# Patient Record
Sex: MALE | Race: WHITE | Employment: OTHER | URBAN - METROPOLITAN AREA
[De-identification: names, ages, dates, MRNs, and addresses within clinical notes are randomized per-mention and may not be internally consistent; named-entity substitution may affect disease eponyms.]

---

## 2017-01-30 ENCOUNTER — DOCTOR'S OFFICE (OUTPATIENT)
Dept: URBAN - METROPOLITAN AREA CLINIC 136 | Facility: CLINIC | Age: 72
Setting detail: OPHTHALMOLOGY
End: 2017-01-30
Payer: COMMERCIAL

## 2017-01-30 DIAGNOSIS — H18.711: ICD-10-CM

## 2017-01-30 PROCEDURE — CLFUP CONTACT LENS FOLLOW-UP: Performed by: OPTOMETRIST

## 2017-01-30 ASSESSMENT — REFRACTION_OUTSIDERX
OD_VA2: 20/NI
OS_VA3: 20/
OS_AXIS: 090
OS_VA1: 20/20
OD_SPHERE: PLANO
OS_ADD: +2.50
OD_VA3: 20/
OS_VA2: 20/20
OS_SPHERE: +0.25
OS_CYLINDER: -1.00
OD_ADD: +2.50
OU_VA: 20/20
OD_CYLINDER: SPH
OD_VA1: 20/NI

## 2017-01-30 ASSESSMENT — REFRACTION_MANIFEST
OS_VA1: 20/
OD_VA2: 20/
OS_SPHERE: -1.50
OD_VA3: 20/
OU_VA: 20/
OS_VA2: 20/
OS_VA3: 20/
OD_VA3: 20/
OS_VA2: 20/
OS_VA1: 20/
OD_VA1: 20/
OD_VA2: 20/
OS_VA3: 20/
OD_VA1: 20/
OS_CYLINDER: SPH
OU_VA: 20/

## 2017-01-30 ASSESSMENT — SPHEQUIV_DERIVED: OS_SPHEQUIV: -0.75

## 2017-01-30 ASSESSMENT — REFRACTION_CURRENTRX
OS_OVR_VA: 20/
OS_AXIS: 100
OD_OVR_VA: 20/
OS_SPHERE: +0.25
OD_VPRISM_DIRECTION: PROGS
OS_ADD: +2.50
OD_OVR_VA: 20/
OD_ADD: +2.50
OS_CYLINDER: -0.50
OD_OVR_VA: 20/
OS_VPRISM_DIRECTION: PROGS

## 2017-01-30 ASSESSMENT — REFRACTION_AUTOREFRACTION
OS_SPHERE: -0.50
OS_AXIS: 083
OS_CYLINDER: -0.50

## 2017-01-30 ASSESSMENT — VISUAL ACUITY
OD_BCVA: 20/25
OS_BCVA: 20/CF XS 5'

## 2017-01-30 ASSESSMENT — DRY EYES - PHYSICIAN NOTES: OS_GENERALCOMMENTS: KSICCA

## 2017-02-27 ENCOUNTER — DOCTOR'S OFFICE (OUTPATIENT)
Dept: URBAN - METROPOLITAN AREA CLINIC 136 | Facility: CLINIC | Age: 72
Setting detail: OPHTHALMOLOGY
End: 2017-02-27
Payer: COMMERCIAL

## 2017-02-27 DIAGNOSIS — H18.711: ICD-10-CM

## 2017-02-27 PROCEDURE — CLFUP CONTACT LENS FOLLOW-UP: Performed by: OPTOMETRIST

## 2017-02-27 ASSESSMENT — REFRACTION_MANIFEST
OD_VA1: 20/
OS_VA1: 20/
OS_VA3: 20/
OD_VA3: 20/
OS_VA1: 20/
OD_VA1: 20/
OS_SPHERE: -1.50
OD_VA2: 20/
OD_VA3: 20/
OU_VA: 20/
OS_VA2: 20/
OS_CYLINDER: SPH
OD_VA2: 20/
OS_VA3: 20/
OU_VA: 20/
OS_VA2: 20/

## 2017-02-27 ASSESSMENT — REFRACTION_OUTSIDERX
OD_ADD: +2.50
OD_VA2: 20/NI
OD_VA1: 20/NI
OD_CYLINDER: SPH
OD_SPHERE: PLANO
OD_VA3: 20/
OS_VA2: 20/20
OS_VA1: 20/20
OS_CYLINDER: -1.00
OS_SPHERE: +0.25
OS_ADD: +2.50
OS_AXIS: 090
OS_VA3: 20/
OU_VA: 20/20

## 2017-02-27 ASSESSMENT — REFRACTION_CURRENTRX
OD_ADD: +2.50
OS_OVR_VA: 20/
OS_OVR_VA: 20/
OD_OVR_VA: 20/
OS_AXIS: 100
OD_OVR_VA: 20/
OS_CYLINDER: -0.50
OS_SPHERE: +0.25
OS_OVR_VA: 20/
OS_ADD: +2.50
OS_VPRISM_DIRECTION: PROGS
OD_VPRISM_DIRECTION: PROGS
OD_OVR_VA: 20/

## 2017-02-27 ASSESSMENT — LID EXAM ASSESSMENTS
OS_BLEPHARITIS: +3
OD_MEIBOMITIS: +2
OD_BLEPHARITIS: +1
OS_MEIBOMITIS: +2

## 2017-02-27 ASSESSMENT — REFRACTION_AUTOREFRACTION
OS_CYLINDER: -0.50
OS_AXIS: 083
OS_SPHERE: -0.50

## 2017-02-27 ASSESSMENT — VISUAL ACUITY
OS_BCVA: 20/40+2
OD_BCVA: 20/25

## 2017-02-27 ASSESSMENT — DRY EYES - PHYSICIAN NOTES: OS_GENERALCOMMENTS: KSICCA

## 2017-02-27 ASSESSMENT — SPHEQUIV_DERIVED: OS_SPHEQUIV: -0.75

## 2017-05-04 ENCOUNTER — TRANSCRIBE ORDERS (OUTPATIENT)
Dept: ADMINISTRATIVE | Facility: HOSPITAL | Age: 72
End: 2017-05-04

## 2017-05-04 ENCOUNTER — ALLSCRIPTS OFFICE VISIT (OUTPATIENT)
Dept: OTHER | Facility: OTHER | Age: 72
End: 2017-05-04

## 2017-05-04 DIAGNOSIS — R49.0 DYSPHONIA OF ORGANIC TREMOR: Primary | ICD-10-CM

## 2017-05-04 DIAGNOSIS — R25.1 DYSPHONIA OF ORGANIC TREMOR: Primary | ICD-10-CM

## 2017-05-04 DIAGNOSIS — R25.1 TREMOR: ICD-10-CM

## 2017-05-17 ENCOUNTER — TRANSCRIBE ORDERS (OUTPATIENT)
Dept: ADMINISTRATIVE | Facility: HOSPITAL | Age: 72
End: 2017-05-17

## 2017-05-17 ENCOUNTER — APPOINTMENT (OUTPATIENT)
Dept: LAB | Facility: HOSPITAL | Age: 72
End: 2017-05-17
Attending: PSYCHIATRY & NEUROLOGY
Payer: COMMERCIAL

## 2017-05-17 ENCOUNTER — HOSPITAL ENCOUNTER (OUTPATIENT)
Dept: MRI IMAGING | Facility: HOSPITAL | Age: 72
Discharge: HOME | End: 2017-05-17
Attending: PSYCHIATRY & NEUROLOGY
Payer: COMMERCIAL

## 2017-05-17 DIAGNOSIS — R25.1 TREMOR: ICD-10-CM

## 2017-05-17 LAB — MAGNESIUM SERPL-MCNC: 1.9 MG/DL (ref 1.6–2.6)

## 2017-05-17 PROCEDURE — 70551 MRI BRAIN STEM W/O DYE: CPT

## 2017-05-17 PROCEDURE — 36415 COLL VENOUS BLD VENIPUNCTURE: CPT

## 2017-05-17 PROCEDURE — 83735 ASSAY OF MAGNESIUM: CPT

## 2017-05-30 ENCOUNTER — GENERIC CONVERSION - ENCOUNTER (OUTPATIENT)
Dept: OTHER | Facility: OTHER | Age: 72
End: 2017-05-30

## 2017-05-30 ENCOUNTER — DOCTOR'S OFFICE (OUTPATIENT)
Dept: URBAN - NONMETROPOLITAN AREA CLINIC 1 | Facility: CLINIC | Age: 72
Setting detail: OPHTHALMOLOGY
End: 2017-05-30
Payer: COMMERCIAL

## 2017-05-30 DIAGNOSIS — H04.122: ICD-10-CM

## 2017-05-30 DIAGNOSIS — H52.4: ICD-10-CM

## 2017-05-30 DIAGNOSIS — H50.42: ICD-10-CM

## 2017-05-30 DIAGNOSIS — H04.121: ICD-10-CM

## 2017-05-30 DIAGNOSIS — H18.711: ICD-10-CM

## 2017-05-30 DIAGNOSIS — H35.30: ICD-10-CM

## 2017-05-30 DIAGNOSIS — E11.3293: ICD-10-CM

## 2017-05-30 DIAGNOSIS — H52.02: ICD-10-CM

## 2017-05-30 DIAGNOSIS — H25.13: ICD-10-CM

## 2017-05-30 PROCEDURE — 92014 COMPRE OPH EXAM EST PT 1/>: CPT | Performed by: OPHTHALMOLOGY

## 2017-05-30 ASSESSMENT — REFRACTION_MANIFEST
OS_SPHERE: -1.50
OS_VA3: 20/
OS_CYLINDER: SPH
OD_VA2: 20/
OD_VA1: 20/
OS_VA1: 20/
OU_VA: 20/
OS_VA1: 20/
OD_VA2: 20/
OD_VA1: 20/
OS_VA2: 20/
OD_VA3: 20/
OD_VA3: 20/
OS_VA3: 20/
OU_VA: 20/
OS_VA2: 20/

## 2017-05-30 ASSESSMENT — REFRACTION_CURRENTRX
OS_CYLINDER: -0.50
OS_OVR_VA: 20/
OS_AXIS: 100
OS_VPRISM_DIRECTION: PROGS
OS_SPHERE: +0.25
OS_ADD: +2.50
OS_OVR_VA: 20/
OD_OVR_VA: 20/
OS_OVR_VA: 20/
OD_OVR_VA: 20/
OD_ADD: +2.50
OD_VPRISM_DIRECTION: PROGS
OD_OVR_VA: 20/

## 2017-05-30 ASSESSMENT — KERATOMETRY
OS_K1POWER_DIOPTERS: 44.00
OS_AXISANGLE_DEGREES: 802
OD_K1POWER_DIOPTERS: 46.50
OS_K2POWER_DIOPTERS: 044.50
OD_K2POWER_DIOPTERS: 63.25
OD_AXISANGLE_DEGREES: 138

## 2017-05-30 ASSESSMENT — REFRACTION_AUTOREFRACTION
OS_AXIS: 083
OS_CYLINDER: -0.50
OS_SPHERE: -0.50

## 2017-05-30 ASSESSMENT — REFRACTION_OUTSIDERX
OS_VA3: 20/
OS_VA1: 20/20
OS_SPHERE: +0.25
OD_SPHERE: PLANO
OD_CYLINDER: SPH
OS_VA2: 20/20
OU_VA: 20/20
OD_ADD: +2.50
OS_ADD: +2.50
OS_AXIS: 090
OD_VA1: 20/NI
OD_VA3: 20/
OS_CYLINDER: -1.00
OD_VA2: 20/NI

## 2017-05-30 ASSESSMENT — DRY EYES - PHYSICIAN NOTES: OS_GENERALCOMMENTS: KSICCA

## 2017-05-30 ASSESSMENT — SPHEQUIV_DERIVED: OS_SPHEQUIV: -0.75

## 2017-05-30 ASSESSMENT — VISUAL ACUITY
OD_BCVA: 20/25
OS_BCVA: 20/40-1

## 2017-05-30 ASSESSMENT — AXIALLENGTH_DERIVED: OS_AL: 23.6086

## 2017-05-30 ASSESSMENT — LID EXAM ASSESSMENTS
OS_BLEPHARITIS: 3+
OD_MEIBOMITIS: 2+
OD_BLEPHARITIS: 1+
OS_MEIBOMITIS: 2+

## 2017-05-30 ASSESSMENT — CONFRONTATIONAL VISUAL FIELD TEST (CVF)
OS_FINDINGS: FULL
OD_FINDINGS: FULL

## 2017-06-02 ENCOUNTER — GENERIC CONVERSION - ENCOUNTER (OUTPATIENT)
Dept: OTHER | Facility: OTHER | Age: 72
End: 2017-06-02

## 2017-06-05 ENCOUNTER — ALLSCRIPTS OFFICE VISIT (OUTPATIENT)
Dept: OTHER | Facility: OTHER | Age: 72
End: 2017-06-05

## 2017-06-05 DIAGNOSIS — I95.1 ORTHOSTATIC HYPOTENSION: ICD-10-CM

## 2017-06-06 ENCOUNTER — TRANSCRIBE ORDERS (OUTPATIENT)
Dept: ADMINISTRATIVE | Facility: HOSPITAL | Age: 72
End: 2017-06-06

## 2017-06-06 ENCOUNTER — GENERIC CONVERSION - ENCOUNTER (OUTPATIENT)
Dept: OTHER | Facility: OTHER | Age: 72
End: 2017-06-06

## 2017-06-06 ENCOUNTER — APPOINTMENT (OUTPATIENT)
Dept: LAB | Facility: HOSPITAL | Age: 72
End: 2017-06-06
Attending: FAMILY MEDICINE
Payer: COMMERCIAL

## 2017-06-06 DIAGNOSIS — I95.1 ORTHOSTATIC HYPOTENSION: ICD-10-CM

## 2017-06-06 LAB
ANION GAP SERPL CALCULATED.3IONS-SCNC: 9 MMOL/L (ref 4–13)
BUN SERPL-MCNC: 15 MG/DL (ref 5–25)
CALCIUM SERPL-MCNC: 9.2 MG/DL (ref 8.3–10.1)
CHLORIDE SERPL-SCNC: 104 MMOL/L (ref 100–108)
CO2 SERPL-SCNC: 28 MMOL/L (ref 21–32)
CREAT SERPL-MCNC: 1.17 MG/DL (ref 0.6–1.3)
GFR SERPL CREATININE-BSD FRML MDRD: >60 ML/MIN/1.73SQ M
GLUCOSE P FAST SERPL-MCNC: 147 MG/DL (ref 65–99)
POTASSIUM SERPL-SCNC: 4.4 MMOL/L (ref 3.5–5.3)
SODIUM SERPL-SCNC: 141 MMOL/L (ref 136–145)

## 2017-06-06 PROCEDURE — 36415 COLL VENOUS BLD VENIPUNCTURE: CPT

## 2017-06-06 PROCEDURE — 80048 BASIC METABOLIC PNL TOTAL CA: CPT

## 2017-07-11 DIAGNOSIS — Z00.00 ENCOUNTER FOR GENERAL ADULT MEDICAL EXAMINATION WITHOUT ABNORMAL FINDINGS: ICD-10-CM

## 2017-07-11 DIAGNOSIS — R25.1 TREMOR: ICD-10-CM

## 2017-07-11 DIAGNOSIS — E11.9 TYPE 2 DIABETES MELLITUS WITHOUT COMPLICATIONS (HCC): ICD-10-CM

## 2017-07-24 ENCOUNTER — GENERIC CONVERSION - ENCOUNTER (OUTPATIENT)
Dept: OTHER | Facility: OTHER | Age: 72
End: 2017-07-24

## 2017-07-24 ENCOUNTER — APPOINTMENT (OUTPATIENT)
Dept: LAB | Facility: HOSPITAL | Age: 72
End: 2017-07-24
Payer: COMMERCIAL

## 2017-07-24 ENCOUNTER — TRANSCRIBE ORDERS (OUTPATIENT)
Dept: ADMINISTRATIVE | Facility: HOSPITAL | Age: 72
End: 2017-07-24

## 2017-07-24 DIAGNOSIS — Z00.8 HEALTH EXAMINATION IN POPULATION SURVEYS: Primary | ICD-10-CM

## 2017-07-24 DIAGNOSIS — Z00.8 HEALTH EXAMINATION IN POPULATION SURVEYS: ICD-10-CM

## 2017-07-24 DIAGNOSIS — E11.9 TYPE 2 DIABETES MELLITUS WITHOUT COMPLICATIONS (HCC): ICD-10-CM

## 2017-07-24 DIAGNOSIS — Z00.00 ENCOUNTER FOR GENERAL ADULT MEDICAL EXAMINATION WITHOUT ABNORMAL FINDINGS: ICD-10-CM

## 2017-07-24 LAB
BASOPHILS # BLD AUTO: 0.03 THOUSANDS/ΜL (ref 0–0.1)
BASOPHILS NFR BLD AUTO: 0 % (ref 0–1)
CHOLEST SERPL-MCNC: 136 MG/DL (ref 50–200)
EOSINOPHIL # BLD AUTO: 0.41 THOUSAND/ΜL (ref 0–0.61)
EOSINOPHIL NFR BLD AUTO: 5 % (ref 0–6)
ERYTHROCYTE [DISTWIDTH] IN BLOOD BY AUTOMATED COUNT: 12.5 % (ref 11.6–15.1)
EST. AVERAGE GLUCOSE BLD GHB EST-MCNC: 171 MG/DL
HBA1C MFR BLD: 7.6 % (ref 4.2–6.3)
HCT VFR BLD AUTO: 44.7 % (ref 36.5–49.3)
HDLC SERPL-MCNC: 40 MG/DL (ref 40–60)
HGB BLD-MCNC: 15.5 G/DL (ref 12–17)
LDLC SERPL CALC-MCNC: 62 MG/DL (ref 0–100)
LYMPHOCYTES # BLD AUTO: 2.43 THOUSANDS/ΜL (ref 0.6–4.47)
LYMPHOCYTES NFR BLD AUTO: 30 % (ref 14–44)
MCH RBC QN AUTO: 33.2 PG (ref 26.8–34.3)
MCHC RBC AUTO-ENTMCNC: 34.7 G/DL (ref 31.4–37.4)
MCV RBC AUTO: 96 FL (ref 82–98)
MONOCYTES # BLD AUTO: 0.55 THOUSAND/ΜL (ref 0.17–1.22)
MONOCYTES NFR BLD AUTO: 7 % (ref 4–12)
NEUTROPHILS # BLD AUTO: 4.63 THOUSANDS/ΜL (ref 1.85–7.62)
NEUTS SEG NFR BLD AUTO: 58 % (ref 43–75)
PLATELET # BLD AUTO: 224 THOUSANDS/UL (ref 149–390)
PMV BLD AUTO: 10.9 FL (ref 8.9–12.7)
RBC # BLD AUTO: 4.67 MILLION/UL (ref 3.88–5.62)
TRIGL SERPL-MCNC: 168 MG/DL
WBC # BLD AUTO: 8.05 THOUSAND/UL (ref 4.31–10.16)

## 2017-07-24 PROCEDURE — 80061 LIPID PANEL: CPT

## 2017-07-24 PROCEDURE — 36415 COLL VENOUS BLD VENIPUNCTURE: CPT

## 2017-07-24 PROCEDURE — 85025 COMPLETE CBC W/AUTO DIFF WBC: CPT

## 2017-07-24 PROCEDURE — 83036 HEMOGLOBIN GLYCOSYLATED A1C: CPT

## 2017-08-17 ENCOUNTER — ALLSCRIPTS OFFICE VISIT (OUTPATIENT)
Dept: OTHER | Facility: OTHER | Age: 72
End: 2017-08-17

## 2017-09-08 DIAGNOSIS — Z11.59 ENCOUNTER FOR SCREENING FOR OTHER VIRAL DISEASES: ICD-10-CM

## 2017-09-10 DIAGNOSIS — E11.9 TYPE 2 DIABETES MELLITUS WITHOUT COMPLICATIONS (HCC): ICD-10-CM

## 2017-09-10 DIAGNOSIS — Z11.59 ENCOUNTER FOR SCREENING FOR OTHER VIRAL DISEASES: ICD-10-CM

## 2017-09-12 ENCOUNTER — ALLSCRIPTS OFFICE VISIT (OUTPATIENT)
Dept: OTHER | Facility: OTHER | Age: 72
End: 2017-09-12

## 2017-09-15 ENCOUNTER — TRANSCRIBE ORDERS (OUTPATIENT)
Dept: LAB | Facility: MEDICAL CENTER | Age: 72
End: 2017-09-15

## 2017-09-15 ENCOUNTER — APPOINTMENT (OUTPATIENT)
Dept: LAB | Facility: MEDICAL CENTER | Age: 72
End: 2017-09-15
Payer: COMMERCIAL

## 2017-09-15 DIAGNOSIS — Z11.59 ENCOUNTER FOR SCREENING FOR OTHER VIRAL DISEASES: ICD-10-CM

## 2017-09-15 DIAGNOSIS — E11.9 TYPE 2 DIABETES MELLITUS WITHOUT COMPLICATIONS (HCC): ICD-10-CM

## 2017-09-15 LAB
ALBUMIN SERPL BCP-MCNC: 3.3 G/DL (ref 3.5–5)
ALP SERPL-CCNC: 80 U/L (ref 46–116)
ALT SERPL W P-5'-P-CCNC: 52 U/L (ref 12–78)
ANION GAP SERPL CALCULATED.3IONS-SCNC: 9 MMOL/L (ref 4–13)
AST SERPL W P-5'-P-CCNC: 24 U/L (ref 5–45)
BILIRUB SERPL-MCNC: 0.8 MG/DL (ref 0.2–1)
BUN SERPL-MCNC: 16 MG/DL (ref 5–25)
CALCIUM SERPL-MCNC: 8.9 MG/DL (ref 8.3–10.1)
CHLORIDE SERPL-SCNC: 103 MMOL/L (ref 100–108)
CHOLEST SERPL-MCNC: 130 MG/DL (ref 50–200)
CO2 SERPL-SCNC: 26 MMOL/L (ref 21–32)
CREAT SERPL-MCNC: 1.14 MG/DL (ref 0.6–1.3)
CREAT UR-MCNC: 169 MG/DL
ERYTHROCYTE [DISTWIDTH] IN BLOOD BY AUTOMATED COUNT: 12.9 % (ref 11.6–15.1)
EST. AVERAGE GLUCOSE BLD GHB EST-MCNC: 189 MG/DL
GFR SERPL CREATININE-BSD FRML MDRD: 65 ML/MIN/1.73SQ M
GLUCOSE P FAST SERPL-MCNC: 181 MG/DL (ref 65–99)
HBA1C MFR BLD: 8.2 % (ref 4.2–6.3)
HCT VFR BLD AUTO: 43.6 % (ref 36.5–49.3)
HDLC SERPL-MCNC: 31 MG/DL (ref 40–60)
HGB BLD-MCNC: 14.8 G/DL (ref 12–17)
LDLC SERPL CALC-MCNC: 65 MG/DL (ref 0–100)
MCH RBC QN AUTO: 32.5 PG (ref 26.8–34.3)
MCHC RBC AUTO-ENTMCNC: 33.9 G/DL (ref 31.4–37.4)
MCV RBC AUTO: 96 FL (ref 82–98)
MICROALBUMIN UR-MCNC: 248 MG/L (ref 0–20)
MICROALBUMIN/CREAT 24H UR: 147 MG/G CREATININE (ref 0–30)
PLATELET # BLD AUTO: 220 THOUSANDS/UL (ref 149–390)
PMV BLD AUTO: 10.5 FL (ref 8.9–12.7)
POTASSIUM SERPL-SCNC: 3.8 MMOL/L (ref 3.5–5.3)
PROT SERPL-MCNC: 7 G/DL (ref 6.4–8.2)
RBC # BLD AUTO: 4.55 MILLION/UL (ref 3.88–5.62)
SODIUM SERPL-SCNC: 138 MMOL/L (ref 136–145)
TRIGL SERPL-MCNC: 171 MG/DL
WBC # BLD AUTO: 8.22 THOUSAND/UL (ref 4.31–10.16)

## 2017-09-15 PROCEDURE — 80061 LIPID PANEL: CPT

## 2017-09-15 PROCEDURE — 82043 UR ALBUMIN QUANTITATIVE: CPT

## 2017-09-15 PROCEDURE — 85027 COMPLETE CBC AUTOMATED: CPT

## 2017-09-15 PROCEDURE — 86803 HEPATITIS C AB TEST: CPT

## 2017-09-15 PROCEDURE — 83036 HEMOGLOBIN GLYCOSYLATED A1C: CPT

## 2017-09-15 PROCEDURE — 80053 COMPREHEN METABOLIC PANEL: CPT

## 2017-09-15 PROCEDURE — 82570 ASSAY OF URINE CREATININE: CPT

## 2017-09-15 PROCEDURE — 36415 COLL VENOUS BLD VENIPUNCTURE: CPT

## 2017-09-16 LAB — HCV AB SER QL: NORMAL

## 2017-09-18 ENCOUNTER — GENERIC CONVERSION - ENCOUNTER (OUTPATIENT)
Dept: OTHER | Facility: OTHER | Age: 72
End: 2017-09-18

## 2017-10-09 ENCOUNTER — ALLSCRIPTS OFFICE VISIT (OUTPATIENT)
Dept: OTHER | Facility: OTHER | Age: 72
End: 2017-10-09

## 2017-10-13 ENCOUNTER — ALLSCRIPTS OFFICE VISIT (OUTPATIENT)
Dept: OTHER | Facility: OTHER | Age: 72
End: 2017-10-13

## 2017-10-13 DIAGNOSIS — E11.9 TYPE 2 DIABETES MELLITUS WITHOUT COMPLICATIONS (HCC): ICD-10-CM

## 2017-10-13 DIAGNOSIS — F41.8 OTHER SPECIFIED ANXIETY DISORDERS: ICD-10-CM

## 2017-10-13 DIAGNOSIS — I25.10 ATHEROSCLEROTIC HEART DISEASE OF NATIVE CORONARY ARTERY WITHOUT ANGINA PECTORIS: ICD-10-CM

## 2017-10-14 NOTE — PROGRESS NOTES
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, greater than 8 weeks (412) (I25 2)    Plan  Coronary arteriosclerosis in native artery, Depression with anxiety, Diabetes mellitus type  2, controlled    · Follow-up visit in 1 year Evaluation and Treatment  Follow-up  Status: Hold For -  Scheduling  Requested for: 40XGB7105   Ordered; For: Coronary arteriosclerosis in native artery, Depression with anxiety, Diabetes mellitus type 2, controlled; Ordered By: Esther Nichols Performed:  Due: 71OXS6149   · STRESS TEST ONLY, EXERCISE; Status:Hold For - Scheduling; Requested  WJS:[id number];    Perform:[facility]; Due:13Oct2018; Ordered; For:Coronary arteriosclerosis in native artery, Depression with anxiety, Diabetes mellitus type 2, controlled; Ordered By:Francois Conte;  Hypertension    · EKG/ECG- POC; Status:Complete;   Done: 43BLE7188 01:27PM   Performed: In Office; Due:16Oct2017; Last Updated Windy Laura; 10/13/2017 1:27:46 PM;Ordered; Today;For:Hypertension; Ordered By:Francois Conte;   · EKG/ECG- POC ; every 1 year; Last 67BNX9993; Next 17EJF3420; Status:Active   For: 'Hypertension'Ordered By: '29 Khadijah Chun Nurse Schedule'    Discussion/Summary  Cardiology Discussion Summary Free Text Note Form [facility]:   All of his assessed cardiac problems are stable  I have reviewed his medications and made no changes  He will be scheduled for a regular EST  He will be dieting ow to control his sugars and get his weight down  RTO 1 year  Chief Complaint  Chief Complaint Free Text Note Form: follow up for CAD      History of Present Illness  Cardiology HPI Free Text Note Form [address] Hussein Kasal: He is not very active  He denies CP, SOB, palpitations  He has CAD with an IMI and RCA stent 2012  His BP and cholesterol are under excellent control  His DM is not   Hgb A1C was > 8  LV function is normal by last ECHO  Review of Systems  Cardiology Male ROS:     Cardiac: as noted in HPI  Skin: No complaints of nonhealing sores or skin rash  Genitourinary: No complaints of recurrent urinary tract infections, frequent urination at night, difficult urination, blood in urine, kidney stones, loss of bladder control, no kidney or prostate problems, no erectile dysfunction  Psychological: No complaints of feeling depressed, anxiety, panic attacks, or difficulty concentrating  General: No complaints of trouble sleeping, lack of energy, fatigue, appetite changes, weight changes, fever, frequent infections, or night sweats  Respiratory: No complaints of shortness of breath, cough with sputum, or wheezing  HEENT: No complaints of serious problems, hearing problems, nose problems, throat problems, or snoring  Gastrointestinal: No complaints of liver problems, nausea, vomiting, heartburn, constipation, bloody stools, diarrhea, problems swallowing, adbominal pain, or rectal bleeding  Hematologic: No complaints of bleeding disorders, anemia, blood clots, or excessive brusing  Neurological: No complaints of numbness, tingling, dizziness, weakness, seizures, headaches, syncope or fainting, AM fatigue, daytime sleepiness, no witnessed apnea episodes  Musculoskeletal: No complaints of arthritis, back pain, or painfull swelling  ROS Reviewed:   ROS reviewed  Active Problems  Problems    1  Arteriosclerotic coronary artery disease (414 00) (I25 10)   2  Arthritis (716 90) (M19 90)   3  Coronary arteriosclerosis in native artery (414 01) (I25 10)   4  Depression with anxiety (300 4) (F41 8)   5  Diabetes mellitus type 2, controlled (250 00) (E11 9)   6  Dizziness (780 4) (R42)   7  Exercise counseling (V65 41) (Z71 82)   8  Flu vaccine need (V04 81) (Z23)   9  Gout (274 9) (M10 9)   10  History of MRSA infection (V12 04) (Z86 14)   11  Hyperlipidemia (272 4) (E78 5)   12  Hypertension (401 9) (I10)   13  Medicare annual wellness visit, subsequent (V70 0) (Z00 00)   14  Morbid or severe obesity due to excess calories (278 01) (E66 01)   15  Need for hepatitis C screening test (V73 89) (Z11 59)   16  Need for influenza vaccination (V04 81) (Z23)   17  No advance directives (V49 89) (Z78 9)   18  NSTEMI (non-ST elevated myocardial infarction) (410 70) (I21 4)   19  Orthostasis (458 0) (I95 1)   20  Peripheral neuropathy (356 9) (G62 9)   21  Screening for depression (V79 0) (Z13 89)   22  Screening for diabetic peripheral neuropathy (V80 09) (Z13 89)   23  Screening for diabetic retinopathy (V80 2) (Z13 5)   24  Screening for genitourinary condition (V81 6) (Z13 89)   25  Screening for neurological condition (V80 09) (Z13 89)   26  Tremor (781 0) (R25 1)    Past Medical History  Problems    1  History of Abnormal blood chemistry (790 6) (R79 9)   2  History of Abscess of skin of abdomen (682 2) (L02 211)   3  History of Achilles tendinitis of left lower extremity (726 71) (M76 62)   4  History of Acute hemorrhagic otitis externa of left ear (380 10) (H60 322)   5  History of Acute hemorrhagic otitis externa of left ear (380 10) (H60 322)   6  History of Acute Non-Q-wave Myocardial Infarction (410 70)   7  History of Acute recurrent maxillary sinusitis (461 0) (J01 01)   8  History of Acute sinusitis (461 9) (J01 90)   9  History of Acute tracheobronchitis (466 0) (J20 9)   10  History of Cellulitis (682 9) (L03 90)   11  History of Cellulitis of right upper extremity (682 3) (L03 113)   12  History of Common cold virus (460) (J00)   13  History of Conjunctivitis (372 30) (H10 9)   14  History of Encounter for prostate cancer screening (V76 44) (Z12 5)   15  History of Glaucoma screening (V80 1) (Z13 5)   16  History of Laceration of forearm (881 00) (S51 819A)   17  History of Need for prophylactic vaccination against single diseases (V05 9) (Z23)   18   History of Sinusitis (473 9) (J32 9)   19  History of Special screening examination for neoplasm of prostate (V76 44) (Z12 5)   20  History of Visit for pre-operative examination (V72 84) (J57 558)  Active Problems And Past Medical History Reviewed: The active problems and past medical history were reviewed and updated today  Surgical History  Problems    1  History of Cath Stent Placement Number Of Stents Placed:   2  History of Tonsillectomy With Adenoidectomy  Surgical History Reviewed: The surgical history was reviewed and updated today  Family History  Mother    1  No pertinent family history  Father    2  No pertinent family history  Family History    3  Family history of Adopted  Family History Reviewed: The family history was reviewed and updated today  Social History  Problems    · Being A Social Drinker   ·    · Never a smoker   · No advance directives (V49 89) (Z78 9)   · No living will  Social History Reviewed: The social history was reviewed and updated today  The social history was reviewed and is unchanged  Current Meds   1  Allopurinol 100 MG Oral Tablet; take one tablet by mouth every day; Therapy: 66PIU4429 to (Daniel Baker)  Requested for: 80YHK1273; Last   Rx:10Oct2016 Ordered   2  Atorvastatin Calcium 80 MG Oral Tablet; take one tablet by mouth every day; Therapy: 01Apr2015 to (Last Rx:95Azw2524)  Requested for: 60Gpz5638 Ordered   3  B Complex Vitamins Oral Capsule Recorded   4  Delvin Aspirin 325 MG TBEC; Take 1 tablet daily; Last Rx:20Oct2015 Ordered   5  Escitalopram Oxalate 10 MG Oral Tablet; Take 1 tablet daily; Therapy: 93Ork3933 to (Evaluate:39Lvl5205)  Requested for: 59ULK9972; Last   Rx:08Jcg4771 Ordered   6  Foltanx 3-35-2 MG Oral Tablet; One po BID Recorded   7  Glimepiride 2 MG Oral Tablet; TAKE ONE TABLET BY MOUTH 2 TIMES A DAY; Therapy: 19LKY0155 to (Evaluate:24Mar2018)  Requested for: 31UZJ0278; Last   Rx:29Mar2017; Status: ACTIVE - Renewal Denied Ordered   8  MetFORMIN HCl - 1000 MG Oral Tablet; TAKE 1 TABLET BY MOUTH TWICE DAILY WITH   MEALS; Therapy: 02XJH3142 to (Last Rx:29Mar2017)  Requested for: 29Mar2017 Ordered   9  Metoprolol Tartrate 25 MG Oral Tablet; TAKE ONE TABLET BY MOUTH 2 TIMES A DAY; Therapy: 92RMX5521 to (Last Rx:29Mar2017)  Requested for: 29Mar2017 Ordered   10  Multivitamins TABS; Therapy: (Recorded:18Jun2012) to Recorded   11  Nitroglycerin 0 4 MG Sublingual Tablet Sublingual; take 1 tablet UNDER THE TONGUE    AS NEEDED FOR CHEST PAIN;    Therapy: 66SWI0016 to (Evaluate:65Qni2114)  Requested for: 26Oct2016; Last    Rx:26Oct2016 Ordered   12  Repaglinide 2 MG Oral Tablet; TAKE 1 TABLET BY MOUTH 3 TIMES A DAY 30 MINUTES    BEFORE MEALS; Therapy: 01OGU9508 to (Last Rx:29Mar2017)  Requested for: 29Mar2017 Ordered  Medication List Reviewed: The medication list was reviewed and updated today  Allergies  Medication    1  No Known Drug Allergies    Vitals  Vital Signs    Recorded: 99SHL0478 01:24PM   Heart Rate 60   Systolic 200, LUE, Sitting   Diastolic 64, LUE, Sitting   BP CUFF SIZE Large   Height 5 ft 9 in   Weight 236 lb    BMI Calculated 34 85   BSA Calculated 2 22     Physical Exam    Constitutional   General appearance: No acute distress, well appearing and well nourished  Eyes   Conjunctiva and Sclera examination: Conjunctiva pink, sclera anicteric  Ears, Nose, Mouth, and Throat - Oropharynx: Clear, nares are clear, mucous membranes are moist    Neck   Neck and thyroid: Normal, supple, trachea midline, no thyromegaly  Pulmonary   Respiratory effort: No increased work of breathing or signs of respiratory distress  Auscultation of lungs: Clear to auscultation, no rales, no rhonchi, no wheezing, good air movement  Cardiovascular   Auscultation of heart: Normal rate and rhythm, normal S1 and S2, no murmurs  Carotid pulses: Normal, 2+ bilaterally      Peripheral vascular exam: Normal pulses throughout, no tenderness, erythema or swelling  Pedal pulses: Normal, 2+ bilaterally  Examination of extremities for edema and/or varicosities: Normal     Abdomen   Abdomen: Non-tender and no distention  Liver and spleen: No hepatomegaly or splenomegaly  Musculoskeletal Gait and station: Normal gait  -- Digits and nails: Normal without clubbing or cyanosis  -- Inspection/palpation of joints, bones, and muscles: Normal, ROM normal     Skin - Skin and subcutaneous tissue: Normal without rashes or lesions  Skin is warm and well perfused, normal turgor  Neurologic - Cranial nerves: II - XII intact  -- Speech: Normal     Psychiatric - Orientation to person, place, and time: Normal -- Mood and affect: Normal       Results/Data  EKG/ECG- POC 54SUB4279 01:27PM Estephanie Ramires     Test Name Result Flag Reference   EKG/ECG 10/13/2017       ECG Report:   Rhythm and rate:  normal sinus rhythm  LAFB, PRWP      Health Management  History of Special screening examination for neoplasm of prostate   (1) PSA (SCREEN) (Dx V76 44 Screen for Prostate Cancer); every 1 year; Last 03Ulz3536; Next  Due: 01Crq2795; Overdue  Hypertension   EKG/ECG- POC; every 1 year; Last 70XST0831; Next Due: 66DCY3036;  Active    Future Appointments    Date/Time Provider Specialty Site   12/12/2017 11:00 AM Beatriz Mckeon DO Family Medicine [address] [place] [address]   11/30/2017 11:40 AM Esaw Hamman, MD Neurology [place] [address]   03/06/2018 08:00 AM Tim Davis DO Family Medicine [address] [place] [address]     Signatures   Electronically signed by : GENNARO Dwyer ; Oct 13 2017  1:41PM EST                       (Author)

## 2017-10-20 ENCOUNTER — HOSPITAL ENCOUNTER (OUTPATIENT)
Dept: NON INVASIVE DIAGNOSTICS | Facility: HOSPITAL | Age: 72
Discharge: HOME | End: 2017-10-20
Attending: INTERNAL MEDICINE
Payer: COMMERCIAL

## 2017-10-20 DIAGNOSIS — I25.10 ATHEROSCLEROTIC HEART DISEASE OF NATIVE CORONARY ARTERY WITHOUT ANGINA PECTORIS: ICD-10-CM

## 2017-10-20 DIAGNOSIS — E11.9 TYPE 2 DIABETES MELLITUS WITHOUT COMPLICATIONS (HCC): ICD-10-CM

## 2017-10-20 DIAGNOSIS — F41.8 OTHER SPECIFIED ANXIETY DISORDERS: ICD-10-CM

## 2017-10-20 PROCEDURE — 93017 CV STRESS TEST TRACING ONLY: CPT

## 2017-11-29 ENCOUNTER — DOCTOR'S OFFICE (OUTPATIENT)
Dept: URBAN - NONMETROPOLITAN AREA CLINIC 1 | Facility: CLINIC | Age: 72
Setting detail: OPHTHALMOLOGY
End: 2017-11-29
Payer: COMMERCIAL

## 2017-11-29 DIAGNOSIS — H35.30: ICD-10-CM

## 2017-11-29 DIAGNOSIS — H25.13: ICD-10-CM

## 2017-11-29 DIAGNOSIS — E11.3293: ICD-10-CM

## 2017-11-29 DIAGNOSIS — H18.711: ICD-10-CM

## 2017-11-29 DIAGNOSIS — H04.123: ICD-10-CM

## 2017-11-29 PROCEDURE — 92250 FUNDUS PHOTOGRAPHY W/I&R: CPT | Performed by: OPHTHALMOLOGY

## 2017-11-29 PROCEDURE — 92134 CPTRZ OPH DX IMG PST SGM RTA: CPT | Performed by: OPHTHALMOLOGY

## 2017-11-29 PROCEDURE — 92014 COMPRE OPH EXAM EST PT 1/>: CPT | Performed by: OPHTHALMOLOGY

## 2017-11-29 ASSESSMENT — REFRACTION_CURRENTRX
OD_OVR_VA: 20/
OS_OVR_VA: 20/
OD_VPRISM_DIRECTION: PROGS
OD_OVR_VA: 20/
OS_OVR_VA: 20/
OS_AXIS: 061
OS_OVR_VA: 20/
OS_VPRISM_DIRECTION: PROGS
OS_CYLINDER: -0.75
OS_SPHERE: +0.25
OD_ADD: +2.50
OS_ADD: +2.50
OD_OVR_VA: 20/

## 2017-11-29 ASSESSMENT — REFRACTION_OUTSIDERX
OD_VA1: 20/NI
OS_ADD: +2.50
OD_SPHERE: PLANO
OS_SPHERE: +0.25
OD_VA3: 20/
OS_CYLINDER: -1.00
OS_VA2: 20/20
OS_VA3: 20/
OS_AXIS: 090
OU_VA: 20/20
OS_VA1: 20/20
OD_ADD: +2.50
OD_VA2: 20/NI
OD_CYLINDER: SPH

## 2017-11-29 ASSESSMENT — REFRACTION_MANIFEST
OD_VA3: 20/
OS_VA3: 20/
OD_VA3: 20/
OU_VA: 20/
OS_VA1: 20/
OU_VA: 20/
OD_VA2: 20/
OS_SPHERE: -1.50
OD_VA2: 20/
OS_VA2: 20/
OD_VA1: 20/
OD_VA1: 20/
OS_VA1: 20/
OS_VA2: 20/
OS_CYLINDER: SPH
OS_VA3: 20/

## 2017-11-29 ASSESSMENT — KERATOMETRY
OD_K2POWER_DIOPTERS: 63.25
OD_AXISANGLE_DEGREES: 138
OS_K2POWER_DIOPTERS: 044.50
OS_AXISANGLE_DEGREES: 802
OS_K1POWER_DIOPTERS: 44.00
OD_K1POWER_DIOPTERS: 46.50

## 2017-11-29 ASSESSMENT — SPHEQUIV_DERIVED: OS_SPHEQUIV: -0.5

## 2017-11-29 ASSESSMENT — CONFRONTATIONAL VISUAL FIELD TEST (CVF)
OD_FINDINGS: FULL
OS_FINDINGS: FULL

## 2017-11-29 ASSESSMENT — LID EXAM ASSESSMENTS
OS_BLEPHARITIS: 3+
OD_BLEPHARITIS: 1+
OS_MEIBOMITIS: 2+
OD_MEIBOMITIS: 2+

## 2017-11-29 ASSESSMENT — AXIALLENGTH_DERIVED: OS_AL: 23.5115

## 2017-11-29 ASSESSMENT — REFRACTION_AUTOREFRACTION
OS_AXIS: 091
OS_CYLINDER: -1.50
OS_SPHERE: +0.25

## 2017-11-29 ASSESSMENT — VISUAL ACUITY
OD_BCVA: 20/25-1
OS_BCVA: CFX3'

## 2017-11-29 ASSESSMENT — DRY EYES - PHYSICIAN NOTES: OS_GENERALCOMMENTS: KSICCA

## 2017-12-01 ENCOUNTER — GENERIC CONVERSION - ENCOUNTER (OUTPATIENT)
Dept: FAMILY MEDICINE CLINIC | Facility: CLINIC | Age: 72
End: 2017-12-01

## 2017-12-07 ENCOUNTER — GENERIC CONVERSION - ENCOUNTER (OUTPATIENT)
Dept: OTHER | Facility: OTHER | Age: 72
End: 2017-12-07

## 2017-12-27 ENCOUNTER — GENERIC CONVERSION - ENCOUNTER (OUTPATIENT)
Dept: OTHER | Facility: OTHER | Age: 72
End: 2017-12-27

## 2017-12-27 ENCOUNTER — APPOINTMENT (OUTPATIENT)
Dept: RADIOLOGY | Facility: MEDICAL CENTER | Age: 72
End: 2017-12-27
Payer: COMMERCIAL

## 2017-12-27 ENCOUNTER — TRANSCRIBE ORDERS (OUTPATIENT)
Dept: RADIOLOGY | Facility: MEDICAL CENTER | Age: 72
End: 2017-12-27

## 2017-12-27 DIAGNOSIS — S81.819A: ICD-10-CM

## 2017-12-27 PROCEDURE — 73590 X-RAY EXAM OF LOWER LEG: CPT

## 2018-01-10 NOTE — RESULT NOTES
Verified Results  (1) CULTURE, BODY FLUID 06Jun2016 03:59PM Farrukh Adrian   TW Order Number: [id number]     Test Name Result Flag Reference   CLINICAL REPORT (Report) A    Test:        Body fluid culture  Specimen Type: Body Fluid  Specimen Date:   6/6/2016 3:59 PM  Result Date:    6/8/2016 11:16 AM  Result Status:   Final result  Abnormal:      Yes  Resulting Lab:   [facility] [address] [place] [address]            Tel: [phone]      CULTURE                                       ------------------                                   2+ Growth of Methicillin Resistant Staphylococcus aureus (Abnormal)     *** Please note: This patient requires contact precautions  ***      STAIN                                        ------------------                                   No polys seen    Rare Gram positive cocci in pairs      SUSCEPTIBILITY                                   ------------------                                                       Methicillin Resistant                       Staphylococcus aureus  METHOD                 AMINA  -------------------------------------  --------------------------------  AMOXICILLIN + CLAVULANATE        <=4/2 ug/ml   Resistant  AMPICILLIN ($$)             >8 00 ug/ml   Resistant  AMPICILLIN + SULBACTAM ($)       <=8/4 ug/ml   Resistant  CEFAZOLIN ($)              <=8 00 ug/ml   Resistant  CLINDAMYCIN ($)             <=0 50 ug/ml   Susceptible [1]  ERYTHROMYCIN ($$$$)           >4 00 ug/ml   Resistant  GENTAMICIN ($$)             <=4 ug/ml    Susceptible  OXACILLIN                >2 00 ug/ml   Resistant  TETRACYCLINE              <=4 ug/ml    Susceptible  TRIMETHOPRIM + SULFAMETHOXAZOLE ($$$)  <=0 5/9 5 ug/ml Susceptible  VANCOMYCIN ($)             1 00 ug/ml    Susceptible         *** [1] The [employer] test is Negative  This isolate is sensitive to Clindamycin        ***     (1) CULTURE, BODY FLUID 06Jun2016 03:59PM Linda Beltran [facility] Order Number: [id number]     Test Name Result Flag Reference   CLINICAL REPORT (Report) A    Test:        Body fluid culture  Specimen Type: Body Fluid  Specimen Date:   6/6/2016 3:59 PM  Result Date:    6/8/2016 10:47 AM  Result Status:   Final result  Abnormal:      Yes  Resulting Lab:   [facility] [address] [place] [address]            Tel: [phone]      CULTURE                                       ------------------                                   3+ Growth of Methicillin Resistant Staphylococcus aureus (Abnormal)     *** Please note: This patient requires contact precautions  ***      STAIN                                        ------------------                                   Rare Polys    1+ Gram positive cocci in pairs      SUSCEPTIBILITY                                   ------------------                                                       Methicillin Resistant                       Staphylococcus aureus  METHOD                 AMINA  -------------------------------------  --------------------------------  AMOXICILLIN + CLAVULANATE        <=4/2 ug/ml   Resistant  AMPICILLIN ($$)             >8 00 ug/ml   Resistant  AMPICILLIN + SULBACTAM ($)       <=8/4 ug/ml   Resistant  CEFAZOLIN ($)              <=8 00 ug/ml   Resistant  CLINDAMYCIN ($)             <=0 50 ug/ml   Susceptible [1]  ERYTHROMYCIN ($$$$)           >4 00 ug/ml   Resistant  GENTAMICIN ($$)             <=4 ug/ml    Susceptible  OXACILLIN                >2 00 ug/ml   Resistant  TETRACYCLINE              <=4 ug/ml    Susceptible  TRIMETHOPRIM + SULFAMETHOXAZOLE ($$$)  <=0 5/9 5 ug/ml Susceptible  VANCOMYCIN ($)             1 00 ug/ml    Susceptible         *** [1] The [employer] test is Negative  This isolate is sensitive to Clindamycin        ***

## 2018-01-11 NOTE — RESULT NOTES
Verified Results  (1) COMPREHENSIVE METABOLIC PANEL 71XTN3505 28:87EU Lotus Cheadle     Test Name Result Flag Reference   GLUCOSE,RANDM 145 mg/dL H    If the patient is fasting, the ADA then defines impaired fasting glucose as > 100 mg/dL and diabetes as > or equal to 123 mg/dL  SODIUM 139 mmol/L  136-145   POTASSIUM 4 4 mmol/L  3 5-5 3   CHLORIDE 105 mmol/L  100-108   CARBON DIOXIDE 22 mmol/L  21-32   ANION GAP (CALC) 12 mmol/L  4-13   BLOOD UREA NITROGEN 13 mg/dL  5-25   CREATININE 1 00 mg/dL  0 60-1 30   Standardized to IDMS reference method   CALCIUM 8 7 mg/dL  8 3-10 1   BILI, TOTAL 0 50 mg/dL  0 20-1 00   ALK PHOSPHATAS 90 U/L     ALT (SGPT) 56 U/L  12-78   AST(SGOT) 29 U/L  5-45   ALBUMIN 3 3 g/dL L 3 5-5 0   TOTAL PROTEIN 6 6 g/dL  6 4-8 2   eGFR Non-African American      >60 0 ml/min/1 73sq [facility] Energy Disease Education Program recommendations are as follows:  GFR calculation is accurate only with a steady state creatinine  Chronic Kidney disease less than 60 ml/min/1 73 sq  meters  Kidney failure less than 15 ml/min/1 73 sq  meters  (1) HEMOGLOBIN A1C 83FRV2431 09:06AM Lotus Cheadle     Test Name Result Flag Reference   HEMOGLOBIN A1C 8 7 % H 4 2-6 3   EST  AVG  GLUCOSE 203 mg/dl       (1) LIPID PANEL, FASTING 02SPF7574 09:06AM Lotus Cheadle     Test Name Result Flag Reference   CHOLESTEROL 125 mg/dL     HDL,DIRECT 33 mg/dL L 40-60   Specimen collection should occur prior to Metamizole administration due to the potential for falsely depressed results     LDL CHOLESTEROL CALCULATED 55 mg/dL  0-100   Triglyceride:         Normal              <150 mg/dl       Borderline High    150-199 mg/dl       High               200-499 mg/dl       Very High          >499 mg/dl  Cholesterol:         Desirable        <200 mg/dl      Borderline High  200-239 mg/dl      High             >239 mg/dl  HDL Cholesterol:        High    >59 mg/dL      Low     <41 mg/dL  LDL CALCULATED:    This screening LDL is a calculated result  It does not have the accuracy of the Direct Measured LDL in the monitoring of patients with hyperlipidemia and/or statin therapy  Direct Measure LDL (ZSQ055) must be ordered separately in these patients  TRIGLYCERIDES 187 mg/dL H <=150   Specimen collection should occur prior to N-Acetylcysteine or Metamizole administration due to the potential for falsely depressed results       (1) MICROALBUMIN CREATININE RATIO, RANDOM URINE 10Jun2016 09:06AM [employer]     Test Name Result Flag Reference   MICROALBUMIN/ CREAT R 152 mg/g creatinine H 0-30   MICROALBUMIN,URINE 109 0 mg/L H 0 0-20 0   CREATININE URINE 71 5 mg/dL       (1) HEP C ANTIBODY 10Jun2016 09:06AM [employer]    Order Number: [id number]   Order Number: [id number]     Test Name Result Flag Reference   HEPATITIS C ANTIBODY Non-reactive  Non-reactive     (1) TSH 49WKA0847 09:06AM [employer]     Test Name Result Flag Reference   TSH 3 060 uIU/mL  0 358-3 740

## 2018-01-11 NOTE — RESULT NOTES
Verified Results  (1) BASIC METABOLIC PROFILE 75AHE8987 09:35AM Yin Stover Order Number: [id number]     Test Name Result Flag Reference   SODIUM 141 mmol/L  136-145   POTASSIUM 4 4 mmol/L  3 5-5 3   CHLORIDE 104 mmol/L  100-108   CARBON DIOXIDE 28 mmol/L  21-32   ANION GAP (CALC) 9 mmol/L  4-13   BLOOD UREA NITROGEN 15 mg/dL  5-25   CREATININE 1 17 mg/dL  0 60-1 30   Standardized to IDMS reference method   CALCIUM 9 2 mg/dL  8 3-10 1   eGFR Non-African American      >60 0 ml/min/1 73sq m   [facility] Energy Disease Education Program recommendations are as follows:  GFR calculation is accurate only with a steady state creatinine  Chronic Kidney disease less than 60 ml/min/1 73 sq  meters  Kidney failure less than 15 ml/min/1 73 sq  meters     GLUCOSE FASTING 147 mg/dL H 65-99

## 2018-01-11 NOTE — RESULT NOTES
Verified Results  * XR CHEST PA & LATERAL 50WPT3799 05:32PM Marlen Pena Order Number: [id number]     Test Name Result Flag Reference   XR CHEST PA & LATERAL (Report)     CHEST      INDICATION: Cough and congestion for 2 weeks  COMPARISON: 11/24/2014     VIEWS: Frontal and lateral projections; 2 images     FINDINGS:        Cardiomediastinal silhouette appears unremarkable  The lungs are clear  No pneumothorax or pleural effusion  Visualized osseous structures appear within normal limits for the patient's age  IMPRESSION:     No active pulmonary disease  Workstation performed: [id number]     Signed by:    Leonel Kennedy MD   2/11/16

## 2018-01-12 VITALS
WEIGHT: 238.5 LBS | SYSTOLIC BLOOD PRESSURE: 126 MMHG | RESPIRATION RATE: 18 BRPM | DIASTOLIC BLOOD PRESSURE: 60 MMHG | HEART RATE: 83 BPM | BODY MASS INDEX: 35.32 KG/M2 | HEIGHT: 69 IN

## 2018-01-12 NOTE — RESULT NOTES
Verified Results  (1) CBC/PLT/DIFF 74SWA5862 11:43AM Yvonne Proper Order Number: [id number]     Test Name Result Flag Reference   WBC COUNT 8 05 Thousand/uL  4 31-10 16   RBC COUNT 4 67 Million/uL  3 88-5 62   HEMOGLOBIN 15 5 g/dL  12 0-17 0   HEMATOCRIT 44 7 %  36 5-49 3   MCV 96 fL  82-98   MCH 33 2 pg  26 8-34 3   MCHC 34 7 g/dL  31 4-37 4   RDW 12 5 %  11 6-15 1   MPV 10 9 fL  8 9-12 7   PLATELET COUNT 293 Thousands/uL  149-390   NEUTROPHILS RELATIVE PERCENT 58 %  43-75   LYMPHOCYTES RELATIVE PERCENT 30 %  14-44   MONOCYTES RELATIVE PERCENT 7 %  4-12   EOSINOPHILS RELATIVE PERCENT 5 %  0-6   BASOPHILS RELATIVE PERCENT 0 %  0-1   NEUTROPHILS ABSOLUTE COUNT 4 63 Thousands/? ??L  1 85-7 62   LYMPHOCYTES ABSOLUTE COUNT 2 43 Thousands/? ??L  0 60-4 47   MONOCYTES ABSOLUTE COUNT 0 55 Thousand/? ??L  0 17-1 22   EOSINOPHILS ABSOLUTE COUNT 0 41 Thousand/? ??L  0 00-0 61   BASOPHILS ABSOLUTE COUNT 0 03 Thousands/? ??L  0 00-0 10

## 2018-01-13 VITALS
WEIGHT: 238.13 LBS | RESPIRATION RATE: 18 BRPM | BODY MASS INDEX: 35.27 KG/M2 | DIASTOLIC BLOOD PRESSURE: 70 MMHG | HEIGHT: 69 IN | SYSTOLIC BLOOD PRESSURE: 122 MMHG | HEART RATE: 68 BPM

## 2018-01-14 VITALS
SYSTOLIC BLOOD PRESSURE: 104 MMHG | DIASTOLIC BLOOD PRESSURE: 64 MMHG | HEART RATE: 60 BPM | BODY MASS INDEX: 34.96 KG/M2 | HEIGHT: 69 IN | WEIGHT: 236 LBS

## 2018-01-14 VITALS
WEIGHT: 243 LBS | BODY MASS INDEX: 35.99 KG/M2 | DIASTOLIC BLOOD PRESSURE: 64 MMHG | SYSTOLIC BLOOD PRESSURE: 108 MMHG | HEIGHT: 69 IN

## 2018-01-14 VITALS
HEIGHT: 69 IN | DIASTOLIC BLOOD PRESSURE: 70 MMHG | SYSTOLIC BLOOD PRESSURE: 112 MMHG | BODY MASS INDEX: 35.7 KG/M2 | WEIGHT: 241 LBS

## 2018-01-14 NOTE — RESULT NOTES
Verified Results  (1) HEP C ANTIBODY 15Sep2017 08:57AM Heidi Giang    Order Number: [id number]     Test Name Result Flag Reference   HEPATITIS C ANTIBODY Non-reactive  Non-reactive     (1) COMPREHENSIVE METABOLIC PANEL [id number] 66:68LQ Heidi Giang     Test Name Result Flag Reference   SODIUM 138 mmol/L  136-145   POTASSIUM 3 8 mmol/L  3 5-5 3   CHLORIDE 103 mmol/L  100-108   CARBON DIOXIDE 26 mmol/L  21-32   ANION GAP (CALC) 9 mmol/L  4-13   BLOOD UREA NITROGEN 16 mg/dL  5-25   CREATININE 1 14 mg/dL  0 60-1 30   Standardized to IDMS reference method   CALCIUM 8 9 mg/dL  8 3-10 1   BILI, TOTAL 0 80 mg/dL  0 20-1 00   ALK PHOSPHATAS 80 U/L     ALT (SGPT) 52 U/L  12-78   Specimen collection should occur prior to Sulfasalazine and/or Sulfapyridine administration due to the potential for falsely depressed results  AST(SGOT) 24 U/L  5-45   Specimen collection should occur prior to Sulfasalazine administration due to the potential for falsely depressed results  ALBUMIN 3 3 g/dL L 3 5-5 0   TOTAL PROTEIN 7 0 g/dL  6 4-8 2   eGFR 65 ml/min/1 73sq m     National Kidney Disease Education Program recommendations are as follows:  GFR calculation is accurate only with a steady state creatinine  Chronic Kidney disease less than 60 ml/min/1 73 sq  meters  Kidney failure less than 15 ml/min/1 73 sq  meters  GLUCOSE FASTING 181 mg/dL H 65-99   Specimen collection should occur prior to Sulfasalazine administration due to the potential for falsely depressed results  Specimen collection should occur prior to Sulfapyridine administration due to the potential for falsely elevated results  (1) HEMOGLOBIN A1C 37Iwl1800 08:57AM Heidi Giang     Test Name Result Flag Reference   HEMOGLOBIN A1C 8 2 % H 4 2-6 3   EST  AVG   GLUCOSE 189 mg/dl       (1) LIPID PANEL, FASTING 15Sep2017 08:57AM Heidi Giang     Test Name Result Flag Reference   CHOLESTEROL 130 mg/dL     HDL,DIRECT 31 mg/dL L 40-60   Specimen collection should occur prior to Metamizole administration due to the potential for falsley depressed results  LDL CHOLESTEROL CALCULATED 65 mg/dL  0-100   Triglyceride:        Normal <150 mg/dl   Borderline High 150-199 mg/dl   High 200-499 mg/dl   Very High >499 mg/dl      Cholesterol:       Desirable <200 mg/dl    Borderline High 200-239 mg/dl    High >239 mg/dl      HDL Cholesterol:       High>59 mg/dL    Low <41 mg/dL      This screening LDL is a calculated result  It does not have the accuracy of the Direct Measured LDL in the monitoring of patients with hyperlipidemia and/or statin therapy  Direct Measure LDL (FGX337) must be ordered separately in these patients  TRIGLYCERIDES 171 mg/dL H <=150   Specimen collection should occur prior to N-Acetylcysteine or Metamizole administration due to the potential for falsely depressed results       (1) MICROALBUMIN CREATININE RATIO, RANDOM URINE 15Sep2017 08:57AM [employer]     Test Name Result Flag Reference   MICROALBUMIN/ CREAT R 147 mg/g creatinine H 0-30   MICROALBUMIN,URINE 248 0 mg/L H 0 0-20 0   CREATININE URINE 169 0 mg/dL       (1) CBC/ PLT (NO DIFF) 15Sep2017 08:57AM [employer]     Test Name Result Flag Reference   HEMATOCRIT 43 6 %  36 5-49 3   HEMOGLOBIN 14 8 g/dL  12 0-17 0   MCHC 33 9 g/dL  31 4-37 4   MCH 32 5 pg  26 8-34 3   MCV 96 fL  82-98   PLATELET COUNT 485 Thousands/uL  149-390   RBC COUNT 4 55 Million/uL  3 88-5 62   RDW 12 9 %  11 6-15 1   WBC COUNT 8 22 Thousand/uL  4 31-10 16   MPV 10 5 fL  8 9-12 7

## 2018-01-22 VITALS
SYSTOLIC BLOOD PRESSURE: 92 MMHG | WEIGHT: 237 LBS | DIASTOLIC BLOOD PRESSURE: 60 MMHG | BODY MASS INDEX: 35.1 KG/M2 | HEIGHT: 69 IN

## 2018-01-23 NOTE — RESULT NOTES
Verified Results  * XR TIBIA FIBULA 2 VIEW LEFT [id number] 01:10PM Radha Wills    Order Number: [id number]     Test Name Result Flag Reference   XR TIBIA FIBULA 2 VW LEFT (Report)     LEFT TIBIA AND FIBULA     INDICATION: E09 335I: Laceration without foreign body, unspecified lower leg, initial encounter  History taken directly from the electronic ordering system  Kayla Locust hitting left shin on steps  Pain and laceration left mid lower leg  COMPARISON: None     VIEWS: AP and lateral     IMAGES: 2     FINDINGS:     There is no acute fracture or dislocation  Plantar calcaneal spur  No lytic or blastic lesions are seen  Vascular calcification is seen  IMPRESSION:     No acute osseous abnormality  Workstation performed: [id number]     Signed by:   Laura Vazquez MD   12/27/17

## 2018-01-24 VITALS
BODY MASS INDEX: 35.71 KG/M2 | WEIGHT: 241.13 LBS | TEMPERATURE: 99.3 F | DIASTOLIC BLOOD PRESSURE: 66 MMHG | SYSTOLIC BLOOD PRESSURE: 128 MMHG | HEIGHT: 69 IN

## 2018-01-24 VITALS
SYSTOLIC BLOOD PRESSURE: 103 MMHG | HEIGHT: 69 IN | BODY MASS INDEX: 35.99 KG/M2 | DIASTOLIC BLOOD PRESSURE: 58 MMHG | HEART RATE: 70 BPM | WEIGHT: 243 LBS

## 2018-03-06 ENCOUNTER — TRANSCRIBE ORDERS (OUTPATIENT)
Dept: LAB | Facility: MEDICAL CENTER | Age: 73
End: 2018-03-06

## 2018-03-06 ENCOUNTER — APPOINTMENT (OUTPATIENT)
Dept: LAB | Facility: MEDICAL CENTER | Age: 73
End: 2018-03-06
Payer: COMMERCIAL

## 2018-03-06 ENCOUNTER — OFFICE VISIT (OUTPATIENT)
Dept: FAMILY MEDICINE CLINIC | Facility: CLINIC | Age: 73
End: 2018-03-06
Payer: COMMERCIAL

## 2018-03-06 VITALS
BODY MASS INDEX: 35.76 KG/M2 | SYSTOLIC BLOOD PRESSURE: 108 MMHG | WEIGHT: 241.4 LBS | HEIGHT: 69 IN | DIASTOLIC BLOOD PRESSURE: 60 MMHG

## 2018-03-06 DIAGNOSIS — E11.21 TYPE 2 DIABETES MELLITUS WITH DIABETIC NEPHROPATHY, WITHOUT LONG-TERM CURRENT USE OF INSULIN (HCC): ICD-10-CM

## 2018-03-06 DIAGNOSIS — J22 ACUTE LOWER RESPIRATORY TRACT INFECTION: Primary | ICD-10-CM

## 2018-03-06 LAB
ALBUMIN SERPL BCP-MCNC: 3.7 G/DL (ref 3.5–5)
ALP SERPL-CCNC: 92 U/L (ref 46–116)
ALT SERPL W P-5'-P-CCNC: 70 U/L (ref 12–78)
ANION GAP SERPL CALCULATED.3IONS-SCNC: 6 MMOL/L (ref 4–13)
AST SERPL W P-5'-P-CCNC: 26 U/L (ref 5–45)
BILIRUB SERPL-MCNC: 0.62 MG/DL (ref 0.2–1)
BUN SERPL-MCNC: 18 MG/DL (ref 5–25)
CALCIUM SERPL-MCNC: 9 MG/DL (ref 8.3–10.1)
CHLORIDE SERPL-SCNC: 100 MMOL/L (ref 100–108)
CHOLEST SERPL-MCNC: 135 MG/DL (ref 50–200)
CO2 SERPL-SCNC: 31 MMOL/L (ref 21–32)
CREAT SERPL-MCNC: 1.11 MG/DL (ref 0.6–1.3)
CREAT UR-MCNC: 185 MG/DL
EST. AVERAGE GLUCOSE BLD GHB EST-MCNC: 183 MG/DL
GFR SERPL CREATININE-BSD FRML MDRD: 66 ML/MIN/1.73SQ M
GLUCOSE P FAST SERPL-MCNC: 181 MG/DL (ref 65–99)
HBA1C MFR BLD: 8 % (ref 4.2–6.3)
HDLC SERPL-MCNC: 35 MG/DL (ref 40–60)
LDLC SERPL CALC-MCNC: 60 MG/DL (ref 0–100)
MICROALBUMIN UR-MCNC: 298 MG/L (ref 0–20)
MICROALBUMIN/CREAT 24H UR: 161 MG/G CREATININE (ref 0–30)
POTASSIUM SERPL-SCNC: 4.5 MMOL/L (ref 3.5–5.3)
PROT SERPL-MCNC: 7.7 G/DL (ref 6.4–8.2)
SODIUM SERPL-SCNC: 137 MMOL/L (ref 136–145)
TRIGL SERPL-MCNC: 200 MG/DL

## 2018-03-06 PROCEDURE — 99214 OFFICE O/P EST MOD 30 MIN: CPT | Performed by: FAMILY MEDICINE

## 2018-03-06 PROCEDURE — 80061 LIPID PANEL: CPT | Performed by: FAMILY MEDICINE

## 2018-03-06 PROCEDURE — 36415 COLL VENOUS BLD VENIPUNCTURE: CPT | Performed by: FAMILY MEDICINE

## 2018-03-06 PROCEDURE — 82570 ASSAY OF URINE CREATININE: CPT | Performed by: FAMILY MEDICINE

## 2018-03-06 PROCEDURE — 80053 COMPREHEN METABOLIC PANEL: CPT | Performed by: FAMILY MEDICINE

## 2018-03-06 PROCEDURE — 3060F POS MICROALBUMINURIA REV: CPT | Performed by: FAMILY MEDICINE

## 2018-03-06 PROCEDURE — 82043 UR ALBUMIN QUANTITATIVE: CPT | Performed by: FAMILY MEDICINE

## 2018-03-06 PROCEDURE — 3066F NEPHROPATHY DOC TX: CPT | Performed by: FAMILY MEDICINE

## 2018-03-06 PROCEDURE — 83036 HEMOGLOBIN GLYCOSYLATED A1C: CPT | Performed by: FAMILY MEDICINE

## 2018-03-06 RX ORDER — AMOXICILLIN AND CLAVULANATE POTASSIUM 875; 125 MG/1; MG/1
1 TABLET, FILM COATED ORAL EVERY 12 HOURS SCHEDULED
Qty: 20 TABLET | Refills: 0 | Status: SHIPPED | OUTPATIENT
Start: 2018-03-06 | End: 2018-03-16

## 2018-03-06 RX ORDER — GABAPENTIN 300 MG/1
100 CAPSULE ORAL 3 TIMES DAILY
COMMUNITY
End: 2018-08-08 | Stop reason: ALTCHOICE

## 2018-03-06 NOTE — ASSESSMENT & PLAN NOTE
Blood sugars not recently checked hemoglobin A1c not checks in September has not been great about following his diet weight is up several lb on

## 2018-03-06 NOTE — PROGRESS NOTES
Assessment/Plan:    Diabetes mellitus type 2, controlled ([facility] )  Blood sugars not recently checked hemoglobin A1c not checks in September has not been great about following his diet weight is up several lb on    Hypertension   Blood pressure well controlled 108/60    Tremor   Tremor being treated by Dr Girma Ashton on gabapentin 100 mg t i d        Diagnoses and all orders for this visit:    Acute lower respiratory tract infection    Other orders  -     gabapentin (NEURONTIN) 300 mg capsule; Take 100 mg by mouth 3 (three) times a day          Subjective:      Patient ID: Linda Logan is a 71 y o  male  Upper respiratory tract infection which has become a lower respiratory infection possibly running fevers has not taken his temperature cough with expectoration of mucus not aware of color no GI symptomatology see systems review for other complaints        The following portions of the patient's history were reviewed and updated as appropriate:   He  has a past medical history of BPH (benign prostatic hyperplasia); CAD (coronary artery disease); Diabetes mellitus ([facility] ); Gout; High cholesterol; Hypertension; and MI, old  He   Patient Active Problem List    Diagnosis Date Noted    Tremor 10/10/2016    Depression with anxiety 08/29/2016    Arthritis 07/13/2015    NSTEMI (non-ST elevated myocardial infarction) ([facility] ) 10/06/2014    Coronary arteriosclerosis in native artery 04/08/2014    Gout 05/17/2013    Arteriosclerotic coronary artery disease 11/28/2012    Diabetes mellitus type 2, controlled ([facility] ) 06/18/2012    Hyperlipidemia 06/18/2012    Hypertension 06/18/2012    Morbid obesity ([facility] ) 06/18/2012     He  has a past surgical history that includes Tonsillectomy and adenoidectomy; Cataract extraction w/  intraocular lens implant; Eye surgery; Coronary angioplasty with stent; and pr colonoscopy flx dx w/collj spec when pfrmd (N/A, 3/21/2016)    His family history includes No Known Problems in his father and mother  He was adopted  He  reports that he has never smoked  He has never used smokeless tobacco  He reports that he drinks alcohol  He reports that he does not use drugs  Current Outpatient Prescriptions   Medication Sig Dispense Refill    allopurinol (ZYLOPRIM) 100 mg tablet Take 100 mg by mouth daily   aspirin 325 mg tablet Take 325 mg by mouth daily   atorvastatin (LIPITOR) 80 mg tablet Take 80 mg by mouth daily   b complex vitamins tablet Take 1 tablet by mouth daily   Canagliflozin (INVOKANA PO) Take 100 mg by mouth daily   gabapentin (NEURONTIN) 300 mg capsule Take 100 mg by mouth 3 (three) times a day      glimepiride (AMARYL) 2 mg tablet Take 2 mg by mouth 2 (two) times a day   L-Methylfolate-B6-B12 (FOLTANX) 3-35-2 MG TABS Take by mouth   metFORMIN (GLUCOPHAGE) 1000 MG tablet Take 1,000 mg by mouth 2 (two) times a day with meals   metoprolol succinate (TOPROL-XL) 25 mg 24 hr tablet Take 25 mg by mouth 2 (two) times a day   repaglinide (PRANDIN) 2 mg tablet Take 2 mg by mouth 3 (three) times a day before meals  No current facility-administered medications for this visit  Current Outpatient Prescriptions on File Prior to Visit   Medication Sig    allopurinol (ZYLOPRIM) 100 mg tablet Take 100 mg by mouth daily   aspirin 325 mg tablet Take 325 mg by mouth daily   atorvastatin (LIPITOR) 80 mg tablet Take 80 mg by mouth daily   b complex vitamins tablet Take 1 tablet by mouth daily   Canagliflozin (INVOKANA PO) Take 100 mg by mouth daily   glimepiride (AMARYL) 2 mg tablet Take 2 mg by mouth 2 (two) times a day   L-Methylfolate-B6-B12 (FOLTANX) 3-35-2 MG TABS Take by mouth   metFORMIN (GLUCOPHAGE) 1000 MG tablet Take 1,000 mg by mouth 2 (two) times a day with meals   metoprolol succinate (TOPROL-XL) 25 mg 24 hr tablet Take 25 mg by mouth 2 (two) times a day      repaglinide (PRANDIN) 2 mg tablet Take 2 mg by mouth 3 (three) times a day before meals  No current facility-administered medications on file prior to visit  He has No Known Allergies       Review of Systems   Constitutional: Positive for fatigue and fever  Negative for activity change, appetite change and diaphoresis  HENT: Positive for sinus pain, sinus pressure and sore throat  Eyes: Negative  Respiratory: Positive for cough and shortness of breath  Negative for apnea, chest tightness and wheezing  Cardiovascular: Negative for chest pain, palpitations and leg swelling  Gastrointestinal: Negative for abdominal distention, abdominal pain, anal bleeding, constipation, diarrhea, nausea and vomiting  Endocrine: Negative for cold intolerance, heat intolerance, polydipsia, polyphagia and polyuria  Genitourinary: Negative for difficulty urinating, dysuria, flank pain, hematuria and urgency  Musculoskeletal: Negative for arthralgias, back pain, gait problem, joint swelling and myalgias  Skin: Negative for color change, rash and wound  Allergic/Immunologic: Negative for environmental allergies, food allergies and immunocompromised state  Neurological: Negative for dizziness, seizures, syncope, speech difficulty, numbness and headaches  Hematological: Negative for adenopathy  Does not bruise/bleed easily  Psychiatric/Behavioral: Negative for agitation, behavioral problems, hallucinations, sleep disturbance and suicidal ideas  Objective:      /60 (BP Location: Left arm, Patient Position: Sitting, Cuff Size: Standard)   Ht 5' 9" (1 753 m)   Wt 109 kg (241 lb 6 4 oz)   BMI 35 65 kg/m²          Physical Exam   Constitutional: He is oriented to person, place, and time  He appears well-developed and well-nourished  No distress  HENT:   Head: Normocephalic  Right Ear: External ear normal    Left Ear: External ear normal    Nose: Nose normal    Mouth/Throat: Oropharyngeal exudate present     Sinuses hyperemic    Eyes: Conjunctivae and EOM are normal  Pupils are equal, round, and reactive to light  Right eye exhibits no discharge  Left eye exhibits no discharge  No scleral icterus  Neck: Normal range of motion  No tracheal deviation present  No thyromegaly present  Cardiovascular: Normal rate, regular rhythm and normal heart sounds  Exam reveals no gallop and no friction rub  No murmur heard  Pulmonary/Chest: Effort normal  No respiratory distress  He has no wheezes  Coarse breath sounds   Abdominal: Soft  Bowel sounds are normal  He exhibits no mass  There is no tenderness  There is no guarding  Musculoskeletal: He exhibits no edema or deformity  Lymphadenopathy:     He has no cervical adenopathy  Neurological: He is alert and oriented to person, place, and time  No cranial nerve deficit  Skin: Skin is warm and dry  No rash noted  He is not diaphoretic  No erythema  Psychiatric: He has a normal mood and affect   Thought content normal

## 2018-03-17 DIAGNOSIS — F33.9 RECURRENT MAJOR DEPRESSIVE DISORDER, REMISSION STATUS UNSPECIFIED (HCC): Primary | ICD-10-CM

## 2018-03-19 RX ORDER — ESCITALOPRAM OXALATE 10 MG/1
TABLET ORAL
Qty: 90 TABLET | Refills: 0 | Status: SHIPPED | OUTPATIENT
Start: 2018-03-19 | End: 2018-07-10 | Stop reason: SDUPTHER

## 2018-04-05 ENCOUNTER — OFFICE VISIT (OUTPATIENT)
Dept: FAMILY MEDICINE CLINIC | Facility: CLINIC | Age: 73
End: 2018-04-05
Payer: COMMERCIAL

## 2018-04-05 VITALS
DIASTOLIC BLOOD PRESSURE: 78 MMHG | WEIGHT: 239.4 LBS | TEMPERATURE: 97.5 F | BODY MASS INDEX: 35.46 KG/M2 | HEIGHT: 69 IN | SYSTOLIC BLOOD PRESSURE: 118 MMHG

## 2018-04-05 DIAGNOSIS — J02.9 PHARYNGITIS, UNSPECIFIED ETIOLOGY: ICD-10-CM

## 2018-04-05 DIAGNOSIS — J32.9 SINUSITIS, UNSPECIFIED CHRONICITY, UNSPECIFIED LOCATION: Primary | ICD-10-CM

## 2018-04-05 DIAGNOSIS — J40 BRONCHITIS: ICD-10-CM

## 2018-04-05 PROCEDURE — 99214 OFFICE O/P EST MOD 30 MIN: CPT | Performed by: PHYSICIAN ASSISTANT

## 2018-04-05 RX ORDER — AZITHROMYCIN 250 MG/1
TABLET, FILM COATED ORAL
Qty: 6 TABLET | Refills: 0 | Status: SHIPPED | OUTPATIENT
Start: 2018-04-05 | End: 2018-04-09

## 2018-04-05 NOTE — PROGRESS NOTES
Assessment/Plan:    Suggested Juju Guo try warm salt water gargles along with drinking hot tea with honey  Diagnoses and all orders for this visit:    Sinusitis, unspecified chronicity, unspecified location  -     azithromycin (ZITHROMAX) 250 mg tablet; Take 2 tablets today then 1 tablet daily x 4 days    Bronchitis  -     azithromycin (ZITHROMAX) 250 mg tablet; Take 2 tablets today then 1 tablet daily x 4 days    Pharyngitis, unspecified etiology  -     azithromycin (ZITHROMAX) 250 mg tablet; Take 2 tablets today then 1 tablet daily x 4 days          Subjective:      Patient ID: Mariusz Casillas is a 71 y o  male  Sore Throat    This is a new problem  The current episode started 1 to 4 weeks ago  The problem has been unchanged  There has been no fever  Associated symptoms include congestion, coughing, ear pain and swollen glands  Pertinent negatives include no abdominal pain, diarrhea, headaches, shortness of breath or vomiting  Associated symptoms comments: +wheeze  He has had exposure to strep  He has tried nothing for the symptoms  The following portions of the patient's history were reviewed and updated as appropriate:   He  has a past medical history of BPH (benign prostatic hyperplasia); CAD (coronary artery disease); Diabetes mellitus ([facility] ); Gout; High cholesterol; Hypertension; and MI, old  He   Patient Active Problem List    Diagnosis Date Noted    Tremor 10/10/2016    Depression with anxiety 08/29/2016    Arthritis 07/13/2015    NSTEMI (non-ST elevated myocardial infarction) ([place] [address] ) 10/06/2014    Coronary arteriosclerosis in native artery 04/08/2014    Gout 05/17/2013    Arteriosclerotic coronary artery disease 11/28/2012    Diabetes mellitus type 2, controlled ([place] [address] ) 06/18/2012    Hyperlipidemia 06/18/2012    Hypertension 06/18/2012    Morbid obesity ([place] [address] ) 06/18/2012     He  has a past surgical history that includes Tonsillectomy and adenoidectomy;  Cataract extraction w/  intraocular lens implant; Eye surgery; Coronary angioplasty with stent; and pr colonoscopy flx dx w/collj spec when pfrmd (N/A, 3/21/2016)  His family history includes No Known Problems in his father and mother  He was adopted  He  reports that he has never smoked  He has never used smokeless tobacco  He reports that he drinks alcohol  He reports that he does not use drugs  Current Outpatient Prescriptions   Medication Sig Dispense Refill    allopurinol (ZYLOPRIM) 100 mg tablet Take 100 mg by mouth daily   aspirin 325 mg tablet Take 325 mg by mouth daily   atorvastatin (LIPITOR) 80 mg tablet Take 80 mg by mouth daily   b complex vitamins tablet Take 1 tablet by mouth daily   Canagliflozin (INVOKANA PO) Take 100 mg by mouth daily   escitalopram (LEXAPRO) 10 mg tablet Take 1 tablet daily 90 tablet 0    gabapentin (NEURONTIN) 300 mg capsule Take 100 mg by mouth 3 (three) times a day      glimepiride (AMARYL) 2 mg tablet Take 2 mg by mouth 2 (two) times a day   L-Methylfolate-B6-B12 (FOLTANX) 3-35-2 MG TABS Take by mouth   metFORMIN (GLUCOPHAGE) 1000 MG tablet Take 1,000 mg by mouth 2 (two) times a day with meals   metoprolol succinate (TOPROL-XL) 25 mg 24 hr tablet Take 25 mg by mouth 2 (two) times a day   repaglinide (PRANDIN) 2 mg tablet Take 2 mg by mouth 3 (three) times a day before meals   azithromycin (ZITHROMAX) 250 mg tablet Take 2 tablets today then 1 tablet daily x 4 days 6 tablet 0     No current facility-administered medications for this visit  Current Outpatient Prescriptions on File Prior to Visit   Medication Sig    allopurinol (ZYLOPRIM) 100 mg tablet Take 100 mg by mouth daily   aspirin 325 mg tablet Take 325 mg by mouth daily   atorvastatin (LIPITOR) 80 mg tablet Take 80 mg by mouth daily   b complex vitamins tablet Take 1 tablet by mouth daily   Canagliflozin (INVOKANA PO) Take 100 mg by mouth daily        escitalopram (LEXAPRO) 10 mg tablet Take 1 tablet daily    gabapentin (NEURONTIN) 300 mg capsule Take 100 mg by mouth 3 (three) times a day    glimepiride (AMARYL) 2 mg tablet Take 2 mg by mouth 2 (two) times a day   L-Methylfolate-B6-B12 (FOLTANX) 3-35-2 MG TABS Take by mouth   metFORMIN (GLUCOPHAGE) 1000 MG tablet Take 1,000 mg by mouth 2 (two) times a day with meals   metoprolol succinate (TOPROL-XL) 25 mg 24 hr tablet Take 25 mg by mouth 2 (two) times a day   repaglinide (PRANDIN) 2 mg tablet Take 2 mg by mouth 3 (three) times a day before meals  No current facility-administered medications on file prior to visit  He has No Known Allergies       Review of Systems   Constitutional: Negative for chills, fatigue and fever  HENT: Positive for congestion, ear pain, postnasal drip, rhinorrhea, sinus pressure and sore throat  Respiratory: Positive for cough and wheezing  Negative for chest tightness and shortness of breath  Cardiovascular: Negative for chest pain and palpitations  Gastrointestinal: Negative for abdominal pain, constipation, diarrhea, nausea and vomiting  Genitourinary: Negative for difficulty urinating and dysuria  Musculoskeletal: Negative for arthralgias and myalgias  Skin: Negative for rash  Neurological: Negative for dizziness, light-headedness and headaches  Objective:      /78 (BP Location: Left arm, Patient Position: Sitting)   Temp 97 5 °F (36 4 °C)   Ht 5' 9" (1 753 m)   Wt 109 kg (239 lb 6 4 oz)   BMI 35 35 kg/m²          Physical Exam   Constitutional: He is oriented to person, place, and time  He appears well-developed and well-nourished  No distress  HENT:   Head: Normocephalic and atraumatic  Right Ear: Hearing, external ear and ear canal normal  Tympanic membrane is injected  Left Ear: Hearing, external ear and ear canal normal  Tympanic membrane is injected     Mouth/Throat: Uvula is midline and mucous membranes are normal  Posterior oropharyngeal erythema present  No oropharyngeal exudate, posterior oropharyngeal edema or tonsillar abscesses  Eyes: Conjunctivae are normal    Neck: Neck supple  No thyromegaly present  Cardiovascular: Normal rate, regular rhythm and normal heart sounds  Pulmonary/Chest: Effort normal  No respiratory distress  He has wheezes  Musculoskeletal: He exhibits no edema  Lymphadenopathy:     He has no cervical adenopathy  Neurological: He is alert and oriented to person, place, and time  Skin: Skin is warm and dry  He is not diaphoretic  Psychiatric: He has a normal mood and affect  His behavior is normal  Judgment and thought content normal    Vitals reviewed

## 2018-04-11 DIAGNOSIS — Z79.4 TYPE 2 DIABETES MELLITUS WITH DIABETIC POLYNEUROPATHY, WITH LONG-TERM CURRENT USE OF INSULIN (HCC): Primary | ICD-10-CM

## 2018-04-11 DIAGNOSIS — E11.42 TYPE 2 DIABETES MELLITUS WITH DIABETIC POLYNEUROPATHY, WITH LONG-TERM CURRENT USE OF INSULIN (HCC): Primary | ICD-10-CM

## 2018-04-11 RX ORDER — REPAGLINIDE 2 MG/1
TABLET ORAL
Qty: 270 TABLET | Refills: 0 | Status: SHIPPED | OUTPATIENT
Start: 2018-04-11 | End: 2018-08-08 | Stop reason: ALTCHOICE

## 2018-07-10 ENCOUNTER — APPOINTMENT (OUTPATIENT)
Dept: RADIOLOGY | Facility: MEDICAL CENTER | Age: 73
End: 2018-07-10
Payer: COMMERCIAL

## 2018-07-10 ENCOUNTER — TRANSCRIBE ORDERS (OUTPATIENT)
Dept: LAB | Facility: MEDICAL CENTER | Age: 73
End: 2018-07-10

## 2018-07-10 ENCOUNTER — LAB (OUTPATIENT)
Dept: LAB | Facility: MEDICAL CENTER | Age: 73
End: 2018-07-10
Payer: COMMERCIAL

## 2018-07-10 ENCOUNTER — OFFICE VISIT (OUTPATIENT)
Dept: FAMILY MEDICINE CLINIC | Facility: CLINIC | Age: 73
End: 2018-07-10
Payer: COMMERCIAL

## 2018-07-10 VITALS
BODY MASS INDEX: 35.81 KG/M2 | DIASTOLIC BLOOD PRESSURE: 70 MMHG | WEIGHT: 241.8 LBS | SYSTOLIC BLOOD PRESSURE: 108 MMHG | HEIGHT: 69 IN

## 2018-07-10 DIAGNOSIS — F33.9 RECURRENT MAJOR DEPRESSIVE DISORDER, REMISSION STATUS UNSPECIFIED (HCC): ICD-10-CM

## 2018-07-10 DIAGNOSIS — M51.36 DEGENERATIVE DISC DISEASE, LUMBAR: ICD-10-CM

## 2018-07-10 DIAGNOSIS — R26.2 AMBULATORY DYSFUNCTION: ICD-10-CM

## 2018-07-10 DIAGNOSIS — I10 ESSENTIAL HYPERTENSION: ICD-10-CM

## 2018-07-10 DIAGNOSIS — Z79.4 CONTROLLED TYPE 2 DIABETES MELLITUS WITH DIABETIC NEUROPATHY, WITH LONG-TERM CURRENT USE OF INSULIN (HCC): Primary | ICD-10-CM

## 2018-07-10 DIAGNOSIS — Z79.4 CONTROLLED TYPE 2 DIABETES MELLITUS WITH DIABETIC NEUROPATHY, WITH LONG-TERM CURRENT USE OF INSULIN (HCC): ICD-10-CM

## 2018-07-10 DIAGNOSIS — E11.40 CONTROLLED TYPE 2 DIABETES MELLITUS WITH DIABETIC NEUROPATHY, WITH LONG-TERM CURRENT USE OF INSULIN (HCC): Primary | ICD-10-CM

## 2018-07-10 DIAGNOSIS — E11.40 CONTROLLED TYPE 2 DIABETES MELLITUS WITH DIABETIC NEUROPATHY, WITH LONG-TERM CURRENT USE OF INSULIN (HCC): ICD-10-CM

## 2018-07-10 DIAGNOSIS — E78.2 MIXED HYPERLIPIDEMIA: ICD-10-CM

## 2018-07-10 PROBLEM — M51.369 DEGENERATIVE DISC DISEASE, LUMBAR: Status: ACTIVE | Noted: 2018-07-10

## 2018-07-10 LAB
ANION GAP SERPL CALCULATED.3IONS-SCNC: 9 MMOL/L (ref 4–13)
BUN SERPL-MCNC: 18 MG/DL (ref 5–25)
CALCIUM SERPL-MCNC: 9.4 MG/DL (ref 8.3–10.1)
CHLORIDE SERPL-SCNC: 102 MMOL/L (ref 100–108)
CHOLEST SERPL-MCNC: 128 MG/DL (ref 50–200)
CO2 SERPL-SCNC: 26 MMOL/L (ref 21–32)
CREAT SERPL-MCNC: 1.2 MG/DL (ref 0.6–1.3)
CREAT UR-MCNC: 162 MG/DL
GFR SERPL CREATININE-BSD FRML MDRD: 60 ML/MIN/1.73SQ M
GLUCOSE P FAST SERPL-MCNC: 312 MG/DL (ref 65–99)
HDLC SERPL-MCNC: 34 MG/DL (ref 40–60)
LDLC SERPL CALC-MCNC: 48 MG/DL (ref 0–100)
MICROALBUMIN UR-MCNC: 274 MG/L (ref 0–20)
MICROALBUMIN/CREAT 24H UR: 169 MG/G CREATININE (ref 0–30)
NONHDLC SERPL-MCNC: 94 MG/DL
POTASSIUM SERPL-SCNC: 4.3 MMOL/L (ref 3.5–5.3)
SODIUM SERPL-SCNC: 137 MMOL/L (ref 136–145)
TRIGL SERPL-MCNC: 230 MG/DL
TSH SERPL DL<=0.05 MIU/L-ACNC: 5.57 UIU/ML (ref 0.36–3.74)

## 2018-07-10 PROCEDURE — 36415 COLL VENOUS BLD VENIPUNCTURE: CPT

## 2018-07-10 PROCEDURE — 72110 X-RAY EXAM L-2 SPINE 4/>VWS: CPT

## 2018-07-10 PROCEDURE — 82570 ASSAY OF URINE CREATININE: CPT

## 2018-07-10 PROCEDURE — 82043 UR ALBUMIN QUANTITATIVE: CPT

## 2018-07-10 PROCEDURE — 80048 BASIC METABOLIC PNL TOTAL CA: CPT

## 2018-07-10 PROCEDURE — 84443 ASSAY THYROID STIM HORMONE: CPT

## 2018-07-10 PROCEDURE — 99214 OFFICE O/P EST MOD 30 MIN: CPT | Performed by: FAMILY MEDICINE

## 2018-07-10 PROCEDURE — 3060F POS MICROALBUMINURIA REV: CPT | Performed by: FAMILY MEDICINE

## 2018-07-10 PROCEDURE — 83036 HEMOGLOBIN GLYCOSYLATED A1C: CPT

## 2018-07-10 PROCEDURE — 80061 LIPID PANEL: CPT | Performed by: FAMILY MEDICINE

## 2018-07-10 RX ORDER — ESCITALOPRAM OXALATE 10 MG/1
10 TABLET ORAL DAILY
Qty: 90 TABLET | Refills: 0 | Status: SHIPPED | OUTPATIENT
Start: 2018-07-10 | End: 2018-10-22 | Stop reason: SDUPTHER

## 2018-07-10 RX ORDER — ESCITALOPRAM OXALATE 10 MG/1
10 TABLET ORAL DAILY
Qty: 90 TABLET | Refills: 2 | Status: SHIPPED | OUTPATIENT
Start: 2018-07-10 | End: 2018-07-10 | Stop reason: SDUPTHER

## 2018-07-10 NOTE — PROGRESS NOTES
Please call the patient regarding his abnormal result   Degenerative arthritic changes of lumbar spine no tumors

## 2018-07-10 NOTE — ASSESSMENT & PLAN NOTE
Start physical therapy for gait training and also for treatment of low back pain and also for balance training in addition I have gave the patient a filled form for the handicap placard so he can park in handicap spots

## 2018-07-10 NOTE — ASSESSMENT & PLAN NOTE
Lab Results   Component Value Date    HGBA1C 8 0 (H) 03/06/2018       No results for input(s): POCGLU in the last 72 hours      Blood Sugar Average: Last 72 hrs:

## 2018-07-10 NOTE — PROGRESS NOTES
Assessment/Plan:    Diabetes mellitus type 2, controlled ([facility] )  Lab Results   Component Value Date    HGBA1C 8 0 (H) 03/06/2018       No results for input(s): POCGLU in the last 72 hours  Blood Sugar Average: Last 72 hrs:      Degenerative disc disease, lumbar   Start physical therapy for gait training and also for treatment of low back pain and also for balance training in addition I have gave the patient a filled form for the handicap placard so he can park in handicap spots       Diagnoses and all orders for this visit:    Controlled type 2 diabetes mellitus with diabetic neuropathy, with long-term current use of insulin ([facility])    Recurrent major depressive disorder, remission status unspecified ([place] [address] )  -     escitalopram (LEXAPRO) 10 mg tablet; Take 1 tablet (10 mg total) by mouth daily    Essential hypertension    Mixed hyperlipidemia    Degenerative disc disease, lumbar    Ambulatory dysfunction          Subjective:      Patient ID: Berenice Rea is a 71 y o  male  Patient is here with several problems he needs follow-up on his diabetes he is having low back pain and he also has ambulatory dysfunction from low back pain and also from disequilibrium and weakness of his legs patient will be referred to physical therapy we will x-ray is low back will give him lab slips for his appropriate diabetic studies on recurring order every 90 days patient will have his lab work drawn today        The following portions of the patient's history were reviewed and updated as appropriate:   He  has a past medical history of BPH (benign prostatic hyperplasia); CAD (coronary artery disease); Diabetes mellitus ([facility] ); Gout; High cholesterol; Hypertension; and MI, old    He   Patient Active Problem List    Diagnosis Date Noted    Degenerative disc disease, lumbar 07/10/2018    Tremor 10/10/2016    Depression with anxiety 08/29/2016    Arthritis 07/13/2015    NSTEMI (non-ST elevated myocardial infarction) ([facility] ) 10/06/2014    Coronary arteriosclerosis in native artery 04/08/2014    Gout 05/17/2013    Arteriosclerotic coronary artery disease 11/28/2012    Diabetes mellitus type 2, controlled ([facility] ) 06/18/2012    Hyperlipidemia 06/18/2012    Hypertension 06/18/2012    Morbid obesity ([facility] ) 06/18/2012     He  has a past surgical history that includes Tonsillectomy and adenoidectomy; Cataract extraction w/  intraocular lens implant; Eye surgery; Coronary angioplasty with stent; and pr colonoscopy flx dx w/collj spec when pfrmd (N/A, 3/21/2016)  His family history includes No Known Problems in his father and mother  He was adopted  He  reports that he has never smoked  He has never used smokeless tobacco  He reports that he drinks alcohol  He reports that he does not use drugs  Current Outpatient Prescriptions   Medication Sig Dispense Refill    allopurinol (ZYLOPRIM) 100 mg tablet Take 100 mg by mouth daily   aspirin 325 mg tablet Take 325 mg by mouth daily   atorvastatin (LIPITOR) 80 mg tablet Take 80 mg by mouth daily   b complex vitamins tablet Take 1 tablet by mouth daily   escitalopram (LEXAPRO) 10 mg tablet Take 1 tablet daily 90 tablet 0    gabapentin (NEURONTIN) 300 mg capsule Take 100 mg by mouth 3 (three) times a day      glimepiride (AMARYL) 2 mg tablet Take 2 mg by mouth 2 (two) times a day   L-Methylfolate-B6-B12 (FOLTANX) 3-35-2 MG TABS Take by mouth   metFORMIN (GLUCOPHAGE) 1000 MG tablet TAKE 1 TABLET BY MOUTH TWICE DAILY WITH MEALS 180 tablet 0    metoprolol succinate (TOPROL-XL) 25 mg 24 hr tablet Take 25 mg by mouth 2 (two) times a day   repaglinide (PRANDIN) 2 mg tablet TAKE 1 TABLET BY MOUTH 3 TIMES A DAY 30 MINUTES BEFORE MEALS 270 tablet 0    Canagliflozin (INVOKANA PO) Take 100 mg by mouth daily  No current facility-administered medications for this visit        Current Outpatient Prescriptions on File Prior to Visit   Medication Sig    allopurinol (ZYLOPRIM) 100 mg tablet Take 100 mg by mouth daily   aspirin 325 mg tablet Take 325 mg by mouth daily   atorvastatin (LIPITOR) 80 mg tablet Take 80 mg by mouth daily   b complex vitamins tablet Take 1 tablet by mouth daily   escitalopram (LEXAPRO) 10 mg tablet Take 1 tablet daily    gabapentin (NEURONTIN) 300 mg capsule Take 100 mg by mouth 3 (three) times a day    glimepiride (AMARYL) 2 mg tablet Take 2 mg by mouth 2 (two) times a day   L-Methylfolate-B6-B12 (FOLTANX) 3-35-2 MG TABS Take by mouth   metFORMIN (GLUCOPHAGE) 1000 MG tablet TAKE 1 TABLET BY MOUTH TWICE DAILY WITH MEALS    metoprolol succinate (TOPROL-XL) 25 mg 24 hr tablet Take 25 mg by mouth 2 (two) times a day   repaglinide (PRANDIN) 2 mg tablet TAKE 1 TABLET BY MOUTH 3 TIMES A DAY 30 MINUTES BEFORE MEALS    Canagliflozin (INVOKANA PO) Take 100 mg by mouth daily  No current facility-administered medications on file prior to visit  He has No Known Allergies       Review of Systems   Constitutional: Negative for activity change, appetite change, diaphoresis, fatigue and fever  HENT: Negative  Eyes: Negative  Respiratory: Negative for apnea, cough, chest tightness, shortness of breath and wheezing  Cardiovascular: Negative for chest pain, palpitations and leg swelling  Gastrointestinal: Negative for abdominal distention, abdominal pain, anal bleeding, constipation, diarrhea, nausea and vomiting  Endocrine: Negative for cold intolerance, heat intolerance, polydipsia, polyphagia and polyuria  History of diabetes with on hyperlipidemia   Genitourinary: Negative for difficulty urinating, dysuria, flank pain, hematuria and urgency  Musculoskeletal: Positive for arthralgias, back pain and gait problem  Negative for joint swelling and myalgias  Skin: Negative for color change, rash and wound  Allergic/Immunologic: Negative for environmental allergies, food allergies and immunocompromised state  Neurological: Negative for dizziness, seizures, syncope, speech difficulty, numbness and headaches  Hematological: Negative for adenopathy  Does not bruise/bleed easily  Psychiatric/Behavioral: Negative for agitation, behavioral problems, hallucinations, sleep disturbance and suicidal ideas  Objective:      /70 (BP Location: Left arm, Patient Position: Sitting, Cuff Size: Standard)   Ht 5' 9" (1 753 m)   Wt 110 kg (241 lb 12 8 oz)   BMI 35 71 kg/m²          Physical Exam   Constitutional: He is oriented to person, place, and time  He appears well-developed and well-nourished  No distress  HENT:   Head: Normocephalic  Right Ear: External ear normal    Left Ear: External ear normal    Nose: Nose normal    Mouth/Throat: Oropharynx is clear and moist    Eyes: Conjunctivae and EOM are normal  Pupils are equal, round, and reactive to light  Right eye exhibits no discharge  Left eye exhibits no discharge  No scleral icterus  Neck: Normal range of motion  No tracheal deviation present  No thyromegaly present  Cardiovascular: Normal rate, regular rhythm and normal heart sounds  Exam reveals no gallop and no friction rub  No murmur heard  Pulmonary/Chest: Effort normal and breath sounds normal  No respiratory distress  He has no wheezes  Abdominal: Soft  Bowel sounds are normal  He exhibits no mass  There is no tenderness  There is no guarding  Musculoskeletal: He exhibits tenderness  He exhibits no edema or deformity  Lymphadenopathy:     He has no cervical adenopathy  Neurological: He is alert and oriented to person, place, and time  No cranial nerve deficit  Disequilibrium   Skin: Skin is warm and dry  No rash noted  He is not diaphoretic  No erythema  Psychiatric: He has a normal mood and affect   Thought content normal

## 2018-07-11 DIAGNOSIS — Z79.4 TYPE 2 DIABETES MELLITUS WITH DIABETIC POLYNEUROPATHY, WITH LONG-TERM CURRENT USE OF INSULIN (HCC): ICD-10-CM

## 2018-07-11 DIAGNOSIS — E11.42 TYPE 2 DIABETES MELLITUS WITH DIABETIC POLYNEUROPATHY, WITH LONG-TERM CURRENT USE OF INSULIN (HCC): ICD-10-CM

## 2018-07-11 DIAGNOSIS — E03.9 HYPOTHYROIDISM, UNSPECIFIED TYPE: Primary | ICD-10-CM

## 2018-07-11 LAB
EST. AVERAGE GLUCOSE BLD GHB EST-MCNC: 214 MG/DL
HBA1C MFR BLD: 9.1 % (ref 4.2–6.3)

## 2018-07-11 RX ORDER — GLIMEPIRIDE 4 MG/1
4 TABLET ORAL 2 TIMES DAILY
Qty: 90 TABLET | Refills: 2 | Status: SHIPPED | OUTPATIENT
Start: 2018-07-11 | End: 2018-10-25 | Stop reason: SDUPTHER

## 2018-07-11 RX ORDER — LEVOTHYROXINE SODIUM 0.03 MG/1
25 TABLET ORAL DAILY
Qty: 90 TABLET | Refills: 0 | Status: SHIPPED | OUTPATIENT
Start: 2018-07-11 | End: 2018-10-08 | Stop reason: SDUPTHER

## 2018-07-11 NOTE — PROGRESS NOTES
Please call the patient regarding his abnormal result   Hemoglobin A1c is the highest it has ever been at 9 1 absolutely horrible blood sugar on chemistry profile is 312 absolutely horrible thyroid level slightly low urine test shows large amounts of protein in the urine indicating that his kidneys are showing the effects of diabetes 1st start Synthroid 25 mcg daily must be taken on an empty stomach no food drink or medicines for 1 hr after the dose recheck TSH 1 month while still taking the thyroid will slowly titrate to dosage upwards get serious about his diet increase metformin from 1 tablet twice a day to 2 tablets twice a day increase glimepiride from 1 tablet twice a day to 2 tablets twice a day add Ozempic 0 25 mg once a week for the 1st 4 weeks then increase the dosage to 0 5 mg once a week this is an injectable medication repeat hemoglobin A1c BMP in 3 months

## 2018-07-19 DIAGNOSIS — G25.0 TREMOR, ESSENTIAL: Primary | ICD-10-CM

## 2018-07-19 RX ORDER — GABAPENTIN 100 MG/1
100 CAPSULE ORAL 3 TIMES DAILY
Qty: 90 CAPSULE | Refills: 2 | Status: SHIPPED | OUTPATIENT
Start: 2018-07-19 | End: 2018-10-17 | Stop reason: SDUPTHER

## 2018-07-24 DIAGNOSIS — M1A.9XX0 CHRONIC GOUT WITHOUT TOPHUS, UNSPECIFIED CAUSE, UNSPECIFIED SITE: ICD-10-CM

## 2018-07-24 DIAGNOSIS — I10 ESSENTIAL HYPERTENSION: Primary | ICD-10-CM

## 2018-07-24 RX ORDER — METOPROLOL TARTRATE 50 MG/1
TABLET, FILM COATED ORAL
Qty: 180 TABLET | Refills: 0 | Status: SHIPPED | OUTPATIENT
Start: 2018-07-24 | End: 2018-08-08 | Stop reason: ALTCHOICE

## 2018-07-24 RX ORDER — ALLOPURINOL 100 MG/1
TABLET ORAL
Qty: 90 TABLET | Refills: 0 | Status: SHIPPED | OUTPATIENT
Start: 2018-07-24 | End: 2018-12-20 | Stop reason: SDUPTHER

## 2018-07-30 ENCOUNTER — TRANSCRIBE ORDERS (OUTPATIENT)
Dept: ADMINISTRATIVE | Facility: HOSPITAL | Age: 73
End: 2018-07-30

## 2018-07-30 ENCOUNTER — EVALUATION (OUTPATIENT)
Dept: PHYSICAL THERAPY | Facility: HOME HEALTHCARE | Age: 73
End: 2018-07-30
Payer: COMMERCIAL

## 2018-07-30 ENCOUNTER — APPOINTMENT (OUTPATIENT)
Dept: LAB | Facility: HOSPITAL | Age: 73
End: 2018-07-30

## 2018-07-30 DIAGNOSIS — R26.2 AMBULATORY DYSFUNCTION: ICD-10-CM

## 2018-07-30 DIAGNOSIS — Z00.8 ENCOUNTER FOR OTHER GENERAL EXAMINATION: ICD-10-CM

## 2018-07-30 DIAGNOSIS — M51.36 DEGENERATION OF LUMBAR INTERVERTEBRAL DISC: Primary | ICD-10-CM

## 2018-07-30 DIAGNOSIS — Z00.8 ENCOUNTER FOR OTHER GENERAL EXAMINATION: Primary | ICD-10-CM

## 2018-07-30 LAB
CHOLEST SERPL-MCNC: 111 MG/DL (ref 50–200)
EST. AVERAGE GLUCOSE BLD GHB EST-MCNC: 189 MG/DL
HBA1C MFR BLD: 8.2 % (ref 4.2–6.3)
HDLC SERPL-MCNC: 37 MG/DL (ref 40–60)
LDLC SERPL CALC-MCNC: 50 MG/DL (ref 0–100)
NONHDLC SERPL-MCNC: 74 MG/DL
TRIGL SERPL-MCNC: 118 MG/DL

## 2018-07-30 PROCEDURE — 83036 HEMOGLOBIN GLYCOSYLATED A1C: CPT

## 2018-07-30 PROCEDURE — 80061 LIPID PANEL: CPT

## 2018-07-30 PROCEDURE — 97535 SELF CARE MNGMENT TRAINING: CPT | Performed by: PHYSICAL THERAPIST

## 2018-07-30 PROCEDURE — G8978 MOBILITY CURRENT STATUS: HCPCS | Performed by: PHYSICAL THERAPIST

## 2018-07-30 PROCEDURE — 97110 THERAPEUTIC EXERCISES: CPT | Performed by: PHYSICAL THERAPIST

## 2018-07-30 PROCEDURE — 97162 PT EVAL MOD COMPLEX 30 MIN: CPT | Performed by: PHYSICAL THERAPIST

## 2018-07-30 PROCEDURE — 36415 COLL VENOUS BLD VENIPUNCTURE: CPT

## 2018-07-30 PROCEDURE — G8979 MOBILITY GOAL STATUS: HCPCS | Performed by: PHYSICAL THERAPIST

## 2018-07-30 NOTE — PROGRESS NOTES
PT Evaluation     Today's date: 2018  Patient name: Ángel Botello  : 1945  MRN: [id number]  Referring provider: Malachi Pollard DO  Dx:   Encounter Diagnosis     ICD-10-CM    1  Degeneration of lumbar intervertebral disc M51 36    2  Ambulatory dysfunction R26 2                   Assessment  Impairments: abnormal gait, abnormal or restricted ROM, abnormal movement, activity intolerance, impaired balance, impaired physical strength, lacks appropriate home exercise program, pain with function, safety issue and poor posture     Assessment details: Pt Hugh Tyler is a 71 y o  who presents to OPPT with s/s consistent with L/S DJD contributing to lower back pain  Pt also with B LE ROM and strength deficits, gait dysfunction, poor balance, and frequent reports of falls due to deficits  Pt states that he does not do any lifting or household chores at home due to increased pain and difficulty  PT observes pt with difficulty completing sit to stand transfer, however pt able to complete independently with use of UE  Pt does not use an AD at this time despite reports of frequent falls and feeling unsteady on his feet  Pt would benefit from skilled therapy services to address outlined impairments, work towards goals, and restore pts PLOF  Thank you!    Understanding of Dx/Px/POC: good   Prognosis: fair    Goals  STG: to be achieved within 4 weeks   Decrease pain 25-50%  Improve ROM by 5-10 degrees  Improve strength by 1/2 grade     LTG: to be achieved within 8 weeks   Independent with HEP   ADL function returned to PLOF  Ambulation improved to PLOF  Pt free from falls over past 4 weeks   Pt with improvement in static balance testing > 50%     Plan  Patient would benefit from: skilled physical therapy  Planned modality interventions: cryotherapy and thermotherapy: hydrocollator packs  Planned therapy interventions: balance, manual therapy, neuromuscular re-education, patient education, postural training, strengthening, stretching, therapeutic activities, therapeutic exercise, home exercise program, gait training, functional ROM exercises and flexibility  Frequency: 2x week  Duration in weeks: 8  Treatment plan discussed with: patient and family        Subjective Evaluation    History of Present Illness  Mechanism of injury: Pt reporting that he really began to notice an increase in back pain about 6 months ago with insidious onset  Pt states that he mentioned pain to family physician and had X-rays ordered which were (+) for OA  Pt states that he also had a lot of medical problems from agent orange from his time in the [employer] and he is currently working with the [employer] to get full disability; he feels the back problems are contributed to the agent orange effects as well      Quality of life: good    Pain  Current pain ratin  At best pain ratin  Location: Lower back   Quality: dull ache and sharp  Aggravating factors: standing, stair climbing, walking and lifting  Progression: worsening    Social Support  Steps to enter house: yes  Stairs in house: yes   Lives in: multiple-level home      Diagnostic Tests  X-ray: abnormal (OA)  Treatments  Current treatment: physical therapy  Patient Goals  Patient goals for therapy: decreased pain, improved balance and increased strength          Objective     Postural Observations  Seated posture: fair  Standing posture: fair        Neurological Testing     Sensation     Lumbar   Left   Intact: light touch    Right   Intact: light touch    Active Range of Motion   Left Hip   Flexion: 75 degrees   Abduction: 45 degrees     Right Hip   Flexion: 80 degrees   Abduction: 45 degrees   Left Knee   Flexion: 110 degrees   Extension: 0 degrees     Right Knee   Flexion: 110 degrees   Extension: 0 degrees     Additional Active Range of Motion Details  L/S ROM:   Flexion:  75%  Extension: 25%   Side bendin% bilaterally     Strength/Myotome Testing     Left Hip   Planes of Motion Flexion: 3+  Abduction: 4-  Adduction: 4-    Right Hip   Planes of Motion   Flexion: 3+  Abduction: 4  Adduction: 4    Left Knee   Flexion: 4-  Extension: 4-    Right Knee   Flexion: 4  Extension: 4    Ambulation     Ambulation: Level Surfaces     Additional Level Surfaces Ambulation Details  Tolerance < 30 minutes  Pt uses scooters when out at shopping centers   Pt does not use AD but reports frequent falls     Ambulation: Stairs   Pattern: non-reciprocal  Railings: one rail  Pattern: non-reciprocal  Railings: one rail    Quality of Movement During Gait   Trunk  Forward lean  Comments   Rhomberg:  Firm EO: 30 seconds  Firm EC: < 5 seconds     Tadem:   Firm EO: L foot back 5"   Firm EO: R foot back 15"     SLS:  Firm EO: R foot < 5"   Firm EO: L foot < 3"     General Comments     Lumbar Comments  Sleeping disrupted   Lying on R/L side is okay; unable to lay on stomach   Avoiding any lifting or household chores       Flowsheet Rows      Most Recent Value   PT/OT G-Codes   Current Score  30   Projected Score  48   FOTO information reviewed  Yes   Assessment Type  Evaluation   G code set  Mobility: Walking & Moving Around   Mobility: Walking and Moving Around Current Status ()  CL   Mobility: Walking and Moving Around Goal Status ()  CK           Precautions: Mild Falls Risk   RE Due: 8/30/18  Specialty Daily Treatment Diary     Manual  7/30/18                                   Exercise Diary         NuStep  Level 1  10 minutes       SLR x 3         HR/TR        Squats         Step-ups         SBB        PPT        PPT with marches        SLR        S/L SLR        Clamshells        Hip abd        Hip add        Bridges        LTR        SKTC        Heel walk-outs        Rhomberg EO         Tadem EO         Side stepping         Tadem amb        Backwards amb        Biodex when appro                       Modalities        MHP prn

## 2018-08-01 ENCOUNTER — OFFICE VISIT (OUTPATIENT)
Dept: PHYSICAL THERAPY | Facility: HOME HEALTHCARE | Age: 73
End: 2018-08-01
Payer: COMMERCIAL

## 2018-08-01 DIAGNOSIS — M51.36 DEGENERATION OF LUMBAR INTERVERTEBRAL DISC: Primary | ICD-10-CM

## 2018-08-01 PROCEDURE — 97110 THERAPEUTIC EXERCISES: CPT

## 2018-08-01 NOTE — PROGRESS NOTES
Daily Note     Today's date: 2018  Patient name: Steve Sims  : 1945  MRN: [id number]  Referring provider: Floridalma Elkins DO  Dx:   Encounter Diagnosis     ICD-10-CM    1  Degeneration of lumbar intervertebral disc M51 36               Subjective: Pt reports he is ok today  Objective: See treatment diary below      Assessment: Tolerated treatment fairly well  Verbal cues needed t/o TE to perform correctly  Shaking noted LLE with supine SLR  Pt with no c/o pain t/o session  Balance deficits noted, Pt requires assistance of parallel bars to complete standing exercises  Patient would benefit from continued PT      Plan: Continue per plan of care  Precautions: Mild Falls Risk   RE Due: 18  Specialty Daily Treatment Diary     Manual  18                                  Exercise Diary   18      NuStep  Level 1  10 minutes Level 1  10 min      SLR x 3   1 x 10 ea Kannan      HR/TR  1 x 15 ea      Squats   1 x 10      Step-ups         SBB  5" x 10      PPT  5" x 10      PPT with marches        SLR  1 x 10 Kannan      S/L SLR        Clamshells        Hip abd        Hip add        Bridges        LTR  5" x 10      SKTC        Heel walk-outs        Rhomberg EO         Tadem EO         Side stepping         Tadem amb        Backwards amb        Biodex when appro                       Modalities  18      MHP prn   Declined

## 2018-08-06 ENCOUNTER — OFFICE VISIT (OUTPATIENT)
Dept: PHYSICAL THERAPY | Facility: HOME HEALTHCARE | Age: 73
End: 2018-08-06
Payer: COMMERCIAL

## 2018-08-06 DIAGNOSIS — M51.36 DEGENERATION OF LUMBAR INTERVERTEBRAL DISC: Primary | ICD-10-CM

## 2018-08-06 PROCEDURE — 97150 GROUP THERAPEUTIC PROCEDURES: CPT | Performed by: PHYSICAL THERAPIST

## 2018-08-06 NOTE — PROGRESS NOTES
Daily Note     Today's date: 2018  Patient name: Steve Sims  : 1945  MRN: [id number]  Referring provider: Floridalma Elkins DO  Dx:   Encounter Diagnosis     ICD-10-CM    1  Degeneration of lumbar intervertebral disc M51 36                   Subjective: The patient states that he is doing okay today, just tired  Objective: See treatment diary below      Assessment: Added forward stepups and progressed NuStep to Level 2 with patient today  He tolerated progression without difficulty though with fatigue during TE  Seated rest breaks are needed during session  He had no increase in pain during session and no pain to end session  Continue to progress as able in upcoming visits  Plan: Continue per plan of care       Precautions: Mild Falls Risk   RE Due: 18  Specialty Daily Treatment Diary      Manual  18                                                       Exercise Diary    18       NuStep  Level 1  10 minutes Level 1  10 min Level 2  10 minutes       SLR x 3    1 x 10 ea Kannan Kannan 1 x 15 each       HR/TR   1 x 15 ea 1 x 15 each       Squats    1 x 10 1 x 10       Step-ups      Kannan - For - C 1 x 10        SBB   5" x 10 5" 1 x 10       PPT   5" x 10 5" 1 x 15       PPT with marches             SLR   1 x 10 Kannan Kannan 1 x 10       S/L SLR             Clamshells             Hip abd     NV       Hip add     NV       Bridges     NV       LTR   5" x 10 5" 1 x 10       SKTC             Heel walk-outs             Rhomberg EO              Tadem EO              Side stepping              Tadem amb             Backwards amb             Biodex when appro                                   Modalities   18       MHP prn    Declined  Declined

## 2018-08-08 ENCOUNTER — OFFICE VISIT (OUTPATIENT)
Dept: PHYSICAL THERAPY | Facility: HOME HEALTHCARE | Age: 73
End: 2018-08-08
Payer: COMMERCIAL

## 2018-08-08 ENCOUNTER — HOSPITAL ENCOUNTER (EMERGENCY)
Facility: HOSPITAL | Age: 73
Discharge: HOME | End: 2018-08-08
Admitting: EMERGENCY MEDICINE
Payer: COMMERCIAL

## 2018-08-08 ENCOUNTER — APPOINTMENT (EMERGENCY)
Dept: RADIOLOGY | Facility: HOSPITAL | Age: 73
End: 2018-08-08
Payer: COMMERCIAL

## 2018-08-08 VITALS
BODY MASS INDEX: 35.79 KG/M2 | RESPIRATION RATE: 16 BRPM | DIASTOLIC BLOOD PRESSURE: 64 MMHG | OXYGEN SATURATION: 95 % | HEART RATE: 62 BPM | SYSTOLIC BLOOD PRESSURE: 120 MMHG | HEIGHT: 69 IN | WEIGHT: 241.62 LBS | TEMPERATURE: 99.3 F

## 2018-08-08 DIAGNOSIS — M51.36 DEGENERATION OF LUMBAR INTERVERTEBRAL DISC: Primary | ICD-10-CM

## 2018-08-08 DIAGNOSIS — R07.9 CHEST PAIN: Primary | ICD-10-CM

## 2018-08-08 LAB
ALBUMIN SERPL BCP-MCNC: 3.6 G/DL (ref 3.5–5)
ALP SERPL-CCNC: 76 U/L (ref 46–116)
ALT SERPL W P-5'-P-CCNC: 80 U/L (ref 12–78)
ANION GAP SERPL CALCULATED.3IONS-SCNC: 7 MMOL/L (ref 4–13)
APTT PPP: 29 SECONDS (ref 24–36)
AST SERPL W P-5'-P-CCNC: 39 U/L (ref 5–45)
BASOPHILS # BLD AUTO: 0.03 THOUSANDS/ΜL (ref 0–0.1)
BASOPHILS NFR BLD AUTO: 0 % (ref 0–1)
BILIRUB SERPL-MCNC: 0.6 MG/DL (ref 0.2–1)
BUN SERPL-MCNC: 16 MG/DL (ref 5–25)
CALCIUM SERPL-MCNC: 9.3 MG/DL (ref 8.3–10.1)
CHLORIDE SERPL-SCNC: 104 MMOL/L (ref 100–108)
CO2 SERPL-SCNC: 30 MMOL/L (ref 21–32)
CREAT SERPL-MCNC: 1.19 MG/DL (ref 0.6–1.3)
EOSINOPHIL # BLD AUTO: 0.48 THOUSAND/ΜL (ref 0–0.61)
EOSINOPHIL NFR BLD AUTO: 6 % (ref 0–6)
ERYTHROCYTE [DISTWIDTH] IN BLOOD BY AUTOMATED COUNT: 12.6 % (ref 11.6–15.1)
GFR SERPL CREATININE-BSD FRML MDRD: 61 ML/MIN/1.73SQ M
GLUCOSE SERPL-MCNC: 136 MG/DL (ref 65–140)
HCT VFR BLD AUTO: 44 % (ref 36.5–49.3)
HGB BLD-MCNC: 15.1 G/DL (ref 12–17)
INR PPP: 0.93 (ref 0.86–1.17)
LYMPHOCYTES # BLD AUTO: 2.47 THOUSANDS/ΜL (ref 0.6–4.47)
LYMPHOCYTES NFR BLD AUTO: 32 % (ref 14–44)
MAGNESIUM SERPL-MCNC: 1.6 MG/DL (ref 1.6–2.6)
MCH RBC QN AUTO: 33.3 PG (ref 26.8–34.3)
MCHC RBC AUTO-ENTMCNC: 34.3 G/DL (ref 31.4–37.4)
MCV RBC AUTO: 97 FL (ref 82–98)
MONOCYTES # BLD AUTO: 0.76 THOUSAND/ΜL (ref 0.17–1.22)
MONOCYTES NFR BLD AUTO: 10 % (ref 4–12)
NEUTROPHILS # BLD AUTO: 4.04 THOUSANDS/ΜL (ref 1.85–7.62)
NEUTS SEG NFR BLD AUTO: 52 % (ref 43–75)
PLATELET # BLD AUTO: 237 THOUSANDS/UL (ref 149–390)
PMV BLD AUTO: 10 FL (ref 8.9–12.7)
POTASSIUM SERPL-SCNC: 4.4 MMOL/L (ref 3.5–5.3)
PROT SERPL-MCNC: 7.1 G/DL (ref 6.4–8.2)
PROTHROMBIN TIME: 12 SECONDS (ref 11.8–14.2)
RBC # BLD AUTO: 4.54 MILLION/UL (ref 3.88–5.62)
SODIUM SERPL-SCNC: 141 MMOL/L (ref 136–145)
TROPONIN I SERPL-MCNC: <0.02 NG/ML
WBC # BLD AUTO: 7.78 THOUSAND/UL (ref 4.31–10.16)

## 2018-08-08 PROCEDURE — 99285 EMERGENCY DEPT VISIT HI MDM: CPT

## 2018-08-08 PROCEDURE — 36415 COLL VENOUS BLD VENIPUNCTURE: CPT | Performed by: PHYSICIAN ASSISTANT

## 2018-08-08 PROCEDURE — 83735 ASSAY OF MAGNESIUM: CPT | Performed by: PHYSICIAN ASSISTANT

## 2018-08-08 PROCEDURE — 84484 ASSAY OF TROPONIN QUANT: CPT | Performed by: PHYSICIAN ASSISTANT

## 2018-08-08 PROCEDURE — 93005 ELECTROCARDIOGRAM TRACING: CPT

## 2018-08-08 PROCEDURE — 85610 PROTHROMBIN TIME: CPT | Performed by: PHYSICIAN ASSISTANT

## 2018-08-08 PROCEDURE — 96360 HYDRATION IV INFUSION INIT: CPT

## 2018-08-08 PROCEDURE — 85025 COMPLETE CBC W/AUTO DIFF WBC: CPT | Performed by: PHYSICIAN ASSISTANT

## 2018-08-08 PROCEDURE — 80053 COMPREHEN METABOLIC PANEL: CPT | Performed by: PHYSICIAN ASSISTANT

## 2018-08-08 PROCEDURE — 85730 THROMBOPLASTIN TIME PARTIAL: CPT | Performed by: PHYSICIAN ASSISTANT

## 2018-08-08 PROCEDURE — 97110 THERAPEUTIC EXERCISES: CPT

## 2018-08-08 PROCEDURE — 71045 X-RAY EXAM CHEST 1 VIEW: CPT

## 2018-08-08 RX ORDER — SODIUM CHLORIDE 9 MG/ML
125 INJECTION, SOLUTION INTRAVENOUS CONTINUOUS
Status: DISCONTINUED | OUTPATIENT
Start: 2018-08-08 | End: 2018-08-08 | Stop reason: HOSPADM

## 2018-08-08 RX ORDER — ASPIRIN 325 MG
325 TABLET ORAL ONCE
Status: COMPLETED | OUTPATIENT
Start: 2018-08-08 | End: 2018-08-08

## 2018-08-08 RX ADMIN — SODIUM CHLORIDE 125 ML/HR: 0.9 INJECTION, SOLUTION INTRAVENOUS at 11:44

## 2018-08-08 RX ADMIN — ASPIRIN 325 MG: 325 TABLET ORAL at 11:43

## 2018-08-08 NOTE — PROGRESS NOTES
Daily Note     Today's date: 2018  Patient name: Vincenzo Clayton  : 1945  MRN: [id number]  Referring provider: Del Lamas DO  Dx:   Encounter Diagnosis     ICD-10-CM    1  Degeneration of lumbar intervertebral disc M51 36               Subjective: Pt reports he is having some pain in his chest today but no pain anywhere else  Pt reports he feels fine today and isn't going to the ER  Objective: See treatment diary below      Assessment: Notified PT Carmen Chino of Pts chest pain before start of session  PT checked Pt's /78, Spo2 97% and HR 80 bpm  Offered ER before start of session, Pt declined ER  PT reports Pt ok to exercise to tolerance today  Spoke to Pts wife also about Pt having chest pain  Tolerated treatment well  Pt with no other c/o t/o session  Pt reported mild dizziness at end of session when going from supine to seated position  Advised Pt to contact Dr or go to ER if symptoms persist or worsen  Plan: Continue per plan of care       Precautions: Mild Falls Risk   RE Due: 18  Specialty Daily Treatment Diary      Manual  18                                                     Exercise Diary    18     NuStep  Level 1  10 minutes Level 1  10 min Level 2  10 minutes Level 2   10 min     SLR x 3    1 x 10 ea Kannan Kannan 1 x 15 each  Kannan 1 x 15 ea     HR/TR   1 x 15 ea 1 x 15 each  1 x 15 ea     Squats    1 x 10 1 x 10  1 x 10     Step-ups      Kannan - For - C 1 x 10   Kannan For C   1 x 10     SBB   5" x 10 5" 1 x 10  5" 1 x 10     PPT   5" x 10 5" 1 x 15  5" 1 x 15     PPT with marches             SLR   1 x 10 Kannan Kannan 1 x 10 NP     S/L SLR             Clamshells             Hip abd     NV  NP     Hip add     NV  NP     Bridges     NV  NP     LTR   5" x 10 5" 1 x 10  5" 1 x 10     SKTC             Heel walk-outs             Rhomberg EO              Tadem EO              Side stepping              Tadem amb             Backwards amb             Biodex when appro                                   Modalities   8/1/18 8/6/18 8/8/18     MHP prn    Declined  Declined  Declined

## 2018-08-08 NOTE — DISCHARGE INSTRUCTIONS

## 2018-08-08 NOTE — ED PROCEDURE NOTE
Procedure  ECG 12 Lead Documentation  Date/Time: 8/8/2018 12:01 PM  Performed by: Omid Tejada  Authorized by: Omid Tejada     Indications / Diagnosis:  Chest pain  ECG reviewed by me, the ED Provider: yes    Patient location:  Bedside  Previous ECG:     Previous ECG:  Compared to current    Comparison ECG info:  No change from December of 2017    Similarity:  No change    Comparison to cardiac monitor: Yes    Interpretation:     Interpretation: normal    Rate:     ECG rate:  73    ECG rate assessment: normal    Rhythm:     Rhythm: sinus rhythm    Ectopy:     Ectopy: none    QRS:     QRS axis:  Normal    QRS intervals:  Normal  Conduction:     Conduction: normal    ST segments:     ST segments:  Normal  T waves:     T waves: normal                       Ewelina Boateng PA-C  08/08/18 1207

## 2018-08-08 NOTE — ED PROVIDER NOTES
History  Chief Complaint   Patient presents with    Chest Pain     Chest pain that woke him at 0430 today,  took 1 Nitro without relief     Patient presents to the emergency department today offering a chief complaint of chest pain  Patient states the chest pain commenced at 0430 hr this morning and woke him out of sleep  He states it is substernal and nonradiating  He denies associated symptoms such as shortness of breath abdominal pain nausea vomiting or diaphoresis  He denies any leg pain or leg edema  No history of back pain  Patient states he did go to physical therapy this morning as well  No history of worsening with exertion  Patient does have a positive cardiac history with 3 stents last of which was in 2015  He takes full-strength aspirin however none yet today  Has not had a cardiac evaluation in approximately 1 year  Prior to Admission Medications   Prescriptions Last Dose Informant Patient Reported? Taking? L-Methylfolate-B6-B12 (FOLTANX) 3-35-2 MG TABS   Yes Yes   Sig: Take by mouth  allopurinol (ZYLOPRIM) 100 mg tablet   No Yes   Sig: TAKE ONE TABLET BY MOUTH EVERY DAY   aspirin 325 mg tablet 8/7/2018 at Unknown time  Yes Yes   Sig: Take 325 mg by mouth daily  atorvastatin (LIPITOR) 80 mg tablet   Yes Yes   Sig: Take 80 mg by mouth daily  b complex vitamins tablet   Yes Yes   Sig: Take 1 tablet by mouth daily     escitalopram (LEXAPRO) 10 mg tablet   No Yes   Sig: Take 1 tablet (10 mg total) by mouth daily   gabapentin (NEURONTIN) 100 mg capsule   No Yes   Sig: Take 1 capsule (100 mg total) by mouth 3 (three) times a day   glimepiride (AMARYL) 4 mg tablet   No Yes   Sig: Take 1 tablet (4 mg total) by mouth 2 (two) times a day   levothyroxine 25 mcg tablet   No Yes   Sig: Take 1 tablet (25 mcg total) by mouth daily One hour before food in am on an empty stomach   metFORMIN (GLUCOPHAGE) 1000 MG tablet   No Yes   Sig: Take 1 tablet (1,000 mg total) by mouth 2 (two) times a day with meals   metoprolol succinate (TOPROL-XL) 25 mg 24 hr tablet   Yes Yes   Sig: Take 25 mg by mouth 2 (two) times a day  Facility-Administered Medications: None       Past Medical History:   Diagnosis Date    BPH (benign prostatic hyperplasia)     CAD (coronary artery disease)     Diabetes mellitus (HCC)     niddm    Gout     High cholesterol     Hypertension     MI, 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        Past Surgical History:   Procedure Laterality Date    CATARACT EXTRACTION W/  INTRAOCULAR LENS IMPLANT      CORONARY ANGIOPLASTY WITH STENT PLACEMENT      stents x3    EYE SURGERY      repair corneal laceration    VT COLONOSCOPY FLX DX W/COLLJ SPEC WHEN PFRMD N/A 3/21/2016    Procedure: COLONOSCOPY;  Surgeon: Federico Garcia MD;  Location: [facility];  Service: Colorectal    TONSILLECTOMY AND ADENOIDECTOMY         Family History   Problem Relation Age of Onset    Adopted: Yes    No Known Problems Mother     No Known Problems Father      I have reviewed and agree with the history as documented  Social History   Substance Use Topics    Smoking status: Never Smoker    Smokeless tobacco: Never Used    Alcohol use Yes      Comment: socially        Review of Systems   Constitutional: Negative  HENT: Negative  Eyes: Negative  Respiratory: Negative  Cardiovascular: Positive for chest pain  Negative for palpitations and leg swelling  Gastrointestinal: Negative  Endocrine: Negative  Genitourinary: Negative  Musculoskeletal: Negative  Skin: Negative  Allergic/Immunologic: Negative  Neurological: Negative  Hematological: Negative  Psychiatric/Behavioral: Negative  All other systems reviewed and are negative  Physical Exam  Physical Exam   Constitutional: He is oriented to person, place, and time  He appears well-developed and well-nourished  No distress  HENT:   Head: Normocephalic  Eyes: Pupils are equal, round, and reactive to light     Neck: Normal range of motion  Cardiovascular: Normal rate, regular rhythm, normal heart sounds and intact distal pulses  Exam reveals no gallop and no friction rub  No murmur heard  Pulmonary/Chest: Effort normal and breath sounds normal  No respiratory distress  He has no wheezes  He has no rales  He exhibits no tenderness  Abdominal: Soft  There is no tenderness  Musculoskeletal: Normal range of motion  Neurological: He is alert and oriented to person, place, and time  Skin: Skin is warm  Capillary refill takes less than 2 seconds  He is not diaphoretic  Psychiatric: He has a normal mood and affect  Vitals reviewed        Vital Signs  ED Triage Vitals [08/08/18 1128]   Temperature Pulse Respirations Blood Pressure SpO2   99 3 °F (37 4 °C) 76 18 158/72 94 %      Temp Source Heart Rate Source Patient Position - Orthostatic VS BP Location FiO2 (%)   Temporal Monitor Lying Left arm --      Pain Score       5           Vitals:    08/08/18 1128   BP: 158/72   Pulse: 76   Patient Position - Orthostatic VS: Lying       Visual Acuity      ED Medications  Medications   sodium chloride 0 9 % infusion (125 mL/hr Intravenous New Bag 8/8/18 1144)   aspirin tablet 325 mg (325 mg Oral Given 8/8/18 1143)       Diagnostic Studies  Results Reviewed     Procedure Component Value Units Date/Time    Troponin I [[id number]]  (Normal) Collected:  08/08/18 1143    Lab Status:  Final result Specimen:  Blood from Arm, Right Updated:  08/08/18 1210     Troponin I <0 02 ng/mL     Comprehensive metabolic panel [[id number]]  (Abnormal) Collected:  08/08/18 1143    Lab Status:  Final result Specimen:  Blood from Arm, Right Updated:  08/08/18 1207     Sodium 141 mmol/L      Potassium 4 4 mmol/L      Chloride 104 mmol/L      CO2 30 mmol/L      Anion Gap 7 mmol/L      BUN 16 mg/dL      Creatinine 1 19 mg/dL      Glucose 136 mg/dL      Calcium 9 3 mg/dL      AST 39 U/L      ALT 80 (H) U/L      Alkaline Phosphatase 76 U/L      Total Protein 7 1 g/dL Albumin 3 6 g/dL      Total Bilirubin 0 60 mg/dL      eGFR 61 ml/min/1 73sq m     Narrative:         National Kidney Disease Education Program recommendations are as follows:  GFR calculation is accurate only with a steady state creatinine  Chronic Kidney disease less than 60 ml/min/1 73 sq  meters  Kidney failure less than 15 ml/min/1 73 sq  meters      Magnesium [[id number]]  (Normal) Collected:  08/08/18 1143    Lab Status:  Final result Specimen:  Blood from Arm, Right Updated:  08/08/18 1207     Magnesium 1 6 mg/dL     Protime-INR [[id number]]  (Normal) Collected:  08/08/18 1143    Lab Status:  Final result Specimen:  Blood from Arm, Right Updated:  08/08/18 1157     Protime 12 0 seconds      INR 0 93    APTT [[id number]]  (Normal) Collected:  08/08/18 1143    Lab Status:  Final result Specimen:  Blood from Arm, Right Updated:  08/08/18 1157     PTT 29 seconds     CBC and differential [[id number]]  (Normal) Collected:  08/08/18 1143    Lab Status:  Final result Specimen:  Blood from Arm, Right Updated:  08/08/18 1149     WBC 7 78 Thousand/uL      RBC 4 54 Million/uL      Hemoglobin 15 1 g/dL      Hematocrit 44 0 %      MCV 97 fL      MCH 33 3 pg      MCHC 34 3 g/dL      RDW 12 6 %      MPV 10 0 fL      Platelets 421 Thousands/uL      Neutrophils Relative 52 %      Lymphocytes Relative 32 %      Monocytes Relative 10 %      Eosinophils Relative 6 %      Basophils Relative 0 %      Neutrophils Absolute 4 04 Thousands/µL      Lymphocytes Absolute 2 47 Thousands/µL      Monocytes Absolute 0 76 Thousand/µL      Eosinophils Absolute 0 48 Thousand/µL      Basophils Absolute 0 03 Thousands/µL                  XR chest 1 view portable   ED Interpretation by Miguel Angel Tapia PA-C (08/08 1204)   No evidence of acute cardiopulmonary process       by Jason Ryan DO (08/08 1234)                 Procedures  Procedures       Phone Contacts  ED Phone Contact    ED Course  ED Course as of Aug 08 1235   Wed Aug 08, 2018 1202 INR: 0 93   1202 PTT: 29   1202 WBC: 7 78   1202 Hemoglobin: 15 1   1202 Platelets: 169   7175 Magnesium: 1 6   1211 Troponin I: <0 02   1211 Sodium: 141   1211 Potassium: 4 4   1211 Chloride: 104   1211 Anion Gap: 7   1211 BUN: 16   1211 Glucose: 136   1211 Calcium: 9 3   1211 AST: 39   1211 Alkaline Phosphatase: 76   1211 eGFR: 61   1211 Total Protein: 7 1   1228 Cardiology paged    1231 I spoke with the pts Cardiologist Dr Nena Mitchell, we did speak regarding the patient's onset of symptoms negative lab values negative EKG and current cardiology workup  We believe the patient is stable for discharge close cardiology follow-up  1234 Patient was advised the need to return to the emergency department if any new symptoms or any worsening of symptoms are noted  Patient's wife is a registered nurse and states she will keep a close eye on him and bring him back if anything changes  They will call Cardiology for follow-up today  HEART Risk Score      Most Recent Value   History  1 Filed at: 08/08/2018 1142   ECG  0 Filed at: 08/08/2018 1142   Age  2 Filed at: 08/08/2018 1142   Risk Factors  2 Filed at: 08/08/2018 1142   Troponin  0 Filed at: 08/08/2018 1142   Heart Score Risk Calculator   History  1 Filed at: 08/08/2018 1142   ECG  0 Filed at: 08/08/2018 1142   Age  2 Filed at: 08/08/2018 1142   Risk Factors  2 Filed at: 08/08/2018 1142   Troponin  0 Filed at: 08/08/2018 1142   HEART Score  5 Filed at: 08/08/2018 1142   HEART Score  5 Filed at: 08/08/2018 1142                            MDM  CritCare Time    Disposition  Final diagnoses:   Chest pain     Time reflects when diagnosis was documented in both MDM as applicable and the Disposition within this note     Time User Action Codes Description Comment    8/8/2018 12:34 PM Raymon Alfaro Add [R07 9] Chest pain       ED Disposition     ED Disposition Condition Comment    Discharge  Jarvis Shirley discharge to home/self care      Condition at discharge: Stable        Follow-up Information     Follow up With Specialties Details Why Contact Info    Anibal Ridley MD Cardiology Call today  [place] [address]  [phone]            Patient's Medications   Discharge Prescriptions    No medications on file     No discharge procedures on file      ED Provider  Electronically Signed by           Romeo Grissom PA-C  08/08/18 7482

## 2018-08-09 LAB
ATRIAL RATE: 73 BPM
P AXIS: 10 DEGREES
PR INTERVAL: 188 MS
QRS AXIS: -52 DEGREES
QRSD INTERVAL: 84 MS
QT INTERVAL: 378 MS
QTC INTERVAL: 416 MS
T WAVE AXIS: 28 DEGREES
VENTRICULAR RATE: 73 BPM

## 2018-08-09 PROCEDURE — 93010 ELECTROCARDIOGRAM REPORT: CPT | Performed by: INTERNAL MEDICINE

## 2018-08-13 ENCOUNTER — OFFICE VISIT (OUTPATIENT)
Dept: PHYSICAL THERAPY | Facility: HOME HEALTHCARE | Age: 73
End: 2018-08-13
Payer: COMMERCIAL

## 2018-08-13 DIAGNOSIS — M51.36 DEGENERATION OF LUMBAR INTERVERTEBRAL DISC: Primary | ICD-10-CM

## 2018-08-13 PROCEDURE — 97110 THERAPEUTIC EXERCISES: CPT

## 2018-08-13 NOTE — PROGRESS NOTES
Daily Note     Today's date: 2018  Patient name: Chano Malagon  : 1945  MRN: [id number]  Referring provider: Tena Hobson DO  Dx:   Encounter Diagnosis     ICD-10-CM    1  Degeneration of lumbar intervertebral disc M51 36               Subjective: Pt with no report of chest pain or recent falls  Objective: See treatment diary below    Assessment: Tolerated treatment well  Able to add hip abd/add today  He denied increased LBP with TE but did report stress at LB with supine SLR  Pt reports slight dizziness with supine to sit and states he did not have breakfast this morning  Patient exhibited good technique with therapeutic exercises and would benefit from continued PT    Plan: Continue per plan of care     Precautions: Mild Falls Risk   RE Due: 18  Specialty Daily Treatment Diary      Manual  18                       Exercise Diary    18   NuStep  Level 1  10 minutes Level 1  10 min Level 2  10 minutes Level 2   10 min  L 2  10'   SLR x 3    1 x 10 ea Kannan Kannan 1 x 15 each  Kannan 1 x 15 ea  Kannan 1 x 15 ea   HR/TR   1 x 15 ea 1 x 15 each  1 x 15 ea  1 x 20   Squats    1 x 10 1 x 10  1 x 10  1 x 10   Step-ups      Kannan - For - C 1 x 10   Kannan For C   1 x 10 Kannan Fwd C  1 x 10   SBB   5" x 10 5" 1 x 10  5" 1 x 10 5" 1  x 10   PPT   5" x 10 5" 1 x 15  5" 1 x 15  5" 1 x 15   PPT with marches             SLR   1 x 10 Kannan Kannan 1 x 10 NP Kannan 1 x 10   S/L SLR             Clamshells             Hip abd     NV  NP Green 1 x 15   Hip add     NV  NP  1 x 10   Bridges     NV  NP     LTR   5" x 10 5" 1 x 10  5" 1 x 10  5" 1 x 10   SKTC             Heel walk-outs             Rhomberg EO              Tadem EO              Side stepping              Tadem amb             Backwards amb             Biodex when appro                                   Modalities   18  1-97-37   MHP prn    Declined  Declined  Declined  Declined

## 2018-08-14 ENCOUNTER — DOCTOR'S OFFICE (OUTPATIENT)
Dept: URBAN - NONMETROPOLITAN AREA CLINIC 1 | Facility: CLINIC | Age: 73
Setting detail: OPHTHALMOLOGY
End: 2018-08-14
Payer: COMMERCIAL

## 2018-08-14 DIAGNOSIS — H04.122: ICD-10-CM

## 2018-08-14 DIAGNOSIS — H04.123: ICD-10-CM

## 2018-08-14 DIAGNOSIS — H18.711: ICD-10-CM

## 2018-08-14 DIAGNOSIS — H25.13: ICD-10-CM

## 2018-08-14 DIAGNOSIS — H35.30: ICD-10-CM

## 2018-08-14 DIAGNOSIS — E11.3293: ICD-10-CM

## 2018-08-14 DIAGNOSIS — H50.42: ICD-10-CM

## 2018-08-14 PROCEDURE — 83861 MICROFLUID ANALY TEARS: CPT | Performed by: OPHTHALMOLOGY

## 2018-08-14 PROCEDURE — 92014 COMPRE OPH EXAM EST PT 1/>: CPT | Performed by: OPHTHALMOLOGY

## 2018-08-14 PROCEDURE — 92134 CPTRZ OPH DX IMG PST SGM RTA: CPT | Performed by: OPHTHALMOLOGY

## 2018-08-14 ASSESSMENT — REFRACTION_CURRENTRX
OS_OVR_VA: 20/
OS_OVR_VA: 20/
OS_SPHERE: +0.25
OS_AXIS: 061
OS_VPRISM_DIRECTION: PROGS
OD_VPRISM_DIRECTION: PROGS
OS_OVR_VA: 20/
OD_OVR_VA: 20/
OS_CYLINDER: -0.75
OD_OVR_VA: 20/
OD_OVR_VA: 20/
OD_ADD: +2.50
OS_ADD: +2.50

## 2018-08-14 ASSESSMENT — REFRACTION_MANIFEST
OU_VA: 20/
OS_VA1: 20/
OU_VA: 20/
OD_VA1: 20/
OS_SPHERE: -1.50
OS_VA2: 20/
OS_VA3: 20/
OD_VA3: 20/
OD_VA2: 20/
OS_VA1: 20/
OD_VA3: 20/
OS_VA2: 20/
OS_VA3: 20/
OD_VA1: 20/
OD_VA2: 20/
OS_CYLINDER: SPH

## 2018-08-14 ASSESSMENT — REFRACTION_OUTSIDERX
OD_CYLINDER: SPH
OD_VA3: 20/
OS_VA2: 20/20
OS_VA1: 20/20
OS_SPHERE: +0.25
OS_VA3: 20/
OD_SPHERE: PLANO
OD_VA1: 20/NI
OU_VA: 20/20
OS_CYLINDER: -1.00
OS_AXIS: 090
OS_ADD: +2.50
OD_VA2: 20/NI
OD_ADD: +2.50

## 2018-08-14 ASSESSMENT — KERATOMETRY
OS_K2POWER_DIOPTERS: 044.50
OS_AXISANGLE_DEGREES: 802
OD_AXISANGLE_DEGREES: 138
OS_K1POWER_DIOPTERS: 44.00
OD_K1POWER_DIOPTERS: 46.50
OD_K2POWER_DIOPTERS: 63.25

## 2018-08-14 ASSESSMENT — LID EXAM ASSESSMENTS
OD_BLEPHARITIS: 1+
OS_BLEPHARITIS: 3+
OD_MEIBOMITIS: 2+
OS_MEIBOMITIS: 2+

## 2018-08-14 ASSESSMENT — SPHEQUIV_DERIVED: OS_SPHEQUIV: -0.25

## 2018-08-14 ASSESSMENT — REFRACTION_AUTOREFRACTION
OS_AXIS: 084
OS_CYLINDER: -0.50
OD_SPHERE: ERROR
OS_SPHERE: 0.00

## 2018-08-14 ASSESSMENT — CONFRONTATIONAL VISUAL FIELD TEST (CVF)
OS_FINDINGS: FULL
OD_FINDINGS: FULL

## 2018-08-14 ASSESSMENT — AXIALLENGTH_DERIVED: OS_AL: 23.4152

## 2018-08-14 ASSESSMENT — DRY EYES - PHYSICIAN NOTES: OS_GENERALCOMMENTS: KSICCA

## 2018-08-14 ASSESSMENT — VISUAL ACUITY
OS_BCVA: 20/50
OD_BCVA: 20/25

## 2018-08-16 ENCOUNTER — OFFICE VISIT (OUTPATIENT)
Dept: PHYSICAL THERAPY | Facility: HOME HEALTHCARE | Age: 73
End: 2018-08-16
Payer: COMMERCIAL

## 2018-08-16 DIAGNOSIS — R26.2 AMBULATORY DYSFUNCTION: Primary | ICD-10-CM

## 2018-08-16 PROCEDURE — 97110 THERAPEUTIC EXERCISES: CPT

## 2018-08-16 PROCEDURE — 97140 MANUAL THERAPY 1/> REGIONS: CPT

## 2018-08-16 PROCEDURE — 97150 GROUP THERAPEUTIC PROCEDURES: CPT

## 2018-08-16 NOTE — PROGRESS NOTES
Daily Note     Today's date: 2018  Patient name: Neville Ramsay  : 1945  MRN: [id number]  Referring provider: Annette Coreas DO  Dx:   Encounter Diagnosis     ICD-10-CM    1  Ambulatory dysfunction R26 2               Subjective: I have a stiff neck from sleeping  I always have LBP  No LE pain  No recent falls  Objective: See treatment diary below    Assessment: Tolerated treatment well  Able to advance with reps of a few ex with encouragement  Added gentle MT with good yousif  Patient would benefit from continued PT    Plan: Continue per plan of care  Precautions: Mild Falls Risk   RE Due: 18  Specialty Daily Treatment Diary      Manual  18      10'                 Exercise Diary  18  333-06   NuStep  Level 2  10 minutes Level 1  10 min Level 2  10 minutes Level 2   10 min  L 2  10'   SLR x 3   Kannan 1 x 15 ea   1 x 10 ea Kannan Kannan 1 x 15 each  Kannan 1 x 15 ea  Kannan 1 x 15 ea   HR/TR  1 x 20 1 x 15 ea 1 x 15 each  1 x 15 ea  1 x 20   Squats   1 x 15 1 x 10 1 x 15  1 x 10  1 x 10   Step-ups  Kannan Fwd C  1 x 15   Kannan - For - C 1 x 10   Kannan For C   1 x 10 Kannan Fwd C  1 x 10   SBB  5" x 10 5" x 10 5" 1 x 10  5" 1 x 10 5" 1  x 10   PPT  5"  1 x 15 5" x 10 5" 1 x 15  5" 1 x 15  5" 1 x 15   PPT with marches             SLR  Kannan 2 x 10 1 x 10 Kannan Kannan 1 x 10 NP Kannan 1 x 10   S/L SLR             Clamshells             Hip abd Green 1 x 15   NV  NP Green 1 x 15   Hip add  1 x 10   NV  NP  1 x 10   Bridges     NV  NP     LTR  5"  1 x 10 5" x 10 5" 1 x 10  5" 1 x 10  5" 1 x 10   SKTC             Heel walk-outs             Rhomberg EO              Tadem EO              Side stepping              Tadem amb             Backwards amb             Biodex when appro                                   Modalities  18  1-26-10   MHP prn   Declined Declined  Declined  Declined  Declined

## 2018-08-20 ENCOUNTER — OFFICE VISIT (OUTPATIENT)
Dept: PHYSICAL THERAPY | Facility: HOME HEALTHCARE | Age: 73
End: 2018-08-20
Payer: COMMERCIAL

## 2018-08-20 ENCOUNTER — TELEPHONE (OUTPATIENT)
Dept: FAMILY MEDICINE CLINIC | Facility: CLINIC | Age: 73
End: 2018-08-20

## 2018-08-20 DIAGNOSIS — E11.9 TYPE 2 DIABETES MELLITUS WITHOUT COMPLICATION, UNSPECIFIED WHETHER LONG TERM INSULIN USE (HCC): Primary | ICD-10-CM

## 2018-08-20 DIAGNOSIS — R26.2 AMBULATORY DYSFUNCTION: Primary | ICD-10-CM

## 2018-08-20 PROCEDURE — 97110 THERAPEUTIC EXERCISES: CPT

## 2018-08-20 PROCEDURE — 97140 MANUAL THERAPY 1/> REGIONS: CPT

## 2018-08-20 RX ORDER — REPAGLINIDE 2 MG/1
TABLET ORAL
Qty: 270 TABLET | Refills: 0 | OUTPATIENT
Start: 2018-08-20

## 2018-08-20 NOTE — PROGRESS NOTES
Daily Note     Today's date: 2018  Patient name: Israel Schulte  : 1945  MRN: [id number]  Referring provider: Betzaida Garcia DO  Dx:   Encounter Diagnosis     ICD-10-CM    1  Ambulatory dysfunction R26 2               Subjective: I was ok after last visit with the added stretches  I still have the R ant hip/groin pain  Objective: See treatment diary below    Assessment: Tolerated treatment well  Pt denied chest pain today  Improved yousif to TE with less rests needed  Also improved yousif to BLE PROM  Patient would benefit from continued PT    Plan: Continue per plan of care  Precautions: Mild Falls Risk   RE Due: 18  Specialty Daily Treatment Diary      Manual  18      10'  10'               Exercise Diary  18  0-72-83   NuStep  Level 2  10 minutes Level 2  10 min Level 2  10 minutes Level 2   10 min  L 2  10'   SLR x 3   Kannan 1 x 15 ea   1 x 15 ea Kannan Kannan 1 x 15 each  Kannan 1 x 15 ea  Kannan 1 x 15 ea   HR/TR  1 x 20 1 x 20 ea 1 x 15 each  1 x 15 ea  1 x 20   Squats   1 x 15 1 x 15 1 x 15  1 x 10  1 x 10   Step-ups  Kannan Fwd C  1 x 15 Kannan Fwd C   1 x 15 Kannan - For - C 1 x 10   Kannan For C   1 x 10 Kannan Fwd C  1 x 10   SBB  5" x 10 5" x 10 5" 1 x 10  5" 1 x 10 5" 1  x 10   PPT  5"  1 x 15 5" x 10 5" 1 x 15  5" 1 x 15  5" 1 x 15   PPT with marches             SLR  Kannan 2 x 10 1 x 10 Kannan Kannan 1 x 10 NP Kannan 1 x 10   S/L SLR             Clamshells             Hip abd Green 1 x 15 Seated 1 x 15 NV  NP Green 1 x 15   Hip add  1 x 10 Seated 1 x 15 NV  NP  1 x 10   Bridges     NV  NP     LTR  5"  1 x 10 5" x 10 5" 1 x 10  5" 1 x 10  5" 1 x 10   SKTC             Heel walk-outs             Rhomberg EO              Tadem EO              Side stepping              Tadem amb             Backwards amb             Biodex when appro                                   Modalities  18/18  8-61-34   MHP prn   Declined Declined  Declined  Declined  Declined

## 2018-08-20 NOTE — TELEPHONE ENCOUNTER
She called for a refill on prandin 2 mg tid  ? Not on current med list, it was crossed out    Should he still be taking it and if so, call to [facility]

## 2018-08-22 ENCOUNTER — OFFICE VISIT (OUTPATIENT)
Dept: PHYSICAL THERAPY | Facility: HOME HEALTHCARE | Age: 73
End: 2018-08-22
Payer: COMMERCIAL

## 2018-08-22 DIAGNOSIS — R26.2 AMBULATORY DYSFUNCTION: Primary | ICD-10-CM

## 2018-08-22 PROCEDURE — 97110 THERAPEUTIC EXERCISES: CPT

## 2018-08-22 PROCEDURE — 97140 MANUAL THERAPY 1/> REGIONS: CPT

## 2018-08-22 NOTE — PROGRESS NOTES
Daily Note     Today's date: 2018  Patient name: Tab Brown  : 1945  MRN: [id number]  Referring provider: Gianluca Scott DO  Dx:   Encounter Diagnosis     ICD-10-CM    1  Ambulatory dysfunction R26 2               Subjective: Pt reports no pain or recent falls  Objective: See treatment diary below       Assessment: Tolerated treatment fairly well  Verbal cues needed t/o TE to perform correctly  One LOB noted with step ups but Pt was able to self correct  Decreased flexibility BLE with manual therapy  Patient would benefit from continued PT      Plan: Continue per plan of care  Precautions: Mild Falls Risk   RE Due: 18  Specialty Daily Treatment Diary      Manual  18-      10'  10'  10 min             Exercise Diary  18  1-34-20   NuStep  Level 2  10 minutes Level 2  10 min Level 2  10 minutes Level 2   10 min  L 2  10'   SLR x 3   Kannan 1 x 15 ea   1 x 15 ea Kannan Kannan 1 x 15 each  Kannan 1 x 15 ea  Kannan 1 x 15 ea   HR/TR  1 x 20 1 x 20 ea 1 x 20 each  1 x 15 ea  1 x 20   Squats   1 x 15 1 x 15 1 x 15  1 x 10  1 x 10   Step-ups  Kannan Fwd C  1 x 15 Kannan Fwd C   1 x 15 Kannan - For - C 1 x 15   Kannan For C   1 x 10 Kannan Fwd C  1 x 10   SBB  5" x 10 5" x 10 5" 1 x 10  5" 1 x 10 5" 1  x 10   PPT  5"  1 x 15 5" x 10 5" 1 x 15  5" 1 x 15  5" 1 x 15   PPT with marches             SLR  Kannan 2 x 10 1 x 10 Kannan Kannan 1 x 10 NP Kannan 1 x 10   S/L SLR             Clamshells             Hip abd Green 1 x 15 Seated 1 x 15 Green 1 x 15  NP Green 1 x 15   Hip add  1 x 10 Seated 1 x 15 1 x 15  NP  1 x 10   Bridges       NP     LTR  5"  1 x 10 5" x 10 5" 1 x 10  5" 1 x 10  5" 1 x 10   SKTC             Heel walk-outs             Rhomberg EO              Tadem EO              Side stepping              Tadem amb             Backwards amb             Biodex when appro                                   Modalities  8-16-18  5-87-20   [facility] prn   Declined Declined  Declined  Declined  Declined

## 2018-08-27 ENCOUNTER — OFFICE VISIT (OUTPATIENT)
Dept: PHYSICAL THERAPY | Facility: HOME HEALTHCARE | Age: 73
End: 2018-08-27
Payer: COMMERCIAL

## 2018-08-27 DIAGNOSIS — M51.36 DEGENERATION OF LUMBAR INTERVERTEBRAL DISC: ICD-10-CM

## 2018-08-27 DIAGNOSIS — R26.2 AMBULATORY DYSFUNCTION: Primary | ICD-10-CM

## 2018-08-27 PROCEDURE — 97110 THERAPEUTIC EXERCISES: CPT

## 2018-08-27 PROCEDURE — 97140 MANUAL THERAPY 1/> REGIONS: CPT

## 2018-08-27 NOTE — PROGRESS NOTES
Daily Note     Today's date: 2018  Patient name: Elder Fuentes  : 1945  MRN: [id number]  Referring provider: Aleksandr Rashid DO  Dx: No diagnosis found  Subjective: Pt  Denies recent falls  Pt  Does c/o B groin pain from the stretches  Objective: See treatment diary below      Assessment: Tolerated treatment well  Patient would benefit from continued PT  Discussed using cane for amb  To decrease falls risk as well as eating breakfast and taking pain meds prior to therapy  Pt  Denies pain following RX  Plan: Progress treatment as tolerated  Precautions: Mild Falls Risk   RE Due: 18  Specialty Daily Treatment Diary      Manual  18  6-65-      10'  10'  10 min  10 min           Exercise Diary  18   NuStep  Level 2  10 minutes Level 2  10 min Level 2  10 minutes Level 3  10 min  L 2  10'   SLR x 3   Kannan 1 x 15 ea   1 x 15 ea Kannan Kannan 1 x 15 each  Kannan 1 x 15 ea  Kannan 1 x 15 ea   HR/TR  1 x 20 1 x 20 ea 1 x 20 each  1 x 15 ea  1 x 20   Squats   1 x 15 1 x 15 1 x 15  1 x 15  1 x 10   Step-ups  Kannan Fwd C  1 x 15 Kannan Fwd C   1 x 15 Kannan - For - C 1 x 15   Kannan For C   1 x 10 Kannan Fwd C  1 x 10   SBB  5" x 10 5" x 10 5" 1 x 10  5" 1 x 10 5" 1  x 10   PPT  5"  1 x 15 5" x 10 5" 1 x 15  5" 1 x 15  5" 1 x 15   PPT with marches             SLR  Kannan 2 x 10 1 x 10 Kannan Kannan 1 x 10 Kannan 1x10 Kannan 1 x 10   S/L SLR             Clamshells             Hip abd Green 1 x 15 Seated 1 x 15 Green 1 x 15  Seated Green x 15 Green 1 x 15   Hip add  1 x 10 Seated 1 x 15 1 x 15  Seated x 15  1 x 10   Bridges       NP     LTR  5"  1 x 10 5" x 10 5" 1 x 10  5" 1 x 10  5" 1 x 10   SKTC        DKTC x 10, 5" Green TB     Heel walk-outs             Rhomberg EO              Tadem EO              Side stepping         NV     Tadem amb             Backwards amb             Biodex when appro                                   Modalities  816-18 8-20-18 8/22/18 8/27/18 2-29-14   MHP prn   Declined Declined  Declined  Declined  Declined

## 2018-08-29 ENCOUNTER — OFFICE VISIT (OUTPATIENT)
Dept: PHYSICAL THERAPY | Facility: HOME HEALTHCARE | Age: 73
End: 2018-08-29
Payer: COMMERCIAL

## 2018-08-29 DIAGNOSIS — R26.2 AMBULATORY DYSFUNCTION: Primary | ICD-10-CM

## 2018-08-29 PROCEDURE — 97110 THERAPEUTIC EXERCISES: CPT

## 2018-08-29 PROCEDURE — 97140 MANUAL THERAPY 1/> REGIONS: CPT

## 2018-08-29 NOTE — PROGRESS NOTES
Daily Note     Today's date: 2018  Patient name: Colin Benjamin  : 1945  MRN: [id number]  Referring provider: Lizzie Echols DO  Dx:   Encounter Diagnosis     ICD-10-CM    1  Ambulatory dysfunction R26 2               Subjective: Pt reports he is doing ok today  Objective: See treatment diary below      Assessment: Tolerated treatment fairly well  Verbal cues needed t/o TE to perform correctly  Unsteadiness noted with ambulation t/o dept  Decreased flexibility BLE with manual therapy  Patient would benefit from continued PT      Plan: Continue per plan of care  Precautions: Mild Falls Risk   RE Due: 18  Specialty Daily Treatment Diary      Manual  18      10'  10'  10 min  10 min  10 min         Exercise Diary  18   NuStep  Level 2  10 minutes Level 2  10 min Level 2  10 minutes Level 3  10 min  L 3  10'   SLR x 3   Kannan 1 x 15 ea   1 x 15 ea Kannan Kannan 1 x 15 each  Kannan 1 x 15 ea  Kannan 1 x 15 ea   HR/TR  1 x 20 1 x 20 ea 1 x 20 each  1 x 15 ea  1 x 20   Squats   1 x 15 1 x 15 1 x 15  1 x 15  1 x 15   Step-ups  Kannan Fwd C  1 x 15 Kannan Fwd C   1 x 15 Kannan - For - C 1 x 15   Kannan For C   1 x 10 Kannan Fwd C  1 x 10   SBB  5" x 10 5" x 10 5" 1 x 10  5" 1 x 10 5" 1  x 10   PPT  5"  1 x 15 5" x 10 5" 1 x 15  5" 1 x 15  5" 1 x 15   PPT with marches             SLR  Kannan 2 x 10 1 x 10 Kannan Kannan 1 x 10 Kannan 1x10 Kannan 1 x 10   S/L SLR             Clamshells             Hip abd Green 1 x 15 Seated 1 x 15 Green 1 x 15  Seated Green x 15 Green 1 x 15   Hip add  1 x 10 Seated 1 x 15 1 x 15  Seated x 15  1 x 15   Bridges       NP     LTR  5"  1 x 10 5" x 10 5" 1 x 10  5" 1 x 10  5" 1 x 10   SKTC        DKTC x 10, 5" Green TB  DKTC 1 x 10  Green ball   Heel walk-outs             Rhomberg EO              Tadem EO              Side stepping         NV     Tadem amb             Backwards amb             Biodex when appro                                 Modalities  8-16-18 8-20-18 8/22/18 8/27/18 8/29/18   MHP prn   Declined Declined  Declined  Declined  Seated   10 min

## 2018-09-04 ENCOUNTER — OFFICE VISIT (OUTPATIENT)
Dept: PHYSICAL THERAPY | Facility: HOME HEALTHCARE | Age: 73
End: 2018-09-04
Payer: COMMERCIAL

## 2018-09-04 DIAGNOSIS — M51.36 DEGENERATION OF LUMBAR INTERVERTEBRAL DISC: Primary | ICD-10-CM

## 2018-09-04 PROCEDURE — G8979 MOBILITY GOAL STATUS: HCPCS | Performed by: PHYSICAL THERAPIST

## 2018-09-04 PROCEDURE — 97150 GROUP THERAPEUTIC PROCEDURES: CPT

## 2018-09-04 PROCEDURE — 97164 PT RE-EVAL EST PLAN CARE: CPT

## 2018-09-04 PROCEDURE — 97140 MANUAL THERAPY 1/> REGIONS: CPT

## 2018-09-04 PROCEDURE — G8978 MOBILITY CURRENT STATUS: HCPCS | Performed by: PHYSICAL THERAPIST

## 2018-09-04 NOTE — PROGRESS NOTES
PT RE-EVALUATION    Today's date: 2018  Patient name: Vicky Ty  : 1945  MRN: [id number]  Referring provider: Artur Thomason DO  Dx:   Encounter Diagnosis     ICD-10-CM    1  Ambulatory dysfunction R26 2                   Assessment  Impairments: abnormal gait, abnormal or restricted ROM, abnormal movement, activity intolerance, impaired balance, impaired physical strength, lacks appropriate home exercise program, pain with function, safety issue and poor posture     Assessment details: The patient has been seen in PT for a total of 11 visits with good PT attendance noted since [facility]  He now has complaints of intermittent LBP and when he does get pain it's not as intense as before  He has made improvements since [facility] but does demonstrate deficits with decreased L/S and BLE ROM and strength, gait dysfunction, decreased balance and proprioception, difficulty with stair negotiation and limited standing tolerance  He denies any falls at home since [facility] but does note that he still loses his balance at times  He continues to ambulate without AD but does demonstrate forward trunk flexion  He continues to have difficulty with stair negotiation and goes up and down the steps with non-reciprocal gait pattern with use of HR  Still with difficulty to complete supine to sit and sit to stand transfers and increased time is needed to complete them  The patient would benefit from continued PT to address deficits and improve function  Tx to include ROM, stretching, strengthening, modalities, HEP, pt education, postural ed, lifting/body mechanics, neuro re-ed, balance/proprioception Te, MT and equipment      Understanding of Dx/Px/POC: good   Prognosis: fair    Goals  STG: to be achieved within 4 weeks   Decrease pain 25-50% - met  Improve ROM by 5-10 degrees - met  Improve strength by 1/2 grade - met    LTG: to be achieved within 8 weeks   Independent with HEP   ADL function returned to PLOF  Ambulation improved to PLOF  Pt free from falls over past 4 weeks   Pt with improvement in static balance testing > 50%     Plan  Patient would benefit from: skilled physical therapy  Planned modality interventions: cryotherapy and thermotherapy: hydrocollator packs  Planned therapy interventions: balance, manual therapy, neuromuscular re-education, patient education, postural training, strengthening, stretching, therapeutic activities, therapeutic exercise, home exercise program, gait training, functional ROM exercises and flexibility  Frequency: 2x week  Duration in weeks: 6  Plan of Care beginning date: 2018  Plan of Care expiration date: 10/4/2018  Treatment plan discussed with: patient        Subjective Evaluation    History of Present Illness  Mechanism of injury: The patient states that he feels therapy has been helping and he is having less back pain overall  He will be going back to see the doctor in October for his follow appointment  He denies any falls at home though he does still feel off balance       Quality of life: good    Pain  At best pain ratin  At worst pain rating: 3  Location: Lower back   Quality: dull ache  Aggravating factors: standing, stair climbing, walking and lifting  Progression: improved    Social Support  Steps to enter house: yes  Stairs in house: yes   Lives in: multiple-level home      Diagnostic Tests  X-ray: abnormal (OA)  Treatments  Current treatment: physical therapy  Patient Goals  Patient goals for therapy: decreased pain, improved balance and increased strength          Objective     Postural Observations  Seated posture: fair  Standing posture: fair        Neurological Testing     Sensation     Lumbar   Left   Intact: light touch    Right   Intact: light touch    Active Range of Motion   Left Hip   Flexion: 90 degrees   Abduction: 45 degrees     Right Hip   Flexion: 90 degrees   Abduction: 45 degrees   Left Knee   Flexion: 110 degrees   Extension: 0 degrees     Right Knee Flexion: 110 degrees   Extension: 0 degrees     Additional Active Range of Motion Details  L/S ROM:   Flexion:  75%  Extension: 25%   Side bendin% bilaterally   R SLR: 45  L SLR: 45    Strength/Myotome Testing     Left Hip   Planes of Motion   Flexion: 4-  Abduction: 4-  Adduction: 4    Right Hip   Planes of Motion   Flexion: 4  Abduction: 4  Adduction: 4+    Left Knee   Flexion: 4  Extension: 4    Right Knee   Flexion: 4  Extension: 4    Ambulation     Ambulation: Level Surfaces     Additional Level Surfaces Ambulation Details  Tolerance < 30 minutes  Pt uses scooters when out at shopping centers   Pt does not use AD but reports frequent falls     Ambulation: Stairs   Pattern: non-reciprocal  Railings: one rail  Pattern: non-reciprocal  Railings: one rail    Quality of Movement During Gait   Trunk  Forward lean       Comments   Rhomberg:  Firm EO: 30 seconds  Firm EC: < 7 seconds     Tadem:   Firm EO: L foot back 8"   Firm EO: R foot back 15"     SLS:  Firm EO: R foot < 5"   Firm EO: L foot < 3"     General Comments     Lumbar Comments  Lying on R/L side is okay; unable to lay on stomach   Avoiding any lifting or household chores

## 2018-09-04 NOTE — PROGRESS NOTES
Daily Note     Today's date: 2018  Patient name: Larry Masterson  : 1945  MRN: [id number]  Referring provider: Layla Girard DO  Dx:   Encounter Diagnosis     ICD-10-CM    1  Ambulatory dysfunction R26 2               Subjective: Less occurences of pain at LB and the intensity is also less  Objective: See treatment diary below    Assessment: Tolerated treatment well  Pt reports that his knees have been bothering him more than his LB  Added SL SLR abd  Patient would benefit from continued PT    Plan: Continue per plan of care  See RE for full findings  Precautions: Mild Falls Risk   RE Due: 18  Specialty Daily Treatment Diary      Manual  18      10'  10'  10 min  10 min  10 min         Exercise Diary  18   NuStep  Level 2  10 minutes Level 2  10 min Level 2  10 minutes Level 3  10 min  L 3  10'   SLR x 3   Kannan 1 x 15 ea   1 x 15 ea Kannan Kannan 1 x 15 each  Kannan 1 x 15 ea  Kannan 1 x 15 ea   HR/TR  1 x 20 1 x 20 ea 1 x 20 each  1 x 15 ea  1 x 20   Squats   1 x 15 1 x 15 1 x 15  1 x 15  1 x 15   Step-ups  Kannan Fwd C  1 x 15 Kannan Fwd C   1 x 15 Kannan - For - C 1 x 15   Kannan For C   1 x 10 Kannan Fwd C  1 x 10   SBB  5" x 10 5" x 10 5" 1 x 10  5" 1 x 10 5" 1  x 10   PPT  5"  1 x 15 5" x 10 5" 1 x 15  5" 1 x 15  5" 1 x 15   PPT with marches             SLR  Kannan 1 x 15 1 x 10 Kannan Kannan 1 x 10 Kannan 1x10 Kannan 1 x 10   S/L SLR  Kannan x 10            Clamshells             Hip abd Green 1 x 15 Seated 1 x 15 Green 1 x 15  Seated Green x 15 Green 1 x 15   Hip add  1 x 20 Seated 1 x 15 1 x 15  Seated x 15  1 x 15   Bridges        NP     LTR  5"  1 x 10 5" x 10 5" 1 x 10  5" 1 x 10  5" 1 x 10   SKTC DKTC 1 x 10   5" Green t-ball      DKTC x 10, 5" Green TB  DKTC 1 x 10  Green ball   Heel walk-outs             Rhomberg EO              Tadem EO              Side stepping         NV     Tadem amb             Backwards amb             Biodex when appro                                   Modalities  9-4-18 8-20-18 8/22/18 8/27/18 8/29/18   MHP prn   Declined Declined  Declined  Declined  Seated   10 min

## 2018-09-06 ENCOUNTER — OFFICE VISIT (OUTPATIENT)
Dept: PHYSICAL THERAPY | Facility: HOME HEALTHCARE | Age: 73
End: 2018-09-06
Payer: COMMERCIAL

## 2018-09-06 DIAGNOSIS — M51.36 DEGENERATION OF LUMBAR INTERVERTEBRAL DISC: Primary | ICD-10-CM

## 2018-09-06 DIAGNOSIS — R26.2 AMBULATORY DYSFUNCTION: ICD-10-CM

## 2018-09-06 PROCEDURE — 97140 MANUAL THERAPY 1/> REGIONS: CPT

## 2018-09-06 PROCEDURE — 97110 THERAPEUTIC EXERCISES: CPT

## 2018-09-06 NOTE — PROGRESS NOTES
Daily Note     Today's date: 2018  Patient name: Chano Malagon  : 1945  MRN: [id number]  Referring provider: Tena Hobson DO  Dx: No diagnosis found  Subjective: Pt  Reports his back is feeling pretty good  C/o pain in R kneecap  Pt  Dorita Weaveru with his progress in therapy  Objective: See treatment diary below    Precautions: Mild Falls Risk   RE Due: 18  Specialty Daily Treatment Diary      Manual  18      10'  10'  10 min  10 min  10 min         Exercise Diary  18   NuStep  Level 2  10 minutes Level 3  10 min Level 2  10 minutes Level 3  10 min  L 3  10'   SLR x 3   Kannan 1 x 15 ea  1 x 15 ea Kannan Kannan 1 x 15 each  Kannan 1 x 15 ea  Kannan 1 x 15 ea   HR/TR  1 x 20 1 x 20 ea 1 x 20 each  1 x 15 ea  1 x 20   Squats   1 x 15 1 x 20 1 x 15  1 x 15  1 x 15   Step-ups  Kannan Fwd C  1 x 15 Kannan Fwd C   1 x 15 Kannan - For - C 1 x 15   Kannan For C   1 x 10 Kannan Fwd C  1 x 10   SBB  5" x 10 5" x 10 5" 1 x 10  5" 1 x 10 5" 1  x 10   PPT  5"  1 x 15 5" x 15 5" 1 x 15  5" 1 x 15  5" 1 x 15   PPT with marches             SLR  Kannan 1 x 15 2 x 10 Kannan Kannan 1 x 10 Kannan 1x10 Kannan 1 x 10   S/L SLR  Kannan x 10            Clamshells             Hip abd Green 1 x 15 Seated 1 x 20 Green 1 x 15  Seated Green x 15 Green 1 x 15   Hip add  1 x 20 Seated 1 x 20 1 x 15  Seated x 15  1 x 15   Bridges        NP     LTR  5"  1 x 10 5" x 20 5" 1 x 10  5" 1 x 10  5" 1 x 10   SKTC DKTC 1 x 10   5" Green t-ball  DKTC x10,5" Green TB    DKTC x 10, 5" Green TB  DKTC 1 x 10  Green ball   Heel walk-outs             Rhomberg EO              Tadem EO              Side stepping         NV     Tadem amb             Backwards amb             Biodex when appro                                   Modalities  9-4-18 9/6/18 8/22/18  8/27/18  8/29/18   MHP prn   Declined Seated x 10 min Declined  Declined  Seated   10 min               Assessment: Tolerated treatment well   Patient demonstrated fatigue post treatment and would benefit from continued PT  Noted blister on L natalee  Pt  And pt 's wife aware of blister  B LE musculature remains tight, L>R  Plan: Progress treatment as tolerated

## 2018-09-10 ENCOUNTER — OFFICE VISIT (OUTPATIENT)
Dept: PHYSICAL THERAPY | Facility: HOME HEALTHCARE | Age: 73
End: 2018-09-10
Payer: COMMERCIAL

## 2018-09-10 DIAGNOSIS — M51.36 DEGENERATION OF LUMBAR INTERVERTEBRAL DISC: Primary | ICD-10-CM

## 2018-09-10 PROCEDURE — 97110 THERAPEUTIC EXERCISES: CPT

## 2018-09-10 PROCEDURE — 97140 MANUAL THERAPY 1/> REGIONS: CPT

## 2018-09-10 NOTE — PROGRESS NOTES
Daily Note     Today's date: 9/10/2018  Patient name: José Miguel Rincon  : 1945  MRN: [id number]  Referring provider: Dasia Talbert DO  Dx:   Encounter Diagnosis     ICD-10-CM    1  Degeneration of lumbar intervertebral disc M51 36               Subjective: Pt reports he is ok this morning  Objective: See treatment diary below      Assessment: Tolerated treatment fairly well  A few verbal cues needed to perform exercises correctly  Decreased flexibility BLE with manual therapy  Pt with no c/o pain t/o session  Unsteadiness with noted with ambulation  Patient would benefit from continued PT      Plan: Continue per plan of care  Precautions: Mild Falls Risk   RE Due: 18  Specialty Daily Treatment Diary      Manual  9-4-18 9/6/18 9/10/18  8/27/18  8/29/18      10'  10'  10 min  10 min  10 min         Exercise Diary  9-4-18 9/6 9/10/18  8/27/18  8/29/18   NuStep  Level 2  10 minutes Level 3  10 min Level 3  10 minutes Level 3  10 min  L 3  10'   SLR x 3   Kannan 1 x 15 ea   1 x 15 ea Kannan Kannan 1 x 15 each  Kannan 1 x 15 ea  Kannan 1 x 15 ea   HR/TR  1 x 20 1 x 20 ea 1 x 20 each  1 x 15 ea  1 x 20   Squats   1 x 15 1 x 20 1 x 20  1 x 15  1 x 15   Step-ups  Kannan Fwd C  1 x 15 Kannan Fwd C   1 x 15 Kannan - For - C 1 x 15   Kannan For C   1 x 10 Kannan Fwd C  1 x 10   SBB  5" x 10 5" x 10 5" 1 x 10  5" 1 x 10 5" 1  x 10   PPT  5"  1 x 15 5" x 15 5" 1 x 15  5" 1 x 15  5" 1 x 15   PPT with marches             SLR  Kannan 1 x 15 2 x 10 Kannan Kannan 2 x 10 Kannan 1x10 Kannan 1 x 10   S/L SLR  Kannan x 10            Clamshells             Hip abd Green 1 x 15 Seated 1 x 20 Green 1 x 20  Seated Green x 15 Green 1 x 15   Hip add  1 x 20 Seated 1 x 20 1 x 20  Seated x 15  1 x 15   Bridges        NP     LTR  5"  1 x 10 5" x 20 5" 1 x 20  5" 1 x 10  5" 1 x 10   SKTC DKTC 1 x 10   5" Green t-ball  DKTC x10,5" Green TB  DKTC x 10   5"  Green TB  DKTC x 10, 5" Green TB  DKTC 1 x 10  Green ball   Heel walk-outs             Leonel Snow EO              Side stepping         NV     Tadem amb             Backwards amb             Biodex when appro                                   Modalities  9-4-18 9/6/18 9/10/18  8/27/18  8/29/18   MHP prn   Declined Seated x 10 min Seated 10 min  Declined  Seated   10 min

## 2018-09-12 ENCOUNTER — OFFICE VISIT (OUTPATIENT)
Dept: PHYSICAL THERAPY | Facility: HOME HEALTHCARE | Age: 73
End: 2018-09-12
Payer: COMMERCIAL

## 2018-09-12 DIAGNOSIS — M51.36 DEGENERATION OF LUMBAR INTERVERTEBRAL DISC: Primary | ICD-10-CM

## 2018-09-12 PROCEDURE — 97140 MANUAL THERAPY 1/> REGIONS: CPT

## 2018-09-12 PROCEDURE — 97110 THERAPEUTIC EXERCISES: CPT

## 2018-09-12 NOTE — PROGRESS NOTES
Daily Note     Today's date: 2018  Patient name: Colin Benjamin  : 1945  MRN: [id number]  Referring provider: Lizzie Echols DO  Dx:   Encounter Diagnosis     ICD-10-CM    1  Degeneration of lumbar intervertebral disc M51 36               Subjective: Pt reports he fell going up the stairs this morning and when he fell he landed on his buttock  Pt reports he is ok and doesn't have any pain  Objective: See treatment diary below      Assessment: Tolerated treatment fairly well  Pt with no c/o increased pain t/o session  Advised Pt to contact Dr if having any pain or issues from fall  Pt reports he feels ok but if he would have any problems he will call his Dr  Decreased flexibility BLE with manual therapy  Pt declined MHP at end of session  Patient would benefit from continued PT      Plan: Continue per plan of care  Precautions: Mild Falls Risk   RE Due: 18  Specialty Daily Treatment Diary      Manual  9-4-18 9/6/18 9/10/18 9/12/18  8/29/18      10'  10'  10 min  10 min  10 min         Exercise Diary  9-4-18 9/6 9/10/18 9/12/18  8/29/18   NuStep  Level 2  10 minutes Level 3  10 min Level 3  10 minutes Level 3  10 min  L 3  10'   SLR x 3   Kannan 1 x 15 ea   1 x 15 ea Kannan Kannan 1 x 15 each  Kannan 1 x 15 ea  Kannan 1 x 15 ea   HR/TR  1 x 20 1 x 20 ea 1 x 20 each  1 x 20 ea  1 x 20   Squats   1 x 15 1 x 20 1 x 20  1 x 20  1 x 15   Step-ups  Kannan Fwd C  1 x 15 Kannan Fwd C   1 x 15 Kannan - For - C 1 x 15   Kannan For C   1 x 15 Kannan Fwd C  1 x 10   SBB  5" x 10 5" x 10 5" 1 x 10  5" 1 x 10 5" 1  x 10   PPT  5"  1 x 15 5" x 15 5" 1 x 15  5" 1 x 15  5" 1 x 15   PPT with marches             SLR  Kannan 1 x 15 2 x 10 Kannan Kannan 2 x 10 Kannan 2x10 Kannan 1 x 10   S/L SLR  Kannan x 10            Clamshells             Hip abd Green 1 x 15 Seated 1 x 20 Green 1 x 20 Green 1 x 20 Green 1 x 15   Hip add  1 x 20 Seated 1 x 20 1 x 20 1 x 20  1 x 15   Bridges            LTR  5"  1 x 10 5" x 20 5" 1 x 20  5" 1 x 20  5" 1 x 10   SKTC DKTC 1 x 10   5" Green t-ball  DKTC x10,5" Green TB  DKTC x 10   5"  Green TB  DKTC x 10, 5" Green TB  DKTC 1 x 10  Green ball   Heel walk-outs             Rhomberg EO              Tadem EO              Side stepping              Tadem amb             Backwards amb             Biodex when appro                                   Modalities  9-4-18 9/6/18 9/10/18 9/12/18  8/29/18   MHP prn   Declined Seated x 10 min Seated 10 min Declined  Seated   10 min

## 2018-09-17 ENCOUNTER — OFFICE VISIT (OUTPATIENT)
Dept: PHYSICAL THERAPY | Facility: HOME HEALTHCARE | Age: 73
End: 2018-09-17
Payer: COMMERCIAL

## 2018-09-17 DIAGNOSIS — R26.2 AMBULATORY DYSFUNCTION: ICD-10-CM

## 2018-09-17 DIAGNOSIS — M51.36 DEGENERATION OF LUMBAR INTERVERTEBRAL DISC: Primary | ICD-10-CM

## 2018-09-17 PROCEDURE — 97110 THERAPEUTIC EXERCISES: CPT | Performed by: PHYSICAL THERAPIST

## 2018-09-17 NOTE — PROGRESS NOTES
Daily Note     Today's date: 2018  Patient name: Kenisha Bennett  : 1945  MRN: [id number]  Referring provider: Niesha Car DO  Dx:   Encounter Diagnosis     ICD-10-CM    1  Degeneration of lumbar intervertebral disc M51 36    2  Ambulatory dysfunction R26 2        Start Time: 1000  Stop Time: 1050  Total time in clinic (min): 50 minutes    Subjective: Pt reports he is feeling pretty good today with no new complaints noted at this time  Objective: See treatment diary below    Precautions: Mild Falls Risk   RE Due: 18  Specialty Daily Treatment Diary      Manual  9-4-18 9/6/18 9/10/18 9/12/18 9/17/18      10'  10'  10 min  10 min Declined          Exercise Diary  9-4-18 9/6 9/10/18 9/12/18 9/17/18   NuStep  Level 2  10 minutes Level 3  10 min Level 3  10 minutes Level 3  10 min L3 10 min   SLR x 3   Kannan 1 x 15 ea   1 x 15 ea Kannan Kannan 1 x 15 each  Kannan 1 x 15 ea 15x ea B/L   HR/TR  1 x 20 1 x 20 ea 1 x 20 each  1 x 20 ea 20x ea   Squats   1 x 15 1 x 20 1 x 20  1 x 20 20x   Step-ups  Kannan Fwd C  1 x 15 Kannan Fwd C   1 x 15 Kannan - For - C 1 x 15   Kannan For C   1 x 15 15x B/L  C step   SBB  5" x 10 5" x 10 5" 1 x 10  5" 1 x 10 10x 5"   PPT  5"  1 x 15 5" x 15 5" 1 x 15  5" 1 x 15 15x 5"    PPT with marches            SLR  Kannan 1 x 15 2 x 10 Kannan Kannan 2 x 10 Kannan 2x10 20x B/L   S/L SLR  Kannan x 10           Clamshells            Hip abd Green 1 x 15 Seated 1 x 20 Green 1 x 20 Green 1 x 20 Green 20x   Hip add  1 x 20 Seated 1 x 20 1 x 20 1 x 20 20x   Bridges            LTR  5"  1 x 10 5" x 20 5" 1 x 20  5" 1 x 20 20x5"   SKTC DKTC 1 x 10   5" Green t-ball  DKTC x10,5" Green TB  DKTC x 10   5"  Green TB  DKTC x 10, 5" Green TB DKTC 10x5"  Green TB   Heel walk-outs            Rhomberg EO             Tadem EO             Side stepping             Tadem amb             Backwards amb             Biodex when appro                                   Modalities  9-4-18 9/6/18 9/10/18 9/12/18 9/17/18   MHP prn   Declined Seated x 10 min Seated 10 min Declined  Seated 10 min         Assessment: Tolerated treatment well  Patient exhibited good technique with therapeutic exercises and would benefit from continued PT  Pt with good tolerance to exercises this visit  No increased pain noted t/o treatment  No adverse reaction noted to MHP application post treatment  Plan: Continue per plan of care  Progress treatment as tolerated

## 2018-09-19 ENCOUNTER — OFFICE VISIT (OUTPATIENT)
Dept: PHYSICAL THERAPY | Facility: HOME HEALTHCARE | Age: 73
End: 2018-09-19
Payer: COMMERCIAL

## 2018-09-19 DIAGNOSIS — M51.36 DEGENERATION OF LUMBAR INTERVERTEBRAL DISC: Primary | ICD-10-CM

## 2018-09-19 PROCEDURE — 97140 MANUAL THERAPY 1/> REGIONS: CPT

## 2018-09-19 PROCEDURE — 97110 THERAPEUTIC EXERCISES: CPT

## 2018-09-19 NOTE — PROGRESS NOTES
Daily Note     Today's date: 2018  Patient name: Pastor Hauser  : 1945  MRN: [id number]  Referring provider: Katarina Veras DO  Dx:   Encounter Diagnosis     ICD-10-CM    1  Degeneration of lumbar intervertebral disc M51 36               Subjective: Pt reports he is ok this morning, just has some pain in his R shoulder  Objective: See treatment diary below      Assessment: Tolerated treatment farily well  Verbal cues needed t/o TE to perform correctly  No increased pain reported t/o TE  Tightness noted BLE with manual therapy  Patient would benefit from continued PT      Plan: Continue per plan of care  Precautions: Mild Falls Risk   RE Due: 18  Specialty Daily Treatment Diary      Manual  9/19/18 9/6/18 9/10/18 9/12/18 9/17/18      10'  10'  10 min  10 min Declined          Exercise Diary  9/19/18 9/6 9/10/18 9/12/18 9/17/18   NuStep  Level 3  10 minutes Level 3  10 min Level 3  10 minutes Level 3  10 min L3 10 min   SLR x 3   Kannan 1 x 15 ea   1 x 15 ea Kannan Kannan 1 x 15 each  Kannan 1 x 15 ea 15x ea B/L   HR/TR  1 x 20 1 x 20 ea 1 x 20 each  1 x 20 ea 20x ea   Squats   1 x 20 1 x 20 1 x 20  1 x 20 20x   Step-ups  Kannan Fwd C  1 x 15 Kannan Fwd C   1 x 15 Kannan - For - C 1 x 15   Kannan For C   1 x 15 15x B/L  C step   SBB  5" x 10 5" x 10 5" 1 x 10  5" 1 x 10 10x 5"   PPT  5"  1 x 15 5" x 15 5" 1 x 15  5" 1 x 15 15x 5"    PPT with marches            SLR  Kannan 1 x 20 2 x 10 Kannan Kannan 2 x 10 Kannan 2x10 20x B/L   S/L SLR           Clamshells            Hip abd Green 1 x 20 Seated 1 x 20 Green 1 x 20 Green 1 x 20 Green 20x   Hip add  1 x 20 Seated 1 x 20 1 x 20 1 x 20 20x   Bridges            LTR  5"  1 x 20 5" x 20 5" 1 x 20  5" 1 x 20 20x5"   SKTC DKTC 1 x 10   5" Green t-ball  DKTC x10,5" Green TB  DKTC x 10   5"  Green TB  DKTC x 10, 5" Green TB DKTC 10x5"  Green TB   Heel walk-outs            Rhomberg EO             Tadem EO             Side stepping             Tadem amb             Backwards amb             Biodex when appro                                   Modalities  9/19/18 9/6/18 9/10/18 9/12/18 9/17/18   MHP prn  Seated 10 min Seated x 10 min Seated 10 min Declined  Seated 10 min

## 2018-09-24 ENCOUNTER — OFFICE VISIT (OUTPATIENT)
Dept: PHYSICAL THERAPY | Facility: HOME HEALTHCARE | Age: 73
End: 2018-09-24
Payer: COMMERCIAL

## 2018-09-24 DIAGNOSIS — M51.36 DEGENERATION OF LUMBAR INTERVERTEBRAL DISC: Primary | ICD-10-CM

## 2018-09-24 PROCEDURE — 97110 THERAPEUTIC EXERCISES: CPT

## 2018-09-24 PROCEDURE — 97140 MANUAL THERAPY 1/> REGIONS: CPT

## 2018-09-24 NOTE — PROGRESS NOTES
Daily Note     Today's date: 2018  Patient name: Satish Tiwari  : 1945  MRN: [id number]  Referring provider: Awa De Los Santos DO  Dx:   Encounter Diagnosis     ICD-10-CM    1  Degeneration of lumbar intervertebral disc M51 36               Subjective: Pt reports his grandchildren are sick at home and he doesn't feel the best today  Objective: See treatment diary below      Assessment: Tolerated treatment fair  Modified program today due to Pt request of not feeling so good today  Pt with no c/o of increased pain t/o session  Tightness BLE with manual therapy  Patient would benefit from continued PT      Plan: Continue per plan of care  Precautions: Mild Falls Risk   RE Due: 18  Specialty Daily Treatment Diary      Manual  9/19/18 9/24/18 9/10/18 9/12/18 9/17/18      10'  10 min  10 min  10 min Declined          Exercise Diary  9/19/18 9/24/18 9/10/18 9/12/18 9/17/18   NuStep  Level 3  10 minutes Level 3  10 min Level 3  10 minutes Level 3  10 min L3 10 min   SLR x 3   Kannan 1 x 15 ea   NP Kannan 1 x 15 each  Kannan 1 x 15 ea 15x ea B/L   HR/TR  1 x 20 NP 1 x 20 each  1 x 20 ea 20x ea   Squats   1 x 20 NP 1 x 20  1 x 20 20x   Step-ups  Kannan Fwd C  1 x 15 NP Kannan - For - C 1 x 15   Kannan For C   1 x 15 15x B/L  C step   SBB  5" x 10 NP 5" 1 x 10  5" 1 x 10 10x 5"   PPT  5"  1 x 15 5" x 15 5" 1 x 15  5" 1 x 15 15x 5"    PPT with marches            SLR  Kannan 1 x 20 2 x 10 Kannan Kannan 2 x 10 Kannan 2x10 20x B/L   S/L SLR           Clamshells            Hip abd Green 1 x 20 Seated 1 x 20 Green 1 x 20 Green 1 x 20 Green 20x   Hip add  1 x 20 Seated 1 x 20 1 x 20 1 x 20 20x   Bridges            LTR  5"  1 x 20 5" x 20 5" 1 x 20  5" 1 x 20 20x5"   SKTC DKTC 1 x 10   5" Green t-ball  DKTC x10,5" Green TB  DKTC x 10   5"  Green TB  DKTC x 10, 5" Green TB DKTC 10x5"  Green TB   Heel walk-outs            Rhomberg EO             Tadem EO             Side stepping             Tadem amb             Backwards amb           Biodex when appro                                   Modalities  9/19/18 9/24/18 9/10/18 9/12/18 9/17/18   MHP prn  Seated 10 min Seated x 10 min Seated 10 min Declined  Seated 10 min

## 2018-09-26 ENCOUNTER — APPOINTMENT (OUTPATIENT)
Dept: PHYSICAL THERAPY | Facility: HOME HEALTHCARE | Age: 73
End: 2018-09-26
Payer: COMMERCIAL

## 2018-09-26 ENCOUNTER — OFFICE VISIT (OUTPATIENT)
Dept: NEUROLOGY | Facility: CLINIC | Age: 73
End: 2018-09-26

## 2018-09-26 VITALS
RESPIRATION RATE: 14 BRPM | HEIGHT: 69 IN | DIASTOLIC BLOOD PRESSURE: 60 MMHG | SYSTOLIC BLOOD PRESSURE: 117 MMHG | WEIGHT: 236.6 LBS | BODY MASS INDEX: 35.04 KG/M2 | HEART RATE: 72 BPM

## 2018-09-26 DIAGNOSIS — R27.8 SENSORY ATAXIA: ICD-10-CM

## 2018-09-26 DIAGNOSIS — R29.6 FREQUENT FALLS: ICD-10-CM

## 2018-09-26 DIAGNOSIS — R26.81 GAIT INSTABILITY: ICD-10-CM

## 2018-09-26 DIAGNOSIS — G25.0 TREMOR, ESSENTIAL: Primary | ICD-10-CM

## 2018-09-26 DIAGNOSIS — E11.40 CONTROLLED TYPE 2 DIABETES MELLITUS WITH DIABETIC NEUROPATHY, WITH LONG-TERM CURRENT USE OF INSULIN (HCC): ICD-10-CM

## 2018-09-26 DIAGNOSIS — Z79.4 CONTROLLED TYPE 2 DIABETES MELLITUS WITH DIABETIC NEUROPATHY, WITH LONG-TERM CURRENT USE OF INSULIN (HCC): ICD-10-CM

## 2018-09-26 PROCEDURE — 99215 OFFICE O/P EST HI 40 MIN: CPT | Performed by: PSYCHIATRY & NEUROLOGY

## 2018-09-26 RX ORDER — DIPHENOXYLATE HYDROCHLORIDE AND ATROPINE SULFATE 2.5; .025 MG/1; MG/1
1 TABLET ORAL DAILY
COMMUNITY
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
## 2018-09-26 NOTE — PATIENT INSTRUCTIONS
Directions:   - Will check blood tests today for reversible causes of tremor, neuropathy and ataxia  - Can increase gabapentin further to 300mg three times a day to see  Week 1:   Gabapentin 200mg three times a day  Week 2:   Gabapentin 300mg Three times a day and stay on this    Alternatives to therapy if this fails includes topiramate  Briefly discussed about Deep Brain Stimulation    - Endorsed continuation of Physical Therapy now for techniques to improve ambulation at home and recommendations on walking aid (they have cane and walker at home)  - Reviewed MRI Brain wo contrast    - Encouraged increased intake of fluids to avoid dehydration  - Encouraged exercise at home as tolerated  - Encouraged glycemic control, therapy as per PCP  - Return to clinic in 3-4 months
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
## 2018-10-01 ENCOUNTER — OFFICE VISIT (OUTPATIENT)
Dept: PHYSICAL THERAPY | Facility: HOME HEALTHCARE | Age: 73
End: 2018-10-01
Payer: COMMERCIAL

## 2018-10-01 ENCOUNTER — OFFICE VISIT (OUTPATIENT)
Dept: CARDIOLOGY CLINIC | Facility: HOSPITAL | Age: 73
End: 2018-10-01
Payer: COMMERCIAL

## 2018-10-01 VITALS
SYSTOLIC BLOOD PRESSURE: 116 MMHG | WEIGHT: 237 LBS | DIASTOLIC BLOOD PRESSURE: 68 MMHG | BODY MASS INDEX: 35.1 KG/M2 | HEIGHT: 69 IN | HEART RATE: 68 BPM

## 2018-10-01 DIAGNOSIS — R07.2 PRECORDIAL PAIN: ICD-10-CM

## 2018-10-01 DIAGNOSIS — M51.36 DEGENERATION OF LUMBAR INTERVERTEBRAL DISC: Primary | ICD-10-CM

## 2018-10-01 DIAGNOSIS — I25.118 CORONARY ARTERY DISEASE OF NATIVE ARTERY OF NATIVE HEART WITH STABLE ANGINA PECTORIS (HCC): Primary | ICD-10-CM

## 2018-10-01 DIAGNOSIS — E78.2 MIXED HYPERLIPIDEMIA: ICD-10-CM

## 2018-10-01 DIAGNOSIS — I10 ESSENTIAL HYPERTENSION: ICD-10-CM

## 2018-10-01 PROCEDURE — 97110 THERAPEUTIC EXERCISES: CPT

## 2018-10-01 PROCEDURE — 97140 MANUAL THERAPY 1/> REGIONS: CPT

## 2018-10-01 PROCEDURE — 99214 OFFICE O/P EST MOD 30 MIN: CPT | Performed by: INTERNAL MEDICINE

## 2018-10-01 PROCEDURE — 93000 ELECTROCARDIOGRAM COMPLETE: CPT | Performed by: INTERNAL MEDICINE

## 2018-10-01 NOTE — PROGRESS NOTES
Daily Note     Today's date: 10/1/2018  Patient name: Michael Wood  : 1945  MRN: [id number]  Referring provider: Monica Spann DO  Dx:   Encounter Diagnosis     ICD-10-CM    1  Degeneration of lumbar intervertebral disc M51 36               Subjective: Pt reports the Dr increased his medication for his neuropathy  Pt reports he has some pain in his back and shooting pain in the bottom of his feet from his neuropathy  Objective: See treatment diary below      Assessment: Tolerated treatment fair  Unsteadiness noted with some standing exercises in parallel bars  A few verbal cues needed t/o TE for correct form  Tightness BLE with manual therapy  Patient would benefit from continued PT      Plan: Continue per plan of care  Re-eval next visit  Precautions: Mild Falls Risk   RE Due: 18  Specialty Daily Treatment Diary      Manual  9/19/18 9/24/18 10/1/18 9/12/18 9/17/18      10'  10 min  10 min  10 min Declined          Exercise Diary  9/19/18 9/24/18 10/1/18 9/12/18 9/17/18   NuStep  Level 3  10 minutes Level 3  10 min Level 3  10 minutes Level 3  10 min L3 10 min   SLR x 3   Kannan 1 x 15 ea   NP Kannan 1 x 15 each  Kannan 1 x 15 ea 15x ea B/L   HR/TR  1 x 20 NP 1 x 20 each  1 x 20 ea 20x ea   Squats   1 x 20 NP 1 x 20  1 x 20 20x   Step-ups  Kannan Fwd C  1 x 15 NP Kannan - For - C 1 x 15   Kannan For C   1 x 15 15x B/L  C step   SBB  5" x 10 NP 5" 1 x 10  5" 1 x 10 10x 5"   PPT  5"  1 x 15 5" x 15 5" 1 x 15  5" 1 x 15 15x 5"    PPT with marches            SLR  Kannan 1 x 20 2 x 10 Kannan Kannan 2 x 10 Kannan 2x10 20x B/L   S/L SLR           Clamshells            Hip abd Green 1 x 20 Seated 1 x 20 Green 1 x 20 Green 1 x 20 Green 20x   Hip add  1 x 20 Seated 1 x 20 1 x 20 1 x 20 20x   Bridges            LTR  5"  1 x 20 5" x 20 5" 1 x 20  5" 1 x 20 20x5"   SKTC DKTC 1 x 10   5" Green t-ball  DKTC x10,5" Green TB  DKTC x 10   5"  Green TB  DKTC x 10, 5" Green TB DKTC 10x5"  Green TB   Heel walk-outs            [facility] EO             Tadem EO             Side stepping             Tadem amb             Backwards amb             Biodex when appro                                   Modalities  9/19/18 9/24/18 10/1/18 9/12/18 9/17/18   MHP prn  Seated 10 min Seated x 10 min Seated 10 min Declined  Seated 10 min

## 2018-10-01 NOTE — PROGRESS NOTES
Cardiology Follow Up    Ranjit Bishop  1945  [id number]  [facility] [place], [place] [facility] [address] [id number] [phone]    1  Coronary artery disease of native artery of native heart with stable angina pectoris (HCC)  POCT ECG    NM myocardial perfusion spect (rx stress and/or rest)   2  Essential hypertension     3  Precordial pain     4  Mixed hyperlipidemia           Discussion/Summary: He is having suspected anginal symptoms and has known CAD  He is not very active and cannot walk on a treadmill due to his neuropathy  I will order a [employer] stress test  I have reviewed his medications and made no changes   Further recommendations will be based on the stress test results  T/C a trial of Ranexa  Interval History:  He was seen in the ER on 8/8/2018 for CP that awoke him from sleep  ECG did not show ischemia and cardiac enzymes were normal  He is not very active but occasionally gets exertional CP  He has CAD with previous L Cx stenting and RCA stenting  He had acute occlusion of the RCA stent in  9/2014 with a sandwich stent placed  His BP is well controlled  He is on Lipitor 80 mg daily      Patient Active Problem List   Diagnosis    Arthritis    Coronary arteriosclerosis in native artery    Depression with anxiety    Diabetes mellitus type 2, controlled ([address] )    Gout    Hyperlipidemia    Hypertension    Morbid obesity ([address] )    NSTEMI (non-ST elevated myocardial infarction) ([address] )    Tremor    Degenerative disc disease, lumbar    Tremor, essential    Sensory ataxia    Gait instability    Frequent falls    Precordial pain     Past Medical History:   Diagnosis Date    BPH (benign prostatic hyperplasia)     CAD (coronary artery disease)     Diabetes mellitus (HCC)     niddm    Gout     High cholesterol     Hypertension     MI, 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      Social History Social History    Marital status: /Civil Union     Spouse name: N/A    Number of children: N/A    Years of education: N/A     Occupational History    Not on file  Social History Main Topics    Smoking status: Never Smoker    Smokeless tobacco: Never Used    Alcohol use Yes      Comment: socially, cut down in 2008, previously did more than     Drug use: No    Sexual activity: Not on file     Other Topics Concern    Not on file     Social History Narrative    No advance directive     No living will       Family History   Problem Relation Age of Onset    Adopted: Yes    No Known Problems Mother     No Known Problems Father      Past Surgical History:   Procedure Laterality Date    CATARACT EXTRACTION W/  INTRAOCULAR LENS IMPLANT      CORONARY ANGIOPLASTY WITH STENT PLACEMENT      stents x3    EYE SURGERY      repair corneal laceration    AK COLONOSCOPY FLX DX W/COLLJ SPEC WHEN PFRMD N/A 3/21/2016    Procedure: COLONOSCOPY;  Surgeon: Angel Luis Isbell MD;  Location: [facility];  Service: Colorectal    TONSILLECTOMY AND ADENOIDECTOMY         Current Outpatient Prescriptions:     allopurinol (ZYLOPRIM) 100 mg tablet, TAKE ONE TABLET BY MOUTH EVERY DAY, Disp: 90 tablet, Rfl: 0    aspirin 325 mg tablet, Take 325 mg by mouth daily  , Disp: , Rfl:     atorvastatin (LIPITOR) 80 mg tablet, Take 80 mg by mouth daily  , Disp: , Rfl:     b complex vitamins tablet, Take 1 tablet by mouth 2 (two) times a day  , Disp: , Rfl:     escitalopram (LEXAPRO) 10 mg tablet, Take 1 tablet (10 mg total) by mouth daily, Disp: 90 tablet, Rfl: 0    gabapentin (NEURONTIN) 100 mg capsule, Take 1 capsule (100 mg total) by mouth 3 (three) times a day (Patient taking differently: Take 200 mg by mouth 3 (three) times a day  ), Disp: 90 capsule, Rfl: 2    glimepiride (AMARYL) 4 mg tablet, Take 1 tablet (4 mg total) by mouth 2 (two) times a day, Disp: 90 tablet, Rfl: 2    L-Methylfolate-B6-B12 (FOLTANX) 3-35-2 MG TABS, Take by mouth 2 (two) times a day  , Disp: , Rfl:     levothyroxine 25 mcg tablet, Take 1 tablet (25 mcg total) by mouth daily One hour before food in am on an empty stomach, Disp: 90 tablet, Rfl: 0    metFORMIN (GLUCOPHAGE) 1000 MG tablet, Take 1 tablet (1,000 mg total) by mouth 2 (two) times a day with meals, Disp: 180 tablet, Rfl: 3    metoprolol succinate (TOPROL-XL) 25 mg 24 hr tablet, Take 25 mg by mouth 2 (two) times a day , Disp: , Rfl:     multivitamin (THERAGRAN) TABS, Take 1 tablet by mouth daily, Disp: , Rfl:   No Known Allergies  Vitals:    10/01/18 1244   BP: 116/68   BP Location: Left arm   Patient Position: Sitting   Cuff Size: Standard   Pulse: 68   Weight: 108 kg (237 lb)   Height: 5' 9" (1 753 m)     Weight (last 2 days)     Date/Time   Weight    10/01/18 1244  108 (237)             Blood pressure 116/68, pulse 68, height 5' 9" (1 753 m), weight 108 kg (237 lb)  , Body mass index is 35 kg/m²      Labs:  Admission on 08/08/2018, Discharged on 08/08/2018   Component Date Value    WBC 08/08/2018 7 78     RBC 08/08/2018 4 54     Hemoglobin 08/08/2018 15 1     Hematocrit 08/08/2018 44 0     MCV 08/08/2018 97     MCH 08/08/2018 33 3     MCHC 08/08/2018 34 3     RDW 08/08/2018 12 6     MPV 08/08/2018 10 0     Platelets 96/29/9611 237     Neutrophils Relative 08/08/2018 52     Lymphocytes Relative 08/08/2018 32     Monocytes Relative 08/08/2018 10     Eosinophils Relative 08/08/2018 6     Basophils Relative 08/08/2018 0     Neutrophils Absolute 08/08/2018 4 04     Lymphocytes Absolute 08/08/2018 2 47     Monocytes Absolute 08/08/2018 0 76     Eosinophils Absolute 08/08/2018 0 48     Basophils Absolute 08/08/2018 0 03     Protime 08/08/2018 12 0     INR 08/08/2018 0 93     PTT 08/08/2018 29     Sodium 08/08/2018 141     Potassium 08/08/2018 4 4     Chloride 08/08/2018 104     CO2 08/08/2018 30     ANION GAP 08/08/2018 7     BUN 08/08/2018 16     Creatinine 08/08/2018 1 19     Glucose 08/08/2018 136     Calcium 08/08/2018 9 3     AST 08/08/2018 39     ALT 08/08/2018 80*    Alkaline Phosphatase 08/08/2018 76     Total Protein 08/08/2018 7 1     Albumin 08/08/2018 3 6     Total Bilirubin 08/08/2018 0 60     eGFR 08/08/2018 61     Magnesium 08/08/2018 1 6     Troponin I 08/08/2018 <0 02     Ventricular Rate 08/08/2018 73     Atrial Rate 08/08/2018 73     CA Interval 08/08/2018 188     QRSD Interval 08/08/2018 84     QT Interval 08/08/2018 378     QTC Interval 08/08/2018 416     P Axis 08/08/2018 10     QRS [place] 08/08/2018 -46     T Wave [place] 08/08/2018 28    Appointment on 07/30/2018   Component Date Value    Cholesterol 07/30/2018 111     Triglycerides 07/30/2018 118     HDL, Direct 07/30/2018 37*    LDL Calculated 07/30/2018 50     Non-HDL-Chol (CHOL-HDL) 07/30/2018 74     Hemoglobin A1C 07/30/2018 8 2*    EAG 07/30/2018 189    Lab on 07/10/2018   Component Date Value    Sodium 07/10/2018 137     Potassium 07/10/2018 4 3     Chloride 07/10/2018 102     CO2 07/10/2018 26     ANION GAP 07/10/2018 9     BUN 07/10/2018 18     Creatinine 07/10/2018 1 20     Glucose, Fasting 07/10/2018 312*    Calcium 07/10/2018 9 4     eGFR 07/10/2018 60     Hemoglobin A1C 07/10/2018 9 1*    EAG 07/10/2018 214     Creatinine, Ur 07/10/2018 162 0     Microalbum  ,U,Random 07/10/2018 274 0*    Microalb Creat Ratio 07/10/2018 169*    TSH 3RD GENERATON 07/10/2018 5 570*   Office Visit on 07/10/2018   Component Date Value    Cholesterol 07/10/2018 128     Triglycerides 07/10/2018 230*    HDL, Direct 07/10/2018 34*    LDL Calculated 07/10/2018 48     Non-HDL-Chol (CHOL-HDL) 07/10/2018 94      Imaging: No results found  Review of Systems:  Review of Systems   Constitutional: Negative for diaphoresis, fatigue, fever and unexpected weight change  HENT: Negative  Respiratory: Negative for cough, shortness of breath and wheezing      Cardiovascular: Positive for chest pain  Negative for palpitations and leg swelling  Gastrointestinal: Negative for abdominal pain, diarrhea and nausea  Musculoskeletal: Negative for gait problem and myalgias  Skin: Negative for rash  Neurological: Negative for dizziness and numbness  Psychiatric/Behavioral: Negative  Physical Exam:  Physical Exam   Constitutional: He is oriented to person, place, and time  He appears well-developed and well-nourished  HENT:   Head: Normocephalic and atraumatic  Eyes: Pupils are equal, round, and reactive to light  Neck: Normal range of motion  Neck supple  No JVD present  Cardiovascular: Regular rhythm, S1 normal, S2 normal and normal pulses  Pulses:       Carotid pulses are 2+ on the right side, and 2+ on the left side  Pulmonary/Chest: Effort normal and breath sounds normal  He has no wheezes  He has no rales  Abdominal: Soft  Bowel sounds are normal  There is no tenderness  Musculoskeletal: Normal range of motion  He exhibits no edema or tenderness  Neurological: He is alert and oriented to person, place, and time  He has normal reflexes  No cranial nerve deficit  Skin: Skin is warm  Psychiatric: He has a normal mood and affect

## 2018-10-03 ENCOUNTER — EVALUATION (OUTPATIENT)
Dept: PHYSICAL THERAPY | Facility: HOME HEALTHCARE | Age: 73
End: 2018-10-03
Payer: COMMERCIAL

## 2018-10-03 DIAGNOSIS — M51.36 DEGENERATION OF LUMBAR INTERVERTEBRAL DISC: Primary | ICD-10-CM

## 2018-10-03 PROCEDURE — 97164 PT RE-EVAL EST PLAN CARE: CPT | Performed by: PHYSICAL THERAPIST

## 2018-10-03 PROCEDURE — 97140 MANUAL THERAPY 1/> REGIONS: CPT | Performed by: PHYSICAL THERAPIST

## 2018-10-03 PROCEDURE — G8979 MOBILITY GOAL STATUS: HCPCS | Performed by: PHYSICAL THERAPIST

## 2018-10-03 PROCEDURE — G8980 MOBILITY D/C STATUS: HCPCS | Performed by: PHYSICAL THERAPIST

## 2018-10-03 PROCEDURE — 97110 THERAPEUTIC EXERCISES: CPT | Performed by: PHYSICAL THERAPIST

## 2018-10-03 NOTE — PROGRESS NOTES
PT RE-EVALUATION & DISCHARGE    Today's date: 10/3/2018  Patient name: Emily Marrero  : 1945  MRN: [id number]  Referring provider: Tali Randolph DO  Dx:   Encounter Diagnosis     ICD-10-CM    1  Degeneration of lumbar intervertebral disc M51 36                   Assessment  Impairments: abnormal gait, abnormal or restricted ROM, abnormal movement, activity intolerance, impaired balance, impaired physical strength, lacks appropriate home exercise program, pain with function, safety issue and poor posture     Assessment details: The patient has been seen in PT for a total of 19 visits with good PT attendance noted since [facility]  He continues to have complaints of intermittent LBP which has decreased in intensity  He has made good improvements since [facility]  His BLE ROM and strength are now [facility]  Strength in his legs has also improved  His primary deficit remains his decreased balance and he has had falls at home  He notes that he feels his balance will always be off  He has been trying to be more active at home and has been taking longer walks around town  No AD is used though he still has forward trunk lean with ambulation  Improved transfers are noted though with increased time needed from lower surfaces  At this time he is happy with his progress in PT and is going to continue with his HEP  He has demonstrated understanding of HEP for strengthening, balance and gentle stretching/ROM  He has met majority of his goals and progressed towards others  D/C PT          Understanding of Dx/Px/POC: good   Prognosis: fair    Goals  STG: to be achieved within 4 weeks   Decrease pain 25-50% - met  Improve ROM by 5-10 degrees - met  Improve strength by 1/2 grade - met    LTG: to be achieved within 8 weeks   Independent with HEP - met  ADL function returned to PLOF - met  Ambulation improved to PLOF - partially met  Pt free from falls over past 4 weeks - not met  Pt with improvement in static balance testing > 50% - partially met    Plan  Planned modality interventions: cryotherapy and thermotherapy: hydrocollator packs  Planned therapy interventions: balance, manual therapy, neuromuscular re-education, patient education, postural training, strengthening, stretching, therapeutic activities, therapeutic exercise, home exercise program, gait training, functional ROM exercises and flexibility  Plan of Care beginning date: 10/3/2018  Plan of Care expiration date: 10/3/2018  Treatment plan discussed with: patient        Subjective Evaluation    History of Present Illness  Mechanism of injury: The patient states that his back has feeling better with less pain overall  Still with pain at times, with being more active  He has been trying to walk more at home  He will be going back to see the doctor on 10/22/18 for his next appointment  The patient notes that his gait will always be off and he will be off balance      Quality of life: good    Pain  At best pain ratin  At worst pain rating: 3  Location: Lower back   Quality: dull ache  Aggravating factors: standing, stair climbing, walking and lifting  Progression: improved    Social Support  Steps to enter house: yes  Stairs in house: yes   Lives in: multiple-level home      Diagnostic Tests  X-ray: abnormal (OA)  Treatments  Current treatment: physical therapy  Patient Goals  Patient goals for therapy: decreased pain, improved balance and increased strength          Objective     Postural Observations  Seated posture: fair  Standing posture: fair        Neurological Testing     Sensation     Lumbar   Left   Intact: light touch    Right   Intact: light touch    Active Range of Motion   Left Hip   Flexion: 90 degrees   Abduction: 45 degrees     Right Hip   Flexion: 90 degrees   Abduction: 45 degrees   Left Knee   Flexion: 110 degrees   Extension: 0 degrees     Right Knee   Flexion: 110 degrees   Extension: 0 degrees     Additional Active Range of Motion Details  L/S ROM: Flexion:  her 25%  Extension: her 25%   Side bendin% bilaterally   R SLR: 50  L SLR: 50    Strength/Myotome Testing     Left Hip   Planes of Motion   Flexion: 4+  Abduction: 4-  Adduction: 4+    Right Hip   Planes of Motion   Flexion: 4+  Abduction: 4  Adduction: 4+    Left Knee   Flexion: 4+  Extension: 4+    Right Knee   Flexion: 4+  Extension: 4+    Ambulation     Ambulation: Level Surfaces     Additional Level Surfaces Ambulation Details  Tolerance < 30 minutes  Pt uses scooters when out at shopping centers   Pt does not use AD but reports falls     Ambulation: Stairs   Pattern: non-reciprocal  Railings: one rail  Pattern: non-reciprocal  Railings: one rail    Quality of Movement During Gait   Trunk  Forward lean  Comments   Rhomberg:  Firm EO: 30 seconds  Firm EC: < 5 seconds     Tadem:   Firm EO: L foot back 15"   Firm EO: R foot back 15"     SLS:  Firm EO: R foot < 5"   Firm EO: L foot < 3"     General Comments     Lumbar Comments  Lying on R/L side is okay; unable to lay on stomach   Avoiding any lifting or household chores     Precautions: Mild Falls Risk   RE Due: 11/3/18  Specialty Daily Treatment Diary      Manual  9/19/18 9/24/18 10/1/18 10/3/18 9/17/18      10'  10 min  10 min 10 minutes Declined          Exercise Diary  9/19/18 9/24/18 10/1/18 10/3/18 9/17/18   NuStep  Level 3  10 minutes Level 3  10 min Level 3  10 minutes Level 3  10 minutes L3 10 min   SLR x 3   Kannan 1 x 15 ea   NP Kannan 1 x 15 each Kannan 1 x 15 each 15x ea B/L   HR/TR  1 x 20 NP 1 x 20 each 1 x 20 each 20x ea   Squats   1 x 20 NP 1 x 20 1 x 20 20x   Step-ups  Kannan Fwd C  1 x 15 NP Kannan - For - C 1 x 15  Kannan For C   1 x 15 15x B/L  C step   SBB  5" x 10 NP 5" 1 x 10 5" 1 x 10 10x 5"   PPT  5"  1 x 15 5" x 15 5" 1 x 15 5" 1 x 15 15x 5"    PPT with marches             SLR  Kannan 1 x 20 2 x 10 Kannan Kannan 2 x 10 Kannan 2 x 10 20x B/L   S/L SLR             Clamshells             Hip abd Green 1 x 20 Seated 1 x 20 Green 1 x 20 Green 1 x 20 Green 20x   Hip add  1 x 20 Seated 1 x 20 1 x 20 1 x 20 20x   Bridges             LTR  5"  1 x 20 5" x 20 5" 1 x 20 5" 1 x 20 20x5"   SKTC DKTC 1 x 10   5" Green t-ball  DKTC x10,5" Green TB  DKTC x 10   5"  Green TB DKTC x 10, 5" Green TBall DKTC 10x5"  Green TB   Heel walk-outs             Rhomberg EO              Tadem EO              Side stepping              Tadem amb             Backwards amb             Biodex when appro                                   Modalities  9/19/18 9/24/18 10/1/18 10/3/18 9/17/18   MHP prn  Seated 10 min Seated x 10 min Seated 10 min Declined Seated 10 min       Flowsheet Rows      Most Recent Value   PT/OT G-Codes   Current Score  59   Projected Score  48   FOTO information reviewed  Yes   Assessment Type  Discharge   G code set  Mobility: Walking & Moving Around   Mobility: Walking and Moving Around Goal Status ()  CK   Mobility: Walking and Moving Around Discharge Status ()  CK

## 2018-10-08 ENCOUNTER — HOSPITAL ENCOUNTER (OUTPATIENT)
Dept: NUCLEAR MEDICINE | Facility: HOSPITAL | Age: 73
Discharge: HOME | End: 2018-10-08
Attending: INTERNAL MEDICINE
Payer: COMMERCIAL

## 2018-10-08 ENCOUNTER — LAB (OUTPATIENT)
Dept: LAB | Facility: HOSPITAL | Age: 73
End: 2018-10-08
Attending: FAMILY MEDICINE
Payer: COMMERCIAL

## 2018-10-08 DIAGNOSIS — R80.9 PROTEINURIA, UNSPECIFIED TYPE: Primary | ICD-10-CM

## 2018-10-08 DIAGNOSIS — R27.8 SENSORY ATAXIA: ICD-10-CM

## 2018-10-08 DIAGNOSIS — I25.118 CORONARY ARTERY DISEASE OF NATIVE ARTERY OF NATIVE HEART WITH STABLE ANGINA PECTORIS (HCC): ICD-10-CM

## 2018-10-08 DIAGNOSIS — R26.81 GAIT INSTABILITY: ICD-10-CM

## 2018-10-08 DIAGNOSIS — Z79.4 CONTROLLED TYPE 2 DIABETES MELLITUS WITH DIABETIC NEUROPATHY, WITH LONG-TERM CURRENT USE OF INSULIN (HCC): ICD-10-CM

## 2018-10-08 DIAGNOSIS — E03.9 HYPOTHYROIDISM, UNSPECIFIED TYPE: ICD-10-CM

## 2018-10-08 DIAGNOSIS — G25.0 TREMOR, ESSENTIAL: ICD-10-CM

## 2018-10-08 DIAGNOSIS — F33.9 RECURRENT MAJOR DEPRESSIVE DISORDER, REMISSION STATUS UNSPECIFIED (HCC): ICD-10-CM

## 2018-10-08 DIAGNOSIS — E11.40 CONTROLLED TYPE 2 DIABETES MELLITUS WITH DIABETIC NEUROPATHY, WITH LONG-TERM CURRENT USE OF INSULIN (HCC): ICD-10-CM

## 2018-10-08 DIAGNOSIS — R29.6 FREQUENT FALLS: ICD-10-CM

## 2018-10-08 LAB
ALBUMIN SERPL BCP-MCNC: 3.4 G/DL (ref 3.5–5)
ALP SERPL-CCNC: 77 U/L (ref 46–116)
ALT SERPL W P-5'-P-CCNC: 79 U/L (ref 12–78)
ANION GAP SERPL CALCULATED.3IONS-SCNC: 10 MMOL/L (ref 4–13)
AST SERPL W P-5'-P-CCNC: 35 U/L (ref 5–45)
BILIRUB SERPL-MCNC: 0.6 MG/DL (ref 0.2–1)
BUN SERPL-MCNC: 15 MG/DL (ref 5–25)
CALCIUM SERPL-MCNC: 9.5 MG/DL (ref 8.3–10.1)
CHEST PAIN STATEMENT: NORMAL
CHLORIDE SERPL-SCNC: 103 MMOL/L (ref 100–108)
CO2 SERPL-SCNC: 27 MMOL/L (ref 21–32)
CREAT SERPL-MCNC: 1.16 MG/DL (ref 0.6–1.3)
CREAT UR-MCNC: 212 MG/DL
EST. AVERAGE GLUCOSE BLD GHB EST-MCNC: 160 MG/DL
FOLATE SERPL-MCNC: >20 NG/ML (ref 3.1–17.5)
GFR SERPL CREATININE-BSD FRML MDRD: 63 ML/MIN/1.73SQ M
GLUCOSE P FAST SERPL-MCNC: 163 MG/DL (ref 65–99)
HBA1C MFR BLD: 7.2 % (ref 4.2–6.3)
MAX DIASTOLIC BP: 62 MMHG
MAX HEART RATE: 129 BPM
MAX PREDICTED HEART RATE: 149 BPM
MAX. SYSTOLIC BP: 114 MMHG
MICROALBUMIN UR-MCNC: 246 MG/L (ref 0–20)
MICROALBUMIN/CREAT 24H UR: 116 MG/G CREATININE (ref 0–30)
POTASSIUM SERPL-SCNC: 4.1 MMOL/L (ref 3.5–5.3)
PROT SERPL-MCNC: 6.6 G/DL (ref 6.4–8.2)
PROTOCOL NAME: NORMAL
REASON FOR TERMINATION: NORMAL
SODIUM SERPL-SCNC: 140 MMOL/L (ref 136–145)
T4 FREE SERPL-MCNC: 1.02 NG/DL (ref 0.76–1.46)
TARGET HR FORMULA: NORMAL
TEST INDICATION: NORMAL
TIME IN EXERCISE PHASE: NORMAL
TSH SERPL DL<=0.05 MIU/L-ACNC: 5.72 UIU/ML (ref 0.36–3.74)
VIT B12 SERPL-MCNC: 2194 PG/ML (ref 100–900)

## 2018-10-08 PROCEDURE — 78452 HT MUSCLE IMAGE SPECT MULT: CPT | Performed by: INTERNAL MEDICINE

## 2018-10-08 PROCEDURE — 82570 ASSAY OF URINE CREATININE: CPT

## 2018-10-08 PROCEDURE — 83036 HEMOGLOBIN GLYCOSYLATED A1C: CPT

## 2018-10-08 PROCEDURE — 93016 CV STRESS TEST SUPVJ ONLY: CPT | Performed by: INTERNAL MEDICINE

## 2018-10-08 PROCEDURE — 80053 COMPREHEN METABOLIC PANEL: CPT

## 2018-10-08 PROCEDURE — 82390 ASSAY OF CERULOPLASMIN: CPT

## 2018-10-08 PROCEDURE — 82525 ASSAY OF COPPER: CPT

## 2018-10-08 PROCEDURE — 84439 ASSAY OF FREE THYROXINE: CPT

## 2018-10-08 PROCEDURE — 3060F POS MICROALBUMINURIA REV: CPT | Performed by: FAMILY MEDICINE

## 2018-10-08 PROCEDURE — 93018 CV STRESS TEST I&R ONLY: CPT | Performed by: INTERNAL MEDICINE

## 2018-10-08 PROCEDURE — 84165 PROTEIN E-PHORESIS SERUM: CPT | Performed by: PATHOLOGY

## 2018-10-08 PROCEDURE — 78452 HT MUSCLE IMAGE SPECT MULT: CPT

## 2018-10-08 PROCEDURE — A9502 TC99M TETROFOSMIN: HCPCS

## 2018-10-08 PROCEDURE — 84446 ASSAY OF VITAMIN E: CPT

## 2018-10-08 PROCEDURE — 82043 UR ALBUMIN QUANTITATIVE: CPT

## 2018-10-08 PROCEDURE — 84443 ASSAY THYROID STIM HORMONE: CPT

## 2018-10-08 PROCEDURE — 93017 CV STRESS TEST TRACING ONLY: CPT

## 2018-10-08 PROCEDURE — 36415 COLL VENOUS BLD VENIPUNCTURE: CPT

## 2018-10-08 PROCEDURE — 84165 PROTEIN E-PHORESIS SERUM: CPT

## 2018-10-08 PROCEDURE — 82607 VITAMIN B-12: CPT

## 2018-10-08 PROCEDURE — 82746 ASSAY OF FOLIC ACID SERUM: CPT

## 2018-10-08 RX ORDER — LEVOTHYROXINE SODIUM 0.03 MG/1
25 TABLET ORAL DAILY
Qty: 90 TABLET | Refills: 0 | Status: SHIPPED | OUTPATIENT
Start: 2018-10-08 | End: 2018-10-23 | Stop reason: SDUPTHER

## 2018-10-08 RX ADMIN — REGADENOSON 0.4 MG: 0.08 INJECTION, SOLUTION INTRAVENOUS at 09:20

## 2018-10-08 NOTE — PROGRESS NOTES
Please call the patient regarding his abnormal result   Spilling a lot of protein in the urine make sure patient does see a nephrologist probably Dr Evin Salcedo would be his best bet hemoglobin A1c was good at 7 2 B12 and folate levels were extremely high he does not need any B12 or folate for a while we can recheck them again in about for 5 months thyroid was good

## 2018-10-09 LAB
ALBUMIN SERPL ELPH-MCNC: 3.85 G/DL (ref 3.5–5)
ALBUMIN SERPL ELPH-MCNC: 55.8 % (ref 52–65)
ALPHA1 GLOB SERPL ELPH-MCNC: 0.3 G/DL (ref 0.1–0.4)
ALPHA1 GLOB SERPL ELPH-MCNC: 4.3 % (ref 2.5–5)
ALPHA2 GLOB SERPL ELPH-MCNC: 0.81 G/DL (ref 0.4–1.2)
ALPHA2 GLOB SERPL ELPH-MCNC: 11.8 % (ref 7–13)
BETA GLOB ABNORMAL SERPL ELPH-MCNC: 0.41 G/DL (ref 0.4–0.8)
BETA1 GLOB SERPL ELPH-MCNC: 6 % (ref 5–13)
BETA2 GLOB SERPL ELPH-MCNC: 7 % (ref 2–8)
BETA2+GAMMA GLOB SERPL ELPH-MCNC: 0.48 G/DL (ref 0.2–0.5)
CERULOPLASMIN SERPL-MCNC: 20.5 MG/DL (ref 16–31)
GAMMA GLOB ABNORMAL SERPL ELPH-MCNC: 1.04 G/DL (ref 0.5–1.6)
GAMMA GLOB SERPL ELPH-MCNC: 15.1 % (ref 12–22)
IGG/ALB SER: 1.26 {RATIO} (ref 1.1–1.8)
PROT PATTERN SERPL ELPH-IMP: NORMAL
PROT SERPL-MCNC: 6.9 G/DL (ref 6.4–8.2)

## 2018-10-10 LAB — COPPER SERPL-MCNC: 100 UG/DL (ref 72–166)

## 2018-10-12 LAB
A-TOCOPHEROL VIT E SERPL-MCNC: 7.1 MG/L (ref 9–29)
GAMMA TOCOPHEROL SERPL-MCNC: 0.7 MG/L (ref 0.5–4.9)

## 2018-10-15 ENCOUNTER — HOSPITAL ENCOUNTER (OUTPATIENT)
Dept: NEUROLOGY | Facility: CLINIC | Age: 73
Discharge: HOME | End: 2018-10-15
Payer: COMMERCIAL

## 2018-10-15 DIAGNOSIS — Z79.4 CONTROLLED TYPE 2 DIABETES MELLITUS WITH DIABETIC NEUROPATHY, WITH LONG-TERM CURRENT USE OF INSULIN (HCC): ICD-10-CM

## 2018-10-15 DIAGNOSIS — R29.6 FREQUENT FALLS: ICD-10-CM

## 2018-10-15 DIAGNOSIS — E11.40 CONTROLLED TYPE 2 DIABETES MELLITUS WITH DIABETIC NEUROPATHY, WITH LONG-TERM CURRENT USE OF INSULIN (HCC): ICD-10-CM

## 2018-10-15 DIAGNOSIS — R27.8 SENSORY ATAXIA: ICD-10-CM

## 2018-10-15 PROCEDURE — 95912 NRV CNDJ TEST 11-12 STUDIES: CPT | Performed by: PHYSICAL MEDICINE & REHABILITATION

## 2018-10-15 PROCEDURE — 95886 MUSC TEST DONE W/N TEST COMP: CPT | Performed by: PHYSICAL MEDICINE & REHABILITATION

## 2018-10-15 NOTE — PROCEDURES
[facility]rell[facility] [address]  [address] [facility]  [phone]        Name: Carmen Barahona  Patient ID: [id number]   Age: 71 Years 8 Months  YOB: 1945   Account Number:    Gender: Male   Technologist:    Date of Exam: 10/15/2018 11:48   Referring Physician: David Sofia MD  Temperature: 29 6 foot, 33 3 hand   Examining Physician: Enrique Eubanks MD  Height: 5 feet 9 inch               Patient History:   71 y o male with history of unsteady gait, progressively worsening for past several years  Endorses chronic numbness in feet  Has history of DM  Denies back pain radiating to the lower extremity  Referred for peripheral polyneuropathy evaluation  [address]      Nerve / Sites Muscle Latency Ref  Amplitude Ref  Duration Rel Amp Distance Lat Diff Velocity Ref  ms ms mV mV ms % mm ms m/s m/s   R Median - APB      Wrist APB 3 59 ?4 20 7 3 ?4 0 6 35 100 70         Elbow APB 8 33  6 3  6 41 86 3 220 4 74 46 ?50   R Ulnar - ADM      Wrist ADM 3 49 ?3 30 4 7 ?5 0 5 05 100 70         B  Elbow ADM 7 19  3 0  5 47 63 1 185 3 70 50 ?50      A  Elbow ADM 9 22  2 8  5 78 94 3 100 2 03 49 ?49            5 73     R Peroneal - EDB      Ankle EDB NR ?5 50 NR ?2 0 NR NR 80         Fib head EDB NR  NR  NR NR  NR  ?40            NR     R Tibial - AH      Ankle AH NR ?6 00 NR ?4 0 NR       R Peroneal - Tib Ant      Fib Head Tib Ant 3 23 ?6 70 1 6 ?5 0 6 41 100          Pop fossa Tib Ant 5 00  1 5  5 78 98 8 100 1 77 56 ?40       SNC      Nerve / Sites Rec  Site Onset Lat Peak Lat Ref  Amp Ref  Distance Peak Diff Ref  Velocity Ref       ms ms ms µV µV mm ms ms m/s m/s   R Median - Digit II (Antidromic)      Wrist Dig II 3 02 3 91 ?3 50 15 8 ?10 0 130   43 ?50   R Ulnar - Digit V (Antidromic)      Wrist Dig V 2 50 3 39 ?3 10 12 2 ?10 0 110   44 ?50   R Radial - Anatomical snuff box (Forearm)      Forearm Wrist 2 55 3 23 ?2 90 7 0 ?10 0 100   39 ?50   R Superficial peroneal - Ankle      Lat leg Ankle NR NR ?3 50 NR ?6 0 100   NR ?40   R Median, Ulnar - Transcarpal comparison      Median Palm Wrist 1 77 2 55 ?2 20 18 3 ?50 0 80   45       Ulnar Palm Wrist 1 77 2 24 ?2 20 1 7 ?12 0 80   45            0 31 ?0 40     R Median, Radial - Thumb comparison      Median Wrist Thumb 2 60 3 28  12 6  100   38       Radial Wrist Thumb 2 66 3 28  4 7  100   38            0 00 ?0 50         EMG         Needle EMG Examination     Insertional Spontaneous MUAP   Muscle Nerve Roots Activity Fib PSW Fasc Dur  Amp Poly Config Recruitment   R  Vastus medialis Femoral L2-L4 Normal None None None Normal Normal None Normal Normal   R  Tibialis anterior Peroneal L4-L5 Incr  1+ 1+ None 1+ Normal Few Normal Normal   R  Gastrocnemius (Medial head) Tibial S1-S2 Normal None None None 1+ 1+ None Normal Decr  R  Peroneus longus Perineal L5-S1 Normal None None None Normal Normal None Normal Decr  R  Gluteus medius Superior gluteal L4-S1 Normal None None None Normal Normal None Normal Normal   R  Deltoid Axillary C5-C6 Normal None None None Normal Normal None Normal Normal   R  Biceps brachii Musculocutaneous C5-C6 Normal None None None Normal Normal None Normal Normal   R  Triceps brachii Radial C6-C8 Normal None None None Normal Normal None Normal Normal   R  Pronator teres Median C6-C7 Normal None None None Normal Normal None Normal Normal   R  First dorsal interosseous Ulnar C8-T1 Normal None None None Normal Normal None Normal Normal         Findings:   Motor:  Right median compound motor action potential (CMAP) demonstrated normal distal latency, normal amplitude, and mildly decreased conduction velocity across the forearm    Right ulnar compound motor action potential (CMAP) demonstrated mildly prolonged distal latency, mildly decreased amplitude, and normal conduction velocity across the elbow and forearm  Right peroneal compound motor action potential (CMAP) unobtainable       Right tibial compound motor action potential (CMAP) unobtainable  Right peroneal CMAP to the tibialis anterior demonstrated decreased amplitude and normal conduction velocity across the fibular head  Sensory:  Right median sensory nerve action potential (SNAP) demonstrated normal amplitude and prolonged peak latency  Right ulnar sensory nerve action potential (SNAP) demonstrated normal amplitude and prolonged peak latency  Right radial sensory nerve action potential (SNAP) demonstrated decreased amplitude and prolonged peak latency  Right median and ulnar sensory nerve action potential (SNAP) comparison study to the wrist (transcarpal) demonstrated normal latency difference  Right median and radial sensory nerve action potential (SNAP) comparison study to digit 1 demonstrated normal latency difference  EMG:  A disposable concentric needle was used to study selected muscles in the right upper extremity including the deltoid, biceps, triceps, pronator teres, first dorsal interosseous, and abductor pollicis brevis  All muscles tested demonstrated normal insertional activity, no abnormal spontaneous activity, and normal volitional motor unit action potentials  A disposable concentric needle was used to study selected muscles in the right lower extremity including the tibialis anterior, medial gastrocnemius, peroneus longus, vastus medialis, and gluteus medius  There was abnormal spontaneous activity noted in the right tibialis anterior  There was abnormal MUAP morphology noted in the right tibialis anterior and medial gastrocnemius  All other muscles tested demonstrated normal insertional activity, no abnormal spontaneous activity, and normal volitional motor unit action potentials  Impression: Abnormal study  1  There is electrodiagnostic evidence of a primarily axonal peripheral sensorimotor polyneuropathy, with ongoing and chronic denervation to distal lower extremity muscles     2  There is no electrodiagnostic evidence of a right upper or lower extremity entrapment mononeuropathy  3  There is no electrodiagnostic evidence of a right cervical radiculopathy, brachial plexopathy, lumbar radiculopathy, or lumbosacral radiculopathy               ___________________________  Lori Barragan MD

## 2018-10-17 DIAGNOSIS — G25.0 TREMOR, ESSENTIAL: ICD-10-CM

## 2018-10-17 LAB
COPPER 24H UR-MRATE: 22 UG/24 HR (ref 3–35)
COPPER UR-MCNC: 19 UG/L
COPPER/CREAT UR: 14 UG/G CREAT (ref 0–49)
CREAT UR-MCNC: 1.34 G/L (ref 0.3–3)

## 2018-10-17 RX ORDER — GABAPENTIN 100 MG/1
200 CAPSULE ORAL 3 TIMES DAILY
Qty: 180 CAPSULE | Refills: 5 | Status: SHIPPED | OUTPATIENT
Start: 2018-10-17 | End: 2018-12-26 | Stop reason: DRUGHIGH

## 2018-10-17 NOTE — TELEPHONE ENCOUNTER
----- Message from Jackie Bonilla MD sent at 10/16/2018  8:05 PM EDT -----  Regarding: Lab results and EMG  Please call patient back regarding the  1  Lab results are back and mainly normal except for elevated B vitamin levels which are not concerning, but mainly concerning for his low vitamin E (recommend taking 100-400 IU a day) and his persistently elevated TSH which is more subclinical hypothyroidism and would not account for tremor  He should follow up with his PCP regarding these  2  Also reviewed his EMG for his falls; most likely diabetic neuropathy from the looks of it and with his HbA1c still at 7 2 but better, it's still the most likely cause of his falls  He should continue with his gabapentin and to call us with any questions   Thanks     ----- Message -----  From: Lab, Background User  Sent: 10/8/2018  10:16 AM  To: Jackie Bonilla MD

## 2018-10-17 NOTE — TELEPHONE ENCOUNTER
I spoke with pt and wife  They verbalized understanding of all recommendations  Pt states he is currently taking gabapentin 200mg TID and stable on this dose  States updated script was never sent to pharm after office visit  Rx entered for your authorization

## 2018-10-22 ENCOUNTER — OFFICE VISIT (OUTPATIENT)
Dept: FAMILY MEDICINE CLINIC | Facility: CLINIC | Age: 73
End: 2018-10-22
Payer: COMMERCIAL

## 2018-10-22 ENCOUNTER — LAB (OUTPATIENT)
Dept: LAB | Facility: MEDICAL CENTER | Age: 73
End: 2018-10-22
Payer: COMMERCIAL

## 2018-10-22 VITALS
WEIGHT: 237.2 LBS | BODY MASS INDEX: 35.13 KG/M2 | SYSTOLIC BLOOD PRESSURE: 104 MMHG | HEIGHT: 69 IN | DIASTOLIC BLOOD PRESSURE: 64 MMHG

## 2018-10-22 DIAGNOSIS — M79.662 PAIN OF LEFT CALF: Primary | ICD-10-CM

## 2018-10-22 DIAGNOSIS — E03.9 ACQUIRED HYPOTHYROIDISM: ICD-10-CM

## 2018-10-22 DIAGNOSIS — F33.9 RECURRENT MAJOR DEPRESSIVE DISORDER, REMISSION STATUS UNSPECIFIED (HCC): ICD-10-CM

## 2018-10-22 DIAGNOSIS — Z23 NEED FOR INFLUENZA VACCINATION: ICD-10-CM

## 2018-10-22 LAB — TSH SERPL DL<=0.05 MIU/L-ACNC: 3.91 UIU/ML (ref 0.36–3.74)

## 2018-10-22 PROCEDURE — 90471 IMMUNIZATION ADMIN: CPT | Performed by: FAMILY MEDICINE

## 2018-10-22 PROCEDURE — 99214 OFFICE O/P EST MOD 30 MIN: CPT | Performed by: FAMILY MEDICINE

## 2018-10-22 PROCEDURE — 36415 COLL VENOUS BLD VENIPUNCTURE: CPT

## 2018-10-22 PROCEDURE — 90662 IIV NO PRSV INCREASED AG IM: CPT | Performed by: FAMILY MEDICINE

## 2018-10-22 PROCEDURE — 3008F BODY MASS INDEX DOCD: CPT | Performed by: FAMILY MEDICINE

## 2018-10-22 PROCEDURE — 84443 ASSAY THYROID STIM HORMONE: CPT

## 2018-10-22 RX ORDER — ESCITALOPRAM OXALATE 20 MG/1
20 TABLET ORAL DAILY
Qty: 30 TABLET | Refills: 2 | Status: SHIPPED | OUTPATIENT
Start: 2018-10-22 | End: 2018-12-20 | Stop reason: SDUPTHER

## 2018-10-22 NOTE — PROGRESS NOTES
Assessment/Plan:  Duplex scan ordered patient's Lexapro of will be increased to 20 mg    No problem-specific Assessment & Plan notes found for this encounter  Diagnoses and all orders for this visit:    Pain of left calf  -     VAS lower limb venous duplex study, unilateral/limited; Future    Need for influenza vaccination  -     influenza vaccine, [id number], high-dose, PF 0 5 mL, for patients 65 yr+ (FLUZONE HIGH-DOSE)    Acquired hypothyroidism  -     TSH, 3rd generation; Future          Subjective:      Patient ID: Kathye Meckel is a 71 y o  male  Patient here for a follow-up of visit has complaints of left leg pain in the calf actually was to his [address] doctor recommended Doppler study but the family would like to have it done here at [facility] also patient is on thyroid needs to be recheck he was recently started on 25 mcg of Synthroid and lastly patient has symptoms of depression family feels he should be increased on the dose of his Lexapro at patient specifically questioned about suicidal ideation he sometimes thinks about this but has no specific plan        The following portions of the patient's history were reviewed and updated as appropriate:   He  has a past medical history of BPH (benign prostatic hyperplasia); CAD (coronary artery disease); Diabetes mellitus ([address] ); Gout; High cholesterol; Hypertension; and MI, old    He   Patient Active Problem List    Diagnosis Date Noted    Precordial pain 10/01/2018    Tremor, essential 09/26/2018    Sensory ataxia 09/26/2018    Gait instability 09/26/2018    Frequent falls 09/26/2018    Degenerative disc disease, lumbar 07/10/2018    Tremor 10/10/2016    Depression with anxiety 08/29/2016    Arthritis 07/13/2015    NSTEMI (non-ST elevated myocardial infarction) ([address] ) 10/06/2014    Coronary arteriosclerosis in native artery 04/08/2014    Gout 05/17/2013    Diabetes mellitus type 2, controlled ([address] ) 06/18/2012    Hyperlipidemia 06/18/2012    Hypertension 06/18/2012    Morbid obesity ([facility] ) 06/18/2012     He  has a past surgical history that includes Tonsillectomy and adenoidectomy; Cataract extraction w/  intraocular lens implant; Eye surgery; Coronary angioplasty with stent; and pr colonoscopy flx dx w/collj spec when pfrmd (N/A, 3/21/2016)  His family history includes No Known Problems in his father and mother  He was adopted  He  reports that he has never smoked  He has never used smokeless tobacco  He reports that he drinks alcohol  He reports that he does not use drugs  Current Outpatient Prescriptions   Medication Sig Dispense Refill    allopurinol (ZYLOPRIM) 100 mg tablet TAKE ONE TABLET BY MOUTH EVERY DAY 90 tablet 0    aspirin 325 mg tablet Take 325 mg by mouth daily   atorvastatin (LIPITOR) 80 mg tablet Take 80 mg by mouth daily   b complex vitamins tablet Take 1 tablet by mouth 2 (two) times a day        escitalopram (LEXAPRO) 10 mg tablet Take 1 tablet (10 mg total) by mouth daily 90 tablet 0    gabapentin (NEURONTIN) 100 mg capsule Take 2 capsules (200 mg total) by mouth 3 (three) times a day If stable, remain at this dose  Zhou Looney, then can do 3 tabs three times a day 180 capsule 5    glimepiride (AMARYL) 4 mg tablet Take 1 tablet (4 mg total) by mouth 2 (two) times a day 90 tablet 2    levothyroxine 25 mcg tablet Take 1 tablet (25 mcg total) by mouth daily One hour before food in am on an empty stomach 90 tablet 0    metFORMIN (GLUCOPHAGE) 1000 MG tablet Take 1 tablet (1,000 mg total) by mouth 2 (two) times a day with meals 180 tablet 3    metoprolol succinate (TOPROL-XL) 25 mg 24 hr tablet Take 25 mg by mouth 2 (two) times a day   multivitamin (THERAGRAN) TABS Take 1 tablet by mouth daily      L-Methylfolate-B6-B12 (FOLTANX) 3-35-2 MG TABS Take by mouth 2 (two) times a day         No current facility-administered medications for this visit        Current Outpatient Prescriptions on File Prior to Visit   Medication Sig    allopurinol (ZYLOPRIM) 100 mg tablet TAKE ONE TABLET BY MOUTH EVERY DAY    aspirin 325 mg tablet Take 325 mg by mouth daily   atorvastatin (LIPITOR) 80 mg tablet Take 80 mg by mouth daily   b complex vitamins tablet Take 1 tablet by mouth 2 (two) times a day      escitalopram (LEXAPRO) 10 mg tablet Take 1 tablet (10 mg total) by mouth daily    gabapentin (NEURONTIN) 100 mg capsule Take 2 capsules (200 mg total) by mouth 3 (three) times a day If stable, remain at this dose  yung Mckoy can do 3 tabs three times a day    glimepiride (AMARYL) 4 mg tablet Take 1 tablet (4 mg total) by mouth 2 (two) times a day    levothyroxine 25 mcg tablet Take 1 tablet (25 mcg total) by mouth daily One hour before food in am on an empty stomach    metFORMIN (GLUCOPHAGE) 1000 MG tablet Take 1 tablet (1,000 mg total) by mouth 2 (two) times a day with meals    metoprolol succinate (TOPROL-XL) 25 mg 24 hr tablet Take 25 mg by mouth 2 (two) times a day   multivitamin (THERAGRAN) TABS Take 1 tablet by mouth daily    L-Methylfolate-B6-B12 (FOLTANX) 3-35-2 MG TABS Take by mouth 2 (two) times a day       No current facility-administered medications on file prior to visit  He has No Known Allergies       Review of Systems   Constitutional: Negative for activity change, appetite change, diaphoresis, fatigue and fever  HENT: Negative  Eyes: Negative  Respiratory: Negative for apnea, cough, chest tightness, shortness of breath and wheezing  Cardiovascular: Negative for chest pain, palpitations and leg swelling  Gastrointestinal: Negative for abdominal distention, abdominal pain, anal bleeding, constipation, diarrhea, nausea and vomiting  Endocrine: Negative for cold intolerance, heat intolerance, polydipsia, polyphagia and polyuria  Genitourinary: Negative for difficulty urinating, dysuria, flank pain, hematuria and urgency     Musculoskeletal: Negative for arthralgias, back pain, gait problem, joint swelling and myalgias  Skin: Negative for color change, rash and wound  Allergic/Immunologic: Negative for environmental allergies, food allergies and immunocompromised state  Neurological: Negative for dizziness, seizures, syncope, speech difficulty, numbness and headaches  Hematological: Negative for adenopathy  Does not bruise/bleed easily  Psychiatric/Behavioral: Positive for dysphoric mood and suicidal ideas  Negative for agitation, behavioral problems, hallucinations and sleep disturbance  Objective:      /64 (BP Location: Left arm, Patient Position: Sitting, Cuff Size: Standard)   Ht 5' 9" (1 753 m)   Wt 108 kg (237 lb 3 2 oz)   BMI 35 03 kg/m²          Physical Exam   Constitutional: He is oriented to person, place, and time  He appears well-developed and well-nourished  No distress  HENT:   Head: Normocephalic  Right Ear: External ear normal    Left Ear: External ear normal    Nose: Nose normal    Mouth/Throat: Oropharynx is clear and moist    Eyes: Pupils are equal, round, and reactive to light  Conjunctivae and EOM are normal  Right eye exhibits no discharge  Left eye exhibits no discharge  No scleral icterus  Neck: Normal range of motion  No tracheal deviation present  No thyromegaly present  Cardiovascular: Normal rate, regular rhythm and normal heart sounds  Exam reveals no gallop and no friction rub  No murmur heard  Pulmonary/Chest: Effort normal and breath sounds normal  No respiratory distress  He has no wheezes  Abdominal: Soft  Bowel sounds are normal  He exhibits no mass  There is no tenderness  There is no guarding  Musculoskeletal: He exhibits edema  He exhibits no deformity  Pain calf left leg   Lymphadenopathy:     He has no cervical adenopathy  Neurological: He is alert and oriented to person, place, and time  No cranial nerve deficit  Skin: Skin is warm and dry  No rash noted  He is not diaphoretic  No erythema  Psychiatric: Thought content normal    Depressed mood

## 2018-10-23 DIAGNOSIS — E03.9 HYPOTHYROIDISM, UNSPECIFIED TYPE: Primary | ICD-10-CM

## 2018-10-23 DIAGNOSIS — E03.9 HYPOTHYROIDISM, UNSPECIFIED TYPE: ICD-10-CM

## 2018-10-23 RX ORDER — LEVOTHYROXINE SODIUM 0.05 MG/1
50 TABLET ORAL DAILY
Start: 2018-10-23 | End: 2018-11-28 | Stop reason: SDUPTHER

## 2018-10-23 NOTE — PROGRESS NOTES
Please call the patient regarding his abnormal result  Thyroid is still under replaced include from 25 mcg up to 50 mcg of Synthroid and repeat a TSH in 4 weeks if he has 25 mcg pills he can take 2 of them

## 2018-10-24 RX ORDER — ATORVASTATIN CALCIUM 80 MG/1
TABLET, FILM COATED ORAL
Qty: 90 TABLET | Refills: 0 | Status: CANCELLED | OUTPATIENT
Start: 2018-10-24

## 2018-10-25 DIAGNOSIS — E11.42 TYPE 2 DIABETES MELLITUS WITH DIABETIC POLYNEUROPATHY, WITH LONG-TERM CURRENT USE OF INSULIN (HCC): ICD-10-CM

## 2018-10-25 DIAGNOSIS — Z79.4 TYPE 2 DIABETES MELLITUS WITH DIABETIC POLYNEUROPATHY, WITH LONG-TERM CURRENT USE OF INSULIN (HCC): ICD-10-CM

## 2018-10-25 DIAGNOSIS — E78.2 MIXED HYPERLIPIDEMIA: Primary | ICD-10-CM

## 2018-10-25 RX ORDER — ATORVASTATIN CALCIUM 80 MG/1
80 TABLET, FILM COATED ORAL DAILY
Qty: 90 TABLET | Refills: 3 | Status: SHIPPED | OUTPATIENT
Start: 2018-10-25 | End: 2019-02-25 | Stop reason: ALTCHOICE

## 2018-10-26 ENCOUNTER — HOSPITAL ENCOUNTER (OUTPATIENT)
Dept: ULTRASOUND IMAGING | Facility: HOSPITAL | Age: 73
Discharge: HOME | End: 2018-10-26
Attending: FAMILY MEDICINE
Payer: COMMERCIAL

## 2018-10-26 DIAGNOSIS — M79.662 PAIN OF LEFT CALF: ICD-10-CM

## 2018-10-26 PROCEDURE — 93971 EXTREMITY STUDY: CPT | Performed by: SURGERY

## 2018-10-26 PROCEDURE — 93971 EXTREMITY STUDY: CPT

## 2018-10-26 RX ORDER — GLIMEPIRIDE 4 MG/1
TABLET ORAL
Qty: 90 TABLET | Refills: 0 | Status: SHIPPED | OUTPATIENT
Start: 2018-10-26 | End: 2018-12-21 | Stop reason: SDUPTHER

## 2018-11-09 ENCOUNTER — TRANSCRIBE ORDERS (OUTPATIENT)
Dept: LAB | Facility: MEDICAL CENTER | Age: 73
End: 2018-11-09

## 2018-11-09 ENCOUNTER — APPOINTMENT (OUTPATIENT)
Dept: LAB | Facility: MEDICAL CENTER | Age: 73
End: 2018-11-09
Payer: COMMERCIAL

## 2018-11-09 DIAGNOSIS — N18.2 CHRONIC KIDNEY DISEASE, STAGE II (MILD): ICD-10-CM

## 2018-11-09 DIAGNOSIS — N18.2 CHRONIC KIDNEY DISEASE, STAGE II (MILD): Primary | ICD-10-CM

## 2018-11-09 DIAGNOSIS — R80.9 PROTEINURIA, UNSPECIFIED TYPE: ICD-10-CM

## 2018-11-09 DIAGNOSIS — E11.21 DIABETIC GLOMERULOPATHY (HCC): ICD-10-CM

## 2018-11-09 LAB
ALBUMIN SERPL BCP-MCNC: 3.4 G/DL (ref 3.5–5)
ALP SERPL-CCNC: 79 U/L (ref 46–116)
ALT SERPL W P-5'-P-CCNC: 59 U/L (ref 12–78)
ANION GAP SERPL CALCULATED.3IONS-SCNC: 1 MMOL/L (ref 4–13)
AST SERPL W P-5'-P-CCNC: 29 U/L (ref 5–45)
BILIRUB SERPL-MCNC: 0.86 MG/DL (ref 0.2–1)
BUN SERPL-MCNC: 11 MG/DL (ref 5–25)
CALCIUM SERPL-MCNC: 8.7 MG/DL (ref 8.3–10.1)
CHLORIDE SERPL-SCNC: 106 MMOL/L (ref 100–108)
CO2 SERPL-SCNC: 30 MMOL/L (ref 21–32)
CREAT SERPL-MCNC: 1.05 MG/DL (ref 0.6–1.3)
CREAT UR-MCNC: 263 MG/DL
GFR SERPL CREATININE-BSD FRML MDRD: 71 ML/MIN/1.73SQ M
GLUCOSE SERPL-MCNC: 86 MG/DL (ref 65–140)
MICROALBUMIN UR-MCNC: 375 MG/L (ref 0–20)
MICROALBUMIN/CREAT 24H UR: 143 MG/G CREATININE (ref 0–30)
POTASSIUM SERPL-SCNC: 3.9 MMOL/L (ref 3.5–5.3)
PROT SERPL-MCNC: 7.1 G/DL (ref 6.4–8.2)
SODIUM SERPL-SCNC: 137 MMOL/L (ref 136–145)

## 2018-11-09 PROCEDURE — 82570 ASSAY OF URINE CREATININE: CPT | Performed by: INTERNAL MEDICINE

## 2018-11-09 PROCEDURE — 36415 COLL VENOUS BLD VENIPUNCTURE: CPT

## 2018-11-09 PROCEDURE — 84165 PROTEIN E-PHORESIS SERUM: CPT

## 2018-11-09 PROCEDURE — 84166 PROTEIN E-PHORESIS/URINE/CSF: CPT | Performed by: INTERNAL MEDICINE

## 2018-11-09 PROCEDURE — 84166 PROTEIN E-PHORESIS/URINE/CSF: CPT | Performed by: PATHOLOGY

## 2018-11-09 PROCEDURE — 84165 PROTEIN E-PHORESIS SERUM: CPT | Performed by: PATHOLOGY

## 2018-11-09 PROCEDURE — 82043 UR ALBUMIN QUANTITATIVE: CPT | Performed by: INTERNAL MEDICINE

## 2018-11-09 PROCEDURE — 3060F POS MICROALBUMINURIA REV: CPT | Performed by: PHYSICIAN ASSISTANT

## 2018-11-09 PROCEDURE — 80053 COMPREHEN METABOLIC PANEL: CPT

## 2018-11-12 LAB
ALBUMIN SERPL ELPH-MCNC: 3.82 G/DL (ref 3.5–5)
ALBUMIN SERPL ELPH-MCNC: 55.4 % (ref 52–65)
ALBUMIN UR ELPH-MCNC: 100 %
ALPHA1 GLOB MFR UR ELPH: 0 %
ALPHA1 GLOB SERPL ELPH-MCNC: 0.29 G/DL (ref 0.1–0.4)
ALPHA1 GLOB SERPL ELPH-MCNC: 4.2 % (ref 2.5–5)
ALPHA2 GLOB MFR UR ELPH: 0 %
ALPHA2 GLOB SERPL ELPH-MCNC: 0.78 G/DL (ref 0.4–1.2)
ALPHA2 GLOB SERPL ELPH-MCNC: 11.3 % (ref 7–13)
B-GLOBULIN MFR UR ELPH: 0 %
BETA GLOB ABNORMAL SERPL ELPH-MCNC: 0.42 G/DL (ref 0.4–0.8)
BETA1 GLOB SERPL ELPH-MCNC: 6.1 % (ref 5–13)
BETA2 GLOB SERPL ELPH-MCNC: 7.3 % (ref 2–8)
BETA2+GAMMA GLOB SERPL ELPH-MCNC: 0.5 G/DL (ref 0.2–0.5)
GAMMA GLOB ABNORMAL SERPL ELPH-MCNC: 1.08 G/DL (ref 0.5–1.6)
GAMMA GLOB MFR UR ELPH: 0 %
GAMMA GLOB SERPL ELPH-MCNC: 15.7 % (ref 12–22)
IGG/ALB SER: 1.24 {RATIO} (ref 1.1–1.8)
PROT PATTERN SERPL ELPH-IMP: NORMAL
PROT PATTERN UR ELPH-IMP: ABNORMAL
PROT SERPL-MCNC: 6.9 G/DL (ref 6.4–8.2)
PROT UR-MCNC: 61 MG/DL

## 2018-11-13 ENCOUNTER — RX ONLY (RX ONLY)
Age: 73
End: 2018-11-13

## 2018-11-13 ENCOUNTER — DOCTOR'S OFFICE (OUTPATIENT)
Dept: URBAN - NONMETROPOLITAN AREA CLINIC 1 | Facility: CLINIC | Age: 73
Setting detail: OPHTHALMOLOGY
End: 2018-11-13
Payer: COMMERCIAL

## 2018-11-13 DIAGNOSIS — H18.711: ICD-10-CM

## 2018-11-13 DIAGNOSIS — H25.13: ICD-10-CM

## 2018-11-13 DIAGNOSIS — H04.123: ICD-10-CM

## 2018-11-13 DIAGNOSIS — H35.3131: ICD-10-CM

## 2018-11-13 LAB
LEFT EYE DIABETIC RETINOPATHY: NORMAL
RIGHT EYE DIABETIC RETINOPATHY: NORMAL
SEVERITY (EYE EXAM): NORMAL

## 2018-11-13 PROCEDURE — 3072F LOW RISK FOR RETINOPATHY: CPT | Performed by: FAMILY MEDICINE

## 2018-11-13 PROCEDURE — 92134 CPTRZ OPH DX IMG PST SGM RTA: CPT | Performed by: OPHTHALMOLOGY

## 2018-11-13 PROCEDURE — 92014 COMPRE OPH EXAM EST PT 1/>: CPT | Performed by: OPHTHALMOLOGY

## 2018-11-13 ASSESSMENT — REFRACTION_CURRENTRX
OD_SPHERE: +0.50
OS_ADD: +2.50
OS_SPHERE: +1.50
OS_AXIS: 110
OD_AXIS: 052
OD_CYLINDER: -0.50
OS_OVR_VA: 20/
OD_ADD: +2.50
OS_OVR_VA: 20/
OD_VPRISM_DIRECTION: PROGS
OD_OVR_VA: 20/
OD_OVR_VA: 20/
OS_OVR_VA: 20/
OS_VPRISM_DIRECTION: PROGS
OD_OVR_VA: 20/
OS_CYLINDER: -1.75

## 2018-11-13 ASSESSMENT — CONFRONTATIONAL VISUAL FIELD TEST (CVF)
OS_FINDINGS: FULL
OD_FINDINGS: FULL

## 2018-11-13 ASSESSMENT — REFRACTION_MANIFEST
OS_VA2: 20/20
OS_SPHERE: -1.50
OD_VA2: 20/
OD_VA1: 20/NI
OD_VA1: 20/
OD_VA3: 20/
OS_VA2: 20/
OS_VA3: 20/
OS_SPHERE: +0.25
OS_ADD: +2.50
OS_AXIS: 090
OS_CYLINDER: SPH
OS_VA3: 20/
OS_CYLINDER: -1.00
OU_VA: 20/20
OD_VA3: 20/
OS_VA1: 20/20
OD_CYLINDER: SPH
OD_VA2: 20/NI
OD_SPHERE: PLANO
OU_VA: 20/
OD_ADD: +2.50
OS_VA1: 20/

## 2018-11-13 ASSESSMENT — VISUAL ACUITY: OD_BCVA: 20/25-1

## 2018-11-13 ASSESSMENT — KERATOMETRY
OD_K2POWER_DIOPTERS: 63.25
OD_K1POWER_DIOPTERS: 46.50
OD_AXISANGLE_DEGREES: 138
OS_AXISANGLE_DEGREES: 802
OS_K1POWER_DIOPTERS: 44.00
OS_K2POWER_DIOPTERS: 044.50

## 2018-11-13 ASSESSMENT — LID EXAM ASSESSMENTS
OD_MEIBOMITIS: 2+
OD_BLEPHARITIS: 1+
OS_BLEPHARITIS: 3+
OS_MEIBOMITIS: 2+

## 2018-11-13 ASSESSMENT — SPHEQUIV_DERIVED
OS_SPHEQUIV: -0.25
OS_SPHEQUIV: -0.375

## 2018-11-13 ASSESSMENT — REFRACTION_AUTOREFRACTION
OD_SPHERE: ERROR
OS_CYLINDER: -0.75
OS_AXIS: 086
OS_SPHERE: 0.00

## 2018-11-13 ASSESSMENT — AXIALLENGTH_DERIVED
OS_AL: 23.4632
OS_AL: 23.4152

## 2018-11-13 ASSESSMENT — DRY EYES - PHYSICIAN NOTES: OS_GENERALCOMMENTS: KSICCA

## 2018-11-19 NOTE — PROGRESS NOTES
Please call the patient regarding his abnormal result    Please make sure that shows up on the dashboard for diabetic eye exam

## 2018-11-28 ENCOUNTER — LAB (OUTPATIENT)
Dept: LAB | Facility: HOSPITAL | Age: 73
End: 2018-11-28
Attending: FAMILY MEDICINE
Payer: COMMERCIAL

## 2018-11-28 DIAGNOSIS — E03.9 HYPOTHYROIDISM, UNSPECIFIED TYPE: ICD-10-CM

## 2018-11-28 DIAGNOSIS — E11.40 CONTROLLED TYPE 2 DIABETES MELLITUS WITH DIABETIC NEUROPATHY, WITH LONG-TERM CURRENT USE OF INSULIN (HCC): ICD-10-CM

## 2018-11-28 DIAGNOSIS — E03.9 HYPOTHYROIDISM, UNSPECIFIED TYPE: Primary | ICD-10-CM

## 2018-11-28 DIAGNOSIS — Z79.4 CONTROLLED TYPE 2 DIABETES MELLITUS WITH DIABETIC NEUROPATHY, WITH LONG-TERM CURRENT USE OF INSULIN (HCC): ICD-10-CM

## 2018-11-28 DIAGNOSIS — F33.9 RECURRENT MAJOR DEPRESSIVE DISORDER, REMISSION STATUS UNSPECIFIED (HCC): ICD-10-CM

## 2018-11-28 LAB — TSH SERPL DL<=0.05 MIU/L-ACNC: 4.24 UIU/ML (ref 0.36–3.74)

## 2018-11-28 PROCEDURE — 36415 COLL VENOUS BLD VENIPUNCTURE: CPT

## 2018-11-28 PROCEDURE — 84443 ASSAY THYROID STIM HORMONE: CPT

## 2018-11-28 RX ORDER — LEVOTHYROXINE SODIUM 0.07 MG/1
75 TABLET ORAL DAILY
Qty: 90 TABLET | Refills: 1 | Status: SHIPPED | OUTPATIENT
Start: 2018-11-28 | End: 2019-02-27 | Stop reason: SDUPTHER

## 2018-11-28 NOTE — PROGRESS NOTES
Please call the patient regarding his abnormal result    Thyroid level is still not adequately replaced check the patient's level of his levothyroxine if he is currently taking 50 mcg increase the dose to 75 mcg and recheck a TSH in a month

## 2018-12-03 ENCOUNTER — OFFICE VISIT (OUTPATIENT)
Dept: FAMILY MEDICINE CLINIC | Facility: CLINIC | Age: 73
End: 2018-12-03
Payer: COMMERCIAL

## 2018-12-03 VITALS
BODY MASS INDEX: 35.34 KG/M2 | SYSTOLIC BLOOD PRESSURE: 128 MMHG | TEMPERATURE: 97.8 F | DIASTOLIC BLOOD PRESSURE: 84 MMHG | WEIGHT: 238.6 LBS | HEIGHT: 69 IN

## 2018-12-03 DIAGNOSIS — E11.40 CONTROLLED TYPE 2 DIABETES MELLITUS WITH DIABETIC NEUROPATHY, WITH LONG-TERM CURRENT USE OF INSULIN (HCC): ICD-10-CM

## 2018-12-03 DIAGNOSIS — L02.212 ABSCESS OF BACK: Primary | ICD-10-CM

## 2018-12-03 DIAGNOSIS — Z79.4 CONTROLLED TYPE 2 DIABETES MELLITUS WITH DIABETIC NEUROPATHY, WITH LONG-TERM CURRENT USE OF INSULIN (HCC): ICD-10-CM

## 2018-12-03 PROCEDURE — 87205 SMEAR GRAM STAIN: CPT | Performed by: FAMILY MEDICINE

## 2018-12-03 PROCEDURE — 99214 OFFICE O/P EST MOD 30 MIN: CPT | Performed by: FAMILY MEDICINE

## 2018-12-03 PROCEDURE — 87070 CULTURE OTHR SPECIMN AEROBIC: CPT | Performed by: FAMILY MEDICINE

## 2018-12-03 RX ORDER — SULFAMETHOXAZOLE AND TRIMETHOPRIM 800; 160 MG/1; MG/1
1 TABLET ORAL EVERY 12 HOURS SCHEDULED
Qty: 20 TABLET | Refills: 0 | Status: SHIPPED | OUTPATIENT
Start: 2018-12-03 | End: 2018-12-13

## 2018-12-03 RX ORDER — CEPHALEXIN 500 MG/1
500 CAPSULE ORAL EVERY 6 HOURS SCHEDULED
Qty: 40 CAPSULE | Refills: 0 | Status: SHIPPED | OUTPATIENT
Start: 2018-12-03 | End: 2018-12-13

## 2018-12-03 NOTE — PROGRESS NOTES
Assessment/Plan:  Culture was obtained patient will be started on Keflex and Bactrim until he can see surgery    No problem-specific Assessment & Plan notes found for this encounter  Diagnoses and all orders for this visit:    Abscess of back  -     Ambulatory referral to General Surgery; Future  -     cephalexin (KEFLEX) 500 mg capsule; Take 1 capsule (500 mg total) by mouth every 6 (six) hours for 10 days  -     sulfamethoxazole-trimethoprim (BACTRIM DS) 800-160 mg per tablet; Take 1 tablet by mouth every 12 (twelve) hours for 10 days  -     Wound culture and Gram stain    Controlled type 2 diabetes mellitus with diabetic neuropathy, with long-term current use of insulin ([facility])          Subjective:      Patient ID: Trina Mederos is a 71 y o  male  Patient has an open wound draining wound of in the back and would directly over the spinous process of T3 on it has been present for 2 weeks it is draining a yellow yellow purulent of fluid        The following portions of the patient's history were reviewed and updated as appropriate:   He  has a past medical history of BPH (benign prostatic hyperplasia); CAD (coronary artery disease); Diabetes mellitus ([facility] ); Gout; High cholesterol; Hypertension; and MI, old    He   Patient Active Problem List    Diagnosis Date Noted    Precordial pain 10/01/2018    Tremor, essential 09/26/2018    Sensory ataxia 09/26/2018    Gait instability 09/26/2018    Frequent falls 09/26/2018    Degenerative disc disease, lumbar 07/10/2018    Tremor 10/10/2016    Depression with anxiety 08/29/2016    Arthritis 07/13/2015    NSTEMI (non-ST elevated myocardial infarction) ([facility] ) 10/06/2014    Coronary arteriosclerosis in native artery 04/08/2014    Gout 05/17/2013    Diabetes mellitus type 2, controlled ([facility] ) 06/18/2012    Hyperlipidemia 06/18/2012    Hypertension 06/18/2012    Morbid obesity ([facility] ) 06/18/2012     He  has a past surgical history that includes Tonsillectomy and adenoidectomy; Cataract extraction w/  intraocular lens implant; Eye surgery; Coronary angioplasty with stent; and pr colonoscopy flx dx w/collj spec when pfrmd (N/A, 3/21/2016)  His family history includes No Known Problems in his father and mother  He was adopted  He  reports that he has never smoked  He has never used smokeless tobacco  He reports that he drinks alcohol  He reports that he does not use drugs  Current Outpatient Prescriptions   Medication Sig Dispense Refill    allopurinol (ZYLOPRIM) 100 mg tablet TAKE ONE TABLET BY MOUTH EVERY DAY 90 tablet 0    aspirin 325 mg tablet Take 325 mg by mouth daily   b complex vitamins tablet Take 1 tablet by mouth 2 (two) times a day        escitalopram (LEXAPRO) 20 mg tablet Take 1 tablet (20 mg total) by mouth daily 30 tablet 2    gabapentin (NEURONTIN) 100 mg capsule Take 2 capsules (200 mg total) by mouth 3 (three) times a day If stable, remain at this dose  Horacio Kicks, then can do 3 tabs three times a day 180 capsule 5    glimepiride (AMARYL) 4 mg tablet TAKE ONE TABLET BY MOUTH 2 TIMES A DAY 90 tablet 0    L-Methylfolate-B6-B12 (FOLTANX) 3-35-2 MG TABS Take by mouth 2 (two) times a day        levothyroxine 75 mcg tablet Take 1 tablet (75 mcg total) by mouth daily One hour before food in am on an empty stomach 90 tablet 1    metFORMIN (GLUCOPHAGE) 1000 MG tablet Take 1 tablet (1,000 mg total) by mouth 2 (two) times a day with meals 180 tablet 3    metoprolol succinate (TOPROL-XL) 25 mg 24 hr tablet Take 25 mg by mouth 2 (two) times a day        multivitamin (THERAGRAN) TABS Take 1 tablet by mouth daily      atorvastatin (LIPITOR) 80 mg tablet Take 1 tablet (80 mg total) by mouth daily 90 tablet 3    cephalexin (KEFLEX) 500 mg capsule Take 1 capsule (500 mg total) by mouth every 6 (six) hours for 10 days 40 capsule 0    sulfamethoxazole-trimethoprim (BACTRIM DS) 800-160 mg per tablet Take 1 tablet by mouth every 12 (twelve) hours for 10 days 20 tablet 0     No current facility-administered medications for this visit  Current Outpatient Prescriptions on File Prior to Visit   Medication Sig    allopurinol (ZYLOPRIM) 100 mg tablet TAKE ONE TABLET BY MOUTH EVERY DAY    aspirin 325 mg tablet Take 325 mg by mouth daily   b complex vitamins tablet Take 1 tablet by mouth 2 (two) times a day      escitalopram (LEXAPRO) 20 mg tablet Take 1 tablet (20 mg total) by mouth daily    gabapentin (NEURONTIN) 100 mg capsule Take 2 capsules (200 mg total) by mouth 3 (three) times a day If stable, remain at this dose  Raimundo Jimenez, then can do 3 tabs three times a day    glimepiride (AMARYL) 4 mg tablet TAKE ONE TABLET BY MOUTH 2 TIMES A DAY    L-Methylfolate-B6-B12 (FOLTANX) 3-35-2 MG TABS Take by mouth 2 (two) times a day      levothyroxine 75 mcg tablet Take 1 tablet (75 mcg total) by mouth daily One hour before food in am on an empty stomach    metFORMIN (GLUCOPHAGE) 1000 MG tablet Take 1 tablet (1,000 mg total) by mouth 2 (two) times a day with meals    metoprolol succinate (TOPROL-XL) 25 mg 24 hr tablet Take 25 mg by mouth 2 (two) times a day   multivitamin (THERAGRAN) TABS Take 1 tablet by mouth daily    atorvastatin (LIPITOR) 80 mg tablet Take 1 tablet (80 mg total) by mouth daily     No current facility-administered medications on file prior to visit  He has No Known Allergies       Review of Systems   Constitutional: Negative for activity change, appetite change, diaphoresis, fatigue and fever  HENT: Negative  Eyes: Negative  Respiratory: Negative for apnea, cough, chest tightness, shortness of breath and wheezing  Cardiovascular: Negative for chest pain, palpitations and leg swelling  Gastrointestinal: Negative for abdominal distention, abdominal pain, anal bleeding, constipation, diarrhea, nausea and vomiting  Endocrine: Negative for cold intolerance, heat intolerance, polydipsia, polyphagia and polyuria     Genitourinary: Negative for difficulty urinating, dysuria, flank pain, hematuria and urgency  Musculoskeletal: Negative for arthralgias, back pain, gait problem, joint swelling and myalgias  Skin: Positive for wound  Negative for color change and rash  Open wound over T3 midline with surrounding erythema and induration   Allergic/Immunologic: Negative for environmental allergies, food allergies and immunocompromised state  Neurological: Negative for dizziness, seizures, syncope, speech difficulty, numbness and headaches  Hematological: Negative for adenopathy  Does not bruise/bleed easily  Psychiatric/Behavioral: Negative for agitation, behavioral problems, hallucinations, sleep disturbance and suicidal ideas  Objective:      /84 (BP Location: Left arm, Patient Position: Sitting, Cuff Size: Standard)   Temp 97 8 °F (36 6 °C) (Tympanic) Comment (Src): left  Ht 5' 9" (1 753 m)   Wt 108 kg (238 lb 9 6 oz)   BMI 35 24 kg/m²          Physical Exam   Constitutional: He is oriented to person, place, and time  He appears well-developed and well-nourished  No distress  HENT:   Head: Normocephalic  Right Ear: External ear normal    Left Ear: External ear normal    Nose: Nose normal    Mouth/Throat: Oropharynx is clear and moist    Eyes: Pupils are equal, round, and reactive to light  Conjunctivae and EOM are normal  Right eye exhibits no discharge  Left eye exhibits no discharge  No scleral icterus  Neck: Normal range of motion  No tracheal deviation present  No thyromegaly present  Cardiovascular: Normal rate, regular rhythm and normal heart sounds  Exam reveals no gallop and no friction rub  No murmur heard  Pulmonary/Chest: Effort normal and breath sounds normal  No respiratory distress  He has no wheezes  Abdominal: Soft  Bowel sounds are normal  He exhibits no mass  There is no tenderness  There is no guarding  Musculoskeletal: He exhibits no edema or deformity     Lymphadenopathy:     He has no cervical adenopathy  Neurological: He is alert and oriented to person, place, and time  No cranial nerve deficit  Skin: Skin is warm and dry  No rash noted  He is not diaphoretic  No erythema  Open wound with purulent discharge midline thoracic spine   Psychiatric: He has a normal mood and affect   Thought content normal

## 2018-12-04 ENCOUNTER — LAB REQUISITION (OUTPATIENT)
Dept: LAB | Facility: HOSPITAL | Age: 73
End: 2018-12-04
Payer: COMMERCIAL

## 2018-12-04 ENCOUNTER — CONSULT (OUTPATIENT)
Dept: SURGERY | Facility: HOSPITAL | Age: 73
End: 2018-12-04
Payer: COMMERCIAL

## 2018-12-04 VITALS
DIASTOLIC BLOOD PRESSURE: 72 MMHG | WEIGHT: 238 LBS | TEMPERATURE: 98.1 F | HEIGHT: 69 IN | BODY MASS INDEX: 35.25 KG/M2 | SYSTOLIC BLOOD PRESSURE: 116 MMHG | HEART RATE: 84 BPM

## 2018-12-04 DIAGNOSIS — L02.212 ABSCESS OF BACK: Primary | ICD-10-CM

## 2018-12-04 DIAGNOSIS — L08.9 INFECTED EPIDERMOID CYST: ICD-10-CM

## 2018-12-04 DIAGNOSIS — L02.212 CUTANEOUS ABSCESS OF BACK EXCLUDING BUTTOCKS: ICD-10-CM

## 2018-12-04 DIAGNOSIS — L72.0 INFECTED EPIDERMOID CYST: ICD-10-CM

## 2018-12-04 PROCEDURE — 10060 I&D ABSCESS SIMPLE/SINGLE: CPT | Performed by: SURGERY

## 2018-12-04 PROCEDURE — 87070 CULTURE OTHR SPECIMN AEROBIC: CPT | Performed by: SURGERY

## 2018-12-04 PROCEDURE — 87205 SMEAR GRAM STAIN: CPT | Performed by: SURGERY

## 2018-12-04 PROCEDURE — 99203 OFFICE O/P NEW LOW 30 MIN: CPT | Performed by: SURGERY

## 2018-12-04 NOTE — ASSESSMENT & PLAN NOTE
Infected epidermal inclusion cyst of the right upper back  Patient underwent incision drainage in the office without complication  Packing was placed should be removed in 24 hours  Patient will follow up in 1 week  Culture sent will follow-up

## 2018-12-04 NOTE — LETTER
December 4, 2018     Zoë Barahona, 71 [address]    Patient: Lynn Mcdonald   YOB: 1945   Date of Visit: 12/4/2018       Dear Dr Taye Cullen: Thank you for referring Fermín Remi to me for evaluation  Below are my notes for this consultation  If you have questions, please do not hesitate to call me  I look forward to following your patient along with you  Sincerely,        Leonardo Benites DO        CC: No Recipients  Leonardo Benites DO  12/4/2018  2:51 PM  Signed  Assessment/Plan:    Infected epidermoid cyst  Infected epidermal inclusion cyst of the right upper back  Patient underwent incision drainage in the office without complication  Packing was placed should be removed in 24 hours  Patient will follow up in 1 week  Culture sent will follow-up  Diagnoses and all orders for this visit:    Abscess of back  -     Ambulatory referral to General Surgery  -     Wound culture and Gram stain; Future    Infected epidermoid cyst          Subjective:      Patient ID: Lynn Mcdonald is a 71 y o  male  44-year-old gentleman presents today for evaluation of infected epidermal cyst   He recently saw his PCP called antibiotics but felt he should be seen by General surgery for further evaluation likely drainage  Patient states that he has had this lump for quite some time however it recently became enlarged with increasing redness and worsening pain  No similar episodes in the past   No active drainage  No fevers or chills  No nausea vomiting  No chest pain or shortness of breath  The following portions of the patient's history were reviewed and updated as appropriate: He  has a past medical history of BPH (benign prostatic hyperplasia); CAD (coronary artery disease); Diabetes mellitus ([address] ); Gout; High cholesterol; Hypertension; and MI, old    He   Patient Active Problem List    Diagnosis Date Noted    Infected epidermoid cyst 12/04/2018    Precordial pain 10/01/2018    Tremor, essential 09/26/2018    Sensory ataxia 09/26/2018    Gait instability 09/26/2018    Frequent falls 09/26/2018    Degenerative disc disease, lumbar 07/10/2018    Tremor 10/10/2016    Depression with anxiety 08/29/2016    Arthritis 07/13/2015    NSTEMI (non-ST elevated myocardial infarction) ([facility] ) 10/06/2014    Coronary arteriosclerosis in native artery 04/08/2014    Gout 05/17/2013    Diabetes mellitus type 2, controlled ([facility] ) 06/18/2012    Hyperlipidemia 06/18/2012    Hypertension 06/18/2012    Morbid obesity ([facility] ) 06/18/2012     He  has a past surgical history that includes Tonsillectomy and adenoidectomy; Cataract extraction w/  intraocular lens implant; Eye surgery; Coronary angioplasty with stent; and pr colonoscopy flx dx w/collj spec when pfrmd (N/A, 3/21/2016)  His family history includes No Known Problems in his father and mother  He was adopted  He  reports that he has never smoked  He has never used smokeless tobacco  He reports that he drinks alcohol  He reports that he does not use drugs  Current Outpatient Prescriptions   Medication Sig Dispense Refill    allopurinol (ZYLOPRIM) 100 mg tablet TAKE ONE TABLET BY MOUTH EVERY DAY 90 tablet 0    aspirin 325 mg tablet Take 325 mg by mouth daily   atorvastatin (LIPITOR) 80 mg tablet Take 1 tablet (80 mg total) by mouth daily 90 tablet 3    b complex vitamins tablet Take 1 tablet by mouth 2 (two) times a day        cephalexin (KEFLEX) 500 mg capsule Take 1 capsule (500 mg total) by mouth every 6 (six) hours for 10 days 40 capsule 0    escitalopram (LEXAPRO) 20 mg tablet Take 1 tablet (20 mg total) by mouth daily 30 tablet 2    gabapentin (NEURONTIN) 100 mg capsule Take 2 capsules (200 mg total) by mouth 3 (three) times a day If stable, remain at this dose   Durga Wilson then can do 3 tabs three times a day 180 capsule 5    glimepiride (AMARYL) 4 mg tablet TAKE ONE TABLET BY MOUTH 2 TIMES A DAY 90 tablet 0    L-Methylfolate-B6-B12 (FOLTANX) 3-35-2 MG TABS Take by mouth 2 (two) times a day        levothyroxine 75 mcg tablet Take 1 tablet (75 mcg total) by mouth daily One hour before food in am on an empty stomach 90 tablet 1    metFORMIN (GLUCOPHAGE) 1000 MG tablet Take 1 tablet (1,000 mg total) by mouth 2 (two) times a day with meals 180 tablet 3    metoprolol succinate (TOPROL-XL) 25 mg 24 hr tablet Take 25 mg by mouth 2 (two) times a day   multivitamin (THERAGRAN) TABS Take 1 tablet by mouth daily      sulfamethoxazole-trimethoprim (BACTRIM DS) 800-160 mg per tablet Take 1 tablet by mouth every 12 (twelve) hours for 10 days 20 tablet 0     No current facility-administered medications for this visit  He has No Known Allergies       Review of Systems   Constitutional: Negative for appetite change, chills, diaphoresis, fatigue, fever and unexpected weight change  Respiratory: Negative for shortness of breath  Cardiovascular: Negative for chest pain and palpitations  Gastrointestinal: Negative for abdominal pain, nausea and vomiting  Musculoskeletal: Negative for gait problem  Skin: Positive for color change (Redness around the infected lesion of the back) and wound ( infected cystic lesion of the back)  Negative for pallor and rash  Objective:      /72   Pulse 84   Temp 98 1 °F (36 7 °C)   Ht 5' 9" (1 753 m)   Wt 108 kg (238 lb)   BMI 35 15 kg/m²           Physical Exam   Constitutional: He is oriented to person, place, and time  He appears well-developed and well-nourished  No distress  HENT:   Head: Normocephalic and atraumatic  Eyes: No scleral icterus  Neck: Normal range of motion  No tracheal deviation present  Pulmonary/Chest: Effort normal    Musculoskeletal: Normal range of motion  He exhibits no edema, tenderness or deformity  Lymphadenopathy:     He has no cervical adenopathy     Neurological: He is alert and oriented to person, place, and time  No cranial nerve deficit  Skin: Skin is warm  No rash noted  He is not diaphoretic  There is erythema  No pallor  There is a 2 centimeters slightly raised fluctuant lesion of the right upper back consistent with likely a infected epidermal inclusion cyst   No active drainage  Psychiatric: He has a normal mood and affect  His behavior is normal    Vitals reviewed  Incision and Drainage  Date/Time: 12/4/2018 2:49 PM  Performed by: Sheela Dugan by: Wakeeney Batch     Patient location:  Clinic  Consent:     Consent obtained:  Verbal    Consent given by:  Patient    Risks discussed:  Bleeding, incomplete drainage, pain and infection    Alternatives discussed:  No treatment  Universal protocol:     Procedure explained and questions answered to patient or proxy's satisfaction: yes      Patient identity confirmed:  Verbally with patient  Location:     Type:  Abscess and cyst    Location:  Trunk    Trunk location:  Back  Anesthesia (see MAR for exact dosages): Anesthesia method:  Local infiltration    Local anesthetic:  Lidocaine 1% w/o epi  Procedure details:     Complexity:  Simple    Needle aspiration: no      Incision types:  Cruciate    Scalpel blade:  11    Approach:  Open    Incision depth:  Skin and subcutaneous    Wound management:  Probed and deloculated and irrigated with saline    Drainage:  Bloody and purulent    Drainage amount:  Scant    Wound treatment:  Wound left open    Packing materials:  1/4 in gauze  Post-procedure details:     Patient tolerance of procedure:   Tolerated well, no immediate complications

## 2018-12-04 NOTE — PROGRESS NOTES
Assessment/Plan:    Infected epidermoid cyst  Infected epidermal inclusion cyst of the right upper back  Patient underwent incision drainage in the office without complication  Packing was placed should be removed in 24 hours  Patient will follow up in 1 week  Culture sent will follow-up  Diagnoses and all orders for this visit:    Abscess of back  -     Ambulatory referral to General Surgery  -     Wound culture and Gram stain; Future    Infected epidermoid cyst          Subjective:      Patient ID: Ramirez Smith is a 71 y o  male  69-year-old gentleman presents today for evaluation of infected epidermal cyst   He recently saw his PCP called antibiotics but felt he should be seen by General surgery for further evaluation likely drainage  Patient states that he has had this lump for quite some time however it recently became enlarged with increasing redness and worsening pain  No similar episodes in the past   No active drainage  No fevers or chills  No nausea vomiting  No chest pain or shortness of breath  The following portions of the patient's history were reviewed and updated as appropriate: He  has a past medical history of BPH (benign prostatic hyperplasia); CAD (coronary artery disease); Diabetes mellitus ([facility] ); Gout; High cholesterol; Hypertension; and MI, old    He   Patient Active Problem List    Diagnosis Date Noted    Infected epidermoid cyst 12/04/2018    Precordial pain 10/01/2018    Tremor, essential 09/26/2018    Sensory ataxia 09/26/2018    Gait instability 09/26/2018    Frequent falls 09/26/2018    Degenerative disc disease, lumbar 07/10/2018    Tremor 10/10/2016    Depression with anxiety 08/29/2016    Arthritis 07/13/2015    NSTEMI (non-ST elevated myocardial infarction) ([facility] ) 10/06/2014    Coronary arteriosclerosis in native artery 04/08/2014    Gout 05/17/2013    Diabetes mellitus type 2, controlled ([facility] ) 06/18/2012    Hyperlipidemia 06/18/2012    Hypertension 06/18/2012    Morbid obesity ([facility] ) 06/18/2012     He  has a past surgical history that includes Tonsillectomy and adenoidectomy; Cataract extraction w/  intraocular lens implant; Eye surgery; Coronary angioplasty with stent; and pr colonoscopy flx dx w/collj spec when pfrmd (N/A, 3/21/2016)  His family history includes No Known Problems in his father and mother  He was adopted  He  reports that he has never smoked  He has never used smokeless tobacco  He reports that he drinks alcohol  He reports that he does not use drugs  Current Outpatient Prescriptions   Medication Sig Dispense Refill    allopurinol (ZYLOPRIM) 100 mg tablet TAKE ONE TABLET BY MOUTH EVERY DAY 90 tablet 0    aspirin 325 mg tablet Take 325 mg by mouth daily   atorvastatin (LIPITOR) 80 mg tablet Take 1 tablet (80 mg total) by mouth daily 90 tablet 3    b complex vitamins tablet Take 1 tablet by mouth 2 (two) times a day        cephalexin (KEFLEX) 500 mg capsule Take 1 capsule (500 mg total) by mouth every 6 (six) hours for 10 days 40 capsule 0    escitalopram (LEXAPRO) 20 mg tablet Take 1 tablet (20 mg total) by mouth daily 30 tablet 2    gabapentin (NEURONTIN) 100 mg capsule Take 2 capsules (200 mg total) by mouth 3 (three) times a day If stable, remain at this dose  Horacio Phipps, then can do 3 tabs three times a day 180 capsule 5    glimepiride (AMARYL) 4 mg tablet TAKE ONE TABLET BY MOUTH 2 TIMES A DAY 90 tablet 0    L-Methylfolate-B6-B12 (FOLTANX) 3-35-2 MG TABS Take by mouth 2 (two) times a day        levothyroxine 75 mcg tablet Take 1 tablet (75 mcg total) by mouth daily One hour before food in am on an empty stomach 90 tablet 1    metFORMIN (GLUCOPHAGE) 1000 MG tablet Take 1 tablet (1,000 mg total) by mouth 2 (two) times a day with meals 180 tablet 3    metoprolol succinate (TOPROL-XL) 25 mg 24 hr tablet Take 25 mg by mouth 2 (two) times a day        multivitamin (THERAGRAN) TABS Take 1 tablet by mouth daily      sulfamethoxazole-trimethoprim (BACTRIM DS) 800-160 mg per tablet Take 1 tablet by mouth every 12 (twelve) hours for 10 days 20 tablet 0     No current facility-administered medications for this visit  He has No Known Allergies       Review of Systems   Constitutional: Negative for appetite change, chills, diaphoresis, fatigue, fever and unexpected weight change  Respiratory: Negative for shortness of breath  Cardiovascular: Negative for chest pain and palpitations  Gastrointestinal: Negative for abdominal pain, nausea and vomiting  Musculoskeletal: Negative for gait problem  Skin: Positive for color change (Redness around the infected lesion of the back) and wound ( infected cystic lesion of the back)  Negative for pallor and rash  Objective:      /72   Pulse 84   Temp 98 1 °F (36 7 °C)   Ht 5' 9" (1 753 m)   Wt 108 kg (238 lb)   BMI 35 15 kg/m²          Physical Exam   Constitutional: He is oriented to person, place, and time  He appears well-developed and well-nourished  No distress  HENT:   Head: Normocephalic and atraumatic  Eyes: No scleral icterus  Neck: Normal range of motion  No tracheal deviation present  Pulmonary/Chest: Effort normal    Musculoskeletal: Normal range of motion  He exhibits no edema, tenderness or deformity  Lymphadenopathy:     He has no cervical adenopathy  Neurological: He is alert and oriented to person, place, and time  No cranial nerve deficit  Skin: Skin is warm  No rash noted  He is not diaphoretic  There is erythema  No pallor  There is a 2 centimeters slightly raised fluctuant lesion of the right upper back consistent with likely a infected epidermal inclusion cyst   No active drainage  Psychiatric: He has a normal mood and affect  His behavior is normal    Vitals reviewed        Incision and Drainage  Date/Time: 12/4/2018 2:49 PM  Performed by: Jeannette Barrera by: Finesse Davila     Patient location: Clinic  Consent:     Consent obtained:  Verbal    Consent given by:  Patient    Risks discussed:  Bleeding, incomplete drainage, pain and infection    Alternatives discussed:  No treatment  Universal protocol:     Procedure explained and questions answered to patient or proxy's satisfaction: yes      Patient identity confirmed:  Verbally with patient  Location:     Type:  Abscess and cyst    Location:  Trunk    Trunk location:  Back  Anesthesia (see MAR for exact dosages): Anesthesia method:  Local infiltration    Local anesthetic:  Lidocaine 1% w/o epi  Procedure details:     Complexity:  Simple    Needle aspiration: no      Incision types:  Cruciate    Scalpel blade:  11    Approach:  Open    Incision depth:  Skin and subcutaneous    Wound management:  Probed and deloculated and irrigated with saline    Drainage:  Bloody and purulent    Drainage amount:  Scant    Wound treatment:  Wound left open    Packing materials:  1/4 in gauze  Post-procedure details:     Patient tolerance of procedure:   Tolerated well, no immediate complications

## 2018-12-06 LAB
BACTERIA WND AEROBE CULT: NORMAL
GRAM STN SPEC: NORMAL
GRAM STN SPEC: NORMAL

## 2018-12-07 LAB
BACTERIA WND AEROBE CULT: ABNORMAL
GRAM STN SPEC: ABNORMAL

## 2018-12-12 ENCOUNTER — OFFICE VISIT (OUTPATIENT)
Dept: SURGERY | Facility: HOSPITAL | Age: 73
End: 2018-12-12
Payer: COMMERCIAL

## 2018-12-12 VITALS
SYSTOLIC BLOOD PRESSURE: 118 MMHG | DIASTOLIC BLOOD PRESSURE: 68 MMHG | WEIGHT: 234 LBS | HEART RATE: 76 BPM | BODY MASS INDEX: 34.66 KG/M2 | TEMPERATURE: 97.9 F | HEIGHT: 69 IN

## 2018-12-12 DIAGNOSIS — L08.9 INFECTED EPIDERMOID CYST: Primary | ICD-10-CM

## 2018-12-12 DIAGNOSIS — L72.0 INFECTED EPIDERMOID CYST: Primary | ICD-10-CM

## 2018-12-12 PROCEDURE — 99213 OFFICE O/P EST LOW 20 MIN: CPT | Performed by: SURGERY

## 2018-12-12 PROCEDURE — 10060 I&D ABSCESS SIMPLE/SINGLE: CPT | Performed by: SURGERY

## 2018-12-12 NOTE — PROGRESS NOTES
Assessment/Plan:    Infected epidermoid cyst -   Cyst still draining pus  Will perform another I&D  Wound packed  Wound instructions given      Subjective:      Patient ID: Linda Logan is a 68 y o  male who recently had I&D of infected back cyst  He presents today with pain in upper back, just below neck line, and a draining wound that is tender  No fevers or chills  HPI    The following portions of the patient's history were reviewed and updated as appropriate: allergies, current medications, past family history, past medical history, past social history, past surgical history and problem list     Review of Systems   Constitutional: Negative  Negative for fever  HENT: Negative  Respiratory: Negative  Skin:        As noted in HPI         Objective:      /68   Pulse 76   Temp 97 9 °F (36 6 °C)   Ht 5' 9" (1 753 m)   Wt 106 kg (234 lb)   BMI 34 56 kg/m²          Physical Exam   Skin:   2 cm tender, indurated area with open, draining incision  Drainage is yellow and thick  Surrounding erythema present  Incision and Drainage  Date/Time: 12/12/2018 3:08 PM  Performed by: Simon Perry  Authorized by: Simon Perry     Patient location:  Bedside  Consent:     Consent obtained:  Verbal    Consent given by:  Patient    Risks discussed:  Incomplete drainage, bleeding and infection    Alternatives discussed:  No treatment and observation  Universal protocol:     Patient identity confirmed:  Verbally with patient  Location:     Type:  Abscess    Size:  2 cm    Location:  Trunk    Trunk location:  Back  Pre-procedure details:     Skin preparation:  Betadine  Anesthesia (see MAR for exact dosages):      Anesthesia method:  Local infiltration    Local anesthetic:  Lidocaine 1% w/o epi  Procedure details:     Complexity:  Simple    Needle aspiration: no      Incision types:  Stab incision    Scalpel blade:  11    Incision depth:  Subcutaneous    Drainage:  Purulent    Drainage amount:  Scant Wound treatment:  Wound left open    Packing materials:  1/4 in gauze  Post-procedure details:     Patient tolerance of procedure:   Tolerated well, no immediate complications

## 2018-12-20 DIAGNOSIS — F33.9 RECURRENT MAJOR DEPRESSIVE DISORDER, REMISSION STATUS UNSPECIFIED (HCC): ICD-10-CM

## 2018-12-20 DIAGNOSIS — M1A.9XX0 CHRONIC GOUT WITHOUT TOPHUS, UNSPECIFIED CAUSE, UNSPECIFIED SITE: ICD-10-CM

## 2018-12-21 DIAGNOSIS — F33.9 RECURRENT MAJOR DEPRESSIVE DISORDER, REMISSION STATUS UNSPECIFIED (HCC): ICD-10-CM

## 2018-12-21 DIAGNOSIS — E11.42 TYPE 2 DIABETES MELLITUS WITH DIABETIC POLYNEUROPATHY, WITH LONG-TERM CURRENT USE OF INSULIN (HCC): ICD-10-CM

## 2018-12-21 DIAGNOSIS — Z79.4 TYPE 2 DIABETES MELLITUS WITH DIABETIC POLYNEUROPATHY, WITH LONG-TERM CURRENT USE OF INSULIN (HCC): ICD-10-CM

## 2018-12-21 RX ORDER — ESCITALOPRAM OXALATE 20 MG/1
20 TABLET ORAL DAILY
Qty: 90 TABLET | Refills: 0 | Status: SHIPPED | OUTPATIENT
Start: 2018-12-21 | End: 2018-12-21

## 2018-12-21 RX ORDER — ESCITALOPRAM OXALATE 20 MG/1
20 TABLET ORAL DAILY
Qty: 90 TABLET | Refills: 3 | Status: SHIPPED | OUTPATIENT
Start: 2018-12-21 | End: 2019-02-27 | Stop reason: SDUPTHER

## 2018-12-21 RX ORDER — ALLOPURINOL 100 MG/1
TABLET ORAL
Qty: 90 TABLET | Refills: 0 | Status: SHIPPED | OUTPATIENT
Start: 2018-12-21 | End: 2019-04-25 | Stop reason: SDUPTHER

## 2018-12-21 RX ORDER — GLIMEPIRIDE 4 MG/1
TABLET ORAL
Qty: 180 TABLET | Refills: 3 | Status: SHIPPED | OUTPATIENT
Start: 2018-12-21 | End: 2020-01-06

## 2018-12-21 RX ORDER — ESCITALOPRAM OXALATE 20 MG/1
20 TABLET ORAL DAILY
Qty: 90 TABLET | Refills: 0 | Status: SHIPPED | OUTPATIENT
Start: 2018-12-21 | End: 2018-12-21 | Stop reason: SDUPTHER
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
## 2018-12-26 ENCOUNTER — OFFICE VISIT (OUTPATIENT)
Dept: NEUROLOGY | Facility: CLINIC | Age: 73
End: 2018-12-26
Payer: COMMERCIAL

## 2018-12-26 VITALS
WEIGHT: 242 LBS | SYSTOLIC BLOOD PRESSURE: 120 MMHG | DIASTOLIC BLOOD PRESSURE: 60 MMHG | HEART RATE: 81 BPM | RESPIRATION RATE: 14 BRPM | BODY MASS INDEX: 35.84 KG/M2 | HEIGHT: 69 IN

## 2018-12-26 DIAGNOSIS — E56.0 VITAMIN E DEFICIENCY: ICD-10-CM

## 2018-12-26 DIAGNOSIS — G25.0 TREMOR, ESSENTIAL: Primary | ICD-10-CM

## 2018-12-26 DIAGNOSIS — E11.42 DIABETIC POLYNEUROPATHY ASSOCIATED WITH TYPE 2 DIABETES MELLITUS (HCC): ICD-10-CM

## 2018-12-26 DIAGNOSIS — R27.8 SENSORY ATAXIA: ICD-10-CM

## 2018-12-26 PROCEDURE — 99214 OFFICE O/P EST MOD 30 MIN: CPT | Performed by: PSYCHIATRY & NEUROLOGY

## 2018-12-26 RX ORDER — GABAPENTIN 300 MG/1
600 CAPSULE ORAL 3 TIMES DAILY
Qty: 180 CAPSULE | Refills: 5 | Status: SHIPPED | OUTPATIENT
Start: 2018-12-26 | End: 2020-04-17

## 2018-12-26 NOTE — PATIENT INSTRUCTIONS
· Increase gabapentin further to 600mg three times a day  Sent to Pharmacy  Explained to patient and family  Call back in 2 weeks to report on status with this  · If limit of gabapentin reached without benefit for tremor, will then inquire to PCP about changing metoprolol to propranolol instead for relief  Then could consider propranolol + primidone dual therapy  Alternatives to therapy if this fails includes topiramate  Briefly discussed about Deep Brain Stimulation  · Recommended to start supplementation on Vit E 100-400 IU a day  · Reviewed MRI Brain wo contrast    · Encouraged increased intake of fluids to avoid dehydration  · Encouraged exercise at home as tolerated  · Encouraged glycemic control, therapy as per PCP  · Return to clinic in 3-4 months  · The patient has been instructed to call us about any new neurological problems or medication side effects
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
## 2019-01-30 ENCOUNTER — LAB (OUTPATIENT)
Dept: LAB | Facility: MEDICAL CENTER | Age: 74
End: 2019-01-30
Payer: COMMERCIAL

## 2019-01-30 DIAGNOSIS — F33.9 RECURRENT MAJOR DEPRESSIVE DISORDER, REMISSION STATUS UNSPECIFIED (HCC): ICD-10-CM

## 2019-01-30 DIAGNOSIS — Z79.4 CONTROLLED TYPE 2 DIABETES MELLITUS WITH DIABETIC NEUROPATHY, WITH LONG-TERM CURRENT USE OF INSULIN (HCC): ICD-10-CM

## 2019-01-30 DIAGNOSIS — E03.9 HYPOTHYROIDISM, UNSPECIFIED TYPE: ICD-10-CM

## 2019-01-30 DIAGNOSIS — E11.40 CONTROLLED TYPE 2 DIABETES MELLITUS WITH DIABETIC NEUROPATHY, WITH LONG-TERM CURRENT USE OF INSULIN (HCC): ICD-10-CM

## 2019-01-30 LAB
CREAT UR-MCNC: 190 MG/DL
EST. AVERAGE GLUCOSE BLD GHB EST-MCNC: 134 MG/DL
HBA1C MFR BLD: 6.3 % (ref 4.2–6.3)
MICROALBUMIN UR-MCNC: 253 MG/L (ref 0–20)
MICROALBUMIN/CREAT 24H UR: 133 MG/G CREATININE (ref 0–30)
TSH SERPL DL<=0.05 MIU/L-ACNC: 1.93 UIU/ML (ref 0.36–3.74)

## 2019-01-30 PROCEDURE — 83036 HEMOGLOBIN GLYCOSYLATED A1C: CPT

## 2019-01-30 PROCEDURE — 82570 ASSAY OF URINE CREATININE: CPT

## 2019-01-30 PROCEDURE — 82043 UR ALBUMIN QUANTITATIVE: CPT

## 2019-01-30 PROCEDURE — 84443 ASSAY THYROID STIM HORMONE: CPT

## 2019-01-30 PROCEDURE — 3060F POS MICROALBUMINURIA REV: CPT | Performed by: FAMILY MEDICINE

## 2019-01-31 NOTE — PROGRESS NOTES
Please call the patient regarding his abnormal result    Hemoglobin A1c is much better it is down to 6 3 probably 1 of the best hemoglobin A1cs he has ever had very good work great job the patient is urine he is spilling less protein any was 2 months ago so that is going in the right direction and his thyroid function is good all is well keep up the good work

## 2019-02-25 ENCOUNTER — OFFICE VISIT (OUTPATIENT)
Dept: FAMILY MEDICINE CLINIC | Facility: CLINIC | Age: 74
End: 2019-02-25
Payer: COMMERCIAL

## 2019-02-25 VITALS
BODY MASS INDEX: 35.4 KG/M2 | DIASTOLIC BLOOD PRESSURE: 78 MMHG | HEIGHT: 69 IN | WEIGHT: 239 LBS | SYSTOLIC BLOOD PRESSURE: 142 MMHG

## 2019-02-25 DIAGNOSIS — Z79.4 TYPE 2 DIABETES MELLITUS WITH DIABETIC POLYNEUROPATHY, WITH LONG-TERM CURRENT USE OF INSULIN (HCC): Primary | ICD-10-CM

## 2019-02-25 DIAGNOSIS — E78.2 MIXED HYPERLIPIDEMIA: ICD-10-CM

## 2019-02-25 DIAGNOSIS — Z00.00 MEDICARE ANNUAL WELLNESS VISIT, SUBSEQUENT: Primary | ICD-10-CM

## 2019-02-25 DIAGNOSIS — E11.42 TYPE 2 DIABETES MELLITUS WITH DIABETIC POLYNEUROPATHY, WITH LONG-TERM CURRENT USE OF INSULIN (HCC): Primary | ICD-10-CM

## 2019-02-25 PROCEDURE — G0439 PPPS, SUBSEQ VISIT: HCPCS | Performed by: FAMILY MEDICINE

## 2019-02-25 PROCEDURE — 1160F RVW MEDS BY RX/DR IN RCRD: CPT | Performed by: FAMILY MEDICINE

## 2019-02-25 PROCEDURE — 3008F BODY MASS INDEX DOCD: CPT | Performed by: FAMILY MEDICINE

## 2019-02-25 PROCEDURE — 1036F TOBACCO NON-USER: CPT | Performed by: FAMILY MEDICINE

## 2019-02-25 RX ORDER — ATORVASTATIN CALCIUM 80 MG/1
80 TABLET, FILM COATED ORAL DAILY
Qty: 90 TABLET | Refills: 3 | Status: SHIPPED | OUTPATIENT
Start: 2019-02-25 | End: 2020-05-06 | Stop reason: SDUPTHER

## 2019-02-25 RX ORDER — LOSARTAN POTASSIUM 25 MG/1
25 TABLET ORAL
COMMUNITY
Start: 2019-02-13 | End: 2019-12-06 | Stop reason: SDUPTHER

## 2019-02-25 NOTE — PROGRESS NOTES
Assessment and Plan:  Problem List Items Addressed This Visit     None        Health Maintenance Due   Topic Date Due    Medicare Annual Wellness Visit (AWV)  1945    BMI: Followup Plan  12/09/1964    HEPATITIS B VACCINES (1 of 3 - Risk 3-dose series) 12/09/1965    Pneumococcal PPSV23/PCV13 65+ Years / Low and Medium Risk (1 of 2 - PCV13) 12/09/2011    CRC Screening: Colonoscopy  03/21/2019         HPI:  Patient Active Problem List   Diagnosis    Arthritis    Coronary arteriosclerosis in native artery    Depression with anxiety    Diabetes mellitus type 2, controlled [facility]    Gout    Hyperlipidemia    Hypertension    [facility]    NSTEMI (non-ST elevated myocardial infarction) [facility]    Tremor    Degenerative disc disease, lumbar    Tremor, essential    Sensory ataxia    Gait instability    Frequent falls    Precordial pain    Infected epidermoid cyst     Past Medical History:   Diagnosis Date    BPH (benign prostatic hyperplasia)     CAD (coronary artery disease)     Diabetes mellitus (HCC)     niddm    Gout     High cholesterol     Hypertension     MI, 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      Past Surgical History:   Procedure Laterality Date    CATARACT EXTRACTION W/  INTRAOCULAR LENS IMPLANT      CORONARY ANGIOPLASTY WITH STENT PLACEMENT      stents x3    EYE SURGERY      repair corneal laceration    MA COLONOSCOPY FLX DX W/COLLJ SPEC WHEN PFRMD N/A 3/21/2016    Procedure: COLONOSCOPY;  Surgeon: Ramy Ryder MD;  Location: [facility];  Service: Colorectal    TONSILLECTOMY AND ADENOIDECTOMY       Family History   Adopted: Yes   Problem Relation Age of Onset    No Known Problems Mother     No Known Problems Father      Social History     Tobacco Use   Smoking Status Never Smoker   Smokeless Tobacco Never Used     Social History     Substance and Sexual Activity   Alcohol Use Yes    Comment: socially, cut down in 2008, previously did more than       Social History Substance and Sexual Activity   Drug Use Never         Current Outpatient Medications   Medication Sig Dispense Refill    allopurinol (ZYLOPRIM) 100 mg tablet TAKE ONE TABLET BY MOUTH EVERY DAY 90 tablet 0    aspirin 325 mg tablet Take 325 mg by mouth daily   escitalopram (LEXAPRO) 20 mg tablet Take 1 tablet (20 mg total) by mouth daily 90 tablet 3    gabapentin (NEURONTIN) 300 mg capsule Take 2 capsules (600 mg total) by mouth 3 (three) times a day 180 capsule 5    glimepiride (AMARYL) 4 mg tablet Take one tablet by mouth 2 times daily 180 tablet 3    levothyroxine 75 mcg tablet Take 1 tablet (75 mcg total) by mouth daily One hour before food in am on an empty stomach 90 tablet 1    losartan (COZAAR) 25 mg tablet Take 25 mg by mouth       metFORMIN (GLUCOPHAGE) 1000 MG tablet Take 1 tablet (1,000 mg total) by mouth 2 (two) times a day with meals 180 tablet 3    metoprolol succinate (TOPROL-XL) 25 mg 24 hr tablet Take 25 mg by mouth 2 (two) times a day   multivitamin (THERAGRAN) TABS Take 1 tablet by mouth daily       No current facility-administered medications for this visit  No Known Allergies  Immunization History   Administered Date(s) Administered    H1N1, All Formulations 11/06/2009    INFLUENZA 09/02/2015, 09/14/2016    Influenza Split High Dose Preservative Free IM 09/11/2012, 09/20/2013, 10/06/2014, 09/02/2015, 09/14/2016, 09/12/2017    Influenza, high dose seasonal 0 5 mL 10/22/2018    Pneumococcal Polysaccharide PPV23 1945    Tdap 11/22/2017    Zoster 04/10/2014       Patient Care Team:  Shefali Rodriguez DO as PCP - General  Dayo Angela MD    Medicare Screening Tests and Risk Assessments:  Randal Prince is here for his Subsequent Wellness visit  Health Risk Assessment:  Patient rates overall health as good  Patient feels that their physical health rating is Slightly worse  Eyesight was rated as Same  Hearing was rated as Slightly worse   Patient feels that their emotional and mental health rating is Same  Pain experienced by patient in the last 7 days has been None  Patient states that he has experienced no weight loss or gain in last 6 months  Emotional/Mental Health:  Patient has been feeling nervous/anxious  PHQ-9 Depression Screening:    Frequency of the following problems over the past two weeks:      1  Little interest or pleasure in doing things: 0 - not at all      2  Feeling down, depressed, or hopeless: 1 - several days      3  Trouble falling or staying asleep, or sleeping too much: 0 - not at all      4  Feeling tired or having little energy: 3 - nearly every day      5  Poor appetite or overeatin - not at all      6  Feeling bad about yourself - or that you are a failure or have let yourself or your family down: 0 - not at all      7  Trouble concentrating on things, such as reading the newspaper or watching television: 1 - several days      8  Moving or speaking so slowly that other people could have noticed  Or the opposite - being so fidgety or restless that you have been moving around a lot more than usual: 0 - not at all      9  Thoughts that you would be better off dead, or of hurting yourself in some way: 0 - not at all  PHQ-2 Score: 1  PHQ-9 Score: 5    Broken Bones/Falls: Fall Risk Assessment:    In the past year, patient has experienced: History of falling in past year     Number of falls: greater than 6  Patient feels unsteady standing  Patient is not taking medication that can cause feelings of lightheadedness or tiredness  Patient often has no need to rush to the toilet  Chronic conditions that may contribute to falls diabetes, neuropathy, arthritis and visual disturbance  Bladder/Bowel:  Patient has leaked urine accidently in the last six months  Patient reports loss of bowel control  Immunizations:  Patient has had a flu vaccination within the last year  Patient has received a pneumonia shot    Patient has received a shingles shot  Patient has received tetanus/diphtheria shot  Date of tetanus/diphtheria shot: 11/22/2017    Home Safety:  Patient has trouble with stairs inside or outside of their home  Patient currently reports that there are no safety hazards present in home, working smoke alarms, working carbon monoxide detectors  Preventative Screenings:   no prostate cancer screen performed, colon cancer screen completed, 3/21/2016  cholesterol screen completed, 7/30/2018  glaucoma eye exam completed, (Additional Comments: Pt follows with Dr Farshad Bassett for all eye care)    Nutrition:  Current diet: Diabetic and Limited junk food with servings of the following:    Medications:  Patient is currently taking over-the-counter supplements  List of OTC medications includes: Aspirin, multivitamin  Patient is not able to manage medications  Lifestyle Choices:  Patient reports no tobacco use  Patient has not smoked or used tobacco in the past   Patient reports alcohol use  Alcohol use per week: Does not drink on a weekly basis  Patient drives a vehicle  Patient wears seat belt  Current level of exercise of physical activity described by patient as: Never exercises, Limited physical activity  Activities of Daily Living:  Can get out of bed by his or her self, able to dress self, able to make own meals, able to do own shopping, able to bathe self, can do own laundry/housekeeping, can manage own money, pay bills and track expenses    Previous Hospitalizations:  No hospitalization or ED visit in past 12 months        Advanced Directives:  Patient has decided on a power of   Patient has spoken to designated power of   Patient has not completed advanced directive          Preventative Screening/Counseling:      Cardiovascular:      General: Risks and Benefits Discussed and Screening Current          Diabetes:      General: Risks and Benefits Discussed and Screening Current          Colorectal Cancer:      General: Risks and Benefits Discussed and Screening Current          Prostate Cancer:      General: Risks and Benefits Discussed and Screening Current          Osteoporosis:      General: Screening Not Indicated          AAA:      General: Screening Not Indicated          Glaucoma:      General: Risks and Benefits Discussed and Screening Current          HIV:      General: Screening Not Indicated          Hepatitis C:      General: Screening Not Indicated        Advanced Directives:   Patient has living will for healthcare, has durable POA for healthcare, patient has an advanced directive  Information on ACP and/or AD not provided  No 5 wishes given  End of life assessment reviewed with patient  Provider agrees with end of life decisions   Immunizations:      Influenza: Risks & Benefits Discussed and Influenza UTD This Year      Pneumococcal: Risks & Benefits Discussed and Lifetime Vaccine Completed      Shingrix: Risks & Benefits Discussed and Patient Declines      Hepatitis B (Low risk patients): Series Not Indicated      Hepatitis B (Medium to high risk patients): Patient Declines      Zostavax: Risks & Benefits Discussed and Zostavax Vaccine UTD            No exam data present    Physical Exam:  Review of Systems   Constitutional: Negative for activity change, appetite change, diaphoresis, fatigue and fever  HENT: Negative  Eyes: Positive for visual disturbance  Respiratory: Negative for apnea, cough, chest tightness, shortness of breath and wheezing  Cardiovascular: Negative for chest pain, palpitations and leg swelling  Gastrointestinal: Positive for bowel incontinence  Negative for abdominal distention, abdominal pain, anal bleeding, constipation, diarrhea, nausea and vomiting  Endocrine: Negative for cold intolerance, heat intolerance, polydipsia, polyphagia and polyuria  Genitourinary: Negative for difficulty urinating, dysuria, flank pain, hematuria and urgency  Musculoskeletal: Positive for arthritis  Negative for arthralgias, back pain, gait problem, joint swelling and myalgias  Skin: Negative for color change, rash and wound  Allergic/Immunologic: Negative for environmental allergies, food allergies and immunocompromised state  Neurological: Negative for dizziness, seizures, syncope, speech difficulty, numbness and headaches  Hematological: Negative for adenopathy  Does not bruise/bleed easily  Psychiatric/Behavioral: Negative for agitation, behavioral problems, hallucinations, sleep disturbance and suicidal ideas  The patient is not nervous/anxious  Vitals:    02/25/19 0917   BP: 142/78   BP Location: Left arm   Patient Position: Sitting   Cuff Size: Standard   Weight: 108 kg (239 lb)   Height: 5' 9" (1 753 m)   Body mass index is 35 29 kg/m²  Physical Exam   Constitutional: He is oriented to person, place, and time  He appears well-developed and well-nourished  HENT:   Head: Normocephalic and atraumatic  Right Ear: External ear normal    Left Ear: External ear normal    Nose: Nose normal    Mouth/Throat: Oropharynx is clear and moist    Eyes: Pupils are equal, round, and reactive to light  Conjunctivae and EOM are normal    Neck: Normal range of motion  Neck supple  Cardiovascular: Normal rate, regular rhythm, normal heart sounds and intact distal pulses  Exam reveals no friction rub  Pulses are no weak pulses  No murmur heard  Pulses:       Dorsalis pedis pulses are 2+ on the right side, and 2+ on the left side  Posterior tibial pulses are 2+ on the right side, and 2+ on the left side  Pulmonary/Chest: Effort normal and breath sounds normal  No respiratory distress  He has no wheezes  He has no rales  He exhibits no tenderness  Abdominal: Soft  Bowel sounds are normal    Musculoskeletal: Normal range of motion  Feet:   Right Foot:   Skin Integrity: Negative for ulcer, skin breakdown, erythema, warmth, callus or dry skin  Left Foot:   Skin Integrity: Negative for ulcer, skin breakdown, erythema, warmth, callus or dry skin  Neurological: He is alert and oriented to person, place, and time  Skin: Skin is warm and dry  Capillary refill takes 2 to 3 seconds  Psychiatric: He has a normal mood and affect  His behavior is normal  Judgment and thought content normal      Patient's shoes and socks removed  Right Foot/Ankle   Right Foot Inspection  Skin Exam: skin normal and skin intact no dry skin, no warmth, no callus, no erythema, no maceration, no abnormal color, no pre-ulcer, no ulcer and no callus                          Toe Exam: ROM and strength within normal limits  Sensory   Vibration: intact  Proprioception: intact   Monofilament testing: intact  Vascular  Capillary refills: < 3 seconds  The right DP pulse is 2+  The right PT pulse is 2+  Left Foot/Ankle  Left Foot Inspection  Skin Exam: skin normal and skin intactno dry skin, no warmth, no erythema, no maceration, normal color, no pre-ulcer, no ulcer and no callus                         Toe Exam: ROM and strength within normal limits                   Sensory   Vibration: intact  Proprioception: intact  Monofilament: intact  Vascular  Capillary refills: < 3 seconds  The left DP pulse is 2+  The left PT pulse is 2+  Assign Risk Category:  No deformity present; No loss of protective sensation;  No weak pulses       Risk: 0

## 2019-02-27 DIAGNOSIS — E03.9 HYPOTHYROIDISM, UNSPECIFIED TYPE: ICD-10-CM

## 2019-02-27 DIAGNOSIS — F33.9 RECURRENT MAJOR DEPRESSIVE DISORDER, REMISSION STATUS UNSPECIFIED (HCC): ICD-10-CM

## 2019-02-27 RX ORDER — ESCITALOPRAM OXALATE 20 MG/1
20 TABLET ORAL DAILY
Qty: 90 TABLET | Refills: 0 | Status: SHIPPED | OUTPATIENT
Start: 2019-02-27 | End: 2020-01-17 | Stop reason: SDUPTHER

## 2019-02-28 RX ORDER — LEVOTHYROXINE SODIUM 0.07 MG/1
75 TABLET ORAL DAILY
Qty: 90 TABLET | Refills: 0 | Status: SHIPPED | OUTPATIENT
Start: 2019-02-28 | End: 2019-05-03 | Stop reason: SDUPTHER
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
## 2019-03-27 ENCOUNTER — OFFICE VISIT (OUTPATIENT)
Dept: NEUROLOGY | Facility: CLINIC | Age: 74
End: 2019-03-27
Payer: COMMERCIAL

## 2019-03-27 VITALS
DIASTOLIC BLOOD PRESSURE: 62 MMHG | WEIGHT: 238 LBS | HEIGHT: 69 IN | BODY MASS INDEX: 35.25 KG/M2 | HEART RATE: 69 BPM | SYSTOLIC BLOOD PRESSURE: 116 MMHG

## 2019-03-27 DIAGNOSIS — G25.0 TREMOR, ESSENTIAL: Primary | ICD-10-CM

## 2019-03-27 PROCEDURE — 99214 OFFICE O/P EST MOD 30 MIN: CPT | Performed by: PSYCHIATRY & NEUROLOGY

## 2019-03-27 RX ORDER — POTASSIUM BICARBONATE 25 MEQ/1
25 TABLET, EFFERVESCENT ORAL 2 TIMES DAILY
COMMUNITY
End: 2019-09-19 | Stop reason: ALTCHOICE

## 2019-03-27 RX ORDER — TOPIRAMATE 25 MG/1
50 CAPSULE, COATED PELLETS ORAL 2 TIMES DAILY
Qty: 120 CAPSULE | Refills: 3 | Status: SHIPPED | OUTPATIENT
Start: 2019-03-27 | End: 2019-09-19 | Stop reason: SINTOL

## 2019-03-27 NOTE — PATIENT INSTRUCTIONS
· Sending script for topiramate 25mg tablets to be taken according to the following: At any point if symptoms are controlled then can hold that dose without going up further  If feels any side effects (sedation, dizziness) that are intolerable, then go back down to next lower dose and hold there, then call us  Week 1:  Start on 0 5 tab at bedtime    Week 2:  Take 1 tablet at bedtime  Week 3:  Take 0 5 tab in AM / 1 tab at bedtime  Week 4:  Take 1 tab twice a day  Week 5:  Take 1 tab in AM / 1 5 tab at bedtime  Week 6:  Take 1 5 tab twice a day  Week 7: Take 1 5 tab in AM / 2 tab at bedtime  Week 8: Take 2 tab twice a day  · Keep the gabapentin dose the same at 300mg BID  Call back in 2 weeks to report on status with this  · It is possible that the metoprolol dose was decreased in the interim, and would help explain his interval worsening partially  If metoprolol were discontinued, propranolol would be a better option  · Recommended to start supplementation on Vit E 100-400 IU a day for his ataxia  He has not yet done so  Encouraged trial use of cane; he has some at home  · Encouraged increased intake of fluids to avoid dehydration  · Encouraged exercise at home as tolerated  · Encouraged glycemic control, therapy as per PCP  · The patient has been instructed to call us about any new neurological problems or medication side effects  · Return to clinic in 3-4 months

## 2019-03-27 NOTE — PROGRESS NOTES
Review of Systems   Constitutional: Negative  Negative for appetite change and fever  HENT: Negative  Negative for hearing loss, tinnitus, trouble swallowing and voice change  Eyes: Negative  Negative for photophobia and pain  Respiratory: Negative  Negative for shortness of breath  Cardiovascular: Negative  Negative for palpitations  Gastrointestinal: Negative  Negative for nausea  Endocrine: Negative  Negative for cold intolerance and heat intolerance  Genitourinary: Negative  Negative for dysuria, frequency and urgency  Musculoskeletal: Positive for gait problem (difficulty walking, balance problems, ), joint swelling and myalgias  Negative for neck pain  Skin: Negative  Allergic/Immunologic: Negative  Neurological: Positive for tremors, light-headedness and numbness (intlging)  Negative for dizziness, seizures, syncope, facial asymmetry, speech difficulty and weakness  Hematological: Negative  Does not bruise/bleed easily  Psychiatric/Behavioral: Negative  Negative for confusion and hallucinations
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
## 2019-04-08 ENCOUNTER — OFFICE VISIT (OUTPATIENT)
Dept: CARDIOLOGY CLINIC | Facility: HOSPITAL | Age: 74
End: 2019-04-08
Payer: COMMERCIAL

## 2019-04-08 VITALS
HEIGHT: 69 IN | BODY MASS INDEX: 35.1 KG/M2 | DIASTOLIC BLOOD PRESSURE: 72 MMHG | HEART RATE: 73 BPM | SYSTOLIC BLOOD PRESSURE: 115 MMHG | WEIGHT: 237 LBS

## 2019-04-08 DIAGNOSIS — I21.4 NSTEMI (NON-ST ELEVATED MYOCARDIAL INFARCTION) (HCC): ICD-10-CM

## 2019-04-08 DIAGNOSIS — I25.10 CORONARY ARTERY DISEASE INVOLVING NATIVE CORONARY ARTERY OF NATIVE HEART WITHOUT ANGINA PECTORIS: Primary | ICD-10-CM

## 2019-04-08 DIAGNOSIS — I10 ESSENTIAL HYPERTENSION: ICD-10-CM

## 2019-04-08 DIAGNOSIS — G25.0 TREMOR, ESSENTIAL: ICD-10-CM

## 2019-04-08 DIAGNOSIS — E78.2 MIXED HYPERLIPIDEMIA: ICD-10-CM

## 2019-04-08 DIAGNOSIS — I25.10 CORONARY ARTERIOSCLEROSIS IN NATIVE ARTERY: ICD-10-CM

## 2019-04-08 PROBLEM — R07.2 PRECORDIAL PAIN: Status: RESOLVED | Noted: 2018-10-01 | Resolved: 2019-04-08

## 2019-04-08 PROCEDURE — 99213 OFFICE O/P EST LOW 20 MIN: CPT | Performed by: INTERNAL MEDICINE

## 2019-04-08 RX ORDER — NITROGLYCERIN 0.4 MG/1
0.4 TABLET SUBLINGUAL
Qty: 25 TABLET | Refills: 3 | Status: SHIPPED | OUTPATIENT
Start: 2019-04-08 | End: 2020-10-20 | Stop reason: SDUPTHER

## 2019-04-18 ENCOUNTER — APPOINTMENT (OUTPATIENT)
Dept: LAB | Facility: HOSPITAL | Age: 74
End: 2019-04-18
Attending: INTERNAL MEDICINE
Payer: COMMERCIAL

## 2019-04-18 ENCOUNTER — TRANSCRIBE ORDERS (OUTPATIENT)
Dept: ADMINISTRATIVE | Facility: HOSPITAL | Age: 74
End: 2019-04-18

## 2019-04-18 DIAGNOSIS — N18.2 CHRONIC KIDNEY DISEASE, STAGE II (MILD): ICD-10-CM

## 2019-04-18 DIAGNOSIS — N18.2 CHRONIC KIDNEY DISEASE, STAGE II (MILD): Primary | ICD-10-CM

## 2019-04-18 DIAGNOSIS — E11.21 DIABETIC GLOMERULOPATHY (HCC): ICD-10-CM

## 2019-04-18 LAB
ANION GAP SERPL CALCULATED.3IONS-SCNC: 8 MMOL/L (ref 4–13)
BUN SERPL-MCNC: 15 MG/DL (ref 5–25)
CALCIUM SERPL-MCNC: 8.5 MG/DL (ref 8.3–10.1)
CHLORIDE SERPL-SCNC: 105 MMOL/L (ref 100–108)
CO2 SERPL-SCNC: 28 MMOL/L (ref 21–32)
CREAT SERPL-MCNC: 1.18 MG/DL (ref 0.6–1.3)
GFR SERPL CREATININE-BSD FRML MDRD: 61 ML/MIN/1.73SQ M
GLUCOSE P FAST SERPL-MCNC: 200 MG/DL (ref 65–99)
POTASSIUM SERPL-SCNC: 4.1 MMOL/L (ref 3.5–5.3)
SODIUM SERPL-SCNC: 141 MMOL/L (ref 136–145)

## 2019-04-18 PROCEDURE — 80048 BASIC METABOLIC PNL TOTAL CA: CPT

## 2019-04-18 PROCEDURE — 36415 COLL VENOUS BLD VENIPUNCTURE: CPT

## 2019-04-25 DIAGNOSIS — M1A.9XX0 CHRONIC GOUT WITHOUT TOPHUS, UNSPECIFIED CAUSE, UNSPECIFIED SITE: ICD-10-CM

## 2019-04-25 RX ORDER — ALLOPURINOL 100 MG/1
TABLET ORAL
Qty: 90 TABLET | Refills: 0 | Status: SHIPPED | OUTPATIENT
Start: 2019-04-25 | End: 2019-10-03 | Stop reason: SDUPTHER

## 2019-05-03 DIAGNOSIS — E03.9 HYPOTHYROIDISM, UNSPECIFIED TYPE: ICD-10-CM

## 2019-05-06 RX ORDER — LEVOTHYROXINE SODIUM 0.07 MG/1
75 TABLET ORAL DAILY
Qty: 90 TABLET | Refills: 1 | Status: SHIPPED | OUTPATIENT
Start: 2019-05-06 | End: 2019-10-16 | Stop reason: SDUPTHER

## 2019-05-14 ENCOUNTER — TELEPHONE (OUTPATIENT)
Dept: FAMILY MEDICINE CLINIC | Facility: CLINIC | Age: 74
End: 2019-05-14

## 2019-05-14 DIAGNOSIS — J01.01 ACUTE RECURRENT MAXILLARY SINUSITIS: Primary | ICD-10-CM

## 2019-05-14 RX ORDER — AMOXICILLIN 500 MG/1
1000 CAPSULE ORAL EVERY 12 HOURS SCHEDULED
Qty: 40 CAPSULE | Refills: 0 | Status: SHIPPED | OUTPATIENT
Start: 2019-05-14 | End: 2019-05-24

## 2019-06-09 ENCOUNTER — LAB (OUTPATIENT)
Dept: LAB | Facility: HOSPITAL | Age: 74
End: 2019-06-09
Attending: FAMILY MEDICINE
Payer: COMMERCIAL

## 2019-06-09 DIAGNOSIS — E11.40 CONTROLLED TYPE 2 DIABETES MELLITUS WITH DIABETIC NEUROPATHY, WITH LONG-TERM CURRENT USE OF INSULIN (HCC): ICD-10-CM

## 2019-06-09 DIAGNOSIS — Z79.4 CONTROLLED TYPE 2 DIABETES MELLITUS WITH DIABETIC NEUROPATHY, WITH LONG-TERM CURRENT USE OF INSULIN (HCC): ICD-10-CM

## 2019-06-09 DIAGNOSIS — F33.9 RECURRENT MAJOR DEPRESSIVE DISORDER, REMISSION STATUS UNSPECIFIED (HCC): ICD-10-CM

## 2019-06-09 LAB — TSH SERPL DL<=0.05 MIU/L-ACNC: 0.9 UIU/ML (ref 0.36–3.74)

## 2019-06-09 PROCEDURE — 84443 ASSAY THYROID STIM HORMONE: CPT

## 2019-06-10 ENCOUNTER — OFFICE VISIT (OUTPATIENT)
Dept: FAMILY MEDICINE CLINIC | Facility: CLINIC | Age: 74
End: 2019-06-10
Payer: COMMERCIAL

## 2019-06-10 VITALS
DIASTOLIC BLOOD PRESSURE: 66 MMHG | WEIGHT: 235 LBS | SYSTOLIC BLOOD PRESSURE: 110 MMHG | HEIGHT: 69 IN | BODY MASS INDEX: 34.8 KG/M2

## 2019-06-10 DIAGNOSIS — K52.9 CHRONIC DIARRHEA OF UNKNOWN ORIGIN: ICD-10-CM

## 2019-06-10 DIAGNOSIS — Z79.4 CONTROLLED TYPE 2 DIABETES MELLITUS WITH DIABETIC NEUROPATHY, WITH LONG-TERM CURRENT USE OF INSULIN (HCC): Primary | ICD-10-CM

## 2019-06-10 DIAGNOSIS — E78.2 MIXED HYPERLIPIDEMIA: ICD-10-CM

## 2019-06-10 DIAGNOSIS — E11.40 CONTROLLED TYPE 2 DIABETES MELLITUS WITH DIABETIC NEUROPATHY, WITH LONG-TERM CURRENT USE OF INSULIN (HCC): Primary | ICD-10-CM

## 2019-06-10 DIAGNOSIS — E66.01 MORBID OBESITY (HCC): ICD-10-CM

## 2019-06-10 DIAGNOSIS — I10 ESSENTIAL HYPERTENSION: ICD-10-CM

## 2019-06-10 PROCEDURE — 99214 OFFICE O/P EST MOD 30 MIN: CPT | Performed by: FAMILY MEDICINE

## 2019-06-10 PROCEDURE — 1036F TOBACCO NON-USER: CPT | Performed by: FAMILY MEDICINE

## 2019-06-10 PROCEDURE — 1160F RVW MEDS BY RX/DR IN RCRD: CPT | Performed by: FAMILY MEDICINE

## 2019-06-10 PROCEDURE — 3078F DIAST BP <80 MM HG: CPT | Performed by: FAMILY MEDICINE

## 2019-06-10 PROCEDURE — 3074F SYST BP LT 130 MM HG: CPT | Performed by: FAMILY MEDICINE

## 2019-06-10 PROCEDURE — 3008F BODY MASS INDEX DOCD: CPT | Performed by: FAMILY MEDICINE

## 2019-06-12 ENCOUNTER — APPOINTMENT (OUTPATIENT)
Dept: LAB | Facility: HOSPITAL | Age: 74
End: 2019-06-12
Attending: FAMILY MEDICINE
Payer: COMMERCIAL

## 2019-06-12 DIAGNOSIS — K52.9 CHRONIC DIARRHEA OF UNKNOWN ORIGIN: ICD-10-CM

## 2019-06-12 LAB
ALBUMIN SERPL BCP-MCNC: 3.4 G/DL (ref 3.5–5)
ALP SERPL-CCNC: 85 U/L (ref 46–116)
ALT SERPL W P-5'-P-CCNC: 50 U/L (ref 12–78)
ANION GAP SERPL CALCULATED.3IONS-SCNC: 8 MMOL/L (ref 4–13)
AST SERPL W P-5'-P-CCNC: 22 U/L (ref 5–45)
BILIRUB SERPL-MCNC: 0.6 MG/DL (ref 0.2–1)
BUN SERPL-MCNC: 9 MG/DL (ref 5–25)
CALCIUM SERPL-MCNC: 8.4 MG/DL (ref 8.3–10.1)
CHLORIDE SERPL-SCNC: 105 MMOL/L (ref 100–108)
CO2 SERPL-SCNC: 30 MMOL/L (ref 21–32)
CREAT SERPL-MCNC: 0.93 MG/DL (ref 0.6–1.3)
GFR SERPL CREATININE-BSD FRML MDRD: 82 ML/MIN/1.73SQ M
GLUCOSE P FAST SERPL-MCNC: 66 MG/DL (ref 65–99)
POTASSIUM SERPL-SCNC: 3.8 MMOL/L (ref 3.5–5.3)
PROT SERPL-MCNC: 7.3 G/DL (ref 6.4–8.2)
SODIUM SERPL-SCNC: 143 MMOL/L (ref 136–145)

## 2019-06-12 PROCEDURE — 36415 COLL VENOUS BLD VENIPUNCTURE: CPT | Performed by: FAMILY MEDICINE

## 2019-06-12 PROCEDURE — 80053 COMPREHEN METABOLIC PANEL: CPT | Performed by: FAMILY MEDICINE

## 2019-06-12 PROCEDURE — 87177 OVA AND PARASITES SMEARS: CPT

## 2019-06-12 PROCEDURE — 87493 C DIFF AMPLIFIED PROBE: CPT

## 2019-06-12 PROCEDURE — 87209 SMEAR COMPLEX STAIN: CPT

## 2019-06-12 PROCEDURE — 87505 NFCT AGENT DETECTION GI: CPT | Performed by: FAMILY MEDICINE

## 2019-06-13 LAB
C DIFF TOX GENS STL QL NAA+PROBE: NORMAL
CAMPYLOBACTER DNA SPEC NAA+PROBE: NORMAL
SALMONELLA DNA SPEC QL NAA+PROBE: NORMAL
SHIGA TOXIN STX GENE SPEC NAA+PROBE: NORMAL
SHIGELLA DNA SPEC QL NAA+PROBE: NORMAL

## 2019-06-15 DIAGNOSIS — Z79.4 TYPE 2 DIABETES MELLITUS WITH DIABETIC POLYNEUROPATHY, WITH LONG-TERM CURRENT USE OF INSULIN (HCC): ICD-10-CM

## 2019-06-15 DIAGNOSIS — E11.42 TYPE 2 DIABETES MELLITUS WITH DIABETIC POLYNEUROPATHY, WITH LONG-TERM CURRENT USE OF INSULIN (HCC): ICD-10-CM

## 2019-06-15 LAB — O+P STL CONC: NORMAL

## 2019-07-05 DIAGNOSIS — Z79.4 TYPE 2 DIABETES MELLITUS WITH DIABETIC POLYNEUROPATHY, WITH LONG-TERM CURRENT USE OF INSULIN (HCC): ICD-10-CM

## 2019-07-05 DIAGNOSIS — E11.42 TYPE 2 DIABETES MELLITUS WITH DIABETIC POLYNEUROPATHY, WITH LONG-TERM CURRENT USE OF INSULIN (HCC): ICD-10-CM

## 2019-07-05 RX ORDER — METOPROLOL TARTRATE 50 MG/1
TABLET, FILM COATED ORAL
Qty: 180 TABLET | Refills: 0 | Status: SHIPPED | OUTPATIENT
Start: 2019-07-05 | End: 2020-02-13 | Stop reason: SDUPTHER

## 2019-09-08 ENCOUNTER — APPOINTMENT (OUTPATIENT)
Dept: LAB | Facility: HOSPITAL | Age: 74
End: 2019-09-08
Attending: FAMILY MEDICINE
Payer: COMMERCIAL

## 2019-09-09 ENCOUNTER — OFFICE VISIT (OUTPATIENT)
Dept: FAMILY MEDICINE CLINIC | Facility: CLINIC | Age: 74
End: 2019-09-09
Payer: COMMERCIAL

## 2019-09-09 VITALS
WEIGHT: 235.6 LBS | HEIGHT: 69 IN | SYSTOLIC BLOOD PRESSURE: 104 MMHG | DIASTOLIC BLOOD PRESSURE: 60 MMHG | BODY MASS INDEX: 34.9 KG/M2

## 2019-09-09 DIAGNOSIS — Z12.12 SCREENING FOR COLORECTAL CANCER: ICD-10-CM

## 2019-09-09 DIAGNOSIS — Z79.4 CONTROLLED TYPE 2 DIABETES MELLITUS WITH DIABETIC NEUROPATHY, WITH LONG-TERM CURRENT USE OF INSULIN (HCC): ICD-10-CM

## 2019-09-09 DIAGNOSIS — I10 ESSENTIAL HYPERTENSION: ICD-10-CM

## 2019-09-09 DIAGNOSIS — Z12.11 SCREENING FOR COLORECTAL CANCER: ICD-10-CM

## 2019-09-09 DIAGNOSIS — Z23 NEED FOR INFLUENZA VACCINATION: Primary | ICD-10-CM

## 2019-09-09 DIAGNOSIS — E78.2 MIXED HYPERLIPIDEMIA: ICD-10-CM

## 2019-09-09 DIAGNOSIS — E11.40 CONTROLLED TYPE 2 DIABETES MELLITUS WITH DIABETIC NEUROPATHY, WITH LONG-TERM CURRENT USE OF INSULIN (HCC): ICD-10-CM

## 2019-09-09 PROCEDURE — 90471 IMMUNIZATION ADMIN: CPT | Performed by: FAMILY MEDICINE

## 2019-09-09 PROCEDURE — 3008F BODY MASS INDEX DOCD: CPT | Performed by: FAMILY MEDICINE

## 2019-09-09 PROCEDURE — 3078F DIAST BP <80 MM HG: CPT | Performed by: FAMILY MEDICINE

## 2019-09-09 PROCEDURE — 3074F SYST BP LT 130 MM HG: CPT | Performed by: FAMILY MEDICINE

## 2019-09-09 PROCEDURE — 99213 OFFICE O/P EST LOW 20 MIN: CPT | Performed by: FAMILY MEDICINE

## 2019-09-09 PROCEDURE — 90662 IIV NO PRSV INCREASED AG IM: CPT | Performed by: FAMILY MEDICINE

## 2019-09-09 NOTE — PROGRESS NOTES
BMI Counseling: Body mass index is 34 79 kg/m²  Discussed the patient's BMI with him  The BMI is above average  BMI counseling and education was provided to the patient  Nutrition recommendations include reducing portion sizes

## 2019-09-09 NOTE — PATIENT INSTRUCTIONS
Obesity   AMBULATORY CARE:   Obesity  is when your body mass index (BMI) is greater than 30  Your healthcare provider will use your height and weight to measure your BMI  The risks of obesity include  many health problems, such as injuries or physical disability  You may need tests to check for the following:  · Diabetes     · High blood pressure or high cholesterol     · Heart disease     · Gallbladder or liver disease     · Cancer of the colon, breast, prostate, liver, or kidney     · Sleep apnea     · Arthritis or gout  Seek care immediately if:   · You have a severe headache, confusion, or difficulty speaking  · You have weakness on one side of your body  · You have chest pain, sweating, or shortness of breath  Contact your healthcare provider if:   · You have symptoms of gallbladder or liver disease, such as pain in your upper abdomen  · You have knee or hip pain and discomfort while walking  · You have symptoms of diabetes, such as intense hunger and thirst, and frequent urination  · You have symptoms of sleep apnea, such as snoring or daytime sleepiness  · You have questions or concerns about your condition or care  Treatment for obesity  focuses on helping you lose weight to improve your health  Even a small decrease in BMI can reduce the risk for many health problems  Your healthcare provider will help you set a weight-loss goal   · Lifestyle changes  are the first step in treating obesity  These include making healthy food choices and getting regular physical activity  Your healthcare provider may suggest a weight-loss program that involves coaching, education, and therapy  · Medicine  may help you lose weight when it is used with a healthy diet and physical activity  · Surgery  can help you lose weight if you are very obese and have other health problems  There are several types of weight-loss surgery  Ask your healthcare provider for more information    Be successful losing weight:   · Set small, realistic goals  An example of a small goal is to walk for 20 minutes 5 days a week  Anther goal is to lose 5% of your body weight  · Tell friends, family members, and coworkers about your goals  and ask for their support  Ask a friend to lose weight with you, or join a weight-loss support group  · Identify foods or triggers that may cause you to overeat , and find ways to avoid them  Remove tempting high-calorie foods from your home and workplace  Place a bowl of fresh fruit on your kitchen counter  If stress causes you to eat, then find other ways to cope with stress  · Keep a diary to track what you eat and drink  Also write down how many minutes of physical activity you do each day  Weigh yourself once a week and record it in your diary  Eating changes: You will need to eat 500 to 1,000 fewer calories each day than you currently eat to lose 1 to 2 pounds a week  The following changes will help you cut calories:  · Eat smaller portions  Use small plates, no larger than 9 inches in diameter  Fill your plate half full of fruits and vegetables  Measure your food using measuring cups until you know what a serving size looks like  · Eat 3 meals and 1 or 2 snacks each day  Plan your meals in advance  Miguel Prince and eat at home most of the time  Eat slowly  · Eat fruits and vegetables at every meal   They are low in calories and high in fiber, which makes you feel full  Do not add butter, margarine, or cream sauce to vegetables  Use herbs to season steamed vegetables  · Eat less fat and fewer fried foods  Eat more baked or grilled chicken and fish  These protein sources are lower in calories and fat than red meat  Limit fast food  Dress your salads with olive oil and vinegar instead of bottled dressing  · Limit the amount of sugar you eat  Do not drink sugary beverages  Limit alcohol  Activity changes:  Physical activity is good for your body in many ways   It helps you burn calories and build strong muscles  It decreases stress and depression, and improves your mood  It can also help you sleep better  Talk to your healthcare provider before you begin an exercise program   · Exercise for at least 30 minutes 5 days a week  Start slowly  Set aside time each day for physical activity that you enjoy and that is convenient for you  It is best to do both weight training and an activity that increases your heart rate, such as walking, bicycling, or swimming  · Find ways to be more active  Do yard work and housecleaning  Walk up the stairs instead of using elevators  Spend your leisure time going to events that require walking, such as outdoor festivals or fairs  This extra physical activity can help you lose weight and keep it off  Follow up with your healthcare provider as directed: You may need to meet with a dietitian  Write down your questions so you remember to ask them during your visits  © 2017 [employer] [employer] Information is for End User's use only and may not be sold, redistributed or otherwise used for commercial purposes  All illustrations and images included in CareNotes® are the copyrighted property of [employer]  or Tushar Avila  The above information is an  only  It is not intended as medical advice for individual conditions or treatments  Talk to your doctor, nurse or pharmacist before following any medical regimen to see if it is safe and effective for you  Weight Management   AMBULATORY CARE:   Why it is important to manage your weight:  Being overweight increases your risk of health conditions such as heart disease, high blood pressure, type 2 diabetes, and certain types of cancer  It can also increase your risk for osteoarthritis, sleep apnea, and other respiratory problems  Aim for a slow, steady weight loss  Even a small amount of weight loss can lower your risk of health problems    How to lose weight safely:  A safe and healthy way to lose weight is to eat fewer calories and get regular exercise  You can lose up about 1 pound a week by decreasing the number of calories you eat by 500 calories each day  You can decrease calories by eating smaller portion sizes or by cutting out high-calorie foods  Read labels to find out how many calories are in the foods you eat  You can also burn calories with exercise such as walking, swimming, or biking  You will be more likely to keep weight off if you make these changes part of your lifestyle  Healthy meal plan for weight management:  A healthy meal plan includes a variety of foods, contains fewer calories, and helps you stay healthy  A healthy meal plan includes the following:  · Eat whole-grain foods more often  A healthy meal plan should contain fiber  Fiber is the part of grains, fruits, and vegetables that is not broken down by your body  Whole-grain foods are healthy and provide extra fiber in your diet  Some examples of whole-grain foods are whole-wheat breads and pastas, oatmeal, brown rice, and bulgur  · Eat a variety of vegetables every day  Include dark, leafy greens such as spinach, kale, yonathan greens, and mustard greens  Eat yellow and orange vegetables such as carrots, sweet potatoes, and winter squash  · Eat a variety of fruits every day  Choose fresh or canned fruit (canned in its own juice or light syrup) instead of juice  Fruit juice has very little or no fiber  · Eat low-fat dairy foods  Drink fat-free (skim) milk or 1% milk  Eat fat-free yogurt and low-fat cottage cheese  Try low-fat cheeses such as mozzarella and other reduced-fat cheeses  · Choose meat and other protein foods that are low in fat  Choose beans or other legumes such as split peas or lentils  Choose fish, skinless poultry (chicken or turkey), or lean cuts of red meat (beef or pork)  Before you cook meat or poultry, cut off any visible fat  · Use less fat and oil  Try baking foods instead of frying them  Add less fat, such as margarine, sour cream, regular salad dressing and mayonnaise to foods  Eat fewer high-fat foods  Some examples of high-fat foods include french fries, doughnuts, ice cream, and cakes  · Eat fewer sweets  Limit foods and drinks that are high in sugar  This includes candy, cookies, regular soda, and sweetened drinks  Ways to decrease calories:   · Eat smaller portions  ¨ Use a small plate with smaller servings  ¨ Do not eat second helpings  ¨ When you eat at a restaurant, ask for a box and place half of your meal in the box before you eat  ¨ Share an entrée with someone else  · Replace high-calorie snacks with healthy, low-calorie snacks  ¨ Choose fresh fruit, vegetables, fat-free rice cakes, or air-popped popcorn instead of potato chips, nuts, or chocolate  ¨ Choose water or calorie-free drinks instead of soda or sweetened drinks  · Eat regular meals  Skipping meals can lead to overeating later in the day  Eat a healthy snack in place of a meal if you do not have time to eat a regular meal      · Do not shop for groceries when you are hungry  You may be more likely to make unhealthy food choices  Take a grocery list of healthy foods and shop after you have eaten  Exercise:  Exercise at least 30 minutes per day on most days of the week  Some examples of exercise include walking, biking, dancing, and swimming  You can also fit in more physical activity by taking the stairs instead of the elevator or parking farther away from stores  Ask your healthcare provider about the best exercise plan for you  Other things to consider as you try to lose weight:   · Be aware of situations that may give you the urge to overeat, such as eating while watching television  Find ways to avoid these situations  For example, read a book, go for a walk, or do crafts      · Meet with a weight loss support group or friends who are also trying to lose weight  This may help you stay motivated to continue working on your weight loss goals  © 2017 [employer] Information is for End User's use only and may not be sold, redistributed or otherwise used for commercial purposes  All illustrations and images included in CareNotes® are the copyrighted property of [employer]  or Tushar Avila  The above information is an  only  It is not intended as medical advice for individual conditions or treatments  Talk to your doctor, nurse or pharmacist before following any medical regimen to see if it is safe and effective for you

## 2019-09-09 NOTE — PROGRESS NOTES
Assessment/Plan:    Problem List Items Addressed This Visit        Endocrine    Diabetes mellitus type 2, controlled ([address] )       Cardiovascular and Mediastinum    Hypertension       Other    Hyperlipidemia      Other Visit Diagnoses     Need for influenza vaccination    -  Primary    Relevant Orders    PREFERRED: influenza vaccine, [id number], high-dose, PF 0 5 mL, for patients 65 yr+ (FLUZONE HIGH-DOSE)           Diagnoses and all orders for this visit:    Need for influenza vaccination  -     Cancel: SYRINGE/SINGLE-DOSE VIAL: influenza vaccine, [id number], quadrivalent, 0 5 mL, preservative-free (AFLURIA, FLUARIX, FLULAVAL, FLUZONE)  -     PREFERRED: influenza vaccine, [id number], high-dose, PF 0 5 mL, for patients 65 yr+ (FLUZONE HIGH-DOSE)    Controlled type 2 diabetes mellitus with diabetic neuropathy, with long-term current use of insulin ([address] )    Essential hypertension    Mixed hyperlipidemia        No problem-specific Assessment & Plan notes found for this encounter  Subjective:      Patient ID: Chano Malagon is a 68 y o  male  Patient here today for a follow-up visit regarding diabetes hyperlipidemia hypertension      The following portions of the patient's history were reviewed and updated as appropriate:   He has a past medical history of BPH (benign prostatic hyperplasia), CAD (coronary artery disease), Diabetes mellitus ([address] ), Gout, High cholesterol, Hypertension, and MI, old ,  does not have any pertinent problems on file  ,   has a past surgical history that includes Tonsillectomy and adenoidectomy; Cataract extraction w/  intraocular lens implant; Eye surgery; Coronary angioplasty with stent; and pr colonoscopy flx dx w/collj spec when pfrmd (N/A, 3/21/2016)  ,  family history includes No Known Problems in his father and mother  He was adopted  ,   reports that he has never smoked  He has never used smokeless tobacco  He reports that he drinks alcohol   He reports that he does not use drugs ,  has No Known Allergies     Current Outpatient Medications   Medication Sig Dispense Refill    allopurinol (ZYLOPRIM) 100 mg tablet TAKE ONE TABLET BY MOUTH EVERY DAY 90 tablet 0    aspirin 325 mg tablet Take 325 mg by mouth daily   atorvastatin (LIPITOR) 80 mg tablet Take 1 tablet (80 mg total) by mouth daily 90 tablet 3    escitalopram (LEXAPRO) 20 mg tablet Take 1 tablet (20 mg total) by mouth daily for 90 days 90 tablet 0    gabapentin (NEURONTIN) 300 mg capsule Take 2 capsules (600 mg total) by mouth 3 (three) times a day (Patient taking differently: Take 300 mg by mouth 2 (two) times a day ) 180 capsule 5    glimepiride (AMARYL) 4 mg tablet Take one tablet by mouth 2 times daily 180 tablet 3    levothyroxine 75 mcg tablet Take 1 tablet (75 mcg total) by mouth daily One hour before food in am on an empty stomach 90 tablet 1    losartan (COZAAR) 25 mg tablet Take 25 mg by mouth       metFORMIN (GLUCOPHAGE) 1000 MG tablet TAKE ONE TABLET BY MOUTH 2 TIMES A DAY WITH MEALS 180 tablet 0    metoprolol tartrate (LOPRESSOR) 50 mg tablet TAKE 1 TABLET TWICE DAILY  180 tablet 0    multivitamin (THERAGRAN) TABS Take 1 tablet by mouth daily      nitroglycerin (NITROSTAT) 0 4 mg SL tablet Place 1 tablet (0 4 mg total) under the tongue every 5 (five) minutes as needed for chest pain 25 tablet 3    potassium bicarbonate (K-LYTE) 25 MEQ disintegrating tablet Take 25 mEq by mouth 2 (two) times a day      topiramate (TOPAMAX) 25 mg sprinkle capsule Take 2 capsules (50 mg total) by mouth 2 (two) times a day 120 capsule 3     No current facility-administered medications for this visit  Review of Systems   Constitutional: Positive for activity change  Negative for appetite change, diaphoresis, fatigue and fever  HENT: Negative  Eyes: Negative  Respiratory: Negative for apnea, cough, chest tightness, shortness of breath and wheezing      Cardiovascular: Negative for chest pain, palpitations and leg swelling  Gastrointestinal: Negative for abdominal distention, abdominal pain, anal bleeding, constipation, diarrhea, nausea and vomiting  Endocrine: Negative for cold intolerance, heat intolerance, polydipsia, polyphagia and polyuria  Genitourinary: Negative for difficulty urinating, dysuria, flank pain, hematuria and urgency  Musculoskeletal: Negative for arthralgias, back pain, gait problem, joint swelling and myalgias  Skin: Negative for color change, rash and wound  Allergic/Immunologic: Negative for environmental allergies, food allergies and immunocompromised state  Neurological: Negative for dizziness, seizures, syncope, speech difficulty, numbness and headaches  Hematological: Negative for adenopathy  Does not bruise/bleed easily  Psychiatric/Behavioral: Negative for agitation, behavioral problems, hallucinations, sleep disturbance and suicidal ideas  Objective:  Vitals:    09/09/19 0836   BP: 104/60   BP Location: Left arm   Patient Position: Sitting   Cuff Size: Standard   Weight: 107 kg (235 lb 9 6 oz)   Height: 5' 9" (1 753 m)     Body mass index is 34 79 kg/m²  Physical Exam   Constitutional: He is oriented to person, place, and time  He appears well-developed and well-nourished  No distress  HENT:   Head: Normocephalic  Right Ear: External ear normal    Left Ear: External ear normal    Nose: Nose normal    Mouth/Throat: Oropharynx is clear and moist    Eyes: Pupils are equal, round, and reactive to light  Conjunctivae and EOM are normal  Right eye exhibits no discharge  Left eye exhibits no discharge  No scleral icterus  Neck: Normal range of motion  No tracheal deviation present  No thyromegaly present  Cardiovascular: Normal rate, regular rhythm and normal heart sounds  Exam reveals no gallop and no friction rub  Pulses are no weak pulses  No murmur heard  Pulses:       Dorsalis pedis pulses are 2+ on the right side, and 2+ on the left side  Posterior tibial pulses are 2+ on the right side, and 2+ on the left side  Pulmonary/Chest: Effort normal and breath sounds normal  No respiratory distress  He has no wheezes  Abdominal: Soft  Bowel sounds are normal  He exhibits no mass  There is no tenderness  There is no guarding  Musculoskeletal: He exhibits no edema or deformity  Feet:   Right Foot:   Skin Integrity: Negative for ulcer, skin breakdown, erythema, warmth, callus or dry skin  Left Foot:   Skin Integrity: Negative for ulcer, skin breakdown, erythema, warmth, callus or dry skin  Lymphadenopathy:     He has no cervical adenopathy  Neurological: He is alert and oriented to person, place, and time  No cranial nerve deficit  Skin: Skin is warm and dry  No rash noted  He is not diaphoretic  No erythema  Psychiatric: He has a normal mood and affect  Thought content normal      Patient's shoes and socks removed  Right Foot/Ankle   Right Foot Inspection  Skin Exam: skin normal and skin intact no dry skin, no warmth, no callus, no erythema, no maceration, no abnormal color, no pre-ulcer, no ulcer and no callus                          Toe Exam: ROM and strength within normal limits  Sensory   Vibration: intact  Proprioception: intact   Monofilament testing: intact  Vascular  Capillary refills: < 3 seconds  The right DP pulse is 2+  The right PT pulse is 2+  Left Foot/Ankle  Left Foot Inspection  Skin Exam: skin normal and skin intactno dry skin, no warmth, no erythema, no maceration, normal color, no pre-ulcer, no ulcer and no callus                         Toe Exam: ROM and strength within normal limits                   Sensory   Vibration: intact  Proprioception: intact  Monofilament: intact  Vascular  Capillary refills: < 3 seconds  The left DP pulse is 2+  The left PT pulse is 2+  Assign Risk Category:  No deformity present; No loss of protective sensation;  No weak pulses       Risk: 0
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
## 2019-09-19 ENCOUNTER — OFFICE VISIT (OUTPATIENT)
Dept: NEUROLOGY | Facility: CLINIC | Age: 74
End: 2019-09-19
Payer: COMMERCIAL

## 2019-09-19 VITALS — BODY MASS INDEX: 34.64 KG/M2 | SYSTOLIC BLOOD PRESSURE: 118 MMHG | DIASTOLIC BLOOD PRESSURE: 64 MMHG | WEIGHT: 234.6 LBS

## 2019-09-19 DIAGNOSIS — E56.0 VITAMIN E DEFICIENCY: ICD-10-CM

## 2019-09-19 DIAGNOSIS — R27.8 SENSORY ATAXIA: ICD-10-CM

## 2019-09-19 DIAGNOSIS — G25.0 TREMOR, ESSENTIAL: Primary | ICD-10-CM

## 2019-09-19 PROCEDURE — 99244 OFF/OP CNSLTJ NEW/EST MOD 40: CPT | Performed by: PSYCHIATRY & NEUROLOGY

## 2019-09-19 NOTE — Clinical Note
Hello Drs  I am seeing Mr Mikhail Gloria about his tremors and unfortunately thus far he has not been able to tolerate most of the medical therapies we have been suggesting  I understand he is on metoprolol 12 5mg BID  Given his tremors continue to bother him greatly I wanted to touch base and see your thoughts on switching over to propranolol 20mg with the following proposed titration up to a maximum tolerated dose or whatever dose you feel his would be his maximum Week 1:  metoprolol 12 5mg in AM  /   propranlolol 20mg at nightWeek 2:  Metoprolol off / take propranolol 20mg twice a dayWeek 3:  Propranolol 1 tab in AM / 2 tab in PMWeek 4:  2 tab twice a dayWeek 5:  2 tab in Am / 3 tab at bedtimeWeek 6:  3 tab in Am / 3 tab at bedtimeWeek 7:  3 tab in Am / 4 tab at bedtimeWeek 8:  4 tab in Am / 4 tab at bedtime  (80mg BID)Would you mind letting me know if this is ok with you and if you suggest any different maximum dose to go up to than 80mg BID   Thank you very Jasmeet Hess MD

## 2019-09-19 NOTE — PATIENT INSTRUCTIONS
· We will send a message to his cardiologist Dr Tess Nunez of a proposal to switch over from metoprolol to propranolol for Blood pressure and tremor control  This would be as such: Will start on propranolol 20mg tablets  At any point if symptoms are controlled then can hold that dose without going up further  If feels any side effects (sedation) that are intolerable, then go back down to next lower dose and hold there, then call us  Week 1:  metoprolol one table in AM  /   propranlolol 1 tablet at night  Week 2:  Metoprolol off / take propranolol 1 tablet twice a day  Week 3:  1 tab in AM / 2 tab in PM  Week 4:  2 tab twice a day  Week 5:  2 tab in Am / 3 tab at bedtime  Week 6:  3 tab in Am / 3 tab at bedtime  Week 7:  3 tab in Am / 4 tab at bedtime  Week 8:  4 tab in Am / 4 tab at bedtime  (80mg BID)  Depending on what happens above, then will switch over to long-acting Inderal (propranlolol)  · Referral over to Occupational Therapy for essential tremor management  · The alternative to above is starting low dose lorazepam (Ativan) for improving anxiety triggering tremor exacerbation  He previously felt CBD oil helped anxiety but not tremor  Another alternative is zonisamide  · Recommended to start supplementation on Vit E 100-400 IU a day for his ataxia  · Encouraged trial use of cane; he has some at home  · The patient has been instructed to call us about any new neurological problems or medication side effects  · Return to clinic in 4 months
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
## 2019-09-19 NOTE — Clinical Note
Hello Drs  I'm seeing Mr  Aviva Sims today and unfortunately he has been unable to tolerate nearly all the medication that we've been using for control of his tremors  He is on the metoprolol 12 5mg BID

## 2019-09-19 NOTE — PROGRESS NOTES
Review of Systems   Constitutional: Negative  Negative for appetite change and fever  HENT: Positive for hearing loss  Negative for tinnitus, trouble swallowing and voice change  Eyes: Negative  Negative for photophobia and pain  Respiratory: Negative  Negative for shortness of breath  Cardiovascular: Negative  Negative for palpitations  Gastrointestinal: Negative  Negative for nausea and vomiting  Endocrine: Negative  Negative for cold intolerance and heat intolerance  Genitourinary: Negative  Negative for dysuria, frequency and urgency  Musculoskeletal: Negative  Negative for myalgias and neck pain  Skin: Negative  Negative for rash  Neurological: Positive for tremors (both hands) and numbness (neuropathy)  Negative for dizziness, seizures, syncope, facial asymmetry, speech difficulty, weakness, light-headedness and headaches  Hematological: Bruises/bleeds easily  Psychiatric/Behavioral: Negative for confusion, hallucinations and sleep disturbance  The patient is nervous/anxious (mood swings )           Memory problems, forgets things

## 2019-10-03 ENCOUNTER — EVALUATION (OUTPATIENT)
Dept: OCCUPATIONAL THERAPY | Facility: HOME HEALTHCARE | Age: 74
End: 2019-10-03
Payer: COMMERCIAL

## 2019-10-03 DIAGNOSIS — M1A.9XX0 CHRONIC GOUT WITHOUT TOPHUS, UNSPECIFIED CAUSE, UNSPECIFIED SITE: ICD-10-CM

## 2019-10-03 DIAGNOSIS — G25.0 TREMOR, ESSENTIAL: Primary | ICD-10-CM

## 2019-10-03 PROCEDURE — 97167 OT EVAL HIGH COMPLEX 60 MIN: CPT

## 2019-10-03 PROCEDURE — 97110 THERAPEUTIC EXERCISES: CPT

## 2019-10-03 NOTE — PROGRESS NOTES
OT Evaluation     Today's date: 10/3/2019  Patient name: Billy Vick  : 1945  MRN: [id number]  Referring provider: Migel Albright MD  Dx:   Encounter Diagnosis     ICD-10-CM    1  Tremor, essential G25 0 Ambulatory referral to Occupational Therapy                  Assessment  Assessment details: Patient presenting to OP OT services with a dx of tremors  Patient reports experiencing tremors for the past 6+ years  Patient notes bilateral hand tremors with initiation  He states no difference in severity as compared bilaterally  Patient is on medication to address tremors, but does not notice a significant decrease in symptoms  Patient reports difficulty with activities involving bilateral hands when initiating the activity    Patient was recently at Neurology on 2019, in which he was referred to OT for adaptive eating utensils and exercises to decrease tremors  Patient has a follow-up with Neurology on 2019  Patient ambulates with SPC  Patient was recommended for DBS and may consider getting the procedure at a later time  Impairments: abnormal coordination, abnormal muscle firing, abnormal or restricted ROM, abnormal movement, activity intolerance and impaired physical strength    Symptom irritability: highBarriers to therapy: PMH: See medical chart  Understanding of Dx/Px/POC: good   Prognosis: good    Goals  STGs    Patient will demonstrate an increase in fine motor skills as evident by a 10-15% increase in time during 9-hole peg test      Patient will demonstrate an increase in functional mobility as evident by a decrease in time needed to complete x 5 sit to stand by 2 seconds  Patient will increase UE strength by 1/2 strength grade  Patient will report a decrease in hand tremors by 25% during activity  Patient will trial and use adaptive equipment for increase I with feeding       LTGs    Patient will demonstrate an increase in fine motor skills as evident by a 15-25% increase in time during 9-hole peg test      Patient will demonstrate an increase in functional mobility as evident by a decrease in time needed to complete x 5 sit to stand by 2 seconds  Patient will increase UE strength by 1-2 strength grades  Patient will report a decrease in hand tremors by 50% during activity  Plan  Plan details: Patient has presenting to OP OT services with a dx of bilateral hand tremors  Patient demonstrating increased tremors, decreased strength, decreased functional mobility and decreased activity  Pt would benefit from continued Occupational Therapy services and PWR two times per week for 6+ weeks to return to prior level of function and achieve all established goals   Thank you for the referral!    Patient would benefit from: OT eval and skilled occupational therapy  Referral necessary: Yes  Planned modality interventions: ultrasound, unattended electrical stimulation, thermotherapy: hydrocollator packs and cryotherapy  Planned therapy interventions: massage, manual therapy, joint mobilization, patient education, strengthening, stretching, fine motor coordination training, home exercise program, therapeutic exercise, neuromuscular re-education and ADL training  Frequency: 2x week  Duration in visits: 8  Duration in weeks: 4  Treatment plan discussed with: patient        Subjective Evaluation    History of Present Illness  Mechanism of injury: Hx of hand tremors 6+ years  Pain  Current pain ratin  At best pain ratin  At worst pain ratin  Location: Bilateral Hands    Hand dominance: right    Treatments  Current treatment: occupational therapy  Patient Goals  Patient goals for therapy: increased motion, independence with ADLs/IADLs, increased strength, return to sport/leisure activities and improved balance          Objective     Active Range of Motion   Left Shoulder   Normal active range of motion    Right Shoulder   Normal active range of motion    Left Elbow   Normal active range of motion    Right Elbow   Normal active range of motion    Left Wrist   Normal active range of motion    Right Wrist   Normal active range of motion    Left Thumb   Opposition: WNL    Right Thumb   Opposition: WNL    Strength/Myotome Testing     Left Shoulder     Planes of Motion   Flexion: 3+   Extension: 3+   Abduction: 3+   Adduction: 3+     Right Shoulder     Planes of Motion   Flexion: 3+   Extension: 3+   Abduction: 3+   Adduction: 3+     Left Elbow   Flexion: 4-  Extension: 4-    Right Elbow   Flexion: 4-  Extension: 4-    Left Wrist/Hand   Wrist extension: 4-  Wrist flexion: 4-     (2nd hand position)     Trial 1: 40    Thumb Strength  Key/Lateral Pinch     Trail 1: 8    Right Wrist/Hand   Wrist extension: 4-  Wrist flexion: 4-     (2nd hand position)     Trial 1: 55    Thumb Strength   Key/Lateral Pinch     Trial 1: 6    Additional Strength Details  Patient presenting with decreased  and pinch strength of left hand as compared to right    Ambulation     Ambulation: Level Surfaces   Ambulation with assistive device: contact guard assist    Observational Gait   Decreased walking speed and stride length  Neuro Exam:     Coordination   Rapid alternating movements: UE impaired and LE impaired    Functional outcomes   5x sit to stand: 12:43 (seconds)  TU:20 (seconds)  Left 9 peg hole test: 38:57 (seconds)  Right 9 hole peg test: 36:04 (seconds)                             Subjective: "I am willing to try anything "      Objective: See treatment diary below      Assessment: Tolerated treatment well  Patient exhibited good technique with therapeutic exercises and would benefit from continued OT      Plan: Continue per plan of care  Progress treatment as tolerated            Precautions N/A       PWR 10/03/2019       Supine        Prone        Standing        Quadruped        Walking        Prone Flow            Exercise Diary  10/03/2019       [employer]one Reaching 2# Cuff Weight B/L Hands Crossing Midline 6 x 2       Cardio  Nu Step        UBE Machine        Stretching        Dawn [employer] Theraband                                                                                                                                    Fine Motor 10/03/2019

## 2019-10-04 RX ORDER — ALLOPURINOL 100 MG/1
TABLET ORAL
Qty: 90 TABLET | Refills: 0 | Status: SHIPPED | OUTPATIENT
Start: 2019-10-04 | End: 2020-04-17

## 2019-10-08 ENCOUNTER — OFFICE VISIT (OUTPATIENT)
Dept: OCCUPATIONAL THERAPY | Facility: HOME HEALTHCARE | Age: 74
End: 2019-10-08
Payer: COMMERCIAL

## 2019-10-08 DIAGNOSIS — G25.0 TREMOR, ESSENTIAL: Primary | ICD-10-CM

## 2019-10-08 PROCEDURE — 97530 THERAPEUTIC ACTIVITIES: CPT

## 2019-10-08 PROCEDURE — 97112 NEUROMUSCULAR REEDUCATION: CPT

## 2019-10-08 PROCEDURE — 97110 THERAPEUTIC EXERCISES: CPT

## 2019-10-08 NOTE — PROGRESS NOTES
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 10/03/2019 10/8/19          Supine             Prone             Seated  Edge of deepa boomwacker horizontal ab/add x 20 1 break      Standing             Quadruped   cross body reach with cylinders in/out b/l x 20 1 break          Walking             Prone Flow                   Exercise Diary  10/03/2019  10/8/19         [employer] Reaching 2# Cuff Weight B/L Hands Crossing Midline 6 x 2 2# cuff b/l hands crossing midline 5 x 2         Cardio  Nu Step             UBE Machine   10 min F L 55         Stretching             PNF Theraband             Stability Ball Iso   5 sec x 10         Stability Ball flexibility                                                                                                                                                                                                         Fine Motor 10/03/2019                                                              Assessment: Tolerated treatment well  Patient exhibited good technique with therapeutic exercises and would benefit from continued OT Pt presents with SPC this date with minimal LOB when introducing himself to therapist, with no A need for self correction  Therapist presents fatigued with exercises in positions on deepa this date to continue working on endurance with activities as sessions progress  Therapist noting proprioception into the shoulder joints seems to decrease tremors while completing activities with the alternate hand  In addition, tremors are worse on left side  Plan: Continue per plan of care  Progress treatment as tolerated  Progress treament per protocol

## 2019-10-10 ENCOUNTER — OFFICE VISIT (OUTPATIENT)
Dept: OCCUPATIONAL THERAPY | Facility: HOME HEALTHCARE | Age: 74
End: 2019-10-10
Payer: COMMERCIAL

## 2019-10-10 DIAGNOSIS — G25.0 TREMOR, ESSENTIAL: Primary | ICD-10-CM

## 2019-10-10 PROCEDURE — 97112 NEUROMUSCULAR REEDUCATION: CPT

## 2019-10-10 PROCEDURE — 97110 THERAPEUTIC EXERCISES: CPT

## 2019-10-10 PROCEDURE — 97530 THERAPEUTIC ACTIVITIES: CPT

## 2019-10-10 NOTE — PROGRESS NOTES
Daily Note     Today's date: 10/10/2019  Patient name: Miranda Walker  : 1945  MRN: [id number]  Referring provider: Ken Reinoso MD  Dx:   Encounter Diagnosis     ICD-10-CM    1  Tremor, essential G25 0                   Subjective: "The weight makes me more comfortable "      Objective: See treatment diary below      Assessment: Tolerated treatment well  Patient exhibited good technique with therapeutic exercises and would benefit from continued OT  Patient demonstrating with decreased tremors during activity with use of 2# cuff weights  Patient noted improve with cuff weight  Plan: Continue per plan of care  Progress treatment as tolerated           PWR 10/03/2019 10/8/19   10/10/2019       Supine             Prone             Seated  [facility] 20 1 break      Standing             Quadruped   cross body reach with cylinders in/out b/l x 20 1 break  Cross Body reach with Cylinders In/Out B/L x 20 1 Break       Walking             Prone Flow                   Exercise Diary  10/03/2019  10/8/19  10/10/2019       Cone Reaching 2# Cuff Weight B/L Hands Crossing Midline 6 x 2 2# cuff b/l hands crossing midline 5 x 2  2# cuff B/L Crossing Midline Hi-Low 5 x 2       Cardio  Nu Step             UBE Machine   10 min F L 55  10 Min 55 Resistance       Stretching             PNF Theraband             Stability Ball Iso   5 sec x 10  5 sec x 10       Stability Ball flexibility                                                                                                                                                                                                         Fine Motor  10/03/2019  10/10/2019         Tweezer Board     2# Cuff   Rx 10 L x 10         Tension Pin Wil    2# Cuff  R - Yellow, Green, Black  L - Red, Blue

## 2019-10-15 ENCOUNTER — OFFICE VISIT (OUTPATIENT)
Dept: OCCUPATIONAL THERAPY | Facility: HOME HEALTHCARE | Age: 74
End: 2019-10-15
Payer: COMMERCIAL

## 2019-10-15 DIAGNOSIS — Z79.4 TYPE 2 DIABETES MELLITUS WITH DIABETIC POLYNEUROPATHY, WITH LONG-TERM CURRENT USE OF INSULIN (HCC): ICD-10-CM

## 2019-10-15 DIAGNOSIS — E11.42 TYPE 2 DIABETES MELLITUS WITH DIABETIC POLYNEUROPATHY, WITH LONG-TERM CURRENT USE OF INSULIN (HCC): ICD-10-CM

## 2019-10-15 DIAGNOSIS — G25.0 TREMOR, ESSENTIAL: Primary | ICD-10-CM

## 2019-10-15 PROCEDURE — 97112 NEUROMUSCULAR REEDUCATION: CPT

## 2019-10-15 PROCEDURE — 97110 THERAPEUTIC EXERCISES: CPT

## 2019-10-15 NOTE — PROGRESS NOTES
Daily Note     Today's date: 10/15/2019  Patient name: Karen Elias  : 1945  MRN: [id number]  Referring provider: Pravin Livingston MD  Dx:   Encounter Diagnosis     ICD-10-CM    1  Tremor, essential G25 0                   Subjective: "I think the weighted ones will work "      Objective: See treatment diary below      Assessment: Tolerated treatment well  Patient exhibited good technique with therapeutic exercises and would benefit from continued OT  Therapist educated patient on use of adaptive utensils for feeding  Patient was given weighted utensils and swivel utensils to trial at home to increase I with feeding  Patient verbalized understanding of education  Plan: Continue per plan of care  Progress treatment as tolerated         PWR 10/03/2019 10/8/19   10/10/2019  10/15/2019     Supine             Prone             Seated  Edge of mat boomwacker horizontal ab/add x 20 1 break  Edge of Mat Boomwacker Horizontal Ab/add x 2 x 10    Standing             Quadruped   cross body reach with cylinders in/out b/l x 20 1 break  Cross Body reach with Cylinders In/Out B/L x 20 1 Break       Walking             Prone Flow                   Exercise Diary  10/03/2019  10/8/19  10/10/2019  10/15/2019     Cone Reaching 2# Cuff Weight B/L Hands Crossing Midline 6 x 2 2# cuff b/l hands crossing midline 5 x 2  2# cuff B/L Crossing Midline Hi-Low 5 x 2  2# cuff B/L Crossing Midline Hi-Low 5 x 2     Cardio  Nu Step             UBE Machine   10 min F L 55  10 Min 55 Resistance  10 Min 55 Resistance     Stretching             PNF Theraband             Stability Ball Iso   5 sec x 10  5 sec x 10  5 sec x 10     Stability Ball flexibility                                                                                                                                                                                                         Fine Motor  10/03/2019  10/10/2019  10/15/2019       Tweezer Board     2# Cuff   Rx 10 L x 10  2# Cuff  R x 10 L x 10       Tension Pin Wil    2# Cuff  R - Yellow, Green, Black  L - Red, Blue  2# Cuff  R - Yellow, Green, Black  L - Red, Blue       Peg Board with Brown Gripper      1st Resistance R- 10, L - 10

## 2019-10-16 DIAGNOSIS — E03.9 HYPOTHYROIDISM, UNSPECIFIED TYPE: ICD-10-CM

## 2019-10-16 RX ORDER — LEVOTHYROXINE SODIUM 0.07 MG/1
75 TABLET ORAL DAILY
Qty: 90 TABLET | Refills: 0 | Status: SHIPPED | OUTPATIENT
Start: 2019-10-16 | End: 2020-01-20

## 2019-10-17 ENCOUNTER — APPOINTMENT (OUTPATIENT)
Dept: OCCUPATIONAL THERAPY | Facility: HOME HEALTHCARE | Age: 74
End: 2019-10-17
Payer: COMMERCIAL

## 2019-10-18 ENCOUNTER — OFFICE VISIT (OUTPATIENT)
Dept: OCCUPATIONAL THERAPY | Facility: HOME HEALTHCARE | Age: 74
End: 2019-10-18
Payer: COMMERCIAL

## 2019-10-18 DIAGNOSIS — G25.0 TREMOR, ESSENTIAL: Primary | ICD-10-CM

## 2019-10-18 PROCEDURE — 97112 NEUROMUSCULAR REEDUCATION: CPT

## 2019-10-18 PROCEDURE — 97110 THERAPEUTIC EXERCISES: CPT

## 2019-10-18 NOTE — PROGRESS NOTES
Daily Note     Today's date: 10/18/2019  Patient name: Vibha Hwang  : 1945  MRN: [id number]  Referring provider: Krista Jon MD  Dx:   Encounter Diagnosis     ICD-10-CM    1  Tremor, essential G25 0                   Subjective: I think the heavier silverware is better than the swivel ones  Objective: See treatment diary below      Assessment: Tolerated treatment well  Patient exhibited good technique with therapeutic exercises and would benefit from continued OT Pt taking increased time to complete cotton ball activity this date, with pt stating he needed to leave early as well  Pt completed activity with 2# cuff weight in order to decrease difficulty of activity with tremors  Pt started using tweezers for cotton ball placement, proving too difficult with small hole target  Therapist having pt use hand to place items to decrease difficulty and increase pt satisfaction  Plan: Continue per plan of care  Progress treatment as tolerated  Progress treament per protocol        PWR  10/8/19   10/10/2019  10/15/2019 10/18/19   Supine            Prone            Seated  Edge of mat boomwacker horizontal ab/add x 20 1 break  Edge of Mat Boomwacker Horizontal Ab/add x 2 x 10    Standing            Quadruped  cross body reach with cylinders in/out b/l x 20 1 break  Cross Body reach with Cylinders In/Out B/L x 20 1 Break       Walking            Prone Flow                  Exercise Diary    10/8/19  10/10/2019  10/15/2019 10/18/19   Cone Reaching  2# cuff b/l hands crossing midline 5 x 2  2# cuff B/L Crossing Midline Hi-Low 5 x 2  2# cuff B/L Crossing Midline Hi-Low 5 x 2     Cardio  Nu Step            UBE Machine  10 min F L 55  10 Min 55 Resistance  10 Min 55 Resistance 10 min   Stretching            PNF Theraband            Stability Ball Iso  5 sec x 10  5 sec x 10  5 sec x 10     Stability Ball Flexibility                                                                                                                                                                                          Fine Motor  10/03/2019  10/10/2019  10/15/2019   10/18/19   Tweezer Board     2# Cuff   Rx 10 L x 10  2# Cuff  R x 10 L x 10       Tension Pin Wil    2# Cuff  R - Yellow, Green, Black  L - Red, Blue  2# Cuff  R - Yellow, Green, Black  L - Red, Blue       Peg Board with Brown Gripper      1st Resistance R- 10, L - 10       Cotton Balls      Sort by color/place in cones x 50 with 2# cuff

## 2019-10-23 ENCOUNTER — OFFICE VISIT (OUTPATIENT)
Dept: OCCUPATIONAL THERAPY | Facility: HOME HEALTHCARE | Age: 74
End: 2019-10-23
Payer: COMMERCIAL

## 2019-10-23 DIAGNOSIS — G25.0 TREMOR, ESSENTIAL: Primary | ICD-10-CM

## 2019-10-23 PROCEDURE — 97110 THERAPEUTIC EXERCISES: CPT

## 2019-10-23 PROCEDURE — 97112 NEUROMUSCULAR REEDUCATION: CPT

## 2019-10-23 NOTE — PROGRESS NOTES
Daily Note     Today's date: 10/23/2019  Patient name: Aminata Gloria  : 1945  MRN: [id number]  Referring provider: Piero Cutler MD  Dx:   Encounter Diagnosis     ICD-10-CM    1  Tremor, essential G25 0                   Subjective: The weighted utensils work well, I will bring back the others  I love juli and all but this therapy isnt helping  Objective: See treatment diary below      Assessment: Tolerated treatment well  Patient exhibited good technique with therapeutic exercises and would benefit from continued OT Pt states that tremors increase due to tension, proven when completing tweezer activity and flexbar activity  Pt tolerated new exercises well, with therapist noting decreased tremors without 2# cuff weight  Pt reports no pain at this time just achy bones, but that is typical for pt  Plan: Continue per plan of care  Progress treatment as tolerated  Progress treament per protocol        PWR 10/23/19   10/10/2019  10/15/2019 10/18/19   Supine           Prone           Seated    Edge of Mat [facility] Horizontal Ab/add x 2 x 10    Standing           Quadruped   Cross Body reach with Cylinders In/Out B/L x 20 1 Break       Walking           Prone Flow                 Exercise Diary  10/23/19   10/10/2019  10/15/2019 10/18/19   Cone Reaching    2# cuff B/L Crossing Midline Hi-Low 5 x 2  2# cuff B/L Crossing Midline Hi-Low 5 x 2     Cardio  Nu Step           UBE Machine 10 min   10 Min 55 Resistance  10 Min 55 Resistance 10 min   Stretching           PNF Theraband           Stability Ball Iso    5 sec x 10  5 sec x 10     Stability Ball Flexibility           Marques Bags 1 foot throw with stability of table          FlexBar Red x 20 pro/sup                                                                                                                                                    Fine Motor 10/23/19   10/10/2019  10/15/2019   10/18/19   Tweezer Board 2# cuff    2# Cuff   Rx 10 L x 10  2# Cuff  R x 10 L x 10       Tension Pin Wil 2# cuff all on with R off with L  2# Cuff  R - Yellow, Green, Black  L - Red, Blue  2# Cuff  R - Yellow, Green, Black  L - Red, Blue       Peg Board with Brown Gripper     1st Resistance R- 10, L - 10       Cotton Balls      Sort by color/place in cones x 50 with 2# cuff   Peg Board Standing/wt bearing through bilateral hands        T-s [place] X 8 on/off

## 2019-10-25 ENCOUNTER — OFFICE VISIT (OUTPATIENT)
Dept: OCCUPATIONAL THERAPY | Facility: HOME HEALTHCARE | Age: 74
End: 2019-10-25
Payer: COMMERCIAL

## 2019-10-25 DIAGNOSIS — G25.0 TREMOR, ESSENTIAL: Primary | ICD-10-CM

## 2019-10-25 PROCEDURE — 97110 THERAPEUTIC EXERCISES: CPT

## 2019-10-25 PROCEDURE — 97112 NEUROMUSCULAR REEDUCATION: CPT

## 2019-10-25 NOTE — PROGRESS NOTES
Daily Note     Today's date: 10/25/2019  Patient name: Malka Yuan  : 1945  MRN: [id number]  Referring provider: Tamiko Bates MD  Dx:   Encounter Diagnosis     ICD-10-CM    1  Tremor, essential G25 0                   Subjective: I have to give you back your silverware, I think its only 30 some dollars for 5 pieces  Objective: See treatment diary below      Assessment: Tolerated treatment well  Patient exhibited good technique with therapeutic exercises and would benefit from continued OT Pt completed PWR this date standing including weight shifting with boomwackers as well as transitions sides and forward  Pt completed seated weight shifting with boomwackers and agility dots as targets  Pt completed marbles with mod difficulty when using opposition, pt decreased to only digits 1/2 for picking up marbles and golf balls  Therapist encouraging pt to keep moving even on weekends get out and walk and move for overall well being  Plan: Continue per plan of care  Progress treatment as tolerated  Progress treament per protocol        PWR 10/23/19 10/25/19   10/15/2019 10/18/19   Supine          Prone          Seated    Edge of Mat Boomwacker Horizontal Ab/add x 2 x 10    Standing  20 min        Quadruped          Walking          Prone Flow                Exercise Diary  10/23/19 10/25/19   10/15/2019 10/18/19   Cone Reaching     2# cuff B/L Crossing Midline Hi-Low 5 x 2     Cardio  Nu Step          UBE Machine 10 min 10 min   10 Min 55 Resistance 10 min   Stretching          PNF Theraband          Stability Ball Iso     5 sec x 10     Stability Ball Flexibility          Marques Bags 1 foot throw with stability of table         FlexBar Red x 20 pro/sup                                                                                                                                     Fine Motor 10/23/19 10/25/19   10/15/2019   10/18/19   Tweezer Board 2# cuff     2# Cuff  R x 10 L x 10       Tension Pin Wil 2# cuff all on with R off with L    2# Cuff  R - Yellow, Green, Black  L - Red, Blue       Peg Board with Brown Gripper    1st Resistance R- 10, L - 10       Cotton Balls      Sort by color/place in cones x 50 with 2# cuff   Peg Board Standing/wt bearing through bilateral hands        T-s [place] X 8 on/off Marbles/golf balls on/off mod difficulty with opposition

## 2019-10-30 ENCOUNTER — OFFICE VISIT (OUTPATIENT)
Dept: OCCUPATIONAL THERAPY | Facility: HOME HEALTHCARE | Age: 74
End: 2019-10-30
Payer: COMMERCIAL

## 2019-10-30 DIAGNOSIS — G25.0 TREMOR, ESSENTIAL: Primary | ICD-10-CM

## 2019-10-30 PROCEDURE — 97110 THERAPEUTIC EXERCISES: CPT

## 2019-10-30 PROCEDURE — 97112 NEUROMUSCULAR REEDUCATION: CPT

## 2019-10-30 NOTE — PROGRESS NOTES
Daily Note     Today's date: 10/30/2019  Patient name: Rufina Green  : 1945  MRN: [id number]  Referring provider: Donna Ochoa MD  Dx:   Encounter Diagnosis     ICD-10-CM    1  Tremor, essential G25 0                   Subjective: I think I am going to be done after my next evaluation  Objective: See treatment diary below      Assessment: Tolerated treatment well  Patient exhibited good technique with therapeutic exercises and would benefit from continued OT Pt states he is going to purchase weighted silverware next paycheck, it seems to help with feeding at home  Pt states he does not feel as though the rest of therapy is helping very much  Pt presents with decreased tremors without 2# cuff weight  Plan: Continue per plan of care  Progress treatment as tolerated  Progress treament per protocol        PWR 10/23/19 10/25/19 10/30/19  10/18/19   Supine         Prone         Seated        Standing  20 min       Quadruped         Walking         Prone Flow               Exercise Diary  10/23/19 10/25/19 10/30/19  10/18/19   Cone Reaching         Cardio  Nu Step         UBE Machine 10 min 10 min 10 min  10 min   Stretching         PNF Theraband         Stability Ball Iso         Stability Ball Flexibility         Marques Bags 1 foot throw with stability of table        FlexBar Red x 20 pro/sup  Red x 20 4 way                                                                                                                       Fine Motor 10/23/19 10/25/19 10/30/19    10/18/19   Tweezer Board 2# cuff           Tension Pin Wil 2# cuff all on with R off with L  All pins on w/ R off w/ L        Zoomball   X 20 horizontal abduction & left/right on top flex/exten        Cotton Balls   X 100 cotton balls with tweezers in color piles   Sort by color/place in cones x 50 with 2# cuff   Peg Board Standing/wt bearing through bilateral hands        T-s [place] X 8 on/off Marbles/golf balls on/off mod difficulty with opposition       [place] Manipulation   X 21 bilateral manipulation

## 2019-11-01 ENCOUNTER — OFFICE VISIT (OUTPATIENT)
Dept: OCCUPATIONAL THERAPY | Facility: HOME HEALTHCARE | Age: 74
End: 2019-11-01
Payer: COMMERCIAL

## 2019-11-01 DIAGNOSIS — G25.0 TREMOR, ESSENTIAL: Primary | ICD-10-CM

## 2019-11-01 PROCEDURE — 97110 THERAPEUTIC EXERCISES: CPT

## 2019-11-01 PROCEDURE — 97112 NEUROMUSCULAR REEDUCATION: CPT

## 2019-11-01 NOTE — PROGRESS NOTES
Daily Note     Today's date: 2019  Patient name: Chip Tarango  : 1945  MRN: [id number]   Referring provider: Tee Mg MD  Dx:   Encounter Diagnosis     ICD-10-CM    1  Tremor, essential G25 0                   Subjective: Im going to be done next week  Objective: See treatment diary below      Assessment: Tolerated treatment well  Patient exhibited good technique with therapeutic exercises and would benefit from continued OT Therapist providing pt with green theraputty for home this date, reviewing several exercises and answering any questions pt had  Pt presents pleased with new exercises for home  Pt taking increased time and requiring redirection to task throughout session  Pt present ready to be finished therapy at next re evaluation next week  Plan: Continue per plan of care  Progress treatment as tolerated  Progress treament per protocol        PWR 10/23/19 10/25/19 10/30/19 11/1/19    Supine        Prone        Seated        Standing  20 min      Quadruped        Walking        Prone Flow              Exercise Diary  10/23/19 10/25/19 10/30/19 11/1/19    Cone Reaching        Cardio  Nu Step        UBE Machine 10 min 10 min 10 min 10 min    Stretching        PNF Theraband        Stability Ball Iso        Stability Ball Flexibility        Marques Bags 1 foot throw with stability of table       FlexBar Red x 20 pro/sup  Red x 20 4 way     TP Grasps    Green x 20    TP Chops    Green x 2 rounds    TP Pulls    Green x 20    TP Pinches    Green x 4 rounds                                                                         Fine Motor 10/23/19 10/25/19 10/30/19 11/1/19      Tweezer Board 2# cuff          Tension Pin Wil 2# cuff all on with R off with L  All pins on w/ R off w/ L All pins on/off pole      Zoomball   X 20 horizontal abduction & left/right on top flex/exten       Cotton Balls   X 100 cotton balls with tweezers in color piles      Peg Board Standing/wt bearing through bilateral hands        T-s [place] X 8 on/off Marbles/golf balls on/off mod difficulty with opposition       [place] Manipulation   X 21 bilateral manipulation

## 2019-11-05 ENCOUNTER — EVALUATION (OUTPATIENT)
Dept: OCCUPATIONAL THERAPY | Facility: HOME HEALTHCARE | Age: 74
End: 2019-11-05
Payer: COMMERCIAL

## 2019-11-05 DIAGNOSIS — G25.0 TREMOR, ESSENTIAL: Primary | ICD-10-CM

## 2019-11-05 PROCEDURE — 97530 THERAPEUTIC ACTIVITIES: CPT

## 2019-11-05 PROCEDURE — 97110 THERAPEUTIC EXERCISES: CPT

## 2019-11-05 PROCEDURE — 97168 OT RE-EVAL EST PLAN CARE: CPT

## 2019-11-05 NOTE — PROGRESS NOTES
OT Re-Evaluation/Discharge    Today's date: 2019  Patient name: Aminata Gloria  : 1945  MRN: [id number]  Referring provider: Piero Cutler MD  Dx:   Encounter Diagnosis     ICD-10-CM    1  Tremor, essential G25 0                   Assessment  Assessment details: Patient presenting to OP OT services with a dx of tremors  Patient reports experiencing tremors for the past 6+ years  Patient notes bilateral hand tremors with initiation  He states no difference in severity as compared bilaterally  Patient is on medication to address tremors, but does not notice a significant decrease in symptoms  Patient reports difficulty with activities involving bilateral hands when initiating the activity    Patient was recently at Neurology on 2019, in which he was referred to OT for adaptive eating utensils and exercises to decrease tremors  Patient has a follow-up with Neurology on 2019  Patient ambulates with SPC  Patient was recommended for DBS and may consider getting the procedure at a later time  Re-assessment completed on 2019  Patient trialed the weighted utensils with increased independence noted  Patient is going to purchase set for home  Patient and therapist discussed option of DBS  Patient spoke with another patient at clinic who had DBS completed  Patient reports exercises improved overall health but did not decrease tremors  Patient will be discharged from OT services secondary to achieve maximum level of function         Impairments: abnormal coordination, abnormal muscle firing, abnormal or restricted ROM, abnormal movement, activity intolerance and impaired physical strength    Symptom irritability: highBarriers to therapy: PMH: See medical chart  Understanding of Dx/Px/POC: good   Prognosis: good    Goals  STGs    Patient will demonstrate an increase in fine motor skills as evident by a 10-15% increase in time during 9-hole peg test  - Partially Met    Patient will demonstrate an increase in functional mobility as evident by a decrease in time needed to complete x 5 sit to stand by 2 seconds  - Not Met    Patient will increase UE strength by 1/2 strength grade  - Met    Patient will report a decrease in hand tremors by 25% during activity  - Partially Met w/ Cuff Weights    Patient will trial and use adaptive equipment for increase I with feeding  - Met    LTGs    Patient will demonstrate an increase in fine motor skills as evident by a 15-25% increase in time during 9-hole peg test  - Partially Met    Patient will demonstrate an increase in functional mobility as evident by a decrease in time needed to complete x 5 sit to stand by 2 seconds  - Not Met    Patient will increase UE strength by 1-2 strength grades  - Partially Met    Patient will report a decrease in hand tremors by 50% during activity  - Partially Met w/ Cuff Weights      Plan  Plan details: Patient has Consistent attended OP OT services since start of care  Patient has made steady progress towards established goals  Patient and therapist discussed discontinued and have agreed on Plan of Care  Patient to be discharged to an independent Home Exercise Program  Patient has achieved maximum potential  Thank you for the referral  Please refer to patient's last assessment for most recent objective measurements         Referral necessary: Yes  Planned modality interventions: ultrasound, unattended electrical stimulation, thermotherapy: hydrocollator packs and cryotherapy  Planned therapy interventions: massage, manual therapy, joint mobilization, patient education, strengthening, stretching, fine motor coordination training, home exercise program, therapeutic exercise, neuromuscular re-education and ADL training  Treatment plan discussed with: patient        Subjective Evaluation    History of Present Illness  Mechanism of injury: Hx of hand tremors 6+ years  Pain  Current pain ratin  At best pain ratin  At worst pain rating: 0  Location: Bilateral Hands    Hand dominance: right    Treatments  Current treatment: occupational therapy  Patient Goals  Patient goals for therapy: increased motion, independence with ADLs/IADLs, increased strength, return to sport/leisure activities and improved balance          Objective     Active Range of Motion   Left Shoulder   Normal active range of motion    Right Shoulder   Normal active range of motion    Left Elbow   Normal active range of motion    Right Elbow   Normal active range of motion    Left Wrist   Normal active range of motion    Right Wrist   Normal active range of motion    Left Thumb   Opposition: WNL    Right Thumb   Opposition: WNL    Strength/Myotome Testing     Left Shoulder     Planes of Motion   Flexion: 3+   Extension: 3+   Abduction: 3+   Adduction: 3+     Right Shoulder     Planes of Motion   Flexion: 3+   Extension: 3+   Abduction: 3+   Adduction: 3+     Left Elbow   Flexion: 4-  Extension: 4-    Right Elbow   Flexion: 4-  Extension: 4-    Left Wrist/Hand   Wrist extension: 4  Wrist flexion: 4     (2nd hand position)     Trial 1: 50    Thumb Strength  Key/Lateral Pinch     Trail 1: 8    Right Wrist/Hand   Wrist extension: 4  Wrist flexion: 4     (2nd hand position)     Trial 1: 56    Thumb Strength   Key/Lateral Pinch     Trial 1: 8    Additional Strength Details  Patient presenting with increased  and pinch strength of left hand as compared to start of care    Ambulation     Ambulation: Level Surfaces   Ambulation with assistive device: contact guard assist    Observational Gait   Decreased walking speed and stride length     Neuro Exam:     Coordination   Rapid alternating movements: UE impaired and LE impaired    Functional outcomes   5x sit to stand: 12:29 (seconds)  TU:20 (seconds)  Left 9 peg hole test: 47 19 (seconds)  Right 9 hole peg test: 34 88 (seconds)  Functional outcome comment: Patient demonstrating with minimal decrease in 5x sit to stand since start of care  Patient demonstrating with increased  strength since start of care  Patient demonstrating with increased duration to complete 9 hole peg test with left hand as compared to start of care  Patient continues to report left hand is worse then right  Subjective: "Today is my last day "      Objective: See treatment diary below      Assessment: Tolerated treatment well  Patient exhibited good technique with therapeutic exercises and would benefit from continued OT      Plan: Continue per plan of care  Progress treatment as tolerated         PWR 10/23/19 10/25/19 10/30/19 11/1/19 11/05/2019   Supine        Prone        Seated        Standing  20 min      Quadruped        Walking        Prone Flow              Exercise Diary  10/23/19 10/25/19 10/30/19 11/1/19 11/05/2019   Cone Reaching        Cardio  Nu Step        UBE Machine 10 min 10 min 10 min 10 min 10 Min   Stretching        PNF Theraband        Stability Ball Iso        Stability Ball Flexibility        Marques Bags 1 foot throw with stability of table       FlexBar Red x 20 pro/sup  Red x 20 4 way     TP Grasps    Green x 20    TP Chops    Green x 2 rounds    TP Pulls    Green x 20    TP Pinches    Green x 4 rounds                                                                         Fine Motor 10/23/19 10/25/19 10/30/19 11/1/19  11/05/2019   Tweezer Board 2# cuff      2 Rows R  2 Rows L   Tension Pin Wil 2# cuff all on with R off with L  All pins on w/ R off w/ L All pins on/off pole     Zoomball   X 20 horizontal abduction & left/right on top flex/exten      Cotton Balls   X 100 cotton balls with tweezers in color piles     Peg Board Standing/wt bearing through bilateral hands       T-s [place] X 8 on/off Marbles/golf balls on/off mod difficulty with opposition      [place] Manipulation   X 21 bilateral manipulation  X 20 into tight lid

## 2019-12-06 DIAGNOSIS — I10 ESSENTIAL HYPERTENSION: Primary | ICD-10-CM

## 2019-12-06 PROCEDURE — 4010F ACE/ARB THERAPY RXD/TAKEN: CPT | Performed by: FAMILY MEDICINE

## 2019-12-06 RX ORDER — LOSARTAN POTASSIUM 25 MG/1
25 TABLET ORAL DAILY
Qty: 90 TABLET | Refills: 1 | Status: SHIPPED | OUTPATIENT
Start: 2019-12-06 | End: 2020-04-14 | Stop reason: ALTCHOICE

## 2019-12-14 ENCOUNTER — APPOINTMENT (OUTPATIENT)
Dept: LAB | Facility: HOSPITAL | Age: 74
End: 2019-12-14
Attending: FAMILY MEDICINE
Payer: COMMERCIAL

## 2019-12-16 ENCOUNTER — APPOINTMENT (OUTPATIENT)
Dept: RADIOLOGY | Facility: MEDICAL CENTER | Age: 74
End: 2019-12-16
Payer: COMMERCIAL

## 2019-12-16 ENCOUNTER — OFFICE VISIT (OUTPATIENT)
Dept: FAMILY MEDICINE CLINIC | Facility: CLINIC | Age: 74
End: 2019-12-16
Payer: COMMERCIAL

## 2019-12-16 VITALS
HEIGHT: 69 IN | SYSTOLIC BLOOD PRESSURE: 142 MMHG | WEIGHT: 244 LBS | DIASTOLIC BLOOD PRESSURE: 76 MMHG | BODY MASS INDEX: 36.14 KG/M2

## 2019-12-16 DIAGNOSIS — E78.2 MIXED HYPERLIPIDEMIA: ICD-10-CM

## 2019-12-16 DIAGNOSIS — I10 ESSENTIAL HYPERTENSION: ICD-10-CM

## 2019-12-16 DIAGNOSIS — E11.40 CONTROLLED TYPE 2 DIABETES MELLITUS WITH DIABETIC NEUROPATHY, WITH LONG-TERM CURRENT USE OF INSULIN (HCC): ICD-10-CM

## 2019-12-16 DIAGNOSIS — M17.0 PRIMARY OSTEOARTHRITIS OF BOTH KNEES: ICD-10-CM

## 2019-12-16 DIAGNOSIS — E11.9 TYPE 2 DIABETES MELLITUS WITHOUT COMPLICATION, UNSPECIFIED WHETHER LONG TERM INSULIN USE (HCC): Primary | ICD-10-CM

## 2019-12-16 DIAGNOSIS — Z79.4 CONTROLLED TYPE 2 DIABETES MELLITUS WITH DIABETIC NEUROPATHY, WITH LONG-TERM CURRENT USE OF INSULIN (HCC): ICD-10-CM

## 2019-12-16 PROBLEM — M15.9 PRIMARY OSTEOARTHRITIS INVOLVING MULTIPLE JOINTS: Status: ACTIVE | Noted: 2019-12-16

## 2019-12-16 PROBLEM — M15.0 PRIMARY OSTEOARTHRITIS INVOLVING MULTIPLE JOINTS: Status: ACTIVE | Noted: 2019-12-16

## 2019-12-16 LAB
LEFT EYE DIABETIC RETINOPATHY: NORMAL
LEFT EYE IMAGE QUALITY: NORMAL
LEFT EYE MACULAR EDEMA: NORMAL
LEFT EYE OTHER RETINOPATHY: NORMAL
RIGHT EYE DIABETIC RETINOPATHY: NORMAL
RIGHT EYE IMAGE QUALITY: NORMAL
RIGHT EYE MACULAR EDEMA: NORMAL
RIGHT EYE OTHER RETINOPATHY: NORMAL
SEVERITY (EYE EXAM): NORMAL

## 2019-12-16 PROCEDURE — 99213 OFFICE O/P EST LOW 20 MIN: CPT | Performed by: FAMILY MEDICINE

## 2019-12-16 PROCEDURE — 1036F TOBACCO NON-USER: CPT | Performed by: FAMILY MEDICINE

## 2019-12-16 PROCEDURE — 2022F DILAT RTA XM EVC RTNOPTHY: CPT | Performed by: FAMILY MEDICINE

## 2019-12-16 PROCEDURE — 1160F RVW MEDS BY RX/DR IN RCRD: CPT | Performed by: FAMILY MEDICINE

## 2019-12-16 PROCEDURE — 3008F BODY MASS INDEX DOCD: CPT | Performed by: FAMILY MEDICINE

## 2019-12-16 PROCEDURE — 73562 X-RAY EXAM OF KNEE 3: CPT

## 2019-12-23 DIAGNOSIS — G89.29 CHRONIC PAIN OF BOTH KNEES: Primary | ICD-10-CM

## 2019-12-23 DIAGNOSIS — M25.561 CHRONIC PAIN OF BOTH KNEES: Primary | ICD-10-CM

## 2019-12-23 DIAGNOSIS — M25.562 CHRONIC PAIN OF BOTH KNEES: Primary | ICD-10-CM

## 2019-12-23 NOTE — RESULT ENCOUNTER NOTE
Please call the patient regarding his abnormal result    Mild to moderate arthritis of the left knee but he does have some loose pieces of bone floating around in the left knee

## 2019-12-23 NOTE — RESULT ENCOUNTER NOTE
Please call the patient regarding his abnormal result    Arthritis of the right knee mild to moderate in severity

## 2020-01-06 DIAGNOSIS — Z79.4 TYPE 2 DIABETES MELLITUS WITH DIABETIC POLYNEUROPATHY, WITH LONG-TERM CURRENT USE OF INSULIN (HCC): ICD-10-CM

## 2020-01-06 DIAGNOSIS — E11.42 TYPE 2 DIABETES MELLITUS WITH DIABETIC POLYNEUROPATHY, WITH LONG-TERM CURRENT USE OF INSULIN (HCC): ICD-10-CM

## 2020-01-06 RX ORDER — GLIMEPIRIDE 4 MG/1
TABLET ORAL
Qty: 180 TABLET | Refills: 1 | Status: SHIPPED | OUTPATIENT
Start: 2020-01-06 | End: 2020-07-09 | Stop reason: SDUPTHER

## 2020-01-17 DIAGNOSIS — F33.9 RECURRENT MAJOR DEPRESSIVE DISORDER, REMISSION STATUS UNSPECIFIED (HCC): ICD-10-CM

## 2020-01-17 RX ORDER — ESCITALOPRAM OXALATE 20 MG/1
20 TABLET ORAL DAILY
Qty: 90 TABLET | Refills: 0 | Status: SHIPPED | OUTPATIENT
Start: 2020-01-17 | End: 2020-02-06 | Stop reason: SDUPTHER

## 2020-01-18 DIAGNOSIS — E03.9 HYPOTHYROIDISM, UNSPECIFIED TYPE: ICD-10-CM

## 2020-01-20 RX ORDER — LEVOTHYROXINE SODIUM 0.07 MG/1
TABLET ORAL
Qty: 90 TABLET | Refills: 0 | Status: SHIPPED | OUTPATIENT
Start: 2020-01-20 | End: 2020-04-06 | Stop reason: SDUPTHER

## 2020-02-06 DIAGNOSIS — F33.9 RECURRENT MAJOR DEPRESSIVE DISORDER, REMISSION STATUS UNSPECIFIED (HCC): ICD-10-CM

## 2020-02-06 DIAGNOSIS — E11.42 TYPE 2 DIABETES MELLITUS WITH DIABETIC POLYNEUROPATHY, WITH LONG-TERM CURRENT USE OF INSULIN (HCC): ICD-10-CM

## 2020-02-06 DIAGNOSIS — Z79.4 TYPE 2 DIABETES MELLITUS WITH DIABETIC POLYNEUROPATHY, WITH LONG-TERM CURRENT USE OF INSULIN (HCC): ICD-10-CM

## 2020-02-06 RX ORDER — ESCITALOPRAM OXALATE 20 MG/1
20 TABLET ORAL DAILY
Qty: 90 TABLET | Refills: 0 | Status: SHIPPED | OUTPATIENT
Start: 2020-02-06 | End: 2020-04-17

## 2020-02-13 ENCOUNTER — CONSULT (OUTPATIENT)
Dept: OBGYN CLINIC | Facility: HOSPITAL | Age: 75
End: 2020-02-13
Payer: COMMERCIAL

## 2020-02-13 VITALS
DIASTOLIC BLOOD PRESSURE: 71 MMHG | HEART RATE: 106 BPM | HEIGHT: 69 IN | SYSTOLIC BLOOD PRESSURE: 115 MMHG | WEIGHT: 242 LBS | BODY MASS INDEX: 35.84 KG/M2

## 2020-02-13 DIAGNOSIS — G89.29 CHRONIC PAIN OF BOTH KNEES: ICD-10-CM

## 2020-02-13 DIAGNOSIS — M25.562 CHRONIC PAIN OF BOTH KNEES: ICD-10-CM

## 2020-02-13 DIAGNOSIS — M25.561 CHRONIC PAIN OF BOTH KNEES: ICD-10-CM

## 2020-02-13 DIAGNOSIS — M17.0 PRIMARY OSTEOARTHRITIS OF BOTH KNEES: Primary | ICD-10-CM

## 2020-02-13 DIAGNOSIS — E11.42 TYPE 2 DIABETES MELLITUS WITH DIABETIC POLYNEUROPATHY, WITH LONG-TERM CURRENT USE OF INSULIN (HCC): Primary | ICD-10-CM

## 2020-02-13 DIAGNOSIS — Z79.4 TYPE 2 DIABETES MELLITUS WITH DIABETIC POLYNEUROPATHY, WITH LONG-TERM CURRENT USE OF INSULIN (HCC): Primary | ICD-10-CM

## 2020-02-13 PROCEDURE — 2022F DILAT RTA XM EVC RTNOPTHY: CPT | Performed by: ORTHOPAEDIC SURGERY

## 2020-02-13 PROCEDURE — 3074F SYST BP LT 130 MM HG: CPT | Performed by: ORTHOPAEDIC SURGERY

## 2020-02-13 PROCEDURE — 1036F TOBACCO NON-USER: CPT | Performed by: ORTHOPAEDIC SURGERY

## 2020-02-13 PROCEDURE — 3008F BODY MASS INDEX DOCD: CPT | Performed by: ORTHOPAEDIC SURGERY

## 2020-02-13 PROCEDURE — 3078F DIAST BP <80 MM HG: CPT | Performed by: ORTHOPAEDIC SURGERY

## 2020-02-13 PROCEDURE — 99203 OFFICE O/P NEW LOW 30 MIN: CPT | Performed by: ORTHOPAEDIC SURGERY

## 2020-02-13 PROCEDURE — 4040F PNEUMOC VAC/ADMIN/RCVD: CPT | Performed by: ORTHOPAEDIC SURGERY

## 2020-02-13 PROCEDURE — 1160F RVW MEDS BY RX/DR IN RCRD: CPT | Performed by: ORTHOPAEDIC SURGERY

## 2020-02-13 PROCEDURE — 20610 DRAIN/INJ JOINT/BURSA W/O US: CPT | Performed by: ORTHOPAEDIC SURGERY

## 2020-02-13 RX ORDER — KETOROLAC TROMETHAMINE 30 MG/ML
60 INJECTION, SOLUTION INTRAMUSCULAR; INTRAVENOUS
Status: COMPLETED | OUTPATIENT
Start: 2020-02-13 | End: 2020-02-13

## 2020-02-13 RX ORDER — BUPIVACAINE HYDROCHLORIDE 2.5 MG/ML
2 INJECTION, SOLUTION INFILTRATION; PERINEURAL
Status: COMPLETED | OUTPATIENT
Start: 2020-02-13 | End: 2020-02-13

## 2020-02-13 RX ORDER — LIDOCAINE HYDROCHLORIDE 5 MG/ML
2 INJECTION, SOLUTION INFILTRATION; PERINEURAL
Status: COMPLETED | OUTPATIENT
Start: 2020-02-13 | End: 2020-02-13

## 2020-02-13 RX ADMIN — KETOROLAC TROMETHAMINE 60 MG: 30 INJECTION, SOLUTION INTRAMUSCULAR; INTRAVENOUS at 14:01

## 2020-02-13 RX ADMIN — LIDOCAINE HYDROCHLORIDE 2 ML: 5 INJECTION, SOLUTION INFILTRATION; PERINEURAL at 14:01

## 2020-02-13 RX ADMIN — KETOROLAC TROMETHAMINE 60 MG: 30 INJECTION, SOLUTION INTRAMUSCULAR; INTRAVENOUS at 14:02

## 2020-02-13 RX ADMIN — BUPIVACAINE HYDROCHLORIDE 2 ML: 2.5 INJECTION, SOLUTION INFILTRATION; PERINEURAL at 14:01

## 2020-02-13 RX ADMIN — BUPIVACAINE HYDROCHLORIDE 2 ML: 2.5 INJECTION, SOLUTION INFILTRATION; PERINEURAL at 14:02

## 2020-02-13 RX ADMIN — LIDOCAINE HYDROCHLORIDE 2 ML: 5 INJECTION, SOLUTION INFILTRATION; PERINEURAL at 14:02

## 2020-02-13 NOTE — PROGRESS NOTES
Assessment/Plan:  Bilateral knee osteoarthritis    Toradol injections provided today to bilateral knees for symptom relief, this will not interfere with his diabetic control  Follow-up in 3 months     Diagnoses and all orders for this visit:    Chronic pain of both knees  -     Ambulatory referral to Orthopedic Surgery          Subjective:      Patient ID: Sofiya Dailey is a 69 y o  male  81-year-old male presents today for initial evaluation of bilateral knee pain that has been present for multiple years  He denies any traumatic injury or inciting event  States the pain is worse when rising from a seated position or when weight-bearing for long distances  He has not received any previous treatment outside of over-the-counter nonsteroidal anti-inflammatories with mild relief  His pain is worse on the medial aspect of both knees  He does have a history of diabetes, most recent A1c was 6 9  The following portions of the patient's history were reviewed and updated as appropriate: allergies, current medications, past family history, past medical history, past social history, past surgical history and problem list     Review of Systems   Constitutional: Negative  Negative for chills and fever  HENT: Negative  Respiratory: Negative  Negative for shortness of breath and wheezing  Cardiovascular: Negative  Negative for chest pain and palpitations  Gastrointestinal: Negative  Negative for nausea and vomiting  Endocrine: Negative  Genitourinary: Negative  Skin: Negative  Allergic/Immunologic: Negative  Neurological: Negative  Negative for numbness  Hematological: Negative  Psychiatric/Behavioral: Negative  Objective:      /71   Pulse (!) 106   Ht 5' 9" (1 753 m)   Wt 110 kg (242 lb)   BMI 35 74 kg/m²          Physical Exam   Constitutional: He is oriented to person, place, and time  He appears well-developed and well-nourished     HENT:   Head: Normocephalic and atraumatic  Neck: Normal range of motion  Cardiovascular: Normal rate and intact distal pulses  Pulmonary/Chest: Effort normal  He has no wheezes  Abdominal: Soft  He exhibits no distension  Musculoskeletal:   Bilateral knees:  -stable to varus and valgus stress  -tenderness on medial aspect of joint line bilaterally  -no palpable effusion  -motor and sensory grossly intact distally   Neurological: He is alert and oriented to person, place, and time  Skin: Skin is warm and dry  Psychiatric: He has a normal mood and affect           X-rays of bilateral knees show moderate degenerative change worse on medial compartment and patellofemoral compartment bilaterally, osteophyte formation with asymmetric joint space narrowing    Large joint arthrocentesis: R knee  Date/Time: 2/13/2020 2:01 PM  Consent given by: patient  Timeout: Immediately prior to procedure a time out was called to verify the correct patient, procedure, equipment, support staff and site/side marked as required   Supporting Documentation  Indications: pain   Procedure Details  Location: knee - R knee  Preparation: Patient was prepped and draped in the usual sterile fashion  Needle size: 22 G  Ultrasound guidance: no  Medications administered: 2 mL bupivacaine 0 25 %; 2 mL lidocaine 0 5 %; 60 mg ketorolac 60 mg/2 mL    Patient tolerance: patient tolerated the procedure well with no immediate complications  Dressing:  Sterile dressing applied    Large joint arthrocentesis: L knee  Date/Time: 2/13/2020 2:02 PM  Consent given by: patient  Timeout: Immediately prior to procedure a time out was called to verify the correct patient, procedure, equipment, support staff and site/side marked as required   Supporting Documentation  Indications: pain   Procedure Details  Location: knee - L knee  Needle size: 22 G  Ultrasound guidance: no  Medications administered: 2 mL bupivacaine 0 25 %; 2 mL lidocaine 0 5 %; 60 mg ketorolac 60 mg/2 mL    Patient tolerance: patient tolerated the procedure well with no immediate complications  Dressing:  Sterile dressing applied

## 2020-02-21 ENCOUNTER — APPOINTMENT (OUTPATIENT)
Dept: LAB | Facility: HOSPITAL | Age: 75
End: 2020-02-21
Attending: FAMILY MEDICINE
Payer: COMMERCIAL

## 2020-02-21 DIAGNOSIS — E11.42 TYPE 2 DIABETES MELLITUS WITH DIABETIC POLYNEUROPATHY, WITH LONG-TERM CURRENT USE OF INSULIN (HCC): ICD-10-CM

## 2020-02-21 DIAGNOSIS — Z79.4 TYPE 2 DIABETES MELLITUS WITH DIABETIC POLYNEUROPATHY, WITH LONG-TERM CURRENT USE OF INSULIN (HCC): ICD-10-CM

## 2020-02-21 LAB
ANION GAP SERPL CALCULATED.3IONS-SCNC: 8 MMOL/L (ref 4–13)
BUN SERPL-MCNC: 19 MG/DL (ref 5–25)
CALCIUM SERPL-MCNC: 9 MG/DL (ref 8.3–10.1)
CHLORIDE SERPL-SCNC: 105 MMOL/L (ref 100–108)
CHOLEST SERPL-MCNC: 122 MG/DL (ref 50–200)
CO2 SERPL-SCNC: 29 MMOL/L (ref 21–32)
CREAT SERPL-MCNC: 1.24 MG/DL (ref 0.6–1.3)
CREAT UR-MCNC: 229 MG/DL
EST. AVERAGE GLUCOSE BLD GHB EST-MCNC: 160 MG/DL
GFR SERPL CREATININE-BSD FRML MDRD: 57 ML/MIN/1.73SQ M
GLUCOSE P FAST SERPL-MCNC: 129 MG/DL (ref 65–99)
HBA1C MFR BLD: 7.2 %
HDLC SERPL-MCNC: 29 MG/DL
LDLC SERPL CALC-MCNC: 63 MG/DL (ref 0–100)
MICROALBUMIN UR-MCNC: 303 MG/L (ref 0–20)
MICROALBUMIN/CREAT 24H UR: 132 MG/G CREATININE (ref 0–30)
NONHDLC SERPL-MCNC: 93 MG/DL
POTASSIUM SERPL-SCNC: 4.2 MMOL/L (ref 3.5–5.3)
SODIUM SERPL-SCNC: 142 MMOL/L (ref 136–145)
TRIGL SERPL-MCNC: 149 MG/DL

## 2020-02-21 PROCEDURE — 82043 UR ALBUMIN QUANTITATIVE: CPT

## 2020-02-21 PROCEDURE — 36415 COLL VENOUS BLD VENIPUNCTURE: CPT

## 2020-02-21 PROCEDURE — 82570 ASSAY OF URINE CREATININE: CPT

## 2020-02-21 PROCEDURE — 80061 LIPID PANEL: CPT

## 2020-02-21 PROCEDURE — 80048 BASIC METABOLIC PNL TOTAL CA: CPT

## 2020-02-21 PROCEDURE — 83036 HEMOGLOBIN GLYCOSYLATED A1C: CPT

## 2020-02-24 NOTE — RESULT ENCOUNTER NOTE
Call patient to notify normal results hemoglobin A1c is 7 2 that is good keep up the good work make sure patient is set up for his annual Medicare well exam

## 2020-02-27 ENCOUNTER — OFFICE VISIT (OUTPATIENT)
Dept: FAMILY MEDICINE CLINIC | Facility: CLINIC | Age: 75
End: 2020-02-27
Payer: COMMERCIAL

## 2020-02-27 VITALS
HEIGHT: 69 IN | DIASTOLIC BLOOD PRESSURE: 80 MMHG | WEIGHT: 246 LBS | BODY MASS INDEX: 36.43 KG/M2 | SYSTOLIC BLOOD PRESSURE: 144 MMHG

## 2020-02-27 DIAGNOSIS — Z00.00 MEDICARE ANNUAL WELLNESS VISIT, SUBSEQUENT: Primary | ICD-10-CM

## 2020-02-27 PROCEDURE — 1170F FXNL STATUS ASSESSED: CPT | Performed by: FAMILY MEDICINE

## 2020-02-27 PROCEDURE — G0439 PPPS, SUBSEQ VISIT: HCPCS | Performed by: FAMILY MEDICINE

## 2020-02-27 PROCEDURE — 3079F DIAST BP 80-89 MM HG: CPT | Performed by: FAMILY MEDICINE

## 2020-02-27 PROCEDURE — 3060F POS MICROALBUMINURIA REV: CPT | Performed by: FAMILY MEDICINE

## 2020-02-27 PROCEDURE — 3051F HG A1C>EQUAL 7.0%<8.0%: CPT | Performed by: FAMILY MEDICINE

## 2020-02-27 PROCEDURE — 3008F BODY MASS INDEX DOCD: CPT | Performed by: FAMILY MEDICINE

## 2020-02-27 PROCEDURE — 1160F RVW MEDS BY RX/DR IN RCRD: CPT | Performed by: FAMILY MEDICINE

## 2020-02-27 PROCEDURE — 1036F TOBACCO NON-USER: CPT | Performed by: FAMILY MEDICINE

## 2020-02-27 PROCEDURE — 2022F DILAT RTA XM EVC RTNOPTHY: CPT | Performed by: FAMILY MEDICINE

## 2020-02-27 PROCEDURE — 1125F AMNT PAIN NOTED PAIN PRSNT: CPT | Performed by: FAMILY MEDICINE

## 2020-02-27 PROCEDURE — 4040F PNEUMOC VAC/ADMIN/RCVD: CPT | Performed by: FAMILY MEDICINE

## 2020-02-27 PROCEDURE — 3077F SYST BP >= 140 MM HG: CPT | Performed by: FAMILY MEDICINE

## 2020-02-27 NOTE — PROGRESS NOTES
Assessment and Plan:     Problem List Items Addressed This Visit     None        BMI Counseling: Body mass index is 36 33 kg/m²  The BMI is above normal  Nutrition recommendations include decreasing portion sizes, encouraging healthy choices of fruits and vegetables, decreasing fast food intake, consuming healthier snacks, moderation in carbohydrate intake and increasing intake of lean protein  Exercise recommendations include moderate physical activity 150 minutes/week and exercising 3-5 times per week  No pharmacotherapy was ordered  Patient referred to PCP due to patient being overweight  Preventive health issues were discussed with patient, and age appropriate screening tests were ordered as noted in patient's After Visit Summary  Personalized health advice and appropriate referrals for health education or preventive services given if needed, as noted in patient's After Visit Summary  History of Present Illness:     Patient presents for Welcome to Medicare visit  Patient Care Team:  Caryle Forth, DO as PCP - MD Ayesha Masterson MD as Endoscopist     Review of Systems:     Review of Systems   Constitutional: Negative for activity change, appetite change, diaphoresis, fatigue and fever  HENT: Positive for dental problem and hearing loss  Eyes: Positive for visual disturbance  Blind in right eye   Respiratory: Negative for apnea, cough, chest tightness, shortness of breath and wheezing  Cardiovascular: Positive for leg swelling  Negative for chest pain and palpitations  Gastrointestinal: Negative for abdominal distention, abdominal pain, anal bleeding, constipation, diarrhea, nausea and vomiting  Endocrine: Negative for cold intolerance, heat intolerance, polydipsia, polyphagia and polyuria  Genitourinary: Negative for difficulty urinating, dysuria, flank pain, hematuria and urgency  Musculoskeletal: Positive for arthralgias and back pain  Negative for gait problem, joint swelling and myalgias  Skin: Negative for color change, rash and wound  Allergic/Immunologic: Negative for environmental allergies, food allergies and immunocompromised state  Neurological: Positive for dizziness  Negative for seizures, syncope, speech difficulty, numbness and headaches  Hematological: Negative for adenopathy  Does not bruise/bleed easily  Psychiatric/Behavioral: Negative for agitation, behavioral problems, hallucinations, sleep disturbance and suicidal ideas        Problem List:     Patient Active Problem List   Diagnosis    Arthritis    Coronary arteriosclerosis in native artery    Depression with anxiety    Diabetes mellitus type 2, controlled ([place] [address] )    Gout    Hyperlipidemia    Hypertension    Morbid obesity ([place] [address] )    NSTEMI (non-ST elevated myocardial infarction) ([place] [address] )    Tremor    Degenerative disc disease, lumbar    Tremor, essential    Sensory ataxia    Gait instability    Frequent falls    Infected epidermoid cyst    Vitamin E deficiency    Primary osteoarthritis involving multiple joints    Chronic pain of both knees    Primary osteoarthritis of both knees      Past Medical and Surgical History:     Past Medical History:   Diagnosis Date    BPH (benign prostatic hyperplasia)     CAD (coronary artery disease)     Diabetes mellitus ([place] [address] )     niddm    Gout     High cholesterol     Hypertension     MI, 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      Past Surgical History:   Procedure Laterality Date    CATARACT EXTRACTION W/  INTRAOCULAR LENS IMPLANT      CORONARY ANGIOPLASTY WITH STENT PLACEMENT      stents x3    EYE SURGERY      repair corneal laceration    LA COLONOSCOPY FLX DX W/COLLJ SPEC WHEN PFRMD N/A 3/21/2016    Procedure: COLONOSCOPY;  Surgeon: Hetal Grace MD;  Location: [facility];  Service: Colorectal    TONSILLECTOMY AND ADENOIDECTOMY        Family History:     Family History   Adopted: Yes   Problem Relation Age of Onset    No Known Problems Mother     No Known Problems Father       Social History:        Social History     Socioeconomic History    Marital status: /Civil Union     Spouse name: None    Number of children: None    Years of education: None    Highest education level: None   Occupational History    None   Social Needs    Financial resource strain: None    Food insecurity:     Worry: None     Inability: None    Transportation needs:     Medical: None     Non-medical: None   Tobacco Use    Smoking status: Never Smoker    Smokeless tobacco: Never Used   Substance and Sexual Activity    Alcohol use: Yes     Comment: socially, cut down in 2008, previously did more than     Drug use: Never    Sexual activity: Yes     Partners: Female   Lifestyle    Physical activity:     Days per week: None     Minutes per session: None    Stress: None   Relationships    Social connections:     Talks on phone: None     Gets together: None     Attends Synagogue service: None     Active member of club or organization: None     Attends meetings of clubs or organizations: None     Relationship status: None    Intimate partner violence:     Fear of current or ex partner: None     Emotionally abused: None     Physically abused: None     Forced sexual activity: None   Other Topics Concern    None   Social History Narrative    No advance directive     No living will       Medications and Allergies:     Current Outpatient Medications   Medication Sig Dispense Refill    allopurinol (ZYLOPRIM) 100 mg tablet TAKE ONE TABLET BY MOUTH EVERY DAY 90 tablet 0    aspirin 325 mg tablet Take 325 mg by mouth daily        atorvastatin (LIPITOR) 80 mg tablet Take 1 tablet (80 mg total) by mouth daily 90 tablet 3    escitalopram (LEXAPRO) 20 mg tablet Take 1 tablet (20 mg total) by mouth daily 90 tablet 0    gabapentin (NEURONTIN) 300 mg capsule Take 2 capsules (600 mg total) by mouth 3 (three) times a day (Patient taking differently: Take 300 mg by mouth daily at bedtime ) 180 capsule 5    glimepiride (AMARYL) 4 mg tablet TAKE 1 TABLET TWICE A  tablet 1    levothyroxine 75 mcg tablet TAKE 1 TABLET DAILY 1 HOUR BEFORE FOOD IN THE MORNING ON AN EMPTY STOMACH 90 tablet 0    losartan (COZAAR) 25 mg tablet Take 1 tablet (25 mg total) by mouth daily 90 tablet 1    metFORMIN (GLUCOPHAGE) 1000 MG tablet Take 1 tablet (1,000 mg total) by mouth 2 (two) times a day with meals 180 tablet 3    metoprolol tartrate (LOPRESSOR) 25 mg tablet Take 0 5 tablets (12 5 mg total) by mouth every 12 (twelve) hours 90 tablet 2    multivitamin (THERAGRAN) TABS Take 1 tablet by mouth daily      nitroglycerin (NITROSTAT) 0 4 mg SL tablet Place 1 tablet (0 4 mg total) under the tongue every 5 (five) minutes as needed for chest pain 25 tablet 3     No current facility-administered medications for this visit  No Known Allergies   Immunizations:     Immunization History   Administered Date(s) Administered    H1N1, All Formulations 11/06/2009    INFLUENZA 09/02/2015, 09/14/2016    Influenza Split High Dose Preservative Free IM 09/11/2012, 09/20/2013, 10/06/2014, 09/02/2015, 09/14/2016, 09/12/2017    Influenza, high dose seasonal 0 5 mL 10/22/2018, 09/09/2019    Pneumococcal Polysaccharide PPV23 1945    Tdap 11/22/2017    Zoster 04/10/2014      Health Maintenance:         Topic Date Due    CRC Screening: Colonoscopy  03/21/2019    Hepatitis C Screening  Completed         Topic Date Due    Pneumococcal Vaccine: 65+ Years (1 of 2 - PCV13) 12/09/2011      Medicare Screening Tests and Risk Assessments:     Vanessa Martins is here for his Subsequent Wellness visit  Health Risk Assessment:   Patient rates overall health as poor  Patient feels that their physical health rating is slightly worse  Eyesight was rated as slightly worse  Hearing was rated as slightly worse  Patient feels that their emotional and mental health rating is same   Pain experienced in the last 7 days has been none  Patient states that he has experienced no weight loss or gain in last 6 months  Depression Screening:   PHQ-2 Score: 3  PHQ-9 Score: 12      Fall Risk Screening: In the past year, patient has experienced: history of falling in past year    Number of falls: 2 or more  Injured during fall?: No    Feels unsteady when standing or walking?: Yes    Worried about falling?: Yes      Home Safety:  Patient has trouble with stairs inside or outside of their home  Patient has working smoke alarms and has working carbon monoxide detector  Home safety hazards include: none  Nutrition:   Current diet is Regular, Limited junk food and No Added Salt  Medications:   Patient is currently taking over-the-counter supplements  OTC medications include: see medication list  Patient is not able to manage medications  wife    Activities of Daily Living (ADLs)/Instrumental Activities of Daily Living (IADLs):   Walk and transfer into and out of bed and chair?: Yes  Dress and groom yourself?: Yes    Bathe or shower yourself?: Yes    Feed yourself?  Yes  Do your laundry/housekeeping?: Yes  Manage your money, pay your bills and track your expenses?: Yes  Make your own meals?: Yes    Do your own shopping?: Yes    Previous Hospitalizations:   Any hospitalizations or ED visits within the last 12 months?: No      Advance Care Planning:   Living will: No    Durable POA for healthcare: No    Advanced directive: No    Advanced directive counseling given: Yes    Five wishes given: Yes    Patient declined ACP directive: No    End of Life Decisions reviewed with patient: Yes    Provider agrees with end of life decisions: No      Cognitive Screening:   Provider or family/friend/caregiver concerned regarding cognition?: No    PREVENTIVE SCREENINGS      Cardiovascular Screening:    General: Screening Not Indicated and History Lipid Disorder      Diabetes Screening:     General: Screening Not Indicated and History Diabetes      Colorectal Cancer Screening:     General: Screening Current      Osteoporosis Screening:    General: Screening Not Indicated      Abdominal Aortic Aneurysm (AAA) Screening:    Risk factors include: age between 73-67 yo        General: Screening Not Indicated      Lung Cancer Screening:     General: Screening Not Indicated      Hepatitis C Screening:    General: Screening Current    No exam data present     Physical Exam:     /80 (BP Location: Left arm, Patient Position: Sitting, Cuff Size: Standard)   Ht 5' 9" (1 753 m)   Wt 112 kg (246 lb)   BMI 36 33 kg/m²     Physical Exam   Constitutional: He is oriented to person, place, and time  He appears well-developed and well-nourished  No distress  HENT:   Head: Normocephalic  Right Ear: External ear normal    Left Ear: External ear normal    Nose: Nose normal    Mouth/Throat: Oropharynx is clear and moist    Eyes: Pupils are equal, round, and reactive to light  Conjunctivae and EOM are normal  Right eye exhibits no discharge  Left eye exhibits no discharge  No scleral icterus  Neck: Normal range of motion  No tracheal deviation present  No thyromegaly present  Cardiovascular: Normal rate, regular rhythm and normal heart sounds  Exam reveals no gallop and no friction rub  No murmur heard  Pulmonary/Chest: Effort normal and breath sounds normal  No respiratory distress  He has no wheezes  Abdominal: Soft  Bowel sounds are normal  He exhibits no mass  There is no tenderness  There is no guarding  Musculoskeletal: He exhibits no edema or deformity  Lymphadenopathy:     He has no cervical adenopathy  Neurological: He is alert and oriented to person, place, and time  No cranial nerve deficit  Skin: Skin is warm and dry  No rash noted  He is not diaphoretic  No erythema  Psychiatric: He has a normal mood and affect   Thought content normal        Cornell Muse DO

## 2020-02-27 NOTE — PATIENT INSTRUCTIONS
Medicare Preventive Visit Patient Instructions  Thank you for completing your Welcome to Medicare Visit or Medicare Annual Wellness Visit today  Your next wellness visit will be due in one year (2/27/2021)  The screening/preventive services that you may require over the next 5-10 years are detailed below  Some tests may not apply to you based off risk factors and/or age  Screening tests ordered at today's visit but not completed yet may show as past due  Also, please note that scanned in results may not display below  Preventive Screenings:  Service Recommendations Previous Testing/Comments   Colorectal Cancer Screening  · Colonoscopy    · Fecal Occult Blood Test (FOBT)/Fecal Immunochemical Test (FIT)  · Fecal DNA/Cologuard Test  · Flexible Sigmoidoscopy Age: 54-65 years old   Colonoscopy: every 10 years (May be performed more frequently if at higher risk)  OR  FOBT/FIT: every 1 year  OR  Cologuard: every 3 years  OR  Sigmoidoscopy: every 5 years  Screening may be recommended earlier than age 48 if at higher risk for colorectal cancer  Also, an individualized decision between you and your healthcare provider will decide whether screening between the ages of 74-80 would be appropriate   Colonoscopy: 03/21/2016  FOBT/FIT: Not on file  Cologuard: Not on file  Sigmoidoscopy: Not on file    Screening Current     Prostate Cancer Screening Individualized decision between patient and health care provider in men between ages of 53-78   Medicare will cover every 12 months beginning on the day after your 50th birthday PSA: No results in last 5 years          Hepatitis C Screening Once for adults born between 1945 and 1965  More frequently in patients at high risk for Hepatitis C Hep C Antibody: 09/15/2017    Screening Current   Diabetes Screening 1-2 times per year if you're at risk for diabetes or have pre-diabetes Fasting glucose: 129 mg/dL   A1C: 7 2 %    Screening Not Indicated  History Diabetes   Cholesterol Screening Once every 5 years if you don't have a lipid disorder  May order more often based on risk factors  Lipid panel: 02/21/2020    Screening Not Indicated  History Lipid Disorder      Other Preventive Screenings Covered by Medicare:  1  Abdominal Aortic Aneurysm (AAA) Screening: covered once if your at risk  You're considered to be at risk if you have a family history of AAA or a male between the age of 73-68 who smoking at least 100 cigarettes in your lifetime  2  Lung Cancer Screening: covers low dose CT scan once per year if you meet all of the following conditions: (1) Age 50-69; (2) No signs or symptoms of lung cancer; (3) Current smoker or have quit smoking within the last 15 years; (4) You have a tobacco smoking history of at least 30 pack years (packs per day x number of years you smoked); (5) You get a written order from a healthcare provider  3  Glaucoma Screening: covered annually if you're considered high risk: (1) You have diabetes OR (2) Family history of glaucoma OR (3)  aged 48 and older OR (3)  American aged 72 and older  3  Osteoporosis Screening: covered every 2 years if you meet one of the following conditions: (1) Have a vertebral abnormality; (2) On glucocorticoid therapy for more than 3 months; (3) Have primary hyperparathyroidism; (4) On osteoporosis medications and need to assess response to drug therapy  5  HIV Screening: covered annually if you're between the age of 12-76  Also covered annually if you are younger than 13 and older than 72 with risk factors for HIV infection  For pregnant patients, it is covered up to 3 times per pregnancy      Immunizations:  Immunization Recommendations   Influenza Vaccine Annual influenza vaccination during flu season is recommended for all persons aged >= 6 months who do not have contraindications   Pneumococcal Vaccine (Prevnar and Pneumovax)  * Prevnar = PCV13  * Pneumovax = PPSV23 Adults 25-60 years old: 1-3 doses may be recommended based on certain risk factors  Adults 72 years old: Prevnar (PCV13) vaccine recommended followed by Pneumovax (PPSV23) vaccine  If already received PPSV23 since turning 65, then PCV13 recommended at least one year after PPSV23 dose  Hepatitis B Vaccine 3 dose series if at intermediate or high risk (ex: diabetes, end stage renal disease, liver disease)   Tetanus (Td) Vaccine - COST NOT COVERED BY MEDICARE PART B Following completion of primary series, a booster dose should be given every 10 years to maintain immunity against tetanus  Td may also be given as tetanus wound prophylaxis  Tdap Vaccine - COST NOT COVERED BY MEDICARE PART B Recommended at least once for all adults  For pregnant patients, recommended with each pregnancy  Shingles Vaccine (Shingrix) - COST NOT COVERED BY MEDICARE PART B  2 shot series recommended in those aged 48 and above     Health Maintenance Due:      Topic Date Due    CRC Screening: Colonoscopy  03/21/2019    Hepatitis C Screening  Completed     Immunizations Due:      Topic Date Due    Pneumococcal Vaccine: 65+ Years (1 of 2 - PCV13) 12/09/2011     Advance Directives   What are advance directives? Advance directives are legal documents that state your wishes and plans for medical care  These plans are made ahead of time in case you lose your ability to make decisions for yourself  Advance directives can apply to any medical decision, such as the treatments you want, and if you want to donate organs  What are the types of advance directives? There are many types of advance directives, and each state has rules about how to use them  You may choose a combination of any of the following:  · Living will: This is a written record of the treatment you want  You can also choose which treatments you do not want, which to limit, and which to stop at a certain time  This includes surgery, medicine, IV fluid, and tube feedings     · Durable power of  for healthcare [facility]): This is a written record that states who you want to make healthcare choices for you when you are unable to make them for yourself  This person, called a proxy, is usually a family member or a friend  You may choose more than 1 proxy  · Do not resuscitate (DNR) order:  A DNR order is used in case your heart stops beating or you stop breathing  It is a request not to have certain forms of treatment, such as CPR  A DNR order may be included in other types of advance directives  · Medical directive: This covers the care that you want if you are in a coma, near death, or unable to make decisions for yourself  You can list the treatments you want for each condition  Treatment may include pain medicine, surgery, blood transfusions, dialysis, IV or tube feedings, and a ventilator (breathing machine)  · Values history: This document has questions about your views, beliefs, and how you feel and think about life  This information can help others choose the care that you would choose  Why are advance directives important? An advance directive helps you control your care  Although spoken wishes may be used, it is better to have your wishes written down  Spoken wishes can be misunderstood, or not followed  Treatments may be given even if you do not want them  An advance directive may make it easier for your family to make difficult choices about your care  Fall Prevention    Fall prevention  includes ways to make your home and other areas safer  It also includes ways you can move more carefully to prevent a fall  Health conditions that cause changes in your blood pressure, vision, or muscle strength and coordination may increase your risk for falls  Medicines may also increase your risk for falls if they make you dizzy, weak, or sleepy  Fall prevention tips:   · Stand or sit up slowly  · Use assistive devices as directed  · Wear shoes that fit well and have soles that   · Wear a personal alarm  · Stay active  · Manage your medical conditions  Home Safety Tips:  · Add items to prevent falls in the bathroom  · Keep paths clear  · Install bright lights in your home  · Keep items you use often on shelves within reach  · Paint or place reflective tape on the edges of your stairs  Weight Management   Why it is important to manage your weight:  Being overweight increases your risk of health conditions such as heart disease, high blood pressure, type 2 diabetes, and certain types of cancer  It can also increase your risk for osteoarthritis, sleep apnea, and other respiratory problems  Aim for a slow, steady weight loss  Even a small amount of weight loss can lower your risk of health problems  How to lose weight safely:  A safe and healthy way to lose weight is to eat fewer calories and get regular exercise  You can lose up about 1 pound a week by decreasing the number of calories you eat by 500 calories each day  Healthy meal plan for weight management:  A healthy meal plan includes a variety of foods, contains fewer calories, and helps you stay healthy  A healthy meal plan includes the following:  · Eat whole-grain foods more often  A healthy meal plan should contain fiber  Fiber is the part of grains, fruits, and vegetables that is not broken down by your body  Whole-grain foods are healthy and provide extra fiber in your diet  Some examples of whole-grain foods are whole-wheat breads and pastas, oatmeal, brown rice, and bulgur  · Eat a variety of vegetables every day  Include dark, leafy greens such as spinach, kale, yonathan greens, and mustard greens  Eat yellow and orange vegetables such as carrots, sweet potatoes, and winter squash  · Eat a variety of fruits every day  Choose fresh or canned fruit (canned in its own juice or light syrup) instead of juice  Fruit juice has very little or no fiber  · Eat low-fat dairy foods  Drink fat-free (skim) milk or 1% milk   Eat fat-free yogurt and low-fat cottage cheese  Try low-fat cheeses such as mozzarella and other reduced-fat cheeses  · Choose meat and other protein foods that are low in fat  Choose beans or other legumes such as split peas or lentils  Choose fish, skinless poultry (chicken or turkey), or lean cuts of red meat (beef or pork)  Before you cook meat or poultry, cut off any visible fat  · Use less fat and oil  Try baking foods instead of frying them  Add less fat, such as margarine, sour cream, regular salad dressing and mayonnaise to foods  Eat fewer high-fat foods  Some examples of high-fat foods include french fries, doughnuts, ice cream, and cakes  · Eat fewer sweets  Limit foods and drinks that are high in sugar  This includes candy, cookies, regular soda, and sweetened drinks  Exercise:  Exercise at least 30 minutes per day on most days of the week  Some examples of exercise include walking, biking, dancing, and swimming  You can also fit in more physical activity by taking the stairs instead of the elevator or parking farther away from stores  Ask your healthcare provider about the best exercise plan for you  © Copyright [employer] 2018 Information is for End User's use only and may not be sold, redistributed or otherwise used for commercial purposes   All illustrations and images included in CareNotes® are the copyrighted property of A D A M , [employer]  or [address] [place] [address] [employer]

## 2020-03-13 ENCOUNTER — TELEPHONE (OUTPATIENT)
Dept: FAMILY MEDICINE CLINIC | Facility: CLINIC | Age: 75
End: 2020-03-13

## 2020-03-13 DIAGNOSIS — J22 LOWER RESPIRATORY INFECTION: Primary | ICD-10-CM

## 2020-03-13 DIAGNOSIS — J22 LOWER RESPIRATORY INFECTION: ICD-10-CM

## 2020-03-13 RX ORDER — LEVOFLOXACIN 750 MG/1
750 TABLET ORAL EVERY 24 HOURS
Qty: 5 TABLET | Refills: 0 | Status: SHIPPED | OUTPATIENT
Start: 2020-03-13 | End: 2020-03-13 | Stop reason: SDUPTHER

## 2020-03-13 RX ORDER — LEVOFLOXACIN 750 MG/1
750 TABLET ORAL EVERY 24 HOURS
Qty: 5 TABLET | Refills: 0 | Status: SHIPPED | OUTPATIENT
Start: 2020-03-13 | End: 2020-03-18 | Stop reason: ALTCHOICE

## 2020-03-18 ENCOUNTER — OFFICE VISIT (OUTPATIENT)
Dept: FAMILY MEDICINE CLINIC | Facility: CLINIC | Age: 75
End: 2020-03-18
Payer: COMMERCIAL

## 2020-03-18 ENCOUNTER — APPOINTMENT (OUTPATIENT)
Dept: LAB | Facility: HOSPITAL | Age: 75
End: 2020-03-18
Attending: FAMILY MEDICINE
Payer: COMMERCIAL

## 2020-03-18 ENCOUNTER — HOSPITAL ENCOUNTER (OUTPATIENT)
Dept: RADIOLOGY | Facility: HOSPITAL | Age: 75
Discharge: HOME | End: 2020-03-18
Attending: FAMILY MEDICINE
Payer: COMMERCIAL

## 2020-03-18 VITALS
HEIGHT: 69 IN | TEMPERATURE: 98.7 F | SYSTOLIC BLOOD PRESSURE: 114 MMHG | DIASTOLIC BLOOD PRESSURE: 60 MMHG | WEIGHT: 244 LBS | BODY MASS INDEX: 36.14 KG/M2

## 2020-03-18 DIAGNOSIS — J22 ACUTE LOWER RESPIRATORY TRACT INFECTION: ICD-10-CM

## 2020-03-18 DIAGNOSIS — J22 ACUTE LOWER RESPIRATORY TRACT INFECTION: Primary | ICD-10-CM

## 2020-03-18 DIAGNOSIS — I25.118 CORONARY ARTERY DISEASE OF NATIVE ARTERY OF NATIVE HEART WITH STABLE ANGINA PECTORIS (HCC): ICD-10-CM

## 2020-03-18 DIAGNOSIS — E66.01 MORBID OBESITY (HCC): ICD-10-CM

## 2020-03-18 LAB
BASOPHILS # BLD AUTO: 0.05 THOUSANDS/ΜL (ref 0–0.1)
BASOPHILS NFR BLD AUTO: 1 % (ref 0–1)
EOSINOPHIL # BLD AUTO: 0.72 THOUSAND/ΜL (ref 0–0.61)
EOSINOPHIL NFR BLD AUTO: 7 % (ref 0–6)
ERYTHROCYTE [DISTWIDTH] IN BLOOD BY AUTOMATED COUNT: 12.3 % (ref 11.6–15.1)
FLUAV RNA NPH QL NAA+PROBE: NORMAL
FLUBV RNA NPH QL NAA+PROBE: NORMAL
HCT VFR BLD AUTO: 44.7 % (ref 36.5–49.3)
HGB BLD-MCNC: 14.9 G/DL (ref 12–17)
IMM GRANULOCYTES # BLD AUTO: 0.05 THOUSAND/UL (ref 0–0.2)
IMM GRANULOCYTES NFR BLD AUTO: 1 % (ref 0–2)
LYMPHOCYTES # BLD AUTO: 3.16 THOUSANDS/ΜL (ref 0.6–4.47)
LYMPHOCYTES NFR BLD AUTO: 30 % (ref 14–44)
MCH RBC QN AUTO: 33.1 PG (ref 26.8–34.3)
MCHC RBC AUTO-ENTMCNC: 33.3 G/DL (ref 31.4–37.4)
MCV RBC AUTO: 99 FL (ref 82–98)
MONOCYTES # BLD AUTO: 0.92 THOUSAND/ΜL (ref 0.17–1.22)
MONOCYTES NFR BLD AUTO: 9 % (ref 4–12)
NEUTROPHILS # BLD AUTO: 5.82 THOUSANDS/ΜL (ref 1.85–7.62)
NEUTS SEG NFR BLD AUTO: 52 % (ref 43–75)
NRBC BLD AUTO-RTO: 0 /100 WBCS
PLATELET # BLD AUTO: 278 THOUSANDS/UL (ref 149–390)
PMV BLD AUTO: 9.6 FL (ref 8.9–12.7)
RBC # BLD AUTO: 4.5 MILLION/UL (ref 3.88–5.62)
RSV RNA NPH QL NAA+PROBE: NORMAL
WBC # BLD AUTO: 10.72 THOUSAND/UL (ref 4.31–10.16)

## 2020-03-18 PROCEDURE — 4040F PNEUMOC VAC/ADMIN/RCVD: CPT | Performed by: FAMILY MEDICINE

## 2020-03-18 PROCEDURE — 2022F DILAT RTA XM EVC RTNOPTHY: CPT | Performed by: FAMILY MEDICINE

## 2020-03-18 PROCEDURE — 3051F HG A1C>EQUAL 7.0%<8.0%: CPT | Performed by: FAMILY MEDICINE

## 2020-03-18 PROCEDURE — 3060F POS MICROALBUMINURIA REV: CPT | Performed by: FAMILY MEDICINE

## 2020-03-18 PROCEDURE — 3008F BODY MASS INDEX DOCD: CPT | Performed by: FAMILY MEDICINE

## 2020-03-18 PROCEDURE — 3074F SYST BP LT 130 MM HG: CPT | Performed by: FAMILY MEDICINE

## 2020-03-18 PROCEDURE — 1036F TOBACCO NON-USER: CPT | Performed by: FAMILY MEDICINE

## 2020-03-18 PROCEDURE — 85025 COMPLETE CBC W/AUTO DIFF WBC: CPT | Performed by: FAMILY MEDICINE

## 2020-03-18 PROCEDURE — 3078F DIAST BP <80 MM HG: CPT | Performed by: FAMILY MEDICINE

## 2020-03-18 PROCEDURE — 87631 RESP VIRUS 3-5 TARGETS: CPT

## 2020-03-18 PROCEDURE — 1160F RVW MEDS BY RX/DR IN RCRD: CPT | Performed by: FAMILY MEDICINE

## 2020-03-18 PROCEDURE — 36415 COLL VENOUS BLD VENIPUNCTURE: CPT | Performed by: FAMILY MEDICINE

## 2020-03-18 PROCEDURE — 71046 X-RAY EXAM CHEST 2 VIEWS: CPT

## 2020-03-18 PROCEDURE — 99214 OFFICE O/P EST MOD 30 MIN: CPT | Performed by: FAMILY MEDICINE

## 2020-03-18 PROCEDURE — 1170F FXNL STATUS ASSESSED: CPT | Performed by: FAMILY MEDICINE

## 2020-03-18 NOTE — RESULT ENCOUNTER NOTE
Please call the patient regarding his abnormal result    CBC very mild elevation of WBC count flu and RSV nasal swab negative chest x-ray pending

## 2020-03-18 NOTE — PROGRESS NOTES
Falls Plan of Care: Recommended assistive device to help with gait and balance  Assessment/Plan:  Patient is going to have influenza swab CBC chest x-ray he has just finished Levaquin we will wait on any further antibiotics until we have a better diagnosis    Problem List Items Addressed This Visit     None      Visit Diagnoses     Acute lower respiratory tract infection    -  Primary           Diagnoses and all orders for this visit:    Acute lower respiratory tract infection        No problem-specific Assessment & Plan notes found for this encounter  Subjective:      Patient ID: Ramonita Trevizo is a 69 y o  male  Patient is here with symptoms of lower respiratory infection cough affect duration thus no fever at the present time no known contact with corona positive the individuals however questionable travel by his daughter to possible areas with the corona virus      The following portions of the patient's history were reviewed and updated as appropriate:   He has a past medical history of BPH (benign prostatic hyperplasia), CAD (coronary artery disease), Diabetes mellitus ([facility] [address] ), Gout, High cholesterol, Hypertension, and MI, old ,  does not have any pertinent problems on file  ,   has a past surgical history that includes Tonsillectomy and adenoidectomy; Cataract extraction w/  intraocular lens implant; Eye surgery; Coronary angioplasty with stent; and pr colonoscopy flx dx w/collj spec when pfrmd (N/A, 3/21/2016)  ,  family history includes No Known Problems in his father and mother  He was adopted  ,   reports that he has never smoked  He has never used smokeless tobacco  He reports that he drinks alcohol  He reports that he does not use drugs  ,  has No Known Allergies     Current Outpatient Medications   Medication Sig Dispense Refill    allopurinol (ZYLOPRIM) 100 mg tablet TAKE ONE TABLET BY MOUTH EVERY DAY 90 tablet 0    aspirin 325 mg tablet Take 325 mg by mouth daily        atorvastatin (LIPITOR) 80 mg tablet Take 1 tablet (80 mg total) by mouth daily 90 tablet 3    escitalopram (LEXAPRO) 20 mg tablet Take 1 tablet (20 mg total) by mouth daily 90 tablet 0    gabapentin (NEURONTIN) 300 mg capsule Take 2 capsules (600 mg total) by mouth 3 (three) times a day (Patient taking differently: Take 300 mg by mouth daily at bedtime ) 180 capsule 5    glimepiride (AMARYL) 4 mg tablet TAKE 1 TABLET TWICE A  tablet 1    levothyroxine 75 mcg tablet TAKE 1 TABLET DAILY 1 HOUR BEFORE FOOD IN THE MORNING ON AN EMPTY STOMACH 90 tablet 0    losartan (COZAAR) 25 mg tablet Take 1 tablet (25 mg total) by mouth daily 90 tablet 1    metFORMIN (GLUCOPHAGE) 1000 MG tablet Take 1 tablet (1,000 mg total) by mouth 2 (two) times a day with meals 180 tablet 3    metoprolol tartrate (LOPRESSOR) 25 mg tablet Take 0 5 tablets (12 5 mg total) by mouth every 12 (twelve) hours 90 tablet 2    multivitamin (THERAGRAN) TABS Take 1 tablet by mouth daily      nitroglycerin (NITROSTAT) 0 4 mg SL tablet Place 1 tablet (0 4 mg total) under the tongue every 5 (five) minutes as needed for chest pain 25 tablet 3     No current facility-administered medications for this visit  Review of Systems   Constitutional: Positive for activity change and fatigue  Negative for appetite change, diaphoresis and fever  HENT: Negative  Eyes: Negative  Respiratory: Positive for cough  Negative for apnea, chest tightness, shortness of breath and wheezing  Cardiovascular: Negative for chest pain, palpitations and leg swelling  Gastrointestinal: Negative for abdominal distention, abdominal pain, anal bleeding, constipation, diarrhea, nausea and vomiting  Endocrine: Negative for cold intolerance, heat intolerance, polydipsia, polyphagia and polyuria  Genitourinary: Negative for difficulty urinating, dysuria, flank pain, hematuria and urgency     Musculoskeletal: Negative for arthralgias, back pain, gait problem, joint swelling and myalgias  Skin: Negative for color change, rash and wound  Allergic/Immunologic: Negative for environmental allergies, food allergies and immunocompromised state  Neurological: Negative for dizziness, seizures, syncope, speech difficulty, numbness and headaches  Hematological: Negative for adenopathy  Does not bruise/bleed easily  Psychiatric/Behavioral: Negative for agitation, behavioral problems, hallucinations, sleep disturbance and suicidal ideas  Objective:  Vitals:    03/18/20 1254   BP: 114/60   BP Location: Right arm   Patient Position: Sitting   Cuff Size: Standard   Temp: 98 7 °F (37 1 °C)   TempSrc: Tympanic   Weight: 111 kg (244 lb)   Height: 5' 9" (1 753 m)     Body mass index is 36 03 kg/m²  Physical Exam   Constitutional: He is oriented to person, place, and time  He appears well-developed and well-nourished  No distress  HENT:   Head: Normocephalic  Right Ear: External ear normal    Left Ear: External ear normal    Nose: Nose normal    Mouth/Throat: Oropharynx is clear and moist    Eyes: Pupils are equal, round, and reactive to light  Conjunctivae and EOM are normal  Right eye exhibits no discharge  Left eye exhibits no discharge  No scleral icterus  Neck: Normal range of motion  No tracheal deviation present  No thyromegaly present  Cardiovascular: Normal rate, regular rhythm and normal heart sounds  Exam reveals no gallop and no friction rub  No murmur heard  Pulmonary/Chest: Effort normal and breath sounds normal  No respiratory distress  He has no wheezes  Abdominal: Soft  Bowel sounds are normal  He exhibits no mass  There is no tenderness  There is no guarding  Musculoskeletal: He exhibits no edema or deformity  Lymphadenopathy:     He has no cervical adenopathy  Neurological: He is alert and oriented to person, place, and time  No cranial nerve deficit  Skin: Skin is warm and dry  No rash noted  He is not diaphoretic  No erythema     Psychiatric: He has a normal mood and affect   Thought content normal

## 2020-04-01 ENCOUNTER — TELEMEDICINE (OUTPATIENT)
Dept: FAMILY MEDICINE CLINIC | Facility: CLINIC | Age: 75
End: 2020-04-01
Payer: COMMERCIAL

## 2020-04-01 VITALS
HEART RATE: 82 BPM | TEMPERATURE: 101 F | DIASTOLIC BLOOD PRESSURE: 41 MMHG | BODY MASS INDEX: 36.03 KG/M2 | HEIGHT: 69 IN | SYSTOLIC BLOOD PRESSURE: 96 MMHG

## 2020-04-01 DIAGNOSIS — Z20.828 EXPOSURE TO SARS-ASSOCIATED CORONAVIRUS: ICD-10-CM

## 2020-04-01 DIAGNOSIS — Z20.828 EXPOSURE TO SARS-ASSOCIATED CORONAVIRUS: Primary | ICD-10-CM

## 2020-04-01 PROCEDURE — 87635 SARS-COV-2 COVID-19 AMP PRB: CPT

## 2020-04-01 PROCEDURE — 99212 OFFICE O/P EST SF 10 MIN: CPT | Performed by: PHYSICIAN ASSISTANT

## 2020-04-05 LAB — SARS-COV-2 RNA SPEC QL NAA+PROBE: NOT DETECTED

## 2020-04-06 ENCOUNTER — APPOINTMENT (OUTPATIENT)
Dept: LAB | Facility: HOSPITAL | Age: 75
End: 2020-04-06
Payer: COMMERCIAL

## 2020-04-06 DIAGNOSIS — R05.9 COUGH: Primary | ICD-10-CM

## 2020-04-06 DIAGNOSIS — R07.89 CHEST TIGHTNESS: ICD-10-CM

## 2020-04-06 DIAGNOSIS — Z79.4 CONTROLLED TYPE 2 DIABETES MELLITUS WITH DIABETIC NEUROPATHY, WITH LONG-TERM CURRENT USE OF INSULIN (HCC): Primary | ICD-10-CM

## 2020-04-06 DIAGNOSIS — E11.40 CONTROLLED TYPE 2 DIABETES MELLITUS WITH DIABETIC NEUROPATHY, WITH LONG-TERM CURRENT USE OF INSULIN (HCC): Primary | ICD-10-CM

## 2020-04-06 DIAGNOSIS — E03.9 HYPOTHYROIDISM, UNSPECIFIED TYPE: ICD-10-CM

## 2020-04-06 DIAGNOSIS — E11.40 CONTROLLED TYPE 2 DIABETES MELLITUS WITH DIABETIC NEUROPATHY, WITH LONG-TERM CURRENT USE OF INSULIN (HCC): ICD-10-CM

## 2020-04-06 DIAGNOSIS — R05.9 COUGH: ICD-10-CM

## 2020-04-06 DIAGNOSIS — Z79.4 CONTROLLED TYPE 2 DIABETES MELLITUS WITH DIABETIC NEUROPATHY, WITH LONG-TERM CURRENT USE OF INSULIN (HCC): ICD-10-CM

## 2020-04-06 LAB
ALBUMIN SERPL BCP-MCNC: 3.2 G/DL (ref 3.5–5)
ALP SERPL-CCNC: 83 U/L (ref 46–116)
ALT SERPL W P-5'-P-CCNC: 63 U/L (ref 12–78)
ANION GAP SERPL CALCULATED.3IONS-SCNC: 6 MMOL/L (ref 4–13)
AST SERPL W P-5'-P-CCNC: 27 U/L (ref 5–45)
BILIRUB SERPL-MCNC: 0.5 MG/DL (ref 0.2–1)
BUN SERPL-MCNC: 11 MG/DL (ref 5–25)
CALCIUM SERPL-MCNC: 8.8 MG/DL (ref 8.3–10.1)
CHLORIDE SERPL-SCNC: 103 MMOL/L (ref 100–108)
CO2 SERPL-SCNC: 29 MMOL/L (ref 21–32)
CREAT SERPL-MCNC: 1.09 MG/DL (ref 0.6–1.3)
D DIMER PPP FEU-MCNC: 0.44 UG/ML FEU
ERYTHROCYTE [DISTWIDTH] IN BLOOD BY AUTOMATED COUNT: 12.2 % (ref 11.6–15.1)
GFR SERPL CREATININE-BSD FRML MDRD: 67 ML/MIN/1.73SQ M
GLUCOSE P FAST SERPL-MCNC: 161 MG/DL (ref 65–99)
HCT VFR BLD AUTO: 44.1 % (ref 36.5–49.3)
HGB BLD-MCNC: 14.8 G/DL (ref 12–17)
MCH RBC QN AUTO: 33.3 PG (ref 26.8–34.3)
MCHC RBC AUTO-ENTMCNC: 33.6 G/DL (ref 31.4–37.4)
MCV RBC AUTO: 99 FL (ref 82–98)
NT-PROBNP SERPL-MCNC: 191 PG/ML
PLATELET # BLD AUTO: 215 THOUSANDS/UL (ref 149–390)
PMV BLD AUTO: 9.5 FL (ref 8.9–12.7)
POTASSIUM SERPL-SCNC: 4.1 MMOL/L (ref 3.5–5.3)
PROT SERPL-MCNC: 7 G/DL (ref 6.4–8.2)
RBC # BLD AUTO: 4.45 MILLION/UL (ref 3.88–5.62)
SODIUM SERPL-SCNC: 138 MMOL/L (ref 136–145)
T4 FREE SERPL-MCNC: 1.06 NG/DL (ref 0.76–1.46)
TROPONIN I SERPL-MCNC: <0.02 NG/ML
TSH SERPL DL<=0.05 MIU/L-ACNC: 6.31 UIU/ML (ref 0.36–3.74)
WBC # BLD AUTO: 9.08 THOUSAND/UL (ref 4.31–10.16)

## 2020-04-06 PROCEDURE — 85027 COMPLETE CBC AUTOMATED: CPT

## 2020-04-06 PROCEDURE — 84484 ASSAY OF TROPONIN QUANT: CPT

## 2020-04-06 PROCEDURE — 83880 ASSAY OF NATRIURETIC PEPTIDE: CPT

## 2020-04-06 PROCEDURE — 36415 COLL VENOUS BLD VENIPUNCTURE: CPT

## 2020-04-06 PROCEDURE — 80053 COMPREHEN METABOLIC PANEL: CPT

## 2020-04-06 PROCEDURE — 84443 ASSAY THYROID STIM HORMONE: CPT

## 2020-04-06 PROCEDURE — 83036 HEMOGLOBIN GLYCOSYLATED A1C: CPT

## 2020-04-06 PROCEDURE — 85379 FIBRIN DEGRADATION QUANT: CPT

## 2020-04-06 PROCEDURE — 84439 ASSAY OF FREE THYROXINE: CPT

## 2020-04-06 RX ORDER — LEVOTHYROXINE SODIUM 88 UG/1
88 TABLET ORAL
Qty: 90 TABLET | Refills: 0 | Status: SHIPPED | OUTPATIENT
Start: 2020-04-06 | End: 2020-04-24 | Stop reason: SDUPTHER

## 2020-04-07 LAB
EST. AVERAGE GLUCOSE BLD GHB EST-MCNC: 171 MG/DL
HBA1C MFR BLD: 7.6 %

## 2020-04-14 ENCOUNTER — TELEMEDICINE (OUTPATIENT)
Dept: CARDIOLOGY CLINIC | Facility: HOSPITAL | Age: 75
End: 2020-04-14
Payer: COMMERCIAL

## 2020-04-14 DIAGNOSIS — E78.2 MIXED HYPERLIPIDEMIA: ICD-10-CM

## 2020-04-14 DIAGNOSIS — I25.118 CORONARY ARTERY DISEASE OF NATIVE ARTERY OF NATIVE HEART WITH STABLE ANGINA PECTORIS (HCC): ICD-10-CM

## 2020-04-14 DIAGNOSIS — R25.1 TREMOR: ICD-10-CM

## 2020-04-14 DIAGNOSIS — I21.4 NSTEMI (NON-ST ELEVATED MYOCARDIAL INFARCTION) (HCC): ICD-10-CM

## 2020-04-14 DIAGNOSIS — I10 ESSENTIAL HYPERTENSION: Primary | ICD-10-CM

## 2020-04-14 PROCEDURE — 99215 OFFICE O/P EST HI 40 MIN: CPT | Performed by: INTERNAL MEDICINE

## 2020-04-14 RX ORDER — PROPRANOLOL HCL 60 MG
60 CAPSULE, EXTENDED RELEASE 24HR ORAL DAILY
Qty: 90 CAPSULE | Refills: 4 | Status: SHIPPED | OUTPATIENT
Start: 2020-04-14 | End: 2020-10-20 | Stop reason: SDUPTHER

## 2020-04-16 ENCOUNTER — TELEMEDICINE (OUTPATIENT)
Dept: NEUROLOGY | Facility: CLINIC | Age: 75
End: 2020-04-16
Payer: COMMERCIAL

## 2020-04-16 DIAGNOSIS — G25.0 TREMOR, ESSENTIAL: Primary | ICD-10-CM

## 2020-04-16 PROCEDURE — 99442 PR PHYS/QHP TELEPHONE EVALUATION 11-20 MIN: CPT | Performed by: PSYCHIATRY & NEUROLOGY

## 2020-04-17 DIAGNOSIS — F33.9 RECURRENT MAJOR DEPRESSIVE DISORDER, REMISSION STATUS UNSPECIFIED (HCC): ICD-10-CM

## 2020-04-17 DIAGNOSIS — G25.0 TREMOR, ESSENTIAL: ICD-10-CM

## 2020-04-17 DIAGNOSIS — M1A.9XX0 CHRONIC GOUT WITHOUT TOPHUS, UNSPECIFIED CAUSE, UNSPECIFIED SITE: ICD-10-CM

## 2020-04-17 RX ORDER — GABAPENTIN 300 MG/1
300 CAPSULE ORAL
Qty: 90 CAPSULE | Refills: 1 | Status: SHIPPED | OUTPATIENT
Start: 2020-04-17 | End: 2020-10-20 | Stop reason: SDUPTHER

## 2020-04-17 RX ORDER — ESCITALOPRAM OXALATE 20 MG/1
TABLET ORAL
Qty: 90 TABLET | Refills: 1 | Status: SHIPPED | OUTPATIENT
Start: 2020-04-17 | End: 2020-10-20 | Stop reason: SDUPTHER

## 2020-04-17 RX ORDER — ALLOPURINOL 100 MG/1
TABLET ORAL
Qty: 90 TABLET | Refills: 1 | Status: SHIPPED | OUTPATIENT
Start: 2020-04-17 | End: 2020-10-20 | Stop reason: SDUPTHER

## 2020-04-24 DIAGNOSIS — E03.9 HYPOTHYROIDISM, UNSPECIFIED TYPE: ICD-10-CM

## 2020-04-24 RX ORDER — LEVOTHYROXINE SODIUM 88 UG/1
88 TABLET ORAL
Qty: 90 TABLET | Refills: 0 | Status: SHIPPED | OUTPATIENT
Start: 2020-04-24 | End: 2020-07-22

## 2020-05-06 DIAGNOSIS — Z79.4 TYPE 2 DIABETES MELLITUS WITH DIABETIC POLYNEUROPATHY, WITH LONG-TERM CURRENT USE OF INSULIN (HCC): ICD-10-CM

## 2020-05-06 DIAGNOSIS — E78.2 MIXED HYPERLIPIDEMIA: ICD-10-CM

## 2020-05-06 DIAGNOSIS — E11.42 TYPE 2 DIABETES MELLITUS WITH DIABETIC POLYNEUROPATHY, WITH LONG-TERM CURRENT USE OF INSULIN (HCC): ICD-10-CM

## 2020-05-06 RX ORDER — ATORVASTATIN CALCIUM 80 MG/1
80 TABLET, FILM COATED ORAL DAILY
Qty: 90 TABLET | Refills: 3 | Status: SHIPPED | OUTPATIENT
Start: 2020-05-06 | End: 2020-10-20 | Stop reason: SDUPTHER

## 2020-05-20 LAB
LEFT EYE DIABETIC RETINOPATHY: NORMAL
RIGHT EYE DIABETIC RETINOPATHY: NORMAL

## 2020-05-21 ENCOUNTER — OFFICE VISIT (OUTPATIENT)
Dept: OBGYN CLINIC | Facility: HOSPITAL | Age: 75
End: 2020-05-21
Payer: COMMERCIAL

## 2020-05-21 VITALS
BODY MASS INDEX: 36.03 KG/M2 | SYSTOLIC BLOOD PRESSURE: 137 MMHG | HEART RATE: 91 BPM | HEIGHT: 69 IN | DIASTOLIC BLOOD PRESSURE: 76 MMHG

## 2020-05-21 DIAGNOSIS — M17.12 PRIMARY OSTEOARTHRITIS OF LEFT KNEE: ICD-10-CM

## 2020-05-21 DIAGNOSIS — M17.11 PRIMARY OSTEOARTHRITIS OF RIGHT KNEE: Primary | ICD-10-CM

## 2020-05-21 PROCEDURE — 1036F TOBACCO NON-USER: CPT | Performed by: ORTHOPAEDIC SURGERY

## 2020-05-21 PROCEDURE — 3075F SYST BP GE 130 - 139MM HG: CPT | Performed by: ORTHOPAEDIC SURGERY

## 2020-05-21 PROCEDURE — 2022F DILAT RTA XM EVC RTNOPTHY: CPT | Performed by: ORTHOPAEDIC SURGERY

## 2020-05-21 PROCEDURE — 3060F POS MICROALBUMINURIA REV: CPT | Performed by: ORTHOPAEDIC SURGERY

## 2020-05-21 PROCEDURE — 20610 DRAIN/INJ JOINT/BURSA W/O US: CPT | Performed by: ORTHOPAEDIC SURGERY

## 2020-05-21 PROCEDURE — 3078F DIAST BP <80 MM HG: CPT | Performed by: ORTHOPAEDIC SURGERY

## 2020-05-21 PROCEDURE — 4040F PNEUMOC VAC/ADMIN/RCVD: CPT | Performed by: ORTHOPAEDIC SURGERY

## 2020-05-21 PROCEDURE — 1160F RVW MEDS BY RX/DR IN RCRD: CPT | Performed by: ORTHOPAEDIC SURGERY

## 2020-05-21 PROCEDURE — 3051F HG A1C>EQUAL 7.0%<8.0%: CPT | Performed by: ORTHOPAEDIC SURGERY

## 2020-05-21 RX ORDER — LIDOCAINE HYDROCHLORIDE 10 MG/ML
3 INJECTION, SOLUTION INFILTRATION; PERINEURAL
Status: COMPLETED | OUTPATIENT
Start: 2020-05-21 | End: 2020-05-21

## 2020-05-21 RX ADMIN — LIDOCAINE HYDROCHLORIDE 3 ML: 10 INJECTION, SOLUTION INFILTRATION; PERINEURAL at 13:56

## 2020-05-21 RX ADMIN — LIDOCAINE HYDROCHLORIDE 3 ML: 10 INJECTION, SOLUTION INFILTRATION; PERINEURAL at 13:55

## 2020-06-08 ENCOUNTER — APPOINTMENT (OUTPATIENT)
Dept: LAB | Facility: HOSPITAL | Age: 75
End: 2020-06-08
Attending: FAMILY MEDICINE
Payer: COMMERCIAL

## 2020-06-08 ENCOUNTER — OFFICE VISIT (OUTPATIENT)
Dept: FAMILY MEDICINE CLINIC | Facility: CLINIC | Age: 75
End: 2020-06-08
Payer: COMMERCIAL

## 2020-06-08 VITALS
WEIGHT: 242.4 LBS | SYSTOLIC BLOOD PRESSURE: 120 MMHG | TEMPERATURE: 96.3 F | BODY MASS INDEX: 35.9 KG/M2 | HEIGHT: 69 IN | DIASTOLIC BLOOD PRESSURE: 78 MMHG

## 2020-06-08 DIAGNOSIS — Z79.4 CONTROLLED TYPE 2 DIABETES MELLITUS WITH DIABETIC NEUROPATHY, WITH LONG-TERM CURRENT USE OF INSULIN (HCC): Primary | ICD-10-CM

## 2020-06-08 DIAGNOSIS — Z79.4 TYPE 2 DIABETES MELLITUS WITH DIABETIC POLYNEUROPATHY, WITH LONG-TERM CURRENT USE OF INSULIN (HCC): ICD-10-CM

## 2020-06-08 DIAGNOSIS — E11.42 TYPE 2 DIABETES MELLITUS WITH DIABETIC POLYNEUROPATHY, WITH LONG-TERM CURRENT USE OF INSULIN (HCC): ICD-10-CM

## 2020-06-08 DIAGNOSIS — I10 ESSENTIAL HYPERTENSION: ICD-10-CM

## 2020-06-08 DIAGNOSIS — I25.10 CORONARY ARTERIOSCLEROSIS IN NATIVE ARTERY: ICD-10-CM

## 2020-06-08 DIAGNOSIS — G25.0 TREMOR, ESSENTIAL: ICD-10-CM

## 2020-06-08 DIAGNOSIS — E11.40 CONTROLLED TYPE 2 DIABETES MELLITUS WITH DIABETIC NEUROPATHY, WITH LONG-TERM CURRENT USE OF INSULIN (HCC): Primary | ICD-10-CM

## 2020-06-08 LAB
CREAT UR-MCNC: 189 MG/DL
MICROALBUMIN UR-MCNC: 388 MG/L (ref 0–20)
MICROALBUMIN/CREAT 24H UR: 205 MG/G CREATININE (ref 0–30)

## 2020-06-08 PROCEDURE — 2022F DILAT RTA XM EVC RTNOPTHY: CPT | Performed by: FAMILY MEDICINE

## 2020-06-08 PROCEDURE — 82570 ASSAY OF URINE CREATININE: CPT

## 2020-06-08 PROCEDURE — 3060F POS MICROALBUMINURIA REV: CPT | Performed by: FAMILY MEDICINE

## 2020-06-08 PROCEDURE — 82043 UR ALBUMIN QUANTITATIVE: CPT

## 2020-06-08 PROCEDURE — 1036F TOBACCO NON-USER: CPT | Performed by: FAMILY MEDICINE

## 2020-06-08 PROCEDURE — 3074F SYST BP LT 130 MM HG: CPT | Performed by: FAMILY MEDICINE

## 2020-06-08 PROCEDURE — 3078F DIAST BP <80 MM HG: CPT | Performed by: FAMILY MEDICINE

## 2020-06-08 PROCEDURE — 1160F RVW MEDS BY RX/DR IN RCRD: CPT | Performed by: FAMILY MEDICINE

## 2020-06-08 PROCEDURE — 3008F BODY MASS INDEX DOCD: CPT | Performed by: FAMILY MEDICINE

## 2020-06-08 PROCEDURE — 4040F PNEUMOC VAC/ADMIN/RCVD: CPT | Performed by: FAMILY MEDICINE

## 2020-06-08 PROCEDURE — 99214 OFFICE O/P EST MOD 30 MIN: CPT | Performed by: FAMILY MEDICINE

## 2020-06-08 PROCEDURE — 3052F HG A1C>EQUAL 8.0%<EQUAL 9.0%: CPT | Performed by: FAMILY MEDICINE

## 2020-06-22 DIAGNOSIS — E11.42 TYPE 2 DIABETES MELLITUS WITH DIABETIC POLYNEUROPATHY, WITH LONG-TERM CURRENT USE OF INSULIN (HCC): ICD-10-CM

## 2020-06-22 DIAGNOSIS — Z79.4 TYPE 2 DIABETES MELLITUS WITH DIABETIC POLYNEUROPATHY, WITH LONG-TERM CURRENT USE OF INSULIN (HCC): ICD-10-CM

## 2020-07-09 DIAGNOSIS — Z79.4 TYPE 2 DIABETES MELLITUS WITH DIABETIC POLYNEUROPATHY, WITH LONG-TERM CURRENT USE OF INSULIN (HCC): ICD-10-CM

## 2020-07-09 DIAGNOSIS — E11.42 TYPE 2 DIABETES MELLITUS WITH DIABETIC POLYNEUROPATHY, WITH LONG-TERM CURRENT USE OF INSULIN (HCC): ICD-10-CM

## 2020-07-09 RX ORDER — GLIMEPIRIDE 4 MG/1
TABLET ORAL
Qty: 180 TABLET | Refills: 1 | Status: SHIPPED | OUTPATIENT
Start: 2020-07-09 | End: 2020-10-20 | Stop reason: SDUPTHER

## 2020-07-22 DIAGNOSIS — E03.9 HYPOTHYROIDISM, UNSPECIFIED TYPE: ICD-10-CM

## 2020-07-22 RX ORDER — LEVOTHYROXINE SODIUM 88 UG/1
88 TABLET ORAL
Qty: 90 TABLET | Refills: 0 | Status: SHIPPED | OUTPATIENT
Start: 2020-07-22 | End: 2020-10-20 | Stop reason: SDUPTHER

## 2020-07-27 NOTE — PROGRESS NOTES
[facility]         VIRTUAL TELEPHONE RETURN PATIENT NOTE    Patient: Paul Madrigal Loop Record Number: # [id number]  YOB: 1945  Date of visit: 7/30/2020    Virtual Brief Visit    ASSESSMENT     Problem List Items Addressed This Visit        Nervous and Auditory    Tremor, essential - Primary        Impression of this 68 yo gentleman returning for bilateral bothersome action tremor - suspected essential tremor, thus far unresponsive or finding side effects with many pharmacological options tried  This includes topiramate (terminated at low dose due to side effects), primidone (stopped at 200mg due to sedation, and gabapentin (600mg total tried but no effect), and CBD oil  Now propranolol 60mg LA q24hrs has generated no response  Occupational Therapy visits to work-around tremors with weighted utensils was not helpful  Although he was interested, his wife is averse to Deep Brain Stimulation option due to the risks involved and would not like to proceed with it at this time  It is also possible he has evolving dystonic-type tremor as the reason for his lack of response  In Sept 2019 visit he did display mild posturing of both shoulders and palms facing backwards with some abduction of the arms  However, the tremors themselves were not clearly dependent on any particular hand or finger posture-holding  His last visit with us was a telephone visit due to COVID precautions and unfortunately tremors could not be evaluated properly at that time as well, making it nearly 10 months since we last saw his tremors in-person  Given these limitations, we discussed several options to proceed - 1  titrating propranolol further up to 80mg daily  2  Switching to zonisamide titration, and 3  MRI-guided ultrasound thalamotomy on the L hemisphere for tremor management of the dominant hand  He will consider them   Proceed as below      PLAN      · We will message Dr Jing Vila if Inderal can be increased to 80mg LA daily and make the change if so  · Darrion Goel, we may start with zonisamide titration  · Afterwards, we may revisit the ultrasound thalamotomy option and possible referral to [facility] for this  · RTC in 4 months  I spent 25 minutes directly with the patient during this visit    Reason for visit is   Chief Complaint   Patient presents with    Virtual Brief Visit    Tremors    Follow-up      Encounter provider Murray Dorado MD    Provider located at [address] [facility] [address] [place] [address]    Recent Visits  Date Type Provider Dept   07/30/20 [address], MD [facility] [place]   Showing recent visits within past 7 days and meeting all other requirements     Future Appointments  No visits were found meeting these conditions  Showing future appointments within next 150 days and meeting all other requirements      After connecting through telephone, the patient was identified by name and date of birth  Shira Barahona was informed that this is a telemedicine visit and that the visit is being conducted through telephone  My office door was closed  No one else was in the room  He acknowledged consent and understanding of privacy and security of the platform  The patient has agreed to participate and understands he can discontinue the visit at any time  Patient is aware this is a billable service  Subjective  Shira Barahona is a 69 y o  R-handed male with HTN, CAD, and DMII with cardiac stents, who returns for tremor  Last encounter via telephone 4/16/2020 but Last in-person visit 9/19/19  No interim calls were made to office  He tells me today that he did not feel the Inderal was working for him and his handwriting is worse  His wife tells him that he has been shaking everyday  Tremors remain in his hands and none in head or legs   He denies any adverse effects yet with the Inderal dose  His wife does not want to proceed with Deep Brain Stimulation option  He has no further questions or concerns today  Past Medical History:   Diagnosis Date    BPH (benign prostatic hyperplasia)     CAD (coronary artery disease)     Diabetes mellitus (HCC)     niddm    Gout     High cholesterol     Hypertension     MI, 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      Past Surgical History:   Procedure Laterality Date    CATARACT EXTRACTION W/  INTRAOCULAR LENS IMPLANT      CORONARY ANGIOPLASTY WITH STENT PLACEMENT      stents x3    EYE SURGERY      repair corneal laceration    [place] COLONOSCOPY FLX DX W/COLLJ SPEC WHEN PFRMD N/A 3/21/2016    Procedure: COLONOSCOPY;  Surgeon: Shiloh Sterling MD;  Location: [facility];  Service: Colorectal    TONSILLECTOMY AND ADENOIDECTOMY       Current Outpatient Medications   Medication Sig Dispense Refill    allopurinol (ZYLOPRIM) 100 mg tablet TAKE ONE TABLET BY MOUTH EVERY DAY 90 tablet 1    aspirin 325 mg tablet Take 325 mg by mouth daily        atorvastatin (LIPITOR) 80 mg tablet Take 1 tablet (80 mg total) by mouth daily 90 tablet 3    carboxymethylcellulose 1 % ophthalmic solution Apply 1 drop to eye      escitalopram (LEXAPRO) 20 mg tablet TAKE ONE TABLET BY MOUTH DAILY 90 tablet 1    gabapentin (NEURONTIN) 300 mg capsule Take 1 capsule (300 mg total) by mouth daily at bedtime 90 capsule 1    glimepiride (AMARYL) 4 mg tablet TAKE 1 TABLET TWICE A  tablet 1    levothyroxine 88 mcg tablet Take 1 tablet (88 mcg total) by mouth daily in the early morning 90 tablet 0    metFORMIN (GLUCOPHAGE) 1000 MG tablet Take 1 tablet (1,000 mg total) by mouth 2 (two) times a day with meals 180 tablet 3    multivitamin (THERAGRAN) TABS Take 1 tablet by mouth daily      nitroglycerin (NITROSTAT) 0 4 mg SL tablet Place 1 tablet (0 4 mg total) under the tongue every 5 (five) minutes as needed for chest pain 25 tablet 3    propranolol (INDERAL LA) 60 mg 24 hr capsule Take 1 capsule (60 mg total) by mouth daily 90 capsule 4     No current facility-administered medications for this visit  No Known Allergies    ROS  Constitutional: Negative  Negative for appetite change and fever  HENT: Negative  Negative for hearing loss, tinnitus, trouble swallowing and voice change  Eyes: Negative  Negative for photophobia and pain  Respiratory: Negative  Negative for shortness of breath  Cardiovascular: Negative  Negative for palpitations  Gastrointestinal: Negative  Negative for nausea and vomiting  Endocrine: Negative  Negative for cold intolerance  Genitourinary: Negative  Negative for dysuria, frequency and urgency  Musculoskeletal: Negative  Negative for myalgias and neck pain  Skin: Negative  Negative for rash  Allergic/Immunologic: Negative  Neurological: Negative  Negative for dizziness, tremors, seizures, syncope, facial asymmetry, speech difficulty, weakness, light-headedness, numbness and headaches  Hematological: Negative  Does not bruise/bleed easily  Psychiatric/Behavioral: Positive for confusion  Negative for hallucinations and sleep disturbance  Short term memory    EXAM    Vitals:    07/30/20 0812   Weight: 111 kg (245 lb)   Height: 5' 8" (1 727 m)     Cognitive and Mental Exam:  The patient is alert, oriented to self, location, date and situation  Tremors could not be evaluated visually on this encounter type  I spent 25 minutes directly with the patient during this visit    VIRTUAL VISIT DISCLAIMER    Ramirez Smith acknowledges that he has consented to an online visit or consultation  He understands that the online visit is based solely on information provided by him, and that, in the absence of a face-to-face physical evaluation by the physician, the diagnosis he receives is both limited and provisional in terms of accuracy and completeness   This is not intended to replace a full medical face-to-face evaluation by the physician  Josie Milton understands and accepts these terms

## 2020-07-30 ENCOUNTER — TELEMEDICINE (OUTPATIENT)
Dept: NEUROLOGY | Facility: CLINIC | Age: 75
End: 2020-07-30
Payer: COMMERCIAL

## 2020-07-30 VITALS — WEIGHT: 245 LBS | HEIGHT: 68 IN | BODY MASS INDEX: 37.13 KG/M2

## 2020-07-30 DIAGNOSIS — G25.0 TREMOR, ESSENTIAL: Primary | ICD-10-CM

## 2020-07-30 PROCEDURE — 99443 PR PHYS/QHP TELEPHONE EVALUATION 21-30 MIN: CPT | Performed by: PSYCHIATRY & NEUROLOGY

## 2020-07-30 NOTE — LETTER
July 31, 2020     Diego Fuller DO  [address]    Patient: Evangelina Saavedra   YOB: 1945   Date of Visit: 7/30/2020       Dear Dr Donald Do:    Earlier today I spoke to Mr  Jazmyn Ryan for evaluation of his suspected essential tremors  Below are my notes for this visit for your records and to keep you updated on his health status  If you have questions, please do not hesitate to call me  Sincerely,        Sulaiman Reinoso MD        CC: No Recipients  Sulaiman Reinoso MD  7/31/2020  1:02 AM  [facility]  DEPARTMENT OF NEUROLOGICAL SCIENCES  [address]         VIRTUAL TELEPHONE RETURN PATIENT NOTE    Patient: [address] Coleman Record Number: # [id number]  YOB: 1945  Date of visit: 7/30/2020    Virtual Brief Visit    ASSESSMENT     Problem List Items Addressed This Visit        Nervous and Auditory    Tremor, essential - Primary        Impression of this 70 yo gentleman returning for bilateral bothersome action tremor - suspected essential tremor, thus far unresponsive or finding side effects with many pharmacological options tried  This includes topiramate (terminated at low dose due to side effects), primidone (stopped at 200mg due to sedation, and gabapentin (600mg total tried but no effect), and CBD oil  Now propranolol 60mg LA q24hrs has generated no response  Occupational Therapy visits to work-around tremors with weighted utensils was not helpful  Although he was interested, his wife is averse to Deep Brain Stimulation option due to the risks involved and would not like to proceed with it at this time  It is also possible he has evolving dystonic-type tremor as the reason for his lack of response  In Sept 2019 visit he did display mild posturing of both shoulders and palms facing backwards with some abduction of the arms   However, the tremors themselves were not clearly dependent on any particular hand or finger posture-holding  His last visit with us was a telephone visit due to COVID precautions and unfortunately tremors could not be evaluated properly at that time as well, making it nearly 10 months since we last saw his tremors in-person  Given these limitations, we discussed several options to proceed - 1  titrating propranolol further up to 80mg daily  2  Switching to zonisamide titration, and 3  MRI-guided ultrasound thalamotomy on the L hemisphere for tremor management of the dominant hand  He will consider them  Proceed as below  PLAN      · We will message Dr Lawyer Wyman if Inderal can be increased to 80mg LA daily and make the change if so  · Antony Dumont, we may start with zonisamide titration  · Afterwards, we may revisit the ultrasound thalamotomy option and possible referral to [facility] for this  · RTC in 4 months  I spent 25 minutes directly with the patient during this visit    Reason for visit is   Chief Complaint   Patient presents with    Virtual Brief Visit    Tremors    Follow-up      Encounter provider Rui Campbell MD    Provider located at [facility] [address]    Recent Visits  Date Type Provider Dept   07/30/20 [address], MD [facility] [place]   Showing recent visits within past 7 days and meeting all other requirements     Future Appointments  No visits were found meeting these conditions  Showing future appointments within next 150 days and meeting all other requirements      After connecting through telephone, the patient was identified by name and date of birth  Amauri Campbell was informed that this is a telemedicine visit and that the visit is being conducted through telephone  My office door was closed  No one else was in the room  He acknowledged consent and understanding of privacy and security of the platform   The patient has agreed to participate and understands he can discontinue the visit at any time  Patient is aware this is a billable service  Subjective  Traci Dodge is a 69 y o  R-handed male with HTN, CAD, and DMII with cardiac stents, who returns for tremor  Last encounter via telephone 4/16/2020 but Last in-person visit 9/19/19  No interim calls were made to office  He tells me today that he did not feel the Inderal was working for him and his handwriting is worse  His wife tells him that he has been shaking everyday  Tremors remain in his hands and none in head or legs  He denies any adverse effects yet with the Inderal dose  His wife does not want to proceed with Deep Brain Stimulation option  He has no further questions or concerns today  Past Medical History:   Diagnosis Date    BPH (benign prostatic hyperplasia)     CAD (coronary artery disease)     Diabetes mellitus (HCC)     niddm    Gout     High cholesterol     Hypertension     MI, 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      Past Surgical History:   Procedure Laterality Date    CATARACT EXTRACTION W/  INTRAOCULAR LENS IMPLANT      CORONARY ANGIOPLASTY WITH STENT PLACEMENT      stents x3    EYE SURGERY      repair corneal laceration    [place] COLONOSCOPY FLX DX W/COLLJ SPEC WHEN PFRMD N/A 3/21/2016    Procedure: COLONOSCOPY;  Surgeon: Lizeth Mariano MD;  Location: [facility];  Service: Colorectal    TONSILLECTOMY AND ADENOIDECTOMY       Current Outpatient Medications   Medication Sig Dispense Refill    allopurinol (ZYLOPRIM) 100 mg tablet TAKE ONE TABLET BY MOUTH EVERY DAY 90 tablet 1    aspirin 325 mg tablet Take 325 mg by mouth daily        atorvastatin (LIPITOR) 80 mg tablet Take 1 tablet (80 mg total) by mouth daily 90 tablet 3    carboxymethylcellulose 1 % ophthalmic solution Apply 1 drop to eye      escitalopram (LEXAPRO) 20 mg tablet TAKE ONE TABLET BY MOUTH DAILY 90 tablet 1    gabapentin (NEURONTIN) 300 mg capsule Take 1 capsule (300 mg total) by mouth daily at bedtime 90 capsule 1    glimepiride (AMARYL) 4 mg tablet TAKE 1 TABLET TWICE A  tablet 1    levothyroxine 88 mcg tablet Take 1 tablet (88 mcg total) by mouth daily in the early morning 90 tablet 0    metFORMIN (GLUCOPHAGE) 1000 MG tablet Take 1 tablet (1,000 mg total) by mouth 2 (two) times a day with meals 180 tablet 3    multivitamin (THERAGRAN) TABS Take 1 tablet by mouth daily      nitroglycerin (NITROSTAT) 0 4 mg SL tablet Place 1 tablet (0 4 mg total) under the tongue every 5 (five) minutes as needed for chest pain 25 tablet 3    propranolol (INDERAL LA) 60 mg 24 hr capsule Take 1 capsule (60 mg total) by mouth daily 90 capsule 4     No current facility-administered medications for this visit  No Known Allergies    ROS  Constitutional: Negative  Negative for appetite change and fever  HENT: Negative  Negative for hearing loss, tinnitus, trouble swallowing and voice change  Eyes: Negative  Negative for photophobia and pain  Respiratory: Negative  Negative for shortness of breath  Cardiovascular: Negative  Negative for palpitations  Gastrointestinal: Negative  Negative for nausea and vomiting  Endocrine: Negative  Negative for cold intolerance  Genitourinary: Negative  Negative for dysuria, frequency and urgency  Musculoskeletal: Negative  Negative for myalgias and neck pain  Skin: Negative  Negative for rash  Allergic/Immunologic: Negative  Neurological: Negative  Negative for dizziness, tremors, seizures, syncope, facial asymmetry, speech difficulty, weakness, light-headedness, numbness and headaches  Hematological: Negative  Does not bruise/bleed easily  Psychiatric/Behavioral: Positive for confusion  Negative for hallucinations and sleep disturbance          Short term memory    EXAM    Vitals:    07/30/20 0812   Weight: 111 kg (245 lb)   Height: 5' 8" (1 727 m)     Cognitive and Mental Exam:  The patient is alert, oriented to self, location, date and situation  Tremors could not be evaluated visually on this encounter type  I spent 25 minutes directly with the patient during this visit    VIRTUAL VISIT DISCLAIMER    Traci Dar acknowledges that he has consented to an online visit or consultation  He understands that the online visit is based solely on information provided by him, and that, in the absence of a face-to-face physical evaluation by the physician, the diagnosis he receives is both limited and provisional in terms of accuracy and completeness  This is not intended to replace a full medical face-to-face evaluation by the physician  Traci Dodge understands and accepts these terms  Leonela Baker MD  7/30/2020  9:13 AM  Sign at close encounter  [facility]         VIRTUAL TELEPHONE RETURN PATIENT NOTE    Patient: [address] Record Number: # [id number]  YOB: 1945  Date of visit: 7/30/2020    Virtual Brief Visit    ASSESSMENT     Problem List Items Addressed This Visit     None        Impression of this 68 yo gentleman returning for bilateral bothersome action tremor - suspected essential tremor, thus far unresponsive or finding side effects with many pharmacological options tried  This includes topiramate (terminated at low dose due to side effects), primidone (stopped at 200mg due to sedation, and gabapentin (600mg total tried but no effect), and CBD oil  Now propranolol 60mg LA q24hrs has generated no response  Occupational Therapy visits to work-around tremors with weighted utensils was not helpful  Although he was interested, his wife is averse to Deep Brain Stimulation option due to the risks involved and would not like to proceed with it at this time  It is also possible he has evolving dystonic-type tremor as the reason for his lack of response   In Sept 2019 visit he did display mild posturing of both shoulders and palms facing backwards with some abduction of the arms  However, the tremors themselves were not clearly dependent on any particular hand or finger posture-holding  His last visit with us was a telephone visit due to COVID precautions and unfortunately tremors could not be evaluated properly at that time as well, making it nearly 10 months since we last saw his tremors in-person  Given these limitations, we discussed several options to proceed - 1  titrating propranolol further up to 80mg daily  2  Switching to zonisamide titration, and 3  MRI-guided ultrasound thalamotomy on the L hemisphere for tremor management of the dominant hand  He will consider them  Proceed as below  PLAN      · We will message Dr June Houser if Inderal can be increased to 80mg LA daily and make the change if so  · Luis Manuel Meeks, we may start with zonisamide titration  · Afterwards, we may revisit the ultrasound thalamotomy option and possible referral to [facility] for this  · RTC in 4 months  I spent 25 minutes directly with the patient during this visit    Reason for visit is   Chief Complaint   Patient presents with    Virtual Brief Visit      Encounter provider Landen Valentin MD    Provider located at [address] [place] [id number]    Recent Visits  No visits were found meeting these conditions  Showing recent visits within past 7 days and meeting all other requirements     Today's Visits  Date Type Provider Dept   07/30/20 Telemedicine Landen Valentin MD [facility] [place]   Showing today's visits and meeting all other requirements     Future Appointments  No visits were found meeting these conditions  Showing future appointments within next 150 days and meeting all other requirements      After connecting through telephone, the patient was identified by name and date of birth   Emily Marrero was informed that this is a telemedicine visit and that the visit is being conducted through telephone  My office door was closed  No one else was in the room  He acknowledged consent and understanding of privacy and security of the platform  The patient has agreed to participate and understands he can discontinue the visit at any time  Patient is aware this is a billable service  Subjective  Philip Grubbs is a 69 y o  R-handed male with HTN, CAD, and DMII with cardiac stents, who returns for tremor  Last encounter via telephone 4/16/2020 but Last in-person visit 9/19/19  No interim calls were made to office  He tells me today that he did not feel the Inderal was working for him and his handwriting is worse  His wife tells him that he has been shaking everyday  Tremors remain in his hands and none in head or legs  He denies any adverse effects yet with the Inderal dose  His wife does not want to proceed with Deep Brain Stimulation option  He has no further questions or concerns today  Past Medical History:   Diagnosis Date    BPH (benign prostatic hyperplasia)     CAD (coronary artery disease)     Diabetes mellitus (HCC)     niddm    Gout     High cholesterol     Hypertension     MI, 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      Past Surgical History:   Procedure Laterality Date    CATARACT EXTRACTION W/  INTRAOCULAR LENS IMPLANT      CORONARY ANGIOPLASTY WITH STENT PLACEMENT      stents x3    EYE SURGERY      repair corneal laceration    [place] COLONOSCOPY FLX DX W/COLLJ SPEC WHEN PFRMD N/A 3/21/2016    Procedure: COLONOSCOPY;  Surgeon: Karen Corley MD;  Location: [facility];  Service: Colorectal    TONSILLECTOMY AND ADENOIDECTOMY       Current Outpatient Medications   Medication Sig Dispense Refill    allopurinol (ZYLOPRIM) 100 mg tablet TAKE ONE TABLET BY MOUTH EVERY DAY 90 tablet 1    aspirin 325 mg tablet Take 325 mg by mouth daily        atorvastatin (LIPITOR) 80 mg tablet Take 1 tablet (80 mg total) by mouth daily 90 tablet 3    carboxymethylcellulose 1 % ophthalmic solution Apply 1 drop to eye      escitalopram (LEXAPRO) 20 mg tablet TAKE ONE TABLET BY MOUTH DAILY 90 tablet 1    gabapentin (NEURONTIN) 300 mg capsule Take 1 capsule (300 mg total) by mouth daily at bedtime 90 capsule 1    glimepiride (AMARYL) 4 mg tablet TAKE 1 TABLET TWICE A  tablet 1    levothyroxine 88 mcg tablet Take 1 tablet (88 mcg total) by mouth daily in the early morning 90 tablet 0    metFORMIN (GLUCOPHAGE) 1000 MG tablet Take 1 tablet (1,000 mg total) by mouth 2 (two) times a day with meals 180 tablet 3    multivitamin (THERAGRAN) TABS Take 1 tablet by mouth daily      nitroglycerin (NITROSTAT) 0 4 mg SL tablet Place 1 tablet (0 4 mg total) under the tongue every 5 (five) minutes as needed for chest pain 25 tablet 3    propranolol (INDERAL LA) 60 mg 24 hr capsule Take 1 capsule (60 mg total) by mouth daily 90 capsule 4     No current facility-administered medications for this visit  No Known Allergies    ROS  Constitutional: Negative  Negative for appetite change and fever  HENT: Negative  Negative for hearing loss, tinnitus, trouble swallowing and voice change  Eyes: Negative  Negative for photophobia and pain  Respiratory: Negative  Negative for shortness of breath  Cardiovascular: Negative  Negative for palpitations  Gastrointestinal: Negative  Negative for nausea and vomiting  Endocrine: Negative  Negative for cold intolerance  Genitourinary: Negative  Negative for dysuria, frequency and urgency  Musculoskeletal: Negative  Negative for myalgias and neck pain  Skin: Negative  Negative for rash  Allergic/Immunologic: Negative  Neurological: Negative  Negative for dizziness, tremors, seizures, syncope, facial asymmetry, speech difficulty, weakness, light-headedness, numbness and headaches  Hematological: Negative  Does not bruise/bleed easily  Psychiatric/Behavioral: Positive for confusion   Negative for hallucinations and sleep disturbance  Short term memory    EXAM    Vitals:    07/30/20 0812   Weight: 111 kg (245 lb)   Height: 5' 8" (1 727 m)     Cognitive and Mental Exam:  The patient is alert, oriented to self, location, date and situation  Tremors could not be evaluated visually on this encounter type  I spent 25 minutes directly with the patient during this visit    VIRTUAL VISIT DISCLAIMER    Alicia Marquez acknowledges that he has consented to an online visit or consultation  He understands that the online visit is based solely on information provided by him, and that, in the absence of a face-to-face physical evaluation by the physician, the diagnosis he receives is both limited and provisional in terms of accuracy and completeness  This is not intended to replace a full medical face-to-face evaluation by the physician  Alicia Marquez understands and accepts these terms

## 2020-07-30 NOTE — Clinical Note
Heather Elliott, I'm treating Mr Tomie Ahumada tremor, one that has been difficult to treat  Would you be ok if I increased the Inderal dose to 80mg daily? I was thinking of trying it before using another alternative   Thank Lukas Dunaway

## 2020-07-30 NOTE — PROGRESS NOTES
Virtual Brief Visit    Assessment/Plan:    Problem List Items Addressed This Visit     None                Reason for visit is   Chief Complaint   Patient presents with    Virtual Brief Visit        Encounter provider Laurel Vickers MD    Provider located at [facility] [facility] [address]    Recent Visits  No visits were found meeting these conditions  Showing recent visits within past 7 days and meeting all other requirements     Today's Visits  Date Type Provider Dept   07/30/20 Telemedicine Laurel Vickers MD [facility]   Showing today's visits and meeting all other requirements     Future Appointments  No visits were found meeting these conditions  Showing future appointments within next 150 days and meeting all other requirements        After connecting through {AMB VIRTUAL VISIT CDNJPO:18945}, the patient was identified by name and date of birth  Pilar Bernheim was informed that this is a telemedicine visit and that the visit is being conducted through {AMB CORONAVIRUS VISIT GGRB:99437}  {Telemedicine confidentiality :59865} {Telemedicine participants:64871}  He acknowledged consent and understanding of privacy and security of the platform  The patient has agreed to participate and understands he can discontinue the visit at any time  Patient is aware this is a billable service  Subjective    Pilar Bernheim is a 69 y o  male ***    HPI     Past Medical History:   Diagnosis Date    BPH (benign prostatic hyperplasia)     CAD (coronary artery disease)     Diabetes mellitus ([address] )     niddm    Gout     High cholesterol     Hypertension     MI, 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        Past Surgical History:   Procedure Laterality Date    CATARACT EXTRACTION W/  INTRAOCULAR LENS IMPLANT      CORONARY ANGIOPLASTY WITH STENT PLACEMENT      stents x3    EYE SURGERY      repair corneal laceration    OR COLONOSCOPY FLX DX W/COLLJ SPEC WHEN PFRMD N/A 3/21/2016    Procedure: COLONOSCOPY;  Surgeon: Hitesh Sue MD;  Location: [facility];  Service: Colorectal    TONSILLECTOMY AND ADENOIDECTOMY         Current Outpatient Medications   Medication Sig Dispense Refill    allopurinol (ZYLOPRIM) 100 mg tablet TAKE ONE TABLET BY MOUTH EVERY DAY 90 tablet 1    aspirin 325 mg tablet Take 325 mg by mouth daily   atorvastatin (LIPITOR) 80 mg tablet Take 1 tablet (80 mg total) by mouth daily 90 tablet 3    carboxymethylcellulose 1 % ophthalmic solution Apply 1 drop to eye      escitalopram (LEXAPRO) 20 mg tablet TAKE ONE TABLET BY MOUTH DAILY 90 tablet 1    gabapentin (NEURONTIN) 300 mg capsule Take 1 capsule (300 mg total) by mouth daily at bedtime 90 capsule 1    glimepiride (AMARYL) 4 mg tablet TAKE 1 TABLET TWICE A  tablet 1    levothyroxine 88 mcg tablet Take 1 tablet (88 mcg total) by mouth daily in the early morning 90 tablet 0    metFORMIN (GLUCOPHAGE) 1000 MG tablet Take 1 tablet (1,000 mg total) by mouth 2 (two) times a day with meals 180 tablet 3    multivitamin (THERAGRAN) TABS Take 1 tablet by mouth daily      nitroglycerin (NITROSTAT) 0 4 mg SL tablet Place 1 tablet (0 4 mg total) under the tongue every 5 (five) minutes as needed for chest pain 25 tablet 3    propranolol (INDERAL LA) 60 mg 24 hr capsule Take 1 capsule (60 mg total) by mouth daily 90 capsule 4     No current facility-administered medications for this visit  No Known Allergies    Review of Systems    Vitals:    07/30/20 0812   Weight: 111 kg (245 lb)   Height: 5' 8" (1 727 m)         {covid time spent:49569}    VIRTUAL VISIT DISCLAIMER    Ema Tran acknowledges that he has consented to an online visit or consultation   He understands that the online visit is based solely on information provided by him, and that, in the absence of a face-to-face physical evaluation by the physician, the diagnosis he receives is both limited and provisional in terms of accuracy and completeness  This is not intended to replace a full medical face-to-face evaluation by the physician  Alicia Marquez understands and accepts these terms

## 2020-07-30 NOTE — PATIENT INSTRUCTIONS
· We will message Dr Amira Zhang if Inderal can be increased to 80mg LA daily and make the change if so  · Lauren Antonio, we may start with zonisamide titration  · Afterwards, we may revisit the ultrasound thalamotomy option and possible referral to [facility] for this      · RTC in 4 months with providers in [facility] office - Lev Galvin or Dr Jessie Galeano

## 2020-07-30 NOTE — PROGRESS NOTES
Review of Systems   Constitutional: Negative  Negative for appetite change and fever  HENT: Negative  Negative for hearing loss, tinnitus, trouble swallowing and voice change  Eyes: Negative  Negative for photophobia and pain  Respiratory: Negative  Negative for shortness of breath  Cardiovascular: Negative  Negative for palpitations  Gastrointestinal: Negative  Negative for nausea and vomiting  Endocrine: Negative  Negative for cold intolerance  Genitourinary: Negative  Negative for dysuria, frequency and urgency  Musculoskeletal: Negative  Negative for myalgias and neck pain  Skin: Negative  Negative for rash  Allergic/Immunologic: Negative  Neurological: Negative  Negative for dizziness, tremors, seizures, syncope, facial asymmetry, speech difficulty, weakness, light-headedness, numbness and headaches  Hematological: Negative  Does not bruise/bleed easily  Psychiatric/Behavioral: Positive for confusion  Negative for hallucinations and sleep disturbance          Short term memory

## 2020-07-30 NOTE — Clinical Note
Joanna Wick, would you please mail the AVS to the patient and schedule a 4 month (or so) f/u please with [facility] in [facility]?  Thank you

## 2020-08-25 ENCOUNTER — OFFICE VISIT (OUTPATIENT)
Dept: OBGYN CLINIC | Facility: HOSPITAL | Age: 75
End: 2020-08-25
Payer: COMMERCIAL

## 2020-08-25 VITALS
DIASTOLIC BLOOD PRESSURE: 72 MMHG | HEIGHT: 68 IN | HEART RATE: 80 BPM | SYSTOLIC BLOOD PRESSURE: 137 MMHG | BODY MASS INDEX: 37.25 KG/M2

## 2020-08-25 DIAGNOSIS — M25.561 CHRONIC PAIN OF BOTH KNEES: ICD-10-CM

## 2020-08-25 DIAGNOSIS — M25.562 CHRONIC PAIN OF BOTH KNEES: ICD-10-CM

## 2020-08-25 DIAGNOSIS — G89.29 CHRONIC PAIN OF BOTH KNEES: ICD-10-CM

## 2020-08-25 DIAGNOSIS — M17.0 PRIMARY OSTEOARTHRITIS OF BOTH KNEES: Primary | ICD-10-CM

## 2020-08-25 PROCEDURE — 3075F SYST BP GE 130 - 139MM HG: CPT | Performed by: ORTHOPAEDIC SURGERY

## 2020-08-25 PROCEDURE — 3060F POS MICROALBUMINURIA REV: CPT | Performed by: ORTHOPAEDIC SURGERY

## 2020-08-25 PROCEDURE — 1160F RVW MEDS BY RX/DR IN RCRD: CPT | Performed by: ORTHOPAEDIC SURGERY

## 2020-08-25 PROCEDURE — 99213 OFFICE O/P EST LOW 20 MIN: CPT | Performed by: ORTHOPAEDIC SURGERY

## 2020-08-25 PROCEDURE — 2022F DILAT RTA XM EVC RTNOPTHY: CPT | Performed by: ORTHOPAEDIC SURGERY

## 2020-08-25 PROCEDURE — 3052F HG A1C>EQUAL 8.0%<EQUAL 9.0%: CPT | Performed by: ORTHOPAEDIC SURGERY

## 2020-08-25 PROCEDURE — 3078F DIAST BP <80 MM HG: CPT | Performed by: ORTHOPAEDIC SURGERY

## 2020-08-25 PROCEDURE — 4040F PNEUMOC VAC/ADMIN/RCVD: CPT | Performed by: ORTHOPAEDIC SURGERY

## 2020-08-25 PROCEDURE — 20610 DRAIN/INJ JOINT/BURSA W/O US: CPT | Performed by: ORTHOPAEDIC SURGERY

## 2020-08-25 PROCEDURE — 1036F TOBACCO NON-USER: CPT | Performed by: ORTHOPAEDIC SURGERY

## 2020-08-25 RX ORDER — LIDOCAINE HYDROCHLORIDE 10 MG/ML
2 INJECTION, SOLUTION INFILTRATION; PERINEURAL
Status: COMPLETED | OUTPATIENT
Start: 2020-08-25 | End: 2020-08-25

## 2020-08-25 RX ORDER — KETOROLAC TROMETHAMINE 30 MG/ML
60 INJECTION, SOLUTION INTRAMUSCULAR; INTRAVENOUS
Status: COMPLETED | OUTPATIENT
Start: 2020-08-25 | End: 2020-08-25

## 2020-08-25 RX ORDER — BUPIVACAINE HYDROCHLORIDE 2.5 MG/ML
2 INJECTION, SOLUTION INFILTRATION; PERINEURAL
Status: COMPLETED | OUTPATIENT
Start: 2020-08-25 | End: 2020-08-25

## 2020-08-25 RX ADMIN — KETOROLAC TROMETHAMINE 60 MG: 30 INJECTION, SOLUTION INTRAMUSCULAR; INTRAVENOUS at 14:02

## 2020-08-25 RX ADMIN — BUPIVACAINE HYDROCHLORIDE 2 ML: 2.5 INJECTION, SOLUTION INFILTRATION; PERINEURAL at 14:02

## 2020-08-25 RX ADMIN — LIDOCAINE HYDROCHLORIDE 2 ML: 10 INJECTION, SOLUTION INFILTRATION; PERINEURAL at 14:02

## 2020-08-25 NOTE — PROGRESS NOTES
Assessment:   Diagnosis ICD-10-CM Associated Orders   1  Primary osteoarthritis of both knees  M17 0 Large joint arthrocentesis: bilateral knee   2  Chronic pain of both knees  M25 561 Large joint arthrocentesis: bilateral knee    M25 562     G89 29        Plan:  Bilateral knees known osteoarthritis  Patient was offered, accepted, performed injection of Marcaine, lidocaine, Toradol for symptomatic relief  Patient tolerated both injections well  Ice and post injection protocol advised  Weight-bearing activities as tolerated  To do next visit:  Return in about 3 months (around 11/25/2020) for re-check in Synvisc-One to both knees  The above stated was discussed in layman's terms and the patient expressed understanding  All questions were answered to the patient's satisfaction  Scribe Attestation    I,:   Az Garza am acting as a scribe while in the presence of the attending physician :        I,:   Gosia Gamble MD personally performed the services described in this documentation    as scribed in my presence :              Subjective:   Ramirez Smith is a 69 y o  male who presents repeat evaluation of his bilateral knees, known osteoarthritis  He has return of pain that increases with prolonged weight-bearing activities  She uses a single cane for ambulatory assistance  He continues to find relief with intermittent injections of Toradol and Synvisc-One injections  At his visit 3 months ago Synvisc-One injections were administered with relief up until recently        Review of systems negative unless otherwise specified in HPI    Past Medical History:   Diagnosis Date    BPH (benign prostatic hyperplasia)     CAD (coronary artery disease)     Diabetes mellitus [address] [address] )     niddm    Gout     High cholesterol     Hypertension     MI, 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        Past Surgical History:   Procedure Laterality Date    CATARACT EXTRACTION W/  INTRAOCULAR LENS IMPLANT      CORONARY ANGIOPLASTY WITH STENT PLACEMENT      stents x3    EYE SURGERY      repair corneal laceration    NM COLONOSCOPY FLX DX W/COLLJ SPEC WHEN PFRMD N/A 3/21/2016    Procedure: COLONOSCOPY;  Surgeon: Yessi Celestin MD;  Location: [facility];  Service: Colorectal    TONSILLECTOMY AND ADENOIDECTOMY         Family History   Adopted: Yes   Problem Relation Age of Onset    No Known Problems Mother     No Known Problems Father        Social History     Occupational History    Not on file   Tobacco Use    Smoking status: Never Smoker    Smokeless tobacco: Never Used   Substance and Sexual Activity    Alcohol use: Yes     Comment: socially, cut down in 2008, previously did more than     Drug use: Never    Sexual activity: Yes     Partners: Female         Current Outpatient Medications:     allopurinol (ZYLOPRIM) 100 mg tablet, TAKE ONE TABLET BY MOUTH EVERY DAY, Disp: 90 tablet, Rfl: 1    aspirin 325 mg tablet, Take 325 mg by mouth daily  , Disp: , Rfl:     atorvastatin (LIPITOR) 80 mg tablet, Take 1 tablet (80 mg total) by mouth daily, Disp: 90 tablet, Rfl: 3    carboxymethylcellulose 1 % ophthalmic solution, Apply 1 drop to eye, Disp: , Rfl:     escitalopram (LEXAPRO) 20 mg tablet, TAKE ONE TABLET BY MOUTH DAILY, Disp: 90 tablet, Rfl: 1    gabapentin (NEURONTIN) 300 mg capsule, Take 1 capsule (300 mg total) by mouth daily at bedtime, Disp: 90 capsule, Rfl: 1    glimepiride (AMARYL) 4 mg tablet, TAKE 1 TABLET TWICE A DAY, Disp: 180 tablet, Rfl: 1    levothyroxine 88 mcg tablet, Take 1 tablet (88 mcg total) by mouth daily in the early morning, Disp: 90 tablet, Rfl: 0    metFORMIN (GLUCOPHAGE) 1000 MG tablet, Take 1 tablet (1,000 mg total) by mouth 2 (two) times a day with meals, Disp: 180 tablet, Rfl: 3    multivitamin (THERAGRAN) TABS, Take 1 tablet by mouth daily, Disp: , Rfl:     nitroglycerin (NITROSTAT) 0 4 mg SL tablet, Place 1 tablet (0 4 mg total) under the tongue every 5 (five) minutes as needed for chest pain, Disp: 25 tablet, Rfl: 3    propranolol (INDERAL LA) 60 mg 24 hr capsule, Take 1 capsule (60 mg total) by mouth daily, Disp: 90 capsule, Rfl: 4    No Known Allergies         Vitals:    08/25/20 1324   BP: 137/72   Pulse: 80       Objective:                    Right Knee Exam     Muscle Strength   The patient has normal right knee strength  Tenderness   The patient is experiencing tenderness in the medial joint line  Range of Motion   The patient has normal right knee ROM  Right knee flexion: With crepitation and stiffness  Other   Erythema: absent  Sensation: normal  Swelling: mild  Effusion: no effusion present      Left Knee Exam     Muscle Strength   The patient has normal left knee strength  Tenderness   The patient is experiencing tenderness in the medial joint line  Range of Motion   The patient has normal left knee ROM  Left knee flexion: With crepitation and stiffness  Other   Erythema: absent  Sensation: normal  Swelling: mild  Effusion: no effusion present    Comments:     Both knees are in mild varus alignment            Diagnostics, reviewed and taken today if performed as documented:    None performed            Procedures, if performed today:    Large joint arthrocentesis: bilateral knee  Date/Time: 8/25/2020 2:02 PM  Consent given by: patient  Site marked: site marked  Supporting Documentation  Indications: pain   Procedure Details  Location: knee - bilateral knee  Preparation: Patient was prepped and draped in the usual sterile fashion  Needle size: 22 G  Ultrasound guidance: no  Approach: anteromedial    Medications (Right): 2 mL bupivacaine 0 25 %; 2 mL lidocaine 1 %; 60 mg ketorolac 60 mg/2 mLMedications (Left): 2 mL bupivacaine 0 25 %; 2 mL lidocaine 1 %; 60 mg ketorolac 60 mg/2 mL   Patient tolerance: patient tolerated the procedure well with no immediate complications  Dressing:  Sterile dressing applied              Portions of the record may have been created with voice recognition software  Occasional wrong word or "sound a like" substitutions may have occurred due to the inherent limitations of voice recognition software  Read the chart carefully and recognize, using context, where substitutions have occurred

## 2020-09-28 ENCOUNTER — OFFICE VISIT (OUTPATIENT)
Dept: FAMILY MEDICINE CLINIC | Facility: CLINIC | Age: 75
End: 2020-09-28
Payer: COMMERCIAL

## 2020-09-28 ENCOUNTER — TELEPHONE (OUTPATIENT)
Dept: ADMINISTRATIVE | Facility: OTHER | Age: 75
End: 2020-09-28

## 2020-09-28 ENCOUNTER — LAB (OUTPATIENT)
Dept: LAB | Facility: HOSPITAL | Age: 75
End: 2020-09-28
Attending: FAMILY MEDICINE
Payer: COMMERCIAL

## 2020-09-28 VITALS
BODY MASS INDEX: 35.22 KG/M2 | HEIGHT: 68 IN | DIASTOLIC BLOOD PRESSURE: 56 MMHG | TEMPERATURE: 96.6 F | SYSTOLIC BLOOD PRESSURE: 108 MMHG | WEIGHT: 232.4 LBS

## 2020-09-28 DIAGNOSIS — G25.0 TREMOR, ESSENTIAL: ICD-10-CM

## 2020-09-28 DIAGNOSIS — Z79.4 TYPE 2 DIABETES MELLITUS WITH DIABETIC POLYNEUROPATHY, WITH LONG-TERM CURRENT USE OF INSULIN (HCC): ICD-10-CM

## 2020-09-28 DIAGNOSIS — E11.40 CONTROLLED TYPE 2 DIABETES MELLITUS WITH DIABETIC NEUROPATHY, WITH LONG-TERM CURRENT USE OF INSULIN (HCC): ICD-10-CM

## 2020-09-28 DIAGNOSIS — Z23 NEED FOR INFLUENZA VACCINATION: Primary | ICD-10-CM

## 2020-09-28 DIAGNOSIS — Z79.4 CONTROLLED TYPE 2 DIABETES MELLITUS WITH DIABETIC NEUROPATHY, WITH LONG-TERM CURRENT USE OF INSULIN (HCC): ICD-10-CM

## 2020-09-28 DIAGNOSIS — R25.1 TREMOR: ICD-10-CM

## 2020-09-28 DIAGNOSIS — I10 ESSENTIAL HYPERTENSION: ICD-10-CM

## 2020-09-28 DIAGNOSIS — E11.42 TYPE 2 DIABETES MELLITUS WITH DIABETIC POLYNEUROPATHY, WITH LONG-TERM CURRENT USE OF INSULIN (HCC): ICD-10-CM

## 2020-09-28 DIAGNOSIS — E03.9 HYPOTHYROIDISM, UNSPECIFIED TYPE: ICD-10-CM

## 2020-09-28 LAB
CREAT UR-MCNC: 84.8 MG/DL
MICROALBUMIN UR-MCNC: 92.5 MG/L (ref 0–20)
MICROALBUMIN/CREAT 24H UR: 109 MG/G CREATININE (ref 0–30)
TSH SERPL DL<=0.05 MIU/L-ACNC: 2.91 UIU/ML (ref 0.36–3.74)

## 2020-09-28 PROCEDURE — 84443 ASSAY THYROID STIM HORMONE: CPT

## 2020-09-28 PROCEDURE — 82570 ASSAY OF URINE CREATININE: CPT

## 2020-09-28 PROCEDURE — 90662 IIV NO PRSV INCREASED AG IM: CPT | Performed by: FAMILY MEDICINE

## 2020-09-28 PROCEDURE — 99214 OFFICE O/P EST MOD 30 MIN: CPT | Performed by: FAMILY MEDICINE

## 2020-09-28 PROCEDURE — 90471 IMMUNIZATION ADMIN: CPT | Performed by: FAMILY MEDICINE

## 2020-09-28 PROCEDURE — 82043 UR ALBUMIN QUANTITATIVE: CPT

## 2020-09-28 NOTE — PROGRESS NOTES
Assessment/Plan:    Problem List Items Addressed This Visit        Endocrine    Diabetes mellitus type 2, controlled ([address] )       Cardiovascular and Mediastinum    Essential hypertension       Nervous and Auditory    Tremor, essential       Other    Tremor      Other Visit Diagnoses     Need for influenza vaccination    -  Primary    Relevant Orders    influenza vaccine, high-dose, PF 0 7 mL (FLUZONE HIGH-DOSE) (Completed)           Diagnoses and all orders for this visit:    Need for influenza vaccination  -     influenza vaccine, high-dose, PF 0 7 mL (FLUZONE HIGH-DOSE)    Controlled type 2 diabetes mellitus with diabetic neuropathy, with long-term current use of insulin ([address] )    Essential hypertension    Tremor, essential    Tremor        No problem-specific Assessment & Plan notes found for this encounter  Subjective:      Patient ID: Srikanth Gonzalez is a 69 y o  male  Mr  Dax Park here follow-up visit on he is doing pretty well his blood sugars are under fairly good control he has been to his ophthalmologist for his remaining healthy I everything looks good there he just had a visit with Dr Vince munroe and there is no open ulceration on his feet he does have a neuropathy of both feet he checks them nightly who he has lost some weight which were very happy about his blood pressure is very well controlled he is going to get his labs as soon as he leaves our office so everything seems to be up to schedule as far as his health maintenance      The following portions of the patient's history were reviewed and updated as appropriate:   He has a past medical history of BPH (benign prostatic hyperplasia), CAD (coronary artery disease), Diabetes mellitus ([address] ), Gout, High cholesterol, Hypertension, and MI, old ,  does not have any pertinent problems on file  ,   has a past surgical history that includes Tonsillectomy and adenoidectomy; Cataract extraction w/  intraocular lens implant;  Eye surgery; Coronary angioplasty with stent; and pr colonoscopy flx dx w/collj spec when pfrmd (N/A, 3/21/2016)  ,  family history includes No Known Problems in his father and mother  He was adopted  ,   reports that he has never smoked  He has never used smokeless tobacco  He reports current alcohol use  He reports that he does not use drugs  ,  has No Known Allergies     Current Outpatient Medications   Medication Sig Dispense Refill    allopurinol (ZYLOPRIM) 100 mg tablet TAKE ONE TABLET BY MOUTH EVERY DAY 90 tablet 1    aspirin 325 mg tablet Take 325 mg by mouth daily   atorvastatin (LIPITOR) 80 mg tablet Take 1 tablet (80 mg total) by mouth daily 90 tablet 3    carboxymethylcellulose 1 % ophthalmic solution Apply 1 drop to eye      escitalopram (LEXAPRO) 20 mg tablet TAKE ONE TABLET BY MOUTH DAILY 90 tablet 1    gabapentin (NEURONTIN) 300 mg capsule Take 1 capsule (300 mg total) by mouth daily at bedtime 90 capsule 1    glimepiride (AMARYL) 4 mg tablet TAKE 1 TABLET TWICE A  tablet 1    levothyroxine 88 mcg tablet Take 1 tablet (88 mcg total) by mouth daily in the early morning 90 tablet 0    metFORMIN (GLUCOPHAGE) 1000 MG tablet Take 1 tablet (1,000 mg total) by mouth 2 (two) times a day with meals 180 tablet 3    multivitamin (THERAGRAN) TABS Take 1 tablet by mouth daily      nitroglycerin (NITROSTAT) 0 4 mg SL tablet Place 1 tablet (0 4 mg total) under the tongue every 5 (five) minutes as needed for chest pain 25 tablet 3    propranolol (INDERAL LA) 60 mg 24 hr capsule Take 1 capsule (60 mg total) by mouth daily 90 capsule 4     No current facility-administered medications for this visit  Review of Systems   Constitutional: Negative for activity change, appetite change, diaphoresis, fatigue and fever  HENT: Negative  Negative for dental problem  Eyes: Positive for visual disturbance  Respiratory: Negative for apnea, cough, chest tightness, shortness of breath and wheezing  Cardiovascular: Negative for chest pain, palpitations and leg swelling  Gastrointestinal: Negative for abdominal distention, abdominal pain, anal bleeding, constipation, diarrhea, nausea and vomiting  Endocrine: Negative for cold intolerance, heat intolerance, polydipsia, polyphagia and polyuria  Diabetes  Under controll   Genitourinary: Negative for difficulty urinating, dysuria, flank pain, hematuria and urgency  Musculoskeletal: Positive for arthralgias  Negative for back pain, gait problem, joint swelling and myalgias  Skin: Negative for color change, rash and wound  Allergic/Immunologic: Negative for environmental allergies, food allergies and immunocompromised state  Neurological: Negative for dizziness, seizures, syncope, speech difficulty, numbness and headaches  Hematological: Negative for adenopathy  Does not bruise/bleed easily  Psychiatric/Behavioral: Negative for agitation, behavioral problems, hallucinations, sleep disturbance and suicidal ideas  Objective:  Vitals:    09/28/20 0737   BP: 108/56   BP Location: Left arm   Patient Position: Sitting   Cuff Size: Large   Temp: (!) 96 6 °F (35 9 °C)   TempSrc: Temporal   Weight: 105 kg (232 lb 6 4 oz)   Height: 5' 8" (1 727 m)     Body mass index is 35 34 kg/m²  Physical Exam  Constitutional:       General: He is not in acute distress  Appearance: He is well-developed  He is not diaphoretic  HENT:      Head: Normocephalic  Right Ear: External ear normal       Left Ear: External ear normal       Nose: Nose normal    Eyes:      General: No scleral icterus  Right eye: No discharge  Left eye: No discharge  Conjunctiva/sclera: Conjunctivae normal       Pupils: Pupils are equal, round, and reactive to light  Neck:      Musculoskeletal: Normal range of motion  Thyroid: No thyromegaly  Trachea: No tracheal deviation     Cardiovascular:      Rate and Rhythm: Normal rate and regular rhythm  Pulses: no weak pulses          Dorsalis pedis pulses are 2+ on the right side and 2+ on the left side  Posterior tibial pulses are 2+ on the right side and 2+ on the left side  Heart sounds: Normal heart sounds  No murmur  No friction rub  No gallop  Pulmonary:      Effort: Pulmonary effort is normal  No respiratory distress  Breath sounds: Normal breath sounds  No wheezing  Abdominal:      General: Bowel sounds are normal       Palpations: Abdomen is soft  There is no mass  Tenderness: There is no abdominal tenderness  There is no guarding  Musculoskeletal:         General: No deformity  Feet:      Right foot:      Skin integrity: No ulcer, skin breakdown, erythema, warmth, callus or dry skin  Left foot:      Skin integrity: No ulcer, skin breakdown, erythema, warmth, callus or dry skin  Lymphadenopathy:      Cervical: No cervical adenopathy  Skin:     General: Skin is warm and dry  Findings: No erythema or rash  Neurological:      Mental Status: He is alert and oriented to person, place, and time  Cranial Nerves: No cranial nerve deficit  Psychiatric:         Thought Content: Thought content normal        Patient's shoes and socks removed  Right Foot/Ankle   Right Foot Inspection  Skin Exam: skin normal and skin intact no dry skin, no warmth, no callus, no erythema, no maceration, no abnormal color, no pre-ulcer, no ulcer and no callus                          Toe Exam: ROM and strength within normal limits  Sensory   Vibration: intact  Proprioception: intact   Monofilament testing: intact  Vascular  Capillary refills: < 3 seconds  The right DP pulse is 2+  The right PT pulse is 2+       Left Foot/Ankle  Left Foot Inspection  Skin Exam: skin normal and skin intactno dry skin, no warmth, no erythema, no maceration, normal color, no pre-ulcer, no ulcer and no callus                         Toe Exam: ROM and strength within normal limits Sensory   Vibration: intact  Proprioception: intact  Monofilament: intact  Vascular  Capillary refills: < 3 seconds  The left DP pulse is 2+  The left PT pulse is 2+  Assign Risk Category:  No deformity present; No loss of protective sensation;  No weak pulses       Risk: 0

## 2020-09-28 NOTE — TELEPHONE ENCOUNTER
Upon review of your request/inquiry, we have noted the HM is up to date with most recent Diabetic Foot Exam  However, I did find a Diabetic Eye Exam within Care Everywhere Activity  I updated HM with most recent Diabetic Eye Exam found  Any additional questions or concerns should be emailed to the Practice Liaisons via [email or web address] email, please do not reply via In Basket       Thank you  Vernon Burden

## 2020-09-28 NOTE — TELEPHONE ENCOUNTER
----- Message from Amaya Rogers sent at 9/28/2020  7:42 AM EDT -----  Regarding: foot exam  09/28/20 7:42 AM    Hello, our patient Philip Humera has had Diabetic Foot Exam completed/performed  Please assist in updating the patient chart by making an External outreach to Dr Geetha Le facility located in [place] The date of service is this month      Thank you,  Amaya Rogers  [facility]

## 2020-10-20 DIAGNOSIS — M1A.9XX0 CHRONIC GOUT WITHOUT TOPHUS, UNSPECIFIED CAUSE, UNSPECIFIED SITE: ICD-10-CM

## 2020-10-20 DIAGNOSIS — Z79.4 TYPE 2 DIABETES MELLITUS WITH DIABETIC POLYNEUROPATHY, WITH LONG-TERM CURRENT USE OF INSULIN (HCC): ICD-10-CM

## 2020-10-20 DIAGNOSIS — G25.0 TREMOR, ESSENTIAL: ICD-10-CM

## 2020-10-20 DIAGNOSIS — E03.9 HYPOTHYROIDISM, UNSPECIFIED TYPE: ICD-10-CM

## 2020-10-20 DIAGNOSIS — I10 ESSENTIAL HYPERTENSION: ICD-10-CM

## 2020-10-20 DIAGNOSIS — I25.10 CORONARY ARTERY DISEASE INVOLVING NATIVE CORONARY ARTERY OF NATIVE HEART WITHOUT ANGINA PECTORIS: ICD-10-CM

## 2020-10-20 DIAGNOSIS — E78.2 MIXED HYPERLIPIDEMIA: ICD-10-CM

## 2020-10-20 DIAGNOSIS — F33.9 RECURRENT MAJOR DEPRESSIVE DISORDER, REMISSION STATUS UNSPECIFIED (HCC): ICD-10-CM

## 2020-10-20 DIAGNOSIS — E11.42 TYPE 2 DIABETES MELLITUS WITH DIABETIC POLYNEUROPATHY, WITH LONG-TERM CURRENT USE OF INSULIN (HCC): ICD-10-CM

## 2020-10-21 RX ORDER — LEVOTHYROXINE SODIUM 88 UG/1
88 TABLET ORAL
Qty: 90 TABLET | Refills: 0 | Status: SHIPPED | OUTPATIENT
Start: 2020-10-21 | End: 2021-01-25 | Stop reason: SDUPTHER

## 2020-10-21 RX ORDER — GABAPENTIN 300 MG/1
300 CAPSULE ORAL
Qty: 90 CAPSULE | Refills: 0 | Status: SHIPPED | OUTPATIENT
Start: 2020-10-21 | End: 2020-11-10 | Stop reason: SDUPTHER

## 2020-10-21 RX ORDER — ATORVASTATIN CALCIUM 80 MG/1
80 TABLET, FILM COATED ORAL DAILY
Qty: 90 TABLET | Refills: 0 | Status: SHIPPED | OUTPATIENT
Start: 2020-10-21 | End: 2021-05-10 | Stop reason: SDUPTHER

## 2020-10-21 RX ORDER — NITROGLYCERIN 0.4 MG/1
0.4 TABLET SUBLINGUAL
Qty: 25 TABLET | Refills: 4 | Status: SHIPPED | OUTPATIENT
Start: 2020-10-21 | End: 2021-12-29 | Stop reason: SDUPTHER

## 2020-10-21 RX ORDER — ALLOPURINOL 100 MG/1
100 TABLET ORAL DAILY
Qty: 90 TABLET | Refills: 0 | Status: SHIPPED | OUTPATIENT
Start: 2020-10-21 | End: 2021-01-15 | Stop reason: SDUPTHER

## 2020-10-21 RX ORDER — PROPRANOLOL HCL 60 MG
60 CAPSULE, EXTENDED RELEASE 24HR ORAL DAILY
Qty: 90 CAPSULE | Refills: 4 | Status: SHIPPED | OUTPATIENT
Start: 2020-10-21 | End: 2020-12-28 | Stop reason: DRUGHIGH

## 2020-10-21 RX ORDER — GLIMEPIRIDE 4 MG/1
TABLET ORAL
Qty: 180 TABLET | Refills: 0 | Status: SHIPPED | OUTPATIENT
Start: 2020-10-21 | End: 2021-01-15 | Stop reason: SDUPTHER

## 2020-10-21 RX ORDER — ESCITALOPRAM OXALATE 20 MG/1
20 TABLET ORAL DAILY
Qty: 90 TABLET | Refills: 0 | Status: SHIPPED | OUTPATIENT
Start: 2020-10-21 | End: 2021-04-19 | Stop reason: SDUPTHER

## 2020-11-06 ENCOUNTER — TELEPHONE (OUTPATIENT)
Dept: NEUROLOGY | Facility: CLINIC | Age: 75
End: 2020-11-06

## 2020-11-10 DIAGNOSIS — G25.0 TREMOR, ESSENTIAL: ICD-10-CM

## 2020-11-11 RX ORDER — GABAPENTIN 300 MG/1
300 CAPSULE ORAL
Qty: 90 CAPSULE | Refills: 1 | Status: SHIPPED | OUTPATIENT
Start: 2020-11-11 | End: 2021-03-18 | Stop reason: ALTCHOICE

## 2020-12-28 ENCOUNTER — HOSPITAL ENCOUNTER (OUTPATIENT)
Dept: RADIOLOGY | Facility: HOSPITAL | Age: 75
Discharge: HOME | End: 2020-12-28
Attending: FAMILY MEDICINE
Payer: COMMERCIAL

## 2020-12-28 ENCOUNTER — OFFICE VISIT (OUTPATIENT)
Dept: FAMILY MEDICINE CLINIC | Facility: CLINIC | Age: 75
End: 2020-12-28
Payer: COMMERCIAL

## 2020-12-28 ENCOUNTER — LAB (OUTPATIENT)
Dept: LAB | Facility: HOSPITAL | Age: 75
End: 2020-12-28
Attending: FAMILY MEDICINE
Payer: COMMERCIAL

## 2020-12-28 ENCOUNTER — OFFICE VISIT (OUTPATIENT)
Dept: CARDIOLOGY CLINIC | Facility: HOSPITAL | Age: 75
End: 2020-12-28
Payer: COMMERCIAL

## 2020-12-28 VITALS
WEIGHT: 238.2 LBS | BODY MASS INDEX: 36.1 KG/M2 | HEIGHT: 68 IN | HEART RATE: 78 BPM | DIASTOLIC BLOOD PRESSURE: 64 MMHG | SYSTOLIC BLOOD PRESSURE: 134 MMHG

## 2020-12-28 VITALS
TEMPERATURE: 97 F | DIASTOLIC BLOOD PRESSURE: 70 MMHG | WEIGHT: 236.8 LBS | BODY MASS INDEX: 35.89 KG/M2 | SYSTOLIC BLOOD PRESSURE: 144 MMHG | HEIGHT: 68 IN

## 2020-12-28 DIAGNOSIS — I25.118 CORONARY ARTERY DISEASE OF NATIVE ARTERY OF NATIVE HEART WITH STABLE ANGINA PECTORIS (HCC): ICD-10-CM

## 2020-12-28 DIAGNOSIS — G25.0 TREMOR, ESSENTIAL: ICD-10-CM
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
## 2020-12-28 DIAGNOSIS — I35.0 NONRHEUMATIC AORTIC VALVE STENOSIS: ICD-10-CM

## 2020-12-28 DIAGNOSIS — E11.40 CONTROLLED TYPE 2 DIABETES MELLITUS WITH DIABETIC NEUROPATHY, WITH LONG-TERM CURRENT USE OF INSULIN (HCC): Primary | ICD-10-CM

## 2020-12-28 DIAGNOSIS — F33.9 RECURRENT MAJOR DEPRESSIVE DISORDER, REMISSION STATUS UNSPECIFIED (HCC): ICD-10-CM

## 2020-12-28 DIAGNOSIS — E78.2 MIXED HYPERLIPIDEMIA: ICD-10-CM

## 2020-12-28 DIAGNOSIS — I10 ESSENTIAL HYPERTENSION: ICD-10-CM

## 2020-12-28 DIAGNOSIS — E11.40 CONTROLLED TYPE 2 DIABETES MELLITUS WITH DIABETIC NEUROPATHY, WITH LONG-TERM CURRENT USE OF INSULIN (HCC): ICD-10-CM
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
## 2020-12-28 DIAGNOSIS — I25.10 CORONARY ARTERY DISEASE INVOLVING NATIVE CORONARY ARTERY OF NATIVE HEART WITHOUT ANGINA PECTORIS: Primary | ICD-10-CM

## 2020-12-28 DIAGNOSIS — Z79.4 CONTROLLED TYPE 2 DIABETES MELLITUS WITH DIABETIC NEUROPATHY, WITH LONG-TERM CURRENT USE OF INSULIN (HCC): ICD-10-CM

## 2020-12-28 DIAGNOSIS — Z79.4 CONTROLLED TYPE 2 DIABETES MELLITUS WITH DIABETIC NEUROPATHY, WITH LONG-TERM CURRENT USE OF INSULIN (HCC): Primary | ICD-10-CM

## 2020-12-28 DIAGNOSIS — I21.4 NSTEMI (NON-ST ELEVATED MYOCARDIAL INFARCTION) (HCC): ICD-10-CM

## 2020-12-28 LAB
ANION GAP SERPL CALCULATED.3IONS-SCNC: 11 MMOL/L (ref 4–13)
BUN SERPL-MCNC: 18 MG/DL (ref 5–25)
CALCIUM SERPL-MCNC: 8.8 MG/DL (ref 8.3–10.1)
CHLORIDE SERPL-SCNC: 104 MMOL/L (ref 100–108)
CO2 SERPL-SCNC: 25 MMOL/L (ref 21–32)
CREAT SERPL-MCNC: 1.26 MG/DL (ref 0.6–1.3)
CREAT UR-MCNC: 209 MG/DL
EST. AVERAGE GLUCOSE BLD GHB EST-MCNC: 157 MG/DL
GFR SERPL CREATININE-BSD FRML MDRD: 56 ML/MIN/1.73SQ M
GLUCOSE P FAST SERPL-MCNC: 154 MG/DL (ref 65–99)
HBA1C MFR BLD: 7.1 %
MICROALBUMIN UR-MCNC: 851 MG/L (ref 0–20)
MICROALBUMIN/CREAT 24H UR: 407 MG/G CREATININE (ref 0–30)
POTASSIUM SERPL-SCNC: 4.4 MMOL/L (ref 3.5–5.3)
SODIUM SERPL-SCNC: 140 MMOL/L (ref 136–145)

## 2020-12-28 PROCEDURE — 99214 OFFICE O/P EST MOD 30 MIN: CPT | Performed by: INTERNAL MEDICINE

## 2020-12-28 PROCEDURE — 93000 ELECTROCARDIOGRAM COMPLETE: CPT | Performed by: INTERNAL MEDICINE

## 2020-12-28 PROCEDURE — 80048 BASIC METABOLIC PNL TOTAL CA: CPT

## 2020-12-28 PROCEDURE — 73590 X-RAY EXAM OF LOWER LEG: CPT

## 2020-12-28 PROCEDURE — 83036 HEMOGLOBIN GLYCOSYLATED A1C: CPT

## 2020-12-28 PROCEDURE — 72100 X-RAY EXAM L-S SPINE 2/3 VWS: CPT

## 2020-12-28 PROCEDURE — 99214 OFFICE O/P EST MOD 30 MIN: CPT | Performed by: FAMILY MEDICINE

## 2020-12-28 PROCEDURE — 82570 ASSAY OF URINE CREATININE: CPT

## 2020-12-28 PROCEDURE — 36415 COLL VENOUS BLD VENIPUNCTURE: CPT

## 2020-12-28 PROCEDURE — 82043 UR ALBUMIN QUANTITATIVE: CPT

## 2020-12-28 RX ORDER — PROPRANOLOL HYDROCHLORIDE 120 MG/1
120 CAPSULE, EXTENDED RELEASE ORAL DAILY
Qty: 90 CAPSULE | Refills: 4 | Status: SHIPPED | OUTPATIENT
Start: 2020-12-28 | End: 2021-03-04 | Stop reason: DRUGHIGH

## 2020-12-28 NOTE — RESULT ENCOUNTER NOTE
Please call the patient regarding his abnormal result   Spilling more protein than usual in his urine needs to follow-up with nephrology

## 2021-01-04 ENCOUNTER — HOSPITAL ENCOUNTER (OUTPATIENT)
Dept: NON INVASIVE DIAGNOSTICS | Facility: HOSPITAL | Age: 76
Discharge: HOME | End: 2021-01-04
Attending: INTERNAL MEDICINE
Payer: COMMERCIAL

## 2021-01-04 DIAGNOSIS — I35.0 NONRHEUMATIC AORTIC VALVE STENOSIS: ICD-10-CM

## 2021-01-04 PROCEDURE — 93306 TTE W/DOPPLER COMPLETE: CPT

## 2021-01-04 PROCEDURE — 93306 TTE W/DOPPLER COMPLETE: CPT | Performed by: INTERNAL MEDICINE

## 2021-01-15 DIAGNOSIS — G25.0 TREMOR, ESSENTIAL: ICD-10-CM

## 2021-01-15 DIAGNOSIS — M1A.9XX0 CHRONIC GOUT WITHOUT TOPHUS, UNSPECIFIED CAUSE, UNSPECIFIED SITE: ICD-10-CM

## 2021-01-15 DIAGNOSIS — E11.42 TYPE 2 DIABETES MELLITUS WITH DIABETIC POLYNEUROPATHY, WITH LONG-TERM CURRENT USE OF INSULIN (HCC): ICD-10-CM

## 2021-01-15 DIAGNOSIS — Z79.4 TYPE 2 DIABETES MELLITUS WITH DIABETIC POLYNEUROPATHY, WITH LONG-TERM CURRENT USE OF INSULIN (HCC): ICD-10-CM

## 2021-01-15 RX ORDER — GLIMEPIRIDE 4 MG/1
TABLET ORAL
Qty: 180 TABLET | Refills: 2 | Status: SHIPPED | OUTPATIENT
Start: 2021-01-15 | End: 2021-10-13 | Stop reason: SDUPTHER

## 2021-01-15 RX ORDER — ALLOPURINOL 100 MG/1
100 TABLET ORAL DAILY
Qty: 90 TABLET | Refills: 2 | Status: SHIPPED | OUTPATIENT
Start: 2021-01-15 | End: 2021-11-05 | Stop reason: SDUPTHER

## 2021-01-25 ENCOUNTER — DOCUMENTATION (OUTPATIENT)
Dept: CARDIOLOGY CLINIC | Facility: HOSPITAL | Age: 76
End: 2021-01-25

## 2021-01-25 DIAGNOSIS — E03.9 HYPOTHYROIDISM, UNSPECIFIED TYPE: ICD-10-CM

## 2021-01-25 RX ORDER — LEVOTHYROXINE SODIUM 88 UG/1
88 TABLET ORAL
Qty: 90 TABLET | Refills: 1 | Status: SHIPPED | OUTPATIENT
Start: 2021-01-25 | End: 2021-07-06

## 2021-01-25 NOTE — PROGRESS NOTES
Per Dr Tobin Marley S/W patients wife re: result of recent echocardiogram revealed normal heart muscle strength and moderate AS  He is OK for now, but will need yearly echoes to follow  Advised to call office if he would have increasing SOB or dizziness  Verbalized understanding

## 2021-02-10 ENCOUNTER — OFFICE VISIT (OUTPATIENT)
Dept: NEPHROLOGY | Facility: CLINIC | Age: 76
End: 2021-02-10
Payer: COMMERCIAL

## 2021-02-10 VITALS
WEIGHT: 235 LBS | HEART RATE: 74 BPM | DIASTOLIC BLOOD PRESSURE: 64 MMHG | SYSTOLIC BLOOD PRESSURE: 128 MMHG | OXYGEN SATURATION: 96 % | HEIGHT: 68 IN | BODY MASS INDEX: 35.61 KG/M2

## 2021-02-10 DIAGNOSIS — E11.21 DIABETIC NEPHROPATHY ASSOCIATED WITH TYPE 2 DIABETES MELLITUS (HCC): ICD-10-CM

## 2021-02-10 DIAGNOSIS — I35.0 AORTIC STENOSIS, MODERATE: ICD-10-CM

## 2021-02-10 DIAGNOSIS — I10 ESSENTIAL HYPERTENSION: ICD-10-CM

## 2021-02-10 DIAGNOSIS — Z79.4 CONTROLLED TYPE 2 DIABETES MELLITUS WITH DIABETIC NEUROPATHY, WITH LONG-TERM CURRENT USE OF INSULIN (HCC): ICD-10-CM

## 2021-02-10 DIAGNOSIS — I51.7 CONCENTRIC LEFT VENTRICULAR HYPERTROPHY: ICD-10-CM

## 2021-02-10 DIAGNOSIS — E11.40 CONTROLLED TYPE 2 DIABETES MELLITUS WITH DIABETIC NEUROPATHY, WITH LONG-TERM CURRENT USE OF INSULIN (HCC): ICD-10-CM

## 2021-02-10 DIAGNOSIS — R60.9 EDEMA, UNSPECIFIED TYPE: ICD-10-CM

## 2021-02-10 DIAGNOSIS — N18.31 STAGE 3A CHRONIC KIDNEY DISEASE (HCC): Primary | ICD-10-CM

## 2021-02-10 DIAGNOSIS — G25.0 TREMOR, ESSENTIAL: ICD-10-CM

## 2021-02-10 PROCEDURE — 99214 OFFICE O/P EST MOD 30 MIN: CPT | Performed by: INTERNAL MEDICINE

## 2021-02-10 RX ORDER — LOSARTAN POTASSIUM 25 MG/1
25 TABLET ORAL DAILY
Qty: 90 TABLET | Refills: 1 | Status: SHIPPED | OUTPATIENT
Start: 2021-02-10 | End: 2021-07-06 | Stop reason: SDUPTHER

## 2021-02-10 RX ORDER — EMPAGLIFLOZIN 10 MG/1
10 TABLET, FILM COATED ORAL EVERY MORNING
Qty: 90 TABLET | Refills: 1 | Status: SHIPPED | OUTPATIENT
Start: 2021-02-10 | End: 2021-09-15 | Stop reason: SDUPTHER

## 2021-02-10 NOTE — ASSESSMENT & PLAN NOTE
Lab Results   Component Value Date    EGFR 56 12/28/2020    EGFR 54 09/28/2020    EGFR 54 06/08/2020    CREATININE 1 26 12/28/2020    CREATININE 1 31 (H) 09/28/2020    CREATININE 1 30 06/08/2020     Patient kidney function continues to decline slowly, but none unless at around 5 mL/minute which is the typical decline in estimated GFR in patients with diabetes  Will need to aggressively try to change the course of the decline with the use of losartan which I will re-initiated 25 mg once daily  Please refer below regarding further strategies

## 2021-02-10 NOTE — ASSESSMENT & PLAN NOTE
As mentioned above, will start the patient losartan, this will help with appropriate remodeling of the left ventricle

## 2021-02-10 NOTE — PATIENT INSTRUCTIONS
Please start losartan as soon as you get it  We will need to check a BMP about 2 weeks after starting the losartan  Please start Jardiance about 4 weeks after starting the losartan  Let me know if you have symptoms of a UTI, we will need to treat you

## 2021-02-10 NOTE — ASSESSMENT & PLAN NOTE
Focus on low-sodium diet, patient will eventually start Jardiance 10 mg once daily which will assist with mild diuresis

## 2021-02-10 NOTE — ASSESSMENT & PLAN NOTE
Patient's proteinuria has worsened off of the angiotensin receptor blocker  Will re-initiated at 25 mg once daily, patient will have blood work done about 2 weeks after starting the losartan  Four weeks after starting the losartan, the patient will also start Jardiance 10 mg once daily, this is for specifically nephroprotection but will also have other benefits from a diabetic standpoint and from a cardiovascular standpoint  Patient was made aware of the most common side effects which include volume depletion, hypoglycemia the presence of glimepiride, and urinary tract infections      Lab Results   Component Value Date    HGBA1C 7 1 (H) 12/28/2020

## 2021-02-10 NOTE — PROGRESS NOTES
[facility] [place], [place]    Name: Miranda Walker  YOB: 1945      Assessment/Plan:    Stage 3a chronic kidney disease  Lab Results   Component Value Date    EGFR 56 12/28/2020    EGFR 54 09/28/2020    EGFR 54 06/08/2020    CREATININE 1 26 12/28/2020    CREATININE 1 31 (H) 09/28/2020    CREATININE 1 30 06/08/2020     Patient kidney function continues to decline slowly, but none unless at around 5 mL/minute which is the typical decline in estimated GFR in patients with diabetes  Will need to aggressively try to change the course of the decline with the use of losartan which I will re-initiated 25 mg once daily  Please refer below regarding further strategies  Diabetic nephropathy associated with type 2 diabetes mellitus ([facility] [address] )  Patient's proteinuria has worsened off of the angiotensin receptor blocker  Will re-initiated at 25 mg once daily, patient will have blood work done about 2 weeks after starting the losartan  Four weeks after starting the losartan, the patient will also start Jardiance 10 mg once daily, this is for specifically nephroprotection but will also have other benefits from a diabetic standpoint and from a cardiovascular standpoint  Patient was made aware of the most common side effects which include volume depletion, hypoglycemia the presence of glimepiride, and urinary tract infections  Lab Results   Component Value Date    HGBA1C 7 1 (H) 12/28/2020       Hypertension  Continue with propanolol, will add losartan 25 mg once daily  Concentric left ventricular hypertrophy  As mentioned above, will start the patient losartan, this will help with appropriate remodeling of the left ventricle  Edema  Focus on low-sodium diet, patient will eventually start Jardiance 10 mg once daily which will assist with mild diuresis           Problem List Items Addressed This Visit        Endocrine    Diabetes mellitus type 2, controlled ([facility] )    Relevant Medications    losartan (COZAAR) 25 mg tablet    Empagliflozin (Jardiance) 10 MG TABS    Other Relevant Orders    Basic metabolic panel    Diabetic nephropathy associated with type 2 diabetes mellitus ([facility] )     Patient's proteinuria has worsened off of the angiotensin receptor blocker  Will re-initiated at 25 mg once daily, patient will have blood work done about 2 weeks after starting the losartan  Four weeks after starting the losartan, the patient will also start Jardiance 10 mg once daily, this is for specifically nephroprotection but will also have other benefits from a diabetic standpoint and from a cardiovascular standpoint  Patient was made aware of the most common side effects which include volume depletion, hypoglycemia the presence of glimepiride, and urinary tract infections  Lab Results   Component Value Date    HGBA1C 7 1 (H) 12/28/2020            Relevant Medications    Empagliflozin (Jardiance) 10 MG TABS       Cardiovascular and Mediastinum    Hypertension     Continue with propanolol, will add losartan 25 mg once daily  Relevant Medications    losartan (COZAAR) 25 mg tablet    Concentric left ventricular hypertrophy     As mentioned above, will start the patient losartan, this will help with appropriate remodeling of the left ventricle  Aortic stenosis, moderate       Nervous and Auditory    Tremor, essential       Genitourinary    Stage 3a chronic kidney disease - Primary     Lab Results   Component Value Date    EGFR 56 12/28/2020    EGFR 54 09/28/2020    EGFR 54 06/08/2020    CREATININE 1 26 12/28/2020    CREATININE 1 31 (H) 09/28/2020    CREATININE 1 30 06/08/2020     Patient kidney function continues to decline slowly, but none unless at around 5 mL/minute which is the typical decline in estimated GFR in patients with diabetes    Will need to aggressively try to change the course of the decline with the use of losartan which I will re-initiated 25 mg once daily  Please refer below regarding further strategies  Other    Edema     Focus on low-sodium diet, patient will eventually start Jardiance 10 mg once daily which will assist with mild diuresis  Patient stable at this time  However and concern for progression of underlying chronic kidney disease due to diabetic nephropathy  Will add both losartan and Jardiance sequentially as detailed above  Will check blood work about 2 weeks after initiation of losartan  We will see the patient back in approximately 6 months with a full set of labs to be done prior to that appointment  Subjective:      Patient ID: Lowella Kayser is a 77 y o  male  Patient presents for follow-up regarding chronic kidney disease and proteinuria  We last saw the patient in 2019, at that time we initiated low-dose losartan for kidney protection  Most recent blood work and urine studies noted estimated GFR appears to decline by about 5 mL/minute every year, proteinuria has increased to about 450 mcg/mg since previous check  Patient's A1cs have been fairly well controlled, most recent 1 was 7 1%  Patient is taking all medications as prescribed with no specific side effects at this time  Patient has noted bilateral lower extremity edema, he is enjoying foods which are at times high in sodium  Hypertension  This is a chronic problem  The current episode started more than 1 year ago  The problem is unchanged  The problem is controlled  Associated symptoms include peripheral edema  Pertinent negatives include no chest pain or orthopnea  There are no associated agents to hypertension  Risk factors for coronary artery disease include diabetes mellitus, male gender and obesity  Past treatments include beta blockers  Compliance problems include diet  Hypertensive end-organ damage includes kidney disease  Identifiable causes of hypertension include chronic renal disease         The following portions of the patient's history were reviewed and updated as appropriate: allergies, current medications, past family history, past medical history, past social history, past surgical history and problem list     Review of Systems   Cardiovascular: Negative for chest pain and orthopnea  All other systems reviewed and are negative          Social History     Socioeconomic History    Marital status: /Civil Union     Spouse name: Not on file    Number of children: Not on file    Years of education: Not on file    Highest education level: Not on file   Occupational History    Not on file   Social Needs    Financial resource strain: Not on file    Food insecurity     Worry: Not on file     Inability: Not on file   [employer] needs     Medical: Not on file     Non-medical: Not on file   Tobacco Use    Smoking status: Never Smoker    Smokeless tobacco: Never Used   Substance and Sexual Activity    Alcohol use: Yes     Comment: socially, cut down in 2008, previously did more than     Drug use: Never    Sexual activity: Yes     Partners: Female   Lifestyle    Physical activity     Days per week: Not on file     Minutes per session: Not on file    Stress: Not on file   Relationships    Social connections     Talks on phone: Not on file     Gets together: Not on file     Attends Christian service: Not on file     Active member of club or organization: Not on file     Attends meetings of clubs or organizations: Not on file     Relationship status: Not on file    Intimate partner violence     Fear of current or ex partner: Not on file     Emotionally abused: Not on file     Physically abused: Not on file     Forced sexual activity: Not on file   Other Topics Concern    Not on file   Social History Narrative    No advance directive     No living will      Past Medical History:   Diagnosis Date    BPH (benign prostatic hyperplasia)     CAD (coronary artery disease)     Diabetes mellitus ([facility] [address] )     niddm    Gout  High cholesterol     Hypertension     MI, 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      Past Surgical History:   Procedure Laterality Date    CATARACT EXTRACTION W/  INTRAOCULAR LENS IMPLANT      CORONARY ANGIOPLASTY WITH STENT PLACEMENT      stents x3    EYE SURGERY      repair corneal laceration    AR COLONOSCOPY FLX DX W/COLLJ SPEC WHEN PFRMD N/A 3/21/2016    Procedure: COLONOSCOPY;  Surgeon: Teresa Flores MD;  Location: [facility];  Service: Colorectal    TONSILLECTOMY AND ADENOIDECTOMY         Current Outpatient Medications:     allopurinol (ZYLOPRIM) 100 mg tablet, Take 1 tablet (100 mg total) by mouth daily, Disp: 90 tablet, Rfl: 2    aspirin 325 mg tablet, Take 325 mg by mouth daily  , Disp: , Rfl:     atorvastatin (LIPITOR) 80 mg tablet, Take 1 tablet (80 mg total) by mouth daily, Disp: 90 tablet, Rfl: 0    escitalopram (LEXAPRO) 20 mg tablet, Take 1 tablet (20 mg total) by mouth daily, Disp: 90 tablet, Rfl: 0    gabapentin (NEURONTIN) 300 mg capsule, Take 1 capsule (300 mg total) by mouth daily at bedtime, Disp: 90 capsule, Rfl: 1    glimepiride (AMARYL) 4 mg tablet, TAKE 1 TABLET TWICE A DAY, Disp: 180 tablet, Rfl: 2    levothyroxine 88 mcg tablet, Take 1 tablet (88 mcg total) by mouth daily in the early morning, Disp: 90 tablet, Rfl: 1    metFORMIN (GLUCOPHAGE) 1000 MG tablet, Take 1 tablet (1,000 mg total) by mouth 2 (two) times a day with meals, Disp: 180 tablet, Rfl: 0    multivitamin (THERAGRAN) TABS, Take 1 tablet by mouth daily, Disp: , Rfl:     nitroglycerin (NITROSTAT) 0 4 mg SL tablet, Place 1 tablet (0 4 mg total) under the tongue every 5 (five) minutes as needed for chest pain, Disp: 25 tablet, Rfl: 4    propranolol (INDERAL LA) 120 mg 24 hr capsule, Take 1 capsule (120 mg total) by mouth daily, Disp: 90 capsule, Rfl: 4    carboxymethylcellulose 1 % ophthalmic solution, Apply 1 drop to eye, Disp: , Rfl:     Empagliflozin (Jardiance) 10 MG TABS, Take 1 tablet (10 mg total) by mouth every morning, Disp: 90 tablet, Rfl: 1    losartan (COZAAR) 25 mg tablet, Take 1 tablet (25 mg total) by mouth daily, Disp: 90 tablet, Rfl: 1    Lab Results   Component Value Date     07/16/2015    SODIUM 140 12/28/2020    K 4 4 12/28/2020     12/28/2020    CO2 25 12/28/2020    ANIONGAP 8 07/16/2015    AGAP 11 12/28/2020    BUN 18 12/28/2020    CREATININE 1 26 12/28/2020    GLUC 86 11/09/2018    GLUF 154 (H) 12/28/2020    CALCIUM 8 8 12/28/2020    AST 27 04/06/2020    ALT 63 04/06/2020    ALKPHOS 83 04/06/2020    PROT 6 3 (L) 07/16/2015    TP 7 0 04/06/2020    BILITOT 0 47 07/16/2015    TBILI 0 50 04/06/2020    EGFR 56 12/28/2020     Lab Results   Component Value Date    WBC 9 08 04/06/2020    HGB 14 8 04/06/2020    HCT 44 1 04/06/2020    MCV 99 (H) 04/06/2020     04/06/2020     Lab Results   Component Value Date    CHOLESTEROL 122 02/21/2020    CHOLESTEROL 111 07/30/2018    CHOLESTEROL 128 07/10/2018     Lab Results   Component Value Date    HDL 29 (L) 02/21/2020    HDL 37 (L) 07/30/2018    HDL 34 (L) 07/10/2018     Lab Results   Component Value Date    LDLCALC 63 02/21/2020    LDLCALC 50 07/30/2018    LDLCALC 48 07/10/2018     Lab Results   Component Value Date    TRIG 149 02/21/2020    TRIG 118 07/30/2018    TRIG 230 (H) 07/10/2018     No results found for: [place], [place]  Lab Results   Component Value Date    STA0GPJBOWYL 2 908 09/28/2020     Lab Results   Component Value Date    CALCIUM 8 8 12/28/2020     Lab Results   Component Value Date    SPEP  11/09/2018     The serum total protein, albumin and electrophoresis are within normal limits  No monoclonal bands noted  Reviewed by: Nichole Huff MD ([id number]) **Electronic Signature**    UPEP  11/12/2018     The urine total protein is increased  The urine protein electrophoresis shows selective proteinuria  No monoclonal bands noted  Reviewed by Nichole Huff MD ([id number])  **Electronic Signature**     No results found for: Dino Cruz, [id number]        Objective:      /64   Pulse 74   Ht 5' 8" (1 727 m)   Wt 107 kg (235 lb)   SpO2 96%   BMI 35 73 kg/m²          Physical Exam

## 2021-02-16 ENCOUNTER — APPOINTMENT (EMERGENCY)
Dept: RADIOLOGY | Facility: HOSPITAL | Age: 76
End: 2021-02-16
Payer: COMMERCIAL

## 2021-02-16 ENCOUNTER — APPOINTMENT (EMERGENCY)
Dept: CT IMAGING | Facility: HOSPITAL | Age: 76
End: 2021-02-16
Payer: COMMERCIAL

## 2021-02-16 ENCOUNTER — HOSPITAL ENCOUNTER (EMERGENCY)
Facility: HOSPITAL | Age: 76
Discharge: HOME | End: 2021-02-16
Attending: EMERGENCY MEDICINE | Admitting: EMERGENCY MEDICINE
Payer: COMMERCIAL

## 2021-02-16 VITALS
SYSTOLIC BLOOD PRESSURE: 132 MMHG | OXYGEN SATURATION: 93 % | TEMPERATURE: 97.8 F | BODY MASS INDEX: 34.1 KG/M2 | WEIGHT: 225 LBS | RESPIRATION RATE: 12 BRPM | HEIGHT: 68 IN | DIASTOLIC BLOOD PRESSURE: 70 MMHG | HEART RATE: 64 BPM

## 2021-02-16 DIAGNOSIS — M25.561 RIGHT KNEE PAIN: ICD-10-CM

## 2021-02-16 DIAGNOSIS — S09.90XA CLOSED HEAD INJURY, INITIAL ENCOUNTER: ICD-10-CM
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
## 2021-02-16 PROCEDURE — 72125 CT NECK SPINE W/O DYE: CPT

## 2021-02-16 PROCEDURE — 73564 X-RAY EXAM KNEE 4 OR MORE: CPT

## 2021-02-16 PROCEDURE — 99284 EMERGENCY DEPT VISIT MOD MDM: CPT | Performed by: EMERGENCY MEDICINE

## 2021-02-16 PROCEDURE — 12031 INTMD RPR S/A/T/EXT 2.5 CM/<: CPT | Performed by: EMERGENCY MEDICINE

## 2021-02-16 PROCEDURE — 70450 CT HEAD/BRAIN W/O DYE: CPT

## 2021-02-16 PROCEDURE — 93005 ELECTROCARDIOGRAM TRACING: CPT

## 2021-02-16 PROCEDURE — 99284 EMERGENCY DEPT VISIT MOD MDM: CPT

## 2021-02-16 RX ORDER — ACETAMINOPHEN 325 MG/1
975 TABLET ORAL ONCE
Status: COMPLETED | OUTPATIENT
Start: 2021-02-16 | End: 2021-02-16

## 2021-02-16 RX ADMIN — ACETAMINOPHEN 975 MG: 325 TABLET, FILM COATED ORAL at 22:14

## 2021-02-17 ENCOUNTER — OFFICE VISIT (OUTPATIENT)
Dept: OBGYN CLINIC | Facility: HOSPITAL | Age: 76
End: 2021-02-17
Payer: COMMERCIAL

## 2021-02-17 VITALS
DIASTOLIC BLOOD PRESSURE: 71 MMHG | WEIGHT: 237 LBS | HEIGHT: 68 IN | HEART RATE: 65 BPM | BODY MASS INDEX: 35.92 KG/M2 | SYSTOLIC BLOOD PRESSURE: 115 MMHG

## 2021-02-17 DIAGNOSIS — M17.12 PRIMARY OSTEOARTHRITIS OF LEFT KNEE: Primary | ICD-10-CM

## 2021-02-17 DIAGNOSIS — M17.11 PRIMARY OSTEOARTHRITIS OF RIGHT KNEE: ICD-10-CM

## 2021-02-17 LAB
ATRIAL RATE: 66 BPM
P AXIS: 33 DEGREES
PR INTERVAL: 192 MS
QRS AXIS: -49 DEGREES
QRSD INTERVAL: 84 MS
QT INTERVAL: 422 MS
QTC INTERVAL: 442 MS
T WAVE AXIS: 43 DEGREES
VENTRICULAR RATE: 66 BPM

## 2021-02-17 PROCEDURE — 93010 ELECTROCARDIOGRAM REPORT: CPT | Performed by: INTERNAL MEDICINE

## 2021-02-17 PROCEDURE — 20610 DRAIN/INJ JOINT/BURSA W/O US: CPT | Performed by: ORTHOPAEDIC SURGERY

## 2021-02-17 PROCEDURE — 99213 OFFICE O/P EST LOW 20 MIN: CPT | Performed by: ORTHOPAEDIC SURGERY

## 2021-02-17 NOTE — ED PROVIDER NOTES
Emergency Department Trauma Note  Dakotah Mora 77 y o  male MRN: [id number]  Unit/Bed#: RM01/RM01 Encounter: [id number]      Trauma Alert: Trauma Acuity: Trauma Evaluation  Model of Arrival: Mode of Arrival: Other (Comment) via    Trauma Team: Current Providers  Attending Provider: Michaela Villagran DO  Consultants: None      History of Present Illness        Chief Complaint:   Chief Complaint   Patient presents with   Rambo Luigi     was walking up the stairs when he fell foreward strinking his head  stated he did have LOC  occured about 20 minutes ago  HPI:  Dakotah Mora is a 77 y o  male who presents with   Mechanism:Details of Incident: patient stated he was walking up stairs when he fell forward strinking his head and high knee  Injury Date: 02/16/21 Injury Time: 2100 Injury Occurence Location - [address] [place] [address] Way: carbon      Patient is a 76 yo M w/ hx of CAD on ASA, DM, HTN who presents for evaluation after a fall  Patient says that he was walking up the stairs when he lost his balance and fell striking his head  Patient says that he did lose consciousness  The patient says that he needed his daughter and wife to help him up  The patient is on aspirin but he denies any other blood thinners  He denies any lightheadedness, dizziness, chest pain, shortness of breath prior to the fall or currently  His only complaint at this time is a headache and some mild right knee pain  He denies any lightheadedness, dizziness, nausea, vomiting, chest pain, shortness of breath, abdominal pain at this time  On exam, he has a small lack to the right parietal scalp  He has some tenderness at the base of the C-spine  HPI  Review of Systems   Constitutional: Negative for chills, fever and unexpected weight change  HENT: Negative for congestion, sore throat and trouble swallowing  Eyes: Negative for pain, discharge and itching  Respiratory: Negative for cough, chest tightness, shortness of breath and wheezing  Cardiovascular: Negative for chest pain, palpitations and leg swelling  Gastrointestinal: Negative for abdominal pain, blood in stool, diarrhea, nausea and vomiting  Endocrine: Negative for polyuria  Genitourinary: Negative for difficulty urinating, dysuria, frequency and hematuria  Musculoskeletal: Positive for arthralgias (right knee)  Negative for back pain  Skin: Positive for wound (right side of head)  Neurological: Positive for headaches  Negative for dizziness, syncope, weakness and light-headedness  Historical Information     Immunizations:   Immunization History   Administered Date(s) Administered    H1N1, All Formulations 11/06/2009    INFLUENZA 09/01/2015, 09/14/2016    Influenza Split High Dose Preservative Free IM 09/11/2012, 09/20/2013, 10/06/2014, 09/02/2015, 09/14/2016, 09/12/2017    Influenza, high dose seasonal 0 7 mL 10/22/2018, 09/09/2019, 09/28/2020    Pneumococcal Polysaccharide PPV23 1945    Tdap 11/22/2017    Zoster 04/10/2014       Past Medical History:   Diagnosis Date    BPH (benign prostatic hyperplasia)     CAD (coronary artery disease)     Diabetes mellitus ([facility] [address] )     niddm    Gout     High cholesterol     Hypertension     MI, 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        Family History   Adopted: Yes   Problem Relation Age of Onset    No Known Problems Mother     No Known Problems Father      Past Surgical History:   Procedure Laterality Date    CATARACT EXTRACTION W/  INTRAOCULAR LENS IMPLANT      CORONARY ANGIOPLASTY WITH STENT PLACEMENT      stents x3    EYE SURGERY      repair corneal laceration    HI COLONOSCOPY FLX DX W/COLLJ SPEC WHEN PFRMD N/A 3/21/2016    Procedure: COLONOSCOPY;  Surgeon: Carmen Weber MD;  Location: [facility];  Service: Colorectal    TONSILLECTOMY AND ADENOIDECTOMY       Social History     Tobacco Use    Smoking status: Never Smoker    Smokeless tobacco: Never Used   Substance Use Topics    Alcohol use:  Yes Comment: socially, cut down in 2008, previously did more than     Drug use: Never     E-Cigarette/Vaping    E-Cigarette Use Never User      E-Cigarette/Vaping Substances    Nicotine No     THC No     CBD No     Flavoring No     Other No     Unknown No        Family History: non-contributory    Meds/Allergies   Prior to Admission Medications   Prescriptions Last Dose Informant Patient Reported? Taking? Empagliflozin (Jardiance) 10 MG TABS   No No   Sig: Take 1 tablet (10 mg total) by mouth every morning   allopurinol (ZYLOPRIM) 100 mg tablet   No No   Sig: Take 1 tablet (100 mg total) by mouth daily   aspirin 325 mg tablet   Yes No   Sig: Take 325 mg by mouth daily     atorvastatin (LIPITOR) 80 mg tablet   No No   Sig: Take 1 tablet (80 mg total) by mouth daily   carboxymethylcellulose 1 % ophthalmic solution   Yes No   Sig: Apply 1 drop to eye   escitalopram (LEXAPRO) 20 mg tablet   No No   Sig: Take 1 tablet (20 mg total) by mouth daily   gabapentin (NEURONTIN) 300 mg capsule   No No   Sig: Take 1 capsule (300 mg total) by mouth daily at bedtime   glimepiride (AMARYL) 4 mg tablet   No No   Sig: TAKE 1 TABLET TWICE A DAY   levothyroxine 88 mcg tablet   No No   Sig: Take 1 tablet (88 mcg total) by mouth daily in the early morning   losartan (COZAAR) 25 mg tablet   No No   Sig: Take 1 tablet (25 mg total) by mouth daily   metFORMIN (GLUCOPHAGE) 1000 MG tablet   No No   Sig: Take 1 tablet (1,000 mg total) by mouth 2 (two) times a day with meals   multivitamin (THERAGRAN) TABS  Spouse/Significant Other Yes No   Sig: Take 1 tablet by mouth daily   nitroglycerin (NITROSTAT) 0 4 mg SL tablet   No No   Sig: Place 1 tablet (0 4 mg total) under the tongue every 5 (five) minutes as needed for chest pain   propranolol (INDERAL LA) 120 mg 24 hr capsule   No No   Sig: Take 1 capsule (120 mg total) by mouth daily      Facility-Administered Medications: None       No Known Allergies    PHYSICAL EXAM    PE limited by:    Objective   Vitals:   First set: Temperature: 97 8 °F (36 6 °C) (02/16/21 2156)  Pulse: 71 (02/16/21 2125)  Respirations: 18 (02/16/21 2125)  Blood Pressure: 144/66 (02/16/21 2125)  SpO2: 95 % (02/16/21 2125)    Primary Survey:   (A) Airway: intact  (B) Breathing: CTA b/l  (C) Circulation: Pulses:   normal  (D) Disabliity:  GCS Total:  15  (E) Expose:  Completed    Secondary Survey: (Click on Physical Exam tab above)  Physical Exam  Vitals signs and nursing note reviewed  Constitutional:       General: He is not in acute distress  Appearance: He is well-developed  HENT:      Head: Normocephalic  Right Ear: External ear normal       Left Ear: External ear normal    Eyes:      Conjunctiva/sclera: Conjunctivae normal       Pupils: Pupils are equal, round, and reactive to light  Neck:      Musculoskeletal: Normal range of motion  Cardiovascular:      Rate and Rhythm: Normal rate and regular rhythm  Heart sounds: Normal heart sounds  No murmur  No friction rub  No gallop  Pulmonary:      Effort: Pulmonary effort is normal  No respiratory distress  Breath sounds: Normal breath sounds  No wheezing or rales  Abdominal:      General: Bowel sounds are normal  There is no distension  Palpations: Abdomen is soft  Tenderness: There is no abdominal tenderness  There is no guarding  Musculoskeletal: Normal range of motion  General: No tenderness or deformity  Lymphadenopathy:      Cervical: No cervical adenopathy  Skin:     General: Skin is warm and dry  Neurological:      General: No focal deficit present  Mental Status: He is alert and oriented to person, place, and time  Mental status is at baseline  Cranial Nerves: No cranial nerve deficit  Sensory: No sensory deficit  Motor: No weakness or abnormal muscle tone        Coordination: Coordination normal    Psychiatric:         Behavior: Behavior normal          Cervical spine cleared by clinical criteria? No (imaging required)      Invasive Devices     None                 Lab Results:   Results Reviewed     None                 Imaging Studies:   Direct to CT: No  XR knee 4+ views Right injury   Final Result by Dejuan Santillan MD (02/16 2229)      No acute osseous abnormality  Workstation performed: [id number]         TRAUMA - CT spine cervical wo contrast   Final Result by Dejuan Santillan MD (02/16 2222)      No cervical spine fracture or traumatic malalignment  Workstation performed: [id number]         TRAUMA - CT head wo contrast   Final Result by Dejuan Santillan MD (02/16 2211)      No intracranial hemorrhage or calvarial fracture  Tiny right parietal scalp contusion  Left sided sinus disease as above  Rule out acute sinusitis              Workstation performed: [id number]               Procedures  ECG 12 Lead Documentation Only    Date/Time: 2/16/2021 9:55 PM  Performed by: Luigi Strickland DO  Authorized by: Luigi Strickland DO     Indications / Diagnosis:  Fall  ECG reviewed by me, the ED Provider: yes    Patient location:  ED  Previous ECG:     Previous ECG:  Compared to current    Similarity:  No change    Comparison to cardiac monitor: Yes    Interpretation:     Interpretation: normal    Rate:     ECG rate:  66    ECG rate assessment: normal    Rhythm:     Rhythm: sinus rhythm    Ectopy:     Ectopy: none    QRS:     QRS axis:  Normal  Conduction:     Conduction: abnormal    ST segments:     ST segments:  Normal  T waves:     T waves: normal      Laceration repair    Date/Time: 2/16/2021 10:38 PM  Performed by: Luigi Strickland DO  Authorized by: Luigi Strickland DO   Consent given by: patient  Patient understanding: patient states understanding of the procedure being performed  Patient identity confirmed: verbally with patient  Body area: head/neck  Location details: scalp  Laceration length: 2 cm  Foreign bodies: no foreign bodies  Tendon involvement: none  Nerve involvement: none    Sedation:  Patient sedated: no      Wound Dehiscence:  Superficial Wound Dehiscence: simple closure      Procedure Details:  Irrigation solution: saline  Irrigation method: jet lavage  Amount of cleaning: extensive  Debridement: none  Degree of undermining: none  Skin closure: staples  Number of sutures: 3  Approximation: close  Approximation difficulty: simple  Patient tolerance: patient tolerated the procedure well with no immediate complications               ED Course  ED Course as of Feb 16 2239   Tue Feb 16, 2021 2129 No need for CXR as patient has no obvious chest trauma  No chest wall tenderness on exam  Has no SOB  2131 Last tetanus was 11/2017              [facility]  Number of Diagnoses or Management Options  Closed head injury, initial encounter:   Fall, initial encounter:   Right knee pain:   Diagnosis management comments: 71-year-old male on aspirin presenting for mechanical fall  Patient says he lost his balance going up the stairs  Did strike his head and lost consciousness  Admits to a headache at this time  Denies chest pain, lightheadedness, dizziness, nausea, vomiting, abdominal pain or shortness of breath before the fall or currently  Vitals are within normal limits  Has small laceration to right side of head  Mild C-spine tenderness  No focal neurologic deficits  Will obtain CT head, C-spine  Will obtain x-ray of right knee  Will obtain EKG  Disposition  [facility] Transfer: No  Final diagnoses:   Fall, initial encounter   Closed head injury, initial encounter   Right knee pain     Time reflects when diagnosis was documented in both MDM as applicable and the Disposition within this note     Time User Action Codes Description Comment    2/16/2021 10:24 PM Tatiana Cosme [V64  CXTJ] Fall, initial encounter     2/16/2021 10:24 PM Tatiana Gonzalez Add [S09 90XA] Closed head injury, initial encounter     2/16/2021 10:24 PM Tatiana Gonzalez Add [M25 561] Right knee pain       ED Disposition     ED Disposition Condition Date/Time Comment    Discharge Stable Tue Feb 16, 2021 10:24 PM Rufina Green discharge to home/self care  Follow-up Information     Follow up With Specialties Details Why Contact Info Additional Information    Levon Schmitt DO Family Medicine Schedule an appointment as soon as possible for a visit  For follow up of head injury [address] [facility]  [address]  [facility] DO [facility] Schedule an appointment as soon as possible for a visit in 1 week For staple removal [address]  [facility] [address] [facility] Emergency Medicine Go in 1 week For staple removal [phone]  [address] [facility] Emergency Department, [place] [address], [place], [address] [place] [address]        Patient's Medications   Discharge Prescriptions    No medications on file     No discharge procedures on file      PDMP Review     None          ED Provider  Electronically Signed by         Cristhian Godinez DO  02/16/21 1776

## 2021-02-17 NOTE — PROGRESS NOTES
Assessment  Diagnoses and all orders for this visit:    Primary osteoarthritis of left knee    Primary osteoarthritis of right knee      Discussion and Plan:    WBAT BL LE  Viscosupplementation injections provided to bilateral knees (Synvisc-One)  Follow up in 3 months    Subjective:   Patient ID: Vibha Hwang is a 77 y o  male      62-year-old male presents for follow-up for bilateral knee osteoarthritis  He had a mechanical fall yesterday hitting his right knee and his head, he was evaluated in the ER for this issue for which staples were placed in his scalp  He is currently using a walker to ambulate  Pain on knees is worse with weight-bearing activities, improves at rest and with injections  He presents for bilateral viscosupplementation injections  The following portions of the patient's history were reviewed and updated as appropriate: allergies, current medications, past family history, past medical history, past social history, past surgical history and problem list     Review of Systems   Constitutional: Negative for appetite change and fever  HENT: Negative for sore throat  Eyes: Negative for visual disturbance  Respiratory: Negative for shortness of breath  Cardiovascular: Negative for chest pain  Gastrointestinal: Negative for nausea and vomiting  Musculoskeletal: Positive for arthralgias and myalgias  Skin: Negative for wound  Objective:  /71   Pulse 65   Ht 5' 8" (1 727 m)   Wt 108 kg (237 lb)   BMI 36 04 kg/m²       Right Knee Exam     Tenderness   The patient is experiencing tenderness in the medial joint line  Range of Motion   Extension: 0   Flexion: 110     Tests   Varus: negative Valgus: negative    Comments:  Crepitance with range of motion  Multiple right abrasions distal to the knee  No effusion  No palpable warmth      Left Knee Exam     Tenderness   The patient is experiencing tenderness in the medial joint line      Range of Motion Extension: 0   Flexion: 110     Tests   Varus: negative Valgus: negative    Comments:  Crepitance with range of motion  No effusion  No palpable warmth                Physical Exam  Vitals signs and nursing note reviewed  Constitutional:       Appearance: Normal appearance  HENT:      Head: Normocephalic and atraumatic  Nose: Nose normal       Mouth/Throat:      Mouth: Mucous membranes are moist    Eyes:      Extraocular Movements: Extraocular movements intact  Conjunctiva/sclera: Conjunctivae normal    Neck:      Musculoskeletal: Normal range of motion  Cardiovascular:      Rate and Rhythm: Normal rate  Pulses: Normal pulses  Pulmonary:      Effort: Pulmonary effort is normal       Breath sounds: Normal breath sounds  Abdominal:      Palpations: Abdomen is soft  Musculoskeletal:      Comments: See Ortho Exam   Skin:     General: Skin is warm  Capillary Refill: Capillary refill takes less than 2 seconds  Neurological:      Mental Status: He is alert  Mental status is at baseline  Gait: Gait abnormal    Psychiatric:         Mood and Affect: Mood normal          Behavior: Behavior normal        Large joint arthrocentesis: R knee  Universal Protocol:  Consent given by: patient  Time out: Immediately prior to procedure a "time out" was called to verify the correct patient, procedure, equipment, support staff and site/side marked as required    Timeout called at: 2/17/2021 5:58 PM   Patient understanding: patient states understanding of the procedure being performed  Patient identity confirmed: verbally with patient    Supporting Documentation  Indications: pain   Procedure Details  Location: knee - R knee  Preparation: Patient was prepped and draped in the usual sterile fashion  Needle size: 18 G  Ultrasound guidance: no  Approach: superolateral   Medications administered: 48 mg hylan 48 MG/6ML    Patient tolerance: patient tolerated the procedure well with no immediate complications  Dressing:  Sterile dressing applied    Lidocaine 1% 5 cc with a 25G provided to the skin prior to injection with 18G    Large joint arthrocentesis: L knee  Universal Protocol:  Consent given by: patient  Time out: Immediately prior to procedure a "time out" was called to verify the correct patient, procedure, equipment, support staff and site/side marked as required    Timeout called at: 2/17/2021 5:58 PM   Patient understanding: patient states understanding of the procedure being performed    Supporting Documentation  Indications: pain   Procedure Details  Location: knee - L knee  Needle size: 18 G  Ultrasound guidance: no  Approach: superomedial   Medications administered: 48 mg hylan 48 MG/6ML    Patient tolerance: patient tolerated the procedure well with no immediate complications  Dressing:  Sterile dressing applied        No new imaging reviewed

## 2021-02-24 ENCOUNTER — OFFICE VISIT (OUTPATIENT)
Dept: FAMILY MEDICINE CLINIC | Facility: CLINIC | Age: 76
End: 2021-02-24
Payer: COMMERCIAL

## 2021-02-24 VITALS
DIASTOLIC BLOOD PRESSURE: 60 MMHG | BODY MASS INDEX: 35.92 KG/M2 | HEIGHT: 68 IN | SYSTOLIC BLOOD PRESSURE: 120 MMHG | TEMPERATURE: 97.3 F | WEIGHT: 237 LBS

## 2021-02-24 DIAGNOSIS — S06.2X0A LACERATION AND CONTUSION OF CEREBRAL CORTEX, RIGHT, WITHOUT LOSS OF CONSCIOUSNESS, INITIAL ENCOUNTER (HCC): Primary | ICD-10-CM

## 2021-02-24 PROCEDURE — 99213 OFFICE O/P EST LOW 20 MIN: CPT | Performed by: FAMILY MEDICINE

## 2021-02-24 NOTE — PROGRESS NOTES
BMI Counseling: Body mass index is 36 04 kg/m²  The BMI is above normal  Nutrition recommendations include decreasing portion sizes, encouraging healthy choices of fruits and vegetables, decreasing fast food intake, consuming healthier snacks, moderation in carbohydrate intake and increasing intake of lean protein  Exercise recommendations include exercising 3-5 times per week  Assessment/Plan:    Problem List Items Addressed This Visit     None      Visit Diagnoses     Laceration and contusion of cerebral cortex, right, without loss of consciousness, initial encounter ([place] [address] )    -  Primary           Diagnoses and all orders for this visit:    Laceration and contusion of cerebral cortex, right, without loss of consciousness, initial encounter ([facility] )        No problem-specific Assessment & Plan notes found for this encounter  Subjective:      Patient ID: Christina Cox is a 77 y o  male  Patient has 3 staples in the right temporoparietal area of his scalp where he has S laceration these removed without incident Steri-Strips were applied in a sterile dressing      The following portions of the patient's history were reviewed and updated as appropriate:   He has a past medical history of BPH (benign prostatic hyperplasia), CAD (coronary artery disease), Diabetes mellitus ([facility] ), Gout, High cholesterol, Hypertension, and MI, old ,  does not have any pertinent problems on file  ,   has a past surgical history that includes Tonsillectomy and adenoidectomy; Cataract extraction w/  intraocular lens implant; Eye surgery; Coronary angioplasty with stent; and pr colonoscopy flx dx w/collj spec when pfrmd (N/A, 3/21/2016)  ,  family history includes No Known Problems in his father and mother  He was adopted  ,   reports that he has never smoked  He has never used smokeless tobacco  He reports current alcohol use  He reports that he does not use drugs  ,  has No Known Allergies     Current Outpatient Medications Medication Sig Dispense Refill    allopurinol (ZYLOPRIM) 100 mg tablet Take 1 tablet (100 mg total) by mouth daily 90 tablet 2    aspirin 325 mg tablet Take 325 mg by mouth daily   atorvastatin (LIPITOR) 80 mg tablet Take 1 tablet (80 mg total) by mouth daily 90 tablet 0    carboxymethylcellulose 1 % ophthalmic solution Apply 1 drop to eye      Empagliflozin (Jardiance) 10 MG TABS Take 1 tablet (10 mg total) by mouth every morning 90 tablet 1    escitalopram (LEXAPRO) 20 mg tablet Take 1 tablet (20 mg total) by mouth daily 90 tablet 0    gabapentin (NEURONTIN) 300 mg capsule Take 1 capsule (300 mg total) by mouth daily at bedtime 90 capsule 1    glimepiride (AMARYL) 4 mg tablet TAKE 1 TABLET TWICE A  tablet 2    levothyroxine 88 mcg tablet Take 1 tablet (88 mcg total) by mouth daily in the early morning 90 tablet 1    losartan (COZAAR) 25 mg tablet Take 1 tablet (25 mg total) by mouth daily 90 tablet 1    metFORMIN (GLUCOPHAGE) 1000 MG tablet Take 1 tablet (1,000 mg total) by mouth 2 (two) times a day with meals 180 tablet 0    multivitamin (THERAGRAN) TABS Take 1 tablet by mouth daily      nitroglycerin (NITROSTAT) 0 4 mg SL tablet Place 1 tablet (0 4 mg total) under the tongue every 5 (five) minutes as needed for chest pain 25 tablet 4    propranolol (INDERAL LA) 120 mg 24 hr capsule Take 1 capsule (120 mg total) by mouth daily 90 capsule 4     No current facility-administered medications for this visit  Review of Systems   Constitutional: Positive for activity change  Negative for appetite change, diaphoresis, fatigue and fever  HENT: Negative  Eyes: Negative  Respiratory: Negative for apnea, cough, chest tightness, shortness of breath and wheezing  Cardiovascular: Negative for chest pain, palpitations and leg swelling  Gastrointestinal: Negative for abdominal distention, abdominal pain, anal bleeding, constipation, diarrhea, nausea and vomiting     Endocrine: Negative for cold intolerance, heat intolerance, polydipsia, polyphagia and polyuria  Genitourinary: Negative for difficulty urinating, dysuria, flank pain, hematuria and urgency  Musculoskeletal: Negative for arthralgias, back pain, gait problem, joint swelling and myalgias  Skin: Positive for wound  Negative for color change and rash  3 surgical staples applied to the wound on the right parietal temporal area   Allergic/Immunologic: Negative for environmental allergies, food allergies and immunocompromised state  Neurological: Negative for dizziness, seizures, syncope, speech difficulty, numbness and headaches  Hematological: Negative for adenopathy  Does not bruise/bleed easily  Psychiatric/Behavioral: Negative for agitation, behavioral problems, hallucinations, sleep disturbance and suicidal ideas  Objective:  Vitals:    02/24/21 1346   BP: 120/60   BP Location: Left arm   Patient Position: Sitting   Cuff Size: Standard   Temp: (!) 97 3 °F (36 3 °C)   TempSrc: Temporal   Weight: 108 kg (237 lb)   Height: 5' 8" (1 727 m)     Body mass index is 36 04 kg/m²  Physical Exam  Constitutional:       General: He is not in acute distress  Appearance: He is well-developed  He is not diaphoretic  HENT:      Head: Normocephalic  Right Ear: External ear normal       Left Ear: External ear normal       Nose: Nose normal    Eyes:      General: No scleral icterus  Right eye: No discharge  Left eye: No discharge  Conjunctiva/sclera: Conjunctivae normal       Pupils: Pupils are equal, round, and reactive to light  Neck:      Musculoskeletal: Normal range of motion  Thyroid: No thyromegaly  Trachea: No tracheal deviation  Cardiovascular:      Rate and Rhythm: Normal rate and regular rhythm  Heart sounds: Normal heart sounds  No murmur  No friction rub  No gallop  Pulmonary:      Effort: Pulmonary effort is normal  No respiratory distress  Breath sounds: Normal breath sounds  No wheezing  Abdominal:      General: Bowel sounds are normal       Palpations: Abdomen is soft  There is no mass  Tenderness: There is no abdominal tenderness  There is no guarding  Musculoskeletal:         General: No deformity  Lymphadenopathy:      Cervical: No cervical adenopathy  Skin:     General: Skin is warm and dry  Findings: No erythema or rash  Comments: 3 surgical served tables removed from scalp laceration right side of scalp   Neurological:      Mental Status: He is alert and oriented to person, place, and time  Cranial Nerves: No cranial nerve deficit  Psychiatric:         Thought Content:  Thought content normal

## 2021-02-26 ENCOUNTER — LAB (OUTPATIENT)
Dept: LAB | Facility: HOSPITAL | Age: 76
End: 2021-02-26
Attending: INTERNAL MEDICINE
Payer: COMMERCIAL

## 2021-02-26 DIAGNOSIS — E11.40 CONTROLLED TYPE 2 DIABETES MELLITUS WITH DIABETIC NEUROPATHY, WITH LONG-TERM CURRENT USE OF INSULIN (HCC): ICD-10-CM

## 2021-02-26 DIAGNOSIS — Z79.4 CONTROLLED TYPE 2 DIABETES MELLITUS WITH DIABETIC NEUROPATHY, WITH LONG-TERM CURRENT USE OF INSULIN (HCC): ICD-10-CM

## 2021-02-26 LAB
ANION GAP SERPL CALCULATED.3IONS-SCNC: 5 MMOL/L (ref 4–13)
BUN SERPL-MCNC: 21 MG/DL (ref 5–25)
CALCIUM SERPL-MCNC: 9.4 MG/DL (ref 8.3–10.1)
CHLORIDE SERPL-SCNC: 107 MMOL/L (ref 100–108)
CO2 SERPL-SCNC: 31 MMOL/L (ref 21–32)
CREAT SERPL-MCNC: 1.07 MG/DL (ref 0.6–1.3)
CREAT UR-MCNC: 180 MG/DL
EST. AVERAGE GLUCOSE BLD GHB EST-MCNC: 146 MG/DL
GFR SERPL CREATININE-BSD FRML MDRD: 68 ML/MIN/1.73SQ M
GLUCOSE P FAST SERPL-MCNC: 129 MG/DL (ref 65–99)
HBA1C MFR BLD: 6.7 %
MICROALBUMIN UR-MCNC: 273 MG/L (ref 0–20)
MICROALBUMIN/CREAT 24H UR: 152 MG/G CREATININE (ref 0–30)
POTASSIUM SERPL-SCNC: 4 MMOL/L (ref 3.5–5.3)
SODIUM SERPL-SCNC: 143 MMOL/L (ref 136–145)

## 2021-02-26 PROCEDURE — 36415 COLL VENOUS BLD VENIPUNCTURE: CPT

## 2021-02-26 PROCEDURE — 83036 HEMOGLOBIN GLYCOSYLATED A1C: CPT

## 2021-02-26 PROCEDURE — 80048 BASIC METABOLIC PNL TOTAL CA: CPT

## 2021-02-26 PROCEDURE — 82570 ASSAY OF URINE CREATININE: CPT

## 2021-02-26 PROCEDURE — 82043 UR ALBUMIN QUANTITATIVE: CPT

## 2021-03-04 ENCOUNTER — OFFICE VISIT (OUTPATIENT)
Dept: FAMILY MEDICINE CLINIC | Facility: CLINIC | Age: 76
End: 2021-03-04
Payer: COMMERCIAL

## 2021-03-04 VITALS
SYSTOLIC BLOOD PRESSURE: 100 MMHG | HEIGHT: 68 IN | DIASTOLIC BLOOD PRESSURE: 50 MMHG | TEMPERATURE: 97 F | HEART RATE: 43 BPM | BODY MASS INDEX: 36.83 KG/M2 | WEIGHT: 243 LBS

## 2021-03-04 DIAGNOSIS — Z80.42 FAMILY HISTORY OF PROSTATE CANCER: ICD-10-CM

## 2021-03-04 DIAGNOSIS — I25.118 CORONARY ARTERY DISEASE OF NATIVE ARTERY OF NATIVE HEART WITH STABLE ANGINA PECTORIS (HCC): ICD-10-CM

## 2021-03-04 DIAGNOSIS — Z00.00 MEDICARE ANNUAL WELLNESS VISIT, SUBSEQUENT: Primary | ICD-10-CM

## 2021-03-04 DIAGNOSIS — F33.9 RECURRENT MAJOR DEPRESSIVE DISORDER, REMISSION STATUS UNSPECIFIED (HCC): ICD-10-CM

## 2021-03-04 DIAGNOSIS — R25.1 TREMOR: ICD-10-CM

## 2021-03-04 DIAGNOSIS — E11.42 TYPE 2 DIABETES MELLITUS WITH DIABETIC POLYNEUROPATHY, WITH LONG-TERM CURRENT USE OF INSULIN (HCC): ICD-10-CM

## 2021-03-04 DIAGNOSIS — Z79.4 TYPE 2 DIABETES MELLITUS WITH DIABETIC POLYNEUROPATHY, WITH LONG-TERM CURRENT USE OF INSULIN (HCC): ICD-10-CM

## 2021-03-04 DIAGNOSIS — E66.01 MORBID OBESITY (HCC): ICD-10-CM

## 2021-03-04 PROCEDURE — G0439 PPPS, SUBSEQ VISIT: HCPCS | Performed by: FAMILY MEDICINE

## 2021-03-04 RX ORDER — PROPRANOLOL HCL 60 MG
60 CAPSULE, EXTENDED RELEASE 24HR ORAL DAILY
Qty: 90 CAPSULE | Refills: 1 | Status: SHIPPED | OUTPATIENT
Start: 2021-03-04 | End: 2021-07-15

## 2021-03-04 NOTE — PATIENT INSTRUCTIONS
Medicare Preventive Visit Patient Instructions  Thank you for completing your Welcome to Medicare Visit or Medicare Annual Wellness Visit today  Your next wellness visit will be due in one year (3/4/2022)  The screening/preventive services that you may require over the next 5-10 years are detailed below  Some tests may not apply to you based off risk factors and/or age  Screening tests ordered at today's visit but not completed yet may show as past due  Also, please note that scanned in results may not display below  Preventive Screenings:  Service Recommendations Previous Testing/Comments   Colorectal Cancer Screening  · Colonoscopy    · Fecal Occult Blood Test (FOBT)/Fecal Immunochemical Test (FIT)  · Fecal DNA/Cologuard Test  · Flexible Sigmoidoscopy Age: 54-65 years old   Colonoscopy: every 10 years (May be performed more frequently if at higher risk)  OR  FOBT/FIT: every 1 year  OR  Cologuard: every 3 years  OR  Sigmoidoscopy: every 5 years  Screening may be recommended earlier than age 48 if at higher risk for colorectal cancer  Also, an individualized decision between you and your healthcare provider will decide whether screening between the ages of 74-80 would be appropriate   Colonoscopy: 03/21/2016  FOBT/FIT: Not on file  Cologuard: Not on file  Sigmoidoscopy: Not on file    Screening Current     Prostate Cancer Screening Individualized decision between patient and health care provider in men between ages of 53-78   Medicare will cover every 12 months beginning on the day after your 50th birthday PSA: No results in last 5 years          Hepatitis C Screening Once for adults born between 1945 and 1965  More frequently in patients at high risk for Hepatitis C Hep C Antibody: 09/15/2017    Screening Current   Diabetes Screening 1-2 times per year if you're at risk for diabetes or have pre-diabetes Fasting glucose: 129 mg/dL   A1C: 6 7 %    Screening Not Indicated  History Diabetes   Cholesterol Screening Once every 5 years if you don't have a lipid disorder  May order more often based on risk factors  Lipid panel: 02/21/2020    Screening Not Indicated  History Lipid Disorder      Other Preventive Screenings Covered by Medicare:  1  Abdominal Aortic Aneurysm (AAA) Screening: covered once if your at risk  You're considered to be at risk if you have a family history of AAA or a male between the age of 73-68 who smoking at least 100 cigarettes in your lifetime  2  Lung Cancer Screening: covers low dose CT scan once per year if you meet all of the following conditions: (1) Age 50-69; (2) No signs or symptoms of lung cancer; (3) Current smoker or have quit smoking within the last 15 years; (4) You have a tobacco smoking history of at least 30 pack years (packs per day x number of years you smoked); (5) You get a written order from a healthcare provider  3  Glaucoma Screening: covered annually if you're considered high risk: (1) You have diabetes OR (2) Family history of glaucoma OR (3)  aged 48 and older OR (3)  American aged 72 and older  3  Osteoporosis Screening: covered every 2 years if you meet one of the following conditions: (1) Have a vertebral abnormality; (2) On glucocorticoid therapy for more than 3 months; (3) Have primary hyperparathyroidism; (4) On osteoporosis medications and need to assess response to drug therapy  5  HIV Screening: covered annually if you're between the age of 12-76  Also covered annually if you are younger than 13 and older than 72 with risk factors for HIV infection  For pregnant patients, it is covered up to 3 times per pregnancy      Immunizations:  Immunization Recommendations   Influenza Vaccine Annual influenza vaccination during flu season is recommended for all persons aged >= 6 months who do not have contraindications   Pneumococcal Vaccine (Prevnar and Pneumovax)  * Prevnar = PCV13  * Pneumovax = PPSV23 Adults 25-60 years old: 1-3 doses may be recommended based on certain risk factors  Adults 72 years old: Prevnar (PCV13) vaccine recommended followed by Pneumovax (PPSV23) vaccine  If already received PPSV23 since turning 65, then PCV13 recommended at least one year after PPSV23 dose  Hepatitis B Vaccine 3 dose series if at intermediate or high risk (ex: diabetes, end stage renal disease, liver disease)   Tetanus (Td) Vaccine - COST NOT COVERED BY MEDICARE PART B Following completion of primary series, a booster dose should be given every 10 years to maintain immunity against tetanus  Td may also be given as tetanus wound prophylaxis  Tdap Vaccine - COST NOT COVERED BY MEDICARE PART B Recommended at least once for all adults  For pregnant patients, recommended with each pregnancy  Shingles Vaccine (Shingrix) - COST NOT COVERED BY MEDICARE PART B  2 shot series recommended in those aged 48 and above     Health Maintenance Due:      Topic Date Due    Colonoscopy Surveillance  03/21/2019    Colorectal Cancer Screening  03/21/2026    Hepatitis C Screening  Completed     Immunizations Due:      Topic Date Due    Pneumococcal Vaccine: 65+ Years (1 of 1 - PPSV23) 12/09/2011     Advance Directives   What are advance directives? Advance directives are legal documents that state your wishes and plans for medical care  These plans are made ahead of time in case you lose your ability to make decisions for yourself  Advance directives can apply to any medical decision, such as the treatments you want, and if you want to donate organs  What are the types of advance directives? There are many types of advance directives, and each state has rules about how to use them  You may choose a combination of any of the following:  · Living will: This is a written record of the treatment you want  You can also choose which treatments you do not want, which to limit, and which to stop at a certain time  This includes surgery, medicine, IV fluid, and tube feedings  · Durable power of  for healthcare [facility]): This is a written record that states who you want to make healthcare choices for you when you are unable to make them for yourself  This person, called a proxy, is usually a family member or a friend  You may choose more than 1 proxy  · Do not resuscitate (DNR) order:  A DNR order is used in case your heart stops beating or you stop breathing  It is a request not to have certain forms of treatment, such as CPR  A DNR order may be included in other types of advance directives  · Medical directive: This covers the care that you want if you are in a coma, near death, or unable to make decisions for yourself  You can list the treatments you want for each condition  Treatment may include pain medicine, surgery, blood transfusions, dialysis, IV or tube feedings, and a ventilator (breathing machine)  · Values history: This document has questions about your views, beliefs, and how you feel and think about life  This information can help others choose the care that you would choose  Why are advance directives important? An advance directive helps you control your care  Although spoken wishes may be used, it is better to have your wishes written down  Spoken wishes can be misunderstood, or not followed  Treatments may be given even if you do not want them  An advance directive may make it easier for your family to make difficult choices about your care  Fall Prevention    Fall prevention  includes ways to make your home and other areas safer  It also includes ways you can move more carefully to prevent a fall  Health conditions that cause changes in your blood pressure, vision, or muscle strength and coordination may increase your risk for falls  Medicines may also increase your risk for falls if they make you dizzy, weak, or sleepy  Fall prevention tips:   · Stand or sit up slowly  · Use assistive devices as directed      · Wear shoes that fit well and have soles that   · Wear a personal alarm  · Stay active  · Manage your medical conditions  Home Safety Tips:  · Add items to prevent falls in the bathroom  · Keep paths clear  · Install bright lights in your home  · Keep items you use often on shelves within reach  · Paint or place reflective tape on the edges of your stairs  Weight Management   Why it is important to manage your weight:  Being overweight increases your risk of health conditions such as heart disease, high blood pressure, type 2 diabetes, and certain types of cancer  It can also increase your risk for osteoarthritis, sleep apnea, and other respiratory problems  Aim for a slow, steady weight loss  Even a small amount of weight loss can lower your risk of health problems  How to lose weight safely:  A safe and healthy way to lose weight is to eat fewer calories and get regular exercise  You can lose up about 1 pound a week by decreasing the number of calories you eat by 500 calories each day  Healthy meal plan for weight management:  A healthy meal plan includes a variety of foods, contains fewer calories, and helps you stay healthy  A healthy meal plan includes the following:  · Eat whole-grain foods more often  A healthy meal plan should contain fiber  Fiber is the part of grains, fruits, and vegetables that is not broken down by your body  Whole-grain foods are healthy and provide extra fiber in your diet  Some examples of whole-grain foods are whole-wheat breads and pastas, oatmeal, brown rice, and bulgur  · Eat a variety of vegetables every day  Include dark, leafy greens such as spinach, kale, yonathan greens, and mustard greens  Eat yellow and orange vegetables such as carrots, sweet potatoes, and winter squash  · Eat a variety of fruits every day  Choose fresh or canned fruit (canned in its own juice or light syrup) instead of juice  Fruit juice has very little or no fiber  · Eat low-fat dairy foods    Drink fat-free (skim) milk or 1% milk  Eat fat-free yogurt and low-fat cottage cheese  Try low-fat cheeses such as mozzarella and other reduced-fat cheeses  · Choose meat and other protein foods that are low in fat  Choose beans or other legumes such as split peas or lentils  Choose fish, skinless poultry (chicken or turkey), or lean cuts of red meat (beef or pork)  Before you cook meat or poultry, cut off any visible fat  · Use less fat and oil  Try baking foods instead of frying them  Add less fat, such as margarine, sour cream, regular salad dressing and mayonnaise to foods  Eat fewer high-fat foods  Some examples of high-fat foods include french fries, doughnuts, ice cream, and cakes  · Eat fewer sweets  Limit foods and drinks that are high in sugar  This includes candy, cookies, regular soda, and sweetened drinks  Exercise:  Exercise at least 30 minutes per day on most days of the week  Some examples of exercise include walking, biking, dancing, and swimming  You can also fit in more physical activity by taking the stairs instead of the elevator or parking farther away from stores  Ask your healthcare provider about the best exercise plan for you  © Copyright [employer] 2018 Information is for End User's use only and may not be sold, redistributed or otherwise used for commercial purposes   All illustrations and images included in CareNotes® are the copyrighted property of [employer]  or [address] [place] [address]

## 2021-03-04 NOTE — PROGRESS NOTES
Assessment and Plan:     Problem List Items Addressed This Visit     None          Falls Plan of Care: balance, strength, and gait training instructions were provided  Recommended assistive device to help with gait and balance  Preventive health issues were discussed with patient, and age appropriate screening tests were ordered as noted in patient's After Visit Summary  Personalized health advice and appropriate referrals for health education or preventive services given if needed, as noted in patient's After Visit Summary  History of Present Illness:     Patient presents for Welcome to Medicare visit  Patient Care Team:  Marian Covington DO as PCP - MD Hetal Rodriguez MD as Endoscopist     Review of Systems:     Review of Systems   Constitutional: Positive for activity change, fatigue and unexpected weight change  Negative for appetite change, diaphoresis and fever  Patient has gained 6 lb since last visit   HENT: Positive for hearing loss  Negative for dental problem  Eyes: Positive for visual disturbance  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 and cataract extraction left eye   Respiratory: Positive for shortness of breath  Negative for apnea, cough, chest tightness and wheezing  Exertional dyspnea   Cardiovascular: Positive for leg swelling  Negative for chest pain and palpitations  Gastrointestinal: Positive for diarrhea  Negative for abdominal distention, abdominal pain, anal bleeding, constipation, nausea and vomiting  Chronic diarrhea possibly related to his metformin   Endocrine: Negative for cold intolerance, heat intolerance, polydipsia, polyphagia and polyuria  Type 2 diabetes most recent hemoglobin A1c 6 7   Genitourinary: Positive for urgency  Negative for difficulty urinating, dysuria, flank pain and hematuria  Musculoskeletal: Positive for arthralgias, back pain and gait problem   Negative for joint swelling and myalgias  Skin: Negative for color change, rash and wound  Allergic/Immunologic: Negative for environmental allergies, food allergies and immunocompromised state  Neurological: Positive for tremors  Negative for dizziness, seizures, syncope, speech difficulty, numbness and headaches  Hematological: Negative for adenopathy  Does not bruise/bleed easily  Psychiatric/Behavioral: Positive for dysphoric mood  Negative for agitation, behavioral problems, hallucinations, sleep disturbance and suicidal ideas        Problem List:     Patient Active Problem List   Diagnosis    Arthritis    Depression with anxiety    Diabetes mellitus type 2, controlled ([place] [address] )    Gout    Hyperlipidemia    Hypertension    Morbid obesity ([facility] )    NSTEMI (non-ST elevated myocardial infarction) ([facility] )    Tremor    Degenerative disc disease, lumbar    Tremor, essential    Sensory ataxia    Gait instability    Frequent falls    Infected epidermoid cyst    Vitamin E deficiency    Primary osteoarthritis involving multiple joints    Chronic pain of both knees    Primary osteoarthritis of both knees    Coronary artery disease of native artery of native heart with stable angina pectoris ([facility] )    Primary osteoarthritis of right knee    Primary osteoarthritis of left knee    Fall (on) (from) other stairs and steps, initial encounter    Recurrent major depressive disorder ([place] [address] )    Stage 3a chronic kidney disease    Diabetic nephropathy associated with type 2 diabetes mellitus ([facility] )    Concentric left ventricular hypertrophy    Aortic stenosis, moderate    Edema      Past Medical and Surgical History:     Past Medical History:   Diagnosis Date    BPH (benign prostatic hyperplasia)     CAD (coronary artery disease)     Diabetes mellitus (HCC)     niddm    Gout     High cholesterol     Hypertension     MI, 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      Past Surgical History:   Procedure Laterality Date    CATARACT EXTRACTION W/  INTRAOCULAR LENS IMPLANT      CORONARY ANGIOPLASTY WITH STENT PLACEMENT      stents x3    EYE SURGERY      repair corneal laceration    MI COLONOSCOPY FLX DX W/COLLJ SPEC WHEN PFRMD N/A 3/21/2016    Procedure: COLONOSCOPY;  Surgeon: Ramy Kendrick MD;  [facility];  Service: Colorectal    TONSILLECTOMY AND ADENOIDECTOMY        Family History:     Family History   Adopted: Yes   Problem Relation Age of Onset    No Known Problems Mother     No Known Problems Father       Social History:     E-Cigarette/Vaping    E-Cigarette Use Never User      E-Cigarette/Vaping Substances    Nicotine No     THC No     CBD No     Flavoring No     Other No     Unknown No      Social History     Socioeconomic History    Marital status: /Civil Union     Spouse name: None    Number of children: None    Years of education: None    Highest education level: None   Occupational History    None   Social Needs    Financial resource strain: None    Food insecurity     Worry: None     Inability: None    Transportation needs     Medical: None     Non-medical: None   Tobacco Use    Smoking status: Never Smoker    Smokeless tobacco: Never Used   Substance and Sexual Activity    Alcohol use: Yes     Comment: socially, cut down in 2008, previously did more than     Drug use: Never    Sexual activity: Yes     Partners: Female   Lifestyle    Physical activity     Days per week: None     Minutes per session: None    Stress: None   Relationships    Social connections     Talks on phone: None     Gets together: None     Attends Congregational service: None     Active member of club or organization: None     Attends meetings of clubs or organizations: None     Relationship status: None    Intimate partner violence     Fear of current or ex partner: None     Emotionally abused: None     Physically abused: None     Forced sexual activity: None   Other Topics Concern    None   Social History Narrative    No advance directive     No living will       Medications and Allergies:     Current Outpatient Medications   Medication Sig Dispense Refill    allopurinol (ZYLOPRIM) 100 mg tablet Take 1 tablet (100 mg total) by mouth daily 90 tablet 2    aspirin 325 mg tablet Take 325 mg by mouth daily   atorvastatin (LIPITOR) 80 mg tablet Take 1 tablet (80 mg total) by mouth daily 90 tablet 0    escitalopram (LEXAPRO) 20 mg tablet Take 1 tablet (20 mg total) by mouth daily 90 tablet 0    gabapentin (NEURONTIN) 300 mg capsule Take 1 capsule (300 mg total) by mouth daily at bedtime 90 capsule 1    glimepiride (AMARYL) 4 mg tablet TAKE 1 TABLET TWICE A  tablet 2    levothyroxine 88 mcg tablet Take 1 tablet (88 mcg total) by mouth daily in the early morning 90 tablet 1    losartan (COZAAR) 25 mg tablet Take 1 tablet (25 mg total) by mouth daily 90 tablet 1    metFORMIN (GLUCOPHAGE) 1000 MG tablet Take 1 tablet (1,000 mg total) by mouth 2 (two) times a day with meals 180 tablet 0    multivitamin (THERAGRAN) TABS Take 1 tablet by mouth daily      nitroglycerin (NITROSTAT) 0 4 mg SL tablet Place 1 tablet (0 4 mg total) under the tongue every 5 (five) minutes as needed for chest pain 25 tablet 4    propranolol (INDERAL LA) 120 mg 24 hr capsule Take 1 capsule (120 mg total) by mouth daily 90 capsule 4    carboxymethylcellulose 1 % ophthalmic solution Apply 1 drop to eye      Empagliflozin (Jardiance) 10 MG TABS Take 1 tablet (10 mg total) by mouth every morning (Patient not taking: Reported on 3/4/2021) 90 tablet 1     No current facility-administered medications for this visit        No Known Allergies   Immunizations:     Immunization History   Administered Date(s) Administered    H1N1, All Formulations 11/06/2009    INFLUENZA 09/01/2015, 09/14/2016    Influenza Split High Dose Preservative Free IM 09/11/2012, 09/20/2013, 10/06/2014, 09/02/2015, 09/14/2016, 09/12/2017    Influenza, high dose seasonal 0 7 mL 10/22/2018, 09/09/2019, 09/28/2020    Pneumococcal Polysaccharide PPV23 1945    Tdap 11/22/2017    Zoster 04/10/2014      Health Maintenance:         Topic Date Due    Colonoscopy Surveillance  03/21/2019    Colorectal Cancer Screening  03/21/2026    Hepatitis C Screening  Completed         Topic Date Due    Pneumococcal Vaccine: 65+ Years (1 of 1 - PPSV23) 12/09/2011      Medicare Screening Tests and Risk Assessments:         Health Risk Assessment:   Patient rates overall health as fair  Patient feels that their physical health rating is slightly better  Eyesight was rated as same  Hearing was rated as slightly worse  Patient feels that their emotional and mental health rating is slightly worse  Pain experienced in the last 7 days has been none  Patient states that he has experienced no weight loss or gain in last 6 months  Depression Screening:   PHQ-2 Score: 2  PHQ-9 Score: 8      Fall Risk Screening: In the past year, patient has experienced: history of falling in past year    Number of falls: 2 or more  Injured during fall?: Yes    Feels unsteady when standing or walking?: Yes    Worried about falling?: Yes      Home Safety:  Patient has trouble with stairs inside or outside of their home  Patient has working smoke alarms and has working carbon monoxide detector  Home safety hazards include: none  Nutrition:   Current diet is Regular and Limited junk food  Medications:   Patient is currently taking over-the-counter supplements  OTC medications include: see medication list  Patient is not able to manage medications  Activities of Daily Living (ADLs)/Instrumental Activities of Daily Living (IADLs):   Walk and transfer into and out of bed and chair?: Yes  Dress and groom yourself?: Yes    Bathe or shower yourself?: Yes    Feed yourself?  Yes  Do your laundry/housekeeping?: No  Manage your money, pay your bills and track your expenses?: No  Make your own meals?: Yes    Do your own shopping?: Yes    Previous Hospitalizations:   Any hospitalizations or ED visits within the last 12 months?: Yes    How many hospitalizations have you had in the last year?: 1-2    Advance Care Planning:   Living will: No    Durable POA for healthcare: No    Advanced directive: No    Advanced directive counseling given: Yes    Five wishes given: No    Patient declined ACP directive: No    End of Life Decisions reviewed with patient: Yes    Provider agrees with end of life decisions: No      Cognitive Screening:   Provider or family/friend/caregiver concerned regarding cognition?: No    PREVENTIVE SCREENINGS      Cardiovascular Screening:    General: Screening Not Indicated and History Lipid Disorder      Diabetes Screening:     General: Screening Not Indicated and History Diabetes      Colorectal Cancer Screening:     General: Screening Current      Osteoporosis Screening:    General: Screening Not Indicated      Abdominal Aortic Aneurysm (AAA) Screening:    Risk factors include: age between 73-67 yo        Lung Cancer Screening:     General: Screening Not Indicated      Hepatitis C Screening:    General: Screening Current    No exam data present     Physical Exam:     /50 (BP Location: Left arm, Patient Position: Sitting, Cuff Size: Standard)   Pulse (!) 43   Temp (!) 97 °F (36 1 °C)   Ht 5' 8" (1 727 m)   Wt 110 kg (243 lb)   BMI 36 95 kg/m²     Physical Exam  Constitutional:       Appearance: He is well-developed  He is obese  HENT:      Head: Normocephalic and atraumatic  Right Ear: External ear normal       Left Ear: External ear normal       Nose: Nose normal    Eyes:      Conjunctiva/sclera: Conjunctivae normal       Pupils: Pupils are equal, round, and reactive to light  Neck:      Musculoskeletal: Normal range of motion and neck supple  Cardiovascular:      Rate and Rhythm: Regular rhythm  Bradycardia present        Pulses: no weak pulses          Dorsalis pedis pulses are 2+ on the right side and 2+ on the left side  Posterior tibial pulses are 2+ on the right side and 2+ on the left side  Heart sounds: Normal heart sounds  No murmur  No friction rub  Pulmonary:      Effort: Pulmonary effort is normal  No respiratory distress  Breath sounds: Normal breath sounds  No wheezing or rales  Chest:      Chest wall: No tenderness  Abdominal:      General: Bowel sounds are normal       Palpations: Abdomen is soft  Musculoskeletal: Normal range of motion  Right lower leg: Edema present  Left lower leg: Edema present  Feet:      Right foot:      Skin integrity: No ulcer, skin breakdown, erythema, warmth, callus or dry skin  Left foot:      Skin integrity: No ulcer, skin breakdown, erythema, warmth, callus or dry skin  Skin:     General: Skin is warm and dry  Capillary Refill: Capillary refill takes 2 to 3 seconds  Neurological:      Mental Status: He is alert and oriented to person, place, and time  Psychiatric:         Behavior: Behavior normal          Thought Content: Thought content normal          Judgment: Judgment normal         Patient's shoes and socks removed  Right Foot/Ankle   Right Foot Inspection  Skin Exam: skin normal and skin intact no dry skin, no warmth, no callus, no erythema, no maceration, no abnormal color, no pre-ulcer, no ulcer and no callus                          Toe Exam: ROM and strength within normal limits  Sensory   Vibration: intact  Proprioception: intact   Monofilament testing: intact  Vascular  Capillary refills: < 3 seconds  The right DP pulse is 2+  The right PT pulse is 2+       Left Foot/Ankle  Left Foot Inspection  Skin Exam: skin normal and skin intactno dry skin, no warmth, no erythema, no maceration, normal color, no pre-ulcer, no ulcer and no callus                         Toe Exam: ROM and strength within normal limits                   Sensory   Vibration: intact  Proprioception: intact  Monofilament: intact  Vascular  Capillary refills: < 3 seconds  The left DP pulse is 2+  The left PT pulse is 2+  Assign Risk Category:  No deformity present; No loss of protective sensation;  No weak pulses       Risk: 0    John Juarez DO

## 2021-03-12 ENCOUNTER — IMMUNIZATIONS (OUTPATIENT)
Dept: FAMILY MEDICINE CLINIC | Facility: HOSPITAL | Age: 76
End: 2021-03-12

## 2021-03-12 DIAGNOSIS — Z23 ENCOUNTER FOR IMMUNIZATION: Primary | ICD-10-CM

## 2021-03-12 PROCEDURE — 0011A SARS-COV-2 / COVID-19 MRNA VACCINE (MODERNA) 100 MCG: CPT

## 2021-03-12 PROCEDURE — 91301 SARS-COV-2 / COVID-19 MRNA VACCINE (MODERNA) 100 MCG: CPT

## 2021-03-18 ENCOUNTER — OFFICE VISIT (OUTPATIENT)
Dept: NEUROLOGY | Facility: CLINIC | Age: 76
End: 2021-03-18
Payer: COMMERCIAL

## 2021-03-18 VITALS
HEART RATE: 70 BPM | WEIGHT: 232 LBS | BODY MASS INDEX: 35.28 KG/M2 | DIASTOLIC BLOOD PRESSURE: 60 MMHG | SYSTOLIC BLOOD PRESSURE: 120 MMHG

## 2021-03-18 DIAGNOSIS — E11.21 DIABETIC NEPHROPATHY ASSOCIATED WITH TYPE 2 DIABETES MELLITUS (HCC): ICD-10-CM

## 2021-03-18 DIAGNOSIS — G25.0 TREMOR, ESSENTIAL: Primary | ICD-10-CM

## 2021-03-18 DIAGNOSIS — R27.8 SENSORY ATAXIA: ICD-10-CM

## 2021-03-18 PROCEDURE — 99214 OFFICE O/P EST MOD 30 MIN: CPT | Performed by: PSYCHIATRY & NEUROLOGY

## 2021-03-18 RX ORDER — GABAPENTIN 100 MG/1
CAPSULE ORAL
Qty: 21 CAPSULE | Refills: 0 | Status: SHIPPED | OUTPATIENT
Start: 2021-03-18 | End: 2021-09-02 | Stop reason: ALTCHOICE

## 2021-03-18 RX ORDER — ZONISAMIDE 25 MG/1
CAPSULE ORAL
Qty: 60 CAPSULE | Refills: 5 | Status: SHIPPED | OUTPATIENT
Start: 2021-03-18 | End: 2021-09-03 | Stop reason: ALTCHOICE

## 2021-03-18 NOTE — PATIENT INSTRUCTIONS
Agree with continuing propranolol LA 60mg daily  We will try adding zonisamide 25mg daily  If no improvement can increase to 50mg daily  If no improvement in a 3-4 weeks contact us and we can further increase

## 2021-03-18 NOTE — PROGRESS NOTES
Patient ID: Oralia Chan is a 77 y o  male  Assessment/Plan:    Diabetic nephropathy associated with type 2 diabetes mellitus ([facility] )    Lab Results   Component Value Date    HGBA1C 6 7 (H) 02/26/2021   Not painful  Gabapentin being used for tremor not pain and is ineffective therefore will taper off given increased sedation  Tremor, essential  Slowly progressive action and intentional tremors of the hands tremor without any parkinsonian symptoms, consistent with his diagnosis of essential tremor  We discussed the slowly progressive nature of this condition, its pathology, and medication treatment options  Surgical options including deep brain stimulation surgery and MRI focused ultrasound therapy were discussed  He is not interested in any surgical options as he feels he can live with this tremor  gree with continuing propranolol LA 60mg daily  Partially effective but unfortunately we cannot increase  We will try adding zonisamide 25mg daily  If no improvement can increase to 50mg daily  If no improvement in a 3-4 weeks contact us and we can further increase  He is to call if he develops any side effect  Diagnoses and all orders for this visit:    Tremor, essential  -     gabapentin (NEURONTIN) 100 mg capsule; 2 po qhs x 1 week then 1 po qhs x 1 week then stop  -     zonisamide (ZONEGRAN) 25 mg capsule; 1 po daily x 1 week then 2 po daily    Diabetic nephropathy associated with type 2 diabetes mellitus (HCC)    Sensory ataxia           Subjective:    Mr Merle Mike is a man with diabetic neuropathy and essential tremor who presents for Movement disorder follow up  He was previously under Dr Ferguson [facility] care  To review, tremors began gradually several decades ago  They have slowly progressed to the point of interfering with function  Medication trials with limited improvement and side effects restricting increases  Most beneficial medication has been propranolol   Occupational Therapy visits to work-around tremors with weighted utensils was not helpful  Medication trials:  topiramate -terminated at low dose due to sedation  Primidone- stopped at 200mg due to sedation  gabapentin (600mg up to tid but no effect and sedation)   CBD oil    Current medications:  propranolol 60mg LA q24hrs with improvement in tremor (better tremor control on 120mg but he had bradycardia)    He continues to have moderate tremors which can interfere with eating, drinking and writing  He manages  Propranolol provides partial relief but he is unable to tolerate increases  No head, leg or vocal tremor  Stress can make tremor worse  He is also on gabapentin 300mg qhs  Higher doses did not help tremor and were associated with sedation       He also has imbalance for which he uses a cane  He has a walker but he does not use  He has diabetic neuropathy with sensory loss to the knees which is not painful  Objective:    Blood pressure 120/60, pulse 70, weight 105 kg (232 lb)  Physical Exam  Vitals signs reviewed  Eyes:      Extraocular Movements: Extraocular movements intact  Pupils: Pupils are equal, round, and reactive to light  Neurological:      Mental Status: He is alert  Deep Tendon Reflexes: Strength normal       Reflex Scores:       Tricep reflexes are 0 on the right side and 0 on the left side  Bicep reflexes are 1+ on the right side and 1+ on the left side  Patellar reflexes are 0 on the right side and 0 on the left side  Psychiatric:         Speech: Speech normal          Neurological Exam  Mental Status  Alert  Oriented only to person, place and situation  Speech is normal  Follows complex commands  Attention and concentration are normal     Cranial Nerves  CN II: Right funduscopic exam: not visualized  Left funduscopic exam: not visualized  CN III, IV, VI: Extraocular movements intact bilaterally  Pupils equal round and reactive to light bilaterally    CN VII: Full and symmetric facial movement  CN VIII: Hearing is normal   CN XI: Shoulder shrug strength is normal   CN XII: Tongue midline without atrophy or fasciculations  Motor   Normal muscle tone  Strength is 5/5 throughout all four extremities  Sensory  Light touch is normal in upper and lower extremities  Decreased to knees bilaterally   Reflexes                                           Right                      Left  Biceps                                 1+                         1+  Triceps                                0                         0  Patellar                                0                         0    Coordination  Right: Finger-to-nose normal  Rapid alternating movement normal   Left: Finger-to-nose normal  Rapid alternating movement normal   Moderate on FTN  Mild postural tremor  No est tremor  No head, vocal or leg tremor  No bradykinesia       Gait  Casual gait: Wide stance  Unable to rise from chair without using arms  Using cane  ROS:    Review of Systems   Constitutional: Positive for fatigue  Negative for appetite change and fever  HENT: Negative  Negative for hearing loss, tinnitus, trouble swallowing and voice change  Eyes: Negative  Negative for photophobia and pain  Respiratory: Negative  Negative for shortness of breath  Cardiovascular: Negative  Negative for palpitations  Gastrointestinal: Negative  Negative for nausea and vomiting  Endocrine: Negative  Negative for cold intolerance  Genitourinary: Negative  Negative for dysuria, frequency and urgency  Musculoskeletal: Positive for gait problem and myalgias (at times)  Negative for neck pain  Balance issues     Skin: Negative  Negative for rash  Allergic/Immunologic: Negative  Neurological: Positive for tremors (Arms)  Negative for dizziness, seizures, syncope, facial asymmetry, speech difficulty, weakness, light-headedness, numbness and headaches     Hematological: Bruises/bleeds easily (Bleed)  Psychiatric/Behavioral: Negative  Negative for confusion, hallucinations and sleep disturbance  All other systems reviewed and are negative  Review of system was personally reviewed

## 2021-03-19 NOTE — ASSESSMENT & PLAN NOTE
Lab Results   Component Value Date    HGBA1C 6 7 (H) 02/26/2021   Not painful  Gabapentin being used for tremor not pain and is ineffective therefore will taper off given increased sedation

## 2021-03-19 NOTE — ASSESSMENT & PLAN NOTE
Slowly progressive action and intentional tremors of the hands tremor without any parkinsonian symptoms, consistent with his diagnosis of essential tremor  We discussed the slowly progressive nature of this condition, its pathology, and medication treatment options  Surgical options including deep brain stimulation surgery and MRI focused ultrasound therapy were discussed  He is not interested in any surgical options as he feels he can live with this tremor  gree with continuing propranolol LA 60mg daily  Partially effective but unfortunately we cannot increase  We will try adding zonisamide 25mg daily  If no improvement can increase to 50mg daily  If no improvement in a 3-4 weeks contact us and we can further increase  He is to call if he develops any side effect

## 2021-03-24 ENCOUNTER — HOSPITAL ENCOUNTER (EMERGENCY)
Facility: HOSPITAL | Age: 76
Discharge: HOME | End: 2021-03-24
Attending: EMERGENCY MEDICINE
Payer: COMMERCIAL

## 2021-03-24 VITALS
DIASTOLIC BLOOD PRESSURE: 60 MMHG | BODY MASS INDEX: 34.32 KG/M2 | OXYGEN SATURATION: 95 % | HEIGHT: 69 IN | SYSTOLIC BLOOD PRESSURE: 131 MMHG | RESPIRATION RATE: 20 BRPM | TEMPERATURE: 97.7 F | HEART RATE: 68 BPM | WEIGHT: 231.7 LBS

## 2021-03-24 DIAGNOSIS — H10.9 CONJUNCTIVITIS OF RIGHT EYE: Primary | ICD-10-CM

## 2021-03-24 DIAGNOSIS — L03.213 PRESEPTAL CELLULITIS OF RIGHT EYE: ICD-10-CM

## 2021-03-24 PROCEDURE — 99284 EMERGENCY DEPT VISIT MOD MDM: CPT | Performed by: EMERGENCY MEDICINE

## 2021-03-24 PROCEDURE — 1123F ACP DISCUSS/DSCN MKR DOCD: CPT | Performed by: EMERGENCY MEDICINE

## 2021-03-24 PROCEDURE — 99283 EMERGENCY DEPT VISIT LOW MDM: CPT

## 2021-03-24 RX ORDER — OFLOXACIN 3 MG/ML
1 SOLUTION/ DROPS OPHTHALMIC 4 TIMES DAILY
Status: DISCONTINUED | OUTPATIENT
Start: 2021-03-24 | End: 2021-03-25 | Stop reason: HOSPADM

## 2021-03-24 RX ORDER — CEPHALEXIN 500 MG/1
500 CAPSULE ORAL EVERY 6 HOURS SCHEDULED
Qty: 20 CAPSULE | Refills: 0 | Status: SHIPPED | OUTPATIENT
Start: 2021-03-24 | End: 2021-03-29

## 2021-03-24 RX ORDER — TETRACAINE HYDROCHLORIDE 5 MG/ML
1 SOLUTION OPHTHALMIC ONCE
Status: COMPLETED | OUTPATIENT
Start: 2021-03-24 | End: 2021-03-24

## 2021-03-24 RX ORDER — CEPHALEXIN 250 MG/1
500 CAPSULE ORAL ONCE
Status: COMPLETED | OUTPATIENT
Start: 2021-03-24 | End: 2021-03-24

## 2021-03-24 RX ADMIN — TETRACAINE HYDROCHLORIDE 1 DROP: 5 SOLUTION OPHTHALMIC at 21:30

## 2021-03-24 RX ADMIN — FLUORESCEIN SODIUM 1 STRIP: 1 STRIP OPHTHALMIC at 21:30

## 2021-03-24 RX ADMIN — CEPHALEXIN 500 MG: 250 CAPSULE ORAL at 22:32

## 2021-03-24 RX ADMIN — OFLOXACIN 1 DROP: 3 SOLUTION/ DROPS OPHTHALMIC at 22:31

## 2021-03-25 NOTE — ED PROVIDER NOTES
History  Chief Complaint   Patient presents with    Eye Swelling     R eye swelling  onset 4 days ago  denies any trauma or bites  denies visual difficulties  80-year-old male with past medical history of coronary artery disease status post PCI, diabetes mellitus not on insulin, CKD, hypertension, hyperlipidemia, hypothyroidism, and gout who is presenting with swelling of the right periorbital region, right eye irritation, and right eye discharge  Patient reports that he is legally blind in his right eye  He received a corneal transplant several decades ago  He states that 4 days ago, he began to note irritation of his right eye  He also noted redness and discharge  Patient reports that shortly after this, he began to note swelling of the right upper eyelid and right periorbital region  He does admit to rubbing his right eye frequently after the onset of the right eye irritation  Patient has been trying warm compresses as well  These have provided him some relief  He denies any eye pain  His vision in the right eye is at baseline  No diplopia or vision changes  No headache, nausea, or vomiting  No recent illnesses  No other complaints on review of systems  Prior to Admission Medications   Prescriptions Last Dose Informant Patient Reported? Taking? Empagliflozin (Jardiance) 10 MG TABS 3/24/2021 at Unknown time  No Yes   Sig: Take 1 tablet (10 mg total) by mouth every morning   allopurinol (ZYLOPRIM) 100 mg tablet 3/24/2021 at Unknown time  No Yes   Sig: Take 1 tablet (100 mg total) by mouth daily   aspirin 325 mg tablet 3/24/2021 at Unknown time  Yes Yes   Sig: Take 325 mg by mouth daily     atorvastatin (LIPITOR) 80 mg tablet 3/23/2021 at Unknown time  No Yes   Sig: Take 1 tablet (80 mg total) by mouth daily   carboxymethylcellulose 1 % ophthalmic solution Not Taking at Unknown time  Yes No   Sig: Apply 1 drop to eye   escitalopram (LEXAPRO) 20 mg tablet 3/24/2021 at Unknown time No Yes   Sig: Take 1 tablet (20 mg total) by mouth daily   gabapentin (NEURONTIN) 100 mg capsule 3/23/2021 at Unknown time  No Yes   Si po qhs x 1 week then 1 po qhs x 1 week then stop   glimepiride (AMARYL) 4 mg tablet 3/24/2021 at Unknown time  No Yes   Sig: TAKE 1 TABLET TWICE A DAY   levothyroxine 88 mcg tablet 3/24/2021 at Unknown time  No Yes   Sig: Take 1 tablet (88 mcg total) by mouth daily in the early morning   losartan (COZAAR) 25 mg tablet 3/24/2021 at Unknown time  No Yes   Sig: Take 1 tablet (25 mg total) by mouth daily   metFORMIN (GLUCOPHAGE) 1000 MG tablet 3/24/2021 at Unknown time  No Yes   Sig: Take 1 tablet (1,000 mg total) by mouth 2 (two) times a day with meals   Patient taking differently: Take 500 mg by mouth 2 (two) times a day with meals    multivitamin (THERAGRAN) TABS 3/24/2021 at Unknown time Spouse/Significant Other Yes Yes   Sig: Take 1 tablet by mouth daily   nitroglycerin (NITROSTAT) 0 4 mg SL tablet   No Yes   Sig: Place 1 tablet (0 4 mg total) under the tongue every 5 (five) minutes as needed for chest pain   propranolol (INDERAL LA) 60 mg 24 hr capsule 3/24/2021 at Unknown time  No Yes   Sig: Take 1 capsule (60 mg total) by mouth daily   zonisamide (ZONEGRAN) 25 mg capsule   No Yes   Si po daily x 1 week then 2 po daily      Facility-Administered Medications: None       Past Medical History:   Diagnosis Date    BPH (benign prostatic hyperplasia)     CAD (coronary artery disease)     Diabetes mellitus (HCC)     niddm    Gout     High cholesterol     Hypertension     MI, 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        Past Surgical History:   Procedure Laterality Date    CATARACT EXTRACTION W/  INTRAOCULAR LENS IMPLANT      CORONARY ANGIOPLASTY WITH STENT PLACEMENT      stents x3    EYE SURGERY      repair corneal laceration    NC COLONOSCOPY FLX DX W/COLLJ SPEC WHEN PFRMD N/A 3/21/2016    Procedure: COLONOSCOPY;  Surgeon: Julia Dowell MD;  Location: [facility];  Service: Colorectal    TONSILLECTOMY AND ADENOIDECTOMY         Family History   Adopted: Yes   Problem Relation Age of Onset    No Known Problems Mother     No Known Problems Father      I have reviewed and agree with the history as documented  E-Cigarette/Vaping    E-Cigarette Use Never User      E-Cigarette/Vaping Substances    Nicotine No     THC No     CBD No     Flavoring No     Other No     Unknown No      Social History     Tobacco Use    Smoking status: Never Smoker    Smokeless tobacco: Never Used   Substance Use Topics    Alcohol use: Not Currently     Comment: socially, cut down in 2008, previously did more than     Drug use: Never       Review of Systems   Constitutional: Negative for diaphoresis, fever and unexpected weight change  HENT: Positive for facial swelling (right periorbital region)  Negative for congestion, rhinorrhea and sore throat  Eyes: Positive for discharge, redness and itching  Negative for pain and visual disturbance  Respiratory: Negative for cough, shortness of breath and wheezing  Cardiovascular: Negative for chest pain, palpitations and leg swelling  Gastrointestinal: Negative for abdominal pain, blood in stool, constipation, diarrhea, nausea and vomiting  Genitourinary: Negative for dysuria, flank pain and hematuria  Musculoskeletal: Negative for arthralgias and myalgias  Skin: Negative for rash and wound  Allergic/Immunologic: Negative for environmental allergies and food allergies  Neurological: Negative for dizziness, seizures, weakness and numbness  Hematological: Negative for adenopathy  Psychiatric/Behavioral: Negative for confusion and hallucinations  Physical Exam  Physical Exam  Vitals signs and nursing note reviewed  Constitutional:       Appearance: He is well-developed  He is not diaphoretic  HENT:      Head: Normocephalic and atraumatic        Right Ear: External ear normal       Left Ear: External ear normal       Nose: Nose normal    Eyes:      General:         Right eye: Discharge present  Pupils: Pupils are equal, round, and reactive to light  Comments: There is conjunctival injection of the right eye  There is some purulent-appearing discharge, not copious  No conjunctival foreign bodies seen  Pupils are equal and reactive to light  Cardiovascular:      Rate and Rhythm: Normal rate and regular rhythm  Pulmonary:      Effort: Pulmonary effort is normal  No respiratory distress  Musculoskeletal: Normal range of motion  General: No deformity  Skin:     General: Skin is warm and dry  Capillary Refill: Capillary refill takes less than 2 seconds  Neurological:      Mental Status: He is alert and oriented to person, place, and time  Comments: No gross motor deficits noted  Cranial nerves II-XII are intact  Speech is normal, without dysarthria or aphasia  Psychiatric:         Mood and Affect: Mood normal          Behavior: Behavior normal                Vital Signs  ED Triage Vitals   Temperature Pulse Respirations Blood Pressure SpO2   03/24/21 2034 03/24/21 2034 03/24/21 2034 03/24/21 2034 03/24/21 2034   97 7 °F (36 5 °C) 73 20 131/66 95 %      Temp Source Heart Rate Source Patient Position - Orthostatic VS BP Location FiO2 (%)   03/24/21 2034 03/24/21 2045 03/24/21 2034 03/24/21 2034 --   Temporal Monitor Lying Right arm       Pain Score       --                  Vitals:    03/24/21 2045 03/24/21 2115 03/24/21 2145 03/24/21 2215   BP: 105/58 114/59 125/58 131/60   Pulse: 71 73 69 68   Patient Position - Orthostatic VS: Sitting Lying Sitting Sitting         Visual Acuity  Visual Acuity      Most Recent Value   Visual acuity R eye is  20/25   Visual acuity Left eye is  20/70   Visual acuity in both eyes is  20/25   Wearing corrective eyewear/lenses?   Yes          ED Medications  Medications   ofloxacin (OCUFLOX) 0 3 % ophthalmic solution 1 drop (1 drop Right Eye Given 3/24/21 2231) tetracaine 0 5 % ophthalmic solution 1 drop (1 drop Right Eye Given 3/24/21 2130)   fluorescein sodium sterile ophthalmic strip 1 strip (1 strip Right Eye Given 3/24/21 2130)   cephalexin (KEFLEX) capsule 500 mg (500 mg Oral Given 3/24/21 2232)       Diagnostic Studies  Results Reviewed     None                 No orders to display              Procedures  Procedures         ED Course  ED Course as of Mar 24 2357   Wed Mar 24, 2021   2142 Visual acuity 20/25 OS, 20/70 OD, 20/25 OU       2214 Right eye was anesthetized with tetracaine  Fluorescein stain was applied  Eye was examined under UV light  No corneal abrasion seen  SBIRT 20yo+      Most Recent Value   SBIRT (24 yo +)   In order to provide better care to our patients, we are screening all of our patients for alcohol and drug use  Would it be okay to ask you these screening questions? Yes Filed at: 03/24/2021 2048   Initial Alcohol Screen: US AUDIT-C    1  How often do you have a drink containing alcohol?  0 Filed at: 03/24/2021 2048   2  How many drinks containing alcohol do you have on a typical day you are drinking? 0 Filed at: 03/24/2021 2048   3a  Male UNDER 65: How often do you have five or more drinks on one occasion? 0 Filed at: 03/24/2021 2048   3b  FEMALE Any Age, or MALE 65+: How often do you have 4 or more drinks on one occassion? 0 Filed at: 03/24/2021 2048   Audit-C Score  0 Filed at: 03/24/2021 2048   SARITA: How many times in the past year have you    Used an illegal drug or used a prescription medication for non-medical reasons?   Never Filed at: 03/24/2021 2048                    MDM  Number of Diagnoses or Management Options  Conjunctivitis of right eye: new and does not require workup  Preseptal cellulitis of right eye: new and does not require workup  Diagnosis management comments:     Patient presented for evaluation of right eye irritation, discharge, and swelling of the right periorbital region as detailed above  He denied any vision changes  No headache, nausea, or vomiting  He admitted to rubbing his eye after the onset of right eye irritation  Of note, patient was legally blind in his right eye  On examination, the right eye had conjunctival injection with purulent discharge  There was periorbital swelling and swelling of the right upper eyelid  No evidence of corneal abrasion was seen after fluorescein stain was applied  The patient was given ofloxacin to treat his conjunctivitis and Keflex to treat possible preseptal cellulitis  The swelling around the periorbital region could have been from repeated rubbing but decided to cover for preseptal cellulitis  Advised prompt follow-up with Ophthalmology  Discussed return precautions with the patient  He verbalized understanding of instructions  Amount and/or Complexity of Data Reviewed  Decide to obtain previous medical records or to obtain history from someone other than the patient: yes  Review and summarize past medical records: yes    Risk of Complications, Morbidity, and/or Mortality  Presenting problems: low  Diagnostic procedures: minimal  Management options: minimal    Patient Progress  Patient progress: stable      Disposition  Final diagnoses:   Conjunctivitis of right eye   Preseptal cellulitis of right eye     Time reflects when diagnosis was documented in both MDM as applicable and the Disposition within this note     Time User Action Codes Description Comment    3/24/2021 10:16 PM Myra Arevalo Add [H10 9] Conjunctivitis of right eye     3/24/2021 10:16 PM Myra Arevalo Add [M59 690] Preseptal cellulitis of right eye       ED Disposition     ED Disposition Condition Date/Time Comment    Discharge Good Wed Mar 24, 2021 10:15 PM Chip Tarango discharge to home/self care              Follow-up Information     Follow up With Specialties Details Why Contact Info Additional [address]  Call in 1 day Please follow-up with your Ophthalmologist within 1 week for a recheck  [facility] Emergency Department Emergency Medicine Go to  If symptoms worsen  Ashia Beckman [phone]  [address] [facility] Emergency [facility], [place] [address], [place], [place], [address]          Discharge Medication List as of 3/24/2021 10:25 PM      START taking these medications    Details   cephalexin (KEFLEX) 500 mg capsule Take 1 capsule (500 mg total) by mouth every 6 (six) hours for 5 days, Starting Wed 3/24/2021, Until Mon 3/29/2021, Normal         CONTINUE these medications which have NOT CHANGED    Details   allopurinol (ZYLOPRIM) 100 mg tablet Take 1 tablet (100 mg total) by mouth daily, Starting Fri 1/15/2021, Normal      aspirin 325 mg tablet Take 325 mg by mouth daily  , Until Discontinued, Historical Med      atorvastatin (LIPITOR) 80 mg tablet Take 1 tablet (80 mg total) by mouth daily, Starting Wed 10/21/2020, Normal      Empagliflozin (Jardiance) 10 MG TABS Take 1 tablet (10 mg total) by mouth every morning, Starting Wed 2/10/2021, Normal      escitalopram (LEXAPRO) 20 mg tablet Take 1 tablet (20 mg total) by mouth daily, Starting Wed 10/21/2020, Normal      gabapentin (NEURONTIN) 100 mg capsule 2 po qhs x 1 week then 1 po qhs x 1 week then stop, Normal      glimepiride (AMARYL) 4 mg tablet TAKE 1 TABLET TWICE A DAY, Normal      levothyroxine 88 mcg tablet Take 1 tablet (88 mcg total) by mouth daily in the early morning, Starting Mon 1/25/2021, Normal      losartan (COZAAR) 25 mg tablet Take 1 tablet (25 mg total) by mouth daily, Starting Wed 2/10/2021, Normal      metFORMIN (GLUCOPHAGE) 1000 MG tablet Take 1 tablet (1,000 mg total) by mouth 2 (two) times a day with meals, Starting Wed 10/21/2020, Normal      multivitamin (THERAGRAN) TABS Take 1 tablet by mouth daily, Historical Med      nitroglycerin (NITROSTAT) 0 4 mg SL tablet Place 1 tablet (0 4 mg total) under the tongue every 5 (five) minutes as needed for chest pain, Starting Wed 10/21/2020, Normal      propranolol (INDERAL LA) 60 mg 24 hr capsule Take 1 capsule (60 mg total) by mouth daily, Starting Thu 3/4/2021, Normal      zonisamide (ZONEGRAN) 25 mg capsule 1 po daily x 1 week then 2 po daily, Normal      carboxymethylcellulose 1 % ophthalmic solution Apply 1 drop to eye, Historical Med           No discharge procedures on file      PDMP Review     None          ED Provider  Electronically Signed by           Jl Moctezuma MD  03/25/21 0001

## 2021-03-25 NOTE — DISCHARGE INSTRUCTIONS
As we discussed, there was no evidence of any corneal abrasion  Your symptoms are likely due to conjunctivitis  I have prescribed ofloxacin eyedrops which should treat the conjunctivitis  Use the drops 4 times daily for 1 week  The swelling of your eyelid and periorbital region could be due to repeated rubbing  It could also be related to preseptal cellulitis, or infection of the skin and soft tissues around the eye  You have no symptoms to suggest infection of the eye socket  This is called orbital cellulitis  I have prescribed Keflex for you to take for the possible periorbital cellulitis  Finish the full 5 day course  Follow-up with your eye specialist as soon as possible  Return to the ER with fever, shaking chills, severe headache, eye pain, worsening discharge from the eye, bulging of the eye, inability to move your eye, pain when moving your eye, or any other concerning symptoms

## 2021-03-30 ENCOUNTER — DOCTOR'S OFFICE (OUTPATIENT)
Dept: URBAN - NONMETROPOLITAN AREA CLINIC 1 | Facility: CLINIC | Age: 76
Setting detail: OPHTHALMOLOGY
End: 2021-03-30
Payer: COMMERCIAL

## 2021-03-30 DIAGNOSIS — B02.39: ICD-10-CM

## 2021-03-30 DIAGNOSIS — H01.005: ICD-10-CM

## 2021-03-30 DIAGNOSIS — H01.001: ICD-10-CM

## 2021-03-30 DIAGNOSIS — H01.004: ICD-10-CM

## 2021-03-30 DIAGNOSIS — H04.121: ICD-10-CM

## 2021-03-30 DIAGNOSIS — H04.122: ICD-10-CM

## 2021-03-30 DIAGNOSIS — H18.711: ICD-10-CM

## 2021-03-30 DIAGNOSIS — H01.002: ICD-10-CM

## 2021-03-30 DIAGNOSIS — C44.1121: ICD-10-CM

## 2021-03-30 DIAGNOSIS — H25.13: ICD-10-CM

## 2021-03-30 PROCEDURE — 92012 INTRM OPH EXAM EST PATIENT: CPT | Performed by: OPHTHALMOLOGY

## 2021-03-30 ASSESSMENT — CONFRONTATIONAL VISUAL FIELD TEST (CVF)
OS_FINDINGS: FULL
OD_FINDINGS: FULL

## 2021-03-30 ASSESSMENT — DRY EYES - PHYSICIAN NOTES: OS_GENERALCOMMENTS: KSICCA

## 2021-04-01 ASSESSMENT — REFRACTION_CURRENTRX
OS_VPRISM_DIRECTION: PROGS
OD_ADD: +2.50
OD_CYLINDER: -0.50
OS_CYLINDER: -1.75
OS_SPHERE: +1.50
OD_AXIS: 052
OS_ADD: +2.50
OD_OVR_VA: 20/
OS_OVR_VA: 20/
OD_SPHERE: +0.50
OS_AXIS: 110
OD_VPRISM_DIRECTION: PROGS

## 2021-04-01 ASSESSMENT — REFRACTION_AUTOREFRACTION
OS_SPHERE: 0.00
OS_AXIS: 086
OD_SPHERE: ERROR
OS_CYLINDER: -0.75

## 2021-04-01 ASSESSMENT — REFRACTION_MANIFEST
OU_VA: 20/20
OS_VA2: 20/20
OD_ADD: +2.50
OS_CYLINDER: -1.00
OD_VA1: 20/NI
OD_SPHERE: PLANO
OS_AXIS: 090
OD_CYLINDER: SPH
OD_VA2: 20/NI
OS_CYLINDER: SPH
OS_VA1: 20/20
OS_SPHERE: -1.50
OS_ADD: +2.50
OS_SPHERE: +0.25

## 2021-04-01 ASSESSMENT — LID EXAM ASSESSMENTS
OS_BLEPHARITIS: 3+
OD_BLEPHARITIS: 1+
OS_MEIBOMITIS: 2+
OD_MEIBOMITIS: 2+

## 2021-04-01 ASSESSMENT — AXIALLENGTH_DERIVED
OS_AL: 23.4632
OS_AL: 23.4152

## 2021-04-01 ASSESSMENT — KERATOMETRY
OS_K2POWER_DIOPTERS: 044.50
OS_K1POWER_DIOPTERS: 44.00
OD_K2POWER_DIOPTERS: 63.25
OD_AXISANGLE_DEGREES: 138
OD_K1POWER_DIOPTERS: 46.50
OS_AXISANGLE_DEGREES: 802

## 2021-04-01 ASSESSMENT — SPHEQUIV_DERIVED
OS_SPHEQUIV: -0.25
OS_SPHEQUIV: -0.375

## 2021-04-01 ASSESSMENT — VISUAL ACUITY: OD_BCVA: 20/25-1

## 2021-04-05 ENCOUNTER — DOCTOR'S OFFICE (OUTPATIENT)
Dept: URBAN - NONMETROPOLITAN AREA CLINIC 1 | Facility: CLINIC | Age: 76
Setting detail: OPHTHALMOLOGY
End: 2021-04-05
Payer: COMMERCIAL

## 2021-04-05 DIAGNOSIS — C44.1121: ICD-10-CM

## 2021-04-05 DIAGNOSIS — H18.711: ICD-10-CM

## 2021-04-05 DIAGNOSIS — H01.005: ICD-10-CM

## 2021-04-05 DIAGNOSIS — H01.001: ICD-10-CM

## 2021-04-05 DIAGNOSIS — H04.122: ICD-10-CM

## 2021-04-05 DIAGNOSIS — H25.13: ICD-10-CM

## 2021-04-05 DIAGNOSIS — H04.121: ICD-10-CM

## 2021-04-05 DIAGNOSIS — H01.002: ICD-10-CM

## 2021-04-05 DIAGNOSIS — B02.39: ICD-10-CM

## 2021-04-05 DIAGNOSIS — H01.004: ICD-10-CM

## 2021-04-05 PROCEDURE — 99213 OFFICE O/P EST LOW 20 MIN: CPT | Performed by: OPHTHALMOLOGY

## 2021-04-05 ASSESSMENT — KERATOMETRY
OS_K1POWER_DIOPTERS: 44.00
OS_AXISANGLE_DEGREES: 802
OS_K2POWER_DIOPTERS: 044.50
OD_K2POWER_DIOPTERS: 63.25
OD_AXISANGLE_DEGREES: 138
OD_K1POWER_DIOPTERS: 46.50

## 2021-04-05 ASSESSMENT — VISUAL ACUITY: OD_BCVA: 20/25

## 2021-04-05 ASSESSMENT — REFRACTION_CURRENTRX
OD_OVR_VA: 20/
OS_OVR_VA: 20/
OD_VPRISM_DIRECTION: PROGS
OS_AXIS: 110
OD_CYLINDER: -0.50
OS_SPHERE: +1.50
OS_ADD: +2.50
OD_ADD: +2.50
OD_SPHERE: +0.50
OS_VPRISM_DIRECTION: PROGS
OS_CYLINDER: -1.75
OD_AXIS: 052

## 2021-04-05 ASSESSMENT — REFRACTION_MANIFEST
OS_SPHERE: +0.25
OD_SPHERE: PLANO
OU_VA: 20/20
OD_CYLINDER: SPH
OD_VA2: 20/NI
OS_CYLINDER: -1.00
OS_ADD: +2.50
OD_ADD: +2.50
OS_CYLINDER: SPH
OS_SPHERE: -1.50
OS_VA1: 20/20
OS_AXIS: 090
OD_VA1: 20/NI
OS_VA2: 20/20

## 2021-04-05 ASSESSMENT — LID EXAM ASSESSMENTS
OS_BLEPHARITIS: 3+
OD_BLEPHARITIS: 1+
OD_MEIBOMITIS: 2+
OS_MEIBOMITIS: 2+

## 2021-04-05 ASSESSMENT — SPHEQUIV_DERIVED
OS_SPHEQUIV: -0.25
OS_SPHEQUIV: -0.125

## 2021-04-05 ASSESSMENT — CONFRONTATIONAL VISUAL FIELD TEST (CVF)
OS_FINDINGS: FULL
OD_FINDINGS: FULL

## 2021-04-05 ASSESSMENT — REFRACTION_AUTOREFRACTION
OS_AXIS: 091
OS_SPHERE: +0.25
OS_CYLINDER: -0.75
OD_SPHERE: ERROR

## 2021-04-05 ASSESSMENT — AXIALLENGTH_DERIVED
OS_AL: 23.3673
OS_AL: 23.4152

## 2021-04-05 ASSESSMENT — TONOMETRY: OD_IOP_MMHG: 10

## 2021-04-05 ASSESSMENT — DRY EYES - PHYSICIAN NOTES: OS_GENERALCOMMENTS: KSICCA

## 2021-04-09 ENCOUNTER — LAB (OUTPATIENT)
Dept: LAB | Facility: HOSPITAL | Age: 76
End: 2021-04-09
Attending: FAMILY MEDICINE
Payer: COMMERCIAL

## 2021-04-09 ENCOUNTER — IMMUNIZATIONS (OUTPATIENT)
Dept: FAMILY MEDICINE CLINIC | Facility: HOSPITAL | Age: 76
End: 2021-04-09

## 2021-04-09 DIAGNOSIS — Z23 ENCOUNTER FOR IMMUNIZATION: Primary | ICD-10-CM

## 2021-04-09 DIAGNOSIS — Z80.42 FAMILY HISTORY OF PROSTATE CANCER: ICD-10-CM

## 2021-04-09 LAB — PSA SERPL-MCNC: 3 NG/ML (ref 0–4)

## 2021-04-09 PROCEDURE — 0012A SARS-COV-2 / COVID-19 MRNA VACCINE (MODERNA) 100 MCG: CPT

## 2021-04-09 PROCEDURE — 91301 SARS-COV-2 / COVID-19 MRNA VACCINE (MODERNA) 100 MCG: CPT

## 2021-04-09 PROCEDURE — G0103 PSA SCREENING: HCPCS

## 2021-04-09 PROCEDURE — 36415 COLL VENOUS BLD VENIPUNCTURE: CPT

## 2021-04-19 DIAGNOSIS — F33.9 RECURRENT MAJOR DEPRESSIVE DISORDER, REMISSION STATUS UNSPECIFIED (HCC): ICD-10-CM

## 2021-04-19 RX ORDER — ESCITALOPRAM OXALATE 20 MG/1
20 TABLET ORAL DAILY
Qty: 90 TABLET | Refills: 0 | Status: SHIPPED | OUTPATIENT
Start: 2021-04-19 | End: 2021-10-13 | Stop reason: SDUPTHER

## 2021-04-23 ENCOUNTER — DOCTOR'S OFFICE (OUTPATIENT)
Dept: URBAN - NONMETROPOLITAN AREA CLINIC 1 | Facility: CLINIC | Age: 76
Setting detail: OPHTHALMOLOGY
End: 2021-04-23
Payer: COMMERCIAL

## 2021-04-23 DIAGNOSIS — H01.002: ICD-10-CM

## 2021-04-23 DIAGNOSIS — C44.1121: ICD-10-CM

## 2021-04-23 DIAGNOSIS — H04.121: ICD-10-CM

## 2021-04-23 DIAGNOSIS — H01.004: ICD-10-CM

## 2021-04-23 DIAGNOSIS — H01.005: ICD-10-CM

## 2021-04-23 DIAGNOSIS — H04.122: ICD-10-CM

## 2021-04-23 DIAGNOSIS — H01.001: ICD-10-CM

## 2021-04-23 DIAGNOSIS — B02.39: ICD-10-CM

## 2021-04-23 PROCEDURE — 92285 EXTERNAL OCULAR PHOTOGRAPHY: CPT | Performed by: OPHTHALMOLOGY

## 2021-04-23 PROCEDURE — 92012 INTRM OPH EXAM EST PATIENT: CPT | Performed by: OPHTHALMOLOGY

## 2021-04-23 ASSESSMENT — REFRACTION_CURRENTRX
OS_SPHERE: +1.50
OS_OVR_VA: 20/
OS_ADD: +2.50
OS_CYLINDER: -1.75
OD_ADD: +2.50
OS_AXIS: 110
OD_OVR_VA: 20/
OD_VPRISM_DIRECTION: PROGS
OD_AXIS: 052
OD_SPHERE: +0.50
OS_VPRISM_DIRECTION: PROGS
OD_CYLINDER: -0.50

## 2021-04-23 ASSESSMENT — REFRACTION_MANIFEST
OD_SPHERE: PLANO
OS_AXIS: 090
OS_ADD: +2.50
OS_CYLINDER: -1.00
OS_VA1: 20/20
OD_VA1: 20/NI
OD_VA2: 20/NI
OU_VA: 20/20
OS_VA2: 20/20
OS_SPHERE: -1.50
OS_SPHERE: +0.25
OD_ADD: +2.50
OD_CYLINDER: SPH
OS_CYLINDER: SPH

## 2021-04-23 ASSESSMENT — LID EXAM ASSESSMENTS
OS_MEIBOMITIS: 2+
OS_BLEPHARITIS: 3+
OD_BLEPHARITIS: 1+
OD_MEIBOMITIS: 2+

## 2021-04-23 ASSESSMENT — REFRACTION_AUTOREFRACTION
OS_CYLINDER: -0.75
OS_AXIS: 091
OS_SPHERE: +0.25
OD_SPHERE: ERROR

## 2021-04-23 ASSESSMENT — SUPERFICIAL PUNCTATE KERATITIS (SPK)
OD_SPK: 2+
OS_SPK: 1+

## 2021-04-23 ASSESSMENT — AXIALLENGTH_DERIVED
OS_AL: 23.4152
OS_AL: 23.3673

## 2021-04-23 ASSESSMENT — CONFRONTATIONAL VISUAL FIELD TEST (CVF)
OD_FINDINGS: FULL
OS_FINDINGS: FULL

## 2021-04-23 ASSESSMENT — KERATOMETRY
OD_K1POWER_DIOPTERS: 46.50
OS_K2POWER_DIOPTERS: 044.50
OD_K2POWER_DIOPTERS: 63.25
OS_AXISANGLE_DEGREES: 802
OS_K1POWER_DIOPTERS: 44.00
OD_AXISANGLE_DEGREES: 138

## 2021-04-23 ASSESSMENT — SPHEQUIV_DERIVED
OS_SPHEQUIV: -0.125
OS_SPHEQUIV: -0.25

## 2021-04-23 ASSESSMENT — VISUAL ACUITY: OD_BCVA: 20/25

## 2021-04-23 ASSESSMENT — TONOMETRY
OS_IOP_MMHG: 15
OD_IOP_MMHG: 15

## 2021-05-10 DIAGNOSIS — E11.42 TYPE 2 DIABETES MELLITUS WITH DIABETIC POLYNEUROPATHY, WITH LONG-TERM CURRENT USE OF INSULIN (HCC): ICD-10-CM

## 2021-05-10 DIAGNOSIS — E78.2 MIXED HYPERLIPIDEMIA: ICD-10-CM

## 2021-05-10 DIAGNOSIS — Z79.4 TYPE 2 DIABETES MELLITUS WITH DIABETIC POLYNEUROPATHY, WITH LONG-TERM CURRENT USE OF INSULIN (HCC): ICD-10-CM

## 2021-05-10 RX ORDER — ATORVASTATIN CALCIUM 80 MG/1
80 TABLET, FILM COATED ORAL DAILY
Qty: 90 TABLET | Refills: 1 | Status: SHIPPED | OUTPATIENT
Start: 2021-05-10 | End: 2022-02-04 | Stop reason: SDUPTHER

## 2021-05-26 ENCOUNTER — OFFICE VISIT (OUTPATIENT)
Dept: OBGYN CLINIC | Facility: HOSPITAL | Age: 76
End: 2021-05-26
Payer: COMMERCIAL

## 2021-05-26 VITALS
HEART RATE: 70 BPM | HEIGHT: 69 IN | BODY MASS INDEX: 33.92 KG/M2 | SYSTOLIC BLOOD PRESSURE: 119 MMHG | DIASTOLIC BLOOD PRESSURE: 72 MMHG | WEIGHT: 229 LBS

## 2021-05-26 DIAGNOSIS — M25.561 CHRONIC PAIN OF BOTH KNEES: ICD-10-CM

## 2021-05-26 DIAGNOSIS — M17.0 PRIMARY OSTEOARTHRITIS OF BOTH KNEES: Primary | ICD-10-CM

## 2021-05-26 DIAGNOSIS — G89.29 CHRONIC PAIN OF BOTH KNEES: ICD-10-CM

## 2021-05-26 DIAGNOSIS — M25.562 CHRONIC PAIN OF BOTH KNEES: ICD-10-CM

## 2021-05-26 PROCEDURE — 20610 DRAIN/INJ JOINT/BURSA W/O US: CPT | Performed by: ORTHOPAEDIC SURGERY

## 2021-05-26 PROCEDURE — 99213 OFFICE O/P EST LOW 20 MIN: CPT | Performed by: ORTHOPAEDIC SURGERY

## 2021-05-26 RX ORDER — LIDOCAINE HYDROCHLORIDE 10 MG/ML
2 INJECTION, SOLUTION INFILTRATION; PERINEURAL
Status: COMPLETED | OUTPATIENT
Start: 2021-05-26 | End: 2021-05-26

## 2021-05-26 RX ORDER — BUPIVACAINE HYDROCHLORIDE 2.5 MG/ML
2 INJECTION, SOLUTION INFILTRATION; PERINEURAL
Status: COMPLETED | OUTPATIENT
Start: 2021-05-26 | End: 2021-05-26

## 2021-05-26 RX ORDER — BETAMETHASONE SODIUM PHOSPHATE AND BETAMETHASONE ACETATE 3; 3 MG/ML; MG/ML
12 INJECTION, SUSPENSION INTRA-ARTICULAR; INTRALESIONAL; INTRAMUSCULAR; SOFT TISSUE
Status: COMPLETED | OUTPATIENT
Start: 2021-05-26 | End: 2021-05-26

## 2021-05-26 RX ADMIN — BUPIVACAINE HYDROCHLORIDE 2 ML: 2.5 INJECTION, SOLUTION INFILTRATION; PERINEURAL at 14:30

## 2021-05-26 RX ADMIN — BETAMETHASONE SODIUM PHOSPHATE AND BETAMETHASONE ACETATE 12 MG: 3; 3 INJECTION, SUSPENSION INTRA-ARTICULAR; INTRALESIONAL; INTRAMUSCULAR; SOFT TISSUE at 14:30

## 2021-05-26 RX ADMIN — LIDOCAINE HYDROCHLORIDE 2 ML: 10 INJECTION, SOLUTION INFILTRATION; PERINEURAL at 14:30

## 2021-05-26 NOTE — PROGRESS NOTES
Assessment:   Diagnosis ICD-10-CM Associated Orders   1  Primary osteoarthritis of both knees  M17 0 Large joint arthrocentesis: bilateral knee     Injection procedure prior authorization   2  Chronic pain of both knees  M25 561 Large joint arthrocentesis: bilateral knee    M25 562 Injection procedure prior authorization    G89 [id number]        Plan:    Bilateral knees known osteoarthritis and return of pain  He was offered, accepted, performed injection of cortisone to both knees for symptomatic relief  Patient tolerated both injections well  Ice and post injection protocol advised  Weightbearing activities as tolerated  We will petition his insurance for repeat Synvisc-One injections for his next visit in 3 months time  To do next visit:  Return in about 3 months (around 8/26/2021) for re-check both knees and synviscONE to both knees  The above stated was discussed in layman's terms and the patient expressed understanding  All questions were answered to the patient's satisfaction  Scribe Attestation    I,:  Srikanth Acosta am acting as a scribe while in the presence of the attending physician :       I,:  Shanell Gauthier MD personally performed the services described in this documentation    as scribed in my presence :             Subjective:   Rozella Bloch is a 77 y o  male who presents  Today for repeat evaluation of his bilateral knees due to return of pain  He has known osteoarthritis and finds relief with injections of either cortisone or Synvisc  At his visit 3 months ago both knees were injected with Synvisc-One with relief  He does have return of pain today medially that increases with weight-bearing activities         Review of systems negative unless otherwise specified in HPI  Review of Systems    Past Medical History:   Diagnosis Date    BPH (benign prostatic hyperplasia)     CAD (coronary artery disease)     Diabetes mellitus (HCC)     niddm    Gout     High cholesterol     Hypertension     MI, 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        Past Surgical History:   Procedure Laterality Date    CATARACT EXTRACTION W/  INTRAOCULAR LENS IMPLANT      CORONARY ANGIOPLASTY WITH STENT PLACEMENT      stents x3    EYE SURGERY      repair corneal laceration    NC COLONOSCOPY FLX DX W/COLLJ SPEC WHEN PFRMD N/A 3/21/2016    Procedure: COLONOSCOPY;  Surgeon: Neyda Perez MD;  [facility];  Service: Colorectal    TONSILLECTOMY AND ADENOIDECTOMY         Family History   Adopted: Yes   Problem Relation Age of Onset    No Known Problems Mother     No Known Problems Father        Social History     Occupational History    Not on file   Tobacco Use    Smoking status: Never Smoker    Smokeless tobacco: Never Used   Substance and Sexual Activity    Alcohol use: Not Currently     Comment: socially, cut down in 2008, previously did more than     Drug use: Never    Sexual activity: Yes     Partners: Female         Current Outpatient Medications:     allopurinol (ZYLOPRIM) 100 mg tablet, Take 1 tablet (100 mg total) by mouth daily, Disp: 90 tablet, Rfl: 2    aspirin 325 mg tablet, Take 325 mg by mouth daily  , Disp: , Rfl:     atorvastatin (LIPITOR) 80 mg tablet, Take 1 tablet (80 mg total) by mouth daily, Disp: 90 tablet, Rfl: 1    carboxymethylcellulose 1 % ophthalmic solution, Apply 1 drop to eye, Disp: , Rfl:     Empagliflozin (Jardiance) 10 MG TABS, Take 1 tablet (10 mg total) by mouth every morning, Disp: 90 tablet, Rfl: 1    escitalopram (LEXAPRO) 20 mg tablet, Take 1 tablet (20 mg total) by mouth daily, Disp: 90 tablet, Rfl: 0    gabapentin (NEURONTIN) 100 mg capsule, 2 po qhs x 1 week then 1 po qhs x 1 week then stop, Disp: 21 capsule, Rfl: 0    glimepiride (AMARYL) 4 mg tablet, TAKE 1 TABLET TWICE A DAY, Disp: 180 tablet, Rfl: 2    levothyroxine 88 mcg tablet, Take 1 tablet (88 mcg total) by mouth daily in the early morning, Disp: 90 tablet, Rfl: 1    losartan (COZAAR) 25 mg tablet, Take 1 tablet (25 mg total) by mouth daily, Disp: 90 tablet, Rfl: 1    metFORMIN (GLUCOPHAGE) 1000 MG tablet, Take 1 tablet (1,000 mg total) by mouth 2 (two) times a day with meals (Patient taking differently: Take 500 mg by mouth 2 (two) times a day with meals ), Disp: 180 tablet, Rfl: 0    multivitamin (THERAGRAN) TABS, Take 1 tablet by mouth daily, Disp: , Rfl:     nitroglycerin (NITROSTAT) 0 4 mg SL tablet, Place 1 tablet (0 4 mg total) under the tongue every 5 (five) minutes as needed for chest pain, Disp: 25 tablet, Rfl: 4    propranolol (INDERAL LA) 60 mg 24 hr capsule, Take 1 capsule (60 mg total) by mouth daily, Disp: 90 capsule, Rfl: 1    zonisamide (ZONEGRAN) 25 mg capsule, 1 po daily x 1 week then 2 po daily, Disp: 60 capsule, Rfl: 5    No Known Allergies         Vitals:    05/26/21 1411   BP: 119/72   Pulse: 70       Objective:                    Right Knee Exam     Muscle Strength   The patient has normal right knee strength  Tenderness   The patient is experiencing tenderness in the medial joint line  Range of Motion   The patient has normal right knee ROM  Right knee flexion: With crepitation and stiffness  Other   Erythema: absent  Sensation: normal  Swelling: mild  Effusion: no effusion present      Left Knee Exam     Muscle Strength   The patient has normal left knee strength  Tenderness   The patient is experiencing tenderness in the medial joint line  Range of Motion   The patient has normal left knee ROM  Left knee flexion: With crepitation and stiffness  Other   Erythema: absent  Sensation: normal  Swelling: mild  Effusion: no effusion present    Comments: Both knees remain in mild varus alignment            Diagnostics, reviewed and taken today if performed as documented:    None performed          Procedures, if performed today:    Large joint arthrocentesis: bilateral knee  Universal Protocol:  Consent: Verbal consent obtained    Risks and benefits: risks, benefits and alternatives were discussed  Consent given by: patient  Time out: Immediately prior to procedure a "time out" was called to verify the correct patient, procedure, equipment, support staff and site/side marked as required  Timeout called at: 5/26/2021 2:25 PM   Patient understanding: patient states understanding of the procedure being performed  Site marked: the operative site was marked  Patient identity confirmed: verbally with patient    Supporting Documentation  Indications: pain and diagnostic evaluation   Procedure Details  Location: knee - bilateral knee  Preparation: Patient was prepped and draped in the usual sterile fashion  Needle size: 22 G  Ultrasound guidance: no  Approach: anterolateral    Medications (Right): 2 mL bupivacaine 0 25 %; 2 mL lidocaine 1 %; 12 mg betamethasone acetate-betamethasone sodium phosphate 6 (3-3) mg/mLMedications (Left): 2 mL bupivacaine 0 25 %; 2 mL lidocaine 1 %; 12 mg betamethasone acetate-betamethasone sodium phosphate 6 (3-3) mg/mL   Patient tolerance: patient tolerated the procedure well with no immediate complications  Dressing:  Sterile dressing applied            Portions of the record may have been created with voice recognition software  Occasional wrong word or "sound a like" substitutions may have occurred due to the inherent limitations of voice recognition software  Read the chart carefully and recognize, using context, where substitutions have occurred

## 2021-05-26 NOTE — PATIENT INSTRUCTIONS
Intra-articular Hyaluronic Acid Injection  Intra-articular Hyaluronic Acid Injection Brands  There are several types of Intra-articular Hyaluronic Acid Injections which vary in brand, dosage, and frequency  There are single dose injections as well as a series of injections  Your provider will choose the injection brand and series based on clinical factors as well as what your insurance company prefers or covers  Buy and Bill vs  Specialty Pharmacy  Injections may be obtained in two ways:   Buy and Bill: The insurance is requiring the office to buy the injection medication and bill the patients insurance for both the injection medication and the office visit   Specialty Pharmacy: The insurance is requiring the office to order the medication from the insurances Specialty Pharmacy and the specialty pharmacy will bill the patients insurance directly for the injection medication  When a specialty pharmacy is used, the office cannot bill for the injection medication, the office can only bill for the office visit  (Examples of a specialty pharmacy include, but not limited to, [facility]cal[facility] , [id number] [facility], [address] [place] [address])  Time Line Expectations  Your care team will work to ensure the injection(s) being ordered for you are authorized by your insurance and is obtained by the insurance plans preferred pharmacy  Your insurance plan may dictate the brand and dosage of the series  Due to the nature of obtaining an insurance authorization as well as working with the pharmacy, the authorization process may take up to 14 business days, sometimes longer if your insurance plan denies the injection authorization or if the pharmacy has delays  If your insurance plan denies the authorization our team will submit an appeal which may lengthen the wait time for the approval of your injection      Our injection authorization team will be in contact with you throughout the injection authorization process, however, please note there may be times of silence while we wait for insurance and pharmacy updates  Your injection appointment(s) will be scheduled once our injection authorization team has received approval from your insurance plan and/or from the pharmacy  Please note: Specialty pharmacies are often delayed when obtaining authorizations and verifying benefits for injection medications  You may receive a phone call from the specialty pharmacy to authorize the medication; so it is important to accept calls from the specialty pharmacy to ensure your injections are not delayed

## 2021-07-05 DIAGNOSIS — E03.9 HYPOTHYROIDISM, UNSPECIFIED TYPE: ICD-10-CM

## 2021-07-06 DIAGNOSIS — E03.9 HYPOTHYROIDISM, UNSPECIFIED TYPE: ICD-10-CM

## 2021-07-06 DIAGNOSIS — E11.40 CONTROLLED TYPE 2 DIABETES MELLITUS WITH DIABETIC NEUROPATHY, WITH LONG-TERM CURRENT USE OF INSULIN (HCC): ICD-10-CM

## 2021-07-06 DIAGNOSIS — Z79.4 CONTROLLED TYPE 2 DIABETES MELLITUS WITH DIABETIC NEUROPATHY, WITH LONG-TERM CURRENT USE OF INSULIN (HCC): ICD-10-CM

## 2021-07-06 RX ORDER — LEVOTHYROXINE SODIUM 88 UG/1
TABLET ORAL
Qty: 90 TABLET | Refills: 0 | Status: SHIPPED | OUTPATIENT
Start: 2021-07-06 | End: 2021-07-06 | Stop reason: SDUPTHER

## 2021-07-06 RX ORDER — LEVOTHYROXINE SODIUM 88 UG/1
88 TABLET ORAL
Qty: 90 TABLET | Refills: 1 | Status: SHIPPED | OUTPATIENT
Start: 2021-07-06 | End: 2021-07-15

## 2021-07-06 RX ORDER — LOSARTAN POTASSIUM 25 MG/1
25 TABLET ORAL DAILY
Qty: 90 TABLET | Refills: 1 | Status: SHIPPED | OUTPATIENT
Start: 2021-07-06 | End: 2022-01-21 | Stop reason: SDUPTHER

## 2021-07-14 ENCOUNTER — APPOINTMENT (OUTPATIENT)
Dept: LAB | Facility: HOSPITAL | Age: 76
End: 2021-07-14
Attending: INTERNAL MEDICINE
Payer: COMMERCIAL

## 2021-07-14 ENCOUNTER — OFFICE VISIT (OUTPATIENT)
Dept: NEPHROLOGY | Facility: CLINIC | Age: 76
End: 2021-07-14
Payer: COMMERCIAL

## 2021-07-14 VITALS
SYSTOLIC BLOOD PRESSURE: 118 MMHG | HEART RATE: 59 BPM | HEIGHT: 69 IN | BODY MASS INDEX: 33.62 KG/M2 | OXYGEN SATURATION: 97 % | WEIGHT: 227 LBS | DIASTOLIC BLOOD PRESSURE: 78 MMHG

## 2021-07-14 DIAGNOSIS — R60.9 EDEMA, UNSPECIFIED TYPE: ICD-10-CM

## 2021-07-14 DIAGNOSIS — N18.31 STAGE 3A CHRONIC KIDNEY DISEASE (HCC): Primary | ICD-10-CM

## 2021-07-14 DIAGNOSIS — R60.0 EDEMA OF BOTH LOWER LEGS: ICD-10-CM

## 2021-07-14 DIAGNOSIS — N18.31 STAGE 3A CHRONIC KIDNEY DISEASE (HCC): ICD-10-CM

## 2021-07-14 DIAGNOSIS — E78.2 MIXED HYPERLIPIDEMIA: ICD-10-CM

## 2021-07-14 DIAGNOSIS — N17.9 ACUTE KIDNEY INJURY (HCC): Primary | ICD-10-CM

## 2021-07-14 DIAGNOSIS — Z79.4 CONTROLLED TYPE 2 DIABETES MELLITUS WITH DIABETIC NEUROPATHY, WITH LONG-TERM CURRENT USE OF INSULIN (HCC): ICD-10-CM

## 2021-07-14 DIAGNOSIS — R26.81 GAIT INSTABILITY: ICD-10-CM

## 2021-07-14 DIAGNOSIS — E11.40 CONTROLLED TYPE 2 DIABETES MELLITUS WITH DIABETIC NEUROPATHY, WITH LONG-TERM CURRENT USE OF INSULIN (HCC): ICD-10-CM

## 2021-07-14 DIAGNOSIS — M10.00 IDIOPATHIC GOUT, UNSPECIFIED CHRONICITY, UNSPECIFIED SITE: ICD-10-CM

## 2021-07-14 LAB
ALBUMIN SERPL BCP-MCNC: 3.7 G/DL (ref 3.5–5)
ALP SERPL-CCNC: 91 U/L (ref 46–116)
ALT SERPL W P-5'-P-CCNC: 57 U/L (ref 12–78)
ANION GAP SERPL CALCULATED.3IONS-SCNC: 6 MMOL/L (ref 4–13)
AST SERPL W P-5'-P-CCNC: 23 U/L (ref 5–45)
BACTERIA UR QL AUTO: ABNORMAL /HPF
BASOPHILS # BLD AUTO: 0.07 THOUSANDS/ΜL (ref 0–0.1)
BASOPHILS NFR BLD AUTO: 1 % (ref 0–1)
BILIRUB SERPL-MCNC: 0.63 MG/DL (ref 0.2–1)
BILIRUB UR QL STRIP: NEGATIVE
BUN SERPL-MCNC: 16 MG/DL (ref 5–25)
CALCIUM SERPL-MCNC: 9.1 MG/DL (ref 8.3–10.1)
CHLORIDE SERPL-SCNC: 106 MMOL/L (ref 100–108)
CLARITY UR: CLEAR
CO2 SERPL-SCNC: 30 MMOL/L (ref 21–32)
COLOR UR: YELLOW
CREAT SERPL-MCNC: 1.23 MG/DL (ref 0.6–1.3)
CREAT UR-MCNC: 91.7 MG/DL
EOSINOPHIL # BLD AUTO: 0.52 THOUSAND/ΜL (ref 0–0.61)
EOSINOPHIL NFR BLD AUTO: 6 % (ref 0–6)
ERYTHROCYTE [DISTWIDTH] IN BLOOD BY AUTOMATED COUNT: 12.8 % (ref 11.6–15.1)
GFR SERPL CREATININE-BSD FRML MDRD: 57 ML/MIN/1.73SQ M
GLUCOSE P FAST SERPL-MCNC: 132 MG/DL (ref 65–99)
GLUCOSE UR STRIP-MCNC: ABNORMAL MG/DL
HCT VFR BLD AUTO: 49.4 % (ref 36.5–49.3)
HGB BLD-MCNC: 16 G/DL (ref 12–17)
HGB UR QL STRIP.AUTO: NEGATIVE
IMM GRANULOCYTES # BLD AUTO: 0.05 THOUSAND/UL (ref 0–0.2)
IMM GRANULOCYTES NFR BLD AUTO: 1 % (ref 0–2)
KETONES UR STRIP-MCNC: NEGATIVE MG/DL
LEUKOCYTE ESTERASE UR QL STRIP: ABNORMAL
LYMPHOCYTES # BLD AUTO: 2.72 THOUSANDS/ΜL (ref 0.6–4.47)
LYMPHOCYTES NFR BLD AUTO: 30 % (ref 14–44)
MCH RBC QN AUTO: 32.7 PG (ref 26.8–34.3)
MCHC RBC AUTO-ENTMCNC: 32.4 G/DL (ref 31.4–37.4)
MCV RBC AUTO: 101 FL (ref 82–98)
MICROALBUMIN UR-MCNC: 88.2 MG/L (ref 0–20)
MICROALBUMIN/CREAT 24H UR: 96 MG/G CREATININE (ref 0–30)
MONOCYTES # BLD AUTO: 0.77 THOUSAND/ΜL (ref 0.17–1.22)
MONOCYTES NFR BLD AUTO: 8 % (ref 4–12)
NEUTROPHILS # BLD AUTO: 5.03 THOUSANDS/ΜL (ref 1.85–7.62)
NEUTS SEG NFR BLD AUTO: 54 % (ref 43–75)
NITRITE UR QL STRIP: NEGATIVE
NON-SQ EPI CELLS URNS QL MICRO: ABNORMAL /HPF
NRBC BLD AUTO-RTO: 0 /100 WBCS
PH UR STRIP.AUTO: 6 [PH]
PLATELET # BLD AUTO: 251 THOUSANDS/UL (ref 149–390)
PMV BLD AUTO: 9.7 FL (ref 8.9–12.7)
POTASSIUM SERPL-SCNC: 4.5 MMOL/L (ref 3.5–5.3)
PROT SERPL-MCNC: 7.6 G/DL (ref 6.4–8.2)
PROT UR STRIP-MCNC: NEGATIVE MG/DL
PROT UR-MCNC: 18 MG/DL
PROT/CREAT UR: 0.2 MG/G{CREAT} (ref 0–0.1)
RBC # BLD AUTO: 4.89 MILLION/UL (ref 3.88–5.62)
RBC #/AREA URNS AUTO: ABNORMAL /HPF
SODIUM SERPL-SCNC: 142 MMOL/L (ref 136–145)
SP GR UR STRIP.AUTO: 1.01 (ref 1–1.03)
URATE SERPL-MCNC: 5.5 MG/DL (ref 4.2–8)
UROBILINOGEN UR QL STRIP.AUTO: 0.2 E.U./DL
UUN 24H UR-MCNC: 424 MG/DL
WBC # BLD AUTO: 9.16 THOUSAND/UL (ref 4.31–10.16)
WBC #/AREA URNS AUTO: ABNORMAL /HPF

## 2021-07-14 PROCEDURE — 99214 OFFICE O/P EST MOD 30 MIN: CPT | Performed by: INTERNAL MEDICINE

## 2021-07-14 PROCEDURE — 84550 ASSAY OF BLOOD/URIC ACID: CPT

## 2021-07-14 PROCEDURE — 84156 ASSAY OF PROTEIN URINE: CPT

## 2021-07-14 PROCEDURE — 81001 URINALYSIS AUTO W/SCOPE: CPT

## 2021-07-14 PROCEDURE — 82043 UR ALBUMIN QUANTITATIVE: CPT

## 2021-07-14 PROCEDURE — 82570 ASSAY OF URINE CREATININE: CPT | Performed by: INTERNAL MEDICINE

## 2021-07-14 PROCEDURE — 84540 ASSAY OF URINE/UREA-N: CPT | Performed by: INTERNAL MEDICINE

## 2021-07-14 PROCEDURE — 85025 COMPLETE CBC W/AUTO DIFF WBC: CPT

## 2021-07-14 PROCEDURE — 80053 COMPREHEN METABOLIC PANEL: CPT

## 2021-07-14 PROCEDURE — 82570 ASSAY OF URINE CREATININE: CPT

## 2021-07-14 RX ORDER — MULTIVIT WITH MINERALS/LUTEIN
1000 TABLET ORAL DAILY
COMMUNITY
End: 2022-02-23

## 2021-07-14 RX ORDER — TORSEMIDE 20 MG/1
20 TABLET ORAL DAILY
Qty: 90 TABLET | Refills: 1 | Status: SHIPPED | OUTPATIENT
Start: 2021-07-14 | End: 2021-10-13 | Stop reason: SDUPTHER

## 2021-07-14 NOTE — PROGRESS NOTES
[address] [facility], [place]    Name: Larry Masterson  YOB: 1945      Assessment/Plan:         Problem List Items Addressed This Visit        Genitourinary    Stage 3a chronic kidney disease [facility]) - Primary     Lab Results   Component Value Date    EGFR 68 02/26/2021    EGFR 56 12/28/2020    EGFR 54 09/28/2020    CREATININE 1 07 02/26/2021    CREATININE 1 26 12/28/2020    CREATININE 1 31 (H) 09/28/2020   He had an improvement in his GFR with the institution of losartan 25 mg and Jardiance  We have no recent labs and will order chemistries as well as urine studies  He knows to avoid nephrotoxins such as NSAIDs         Relevant Medications    torsemide (DEMADEX) 20 mg tablet       Other    Gout     He has not had a gouty attack (related to shellfish with him )         Hyperlipidemia     Aim for an LDL < 70         Gait instability     Uses a cane due to OA of knees         Edema     He has significant lower extremity edema at least 2+  Will start torsemide 20 mg daily  He should restrict fluids and avoid salt           Other Visit Diagnoses     Edema of both lower legs        Relevant Medications    torsemide (DEMADEX) 20 mg tablet            Subjective:      Patient ID: Larry Masterson is a 77 y o  male  HPI  He has stage 3 A chronic kidney disease with a baseline creatinine of 1 2-1 3 mg/dl  He was started on losartan 25mg daily due to underlying diabetic nephropathy  He is also taking jardiance 10mg daily which he is taking without side effects    He has a history of diastolic dysfunction with concentric LVH   The following portions of the patient's history were reviewed and updated as appropriate: allergies, current medications, past family history, past medical history, past social history, past surgical history and problem list     Review of Systems   Constitutional: Negative for fatigue  HENT: Negative for hearing loss      Eyes: Negative for visual disturbance  Respiratory: Positive for shortness of breath  In the evening   Cardiovascular: Negative for chest pain, palpitations and leg swelling  Genitourinary: Positive for urgency  Negative for difficulty urinating, dysuria and hematuria  Frequent nocturia and dribbles   Musculoskeletal: Positive for arthralgias and gait problem  Neurological: Negative for dizziness, weakness and light-headedness  Hematological: Does not bruise/bleed easily  Psychiatric/Behavioral: Negative for dysphoric mood  Social History     Socioeconomic History    Marital status: /Civil Union     Spouse name: None    Number of children: None    Years of education: None    Highest education level: None   Occupational History    None   Tobacco Use    Smoking status: Never Smoker    Smokeless tobacco: Never Used   Vaping Use    Vaping Use: Never used   Substance and Sexual Activity    Alcohol use: Not Currently     Comment: socially, cut down in 2008, previously did more than     Drug use: Never    Sexual activity: Yes     Partners: Female   Other Topics Concern    None   Social History Narrative    No advance directive     No living will      Social Determinants of Health     Financial Resource Strain:     Difficulty of Paying Living Expenses:    Food Insecurity:     Worried About Running Out of Food in the Last Year:     [address] in the Last Year:    Transportation Needs:     Lack of Transportation (Medical):      Lack of Transportation (Non-Medical):    Physical Activity:     Days of Exercise per Week:     Minutes of Exercise per Session:    Stress:     Feeling of Stress :    Social Connections:     Frequency of Communication with Friends and Family:     Frequency of Social Gatherings with Friends and Family:     Attends [employer] Services:     Active Member of Clubs or Organizations:     Attends Club or Organization Meetings:     Marital Status:    Intimate Partner Violence:     Fear of Current or Ex-Partner:     Emotionally Abused:     Physically Abused:     Sexually Abused:      Past Medical History:   Diagnosis Date    BPH (benign prostatic hyperplasia)     CAD (coronary artery disease)     Diabetes mellitus ([facility] )     niddm    Gout     High cholesterol     Hypertension     MI, 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      Past Surgical History:   Procedure Laterality Date    CATARACT EXTRACTION W/  INTRAOCULAR LENS IMPLANT      CORONARY ANGIOPLASTY WITH STENT PLACEMENT      stents x3    EYE SURGERY      repair corneal laceration    RI COLONOSCOPY FLX DX W/COLLJ SPEC WHEN PFRMD N/A 3/21/2016    Procedure: COLONOSCOPY;  Surgeon: Vania Walker MD;  [facility];  Service: Colorectal    TONSILLECTOMY AND ADENOIDECTOMY         Current Outpatient Medications:     allopurinol (ZYLOPRIM) 100 mg tablet, Take 1 tablet (100 mg total) by mouth daily, Disp: 90 tablet, Rfl: 2    aspirin 325 mg tablet, Take 325 mg by mouth daily  , Disp: , Rfl:     atorvastatin (LIPITOR) 80 mg tablet, Take 1 tablet (80 mg total) by mouth daily, Disp: 90 tablet, Rfl: 1    Empagliflozin (Jardiance) 10 MG TABS, Take 1 tablet (10 mg total) by mouth every morning, Disp: 90 tablet, Rfl: 1    escitalopram (LEXAPRO) 20 mg tablet, Take 1 tablet (20 mg total) by mouth daily, Disp: 90 tablet, Rfl: 0    glimepiride (AMARYL) 4 mg tablet, TAKE 1 TABLET TWICE A DAY, Disp: 180 tablet, Rfl: 2    levothyroxine 88 mcg tablet, Take 1 tablet (88 mcg total) by mouth daily in the early morning, Disp: 90 tablet, Rfl: 1    losartan (COZAAR) 25 mg tablet, Take 1 tablet (25 mg total) by mouth daily, Disp: 90 tablet, Rfl: 1    metFORMIN (GLUCOPHAGE) 1000 MG tablet, Take 1 tablet (1,000 mg total) by mouth 2 (two) times a day with meals (Patient taking differently: Take 500 mg by mouth 2 (two) times a day with meals ), Disp: 180 tablet, Rfl: 0    multivitamin (THERAGRAN) TABS, Take 1 tablet by mouth daily, Disp: , Rfl:     nitroglycerin (NITROSTAT) 0 4 mg SL tablet, Place 1 tablet (0 4 mg total) under the tongue every 5 (five) minutes as needed for chest pain, Disp: 25 tablet, Rfl: 4    propranolol (INDERAL LA) 60 mg 24 hr capsule, Take 1 capsule (60 mg total) by mouth daily, Disp: 90 capsule, Rfl: 1    zonisamide (ZONEGRAN) 25 mg capsule, 1 po daily x 1 week then 2 po daily, Disp: 60 capsule, Rfl: 5    carboxymethylcellulose 1 % ophthalmic solution, Apply 1 drop to eye (Patient not taking: Reported on 7/14/2021), Disp: , Rfl:     gabapentin (NEURONTIN) 100 mg capsule, 2 po qhs x 1 week then 1 po qhs x 1 week then stop (Patient not taking: Reported on 7/14/2021), Disp: 21 capsule, Rfl: 0    torsemide (DEMADEX) 20 mg tablet, Take 1 tablet (20 mg total) by mouth daily, Disp: 90 tablet, Rfl: 1    vitamin E, tocopherol, 1,000 units capsule, Take 1,000 Units by mouth daily, Disp: , Rfl:     Lab Results   Component Value Date     07/16/2015    SODIUM 143 02/26/2021    K 4 0 02/26/2021     02/26/2021    CO2 31 02/26/2021    ANIONGAP 8 07/16/2015    AGAP 5 02/26/2021    BUN 21 02/26/2021    CREATININE 1 07 02/26/2021    GLUC 86 11/09/2018    GLUF 129 (H) 02/26/2021    CALCIUM 9 4 02/26/2021    AST 27 04/06/2020    ALT 63 04/06/2020    ALKPHOS 83 04/06/2020    PROT 6 3 (L) 07/16/2015    TP 7 0 04/06/2020    BILITOT 0 47 07/16/2015    TBILI 0 50 04/06/2020    EGFR 68 02/26/2021     Lab Results   Component Value Date    WBC 9 08 04/06/2020    HGB 14 8 04/06/2020    HCT 44 1 04/06/2020    MCV 99 (H) 04/06/2020     04/06/2020     Lab Results   Component Value Date    CHOLESTEROL 122 02/21/2020    CHOLESTEROL 111 07/30/2018    CHOLESTEROL 128 07/10/2018     Lab Results   Component Value Date    HDL 29 (L) 02/21/2020    HDL 37 (L) 07/30/2018    HDL 34 (L) 07/10/2018     Lab Results   Component Value Date    LDLCALC 63 02/21/2020    LDLCALC 50 07/30/2018    [address] 48 07/10/2018     Lab Results Component Value Date    TRIG 149 02/21/2020    TRIG 118 07/30/2018    TRIG 230 (H) 07/10/2018     No results found for: [place], [place]  Lab Results   Component Value Date    VID2LGYTKLKO 2 908 09/28/2020     Lab Results   Component Value Date    CALCIUM 9 4 02/26/2021     Lab Results   Component Value Date    SPEP  11/09/2018     The serum total protein, albumin and electrophoresis are within normal limits  No monoclonal bands noted  Reviewed by: Fatou Huff MD ([id number]) **Electronic Signature**    UPEP  11/12/2018     The urine total protein is increased  The urine protein electrophoresis shows selective proteinuria  No monoclonal bands noted  Reviewed by Fatou Huff MD ([id number])  **Electronic Signature**     No results found for: Gaudencio Carey        Objective:      /78 (BP Location: Left arm, Patient Position: Sitting, Cuff Size: Large)   Pulse 59   Ht 5' 9" (1 753 m)   Wt 103 kg (227 lb)   SpO2 97%   BMI 33 52 kg/m²          Physical Exam  Constitutional:       General: He is not in acute distress  Appearance: He is obese  He is not toxic-appearing  HENT:      Head: Normocephalic and atraumatic  Right Ear: External ear normal       Left Ear: External ear normal    Eyes:      Extraocular Movements: Extraocular movements intact  Conjunctiva/sclera: Conjunctivae normal       Pupils: Pupils are equal, round, and reactive to light  Cardiovascular:      Rate and Rhythm: Normal rate and regular rhythm  Pulmonary:      Effort: Pulmonary effort is normal       Breath sounds: Normal breath sounds  No wheezing or rales  Abdominal:      General: Bowel sounds are normal  There is no distension  Tenderness: There is no abdominal tenderness  Musculoskeletal:         General: Normal range of motion  Cervical back: Normal range of motion  Right lower leg: Edema present  Skin:     General: Skin is warm and dry  Neurological:      General: No focal deficit present  Mental Status: He is alert  Gait: Gait abnormal    Psychiatric:         Mood and Affect: Mood normal          Behavior: Behavior normal          Thought Content:  Thought content normal          Judgment: Judgment normal

## 2021-07-14 NOTE — ASSESSMENT & PLAN NOTE
Lab Results   Component Value Date    EGFR 68 02/26/2021    EGFR 56 12/28/2020    EGFR 54 09/28/2020    CREATININE 1 07 02/26/2021    CREATININE 1 26 12/28/2020    CREATININE 1 31 (H) 09/28/2020   He had an improvement in his GFR with the institution of losartan 25 mg and Jardiance  We have no recent labs and will order chemistries as well as urine studies    He knows to avoid nephrotoxins such as NSAIDs

## 2021-07-14 NOTE — ASSESSMENT & PLAN NOTE
He has significant lower extremity edema at least 2+  Will start torsemide 20 mg daily    He should restrict fluids and avoid salt

## 2021-07-15 ENCOUNTER — HOSPITAL ENCOUNTER (OUTPATIENT)
Dept: ULTRASOUND IMAGING | Facility: HOSPITAL | Age: 76
Discharge: HOME | End: 2021-07-15
Attending: INTERNAL MEDICINE
Payer: COMMERCIAL

## 2021-07-15 DIAGNOSIS — R25.1 TREMOR: ICD-10-CM

## 2021-07-15 DIAGNOSIS — N18.31 STAGE 3A CHRONIC KIDNEY DISEASE (HCC): ICD-10-CM

## 2021-07-15 DIAGNOSIS — E03.9 HYPOTHYROIDISM, UNSPECIFIED TYPE: ICD-10-CM

## 2021-07-15 PROCEDURE — 76770 US EXAM ABDO BACK WALL COMP: CPT

## 2021-07-15 RX ORDER — PROPRANOLOL HCL 60 MG
CAPSULE, EXTENDED RELEASE 24HR ORAL
Qty: 90 CAPSULE | Refills: 0 | Status: SHIPPED | OUTPATIENT
Start: 2021-07-15 | End: 2022-02-04 | Stop reason: SDUPTHER

## 2021-07-15 RX ORDER — LEVOTHYROXINE SODIUM 88 UG/1
TABLET ORAL
Qty: 90 TABLET | Refills: 0 | Status: SHIPPED | OUTPATIENT
Start: 2021-07-15 | End: 2021-11-05 | Stop reason: SDUPTHER

## 2021-08-26 ENCOUNTER — OFFICE VISIT (OUTPATIENT)
Dept: OBGYN CLINIC | Facility: HOSPITAL | Age: 76
End: 2021-08-26
Payer: COMMERCIAL

## 2021-08-26 VITALS
HEIGHT: 69 IN | HEART RATE: 76 BPM | WEIGHT: 227 LBS | DIASTOLIC BLOOD PRESSURE: 70 MMHG | BODY MASS INDEX: 33.62 KG/M2 | SYSTOLIC BLOOD PRESSURE: 102 MMHG

## 2021-08-26 DIAGNOSIS — G89.29 CHRONIC PAIN OF BOTH KNEES: ICD-10-CM

## 2021-08-26 DIAGNOSIS — M17.0 PRIMARY OSTEOARTHRITIS OF BOTH KNEES: Primary | ICD-10-CM

## 2021-08-26 DIAGNOSIS — M25.562 CHRONIC PAIN OF BOTH KNEES: ICD-10-CM

## 2021-08-26 DIAGNOSIS — M25.561 CHRONIC PAIN OF BOTH KNEES: ICD-10-CM

## 2021-08-26 PROCEDURE — 20610 DRAIN/INJ JOINT/BURSA W/O US: CPT | Performed by: PHYSICIAN ASSISTANT

## 2021-08-26 NOTE — PROGRESS NOTES
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        Past Surgical History:   Procedure Laterality Date    CATARACT EXTRACTION W/  INTRAOCULAR LENS IMPLANT      CORONARY ANGIOPLASTY WITH STENT PLACEMENT      stents x3    EYE SURGERY      repair corneal laceration    LA COLONOSCOPY FLX DX W/COLLJ SPEC WHEN PFRMD N/A 3/21/2016    Procedure: COLONOSCOPY;  Surgeon: Maddie Hale MD;  Location: [facility];  Service: Colorectal    TONSILLECTOMY AND ADENOIDECTOMY         Family History   Adopted: Yes   Problem Relation Age of Onset    No Known Problems Mother     No Known Problems Father        Social History     Tobacco Use    Smoking status: Never Smoker    Smokeless tobacco: Never Used   Vaping Use    Vaping Use: Never used   Substance Use Topics    Alcohol use: Not Currently     Comment: socially, cut down in 2008, previously did more than     Drug use: Never      exam;   His knees allow for safe administration of medication there is no redness no bruising no palpable warmth and no fluid distension of either knee    Large joint arthrocentesis: R knee  Procedure Details  Location: knee - R knee  Needle size: 18 G (RP 27 G)  Medications administered: 48 mg hylan 48 MG/6ML    Patient tolerance: patient tolerated the procedure well with no immediate complications  Dressing:  Sterile dressing applied    Large joint arthrocentesis: L knee  Procedure Details  Location: knee - L knee  Needle size: 18 G (RP 27 CASANDRA)  Medications administered: 48 mg hylan 48 MG/6ML    Patient tolerance: patient tolerated the procedure well with no immediate complications  Dressing:  Sterile dressing applied         impression;     bilateral knee osteoarthritis   bilateral knee pain     plan;     he received well placed injections of Synvisc-One to both knees   we will see him in 3 months in clinical follow-up only     his entire experience was supervised by and plan formulated by the attending surgeon it was my privilege to assist him in its delivery

## 2021-09-02 ENCOUNTER — OFFICE VISIT (OUTPATIENT)
Dept: NEUROLOGY | Facility: CLINIC | Age: 76
End: 2021-09-02
Payer: COMMERCIAL

## 2021-09-02 VITALS
BODY MASS INDEX: 33.58 KG/M2 | DIASTOLIC BLOOD PRESSURE: 61 MMHG | SYSTOLIC BLOOD PRESSURE: 132 MMHG | WEIGHT: 227.4 LBS | HEART RATE: 66 BPM

## 2021-09-02 DIAGNOSIS — G25.0 TREMOR, ESSENTIAL: Primary | ICD-10-CM

## 2021-09-02 DIAGNOSIS — E08.42 DIABETIC POLYNEUROPATHY ASSOCIATED WITH DIABETES MELLITUS DUE TO UNDERLYING CONDITION (HCC): ICD-10-CM

## 2021-09-02 DIAGNOSIS — G31.84 MILD COGNITIVE IMPAIRMENT: ICD-10-CM

## 2021-09-02 PROCEDURE — 99214 OFFICE O/P EST MOD 30 MIN: CPT | Performed by: PSYCHIATRY & NEUROLOGY

## 2021-09-02 NOTE — PROGRESS NOTES
Review of Systems   Constitutional: Positive for fatigue (Always tired)  Negative for appetite change and fever  HENT: Negative  Negative for hearing loss, tinnitus, trouble swallowing and voice change  Eyes: Negative  Negative for photophobia and pain  Respiratory: Negative  Negative for shortness of breath  Cardiovascular: Negative  Negative for palpitations  Gastrointestinal: Negative  Negative for nausea and vomiting  Endocrine: Negative  Negative for cold intolerance  Genitourinary: Negative  Negative for dysuria, frequency and urgency  Musculoskeletal: Positive for gait problem  Negative for myalgias and neck pain  Balance issues     Skin: Negative  Negative for rash  Allergic/Immunologic: Negative  Neurological: Positive for tremors (Hands), weakness and numbness (Feet)  Negative for dizziness, seizures, syncope, facial asymmetry, speech difficulty, light-headedness and headaches  Hematological: Negative  Does not bruise/bleed easily  Psychiatric/Behavioral: Negative  Negative for confusion, hallucinations and sleep disturbance  All other systems reviewed and are negative

## 2021-09-02 NOTE — PROGRESS NOTES
Patient ID: Satish Tiwari is a 77 y o  male  Assessment/Plan:    Tremor, essential  Progressive action and intentional tremors of the hands tremor  He has tried and failed multiple  Medications  No improvement with zonisamde therefore will have him taper off  He is not iinterested in any surgical options as he feels he can live with this tremor  He will continue propranolol LA 60mg daily  Partially effective but unfortunately we cannot increase  He has adapted to using covered cups and spoons  More information on adaptive equipment discussed  Diabetic neuropathy associated with diabetes mellitus due to underlying condition ([facility] )  Progressive imbalance leading to falls related to sensory ataxia from neuropathy  Medication not needed as he has no pain  Discussed the importance of glucose control  Potential benefits of PT for balance reviewed but he is not interested  Lab Results   Component Value Date    HGBA1C 7 2 (H) 07/14/2021       Mild cognitive impairment    Cognitive changes noted over years  It does unclear if it is affecting his normal activities of daily living as these have been restricted due to other comorbidities and family has taken over  Ciales impaired 18/26  Plan will be to continue to monitor  If worsening will  Consider further workup with them MRI of the brain with [facility]  Last MRI from 2017 reviewed  Showing mild micro vascular disease  CBC, CMP, TSH obtain it within the past year were reviewed and unremarkable  Diagnoses and all orders for this visit:    Tremor, essential    Diabetic polyneuropathy associated with diabetes mellitus due to underlying condition ([facility] )    Mild cognitive impairment           Subjective:    Mr Ana Villarreal is a man with diabetic neuropathy and essential tremor who presents for movement disorder follow up  To review, tremors began gradually several decades ago and slowly progressed to the point of interfering with function  Medication trials with limited improvement and side effects restricting increases  Most beneficial medication has been propranolol  Occupational Therapy visits to work-around tremors with weighted utensils was not helpful  Medication trials:  topiramate -terminated at low dose due to sedation  Primidone- stopped at 200mg due to sedation  gabapentin (600mg up to tid but no effect and sedation)  CBD oil  zonisamide    Current medications:  propranolol 60mg LA q24hrs with improvement in tremor (better tremor control on 120mg but he had bradycardia)    The addition of zonisamide was not helpful in controlling tremor  He stopped gabapentin due to sedation  Tremors interfere with eating, drinking and writing  He has learned to adapt  No head, leg or vocal tremor  Tremor worse with stress  Propranolol provides partial relief but he is unable to tolerate increases  Gabapentin 300mg qhs taken for neuropathy  Higher daytime doses did not help tremor and was associated with sedation       He has diabetic neuropathy with sensory loss to the knees which is not painful  He ambulates with cane given imbalance  Wife states he has been complaining of leg discomfort and tingling  Daughter states he is forgetful  He forgets conversations and appointments  He is independent in activities of daily living  He does not do much around the house given they have taken over due to risk of imbalance and falls  He does not drive  His wife has managed the finances for some time  Objective:    Blood pressure 132/61, pulse 66, weight 103 kg (227 lb 6 4 oz)  Physical Exam  Vitals reviewed  Eyes:      Extraocular Movements: Extraocular movements intact  Pupils: Pupils are equal, round, and reactive to light  Neurological:      Deep Tendon Reflexes: Strength normal       Reflex Scores:       Patellar reflexes are 0 on the right side and 0 on the left side    Psychiatric:         Speech: Speech normal  Neurological Exam  Mental Status   Oriented to person, place, time and situation  Memory: Recalled 3/5  Speech is normal  Able to name objects and repeat  Follows complex commands  Digit span was 5 forward and 3 backward  [address] 18/26  Cranial Nerves  CN II: Right funduscopic exam: not visualized  Left funduscopic exam: not visualized  CN III, IV, VI: Extraocular movements intact bilaterally  Pupils equal round and reactive to light bilaterally  CN VII: Full and symmetric facial movement  CN VIII: Hearing is normal   CN XI: Shoulder shrug strength is normal   CN XII: Tongue midline without atrophy or fasciculations  Motor   Normal muscle tone  Strength is 5/5 throughout all four extremities  Sensory  Pinprick is normal in upper and lower extremities  Reduced to bilaterally knee  Reflexes                                           Right                      Left  Biceps                                 Tr                         Tr  Patellar                                0                         0    Coordination  Right: Finger-to-nose abnormality:  Left: Finger-to-nose abnormality:  Moderate tremor on FTN bilaterally  No vocal , leg tremor  Mild symmetric decrement on FT  No rest tremor       Gait  Casual gait: Wide stance  Able to rise from chair without using arms  ROS:    Review of Systems  Constitutional: Positive for fatigue (Always tired)  Negative for appetite change and fever  HENT: Negative  Negative for hearing loss, tinnitus, trouble swallowing and voice change  Eyes: Negative  Negative for photophobia and pain  Respiratory: Negative  Negative for shortness of breath  Cardiovascular: Negative  Negative for palpitations  Gastrointestinal: Negative  Negative for nausea and vomiting  Endocrine: Negative  Negative for cold intolerance  Genitourinary: Negative  Negative for dysuria, frequency and urgency  Musculoskeletal: Positive for gait problem  Negative for myalgias and neck pain  Balance issues     Skin: Negative  Negative for rash  Allergic/Immunologic: Negative  Neurological: Positive for tremors (Hands), weakness and numbness (Feet)  Negative for dizziness, seizures, syncope, facial asymmetry, speech difficulty, light-headedness and headaches  Hematological: Negative  Does not bruise/bleed easily  Psychiatric/Behavioral: Negative  Negative for confusion, hallucinations and sleep disturbance  All other systems reviewed and are negative       Review of system documented by the MA, was personally reviewed

## 2021-09-03 PROBLEM — E08.40 DIABETIC NEUROPATHY ASSOCIATED WITH DIABETES MELLITUS DUE TO UNDERLYING CONDITION (HCC): Status: ACTIVE | Noted: 2018-09-26

## 2021-09-03 PROBLEM — G31.84 MILD COGNITIVE IMPAIRMENT: Status: ACTIVE | Noted: 2021-09-03

## 2021-09-03 NOTE — ASSESSMENT & PLAN NOTE
Progressive action and intentional tremors of the hands tremor  He has tried and failed multiple  Medications  No improvement with zonisamde therefore will have him taper off  He is not iinterested in any surgical options as he feels he can live with this tremor  He will continue propranolol LA 60mg daily  Partially effective but unfortunately we cannot increase  He has adapted to using covered cups and spoons  More information on adaptive equipment discussed

## 2021-09-03 NOTE — ASSESSMENT & PLAN NOTE
Progressive imbalance leading to falls related to sensory ataxia from neuropathy  Medication not needed as he has no pain  Discussed the importance of glucose control  Potential benefits of PT for balance reviewed but he is not interested     Lab Results   Component Value Date    HGBA1C 7 2 (H) 07/14/2021

## 2021-09-03 NOTE — ASSESSMENT & PLAN NOTE
Cognitive changes noted over years  It does unclear if it is affecting his normal activities of daily living as these have been restricted due to other comorbidities and family has taken over  [place] impaired 18/26  Plan will be to continue to monitor  If worsening will  Consider further workup with them MRI of the brain with [employer]  Last MRI from 2017 reviewed  Showing mild micro vascular disease  CBC, CMP, TSH obtain it within the past year were reviewed and unremarkable

## 2021-09-13 ENCOUNTER — APPOINTMENT (OUTPATIENT)
Dept: LAB | Facility: HOSPITAL | Age: 76
End: 2021-09-13

## 2021-09-13 ENCOUNTER — APPOINTMENT (OUTPATIENT)
Dept: LAB | Facility: HOSPITAL | Age: 76
End: 2021-09-13
Attending: INTERNAL MEDICINE
Payer: COMMERCIAL

## 2021-09-13 ENCOUNTER — OFFICE VISIT (OUTPATIENT)
Dept: FAMILY MEDICINE CLINIC | Facility: CLINIC | Age: 76
End: 2021-09-13

## 2021-09-13 VITALS
WEIGHT: 229.8 LBS | BODY MASS INDEX: 34.04 KG/M2 | SYSTOLIC BLOOD PRESSURE: 110 MMHG | HEIGHT: 69 IN | TEMPERATURE: 97.6 F | DIASTOLIC BLOOD PRESSURE: 72 MMHG

## 2021-09-13 DIAGNOSIS — Z79.4 CONTROLLED TYPE 2 DIABETES MELLITUS WITH DIABETIC NEUROPATHY, WITH LONG-TERM CURRENT USE OF INSULIN (HCC): ICD-10-CM

## 2021-09-13 DIAGNOSIS — E08.42 DIABETIC POLYNEUROPATHY ASSOCIATED WITH DIABETES MELLITUS DUE TO UNDERLYING CONDITION (HCC): ICD-10-CM

## 2021-09-13 DIAGNOSIS — I10 ESSENTIAL HYPERTENSION: ICD-10-CM

## 2021-09-13 DIAGNOSIS — N17.9 ACUTE KIDNEY INJURY (HCC): ICD-10-CM

## 2021-09-13 DIAGNOSIS — E11.21 DIABETIC NEPHROPATHY ASSOCIATED WITH TYPE 2 DIABETES MELLITUS (HCC): ICD-10-CM

## 2021-09-13 DIAGNOSIS — E11.40 CONTROLLED TYPE 2 DIABETES MELLITUS WITH DIABETIC NEUROPATHY, WITH LONG-TERM CURRENT USE OF INSULIN (HCC): ICD-10-CM

## 2021-09-13 DIAGNOSIS — I35.0 AORTIC STENOSIS, MODERATE: ICD-10-CM

## 2021-09-13 DIAGNOSIS — G25.0 TREMOR, ESSENTIAL: ICD-10-CM

## 2021-09-13 DIAGNOSIS — Z23 NEED FOR INFLUENZA VACCINATION: Primary | ICD-10-CM

## 2021-09-13 DIAGNOSIS — Z00.8 HEALTH EXAMINATION IN POPULATION SURVEY: ICD-10-CM

## 2021-09-13 LAB
ANION GAP SERPL CALCULATED.3IONS-SCNC: 8 MMOL/L (ref 4–13)
BUN SERPL-MCNC: 22 MG/DL (ref 5–25)
CALCIUM SERPL-MCNC: 9 MG/DL (ref 8.3–10.1)
CHLORIDE SERPL-SCNC: 104 MMOL/L (ref 100–108)
CHOLEST SERPL-MCNC: 140 MG/DL (ref 50–200)
CO2 SERPL-SCNC: 29 MMOL/L (ref 21–32)
CREAT SERPL-MCNC: 1.49 MG/DL (ref 0.6–1.3)
CREAT UR-MCNC: 69.4 MG/DL
EST. AVERAGE GLUCOSE BLD GHB EST-MCNC: 203 MG/DL
GFR SERPL CREATININE-BSD FRML MDRD: 46 ML/MIN/1.73SQ M
GLUCOSE P FAST SERPL-MCNC: 224 MG/DL (ref 65–99)
HBA1C MFR BLD: 8.7 %
HDLC SERPL-MCNC: 36 MG/DL
LDLC SERPL CALC-MCNC: 66 MG/DL (ref 0–100)
MICROALBUMIN UR-MCNC: 24.4 MG/L (ref 0–20)
MICROALBUMIN/CREAT 24H UR: 35 MG/G CREATININE (ref 0–30)
NONHDLC SERPL-MCNC: 104 MG/DL
POTASSIUM SERPL-SCNC: 3.9 MMOL/L (ref 3.5–5.3)
SODIUM SERPL-SCNC: 141 MMOL/L (ref 136–145)
TRIGL SERPL-MCNC: 190 MG/DL

## 2021-09-13 PROCEDURE — 36415 COLL VENOUS BLD VENIPUNCTURE: CPT

## 2021-09-13 PROCEDURE — 80048 BASIC METABOLIC PNL TOTAL CA: CPT

## 2021-09-13 PROCEDURE — 80061 LIPID PANEL: CPT

## 2021-09-13 PROCEDURE — 83036 HEMOGLOBIN GLYCOSYLATED A1C: CPT

## 2021-09-13 NOTE — PROGRESS NOTES
Assessment/Plan:    Problem List Items Addressed This Visit        Endocrine    Diabetes mellitus type 2, controlled ([facility] [address] )    Diabetic neuropathy associated with diabetes mellitus due to underlying condition ([facility] [address] )    Diabetic nephropathy associated with type 2 diabetes mellitus ([address] )       Cardiovascular and Mediastinum    Hypertension    Aortic stenosis, moderate       Nervous and Auditory    Tremor, essential      Other Visit Diagnoses     Need for influenza vaccination    -  Primary    Relevant Orders    influenza vaccine, high-dose, PF 0 7 mL (FLUZONE HIGH-DOSE)           Diagnoses and all orders for this visit:    Need for influenza vaccination  -     influenza vaccine, high-dose, PF 0 7 mL (FLUZONE HIGH-DOSE)    Controlled type 2 diabetes mellitus with diabetic neuropathy, with long-term current use of insulin ([facility] [address] )    Diabetic nephropathy associated with type 2 diabetes mellitus ([facility] [address] )    Diabetic polyneuropathy associated with diabetes mellitus due to underlying condition ([facility] [address] )    Aortic stenosis, moderate    Essential hypertension    Tremor, essential        No problem-specific Assessment & Plan notes found for this encounter  Subjective:      Patient ID: Alicia Marquez is a 77 y o  male      Mr Swapna webb here and he is at a status quo he has had a few falls on primarily because of his neuropathy from his knees down he does he has recently been to the podiatrist no issues there eye exams have been done and are stable in his good eye on blood sugars are pending hemoglobin A1c is pending urine microalbumin is pending recently saw his nephrologist she placed him on torsemide for some peripheral edema that he had otherwise doing well      The following portions of the patient's history were reviewed and updated as appropriate:   He has a past medical history of BPH (benign prostatic hyperplasia), CAD (coronary artery disease), Diabetes mellitus ([address] ), Gout, High cholesterol, Hypertension, and MI, old ,  does not have any pertinent problems on file  ,   has a past surgical history that includes Tonsillectomy and adenoidectomy; Cataract extraction w/  intraocular lens implant; Eye surgery; Coronary angioplasty with stent; and pr colonoscopy flx dx w/collj spec when pfrmd (N/A, 3/21/2016)  ,  family history includes No Known Problems in his father and mother  He was adopted  ,   reports that he has never smoked  He has never used smokeless tobacco  He reports previous alcohol use  He reports that he does not use drugs  ,  has No Known Allergies     Current Outpatient Medications   Medication Sig Dispense Refill   • allopurinol (ZYLOPRIM) 100 mg tablet Take 1 tablet (100 mg total) by mouth daily 90 tablet 2   • aspirin 325 mg tablet Take 325 mg by mouth daily  • atorvastatin (LIPITOR) 80 mg tablet Take 1 tablet (80 mg total) by mouth daily 90 tablet 1   • Empagliflozin (Jardiance) 10 MG TABS Take 1 tablet (10 mg total) by mouth every morning 90 tablet 1   • escitalopram (LEXAPRO) 20 mg tablet Take 1 tablet (20 mg total) by mouth daily 90 tablet 0   • glimepiride (AMARYL) 4 mg tablet TAKE 1 TABLET TWICE A  tablet 2   • levothyroxine 88 mcg tablet TAKE ONE TABLET BY MOUTH DAILY IN THE EARLY MORNING 90 tablet 0   • losartan (COZAAR) 25 mg tablet Take 1 tablet (25 mg total) by mouth daily 90 tablet 1   • metFORMIN (GLUCOPHAGE) 1000 MG tablet Take 1 tablet (1,000 mg total) by mouth 2 (two) times a day with meals (Patient taking differently: Take 500 mg by mouth 2 (two) times a day with meals ) 180 tablet 0   • multivitamin (THERAGRAN) TABS Take 1 tablet by mouth daily     • nitroglycerin (NITROSTAT) 0 4 mg SL tablet Place 1 tablet (0 4 mg total) under the tongue every 5 (five) minutes as needed for chest pain 25 tablet 4   • propranolol (INDERAL LA) 60 mg 24 hr capsule TAKE 1 CAPSULE BY MOUTH DAILY   90 capsule 0   • torsemide (DEMADEX) 20 mg tablet Take 1 tablet (20 mg total) by mouth daily 90 tablet 1   • vitamin E, tocopherol, 1,000 units capsule Take 1,000 Units by mouth daily     • carboxymethylcellulose 1 % ophthalmic solution Apply 1 drop to eye (Patient not taking: Reported on 7/14/2021)       No current facility-administered medications for this visit  Review of Systems      Objective:  Vitals:    09/13/21 0840   BP: (!) 88/68   BP Location: Left arm   Patient Position: Sitting   Cuff Size: Standard   Temp: 97 6 °F (36 4 °C)   TempSrc: Temporal   Weight: 104 kg (229 lb 12 8 oz)   Height: 5' 9" (1 753 m)     Body mass index is 33 94 kg/m²       Physical Exam

## 2021-09-15 DIAGNOSIS — E11.40 CONTROLLED TYPE 2 DIABETES MELLITUS WITH DIABETIC NEUROPATHY, WITH LONG-TERM CURRENT USE OF INSULIN (HCC): ICD-10-CM

## 2021-09-15 DIAGNOSIS — Z79.4 CONTROLLED TYPE 2 DIABETES MELLITUS WITH DIABETIC NEUROPATHY, WITH LONG-TERM CURRENT USE OF INSULIN (HCC): ICD-10-CM

## 2021-09-15 RX ORDER — EMPAGLIFLOZIN 10 MG/1
10 TABLET, FILM COATED ORAL EVERY MORNING
Qty: 90 TABLET | Refills: 1 | Status: SHIPPED | OUTPATIENT
Start: 2021-09-15 | End: 2021-09-16 | Stop reason: SDUPTHER

## 2021-09-16 DIAGNOSIS — E11.40 CONTROLLED TYPE 2 DIABETES MELLITUS WITH DIABETIC NEUROPATHY, WITH LONG-TERM CURRENT USE OF INSULIN (HCC): ICD-10-CM

## 2021-09-16 DIAGNOSIS — Z79.4 CONTROLLED TYPE 2 DIABETES MELLITUS WITH DIABETIC NEUROPATHY, WITH LONG-TERM CURRENT USE OF INSULIN (HCC): ICD-10-CM

## 2021-09-16 RX ORDER — EMPAGLIFLOZIN 10 MG/1
10 TABLET, FILM COATED ORAL EVERY MORNING
Qty: 90 TABLET | Refills: 1 | Status: SHIPPED | OUTPATIENT
Start: 2021-09-16 | End: 2021-12-29 | Stop reason: SDUPTHER

## 2021-09-30 DIAGNOSIS — E11.42 TYPE 2 DIABETES MELLITUS WITH DIABETIC POLYNEUROPATHY, WITH LONG-TERM CURRENT USE OF INSULIN (HCC): ICD-10-CM

## 2021-09-30 DIAGNOSIS — Z79.4 TYPE 2 DIABETES MELLITUS WITH DIABETIC POLYNEUROPATHY, WITH LONG-TERM CURRENT USE OF INSULIN (HCC): ICD-10-CM

## 2021-10-06 DIAGNOSIS — G25.0 TREMOR, ESSENTIAL: ICD-10-CM

## 2021-10-07 RX ORDER — ZONISAMIDE 25 MG/1
CAPSULE ORAL
Qty: 7 CAPSULE | Refills: 0 | Status: SHIPPED | OUTPATIENT
Start: 2021-10-07 | End: 2021-12-02 | Stop reason: SDUPTHER

## 2021-10-13 DIAGNOSIS — E11.42 TYPE 2 DIABETES MELLITUS WITH DIABETIC POLYNEUROPATHY, WITH LONG-TERM CURRENT USE OF INSULIN (HCC): ICD-10-CM

## 2021-10-13 DIAGNOSIS — R60.0 EDEMA OF BOTH LOWER LEGS: ICD-10-CM

## 2021-10-13 DIAGNOSIS — Z79.4 TYPE 2 DIABETES MELLITUS WITH DIABETIC POLYNEUROPATHY, WITH LONG-TERM CURRENT USE OF INSULIN (HCC): ICD-10-CM

## 2021-10-13 DIAGNOSIS — F33.9 RECURRENT MAJOR DEPRESSIVE DISORDER, REMISSION STATUS UNSPECIFIED (HCC): ICD-10-CM

## 2021-10-13 RX ORDER — GLIMEPIRIDE 4 MG/1
TABLET ORAL
Qty: 180 TABLET | Refills: 0 | Status: SHIPPED | OUTPATIENT
Start: 2021-10-13 | End: 2021-12-29 | Stop reason: SDUPTHER

## 2021-10-13 RX ORDER — ESCITALOPRAM OXALATE 20 MG/1
20 TABLET ORAL DAILY
Qty: 90 TABLET | Refills: 0 | Status: SHIPPED | OUTPATIENT
Start: 2021-10-13 | End: 2021-12-29 | Stop reason: SDUPTHER

## 2021-10-14 RX ORDER — TORSEMIDE 20 MG/1
20 TABLET ORAL DAILY
Qty: 90 TABLET | Refills: 0 | Status: SHIPPED | OUTPATIENT
Start: 2021-10-14 | End: 2021-10-22

## 2021-10-22 ENCOUNTER — OFFICE VISIT (OUTPATIENT)
Dept: NEPHROLOGY | Facility: CLINIC | Age: 76
End: 2021-10-22
Payer: COMMERCIAL

## 2021-10-22 ENCOUNTER — DOCTOR'S OFFICE (OUTPATIENT)
Dept: URBAN - NONMETROPOLITAN AREA CLINIC 1 | Facility: CLINIC | Age: 76
Setting detail: OPHTHALMOLOGY
End: 2021-10-22
Payer: COMMERCIAL

## 2021-10-22 ENCOUNTER — TELEPHONE (OUTPATIENT)
Dept: NEPHROLOGY | Facility: CLINIC | Age: 76
End: 2021-10-22

## 2021-10-22 VITALS
OXYGEN SATURATION: 94 % | HEIGHT: 69 IN | DIASTOLIC BLOOD PRESSURE: 72 MMHG | HEART RATE: 86 BPM | BODY MASS INDEX: 34.07 KG/M2 | SYSTOLIC BLOOD PRESSURE: 118 MMHG | WEIGHT: 230 LBS

## 2021-10-22 DIAGNOSIS — H04.121: ICD-10-CM

## 2021-10-22 DIAGNOSIS — B02.39: ICD-10-CM

## 2021-10-22 DIAGNOSIS — H04.122: ICD-10-CM

## 2021-10-22 DIAGNOSIS — H18.711: ICD-10-CM

## 2021-10-22 DIAGNOSIS — I12.9 BENIGN HYPERTENSION WITH CKD (CHRONIC KIDNEY DISEASE) STAGE III (HCC): ICD-10-CM

## 2021-10-22 DIAGNOSIS — H01.001: ICD-10-CM

## 2021-10-22 DIAGNOSIS — C44.1121: ICD-10-CM

## 2021-10-22 DIAGNOSIS — E11.21 DIABETIC NEPHROPATHY ASSOCIATED WITH TYPE 2 DIABETES MELLITUS (HCC): ICD-10-CM

## 2021-10-22 DIAGNOSIS — I35.0 AORTIC STENOSIS, MODERATE: ICD-10-CM

## 2021-10-22 DIAGNOSIS — H01.002: ICD-10-CM

## 2021-10-22 DIAGNOSIS — N18.31 STAGE 3A CHRONIC KIDNEY DISEASE (HCC): Primary | ICD-10-CM

## 2021-10-22 DIAGNOSIS — N18.30 BENIGN HYPERTENSION WITH CKD (CHRONIC KIDNEY DISEASE) STAGE III (HCC): ICD-10-CM

## 2021-10-22 DIAGNOSIS — H01.004: ICD-10-CM

## 2021-10-22 DIAGNOSIS — E55.9 VITAMIN D DEFICIENCY: ICD-10-CM

## 2021-10-22 DIAGNOSIS — R60.0 EDEMA OF BOTH LOWER LEGS: ICD-10-CM

## 2021-10-22 DIAGNOSIS — H01.005: ICD-10-CM

## 2021-10-22 DIAGNOSIS — R60.0 BILATERAL LOWER EXTREMITY EDEMA: ICD-10-CM

## 2021-10-22 DIAGNOSIS — I50.32 CHRONIC DIASTOLIC (CONGESTIVE) HEART FAILURE (HCC): ICD-10-CM

## 2021-10-22 PROCEDURE — 99213 OFFICE O/P EST LOW 20 MIN: CPT | Performed by: OPHTHALMOLOGY

## 2021-10-22 PROCEDURE — 99214 OFFICE O/P EST MOD 30 MIN: CPT | Performed by: NURSE PRACTITIONER

## 2021-10-22 RX ORDER — TORSEMIDE 10 MG/1
10 TABLET ORAL DAILY
Qty: 90 TABLET | Refills: 2 | Status: SHIPPED | OUTPATIENT
Start: 2021-10-22 | End: 2022-02-04 | Stop reason: SDUPTHER

## 2021-10-22 ASSESSMENT — AXIALLENGTH_DERIVED
OS_AL: 23.4152
OS_AL: 23.3673

## 2021-10-22 ASSESSMENT — REFRACTION_CURRENTRX
OD_CYLINDER: -0.50
OD_AXIS: 052
OD_OVR_VA: 20/
OS_ADD: +2.50
OD_SPHERE: +0.50
OS_AXIS: 110
OD_VPRISM_DIRECTION: PROGS
OS_VPRISM_DIRECTION: PROGS
OD_ADD: +2.50
OS_CYLINDER: -1.75
OS_SPHERE: +1.50
OS_OVR_VA: 20/

## 2021-10-22 ASSESSMENT — LID EXAM ASSESSMENTS
OD_MEIBOMITIS: 2+
OD_BLEPHARITIS: 1+
OS_BLEPHARITIS: 3+
OS_MEIBOMITIS: 2+

## 2021-10-22 ASSESSMENT — REFRACTION_AUTOREFRACTION
OD_SPHERE: ERROR
OS_AXIS: 091
OS_CYLINDER: -0.75
OS_SPHERE: +0.25

## 2021-10-22 ASSESSMENT — KERATOMETRY
OS_K1POWER_DIOPTERS: 44.00
OS_K2POWER_DIOPTERS: 044.50
OD_K1POWER_DIOPTERS: 46.50
OD_AXISANGLE_DEGREES: 138
OD_K2POWER_DIOPTERS: 63.25
OS_AXISANGLE_DEGREES: 802

## 2021-10-22 ASSESSMENT — REFRACTION_MANIFEST
OS_CYLINDER: SPH
OS_ADD: +2.50
OS_AXIS: 090
OS_VA1: 20/20
OD_SPHERE: PLANO
OS_SPHERE: +0.25
OD_CYLINDER: SPH
OS_SPHERE: -1.50
OU_VA: 20/20
OS_VA2: 20/20
OD_VA1: 20/NI
OD_VA2: 20/NI
OD_ADD: +2.50
OS_CYLINDER: -1.00

## 2021-10-22 ASSESSMENT — SPHEQUIV_DERIVED
OS_SPHEQUIV: -0.25
OS_SPHEQUIV: -0.125

## 2021-10-22 ASSESSMENT — SUPERFICIAL PUNCTATE KERATITIS (SPK)
OS_SPK: ABSENT
OD_SPK: 2+

## 2021-10-22 ASSESSMENT — VISUAL ACUITY
OD_BCVA: 20/20-2
OS_BCVA: 20/CFX5

## 2021-11-05 DIAGNOSIS — M1A.9XX0 CHRONIC GOUT WITHOUT TOPHUS, UNSPECIFIED CAUSE, UNSPECIFIED SITE: ICD-10-CM

## 2021-11-05 DIAGNOSIS — E03.9 HYPOTHYROIDISM, UNSPECIFIED TYPE: ICD-10-CM

## 2021-11-05 RX ORDER — LEVOTHYROXINE SODIUM 88 UG/1
88 TABLET ORAL
Qty: 90 TABLET | Refills: 1 | Status: SHIPPED | OUTPATIENT
Start: 2021-11-05 | End: 2022-02-04 | Stop reason: SDUPTHER

## 2021-11-05 RX ORDER — ALLOPURINOL 100 MG/1
100 TABLET ORAL DAILY
Qty: 90 TABLET | Refills: 1 | Status: SHIPPED | OUTPATIENT
Start: 2021-11-05 | End: 2022-02-09 | Stop reason: SDUPTHER

## 2021-11-19 ENCOUNTER — HOSPITAL ENCOUNTER (EMERGENCY)
Facility: HOSPITAL | Age: 76
Discharge: HOME | End: 2021-11-19
Attending: EMERGENCY MEDICINE | Admitting: EMERGENCY MEDICINE
Payer: COMMERCIAL

## 2021-11-19 VITALS
WEIGHT: 229.28 LBS | DIASTOLIC BLOOD PRESSURE: 69 MMHG | RESPIRATION RATE: 18 BRPM | OXYGEN SATURATION: 95 % | BODY MASS INDEX: 33.86 KG/M2 | HEART RATE: 60 BPM | SYSTOLIC BLOOD PRESSURE: 103 MMHG | TEMPERATURE: 96.8 F

## 2021-11-19 DIAGNOSIS — E11.9 DIABETES MELLITUS (HCC): Primary | ICD-10-CM

## 2021-11-19 DIAGNOSIS — L98.9 SKIN LESION OF BACK: ICD-10-CM

## 2021-11-19 LAB
ALBUMIN SERPL BCP-MCNC: 3.6 G/DL (ref 3.5–5)
ALP SERPL-CCNC: 91 U/L (ref 46–116)
ALT SERPL W P-5'-P-CCNC: 58 U/L (ref 12–78)
ANION GAP SERPL CALCULATED.3IONS-SCNC: 6 MMOL/L (ref 4–13)
AST SERPL W P-5'-P-CCNC: 32 U/L (ref 5–45)
BACTERIA UR QL AUTO: NORMAL /HPF
BASOPHILS # BLD AUTO: 0.08 THOUSANDS/ΜL (ref 0–0.1)
BASOPHILS NFR BLD AUTO: 1 % (ref 0–1)
BILIRUB SERPL-MCNC: 0.39 MG/DL (ref 0.2–1)
BILIRUB UR QL STRIP: NEGATIVE
BUN SERPL-MCNC: 18 MG/DL (ref 5–25)
CALCIUM SERPL-MCNC: 9.2 MG/DL (ref 8.3–10.1)
CARDIAC TROPONIN I PNL SERPL HS: 3 NG/L
CHLORIDE SERPL-SCNC: 102 MMOL/L (ref 100–108)
CLARITY UR: CLEAR
CO2 SERPL-SCNC: 29 MMOL/L (ref 21–32)
COLOR UR: YELLOW
CREAT SERPL-MCNC: 1.27 MG/DL (ref 0.6–1.3)
EOSINOPHIL # BLD AUTO: 0.73 THOUSAND/ΜL (ref 0–0.61)
EOSINOPHIL NFR BLD AUTO: 6 % (ref 0–6)
ERYTHROCYTE [DISTWIDTH] IN BLOOD BY AUTOMATED COUNT: 12.4 % (ref 11.6–15.1)
GFR SERPL CREATININE-BSD FRML MDRD: 55 ML/MIN/1.73SQ M
GLUCOSE SERPL-MCNC: 86 MG/DL (ref 65–140)
GLUCOSE UR STRIP-MCNC: ABNORMAL MG/DL
HCT VFR BLD AUTO: 43.9 % (ref 36.5–49.3)
HGB BLD-MCNC: 14.8 G/DL (ref 12–17)
HGB UR QL STRIP.AUTO: NEGATIVE
IMM GRANULOCYTES # BLD AUTO: 0.06 THOUSAND/UL (ref 0–0.2)
IMM GRANULOCYTES NFR BLD AUTO: 1 % (ref 0–2)
KETONES UR STRIP-MCNC: NEGATIVE MG/DL
LEUKOCYTE ESTERASE UR QL STRIP: ABNORMAL
LYMPHOCYTES # BLD AUTO: 3.07 THOUSANDS/ΜL (ref 0.6–4.47)
LYMPHOCYTES NFR BLD AUTO: 27 % (ref 14–44)
MCH RBC QN AUTO: 33.6 PG (ref 26.8–34.3)
MCHC RBC AUTO-ENTMCNC: 33.7 G/DL (ref 31.4–37.4)
MCV RBC AUTO: 100 FL (ref 82–98)
MONOCYTES # BLD AUTO: 1.15 THOUSAND/ΜL (ref 0.17–1.22)
MONOCYTES NFR BLD AUTO: 10 % (ref 4–12)
NEUTROPHILS # BLD AUTO: 6.51 THOUSANDS/ΜL (ref 1.85–7.62)
NEUTS SEG NFR BLD AUTO: 55 % (ref 43–75)
NITRITE UR QL STRIP: NEGATIVE
NON-SQ EPI CELLS URNS QL MICRO: NORMAL /HPF
NRBC BLD AUTO-RTO: 0 /100 WBCS
PH UR STRIP.AUTO: 5.5 [PH]
PLATELET # BLD AUTO: 273 THOUSANDS/UL (ref 149–390)
PMV BLD AUTO: 9.9 FL (ref 8.9–12.7)
POTASSIUM SERPL-SCNC: 4.2 MMOL/L (ref 3.5–5.3)
PROT SERPL-MCNC: 7.2 G/DL (ref 6.4–8.2)
PROT UR STRIP-MCNC: NEGATIVE MG/DL
RBC # BLD AUTO: 4.41 MILLION/UL (ref 3.88–5.62)
RBC #/AREA URNS AUTO: NORMAL /HPF
SODIUM SERPL-SCNC: 137 MMOL/L (ref 136–145)
SP GR UR STRIP.AUTO: 1.02 (ref 1–1.03)
UROBILINOGEN UR QL STRIP.AUTO: 0.2 E.U./DL
WBC # BLD AUTO: 11.6 THOUSAND/UL (ref 4.31–10.16)
WBC #/AREA URNS AUTO: NORMAL /HPF

## 2021-11-19 PROCEDURE — 99285 EMERGENCY DEPT VISIT HI MDM: CPT | Performed by: PHYSICIAN ASSISTANT

## 2021-11-19 PROCEDURE — 80053 COMPREHEN METABOLIC PANEL: CPT | Performed by: PHYSICIAN ASSISTANT

## 2021-11-19 PROCEDURE — 81001 URINALYSIS AUTO W/SCOPE: CPT | Performed by: EMERGENCY MEDICINE

## 2021-11-19 PROCEDURE — 99283 EMERGENCY DEPT VISIT LOW MDM: CPT

## 2021-11-19 PROCEDURE — 93005 ELECTROCARDIOGRAM TRACING: CPT

## 2021-11-19 PROCEDURE — 36415 COLL VENOUS BLD VENIPUNCTURE: CPT | Performed by: PHYSICIAN ASSISTANT

## 2021-11-19 PROCEDURE — 84484 ASSAY OF TROPONIN QUANT: CPT | Performed by: PHYSICIAN ASSISTANT

## 2021-11-19 PROCEDURE — 85025 COMPLETE CBC W/AUTO DIFF WBC: CPT | Performed by: PHYSICIAN ASSISTANT

## 2021-11-22 LAB
ATRIAL RATE: 66 BPM
P AXIS: 20 DEGREES
PR INTERVAL: 200 MS
QRS AXIS: -52 DEGREES
QRSD INTERVAL: 82 MS
QT INTERVAL: 410 MS
QTC INTERVAL: 429 MS
T WAVE AXIS: 49 DEGREES
VENTRICULAR RATE: 66 BPM

## 2021-11-22 PROCEDURE — 93010 ELECTROCARDIOGRAM REPORT: CPT | Performed by: INTERNAL MEDICINE

## 2021-11-24 ENCOUNTER — APPOINTMENT (OUTPATIENT)
Dept: LAB | Facility: HOSPITAL | Age: 76
End: 2021-11-24
Payer: COMMERCIAL

## 2021-11-24 DIAGNOSIS — N18.31 STAGE 3A CHRONIC KIDNEY DISEASE (HCC): ICD-10-CM

## 2021-11-24 DIAGNOSIS — E11.21 DIABETIC NEPHROPATHY ASSOCIATED WITH TYPE 2 DIABETES MELLITUS (HCC): ICD-10-CM

## 2021-11-24 DIAGNOSIS — E55.9 VITAMIN D DEFICIENCY: ICD-10-CM

## 2021-11-24 LAB
25(OH)D3 SERPL-MCNC: 15.5 NG/ML (ref 30–100)
ALBUMIN SERPL BCP-MCNC: 3.6 G/DL (ref 3.5–5)
ALP SERPL-CCNC: 97 U/L (ref 46–116)
ALT SERPL W P-5'-P-CCNC: 52 U/L (ref 12–78)
ANION GAP SERPL CALCULATED.3IONS-SCNC: 7 MMOL/L (ref 4–13)
AST SERPL W P-5'-P-CCNC: 26 U/L (ref 5–45)
BACTERIA UR QL AUTO: ABNORMAL /HPF
BILIRUB SERPL-MCNC: 0.41 MG/DL (ref 0.2–1)
BILIRUB UR QL STRIP: NEGATIVE
BUN SERPL-MCNC: 22 MG/DL (ref 5–25)
CALCIUM SERPL-MCNC: 9 MG/DL (ref 8.3–10.1)
CHLORIDE SERPL-SCNC: 102 MMOL/L (ref 100–108)
CLARITY UR: ABNORMAL
CO2 SERPL-SCNC: 33 MMOL/L (ref 21–32)
COLOR UR: YELLOW
CREAT SERPL-MCNC: 1.48 MG/DL (ref 0.6–1.3)
CREAT UR-MCNC: 80.2 MG/DL
GFR SERPL CREATININE-BSD FRML MDRD: 46 ML/MIN/1.73SQ M
GLUCOSE P FAST SERPL-MCNC: 161 MG/DL (ref 65–99)
GLUCOSE UR STRIP-MCNC: ABNORMAL MG/DL
HGB UR QL STRIP.AUTO: NEGATIVE
KETONES UR STRIP-MCNC: NEGATIVE MG/DL
LEUKOCYTE ESTERASE UR QL STRIP: ABNORMAL
MICROALBUMIN UR-MCNC: 53.9 MG/L (ref 0–20)
MICROALBUMIN/CREAT 24H UR: 67 MG/G CREATININE (ref 0–30)
NITRITE UR QL STRIP: NEGATIVE
NON-SQ EPI CELLS URNS QL MICRO: ABNORMAL /HPF
PH UR STRIP.AUTO: 6 [PH]
POTASSIUM SERPL-SCNC: 4 MMOL/L (ref 3.5–5.3)
PROT SERPL-MCNC: 7.5 G/DL (ref 6.4–8.2)
PROT UR STRIP-MCNC: NEGATIVE MG/DL
RBC #/AREA URNS AUTO: ABNORMAL /HPF
SODIUM SERPL-SCNC: 142 MMOL/L (ref 136–145)
SP GR UR STRIP.AUTO: 1.01 (ref 1–1.03)
UROBILINOGEN UR QL STRIP.AUTO: 0.2 E.U./DL
WBC #/AREA URNS AUTO: ABNORMAL /HPF

## 2021-11-24 PROCEDURE — 82570 ASSAY OF URINE CREATININE: CPT

## 2021-11-24 PROCEDURE — 80053 COMPREHEN METABOLIC PANEL: CPT

## 2021-11-24 PROCEDURE — 81001 URINALYSIS AUTO W/SCOPE: CPT

## 2021-11-24 PROCEDURE — 82043 UR ALBUMIN QUANTITATIVE: CPT

## 2021-11-24 PROCEDURE — 82306 VITAMIN D 25 HYDROXY: CPT

## 2021-11-24 PROCEDURE — 36415 COLL VENOUS BLD VENIPUNCTURE: CPT

## 2021-11-30 ENCOUNTER — OFFICE VISIT (OUTPATIENT)
Dept: OBGYN CLINIC | Facility: HOSPITAL | Age: 76
End: 2021-11-30
Payer: COMMERCIAL

## 2021-11-30 VITALS
SYSTOLIC BLOOD PRESSURE: 96 MMHG | HEIGHT: 69 IN | DIASTOLIC BLOOD PRESSURE: 59 MMHG | BODY MASS INDEX: 33.92 KG/M2 | HEART RATE: 87 BPM | WEIGHT: 229 LBS

## 2021-11-30 DIAGNOSIS — M17.0 BILATERAL PRIMARY OSTEOARTHRITIS OF KNEE: Primary | ICD-10-CM

## 2021-11-30 DIAGNOSIS — M25.562 BILATERAL CHRONIC KNEE PAIN: ICD-10-CM

## 2021-11-30 DIAGNOSIS — G89.29 BILATERAL CHRONIC KNEE PAIN: ICD-10-CM

## 2021-11-30 DIAGNOSIS — M25.561 BILATERAL CHRONIC KNEE PAIN: ICD-10-CM

## 2021-11-30 PROCEDURE — 99213 OFFICE O/P EST LOW 20 MIN: CPT

## 2021-11-30 PROCEDURE — 20610 DRAIN/INJ JOINT/BURSA W/O US: CPT

## 2021-11-30 RX ORDER — LIDOCAINE HYDROCHLORIDE 10 MG/ML
2 INJECTION, SOLUTION INFILTRATION; PERINEURAL
Status: COMPLETED | OUTPATIENT
Start: 2021-11-30 | End: 2021-11-30

## 2021-11-30 RX ORDER — BUPIVACAINE HYDROCHLORIDE 2.5 MG/ML
2 INJECTION, SOLUTION INFILTRATION; PERINEURAL
Status: COMPLETED | OUTPATIENT
Start: 2021-11-30 | End: 2021-11-30

## 2021-11-30 RX ORDER — BETAMETHASONE SODIUM PHOSPHATE AND BETAMETHASONE ACETATE 3; 3 MG/ML; MG/ML
12 INJECTION, SUSPENSION INTRA-ARTICULAR; INTRALESIONAL; INTRAMUSCULAR; SOFT TISSUE
Status: COMPLETED | OUTPATIENT
Start: 2021-11-30 | End: 2021-11-30

## 2021-11-30 RX ADMIN — LIDOCAINE HYDROCHLORIDE 2 ML: 10 INJECTION, SOLUTION INFILTRATION; PERINEURAL at 11:59

## 2021-11-30 RX ADMIN — BETAMETHASONE SODIUM PHOSPHATE AND BETAMETHASONE ACETATE 12 MG: 3; 3 INJECTION, SUSPENSION INTRA-ARTICULAR; INTRALESIONAL; INTRAMUSCULAR; SOFT TISSUE at 11:59

## 2021-11-30 RX ADMIN — BUPIVACAINE HYDROCHLORIDE 2 ML: 2.5 INJECTION, SOLUTION INFILTRATION; PERINEURAL at 11:59

## 2021-12-01 ENCOUNTER — APPOINTMENT (EMERGENCY)
Dept: RADIOLOGY | Facility: HOSPITAL | Age: 76
End: 2021-12-01
Payer: COMMERCIAL

## 2021-12-01 ENCOUNTER — APPOINTMENT (EMERGENCY)
Dept: CT IMAGING | Facility: HOSPITAL | Age: 76
End: 2021-12-01
Payer: COMMERCIAL

## 2021-12-01 ENCOUNTER — HOSPITAL ENCOUNTER (OUTPATIENT)
Facility: HOSPITAL | Age: 76
Setting detail: OBSERVATION
Discharge: HOME | End: 2021-12-02
Attending: EMERGENCY MEDICINE | Admitting: FAMILY MEDICINE
Payer: COMMERCIAL

## 2021-12-01 ENCOUNTER — DOCTOR'S OFFICE (OUTPATIENT)
Dept: URBAN - NONMETROPOLITAN AREA CLINIC 1 | Facility: CLINIC | Age: 76
Setting detail: OPHTHALMOLOGY
End: 2021-12-01
Payer: COMMERCIAL

## 2021-12-01 VITALS — HEIGHT: 60 IN

## 2021-12-01 DIAGNOSIS — R74.8 ELEVATED LIPASE: ICD-10-CM

## 2021-12-01 DIAGNOSIS — H04.123: ICD-10-CM

## 2021-12-01 DIAGNOSIS — H18.711: ICD-10-CM

## 2021-12-01 DIAGNOSIS — C44.1121: ICD-10-CM

## 2021-12-01 DIAGNOSIS — H35.3131: ICD-10-CM

## 2021-12-01 DIAGNOSIS — B02.39: ICD-10-CM

## 2021-12-01 DIAGNOSIS — N17.9 AKI (ACUTE KIDNEY INJURY) (HCC): ICD-10-CM

## 2021-12-01 DIAGNOSIS — E11.3293: ICD-10-CM

## 2021-12-01 DIAGNOSIS — H01.001: ICD-10-CM

## 2021-12-01 DIAGNOSIS — H01.005: ICD-10-CM

## 2021-12-01 DIAGNOSIS — G25.0 TREMOR, ESSENTIAL: ICD-10-CM

## 2021-12-01 DIAGNOSIS — H01.002: ICD-10-CM

## 2021-12-01 DIAGNOSIS — H25.13: ICD-10-CM

## 2021-12-01 DIAGNOSIS — R07.9 CHEST PAIN: Primary | ICD-10-CM

## 2021-12-01 DIAGNOSIS — H01.004: ICD-10-CM

## 2021-12-01 DIAGNOSIS — R07.89 OTHER CHEST PAIN: ICD-10-CM

## 2021-12-01 PROBLEM — D72.829 LEUKOCYTOSIS: Status: ACTIVE | Noted: 2021-12-01

## 2021-12-01 LAB
ALBUMIN SERPL BCP-MCNC: 3.6 G/DL (ref 3.5–5)
ALP SERPL-CCNC: 87 U/L (ref 46–116)
ALT SERPL W P-5'-P-CCNC: 54 U/L (ref 12–78)
ANION GAP SERPL CALCULATED.3IONS-SCNC: 12 MMOL/L (ref 4–13)
APTT PPP: 27 SECONDS (ref 23–37)
AST SERPL W P-5'-P-CCNC: 18 U/L (ref 5–45)
BASOPHILS # BLD AUTO: 0.02 THOUSANDS/ΜL (ref 0–0.1)
BASOPHILS NFR BLD AUTO: 0 % (ref 0–1)
BILIRUB SERPL-MCNC: 0.3 MG/DL (ref 0.2–1)
BUN SERPL-MCNC: 28 MG/DL (ref 5–25)
CALCIUM SERPL-MCNC: 8.9 MG/DL (ref 8.3–10.1)
CARDIAC TROPONIN I PNL SERPL HS: 4 NG/L
CHLORIDE SERPL-SCNC: 102 MMOL/L (ref 100–108)
CO2 SERPL-SCNC: 27 MMOL/L (ref 21–32)
CREAT SERPL-MCNC: 1.56 MG/DL (ref 0.6–1.3)
D DIMER PPP FEU-MCNC: 0.49 UG/ML FEU
EOSINOPHIL # BLD AUTO: 0 THOUSAND/ΜL (ref 0–0.61)
EOSINOPHIL NFR BLD AUTO: 0 % (ref 0–6)
ERYTHROCYTE [DISTWIDTH] IN BLOOD BY AUTOMATED COUNT: 12.5 % (ref 11.6–15.1)
FLUAV RNA RESP QL NAA+PROBE: NEGATIVE
FLUBV RNA RESP QL NAA+PROBE: NEGATIVE
GFR SERPL CREATININE-BSD FRML MDRD: 43 ML/MIN/1.73SQ M
GLUCOSE SERPL-MCNC: 206 MG/DL (ref 65–140)
HCT VFR BLD AUTO: 41.9 % (ref 36.5–49.3)
HGB BLD-MCNC: 14.1 G/DL (ref 12–17)
IMM GRANULOCYTES # BLD AUTO: 0.1 THOUSAND/UL (ref 0–0.2)
IMM GRANULOCYTES NFR BLD AUTO: 1 % (ref 0–2)
INR PPP: 0.97 (ref 0.84–1.19)
LIPASE SERPL-CCNC: 717 U/L (ref 73–393)
LYMPHOCYTES # BLD AUTO: 2.38 THOUSANDS/ΜL (ref 0.6–4.47)
LYMPHOCYTES NFR BLD AUTO: 17 % (ref 14–44)
MCH RBC QN AUTO: 33.3 PG (ref 26.8–34.3)
MCHC RBC AUTO-ENTMCNC: 33.7 G/DL (ref 31.4–37.4)
MCV RBC AUTO: 99 FL (ref 82–98)
MONOCYTES # BLD AUTO: 1.1 THOUSAND/ΜL (ref 0.17–1.22)
MONOCYTES NFR BLD AUTO: 8 % (ref 4–12)
NEUTROPHILS # BLD AUTO: 10.64 THOUSANDS/ΜL (ref 1.85–7.62)
NEUTS SEG NFR BLD AUTO: 74 % (ref 43–75)
NRBC BLD AUTO-RTO: 0 /100 WBCS
NT-PROBNP SERPL-MCNC: 284 PG/ML
PLATELET # BLD AUTO: 298 THOUSANDS/UL (ref 149–390)
PMV BLD AUTO: 10.3 FL (ref 8.9–12.7)
POTASSIUM SERPL-SCNC: 4.3 MMOL/L (ref 3.5–5.3)
PROT SERPL-MCNC: 7.5 G/DL (ref 6.4–8.2)
PROTHROMBIN TIME: 12.4 SECONDS (ref 11.6–14.5)
RBC # BLD AUTO: 4.24 MILLION/UL (ref 3.88–5.62)
RSV RNA RESP QL NAA+PROBE: NEGATIVE
SARS-COV-2 RNA RESP QL NAA+PROBE: NEGATIVE
SODIUM SERPL-SCNC: 141 MMOL/L (ref 136–145)
WBC # BLD AUTO: 14.24 THOUSAND/UL (ref 4.31–10.16)

## 2021-12-01 PROCEDURE — 99220 PR INITIAL OBSERVATION CARE/DAY 70 MINUTES: CPT | Performed by: PHYSICIAN ASSISTANT

## 2021-12-01 PROCEDURE — 99285 EMERGENCY DEPT VISIT HI MDM: CPT

## 2021-12-01 PROCEDURE — 85379 FIBRIN DEGRADATION QUANT: CPT | Performed by: EMERGENCY MEDICINE

## 2021-12-01 PROCEDURE — 36415 COLL VENOUS BLD VENIPUNCTURE: CPT | Performed by: EMERGENCY MEDICINE

## 2021-12-01 PROCEDURE — 99285 EMERGENCY DEPT VISIT HI MDM: CPT | Performed by: EMERGENCY MEDICINE

## 2021-12-01 PROCEDURE — 85730 THROMBOPLASTIN TIME PARTIAL: CPT | Performed by: EMERGENCY MEDICINE

## 2021-12-01 PROCEDURE — 83880 ASSAY OF NATRIURETIC PEPTIDE: CPT | Performed by: EMERGENCY MEDICINE

## 2021-12-01 PROCEDURE — 99213 OFFICE O/P EST LOW 20 MIN: CPT | Performed by: OPHTHALMOLOGY

## 2021-12-01 PROCEDURE — 80053 COMPREHEN METABOLIC PANEL: CPT | Performed by: EMERGENCY MEDICINE

## 2021-12-01 PROCEDURE — 83690 ASSAY OF LIPASE: CPT | Performed by: EMERGENCY MEDICINE

## 2021-12-01 PROCEDURE — 70450 CT HEAD/BRAIN W/O DYE: CPT

## 2021-12-01 PROCEDURE — 71045 X-RAY EXAM CHEST 1 VIEW: CPT

## 2021-12-01 PROCEDURE — 85025 COMPLETE CBC W/AUTO DIFF WBC: CPT | Performed by: EMERGENCY MEDICINE

## 2021-12-01 PROCEDURE — 85610 PROTHROMBIN TIME: CPT | Performed by: EMERGENCY MEDICINE

## 2021-12-01 PROCEDURE — 84484 ASSAY OF TROPONIN QUANT: CPT | Performed by: EMERGENCY MEDICINE

## 2021-12-01 PROCEDURE — 96361 HYDRATE IV INFUSION ADD-ON: CPT

## 2021-12-01 PROCEDURE — 96360 HYDRATION IV INFUSION INIT: CPT

## 2021-12-01 PROCEDURE — 74177 CT ABD & PELVIS W/CONTRAST: CPT

## 2021-12-01 PROCEDURE — 92134 CPTRZ OPH DX IMG PST SGM RTA: CPT | Performed by: OPHTHALMOLOGY

## 2021-12-01 PROCEDURE — 93005 ELECTROCARDIOGRAM TRACING: CPT

## 2021-12-01 PROCEDURE — 0241U HB NFCT DS VIR RESP RNA 4 TRGT: CPT | Performed by: EMERGENCY MEDICINE

## 2021-12-01 RX ORDER — NITROGLYCERIN 0.4 MG/1
0.4 TABLET SUBLINGUAL ONCE
Status: COMPLETED | OUTPATIENT
Start: 2021-12-01 | End: 2021-12-01

## 2021-12-01 RX ORDER — SODIUM CHLORIDE 9 MG/ML
75 INJECTION, SOLUTION INTRAVENOUS CONTINUOUS
Status: DISCONTINUED | OUTPATIENT
Start: 2021-12-01 | End: 2021-12-02 | Stop reason: HOSPADM

## 2021-12-01 RX ADMIN — NITROGLYCERIN 0.4 MG: 0.4 TABLET SUBLINGUAL at 21:28

## 2021-12-01 RX ADMIN — IOHEXOL 100 ML: 350 INJECTION, SOLUTION INTRAVENOUS at 23:33

## 2021-12-01 RX ADMIN — SODIUM CHLORIDE 125 ML/HR: 0.9 INJECTION, SOLUTION INTRAVENOUS at 21:29

## 2021-12-01 ASSESSMENT — SUPERFICIAL PUNCTATE KERATITIS (SPK)
OD_SPK: 2+
OS_SPK: ABSENT

## 2021-12-01 ASSESSMENT — CONFRONTATIONAL VISUAL FIELD TEST (CVF)
OS_FINDINGS: FULL
OD_FINDINGS: FULL

## 2021-12-02 VITALS
OXYGEN SATURATION: 93 % | RESPIRATION RATE: 18 BRPM | DIASTOLIC BLOOD PRESSURE: 62 MMHG | HEART RATE: 66 BPM | HEIGHT: 69 IN | BODY MASS INDEX: 33.96 KG/M2 | SYSTOLIC BLOOD PRESSURE: 138 MMHG | WEIGHT: 229.28 LBS | TEMPERATURE: 97.1 F

## 2021-12-02 LAB
4HR DELTA HS TROPONIN: 1 NG/L
ALBUMIN SERPL BCP-MCNC: 3.3 G/DL (ref 3.5–5)
ALP SERPL-CCNC: 75 U/L (ref 46–116)
ALT SERPL W P-5'-P-CCNC: 52 U/L (ref 12–78)
ANION GAP SERPL CALCULATED.3IONS-SCNC: 6 MMOL/L (ref 4–13)
AST SERPL W P-5'-P-CCNC: 19 U/L (ref 5–45)
BILIRUB SERPL-MCNC: 0.23 MG/DL (ref 0.2–1)
BUN SERPL-MCNC: 28 MG/DL (ref 5–25)
CALCIUM ALBUM COR SERPL-MCNC: 9.2 MG/DL (ref 8.3–10.1)
CALCIUM SERPL-MCNC: 8.6 MG/DL (ref 8.3–10.1)
CARDIAC TROPONIN I PNL SERPL HS: 5 NG/L
CHLORIDE SERPL-SCNC: 104 MMOL/L (ref 100–108)
CO2 SERPL-SCNC: 27 MMOL/L (ref 21–32)
CREAT SERPL-MCNC: 1.21 MG/DL (ref 0.6–1.3)
ERYTHROCYTE [DISTWIDTH] IN BLOOD BY AUTOMATED COUNT: 12.5 % (ref 11.6–15.1)
GFR SERPL CREATININE-BSD FRML MDRD: 59 ML/MIN/1.73SQ M
GLUCOSE P FAST SERPL-MCNC: 173 MG/DL (ref 65–99)
GLUCOSE SERPL-MCNC: 130 MG/DL (ref 65–140)
GLUCOSE SERPL-MCNC: 173 MG/DL (ref 65–140)
GLUCOSE SERPL-MCNC: 178 MG/DL (ref 65–140)
GLUCOSE SERPL-MCNC: 199 MG/DL (ref 65–140)
HCT VFR BLD AUTO: 40.4 % (ref 36.5–49.3)
HGB BLD-MCNC: 13.6 G/DL (ref 12–17)
LIPASE SERPL-CCNC: 273 U/L (ref 73–393)
MAGNESIUM SERPL-MCNC: 2.1 MG/DL (ref 1.6–2.6)
MCH RBC QN AUTO: 33.4 PG (ref 26.8–34.3)
MCHC RBC AUTO-ENTMCNC: 33.7 G/DL (ref 31.4–37.4)
MCV RBC AUTO: 99 FL (ref 82–98)
PHOSPHATE SERPL-MCNC: 4 MG/DL (ref 2.3–4.1)
PLATELET # BLD AUTO: 266 THOUSANDS/UL (ref 149–390)
PMV BLD AUTO: 10.4 FL (ref 8.9–12.7)
POTASSIUM SERPL-SCNC: 4.2 MMOL/L (ref 3.5–5.3)
PROT SERPL-MCNC: 6.9 G/DL (ref 6.4–8.2)
RBC # BLD AUTO: 4.07 MILLION/UL (ref 3.88–5.62)
SODIUM SERPL-SCNC: 137 MMOL/L (ref 136–145)
WBC # BLD AUTO: 12.19 THOUSAND/UL (ref 4.31–10.16)

## 2021-12-02 PROCEDURE — 84484 ASSAY OF TROPONIN QUANT: CPT | Performed by: EMERGENCY MEDICINE

## 2021-12-02 PROCEDURE — 99217 PR OBSERVATION CARE DISCHARGE MANAGEMENT: CPT | Performed by: FAMILY MEDICINE

## 2021-12-02 PROCEDURE — 80053 COMPREHEN METABOLIC PANEL: CPT | Performed by: PHYSICIAN ASSISTANT

## 2021-12-02 PROCEDURE — 84100 ASSAY OF PHOSPHORUS: CPT | Performed by: PHYSICIAN ASSISTANT

## 2021-12-02 PROCEDURE — 82948 REAGENT STRIP/BLOOD GLUCOSE: CPT

## 2021-12-02 PROCEDURE — 99245 OFF/OP CONSLTJ NEW/EST HI 55: CPT | Performed by: INTERNAL MEDICINE

## 2021-12-02 PROCEDURE — 85027 COMPLETE CBC AUTOMATED: CPT | Performed by: PHYSICIAN ASSISTANT

## 2021-12-02 PROCEDURE — 83735 ASSAY OF MAGNESIUM: CPT | Performed by: PHYSICIAN ASSISTANT

## 2021-12-02 PROCEDURE — 36415 COLL VENOUS BLD VENIPUNCTURE: CPT | Performed by: EMERGENCY MEDICINE

## 2021-12-02 PROCEDURE — 83690 ASSAY OF LIPASE: CPT | Performed by: FAMILY MEDICINE

## 2021-12-02 RX ORDER — ACETAMINOPHEN 325 MG/1
650 TABLET ORAL EVERY 6 HOURS PRN
Status: DISCONTINUED | OUTPATIENT
Start: 2021-12-02 | End: 2021-12-02 | Stop reason: HOSPADM

## 2021-12-02 RX ORDER — ATORVASTATIN CALCIUM 40 MG/1
80 TABLET, FILM COATED ORAL DAILY
Status: DISCONTINUED | OUTPATIENT
Start: 2021-12-02 | End: 2021-12-02 | Stop reason: HOSPADM

## 2021-12-02 RX ORDER — LEVOTHYROXINE SODIUM 88 UG/1
88 TABLET ORAL
Status: DISCONTINUED | OUTPATIENT
Start: 2021-12-02 | End: 2021-12-02 | Stop reason: HOSPADM

## 2021-12-02 RX ORDER — ALLOPURINOL 100 MG/1
100 TABLET ORAL DAILY
Status: DISCONTINUED | OUTPATIENT
Start: 2021-12-02 | End: 2021-12-02 | Stop reason: HOSPADM

## 2021-12-02 RX ORDER — LOSARTAN POTASSIUM 25 MG/1
25 TABLET ORAL DAILY
Status: DISCONTINUED | OUTPATIENT
Start: 2021-12-02 | End: 2021-12-02 | Stop reason: HOSPADM

## 2021-12-02 RX ORDER — PROPRANOLOL HCL 60 MG
60 CAPSULE, EXTENDED RELEASE 24HR ORAL DAILY
Status: DISCONTINUED | OUTPATIENT
Start: 2021-12-02 | End: 2021-12-02 | Stop reason: HOSPADM

## 2021-12-02 RX ORDER — HEPARIN SODIUM 5000 [USP'U]/ML
5000 INJECTION, SOLUTION INTRAVENOUS; SUBCUTANEOUS EVERY 8 HOURS SCHEDULED
Status: DISCONTINUED | OUTPATIENT
Start: 2021-12-02 | End: 2021-12-02 | Stop reason: HOSPADM

## 2021-12-02 RX ORDER — ESCITALOPRAM OXALATE 10 MG/1
20 TABLET ORAL DAILY
Status: DISCONTINUED | OUTPATIENT
Start: 2021-12-02 | End: 2021-12-02 | Stop reason: HOSPADM

## 2021-12-02 RX ORDER — TORSEMIDE 20 MG/1
10 TABLET ORAL DAILY
Status: DISCONTINUED | OUTPATIENT
Start: 2021-12-02 | End: 2021-12-02 | Stop reason: HOSPADM

## 2021-12-02 RX ORDER — ONDANSETRON 2 MG/ML
4 INJECTION INTRAMUSCULAR; INTRAVENOUS EVERY 6 HOURS PRN
Status: DISCONTINUED | OUTPATIENT
Start: 2021-12-02 | End: 2021-12-02 | Stop reason: HOSPADM

## 2021-12-02 RX ORDER — ASPIRIN 325 MG
325 TABLET ORAL DAILY
Status: DISCONTINUED | OUTPATIENT
Start: 2021-12-02 | End: 2021-12-02 | Stop reason: HOSPADM

## 2021-12-02 RX ADMIN — TORSEMIDE 10 MG: 20 TABLET ORAL at 09:26

## 2021-12-02 RX ADMIN — LOSARTAN POTASSIUM 25 MG: 25 TABLET, FILM COATED ORAL at 09:25

## 2021-12-02 RX ADMIN — Medication 800 UNITS: at 09:26

## 2021-12-02 RX ADMIN — ESCITALOPRAM OXALATE 20 MG: 10 TABLET ORAL at 09:24

## 2021-12-02 RX ADMIN — INSULIN LISPRO 2 UNITS: 100 INJECTION, SOLUTION INTRAVENOUS; SUBCUTANEOUS at 02:37

## 2021-12-02 RX ADMIN — ALLOPURINOL 100 MG: 100 TABLET ORAL at 09:22

## 2021-12-02 RX ADMIN — LEVOTHYROXINE SODIUM 88 MCG: 88 TABLET ORAL at 05:58

## 2021-12-02 RX ADMIN — ASPIRIN 325 MG ORAL TABLET 325 MG: 325 PILL ORAL at 09:23

## 2021-12-02 RX ADMIN — PROPRANOLOL HYDROCHLORIDE 60 MG: 60 CAPSULE, EXTENDED RELEASE ORAL at 09:25

## 2021-12-02 RX ADMIN — ATORVASTATIN CALCIUM 80 MG: 40 TABLET, FILM COATED ORAL at 09:23

## 2021-12-02 RX ADMIN — HEPARIN SODIUM 5000 UNITS: 5000 INJECTION INTRAVENOUS; SUBCUTANEOUS at 05:58

## 2021-12-02 ASSESSMENT — LID EXAM ASSESSMENTS
OD_MEIBOMITIS: 2+
OS_BLEPHARITIS: 3+
OD_BLEPHARITIS: 1+
OS_MEIBOMITIS: 2+

## 2021-12-02 ASSESSMENT — KERATOMETRY
OD_K1POWER_DIOPTERS: 46.50
OD_K2POWER_DIOPTERS: 63.25
OS_AXISANGLE_DEGREES: 802
OS_K2POWER_DIOPTERS: 044.50
OD_AXISANGLE_DEGREES: 138
OS_K1POWER_DIOPTERS: 44.00

## 2021-12-02 ASSESSMENT — SPHEQUIV_DERIVED
OS_SPHEQUIV: -0.25
OS_SPHEQUIV: -0.125

## 2021-12-02 ASSESSMENT — REFRACTION_CURRENTRX
OS_VPRISM_DIRECTION: PROGS
OS_AXIS: 110
OS_OVR_VA: 20/
OS_CYLINDER: -1.75
OS_SPHERE: +1.50
OS_ADD: +2.50
OD_CYLINDER: -0.50
OD_ADD: +2.50
OD_OVR_VA: 20/
OD_VPRISM_DIRECTION: PROGS
OD_AXIS: 052
OD_SPHERE: +0.50

## 2021-12-02 ASSESSMENT — REFRACTION_MANIFEST
OS_VA1: 20/20
OD_SPHERE: PLANO
OD_VA2: 20/NI
OD_ADD: +2.50
OD_CYLINDER: SPH
OS_AXIS: 090
OS_SPHERE: -1.50
OS_CYLINDER: -1.00
OS_SPHERE: +0.25
OS_CYLINDER: SPH
OD_VA1: 20/NI
OS_ADD: +2.50
OU_VA: 20/20
OS_VA2: 20/20

## 2021-12-02 ASSESSMENT — REFRACTION_AUTOREFRACTION
OS_CYLINDER: -0.75
OS_AXIS: 091
OS_SPHERE: +0.25
OD_SPHERE: ERROR

## 2021-12-02 ASSESSMENT — VISUAL ACUITY
OD_BCVA: 20/30-1
OS_BCVA: 20/CFX5

## 2021-12-02 ASSESSMENT — AXIALLENGTH_DERIVED
OS_AL: 23.3673
OS_AL: 23.4152

## 2021-12-03 ENCOUNTER — HOSPITAL ENCOUNTER (OUTPATIENT)
Dept: NUCLEAR MEDICINE | Facility: HOSPITAL | Age: 76
Discharge: HOME | End: 2021-12-03
Payer: COMMERCIAL

## 2021-12-03 ENCOUNTER — TRANSITIONAL CARE MANAGEMENT (OUTPATIENT)
Dept: FAMILY MEDICINE CLINIC | Facility: CLINIC | Age: 76
End: 2021-12-03

## 2021-12-03 DIAGNOSIS — R07.89 OTHER CHEST PAIN: ICD-10-CM

## 2021-12-03 LAB
ATRIAL RATE: 78 BPM
NUC STRESS EJECTION FRACTION: 61 %
P AXIS: 56 DEGREES
PR INTERVAL: 192 MS
QRS AXIS: -47 DEGREES
QRSD INTERVAL: 84 MS
QT INTERVAL: 394 MS
QTC INTERVAL: 449 MS
RATE PRESSURE PRODUCT: 9804
SL CV REST NUCLEAR ISOTOPE DOSE: 11 MCI
SL CV STRESS NUCLEAR ISOTOPE DOSE: 32.9 MCI
SL CV STRESS RECOVERY BP: NORMAL MMHG
SL CV STRESS RECOVERY HR: 75 BPM
SL CV STRESS RECOVERY O2 SAT: 98 %
STRESS BASELINE BP: NORMAL MMHG
STRESS BASELINE HR: 64 BPM
STRESS O2 SAT REST: 94 %
STRESS PEAK HR: 86 BPM
STRESS POST O2 SAT PEAK: 98 %
STRESS POST PEAK BP: 114 MMHG
STRESS/REST PERFUSION RATIO: 1.1
T WAVE AXIS: 41 DEGREES
VENTRICULAR RATE: 78 BPM

## 2021-12-03 PROCEDURE — 93016 CV STRESS TEST SUPVJ ONLY: CPT | Performed by: INTERNAL MEDICINE

## 2021-12-03 PROCEDURE — G1004 CDSM NDSC: HCPCS

## 2021-12-03 PROCEDURE — 78452 HT MUSCLE IMAGE SPECT MULT: CPT | Performed by: INTERNAL MEDICINE

## 2021-12-03 PROCEDURE — A9502 TC99M TETROFOSMIN: HCPCS

## 2021-12-03 PROCEDURE — 78452 HT MUSCLE IMAGE SPECT MULT: CPT

## 2021-12-03 PROCEDURE — 93017 CV STRESS TEST TRACING ONLY: CPT

## 2021-12-03 PROCEDURE — 93010 ELECTROCARDIOGRAM REPORT: CPT | Performed by: INTERNAL MEDICINE

## 2021-12-03 PROCEDURE — 93018 CV STRESS TEST I&R ONLY: CPT | Performed by: INTERNAL MEDICINE

## 2021-12-03 RX ORDER — ZONISAMIDE 25 MG/1
CAPSULE ORAL
Qty: 30 CAPSULE | Refills: 3 | Status: SHIPPED | OUTPATIENT
Start: 2021-12-03 | End: 2022-02-04 | Stop reason: SDUPTHER

## 2021-12-08 ENCOUNTER — IMMUNIZATIONS (OUTPATIENT)
Dept: FAMILY MEDICINE CLINIC | Facility: HOSPITAL | Age: 76
End: 2021-12-08

## 2021-12-08 DIAGNOSIS — Z23 ENCOUNTER FOR IMMUNIZATION: Primary | ICD-10-CM

## 2021-12-08 PROCEDURE — 91306 COVID-19 MODERNA VACC 0.25 ML BOOSTER: CPT

## 2021-12-08 PROCEDURE — 0064A COVID-19 MODERNA VACC 0.25 ML BOOSTER: CPT

## 2021-12-09 ENCOUNTER — OFFICE VISIT (OUTPATIENT)
Dept: FAMILY MEDICINE CLINIC | Facility: CLINIC | Age: 76
End: 2021-12-09
Payer: COMMERCIAL

## 2021-12-09 VITALS
HEIGHT: 69 IN | WEIGHT: 227 LBS | DIASTOLIC BLOOD PRESSURE: 68 MMHG | SYSTOLIC BLOOD PRESSURE: 128 MMHG | TEMPERATURE: 97.1 F | BODY MASS INDEX: 33.62 KG/M2

## 2021-12-09 DIAGNOSIS — I25.118 CORONARY ARTERY DISEASE OF NATIVE ARTERY OF NATIVE HEART WITH STABLE ANGINA PECTORIS (HCC): ICD-10-CM

## 2021-12-09 DIAGNOSIS — I21.4 NSTEMI (NON-ST ELEVATED MYOCARDIAL INFARCTION) (HCC): ICD-10-CM

## 2021-12-09 DIAGNOSIS — I12.9 BENIGN HYPERTENSION WITH CKD (CHRONIC KIDNEY DISEASE) STAGE III (HCC): ICD-10-CM

## 2021-12-09 DIAGNOSIS — Z76.89 ENCOUNTER FOR SUPPORT AND COORDINATION OF TRANSITION OF CARE: Primary | ICD-10-CM

## 2021-12-09 DIAGNOSIS — E11.65 CONTROLLED TYPE 2 DIABETES MELLITUS WITH HYPERGLYCEMIA, WITHOUT LONG-TERM CURRENT USE OF INSULIN (HCC): ICD-10-CM

## 2021-12-09 DIAGNOSIS — D49.2 NEOPLASM OF SKIN: ICD-10-CM

## 2021-12-09 DIAGNOSIS — E11.21 DIABETIC NEPHROPATHY ASSOCIATED WITH TYPE 2 DIABETES MELLITUS (HCC): ICD-10-CM

## 2021-12-09 DIAGNOSIS — G25.0 TREMOR, ESSENTIAL: ICD-10-CM

## 2021-12-09 DIAGNOSIS — E66.01 MORBID OBESITY (HCC): ICD-10-CM

## 2021-12-09 DIAGNOSIS — N18.30 BENIGN HYPERTENSION WITH CKD (CHRONIC KIDNEY DISEASE) STAGE III (HCC): ICD-10-CM

## 2021-12-09 PROCEDURE — 99496 TRANSJ CARE MGMT HIGH F2F 7D: CPT | Performed by: FAMILY MEDICINE

## 2021-12-22 ENCOUNTER — CONSULT (OUTPATIENT)
Dept: SURGERY | Facility: CLINIC | Age: 76
End: 2021-12-22
Payer: COMMERCIAL

## 2021-12-22 VITALS
HEART RATE: 80 BPM | SYSTOLIC BLOOD PRESSURE: 138 MMHG | WEIGHT: 229 LBS | DIASTOLIC BLOOD PRESSURE: 88 MMHG | HEIGHT: 69 IN | BODY MASS INDEX: 33.92 KG/M2 | TEMPERATURE: 98 F

## 2021-12-22 DIAGNOSIS — L98.9 SKIN LESION: Primary | ICD-10-CM

## 2021-12-22 DIAGNOSIS — D49.2 NEOPLASM OF SKIN: ICD-10-CM

## 2021-12-22 PROCEDURE — 99244 OFF/OP CNSLTJ NEW/EST MOD 40: CPT | Performed by: SURGERY

## 2021-12-29 DIAGNOSIS — Z79.4 TYPE 2 DIABETES MELLITUS WITH DIABETIC POLYNEUROPATHY, WITH LONG-TERM CURRENT USE OF INSULIN (HCC): ICD-10-CM

## 2021-12-29 DIAGNOSIS — E11.40 CONTROLLED TYPE 2 DIABETES MELLITUS WITH DIABETIC NEUROPATHY, WITH LONG-TERM CURRENT USE OF INSULIN (HCC): ICD-10-CM

## 2021-12-29 DIAGNOSIS — I25.10 CORONARY ARTERY DISEASE INVOLVING NATIVE CORONARY ARTERY OF NATIVE HEART WITHOUT ANGINA PECTORIS: ICD-10-CM

## 2021-12-29 DIAGNOSIS — E11.42 TYPE 2 DIABETES MELLITUS WITH DIABETIC POLYNEUROPATHY, WITH LONG-TERM CURRENT USE OF INSULIN (HCC): ICD-10-CM

## 2021-12-29 DIAGNOSIS — Z79.4 CONTROLLED TYPE 2 DIABETES MELLITUS WITH DIABETIC NEUROPATHY, WITH LONG-TERM CURRENT USE OF INSULIN (HCC): ICD-10-CM

## 2021-12-29 DIAGNOSIS — F33.9 RECURRENT MAJOR DEPRESSIVE DISORDER, REMISSION STATUS UNSPECIFIED (HCC): ICD-10-CM

## 2021-12-29 RX ORDER — NITROGLYCERIN 0.4 MG/1
0.4 TABLET SUBLINGUAL
Qty: 25 TABLET | Refills: 4 | Status: SHIPPED | OUTPATIENT
Start: 2021-12-29 | End: 2022-02-04 | Stop reason: SDUPTHER

## 2021-12-29 RX ORDER — EMPAGLIFLOZIN 10 MG/1
10 TABLET, FILM COATED ORAL EVERY MORNING
Qty: 90 TABLET | Refills: 1 | Status: SHIPPED | OUTPATIENT
Start: 2021-12-29 | End: 2022-01-21 | Stop reason: SDUPTHER

## 2021-12-29 RX ORDER — GLIMEPIRIDE 4 MG/1
TABLET ORAL
Qty: 180 TABLET | Refills: 1 | Status: SHIPPED | OUTPATIENT
Start: 2021-12-29 | End: 2022-03-09 | Stop reason: SDUPTHER

## 2021-12-29 RX ORDER — ESCITALOPRAM OXALATE 20 MG/1
20 TABLET ORAL DAILY
Qty: 90 TABLET | Refills: 1 | Status: SHIPPED | OUTPATIENT
Start: 2021-12-29 | End: 2022-02-04 | Stop reason: SDUPTHER

## 2022-01-05 ENCOUNTER — OFFICE VISIT (OUTPATIENT)
Dept: SURGERY | Facility: CLINIC | Age: 77
End: 2022-01-05
Payer: COMMERCIAL

## 2022-01-05 VITALS
HEIGHT: 69 IN | SYSTOLIC BLOOD PRESSURE: 122 MMHG | WEIGHT: 224 LBS | DIASTOLIC BLOOD PRESSURE: 80 MMHG | HEART RATE: 88 BPM | TEMPERATURE: 98.9 F | BODY MASS INDEX: 33.18 KG/M2

## 2022-01-05 DIAGNOSIS — L98.9 SKIN LESION: Primary | ICD-10-CM

## 2022-01-05 PROCEDURE — 99212 OFFICE O/P EST SF 10 MIN: CPT | Performed by: SURGERY

## 2022-01-05 NOTE — PROGRESS NOTES
Assessment/Plan:    Skin lesion  Persistent skin lesion with no change since last visit  4mm punch biopsy taken  Will call with results and discuss treatment plan depending on pathology  Diagnoses and all orders for this visit:    Skin lesion          Subjective:      Patient ID: Samuel Hernandez is a 77 y o  male  76M with persistent non healing wound/ulcer of rigtht shoulder presents for follow up  Overall no change  No significant drainage  No significant pain  He was seen a few weeks prior and noted to have 1 cm lesion of the right shoulder  No fevers or chills  No other associated symptoms  The following portions of the patient's history were reviewed and updated as appropriate:   He  has a past medical history of BPH (benign prostatic hyperplasia), CAD (coronary artery disease), Diabetes mellitus ([facility] [address] ), Gout, High cholesterol, Hypertension, and MI, old    He   Patient Active Problem List    Diagnosis Date Noted    Skin lesion 12/22/2021    Other chest pain 12/01/2021    Leukocytosis 12/01/2021    Bilateral lower extremity edema 10/22/2021    Mild cognitive impairment 09/03/2021    Benign hypertension with CKD (chronic kidney disease) stage III ([facility] [address] ) 02/10/2021    Diabetic nephropathy associated with type 2 diabetes mellitus ([facility] [address] ) 02/10/2021    Concentric left ventricular hypertrophy 02/10/2021    Aortic stenosis, moderate 02/10/2021    Edema 02/10/2021    Fall (on) (from) other stairs and steps, initial encounter 12/28/2020    Recurrent major depressive disorder ([facility] [address] ) 12/28/2020    Primary osteoarthritis of right knee 05/21/2020    Primary osteoarthritis of left knee 05/21/2020    Coronary artery disease of native artery of native heart with stable angina pectoris ([facility] [address] ) 03/18/2020    Chronic pain of both knees 02/13/2020    Primary osteoarthritis of both knees 02/13/2020    Primary osteoarthritis involving multiple joints 12/16/2019    Vitamin E deficiency 09/19/2019    Infected epidermoid cyst 12/04/2018    Tremor, essential 09/26/2018    Diabetic neuropathy associated with diabetes mellitus due to underlying condition ([place] [address] ) 09/26/2018    Gait instability 09/26/2018    Frequent falls 09/26/2018    Degenerative disc disease, lumbar 07/10/2018    Tremor 10/10/2016    Depression with anxiety 08/29/2016    Arthritis 07/13/2015    NSTEMI (non-ST elevated myocardial infarction) ([place] [address] ) 10/06/2014    Gout 05/17/2013    Controlled type 2 diabetes mellitus with hyperglycemia, without long-term current use of insulin ([place] [address] ) 06/18/2012    Hyperlipidemia 06/18/2012    Hypertension 06/18/2012    Morbid obesity ([place] [address] ) 06/18/2012     He  has a past surgical history that includes Tonsillectomy and adenoidectomy; Cataract extraction w/  intraocular lens implant; Eye surgery; Coronary angioplasty with stent; and pr colonoscopy flx dx w/collj spec when pfrmd (N/A, 3/21/2016)  His family history includes No Known Problems in his father and mother  He was adopted  He  reports that he has never smoked  He has never used smokeless tobacco  He reports previous alcohol use  He reports that he does not use drugs  Current Outpatient Medications   Medication Sig Dispense Refill    allopurinol (ZYLOPRIM) 100 mg tablet Take 1 tablet (100 mg total) by mouth daily 90 tablet 1    aspirin 325 mg tablet Take 325 mg by mouth daily        atorvastatin (LIPITOR) 80 mg tablet Take 1 tablet (80 mg total) by mouth daily 90 tablet 1    carboxymethylcellulose 1 % ophthalmic solution Apply 1 drop to eye (Patient not taking: Reported on 7/14/2021)      Empagliflozin (Jardiance) 10 MG TABS Take 1 tablet (10 mg total) by mouth every morning 90 tablet 1    escitalopram (LEXAPRO) 20 mg tablet Take 1 tablet (20 mg total) by mouth daily 90 tablet 1    glimepiride (AMARYL) 4 mg tablet TAKE 1 TABLET TWICE A  tablet 1    levothyroxine 88 mcg tablet Take 1 tablet (88 mcg total) by mouth daily in the early morning 90 tablet 1    losartan (COZAAR) 25 mg tablet Take 1 tablet (25 mg total) by mouth daily 90 tablet 1    metFORMIN (GLUCOPHAGE) 1000 MG tablet Take 1 tablet (1,000 mg total) by mouth 2 (two) times a day with meals 180 tablet 2    multivitamin (THERAGRAN) TABS Take 1 tablet by mouth daily      nitroglycerin (NITROSTAT) 0 4 mg SL tablet Place 1 tablet (0 4 mg total) under the tongue every 5 (five) minutes as needed for chest pain 25 tablet 4    propranolol (INDERAL LA) 60 mg 24 hr capsule TAKE 1 CAPSULE BY MOUTH DAILY  90 capsule 0    torsemide (DEMADEX) 10 mg tablet Take 1 tablet (10 mg total) by mouth daily 90 tablet 2    vitamin E, tocopherol, 1,000 units capsule Take 1,000 Units by mouth daily      zonisamide (ZONEGRAN) 25 mg capsule Take 1 tablet (25 mg total) by mouth daily 30 capsule 3     No current facility-administered medications for this visit  He has No Known Allergies       Review of Systems   Constitutional: Negative for activity change, appetite change, chills, diaphoresis and fatigue  Skin: Positive for wound (Right shoulder with noted ulceration)  Negative for color change, pallor and rash  Objective:      /80   Pulse 88   Temp 98 9 °F (37 2 °C)   Ht 5' 9" (1 753 m)   Wt 102 kg (224 lb)   BMI 33 08 kg/m²            Physical Exam  Vitals reviewed  Constitutional:       General: He is not in acute distress  Appearance: He is not ill-appearing, toxic-appearing or diaphoretic  HENT:      Head: Normocephalic and atraumatic  Right Ear: External ear normal       Left Ear: External ear normal    Eyes:      General: No scleral icterus  Right eye: No discharge  Left eye: No discharge  Cardiovascular:      Rate and Rhythm: Normal rate  Pulmonary:      Effort: Pulmonary effort is normal  No respiratory distress  Musculoskeletal:         General: No deformity or signs of injury  Normal range of motion        Cervical back: Normal range of motion and neck supple  Right lower leg: No edema  Left lower leg: No edema  Skin:     General: Skin is warm  Coloration: Skin is not jaundiced or pale  Findings: No bruising or erythema  Comments: 170 ulceration of the right posterior shoulder with no change  Punch biopsy will be undertaken   Neurological:      General: No focal deficit present  Mental Status: He is alert and oriented to person, place, and time  Cranial Nerves: No cranial nerve deficit  Psychiatric:         Mood and Affect: Mood normal          Behavior: Behavior normal          Thought Content: Thought content normal          Judgment: Judgment normal            Biopsy    Date/Time: 1/5/2022 9:53 AM  Performed by: Elder Bowden DO  Authorized by: Elder Bowden DO   Universal Protocol:  Consent: Verbal consent obtained  Written consent obtained  Risks and benefits: risks, benefits and alternatives were discussed  Consent given by: patient  Time out: Immediately prior to procedure a "time out" was called to verify the correct patient, procedure, equipment, support staff and site/side marked as required  Patient understanding: patient states understanding of the procedure being performed  Patient consent: the patient's understanding of the procedure matches consent given  Patient identity confirmed: verbally with patient      Procedure Details - Skin Biopsy:     Biopsy tissue type: skin    Biopsy method: punch biopsy      Body area:  Trunk    Trunk location: Right posterior shoulder      Initial size (mm):  4    Final defect size (mm):  4    Malignancy: malignant lesion

## 2022-01-05 NOTE — ASSESSMENT & PLAN NOTE
Persistent skin lesion with no change since last visit  4mm punch biopsy taken  Will call with results and discuss treatment plan depending on pathology

## 2022-01-06 ENCOUNTER — APPOINTMENT (OUTPATIENT)
Dept: LAB | Facility: HOSPITAL | Age: 77
End: 2022-01-06
Attending: SURGERY
Payer: COMMERCIAL

## 2022-01-06 DIAGNOSIS — L98.9 SKIN LESION: ICD-10-CM

## 2022-01-06 PROCEDURE — 88305 TISSUE EXAM BY PATHOLOGIST: CPT | Performed by: PATHOLOGY

## 2022-01-10 ENCOUNTER — OFFICE VISIT (OUTPATIENT)
Dept: FAMILY MEDICINE CLINIC | Facility: CLINIC | Age: 77
End: 2022-01-10
Payer: COMMERCIAL

## 2022-01-10 VITALS
WEIGHT: 230 LBS | TEMPERATURE: 96.9 F | HEIGHT: 69 IN | SYSTOLIC BLOOD PRESSURE: 110 MMHG | DIASTOLIC BLOOD PRESSURE: 60 MMHG | BODY MASS INDEX: 34.07 KG/M2

## 2022-01-10 DIAGNOSIS — E11.22 TYPE 2 DIABETES MELLITUS WITH STAGE 3A CHRONIC KIDNEY DISEASE, UNSPECIFIED WHETHER LONG TERM INSULIN USE (HCC): Primary | ICD-10-CM

## 2022-01-10 DIAGNOSIS — N18.31 TYPE 2 DIABETES MELLITUS WITH STAGE 3A CHRONIC KIDNEY DISEASE, UNSPECIFIED WHETHER LONG TERM INSULIN USE (HCC): Primary | ICD-10-CM

## 2022-01-10 DIAGNOSIS — E66.01 MORBID OBESITY (HCC): ICD-10-CM

## 2022-01-10 DIAGNOSIS — I50.32 CHRONIC DIASTOLIC (CONGESTIVE) HEART FAILURE (HCC): ICD-10-CM

## 2022-01-10 DIAGNOSIS — F33.9 RECURRENT MAJOR DEPRESSIVE DISORDER, REMISSION STATUS UNSPECIFIED (HCC): ICD-10-CM

## 2022-01-10 DIAGNOSIS — I25.118 CORONARY ARTERY DISEASE OF NATIVE ARTERY OF NATIVE HEART WITH STABLE ANGINA PECTORIS (HCC): ICD-10-CM

## 2022-01-10 DIAGNOSIS — S06.2X0A LACERATION AND CONTUSION OF CEREBRAL CORTEX, RIGHT, WITHOUT LOSS OF CONSCIOUSNESS, INITIAL ENCOUNTER (HCC): ICD-10-CM

## 2022-01-10 LAB — SL AMB POCT HEMOGLOBIN AIC: 6.6 (ref ?–6.5)

## 2022-01-10 PROCEDURE — 3066F NEPHROPATHY DOC TX: CPT | Performed by: SURGERY

## 2022-01-10 PROCEDURE — 83036 HEMOGLOBIN GLYCOSYLATED A1C: CPT | Performed by: FAMILY MEDICINE

## 2022-01-10 PROCEDURE — 3044F HG A1C LEVEL LT 7.0%: CPT | Performed by: SURGERY

## 2022-01-10 PROCEDURE — 1003F LEVEL OF ACTIVITY ASSESS: CPT | Performed by: FAMILY MEDICINE

## 2022-01-10 PROCEDURE — 99214 OFFICE O/P EST MOD 30 MIN: CPT | Performed by: FAMILY MEDICINE

## 2022-01-10 NOTE — PROGRESS NOTES
BMI Counseling: Body mass index is 33 97 kg/m²  The BMI is above normal  Nutrition recommendations include decreasing portion sizes, encouraging healthy choices of fruits and vegetables, moderation in carbohydrate intake and increasing intake of lean protein  Exercise recommendations include exercising 3-5 times per week  Rationale for BMI follow-up plan is due to patient being overweight or obese  Assessment/Plan:  Patient will have a hemoglobin A1c fingerstick done today he sees Dr Michelle munroe tomorrow and he is up-to-date on his eye exams    Problem List Items Addressed This Visit     None           There are no diagnoses linked to this encounter  No problem-specific Assessment & Plan notes found for this encounter  Subjective:      Patient ID: Lynn Mcdonald is a 77 y o  male  Kim Thacker is here today for a follow-up visit accompanied by his wife he is doing fairly well he he recently had 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 done by Dr Jesse Ramos car on sugars are slightly high needs a hemoglobin A1c on otherwise his lab work is up to date he goes to Dr Dania munroe for diabetic foot care      The following portions of the patient's history were reviewed and updated as appropriate:   He has a past medical history of BPH (benign prostatic hyperplasia), CAD (coronary artery disease), Diabetes mellitus ([facility] [address] ), Gout, High cholesterol, Hypertension, and MI, old ,  does not have any pertinent problems on file  ,   has a past surgical history that includes Tonsillectomy and adenoidectomy; Cataract extraction w/  intraocular lens implant; Eye surgery; Coronary angioplasty with stent; and pr colonoscopy flx dx w/collj spec when pfrmd (N/A, 3/21/2016)  ,  family history includes No Known Problems in his father and mother  He was adopted  ,   reports that he has never smoked  He has never used smokeless tobacco  He reports previous alcohol use  He reports that he does not use drugs  ,  has No Known Allergies     Current Outpatient Medications   Medication Sig Dispense Refill    allopurinol (ZYLOPRIM) 100 mg tablet Take 1 tablet (100 mg total) by mouth daily 90 tablet 1    aspirin 325 mg tablet Take 325 mg by mouth daily   atorvastatin (LIPITOR) 80 mg tablet Take 1 tablet (80 mg total) by mouth daily 90 tablet 1    Empagliflozin (Jardiance) 10 MG TABS Take 1 tablet (10 mg total) by mouth every morning 90 tablet 1    escitalopram (LEXAPRO) 20 mg tablet Take 1 tablet (20 mg total) by mouth daily 90 tablet 1    glimepiride (AMARYL) 4 mg tablet TAKE 1 TABLET TWICE A  tablet 1    levothyroxine 88 mcg tablet Take 1 tablet (88 mcg total) by mouth daily in the early morning 90 tablet 1    losartan (COZAAR) 25 mg tablet Take 1 tablet (25 mg total) by mouth daily 90 tablet 1    metFORMIN (GLUCOPHAGE) 1000 MG tablet Take 1 tablet (1,000 mg total) by mouth 2 (two) times a day with meals 180 tablet 2    multivitamin (THERAGRAN) TABS Take 1 tablet by mouth daily      nitroglycerin (NITROSTAT) 0 4 mg SL tablet Place 1 tablet (0 4 mg total) under the tongue every 5 (five) minutes as needed for chest pain 25 tablet 4    propranolol (INDERAL LA) 60 mg 24 hr capsule TAKE 1 CAPSULE BY MOUTH DAILY  90 capsule 0    torsemide (DEMADEX) 10 mg tablet Take 1 tablet (10 mg total) by mouth daily 90 tablet 2    vitamin E, tocopherol, 1,000 units capsule Take 1,000 Units by mouth daily      zonisamide (ZONEGRAN) 25 mg capsule Take 1 tablet (25 mg total) by mouth daily 30 capsule 3     No current facility-administered medications for this visit  Review of Systems   Constitutional: Negative for activity change, appetite change, diaphoresis, fatigue and fever  HENT: Negative  Eyes: Negative  Respiratory: Negative for apnea, cough, chest tightness, shortness of breath and wheezing  Cardiovascular: Negative for chest pain, palpitations and leg swelling     Gastrointestinal: Negative for abdominal distention, abdominal pain, anal bleeding, constipation, diarrhea, nausea and vomiting  Endocrine: Negative for cold intolerance, heat intolerance, polydipsia, polyphagia and polyuria  Genitourinary: Negative for difficulty urinating, dysuria, flank pain, hematuria and urgency  Musculoskeletal: Negative for arthralgias, back pain, gait problem, joint swelling and myalgias  Skin: Negative for color change, rash and wound  Allergic/Immunologic: Negative for environmental allergies, food allergies and immunocompromised state  Neurological: Negative for dizziness, seizures, syncope, speech difficulty, numbness and headaches  Hematological: Negative for adenopathy  Does not bruise/bleed easily  Psychiatric/Behavioral: Negative for agitation, behavioral problems, hallucinations, sleep disturbance and suicidal ideas  Objective:  Vitals:    01/10/22 0803   BP: 110/60   BP Location: Left arm   Patient Position: Sitting   Cuff Size: Standard   Temp: (!) 96 9 °F (36 1 °C)   TempSrc: Temporal   Weight: 104 kg (230 lb)   Height: 5' 9" (1 753 m)     Body mass index is 33 97 kg/m²  Physical Exam  Constitutional:       General: He is not in acute distress  Appearance: He is well-developed  He is not diaphoretic  HENT:      Head: Normocephalic  Right Ear: External ear normal       Left Ear: External ear normal       Nose: Nose normal    Eyes:      General: No scleral icterus  Right eye: No discharge  Left eye: No discharge  Conjunctiva/sclera: Conjunctivae normal       Pupils: Pupils are equal, round, and reactive to light  Neck:      Thyroid: No thyromegaly  Trachea: No tracheal deviation  Cardiovascular:      Rate and Rhythm: Normal rate and regular rhythm  Heart sounds: Normal heart sounds  No murmur heard  No friction rub  No gallop  Pulmonary:      Effort: Pulmonary effort is normal  No respiratory distress  Breath sounds: Normal breath sounds   No wheezing  Abdominal:      General: Bowel sounds are normal       Palpations: Abdomen is soft  There is no mass  Tenderness: There is no abdominal tenderness  There is no guarding  Musculoskeletal:         General: No deformity  Cervical back: Normal range of motion  Lymphadenopathy:      Cervical: No cervical adenopathy  Skin:     General: Skin is warm and dry  Findings: No erythema or rash  Neurological:      Mental Status: He is alert and oriented to person, place, and time  Cranial Nerves: No cranial nerve deficit  Psychiatric:         Thought Content:  Thought content normal

## 2022-01-18 ENCOUNTER — OFFICE VISIT (OUTPATIENT)
Dept: SURGERY | Facility: CLINIC | Age: 77
End: 2022-01-18
Payer: COMMERCIAL

## 2022-01-18 VITALS
WEIGHT: 230 LBS | HEIGHT: 69 IN | HEART RATE: 87 BPM | SYSTOLIC BLOOD PRESSURE: 125 MMHG | BODY MASS INDEX: 34.07 KG/M2 | DIASTOLIC BLOOD PRESSURE: 73 MMHG | TEMPERATURE: 98.8 F

## 2022-01-18 DIAGNOSIS — C44.612 BASAL CELL CARCINOMA OF SHOULDER, RIGHT: Primary | ICD-10-CM

## 2022-01-18 PROCEDURE — 1036F TOBACCO NON-USER: CPT | Performed by: SURGERY

## 2022-01-18 PROCEDURE — 99214 OFFICE O/P EST MOD 30 MIN: CPT | Performed by: SURGERY

## 2022-01-18 PROCEDURE — 1160F RVW MEDS BY RX/DR IN RCRD: CPT | Performed by: SURGERY

## 2022-01-18 RX ORDER — CHLORHEXIDINE GLUCONATE 4 G/100ML
SOLUTION TOPICAL DAILY PRN
Status: CANCELLED | OUTPATIENT
Start: 2022-01-18

## 2022-01-18 RX ORDER — CEFAZOLIN SODIUM 2 G/50ML
2000 SOLUTION INTRAVENOUS ONCE
Status: CANCELLED | OUTPATIENT
Start: 2022-01-21 | End: 2022-01-18

## 2022-01-18 RX ORDER — SODIUM CHLORIDE, SODIUM LACTATE, POTASSIUM CHLORIDE, CALCIUM CHLORIDE 600; 310; 30; 20 MG/100ML; MG/100ML; MG/100ML; MG/100ML
75 INJECTION, SOLUTION INTRAVENOUS CONTINUOUS
Status: CANCELLED | OUTPATIENT
Start: 2022-01-21

## 2022-01-18 RX ORDER — HEPARIN SODIUM 5000 [USP'U]/ML
5000 INJECTION, SOLUTION INTRAVENOUS; SUBCUTANEOUS ONCE
Status: CANCELLED | OUTPATIENT
Start: 2022-01-21 | End: 2022-01-18

## 2022-01-18 NOTE — PROGRESS NOTES
Assessment/Plan:    Basal cell carcinoma of shoulder, right  Biopsy-proven basal cell carcinoma of the right posterior shoulder  Patient will need a definitive excisional biopsy with margins  The procedure self including all associated risks and benefits discussed the patient great length  Patient verbalized understands risks is willing proceed, consent was signed  Patient does have a cardiac history however he has recently undergone a nuclear stress test which was unremarkable in addition to routine labs and therefore not require any additional workup at this time  Diagnoses and all orders for this visit:    Basal cell carcinoma of shoulder, right          Subjective:      Patient ID: Ashkan Bird is a 77 y o  male  81-year-old gentleman with known CAD, diabetes, hyperlipidemia, hypertension, presents today for follow-up after punch biopsy of a right posterior shoulder nonhealing wound  Biopsy showing basal cell carcinoma  Patient presents today to discuss surgery  He reports no overall changes to the lesion  No bleeding  Denies any worsening shortness of breath or chest pain  No abdominal pain  Tolerating diet  No other symptoms at this time  He did undergo stress test in December of last year which did not show any concerning heart related issues  Patient states he wishes have this removed  The following portions of the patient's history were reviewed and updated as appropriate:   He  has a past medical history of BPH (benign prostatic hyperplasia), CAD (coronary artery disease), Diabetes mellitus ([address] ), Gout, High cholesterol, Hypertension, and MI, old    He   Patient Active Problem List    Diagnosis Date Noted    Basal cell carcinoma of shoulder, right 01/18/2022    Chronic diastolic (congestive) heart failure ([facility] ) 01/10/2022    Laceration and contusion of cerebral cortex, right, without loss of consciousness, initial encounter ([facility] ) 01/10/2022    Skin lesion 12/22/2021    Other chest pain 12/01/2021    Leukocytosis 12/01/2021    Bilateral lower extremity edema 10/22/2021    Mild cognitive impairment 09/03/2021    Benign hypertension with CKD (chronic kidney disease) stage III ([place] [address] ) 02/10/2021    Diabetic nephropathy associated with type 2 diabetes mellitus ([place] [address] ) 02/10/2021    Concentric left ventricular hypertrophy 02/10/2021    Aortic stenosis, moderate 02/10/2021    Edema 02/10/2021    Fall (on) (from) other stairs and steps, initial encounter 12/28/2020    Recurrent major depressive disorder ([place] [address] ) 12/28/2020    Primary osteoarthritis of right knee 05/21/2020    Primary osteoarthritis of left knee 05/21/2020    Coronary artery disease of native artery of native heart with stable angina pectoris ([place] [address] ) 03/18/2020    Chronic pain of both knees 02/13/2020    Primary osteoarthritis of both knees 02/13/2020    Primary osteoarthritis involving multiple joints 12/16/2019    Vitamin E deficiency 09/19/2019    Infected epidermoid cyst 12/04/2018    Tremor, essential 09/26/2018    Diabetic neuropathy associated with diabetes mellitus due to underlying condition ([place] [address] ) 09/26/2018    Gait instability 09/26/2018    Frequent falls 09/26/2018    Degenerative disc disease, lumbar 07/10/2018    Tremor 10/10/2016    Depression with anxiety 08/29/2016    Arthritis 07/13/2015    NSTEMI (non-ST elevated myocardial infarction) ([place] [address] ) 10/06/2014    Gout 05/17/2013    Controlled type 2 diabetes mellitus with hyperglycemia, without long-term current use of insulin ([place] [address] ) 06/18/2012    Hyperlipidemia 06/18/2012    Hypertension 06/18/2012    Morbid obesity ([place] [address] ) 06/18/2012     He  has a past surgical history that includes Tonsillectomy and adenoidectomy; Cataract extraction w/  intraocular lens implant; Eye surgery; Coronary angioplasty with stent; and pr colonoscopy flx dx w/collj spec when pfrmd (N/A, 3/21/2016)    His family history includes No Known Problems in his father and mother  He was adopted  He  reports that he has never smoked  He has never used smokeless tobacco  He reports previous alcohol use  He reports that he does not use drugs  Current Outpatient Medications   Medication Sig Dispense Refill    allopurinol (ZYLOPRIM) 100 mg tablet Take 1 tablet (100 mg total) by mouth daily 90 tablet 1    aspirin 325 mg tablet Take 325 mg by mouth daily   atorvastatin (LIPITOR) 80 mg tablet Take 1 tablet (80 mg total) by mouth daily 90 tablet 1    Empagliflozin (Jardiance) 10 MG TABS Take 1 tablet (10 mg total) by mouth every morning 90 tablet 1    escitalopram (LEXAPRO) 20 mg tablet Take 1 tablet (20 mg total) by mouth daily 90 tablet 1    glimepiride (AMARYL) 4 mg tablet TAKE 1 TABLET TWICE A  tablet 1    levothyroxine 88 mcg tablet Take 1 tablet (88 mcg total) by mouth daily in the early morning 90 tablet 1    losartan (COZAAR) 25 mg tablet Take 1 tablet (25 mg total) by mouth daily 90 tablet 1    metFORMIN (GLUCOPHAGE) 1000 MG tablet Take 1 tablet (1,000 mg total) by mouth 2 (two) times a day with meals 180 tablet 2    multivitamin (THERAGRAN) TABS Take 1 tablet by mouth daily      nitroglycerin (NITROSTAT) 0 4 mg SL tablet Place 1 tablet (0 4 mg total) under the tongue every 5 (five) minutes as needed for chest pain 25 tablet 4    propranolol (INDERAL LA) 60 mg 24 hr capsule TAKE 1 CAPSULE BY MOUTH DAILY  90 capsule 0    torsemide (DEMADEX) 10 mg tablet Take 1 tablet (10 mg total) by mouth daily 90 tablet 2    vitamin E, tocopherol, 1,000 units capsule Take 1,000 Units by mouth daily      zonisamide (ZONEGRAN) 25 mg capsule Take 1 tablet (25 mg total) by mouth daily 30 capsule 3     No current facility-administered medications for this visit  He has No Known Allergies       Review of Systems   Constitutional: Negative for activity change, appetite change, chills, diaphoresis, fatigue, fever and unexpected weight change     Respiratory: Negative for cough and shortness of breath  Cardiovascular: Negative for chest pain  Gastrointestinal: Negative for abdominal pain  Skin: Negative for color change, pallor, rash and wound  Skin lesion of right posterior shoulder         Objective:      /73   Pulse 87   Temp 98 8 °F (37 1 °C)   Ht 5' 9" (1 753 m)   Wt 104 kg (230 lb)   BMI 33 97 kg/m²          Physical Exam  Vitals reviewed  Constitutional:       General: He is not in acute distress  Appearance: Normal appearance  He is obese  He is not ill-appearing or diaphoretic  HENT:      Head: Normocephalic and atraumatic  Right Ear: External ear normal       Left Ear: External ear normal    Eyes:      General: No scleral icterus  Right eye: No discharge  Left eye: No discharge  Cardiovascular:      Rate and Rhythm: Normal rate and regular rhythm  Heart sounds: Murmur heard  No friction rub  No gallop  Pulmonary:      Effort: Pulmonary effort is normal  No respiratory distress  Breath sounds: Normal breath sounds  No stridor  No wheezing, rhonchi or rales  Chest:      Chest wall: No tenderness  Abdominal:      General: Abdomen is flat  There is no distension  Palpations: Abdomen is soft  There is no mass  Tenderness: There is no abdominal tenderness  Hernia: No hernia is present  Musculoskeletal:      Cervical back: Normal range of motion  Comments: Walks with a cane   Skin:     General: Skin is warm  Coloration: Skin is not jaundiced or pale  Findings: Lesion present  No bruising or erythema  Comments: 1 cm round ulcerated lesion consistent with biopsy-proven basal carcinoma   Neurological:      General: No focal deficit present  Mental Status: He is alert and oriented to person, place, and time  Cranial Nerves: No cranial nerve deficit  Psychiatric:         Mood and Affect: Mood normal          Behavior: Behavior normal          Thought Content:  Thought content normal          Judgment: Judgment normal            Note:    Recent family medicine note progress note dated January 10, 2022 was personally reviewed by me  Lab/results:     Tissue exam a biopsy dated January 6, 2022 personally reviewed by me  In addition patient's most recent hemoglobin A1c dated January 10, 2022 was also reviewed by me  FEVER

## 2022-01-18 NOTE — ASSESSMENT & PLAN NOTE
Biopsy-proven basal cell carcinoma of the right posterior shoulder  Patient will need a definitive excisional biopsy with margins  The procedure self including all associated risks and benefits discussed the patient great length  Patient verbalized understands risks is willing proceed, consent was signed  Patient does have a cardiac history however he has recently undergone a nuclear stress test which was unremarkable in addition to routine labs and therefore not require any additional workup at this time

## 2022-01-18 NOTE — H&P
Assessment/Plan:    Basal cell carcinoma of shoulder, right  Biopsy-proven basal cell carcinoma of the right posterior shoulder  Patient will need a definitive excisional biopsy with margins  The procedure self including all associated risks and benefits discussed the patient great length  Patient verbalized understands risks is willing proceed, consent was signed  Patient does have a cardiac history however he has recently undergone a nuclear stress test which was unremarkable in addition to routine labs and therefore not require any additional workup at this time  Diagnoses and all orders for this visit:    Basal cell carcinoma of shoulder, right          Subjective:      Patient ID: Shira Barahona is a 77 y o  male  81-year-old gentleman with known CAD, diabetes, hyperlipidemia, hypertension, presents today for follow-up after punch biopsy of a right posterior shoulder nonhealing wound  Biopsy showing basal cell carcinoma  Patient presents today to discuss surgery  He reports no overall changes to the lesion  No bleeding  Denies any worsening shortness of breath or chest pain  No abdominal pain  Tolerating diet  No other symptoms at this time  He did undergo stress test in December of last year which did not show any concerning heart related issues  Patient states he wishes have this removed  The following portions of the patient's history were reviewed and updated as appropriate:   He  has a past medical history of BPH (benign prostatic hyperplasia), CAD (coronary artery disease), Diabetes mellitus ([address] ), Gout, High cholesterol, Hypertension, and MI, old    He   Patient Active Problem List    Diagnosis Date Noted    Basal cell carcinoma of shoulder, right 01/18/2022    Chronic diastolic (congestive) heart failure ([address] ) 01/10/2022    Laceration and contusion of cerebral cortex, right, without loss of consciousness, initial encounter [facility] ) 01/10/2022    Skin lesion 12/22/2021    Other chest pain 12/01/2021    Leukocytosis 12/01/2021    Bilateral lower extremity edema 10/22/2021    Mild cognitive impairment 09/03/2021    Benign hypertension with CKD (chronic kidney disease) stage III ([place] [address] ) 02/10/2021    Diabetic nephropathy associated with type 2 diabetes mellitus ([place] [address] ) 02/10/2021    Concentric left ventricular hypertrophy 02/10/2021    Aortic stenosis, moderate 02/10/2021    Edema 02/10/2021    Fall (on) (from) other stairs and steps, initial encounter 12/28/2020    Recurrent major depressive disorder ([place] [address] ) 12/28/2020    Primary osteoarthritis of right knee 05/21/2020    Primary osteoarthritis of left knee 05/21/2020    Coronary artery disease of native artery of native heart with stable angina pectoris ([place] [address] ) 03/18/2020    Chronic pain of both knees 02/13/2020    Primary osteoarthritis of both knees 02/13/2020    Primary osteoarthritis involving multiple joints 12/16/2019    Vitamin E deficiency 09/19/2019    Infected epidermoid cyst 12/04/2018    Tremor, essential 09/26/2018    Diabetic neuropathy associated with diabetes mellitus due to underlying condition ([place] [address] ) 09/26/2018    Gait instability 09/26/2018    Frequent falls 09/26/2018    Degenerative disc disease, lumbar 07/10/2018    Tremor 10/10/2016    Depression with anxiety 08/29/2016    Arthritis 07/13/2015    NSTEMI (non-ST elevated myocardial infarction) ([place] [address] ) 10/06/2014    Gout 05/17/2013    Controlled type 2 diabetes mellitus with hyperglycemia, without long-term current use of insulin ([place] [address] ) 06/18/2012    Hyperlipidemia 06/18/2012    Hypertension 06/18/2012    Morbid obesity ([place] [address] ) 06/18/2012     He  has a past surgical history that includes Tonsillectomy and adenoidectomy; Cataract extraction w/  intraocular lens implant; Eye surgery; Coronary angioplasty with stent; and pr colonoscopy flx dx w/collj spec when pfrmd (N/A, 3/21/2016)    His family history includes No Known Problems in his father and mother  He was adopted  He  reports that he has never smoked  He has never used smokeless tobacco  He reports previous alcohol use  He reports that he does not use drugs  Current Outpatient Medications   Medication Sig Dispense Refill    allopurinol (ZYLOPRIM) 100 mg tablet Take 1 tablet (100 mg total) by mouth daily 90 tablet 1    aspirin 325 mg tablet Take 325 mg by mouth daily   atorvastatin (LIPITOR) 80 mg tablet Take 1 tablet (80 mg total) by mouth daily 90 tablet 1    Empagliflozin (Jardiance) 10 MG TABS Take 1 tablet (10 mg total) by mouth every morning 90 tablet 1    escitalopram (LEXAPRO) 20 mg tablet Take 1 tablet (20 mg total) by mouth daily 90 tablet 1    glimepiride (AMARYL) 4 mg tablet TAKE 1 TABLET TWICE A  tablet 1    levothyroxine 88 mcg tablet Take 1 tablet (88 mcg total) by mouth daily in the early morning 90 tablet 1    losartan (COZAAR) 25 mg tablet Take 1 tablet (25 mg total) by mouth daily 90 tablet 1    metFORMIN (GLUCOPHAGE) 1000 MG tablet Take 1 tablet (1,000 mg total) by mouth 2 (two) times a day with meals 180 tablet 2    multivitamin (THERAGRAN) TABS Take 1 tablet by mouth daily      nitroglycerin (NITROSTAT) 0 4 mg SL tablet Place 1 tablet (0 4 mg total) under the tongue every 5 (five) minutes as needed for chest pain 25 tablet 4    propranolol (INDERAL LA) 60 mg 24 hr capsule TAKE 1 CAPSULE BY MOUTH DAILY  90 capsule 0    torsemide (DEMADEX) 10 mg tablet Take 1 tablet (10 mg total) by mouth daily 90 tablet 2    vitamin E, tocopherol, 1,000 units capsule Take 1,000 Units by mouth daily      zonisamide (ZONEGRAN) 25 mg capsule Take 1 tablet (25 mg total) by mouth daily 30 capsule 3     No current facility-administered medications for this visit  He has No Known Allergies       Review of Systems   Constitutional: Negative for activity change, appetite change, chills, diaphoresis, fatigue, fever and unexpected weight change     Respiratory: Negative for cough and shortness of breath  Cardiovascular: Negative for chest pain  Gastrointestinal: Negative for abdominal pain  Skin: Negative for color change, pallor, rash and wound  Skin lesion of right posterior shoulder         Objective:      /73   Pulse 87   Temp 98 8 °F (37 1 °C)   Ht 5' 9" (1 753 m)   Wt 104 kg (230 lb)   BMI 33 97 kg/m²          Physical Exam  Vitals reviewed  Constitutional:       General: He is not in acute distress  Appearance: Normal appearance  He is obese  He is not ill-appearing or diaphoretic  HENT:      Head: Normocephalic and atraumatic  Right Ear: External ear normal       Left Ear: External ear normal    Eyes:      General: No scleral icterus  Right eye: No discharge  Left eye: No discharge  Cardiovascular:      Rate and Rhythm: Normal rate and regular rhythm  Heart sounds: Murmur heard  No friction rub  No gallop  Pulmonary:      Effort: Pulmonary effort is normal  No respiratory distress  Breath sounds: Normal breath sounds  No stridor  No wheezing, rhonchi or rales  Chest:      Chest wall: No tenderness  Abdominal:      General: Abdomen is flat  There is no distension  Palpations: Abdomen is soft  There is no mass  Tenderness: There is no abdominal tenderness  Hernia: No hernia is present  Musculoskeletal:      Cervical back: Normal range of motion  Comments: Walks with a cane   Skin:     General: Skin is warm  Coloration: Skin is not jaundiced or pale  Findings: Lesion present  No bruising or erythema  Comments: 1 cm round ulcerated lesion consistent with biopsy-proven basal carcinoma   Neurological:      General: No focal deficit present  Mental Status: He is alert and oriented to person, place, and time  Cranial Nerves: No cranial nerve deficit  Psychiatric:         Mood and Affect: Mood normal          Behavior: Behavior normal          Thought Content:  Thought content normal          Judgment: Judgment normal            Note:    Recent family medicine note progress note dated January 10, 2022 was personally reviewed by me  Lab/results:     Tissue exam a biopsy dated January 6, 2022 personally reviewed by me  In addition patient's most recent hemoglobin A1c dated January 10, 2022 was also reviewed by me

## 2022-01-18 NOTE — H&P (VIEW-ONLY)
Assessment/Plan:    Basal cell carcinoma of shoulder, right  Biopsy-proven basal cell carcinoma of the right posterior shoulder  Patient will need a definitive excisional biopsy with margins  The procedure self including all associated risks and benefits discussed the patient great length  Patient verbalized understands risks is willing proceed, consent was signed  Patient does have a cardiac history however he has recently undergone a nuclear stress test which was unremarkable in addition to routine labs and therefore not require any additional workup at this time  Diagnoses and all orders for this visit:    Basal cell carcinoma of shoulder, right          Subjective:      Patient ID: Amauri Campbell is a 77 y o  male  78-year-old gentleman with known CAD, diabetes, hyperlipidemia, hypertension, presents today for follow-up after punch biopsy of a right posterior shoulder nonhealing wound  Biopsy showing basal cell carcinoma  Patient presents today to discuss surgery  He reports no overall changes to the lesion  No bleeding  Denies any worsening shortness of breath or chest pain  No abdominal pain  Tolerating diet  No other symptoms at this time  He did undergo stress test in December of last year which did not show any concerning heart related issues  Patient states he wishes have this removed  The following portions of the patient's history were reviewed and updated as appropriate:   He  has a past medical history of BPH (benign prostatic hyperplasia), CAD (coronary artery disease), Diabetes mellitus ([address] ), Gout, High cholesterol, Hypertension, and MI, old    He   Patient Active Problem List    Diagnosis Date Noted    Basal cell carcinoma of shoulder, right 01/18/2022    Chronic diastolic (congestive) heart failure ([facility] [address] ) 01/10/2022    Laceration and contusion of cerebral cortex, right, without loss of consciousness, initial encounter ([facility] ) 01/10/2022    Skin lesion 12/22/2021    Other chest pain 12/01/2021    Leukocytosis 12/01/2021    Bilateral lower extremity edema 10/22/2021    Mild cognitive impairment 09/03/2021    Benign hypertension with CKD (chronic kidney disease) stage III ([place] [address] ) 02/10/2021    Diabetic nephropathy associated with type 2 diabetes mellitus ([place] [address] ) 02/10/2021    Concentric left ventricular hypertrophy 02/10/2021    Aortic stenosis, moderate 02/10/2021    Edema 02/10/2021    Fall (on) (from) other stairs and steps, initial encounter 12/28/2020    Recurrent major depressive disorder ([place] [address] ) 12/28/2020    Primary osteoarthritis of right knee 05/21/2020    Primary osteoarthritis of left knee 05/21/2020    Coronary artery disease of native artery of native heart with stable angina pectoris ([place] [address] ) 03/18/2020    Chronic pain of both knees 02/13/2020    Primary osteoarthritis of both knees 02/13/2020    Primary osteoarthritis involving multiple joints 12/16/2019    Vitamin E deficiency 09/19/2019    Infected epidermoid cyst 12/04/2018    Tremor, essential 09/26/2018    Diabetic neuropathy associated with diabetes mellitus due to underlying condition ([place] [address] ) 09/26/2018    Gait instability 09/26/2018    Frequent falls 09/26/2018    Degenerative disc disease, lumbar 07/10/2018    Tremor 10/10/2016    Depression with anxiety 08/29/2016    Arthritis 07/13/2015    NSTEMI (non-ST elevated myocardial infarction) ([place] [address] ) 10/06/2014    Gout 05/17/2013    Controlled type 2 diabetes mellitus with hyperglycemia, without long-term current use of insulin ([place] [address] ) 06/18/2012    Hyperlipidemia 06/18/2012    Hypertension 06/18/2012    Morbid obesity ([place] [address] ) 06/18/2012     He  has a past surgical history that includes Tonsillectomy and adenoidectomy; Cataract extraction w/  intraocular lens implant; Eye surgery; Coronary angioplasty with stent; and pr colonoscopy flx dx w/collj spec when pfrmd (N/A, 3/21/2016)    His family history includes No Known Problems in his father and mother  He was adopted  He  reports that he has never smoked  He has never used smokeless tobacco  He reports previous alcohol use  He reports that he does not use drugs  Current Outpatient Medications   Medication Sig Dispense Refill    allopurinol (ZYLOPRIM) 100 mg tablet Take 1 tablet (100 mg total) by mouth daily 90 tablet 1    aspirin 325 mg tablet Take 325 mg by mouth daily   atorvastatin (LIPITOR) 80 mg tablet Take 1 tablet (80 mg total) by mouth daily 90 tablet 1    Empagliflozin (Jardiance) 10 MG TABS Take 1 tablet (10 mg total) by mouth every morning 90 tablet 1    escitalopram (LEXAPRO) 20 mg tablet Take 1 tablet (20 mg total) by mouth daily 90 tablet 1    glimepiride (AMARYL) 4 mg tablet TAKE 1 TABLET TWICE A  tablet 1    levothyroxine 88 mcg tablet Take 1 tablet (88 mcg total) by mouth daily in the early morning 90 tablet 1    losartan (COZAAR) 25 mg tablet Take 1 tablet (25 mg total) by mouth daily 90 tablet 1    metFORMIN (GLUCOPHAGE) 1000 MG tablet Take 1 tablet (1,000 mg total) by mouth 2 (two) times a day with meals 180 tablet 2    multivitamin (THERAGRAN) TABS Take 1 tablet by mouth daily      nitroglycerin (NITROSTAT) 0 4 mg SL tablet Place 1 tablet (0 4 mg total) under the tongue every 5 (five) minutes as needed for chest pain 25 tablet 4    propranolol (INDERAL LA) 60 mg 24 hr capsule TAKE 1 CAPSULE BY MOUTH DAILY  90 capsule 0    torsemide (DEMADEX) 10 mg tablet Take 1 tablet (10 mg total) by mouth daily 90 tablet 2    vitamin E, tocopherol, 1,000 units capsule Take 1,000 Units by mouth daily      zonisamide (ZONEGRAN) 25 mg capsule Take 1 tablet (25 mg total) by mouth daily 30 capsule 3     No current facility-administered medications for this visit  He has No Known Allergies       Review of Systems   Constitutional: Negative for activity change, appetite change, chills, diaphoresis, fatigue, fever and unexpected weight change     Respiratory: Negative for cough and shortness of breath  Cardiovascular: Negative for chest pain  Gastrointestinal: Negative for abdominal pain  Skin: Negative for color change, pallor, rash and wound  Skin lesion of right posterior shoulder         Objective:      /73   Pulse 87   Temp 98 8 °F (37 1 °C)   Ht 5' 9" (1 753 m)   Wt 104 kg (230 lb)   BMI 33 97 kg/m²          Physical Exam  Vitals reviewed  Constitutional:       General: He is not in acute distress  Appearance: Normal appearance  He is obese  He is not ill-appearing or diaphoretic  HENT:      Head: Normocephalic and atraumatic  Right Ear: External ear normal       Left Ear: External ear normal    Eyes:      General: No scleral icterus  Right eye: No discharge  Left eye: No discharge  Cardiovascular:      Rate and Rhythm: Normal rate and regular rhythm  Heart sounds: Murmur heard  No friction rub  No gallop  Pulmonary:      Effort: Pulmonary effort is normal  No respiratory distress  Breath sounds: Normal breath sounds  No stridor  No wheezing, rhonchi or rales  Chest:      Chest wall: No tenderness  Abdominal:      General: Abdomen is flat  There is no distension  Palpations: Abdomen is soft  There is no mass  Tenderness: There is no abdominal tenderness  Hernia: No hernia is present  Musculoskeletal:      Cervical back: Normal range of motion  Comments: Walks with a cane   Skin:     General: Skin is warm  Coloration: Skin is not jaundiced or pale  Findings: Lesion present  No bruising or erythema  Comments: 1 cm round ulcerated lesion consistent with biopsy-proven basal carcinoma   Neurological:      General: No focal deficit present  Mental Status: He is alert and oriented to person, place, and time  Cranial Nerves: No cranial nerve deficit  Psychiatric:         Mood and Affect: Mood normal          Behavior: Behavior normal          Thought Content:  Thought content normal          Judgment: Judgment normal            Note:    Recent family medicine note progress note dated January 10, 2022 was personally reviewed by me  Lab/results:     Tissue exam a biopsy dated January 6, 2022 personally reviewed by me  In addition patient's most recent hemoglobin A1c dated January 10, 2022 was also reviewed by me

## 2022-01-20 ENCOUNTER — ANESTHESIA EVENT (OUTPATIENT)
Dept: PERIOP | Facility: HOSPITAL | Age: 77
End: 2022-01-20
Payer: COMMERCIAL

## 2022-01-20 NOTE — PRE-PROCEDURE INSTRUCTIONS
Pre-Surgery Instructions:   Medication Instructions    allopurinol (ZYLOPRIM) 100 mg tablet Take this medication day of surgery if normally taken in the morning   aspirin 325 mg tablet Hold day of surgery- pt had been informed by surgeon, no hold on this medication needed   atorvastatin (LIPITOR) 80 mg tablet Take this medication day of surgery if normally taken in the morning   Empagliflozin (Jardiance) 10 MG TABS Hold day of surgery      escitalopram (LEXAPRO) 20 mg tablet Take this medication day of surgery if normally taken in the morning   glimepiride (AMARYL) 4 mg tablet Hold day of surgery      levothyroxine 88 mcg tablet Take this medication day of surgery if normally taken in the morning   losartan (COZAAR) 25 mg tablet Hold day of surgery      metFORMIN (GLUCOPHAGE) 1000 MG tablet Hold day of surgery      multivitamin (THERAGRAN) TABS Hold day of surgery      nitroglycerin (NITROSTAT) 0 4 mg SL tablet Use day of surgery if needed and bring with you      propranolol (INDERAL LA) 60 mg 24 hr capsule Take this medication day of surgery if normally taken in the morning   torsemide (DEMADEX) 10 mg tablet Take this medication day of surgery if normally taken in the morning   vitamin E, tocopherol, 1,000 units capsule Hold from now till after procedure unless prescribed by a Physician   zonisamide (ZONEGRAN) 25 mg capsule Take this medication day of surgery if normally taken in the morning  My Surgical Experience    The following information was developed to assist you to prepare for your operation  What do I need to do before coming to the hospital?   Arrange for a responsible person to drive you to and from the hospital    Arrange care for your children at home  Children are not allowed in the recovery areas of the hospital   Plan to wear clothing that is easy to put on and take off   If you are having shoulder surgery, wear a shirt that buttons or zippers in the front  Bathing  o Shower the evening before and the morning of your surgery with an antibacterial soap  Please refer to the Pre Op Showering Instructions for Surgery Patients Sheet   o Remove nail polish and all body piercing jewelry  o Do not shave any body part for at least 24 hours before surgery-this includes face, arms, legs and upper body  Food  o Nothing to eat or drink after midnight the night before your surgery  This includes candy and chewing gum  o Exception: If your surgery is after 12:00pm (noon), you may have clear liquids such as 7-Up®, ginger ale, apple or cranberry juice, Jell-O®, water, or clear broth until 8:00 am  o Do not drink milk or juice with pulp on the morning before surgery  o Do not drink alcohol 24 hours before surgery  Medicine  o Follow instructions you received from your surgeon about which medicines you may take on the day of surgery  o If instructed to take medicine on the morning of surgery, take pills with just a small sip of water  Call your prescribing doctor for specific infroamtion on what to do if you take insulin    What should I bring to the hospital?    Bring:  Bridgett Lowery or a walker, if you have them, for foot or knee surgery   A list of the daily medicines, vitamins, minerals, herbals and nutritional supplements you take  Include the dosages of medicines and the time you take them each day   Glasses, dentures or hearing aids   Minimal clothing; you will be wearing hospital sleepwear   Photo ID; required to verify your identity   If you have a Living Will or Power of , bring a copy of the documents   If you have an ostomy, bring an extra pouch and any supplies you use    Do not bring   Medicines or inhalers   Money, valuables or jewelry    What other information should I know about the day of surgery?  Notify your surgeons if you develop a cold, sore throat, cough, fever, rash or any other illness     Report to the Ambulatory Surgical/Same Day Surgery Unit   You will be instructed to stop at Registration only if you have not been pre-registered   Inform your  fi they do not stay that they will be asked by the staff to leave a phone number where they can be reached   Be available to be reached before surgery  In the event the operating room schedule changes, you may be asked to come in earlier or later than expected    *It is important to tell your doctor and others involved in your health care if you are taking or have been taking any non-prescription drugs, vitamins, minerals, herbals or other nutritional supplements   Any of these may interact with some food or medicines and cause a reaction

## 2022-01-20 NOTE — ANESTHESIA PREPROCEDURE EVALUATION
Procedure:  EXCISION BASAL CELL CARCINOMA EXTREMITY (Right Shoulder)    Relevant Problems   CARDIO   (+) Aortic stenosis, moderate ([facility] 3 1/21)   (+) Coronary artery disease of native artery of native heart with stable angina pectoris ([facility])   (+) Hyperlipidemia   (+) Hypertension   (+) NSTEMI (non-ST elevated myocardial infarction) ([facility] [address] )   (+) Other chest pain      /RENAL   (+) Benign hypertension with CKD (chronic kidney disease) stage III ([facility])   (+) Diabetic nephropathy associated with type 2 diabetes mellitus ([facility])      MUSCULOSKELETAL   (+) Arthritis   (+) Degenerative disc disease, lumbar   (+) Gout   (+) Primary osteoarthritis involving multiple joints   (+) Primary osteoarthritis of both knees   (+) Primary osteoarthritis of left knee   (+) Primary osteoarthritis of right knee      NEURO/PSYCH   (+) Depression with anxiety   (+) Diabetic neuropathy associated with diabetes mellitus due to underlying condition ([facility])   (+) Recurrent major depressive disorder ([facility] [address] )      Other   (+) Morbid obesity ([facility] )   1/3/21    LEFT VENTRICLE: Size was normal  Systolic function was normal by visual assessment  Ejection fraction was estimated to be 70 %  There were no regional wall motion abnormalities  Wall thickness was mildly increased  There was mild  concentric hypertrophy  DOPPLER: Doppler parameters were consistent with abnormal left ventricular relaxation (grade 1 diastolic dysfunction)      RIGHT VENTRICLE: The size was normal  Systolic function was normal  Wall thickness was normal      LEFT ATRIUM: The atrium was mildly dilated      RIGHT ATRIUM: Size was normal      MITRAL VALVE: Valve structure was normal  There was normal leaflet separation  DOPPLER: The transmitral velocity was within the normal range  There was no evidence for stenosis  There was no significant regurgitation      AORTIC VALVE: The valve was trileaflet   Leaflets exhibited markedly increased thickness, marked calcification, and markedly reduced cuspal separation  DOPPLER: Transaortic velocity was increased due to valvular stenosis  There was moderate  stenosis by hemodynamics  There was trace regurgitation  12/21    Stress Function: Left ventricular function post-stress is normal  Post-stress ejection fraction is 61 %    Stress Combined Conclusion: The ECG and SPECT imaging portions of the stress study are concordant with no evidence of stress induced myocardial ischemia           Physical Exam    Airway    Mallampati score: II  TM Distance: >3 FB  Neck ROM: full     Dental   No notable dental hx     Cardiovascular      Pulmonary      Other Findings        Anesthesia Plan  ASA Score- 3     Anesthesia Type- general with ASA Monitors  Additional Monitors:   Airway Plan: LMA  Plan Factors-Exercise tolerance (METS): <4 METS  Chart reviewed  EKG reviewed  Imaging results reviewed  Existing labs reviewed  Patient is not a current smoker  Patient not instructed to abstain from smoking on day of procedure  Patient did not smoke on day of surgery  Induction- intravenous  Postoperative Plan-     Informed Consent- Anesthetic plan and risks discussed with patient  I personally reviewed this patient with the CRNA  Discussed and agreed on the Anesthesia Plan with the CRNA  Chavo Glover

## 2022-01-21 ENCOUNTER — HOSPITAL ENCOUNTER (OUTPATIENT)
Facility: HOSPITAL | Age: 77
Setting detail: OUTPATIENT SURGERY
Discharge: HOME | End: 2022-01-21
Attending: SURGERY | Admitting: SURGERY
Payer: COMMERCIAL

## 2022-01-21 ENCOUNTER — ANESTHESIA (OUTPATIENT)
Dept: PERIOP | Facility: HOSPITAL | Age: 77
End: 2022-01-21
Payer: COMMERCIAL

## 2022-01-21 VITALS
SYSTOLIC BLOOD PRESSURE: 120 MMHG | OXYGEN SATURATION: 95 % | WEIGHT: 230 LBS | DIASTOLIC BLOOD PRESSURE: 68 MMHG | TEMPERATURE: 99.8 F | HEIGHT: 69 IN | BODY MASS INDEX: 34.07 KG/M2 | HEART RATE: 68 BPM | RESPIRATION RATE: 16 BRPM

## 2022-01-21 DIAGNOSIS — Z79.4 CONTROLLED TYPE 2 DIABETES MELLITUS WITH DIABETIC NEUROPATHY, WITH LONG-TERM CURRENT USE OF INSULIN (HCC): ICD-10-CM

## 2022-01-21 DIAGNOSIS — E11.40 CONTROLLED TYPE 2 DIABETES MELLITUS WITH DIABETIC NEUROPATHY, WITH LONG-TERM CURRENT USE OF INSULIN (HCC): ICD-10-CM

## 2022-01-21 DIAGNOSIS — L98.9 SKIN LESION: Primary | ICD-10-CM

## 2022-01-21 DIAGNOSIS — C44.612 BASAL CELL CARCINOMA OF SHOULDER, RIGHT: ICD-10-CM

## 2022-01-21 LAB
GLUCOSE SERPL-MCNC: 110 MG/DL (ref 65–140)
GLUCOSE SERPL-MCNC: 156 MG/DL (ref 65–140)

## 2022-01-21 PROCEDURE — 11606 EXC TR-EXT MAL+MARG >4 CM: CPT | Performed by: SURGERY

## 2022-01-21 PROCEDURE — 88305 TISSUE EXAM BY PATHOLOGIST: CPT | Performed by: PATHOLOGY

## 2022-01-21 PROCEDURE — 4010F ACE/ARB THERAPY RXD/TAKEN: CPT | Performed by: SURGERY

## 2022-01-21 PROCEDURE — NC001 PR NO CHARGE

## 2022-01-21 PROCEDURE — 82948 REAGENT STRIP/BLOOD GLUCOSE: CPT

## 2022-01-21 PROCEDURE — 12032 INTMD RPR S/A/T/EXT 2.6-7.5: CPT | Performed by: SURGERY

## 2022-01-21 RX ORDER — OXYCODONE HYDROCHLORIDE AND ACETAMINOPHEN 5; 325 MG/1; MG/1
1 TABLET ORAL EVERY 4 HOURS PRN
Qty: 10 TABLET | Refills: 0 | Status: SHIPPED | OUTPATIENT
Start: 2022-01-21 | End: 2022-02-23

## 2022-01-21 RX ORDER — EMPAGLIFLOZIN 10 MG/1
10 TABLET, FILM COATED ORAL EVERY MORNING
Qty: 90 TABLET | Refills: 1 | Status: SHIPPED | OUTPATIENT
Start: 2022-01-21 | End: 2022-03-09 | Stop reason: SDUPTHER

## 2022-01-21 RX ORDER — PROPOFOL 10 MG/ML
INJECTION, EMULSION INTRAVENOUS AS NEEDED
Status: DISCONTINUED | OUTPATIENT
Start: 2022-01-21 | End: 2022-01-21

## 2022-01-21 RX ORDER — ONDANSETRON 2 MG/ML
4 INJECTION INTRAMUSCULAR; INTRAVENOUS ONCE AS NEEDED
Status: DISCONTINUED | OUTPATIENT
Start: 2022-01-21 | End: 2022-01-21 | Stop reason: HOSPADM

## 2022-01-21 RX ORDER — SODIUM CHLORIDE, SODIUM LACTATE, POTASSIUM CHLORIDE, CALCIUM CHLORIDE 600; 310; 30; 20 MG/100ML; MG/100ML; MG/100ML; MG/100ML
75 INJECTION, SOLUTION INTRAVENOUS CONTINUOUS
Status: DISCONTINUED | OUTPATIENT
Start: 2022-01-21 | End: 2022-01-21 | Stop reason: HOSPADM

## 2022-01-21 RX ORDER — DEXAMETHASONE SODIUM PHOSPHATE 4 MG/ML
INJECTION, SOLUTION INTRA-ARTICULAR; INTRALESIONAL; INTRAMUSCULAR; INTRAVENOUS; SOFT TISSUE AS NEEDED
Status: DISCONTINUED | OUTPATIENT
Start: 2022-01-21 | End: 2022-01-21

## 2022-01-21 RX ORDER — MAGNESIUM HYDROXIDE 1200 MG/15ML
LIQUID ORAL AS NEEDED
Status: DISCONTINUED | OUTPATIENT
Start: 2022-01-21 | End: 2022-01-21 | Stop reason: HOSPADM

## 2022-01-21 RX ORDER — SODIUM CHLORIDE, SODIUM LACTATE, POTASSIUM CHLORIDE, CALCIUM CHLORIDE 600; 310; 30; 20 MG/100ML; MG/100ML; MG/100ML; MG/100ML
125 INJECTION, SOLUTION INTRAVENOUS CONTINUOUS
Status: DISCONTINUED | OUTPATIENT
Start: 2022-01-21 | End: 2022-01-21 | Stop reason: HOSPADM

## 2022-01-21 RX ORDER — BUPIVACAINE HYDROCHLORIDE 5 MG/ML
INJECTION, SOLUTION EPIDURAL; INTRACAUDAL AS NEEDED
Status: DISCONTINUED | OUTPATIENT
Start: 2022-01-21 | End: 2022-01-21 | Stop reason: HOSPADM

## 2022-01-21 RX ORDER — GINSENG 100 MG
CAPSULE ORAL AS NEEDED
Status: DISCONTINUED | OUTPATIENT
Start: 2022-01-21 | End: 2022-01-21 | Stop reason: HOSPADM

## 2022-01-21 RX ORDER — CHLORHEXIDINE GLUCONATE 4 G/100ML
SOLUTION TOPICAL DAILY PRN
Status: DISCONTINUED | OUTPATIENT
Start: 2022-01-21 | End: 2022-01-21 | Stop reason: HOSPADM

## 2022-01-21 RX ORDER — FENTANYL CITRATE 50 UG/ML
INJECTION, SOLUTION INTRAMUSCULAR; INTRAVENOUS AS NEEDED
Status: DISCONTINUED | OUTPATIENT
Start: 2022-01-21 | End: 2022-01-21

## 2022-01-21 RX ORDER — ALBUTEROL SULFATE 2.5 MG/3ML
2.5 SOLUTION RESPIRATORY (INHALATION) ONCE AS NEEDED
Status: DISCONTINUED | OUTPATIENT
Start: 2022-01-21 | End: 2022-01-21 | Stop reason: HOSPADM

## 2022-01-21 RX ORDER — LIDOCAINE HYDROCHLORIDE 10 MG/ML
INJECTION, SOLUTION EPIDURAL; INFILTRATION; INTRACAUDAL; PERINEURAL AS NEEDED
Status: DISCONTINUED | OUTPATIENT
Start: 2022-01-21 | End: 2022-01-21

## 2022-01-21 RX ORDER — CEFAZOLIN SODIUM 2 G/50ML
2000 SOLUTION INTRAVENOUS ONCE
Status: COMPLETED | OUTPATIENT
Start: 2022-01-21 | End: 2022-01-21

## 2022-01-21 RX ORDER — HEPARIN SODIUM 5000 [USP'U]/ML
5000 INJECTION, SOLUTION INTRAVENOUS; SUBCUTANEOUS ONCE
Status: COMPLETED | OUTPATIENT
Start: 2022-01-21 | End: 2022-01-21

## 2022-01-21 RX ORDER — LIDOCAINE HYDROCHLORIDE 10 MG/ML
0.5 INJECTION, SOLUTION EPIDURAL; INFILTRATION; INTRACAUDAL; PERINEURAL ONCE AS NEEDED
Status: DISCONTINUED | OUTPATIENT
Start: 2022-01-21 | End: 2022-01-21 | Stop reason: HOSPADM

## 2022-01-21 RX ORDER — ONDANSETRON 2 MG/ML
INJECTION INTRAMUSCULAR; INTRAVENOUS AS NEEDED
Status: DISCONTINUED | OUTPATIENT
Start: 2022-01-21 | End: 2022-01-21

## 2022-01-21 RX ORDER — MIDAZOLAM HYDROCHLORIDE 2 MG/2ML
INJECTION, SOLUTION INTRAMUSCULAR; INTRAVENOUS AS NEEDED
Status: DISCONTINUED | OUTPATIENT
Start: 2022-01-21 | End: 2022-01-21

## 2022-01-21 RX ORDER — SODIUM CHLORIDE, SODIUM LACTATE, POTASSIUM CHLORIDE, CALCIUM CHLORIDE 600; 310; 30; 20 MG/100ML; MG/100ML; MG/100ML; MG/100ML
50 INJECTION, SOLUTION INTRAVENOUS CONTINUOUS
Status: DISCONTINUED | OUTPATIENT
Start: 2022-01-21 | End: 2022-01-21 | Stop reason: HOSPADM

## 2022-01-21 RX ORDER — ONDANSETRON 2 MG/ML
4 INJECTION INTRAMUSCULAR; INTRAVENOUS EVERY 8 HOURS PRN
Status: DISCONTINUED | OUTPATIENT
Start: 2022-01-21 | End: 2022-01-21 | Stop reason: HOSPADM

## 2022-01-21 RX ORDER — FENTANYL CITRATE/PF 50 MCG/ML
25 SYRINGE (ML) INJECTION
Status: DISCONTINUED | OUTPATIENT
Start: 2022-01-21 | End: 2022-01-21 | Stop reason: HOSPADM

## 2022-01-21 RX ORDER — LOSARTAN POTASSIUM 25 MG/1
25 TABLET ORAL DAILY
Qty: 90 TABLET | Refills: 1 | Status: SHIPPED | OUTPATIENT
Start: 2022-01-21

## 2022-01-21 RX ORDER — OXYCODONE HYDROCHLORIDE AND ACETAMINOPHEN 5; 325 MG/1; MG/1
2 TABLET ORAL EVERY 4 HOURS PRN
Status: DISCONTINUED | OUTPATIENT
Start: 2022-01-21 | End: 2022-01-21 | Stop reason: HOSPADM

## 2022-01-21 RX ADMIN — SODIUM CHLORIDE, SODIUM LACTATE, POTASSIUM CHLORIDE, AND CALCIUM CHLORIDE 75 ML/HR: .6; .31; .03; .02 INJECTION, SOLUTION INTRAVENOUS at 09:59

## 2022-01-21 RX ADMIN — PHENYLEPHRINE HYDROCHLORIDE 30 MCG/MIN: 10 INJECTION INTRAVENOUS at 10:15

## 2022-01-21 RX ADMIN — FENTANYL CITRATE 25 MCG: 50 INJECTION, SOLUTION INTRAMUSCULAR; INTRAVENOUS at 10:12

## 2022-01-21 RX ADMIN — CEFAZOLIN SODIUM 2000 MG: 2 SOLUTION INTRAVENOUS at 10:07

## 2022-01-21 RX ADMIN — PROPOFOL 150 MG: 10 INJECTION, EMULSION INTRAVENOUS at 10:12

## 2022-01-21 RX ADMIN — ONDANSETRON 4 MG: 2 INJECTION INTRAMUSCULAR; INTRAVENOUS at 11:02

## 2022-01-21 RX ADMIN — HEPARIN SODIUM 5000 UNITS: 5000 INJECTION INTRAVENOUS; SUBCUTANEOUS at 10:00

## 2022-01-21 RX ADMIN — FENTANYL CITRATE 25 MCG: 50 INJECTION, SOLUTION INTRAMUSCULAR; INTRAVENOUS at 10:38

## 2022-01-21 RX ADMIN — MIDAZOLAM HYDROCHLORIDE 2 MG: 1 INJECTION, SOLUTION INTRAMUSCULAR; INTRAVENOUS at 10:07

## 2022-01-21 RX ADMIN — LIDOCAINE HYDROCHLORIDE 50 MG: 10 INJECTION, SOLUTION EPIDURAL; INFILTRATION; INTRACAUDAL; PERINEURAL at 10:12

## 2022-01-21 RX ADMIN — DEXAMETHASONE SODIUM PHOSPHATE 4 MG: 4 INJECTION, SOLUTION INTRA-ARTICULAR; INTRALESIONAL; INTRAMUSCULAR; INTRAVENOUS; SOFT TISSUE at 10:28

## 2022-01-21 NOTE — INTERVAL H&P NOTE
H&P reviewed  After examining the patient I find no changes in the patients condition since the H&P had been written      Vitals:    01/21/22 0946   BP: 137/69   Pulse: 77   Resp: 20   Temp: 98 2 °F (36 8 °C)   SpO2: 95%

## 2022-01-21 NOTE — DISCHARGE INSTRUCTIONS
Follow-up Dr Shivani Saini in 2 weeks as per appointment  Regular diet as tolerated  Low intensity activity as tolerated, no heavy lifting, nothing greater than 10 lb with the right arm for 2 weeks  Dressing may be removed on Sunday  You may shower on Sunday  No baths or swimming  If developed fever, worsening pain, redness or drainage the incision call the office or go to the ER

## 2022-01-21 NOTE — ANESTHESIA POSTPROCEDURE EVALUATION
Post-Op Assessment Note    CV Status:  Stable    Pain management: adequate     Mental Status:  Alert and awake   Hydration Status:  Euvolemic   PONV Controlled:  Controlled   Airway Patency:  Patent      Post Op Vitals Reviewed: Yes      Staff: CRNA         No complications documented      BP   121/68   Temp (P) 99 8 °F (37 7 °C) (01/21/22 1132)    Pulse  61   Resp   18   SpO2   96%

## 2022-02-03 ENCOUNTER — OFFICE VISIT (OUTPATIENT)
Dept: SURGERY | Facility: CLINIC | Age: 77
End: 2022-02-03

## 2022-02-03 VITALS
TEMPERATURE: 98.6 F | HEIGHT: 69 IN | SYSTOLIC BLOOD PRESSURE: 119 MMHG | WEIGHT: 230 LBS | DIASTOLIC BLOOD PRESSURE: 77 MMHG | HEART RATE: 76 BPM | BODY MASS INDEX: 34.07 KG/M2

## 2022-02-03 DIAGNOSIS — C44.612 BASAL CELL CARCINOMA (BCC) OF RIGHT SHOULDER: Primary | ICD-10-CM

## 2022-02-03 PROCEDURE — 99024 POSTOP FOLLOW-UP VISIT: CPT

## 2022-02-03 NOTE — PROGRESS NOTES
Assessment/Plan:    Basal cell carcinoma (BCC) of right shoulder  Virginia Edwards is a 46-year-old white male who had wide excision of a basal cell carcinoma of the right shoulder performed in the operating room on January 21st by Dr Rigoberto Torres  The patient is seen here today for suture removal and wound care  The patient does not admit any drainage or bleeding from the right shoulder incision  The wound has been closed with multiple 1 sutures  He denies any pain  Surgical pathology report was discussed with the patient which did confirm basal cell carcinoma of the right shoulder  Sutures were removed in the office here today by this provider, cleansed with alcohol, the incision looked clean and dry, no erythema or any drainage noted  Benzoin in quarter-inch Steri-Strips were applied to the incision  No follow-up needed after this appointment  His primary care physician is Dr Quin Botello  Patient instructed to keep the area clean, shower daily, may use skin lubricating ointment once or twice a day to keep the skin moist              Surgical Pathology Report                         Case: [id number]                                    Authorizing Provider: Rigoberto Torres DO          Collected:           01/21/2022 1044               Ordering Location:     [facility] Received:            01/21/2022 1135                                      Operating Room                                                                Pathologist:           Dickson Mccabe MD                                                              Specimen:    Lesion, right shoulder long lateral short superior                                         Final Diagnosis   A  Skin, right shoulder, excision:  Residual basal cell carcinoma, nodular type with a minor infiltrating component, and scar, margins free     Electronically signed by Dickson Mccabe MD on 1/26/2022 at  4:47 PM       Subjective:      Patient ID: Terrie Flores Zenia Mendes 77 y o  diabetic male seen in the office today for postoperative wound check to his right shoulder  HPI Patient was seen in the office today for wound check and suture removal of his right shoulder  He underwent wide excision of a skin lesion on his right posterior shoulder by Dr Wojciech Alberto on January 21, 2022 under IV sedation with anesthesia  Minimal blood loss  No drains  The wound was closed with 7 interrupted nylon sutures  Wound measured 1 5 cm  He is seen here today with his wife present  Patient reports no problem  No skin erythema, no drainage from the wound  He has not had to have any dressing covering the incision  Denies any fever or chills  The following portions of the patient's history were reviewed and updated as appropriate:   He  has a past medical history of BPH (benign prostatic hyperplasia), CAD (coronary artery disease), Cancer ([facility] ), Chronic kidney disease, Colon polyp, CPAP (continuous positive airway pressure) dependence, Diabetes mellitus ([facility] ), Disease of thyroid gland, Gout, High cholesterol, Hypertension, MI, old, Myocardial infarction ([facility] ) (2014), and Sleep apnea    He   Patient Active Problem List    Diagnosis Date Noted    Basal cell carcinoma (BCC) of right shoulder 01/18/2022    Chronic diastolic (congestive) heart failure ([facility] ) 01/10/2022    Laceration and contusion of cerebral cortex, right, without loss of consciousness, initial encounter ([place] [id number] ) 01/10/2022    Skin lesion 12/22/2021    Other chest pain 12/01/2021    Leukocytosis 12/01/2021    Bilateral lower extremity edema 10/22/2021    Mild cognitive impairment 09/03/2021    Benign hypertension with CKD (chronic kidney disease) stage III ([facility] ) 02/10/2021    Diabetic nephropathy associated with type 2 diabetes mellitus ([facility] ) 02/10/2021    Concentric left ventricular hypertrophy 02/10/2021    Aortic stenosis, moderate 02/10/2021    Edema 02/10/2021    Fall (on) (from) other stairs and steps, initial encounter 12/28/2020    Recurrent major depressive disorder ([facility] ) 12/28/2020    Primary osteoarthritis of right knee 05/21/2020    Primary osteoarthritis of left knee 05/21/2020    Coronary artery disease of native artery of native heart with stable angina pectoris ([place] [address] ) 03/18/2020    Chronic pain of both knees 02/13/2020    Primary osteoarthritis of both knees 02/13/2020    Primary osteoarthritis involving multiple joints 12/16/2019    Vitamin E deficiency 09/19/2019    Infected epidermoid cyst 12/04/2018    Tremor, essential 09/26/2018    Diabetic neuropathy associated with diabetes mellitus due to underlying condition ([place] [address] ) 09/26/2018    Gait instability 09/26/2018    Frequent falls 09/26/2018    Degenerative disc disease, lumbar 07/10/2018    Tremor 10/10/2016    Depression with anxiety 08/29/2016    Arthritis 07/13/2015    NSTEMI (non-ST elevated myocardial infarction) ([place] [address] ) 10/06/2014    Gout 05/17/2013    Controlled type 2 diabetes mellitus with hyperglycemia, without long-term current use of insulin ([place] [address] ) 06/18/2012    Hyperlipidemia 06/18/2012    Hypertension 06/18/2012    Morbid obesity ([place] [address] ) 06/18/2012     He  has a past surgical history that includes Tonsillectomy and adenoidectomy; Cataract extraction w/  intraocular lens implant; Eye surgery; Coronary angioplasty with stent; pr colonoscopy flx dx w/collj spec when pfrmd (N/A, 3/21/2016); Corneal transplant; and Excision basal cell carcinoma (Right, 1/21/2022)  His family history includes No Known Problems in his father and mother  He was adopted  He  reports that he has never smoked  He has never used smokeless tobacco  He reports previous alcohol use  He reports that he does not use drugs  Current Outpatient Medications   Medication Sig Dispense Refill    allopurinol (ZYLOPRIM) 100 mg tablet Take 1 tablet (100 mg total) by mouth daily 90 tablet 1    aspirin 325 mg tablet Take 325 mg by mouth daily        atorvastatin (LIPITOR) 80 mg tablet Take 1 tablet (80 mg total) by mouth daily 90 tablet 1    Empagliflozin (Jardiance) 10 MG TABS Take 1 tablet (10 mg total) by mouth every morning 90 tablet 1    escitalopram (LEXAPRO) 20 mg tablet Take 1 tablet (20 mg total) by mouth daily 90 tablet 1    glimepiride (AMARYL) 4 mg tablet TAKE 1 TABLET TWICE A  tablet 1    levothyroxine 88 mcg tablet Take 1 tablet (88 mcg total) by mouth daily in the early morning 90 tablet 1    losartan (COZAAR) 25 mg tablet Take 1 tablet (25 mg total) by mouth daily 90 tablet 1    metFORMIN (GLUCOPHAGE) 1000 MG tablet Take 1 tablet (1,000 mg total) by mouth 2 (two) times a day with meals 180 tablet 2    multivitamin (THERAGRAN) TABS Take 1 tablet by mouth daily      nitroglycerin (NITROSTAT) 0 4 mg SL tablet Place 1 tablet (0 4 mg total) under the tongue every 5 (five) minutes as needed for chest pain 25 tablet 4    oxyCODONE-acetaminophen (PERCOCET) 5-325 mg per tablet Take 1 tablet by mouth every 4 (four) hours as needed for moderate pain or severe pain Max Daily Amount: 6 tablets 10 tablet 0    propranolol (INDERAL LA) 60 mg 24 hr capsule TAKE 1 CAPSULE BY MOUTH DAILY  90 capsule 0    torsemide (DEMADEX) 10 mg tablet Take 1 tablet (10 mg total) by mouth daily 90 tablet 2    vitamin E, tocopherol, 1,000 units capsule Take 1,000 Units by mouth daily      zonisamide (ZONEGRAN) 25 mg capsule Take 1 tablet (25 mg total) by mouth daily 30 capsule 3     No current facility-administered medications for this visit  He has No Known Allergies       Review of Systems   Constitutional: Negative  HENT: Negative  Eyes: Negative  Respiratory: Negative  Cardiovascular: Negative  Gastrointestinal: Negative  Endocrine: Negative  Genitourinary: Negative  Musculoskeletal: Negative  Skin: Positive for wound  Incision right shoulder closed with interrupted sutures, no drainage or erythema    No pain at this time    Neurological: Negative  Hematological: Negative  Psychiatric/Behavioral: Negative  Objective:      /77   Pulse 76   Temp 98 6 °F (37 °C)   Ht 5' 9" (1 753 m)   Wt 104 kg (230 lb)   BMI 33 97 kg/m²          Physical Exam  Constitutional:       Appearance: Normal appearance  HENT:      Head: Normocephalic and atraumatic  Nose: Nose normal       Mouth/Throat:      Pharynx: Oropharynx is clear  Cardiovascular:      Rate and Rhythm: Normal rate and regular rhythm  Pulses: Normal pulses  Heart sounds: Normal heart sounds  Pulmonary:      Effort: Pulmonary effort is normal       Breath sounds: Normal breath sounds  Abdominal:      General: Bowel sounds are normal       Palpations: Abdomen is soft  Musculoskeletal:         General: No swelling, tenderness, deformity or signs of injury  Cervical back: Normal range of motion and neck supple  No rigidity or tenderness  Skin:     General: Skin is warm and dry  Comments: 1 5 cm surgical incision noted right posterior shoulder appears clean, dry and wound edges intact  No erythema or any drainage visible, no crepitus with palpation over the area  No bruising  Good range of motion of the right shoulder  Neurological:      General: No focal deficit present  Mental Status: He is alert and oriented to person, place, and time  Mental status is at baseline  Psychiatric:         Mood and Affect: Mood normal          Behavior: Behavior normal          Thought Content:  Thought content normal          Judgment: Judgment normal

## 2022-02-04 DIAGNOSIS — G25.0 TREMOR, ESSENTIAL: ICD-10-CM

## 2022-02-04 DIAGNOSIS — F33.9 RECURRENT MAJOR DEPRESSIVE DISORDER, REMISSION STATUS UNSPECIFIED (HCC): ICD-10-CM

## 2022-02-04 DIAGNOSIS — E11.42 TYPE 2 DIABETES MELLITUS WITH DIABETIC POLYNEUROPATHY, WITH LONG-TERM CURRENT USE OF INSULIN (HCC): ICD-10-CM

## 2022-02-04 DIAGNOSIS — E03.9 HYPOTHYROIDISM, UNSPECIFIED TYPE: ICD-10-CM

## 2022-02-04 DIAGNOSIS — I25.10 CORONARY ARTERY DISEASE INVOLVING NATIVE CORONARY ARTERY OF NATIVE HEART WITHOUT ANGINA PECTORIS: ICD-10-CM

## 2022-02-04 DIAGNOSIS — Z79.4 TYPE 2 DIABETES MELLITUS WITH DIABETIC POLYNEUROPATHY, WITH LONG-TERM CURRENT USE OF INSULIN (HCC): ICD-10-CM

## 2022-02-04 DIAGNOSIS — R60.0 EDEMA OF BOTH LOWER LEGS: ICD-10-CM

## 2022-02-04 DIAGNOSIS — E78.2 MIXED HYPERLIPIDEMIA: ICD-10-CM

## 2022-02-04 DIAGNOSIS — R25.1 TREMOR: ICD-10-CM

## 2022-02-04 PROCEDURE — 3066F NEPHROPATHY DOC TX: CPT | Performed by: INTERNAL MEDICINE

## 2022-02-04 RX ORDER — NITROGLYCERIN 0.4 MG/1
0.4 TABLET SUBLINGUAL
Qty: 25 TABLET | Refills: 0 | Status: SHIPPED | OUTPATIENT
Start: 2022-02-04

## 2022-02-04 RX ORDER — ATORVASTATIN CALCIUM 80 MG/1
80 TABLET, FILM COATED ORAL DAILY
Qty: 90 TABLET | Refills: 0 | Status: SHIPPED | OUTPATIENT
Start: 2022-02-04 | End: 2022-05-10

## 2022-02-04 RX ORDER — ZONISAMIDE 25 MG/1
CAPSULE ORAL
Qty: 30 CAPSULE | Refills: 3 | Status: SHIPPED | OUTPATIENT
Start: 2022-02-04 | End: 2022-02-09 | Stop reason: SDUPTHER

## 2022-02-04 RX ORDER — PROPRANOLOL HCL 60 MG
60 CAPSULE, EXTENDED RELEASE 24HR ORAL DAILY
Qty: 90 CAPSULE | Refills: 0 | Status: SHIPPED | OUTPATIENT
Start: 2022-02-04 | End: 2022-06-03

## 2022-02-04 RX ORDER — TORSEMIDE 10 MG/1
10 TABLET ORAL DAILY
Qty: 90 TABLET | Refills: 0 | Status: SHIPPED | OUTPATIENT
Start: 2022-02-04 | End: 2022-05-10

## 2022-02-04 RX ORDER — LEVOTHYROXINE SODIUM 88 UG/1
88 TABLET ORAL
Qty: 90 TABLET | Refills: 0 | Status: SHIPPED | OUTPATIENT
Start: 2022-02-04 | End: 2022-05-16

## 2022-02-04 RX ORDER — ESCITALOPRAM OXALATE 20 MG/1
20 TABLET ORAL DAILY
Qty: 90 TABLET | Refills: 0 | Status: SHIPPED | OUTPATIENT
Start: 2022-02-04 | End: 2022-04-20

## 2022-02-04 NOTE — TELEPHONE ENCOUNTER
Zonisamide sent on 12/3/21 with 3 refills to [facility]  Pt is now asking for script to be sent to [facility] DR ASAD RILEY

## 2022-02-09 DIAGNOSIS — M1A.9XX0 CHRONIC GOUT WITHOUT TOPHUS, UNSPECIFIED CAUSE, UNSPECIFIED SITE: ICD-10-CM

## 2022-02-09 DIAGNOSIS — G25.0 TREMOR, ESSENTIAL: ICD-10-CM

## 2022-02-09 RX ORDER — ALLOPURINOL 100 MG/1
100 TABLET ORAL DAILY
Qty: 90 TABLET | Refills: 0 | Status: SHIPPED | OUTPATIENT
Start: 2022-02-09 | End: 2022-05-10

## 2022-02-09 RX ORDER — ZONISAMIDE 25 MG/1
CAPSULE ORAL
Qty: 30 CAPSULE | Refills: 0 | Status: CANCELLED | OUTPATIENT
Start: 2022-02-09

## 2022-02-09 NOTE — TELEPHONE ENCOUNTER
Script was sent to [facility] on 2/4 but per chart- E-Prescribing Status: Verification status not available for this order     Called [facility], they did not receive zonisamide script    rx called into pharm

## 2022-02-21 ENCOUNTER — LAB (OUTPATIENT)
Dept: LAB | Facility: HOSPITAL | Age: 77
End: 2022-02-21
Attending: FAMILY MEDICINE
Payer: COMMERCIAL

## 2022-02-21 DIAGNOSIS — E11.40 CONTROLLED TYPE 2 DIABETES MELLITUS WITH DIABETIC NEUROPATHY, WITH LONG-TERM CURRENT USE OF INSULIN (HCC): ICD-10-CM

## 2022-02-21 DIAGNOSIS — Z79.4 CONTROLLED TYPE 2 DIABETES MELLITUS WITH DIABETIC NEUROPATHY, WITH LONG-TERM CURRENT USE OF INSULIN (HCC): ICD-10-CM

## 2022-02-21 DIAGNOSIS — N17.9 ACUTE KIDNEY INJURY (HCC): ICD-10-CM

## 2022-02-21 LAB
ANION GAP SERPL CALCULATED.3IONS-SCNC: 10 MMOL/L (ref 4–13)
BASOPHILS # BLD AUTO: 0.06 THOUSANDS/ΜL (ref 0–0.1)
BASOPHILS NFR BLD AUTO: 1 % (ref 0–1)
BUN SERPL-MCNC: 22 MG/DL (ref 5–25)
CALCIUM SERPL-MCNC: 9 MG/DL (ref 8.3–10.1)
CHLORIDE SERPL-SCNC: 105 MMOL/L (ref 100–108)
CO2 SERPL-SCNC: 25 MMOL/L (ref 21–32)
CREAT SERPL-MCNC: 1.3 MG/DL (ref 0.6–1.3)
CREAT UR-MCNC: 75.1 MG/DL
CREAT UR-MCNC: 75.3 MG/DL
EOSINOPHIL # BLD AUTO: 1.06 THOUSAND/ΜL (ref 0–0.61)
EOSINOPHIL NFR BLD AUTO: 11 % (ref 0–6)
ERYTHROCYTE [DISTWIDTH] IN BLOOD BY AUTOMATED COUNT: 12.3 % (ref 11.6–15.1)
GFR SERPL CREATININE-BSD FRML MDRD: 53 ML/MIN/1.73SQ M
GLUCOSE P FAST SERPL-MCNC: 152 MG/DL (ref 65–99)
HCT VFR BLD AUTO: 45.6 % (ref 36.5–49.3)
HGB BLD-MCNC: 15.2 G/DL (ref 12–17)
IMM GRANULOCYTES # BLD AUTO: 0.04 THOUSAND/UL (ref 0–0.2)
IMM GRANULOCYTES NFR BLD AUTO: 0 % (ref 0–2)
LYMPHOCYTES # BLD AUTO: 3.01 THOUSANDS/ΜL (ref 0.6–4.47)
LYMPHOCYTES NFR BLD AUTO: 32 % (ref 14–44)
MCH RBC QN AUTO: 33 PG (ref 26.8–34.3)
MCHC RBC AUTO-ENTMCNC: 33.3 G/DL (ref 31.4–37.4)
MCV RBC AUTO: 99 FL (ref 82–98)
MICROALBUMIN UR-MCNC: 65 MG/L (ref 0–20)
MICROALBUMIN/CREAT 24H UR: 86 MG/G CREATININE (ref 0–30)
MONOCYTES # BLD AUTO: 0.76 THOUSAND/ΜL (ref 0.17–1.22)
MONOCYTES NFR BLD AUTO: 8 % (ref 4–12)
NEUTROPHILS # BLD AUTO: 4.4 THOUSANDS/ΜL (ref 1.85–7.62)
NEUTS SEG NFR BLD AUTO: 48 % (ref 43–75)
NRBC BLD AUTO-RTO: 0 /100 WBCS
PLATELET # BLD AUTO: 259 THOUSANDS/UL (ref 149–390)
PMV BLD AUTO: 9.9 FL (ref 8.9–12.7)
POTASSIUM SERPL-SCNC: 4.2 MMOL/L (ref 3.5–5.3)
PROT UR-MCNC: 18 MG/DL
PROT/CREAT UR: 0.24 MG/G{CREAT} (ref 0–0.1)
RBC # BLD AUTO: 4.61 MILLION/UL (ref 3.88–5.62)
SODIUM SERPL-SCNC: 140 MMOL/L (ref 136–145)
WBC # BLD AUTO: 9.33 THOUSAND/UL (ref 4.31–10.16)

## 2022-02-21 PROCEDURE — 85025 COMPLETE CBC W/AUTO DIFF WBC: CPT

## 2022-02-21 PROCEDURE — 82043 UR ALBUMIN QUANTITATIVE: CPT

## 2022-02-21 PROCEDURE — 3060F POS MICROALBUMINURIA REV: CPT | Performed by: INTERNAL MEDICINE

## 2022-02-21 PROCEDURE — 36415 COLL VENOUS BLD VENIPUNCTURE: CPT

## 2022-02-21 PROCEDURE — 82570 ASSAY OF URINE CREATININE: CPT

## 2022-02-21 PROCEDURE — 84156 ASSAY OF PROTEIN URINE: CPT

## 2022-02-21 PROCEDURE — 80048 BASIC METABOLIC PNL TOTAL CA: CPT

## 2022-02-22 ENCOUNTER — TELEPHONE (OUTPATIENT)
Dept: NEPHROLOGY | Facility: CLINIC | Age: 77
End: 2022-02-22

## 2022-02-22 NOTE — TELEPHONE ENCOUNTER
Appointment Confirmation   Person confirmed appointment with  If not patient, name of the person Spouse    Date and time of appointment 2/23/2022 @11:00    Patient acknowledged and will be at appointment?  yes    Did you advise the patient that they will need a urine sample if they are a new patient? n/a    Did you advise the patient to bring their current medications for verification? (including any OTC) Yes    Additional Information

## 2022-02-23 ENCOUNTER — OFFICE VISIT (OUTPATIENT)
Dept: NEPHROLOGY | Facility: CLINIC | Age: 77
End: 2022-02-23
Payer: COMMERCIAL

## 2022-02-23 VITALS
HEART RATE: 77 BPM | WEIGHT: 225.8 LBS | BODY MASS INDEX: 33.34 KG/M2 | OXYGEN SATURATION: 96 % | DIASTOLIC BLOOD PRESSURE: 62 MMHG | SYSTOLIC BLOOD PRESSURE: 100 MMHG

## 2022-02-23 DIAGNOSIS — I12.9 BENIGN HYPERTENSION WITH CKD (CHRONIC KIDNEY DISEASE) STAGE III (HCC): ICD-10-CM

## 2022-02-23 DIAGNOSIS — N18.31 STAGE 3A CHRONIC KIDNEY DISEASE (HCC): ICD-10-CM

## 2022-02-23 DIAGNOSIS — R60.0 BILATERAL LOWER EXTREMITY EDEMA: ICD-10-CM

## 2022-02-23 DIAGNOSIS — I35.0 AORTIC STENOSIS, MODERATE: ICD-10-CM

## 2022-02-23 DIAGNOSIS — I51.7 CONCENTRIC LEFT VENTRICULAR HYPERTROPHY: ICD-10-CM

## 2022-02-23 DIAGNOSIS — E11.21 DIABETIC NEPHROPATHY ASSOCIATED WITH TYPE 2 DIABETES MELLITUS (HCC): Primary | ICD-10-CM

## 2022-02-23 DIAGNOSIS — N18.30 BENIGN HYPERTENSION WITH CKD (CHRONIC KIDNEY DISEASE) STAGE III (HCC): ICD-10-CM

## 2022-02-23 DIAGNOSIS — I50.32 CHRONIC DIASTOLIC (CONGESTIVE) HEART FAILURE (HCC): ICD-10-CM

## 2022-02-23 PROCEDURE — 99214 OFFICE O/P EST MOD 30 MIN: CPT | Performed by: INTERNAL MEDICINE

## 2022-02-23 NOTE — ASSESSMENT & PLAN NOTE
Lab Results   Component Value Date    EGFR 53 02/21/2022    EGFR 59 12/02/2021    EGFR 43 12/01/2021    CREATININE 1 30 02/21/2022    CREATININE 1 21 12/02/2021    CREATININE 1 56 (H) 12/01/2021     Blood pressure well controlled this time, it is tolerated with respect to the relative hypotension    Continue to focus on maximizing overall care and hemodynamics in general

## 2022-02-23 NOTE — PROGRESS NOTES
[facility] [place], [place]    Name: Ramirez Smith  YOB: 1945      Assessment/Plan:    Stage 3a chronic kidney disease [facility])  Lab Results   Component Value Date    EGFR 53 02/21/2022    EGFR 59 12/02/2021    EGFR 43 12/01/2021    CREATININE 1 30 02/21/2022    CREATININE 1 21 12/02/2021    CREATININE 1 56 (H) 12/01/2021     Kidney function stable at this time, continue with maximizing overall diabetic and hypertensive management and avoidance of potential nephrotoxins  Benign hypertension with CKD (chronic kidney disease) stage III [facility])  Lab Results   Component Value Date    EGFR 53 02/21/2022    EGFR 59 12/02/2021    EGFR 43 12/01/2021    CREATININE 1 30 02/21/2022    CREATININE 1 21 12/02/2021    CREATININE 1 56 (H) 12/01/2021     Blood pressure well controlled this time, it is tolerated with respect to the relative hypotension  Continue to focus on maximizing overall care and hemodynamics in general     Diabetic nephropathy associated with type 2 diabetes mellitus ([facility] )    Lab Results   Component Value Date    HGBA1C 6 6 (A) 01/10/2022     Blood sugars better controlled, continue with angiotensin receptor blocker as well as SGLT-2 inhibitor  Patient not a candidate for additional medications at this time, will continue to monitor closely and offer additional medical therapy if indicated  Concentric left ventricular hypertrophy  Will defer if repeat echocardiogram indicated according to our Cardiology colleagues  Otherwise continue to maximize medical therapy and blood pressure control  Patient also has aortic stenosis which we will defer to Cardiology           Problem List Items Addressed This Visit        Endocrine    Diabetic nephropathy associated with type 2 diabetes mellitus ([facility] ) - Primary       Lab Results   Component Value Date    HGBA1C 6 6 (A) 01/10/2022     Blood sugars better controlled, continue with angiotensin receptor blocker as well as SGLT-2 inhibitor  Patient not a candidate for additional medications at this time, will continue to monitor closely and offer additional medical therapy if indicated  Cardiovascular and Mediastinum    Benign hypertension with CKD (chronic kidney disease) stage III [facility])     Lab Results   Component Value Date    EGFR 53 02/21/2022    EGFR 59 12/02/2021    EGFR 43 12/01/2021    CREATININE 1 30 02/21/2022    CREATININE 1 21 12/02/2021    CREATININE 1 56 (H) 12/01/2021     Blood pressure well controlled this time, it is tolerated with respect to the relative hypotension  Continue to focus on maximizing overall care and hemodynamics in general          Concentric left ventricular hypertrophy     Will defer if repeat echocardiogram indicated according to our Cardiology colleagues  Otherwise continue to maximize medical therapy and blood pressure control  Patient also has aortic stenosis which we will defer to Cardiology  Aortic stenosis, moderate    Chronic diastolic (congestive) heart failure (HCC)       Genitourinary    Stage 3a chronic kidney disease ([facility] )     Lab Results   Component Value Date    EGFR 53 02/21/2022    EGFR 59 12/02/2021    EGFR 43 12/01/2021    CREATININE 1 30 02/21/2022    CREATININE 1 21 12/02/2021    CREATININE 1 56 (H) 12/01/2021     Kidney function stable at this time, continue with maximizing overall diabetic and hypertensive management and avoidance of potential nephrotoxins  Relevant Orders    Microalbumin / creatinine urine ratio    Urinalysis with microscopic    Comprehensive metabolic panel    PTH, intact    Phosphorus    Magnesium       Other    Bilateral lower extremity edema          Patient currently stable the renal standpoint, we will see him back in approximately 6 months for regular appointment  Labs to be done prior to that appointment  Subjective:      Patient ID: Shira Barahona is a 77 y o  male      Patient presents for follow-up appointment  Labs reviewed with the patient, kidney function stable with a creatinine of 1 3 mg/dL with no specific electrolyte abnormalities  Patient bilateral lower extremity edema is controlled, he is taking diuretics  He is trying to focus on low-sodium diet, his wife has been assisting with this  Hypertension  This is a chronic problem  The current episode started more than 1 year ago  The problem is unchanged  The problem is controlled  Associated symptoms include peripheral edema  Pertinent negatives include no chest pain or orthopnea  There are no associated agents to hypertension  Risk factors for coronary artery disease include male gender and obesity  Past treatments include angiotensin blockers, lifestyle changes and diuretics  There are no compliance problems  Hypertensive end-organ damage includes kidney disease  Identifiable causes of hypertension include chronic renal disease  The following portions of the patient's history were reviewed and updated as appropriate: allergies, current medications, past family history, past medical history, past social history, past surgical history and problem list     Review of Systems   Cardiovascular: Negative for chest pain and orthopnea  All other systems reviewed and are negative          Social History     Socioeconomic History    Marital status: /Civil Union     Spouse name: None    Number of children: None    Years of education: None    Highest education level: None   Occupational History    None   Tobacco Use    Smoking status: Never Smoker    Smokeless tobacco: Never Used   Vaping Use    Vaping Use: Never used   Substance and Sexual Activity    Alcohol use: Not Currently     Comment: socially, cut down in 2008, previously did more than     Drug use: Never    Sexual activity: Yes     Partners: Female   Other Topics Concern    None   Social History Narrative    No advance directive     No living will      Social Determinants of Health     Financial Resource Strain: Not on file   Food Insecurity: Not on file   Transportation Needs: Not on file   Physical Activity: Not on file   Stress: Not on file   Social Connections: Not on file   Intimate Partner Violence: Not on file   Housing Stability: Not on file     Past Medical History:   Diagnosis Date    BPH (benign prostatic hyperplasia)     CAD (coronary artery disease)     Cancer ([facility] )     basal cell    Chronic kidney disease     stage 3    Colon polyp     CPAP (continuous positive airway pressure) dependence     Diabetes mellitus ([facility] )     niddm    Disease of thyroid gland     Gout     High cholesterol     Hypertension     MI, 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     Myocardial infarction ([facility] ) 2014    Sleep apnea      Past Surgical History:   Procedure Laterality Date    BASAL CELL CARCINOMA EXCISION Right 1/21/2022    Procedure: EXCISION BASAL CELL CARCINOMA EXTREMITY;  Surgeon: Elder Bowden DO;  Location: [facility];  Service: General    CATARACT EXTRACTION W/  INTRAOCULAR LENS IMPLANT      CORNEAL TRANSPLANT      CORONARY ANGIOPLASTY WITH STENT PLACEMENT      stents x3-- [phone]    EYE SURGERY      repair corneal laceration    [place] COLONOSCOPY FLX DX W/COLLJ SPEC WHEN PFRMD N/A 3/21/2016    Procedure: COLONOSCOPY;  Surgeon: Aliya Dailey MD;  Location: [facility];  Service: Colorectal    TONSILLECTOMY AND ADENOIDECTOMY         Current Outpatient Medications:     allopurinol (ZYLOPRIM) 100 mg tablet, Take 1 tablet (100 mg total) by mouth daily, Disp: 90 tablet, Rfl: 0    aspirin 325 mg tablet, Take 325 mg by mouth daily  , Disp: , Rfl:     atorvastatin (LIPITOR) 80 mg tablet, Take 1 tablet (80 mg total) by mouth daily, Disp: 90 tablet, Rfl: 0    Empagliflozin (Jardiance) 10 MG TABS, Take 1 tablet (10 mg total) by mouth every morning, Disp: 90 tablet, Rfl: 1    escitalopram (LEXAPRO) 20 mg tablet, Take 1 tablet (20 mg total) by mouth daily, Disp: 90 tablet, Rfl: 0    glimepiride (AMARYL) 4 mg tablet, TAKE 1 TABLET TWICE A DAY, Disp: 180 tablet, Rfl: 1    levothyroxine 88 mcg tablet, Take 1 tablet (88 mcg total) by mouth daily in the early morning, Disp: 90 tablet, Rfl: 0    losartan (COZAAR) 25 mg tablet, Take 1 tablet (25 mg total) by mouth daily, Disp: 90 tablet, Rfl: 1    metFORMIN (GLUCOPHAGE) 1000 MG tablet, Take 1 tablet (1,000 mg total) by mouth 2 (two) times a day with meals, Disp: 180 tablet, Rfl: 2    multivitamin (THERAGRAN) TABS, Take 1 tablet by mouth daily, Disp: , Rfl:     nitroglycerin (NITROSTAT) 0 4 mg SL tablet, Place 1 tablet (0 4 mg total) under the tongue every 5 (five) minutes as needed for chest pain, Disp: 25 tablet, Rfl: 0    propranolol (INDERAL LA) 60 mg 24 hr capsule, Take 1 capsule (60 mg total) by mouth daily, Disp: 90 capsule, Rfl: 0    torsemide (DEMADEX) 10 mg tablet, Take 1 tablet (10 mg total) by mouth daily, Disp: 90 tablet, Rfl: 0    zonisamide (ZONEGRAN) 25 mg capsule, Take 1 tablet (25 mg total) by mouth daily, Disp: 30 capsule, Rfl: 3    Lab Results   Component Value Date     07/16/2015    SODIUM 140 02/21/2022    K 4 2 02/21/2022     02/21/2022    CO2 25 02/21/2022    ANIONGAP 8 07/16/2015    AGAP 10 02/21/2022    BUN 22 02/21/2022    CREATININE 1 30 02/21/2022    GLUC 173 (H) 12/02/2021    GLUF 152 (H) 02/21/2022    CALCIUM 9 0 02/21/2022    AST 19 12/02/2021    ALT 52 12/02/2021    ALKPHOS 75 12/02/2021    PROT 6 3 (L) 07/16/2015    TP 6 9 12/02/2021    BILITOT 0 47 07/16/2015    TBILI 0 23 12/02/2021    EGFR 53 02/21/2022     Lab Results   Component Value Date    WBC 9 33 02/21/2022    HGB 15 2 02/21/2022    HCT 45 6 02/21/2022    MCV 99 (H) 02/21/2022     02/21/2022     Lab Results   Component Value Date    CHOLESTEROL 140 09/13/2021    CHOLESTEROL 122 02/21/2020    CHOLESTEROL 111 07/30/2018     Lab Results   Component Value Date    HDL 36 (L) 09/13/2021    HDL 29 (L) 02/21/2020 HDL 37 (L) 07/30/2018     Lab Results   Component Value Date    LDLCALC 66 09/13/2021    LDLCALC 63 02/21/2020    LDLCALC 50 07/30/2018     Lab Results   Component Value Date    TRIG 190 (H) 09/13/2021    TRIG 149 02/21/2020    TRIG 118 07/30/2018     No results found for: [place], [place]  Lab Results   Component Value Date    RUB6UODMHTOM 2 908 09/28/2020     Lab Results   Component Value Date    CALCIUM 9 0 02/21/2022    PHOS 4 0 12/02/2021     Lab Results   Component Value Date    SPEP  11/09/2018     The serum total protein, albumin and electrophoresis are within normal limits  No monoclonal bands noted  Reviewed by: Esvin Huff MD ([id number]) **Electronic Signature**    UPEP  11/12/2018     The urine total protein is increased  The urine protein electrophoresis shows selective proteinuria  No monoclonal bands noted  Reviewed by Esvin Huff MD ([id number])  **Electronic Signature**     No results found for: CHANEL SOTOMAYOR        Objective:      /62   Pulse 77   Wt 102 kg (225 lb 12 8 oz)   SpO2 96%   BMI 33 34 kg/m²          Physical Exam  Vitals reviewed  Constitutional:       General: He is not in acute distress  Appearance: He is well-developed  HENT:      Head: Normocephalic and atraumatic  Eyes:      Conjunctiva/sclera: Conjunctivae normal    Cardiovascular:      Rate and Rhythm: Normal rate and regular rhythm  Pulmonary:      Effort: Pulmonary effort is normal       Breath sounds: Normal breath sounds  Abdominal:      Palpations: Abdomen is soft  Musculoskeletal:      Cervical back: Neck supple  Right lower leg: Edema present  Left lower leg: Edema present  Skin:     General: Skin is warm  Findings: No rash  Neurological:      Mental Status: He is alert and oriented to person, place, and time  Cranial Nerves: No cranial nerve deficit     Psychiatric:         Behavior: Behavior normal

## 2022-02-23 NOTE — ASSESSMENT & PLAN NOTE
Will defer if repeat echocardiogram indicated according to our Cardiology colleagues  Otherwise continue to maximize medical therapy and blood pressure control  Patient also has aortic stenosis which we will defer to Cardiology

## 2022-02-23 NOTE — ASSESSMENT & PLAN NOTE
Lab Results   Component Value Date    EGFR 53 02/21/2022    EGFR 59 12/02/2021    EGFR 43 12/01/2021    CREATININE 1 30 02/21/2022    CREATININE 1 21 12/02/2021    CREATININE 1 56 (H) 12/01/2021     Kidney function stable at this time, continue with maximizing overall diabetic and hypertensive management and avoidance of potential nephrotoxins

## 2022-02-23 NOTE — ASSESSMENT & PLAN NOTE
Lab Results   Component Value Date    HGBA1C 6 6 (A) 01/10/2022     Blood sugars better controlled, continue with angiotensin receptor blocker as well as SGLT-2 inhibitor  Patient not a candidate for additional medications at this time, will continue to monitor closely and offer additional medical therapy if indicated

## 2022-02-25 ENCOUNTER — OFFICE VISIT (OUTPATIENT)
Dept: CARDIOLOGY CLINIC | Facility: HOSPITAL | Age: 77
End: 2022-02-25
Payer: COMMERCIAL

## 2022-02-25 VITALS
HEART RATE: 79 BPM | BODY MASS INDEX: 33.36 KG/M2 | HEIGHT: 69 IN | DIASTOLIC BLOOD PRESSURE: 67 MMHG | SYSTOLIC BLOOD PRESSURE: 141 MMHG | WEIGHT: 225.2 LBS

## 2022-02-25 DIAGNOSIS — I51.7 CONCENTRIC LEFT VENTRICULAR HYPERTROPHY: ICD-10-CM

## 2022-02-25 DIAGNOSIS — N18.30 BENIGN HYPERTENSION WITH CKD (CHRONIC KIDNEY DISEASE) STAGE III (HCC): ICD-10-CM

## 2022-02-25 DIAGNOSIS — I25.118 CORONARY ARTERY DISEASE OF NATIVE ARTERY OF NATIVE HEART WITH STABLE ANGINA PECTORIS (HCC): ICD-10-CM

## 2022-02-25 DIAGNOSIS — I12.9 BENIGN HYPERTENSION WITH CKD (CHRONIC KIDNEY DISEASE) STAGE III (HCC): ICD-10-CM

## 2022-02-25 DIAGNOSIS — I35.0 AORTIC STENOSIS, MODERATE: Primary | ICD-10-CM

## 2022-02-25 DIAGNOSIS — I50.32 CHRONIC DIASTOLIC (CONGESTIVE) HEART FAILURE (HCC): ICD-10-CM

## 2022-02-25 DIAGNOSIS — I21.4 NSTEMI (NON-ST ELEVATED MYOCARDIAL INFARCTION) (HCC): ICD-10-CM

## 2022-02-25 PROCEDURE — 1036F TOBACCO NON-USER: CPT | Performed by: INTERNAL MEDICINE

## 2022-02-25 PROCEDURE — 99214 OFFICE O/P EST MOD 30 MIN: CPT | Performed by: INTERNAL MEDICINE

## 2022-02-25 PROCEDURE — 1160F RVW MEDS BY RX/DR IN RCRD: CPT | Performed by: INTERNAL MEDICINE

## 2022-02-26 NOTE — PROGRESS NOTES
Cardiology Follow Up    Barbara Celestin  1945  [id number]  [facility] Bishnu Sanders  [address] [address] [address] [phone]    1  Aortic stenosis, moderate  Echo complete w/ contrast if indicated   2  NSTEMI (non-ST elevated myocardial infarction) ([address] )     3  Coronary artery disease of native artery of native heart with stable angina pectoris ([address] )     4  Benign hypertension with CKD (chronic kidney disease) stage III ([address] )     5  Concentric left ventricular hypertrophy     6  Chronic diastolic (congestive) heart failure (HCC)           Discussion/Summary: All of his assessed cardiac problems are presently stable  I have reviewed his medications and made no changes  He will be scheduled for a repeat ECHO  RTO 9 months  Interval History: He is not very active  He denies CP, significant SOB, LE edema  He has CAD with RCA stenting in 9/2014  Nuclear stress test 10/2018 - no ischemia  ECHO 1/2021 - normal EF, moderate AS  His weight is down from 238 to 225 lbs      /67  Creatinine 1 3        Patient Active Problem List   Diagnosis    Arthritis    Depression with anxiety    Controlled type 2 diabetes mellitus with hyperglycemia, without long-term current use of insulin ([address] )    Gout    Hyperlipidemia    Hypertension    Morbid obesity ([address] )    NSTEMI (non-ST elevated myocardial infarction) ([address] )    Tremor    Degenerative disc disease, lumbar    Tremor, essential    Diabetic neuropathy associated with diabetes mellitus due to underlying condition ([address] )    Gait instability    Frequent falls    Infected epidermoid cyst    Vitamin E deficiency    Primary osteoarthritis involving multiple joints    Chronic pain of both knees    Primary osteoarthritis of both knees    Coronary artery disease of native artery of native heart with stable angina pectoris ([address] )    Primary osteoarthritis of right knee    Primary osteoarthritis of left knee    Fall (on) (from) other stairs and steps, initial encounter    Recurrent major depressive disorder ([place] [address] )    Benign hypertension with CKD (chronic kidney disease) stage III ([place] [address] )    Diabetic nephropathy associated with type 2 diabetes mellitus ([facility] )    Concentric left ventricular hypertrophy    Aortic stenosis, moderate    Edema    Mild cognitive impairment    Bilateral lower extremity edema    Other chest pain    Leukocytosis    Skin lesion    Chronic diastolic (congestive) heart failure (HCC)    Laceration and contusion of cerebral cortex, right, without loss of consciousness, initial encounter ([place] [address] )    Basal cell carcinoma (BCC) of right shoulder    Stage 3a chronic kidney disease ([place] [address] )     Past Medical History:   Diagnosis Date    BPH (benign prostatic hyperplasia)     CAD (coronary artery disease)     Cancer (HCC)     basal cell    Chronic kidney disease     stage 3    Colon polyp     CPAP (continuous positive airway pressure) dependence     Diabetes mellitus (HCC)     niddm    Disease of thyroid gland     Gout     High cholesterol     Hypertension     MI, 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     Myocardial infarction ([place] [address] ) 2014    Sleep apnea      Social History     Socioeconomic History    Marital status: /Civil Union     Spouse name: Not on file    Number of children: Not on file    Years of education: Not on file    Highest education level: Not on file   Occupational History    Not on file   Tobacco Use    Smoking status: Never Smoker    Smokeless tobacco: Never Used   Vaping Use    Vaping Use: Never used   Substance and Sexual Activity    Alcohol use: Not Currently     Comment: socially, cut down in 2008, previously did more than     Drug use: Never    Sexual activity: Yes     Partners: Female   Other Topics Concern    Not on file   Social History Narrative    No advance directive     No living will      Social Determinants of Health     Financial Resource Strain: Not on file   Food Insecurity: Not on file   Transportation Needs: Not on file   Physical Activity: Not on file   Stress: Not on file   Social Connections: Not on file   Intimate Partner Violence: Not on file   Housing Stability: Not on file      Family History   Adopted: Yes   Problem Relation Age of Onset    No Known Problems Mother     No Known Problems Father      Past Surgical History:   Procedure Laterality Date    BASAL CELL CARCINOMA EXCISION Right 1/21/2022    Procedure: EXCISION BASAL CELL CARCINOMA EXTREMITY;  Surgeon: Elder Bowden DO;  Location: [facility];  Service: General    CATARACT EXTRACTION W/  INTRAOCULAR LENS IMPLANT      CORNEAL TRANSPLANT      CORONARY ANGIOPLASTY WITH STENT PLACEMENT      stents [phone]-- [phone]    EYE SURGERY      repair corneal laceration    CT COLONOSCOPY FLX DX W/COLLJ SPEC WHEN PFRMD N/A 3/21/2016    Procedure: COLONOSCOPY;  Surgeon: Aliya Dailey MD;  Location: [facility];  Service: Colorectal    TONSILLECTOMY AND ADENOIDECTOMY         Current Outpatient Medications:     allopurinol (ZYLOPRIM) 100 mg tablet, Take 1 tablet (100 mg total) by mouth daily, Disp: 90 tablet, Rfl: 0    aspirin 325 mg tablet, Take 325 mg by mouth daily  , Disp: , Rfl:     atorvastatin (LIPITOR) 80 mg tablet, Take 1 tablet (80 mg total) by mouth daily, Disp: 90 tablet, Rfl: 0    Empagliflozin (Jardiance) 10 MG TABS, Take 1 tablet (10 mg total) by mouth every morning, Disp: 90 tablet, Rfl: 1    escitalopram (LEXAPRO) 20 mg tablet, Take 1 tablet (20 mg total) by mouth daily, Disp: 90 tablet, Rfl: 0    glimepiride (AMARYL) 4 mg tablet, TAKE 1 TABLET TWICE A DAY, Disp: 180 tablet, Rfl: 1    levothyroxine 88 mcg tablet, Take 1 tablet (88 mcg total) by mouth daily in the early morning, Disp: 90 tablet, Rfl: 0    losartan (COZAAR) 25 mg tablet, Take 1 tablet (25 mg total) by mouth daily, Disp: 90 tablet, Rfl: 1   metFORMIN (GLUCOPHAGE) 1000 MG tablet, Take 1 tablet (1,000 mg total) by mouth 2 (two) times a day with meals, Disp: 180 tablet, Rfl: 2    multivitamin (THERAGRAN) TABS, Take 1 tablet by mouth daily, Disp: , Rfl:     nitroglycerin (NITROSTAT) 0 4 mg SL tablet, Place 1 tablet (0 4 mg total) under the tongue every 5 (five) minutes as needed for chest pain, Disp: 25 tablet, Rfl: 0    propranolol (INDERAL LA) 60 mg 24 hr capsule, Take 1 capsule (60 mg total) by mouth daily, Disp: 90 capsule, Rfl: 0    torsemide (DEMADEX) 10 mg tablet, Take 1 tablet (10 mg total) by mouth daily, Disp: 90 tablet, Rfl: 0    zonisamide (ZONEGRAN) 25 mg capsule, Take 1 tablet (25 mg total) by mouth daily, Disp: 30 capsule, Rfl: 3  No Known Allergies  Vitals:    02/25/22 1544   BP: 141/67   Pulse: 79   Weight: 102 kg (225 lb 3 2 oz)   Height: 5' 9" (1 753 m)     Weight (last 2 days)     Date/Time Weight    02/25/22 1544 102 (225 2)         Blood pressure 141/67, pulse 79, height 5' 9" (1 753 m), weight 102 kg (225 lb 3 2 oz)  , Body mass index is 33 26 kg/m²  Labs:  Lab on 02/21/2022   Component Date Value    Creatinine, Ur 02/21/2022 75 3     Microalbum  ,U,Random 02/21/2022 65 0*    Microalb Creat Ratio 02/21/2022 86*    WBC 02/21/2022 9 33     RBC 02/21/2022 4 61     Hemoglobin 02/21/2022 15 2     Hematocrit 02/21/2022 45 6     MCV 02/21/2022 99*    MCH 02/21/2022 33 0     MCHC 02/21/2022 33 3     RDW 02/21/2022 12 3     MPV 02/21/2022 9 9     Platelets 73/11/3359 259     nRBC 02/21/2022 0     Neutrophils Relative 02/21/2022 48     Immat GRANS % 02/21/2022 0     Lymphocytes Relative 02/21/2022 32     Monocytes Relative 02/21/2022 8     Eosinophils Relative 02/21/2022 11*    Basophils Relative 02/21/2022 1     Neutrophils Absolute 02/21/2022 4 40     Immature Grans Absolute 02/21/2022 0 04     Lymphocytes Absolute 02/21/2022 3 01     Monocytes Absolute 02/21/2022 0 76     Eosinophils Absolute 02/21/2022 1 06*  Basophils Absolute 02/21/2022 0 06     Creatinine, Ur 02/21/2022 75 1     Protein Urine Random 02/21/2022 18     Prot/Creat Ratio, Ur 02/21/2022 0 24*    Sodium 02/21/2022 140     Potassium 02/21/2022 4 2     Chloride 02/21/2022 105     CO2 02/21/2022 25     ANION GAP 02/21/2022 10     BUN 02/21/2022 22     Creatinine 02/21/2022 1 30     Glucose, Fasting 02/21/2022 152*    Calcium 02/21/2022 9 0     eGFR 02/21/2022 53    Admission on 01/21/2022, Discharged on 01/21/2022   Component Date Value    POC Glucose 01/21/2022 156*    Case Report 01/21/2022                      Value:Surgical Pathology Report                         Case: [id number]                                   Authorizing Provider:  Inna Botello DO          Collected:           01/21/2022 1044              Ordering Location:     [facility] Received:            01/21/2022 [address] [place] [address]                                     Operating Room                                                               Pathologist:           Nitesh Schmidt MD                                                             Specimen:    Lesion, right shoulder long lateral short superior                                         Final Diagnosis 01/21/2022                      Value: This result contains rich text formatting which cannot be displayed here   Additional Information 01/21/2022                      Value: This result contains rich text formatting which cannot be displayed here  Shelbi Lopez Gross Description 01/21/2022                      Value: This result contains rich text formatting which cannot be displayed here      Clinical Information 01/21/2022                      Value:Right shoulder BCC    POC Glucose 01/21/2022 110    Office Visit on 01/10/2022   Component Date Value    Hemoglobin A1C 01/10/2022 6 6*   Appointment on 01/06/2022   Component Date Value    Case Report 01/06/2022                      Value:Surgical Pathology Report Case: [id number]                                   Authorizing Provider:  Amena Bird DO          Collected:           01/06/2022 0746              Ordering Location:     [facility]  Received:            01/06/2022 [facility] [address],[facility]  [address]                                                                       Pathologist:           Alondra Lobo MD                                                         Specimen:    Lesion, R shoulder                                                                         Final Diagnosis 01/06/2022                      Value: This result contains rich text formatting which cannot be displayed here   Additional Information 01/06/2022                      Value: This result contains rich text formatting which cannot be displayed here  Casey McKinley Gross Description 01/06/2022                      Value: This result contains rich text formatting which cannot be displayed here      Clinical Information 01/06/2022                      Value:punch biopsy right shoulder   Hospital Outpatient Visit on 12/03/2021   Component Date Value    Baseline HR 12/03/2021 64     Baseline BP 12/03/2021 118/70     O2 sat rest 12/03/2021 94     Stress peak HR 12/03/2021 86     Post peak BP 12/03/2021 114     Rate Pressure Product 12/03/2021 9,804 0     O2 sat peak 12/03/2021 98     Recovery HR 12/03/2021 75     Recovery BP 12/03/2021 114/70     O2 sat recovery 12/03/2021 98     Rest Nuclear Isotope Dose 12/03/2021 11 00     Stress Nuclear Isotope D* 12/03/2021 32 90     Stress/rest perfusion ra* 12/03/2021 1 10     EF (%) 12/03/2021 61    Admission on 12/01/2021, Discharged on 12/02/2021   Component Date Value    WBC 12/01/2021 14 24*    RBC 12/01/2021 4 24     Hemoglobin 12/01/2021 14 1     Hematocrit 12/01/2021 41 9     MCV 12/01/2021 99*    MCH 12/01/2021 33 3     MCHC 12/01/2021 33 7     RDW 12/01/2021 12 5     MPV 12/01/2021 10 3  Platelets 13/60/5950 298     nRBC 12/01/2021 0     Neutrophils Relative 12/01/2021 74     Immat GRANS % 12/01/2021 1     Lymphocytes Relative 12/01/2021 17     Monocytes Relative 12/01/2021 8     Eosinophils Relative 12/01/2021 0     Basophils Relative 12/01/2021 0     Neutrophils Absolute 12/01/2021 10 64*    Immature Grans Absolute 12/01/2021 0 10     Lymphocytes Absolute 12/01/2021 2 38     Monocytes Absolute 12/01/2021 1 10     Eosinophils Absolute 12/01/2021 0 00     Basophils Absolute 12/01/2021 0 02     Protime 12/01/2021 12 4     INR 12/01/2021 0 97     PTT 12/01/2021 27     Sodium 12/01/2021 141     Potassium 12/01/2021 4 3     Chloride 12/01/2021 102     CO2 12/01/2021 27     ANION GAP 12/01/2021 12     BUN 12/01/2021 28*    Creatinine 12/01/2021 1 56*    Glucose 12/01/2021 206*    Calcium 12/01/2021 8 9     AST 12/01/2021 18     ALT 12/01/2021 54     Alkaline Phosphatase 12/01/2021 87     Total Protein 12/01/2021 7 5     Albumin 12/01/2021 3 6     Total Bilirubin 12/01/2021 0 30     eGFR 12/01/2021 43     Lipase 12/01/2021 717*    D-Dimer, Quant 12/01/2021 0 49     hs TnI 0hr 12/01/2021 4     SARS-CoV-2 12/01/2021 Negative     INFLUENZA A PCR 12/01/2021 Negative     INFLUENZA B PCR 12/01/2021 Negative     RSV PCR 12/01/2021 Negative     NT-proBNP 12/01/2021 284*    hs TnI 4hr 12/02/2021 5     Delta 4hr hsTnI 12/02/2021 1     POC Glucose 12/02/2021 199*    Sodium 12/02/2021 137     Potassium 12/02/2021 4 2     Chloride 12/02/2021 104     CO2 12/02/2021 27     ANION GAP 12/02/2021 6     BUN 12/02/2021 28*    Creatinine 12/02/2021 1 21     Glucose 12/02/2021 173*    Glucose, Fasting 12/02/2021 173*    Calcium 12/02/2021 8 6     Corrected Calcium 12/02/2021 9 2     AST 12/02/2021 19     ALT 12/02/2021 52     Alkaline Phosphatase 12/02/2021 75     Total Protein 12/02/2021 6 9     Albumin 12/02/2021 3 3*    Total Bilirubin 12/02/2021 0 23     eGFR 12/02/2021 59     Magnesium 12/02/2021 2 1     Phosphorus 12/02/2021 4 0     WBC 12/02/2021 12 19*    RBC 12/02/2021 4 07     Hemoglobin 12/02/2021 13 6     Hematocrit 12/02/2021 40 4     MCV 12/02/2021 99*    MCH 12/02/2021 33 4     MCHC 12/02/2021 33 7     RDW 12/02/2021 12 5     Platelets 32/01/5707 266     MPV 12/02/2021 10 4     POC Glucose 12/02/2021 130     Lipase 12/02/2021 273     POC Glucose 12/02/2021 178*    Ventricular Rate 12/01/2021 78     Atrial Rate 12/01/2021 78     MO Interval 12/01/2021 192     QRSD Interval 12/01/2021 84     QT Interval 12/01/2021 394     QTC Interval 12/01/2021 449     P Axis 12/01/2021 56     QRS Axis 12/01/2021 -52     T Wave [place] 12/01/2021 41    Appointment on 11/24/2021   Component Date Value    Sodium 11/24/2021 142     Potassium 11/24/2021 4 0     Chloride 11/24/2021 102     CO2 11/24/2021 33*    ANION GAP 11/24/2021 7     BUN 11/24/2021 22     Creatinine 11/24/2021 1 48*    Glucose, Fasting 11/24/2021 161*    Calcium 11/24/2021 9 0     AST 11/24/2021 26     ALT 11/24/2021 52     Alkaline Phosphatase 11/24/2021 97     Total Protein 11/24/2021 7 5     Albumin 11/24/2021 3 6     Total Bilirubin 11/24/2021 0 41     eGFR 11/24/2021 46     Creatinine, Ur 11/24/2021 80 2     Microalbum  ,U,Random 11/24/2021 53 9*    Microalb Creat Ratio 11/24/2021 67*    Clarity, UA 11/24/2021 Slightly Cloudy     Color, UA 11/24/2021 Yellow     Specific Gravity, UA 11/24/2021 1 015     pH, UA 11/24/2021 6 0     Glucose, UA 11/24/2021 >=1000 (1%)*    Ketones, UA 11/24/2021 Negative     Blood, UA 11/24/2021 Negative     Protein, UA 11/24/2021 Negative     Nitrite, UA 11/24/2021 Negative     Bilirubin, UA 11/24/2021 Negative     Urobilinogen, UA 11/24/2021 0 2     Leukocytes, UA 11/24/2021 Elevated glucose may cause decreased leukocyte values   See urine microscopic for [facility] result/*    WBC, UA 11/24/2021 0-1*    RBC, UA 11/24/2021 None Seen  Bacteria, UA 11/24/2021 None Seen     Epithelial Cells 11/24/2021 Occasional     Vit D, 25-Hydroxy 11/24/2021 15 5*   Admission on 11/19/2021, Discharged on 11/19/2021   Component Date Value    Color, UA 11/19/2021 Yellow     Clarity, UA 11/19/2021 Clear     Specific Gravity, UA 11/19/2021 1 020     pH, UA 11/19/2021 5 5     Leukocytes, UA 11/19/2021 Elevated glucose may cause decreased leukocyte values   See urine microscopic for [facility] result/*    Nitrite, UA 11/19/2021 Negative     Protein, UA 11/19/2021 Negative     Glucose, UA 11/19/2021 >=1000 (1%)*    Ketones, UA 11/19/2021 Negative     Urobilinogen, UA 11/19/2021 0 2     Bilirubin, UA 11/19/2021 Negative     Blood, UA 11/19/2021 Negative     WBC 11/19/2021 11 60*    RBC 11/19/2021 4 41     Hemoglobin 11/19/2021 14 8     Hematocrit 11/19/2021 43 9     MCV 11/19/2021 100*    MCH 11/19/2021 33 6     MCHC 11/19/2021 33 7     RDW 11/19/2021 12 4     MPV 11/19/2021 9 9     Platelets 16/13/3069 273     nRBC 11/19/2021 0     Neutrophils Relative 11/19/2021 55     Immat GRANS % 11/19/2021 1     Lymphocytes Relative 11/19/2021 27     Monocytes Relative 11/19/2021 10     Eosinophils Relative 11/19/2021 6     Basophils Relative 11/19/2021 1     Neutrophils Absolute 11/19/2021 6 51     Immature Grans Absolute 11/19/2021 0 06     Lymphocytes Absolute 11/19/2021 3 07     Monocytes Absolute 11/19/2021 1 15     Eosinophils Absolute 11/19/2021 0 73*    Basophils Absolute 11/19/2021 0 08     Sodium 11/19/2021 137     Potassium 11/19/2021 4 2     Chloride 11/19/2021 102     CO2 11/19/2021 29     ANION GAP 11/19/2021 6     BUN 11/19/2021 18     Creatinine 11/19/2021 1 27     Glucose 11/19/2021 86     Calcium 11/19/2021 9 2     AST 11/19/2021 32     ALT 11/19/2021 58     Alkaline Phosphatase 11/19/2021 91     Total Protein 11/19/2021 7 2     Albumin 11/19/2021 3 6     Total Bilirubin 11/19/2021 0 39     eGFR 11/19/2021 55     hs TnI 0hr 11/19/2021 3     RBC, UA 11/19/2021 None Seen     WBC, UA 11/19/2021 None Seen     Epithelial Cells 11/19/2021 Occasional     Bacteria, UA 11/19/2021 None Seen     Ventricular Rate 11/19/2021 66     Atrial Rate 11/19/2021 66     NV Interval 11/19/2021 200     QRSD Interval 11/19/2021 82     QT Interval 11/19/2021 410     QTC Interval 11/19/2021 429     P Axis 11/19/2021 20     QRS Axis 11/19/2021 -46     T Wave Axis 11/19/2021 49    Office Visit on 09/13/2021   Component Date Value    Creatinine, Ur 09/13/2021 69 4     Microalbum  ,U,Random 09/13/2021 24 4*    Microalb Creat Ratio 09/13/2021 35*   Appointment on 09/13/2021   Component Date Value    Sodium 09/13/2021 141     Potassium 09/13/2021 3 9     Chloride 09/13/2021 104     CO2 09/13/2021 29     ANION GAP 09/13/2021 8     BUN 09/13/2021 22     Creatinine 09/13/2021 1 49*    Glucose, Fasting 09/13/2021 224*    Calcium 09/13/2021 9 0     eGFR 09/13/2021 46    There may be more visits with results that are not included  Imaging: No results found  Review of Systems:  Review of Systems   Constitutional: Negative for diaphoresis, fatigue, fever and unexpected weight change  HENT: Negative  Respiratory: Negative for cough, shortness of breath and wheezing  Cardiovascular: Negative for chest pain, palpitations and leg swelling  Gastrointestinal: Negative for abdominal pain, diarrhea and nausea  Musculoskeletal: Negative for gait problem and myalgias  Skin: Negative for rash  Neurological: Negative for dizziness and numbness  Psychiatric/Behavioral: Negative  Physical Exam:  Physical Exam  Constitutional:       Appearance: He is well-developed  HENT:      Head: Normocephalic and atraumatic  Eyes:      Pupils: Pupils are equal, round, and reactive to light  Neck:      Vascular: No JVD  Cardiovascular:      Rate and Rhythm: Regular rhythm  Pulses: Normal pulses             Carotid pulses are 2+ on the right side and 2+ on the left side  Heart sounds: S1 normal and S2 normal  Murmur heard  Systolic murmur is present with a grade of 3/6  Pulmonary:      Effort: Pulmonary effort is normal       Breath sounds: Normal breath sounds  No wheezing or rales  Abdominal:      General: Bowel sounds are normal       Palpations: Abdomen is soft  Tenderness: There is no abdominal tenderness  Musculoskeletal:         General: No tenderness  Normal range of motion  Cervical back: Normal range of motion and neck supple  Skin:     General: Skin is warm  Neurological:      Mental Status: He is alert and oriented to person, place, and time  Cranial Nerves: No cranial nerve deficit  Deep Tendon Reflexes: Reflexes are normal and symmetric

## 2022-03-01 ENCOUNTER — PROCEDURE VISIT (OUTPATIENT)
Dept: OBGYN CLINIC | Facility: HOSPITAL | Age: 77
End: 2022-03-01
Payer: MEDICARE

## 2022-03-01 VITALS
HEART RATE: 69 BPM | SYSTOLIC BLOOD PRESSURE: 117 MMHG | WEIGHT: 227 LBS | DIASTOLIC BLOOD PRESSURE: 75 MMHG | HEIGHT: 69 IN | BODY MASS INDEX: 33.62 KG/M2

## 2022-03-01 DIAGNOSIS — G89.29 BILATERAL CHRONIC KNEE PAIN: Primary | ICD-10-CM

## 2022-03-01 DIAGNOSIS — M25.562 BILATERAL CHRONIC KNEE PAIN: Primary | ICD-10-CM

## 2022-03-01 DIAGNOSIS — M17.0 BILATERAL PRIMARY OSTEOARTHRITIS OF KNEE: ICD-10-CM

## 2022-03-01 DIAGNOSIS — M25.561 BILATERAL CHRONIC KNEE PAIN: Primary | ICD-10-CM

## 2022-03-01 PROCEDURE — 99213 OFFICE O/P EST LOW 20 MIN: CPT | Performed by: ORTHOPAEDIC SURGERY

## 2022-03-01 PROCEDURE — 20610 DRAIN/INJ JOINT/BURSA W/O US: CPT | Performed by: ORTHOPAEDIC SURGERY

## 2022-03-01 RX ORDER — LIDOCAINE HYDROCHLORIDE 10 MG/ML
3 INJECTION, SOLUTION INFILTRATION; PERINEURAL
Status: COMPLETED | OUTPATIENT
Start: 2022-03-01 | End: 2022-03-01

## 2022-03-01 RX ADMIN — LIDOCAINE HYDROCHLORIDE 3 ML: 10 INJECTION, SOLUTION INFILTRATION; PERINEURAL at 11:21

## 2022-03-01 NOTE — PROGRESS NOTES
Assessment:  1  Bilateral chronic knee pain  Large joint arthrocentesis: R knee    Large joint arthrocentesis: L knee   2  Bilateral primary osteoarthritis of knee  Large joint arthrocentesis: R knee    Large joint arthrocentesis: L knee       Plan:  The patient was provided with bilateral Synvisc One injections  He tolerated the procedure well  He should follow up in 3 months  To do next visit:  Return in about 3 months (around 6/1/2022)  The above stated was discussed in layman's terms and the patient expressed understanding  All questions were answered to the patient's satisfaction  Scribe Attestation    I,:  Lemuel Dowling am acting as a scribe while in the presence of the attending physician :       I,:  Minerva Phillips MD personally performed the services described in this documentation    as scribed in my presence :             Subjective:   Sloan Cardenas is a 77 y o  male who presents for follow up of bilateral knees and Synvisc One injections  He is s/p bilateral knee steroid injections with benefit, 11/30/2021  Today he complains of return bilateral knee pain  Prolonged weight bearing and repetitive bending aggravates while rest alleviates  He does use a cane for ambulation          Review of systems negative unless otherwise specified in HPI    Past Medical History:   Diagnosis Date    BPH (benign prostatic hyperplasia)     CAD (coronary artery disease)     Cancer (HCC)     basal cell    Chronic kidney disease     stage 3    Colon polyp     CPAP (continuous positive airway pressure) dependence     Diabetes mellitus (HCC)     niddm    Disease of thyroid gland     Gout     High cholesterol     Hypertension     MI, 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     Myocardial infarction ([facility] [address] ) 2014    Sleep apnea        Past Surgical History:   Procedure Laterality Date    BASAL CELL CARCINOMA EXCISION Right 1/21/2022    Procedure: EXCISION BASAL CELL CARCINOMA EXTREMITY;  Surgeon: Ulises Graves DO;  Location: [facility];  Service: General    CATARACT EXTRACTION W/  INTRAOCULAR LENS IMPLANT      CORNEAL TRANSPLANT      CORONARY ANGIOPLASTY WITH STENT PLACEMENT      stents x3-- [phone]    EYE SURGERY      repair corneal laceration    MD COLONOSCOPY FLX DX W/COLLJ SPEC WHEN PFRMD N/A 3/21/2016    Procedure: COLONOSCOPY;  Surgeon: Paula Henderson MD;  Location: [facility];  Service: Colorectal    TONSILLECTOMY AND ADENOIDECTOMY         Family History   Adopted: Yes   Problem Relation Age of Onset    No Known Problems Mother     No Known Problems Father        Social History     Occupational History    Not on file   Tobacco Use    Smoking status: Never Smoker    Smokeless tobacco: Never Used   Vaping Use    Vaping Use: Never used   Substance and Sexual Activity    Alcohol use: Not Currently     Comment: socially, cut down in 2008, previously did more than     Drug use: Never    Sexual activity: Yes     Partners: Female         Current Outpatient Medications:     allopurinol (ZYLOPRIM) 100 mg tablet, Take 1 tablet (100 mg total) by mouth daily, Disp: 90 tablet, Rfl: 0    aspirin 325 mg tablet, Take 325 mg by mouth daily  , Disp: , Rfl:     atorvastatin (LIPITOR) 80 mg tablet, Take 1 tablet (80 mg total) by mouth daily, Disp: 90 tablet, Rfl: 0    Empagliflozin (Jardiance) 10 MG TABS, Take 1 tablet (10 mg total) by mouth every morning, Disp: 90 tablet, Rfl: 1    escitalopram (LEXAPRO) 20 mg tablet, Take 1 tablet (20 mg total) by mouth daily, Disp: 90 tablet, Rfl: 0    glimepiride (AMARYL) 4 mg tablet, TAKE 1 TABLET TWICE A DAY, Disp: 180 tablet, Rfl: 1    levothyroxine 88 mcg tablet, Take 1 tablet (88 mcg total) by mouth daily in the early morning, Disp: 90 tablet, Rfl: 0    losartan (COZAAR) 25 mg tablet, Take 1 tablet (25 mg total) by mouth daily, Disp: 90 tablet, Rfl: 1    metFORMIN (GLUCOPHAGE) 1000 MG tablet, Take 1 tablet (1,000 mg total) by mouth 2 (two) times a day with meals, Disp: 180 tablet, Rfl: 2    multivitamin (THERAGRAN) TABS, Take 1 tablet by mouth daily, Disp: , Rfl:     nitroglycerin (NITROSTAT) 0 4 mg SL tablet, Place 1 tablet (0 4 mg total) under the tongue every 5 (five) minutes as needed for chest pain, Disp: 25 tablet, Rfl: 0    propranolol (INDERAL LA) 60 mg 24 hr capsule, Take 1 capsule (60 mg total) by mouth daily, Disp: 90 capsule, Rfl: 0    torsemide (DEMADEX) 10 mg tablet, Take 1 tablet (10 mg total) by mouth daily, Disp: 90 tablet, Rfl: 0    zonisamide (ZONEGRAN) 25 mg capsule, Take 1 tablet (25 mg total) by mouth daily, Disp: 30 capsule, Rfl: 3    No Known Allergies         Vitals:    03/01/22 1029   BP: 117/75   Pulse: 69       Objective:  Physical exam  · General: Awake, Alert, Oriented  · Eyes: Pupils equal, round and reactive to light  · Heart: regular rate and rhythm  · Lungs: No audible wheezing  · Abdomen: soft                    Ortho Exam  Bilateral knees:  No erythema or ecchymosis  No effusion or swelling  Normal strength  Good ROM with crepitus   Calf compartments soft and supple  Sensation intact  Toes are warm sensate and mobile        Diagnostics, reviewed and taken today if performed as documented:    None performed     Procedures, if performed today:    Large joint arthrocentesis: R knee  Universal Protocol:  Consent: Verbal consent obtained  Risks and benefits: risks, benefits and alternatives were discussed  Consent given by: patient  Time out: Immediately prior to procedure a "time out" was called to verify the correct patient, procedure, equipment, support staff and site/side marked as required    Timeout called at: 3/1/2022 11:18 AM   Patient understanding: patient states understanding of the procedure being performed  Patient identity confirmed: verbally with patient    Supporting Documentation  Indications: pain   Procedure Details  Location: knee - R knee  Preparation: Patient was prepped and draped in the usual sterile fashion  Needle size: 22 G  Ultrasound guidance: no  Approach: anterolateral  Medications administered: 48 mg hylan 48 MG/6ML; 3 mL lidocaine 1 %    Patient tolerance: patient tolerated the procedure well with no immediate complications  Dressing:  Sterile dressing applied    Large joint arthrocentesis: L knee  Universal Protocol:  Consent: Verbal consent obtained  Risks and benefits: risks, benefits and alternatives were discussed  Consent given by: patient  Time out: Immediately prior to procedure a "time out" was called to verify the correct patient, procedure, equipment, support staff and site/side marked as required  Timeout called at: 3/1/2022 11:19 AM   Patient understanding: patient states understanding of the procedure being performed  Patient identity confirmed: verbally with patient    Supporting Documentation  Indications: pain   Procedure Details  Location: knee - L knee  Preparation: Patient was prepped and draped in the usual sterile fashion  Needle size: 22 G  Ultrasound guidance: no  Approach: anterolateral  Medications administered: 48 mg hylan 48 MG/6ML    Patient tolerance: patient tolerated the procedure well with no immediate complications  Dressing:  Sterile dressing applied            Portions of the record may have been created with voice recognition software  Occasional wrong word or "sound a like" substitutions may have occurred due to the inherent limitations of voice recognition software  Read the chart carefully and recognize, using context, where substitutions have occurred

## 2022-03-08 ENCOUNTER — RA CDI HCC (OUTPATIENT)
Dept: OTHER | Facility: HOSPITAL | Age: 77
End: 2022-03-08

## 2022-03-08 NOTE — PROGRESS NOTES
[facility]  coding opportunities     I13 0        Chart Reviewed * (Number of) Inbasket suggestions sent to Provider: 2                  Patients insurance company: [address]  (Medicare Advantage and Commercial)

## 2022-03-09 ENCOUNTER — OFFICE VISIT (OUTPATIENT)
Dept: NEUROLOGY | Facility: CLINIC | Age: 77
End: 2022-03-09
Payer: COMMERCIAL

## 2022-03-09 VITALS — DIASTOLIC BLOOD PRESSURE: 70 MMHG | HEART RATE: 76 BPM | SYSTOLIC BLOOD PRESSURE: 122 MMHG

## 2022-03-09 DIAGNOSIS — Z79.4 TYPE 2 DIABETES MELLITUS WITH DIABETIC POLYNEUROPATHY, WITH LONG-TERM CURRENT USE OF INSULIN (HCC): ICD-10-CM

## 2022-03-09 DIAGNOSIS — G25.0 TREMOR, ESSENTIAL: Primary | ICD-10-CM

## 2022-03-09 DIAGNOSIS — E11.42 TYPE 2 DIABETES MELLITUS WITH DIABETIC POLYNEUROPATHY, WITH LONG-TERM CURRENT USE OF INSULIN (HCC): ICD-10-CM

## 2022-03-09 DIAGNOSIS — E08.42 DIABETIC POLYNEUROPATHY ASSOCIATED WITH DIABETES MELLITUS DUE TO UNDERLYING CONDITION (HCC): ICD-10-CM

## 2022-03-09 PROCEDURE — 99214 OFFICE O/P EST MOD 30 MIN: CPT | Performed by: PSYCHIATRY & NEUROLOGY

## 2022-03-09 PROCEDURE — 3066F NEPHROPATHY DOC TX: CPT | Performed by: FAMILY MEDICINE

## 2022-03-09 RX ORDER — ZONISAMIDE 50 MG/1
CAPSULE ORAL
Qty: 90 CAPSULE | Refills: 2 | Status: SHIPPED | OUTPATIENT
Start: 2022-03-09

## 2022-03-09 RX ORDER — GLIMEPIRIDE 4 MG/1
TABLET ORAL
Qty: 180 TABLET | Refills: 0 | Status: SHIPPED | OUTPATIENT
Start: 2022-03-09 | End: 2022-07-13 | Stop reason: SDUPTHER

## 2022-03-09 NOTE — ASSESSMENT & PLAN NOTE
Progressive action and intentional tremors of the hands which are partially controlled with propranolol and low dose zonisamide  He has tried and failed multiple  He will continue propranolol LA 60mg daily which provides partially relief but unfortunately we cannot increase due to bradycardia  He did find that in tapering off zonisamide, tremor worsened  For this reason we will increase to 50mg daily to see if this would better control tremor  Cristiano Clark

## 2022-03-09 NOTE — PROGRESS NOTES
Patient ID: Shira Barahona is a 77 y o  male    Assessment/Plan:    Diabetic neuropathy associated with diabetes mellitus due to underlying condition ([facility] [address] )  Progressive imbalance leading to falls related to sensory ataxia from neuropathy  No associated pain so no medications needed  Lab Results   Component Value Date    HGBA1C 6 6 (A) 01/10/2022       Tremor, essential  Progressive action and intentional tremors of the hands which are partially controlled with propranolol and low dose zonisamide  He has tried and failed multiple  He will continue propranolol LA 60mg daily which provides partially relief but unfortunately we cannot increase due to bradycardia  He did find that in tapering off zonisamide, tremor worsened  For this reason we will increase to 50mg daily to see if this would better control tremor              Diagnoses and all orders for this visit:    Tremor, essential  -     zonisamide (ZONEGRAN) 50 MG capsule; Take 1 tablet (50 mg total) by mouth daily    Diabetic polyneuropathy associated with diabetes mellitus due to underlying condition [facility])        Subjective:      Mr Susan Wadsworth is a man with diabetic neuropathy and essential tremor who presents for movement disorder follow up  To review, tremors began gradually several decades ago and slowly progressed to the point of interfering with function  Medication trials with limited improvement and side effects restricting increases  Most beneficial medication has been propranolol  Occupational Therapy visits to work-around tremors with weighted utensils was not helpful      Medication trials:  topiramate -terminated at low dose due to sedation  Primidone- stopped at 200mg due to sedation  gabapentin (600mg up to tid but no effect and sedation)  CBD oil      Current medications:  propranolol 60mg LA q24hrs with improvement in tremor (better tremor control on 120mg but he had bradycardia)  zonisamide 25mg qhs     He continues to have action tremors  He does manage to perform ADL's but remains bothered by tremor  No head or vocal tremor  He has diabetic neuropathy with sensory loss to the knees which is not painful  He ambulates with cane given imbalance  No changes  Cognition has been stable  He forgets conversations  He is independent in activities of daily living  Objective:    /70 (BP Location: Left arm, Patient Position: Sitting, Cuff Size: Standard)   Pulse 76       Physical Exam  Vitals reviewed  Eyes:      Extraocular Movements: Extraocular movements intact  Pupils: Pupils are equal, round, and reactive to light  Psychiatric:         Speech: Speech normal          Neurological Exam  Mental Status   Oriented to person, place, time and situation  Recent and remote memory are intact  Unable to copy figure  Clock drawing is normal  Speech is normal  Follows complex commands  Digit span was 5 forward and 3 backward  MOCA 27  Cranial Nerves  CN II: Right funduscopic exam: not visualized  Left funduscopic exam: not visualized  CN III, IV, VI: Extraocular movements intact bilaterally  Pupils equal round and reactive to light bilaterally  CN VII: Full and symmetric facial movement  CN VIII: Hearing is normal   CN XI: Shoulder shrug strength is normal     Motor   Normal muscle tone  Normal in upper extremities  Sensory  Light touch is normal in upper and lower extremities  Coordination  Right: Finger-to-nose abnormality: Rapid alternating movement normal   Left: Finger-to-nose abnormality: Rapid alternating movement normal   No head or vocal tremor  Moderate tremor on FTN   Moderate left postural tremor  Gait  Casual gait: Unable to rise from chair without using arms  Wide based with cane           Current Outpatient Medications on File Prior to Visit   Medication Sig Dispense Refill    allopurinol (ZYLOPRIM) 100 mg tablet Take 1 tablet (100 mg total) by mouth daily 90 tablet 0    aspirin 325 mg tablet Take 325 mg by mouth daily   atorvastatin (LIPITOR) 80 mg tablet Take 1 tablet (80 mg total) by mouth daily 90 tablet 0    Empagliflozin (Jardiance) 10 MG TABS Take 1 tablet (10 mg total) by mouth every morning 90 tablet 1    escitalopram (LEXAPRO) 20 mg tablet Take 1 tablet (20 mg total) by mouth daily 90 tablet 0    glimepiride (AMARYL) 4 mg tablet TAKE 1 TABLET TWICE A  tablet 1    levothyroxine 88 mcg tablet Take 1 tablet (88 mcg total) by mouth daily in the early morning 90 tablet 0    losartan (COZAAR) 25 mg tablet Take 1 tablet (25 mg total) by mouth daily 90 tablet 1    metFORMIN (GLUCOPHAGE) 1000 MG tablet Take 1 tablet (1,000 mg total) by mouth 2 (two) times a day with meals 180 tablet 2    multivitamin (THERAGRAN) TABS Take 1 tablet by mouth daily      nitroglycerin (NITROSTAT) 0 4 mg SL tablet Place 1 tablet (0 4 mg total) under the tongue every 5 (five) minutes as needed for chest pain 25 tablet 0    propranolol (INDERAL LA) 60 mg 24 hr capsule Take 1 capsule (60 mg total) by mouth daily 90 capsule 0    torsemide (DEMADEX) 10 mg tablet Take 1 tablet (10 mg total) by mouth daily 90 tablet 0    [DISCONTINUED] zonisamide (ZONEGRAN) 25 mg capsule Take 1 tablet (25 mg total) by mouth daily 30 capsule 3    [DISCONTINUED] topiramate (Topamax) 25 mg tablet Take 1 tablet (25 mg total) by mouth daily X 1 week then discontinue 7 tablet 0     No current facility-administered medications on file prior to visit           Cornelio Rodriguez MD  Movement disorder physician  [address]

## 2022-03-09 NOTE — ASSESSMENT & PLAN NOTE
Progressive imbalance leading to falls related to sensory ataxia from neuropathy  No associated pain so no medications needed     Lab Results   Component Value Date    HGBA1C 6 6 (A) 01/10/2022

## 2022-03-09 NOTE — PROGRESS NOTES
Review of Systems   Constitutional: Negative  Negative for appetite change and fever  HENT: Negative  Negative for hearing loss, tinnitus, trouble swallowing and voice change  Eyes: Negative  Negative for photophobia and pain  Respiratory: Negative  Negative for shortness of breath  Cardiovascular: Negative  Negative for palpitations  Gastrointestinal: Negative  Negative for nausea and vomiting  Endocrine: Negative  Negative for cold intolerance  Genitourinary: Negative  Negative for dysuria, frequency and urgency  Musculoskeletal: Positive for gait problem  Negative for myalgias and neck pain  Here and there has Balance issues  Falls     Skin: Negative  Negative for rash  Allergic/Immunologic: Negative  Neurological: Positive for dizziness (here and there) and tremors (Arms )  Negative for seizures, syncope, facial asymmetry, speech difficulty, weakness, light-headedness, numbness and headaches  Neuropathy in feet     Hematological: Negative  Does not bruise/bleed easily  Psychiatric/Behavioral: Negative for confusion, hallucinations and sleep disturbance  Memory Issues   All other systems reviewed and are negative

## 2022-03-23 ENCOUNTER — LAB (OUTPATIENT)
Dept: LAB | Facility: HOSPITAL | Age: 77
End: 2022-03-23
Attending: FAMILY MEDICINE
Payer: COMMERCIAL

## 2022-03-23 ENCOUNTER — OFFICE VISIT (OUTPATIENT)
Dept: FAMILY MEDICINE CLINIC | Facility: CLINIC | Age: 77
End: 2022-03-23
Payer: COMMERCIAL

## 2022-03-23 VITALS
WEIGHT: 227 LBS | BODY MASS INDEX: 33.62 KG/M2 | SYSTOLIC BLOOD PRESSURE: 118 MMHG | DIASTOLIC BLOOD PRESSURE: 68 MMHG | TEMPERATURE: 96.7 F | HEIGHT: 69 IN

## 2022-03-23 DIAGNOSIS — E11.40 CONTROLLED TYPE 2 DIABETES MELLITUS WITH DIABETIC NEUROPATHY, WITH LONG-TERM CURRENT USE OF INSULIN (HCC): ICD-10-CM

## 2022-03-23 DIAGNOSIS — N18.31 STAGE 3A CHRONIC KIDNEY DISEASE (HCC): ICD-10-CM

## 2022-03-23 DIAGNOSIS — Z00.00 MEDICARE ANNUAL WELLNESS VISIT, SUBSEQUENT: Primary | ICD-10-CM

## 2022-03-23 DIAGNOSIS — Z79.4 CONTROLLED TYPE 2 DIABETES MELLITUS WITH DIABETIC NEUROPATHY, WITH LONG-TERM CURRENT USE OF INSULIN (HCC): ICD-10-CM

## 2022-03-23 LAB
ALBUMIN SERPL BCP-MCNC: 3.4 G/DL (ref 3.5–5)
ALP SERPL-CCNC: 91 U/L (ref 46–116)
ALT SERPL W P-5'-P-CCNC: 52 U/L (ref 12–78)
ANION GAP SERPL CALCULATED.3IONS-SCNC: 11 MMOL/L (ref 4–13)
AST SERPL W P-5'-P-CCNC: 23 U/L (ref 5–45)
BACTERIA UR QL AUTO: ABNORMAL /HPF
BILIRUB SERPL-MCNC: 0.48 MG/DL (ref 0.2–1)
BILIRUB UR QL STRIP: NEGATIVE
BUN SERPL-MCNC: 18 MG/DL (ref 5–25)
CALCIUM ALBUM COR SERPL-MCNC: 9.4 MG/DL (ref 8.3–10.1)
CALCIUM SERPL-MCNC: 8.9 MG/DL (ref 8.3–10.1)
CHLORIDE SERPL-SCNC: 106 MMOL/L (ref 100–108)
CLARITY UR: ABNORMAL
CO2 SERPL-SCNC: 27 MMOL/L (ref 21–32)
COLOR UR: YELLOW
CREAT SERPL-MCNC: 1.25 MG/DL (ref 0.6–1.3)
CREAT UR-MCNC: 87.7 MG/DL
GFR SERPL CREATININE-BSD FRML MDRD: 55 ML/MIN/1.73SQ M
GLUCOSE P FAST SERPL-MCNC: 108 MG/DL (ref 65–99)
GLUCOSE UR STRIP-MCNC: ABNORMAL MG/DL
HGB UR QL STRIP.AUTO: NEGATIVE
KETONES UR STRIP-MCNC: NEGATIVE MG/DL
LEUKOCYTE ESTERASE UR QL STRIP: ABNORMAL
MAGNESIUM SERPL-MCNC: 2 MG/DL (ref 1.6–2.6)
MICROALBUMIN UR-MCNC: 75 MG/L (ref 0–20)
MICROALBUMIN/CREAT 24H UR: 86 MG/G CREATININE (ref 0–30)
NITRITE UR QL STRIP: NEGATIVE
NON-SQ EPI CELLS URNS QL MICRO: ABNORMAL /HPF
PH UR STRIP.AUTO: 6.5 [PH]
PHOSPHATE SERPL-MCNC: 4 MG/DL (ref 2.3–4.1)
POTASSIUM SERPL-SCNC: 4 MMOL/L (ref 3.5–5.3)
PROT SERPL-MCNC: 7.2 G/DL (ref 6.4–8.2)
PROT UR STRIP-MCNC: NEGATIVE MG/DL
PTH-INTACT SERPL-MCNC: 32.2 PG/ML (ref 18.4–80.1)
RBC #/AREA URNS AUTO: ABNORMAL /HPF
SODIUM SERPL-SCNC: 144 MMOL/L (ref 136–145)
SP GR UR STRIP.AUTO: 1.01 (ref 1–1.03)
UROBILINOGEN UR QL STRIP.AUTO: 0.2 E.U./DL
WBC #/AREA URNS AUTO: ABNORMAL /HPF

## 2022-03-23 PROCEDURE — 1170F FXNL STATUS ASSESSED: CPT | Performed by: FAMILY MEDICINE

## 2022-03-23 PROCEDURE — 82570 ASSAY OF URINE CREATININE: CPT | Performed by: INTERNAL MEDICINE

## 2022-03-23 PROCEDURE — 3060F POS MICROALBUMINURIA REV: CPT | Performed by: FAMILY MEDICINE

## 2022-03-23 PROCEDURE — 83970 ASSAY OF PARATHORMONE: CPT

## 2022-03-23 PROCEDURE — G0439 PPPS, SUBSEQ VISIT: HCPCS | Performed by: FAMILY MEDICINE

## 2022-03-23 PROCEDURE — 83735 ASSAY OF MAGNESIUM: CPT

## 2022-03-23 PROCEDURE — 1036F TOBACCO NON-USER: CPT | Performed by: FAMILY MEDICINE

## 2022-03-23 PROCEDURE — 1125F AMNT PAIN NOTED PAIN PRSNT: CPT | Performed by: FAMILY MEDICINE

## 2022-03-23 PROCEDURE — 82043 UR ALBUMIN QUANTITATIVE: CPT | Performed by: INTERNAL MEDICINE

## 2022-03-23 PROCEDURE — 84100 ASSAY OF PHOSPHORUS: CPT

## 2022-03-23 PROCEDURE — 80053 COMPREHEN METABOLIC PANEL: CPT

## 2022-03-23 PROCEDURE — 3288F FALL RISK ASSESSMENT DOCD: CPT | Performed by: FAMILY MEDICINE

## 2022-03-23 PROCEDURE — 36415 COLL VENOUS BLD VENIPUNCTURE: CPT

## 2022-03-23 PROCEDURE — 81001 URINALYSIS AUTO W/SCOPE: CPT | Performed by: INTERNAL MEDICINE

## 2022-03-23 PROCEDURE — 1160F RVW MEDS BY RX/DR IN RCRD: CPT | Performed by: FAMILY MEDICINE

## 2022-03-23 NOTE — PATIENT INSTRUCTIONS
Medicare Preventive Visit Patient Instructions  Thank you for completing your Welcome to Medicare Visit or Medicare Annual Wellness Visit today  Your next wellness visit will be due in one year (3/24/2023)  The screening/preventive services that you may require over the next 5-10 years are detailed below  Some tests may not apply to you based off risk factors and/or age  Screening tests ordered at today's visit but not completed yet may show as past due  Also, please note that scanned in results may not display below  Preventive Screenings:  Service Recommendations Previous Testing/Comments   Colorectal Cancer Screening  · Colonoscopy    · Fecal Occult Blood Test (FOBT)/Fecal Immunochemical Test (FIT)  · Fecal DNA/Cologuard Test  · Flexible Sigmoidoscopy Age: 54-65 years old   Colonoscopy: every 10 years (May be performed more frequently if at higher risk)  OR  FOBT/FIT: every 1 year  OR  Cologuard: every 3 years  OR  Sigmoidoscopy: every 5 years  Screening may be recommended earlier than age 48 if at higher risk for colorectal cancer  Also, an individualized decision between you and your healthcare provider will decide whether screening between the ages of 74-80 would be appropriate   Colonoscopy: 03/21/2016  FOBT/FIT: Not on file  Cologuard: Not on file  Sigmoidoscopy: Not on file          Prostate Cancer Screening Individualized decision between patient and health care provider in men between ages of 53-78   Medicare will cover every 12 months beginning on the day after your 50th birthday PSA: 3 0 ng/mL     Screening Not Indicated     Hepatitis C Screening Once for adults born between 1945 and 1965  More frequently in patients at high risk for Hepatitis C Hep C Antibody: 09/15/2017    Screening Current   Diabetes Screening 1-2 times per year if you're at risk for diabetes or have pre-diabetes Fasting glucose: 108 mg/dL   A1C: 6 6    Screening Not Indicated  History Diabetes   Cholesterol Screening Once every 5 years if you don't have a lipid disorder  May order more often based on risk factors  Lipid panel: 09/13/2021    Screening Not Indicated  History Lipid Disorder      Other Preventive Screenings Covered by Medicare:  1  Abdominal Aortic Aneurysm (AAA) Screening: covered once if your at risk  You're considered to be at risk if you have a family history of AAA or a male between the age of 73-68 who smoking at least 100 cigarettes in your lifetime  2  Lung Cancer Screening: covers low dose CT scan once per year if you meet all of the following conditions: (1) Age 50-69; (2) No signs or symptoms of lung cancer; (3) Current smoker or have quit smoking within the last 15 years; (4) You have a tobacco smoking history of at least 30 pack years (packs per day x number of years you smoked); (5) You get a written order from a healthcare provider  3  Glaucoma Screening: covered annually if you're considered high risk: (1) You have diabetes OR (2) Family history of glaucoma OR (3)  aged 48 and older OR (3)  American aged 72 and older  3  Osteoporosis Screening: covered every 2 years if you meet one of the following conditions: (1) Have a vertebral abnormality; (2) On glucocorticoid therapy for more than 3 months; (3) Have primary hyperparathyroidism; (4) On osteoporosis medications and need to assess response to drug therapy  5  HIV Screening: covered annually if you're between the age of 12-76  Also covered annually if you are younger than 13 and older than 72 with risk factors for HIV infection  For pregnant patients, it is covered up to 3 times per pregnancy      Immunizations:  Immunization Recommendations   Influenza Vaccine Annual influenza vaccination during flu season is recommended for all persons aged >= 6 months who do not have contraindications   Pneumococcal Vaccine (Prevnar and Pneumovax)  * Prevnar = PCV13  * Pneumovax = PPSV23 Adults 25-60 years old: 1-3 doses may be recommended based on certain risk factors  Adults 72 years old: Prevnar (PCV13) vaccine recommended followed by Pneumovax (PPSV23) vaccine  If already received PPSV23 since turning 65, then PCV13 recommended at least one year after PPSV23 dose  Hepatitis B Vaccine 3 dose series if at intermediate or high risk (ex: diabetes, end stage renal disease, liver disease)   Tetanus (Td) Vaccine - COST NOT COVERED BY MEDICARE PART B Following completion of primary series, a booster dose should be given every 10 years to maintain immunity against tetanus  Td may also be given as tetanus wound prophylaxis  Tdap Vaccine - COST NOT COVERED BY MEDICARE PART B Recommended at least once for all adults  For pregnant patients, recommended with each pregnancy  Shingles Vaccine (Shingrix) - COST NOT COVERED BY MEDICARE PART B  2 shot series recommended in those aged 48 and above     Health Maintenance Due:      Topic Date Due    Colorectal Cancer Screening  03/21/2019    Hepatitis C Screening  Completed     Immunizations Due:      Topic Date Due    Pneumococcal Vaccine: 65+ Years (1 of 2 - PPSV23) 12/09/1951     Advance Directives   What are advance directives? Advance directives are legal documents that state your wishes and plans for medical care  These plans are made ahead of time in case you lose your ability to make decisions for yourself  Advance directives can apply to any medical decision, such as the treatments you want, and if you want to donate organs  What are the types of advance directives? There are many types of advance directives, and each state has rules about how to use them  You may choose a combination of any of the following:  · Living will: This is a written record of the treatment you want  You can also choose which treatments you do not want, which to limit, and which to stop at a certain time  This includes surgery, medicine, IV fluid, and tube feedings  · Durable power of  for healthcare [facility]):   This is a written record that states who you want to make healthcare choices for you when you are unable to make them for yourself  This person, called a proxy, is usually a family member or a friend  You may choose more than 1 proxy  · Do not resuscitate (DNR) order:  A DNR order is used in case your heart stops beating or you stop breathing  It is a request not to have certain forms of treatment, such as CPR  A DNR order may be included in other types of advance directives  · Medical directive: This covers the care that you want if you are in a coma, near death, or unable to make decisions for yourself  You can list the treatments you want for each condition  Treatment may include pain medicine, surgery, blood transfusions, dialysis, IV or tube feedings, and a ventilator (breathing machine)  · Values history: This document has questions about your views, beliefs, and how you feel and think about life  This information can help others choose the care that you would choose  Why are advance directives important? An advance directive helps you control your care  Although spoken wishes may be used, it is better to have your wishes written down  Spoken wishes can be misunderstood, or not followed  Treatments may be given even if you do not want them  An advance directive may make it easier for your family to make difficult choices about your care  Fall Prevention    Fall prevention  includes ways to make your home and other areas safer  It also includes ways you can move more carefully to prevent a fall  Health conditions that cause changes in your blood pressure, vision, or muscle strength and coordination may increase your risk for falls  Medicines may also increase your risk for falls if they make you dizzy, weak, or sleepy  Fall prevention tips:   · Stand or sit up slowly  · Use assistive devices as directed  · Wear shoes that fit well and have soles that   · Wear a personal alarm  · Stay active  · Manage your medical conditions  Home Safety Tips:  · Add items to prevent falls in the bathroom  · Keep paths clear  · Install bright lights in your home  · Keep items you use often on shelves within reach  · Paint or place reflective tape on the edges of your stairs  Weight Management   Why it is important to manage your weight:  Being overweight increases your risk of health conditions such as heart disease, high blood pressure, type 2 diabetes, and certain types of cancer  It can also increase your risk for osteoarthritis, sleep apnea, and other respiratory problems  Aim for a slow, steady weight loss  Even a small amount of weight loss can lower your risk of health problems  How to lose weight safely:  A safe and healthy way to lose weight is to eat fewer calories and get regular exercise  You can lose up about 1 pound a week by decreasing the number of calories you eat by 500 calories each day  Healthy meal plan for weight management:  A healthy meal plan includes a variety of foods, contains fewer calories, and helps you stay healthy  A healthy meal plan includes the following:  · Eat whole-grain foods more often  A healthy meal plan should contain fiber  Fiber is the part of grains, fruits, and vegetables that is not broken down by your body  Whole-grain foods are healthy and provide extra fiber in your diet  Some examples of whole-grain foods are whole-wheat breads and pastas, oatmeal, brown rice, and bulgur  · Eat a variety of vegetables every day  Include dark, leafy greens such as spinach, kale, yonathan greens, and mustard greens  Eat yellow and orange vegetables such as carrots, sweet potatoes, and winter squash  · Eat a variety of fruits every day  Choose fresh or canned fruit (canned in its own juice or light syrup) instead of juice  Fruit juice has very little or no fiber  · Eat low-fat dairy foods  Drink fat-free (skim) milk or 1% milk   Eat fat-free yogurt and low-fat cottage cheese  Try low-fat cheeses such as mozzarella and other reduced-fat cheeses  · Choose meat and other protein foods that are low in fat  Choose beans or other legumes such as split peas or lentils  Choose fish, skinless poultry (chicken or turkey), or lean cuts of red meat (beef or pork)  Before you cook meat or poultry, cut off any visible fat  · Use less fat and oil  Try baking foods instead of frying them  Add less fat, such as margarine, sour cream, regular salad dressing and mayonnaise to foods  Eat fewer high-fat foods  Some examples of high-fat foods include french fries, doughnuts, ice cream, and cakes  · Eat fewer sweets  Limit foods and drinks that are high in sugar  This includes candy, cookies, regular soda, and sweetened drinks  Exercise:  Exercise at least 30 minutes per day on most days of the week  Some examples of exercise include walking, biking, dancing, and swimming  You can also fit in more physical activity by taking the stairs instead of the elevator or parking farther away from stores  Ask your healthcare provider about the best exercise plan for you  © Copyright [employer] 2018 Information is for End User's use only and may not be sold, redistributed or otherwise used for commercial purposes   All illustrations and images included in CareNotes® are the copyrighted property of [employer]  or [address] [place] [address]

## 2022-03-23 NOTE — PROGRESS NOTES
Assessment and Plan:     Problem List Items Addressed This Visit     None          Falls Plan of Care: balance, strength, and gait training instructions were provided  Recommended assistive device to help with gait and balance  Preventive health issues were discussed with patient, and age appropriate screening tests were ordered as noted in patient's After Visit Summary  Personalized health advice and appropriate referrals for health education or preventive services given if needed, as noted in patient's After Visit Summary  History of Present Illness:     Patient presents for Welcome to Medicare visit  Patient Care Team:  Nela Blackwell DO as PCP - MD Jeffery Nelson MD as Endoscopist     Review of Systems:     Review of Systems   Constitutional: Positive for fatigue  Negative for activity change, appetite change, diaphoresis and fever  HENT: Positive for hearing loss  Negative for dental problem and tinnitus  Eyes: Positive for visual disturbance  Patient legally blind right eye and follows with ophthalmology   Respiratory: Negative for apnea, cough, chest tightness, shortness of breath and wheezing  Cardiovascular: Positive for leg swelling  Negative for chest pain and palpitations  Gastrointestinal: Negative for abdominal distention, abdominal pain, anal bleeding, constipation, diarrhea, nausea and vomiting  Endocrine: Negative for cold intolerance, heat intolerance, polydipsia, polyphagia and polyuria  Genitourinary: Negative for difficulty urinating, dysuria, flank pain, hematuria and urgency  Musculoskeletal: Positive for arthralgias  Negative for back pain, gait problem, joint swelling and myalgias  Skin: Negative for color change, rash and wound  Allergic/Immunologic: Negative for environmental allergies, food allergies and immunocompromised state     Neurological: Negative for dizziness, seizures, syncope, speech difficulty, numbness and headaches  Hematological: Negative for adenopathy  Does not bruise/bleed easily  Psychiatric/Behavioral: Negative for agitation, behavioral problems, hallucinations, sleep disturbance and suicidal ideas  The patient is nervous/anxious         Problem List:     Patient Active Problem List   Diagnosis    Arthritis    Depression with anxiety    Controlled type 2 diabetes mellitus with hyperglycemia, without long-term current use of insulin ([facility])    Gout    Hyperlipidemia    Hypertension    [facility]bid obesity ([facility] )    NSTEMI (non-ST elevated myocardial infarction) ([facility] )    Tremor    Degenerative disc disease, lumbar    Tremor, essential    Diabetic neuropathy associated with diabetes mellitus due to underlying condition ([facility] )    Gait instability    Frequent falls    Infected epidermoid cyst    Vitamin E deficiency    Primary osteoarthritis involving multiple joints    Chronic pain of both knees    Primary osteoarthritis of both knees    Coronary artery disease of native artery of native heart with stable angina pectoris ([facility] )    Primary osteoarthritis of right knee    Primary osteoarthritis of left knee    Fall (on) (from) other stairs and steps, initial encounter    Recurrent major depressive disorder ([facility] )    Benign hypertension with CKD (chronic kidney disease) stage III ([facility] )    Diabetic nephropathy associated with type 2 diabetes mellitus ([facility] )    Concentric left ventricular hypertrophy    Aortic stenosis, moderate    Edema    Mild cognitive impairment    Bilateral lower extremity edema    Other chest pain    Leukocytosis    Skin lesion    Chronic diastolic (congestive) heart failure (HCC)    Laceration and contusion of cerebral cortex, right, without loss of consciousness, initial encounter ([place] [address] )    Basal cell carcinoma (BCC) of right shoulder    Stage 3a chronic kidney disease ([facility] )      Past Medical and Surgical History:     Past Medical History:   Diagnosis Date    BPH (benign prostatic hyperplasia)     CAD (coronary artery disease)     Cancer (HCC)     basal cell    Chronic kidney disease     stage 3    Colon polyp     CPAP (continuous positive airway pressure) dependence     Diabetes mellitus (HCC)     niddm    Disease of thyroid gland     Gout     High cholesterol     Hypertension     MI, 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     Myocardial infarction ([facility] ) 2014    Sleep apnea      Past Surgical History:   Procedure Laterality Date    BASAL CELL CARCINOMA EXCISION Right 1/21/2022    Procedure: EXCISION BASAL CELL CARCINOMA EXTREMITY;  Surgeon: Bravo Trimble DO;  [facility]: [facility];  Service: General    CATARACT EXTRACTION W/  INTRAOCULAR LENS IMPLANT      CORNEAL TRANSPLANT      CORONARY ANGIOPLASTY WITH STENT PLACEMENT      stents x3-- [phone]    EYE SURGERY      repair corneal laceration    NV COLONOSCOPY FLX DX W/COLLJ SPEC WHEN PFRMD N/A 3/21/2016    Procedure: COLONOSCOPY;  Surgeon: Kumar Alvarez MD;  Location: [facility];  Service: Colorectal    TONSILLECTOMY AND ADENOIDECTOMY        Family History:     Family History   Adopted: Yes   Problem Relation Age of Onset    No Known Problems Mother     No Known Problems Father       Social History:     Social History     Socioeconomic History    Marital status: /Civil Union     Spouse name: None    Number of children: None    Years of education: None    Highest education level: None   Occupational History    None   Tobacco Use    Smoking status: Never Smoker    Smokeless tobacco: Never Used   Vaping Use    Vaping Use: Never used   Substance and Sexual Activity    Alcohol use: Not Currently     Comment: socially, cut down in 2008, previously did more than     Drug use: Never    Sexual activity: Yes     Partners: Female   Other Topics Concern    None   Social History Narrative    No advance directive     No living will      Social Determinants of Health     Financial Resource Strain: Not on file   Food Insecurity: Not on file   Transportation Needs: Not on file   Physical Activity: Not on file   Stress: Not on file   Social Connections: Not on file   Intimate Partner Violence: Not on file   Housing Stability: Not on file      Medications and Allergies:     Current Outpatient Medications   Medication Sig Dispense Refill    allopurinol (ZYLOPRIM) 100 mg tablet Take 1 tablet (100 mg total) by mouth daily 90 tablet 0    aspirin 325 mg tablet Take 325 mg by mouth daily   atorvastatin (LIPITOR) 80 mg tablet Take 1 tablet (80 mg total) by mouth daily 90 tablet 0    Empagliflozin (Jardiance) 10 MG TABS Take 1 tablet (10 mg total) by mouth every morning 90 tablet 1    escitalopram (LEXAPRO) 20 mg tablet Take 1 tablet (20 mg total) by mouth daily 90 tablet 0    glimepiride (AMARYL) 4 mg tablet TAKE 1 TABLET TWICE A  tablet 0    levothyroxine 88 mcg tablet Take 1 tablet (88 mcg total) by mouth daily in the early morning 90 tablet 0    losartan (COZAAR) 25 mg tablet Take 1 tablet (25 mg total) by mouth daily 90 tablet 1    metFORMIN (GLUCOPHAGE) 1000 MG tablet Take 1 tablet (1,000 mg total) by mouth 2 (two) times a day with meals 180 tablet 0    multivitamin (THERAGRAN) TABS Take 1 tablet by mouth daily      nitroglycerin (NITROSTAT) 0 4 mg SL tablet Place 1 tablet (0 4 mg total) under the tongue every 5 (five) minutes as needed for chest pain 25 tablet 0    propranolol (INDERAL LA) 60 mg 24 hr capsule Take 1 capsule (60 mg total) by mouth daily 90 capsule 0    torsemide (DEMADEX) 10 mg tablet Take 1 tablet (10 mg total) by mouth daily 90 tablet 0    zonisamide (ZONEGRAN) 50 MG capsule Take 1 tablet (50 mg total) by mouth daily 90 capsule 2     No current facility-administered medications for this visit       No Known Allergies   Immunizations:     Immunization History   Administered Date(s) Administered    COVID-19 MODERNA VACC 0 25 ML IM BOOSTER 12/08/2021    COVID-19 MODERNA VACC 0 5 ML IM 03/12/2021, 04/09/2021    H1N1, All Formulations 11/06/2009    INFLUENZA 09/01/2015, 09/01/2015, 09/14/2016, 10/04/2021    Influenza Split High Dose Preservative Free IM 09/11/2012, 09/20/2013, 10/06/2014, 09/02/2015, 09/14/2016, 09/12/2017    Influenza, high dose seasonal 0 7 mL 10/22/2018, 09/09/2019, 09/28/2020    Pneumococcal Polysaccharide PPV23 1945    Tdap 11/22/2017    Zoster 04/10/2014      Health Maintenance:         Topic Date Due    Colorectal Cancer Screening  03/21/2019    Hepatitis C Screening  Completed         Topic Date Due    Pneumococcal Vaccine: 65+ Years (1 of 2 - PPSV23) 12/09/1951      Medicare Screening Tests and Risk Assessments:     Donaldo Goode is here for his Subsequent Wellness visit  Health Risk Assessment:   Patient rates overall health as fair  Patient feels that their physical health rating is same  Patient is satisfied with their life  Eyesight was rated as same  Hearing was rated as same  Patient feels that their emotional and mental health rating is same  Patients states they are sometimes angry  Patient states they are often unusually tired/fatigued  Pain experienced in the last 7 days has been none  Patient states that he has experienced no weight loss or gain in last 6 months  Fall Risk Screening: In the past year, patient has experienced: history of falling in past year    Number of falls: 2 or more  Injured during fall?: Yes    Feels unsteady when standing or walking?: Yes    Worried about falling?: Yes      Home Safety:  Patient has trouble with stairs inside or outside of their home  Patient has working smoke alarms and has working carbon monoxide detector  Home safety hazards include: none  Nutrition:   Current diet is Diabetic  Medications:   Patient is not currently taking any over-the-counter supplements  Patient is able to manage medications       Activities of Daily Living (ADLs)/Instrumental Activities of Daily Living (IADLs):   Walk and transfer into and out of bed and chair?: Yes  Dress and groom yourself?: Yes    Bathe or shower yourself?: Yes    Feed yourself? Yes  Do your laundry/housekeeping?: No  Manage your money, pay your bills and track your expenses?: Yes  Make your own meals?: No    Do your own shopping?: No    Previous Hospitalizations:   Any hospitalizations or ED visits within the last 12 months?: Yes    How many hospitalizations have you had in the last year?: 1-2    Advance Care Planning:   Living will: No    Durable POA for healthcare: Yes    Advanced directive: Yes    Advanced directive counseling given: Yes    Five wishes given: No    Patient declined ACP directive: No    End of Life Decisions reviewed with patient: Yes    Provider agrees with end of life decisions: Yes      Cognitive Screening:   Provider or family/friend/caregiver concerned regarding cognition?: No    PREVENTIVE SCREENINGS      Cardiovascular Screening:    General: Screening Not Indicated and History Lipid Disorder      Diabetes Screening:     General: Screening Not Indicated and History Diabetes      Prostate Cancer Screening:    General: Screening Not Indicated      Abdominal Aortic Aneurysm (AAA) Screening:    Risk factors include: age between 73-67 yo        Lung Cancer Screening:     General: Screening Not Indicated      Hepatitis C Screening:    General: Screening Current    Screening, Brief Intervention, and Referral to Treatment (SBIRT)    Screening  Typical number of drinks in a day: 0  Typical number of drinks in a week: 0  Interpretation: Low risk drinking behavior      AUDIT-C Screenin) How often did you have a drink containing alcohol in the past year? never  2) How many drinks did you have on a typical day when you were drinking in the past year? 0  3) How often did you have 6 or more drinks on one occasion in the past year? never    AUDIT-C Score: 0  Interpretation: Score 0-3 (male): Negative screen for alcohol misuse    Single Item Drug Screening:  How often have you used an illegal drug (including marijuana) or a prescription medication for non-medical reasons in the past year? never    Single Item Drug Screen Score: 0  Interpretation: Negative screen for possible drug use disorder    No exam data present     Physical Exam:     /68 (BP Location: Left arm, Patient Position: Sitting, Cuff Size: Standard)   Temp (!) 96 7 °F (35 9 °C) (Temporal)   Ht 5' 9" (1 753 m)   Wt 103 kg (227 lb)   BMI 33 52 kg/m²     Physical Exam  Constitutional:       Appearance: He is well-developed  HENT:      Head: Normocephalic and atraumatic  Right Ear: External ear normal       Left Ear: External ear normal       Nose: Nose normal    Eyes:      Conjunctiva/sclera: Conjunctivae normal       Pupils: Pupils are equal, round, and reactive to light  Cardiovascular:      Rate and Rhythm: Normal rate and regular rhythm  Pulses: no weak pulses          Dorsalis pedis pulses are 2+ on the right side and 2+ on the left side  Posterior tibial pulses are 2+ on the right side and 2+ on the left side  Heart sounds: Normal heart sounds  No murmur heard  No friction rub  Pulmonary:      Effort: Pulmonary effort is normal  No respiratory distress  Breath sounds: Normal breath sounds  No wheezing or rales  Chest:      Chest wall: No tenderness  Abdominal:      General: Bowel sounds are normal       Palpations: Abdomen is soft  Musculoskeletal:         General: Normal range of motion  Cervical back: Normal range of motion and neck supple  Feet:      Right foot:      Skin integrity: No ulcer, skin breakdown, erythema, warmth, callus or dry skin  Left foot:      Skin integrity: No ulcer, skin breakdown, erythema, warmth, callus or dry skin  Skin:     General: Skin is warm and dry  Capillary Refill: Capillary refill takes 2 to 3 seconds     Neurological:      Mental Status: He is alert and oriented to person, place, and time  Psychiatric:         Behavior: Behavior normal          Thought Content: Thought content normal          Judgment: Judgment normal         Patient's shoes and socks removed  Right Foot/Ankle   Right Foot Inspection  Skin Exam: skin normal and skin intact  No dry skin, no warmth, no callus, no erythema, no maceration, no abnormal color, no pre-ulcer, no ulcer and no callus  Toe Exam: ROM and strength within normal limits  Sensory   Vibration: intact  Proprioception: intact  Monofilament testing: intact    Vascular  Capillary refills: < 3 seconds  The right DP pulse is 2+  The right PT pulse is 2+  Left Foot/Ankle  Left Foot Inspection  Skin Exam: skin normal and skin intact  No dry skin, no warmth, no erythema, no maceration, normal color, no pre-ulcer, no ulcer and no callus  Toe Exam: ROM and strength within normal limits  Sensory   Vibration: intact  Proprioception: intact  Monofilament testing: intact    Vascular  Capillary refills: < 3 seconds  The left DP pulse is 2+  The left PT pulse is 2+       Assign Risk Category  No deformity present  No loss of protective sensation  No weak pulses  Risk: 0    Zoë Barahona DO

## 2022-03-31 ENCOUNTER — HOSPITAL ENCOUNTER (OUTPATIENT)
Dept: NON INVASIVE DIAGNOSTICS | Facility: HOSPITAL | Age: 77
Discharge: HOME | End: 2022-03-31
Attending: INTERNAL MEDICINE
Payer: COMMERCIAL

## 2022-03-31 VITALS
DIASTOLIC BLOOD PRESSURE: 75 MMHG | HEART RATE: 80 BPM | BODY MASS INDEX: 33.62 KG/M2 | SYSTOLIC BLOOD PRESSURE: 117 MMHG | HEIGHT: 69 IN | WEIGHT: 227 LBS

## 2022-03-31 DIAGNOSIS — I35.0 AORTIC STENOSIS, MODERATE: ICD-10-CM

## 2022-03-31 LAB
AORTIC ROOT: 3.2 CM
AORTIC VALVE MEAN VELOCITY: 19.6 M/S
AV AREA BY CONTINUOUS VTI: 1.6 CM2
AV AREA PEAK VELOCITY: 1.2 CM2
AV LVOT MEAN GRADIENT: 2 MMHG
AV LVOT PEAK GRADIENT: 3 MMHG
AV MEAN GRADIENT: 17 MMHG
AV PEAK GRADIENT: 27 MMHG
AV VALVE AREA: 1.57 CM2
AV VELOCITY RATIO: 0.36
DOP CALC AO PEAK VEL: 2.57 M/S
DOP CALC AO VTI: 48.68 CM
DOP CALC LVOT AREA: 3.46 CM2
DOP CALC LVOT DIAMETER: 2.1 CM
DOP CALC LVOT PEAK VEL VTI: 22.08 CM
DOP CALC LVOT PEAK VEL: 0.93 M/S
DOP CALC LVOT STROKE INDEX: 35.3 ML/M2
DOP CALC LVOT STROKE VOLUME: 76.44 CM3
E WAVE DECELERATION TIME: 153 MS
FRACTIONAL SHORTENING: 33 % (ref 28–44)
INTERVENTRICULAR SEPTUM IN DIASTOLE (PARASTERNAL SHORT AXIS VIEW): 1.4 CM
INTERVENTRICULAR SEPTUM: 1.4 CM (ref 0.56–1.05)
LEFT ATRIUM AREA SYSTOLE SINGLE PLANE A4C: 20 CM2
LEFT ATRIUM SIZE: 4.4 CM
LEFT INTERNAL DIMENSION IN SYSTOLE: 3.1 CM (ref 4.15–6.28)
LEFT VENTRICULAR INTERNAL DIMENSION IN DIASTOLE: 4.6 CM (ref 6.91–10.3)
LEFT VENTRICULAR POSTERIOR WALL IN END DIASTOLE: 1.5 CM (ref 0.55–1.04)
LEFT VENTRICULAR STROKE VOLUME: 56 ML
LVSV (TEICH): 56 ML
MV E'TISSUE VEL-SEP: 6 CM/S
MV PEAK A VEL: 0.57 M/S
MV PEAK E VEL: 48 CM/S
RIGHT ATRIUM AREA SYSTOLE A4C: 16 CM2
RIGHT VENTRICLE ID DIMENSION: 2.8 CM
SL CV LV EF: 65
SL CV PED ECHO LEFT VENTRICLE DIASTOLIC VOLUME (MOD BIPLANE) 2D: 95 ML
SL CV PED ECHO LEFT VENTRICLE SYSTOLIC VOLUME (MOD BIPLANE) 2D: 39 ML
TR MAX PG: 20 MMHG
TR PEAK VELOCITY: 2.3 M/S
TRICUSPID ANNULAR PLANE SYSTOLIC EXCURSION: 1.9 CM
TRICUSPID VALVE PEAK REGURGITATION VELOCITY: 2.25 M/S
Z-SCORE OF INTERVENTRICULAR SEPTUM IN END DIASTOLE: 4.76
Z-SCORE OF LEFT VENTRICULAR DIMENSION IN END DIASTOLE: -6.07
Z-SCORE OF LEFT VENTRICULAR DIMENSION IN END SYSTOLE: -3.94
Z-SCORE OF LEFT VENTRICULAR POSTERIOR WALL IN END DIASTOLE: 5.67

## 2022-03-31 PROCEDURE — 93306 TTE W/DOPPLER COMPLETE: CPT | Performed by: INTERNAL MEDICINE

## 2022-03-31 PROCEDURE — 93306 TTE W/DOPPLER COMPLETE: CPT

## 2022-04-20 DIAGNOSIS — F33.9 RECURRENT MAJOR DEPRESSIVE DISORDER, REMISSION STATUS UNSPECIFIED (HCC): ICD-10-CM

## 2022-04-20 RX ORDER — ESCITALOPRAM OXALATE 20 MG/1
TABLET ORAL
Qty: 90 TABLET | Refills: 0 | Status: SHIPPED | OUTPATIENT
Start: 2022-04-20 | End: 2022-05-11 | Stop reason: SDUPTHER

## 2022-05-09 DIAGNOSIS — E11.42 TYPE 2 DIABETES MELLITUS WITH DIABETIC POLYNEUROPATHY, WITH LONG-TERM CURRENT USE OF INSULIN (HCC): ICD-10-CM

## 2022-05-09 DIAGNOSIS — Z79.4 TYPE 2 DIABETES MELLITUS WITH DIABETIC POLYNEUROPATHY, WITH LONG-TERM CURRENT USE OF INSULIN (HCC): ICD-10-CM

## 2022-05-09 DIAGNOSIS — E78.2 MIXED HYPERLIPIDEMIA: ICD-10-CM

## 2022-05-09 DIAGNOSIS — R60.0 EDEMA OF BOTH LOWER LEGS: ICD-10-CM

## 2022-05-09 DIAGNOSIS — M1A.9XX0 CHRONIC GOUT WITHOUT TOPHUS, UNSPECIFIED CAUSE, UNSPECIFIED SITE: ICD-10-CM

## 2022-05-09 RX ORDER — TORSEMIDE 10 MG/1
10 TABLET ORAL DAILY
Qty: 90 TABLET | Refills: 0 | Status: CANCELLED | OUTPATIENT
Start: 2022-05-09

## 2022-05-10 DIAGNOSIS — Z79.4 TYPE 2 DIABETES MELLITUS WITH DIABETIC POLYNEUROPATHY, WITH LONG-TERM CURRENT USE OF INSULIN (HCC): ICD-10-CM

## 2022-05-10 DIAGNOSIS — E78.2 MIXED HYPERLIPIDEMIA: ICD-10-CM

## 2022-05-10 DIAGNOSIS — M1A.9XX0 CHRONIC GOUT WITHOUT TOPHUS, UNSPECIFIED CAUSE, UNSPECIFIED SITE: ICD-10-CM

## 2022-05-10 DIAGNOSIS — E11.42 TYPE 2 DIABETES MELLITUS WITH DIABETIC POLYNEUROPATHY, WITH LONG-TERM CURRENT USE OF INSULIN (HCC): ICD-10-CM

## 2022-05-10 DIAGNOSIS — R60.0 EDEMA OF BOTH LOWER LEGS: ICD-10-CM

## 2022-05-10 RX ORDER — ATORVASTATIN CALCIUM 80 MG/1
TABLET, FILM COATED ORAL
Qty: 90 TABLET | Refills: 0 | Status: SHIPPED | OUTPATIENT
Start: 2022-05-10

## 2022-05-10 RX ORDER — ALLOPURINOL 100 MG/1
TABLET ORAL
Qty: 90 TABLET | Refills: 0 | Status: SHIPPED | OUTPATIENT
Start: 2022-05-10

## 2022-05-10 RX ORDER — ATORVASTATIN CALCIUM 80 MG/1
80 TABLET, FILM COATED ORAL DAILY
Qty: 90 TABLET | Refills: 0 | Status: SHIPPED | OUTPATIENT
Start: 2022-05-10

## 2022-05-10 RX ORDER — ALLOPURINOL 100 MG/1
100 TABLET ORAL DAILY
Qty: 90 TABLET | Refills: 0 | Status: SHIPPED | OUTPATIENT
Start: 2022-05-10

## 2022-05-10 RX ORDER — TORSEMIDE 10 MG/1
TABLET ORAL
Qty: 90 TABLET | Refills: 0 | Status: SHIPPED | OUTPATIENT
Start: 2022-05-10

## 2022-05-11 DIAGNOSIS — F33.9 RECURRENT MAJOR DEPRESSIVE DISORDER, REMISSION STATUS UNSPECIFIED (HCC): ICD-10-CM

## 2022-05-12 RX ORDER — ESCITALOPRAM OXALATE 20 MG/1
20 TABLET ORAL DAILY
Qty: 90 TABLET | Refills: 0 | Status: SHIPPED | OUTPATIENT
Start: 2022-05-12

## 2022-05-14 DIAGNOSIS — E03.9 HYPOTHYROIDISM, UNSPECIFIED TYPE: ICD-10-CM

## 2022-05-15 DIAGNOSIS — E03.9 HYPOTHYROIDISM, UNSPECIFIED TYPE: ICD-10-CM

## 2022-05-16 RX ORDER — LEVOTHYROXINE SODIUM 88 UG/1
88 TABLET ORAL
Qty: 90 TABLET | Refills: 0 | Status: SHIPPED | OUTPATIENT
Start: 2022-05-16 | End: 2022-07-27 | Stop reason: SDUPTHER

## 2022-05-16 RX ORDER — LEVOTHYROXINE SODIUM 88 UG/1
TABLET ORAL
Qty: 90 TABLET | Refills: 0 | Status: SHIPPED | OUTPATIENT
Start: 2022-05-16

## 2022-06-03 DIAGNOSIS — R25.1 TREMOR: ICD-10-CM

## 2022-06-03 RX ORDER — PROPRANOLOL HCL 60 MG
CAPSULE, EXTENDED RELEASE 24HR ORAL
Qty: 90 CAPSULE | Refills: 1 | Status: SHIPPED | OUTPATIENT
Start: 2022-06-03 | End: 2022-06-09 | Stop reason: SDUPTHER

## 2022-06-07 ENCOUNTER — OFFICE VISIT (OUTPATIENT)
Dept: OBGYN CLINIC | Facility: HOSPITAL | Age: 77
End: 2022-06-07
Payer: COMMERCIAL

## 2022-06-07 VITALS
DIASTOLIC BLOOD PRESSURE: 66 MMHG | SYSTOLIC BLOOD PRESSURE: 101 MMHG | HEART RATE: 76 BPM | WEIGHT: 223 LBS | HEIGHT: 69 IN | BODY MASS INDEX: 33.03 KG/M2

## 2022-06-07 DIAGNOSIS — G89.29 BILATERAL CHRONIC KNEE PAIN: ICD-10-CM

## 2022-06-07 DIAGNOSIS — M25.562 BILATERAL CHRONIC KNEE PAIN: ICD-10-CM

## 2022-06-07 DIAGNOSIS — M17.0 BILATERAL PRIMARY OSTEOARTHRITIS OF KNEE: Primary | ICD-10-CM

## 2022-06-07 DIAGNOSIS — M25.561 BILATERAL CHRONIC KNEE PAIN: ICD-10-CM

## 2022-06-07 PROCEDURE — 20610 DRAIN/INJ JOINT/BURSA W/O US: CPT | Performed by: ORTHOPAEDIC SURGERY

## 2022-06-07 PROCEDURE — 99213 OFFICE O/P EST LOW 20 MIN: CPT | Performed by: ORTHOPAEDIC SURGERY

## 2022-06-07 PROCEDURE — 3066F NEPHROPATHY DOC TX: CPT | Performed by: FAMILY MEDICINE

## 2022-06-07 RX ORDER — BUPIVACAINE HYDROCHLORIDE 2.5 MG/ML
2 INJECTION, SOLUTION INFILTRATION; PERINEURAL
Status: COMPLETED | OUTPATIENT
Start: 2022-06-07 | End: 2022-06-07

## 2022-06-07 RX ORDER — LIDOCAINE HYDROCHLORIDE 10 MG/ML
2 INJECTION, SOLUTION INFILTRATION; PERINEURAL
Status: COMPLETED | OUTPATIENT
Start: 2022-06-07 | End: 2022-06-07

## 2022-06-07 RX ORDER — BETAMETHASONE SODIUM PHOSPHATE AND BETAMETHASONE ACETATE 3; 3 MG/ML; MG/ML
12 INJECTION, SUSPENSION INTRA-ARTICULAR; INTRALESIONAL; INTRAMUSCULAR; SOFT TISSUE
Status: COMPLETED | OUTPATIENT
Start: 2022-06-07 | End: 2022-06-07

## 2022-06-07 RX ADMIN — BETAMETHASONE SODIUM PHOSPHATE AND BETAMETHASONE ACETATE 12 MG: 3; 3 INJECTION, SUSPENSION INTRA-ARTICULAR; INTRALESIONAL; INTRAMUSCULAR; SOFT TISSUE at 11:11

## 2022-06-07 RX ADMIN — BUPIVACAINE HYDROCHLORIDE 2 ML: 2.5 INJECTION, SOLUTION INFILTRATION; PERINEURAL at 11:11

## 2022-06-07 RX ADMIN — LIDOCAINE HYDROCHLORIDE 2 ML: 10 INJECTION, SOLUTION INFILTRATION; PERINEURAL at 11:11

## 2022-06-07 NOTE — PROGRESS NOTES
Assessment:  1  Bilateral primary osteoarthritis of knee     2  Bilateral chronic knee pain         Plan:  Patient was provided with bilateral cortical steroid injections  Procedure tolerated well  Post-injection protocol advised, weight-bearing as tolerated  He will follow-up in 3 months for repeat Synvisc 1 injections  To do next visit:  Return in about 3 months (around 9/7/2022) for Recheck, Synvisc injections  The above stated was discussed in layman's terms and the patient expressed understanding  All questions were answered to the patient's satisfaction  Scribe Attestation    I,:  Donte Sampson am acting as a scribe while in the presence of the attending physician :       I,:  Tisha Melton MD personally performed the services described in this documentation    as scribed in my presence :             Subjective:   Ike Harper is a 77 y o  male who presents for follow-up of bilateral knees and steroid injections  He is status post bilateral Synvisc-One injections with benefit 03/01/2022  Today he complains of return of bilateral knee pain  Prolonged weight-bearing repetitive bending aggravates well rest alleviates  He does use a cane for ambulation        Review of systems negative unless otherwise specified in HPI    Past Medical History:   Diagnosis Date    BPH (benign prostatic hyperplasia)     CAD (coronary artery disease)     Cancer (HCC)     basal cell    Chronic kidney disease     stage 3    Colon polyp     CPAP (continuous positive airway pressure) dependence     Diabetes mellitus (HCC)     niddm    Disease of thyroid gland     Gout     High cholesterol     Hypertension     MI, 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     Myocardial infarction ([facility] ) 2014    Sleep apnea        Past Surgical History:   Procedure Laterality Date    BASAL CELL CARCINOMA EXCISION Right 1/21/2022    Procedure: EXCISION BASAL CELL CARCINOMA EXTREMITY;  Surgeon: David Denny DO;  [facility];  Service: General    CATARACT EXTRACTION W/  INTRAOCULAR LENS IMPLANT      CORNEAL TRANSPLANT      CORONARY ANGIOPLASTY WITH STENT PLACEMENT      stents x3-- [id number]    EYE SURGERY      repair corneal laceration    VA COLONOSCOPY FLX DX W/COLLJ SPEC WHEN PFRMD N/A 3/21/2016    Procedure: COLONOSCOPY;  Surgeon: Farshad Serrano MD;  [facility];  Service: Colorectal    TONSILLECTOMY AND ADENOIDECTOMY         Family History   Adopted: Yes   Problem Relation Age of Onset    No Known Problems Mother     No Known Problems Father        Social History     Occupational History    Not on file   Tobacco Use    Smoking status: Never Smoker    Smokeless tobacco: Never Used   Vaping Use    Vaping Use: Never used   Substance and Sexual Activity    Alcohol use: Not Currently     Comment: socially, cut down in 2008, previously did more than     Drug use: Never    Sexual activity: Yes     Partners: Female         Current Outpatient Medications:     allopurinol (ZYLOPRIM) 100 mg tablet, Take 1 tablet (100 mg total) by mouth daily, Disp: 90 tablet, Rfl: 0    allopurinol (ZYLOPRIM) 100 mg tablet, Take 1 tablet by mouth once daily, Disp: 90 tablet, Rfl: 0    aspirin 325 mg tablet, Take 325 mg by mouth daily  , Disp: , Rfl:     atorvastatin (LIPITOR) 80 mg tablet, Take 1 tablet (80 mg total) by mouth daily, Disp: 90 tablet, Rfl: 0    atorvastatin (LIPITOR) 80 mg tablet, Take 1 tablet by mouth once daily, Disp: 90 tablet, Rfl: 0    Empagliflozin (Jardiance) 10 MG TABS, Take 1 tablet (10 mg total) by mouth every morning, Disp: 90 tablet, Rfl: 1    escitalopram (LEXAPRO) 20 mg tablet, Take 1 tablet (20 mg total) by mouth in the morning , Disp: 90 tablet, Rfl: 0    glimepiride (AMARYL) 4 mg tablet, TAKE 1 TABLET TWICE A DAY, Disp: 180 tablet, Rfl: 0    levothyroxine 88 mcg tablet, Take 1 tablet (88 mcg total) by mouth daily in the early morning, Disp: 90 tablet, Rfl: 0    levothyroxine 88 mcg tablet, TAKE 1 TABLET BY MOUTH ONCE DAILY IN THE MORNING, Disp: 90 tablet, Rfl: 0    losartan (COZAAR) 25 mg tablet, Take 1 tablet (25 mg total) by mouth daily, Disp: 90 tablet, Rfl: 1    metFORMIN (GLUCOPHAGE) 1000 MG tablet, Take 1 tablet (1,000 mg total) by mouth 2 (two) times a day with meals, Disp: 180 tablet, Rfl: 0    multivitamin (THERAGRAN) TABS, Take 1 tablet by mouth daily, Disp: , Rfl:     nitroglycerin (NITROSTAT) 0 4 mg SL tablet, Place 1 tablet (0 4 mg total) under the tongue every 5 (five) minutes as needed for chest pain, Disp: 25 tablet, Rfl: 0    propranolol (INDERAL LA) 60 mg 24 hr capsule, Take 1 capsule by mouth once daily, Disp: 90 capsule, Rfl: 1    torsemide (DEMADEX) 10 mg tablet, Take 1 tablet by mouth once daily, Disp: 90 tablet, Rfl: 0    zonisamide (ZONEGRAN) 50 MG capsule, Take 1 tablet (50 mg total) by mouth daily, Disp: 90 capsule, Rfl: 2    No Known Allergies         Vitals:    06/07/22 1044   BP: 101/66   Pulse: 76       Objective:                    Right Knee Exam     Other   Erythema: absent  Sensation: normal  Swelling: none  Effusion: no effusion present      Left Knee Exam     Other   Erythema: absent  Sensation: normal  Swelling: none  Effusion: no effusion present            Diagnostics, reviewed and taken today if performed as documented:    None performed        Procedures, if performed today:    Large joint arthrocentesis: bilateral knee  Universal Protocol:  Consent: Verbal consent obtained  Risks and benefits: risks, benefits and alternatives were discussed  Consent given by: patient  Time out: Immediately prior to procedure a "time out" was called to verify the correct patient, procedure, equipment, support staff and site/side marked as required    Patient understanding: patient states understanding of the procedure being performed  Patient identity confirmed: verbally with patient    Supporting Documentation  Indications: pain and diagnostic evaluation Procedure Details  Location: knee - bilateral knee  Preparation: Patient was prepped and draped in the usual sterile fashion  Needle size: 22 G  Ultrasound guidance: no    Medications (Right): 2 mL bupivacaine 0 25 %; 2 mL lidocaine 1 %; 12 mg betamethasone acetate-betamethasone sodium phosphate 6 (3-3) mg/mLMedications (Left): 2 mL bupivacaine 0 25 %; 2 mL lidocaine 1 %; 12 mg betamethasone acetate-betamethasone sodium phosphate 6 (3-3) mg/mL   Patient tolerance: patient tolerated the procedure well with no immediate complications  Dressing:  Sterile dressing applied            Portions of the record may have been created with voice recognition software  Occasional wrong word or "sound a like" substitutions may have occurred due to the inherent limitations of voice recognition software  Read the chart carefully and recognize, using context, where substitutions have occurred

## 2022-06-09 DIAGNOSIS — E11.40 CONTROLLED TYPE 2 DIABETES MELLITUS WITH DIABETIC NEUROPATHY, WITH LONG-TERM CURRENT USE OF INSULIN (HCC): ICD-10-CM

## 2022-06-09 DIAGNOSIS — R25.1 TREMOR: ICD-10-CM

## 2022-06-09 DIAGNOSIS — Z79.4 CONTROLLED TYPE 2 DIABETES MELLITUS WITH DIABETIC NEUROPATHY, WITH LONG-TERM CURRENT USE OF INSULIN (HCC): ICD-10-CM

## 2022-06-10 ENCOUNTER — DOCTOR'S OFFICE (OUTPATIENT)
Dept: URBAN - NONMETROPOLITAN AREA CLINIC 1 | Facility: CLINIC | Age: 77
Setting detail: OPHTHALMOLOGY
End: 2022-06-10
Payer: MEDICARE

## 2022-06-10 DIAGNOSIS — H18.711: ICD-10-CM

## 2022-06-10 DIAGNOSIS — E11.3293: ICD-10-CM

## 2022-06-10 DIAGNOSIS — H25.13: ICD-10-CM

## 2022-06-10 DIAGNOSIS — H35.3131: ICD-10-CM

## 2022-06-10 DIAGNOSIS — H04.123: ICD-10-CM

## 2022-06-10 PROBLEM — H01.004 BLEPHARITIS; RIGHT UPPER LID, RIGHT LOWER LID, LEFT UPPER LID, LEFT LOWER LID: Status: ACTIVE | Noted: 2021-12-01

## 2022-06-10 PROBLEM — H04.121 DRY EYE; RIGHT EYE, LEFT EYE: Status: ACTIVE | Noted: 2017-05-30

## 2022-06-10 PROBLEM — H52.4: Status: ACTIVE | Noted: 2017-02-27

## 2022-06-10 PROBLEM — H50.42: Status: ACTIVE | Noted: 2017-02-27

## 2022-06-10 PROBLEM — H01.001 BLEPHARITIS; RIGHT UPPER LID, RIGHT LOWER LID, LEFT UPPER LID, LEFT LOWER LID: Status: ACTIVE | Noted: 2021-12-01

## 2022-06-10 PROBLEM — C44.1121: Status: RESOLVED | Noted: 2021-03-30 | Resolved: 2022-06-10

## 2022-06-10 PROBLEM — H01.005 BLEPHARITIS; RIGHT UPPER LID, RIGHT LOWER LID, LEFT UPPER LID, LEFT LOWER LID: Status: ACTIVE | Noted: 2021-12-01

## 2022-06-10 PROBLEM — H52.02: Status: ACTIVE | Noted: 2017-02-27

## 2022-06-10 PROBLEM — H25.89 CATARACT: Status: ACTIVE | Noted: 2017-02-27

## 2022-06-10 PROBLEM — H04.122 DRY EYE; RIGHT EYE, LEFT EYE: Status: ACTIVE | Noted: 2017-05-30

## 2022-06-10 PROBLEM — B02.39: Status: RESOLVED | Noted: 2021-03-30 | Resolved: 2022-06-10

## 2022-06-10 PROBLEM — H01.002 BLEPHARITIS; RIGHT UPPER LID, RIGHT LOWER LID, LEFT UPPER LID, LEFT LOWER LID: Status: ACTIVE | Noted: 2021-12-01

## 2022-06-10 PROCEDURE — 92134 CPTRZ OPH DX IMG PST SGM RTA: CPT | Performed by: OPHTHALMOLOGY

## 2022-06-10 PROCEDURE — 2022F DILAT RTA XM EVC RTNOPTHY: CPT | Performed by: FAMILY MEDICINE

## 2022-06-10 PROCEDURE — 99214 OFFICE O/P EST MOD 30 MIN: CPT | Performed by: OPHTHALMOLOGY

## 2022-06-10 RX ORDER — PROPRANOLOL HCL 60 MG
60 CAPSULE, EXTENDED RELEASE 24HR ORAL DAILY
Qty: 90 CAPSULE | Refills: 0 | Status: SHIPPED | OUTPATIENT
Start: 2022-06-10

## 2022-06-10 ASSESSMENT — REFRACTION_CURRENTRX
OS_OVR_VA: 20/
OD_SPHERE: +0.50
OS_VPRISM_DIRECTION: PROGS
OS_ADD: +2.50
OS_AXIS: 110
OS_CYLINDER: -1.75
OD_ADD: +2.50
OD_AXIS: 052
OD_VPRISM_DIRECTION: PROGS
OD_OVR_VA: 20/
OD_CYLINDER: -0.50
OS_SPHERE: +1.50

## 2022-06-10 ASSESSMENT — VISUAL ACUITY
OD_BCVA: 20/25
OS_BCVA: 20/CFX5

## 2022-06-10 ASSESSMENT — KERATOMETRY
OD_K2POWER_DIOPTERS: 63.25
OS_K1POWER_DIOPTERS: 44.00
OD_K1POWER_DIOPTERS: 46.50
OD_AXISANGLE_DEGREES: 138
OS_AXISANGLE_DEGREES: 802
OS_K2POWER_DIOPTERS: 044.50

## 2022-06-10 ASSESSMENT — REFRACTION_MANIFEST
OS_SPHERE: +0.25
OS_AXIS: 090
OD_SPHERE: PLANO
OU_VA: 20/20
OS_CYLINDER: SPH
OS_ADD: +2.50
OS_VA2: 20/20
OD_ADD: +2.50
OD_CYLINDER: SPH
OD_VA2: 20/NI
OS_CYLINDER: -1.00
OS_VA1: 20/20
OD_VA1: 20/NI
OS_SPHERE: -1.50

## 2022-06-10 ASSESSMENT — LID EXAM ASSESSMENTS
OS_MEIBOMITIS: 2+
OD_BLEPHARITIS: 1+
OS_BLEPHARITIS: 3+
OD_MEIBOMITIS: 2+

## 2022-06-10 ASSESSMENT — SUPERFICIAL PUNCTATE KERATITIS (SPK)
OD_SPK: 2+
OS_SPK: ABSENT

## 2022-06-10 ASSESSMENT — REFRACTION_AUTOREFRACTION
OS_SPHERE: +0.25
OS_CYLINDER: -0.75
OD_SPHERE: ERROR
OS_AXIS: 091

## 2022-06-10 ASSESSMENT — CONFRONTATIONAL VISUAL FIELD TEST (CVF)
OD_FINDINGS: FULL
OS_FINDINGS: FULL

## 2022-06-10 ASSESSMENT — SPHEQUIV_DERIVED
OS_SPHEQUIV: -0.125
OS_SPHEQUIV: -0.25

## 2022-06-10 ASSESSMENT — AXIALLENGTH_DERIVED
OS_AL: 23.3673
OS_AL: 23.4152

## 2022-06-17 ENCOUNTER — RA CDI HCC (OUTPATIENT)
Dept: OTHER | Facility: HOSPITAL | Age: 77
End: 2022-06-17

## 2022-06-17 NOTE — PROGRESS NOTES
[facility]  coding opportunities     I13 0     Chart Reviewed number of suggestions sent to Provider: 1     Patients Insurance     Medicare Insurance: [address] [place] [address]

## 2022-06-20 ENCOUNTER — LAB (OUTPATIENT)
Dept: LAB | Facility: HOSPITAL | Age: 77
End: 2022-06-20
Attending: FAMILY MEDICINE
Payer: COMMERCIAL

## 2022-06-20 DIAGNOSIS — Z79.4 CONTROLLED TYPE 2 DIABETES MELLITUS WITH DIABETIC NEUROPATHY, WITH LONG-TERM CURRENT USE OF INSULIN (HCC): ICD-10-CM

## 2022-06-20 DIAGNOSIS — E11.40 CONTROLLED TYPE 2 DIABETES MELLITUS WITH DIABETIC NEUROPATHY, WITH LONG-TERM CURRENT USE OF INSULIN (HCC): ICD-10-CM

## 2022-06-20 LAB
ANION GAP SERPL CALCULATED.3IONS-SCNC: 10 MMOL/L (ref 4–13)
BUN SERPL-MCNC: 23 MG/DL (ref 5–25)
CALCIUM SERPL-MCNC: 9.3 MG/DL (ref 8.3–10.1)
CHLORIDE SERPL-SCNC: 102 MMOL/L (ref 100–108)
CO2 SERPL-SCNC: 28 MMOL/L (ref 21–32)
CREAT SERPL-MCNC: 1.37 MG/DL (ref 0.6–1.3)
CREAT UR-MCNC: 83.6 MG/DL
GFR SERPL CREATININE-BSD FRML MDRD: 50 ML/MIN/1.73SQ M
GLUCOSE P FAST SERPL-MCNC: 108 MG/DL (ref 65–99)
MICROALBUMIN UR-MCNC: 49.3 MG/L (ref 0–20)
MICROALBUMIN/CREAT 24H UR: 59 MG/G CREATININE (ref 0–30)
POTASSIUM SERPL-SCNC: 4 MMOL/L (ref 3.5–5.3)
SODIUM SERPL-SCNC: 140 MMOL/L (ref 136–145)

## 2022-06-20 PROCEDURE — 36415 COLL VENOUS BLD VENIPUNCTURE: CPT

## 2022-06-20 PROCEDURE — 3060F POS MICROALBUMINURIA REV: CPT | Performed by: FAMILY MEDICINE

## 2022-06-20 PROCEDURE — 82043 UR ALBUMIN QUANTITATIVE: CPT

## 2022-06-20 PROCEDURE — 82570 ASSAY OF URINE CREATININE: CPT

## 2022-06-20 PROCEDURE — 80048 BASIC METABOLIC PNL TOTAL CA: CPT

## 2022-06-23 ENCOUNTER — OFFICE VISIT (OUTPATIENT)
Dept: FAMILY MEDICINE CLINIC | Facility: CLINIC | Age: 77
End: 2022-06-23
Payer: COMMERCIAL

## 2022-06-23 VITALS
DIASTOLIC BLOOD PRESSURE: 70 MMHG | TEMPERATURE: 97.5 F | BODY MASS INDEX: 32.73 KG/M2 | OXYGEN SATURATION: 97 % | SYSTOLIC BLOOD PRESSURE: 106 MMHG | WEIGHT: 221 LBS | HEIGHT: 69 IN | HEART RATE: 65 BPM

## 2022-06-23 DIAGNOSIS — E11.65 CONTROLLED TYPE 2 DIABETES MELLITUS WITH HYPERGLYCEMIA, WITHOUT LONG-TERM CURRENT USE OF INSULIN (HCC): Primary | ICD-10-CM

## 2022-06-23 DIAGNOSIS — I12.9 BENIGN HYPERTENSION WITH CKD (CHRONIC KIDNEY DISEASE) STAGE III (HCC): ICD-10-CM

## 2022-06-23 DIAGNOSIS — N18.31 STAGE 3A CHRONIC KIDNEY DISEASE (HCC): ICD-10-CM

## 2022-06-23 DIAGNOSIS — E11.21 DIABETIC NEPHROPATHY ASSOCIATED WITH TYPE 2 DIABETES MELLITUS (HCC): ICD-10-CM

## 2022-06-23 DIAGNOSIS — N18.30 BENIGN HYPERTENSION WITH CKD (CHRONIC KIDNEY DISEASE) STAGE III (HCC): ICD-10-CM

## 2022-06-23 DIAGNOSIS — E03.9 ACQUIRED HYPOTHYROIDISM: ICD-10-CM

## 2022-06-23 DIAGNOSIS — E08.42 DIABETIC POLYNEUROPATHY ASSOCIATED WITH DIABETES MELLITUS DUE TO UNDERLYING CONDITION (HCC): ICD-10-CM

## 2022-06-23 LAB — SL AMB POCT HEMOGLOBIN AIC: 6.6 (ref ?–6.5)

## 2022-06-23 PROCEDURE — 99214 OFFICE O/P EST MOD 30 MIN: CPT | Performed by: FAMILY MEDICINE

## 2022-06-23 PROCEDURE — 3725F SCREEN DEPRESSION PERFORMED: CPT | Performed by: FAMILY MEDICINE

## 2022-06-23 PROCEDURE — 3044F HG A1C LEVEL LT 7.0%: CPT | Performed by: FAMILY MEDICINE

## 2022-06-23 PROCEDURE — 83036 HEMOGLOBIN GLYCOSYLATED A1C: CPT | Performed by: FAMILY MEDICINE

## 2022-06-23 PROCEDURE — 1036F TOBACCO NON-USER: CPT | Performed by: FAMILY MEDICINE

## 2022-06-23 PROCEDURE — 1160F RVW MEDS BY RX/DR IN RCRD: CPT | Performed by: FAMILY MEDICINE

## 2022-06-23 NOTE — PROGRESS NOTES
Assessment/Plan:    Problem List Items Addressed This Visit        Endocrine    Controlled type 2 diabetes mellitus with hyperglycemia, without long-term current use of insulin ([facility] ) - Primary    Diabetic neuropathy associated with diabetes mellitus due to underlying condition ([facility] )    Diabetic nephropathy associated with type 2 diabetes mellitus ([facility] )       Cardiovascular and Mediastinum    Benign hypertension with CKD (chronic kidney disease) stage III (HCC)       Genitourinary    Stage 3a chronic kidney disease ([facility] )           Diagnoses and all orders for this visit:    Controlled type 2 diabetes mellitus with hyperglycemia, without long-term current use of insulin ([facility] )    Diabetic nephropathy associated with type 2 diabetes mellitus ([facility] )    Diabetic polyneuropathy associated with diabetes mellitus due to underlying condition ([facility] )    Benign hypertension with CKD (chronic kidney disease) stage III (HCC)    Stage 3a chronic kidney disease ([facility] )        No problem-specific Assessment & Plan notes found for this encounter  Subjective:      Patient ID: Lowella Kayser is a 77 y o  male  Mr  Mariama Shrestha is here accompanied by his wife he is here for a follow-up of visit regarding his diabetes he does not check his sugars on but he has recently been eating a little more in the way of sweets on otherwise trying to follow a diabetic diet      The following portions of the patient's history were reviewed and updated as appropriate:   He has a past medical history of BPH (benign prostatic hyperplasia), CAD (coronary artery disease), Cancer ([facility] ), Chronic kidney disease, Colon polyp, CPAP (continuous positive airway pressure) dependence, Diabetes mellitus ([facility] ), Disease of thyroid gland, Gout, High cholesterol, Hypertension, MI, old, Myocardial infarction ([facility] ) (2014), and Sleep apnea  ,  does not have any pertinent problems on file  ,   has a past surgical history that includes Tonsillectomy and adenoidectomy; Cataract extraction w/  intraocular lens implant; Eye surgery; Coronary angioplasty with stent; pr colonoscopy flx dx w/collj spec when pfrmd (N/A, 3/21/2016); Corneal transplant; and Excision basal cell carcinoma (Right, 1/21/2022)  ,  family history includes No Known Problems in his father and mother  He was adopted  ,   reports that he has never smoked  He has never used smokeless tobacco  He reports previous alcohol use  He reports that he does not use drugs  ,  has No Known Allergies     Current Outpatient Medications   Medication Sig Dispense Refill    allopurinol (ZYLOPRIM) 100 mg tablet Take 1 tablet (100 mg total) by mouth daily 90 tablet 0    allopurinol (ZYLOPRIM) 100 mg tablet Take 1 tablet by mouth once daily 90 tablet 0    aspirin 325 mg tablet Take 325 mg by mouth daily   atorvastatin (LIPITOR) 80 mg tablet Take 1 tablet (80 mg total) by mouth daily 90 tablet 0    atorvastatin (LIPITOR) 80 mg tablet Take 1 tablet by mouth once daily 90 tablet 0    Empagliflozin (Jardiance) 10 MG TABS Take 1 tablet (10 mg total) by mouth every morning 90 tablet 1    escitalopram (LEXAPRO) 20 mg tablet Take 1 tablet (20 mg total) by mouth in the morning   90 tablet 0    glimepiride (AMARYL) 4 mg tablet TAKE 1 TABLET TWICE A  tablet 0    levothyroxine 88 mcg tablet Take 1 tablet (88 mcg total) by mouth daily in the early morning 90 tablet 0    levothyroxine 88 mcg tablet TAKE 1 TABLET BY MOUTH ONCE DAILY IN THE MORNING 90 tablet 0    losartan (COZAAR) 25 mg tablet Take 1 tablet (25 mg total) by mouth daily 90 tablet 1    metFORMIN (GLUCOPHAGE) 1000 MG tablet Take 1 tablet (1,000 mg total) by mouth 2 (two) times a day with meals 180 tablet 0    multivitamin (THERAGRAN) TABS Take 1 tablet by mouth daily      nitroglycerin (NITROSTAT) 0 4 mg SL tablet Place 1 tablet (0 4 mg total) under the tongue every 5 (five) minutes as needed for chest pain 25 tablet 0    propranolol (INDERAL LA) 60 mg 24 hr capsule Take 1 capsule (60 mg total) by mouth daily 90 capsule 0    torsemide (DEMADEX) 10 mg tablet Take 1 tablet by mouth once daily 90 tablet 0    zonisamide (ZONEGRAN) 50 MG capsule Take 1 tablet (50 mg total) by mouth daily 90 capsule 2     No current facility-administered medications for this visit  Review of Systems   Constitutional: Negative for activity change, appetite change, diaphoresis, fatigue and fever  HENT: Positive for dental problem  Negative for hearing loss  Eyes: Positive for visual disturbance  Respiratory: Negative for apnea, cough, chest tightness, shortness of breath and wheezing  Cardiovascular: Negative for chest pain, palpitations and leg swelling  Gastrointestinal: Negative for abdominal distention, abdominal pain, anal bleeding, constipation, diarrhea, nausea and vomiting  Endocrine: Negative for cold intolerance, heat intolerance, polydipsia, polyphagia and polyuria  Genitourinary: Negative for difficulty urinating, dysuria, flank pain, hematuria and urgency  Musculoskeletal: Positive for arthralgias and gait problem  Negative for back pain, joint swelling and myalgias  Skin: Negative for color change, rash and wound  Allergic/Immunologic: Negative for environmental allergies, food allergies and immunocompromised state  Neurological: Negative for dizziness, seizures, syncope, speech difficulty, numbness and headaches  Hematological: Negative for adenopathy  Does not bruise/bleed easily  Psychiatric/Behavioral: Positive for dysphoric mood  Negative for agitation, behavioral problems, hallucinations, sleep disturbance and suicidal ideas  Objective:  Vitals:    06/23/22 1204   BP: 106/70   BP Location: Left arm   Patient Position: Sitting   Cuff Size: Standard   Pulse: 65   Temp: 97 5 °F (36 4 °C)   TempSrc: Temporal   SpO2: 97%   Weight: 100 kg (221 lb)   Height: 5' 9" (1 753 m)     Body mass index is 32 64 kg/m²       Physical Exam  Constitutional: General: He is not in acute distress  Appearance: He is well-developed  He is not diaphoretic  HENT:      Head: Normocephalic  Right Ear: External ear normal       Left Ear: External ear normal       Nose: Nose normal    Eyes:      General: No scleral icterus  Right eye: No discharge  Left eye: No discharge  Conjunctiva/sclera: Conjunctivae normal       Pupils: Pupils are equal, round, and reactive to light  Neck:      Thyroid: No thyromegaly  Trachea: No tracheal deviation  Cardiovascular:      Rate and Rhythm: Normal rate and regular rhythm  Pulses: no weak pulses          Dorsalis pedis pulses are 2+ on the right side and 2+ on the left side  Posterior tibial pulses are 2+ on the right side and 2+ on the left side  Heart sounds: Normal heart sounds  No murmur heard  No friction rub  No gallop  Pulmonary:      Effort: Pulmonary effort is normal  No respiratory distress  Breath sounds: Normal breath sounds  No wheezing  Abdominal:      General: Bowel sounds are normal       Palpations: Abdomen is soft  There is no mass  Tenderness: There is no abdominal tenderness  There is no guarding  Musculoskeletal:         General: No deformity  Cervical back: Normal range of motion  Feet:      Right foot:      Skin integrity: No ulcer, skin breakdown, erythema, warmth, callus or dry skin  Left foot:      Skin integrity: No ulcer, skin breakdown, erythema, warmth, callus or dry skin  Lymphadenopathy:      Cervical: No cervical adenopathy  Skin:     General: Skin is warm and dry  Findings: No erythema or rash  Neurological:      Mental Status: He is alert and oriented to person, place, and time  Cranial Nerves: No cranial nerve deficit  Psychiatric:         Thought Content: Thought content normal        Patient's shoes and socks removed      Right Foot/Ankle   Right Foot Inspection  Skin Exam: skin normal and skin intact  No dry skin, no warmth, no callus, no erythema, no maceration, no abnormal color, no pre-ulcer, no ulcer and no callus  Toe Exam: ROM and strength within normal limits  Sensory   Vibration: intact  Proprioception: intact  Monofilament testing: intact    Vascular  Capillary refills: < 3 seconds  The right DP pulse is 2+  The right PT pulse is 2+  Left Foot/Ankle  Left Foot Inspection  Skin Exam: skin normal and skin intact  No dry skin, no warmth, no erythema, no maceration, normal color, no pre-ulcer, no ulcer and no callus  Toe Exam: ROM and strength within normal limits  Sensory   Vibration: intact  Proprioception: intact  Monofilament testing: intact    Vascular  Capillary refills: < 3 seconds  The left DP pulse is 2+  The left PT pulse is 2+       Assign Risk Category  No deformity present  No loss of protective sensation  No weak pulses  Risk: 1

## 2022-06-24 ENCOUNTER — TELEPHONE (OUTPATIENT)
Dept: ADMINISTRATIVE | Facility: OTHER | Age: 77
End: 2022-06-24

## 2022-06-24 NOTE — LETTER
Diabetic Eye Exam Form    Date Requested: 22  Patient: Elijah Aguero  Patient : 1945   Referring Provider: Nu Zayas DO    DIABETIC Eye Exam Date _______________________________    Type of Exam MUST be documented for Diabetic Eye Exams  Please CHECK ONE  Retinal Exam       Dilated Retinal Exam       OCT       Optomap-Iris Exam      Fundus Photography     Left Eye - Please check Retinopathy AND Type or No Retinopathy      Exam did show retinopathy    Exam did not show retinopathy         Mild     Proliferative           Moderate    Severe            None         Right Eye - Please check Retinopathy AND Type or No Retinopathy     Exam did show retinopathy    Exam did not show retinopathy         Mild     Proliferative        Moderate    Severe        None       Comments __________________________________________________________    Practice Providing Exam ______________________________________________    Exam Performed By (print name) _______________________________________      Provider Signature ___________________________________________________    These reports are needed for  compliance  Please fax this completed form and a copy of the Diabetic Eye Exam report to our office located at [place] [address] as soon as possible via [phone] tashi Cagle Killer: Phone [phone]  We thank you for your assistance in treating our mutual patient

## 2022-06-24 NOTE — TELEPHONE ENCOUNTER
Upon review of the In Basket request and the patient's chart, initial outreach has been made via fax, please see Contacts section for details       Thank you  Arias Padilla MA

## 2022-06-24 NOTE — TELEPHONE ENCOUNTER
----- Message from Kvng Shipley sent at 6/23/2022  2:13 PM EDT -----  06/23/22 2:13 PM    Hello, our patient Don Rodrigues has had Diabetic Eye Exam completed/performed  Please assist in updating the patient chart by making an External outreach to [facility] in [address] [place] [address]  The date of service is 2022      Thank you,  Kelly Shah   [facility]

## 2022-06-24 NOTE — TELEPHONE ENCOUNTER
Upon review of the In Basket request we were able to locate, review, and update the patient chart as requested for Diabetic Eye Exam     Any additional questions or concerns should be emailed to the Practice Liaisons via [email or web address] email, please do not reply via In Basket      Thank you  Raj Guido MA

## 2022-07-13 DIAGNOSIS — E11.42 TYPE 2 DIABETES MELLITUS WITH DIABETIC POLYNEUROPATHY, WITH LONG-TERM CURRENT USE OF INSULIN (HCC): ICD-10-CM

## 2022-07-13 DIAGNOSIS — Z79.4 TYPE 2 DIABETES MELLITUS WITH DIABETIC POLYNEUROPATHY, WITH LONG-TERM CURRENT USE OF INSULIN (HCC): ICD-10-CM

## 2022-07-13 RX ORDER — GLIMEPIRIDE 4 MG/1
TABLET ORAL
Qty: 180 TABLET | Refills: 0 | Status: SHIPPED | OUTPATIENT
Start: 2022-07-13 | End: 2022-10-21 | Stop reason: SDUPTHER

## 2022-07-19 ENCOUNTER — HOSPITAL ENCOUNTER (EMERGENCY)
Facility: HOSPITAL | Age: 77
Discharge: HOME | End: 2022-07-19
Attending: EMERGENCY MEDICINE
Payer: COMMERCIAL

## 2022-07-19 ENCOUNTER — APPOINTMENT (EMERGENCY)
Dept: CT IMAGING | Facility: HOSPITAL | Age: 77
End: 2022-07-19
Payer: COMMERCIAL

## 2022-07-19 ENCOUNTER — APPOINTMENT (EMERGENCY)
Dept: RADIOLOGY | Facility: HOSPITAL | Age: 77
End: 2022-07-19
Payer: COMMERCIAL

## 2022-07-19 VITALS
WEIGHT: 224.65 LBS | HEART RATE: 78 BPM | DIASTOLIC BLOOD PRESSURE: 52 MMHG | BODY MASS INDEX: 33.17 KG/M2 | SYSTOLIC BLOOD PRESSURE: 102 MMHG | RESPIRATION RATE: 20 BRPM | TEMPERATURE: 96.7 F | OXYGEN SATURATION: 96 %

## 2022-07-19 DIAGNOSIS — E11.649 HYPOGLYCEMIA ASSOCIATED WITH TYPE 2 DIABETES MELLITUS (HCC): Primary | ICD-10-CM

## 2022-07-19 DIAGNOSIS — M25.552 LEFT HIP PAIN: ICD-10-CM
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
## 2022-07-19 LAB
2HR DELTA HS TROPONIN: 0 NG/L
4HR DELTA HS TROPONIN: 0 NG/L
ALBUMIN SERPL BCP-MCNC: 4.1 G/DL (ref 3.5–5)
ALP SERPL-CCNC: 94 U/L (ref 46–116)
ALT SERPL W P-5'-P-CCNC: 59 U/L (ref 12–78)
ANION GAP SERPL CALCULATED.3IONS-SCNC: 14 MMOL/L (ref 4–13)
AST SERPL W P-5'-P-CCNC: 34 U/L (ref 5–45)
BASOPHILS # BLD AUTO: 0.07 THOUSANDS/ΜL (ref 0–0.1)
BASOPHILS NFR BLD AUTO: 1 % (ref 0–1)
BILIRUB SERPL-MCNC: 0.58 MG/DL (ref 0.2–1)
BUN SERPL-MCNC: 26 MG/DL (ref 5–25)
CALCIUM SERPL-MCNC: 9.5 MG/DL (ref 8.3–10.1)
CARDIAC TROPONIN I PNL SERPL HS: 2 NG/L
CHLORIDE SERPL-SCNC: 101 MMOL/L (ref 96–108)
CO2 SERPL-SCNC: 25 MMOL/L (ref 21–32)
CREAT SERPL-MCNC: 1.21 MG/DL (ref 0.6–1.3)
EOSINOPHIL # BLD AUTO: 0.28 THOUSAND/ΜL (ref 0–0.61)
EOSINOPHIL NFR BLD AUTO: 2 % (ref 0–6)
ERYTHROCYTE [DISTWIDTH] IN BLOOD BY AUTOMATED COUNT: 12.6 % (ref 11.6–15.1)
GFR SERPL CREATININE-BSD FRML MDRD: 58 ML/MIN/1.73SQ M
GLUCOSE SERPL-MCNC: 101 MG/DL (ref 65–140)
GLUCOSE SERPL-MCNC: 109 MG/DL (ref 65–140)
GLUCOSE SERPL-MCNC: 31 MG/DL (ref 65–140)
GLUCOSE SERPL-MCNC: 42 MG/DL (ref 65–140)
GLUCOSE SERPL-MCNC: 42 MG/DL (ref 65–140)
GLUCOSE SERPL-MCNC: 69 MG/DL (ref 65–140)
GLUCOSE SERPL-MCNC: 94 MG/DL (ref 65–140)
GLUCOSE SERPL-MCNC: 97 MG/DL (ref 65–140)
HCT VFR BLD AUTO: 50.3 % (ref 36.5–49.3)
HGB BLD-MCNC: 16.3 G/DL (ref 12–17)
IMM GRANULOCYTES # BLD AUTO: 0.07 THOUSAND/UL (ref 0–0.2)
IMM GRANULOCYTES NFR BLD AUTO: 1 % (ref 0–2)
LYMPHOCYTES # BLD AUTO: 3.53 THOUSANDS/ΜL (ref 0.6–4.47)
LYMPHOCYTES NFR BLD AUTO: 29 % (ref 14–44)
MCH RBC QN AUTO: 32.3 PG (ref 26.8–34.3)
MCHC RBC AUTO-ENTMCNC: 32.4 G/DL (ref 31.4–37.4)
MCV RBC AUTO: 100 FL (ref 82–98)
MONOCYTES # BLD AUTO: 0.95 THOUSAND/ΜL (ref 0.17–1.22)
MONOCYTES NFR BLD AUTO: 8 % (ref 4–12)
NEUTROPHILS # BLD AUTO: 7.42 THOUSANDS/ΜL (ref 1.85–7.62)
NEUTS SEG NFR BLD AUTO: 59 % (ref 43–75)
NRBC BLD AUTO-RTO: 0 /100 WBCS
PLATELET # BLD AUTO: 272 THOUSANDS/UL (ref 149–390)
PMV BLD AUTO: 10.6 FL (ref 8.9–12.7)
POTASSIUM SERPL-SCNC: 4.8 MMOL/L (ref 3.5–5.3)
PROT SERPL-MCNC: 8.2 G/DL (ref 6.4–8.4)
RBC # BLD AUTO: 5.04 MILLION/UL (ref 3.88–5.62)
SODIUM SERPL-SCNC: 140 MMOL/L (ref 135–147)
WBC # BLD AUTO: 12.32 THOUSAND/UL (ref 4.31–10.16)

## 2022-07-19 PROCEDURE — 80053 COMPREHEN METABOLIC PANEL: CPT | Performed by: EMERGENCY MEDICINE

## 2022-07-19 PROCEDURE — 99285 EMERGENCY DEPT VISIT HI MDM: CPT

## 2022-07-19 PROCEDURE — 82948 REAGENT STRIP/BLOOD GLUCOSE: CPT

## 2022-07-19 PROCEDURE — 70450 CT HEAD/BRAIN W/O DYE: CPT

## 2022-07-19 PROCEDURE — G1004 CDSM NDSC: HCPCS

## 2022-07-19 PROCEDURE — 84484 ASSAY OF TROPONIN QUANT: CPT | Performed by: EMERGENCY MEDICINE

## 2022-07-19 PROCEDURE — 99285 EMERGENCY DEPT VISIT HI MDM: CPT | Performed by: PHYSICIAN ASSISTANT

## 2022-07-19 PROCEDURE — 73502 X-RAY EXAM HIP UNI 2-3 VIEWS: CPT

## 2022-07-19 PROCEDURE — 93005 ELECTROCARDIOGRAM TRACING: CPT

## 2022-07-19 PROCEDURE — 71045 X-RAY EXAM CHEST 1 VIEW: CPT

## 2022-07-19 PROCEDURE — 36415 COLL VENOUS BLD VENIPUNCTURE: CPT

## 2022-07-19 PROCEDURE — 85025 COMPLETE CBC W/AUTO DIFF WBC: CPT | Performed by: EMERGENCY MEDICINE

## 2022-07-19 RX ORDER — DEXTROSE MONOHYDRATE 25 G/50ML
INJECTION, SOLUTION INTRAVENOUS
Status: COMPLETED
Start: 2022-07-19 | End: 2022-07-19

## 2022-07-19 RX ADMIN — DEXTROSE MONOHYDRATE 50 ML: 25 INJECTION, SOLUTION INTRAVENOUS at 09:37

## 2022-07-19 RX ADMIN — Medication 4 G: at 09:14

## 2022-07-19 NOTE — ED PROVIDER NOTES
History  Chief Complaint   Patient presents with    Syncope     Pt had syncopal episode at home  No blood thinners noted     77year old male with PMH CAD, DM, HTN, HLD, LUKE, gout, BPH, hypothyroidism presents via EMS from home for evaluation after a fall out of bed  Pt does not recall what happened  Pt states he was sleeping in bed and his grandson came into the room and found him lying on the floor  Pt unsure of how he ended up on the floor but assumes he fell out of bed  Floor is reported to be carpeted  He notes when he woke up he was feeling dizzy and lightheaded but that has since resolved  He denies any injuries  He denies any pain  He states he still feels a little fatigued  He denies hitting head although he was laying on the floor  He takes an aspirin daily  Denies any other blood thinners  Denies headache, visual disturbance  Denies chest pain, SOB, N/V/D, abdominal pain  Denies numbness, tingling, focal weakness  He has otherwise been in usual state of health  He was noted to be hypoglycemia upon arrival here which was treated with oral glucose as well as IV dextrose  He is prescribed metformin and jardiance for his diabetes  He last took these yesterday  He reports last meal was dinner last night  He does not routinely monitor his blood sugars  He is unsure of his last HgbA1C  He reports his spouse helps manage his medications and he has been taking them as prescribed  History provided by:  Patient and medical records   used: No    Syncope  Episode history:  Single  Most recent episode:   Today  Progression:  Resolved  Chronicity:  New  Associated symptoms: malaise/fatigue    Associated symptoms: no chest pain, no confusion, no diaphoresis, no difficulty breathing, no dizziness, no fever, no focal sensory loss, no focal weakness, no headaches, no nausea, no palpitations, no recent injury, no recent surgery, no seizures, no shortness of breath, no visual change, no vomiting and no weakness    Risk factors: coronary artery disease    Risk factors: no seizures        Prior to Admission Medications   Prescriptions Last Dose Informant Patient Reported? Taking? Empagliflozin (Jardiance) 10 MG TABS   No No   Sig: Take 1 tablet (10 mg total) by mouth every morning   allopurinol (ZYLOPRIM) 100 mg tablet   No No   Sig: Take 1 tablet (100 mg total) by mouth daily   allopurinol (ZYLOPRIM) 100 mg tablet   No No   Sig: Take 1 tablet by mouth once daily   aspirin 325 mg tablet  Spouse/Significant Other Yes No   Sig: Take 325 mg by mouth daily  atorvastatin (LIPITOR) 80 mg tablet   No No   Sig: Take 1 tablet (80 mg total) by mouth daily   atorvastatin (LIPITOR) 80 mg tablet   No No   Sig: Take 1 tablet by mouth once daily   escitalopram (LEXAPRO) 20 mg tablet   No No   Sig: Take 1 tablet (20 mg total) by mouth in the morning     glimepiride (AMARYL) 4 mg tablet   No No   Sig: TAKE 1 TABLET TWICE A DAY   levothyroxine 88 mcg tablet   No No   Sig: Take 1 tablet (88 mcg total) by mouth daily in the early morning   levothyroxine 88 mcg tablet   No No   Sig: TAKE 1 TABLET BY MOUTH ONCE DAILY IN THE MORNING   losartan (COZAAR) 25 mg tablet   No No   Sig: Take 1 tablet (25 mg total) by mouth daily   metFORMIN (GLUCOPHAGE) 1000 MG tablet   No No   Sig: Take 1 tablet (1,000 mg total) by mouth 2 (two) times a day with meals   multivitamin (THERAGRAN) TABS  Spouse/Significant Other Yes No   Sig: Take 1 tablet by mouth daily   nitroglycerin (NITROSTAT) 0 4 mg SL tablet   No No   Sig: Place 1 tablet (0 4 mg total) under the tongue every 5 (five) minutes as needed for chest pain   propranolol (INDERAL LA) 60 mg 24 hr capsule   No No   Sig: Take 1 capsule (60 mg total) by mouth daily   torsemide (DEMADEX) 10 mg tablet   No No   Sig: Take 1 tablet by mouth once daily   zonisamide (ZONEGRAN) 50 MG capsule   No No   Sig: Take 1 tablet (50 mg total) by mouth daily      Facility-Administered Medications: None       Past Medical History:   Diagnosis Date    BPH (benign prostatic hyperplasia)     CAD (coronary artery disease)     Cancer (HCC)     basal cell    Chronic kidney disease     stage 3    Colon polyp     CPAP (continuous positive airway pressure) dependence     Diabetes mellitus (HCC)     niddm    Disease of thyroid gland     Gout     High cholesterol     Hypertension     MI, 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     Myocardial infarction ([facility] [address] ) 2014    Sleep apnea        Past Surgical History:   Procedure Laterality Date    BASAL CELL CARCINOMA EXCISION Right 1/21/2022    Procedure: EXCISION BASAL CELL CARCINOMA EXTREMITY;  Surgeon: Sudarshan Houser DO;  Location: [facility];  Service: General    CATARACT EXTRACTION W/  INTRAOCULAR LENS IMPLANT      CORNEAL TRANSPLANT      CORONARY ANGIOPLASTY WITH STENT PLACEMENT      stents x3-- [phone]    EYE SURGERY      repair corneal laceration    AR COLONOSCOPY FLX DX W/COLLJ SPEC WHEN PFRMD N/A 3/21/2016    Procedure: COLONOSCOPY;  Surgeon: Mitzi Cotton MD;  Location: [facility];  Service: Colorectal    TONSILLECTOMY AND ADENOIDECTOMY         Family History   Adopted: Yes   Problem Relation Age of Onset    No Known Problems Mother     No Known Problems Father      I have reviewed and agree with the history as documented  E-Cigarette/Vaping    E-Cigarette Use Never User      E-Cigarette/Vaping Substances    Nicotine No     THC No     CBD No     Flavoring No     Other No     Unknown No      Social History     Tobacco Use    Smoking status: Never Smoker    Smokeless tobacco: Never Used   Vaping Use    Vaping Use: Never used   Substance Use Topics    Alcohol use: Not Currently     Comment: socially, cut down in 2008, previously did more than     Drug use: Never       Review of Systems   Constitutional: Positive for fatigue and malaise/fatigue  Negative for chills, diaphoresis and fever  HENT: Negative    Negative for congestion, rhinorrhea and sore throat  Eyes: Negative  Negative for visual disturbance  Respiratory: Negative  Negative for cough, shortness of breath and wheezing  Cardiovascular: Positive for syncope  Negative for chest pain, palpitations and leg swelling  Gastrointestinal: Negative  Negative for abdominal pain, constipation, diarrhea, nausea and vomiting  Genitourinary: Negative  Negative for dysuria, frequency and hematuria  Musculoskeletal: Negative  Negative for arthralgias, back pain, myalgias and neck pain  Skin: Negative  Negative for rash  Neurological: Positive for syncope  Negative for dizziness, focal weakness, seizures, facial asymmetry, speech difficulty, weakness, light-headedness, numbness and headaches  Psychiatric/Behavioral: Negative  Negative for confusion  All other systems reviewed and are negative  Physical Exam  Physical Exam  Vitals and nursing note reviewed  Constitutional:       General: He is not in acute distress  Appearance: Normal appearance  He is well-developed  He is obese  He is not toxic-appearing or diaphoretic  HENT:      Head: Normocephalic and atraumatic  Right Ear: Hearing, tympanic membrane, ear canal and external ear normal       Left Ear: Hearing, tympanic membrane, ear canal and external ear normal       Nose: Nose normal       Mouth/Throat:      Mouth: Mucous membranes are moist       Tongue: Tongue does not deviate from midline  Pharynx: Oropharynx is clear  Uvula midline  Eyes:      General: Lids are normal  No visual field deficit or scleral icterus  Extraocular Movements: Extraocular movements intact  Conjunctiva/sclera: Conjunctivae normal       Pupils: Pupils are equal, round, and reactive to light  Neck:      Trachea: Trachea and phonation normal  No tracheal deviation  Cardiovascular:      Rate and Rhythm: Normal rate and regular rhythm  Pulses: Normal pulses             Radial pulses are 2+ on the right side and 2+ on the left side  Dorsalis pedis pulses are 2+ on the right side and 2+ on the left side  Posterior tibial pulses are 2+ on the right side and 2+ on the left side  Heart sounds: Normal heart sounds, S1 normal and S2 normal  No murmur heard  Pulmonary:      Effort: Pulmonary effort is normal  No tachypnea or respiratory distress  Breath sounds: Normal breath sounds  No wheezing, rhonchi or rales  Abdominal:      General: Bowel sounds are normal  There is no distension  Palpations: Abdomen is soft  Tenderness: There is no abdominal tenderness  There is no guarding or rebound  Musculoskeletal:      Cervical back: Normal range of motion and neck supple  No spinous process tenderness  Right lower leg: No edema  Left lower leg: No edema  Skin:     General: Skin is warm and dry  Capillary Refill: Capillary refill takes less than 2 seconds  Findings: No rash  Neurological:      General: No focal deficit present  Mental Status: He is alert and oriented to person, place, and time  GCS: GCS eye subscore is 4  GCS verbal subscore is 5  GCS motor subscore is 6  Cranial Nerves: No cranial nerve deficit, dysarthria or facial asymmetry  Sensory: Sensation is intact  No sensory deficit  Motor: No abnormal muscle tone or pronator drift  Coordination: Coordination is intact  Psychiatric:         Mood and Affect: Mood normal          Speech: Speech normal  Speech is not slurred           Behavior: Behavior normal          Vital Signs  ED Triage Vitals   Temperature Pulse Respirations Blood Pressure SpO2   07/19/22 0905 07/19/22 0905 07/19/22 0905 07/19/22 0905 07/19/22 0918   (!) 96 7 °F (35 9 °C) (!) 51 18 (!) 144/102 97 %      Temp Source Heart Rate Source Patient Position - Orthostatic VS BP Location FiO2 (%)   07/19/22 0905 07/19/22 0905 07/19/22 0905 07/19/22 0905 --   Temporal Monitor Lying Right arm       Pain Score       07/19/22 0905       No Pain           Vitals:    07/19/22 1230 07/19/22 1245 07/19/22 1330 07/19/22 1345   BP:  111/73 102/52    Pulse: 74 77 71 78   Patient Position - Orthostatic VS:             Visual Acuity      ED Medications  Medications   glucose 4 g chewable tablet **ADS Override Pull** (4 g  Given 7/19/22 0914)   dextrose 50 % IV solution **ADS Override Pull** (50 mL  Given 7/19/22 0937)       Diagnostic Studies  Results Reviewed     Procedure Component Value Units Date/Time    HS Troponin I 4hr [[id number]]  (Normal) Collected: 07/19/22 1338    Lab Status: Final result Specimen: Blood from Hand, Left Updated: 07/19/22 1409     hs TnI 4hr 2 ng/L      Delta 4hr hsTnI 0 ng/L     Fingerstick Glucose (POCT) [[id number]]  (Normal) Collected: 07/19/22 1329    Lab Status: Final result Updated: 07/19/22 1331     POC Glucose 97 mg/dl     Fingerstick Glucose (POCT) [[id number]]  (Normal) Collected: 07/19/22 1155    Lab Status: Final result Updated: 07/19/22 1156     POC Glucose 69 mg/dl     HS Troponin I 2hr [[id number]]  (Normal) Collected: 07/19/22 1110    Lab Status: Final result Specimen: Blood from Hand, Left Updated: 07/19/22 1141     hs TnI 2hr 2 ng/L      Delta 2hr hsTnI 0 ng/L     Fingerstick Glucose (POCT) [[id number]]  (Normal) Collected: 07/19/22 1106    Lab Status: Final result Updated: 07/19/22 1112     POC Glucose 94 mg/dl     Fingerstick Glucose (POCT) [[id number]]  (Normal) Collected: 07/19/22 1011    Lab Status: Final result Updated: 07/19/22 1012     POC Glucose 101 mg/dl     UA (URINE) with reflex to Scope [[id number]]     Lab Status: No result Specimen: Urine     Fingerstick Glucose (POCT) [[id number]]  (Normal) Collected: 07/19/22 0955    Lab Status: Final result Updated: 07/19/22 0956     POC Glucose 109 mg/dl     HS Troponin 0hr (reflex protocol) [[id number]]  (Normal) Collected: 07/19/22 0922    Lab Status: Final result Specimen: Blood from Arm, Left Updated: 07/19/22 0954     hs TnI 0hr 2 ng/L     Comprehensive metabolic panel [[id number]]  (Abnormal) Collected: 07/19/22 0922    Lab Status: Final result Specimen: Blood from Arm, Left Updated: 07/19/22 0948     Sodium 140 mmol/L      Potassium 4 8 mmol/L      Chloride 101 mmol/L      CO2 25 mmol/L      ANION GAP 14 mmol/L      BUN 26 mg/dL      Creatinine 1 21 mg/dL      Glucose 42 mg/dL      Calcium 9 5 mg/dL      AST 34 U/L      ALT 59 U/L      Alkaline Phosphatase 94 U/L      Total Protein 8 2 g/dL      Albumin 4 1 g/dL      Total Bilirubin 0 58 mg/dL      eGFR 58 ml/min/1 73sq m     Narrative:      [facility] guidelines for Chronic Kidney Disease (CKD):     Stage 1 with normal or high GFR (GFR > 90 mL/min/1 73 square meters)    Stage 2 Mild CKD (GFR = 60-89 mL/min/1 73 square meters)    Stage 3A Moderate CKD (GFR = 45-59 mL/min/1 73 square meters)    Stage 3B Moderate CKD (GFR = 30-44 mL/min/1 73 square meters)    Stage 4 Severe CKD (GFR = 15-29 mL/min/1 73 square meters)    Stage 5 End Stage CKD (GFR <15 mL/min/1 73 square meters)  Note: GFR calculation is accurate only with a steady state creatinine    Fingerstick Glucose (POCT) [[id number]]  (Abnormal) Collected: 07/19/22 0933    Lab Status: Final result Updated: 07/19/22 0934     POC Glucose 31 mg/dl     CBC and differential [[id number]]  (Abnormal) Collected: 07/19/22 0922    Lab Status: Final result Specimen: Blood from Arm, Left Updated: 07/19/22 0933     WBC 12 32 Thousand/uL      RBC 5 04 Million/uL      Hemoglobin 16 3 g/dL      Hematocrit 50 3 %       fL      MCH 32 3 pg      MCHC 32 4 g/dL      RDW 12 6 %      MPV 10 6 fL      Platelets 439 Thousands/uL      nRBC 0 /100 WBCs      Neutrophils Relative 59 %      Immat GRANS % 1 %      Lymphocytes Relative 29 %      Monocytes Relative 8 %      Eosinophils Relative 2 %      Basophils Relative 1 %      Neutrophils Absolute 7 42 Thousands/µL      Immature Grans Absolute 0 07 Thousand/uL      Lymphocytes Absolute 3 53 Thousands/µL      Monocytes Absolute 0 95 Thousand/µL      Eosinophils Absolute 0 28 Thousand/µL      Basophils Absolute 0 07 Thousands/µL     Fingerstick Glucose (POCT) [[id number]]  (Abnormal) Collected: 07/19/22 0910    Lab Status: Final result Updated: 07/19/22 0911     POC Glucose 42 mg/dl              Lab Results   Component Value Date    HGBA1C 6 6 (A) 06/23/2022         CT head without contrast   Final Result by Kael Avila MD (07/19 1105)      No acute intracranial abnormality  Workstation performed: [id number]         XR chest 1 view portable   Final Result by John Potter DO (07/19 1410)   No acute cardiopulmonary disease                    Workstation performed: [id number]         XR hip/pelv 2-3 vws left if performed    (Results Pending)              Procedures  ECG 12 Lead Documentation Only    Date/Time: 7/19/2022 9:20 AM  Performed by: Last Moran PA-C  Authorized by: Last Moran PA-C     Indications / Diagnosis:  Syncope  ECG reviewed by me, the ED Provider: yes    Patient location:  ED  Previous ECG:     Comparison to cardiac monitor: Yes    Interpretation:     Interpretation: abnormal    Rate:     ECG rate:  65    ECG rate assessment: normal    Rhythm:     Rhythm: sinus rhythm    QRS:     QRS axis:  Left  Conduction:     Conduction: normal    ST segments:     ST segments:  Normal  T waves:     T waves: non-specific    Comments:      , QRS 78, QT/QTc 424/440; low voltage QRS             ED Course  ED Course as of 07/19/22 1504   Tue Jul 19, 2022   0959 WBC(!): 12 32  Similar values over the past seven months   1000 Hemoglobin: 16 3   1000 Platelet Count: 459   1000 Glucose, Random(!): 42  treated   1000 Creatinine: 1 21  Stable, improved   1000 BUN(!): 26   1000 Sodium: 140   1000 Potassium: 4 8   1000 Chloride: 101   1000 CO2: 25   1000 Anion Gap(!): 14   1000 Calcium: 9 5   1000 AST: 34   1000 ALT: 59   1000 Alkaline Phosphatase: 94   1000 Total Protein: 8 2   1000 Albumin: 4 1   1000 TOTAL BILIRUBIN: 0 58   1000 eGFR: 58   1000 hs TnI 0hr: 2   1000 POC Glucose: 109  Improved after dextrose   1016 POC Glucose: 101   1035 XR chest 1 view portable  Independently viewed and interpreted by me - no acute cardiopulmonary process, no significant interval change; pending official read  1107 CT head without contrast  IMPRESSION:     No acute intracranial abnormality  1119 POC Glucose: 94   1127 Repeat troponin in process  [address] hsTnI: 0   1201 POC Glucose: 69   1201 Pt was given meal tray  1228 Pt reassessed  Reports feeling improved  He ate entire meal tray  However now complaining of pain in left hip  Will xray the hip  If normal limits and blood sugar remains improved, plan to discharge home  Spouse now at bedside  Notes taking metformin, jardiance, as well as glyburide  1324 XR hip/pelv 2-3 vws left if performed  Independently viewed and interpreted by me - no fracture or acute osseous findings, osteoarthiritis; pending official read  80 Pt and spouse updated on additional results  Pt continues to feel well and requesting discharge home  Blood sugar has been stable over several hours  Hypoglycemia episode felt to be related to overcorrection from his oral diabetes agents  1343 POC Glucose: 97     Pt with a fall out of bed, likely related to hypoglycemic episode  Blood glucose was corrected  Pt improved  Had complaint of left hip pain, xray showed no acute findings  He was able to move leg freely and bear weight  Discussed continued symptomatic management  Advised to hold current diabetes medications and follow up with his PCP for further adjustments  Recommended to monitor blood sugars closely  Strict return precautions outlined  Advised outpatient follow up with PCP or return to ER for change in condition as outlined   Pt and spouse verbalized understanding and had no further questions  HEART Risk Score    Flowsheet Row Most Recent Value   Heart Score Risk Calculator    History 0 Filed at: 07/19/2022 1029   ECG 1 Filed at: 07/19/2022 1029   Age 2 Filed at: 07/19/2022 1029   Risk Factors 2 Filed at: 07/19/2022 1029   Troponin 0 Filed at: 07/19/2022 1029   HEART Score 5 Filed at: 07/19/2022 1029                        SBIRT 22yo+    Flowsheet Row Most Recent Value   SBIRT (23 yo +)    In order to provide better care to our patients, we are screening all of our patients for alcohol and drug use  Would it be okay to ask you these screening questions? Yes Filed at: 07/19/2022 1006   Initial Alcohol Screen: US AUDIT-C     1  How often do you have a drink containing alcohol? 0 Filed at: 07/19/2022 1006   2  How many drinks containing alcohol do you have on a typical day you are drinking? 0 Filed at: 07/19/2022 1006   3b  FEMALE Any Age, or MALE 65+: How often do you have 4 or more drinks on one occassion? 0 Filed at: 07/19/2022 1006   Audit-C Score 0 Filed at: 07/19/2022 1006   SARITA: How many times in the past year have you    Used an illegal drug or used a prescription medication for non-medical reasons?  Never Filed at: 07/19/2022 1006                    MDM  Number of Diagnoses or Management Options  Accidental fall from bed, initial encounter: new and requires workup  Hypoglycemia associated with type 2 diabetes mellitus ([facility] [address] ): new and requires workup  Left hip pain: new and requires workup     Amount and/or Complexity of Data Reviewed  Clinical lab tests: ordered and reviewed  Tests in the radiology section of CPT®: ordered and reviewed  Decide to obtain previous medical records or to obtain history from someone other than the patient: yes  Obtain history from someone other than the patient: yes  Review and summarize past medical records: yes  Discuss the patient with other providers: yes (attending)  Independent visualization of images, tracings, or specimens: yes    Patient Progress  Patient progress: improved      Disposition  Final diagnoses:   Hypoglycemia associated with type 2 diabetes mellitus ([facility] [address] )   Accidental fall from bed, initial encounter   Left hip pain     Time reflects when diagnosis was documented in both MDM as applicable and the Disposition within this note     Time User Action Codes Description Comment    7/19/2022  1:43 PM Ellene Blizzard Add [E11 649] Hypoglycemia associated with type 2 diabetes mellitus ([address] )     7/19/2022  1:43 PM Ellene Blizzard Add [W06  [id number]] Accidental fall from bed, initial encounter     7/19/2022  1:44 PM Ellene Blizzard Add [M25 [id number]] Left hip pain       ED Disposition     ED Disposition   Discharge    Condition   Stable    Date/Time   Tue Jul 19, 2022  1:43 PM    Comment   Champ Velasquez discharge to home/self care  Follow-up Information     Follow up With Specialties Details Why Contact Info Additional Information    Beau Mccartney DO Family Medicine Schedule an appointment as soon as possible for a visit   [address] ([place] [address]) [address] [facility] [id number] [phone]       [facility] Emergency Department Emergency Medicine  As needed [place] [phone]  [address] [place] [address] Emergency Department, [address], [facility]          Discharge Medication List as of 7/19/2022  1:44 PM      CONTINUE these medications which have NOT CHANGED    Details   !! allopurinol (ZYLOPRIM) 100 mg tablet Take 1 tablet (100 mg total) by mouth daily, Starting Tue 5/10/2022, Normal      !! allopurinol (ZYLOPRIM) 100 mg tablet Take 1 tablet by mouth once daily, Normal      aspirin 325 mg tablet Take 325 mg by mouth daily  , Historical Med      !! atorvastatin (LIPITOR) 80 mg tablet Take 1 tablet (80 mg total) by mouth daily, Starting Tue 5/10/2022, Normal      !! atorvastatin (LIPITOR) 80 mg tablet Take 1 tablet by mouth once daily, Normal      Empagliflozin (Jardiance) 10 MG TABS Take 1 tablet (10 mg total) by mouth every morning, Starting Fri 6/10/2022, Normal      escitalopram (LEXAPRO) 20 mg tablet Take 1 tablet (20 mg total) by mouth in the morning , Starting Thu 5/12/2022, Normal      glimepiride (AMARYL) 4 mg tablet TAKE 1 TABLET TWICE A DAY, Normal      !! levothyroxine 88 mcg tablet Take 1 tablet (88 mcg total) by mouth daily in the early morning, Starting Mon 5/16/2022, Normal      !! levothyroxine 88 mcg tablet TAKE 1 TABLET BY MOUTH ONCE DAILY IN THE MORNING, Normal      losartan (COZAAR) 25 mg tablet Take 1 tablet (25 mg total) by mouth daily, Starting Fri 1/21/2022, Normal      metFORMIN (GLUCOPHAGE) 1000 MG tablet Take 1 tablet (1,000 mg total) by mouth 2 (two) times a day with meals, Starting Wed 7/13/2022, Normal      multivitamin (THERAGRAN) TABS Take 1 tablet by mouth daily, Historical Med      nitroglycerin (NITROSTAT) 0 4 mg SL tablet Place 1 tablet (0 4 mg total) under the tongue every 5 (five) minutes as needed for chest pain, Starting Fri 2/4/2022, Normal      propranolol (INDERAL LA) 60 mg 24 hr capsule Take 1 capsule (60 mg total) by mouth daily, Starting Fri 6/10/2022, Normal      torsemide (DEMADEX) 10 mg tablet Take 1 tablet by mouth once daily, Normal      zonisamide (ZONEGRAN) 50 MG capsule Take 1 tablet (50 mg total) by mouth daily, Normal       !! - Potential duplicate medications found  Please discuss with provider  No discharge procedures on file      PDMP Review     None          ED Provider  Electronically Signed by           Romayne Glass, PA-C  07/19/22 1077

## 2022-07-19 NOTE — DISCHARGE INSTRUCTIONS
Hold your diabetes medications as this time until you speak with your PCP  Continue to monitor blood sugars  Follow up with PCP for recheck or return to ER as needed

## 2022-07-21 ENCOUNTER — HOSPITAL ENCOUNTER (OUTPATIENT)
Dept: NON INVASIVE DIAGNOSTICS | Facility: HOSPITAL | Age: 77
Discharge: HOME | End: 2022-07-21
Attending: FAMILY MEDICINE
Payer: COMMERCIAL

## 2022-07-21 DIAGNOSIS — E11.65 CONTROLLED TYPE 2 DIABETES MELLITUS WITH HYPERGLYCEMIA, WITHOUT LONG-TERM CURRENT USE OF INSULIN (HCC): ICD-10-CM

## 2022-07-21 LAB
ATRIAL RATE: 65 BPM
P AXIS: 50 DEGREES
PR INTERVAL: 206 MS
QRS AXIS: -45 DEGREES
QRSD INTERVAL: 78 MS
QT INTERVAL: 424 MS
QTC INTERVAL: 440 MS
T WAVE AXIS: -4 DEGREES
VENTRICULAR RATE: 65 BPM

## 2022-07-21 PROCEDURE — 93925 LOWER EXTREMITY STUDY: CPT | Performed by: SURGERY

## 2022-07-21 PROCEDURE — 93923 UPR/LXTR ART STDY 3+ LVLS: CPT

## 2022-07-21 PROCEDURE — 93010 ELECTROCARDIOGRAM REPORT: CPT | Performed by: INTERNAL MEDICINE

## 2022-07-21 PROCEDURE — 93923 UPR/LXTR ART STDY 3+ LVLS: CPT | Performed by: SURGERY

## 2022-07-21 PROCEDURE — 93925 LOWER EXTREMITY STUDY: CPT

## 2022-07-27 DIAGNOSIS — E03.9 HYPOTHYROIDISM, UNSPECIFIED TYPE: ICD-10-CM

## 2022-07-28 ENCOUNTER — APPOINTMENT (OUTPATIENT)
Dept: LAB | Facility: HOSPITAL | Age: 77
End: 2022-07-28
Attending: INTERNAL MEDICINE
Payer: COMMERCIAL

## 2022-07-28 DIAGNOSIS — N18.31 STAGE 3A CHRONIC KIDNEY DISEASE (HCC): ICD-10-CM

## 2022-07-28 LAB
ALBUMIN SERPL BCP-MCNC: 3.5 G/DL (ref 3.5–5)
ALP SERPL-CCNC: 76 U/L (ref 46–116)
ALT SERPL W P-5'-P-CCNC: 56 U/L (ref 12–78)
ANION GAP SERPL CALCULATED.3IONS-SCNC: 10 MMOL/L (ref 4–13)
AST SERPL W P-5'-P-CCNC: 26 U/L (ref 5–45)
BACTERIA UR QL AUTO: ABNORMAL /HPF
BILIRUB SERPL-MCNC: 0.57 MG/DL (ref 0.2–1)
BILIRUB UR QL STRIP: NEGATIVE
BUN SERPL-MCNC: 18 MG/DL (ref 5–25)
CALCIUM SERPL-MCNC: 9.3 MG/DL (ref 8.3–10.1)
CHLORIDE SERPL-SCNC: 106 MMOL/L (ref 96–108)
CLARITY UR: CLEAR
CO2 SERPL-SCNC: 26 MMOL/L (ref 21–32)
COLOR UR: YELLOW
CREAT SERPL-MCNC: 1.32 MG/DL (ref 0.6–1.3)
GFR SERPL CREATININE-BSD FRML MDRD: 52 ML/MIN/1.73SQ M
GLUCOSE P FAST SERPL-MCNC: 105 MG/DL (ref 65–99)
GLUCOSE UR STRIP-MCNC: ABNORMAL MG/DL
HGB UR QL STRIP.AUTO: NEGATIVE
KETONES UR STRIP-MCNC: NEGATIVE MG/DL
LEUKOCYTE ESTERASE UR QL STRIP: ABNORMAL
NITRITE UR QL STRIP: NEGATIVE
NON-SQ EPI CELLS URNS QL MICRO: ABNORMAL /HPF
PH UR STRIP.AUTO: 7 [PH]
PHOSPHATE SERPL-MCNC: 4.2 MG/DL (ref 2.3–4.1)
POTASSIUM SERPL-SCNC: 4 MMOL/L (ref 3.5–5.3)
PROT SERPL-MCNC: 6.8 G/DL (ref 6.4–8.4)
PROT UR STRIP-MCNC: NEGATIVE MG/DL
PTH-INTACT SERPL-MCNC: 36.5 PG/ML (ref 18.4–80.1)
RBC #/AREA URNS AUTO: ABNORMAL /HPF
SODIUM SERPL-SCNC: 142 MMOL/L (ref 135–147)
SP GR UR STRIP.AUTO: 1.01 (ref 1–1.03)
UROBILINOGEN UR QL STRIP.AUTO: 2 E.U./DL
WBC #/AREA URNS AUTO: ABNORMAL /HPF

## 2022-07-28 PROCEDURE — 82043 UR ALBUMIN QUANTITATIVE: CPT

## 2022-07-28 PROCEDURE — 80053 COMPREHEN METABOLIC PANEL: CPT

## 2022-07-28 PROCEDURE — 84100 ASSAY OF PHOSPHORUS: CPT

## 2022-07-28 PROCEDURE — 82570 ASSAY OF URINE CREATININE: CPT

## 2022-07-28 PROCEDURE — 36415 COLL VENOUS BLD VENIPUNCTURE: CPT

## 2022-07-28 PROCEDURE — 83970 ASSAY OF PARATHORMONE: CPT

## 2022-07-28 PROCEDURE — 81001 URINALYSIS AUTO W/SCOPE: CPT

## 2022-07-28 RX ORDER — LEVOTHYROXINE SODIUM 88 UG/1
88 TABLET ORAL
Qty: 90 TABLET | Refills: 0 | Status: SHIPPED | OUTPATIENT
Start: 2022-07-28

## 2022-07-29 LAB
CREAT UR-MCNC: 78.7 MG/DL
MICROALBUMIN UR-MCNC: 53.6 MG/L (ref 0–20)
MICROALBUMIN/CREAT 24H UR: 68 MG/G CREATININE (ref 0–30)

## 2022-08-14 DIAGNOSIS — F33.9 RECURRENT MAJOR DEPRESSIVE DISORDER, REMISSION STATUS UNSPECIFIED (HCC): ICD-10-CM

## 2022-08-15 RX ORDER — ESCITALOPRAM OXALATE 20 MG/1
20 TABLET ORAL DAILY
Qty: 90 TABLET | Refills: 0 | Status: SHIPPED | OUTPATIENT
Start: 2022-08-15

## 2022-08-29 ENCOUNTER — TELEPHONE (OUTPATIENT)
Dept: OBGYN CLINIC | Facility: HOSPITAL | Age: 77
End: 2022-08-29

## 2022-08-29 NOTE — TELEPHONE ENCOUNTER
Pt sees DR Tan Marquis    Pt wife is calling to start the prior auth for the pt B/L knee gel injections    #[phone]

## 2022-08-30 DIAGNOSIS — R26.2 AMBULATORY DYSFUNCTION: ICD-10-CM

## 2022-08-30 DIAGNOSIS — M25.561 CHRONIC PAIN OF BOTH KNEES: ICD-10-CM

## 2022-08-30 DIAGNOSIS — M25.562 CHRONIC PAIN OF BOTH KNEES: ICD-10-CM

## 2022-08-30 DIAGNOSIS — G89.29 CHRONIC PAIN OF BOTH KNEES: ICD-10-CM

## 2022-08-30 DIAGNOSIS — M17.0 BILATERAL PRIMARY OSTEOARTHRITIS OF KNEE: Primary | ICD-10-CM

## 2022-08-30 NOTE — TELEPHONE ENCOUNTER
The [facility] team needs an order placed in patients chart for visco    Thank you,  [facility]

## 2022-09-06 DIAGNOSIS — G25.0 TREMOR, ESSENTIAL: ICD-10-CM

## 2022-09-08 RX ORDER — ZONISAMIDE 50 MG/1
CAPSULE ORAL
Qty: 90 CAPSULE | Refills: 2 | Status: SHIPPED | OUTPATIENT
Start: 2022-09-08

## 2022-09-09 ENCOUNTER — TELEPHONE (OUTPATIENT)
Dept: FAMILY MEDICINE CLINIC | Facility: CLINIC | Age: 77
End: 2022-09-09

## 2022-09-09 DIAGNOSIS — J06.9 UPPER RESPIRATORY TRACT INFECTION, UNSPECIFIED TYPE: Primary | ICD-10-CM

## 2022-09-09 RX ORDER — AMOXICILLIN 500 MG/1
500 CAPSULE ORAL EVERY 8 HOURS SCHEDULED
Qty: 30 CAPSULE | Refills: 0 | Status: SHIPPED | OUTPATIENT
Start: 2022-09-09 | End: 2022-09-19

## 2022-09-09 NOTE — TELEPHONE ENCOUNTER
Pt's wife calling pt complaining of sore throat, muscle aches, runny nose  Home covid test negative   Temp 97 2  He is wondering if something could be called in for his sore throat  Pharmacy is [facility] in [facility]

## 2022-09-09 NOTE — TELEPHONE ENCOUNTER
Probably has COVID I would continue testing if still symptomatic Monday needs a PCR test also amoxicillin sent to the pharmacy

## 2022-09-09 NOTE — PROGRESS NOTES
Patient may still have COVID the home COVID tests tend to miss a small percentage of patients so if he is still symptomatic by Monday he should have a PCR test but a prescription for amoxicillin has been sent into the pharmacy

## 2022-09-12 ENCOUNTER — CLINICAL SUPPORT (OUTPATIENT)
Dept: FAMILY MEDICINE CLINIC | Facility: CLINIC | Age: 77
End: 2022-09-12

## 2022-09-12 DIAGNOSIS — R09.89 SUSPECTED SARS: Primary | ICD-10-CM

## 2022-09-12 PROCEDURE — U0003 INFECTIOUS AGENT DETECTION BY NUCLEIC ACID (DNA OR RNA); SEVERE ACUTE RESPIRATORY SYNDROME CORONAVIRUS 2 (SARS-COV-2) (CORONAVIRUS DISEASE [COVID-19]), AMPLIFIED PROBE TECHNIQUE, MAKING USE OF HIGH THROUGHPUT TECHNOLOGIES AS DESCRIBED BY CMS-2020-01-R: HCPCS | Performed by: FAMILY MEDICINE

## 2022-09-12 PROCEDURE — U0005 INFEC AGEN DETEC AMPLI PROBE: HCPCS | Performed by: FAMILY MEDICINE

## 2022-09-13 LAB — SARS-COV-2 RNA RESP QL NAA+PROBE: NEGATIVE

## 2022-10-04 ENCOUNTER — OFFICE VISIT (OUTPATIENT)
Dept: NEPHROLOGY | Facility: CLINIC | Age: 77
End: 2022-10-04
Payer: COMMERCIAL

## 2022-10-04 VITALS
SYSTOLIC BLOOD PRESSURE: 102 MMHG | OXYGEN SATURATION: 99 % | BODY MASS INDEX: 33.18 KG/M2 | DIASTOLIC BLOOD PRESSURE: 62 MMHG | HEART RATE: 68 BPM | HEIGHT: 69 IN | WEIGHT: 224 LBS

## 2022-10-04 DIAGNOSIS — I12.9 BENIGN HYPERTENSION WITH CKD (CHRONIC KIDNEY DISEASE) STAGE III (HCC): ICD-10-CM

## 2022-10-04 DIAGNOSIS — N18.30 BENIGN HYPERTENSION WITH CKD (CHRONIC KIDNEY DISEASE) STAGE III (HCC): ICD-10-CM

## 2022-10-04 DIAGNOSIS — E11.21 DIABETIC NEPHROPATHY ASSOCIATED WITH TYPE 2 DIABETES MELLITUS (HCC): ICD-10-CM

## 2022-10-04 DIAGNOSIS — N18.31 STAGE 3A CHRONIC KIDNEY DISEASE (HCC): Primary | ICD-10-CM

## 2022-10-04 PROCEDURE — 99214 OFFICE O/P EST MOD 30 MIN: CPT | Performed by: INTERNAL MEDICINE

## 2022-10-04 NOTE — PATIENT INSTRUCTIONS
Kidney function stable at 52 mL/min from July  You have kidney disease stage 3 a  Your etiology of kidney disease is due to diabetes and HTN  You are moving in the winter  Will tentatively schedule appt and blood work in February  If you do move, I encourage you to see a kidney doctor in [place]  If you are having lightheadedness/dizziness/ blood pressure less than 100/60, please give the office a call

## 2022-10-04 NOTE — PROGRESS NOTES
Assessment & Plan:    1  Stage 3a chronic kidney disease (HCC)  -     Microalbumin / creatinine urine ratio; Future; Expected date: 02/15/2023  -     Comprehensive metabolic panel; Future; Expected date: 02/15/2023  -     Magnesium; Future; Expected date: 02/15/2023  -     Phosphorus; Future; Expected date: 02/15/2023  -     PTH, intact; Future; Expected date: 02/15/2023  -     Uric acid; Future; Expected date: 02/15/2023  -     Urinalysis with microscopic; Future; Expected date: 02/15/2023    2  Diabetic nephropathy associated with type 2 diabetes mellitus (HCC)  -     Microalbumin / creatinine urine ratio; Future; Expected date: 02/15/2023  -     Comprehensive metabolic panel; Future; Expected date: 02/15/2023  -     Magnesium; Future; Expected date: 02/15/2023  -     Phosphorus; Future; Expected date: 02/15/2023  -     PTH, intact; Future; Expected date: 02/15/2023  -     Uric acid; Future; Expected date: 02/15/2023  -     Urinalysis with microscopic; Future; Expected date: 02/15/2023    3  Benign hypertension with CKD (chronic kidney disease) stage III ([facility] [address] )       1  CKD 3a A2 baseline 1 2-1 3  Function is stable  Most recent creatinine 1 3 with a GFR of 52 mL/min from July 28  Etiology of CKD secondary to diabetic nephropathy and hypertension  07/28/2022  Sodium 142 within normal limits  Potassium 4 0 within normal limits  Acid-base chloride 106 TCO2 26 within normal limits  Calcium 9 3 within normal limits , phosphorus 4 2 within normal limits, PTH 36 5 which is appropriate CKD 3  Goal 35-70  Microalbumin to creatinine ratio 68 milligram/gram creatinine  This is reduced from February/March 22 coming down for a 6 milligram/grams creatinine  Urinalysis showed greater than 1000 glucose but he is on SGOT 2 inhibitor  There is no occult blood, RBC, white blood cell, bacteria  He is on SGLT2 inhibitor and losartan 25mg/day at maximal doses given his current blood pressure    Would not advise for Melanie Sessions given risk of hypotension  He is moving to [place] in the winter  We will schedule a tentative follow-up for 6 months with blood and urine test   If he moves to [facility] I encouraged him to follow-up with a nephrologist in the area  2  Diabetic nephropathy with type 2 diabetes  Blood sugars have been under reasonable control  Most recent a 166 6  Continue ARB initial T2 inhibitor  Not a candidate for additional therapy with risk of hypotension as above  Would not maximize ARB given risk for hypertension  3  Benign hypertension with CKD  Blood pressure 102/62 with a heart rate of 68  Asymptomatic at present  He does not take his blood pressure frequently at home but when he does he notices the top number is around 100  If he becomes symptomatic her blood pressure less than 100/60 would likely need to titrate back antihypertensives  He is on propranolol 60 mg extended release for essential tremor  He is reports tremors worsening and does not want to come back at this time  He is on losartan 25 mg per day  On a this is needed for CKD progression and proteinuria  Ideally would maintain on current dose  If he does develop symptomatic hypotension will have to reconsider his antihypertensive regiment  4  LVH, aortic stenosis, chronic diastolic heart failure follow-up of Cardiology  Advised low-sodium diet and continue torsemide at 10 mg per day  Reports weights have been stable  The benefits, risks and alternatives to the treatment plan were discussed at this visit  Patient was advised of common adverse effects of any medical therapies prescribed  All questions were answered and discussed with the patient and any accompanying family members or caretakers  Subjective:      Patient ID: Ashkan Bird is a 77 y o  male [place]3a [place] [facility] [place]  Last seen by Dr Vaishali Ghotra in Feb 2022      HPI  In interim since last visit,denies any new medical conditions, surgeries, medications, ER visits  /62 HR 68  He reports intermittently taking BP at home and SBP in 100  Drinks 3 X 18 ounces bottles of water per day  Risk factor for CAD is male gender  He is complaint with antihypertensive losartan and torsemide  Denies adverse side effect of medications  Reports type 2 DM under good control  Takes Jardiance, amaryl and metformin unchanged   range at home  Does not check frequently  A1C 6 6 June 2022  Does not watch carb or sodium intake at home  Denies weight changes or LE edema  Takes torsemide 10mg/day  Last gout flare > 1 year  Had COVID test on 9/12 as his daughter contracted it  But did not have COVID at that time  Never diagnosed with COVID in past      Takes Advil for severe headaches once a month  Denies LUTS  Denies hematuria  Denies following with urologist      The following portions of the patient's history were reviewed and updated as appropriate: allergies, current medications, past family history, past medical history, past social history, past surgical history, and problem list     Review of Systems   Constitutional: Negative  Eyes: Negative  Respiratory: Negative  Cardiovascular: Negative  Gastrointestinal: Negative  Genitourinary: Negative  Neurological: Positive for light-headedness  All other systems reviewed and are negative  Objective:      /62   Pulse 68   Ht 5' 9" (1 753 m)   Wt 102 kg (224 lb)   SpO2 99%   BMI 33 08 kg/m²          Physical Exam  Vitals and nursing note reviewed  Constitutional:       General: He is not in acute distress  Appearance: He is not ill-appearing  HENT:      Head: Atraumatic  Nose: Nose normal       Mouth/Throat:      Mouth: Mucous membranes are moist       Pharynx: Oropharynx is clear  Eyes:      Extraocular Movements: Extraocular movements intact        Conjunctiva/sclera: Conjunctivae normal    Cardiovascular:      Rate and Rhythm: Normal rate and regular rhythm  Heart sounds: No friction rub  Pulmonary:      Breath sounds: No wheezing or rhonchi  Abdominal:      General: Bowel sounds are normal  There is no distension  Palpations: Abdomen is soft  Tenderness: There is no abdominal tenderness  There is no guarding  Musculoskeletal:      Right lower leg: No edema  Left lower leg: No edema  Skin:     General: Skin is warm and dry  Coloration: Skin is not jaundiced  Neurological:      General: No focal deficit present  Mental Status: He is alert  Cranial Nerves: No cranial nerve deficit        Comments: Abnormal gait, essential tremor    Psychiatric:         Mood and Affect: Mood normal          Behavior: Behavior normal              Lab Results   Component Value Date     07/16/2015    SODIUM 142 07/28/2022    K 4 0 07/28/2022     07/28/2022    CO2 26 07/28/2022    ANIONGAP 8 07/16/2015    AGAP 10 07/28/2022    BUN 18 07/28/2022    CREATININE 1 32 (H) 07/28/2022    GLUC 42 (L) 07/19/2022    GLUF 105 (H) 07/28/2022    CALCIUM 9 3 07/28/2022    AST 26 07/28/2022    ALT 56 07/28/2022    ALKPHOS 76 07/28/2022    PROT 6 3 (L) 07/16/2015    TP 6 8 07/28/2022    BILITOT 0 47 07/16/2015    TBILI 0 57 07/28/2022    EGFR 52 07/28/2022      Lab Results   Component Value Date    CREATININE 1 32 (H) 07/28/2022    CREATININE 1 21 07/19/2022    CREATININE 1 37 (H) 06/20/2022    CREATININE 1 25 03/23/2022    CREATININE 1 30 02/21/2022    CREATININE 1 21 12/02/2021    CREATININE 1 56 (H) 12/01/2021    CREATININE 1 48 (H) 11/24/2021    CREATININE 1 27 11/19/2021    CREATININE 1 49 (H) 09/13/2021    CREATININE 1 23 07/14/2021    CREATININE 1 07 02/26/2021    CREATININE 1 26 12/28/2020    CREATININE 1 31 (H) 09/28/2020    CREATININE 1 30 06/08/2020      Lab Results   Component Value Date    COLORU Yellow 07/28/2022    CLARITYU Clear 07/28/2022    SPECGRAV 1 010 07/28/2022    PHUR 7 0 07/28/2022 LEUKOCYTESUR (A) 07/28/2022     Elevated glucose may cause decreased leukocyte values  See urine microscopic for [facility] result/    NITRITE Negative 07/28/2022    PROTEIN UA Negative 07/28/2022    GLUCOSEU >=1000 (1%) (A) 07/28/2022    KETONESU Negative 07/28/2022    UROBILINOGEN 2 0 (A) 07/28/2022    BILIRUBINUR Negative 07/28/2022    BLOODU Negative 07/28/2022    RBCUA None Seen 07/28/2022    WBCUA None Seen 07/28/2022    EPIS None Seen 07/28/2022    BACTERIA None Seen 07/28/2022      No results found for: LABPROT  No results found for: [facility] Results   Component Value Date    WBC 12 32 (H) 07/19/2022    HGB 16 3 07/19/2022    HCT 50 3 (H) 07/19/2022     (H) 07/19/2022     07/19/2022      Lab Results   Component Value Date    HGB 16 3 07/19/2022    HGB 15 2 02/21/2022    HGB 13 6 12/02/2021    HGB 14 1 12/01/2021    HGB 14 8 11/19/2021      No results found for: IRON, TIBC, FERRITIN   No results found for: PTHCALCIUM, [id number], PHOSPHORUS   Lab Results   Component Value Date    CHOLESTEROL 140 09/13/2021    HDL 36 (L) 09/13/2021    LDLCALC 66 09/13/2021    TRIG 190 (H) 09/13/2021      Lab Results   Component Value Date    URICACID 5 5 07/14/2021      Lab Results   Component Value Date    HGBA1C 6 6 (A) 06/23/2022      Lab Results   Component Value Date    L9EGVES 1 0 06/10/2016    FREET4 1 06 04/06/2020      No results found for: AILYN, DSDNAAB, RFIGM   Lab Results   Component Value Date    PROT 6 3 (L) 07/16/2015    UPEP  11/12/2018     The urine total protein is increased  The urine protein electrophoresis shows selective proteinuria  No monoclonal bands noted  Reviewed by Sharif Huff MD ([id number])  **Electronic Signature**        Portions of the record may have been created with voice recognition software  Occasional wrong word or "sound a like" substitutions may have occurred due to the inherent limitations of voice recognition software   Read the chart carefully and recognize, using context, where substitutions have occurred  If you have any questions, please contact the dictating provider

## 2022-10-06 DIAGNOSIS — E11.42 TYPE 2 DIABETES MELLITUS WITH DIABETIC POLYNEUROPATHY, WITH LONG-TERM CURRENT USE OF INSULIN (HCC): ICD-10-CM

## 2022-10-06 DIAGNOSIS — Z79.4 TYPE 2 DIABETES MELLITUS WITH DIABETIC POLYNEUROPATHY, WITH LONG-TERM CURRENT USE OF INSULIN (HCC): ICD-10-CM

## 2022-10-11 ENCOUNTER — PROCEDURE VISIT (OUTPATIENT)
Dept: OBGYN CLINIC | Facility: HOSPITAL | Age: 77
End: 2022-10-11
Payer: COMMERCIAL

## 2022-10-11 VITALS
HEIGHT: 69 IN | SYSTOLIC BLOOD PRESSURE: 118 MMHG | DIASTOLIC BLOOD PRESSURE: 70 MMHG | WEIGHT: 222 LBS | HEART RATE: 73 BPM | BODY MASS INDEX: 32.88 KG/M2

## 2022-10-11 DIAGNOSIS — G89.29 CHRONIC PAIN OF BOTH KNEES: ICD-10-CM

## 2022-10-11 DIAGNOSIS — M17.0 PRIMARY OSTEOARTHRITIS OF BOTH KNEES: Primary | ICD-10-CM

## 2022-10-11 DIAGNOSIS — M25.562 CHRONIC PAIN OF BOTH KNEES: ICD-10-CM

## 2022-10-11 DIAGNOSIS — M25.561 CHRONIC PAIN OF BOTH KNEES: ICD-10-CM

## 2022-10-11 PROCEDURE — 20610 DRAIN/INJ JOINT/BURSA W/O US: CPT | Performed by: ORTHOPAEDIC SURGERY

## 2022-10-11 NOTE — PROGRESS NOTES
Assessment:  1  Primary osteoarthritis of both knees     2  Chronic pain of both knees         Plan:      To do next visit:  Return in about 3 months (around 1/11/2023) for Recheck  The above stated was discussed in layman's terms and the patient expressed understanding  All questions were answered to the patient's satisfaction  Scribe Attestation    I,:  Manjula Cuellar am acting as a scribe while in the presence of the attending physician :       I,:  Artur Tanner MD personally performed the services described in this documentation    as scribed in my presence :             Subjective:   Josie Milton is a 77 y o  male who presents for Synvisc-One injections  He has received these injections in the past with benefit  Today he states he has had a return of bilateral knee pain  Prolonged weight-bearing and repetitive bending aggravate will rest alleviates  He does use a cane for ambulation  He indicates medial joint space, and the patella as locations of his pain  He denies any new injury or trauma to the area  He denies any numbness or tingling        Review of systems negative unless otherwise specified in HPI    Past Medical History:   Diagnosis Date   • BPH (benign prostatic hyperplasia)    • CAD (coronary artery disease)    • Cancer (HCC)     basal cell   • Chronic kidney disease     stage 3   • Colon polyp    • CPAP (continuous positive airway pressure) dependence    • Diabetes mellitus (HCC)     niddm   • Disease of thyroid gland    • Gout    • High cholesterol    • Hypertension    • MI, 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    • Myocardial infarction ([facility] [address] ) 2014   • Sleep apnea        Past Surgical History:   Procedure Laterality Date   • BASAL CELL CARCINOMA EXCISION Right 1/21/2022    Procedure: EXCISION BASAL CELL CARCINOMA EXTREMITY;  Surgeon: Carlos Ordaz DO;  Location: [facility];  Service: General   • CATARACT EXTRACTION W/  INTRAOCULAR LENS IMPLANT     • CORNEAL TRANSPLANT     • CORONARY ANGIOPLASTY WITH STENT PLACEMENT      stents x3-- [phone]   • EYE SURGERY      repair corneal laceration   • MN COLONOSCOPY FLX DX W/COLLJ SPEC WHEN PFRMD N/A 3/21/2016    Procedure: COLONOSCOPY;  Surgeon: Poly Christian MD;  Location: [facility];  Service: Colorectal   • TONSILLECTOMY AND ADENOIDECTOMY         Family History   Adopted: Yes   Problem Relation Age of Onset   • No Known Problems Mother    • No Known Problems Father        Social History     Occupational History   • Not on file   Tobacco Use   • Smoking status: Never Smoker   • Smokeless tobacco: Never Used   Vaping Use   • Vaping Use: Never used   Substance and Sexual Activity   • Alcohol use: Not Currently     Comment: socially, cut down in 2008, previously did more than    • Drug use: Never   • Sexual activity: Yes     Partners: Female         Current Outpatient Medications:   •  allopurinol (ZYLOPRIM) 100 mg tablet, Take 1 tablet (100 mg total) by mouth daily, Disp: 90 tablet, Rfl: 0  •  allopurinol (ZYLOPRIM) 100 mg tablet, Take 1 tablet by mouth once daily, Disp: 90 tablet, Rfl: 0  •  aspirin 325 mg tablet, Take 325 mg by mouth daily  , Disp: , Rfl:   •  atorvastatin (LIPITOR) 80 mg tablet, Take 1 tablet (80 mg total) by mouth daily, Disp: 90 tablet, Rfl: 0  •  atorvastatin (LIPITOR) 80 mg tablet, Take 1 tablet by mouth once daily, Disp: 90 tablet, Rfl: 0  •  Empagliflozin (Jardiance) 10 MG TABS, Take 1 tablet (10 mg total) by mouth every morning, Disp: 90 tablet, Rfl: 1  •  escitalopram (LEXAPRO) 20 mg tablet, Take 1 tablet (20 mg total) by mouth daily, Disp: 90 tablet, Rfl: 0  •  glimepiride (AMARYL) 4 mg tablet, TAKE 1 TABLET TWICE A DAY, Disp: 180 tablet, Rfl: 0  •  levothyroxine 88 mcg tablet, TAKE 1 TABLET BY MOUTH ONCE DAILY IN THE MORNING, Disp: 90 tablet, Rfl: 0  •  levothyroxine 88 mcg tablet, Take 1 tablet (88 mcg total) by mouth daily in the early morning, Disp: 90 tablet, Rfl: 0  •  losartan (COZAAR) 25 mg tablet, Take 1 tablet (25 mg total) by mouth daily, Disp: 90 tablet, Rfl: 1  •  metFORMIN (GLUCOPHAGE) 1000 MG tablet, Take 1 tablet (1,000 mg total) by mouth 2 (two) times a day with meals, Disp: 180 tablet, Rfl: 0  •  multivitamin (THERAGRAN) TABS, Take 1 tablet by mouth daily, Disp: , Rfl:   •  nitroglycerin (NITROSTAT) 0 4 mg SL tablet, Place 1 tablet (0 4 mg total) under the tongue every 5 (five) minutes as needed for chest pain, Disp: 25 tablet, Rfl: 0  •  propranolol (INDERAL LA) 60 mg 24 hr capsule, Take 1 capsule (60 mg total) by mouth daily, Disp: 90 capsule, Rfl: 0  •  torsemide (DEMADEX) 10 mg tablet, Take 1 tablet (10 mg total) by mouth daily, Disp: 90 tablet, Rfl: 3  •  zonisamide (ZONEGRAN) 50 MG capsule, Take 1 tablet (50 mg total) by mouth daily, Disp: 90 capsule, Rfl: 2    No Known Allergies         Vitals:    10/11/22 0908   BP: 118/70   Pulse: 73       Objective:                    Right Knee Exam     Tenderness   The patient is experiencing tenderness in the medial joint line and patella  Other   Erythema: absent  Sensation: normal  Pulse: present  Swelling: none  Effusion: no effusion present      Left Knee Exam     Tenderness   The patient is experiencing tenderness in the medial joint line and patella  Other   Erythema: absent  Sensation: normal  Pulse: present  Swelling: none  Effusion: no effusion present              Diagnostics, reviewed and taken today if performed as documented:    None performed      Procedures, if performed today:    Large joint arthrocentesis: bilateral knee  Universal Protocol:  Consent: Verbal consent obtained    Risks and benefits: risks, benefits and alternatives were discussed  Consent given by: patient  Timeout called at: 10/11/2022 9:22 AM   Patient understanding: patient states understanding of the procedure being performed  Patient identity confirmed: verbally with patient    Supporting Documentation  Indications: pain and diagnostic evaluation   Procedure Details  Location: knee - bilateral knee  Needle size: 22 G  Ultrasound guidance: no    Medications (Right): 48 mg hylan 48 MG/6MLMedications (Left): 48 mg hylan 48 MG/6ML   Patient tolerance: patient tolerated the procedure well with no immediate complications  Dressing:  Sterile dressing applied              Portions of the record may have been created with voice recognition software  Occasional wrong word or "sound a like" substitutions may have occurred due to the inherent limitations of voice recognition software  Read the chart carefully and recognize, using context, where substitutions have occurred

## 2022-10-12 ENCOUNTER — OFFICE VISIT (OUTPATIENT)
Dept: NEUROLOGY | Facility: CLINIC | Age: 77
End: 2022-10-12
Payer: COMMERCIAL

## 2022-10-12 VITALS
WEIGHT: 221 LBS | HEART RATE: 70 BPM | DIASTOLIC BLOOD PRESSURE: 64 MMHG | SYSTOLIC BLOOD PRESSURE: 128 MMHG | BODY MASS INDEX: 32.64 KG/M2

## 2022-10-12 DIAGNOSIS — G25.0 TREMOR, ESSENTIAL: Primary | ICD-10-CM

## 2022-10-12 PROBLEM — S06.2X0A: Status: RESOLVED | Noted: 2022-01-10 | Resolved: 2022-10-12

## 2022-10-12 PROCEDURE — 99213 OFFICE O/P EST LOW 20 MIN: CPT | Performed by: PSYCHIATRY & NEUROLOGY

## 2022-10-12 NOTE — PROGRESS NOTES
Patient ID: Amauri Campbell is a 77 y o  male  Assessment/Plan:    Tremor, essential  Patient is a 67-year-old male who presents for essential tremor follow up  Progressive action and intentional tremors of the hands which are partially controlled with propranolol and zonisamide  He has tried and failed multiple topiramatie, primidone and gabapentin due to sedation  Tapering off Zonisamide worsened tremors  No difference with increasing dose  Propanolol cannot be further increased due to bradycardia  At this time will continue propanolol  LA 60mg and zonisamide 50 mg        Diagnoses and all orders for this visit:    Tremor, essential         Subjective:    HPI   Patient is a 67-year-old male who presents for essential tremor follow-up  Patient was last seen by Dr Verna Donaldson on 3/9/22  At that time Zonisamide was increased to 50 mg daily  Today he reports that his inner core tremors are getting worse in the sense that they are more frequent  He continues to have tremors of the upper extremities  Does not notice one side particularly worse than the other  Has not noticed any significant difference in tremor with increasing zonisamide dose  He reports difficulty drinking coffee without spilling it and requires a straw  He has difficulty with fine motor movements  No problems feeding himself  Denies head tremor  Patient also reports a shuffling of his feet that varies  Can be worse with poor sleep and if he feels dizzy upon standing  Difficulty with balance but no falls  Ambulates with a cane  No slowing of movements  Patient has not been exercising  Has not participated with PT and is currently not interested   Denies any further progression of neuropathy symptoms       Medication trials:  topiramate -terminated at low dose due to sedation  Primidone- stopped at 200mg due to sedation  gabapentin (600mg up to tid but no effect and sedation)  CBD oil       Current medications:  propranolol 60mg LA q24hrs with improvement in tremor (better tremor control on 120mg but he had bradycardia)  zonisamide 50 mg qhs     The following portions of the patient's history were reviewed and updated as appropriate: allergies, current medications, past family history, past medical history, past social history, past surgical history and problem list and review of systems  Objective:    Blood pressure 128/64, pulse 70, weight 100 kg (221 lb)  Physical Exam  Constitutional:       General: He is awake  Eyes:      General: Lids are normal       Extraocular Movements: Extraocular movements intact  Neurological:      Mental Status: He is alert  Neurological Exam  Mental Status  Awake and alert  Cranial Nerves  CN III, IV, VI: Extraocular movements intact bilaterally  Normal lids and orbits bilaterally  CN VII: Full and symmetric facial movement  CN VIII: Hearing is normal   CN XII: Tongue midline without atrophy or fasciculations  Motor   Normal muscle tone  Coordination    Tremor with action and intention  Increasing tremor amplitude upon reaching target, noted to be worse on the L compared to R   Gait    Wide base gait, cautious   No shuffling appreciated  No stopped posture   Turns in two steps, no en bloc   Stands with support   ROS:    Review of Systems   Constitutional: Negative  Negative for appetite change and fever  HENT: Negative  Negative for hearing loss, tinnitus, trouble swallowing and voice change  Eyes: Negative  Negative for photophobia, pain and visual disturbance  Respiratory: Negative  Negative for shortness of breath  Cardiovascular: Negative  Negative for palpitations  Gastrointestinal: Negative  Negative for nausea and vomiting  Endocrine: Negative  Negative for cold intolerance  Genitourinary: Negative  Negative for dysuria, frequency and urgency  Musculoskeletal: Positive for gait problem (Shuffling feet)  Negative for myalgias and neck pain  Balance Issues     Skin: Negative  Negative for rash  Allergic/Immunologic: Negative  Neurological: Positive for tremors (Getting worse and is in arms) and numbness (Neuropathy in both legs)  Negative for dizziness, seizures, syncope, facial asymmetry, speech difficulty, weakness, light-headedness and headaches  Hematological: Negative  Does not bruise/bleed easily  Psychiatric/Behavioral: Negative  Negative for confusion, hallucinations and sleep disturbance  All other systems reviewed and are negative

## 2022-10-12 NOTE — ASSESSMENT & PLAN NOTE
Patient is a 67-year-old male who presents for essential tremor follow up  Progressive action and intentional tremors of the hands which are partially controlled with propranolol and zonisamide  He has tried and failed multiple topiramatie, primidone and gabapentin due to sedation  Tapering off Zonisamide worsened tremors  No difference with increasing dose  Propanolol cannot be further increased due to bradycardia  At this time will continue propanolol  LA 60mg and zonisamide 50 mg

## 2022-10-21 DIAGNOSIS — M1A.9XX0 CHRONIC GOUT WITHOUT TOPHUS, UNSPECIFIED CAUSE, UNSPECIFIED SITE: ICD-10-CM

## 2022-10-21 DIAGNOSIS — E11.42 TYPE 2 DIABETES MELLITUS WITH DIABETIC POLYNEUROPATHY, WITH LONG-TERM CURRENT USE OF INSULIN (HCC): ICD-10-CM

## 2022-10-21 DIAGNOSIS — Z79.4 TYPE 2 DIABETES MELLITUS WITH DIABETIC POLYNEUROPATHY, WITH LONG-TERM CURRENT USE OF INSULIN (HCC): ICD-10-CM

## 2022-10-24 RX ORDER — GLIMEPIRIDE 4 MG/1
TABLET ORAL
Qty: 180 TABLET | Refills: 0 | Status: SHIPPED | OUTPATIENT
Start: 2022-10-24

## 2022-10-24 RX ORDER — ALLOPURINOL 100 MG/1
100 TABLET ORAL DAILY
Qty: 90 TABLET | Refills: 0 | Status: SHIPPED | OUTPATIENT
Start: 2022-10-24

## 2022-10-30 DIAGNOSIS — Z79.4 TYPE 2 DIABETES MELLITUS WITH DIABETIC POLYNEUROPATHY, WITH LONG-TERM CURRENT USE OF INSULIN (HCC): ICD-10-CM

## 2022-10-30 DIAGNOSIS — E78.2 MIXED HYPERLIPIDEMIA: ICD-10-CM

## 2022-10-30 DIAGNOSIS — E11.42 TYPE 2 DIABETES MELLITUS WITH DIABETIC POLYNEUROPATHY, WITH LONG-TERM CURRENT USE OF INSULIN (HCC): ICD-10-CM

## 2022-10-31 RX ORDER — ATORVASTATIN CALCIUM 80 MG/1
80 TABLET, FILM COATED ORAL DAILY
Qty: 90 TABLET | Refills: 0 | Status: SHIPPED | OUTPATIENT
Start: 2022-10-31

## 2022-11-03 DIAGNOSIS — M1A.9XX0 CHRONIC GOUT WITHOUT TOPHUS, UNSPECIFIED CAUSE, UNSPECIFIED SITE: ICD-10-CM

## 2022-11-04 RX ORDER — ALLOPURINOL 100 MG/1
100 TABLET ORAL DAILY
Qty: 90 TABLET | Refills: 0 | Status: SHIPPED | OUTPATIENT
Start: 2022-11-04

## 2022-11-13 DIAGNOSIS — F33.9 RECURRENT MAJOR DEPRESSIVE DISORDER, REMISSION STATUS UNSPECIFIED (HCC): ICD-10-CM

## 2022-11-14 RX ORDER — ESCITALOPRAM OXALATE 20 MG/1
20 TABLET ORAL DAILY
Qty: 90 TABLET | Refills: 0 | Status: SHIPPED | OUTPATIENT
Start: 2022-11-14

## 2022-11-17 DIAGNOSIS — E03.9 HYPOTHYROIDISM, UNSPECIFIED TYPE: ICD-10-CM

## 2022-11-17 RX ORDER — LEVOTHYROXINE SODIUM 88 UG/1
88 TABLET ORAL
Qty: 90 TABLET | Refills: 0 | Status: SHIPPED | OUTPATIENT
Start: 2022-11-17

## 2022-11-21 ENCOUNTER — OFFICE VISIT (OUTPATIENT)
Dept: FAMILY MEDICINE CLINIC | Facility: CLINIC | Age: 77
End: 2022-11-21

## 2022-11-21 VITALS
SYSTOLIC BLOOD PRESSURE: 126 MMHG | HEART RATE: 96 BPM | OXYGEN SATURATION: 96 % | BODY MASS INDEX: 33.92 KG/M2 | DIASTOLIC BLOOD PRESSURE: 88 MMHG | WEIGHT: 229 LBS | TEMPERATURE: 97.5 F | HEIGHT: 69 IN

## 2022-11-21 DIAGNOSIS — E08.42 DIABETIC POLYNEUROPATHY ASSOCIATED WITH DIABETES MELLITUS DUE TO UNDERLYING CONDITION (HCC): ICD-10-CM

## 2022-11-21 DIAGNOSIS — Z23 ENCOUNTER FOR IMMUNIZATION: Primary | ICD-10-CM

## 2022-11-21 RX ORDER — LANCETS 33 GAUGE
EACH MISCELLANEOUS
Qty: 200 EACH | Refills: 3 | Status: SHIPPED | OUTPATIENT
Start: 2022-11-21

## 2022-11-21 RX ORDER — BLOOD SUGAR DIAGNOSTIC
STRIP MISCELLANEOUS
Qty: 200 EACH | Refills: 3 | Status: SHIPPED | OUTPATIENT
Start: 2022-11-21

## 2022-11-21 RX ORDER — BLOOD-GLUCOSE METER
KIT MISCELLANEOUS
Qty: 1 KIT | Refills: 0 | Status: SHIPPED | OUTPATIENT
Start: 2022-11-21

## 2022-11-21 NOTE — PATIENT INSTRUCTIONS
Type 2 Diabetes Management for Adults   AMBULATORY CARE:   Type 2 diabetes  is a disease that affects how your body uses glucose (sugar)  Either your body cannot make enough insulin, or it cannot use the insulin correctly  It is important to keep diabetes controlled to prevent damage to your heart, blood vessels, and other organs  Have someone call your local emergency number (911 in the [address] [facility]) if:   · You cannot be woken  · You have signs of diabetic ketoacidosis:     ? confusion, fatigue    ? vomiting    ? rapid heartbeat    ? fruity smelling breath    ? extreme thirst    ? dry mouth and skin    · You have any of the following signs of a heart attack:      ? Squeezing, pressure, or pain in your chest    ? You may  also have any of the following:     § Discomfort or pain in your back, neck, jaw, stomach, or arm    § Shortness of breath    § Nausea or vomiting    § Lightheadedness or a sudden cold sweat    · You have any of the following signs of a stroke:      ? Numbness or drooping on one side of your face     ? Weakness in an arm or leg    ? Confusion or difficulty speaking    ? Dizziness, a severe headache, or vision loss    Call your doctor or diabetes care team if:   · You have a sore or wound that will not heal     · You have a change in the amount you urinate  · Your blood sugar levels are higher than your target goals  · You often have lower blood sugar levels than your target goals  · Your skin is red, dry, warm, or swollen  · You have trouble coping with diabetes, or you feel anxious or depressed  · You have questions or concerns about your condition or care  What you need to know about high blood sugar levels:  High blood sugar levels may not cause any symptoms  You may feel more thirsty or urinate more often than usual  Over time, high blood sugar levels can damage your nerves, blood vessels, tissues, and organs   The following can increase your blood sugar levels:  · Large meals or large amounts of carbohydrates at one time    · Less physical activity    · Stress    · Illness    · A lower dose of medicine or insulin, or a late dose    What you need to know about low blood sugar levels: You can prevent symptoms such as shakiness, dizziness, irritability, or confusion by preventing your blood sugar levels from going too low  · Treat a low blood sugar level right away  ? Drink 4 ounces of juice or have 1 tube of glucose gel  ? Check your blood sugar level again 10 to 15 minutes later  ? When the level goes back to normal, eat a meal or snack to prevent another decrease  · Keep glucose gel, raisins, or hard candy with you at all times to treat a low blood sugar level  · Your blood sugar level can get too low if you take diabetes medicine or insulin and do not eat enough food  · If you use insulin, check your blood sugar level before you exercise  ? If your blood sugar level is below 100 mg/dL, eat 4 crackers or 2 ounces of raisins, or drink 4 ounces of juice  ? Check your level every 30 minutes if you exercise more than 1 hour  ? You may need a snack during or after exercise  What you can do to manage your blood sugar levels:   · Check your blood sugar levels as directed and as needed  Several items are available to use to check your levels  You may need to check by testing a drop of blood in a glucose monitor  You may instead be given a continuous glucose monitoring (CGM) device  The device is worn at all times  The CGM checks your blood sugar level every 5 minutes  It sends results to an electronic device such as a smart phone  A CGM can be used with or without an insulin pump  Talk with your provider to find out which method is best for you  The goal for blood sugar levels before meals  is between 80 and 130 mg/dL and 2 hours after eating  is lower than 180 mg/dL  · Make healthy food choices    Work with a dietitian to develop a meal plan that works for you and your schedule  A dietitian can help you learn how to eat the right amount of carbohydrates during your meals and snacks  Carbohydrates can raise your blood sugar level if you eat too many at one time  Examples of foods that contain carbohydrates are breads, cereals, rice, pasta, and sweets  · Get regular physical activity  Physical activity can help you get to your target blood sugar level goal and manage your weight  Get at least 150 minutes of moderate to vigorous aerobic physical activity each week  Do not miss more than 2 days in a row  Do not sit longer than 30 minutes at a time  Your healthcare provider can help you create an activity plan  The plan can include the best activities for you and can help you build your strength and endurance  · Maintain a healthy weight  Ask your healthcare provider what a healthy weight is for you  Ask him or her to help you create a safe weight loss plan if you are overweight  · Take your diabetes medicine or insulin as directed  You may need diabetes medicine, insulin, or both to help control your blood sugar levels  Your healthcare provider will teach you how and when to take your diabetes medicine or insulin  You will also be taught about side effects oral diabetes medicine can cause  Insulin may be injected, or given through a pump or pen  You and your care team will discuss which method is best for you  ? An insulin pump  is an implanted device that gives your insulin 24 hours a day  An insulin pump prevents the need for multiple insulin injections in a day  ? An insulin pen  is a device prefilled with the right amount of insulin  ? You and your family members will be taught how to draw up and give insulin  if this is the best method for you  Your education team will also teach you how to dispose of needles and syringes  ? You will learn how much insulin you need  and when to give it   You will be taught when not to give insulin  You will also be taught what to do if your blood sugar level drops too low  This may happen if you take insulin and do not eat the right amount of carbohydrates  Other things you can do to manage type 2 diabetes:   · Wear medical alert identification  Wear medical alert jewelry or carry a card that says you have diabetes  Ask your provider where to get these items  · Do not smoke  Nicotine and other chemicals in cigarettes and cigars can cause lung and blood vessel damage  It also makes it more difficult to manage your diabetes  Ask your provider for information if you currently smoke and need help to quit  Do not use e-cigarettes or smokeless tobacco in place of cigarettes or to help you quit  They still contain nicotine  · Check your feet each day for cuts, scratches, calluses, or other wounds  Look for redness and swelling, and feel for warmth  Wear shoes that fit well  Check your shoes for rocks or other objects that can hurt your feet  Do not walk barefoot or wear shoes without socks  Wear cotton socks to help keep your feet dry  · Ask about vaccines you may need  You have a higher risk for serious illness if you get the flu, pneumonia, COVID-19, or hepatitis  Ask your provider if you should get vaccines to prevent these or other diseases, and when to get the vaccines  · Talk to your care team if you become stressed about diabetes care  Sometimes being able to fit diabetes care into your life can cause increased stress  The stress can cause you not to take care of yourself properly  Your care team can help by offering tips about self-care  Your care team may suggest you talk to a mental health provider  The provider can listen and offer help with self-care issues  Follow up with your doctor or diabetes care team as directed: You may need to have blood tests done before your follow-up visit   The test results will show if changes need to be made in your treatment or self-care  Write down your questions so you remember to ask them during your visits  Talk to your provider if you cannot afford your medicine  © Copyright [address] 2022 Information is for End User's use only and may not be sold, redistributed or otherwise used for commercial purposes  All illustrations and images included in CareNotes® are the copyrighted property of [employer]  or Nathan Echeverria  The above information is an  only  It is not intended as medical advice for individual conditions or treatments  Talk to your doctor, nurse or pharmacist before following any medical regimen to see if it is safe and effective for you  Foot Care for People with Diabetes   AMBULATORY CARE:   What you need to know about foot care:   · Foot care helps protect your feet and prevent foot ulcers or sores  Long-term high blood sugar levels can damage the blood vessels and nerves in your legs and feet  This damage makes it hard to feel pressure, pain, temperature, and touch  You may not be able to feel a cut or sore, or shoes that are too tight  Foot care is needed to prevent serious problems, such as an infection or amputation  · Diabetes may cause your toes to become crooked or curved under  These changes may affect the way you walk and can lead to increased pressure on your foot  The pressure can decrease blood flow to your feet  Lack of blood flow increases your risk for a foot ulcer  Do not ignore small problems, such as dry skin or small wounds  These can become life-threatening over time without proper care  Call your care team provider if:   · Your feet become numb, weak, or hard to move  · You have pus draining from a sore on your foot  · You have a wound on your foot that gets bigger, deeper, or does not heal      · You see blisters, cuts, scratches, calluses, or sores on your foot  · You have a fever, and your feet become red, warm, and swollen  · Your toenails become thick, curled, or yellow  · You find it hard to check your feet because your vision is poor  · You have questions or concerns about your condition or care  How to care for your feet:   · Check your feet each day  Look at your whole foot, including the bottom, and between and under your toes  Check for wounds, corns, and calluses  Use a mirror to see the bottom of your feet  The skin on your feet may be shiny, tight, or darker than normal  Your feet may also be cold and pale  Feel your feet by running your hands along the tops, bottoms, sides, and between your toes  Redness, swelling, and warmth are signs of blood flow problems that can lead to a foot ulcer  Do not try to remove corns or calluses yourself  · Wash your feet each day with soap and warm water  Do not use hot water, because this can injure your foot  Dry your feet gently with a towel after you wash them  Dry between and under your toes  · Apply lotion or a moisturizer on your dry feet  Ask your care team provider what lotions are best to use  Do not put lotion or moisturizer between your toes  Moisture between your toes could lead to skin breakdown  · Cut your toenails correctly  File or cut your toenails straight across  Use a soft brush to clean around your toenails  If your toenails are very thick, you may need to have a care team provider or specialist cut them  · Protect your feet  Do not walk barefoot or wear your shoes without socks  Check your shoes for rocks or other objects that can hurt your feet  Wear cotton socks to help keep your feet dry  Wear socks without toe seams, or wear them with the seams inside out  Change your socks each day  Do not wear socks that are dirty or damp  · Wear shoes that fit well  Wear shoes that do not rub against any area of your feet  Your shoes should be ½ to ¾ inch (1 to 2 centimeters) longer than your feet   Your shoes should also have extra space around the widest part of your feet  Walking or athletic shoes with laces or straps that adjust are best  Ask your care team provider for help to choose shoes that fit you best  Ask him or her if you need to wear an insert, orthotic, or bandage on your feet  · Go to your follow-up visits  Your care team provider will do a foot exam at least once a year  You may need a foot exam more often if you have nerve damage, foot deformities, or ulcers  He will check for nerve damage and how well you can feel your feet  He will check your shoes to see if they fit well  · Do not smoke  Smoking can damage your blood vessels and put you at increased risk for foot ulcers  Ask your care team provider for information if you currently smoke and need help to quit  E-cigarettes or smokeless tobacco still contain nicotine  Talk to your care team provider before you use these products  Follow up with your diabetes care team provider or foot specialist as directed: You will need to have your feet checked at least once a year  You may need a foot exam more often if you have nerve damage, foot deformities, or ulcers  Write down your questions so you remember to ask them during your visits  © Copyright [employer] 2022 Information is for End User's use only and may not be sold, redistributed or otherwise used for commercial purposes  All illustrations and images included in CareNotes® are the copyrighted property of [employer]  or Nathan Echeverria  The above information is an  only  It is not intended as medical advice for individual conditions or treatments  Talk to your doctor, nurse or pharmacist before following any medical regimen to see if it is safe and effective for you

## 2022-11-21 NOTE — PROGRESS NOTES
Assessment/Plan:    Problem List Items Addressed This Visit        Endocrine    Diabetic neuropathy associated with diabetes mellitus due to underlying condition ([facility] )   Other Visit Diagnoses     Encounter for immunization    -  Primary    Relevant Orders    influenza vaccine, high-dose, PF 0 7 mL (FLUZONE HIGH-DOSE) (Completed)           Diagnoses and all orders for this visit:    Encounter for immunization  -     influenza vaccine, high-dose, PF 0 7 mL (FLUZONE HIGH-DOSE)    Diabetic polyneuropathy associated with diabetes mellitus due to underlying condition ([facility] )        No problem-specific Assessment & Plan notes found for this encounter  Subjective:      Patient ID: Oralia Chan is a 77 y o  male  Mr  Merle Mike is here for a purposes a follow-up regarding his diabetes he also is in need of prescription diabetic shoes with inserts due to poor circulation to his feet and his diabetes he does not have any open wounds but he does have numbness of both feet      The following portions of the patient's history were reviewed and updated as appropriate:   He has a past medical history of BPH (benign prostatic hyperplasia), CAD (coronary artery disease), Cancer ([facility] ), Chronic kidney disease, Colon polyp, CPAP (continuous positive airway pressure) dependence, Diabetes mellitus ([facility] ), Disease of thyroid gland, Gout, High cholesterol, Hypertension, MI, old, Myocardial infarction ([facility] ) (2014), and Sleep apnea  ,  does not have any pertinent problems on file  ,   has a past surgical history that includes Tonsillectomy and adenoidectomy; Cataract extraction w/  intraocular lens implant; Eye surgery; Coronary angioplasty with stent; pr colonoscopy flx dx w/collj spec when pfrmd (N/A, 3/21/2016); Corneal transplant; and Excision basal cell carcinoma (Right, 1/21/2022)  ,  family history includes No Known Problems in his father and mother  He was adopted  ,   reports that he has never smoked   He has never used smokeless tobacco  He reports that he does not currently use alcohol  He reports that he does not use drugs  ,  has No Known Allergies     Current Outpatient Medications   Medication Sig Dispense Refill   • allopurinol (ZYLOPRIM) 100 mg tablet Take 1 tablet (100 mg total) by mouth daily 90 tablet 0   • allopurinol (ZYLOPRIM) 100 mg tablet Take 1 tablet (100 mg total) by mouth daily 90 tablet 0   • aspirin 325 mg tablet Take 325 mg by mouth daily  • atorvastatin (LIPITOR) 80 mg tablet Take 1 tablet by mouth once daily 90 tablet 0   • atorvastatin (LIPITOR) 80 mg tablet Take 1 tablet (80 mg total) by mouth daily 90 tablet 0   • Empagliflozin (Jardiance) 10 MG TABS Take 1 tablet (10 mg total) by mouth every morning 90 tablet 1   • escitalopram (LEXAPRO) 20 mg tablet Take 1 tablet (20 mg total) by mouth daily 90 tablet 0   • glimepiride (AMARYL) 4 mg tablet TAKE 1 TABLET TWICE A  tablet 0   • levothyroxine 88 mcg tablet TAKE 1 TABLET BY MOUTH ONCE DAILY IN THE MORNING 90 tablet 0   • levothyroxine 88 mcg tablet Take 1 tablet (88 mcg total) by mouth daily in the early morning 90 tablet 0   • losartan (COZAAR) 25 mg tablet Take 1 tablet (25 mg total) by mouth daily 90 tablet 1   • metFORMIN (GLUCOPHAGE) 1000 MG tablet Take 1 tablet (1,000 mg total) by mouth 2 (two) times a day with meals 180 tablet 0   • multivitamin (THERAGRAN) TABS Take 1 tablet by mouth daily     • nitroglycerin (NITROSTAT) 0 4 mg SL tablet Place 1 tablet (0 4 mg total) under the tongue every 5 (five) minutes as needed for chest pain 25 tablet 0   • propranolol (INDERAL LA) 60 mg 24 hr capsule Take 1 capsule (60 mg total) by mouth daily 90 capsule 0   • torsemide (DEMADEX) 10 mg tablet Take 1 tablet (10 mg total) by mouth daily 90 tablet 1   • zonisamide (ZONEGRAN) 50 MG capsule Take 1 tablet (50 mg total) by mouth daily 90 capsule 2     No current facility-administered medications for this visit         Review of Systems   Constitutional: Negative for activity change, appetite change, diaphoresis, fatigue and fever  HENT: Negative  Eyes: Positive for visual disturbance  Respiratory: Negative for apnea, cough, chest tightness, shortness of breath and wheezing  Cardiovascular: Negative for chest pain, palpitations and leg swelling  Patient has peripheral vascular occlusive disease and follows with Dr Magdy Williamson for his foot care is in need of prescription diabetic shoe with molded inserts   Gastrointestinal: Negative for abdominal distention, abdominal pain, anal bleeding, constipation, diarrhea, nausea and vomiting  Endocrine: Negative for cold intolerance, heat intolerance, polydipsia, polyphagia and polyuria  Genitourinary: Negative for difficulty urinating, dysuria, flank pain, hematuria and urgency  Musculoskeletal: Positive for arthralgias  Negative for back pain, gait problem, joint swelling and myalgias  Skin: Negative for color change, rash and wound  Allergic/Immunologic: Negative for environmental allergies, food allergies and immunocompromised state  Neurological: Negative for dizziness, seizures, syncope, speech difficulty, numbness and headaches  Neuropathy of both feet   Hematological: Negative for adenopathy  Does not bruise/bleed easily  Psychiatric/Behavioral: Negative for agitation, behavioral problems, hallucinations, sleep disturbance and suicidal ideas  Objective:  Vitals:    11/21/22 0949   BP: 126/88   BP Location: Left arm   Patient Position: Sitting   Cuff Size: Standard   Pulse: 96   Temp: 97 5 °F (36 4 °C)   TempSrc: Temporal   SpO2: 96%   Weight: 104 kg (229 lb)   Height: 5' 9" (1 753 m)     Body mass index is 33 82 kg/m²  Physical Exam  Constitutional:       General: He is not in acute distress  Appearance: He is well-developed and well-nourished  He is obese  He is not diaphoretic  HENT:      Head: Normocephalic        Right Ear: External ear normal       Left Ear: External ear normal       Nose: Nose normal       Mouth/Throat:      Mouth: Oropharynx is clear and moist    Eyes:      General: No scleral icterus  Right eye: No discharge  Left eye: No discharge  Extraocular Movements: EOM normal       Conjunctiva/sclera: Conjunctivae normal       Pupils: Pupils are equal, round, and reactive to light  Neck:      Thyroid: No thyromegaly  Trachea: No tracheal deviation  Cardiovascular:      Rate and Rhythm: Normal rate and regular rhythm  Pulses: no weak pulses          Dorsalis pedis pulses are 1+ on the right side and 1+ on the left side  Posterior tibial pulses are 1+ on the right side and 1+ on the left side  Heart sounds: Normal heart sounds  No murmur heard  No friction rub  No gallop  Pulmonary:      Effort: Pulmonary effort is normal  No respiratory distress  Breath sounds: Normal breath sounds  No wheezing  Abdominal:      General: Bowel sounds are normal       Palpations: Abdomen is soft  There is no mass  Tenderness: There is no abdominal tenderness  There is no guarding  Musculoskeletal:         General: No deformity or edema  Normal range of motion  Cervical back: Normal range of motion  Feet:      Right foot:      Skin integrity: No ulcer, skin breakdown, erythema, warmth, callus or dry skin  Left foot:      Skin integrity: No ulcer, skin breakdown, erythema, warmth, callus or dry skin  Lymphadenopathy:      Cervical: No cervical adenopathy  Skin:     General: Skin is warm and dry  Findings: No erythema or rash  Neurological:      Mental Status: He is alert and oriented to person, place, and time  Cranial Nerves: No cranial nerve deficit  Sensory: Sensory deficit present  Psychiatric:         Mood and Affect: Mood and affect normal          Thought Content: Thought content normal        Patient's shoes and socks removed      Right Foot/Ankle   Right Foot Inspection  Skin Exam: skin normal and skin intact  No dry skin, no warmth, no callus, no erythema, no maceration, no abnormal color, no pre-ulcer, no ulcer and no callus  Toe Exam: ROM and strength within normal limits and erythema  Sensory   Vibration: diminished  Proprioception: intact  Monofilament testing: diminished    Vascular  Capillary refills: < 3 seconds  The right DP pulse is 1+  The right PT pulse is 1+  Left Foot/Ankle  Left Foot Inspection  Skin Exam: skin normal and skin intact  No dry skin, no warmth, no erythema, no maceration, normal color, no pre-ulcer, no ulcer and no callus  Toe Exam: ROM and strength within normal limits and erythema  Sensory   Vibration: diminished  Proprioception: intact  Monofilament testing: diminished    Vascular  Capillary refills: < 3 seconds  The left DP pulse is 1+  The left PT pulse is 1+       Assign Risk Category  No deformity present  No loss of protective sensation  No weak pulses  Risk: 0

## 2022-11-29 ENCOUNTER — APPOINTMENT (EMERGENCY)
Dept: CT IMAGING | Facility: HOSPITAL | Age: 77
End: 2022-11-29

## 2022-11-29 ENCOUNTER — HOSPITAL ENCOUNTER (EMERGENCY)
Facility: HOSPITAL | Age: 77
Discharge: HOME | End: 2022-11-29
Attending: EMERGENCY MEDICINE

## 2022-11-29 VITALS
WEIGHT: 229.28 LBS | BODY MASS INDEX: 33.86 KG/M2 | HEART RATE: 62 BPM | OXYGEN SATURATION: 94 % | TEMPERATURE: 97.5 F | DIASTOLIC BLOOD PRESSURE: 71 MMHG | RESPIRATION RATE: 14 BRPM | SYSTOLIC BLOOD PRESSURE: 130 MMHG

## 2022-11-29 DIAGNOSIS — S01.01XA SCALP LACERATION, INITIAL ENCOUNTER: ICD-10-CM
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
## 2022-11-29 LAB
ATRIAL RATE: 62 BPM
P AXIS: 50 DEGREES
PR INTERVAL: 204 MS
QRS AXIS: -46 DEGREES
QRSD INTERVAL: 80 MS
QT INTERVAL: 408 MS
QTC INTERVAL: 414 MS
T WAVE AXIS: 29 DEGREES
VENTRICULAR RATE: 62 BPM

## 2022-12-03 DIAGNOSIS — R25.1 TREMOR: ICD-10-CM

## 2022-12-05 RX ORDER — PROPRANOLOL HCL 60 MG
60 CAPSULE, EXTENDED RELEASE 24HR ORAL DAILY
Qty: 90 CAPSULE | Refills: 0 | Status: SHIPPED | OUTPATIENT
Start: 2022-12-05

## 2022-12-09 ENCOUNTER — OFFICE VISIT (OUTPATIENT)
Dept: CARDIOLOGY CLINIC | Facility: HOSPITAL | Age: 77
End: 2022-12-09

## 2022-12-09 VITALS
BODY MASS INDEX: 33.83 KG/M2 | DIASTOLIC BLOOD PRESSURE: 66 MMHG | SYSTOLIC BLOOD PRESSURE: 110 MMHG | HEART RATE: 64 BPM | WEIGHT: 228.4 LBS | HEIGHT: 69 IN | OXYGEN SATURATION: 94 %

## 2022-12-09 DIAGNOSIS — I35.0 AORTIC STENOSIS, MODERATE: ICD-10-CM

## 2022-12-09 DIAGNOSIS — I25.118 CORONARY ARTERY DISEASE OF NATIVE ARTERY OF NATIVE HEART WITH STABLE ANGINA PECTORIS (HCC): Primary | ICD-10-CM

## 2022-12-09 DIAGNOSIS — I10 PRIMARY HYPERTENSION: ICD-10-CM

## 2022-12-09 DIAGNOSIS — I51.7 CONCENTRIC LEFT VENTRICULAR HYPERTROPHY: ICD-10-CM

## 2022-12-10 DIAGNOSIS — I25.10 CORONARY ARTERY DISEASE INVOLVING NATIVE CORONARY ARTERY OF NATIVE HEART WITHOUT ANGINA PECTORIS: ICD-10-CM

## 2022-12-10 NOTE — PROGRESS NOTES
Cardiology Follow Up    Saida Hayden  1945  [id number]  [address] [id number] [phone]    1  Coronary artery disease of native artery of native heart with stable angina pectoris ([facility] )        2  Concentric left ventricular hypertrophy        3  Aortic stenosis, moderate        4  Primary hypertension              Discussion/Summary: I had a long discussion with the patient and his wife concerning his CAD and symptoms  We have decided to proceed with repeat cardiac cath  On review of his films from 2014, a RFA approach was used  I have reviewed his medications and made no changes  Interval History: Over the past several weeks he has been getting much more frequent CP requiring more use of SL nitro  He is also getting more SOB at low levels of activity  He has known CAD with prior OM1 stenting and RCA stenting  His last cardiac cath was 9/2014 - A long SAM was placed to the mid RCA  The OM1 stent was patent      ECHO 3/2022 - EF 65%, LVH, moderate AS    Creatinine 1 32    Nuclear stress test 12/2021 - no ischemia    Patient Active Problem List   Diagnosis   • Arthritis   • Depression with anxiety   • Controlled type 2 diabetes mellitus with hyperglycemia, without long-term current use of insulin (formerly [facility])   • Gout   • Hyperlipidemia   • Hypertension   • Morbid obesity ([facility] )   • NSTEMI (non-ST elevated myocardial infarction) ([facility] )   • Tremor   • Degenerative disc disease, lumbar   • Tremor, essential   • Diabetic neuropathy associated with diabetes mellitus due to underlying condition ([facility] )   • Gait instability   • Frequent falls   • Infected epidermoid cyst   • Vitamin E deficiency   • Primary osteoarthritis involving multiple joints   • Chronic pain of both knees   • Primary osteoarthritis of both knees   • Coronary artery disease of native artery of native heart with stable angina pectoris ([place] [address] )   • Primary osteoarthritis of right knee   • Primary osteoarthritis of left knee   • Fall (on) (from) other stairs and steps, initial encounter   • Recurrent major depressive disorder ([place] [address] )   • Benign hypertension with CKD (chronic kidney disease) stage III (HCC)   • Diabetic nephropathy associated with type 2 diabetes mellitus ([place] [address] )   • Concentric left ventricular hypertrophy   • Aortic stenosis, moderate   • Edema   • Mild cognitive impairment   • Bilateral lower extremity edema   • Other chest pain   • Leukocytosis   • Skin lesion   • Chronic diastolic (congestive) heart failure (HCC)   • Basal cell carcinoma (BCC) of right shoulder   • Stage 3a chronic kidney disease (HCC)     Past Medical History:   Diagnosis Date   • BPH (benign prostatic hyperplasia)    • CAD (coronary artery disease)    • Cancer (HCC)     basal cell   • Chronic kidney disease     stage 3   • Colon polyp    • CPAP (continuous positive airway pressure) dependence    • Diabetes mellitus (HCC)     niddm   • Disease of thyroid gland    • Gout    • High cholesterol    • Hypertension    • MI, 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    • Myocardial infarction ([place] [address] ) 2014   • Sleep apnea      Social History     Socioeconomic History   • Marital status: /Civil Union     Spouse name: Not on file   • Number of children: Not on file   • Years of education: Not on file   • Highest education level: Not on file   Occupational History   • Not on file   Tobacco Use   • Smoking status: Never   • Smokeless tobacco: Never   Vaping Use   • Vaping Use: Never used   Substance and Sexual Activity   • Alcohol use: Not Currently     Comment: socially, cut down in 2008, previously did more than    • Drug use: Never   • Sexual activity: Yes     Partners: Female   Other Topics Concern   • Not on file   Social History Narrative    No advance directive     No living will      Social Determinants of Health     Financial Resource Strain: Not on file   Food Insecurity: Not on file   Transportation Needs: Not on file   Physical Activity: Not on file   Stress: Not on file   Social Connections: Not on file   Intimate Partner Violence: Not on file   Housing Stability: Not on file      Family History   Adopted: Yes   Problem Relation Age of Onset   • No Known Problems Mother    • No Known Problems Father      Past Surgical History:   Procedure Laterality Date   • BASAL CELL CARCINOMA EXCISION Right 1/21/2022    Procedure: EXCISION BASAL CELL CARCINOMA EXTREMITY;  Surgeon: Ulises Graves DO;  Location: [facility];  Service: General   • CATARACT EXTRACTION W/  INTRAOCULAR LENS IMPLANT     • CORNEAL TRANSPLANT     • CORONARY ANGIOPLASTY WITH STENT PLACEMENT      stents x3-- [phone]   • EYE SURGERY      repair corneal laceration   • MI COLONOSCOPY FLX DX W/COLLJ SPEC WHEN PFRMD N/A 3/21/2016    Procedure: COLONOSCOPY;  Surgeon: Paula Henderson MD;  Location: [facility];  Service: Colorectal   • TONSILLECTOMY AND ADENOIDECTOMY         Current Outpatient Medications:   •  allopurinol (ZYLOPRIM) 100 mg tablet, Take 1 tablet (100 mg total) by mouth daily, Disp: 90 tablet, Rfl: 0  •  allopurinol (ZYLOPRIM) 100 mg tablet, Take 1 tablet (100 mg total) by mouth daily, Disp: 90 tablet, Rfl: 0  •  aspirin 325 mg tablet, Take 325 mg by mouth daily  , Disp: , Rfl:   •  atorvastatin (LIPITOR) 80 mg tablet, Take 1 tablet by mouth once daily, Disp: 90 tablet, Rfl: 0  •  atorvastatin (LIPITOR) 80 mg tablet, Take 1 tablet (80 mg total) by mouth daily, Disp: 90 tablet, Rfl: 0  •  Blood Glucose Monitoring Suppl (OneTouch Verio Reflect) w/Device KIT, Check blood sugars twice daily  Please substitute with appropriate alternative as covered by patient's insurance   Dx: E11 65, Disp: 1 kit, Rfl: 0  •  Empagliflozin (Jardiance) 10 MG TABS, Take 1 tablet (10 mg total) by mouth every morning, Disp: 90 tablet, Rfl: 1  •  escitalopram (LEXAPRO) 20 mg tablet, Take 1 tablet (20 mg total) by mouth daily, Disp: 90 tablet, Rfl: 0  •  glimepiride (AMARYL) 4 mg tablet, TAKE 1 TABLET TWICE A DAY, Disp: 180 tablet, Rfl: 0  •  glucose blood (OneTouch Verio) test strip, Check blood sugars twice daily  Please substitute with appropriate alternative as covered by patient's insurance  Dx: E11 65, Disp: 200 each, Rfl: 3  •  levothyroxine 88 mcg tablet, TAKE 1 TABLET BY MOUTH ONCE DAILY IN THE MORNING, Disp: 90 tablet, Rfl: 0  •  levothyroxine 88 mcg tablet, Take 1 tablet (88 mcg total) by mouth daily in the early morning, Disp: 90 tablet, Rfl: 0  •  losartan (COZAAR) 25 mg tablet, Take 1 tablet (25 mg total) by mouth daily, Disp: 90 tablet, Rfl: 1  •  metFORMIN (GLUCOPHAGE) 1000 MG tablet, Take 1 tablet (1,000 mg total) by mouth 2 (two) times a day with meals, Disp: 180 tablet, Rfl: 0  •  multivitamin (THERAGRAN) TABS, Take 1 tablet by mouth daily, Disp: , Rfl:   •  nitroglycerin (NITROSTAT) 0 4 mg SL tablet, Place 1 tablet (0 4 mg total) under the tongue every 5 (five) minutes as needed for chest pain, Disp: 25 tablet, Rfl: 0  •  OneTouch Delica Lancets 33J MISC, Check blood sugars twice daily  Please substitute with appropriate alternative as covered by patient's insurance  Dx: E11 65, Disp: 200 each, Rfl: 3  •  propranolol (INDERAL LA) 60 mg 24 hr capsule, Take 1 capsule (60 mg total) by mouth daily, Disp: 90 capsule, Rfl: 0  •  torsemide (DEMADEX) 10 mg tablet, Take 1 tablet (10 mg total) by mouth daily, Disp: 90 tablet, Rfl: 1  •  zonisamide (ZONEGRAN) 50 MG capsule, Take 1 tablet (50 mg total) by mouth daily, Disp: 90 capsule, Rfl: 2  No Known Allergies  Vitals:    12/09/22 1504   BP: 110/66   Pulse: 64   SpO2: 94%   Weight: 104 kg (228 lb 6 4 oz)   Height: 5' 9" (1 753 m)     Weight (last 2 days)     Date/Time Weight    12/09/22 1504 104 (228 4)         Blood pressure 110/66, pulse 64, height 5' 9" (1 753 m), weight 104 kg (228 lb 6 4 oz), SpO2 94 %  , Body mass index is 33 73 kg/m²      Labs:  Admission on 11/29/2022, Discharged on 11/29/2022   Component Date Value   • Ventricular Rate 11/29/2022 62    • Atrial Rate 11/29/2022 62    • MS Interval 11/29/2022 204    • QRSD Interval 11/29/2022 80    • QT Interval 11/29/2022 408    • QTC Interval 11/29/2022 414    • P Axis 11/29/2022 50    • QRS Axis 11/29/2022 -46    • T Wave Axis 11/29/2022 29    Clinical Support on 09/12/2022   Component Date Value   • SARS-CoV-2 09/12/2022 Negative    Appointment on 07/28/2022   Component Date Value   • Creatinine, Ur 07/28/2022 78 7    • Microalbum  ,U,Random 07/28/2022 53 6 (H)    • Microalb Creat Ratio 07/28/2022 68 (H)    • Color, UA 07/28/2022 Yellow    • Clarity, UA 07/28/2022 Clear    • Specific Gravity, UA 07/28/2022 1 010    • pH, UA 07/28/2022 7 0    • Leukocytes, UA 07/28/2022 Elevated glucose may cause decreased leukocyte values   See urine microscopic for [facility] result/ (A)    • Nitrite, UA 07/28/2022 Negative    • Protein, UA 07/28/2022 Negative    • Glucose, UA 07/28/2022 >=1000 (1%) (A)    • Ketones, UA 07/28/2022 Negative    • Urobilinogen, UA 07/28/2022 2 0 (A)    • Bilirubin, UA 07/28/2022 Negative    • Occult Blood, UA 07/28/2022 Negative    • RBC, UA 07/28/2022 None Seen    • WBC, UA 07/28/2022 None Seen    • Epithelial Cells 07/28/2022 None Seen    • Bacteria, UA 07/28/2022 None Seen    • Sodium 07/28/2022 142    • Potassium 07/28/2022 4 0    • Chloride 07/28/2022 106    • CO2 07/28/2022 26    • ANION GAP 07/28/2022 10    • BUN 07/28/2022 18    • Creatinine 07/28/2022 1 32 (H)    • Glucose, Fasting 07/28/2022 105 (H)    • Calcium 07/28/2022 9 3    • AST 07/28/2022 26    • ALT 07/28/2022 56    • Alkaline Phosphatase 07/28/2022 76    • Total Protein 07/28/2022 6 8    • Albumin 07/28/2022 3 5    • Total Bilirubin 07/28/2022 0 57    • eGFR 07/28/2022 52    • Phosphorus 07/28/2022 4 2 (H)    • PTH 07/28/2022 36 5    Admission on 07/19/2022, Discharged on 07/19/2022   Component Date Value   • POC Glucose 07/19/2022 42 (L)    • WBC 07/19/2022 12 32 (H)    • RBC 07/19/2022 5 04    • Hemoglobin 07/19/2022 16 3    • Hematocrit 07/19/2022 50 3 (H)    • MCV 07/19/2022 100 (H)    • MCH 07/19/2022 32 3    • MCHC 07/19/2022 32 4    • RDW 07/19/2022 12 6    • MPV 07/19/2022 10 6    • Platelets 32/34/8747 272    • nRBC 07/19/2022 0    • Neutrophils Relative 07/19/2022 59    • Immat GRANS % 07/19/2022 1    • Lymphocytes Relative 07/19/2022 29    • Monocytes Relative 07/19/2022 8    • Eosinophils Relative 07/19/2022 2    • Basophils Relative 07/19/2022 1    • Neutrophils Absolute 07/19/2022 7 42    • Immature Grans Absolute 07/19/2022 0 07    • Lymphocytes Absolute 07/19/2022 3 53    • Monocytes Absolute 07/19/2022 0 95    • Eosinophils Absolute 07/19/2022 0 28    • Basophils Absolute 07/19/2022 0 07    • Sodium 07/19/2022 140    • Potassium 07/19/2022 4 8    • Chloride 07/19/2022 101    • CO2 07/19/2022 25    • ANION GAP 07/19/2022 14 (H)    • BUN 07/19/2022 26 (H)    • Creatinine 07/19/2022 1 21    • Glucose 07/19/2022 42 (L)    • Calcium 07/19/2022 9 5    • AST 07/19/2022 34    • ALT 07/19/2022 59    • Alkaline Phosphatase 07/19/2022 94    • Total Protein 07/19/2022 8 2    • Albumin 07/19/2022 4 1    • Total Bilirubin 07/19/2022 0 58    • eGFR 07/19/2022 58    • hs TnI 0hr 07/19/2022 2    • POC Glucose 07/19/2022 31 (LL)    • hs TnI 2hr 07/19/2022 2    • Delta 2hr hsTnI 07/19/2022 0    • POC Glucose 07/19/2022 109    • POC Glucose 07/19/2022 101    • POC Glucose 07/19/2022 94    • hs TnI 4hr 07/19/2022 2    • Delta 4hr hsTnI 07/19/2022 0    • POC Glucose 07/19/2022 69    • POC Glucose 07/19/2022 97    Telephone on 06/24/2022   Component Date Value   • Severity 06/10/2022 Alert    • Right Eye Diabetic Retin* 06/10/2022 Mild    • Left Eye Diabetic Retino* 06/10/2022 Mild    Office Visit on 06/23/2022   Component Date Value   • Hemoglobin A1C 06/23/2022 6 6 (A)    Lab on 06/20/2022   Component Date Value   • Creatinine, Ur 06/20/2022 83 6    • Microalbum  ,U,Random 06/20/2022 49 3 (H)    • Microalb Creat Ratio 06/20/2022 59 (H)    • Sodium 06/20/2022 140    • Potassium 06/20/2022 4 0    • Chloride 06/20/2022 102    • CO2 06/20/2022 28    • ANION GAP 06/20/2022 10    • BUN 06/20/2022 23    • Creatinine 06/20/2022 1 37 (H)    • Glucose, Fasting 06/20/2022 108 (H)    • Calcium 06/20/2022 9 3    • eGFR 06/20/2022 50      Imaging: CT head without contrast    Result Date: 11/29/2022  Narrative: CT BRAIN - WITHOUT CONTRAST INDICATION:   Head trauma, moderate-severe Trauma she head strike loss of consciousness  COMPARISON:  CT 7/19/2022  TECHNIQUE:  CT examination of the brain was performed  In addition to axial images, sagittal and coronal 2D reformatted images were created and submitted for interpretation  Radiation dose length product (DLP) for this visit:  993 56 mGy-cm   This examination, like all CT scans performed in the [facility], was performed utilizing techniques to minimize radiation dose exposure, including the use of iterative  reconstruction and automated exposure control  IMAGE QUALITY:  Diagnostic  FINDINGS: PARENCHYMA: Decreased attenuation is noted in periventricular and subcortical white matter demonstrating an appearance that is statistically most likely to represent mild microangiopathic change  No CT signs of acute infarction  No intracranial mass, mass effect or midline shift  No acute parenchymal hemorrhage  VENTRICLES AND EXTRA-AXIAL SPACES:  Normal for the patient's age  VISUALIZED ORBITS AND PARANASAL SINUSES:  No acute abnormality involving the orbits  Mild mucosal thickening in the left maxillary sinus  No fluid levels are seen  CALVARIUM AND EXTRACRANIAL SOFT TISSUES:  Normal      Impression: No acute intracranial abnormality   Workstation performed: [id number]     CT spine cervical without contrast    Result Date: 11/29/2022  Narrative: CT CERVICAL SPINE - WITHOUT CONTRAST INDICATION:   Trauma head strike loss of consciousness  COMPARISON:  CT cervical spine 2/16/2021 TECHNIQUE:  CT examination of the cervical spine was performed without intravenous contrast   Contiguous axial images were obtained  Sagittal and coronal reconstructions were performed  Radiation dose length product (DLP) for this visit:  461 77 mGy-cm   This examination, like all CT scans performed in the [facility], was performed utilizing techniques to minimize radiation dose exposure, including the use of iterative  reconstruction and automated exposure control  IMAGE QUALITY:  Diagnostic  FINDINGS: ALIGNMENT:  There is straightening of normal cervical lordosis  No subluxation or compression deformity  VERTEBRAL BODIES:  No fracture  DEGENERATIVE CHANGES:  Moderate to severe multilevel cervical degenerative changes are noted  No critical central canal stenosis  PREVERTEBRAL AND PARASPINAL SOFT TISSUES:  Unremarkable  THORACIC INLET:  Right apical scarring  Impression: No cervical spine fracture or traumatic malalignment  Workstation performed: [id number]       Review of Systems:  Review of Systems   Constitutional: Negative for diaphoresis, fatigue, fever and unexpected weight change  HENT: Negative  Respiratory: Positive for chest tightness and shortness of breath  Negative for cough and wheezing  Cardiovascular: Negative for chest pain, palpitations and leg swelling  Gastrointestinal: Negative for abdominal pain, diarrhea and nausea  Musculoskeletal: Negative for gait problem and myalgias  Skin: Negative for rash  Neurological: Negative for dizziness and numbness  Psychiatric/Behavioral: Negative  Physical Exam:  Physical Exam  Constitutional:       Appearance: He is well-developed  HENT:      Head: Normocephalic and atraumatic  Eyes:      Pupils: Pupils are equal, round, and reactive to light  Neck:      Vascular: No JVD  Cardiovascular:      Rate and Rhythm: Regular rhythm        Pulses: Normal pulses  Carotid pulses are 2+ on the right side and 2+ on the left side  Heart sounds: S1 normal and S2 normal    Pulmonary:      Effort: Pulmonary effort is normal       Breath sounds: Normal breath sounds  No wheezing or rales  Abdominal:      General: Bowel sounds are normal       Palpations: Abdomen is soft  Tenderness: There is no abdominal tenderness  Musculoskeletal:         General: No tenderness  Normal range of motion  Cervical back: Normal range of motion and neck supple  Skin:     General: Skin is warm  Neurological:      Mental Status: He is alert and oriented to person, place, and time  Cranial Nerves: No cranial nerve deficit  Deep Tendon Reflexes: Reflexes are normal and symmetric

## 2022-12-12 ENCOUNTER — TELEPHONE (OUTPATIENT)
Dept: CARDIOLOGY CLINIC | Facility: CLINIC | Age: 77
End: 2022-12-12

## 2022-12-12 DIAGNOSIS — I35.0 AORTIC STENOSIS, MODERATE: ICD-10-CM

## 2022-12-12 DIAGNOSIS — I25.118 CORONARY ARTERY DISEASE OF NATIVE ARTERY OF NATIVE HEART WITH STABLE ANGINA PECTORIS (HCC): Primary | ICD-10-CM

## 2022-12-12 RX ORDER — NITROGLYCERIN 0.4 MG/1
0.4 TABLET SUBLINGUAL
Qty: 25 TABLET | Refills: 0 | Status: SHIPPED | OUTPATIENT
Start: 2022-12-12

## 2022-12-12 NOTE — TELEPHONE ENCOUNTER
----- Message from Merrill Alvarez MD sent at 12/10/2022  8:01 AM EST -----  He needs cardiac cath with me  Not urgent  He is aware   Thx

## 2022-12-12 NOTE — TELEPHONE ENCOUNTER
Patient scheduled for LHC at [facility] on 12/29/22 with  Dg    Patient aware of general instructions and labs required    METFORMIN: Hold the night prior or the morning of the procedure    [place]: Hold the morning of the procedure    GLIMEPIRIDE: Hold the morning of the procedure    TORSEMIDE: Hold the morning of the procedure      Can we please have insurance check for service

## 2022-12-13 ENCOUNTER — DOCTOR'S OFFICE (OUTPATIENT)
Dept: URBAN - NONMETROPOLITAN AREA CLINIC 1 | Facility: CLINIC | Age: 77
Setting detail: OPHTHALMOLOGY
End: 2022-12-13
Payer: MEDICARE

## 2022-12-13 DIAGNOSIS — H18.711: ICD-10-CM

## 2022-12-13 DIAGNOSIS — H35.3131: ICD-10-CM

## 2022-12-13 DIAGNOSIS — H35.373: ICD-10-CM

## 2022-12-13 DIAGNOSIS — H43.812: ICD-10-CM

## 2022-12-13 DIAGNOSIS — H25.11: ICD-10-CM

## 2022-12-13 DIAGNOSIS — E11.3293: ICD-10-CM

## 2022-12-13 DIAGNOSIS — H04.123: ICD-10-CM

## 2022-12-13 PROCEDURE — 92025 CPTRIZED CORNEAL TOPOGRAPHY: CPT | Performed by: OPHTHALMOLOGY

## 2022-12-13 PROCEDURE — 76514 ECHO EXAM OF EYE THICKNESS: CPT | Performed by: OPHTHALMOLOGY

## 2022-12-13 PROCEDURE — 92134 CPTRZ OPH DX IMG PST SGM RTA: CPT | Performed by: OPHTHALMOLOGY

## 2022-12-13 PROCEDURE — 92286 ANT SGM IMG I&R SPECLR MIC: CPT | Performed by: OPHTHALMOLOGY

## 2022-12-13 PROCEDURE — 99214 OFFICE O/P EST MOD 30 MIN: CPT | Performed by: OPHTHALMOLOGY

## 2022-12-13 ASSESSMENT — SUPERFICIAL PUNCTATE KERATITIS (SPK)
OS_SPK: ABSENT
OD_SPK: 2+

## 2022-12-13 ASSESSMENT — PACHYMETRY
OD_CT_UM: 623
OS_CT_CORRECTION: -4
OS_CT_UM: 600
OD_CT_CORRECTION: -6

## 2022-12-13 ASSESSMENT — LID EXAM ASSESSMENTS
OD_MEIBOMITIS: RLL RUL
OS_BLEPHARITIS: LLL LUL
OS_MEIBOMITIS: LLL LUL
OD_BLEPHARITIS: RLL RUL

## 2022-12-13 ASSESSMENT — CONFRONTATIONAL VISUAL FIELD TEST (CVF)
OS_FINDINGS: FULL
OD_FINDINGS: FULL

## 2022-12-14 ASSESSMENT — REFRACTION_AUTOREFRACTION
OD_SPHERE: ERROR
OS_CYLINDER: -0.75
OS_SPHERE: +0.25
OS_AXIS: 091

## 2022-12-14 ASSESSMENT — REFRACTION_MANIFEST
OS_ADD: +2.50
OS_CYLINDER: -1.00
OS_CYLINDER: SPH
OS_SPHERE: -1.50
OS_VA2: 20/20
OS_AXIS: 090
OD_CYLINDER: SPH
OD_SPHERE: PLANO
OD_VA2: 20/NI
OS_SPHERE: +0.25
OD_VA1: 20/NI
OS_VA1: 20/20
OU_VA: 20/20
OD_ADD: +2.50

## 2022-12-14 ASSESSMENT — VISUAL ACUITY
OS_BCVA: 20/CFX5
OD_BCVA: 20/25

## 2022-12-14 ASSESSMENT — REFRACTION_CURRENTRX
OD_CYLINDER: -0.50
OS_SPHERE: +1.50
OD_AXIS: 052
OS_AXIS: 110
OD_SPHERE: +0.50
OS_OVR_VA: 20/
OS_ADD: +2.50
OS_CYLINDER: -1.75
OD_OVR_VA: 20/
OS_VPRISM_DIRECTION: PROGS
OD_VPRISM_DIRECTION: PROGS
OD_ADD: +2.50

## 2022-12-14 ASSESSMENT — KERATOMETRY
OS_K2POWER_DIOPTERS: 044.50
OS_K1POWER_DIOPTERS: 44.00
OS_AXISANGLE_DEGREES: 802
OD_AXISANGLE_DEGREES: 138
OD_K1POWER_DIOPTERS: 46.50
OD_K2POWER_DIOPTERS: 63.25

## 2022-12-14 ASSESSMENT — SPHEQUIV_DERIVED
OS_SPHEQUIV: -0.25
OS_SPHEQUIV: -0.125

## 2022-12-14 ASSESSMENT — AXIALLENGTH_DERIVED
OS_AL: 23.4152
OS_AL: 23.3673

## 2022-12-15 ENCOUNTER — PREP FOR PROCEDURE (OUTPATIENT)
Dept: CARDIOLOGY CLINIC | Facility: CLINIC | Age: 77
End: 2022-12-15

## 2022-12-15 DIAGNOSIS — I35.0 AORTIC STENOSIS, MODERATE: Primary | ICD-10-CM

## 2022-12-17 ENCOUNTER — HOSPITAL ENCOUNTER (EMERGENCY)
Facility: HOSPITAL | Age: 77
Discharge: HOME | End: 2022-12-18
Attending: EMERGENCY MEDICINE

## 2022-12-17 ENCOUNTER — APPOINTMENT (EMERGENCY)
Dept: RADIOLOGY | Facility: HOSPITAL | Age: 77
End: 2022-12-17

## 2022-12-17 ENCOUNTER — APPOINTMENT (EMERGENCY)
Dept: CT IMAGING | Facility: HOSPITAL | Age: 77
End: 2022-12-17

## 2022-12-17 VITALS
OXYGEN SATURATION: 93 % | RESPIRATION RATE: 18 BRPM | BODY MASS INDEX: 33.77 KG/M2 | DIASTOLIC BLOOD PRESSURE: 60 MMHG | HEART RATE: 74 BPM | WEIGHT: 228 LBS | HEIGHT: 69 IN | TEMPERATURE: 97.2 F | SYSTOLIC BLOOD PRESSURE: 122 MMHG

## 2022-12-17 DIAGNOSIS — M79.672 LEFT FOOT PAIN: ICD-10-CM

## 2022-12-17 DIAGNOSIS — I73.9 PERIPHERAL ARTERIAL DISEASE (HCC): Primary | ICD-10-CM

## 2022-12-17 LAB
2HR DELTA HS TROPONIN: 0 NG/L
ALBUMIN SERPL BCP-MCNC: 3.4 G/DL (ref 3.5–5)
ALP SERPL-CCNC: 102 U/L (ref 46–116)
ALT SERPL W P-5'-P-CCNC: 65 U/L (ref 12–78)
ANION GAP SERPL CALCULATED.3IONS-SCNC: 8 MMOL/L (ref 4–13)
APTT PPP: 29 SECONDS (ref 23–37)
AST SERPL W P-5'-P-CCNC: 39 U/L (ref 5–45)
BASOPHILS # BLD AUTO: 0.03 THOUSANDS/ÂΜL (ref 0–0.1)
BASOPHILS NFR BLD AUTO: 0 % (ref 0–1)
BILIRUB SERPL-MCNC: 0.44 MG/DL (ref 0.2–1)
BUN SERPL-MCNC: 21 MG/DL (ref 5–25)
CALCIUM ALBUM COR SERPL-MCNC: 9.5 MG/DL (ref 8.3–10.1)
CALCIUM SERPL-MCNC: 9 MG/DL (ref 8.3–10.1)
CARDIAC TROPONIN I PNL SERPL HS: 5 NG/L
CARDIAC TROPONIN I PNL SERPL HS: 5 NG/L
CHLORIDE SERPL-SCNC: 100 MMOL/L (ref 96–108)
CO2 SERPL-SCNC: 31 MMOL/L (ref 21–32)
CREAT SERPL-MCNC: 1.5 MG/DL (ref 0.6–1.3)
D DIMER PPP FEU-MCNC: 0.65 UG/ML FEU
EOSINOPHIL # BLD AUTO: 0.47 THOUSAND/ÂΜL (ref 0–0.61)
EOSINOPHIL NFR BLD AUTO: 7 % (ref 0–6)
ERYTHROCYTE [DISTWIDTH] IN BLOOD BY AUTOMATED COUNT: 12.8 % (ref 11.6–15.1)
GFR SERPL CREATININE-BSD FRML MDRD: 44 ML/MIN/1.73SQ M
GLUCOSE SERPL-MCNC: 294 MG/DL (ref 65–140)
HCT VFR BLD AUTO: 43.3 % (ref 36.5–49.3)
HGB BLD-MCNC: 14.7 G/DL (ref 12–17)
IMM GRANULOCYTES # BLD AUTO: 0.03 THOUSAND/UL (ref 0–0.2)
IMM GRANULOCYTES NFR BLD AUTO: 0 % (ref 0–2)
INR PPP: 0.92 (ref 0.84–1.19)
LYMPHOCYTES # BLD AUTO: 1.58 THOUSANDS/ÂΜL (ref 0.6–4.47)
LYMPHOCYTES NFR BLD AUTO: 22 % (ref 14–44)
MCH RBC QN AUTO: 33.5 PG (ref 26.8–34.3)
MCHC RBC AUTO-ENTMCNC: 33.9 G/DL (ref 31.4–37.4)
MCV RBC AUTO: 99 FL (ref 82–98)
MONOCYTES # BLD AUTO: 0.83 THOUSAND/ÂΜL (ref 0.17–1.22)
MONOCYTES NFR BLD AUTO: 12 % (ref 4–12)
NEUTROPHILS # BLD AUTO: 4.13 THOUSANDS/ÂΜL (ref 1.85–7.62)
NEUTS SEG NFR BLD AUTO: 59 % (ref 43–75)
NRBC BLD AUTO-RTO: 0 /100 WBCS
PLATELET # BLD AUTO: 232 THOUSANDS/UL (ref 149–390)
PMV BLD AUTO: 9.9 FL (ref 8.9–12.7)
POTASSIUM SERPL-SCNC: 4.2 MMOL/L (ref 3.5–5.3)
PROT SERPL-MCNC: 7 G/DL (ref 6.4–8.4)
PROTHROMBIN TIME: 12.6 SECONDS (ref 11.6–14.5)
RBC # BLD AUTO: 4.39 MILLION/UL (ref 3.88–5.62)
SODIUM SERPL-SCNC: 139 MMOL/L (ref 135–147)
WBC # BLD AUTO: 7.07 THOUSAND/UL (ref 4.31–10.16)

## 2022-12-17 RX ORDER — HYDROMORPHONE HCL/PF 1 MG/ML
0.2 SYRINGE (ML) INJECTION ONCE
Status: COMPLETED | OUTPATIENT
Start: 2022-12-17 | End: 2022-12-17

## 2022-12-17 RX ORDER — NITROGLYCERIN 0.4 MG/1
0.4 TABLET SUBLINGUAL ONCE
Status: COMPLETED | OUTPATIENT
Start: 2022-12-17 | End: 2022-12-17

## 2022-12-17 RX ADMIN — HYDROMORPHONE HYDROCHLORIDE 0.2 MG: 1 INJECTION, SOLUTION INTRAMUSCULAR; INTRAVENOUS; SUBCUTANEOUS at 20:57

## 2022-12-17 RX ADMIN — SODIUM CHLORIDE 1000 ML: 0.9 INJECTION, SOLUTION INTRAVENOUS at 21:29

## 2022-12-17 RX ADMIN — IOHEXOL 120 ML: 350 INJECTION, SOLUTION INTRAVENOUS at 21:22

## 2022-12-17 RX ADMIN — NITROGLYCERIN 0.4 MG: 0.4 TABLET SUBLINGUAL at 20:36

## 2022-12-18 NOTE — ED PROVIDER NOTES
History  Chief Complaint   Patient presents with   • Foot Pain     Patient reports that he has had b/l numbness from knees down but today has pain in b/l feet     58-year-old male history of CAD, severe aortic stenosis, DM, DVT in lower extremities currently on aspirin, peripheral artery disease presenting with subacute onset atraumatic left foot pain  Patient normally has severe peripheral neuropathy to the point where he can barely feel his feet at all  Tonight while sitting on a chair, had acute onset of pain in his left arch that increases with movement and pressure  Has never had this kind pain before  About 10 years ago he did have DVT in his lower extremities and was at one point on Plavix but now only taking aspirin and has not had recurrence of DVT since then  Of note, patient has been having chest pain daily, using 4-5 sublingual nitroglycerin per day, has cardiac catheterization with aortic valve repair scheduled in 12 days  Prior to Admission Medications   Prescriptions Last Dose Informant Patient Reported? Taking? Blood Glucose Monitoring Suppl (OneTouch Verio Reflect) w/Device KIT   No No   Sig: Check blood sugars twice daily  Please substitute with appropriate alternative as covered by patient's insurance  Dx: E11 65   Empagliflozin (Jardiance) 10 MG TABS   No No   Sig: Take 1 tablet (10 mg total) by mouth every morning   OneTouch Delica Lancets 79G MISC   No No   Sig: Check blood sugars twice daily  Please substitute with appropriate alternative as covered by patient's insurance  Dx: E11 65   allopurinol (ZYLOPRIM) 100 mg tablet   No No   Sig: Take 1 tablet (100 mg total) by mouth daily   allopurinol (ZYLOPRIM) 100 mg tablet   No No   Sig: Take 1 tablet (100 mg total) by mouth daily   aspirin 325 mg tablet   Yes No   Sig: Take 325 mg by mouth daily     atorvastatin (LIPITOR) 80 mg tablet   No No   Sig: Take 1 tablet by mouth once daily   atorvastatin (LIPITOR) 80 mg tablet   No No Sig: Take 1 tablet (80 mg total) by mouth daily   escitalopram (LEXAPRO) 20 mg tablet   No No   Sig: Take 1 tablet (20 mg total) by mouth daily   glimepiride (AMARYL) 4 mg tablet   No No   Sig: TAKE 1 TABLET TWICE A DAY   glucose blood (OneTouch Verio) test strip   No No   Sig: Check blood sugars twice daily  Please substitute with appropriate alternative as covered by patient's insurance   Dx: E11 65   levothyroxine 88 mcg tablet   No No   Sig: TAKE 1 TABLET BY MOUTH ONCE DAILY IN THE MORNING   levothyroxine 88 mcg tablet   No No   Sig: Take 1 tablet (88 mcg total) by mouth daily in the early morning   losartan (COZAAR) 25 mg tablet   No No   Sig: Take 1 tablet (25 mg total) by mouth daily   metFORMIN (GLUCOPHAGE) 1000 MG tablet   No No   Sig: Take 1 tablet (1,000 mg total) by mouth 2 (two) times a day with meals   multivitamin (THERAGRAN) TABS   Yes No   Sig: Take 1 tablet by mouth daily   nitroglycerin (NITROSTAT) 0 4 mg SL tablet   No No   Sig: Place 1 tablet (0 4 mg total) under the tongue every 5 (five) minutes as needed for chest pain   propranolol (INDERAL LA) 60 mg 24 hr capsule   No No   Sig: Take 1 capsule (60 mg total) by mouth daily   torsemide (DEMADEX) 10 mg tablet   No No   Sig: Take 1 tablet (10 mg total) by mouth daily   zonisamide (ZONEGRAN) 50 MG capsule   No No   Sig: Take 1 tablet (50 mg total) by mouth daily      Facility-Administered Medications: None       Past Medical History:   Diagnosis Date   • BPH (benign prostatic hyperplasia)    • CAD (coronary artery disease)    • Cancer (HCC)     basal cell   • Chronic kidney disease     stage 3   • Colon polyp    • CPAP (continuous positive airway pressure) dependence    • Diabetes mellitus (HCC)     niddm   • Disease of thyroid gland    • Gout    • High cholesterol    • Hypertension    • MI, 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    • Myocardial infarction ([facility] [address] ) 2014   • Sleep apnea        Past Surgical History:   Procedure Laterality Date   • BASAL CELL CARCINOMA EXCISION Right 1/21/2022    Procedure: EXCISION BASAL CELL CARCINOMA EXTREMITY;  Surgeon: Adama Arroyo DO;  Location: [facility];  Service: General   • CATARACT EXTRACTION W/  INTRAOCULAR LENS IMPLANT     • CORNEAL TRANSPLANT     • CORONARY ANGIOPLASTY WITH STENT PLACEMENT      stents x3-- [id number]   • EYE SURGERY      repair corneal laceration   • UT COLONOSCOPY FLX DX W/COLLJ SPEC WHEN PFRMD N/A 3/21/2016    Procedure: COLONOSCOPY;  Surgeon: Gato Navas MD;  Location: [facility];  Service: Colorectal   • TONSILLECTOMY AND ADENOIDECTOMY         Family History   Adopted: Yes   Problem Relation Age of Onset   • No Known Problems Mother    • No Known Problems Father      I have reviewed and agree with the history as documented  E-Cigarette/Vaping   • E-Cigarette Use Never User      E-Cigarette/Vaping Substances   • Nicotine No    • THC No    • CBD No    • Flavoring No    • Other No    • Unknown No      Social History     Tobacco Use   • Smoking status: Never   • Smokeless tobacco: Never   Vaping Use   • Vaping Use: Never used   Substance Use Topics   • Alcohol use: Not Currently     Comment: socially, cut down in 2008, previously did more than    • Drug use: Never        Review of Systems   Constitutional: Negative for activity change, fever and unexpected weight change  HENT: Negative for postnasal drip and rhinorrhea  Eyes: Negative for visual disturbance  Respiratory: Negative for cough, chest tightness and shortness of breath  Cardiovascular: Positive for chest pain, palpitations and leg swelling  Gastrointestinal: Negative for abdominal pain, blood in stool, constipation, diarrhea, nausea and vomiting  Genitourinary: Negative for dysuria and hematuria  Musculoskeletal: Positive for myalgias  Skin: Negative for color change and wound  Allergic/Immunologic: Negative for immunocompromised state  Neurological: Positive for numbness   Negative for dizziness and syncope  Psychiatric/Behavioral: Negative for dysphoric mood  The patient is not nervous/anxious  All other systems reviewed and are negative  Physical Exam  ED Triage Vitals [12/17/22 1903]   Temperature Pulse Respirations Blood Pressure SpO2   (!) 97 2 °F (36 2 °C) 76 18 129/63 93 %      Temp Source Heart Rate Source Patient Position - Orthostatic VS BP Location FiO2 (%)   Temporal Monitor Lying Right arm --      Pain Score       5             Orthostatic Vital Signs  Vitals:    12/17/22 1903 12/17/22 2036   BP: 129/63 122/60   Pulse: 76 74   Patient Position - Orthostatic VS: Lying Lying       Physical Exam  Vitals and nursing note reviewed  Exam conducted with a chaperone present (Wife at bedside)  Constitutional:       Appearance: He is not toxic-appearing or diaphoretic  HENT:      Head: Normocephalic and atraumatic  Right Ear: External ear normal       Left Ear: External ear normal       Nose: Nose normal       Mouth/Throat:      Mouth: Mucous membranes are moist    Eyes:      General: No scleral icterus  Extraocular Movements: Extraocular movements intact  Conjunctiva/sclera: Conjunctivae normal    Cardiovascular:      Rate and Rhythm: Normal rate and regular rhythm  Pulses:           Radial pulses are 2+ on the right side  Dorsalis pedis pulses are 2+ on the right side and detected w/ Doppler on the left side  Posterior tibial pulses are detected w/ Doppler on the left side  Heart sounds: Normal heart sounds, S1 normal and S2 normal  No murmur heard  Pulmonary:      Effort: Pulmonary effort is normal  No respiratory distress  Breath sounds: Normal breath sounds  No stridor  No wheezing  Abdominal:      General: Bowel sounds are normal       Palpations: Abdomen is soft  Tenderness: There is no abdominal tenderness  Musculoskeletal:         General: Normal range of motion  Cervical back: Normal range of motion     Feet:      Comments: Left foot slightly warmer than right  Small amount of swelling compared to right  Very tender to light palpation of his arch  Skin:     General: Skin is warm and dry  Coloration: Skin is not jaundiced  Neurological:      General: No focal deficit present  Mental Status: He is alert and oriented to person, place, and time     Psychiatric:         Mood and Affect: Mood normal          ED Medications  Medications   nitroglycerin (NITROSTAT) SL tablet 0 4 mg (0 4 mg Sublingual Given 12/17/22 2036)   HYDROmorphone (DILAUDID) injection 0 2 mg (0 2 mg Intravenous Given 12/17/22 2057)   sodium chloride 0 9 % bolus 1,000 mL (0 mL Intravenous Stopped 12/17/22 2228)   iohexol (OMNIPAQUE) 350 MG/ML injection (SINGLE-DOSE) 100 mL (120 mL Intravenous Given 12/17/22 2122)       Diagnostic Studies  Results Reviewed     Procedure Component Value Units Date/Time    HS Troponin I 2hr [[id number]]  (Normal) Collected: 12/17/22 2235    Lab Status: Final result Specimen: Blood from Arm, Right Updated: 12/17/22 2306     hs TnI 2hr 5 ng/L      Delta 2hr hsTnI 0 ng/L     HS Troponin I 4hr [[id number]]     Lab Status: No result Specimen: Blood     HS Troponin 0hr (reflex protocol) [[id number]]  (Normal) Collected: 12/17/22 2014    Lab Status: Final result Specimen: Blood from Arm, Right Updated: 12/17/22 2113     hs TnI 0hr 5 ng/L     Comprehensive metabolic panel [[id number]]  (Abnormal) Collected: 12/17/22 2014    Lab Status: Final result Specimen: Blood from Arm, Right Updated: 12/17/22 2105     Sodium 139 mmol/L      Potassium 4 2 mmol/L      Chloride 100 mmol/L      CO2 31 mmol/L      ANION GAP 8 mmol/L      BUN 21 mg/dL      Creatinine 1 50 mg/dL      Glucose 294 mg/dL      Calcium 9 0 mg/dL      Corrected Calcium 9 5 mg/dL      AST 39 U/L      ALT 65 U/L      Alkaline Phosphatase 102 U/L      Total Protein 7 0 g/dL      Albumin 3 4 g/dL      Total Bilirubin 0 44 mg/dL      eGFR 44 ml/min/1 73sq m     Narrative:      [employer] [facility] guidelines for Chronic Kidney Disease (CKD):   •  Stage 1 with normal or high GFR (GFR > 90 mL/min/1 73 square meters)  •  Stage 2 Mild CKD (GFR = 60-89 mL/min/1 73 square meters)  •  Stage 3A Moderate CKD (GFR = 45-59 mL/min/1 73 square meters)  •  Stage 3B Moderate CKD (GFR = 30-44 mL/min/1 73 square meters)  •  Stage 4 Severe CKD (GFR = 15-29 mL/min/1 73 square meters)  •  Stage 5 End Stage CKD (GFR <15 mL/min/1 73 square meters)  Note: GFR calculation is accurate only with a steady state creatinine    D-Dimer [[id number]]  (Abnormal) Collected: 12/17/22 2014    Lab Status: Final result Specimen: Blood from Arm, Right Updated: 12/17/22 2105     D-Dimer, Quant 0 65 ug/ml FEU     Narrative: In the evaluation for possible pulmonary embolism, in the appropriate (Well's Score of 4 or less) patient, the age adjusted d-dimer cutoff for this patient can be calculated as:    Age x 0 01 (in ug/mL) for Age-adjusted D-dimer exclusion threshold for a patient over 50 years      Protime-INR [[id number]]  (Normal) Collected: 12/17/22 2014    Lab Status: Final result Specimen: Blood from Arm, Right Updated: 12/17/22 2102     Protime 12 6 seconds      INR 0 92    APTT [[id number]]  (Normal) Collected: 12/17/22 2014    Lab Status: Final result Specimen: Blood from Arm, Right Updated: 12/17/22 2102     PTT 29 seconds     CBC and differential [[id number]]  (Abnormal) Collected: 12/17/22 2014    Lab Status: Final result Specimen: Blood from Arm, Right Updated: 12/17/22 2052     WBC 7 07 Thousand/uL      RBC 4 39 Million/uL      Hemoglobin 14 7 g/dL      Hematocrit 43 3 %      MCV 99 fL      MCH 33 5 pg      MCHC 33 9 g/dL      RDW 12 8 %      MPV 9 9 fL      Platelets 247 Thousands/uL      nRBC 0 /100 WBCs      Neutrophils Relative 59 %      Immat GRANS % 0 %      Lymphocytes Relative 22 %      Monocytes Relative 12 %      Eosinophils Relative 7 %      Basophils Relative 0 %      Neutrophils Absolute 4 13 Thousands/µL      Immature Grans Absolute 0 03 Thousand/uL      Lymphocytes Absolute 1 58 Thousands/µL      Monocytes Absolute 0 83 Thousand/µL      Eosinophils Absolute 0 47 Thousand/µL      Basophils Absolute 0 03 Thousands/µL                  XR foot 3+ views LEFT   ED Interpretation by Susanne Rodriguez MD (12/17 2009)   No fracture or dislocation  No obvious gas      CTA abdominal w run off w wo contrast    (Results Pending)         Procedures  ECG 12 Lead Documentation Only    Date/Time: 12/17/2022 8:40 PM  Performed by: Susanne Rodriguez MD  Authorized by: Susanne Rodriguez MD     Indications / Diagnosis:  Cp  ECG reviewed by me, the ED Provider: yes    Patient location:  ED  Interpretation:     Interpretation: non-specific    Quality:     Tracing quality:  Limited by artifact  Rate:     ECG rate:  82    ECG rate assessment: normal    Rhythm:     Rhythm: sinus rhythm and A-V block    Ectopy:     Ectopy: none    QRS:     QRS axis:  Left    QRS intervals:  Normal  Conduction:     Conduction: abnormal      Abnormal conduction: 1st degree    ST segments:     ST segments:  Normal  T waves:     T waves: normal            ED Course  ED Course as of 12/18/22 0111   Sat Dec 17, 2022   2009 XR foot 3+ views LEFT  No fracture or dislocation  No obvious gas     2034 Complaining of left-sided chest pain requesting sublingual nitroglycerin   2102 WBC: 7 07   2108 D-Dimer, Quant(!): 0 65  Normal for age-adjusted parameters   2144 hs TnI 0hr: 5   2318 Delta 2hr hsTnI: 0   Sun Dec 18, 2022   0059 Report from [facility]:IMPRESSION:  1  Atherosclerotic disease  2  Severe proximal SMA stenosis with reconstitution shortly thereafter  3  Mild-to-moderate bilateral renal artery stenosis  4   Severe atherosclerotic disease of bilateral infrapopliteal arteries with good contrast flow of the  arteries demonstrated at the levels of the ankles and feet             HEART Risk Score    Flowsheet Row Most Recent Value   Heart Score Risk Calculator History 1 Filed at: 12/17/2022 2144   ECG 0 Filed at: 12/17/2022 2144   Age 2 Filed at: 12/17/2022 2144   Risk Factors 2 Filed at: 12/17/2022 2144   Troponin 0 Filed at: 12/17/2022 2144   HEART Score 5 Filed at: 12/17/2022 2144                                MDM  Number of Diagnoses or Management Options  Left foot pain: new and requires workup  Peripheral arterial disease ([facility] [address] ): established and worsening  Diagnosis management comments: Initial Impression: Atraumatic foot pain with his extensive history concern for DVT, arterial occlusion/dissection, infection  We will also check his EKG and troponin given that he has been having chest pain requiring nitroglycerin so frequently over the past few days    Initial Work Up: EKG/troponin, D-dimer to assess clot risk, CTA with runoff    Final Impression: D-dimer was within normal limits for for age  Did have an episode of chest pain which was relieved by sublingual nitroglycerin, already has a appointment for Cath Lab in 12 days and no evidence of NSTEMI/STEMI that would require further management at this time  CTA with runoff of his legs showed evidence of severe atherosclerotic disease in multiple points but no acute occlusion, dissection, infection  With no sign of acute issue, likely that this is worsening of his established peripheral artery disease  Did offer to patient that he could stay if his pain was unmanageable or if he felt he could not ambulate well enough at home however he felt comfortable going home  At this point, with no acute pathology identified and no need for any intervention here tonight, patient can be discharged with vascular surgery follow-up          Disposition  Final diagnoses:   Peripheral arterial disease ([facility] [address] )   Left foot pain     Time reflects when diagnosis was documented in both MDM as applicable and the Disposition within this note     Time User Action Codes Description Comment    12/18/2022 12:46 AM Estephanie Cosme [I73 9] Peripheral arterial disease ([facility] [address] )     12/18/2022 12:46 AM Kayla Onofre Add [R32 [id number]] Left foot pain       ED Disposition     ED Disposition   Discharge    Condition   Stable    Date/Time   Sun Dec 18, 2022 12:46 AM    Comment   T[place] De Los Santos discharge to home/self care  Follow-up Information     Follow up With Specialties Details Why Contact Info Additional [address] [facility] Emergency Department Emergency Medicine Go to  As needed, If symptoms worsen [place] [phone]  [facility] Emergency Department, [address], [address]    [facility] Call in 1 week A referral has been placed for you  They should call you to schedule appointment  If you have not heard from them in a week, give this number a call [facility]  [phone] [facility], [address] [place] [address], [address] [place] [address], [address]          Discharge Medication List as of 12/18/2022 12:47 AM      CONTINUE these medications which have NOT CHANGED    Details   !! allopurinol (ZYLOPRIM) 100 mg tablet Take 1 tablet (100 mg total) by mouth daily, Starting Mon 10/24/2022, Normal      !! allopurinol (ZYLOPRIM) 100 mg tablet Take 1 tablet (100 mg total) by mouth daily, Starting Fri 11/4/2022, Normal      aspirin 325 mg tablet Take 325 mg by mouth daily  , Historical Med      !! atorvastatin (LIPITOR) 80 mg tablet Take 1 tablet by mouth once daily, Normal      !! atorvastatin (LIPITOR) 80 mg tablet Take 1 tablet (80 mg total) by mouth daily, Starting Mon 10/31/2022, Normal      Blood Glucose Monitoring Suppl (OneTouch Verio Reflect) w/Device KIT Check blood sugars twice daily  Please substitute with appropriate alternative as covered by patient's insurance   Dx: E11 65, Normal      Empagliflozin (Jardiance) 10 MG TABS Take 1 tablet (10 mg total) by mouth every morning, Starting Tue 9/6/2022, Normal      escitalopram (LEXAPRO) 20 mg tablet Take 1 tablet (20 mg total) by mouth daily, Starting Mon 11/14/2022, Normal      glimepiride (AMARYL) 4 mg tablet TAKE 1 TABLET TWICE A DAY, Normal      glucose blood (OneTouch Verio) test strip Check blood sugars twice daily  Please substitute with appropriate alternative as covered by patient's insurance  Dx: E11 65, Normal      !! levothyroxine 88 mcg tablet TAKE 1 TABLET BY MOUTH ONCE DAILY IN THE MORNING, Normal      !! levothyroxine 88 mcg tablet Take 1 tablet (88 mcg total) by mouth daily in the early morning, Starting Thu 11/17/2022, Normal      losartan (COZAAR) 25 mg tablet Take 1 tablet (25 mg total) by mouth daily, Starting Mon 8/22/2022, Normal      metFORMIN (GLUCOPHAGE) 1000 MG tablet Take 1 tablet (1,000 mg total) by mouth 2 (two) times a day with meals, Starting Thu 10/6/2022, Normal      multivitamin (THERAGRAN) TABS Take 1 tablet by mouth daily, Historical Med      nitroglycerin (NITROSTAT) 0 4 mg SL tablet Place 1 tablet (0 4 mg total) under the tongue every 5 (five) minutes as needed for chest pain, Starting Mon 12/12/2022, Normal      OneTouch Delica Lancets 96F MISC Check blood sugars twice daily  Please substitute with appropriate alternative as covered by patient's insurance  Dx: E11 65, Normal      propranolol (INDERAL LA) 60 mg 24 hr capsule Take 1 capsule (60 mg total) by mouth daily, Starting Mon 12/5/2022, Normal      torsemide (DEMADEX) 10 mg tablet Take 1 tablet (10 mg total) by mouth daily, Starting Tue 11/15/2022, Normal      zonisamide (ZONEGRAN) 50 MG capsule Take 1 tablet (50 mg total) by mouth daily, Normal       !! - Potential duplicate medications found  Please discuss with provider  PDMP Review     None           ED Provider  Attending physically available and evaluated Paulett Jeans   MEGHAN managed the patient along with the ED Attending      Electronically Signed by         Zeinab Cooper MD  12/18/22 0111

## 2022-12-18 NOTE — ED ATTENDING ATTESTATION
Final Diagnosis:  1  Peripheral arterial disease ([facility] [address] )    2  Left foot pain           I, Adriane Byrne MD, saw and evaluated the patient  All available labs and X-rays were ordered by me or the resident and have been reviewed by myself  I discussed the patient with the resident / non-physician and agree with the resident's / non-physician practitioner's findings and plan as documented in the resident's / non-physician practicitioner's note, except where noted  At this point, I agree with the current assessment done in the ED  I was present during key portions of all procedures performed unless otherwise stated  Chief Complaint   Patient presents with   • Foot Pain     Patient reports that he has had b/l numbness from knees down but today has pain in b/l feet     This is a 69 y o  male presenting for evaluation of left foot pain  Patient has a history of diabetes with extensive neuropathy  He stated that today started to have some left foot pain which he identifies as being in the arch of his foot  This surprised him as he usually does not have very much feeling in his feet  He denies any trauma to the area  He states that at rest he has no pain but when he palpates that area he has severe pain with gentle touch in the area  Denies any fever chills, nausea vomiting  PMH:   has a past medical history of BPH (benign prostatic hyperplasia), CAD (coronary artery disease), Cancer ([facility] [address] ), Chronic kidney disease, Colon polyp, CPAP (continuous positive airway pressure) dependence, Diabetes mellitus ([address] ), Disease of thyroid gland, Gout, High cholesterol, Hypertension, MI, old, Myocardial infarction ([address] ) (2014), and Sleep apnea  PSH:   has a past surgical history that includes Tonsillectomy and adenoidectomy; Cataract extraction w/  intraocular lens implant; Eye surgery; Coronary angioplasty with stent; pr colonoscopy flx dx w/collj spec when pfrmd (N/A, 3/21/2016);  Corneal transplant; and Excision basal cell carcinoma (Right, 1/21/2022)  Social:  Social History     Substance and Sexual Activity   Alcohol Use Not Currently    Comment: socially, cut down in 2008, previously did more than      Social History     Tobacco Use   Smoking Status Never   Smokeless Tobacco Never     Social History     Substance and Sexual Activity   Drug Use Never     PE:  Vitals:    12/17/22 1903 12/17/22 2036   BP: 129/63 122/60   BP Location: Right arm Right arm   Pulse: 76 74   Resp: 18 18   Temp: (!) 97 2 °F (36 2 °C)    TempSrc: Temporal    SpO2: 93% 93%   Weight: 103 kg (228 lb)    Height: 5' 9" (1 753 m)        A:  -Patient appears nondistressed sitting in bed  Overall feet and legs appear roughly symmetric  Patient has tenderness to palpation over the arch of the foot without any obvious discoloration  No discomfort with palpation over toes or distal metatarsals  No tenderness in the heel or the Achilles  No tenderness in the distal tib-fib  No obvious signs of trauma  Unless otherwise specified above:     General: VS reviewed  Appears in NAD     Head: Normocephalic, atraumatic  Eyes: EOM-I  No exudate  ENT: Atraumatic external nose and ears  No malocclusion  No stridor  No drooling  Neck: No JVD  CV: No pallor noted  Lungs:   No tachypnea  No respiratory distress     Abdomen:  Soft, non-tender, non-distended     MSK:   FROM spontaneously  No obvious deformity     Skin: Dry, intact  No obvious rash  Neuro: Awake, alert, GCS15, CN II-XII grossly intact  Speaking in full sentences  Motor grossly intact  Psychiatric/Behavioral: Appropriate mood and affect   Exam: deferred    P:  -Patient presents for evaluation of new onset of foot pain  Will evaluate for signs of infection, bony pathology  Reassess   -No obvious pathology on xr  CTA performed which did not reveal acute occlusions  Patient comfortable with plan to follow up and be discharged home     - 13 point ROS was performed and all are normal unless stated in the history above  - Nursing note reviewed  Vitals reviewed  - Orders placed by myself and/or advanced practitioner / resident     - Previous chart was reviewed  - No language barrier    - History obtained from patient  - There are no limitations to the history obtained  Code Status: Prior  Advance Directive and Living Will:      Power of :    POLST:      Medications   nitroglycerin (NITROSTAT) SL tablet 0 4 mg (0 4 mg Sublingual Given 12/17/22 2036)   HYDROmorphone (DILAUDID) injection 0 2 mg (0 2 mg Intravenous Given 12/17/22 2057)   sodium chloride 0 9 % bolus 1,000 mL (0 mL Intravenous Stopped 12/17/22 2228)   iohexol (OMNIPAQUE) 350 MG/ML injection (SINGLE-DOSE) 100 mL (120 mL Intravenous Given 12/17/22 2122)     CTA abdominal w run off w wo contrast   Final Result      Continuous three-vessel bilateral lower extremity runoff with diffuse below knee atherosclerotic disease  Critical superior mesenteric artery stenosis versus occlusion  Diverticulosis  Workstation performed: [id number]         XR foot 3+ views LEFT   ED Interpretation   No fracture or dislocation  No obvious gas      Final Result      No acute osseous abnormality              Workstation performed: [id number]           Orders Placed This Encounter   Procedures   • ED ECG Documentation Only   • XR foot 3+ views LEFT   • CTA abdominal w run off w wo contrast   • CBC and differential   • Protime-INR   • APTT   • Comprehensive metabolic panel   • D-Dimer   • HS Troponin 0hr (reflex protocol)   • HS Troponin I 2hr   • Ambulatory Referral to Vascular Surgery   • ECG 12 lead     Labs Reviewed   CBC AND DIFFERENTIAL - Abnormal       Result Value Ref Range Status    WBC 7 07  4 31 - 10 16 Thousand/uL Final    RBC 4 39  3 88 - 5 62 Million/uL Final    Hemoglobin 14 7  12 0 - 17 0 g/dL Final    Hematocrit 43 3  36 5 - 49 3 % Final    MCV 99 (*) 82 - 98 fL Final    MCH 33 5  26 8 - 34 3 pg Final MCHC 33 9  31 4 - 37 4 g/dL Final    RDW 12 8  11 6 - 15 1 % Final    MPV 9 9  8 9 - 12 7 fL Final    Platelets 152  144 - 390 Thousands/uL Final    nRBC 0  /100 WBCs Final    Neutrophils Relative 59  43 - 75 % Final    Immat GRANS % 0  0 - 2 % Final    Lymphocytes Relative 22  14 - 44 % Final    Monocytes Relative 12  4 - 12 % Final    Eosinophils Relative 7 (*) 0 - 6 % Final    Basophils Relative 0  0 - 1 % Final    Neutrophils Absolute 4 13  1 85 - 7 62 Thousands/µL Final    Immature Grans Absolute 0 03  0 00 - 0 20 Thousand/uL Final    Lymphocytes Absolute 1 58  0 60 - 4 47 Thousands/µL Final    Monocytes Absolute 0 83  0 17 - 1 22 Thousand/µL Final    Eosinophils Absolute 0 47  0 00 - 0 61 Thousand/µL Final    Basophils Absolute 0 03  0 00 - 0 10 Thousands/µL Final   COMPREHENSIVE METABOLIC PANEL - Abnormal    Sodium 139  135 - 147 mmol/L Final    Potassium 4 2  3 5 - 5 3 mmol/L Final    Chloride 100  96 - 108 mmol/L Final    CO2 31  21 - 32 mmol/L Final    ANION GAP 8  4 - 13 mmol/L Final    BUN 21  5 - 25 mg/dL Final    Creatinine 1 50 (*) 0 60 - 1 30 mg/dL Final    Comment: Standardized to IDMS reference method    Glucose 294 (*) 65 - 140 mg/dL Final    Comment: If the patient is fasting, the ADA then defines impaired fasting glucose as > 100 mg/dL and diabetes as > or equal to 123 mg/dL  Specimen collection should occur prior to Sulfasalazine administration due to the potential for falsely depressed results  Specimen collection should occur prior to Sulfapyridine administration due to the potential for falsely elevated results  Calcium 9 0  8 3 - 10 1 mg/dL Final    Corrected Calcium 9 5  8 3 - 10 1 mg/dL Final    AST 39  5 - 45 U/L Final    Comment: Specimen collection should occur prior to Sulfasalazine administration due to the potential for falsely depressed results       ALT 65  12 - 78 U/L Final    Comment: Specimen collection should occur prior to Sulfasalazine administration due to the potential for falsely depressed results  Alkaline Phosphatase 102  46 - 116 U/L Final    Total Protein 7 0  6 4 - 8 4 g/dL Final    Albumin 3 4 (*) 3 5 - 5 0 g/dL Final    Total Bilirubin 0 44  0 20 - 1 00 mg/dL Final    Comment: Use of this assay is not recommended for patients undergoing treatment with eltrombopag due to the potential for falsely elevated results  eGFR 44  ml/min/1 73sq m Final    Narrative:     [place] guidelines for Chronic Kidney Disease (CKD):   •  Stage 1 with normal or high GFR (GFR > 90 mL/min/1 73 square meters)  •  Stage 2 Mild CKD (GFR = 60-89 mL/min/1 73 square meters)  •  Stage 3A Moderate CKD (GFR = 45-59 mL/min/1 73 square meters)  •  Stage 3B Moderate CKD (GFR = 30-44 mL/min/1 73 square meters)  •  Stage 4 Severe CKD (GFR = 15-29 mL/min/1 73 square meters)  •  Stage 5 End Stage CKD (GFR <15 mL/min/1 73 square meters)  Note: GFR calculation is accurate only with a steady state creatinine   D-DIMER, QUANTITATIVE - Abnormal    D-Dimer, Quant 0 65 (*) <0 50 ug/ml FEU Final    Comment: Reference and upper limits to exclude DVT and PE are the same  Do not use to exclude if clinical symptoms are present  Narrative: In the evaluation for possible pulmonary embolism, in the appropriate (Well's Score of 4 or less) patient, the age adjusted d-dimer cutoff for this patient can be calculated as:    Age x 0 01 (in ug/mL) for Age-adjusted D-dimer exclusion threshold for a patient over 50 years     PROTIME-INR - Normal    Protime 12 6  11 6 - 14 5 seconds Final    INR 0 92  0 84 - 1 19 Final   APTT - Normal    PTT 29  23 - 37 seconds Final    Comment: Therapeutic Heparin Range =  60-90 seconds   HS TROPONIN I 0HR - Normal    hs TnI 0hr 5  "Refer to ACS Flowchart"- see link ng/L Final    Comment:                                              Initial (time 0) result  If >=50 ng/L, Myocardial injury suggested ;  Type of myocardial injury and treatment strategy  to be determined  If 5-49 ng/L, a delta result at 2 hours and or 4 hours will be needed to further evaluate  If <4 ng/L, and chest pain has been >3 hours since onset, patient may qualify for discharge based on the HEART score in the ED  If <5 ng/L and <3hours since onset of chest pain, a delta result at 2 hours will be needed to further evaluate  HS Troponin 99th Percentile URL of a Health Population=12 ng/L with a 95% Confidence Interval of 8-18 ng/L  Second Troponin (time 2 hours)  If calculated delta >= 20 ng/L,  Myocardial injury suggested ; Type of myocardial injury and treatment strategy to be determined  If 5-49 ng/L and the calculated delta is 5-19 ng/L, consult medical service for evaluation  Continue evaluation for ischemia on ecg and other possible etiology and repeat hs troponin at 4 hours  If delta is <5 ng/L at 2 hours, consider discharge based on risk stratification via the HEART score (if in ED), or JANETT risk score in IP/Observation  HS Troponin 99th Percentile URL of a Health Population=12 ng/L with a 95% Confidence Interval of 8-18 ng/L    HS TROPONIN I 2HR - Normal    hs TnI 2hr 5  "Refer to ACS Flowchart"- see link ng/L Final    Comment:                                              Initial (time 0) result  If >=50 ng/L, Myocardial injury suggested ;  Type of myocardial injury and treatment strategy  to be determined  If 5-49 ng/L, a delta result at 2 hours and or 4 hours will be needed to further evaluate  If <4 ng/L, and chest pain has been >3 hours since onset, patient may qualify for discharge based on the HEART score in the ED  If <5 ng/L and <3hours since onset of chest pain, a delta result at 2 hours will be needed to further evaluate  HS Troponin 99th Percentile URL of a Health Population=12 ng/L with a 95% Confidence Interval of 8-18 ng/L      Second Troponin (time 2 hours)  If calculated delta >= 20 ng/L,  Myocardial injury suggested ; Type of myocardial injury and treatment strategy to be determined  If 5-49 ng/L and the calculated delta is 5-19 ng/L, consult medical service for evaluation  Continue evaluation for ischemia on ecg and other possible etiology and repeat hs troponin at 4 hours  If delta is <5 ng/L at 2 hours, consider discharge based on risk stratification via the HEART score (if in ED), or JANETT risk score in IP/Observation  HS Troponin 99th Percentile URL of a Health Population=12 ng/L with a 95% Confidence Interval of 8-18 ng/L  Delta 2hr hsTnI 0  <20 ng/L Final     Time reflects when diagnosis was documented in both MDM as applicable and the Disposition within this note     Time User Action Codes Description Comment    12/18/2022 12:46 AM Neo Cosme [I73 [id number]] Peripheral arterial disease ([facility] [address] )     12/18/2022 12:46 AM Neo Cosme [[id number]] Left foot pain       ED Disposition     ED Disposition   Discharge    Condition   Stable    Date/Time   Sun Dec 18, 2022 12:46 AM    Comment   Paula Gaxiola discharge to home/self care  Follow-up Information     Follow up With Specialties Details Why Contact Info Additional [address] [facility] Emergency Department Emergency Medicine Go to  As needed, If symptoms worsen [facility] [id number]  [facility] Emergency Department, [address], [address]    [facility] Call in 1 week A referral has been placed for you  They should call you to schedule appointment    If you have not heard from them in a week, give this number a call [facility]  [phone] [facility], [facility], [address] [place] [address], [address]        Discharge Medication List as of 12/18/2022 12:47 AM      CONTINUE these medications which have NOT CHANGED    Details   !! allopurinol (ZYLOPRIM) 100 mg tablet Take 1 tablet (100 mg total) by mouth daily, Starting Mon 10/24/2022, Normal      !! allopurinol (ZYLOPRIM) 100 mg tablet Take 1 tablet (100 mg total) by mouth daily, Starting Fri 11/4/2022, Normal      aspirin 325 mg tablet Take 325 mg by mouth daily  , Historical Med      !! atorvastatin (LIPITOR) 80 mg tablet Take 1 tablet by mouth once daily, Normal      !! atorvastatin (LIPITOR) 80 mg tablet Take 1 tablet (80 mg total) by mouth daily, Starting Mon 10/31/2022, Normal      Blood Glucose Monitoring Suppl (OneTouch Verio Reflect) w/Device KIT Check blood sugars twice daily  Please substitute with appropriate alternative as covered by patient's insurance  Dx: E11 65, Normal      Empagliflozin (Jardiance) 10 MG TABS Take 1 tablet (10 mg total) by mouth every morning, Starting Tue 9/6/2022, Normal      escitalopram (LEXAPRO) 20 mg tablet Take 1 tablet (20 mg total) by mouth daily, Starting Mon 11/14/2022, Normal      glimepiride (AMARYL) 4 mg tablet TAKE 1 TABLET TWICE A DAY, Normal      glucose blood (OneTouch Verio) test strip Check blood sugars twice daily  Please substitute with appropriate alternative as covered by patient's insurance   Dx: E11 65, Normal      !! levothyroxine 88 mcg tablet TAKE 1 TABLET BY MOUTH ONCE DAILY IN THE MORNING, Normal      !! levothyroxine 88 mcg tablet Take 1 tablet (88 mcg total) by mouth daily in the early morning, Starting Thu 11/17/2022, Normal      losartan (COZAAR) 25 mg tablet Take 1 tablet (25 mg total) by mouth daily, Starting Mon 8/22/2022, Normal      metFORMIN (GLUCOPHAGE) 1000 MG tablet Take 1 tablet (1,000 mg total) by mouth 2 (two) times a day with meals, Starting Thu 10/6/2022, Normal      multivitamin (THERAGRAN) TABS Take 1 tablet by mouth daily, Historical Med      nitroglycerin (NITROSTAT) 0 4 mg SL tablet Place 1 tablet (0 4 mg total) under the tongue every 5 (five) minutes as needed for chest pain, Starting Mon 12/12/2022, Normal      OneTouch Delica Lancets 17K MISC Check blood sugars twice daily  Please substitute with appropriate alternative as covered by patient's insurance  Dx: E11 65, Normal      propranolol (INDERAL LA) 60 mg 24 hr capsule Take 1 capsule (60 mg total) by mouth daily, Starting Mon 12/5/2022, Normal      torsemide (DEMADEX) 10 mg tablet Take 1 tablet (10 mg total) by mouth daily, Starting Tue 11/15/2022, Normal      zonisamide (ZONEGRAN) 50 MG capsule Take 1 tablet (50 mg total) by mouth daily, Normal       !! - Potential duplicate medications found  Please discuss with provider  Prior to Admission Medications   Prescriptions Last Dose Informant Patient Reported? Taking? Blood Glucose Monitoring Suppl (OneTouch Verio Reflect) w/Device KIT   No No   Sig: Check blood sugars twice daily  Please substitute with appropriate alternative as covered by patient's insurance  Dx: E11 65   Empagliflozin (Jardiance) 10 MG TABS   No No   Sig: Take 1 tablet (10 mg total) by mouth every morning   OneTouch Delica Lancets 11L MISC   No No   Sig: Check blood sugars twice daily  Please substitute with appropriate alternative as covered by patient's insurance  Dx: E11 65   allopurinol (ZYLOPRIM) 100 mg tablet   No No   Sig: Take 1 tablet (100 mg total) by mouth daily   allopurinol (ZYLOPRIM) 100 mg tablet   No No   Sig: Take 1 tablet (100 mg total) by mouth daily   aspirin 325 mg tablet   Yes No   Sig: Take 325 mg by mouth daily  atorvastatin (LIPITOR) 80 mg tablet   No No   Sig: Take 1 tablet by mouth once daily   atorvastatin (LIPITOR) 80 mg tablet   No No   Sig: Take 1 tablet (80 mg total) by mouth daily   escitalopram (LEXAPRO) 20 mg tablet   No No   Sig: Take 1 tablet (20 mg total) by mouth daily   glimepiride (AMARYL) 4 mg tablet   No No   Sig: TAKE 1 TABLET TWICE A DAY   glucose blood (OneTouch Verio) test strip   No No   Sig: Check blood sugars twice daily   Please substitute with appropriate alternative as covered by patient's insurance  Dx: E11 65   levothyroxine 88 mcg tablet   No No   Sig: TAKE 1 TABLET BY MOUTH ONCE DAILY IN THE MORNING   levothyroxine 88 mcg tablet   No No   Sig: Take 1 tablet (88 mcg total) by mouth daily in the early morning   losartan (COZAAR) 25 mg tablet   No No   Sig: Take 1 tablet (25 mg total) by mouth daily   metFORMIN (GLUCOPHAGE) 1000 MG tablet   No No   Sig: Take 1 tablet (1,000 mg total) by mouth 2 (two) times a day with meals   multivitamin (THERAGRAN) TABS   Yes No   Sig: Take 1 tablet by mouth daily   nitroglycerin (NITROSTAT) 0 4 mg SL tablet   No No   Sig: Place 1 tablet (0 4 mg total) under the tongue every 5 (five) minutes as needed for chest pain   propranolol (INDERAL LA) 60 mg 24 hr capsule   No No   Sig: Take 1 capsule (60 mg total) by mouth daily   torsemide (DEMADEX) 10 mg tablet   No No   Sig: Take 1 tablet (10 mg total) by mouth daily   zonisamide (ZONEGRAN) 50 MG capsule   No No   Sig: Take 1 tablet (50 mg total) by mouth daily      Facility-Administered Medications: None       Portions of the record may have been created with voice recognition software  Occasional wrong word or "sound a like" substitutions may have occurred due to the inherent limitations of voice recognition software  Read the chart carefully and recognize, using context, where substitutions have occurred      Electronically signed by:  Gabriela Mary

## 2022-12-23 LAB
ATRIAL RATE: 79 BPM
ATRIAL RATE: 82 BPM
P AXIS: 52 DEGREES
P AXIS: 62 DEGREES
PR INTERVAL: 212 MS
PR INTERVAL: 212 MS
QRS AXIS: -74 DEGREES
QRS AXIS: -75 DEGREES
QRSD INTERVAL: 72 MS
QRSD INTERVAL: 74 MS
QT INTERVAL: 366 MS
QT INTERVAL: 376 MS
QTC INTERVAL: 427 MS
QTC INTERVAL: 431 MS
T WAVE AXIS: -16 DEGREES
T WAVE AXIS: 46 DEGREES
VENTRICULAR RATE: 79 BPM
VENTRICULAR RATE: 82 BPM

## 2022-12-26 ENCOUNTER — OFFICE VISIT (OUTPATIENT)
Dept: URGENT CARE | Facility: MEDICAL CENTER | Age: 77
End: 2022-12-26

## 2022-12-26 VITALS
WEIGHT: 225 LBS | TEMPERATURE: 98 F | BODY MASS INDEX: 34.1 KG/M2 | HEIGHT: 68 IN | RESPIRATION RATE: 20 BRPM | OXYGEN SATURATION: 98 % | HEART RATE: 68 BPM

## 2022-12-26 DIAGNOSIS — J06.9 ACUTE URI: Primary | ICD-10-CM

## 2022-12-26 DIAGNOSIS — R05.1 ACUTE COUGH: ICD-10-CM

## 2022-12-26 RX ORDER — AZITHROMYCIN 250 MG/1
TABLET, FILM COATED ORAL
Qty: 6 TABLET | Refills: 0 | Status: SHIPPED | OUTPATIENT
Start: 2022-12-26 | End: 2022-12-30

## 2022-12-26 RX ORDER — BENZONATATE 200 MG/1
200 CAPSULE ORAL 3 TIMES DAILY PRN
Qty: 20 CAPSULE | Refills: 0 | Status: SHIPPED | OUTPATIENT
Start: 2022-12-26

## 2022-12-26 NOTE — PROGRESS NOTES
[facility] Now        NAME: Love Hodge is a 69 y o  male  : 1945    MRN: [id number]  DATE: 2022  TIME: 9:10 AM    Assessment and Plan   Acute URI [J06 9]  1  Acute URI  azithromycin (ZITHROMAX) 250 mg tablet      2  Acute cough  benzonatate (TESSALON) 200 MG capsule            Patient Instructions     Take prescribed medications as instructed  Plenty of fluids and rest   Monitor ibuprofen for pain or fever  May try tea with honey for throat/cough relief  Follow up with PCP in 3-5 days  Proceed to  ER if symptoms worsen  Chief Complaint     Chief Complaint   Patient presents with   • Cold Like Symptoms     Cold symptoms for the past 2 weeks, c/o cough and congestion, pt  Was exposed to flu          History of Present Illness       52-year-old man here with cold-like symptoms that have been worsening for the last 2 weeks  He admits to having a cough and congestion  He states he has been coughing up mucus but denies knowing what color it is  He has been taking Tylenol without relief  Additional symptom includes headache  PT states he was exposed to the flu 2 weeks ago from his grandkids  Denies any fevers, chills, shortness of breath, chest pain, abdominal pain, nausea, vomiting, diarrhea  Review of Systems   Review of Systems   Constitutional: Negative for chills and fever  HENT: Positive for congestion and rhinorrhea  Negative for ear discharge, ear pain, sinus pressure, sinus pain and sore throat  Eyes: Negative  Respiratory: Positive for cough  Negative for shortness of breath and wheezing  Cardiovascular: Negative for chest pain and palpitations  Gastrointestinal: Negative  Musculoskeletal: Negative  Skin: Negative  Neurological: Positive for headaches  Negative for dizziness, weakness, light-headedness and numbness           Current Medications       Current Outpatient Medications:   •  azithromycin (ZITHROMAX) 250 mg tablet, Take 2 tablets today then 1 tablet daily x 4 days, Disp: 6 tablet, Rfl: 0  •  benzonatate (TESSALON) 200 MG capsule, Take 1 capsule (200 mg total) by mouth 3 (three) times a day as needed for cough, Disp: 20 capsule, Rfl: 0  •  allopurinol (ZYLOPRIM) 100 mg tablet, Take 1 tablet (100 mg total) by mouth daily, Disp: 90 tablet, Rfl: 0  •  allopurinol (ZYLOPRIM) 100 mg tablet, Take 1 tablet (100 mg total) by mouth daily, Disp: 90 tablet, Rfl: 0  •  aspirin 325 mg tablet, Take 325 mg by mouth daily  , Disp: , Rfl:   •  atorvastatin (LIPITOR) 80 mg tablet, Take 1 tablet by mouth once daily, Disp: 90 tablet, Rfl: 0  •  atorvastatin (LIPITOR) 80 mg tablet, Take 1 tablet (80 mg total) by mouth daily, Disp: 90 tablet, Rfl: 0  •  Blood Glucose Monitoring Suppl (OneTouch Verio Reflect) w/Device KIT, Check blood sugars twice daily  Please substitute with appropriate alternative as covered by patient's insurance  Dx: E11 65, Disp: 1 kit, Rfl: 0  •  Empagliflozin (Jardiance) 10 MG TABS, Take 1 tablet (10 mg total) by mouth every morning, Disp: 90 tablet, Rfl: 1  •  escitalopram (LEXAPRO) 20 mg tablet, Take 1 tablet (20 mg total) by mouth daily, Disp: 90 tablet, Rfl: 0  •  glimepiride (AMARYL) 4 mg tablet, TAKE 1 TABLET TWICE A DAY, Disp: 180 tablet, Rfl: 0  •  glucose blood (OneTouch Verio) test strip, Check blood sugars twice daily  Please substitute with appropriate alternative as covered by patient's insurance   Dx: E11 65, Disp: 200 each, Rfl: 3  •  levothyroxine 88 mcg tablet, TAKE 1 TABLET BY MOUTH ONCE DAILY IN THE MORNING, Disp: 90 tablet, Rfl: 0  •  levothyroxine 88 mcg tablet, Take 1 tablet (88 mcg total) by mouth daily in the early morning, Disp: 90 tablet, Rfl: 0  •  losartan (COZAAR) 25 mg tablet, Take 1 tablet (25 mg total) by mouth daily, Disp: 90 tablet, Rfl: 1  •  metFORMIN (GLUCOPHAGE) 1000 MG tablet, Take 1 tablet (1,000 mg total) by mouth 2 (two) times a day with meals, Disp: 180 tablet, Rfl: 0  •  multivitamin (Marlin Chacon) TABS, Take 1 tablet by mouth daily, Disp: , Rfl:   •  nitroglycerin (NITROSTAT) 0 4 mg SL tablet, Place 1 tablet (0 4 mg total) under the tongue every 5 (five) minutes as needed for chest pain, Disp: 25 tablet, Rfl: 0  •  OneTouch Delica Lancets 21V MISC, Check blood sugars twice daily  Please substitute with appropriate alternative as covered by patient's insurance   Dx: E11 65, Disp: 200 each, Rfl: 3  •  propranolol (INDERAL LA) 60 mg 24 hr capsule, Take 1 capsule (60 mg total) by mouth daily, Disp: 90 capsule, Rfl: 0  •  torsemide (DEMADEX) 10 mg tablet, Take 1 tablet (10 mg total) by mouth daily, Disp: 90 tablet, Rfl: 1  •  zonisamide (ZONEGRAN) 50 MG capsule, Take 1 tablet (50 mg total) by mouth daily, Disp: 90 capsule, Rfl: 2    Current Allergies     Allergies as of 12/26/2022   • (No Known Allergies)            The following portions of the patient's history were reviewed and updated as appropriate: allergies, current medications, past family history, past medical history, past social history, past surgical history and problem list      Past Medical History:   Diagnosis Date   • BPH (benign prostatic hyperplasia)    • CAD (coronary artery disease)    • Cancer (HCC)     basal cell   • Chronic kidney disease     stage 3   • Colon polyp    • CPAP (continuous positive airway pressure) dependence    • Diabetes mellitus (HCC)     niddm   • Disease of thyroid gland    • Gout    • High cholesterol    • Hypertension    • MI, 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    • Myocardial infarction ([facility] [address] ) 2014   • Sleep apnea        Past Surgical History:   Procedure Laterality Date   • BASAL CELL CARCINOMA EXCISION Right 1/21/2022    Procedure: EXCISION BASAL CELL CARCINOMA EXTREMITY;  Surgeon: Alexandria Cronin DO;  Location: [facility];  Service: General   • CATARACT EXTRACTION W/  INTRAOCULAR LENS IMPLANT     • CORNEAL TRANSPLANT     • CORONARY ANGIOPLASTY WITH STENT PLACEMENT      stents x3-- [id number]   • EYE SURGERY      repair corneal laceration   • DC COLONOSCOPY FLX DX W/COLLJ SPEC WHEN PFRMD N/A 3/21/2016    Procedure: COLONOSCOPY;  Surgeon: Lucero Burciaga MD;  Location: [facility] MA[facility];  Service: Colorectal   • TONSILLECTOMY AND ADENOIDECTOMY         Family History   Adopted: Yes   Problem Relation Age of Onset   • No Known Problems Mother    • No Known Problems Father          Medications have been verified  Objective   Pulse 68   Temp 98 °F (36 7 °C)   Resp 20   Ht 5' 8" (1 727 m)   Wt 102 kg (225 lb)   SpO2 98%   BMI 34 21 kg/m²        Physical Exam     Physical Exam  Constitutional:       General: He is not in acute distress  Appearance: Normal appearance  HENT:      Head: Normocephalic and atraumatic  Right Ear: Tympanic membrane, ear canal and external ear normal       Left Ear: Tympanic membrane, ear canal and external ear normal       Nose: Congestion and rhinorrhea present  Mouth/Throat:      Mouth: Mucous membranes are moist       Pharynx: Oropharynx is clear  Posterior oropharyngeal erythema (mild) present  No oropharyngeal exudate  Eyes:      Conjunctiva/sclera: Conjunctivae normal    Cardiovascular:      Rate and Rhythm: Normal rate and regular rhythm  Pulses: Normal pulses  Heart sounds: Normal heart sounds  No friction rub  No gallop  Pulmonary:      Effort: Pulmonary effort is normal  No respiratory distress  Breath sounds: Normal breath sounds  No stridor  No wheezing, rhonchi or rales  Chest:      Chest wall: No tenderness  Skin:     General: Skin is warm and dry  Capillary Refill: Capillary refill takes less than 2 seconds  Findings: No erythema or rash  Neurological:      General: No focal deficit present  Mental Status: He is alert

## 2022-12-26 NOTE — PATIENT INSTRUCTIONS
Take prescribed medications as instructed  Plenty of fluids and rest   Monitor ibuprofen for pain or fever  May try tea with honey for throat/cough relief  Follow up with PCP in 3-5 days  Proceed to  ER if symptoms worsen  Upper Respiratory Infection   WHAT YOU NEED TO KNOW:   An upper respiratory infection is also called a cold  It can affect your nose, throat, ears, and sinuses  Cold symptoms are usually worst for the first 3 to 5 days  Most people get better in 7 to 14 days  You may continue to cough for 2 to 3 weeks  Colds are caused by viruses and do not get better with antibiotics  DISCHARGE INSTRUCTIONS:   Call your local emergency number (911 in the [address] [place] [address],[address]) if:   You have chest pain or trouble breathing  Return to the emergency department if:   You have a fever over 102ºF (39ºC)  Call your doctor if:   You have a low fever  Your sore throat gets worse or you see white or yellow spots in your throat  Your symptoms get worse after 3 to 5 days or are not better in 14 days  You have a rash anywhere on your skin  You have large, tender lumps in your neck  You have thick, green, or yellow drainage from your nose  You cough up thick yellow, green, or bloody mucus  You have a bad earache  You have questions or concerns about your condition or care  Medicines: You may need any of the following:  Decongestants  help reduce nasal congestion and help you breathe more easily  If you take decongestant pills, they may make you feel restless or cause problems with your sleep  Do not use decongestant sprays for more than a few days  Cough suppressants  help reduce coughing  Ask your healthcare provider which type of cough medicine is best for you  NSAIDs , such as ibuprofen, help decrease swelling, pain, and fever  NSAIDs can cause stomach bleeding or kidney problems in certain people   If you take blood thinner medicine, always ask your healthcare provider if NSAIDs are safe for you  Always read the medicine label and follow directions  Acetaminophen  decreases pain and fever  It is available without a doctor's order  Ask how much to take and how often to take it  Follow directions  Read the labels of all other medicines you are using to see if they also contain acetaminophen, or ask your doctor or pharmacist  Acetaminophen can cause liver damage if not taken correctly  Do not use more than 4 grams (4,000 milligrams) total of acetaminophen in one day  Take your medicine as directed  Contact your healthcare provider if you think your medicine is not helping or if you have side effects  Tell him or her if you are allergic to any medicine  Keep a list of the medicines, vitamins, and herbs you take  Include the amounts, and when and why you take them  Bring the list or the pill bottles to follow-up visits  Carry your medicine list with you in case of an emergency  Self-care:   Rest as much as possible  Slowly start to do more each day  Drink more liquids as directed  Liquids will help thin and loosen mucus so you can cough it up  Liquids will also help prevent dehydration  Liquids that help prevent dehydration include water, fruit juice, and broth  Do not drink liquids that contain caffeine  Caffeine can increase your risk for dehydration  Ask your healthcare provider how much liquid to drink each day  Soothe a sore throat  Gargle with warm salt water  Make salt water by dissolving ¼ teaspoon salt in 1 cup warm water  You may also suck on hard candy or throat lozenges  You may use a sore throat spray  Use a humidifier or vaporizer  Use a cool mist humidifier or a vaporizer to increase air moisture in your home  This may make it easier for you to breathe and help decrease your cough  Use saline nasal drops as directed  These help relieve congestion  Apply petroleum-based jelly around the outside of your nostrils    This can decrease irritation from blowing your nose     Do not smoke  Nicotine and other chemicals in cigarettes and cigars can make your symptoms worse  They can also cause infections such as bronchitis or pneumonia  Ask your healthcare provider for information if you currently smoke and need help to quit  E-cigarettes or smokeless tobacco still contain nicotine  Talk to your healthcare provider before you use these products  Prevent a cold: Wash your hands often  Use soap and water every time you wash your hands  Rub your soapy hands together, lacing your fingers  Use the fingers of one hand to scrub under the nails of the other hand  Wash for at least 20 seconds  Rinse with warm, running water for several seconds  Then dry your hands  Use germ-killing gel if soap and water are not available  Do not touch your eyes or mouth without washing your hands first          Cover a sneeze or cough  Use a tissue that covers your mouth and nose  Put the used tissue in the trash right away  Use the bend of your arm if a tissue is not available  Wash your hands well with soap and water or use a hand   Do not stand close to anyone who is sneezing or coughing  Try to stay away from others while you are sick  This is especially important during the first 2 to 3 days when the virus is more easily spread  Wait until a fever, cough, or other symptoms are gone before you return to work or other regular activities  Do not share items while you are sick  This includes food, drinks, eating utensils, and dishes  Follow up with your doctor as directed:  Write down your questions so you remember to ask them during your visits  © Copyright [employer] 2022 Information is for End User's use only and may not be sold, redistributed or otherwise used for commercial purposes  All illustrations and images included in CareNotes® are the copyrighted property of [employer]  or Nathan Echeverria  The above information is an  only   It is not intended as medical advice for individual conditions or treatments  Talk to your doctor, nurse or pharmacist before following any medical regimen to see if it is safe and effective for you

## 2022-12-29 ENCOUNTER — HOSPITAL ENCOUNTER (OUTPATIENT)
Facility: HOSPITAL | Age: 77
Setting detail: OUTPATIENT SURGERY
Discharge: HOME | End: 2022-12-30
Attending: INTERNAL MEDICINE | Admitting: INTERNAL MEDICINE

## 2022-12-29 DIAGNOSIS — N18.30 CKD (CHRONIC KIDNEY DISEASE) STAGE 3, GFR 30-59 ML/MIN (HCC): ICD-10-CM

## 2022-12-29 DIAGNOSIS — F33.9 RECURRENT MAJOR DEPRESSIVE DISORDER, REMISSION STATUS UNSPECIFIED (HCC): ICD-10-CM

## 2022-12-29 DIAGNOSIS — Z95.5 STATUS POST INSERTION OF DRUG ELUTING CORONARY ARTERY STENT: Primary | ICD-10-CM

## 2022-12-29 DIAGNOSIS — I25.10 CORONARY ARTERY DISEASE INVOLVING NATIVE CORONARY ARTERY OF NATIVE HEART, UNSPECIFIED WHETHER ANGINA PRESENT: ICD-10-CM

## 2022-12-29 DIAGNOSIS — I35.0 AORTIC STENOSIS, MODERATE: ICD-10-CM

## 2022-12-29 LAB
ANION GAP SERPL CALCULATED.3IONS-SCNC: 8 MMOL/L (ref 4–13)
ATRIAL RATE: 63 BPM
BUN SERPL-MCNC: 20 MG/DL (ref 5–25)
CALCIUM SERPL-MCNC: 8.9 MG/DL (ref 8.3–10.1)
CHLORIDE SERPL-SCNC: 104 MMOL/L (ref 96–108)
CO2 SERPL-SCNC: 26 MMOL/L (ref 21–32)
CREAT SERPL-MCNC: 1.22 MG/DL (ref 0.6–1.3)
GFR SERPL CREATININE-BSD FRML MDRD: 57 ML/MIN/1.73SQ M
GLUCOSE P FAST SERPL-MCNC: 132 MG/DL (ref 65–99)
GLUCOSE SERPL-MCNC: 132 MG/DL (ref 65–140)
GLUCOSE SERPL-MCNC: 65 MG/DL (ref 65–140)
KCT BLD-ACNC: 239 SEC (ref 89–137)
P AXIS: 42 DEGREES
POTASSIUM SERPL-SCNC: 4.4 MMOL/L (ref 3.5–5.3)
PR INTERVAL: 202 MS
QRS AXIS: 106 DEGREES
QRSD INTERVAL: 86 MS
QT INTERVAL: 438 MS
QTC INTERVAL: 448 MS
SODIUM SERPL-SCNC: 138 MMOL/L (ref 135–147)
SPECIMEN SOURCE: ABNORMAL
T WAVE AXIS: 1 DEGREES
VENTRICULAR RATE: 63 BPM

## 2022-12-29 DEVICE — XIENCE SKYPOINT™ EVEROLIMUS ELUTING CORONARY STENT SYSTEM 2.25 MM X 23 MM / RAPID-EXCHANGE
Type: IMPLANTABLE DEVICE | Site: CORONARY | Status: FUNCTIONAL
Brand: XIENCE SKYPOINT™

## 2022-12-29 DEVICE — ANGIO-SEAL VIP VASCULAR CLOSURE DEVICE
Type: IMPLANTABLE DEVICE | Site: GROIN | Status: FUNCTIONAL
Brand: ANGIO-SEAL

## 2022-12-29 RX ORDER — PROPRANOLOL HCL 60 MG
60 CAPSULE, EXTENDED RELEASE 24HR ORAL DAILY
Status: DISCONTINUED | OUTPATIENT
Start: 2022-12-29 | End: 2022-12-30 | Stop reason: HOSPADM

## 2022-12-29 RX ORDER — SODIUM CHLORIDE 9 MG/ML
100 INJECTION, SOLUTION INTRAVENOUS CONTINUOUS
Status: DISPENSED | OUTPATIENT
Start: 2022-12-29 | End: 2022-12-29

## 2022-12-29 RX ORDER — ALLOPURINOL 100 MG/1
100 TABLET ORAL DAILY
Status: DISCONTINUED | OUTPATIENT
Start: 2022-12-29 | End: 2022-12-30 | Stop reason: HOSPADM

## 2022-12-29 RX ORDER — MIDAZOLAM HYDROCHLORIDE 2 MG/2ML
INJECTION, SOLUTION INTRAMUSCULAR; INTRAVENOUS CODE/TRAUMA/SEDATION MEDICATION
Status: DISCONTINUED | OUTPATIENT
Start: 2022-12-29 | End: 2022-12-29 | Stop reason: HOSPADM

## 2022-12-29 RX ORDER — LIDOCAINE HYDROCHLORIDE 10 MG/ML
INJECTION, SOLUTION EPIDURAL; INFILTRATION; INTRACAUDAL; PERINEURAL CODE/TRAUMA/SEDATION MEDICATION
Status: DISCONTINUED | OUTPATIENT
Start: 2022-12-29 | End: 2022-12-29 | Stop reason: HOSPADM

## 2022-12-29 RX ORDER — HEPARIN SODIUM 1000 [USP'U]/ML
INJECTION, SOLUTION INTRAVENOUS; SUBCUTANEOUS CODE/TRAUMA/SEDATION MEDICATION
Status: DISCONTINUED | OUTPATIENT
Start: 2022-12-29 | End: 2022-12-29 | Stop reason: HOSPADM

## 2022-12-29 RX ORDER — SODIUM CHLORIDE 9 MG/ML
50 INJECTION, SOLUTION INTRAVENOUS CONTINUOUS
Status: DISPENSED | OUTPATIENT
Start: 2022-12-29 | End: 2022-12-29

## 2022-12-29 RX ORDER — ASPIRIN 81 MG/1
81 TABLET, CHEWABLE ORAL DAILY
Status: DISCONTINUED | OUTPATIENT
Start: 2022-12-29 | End: 2022-12-30 | Stop reason: HOSPADM

## 2022-12-29 RX ORDER — ASPIRIN 81 MG/1
324 TABLET, CHEWABLE ORAL ONCE
Status: DISCONTINUED | OUTPATIENT
Start: 2022-12-29 | End: 2022-12-29 | Stop reason: HOSPADM

## 2022-12-29 RX ORDER — RANOLAZINE 500 MG/1
500 TABLET, EXTENDED RELEASE ORAL EVERY 12 HOURS SCHEDULED
Status: DISCONTINUED | OUTPATIENT
Start: 2022-12-29 | End: 2022-12-30 | Stop reason: HOSPADM

## 2022-12-29 RX ORDER — ATORVASTATIN CALCIUM 80 MG/1
80 TABLET, FILM COATED ORAL
Status: DISCONTINUED | OUTPATIENT
Start: 2022-12-29 | End: 2022-12-30 | Stop reason: HOSPADM

## 2022-12-29 RX ORDER — LEVOTHYROXINE SODIUM 88 UG/1
88 TABLET ORAL
Status: DISCONTINUED | OUTPATIENT
Start: 2022-12-29 | End: 2022-12-30 | Stop reason: HOSPADM

## 2022-12-29 RX ORDER — FENTANYL CITRATE 50 UG/ML
INJECTION, SOLUTION INTRAMUSCULAR; INTRAVENOUS CODE/TRAUMA/SEDATION MEDICATION
Status: DISCONTINUED | OUTPATIENT
Start: 2022-12-29 | End: 2022-12-29 | Stop reason: HOSPADM

## 2022-12-29 RX ORDER — ESCITALOPRAM OXALATE 20 MG/1
20 TABLET ORAL DAILY
Status: DISCONTINUED | OUTPATIENT
Start: 2022-12-29 | End: 2022-12-30 | Stop reason: HOSPADM

## 2022-12-29 RX ADMIN — SODIUM CHLORIDE 153 ML: 0.9 INJECTION, SOLUTION INTRAVENOUS at 07:21

## 2022-12-29 RX ADMIN — RANOLAZINE 500 MG: 500 TABLET, FILM COATED, EXTENDED RELEASE ORAL at 15:34

## 2022-12-29 RX ADMIN — TICAGRELOR 180 MG: 90 TABLET ORAL at 12:11

## 2022-12-29 RX ADMIN — PROPRANOLOL HYDROCHLORIDE 60 MG: 60 CAPSULE, EXTENDED RELEASE ORAL at 15:34

## 2022-12-29 RX ADMIN — RANOLAZINE 500 MG: 500 TABLET, FILM COATED, EXTENDED RELEASE ORAL at 20:58

## 2022-12-29 RX ADMIN — ASPIRIN 81 MG CHEWABLE TABLET 81 MG: 81 TABLET CHEWABLE at 15:34

## 2022-12-29 RX ADMIN — ALLOPURINOL 100 MG: 100 TABLET ORAL at 15:34

## 2022-12-29 RX ADMIN — TICAGRELOR 90 MG: 90 TABLET ORAL at 17:24

## 2022-12-29 RX ADMIN — ATORVASTATIN CALCIUM 80 MG: 80 TABLET, FILM COATED ORAL at 15:34

## 2022-12-29 RX ADMIN — ESCITALOPRAM OXALATE 20 MG: 20 TABLET, FILM COATED ORAL at 15:34

## 2022-12-29 RX ADMIN — LEVOTHYROXINE SODIUM 88 MCG: 88 TABLET ORAL at 15:34

## 2022-12-29 RX ADMIN — SODIUM CHLORIDE 100 ML/HR: 0.9 INJECTION, SOLUTION INTRAVENOUS at 15:33

## 2022-12-29 NOTE — PROGRESS NOTES
Cardiac cath performed via the RFA  Closed with angioseal     The RCA is now chronically totally occluded in the mid segment with L to R collaterals  The previously placed stent in OM1 is patent  The LAD had a proximal 80 % lesion and mid 70% lesion  IFR positive for ischemia  Successful PCI was performed    PTCA of the mid lesion  Stenting of the proximal lesion - 2 25 x 23 mm SAM post dilated to 2 63 mm    A stent was not able to get to the mid lesion

## 2022-12-29 NOTE — PROGRESS NOTES
Will text sent to MD MAYBERRY [facility], patient's post procedure BS is 72  Patient is asymptomatic to low BS  Will continue to monitor and await MD to respond to inform of treatment

## 2022-12-29 NOTE — CONSULTS
Consultation - Nephrology   Paulett Jeans 69 y o  male MRN: [id number]  Unit/Bed#: [facility] CATH LAB ROOM Encounter: [id number]    ASSESSMENT/PLAN:    Renal optimization to prevent LUCI  -Hold ARB/ACE-I/diuretic the day of surgery (per patient he took losartan today)  -Urinary retention protocol if patient does not have a morales  -Avoid hypotension, avoid NSAID use, minimize hemodynamic perturbations   -Recommend repeat BMP in 48 hours post IV dye exposure  -Continue pre-Dye exposure IV fluid, continue IV fluid for minimum of 2 hours post dye exposure if being discharged    CKD IIIa  -Baseline creatinine 1 2-1 3, most recent creatinine is 1 22 from today  -Etiology secondary to hypertensive nephrosclerosis diabetic nephropathy  -Follows with Dr Dequan Pineda as outpatient last seen 10/2022  -Renal imaging, CTA abdomen pelvis on 12/17/2022 with no renal mass, no hydronephrosis, lower pole left renal cyst    Hypertension  -Outpatient regimen: Losartan 25 mg daily, Jardiance 10 mg daily, propanolol 60 mg daily (for essential tremor) torsemide 10 mg daily  -Recommend holding diuretics and losartan for today, can be resumed tomorrow if being discharged    Coronary artery disease  -Follows with Dr Albin Aase as outpatient cardiologist, per review of his notes has had intermittent chest pain with shortness of breath  -Prior cardiac catheterization 2014 with SAM placed to mid RCA  -Most recent TTE from March 2022 with EF of 65% left ventricular hypertrophy, moderate AS  -For left heart catheterization today    DM2  -Last A1c 6 6  -On Jardiance as outpatient    Additional medical problems: Hyperlipidemia, morbid obesity, LUKE on CPAP, BPH, gout, essential tremor      HISTORY OF PRESENT ILLNESS:  Requesting Physician: James Acevedo MD  Reason for Consult:  LUCI risk reduction in setting of CKD    Paulett Jeans is a 69y o  year old male who presented to [facility] [address]  for planned left heart catheterization per cardiology after he had been experiencing increasing frequency of chest pain or shortness of breath  He is a history of CKD 3 and follows with Dr Renea Fernández as an outpatient  He denies any recent use of NSAIDs, denies any recent nausea/vomiting/diarrhea/or any recent major medical illnesses  He currently denies chest pain/shortness of breath, reports he took his losartan this morning  A renal consultation is requested today for assistance in the management of LUCI risk reduction in setting of CKD      PAST MEDICAL HISTORY:  Past Medical History:   Diagnosis Date   • BPH (benign prostatic hyperplasia)    • CAD (coronary artery disease)    • Cancer (HCC)     basal cell   • Chronic kidney disease     stage 3   • Colon polyp    • CPAP (continuous positive airway pressure) dependence    • Diabetes mellitus (HCC)     niddm   • Disease of thyroid gland    • Gout    • High cholesterol    • Hypertension    • MI, 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    • Myocardial infarction ([facility] [address] ) 2014   • Sleep apnea        PAST SURGICAL HISTORY:  Past Surgical History:   Procedure Laterality Date   • BASAL CELL CARCINOMA EXCISION Right 1/21/2022    Procedure: EXCISION BASAL CELL CARCINOMA EXTREMITY;  Surgeon: Sweetie Orozco DO;  Location: [facility];  Service: General   • CATARACT EXTRACTION W/  INTRAOCULAR LENS IMPLANT     • CORNEAL TRANSPLANT     • CORONARY ANGIOPLASTY WITH STENT PLACEMENT      stents x3-- [id number]   • EYE SURGERY      repair corneal laceration   • GA COLONOSCOPY FLX DX W/COLLJ SPEC WHEN PFRMD N/A 3/21/2016    Procedure: COLONOSCOPY;  Surgeon: Aisha Cavazos MD;  Location: [facility];  Service: Colorectal   • TONSILLECTOMY AND ADENOIDECTOMY         ALLERGIES:  No Known Allergies    SOCIAL HISTORY:  Social History     Substance and Sexual Activity   Alcohol Use Not Currently    Comment: socially, cut down in 2008, previously did more than      Social History     Substance and Sexual Activity   Drug Use Never     Social History     Tobacco Use   Smoking Status Never   Smokeless Tobacco Never       FAMILY HISTORY:  Family History   Adopted: Yes   Problem Relation Age of Onset   • No Known Problems Mother    • No Known Problems Father        MEDICATIONS:    Current Facility-Administered Medications:   •  aspirin chewable tablet 324 mg, 324 mg, Oral, Once, FAY Maxwlel  •  [COMPLETED] sodium chloride 0 9 % bolus 306 mL, 3 mL/kg, Intravenous, Once, Last Rate: 153 mL/hr at 12/29/22 0721, 153 mL at 12/29/22 0721 **FOLLOWED BY** sodium chloride 0 9 % infusion, 50 mL/hr, Intravenous, Continuous, FAY [place]    REVIEW OF SYSTEMS:  Review of Systems   Constitutional: Negative  HENT: Negative  Respiratory: Negative  Cardiovascular: Negative  Gastrointestinal: Negative  Genitourinary: Negative  Musculoskeletal: Negative  A complete 10 point review of systems was performed and found to be negative unless otherwise noted above or in the HPI  PHYSICAL EXAM:  Current Weight: Weight - Scale: 101 kg (222 lb 10 6 oz)  First Weight: Weight - Scale: 101 kg (222 lb 10 6 oz)  Vitals:    12/29/22 0711   BP: 124/77   Pulse: 63   Resp: 18   Temp: 98 1 °F (36 7 °C)   TempSrc: Temporal   SpO2: 93%   Weight: 101 kg (222 lb 10 6 oz)   Height: 5' 8" (1 727 m)     No intake or output data in the 24 hours ending 12/29/22 1833  Physical Exam  Vitals and nursing note reviewed  Constitutional:       General: He is not in acute distress  Appearance: Normal appearance  He is obese  He is not ill-appearing, toxic-appearing or diaphoretic  Comments: Awake sitting in bed   HENT:      Head: Normocephalic and atraumatic  Nose: Nose normal       Mouth/Throat:      Mouth: Mucous membranes are moist    Eyes:      General: No scleral icterus  Cardiovascular:      Rate and Rhythm: Normal rate and regular rhythm  Pulses: Normal pulses  Heart sounds: Normal heart sounds     Pulmonary:      Effort: Pulmonary effort is normal  No respiratory distress  Breath sounds: Normal breath sounds  No wheezing or rales  Abdominal:      General: Abdomen is flat  Musculoskeletal:      Cervical back: Neck supple  Right lower leg: No edema  Left lower leg: No edema  Skin:     General: Skin is warm and dry  Capillary Refill: Capillary refill takes less than 2 seconds  Neurological:      General: No focal deficit present  Mental Status: He is alert  Invasive Devices:      Lab Results:   Results from last 7 days   Lab Units 12/29/22  0658   POTASSIUM mmol/L 4 4   CHLORIDE mmol/L 104   CO2 mmol/L 26   BUN mg/dL 20   CREATININE mg/dL 1 22   CALCIUM mg/dL 8 9       I have personally reviewed the blood work as stated above and in my note  I have personally reviewed CTA AP imaging studies  I have personally reviewed prior nephrology and cardiology note

## 2022-12-29 NOTE — PROGRESS NOTES
MD DUMONT [facility] paged again, no return text regarding how to treat patient's BS    0842: MD returned second tiger text, ok to treat patient's post procedure BS of 65 with orange juice per patient's return  Orange juice given and gave report to receiving RN Zeeshan Lowers that patient's BS would need to be re checked

## 2022-12-30 VITALS
SYSTOLIC BLOOD PRESSURE: 119 MMHG | HEIGHT: 68 IN | DIASTOLIC BLOOD PRESSURE: 69 MMHG | OXYGEN SATURATION: 95 % | RESPIRATION RATE: 18 BRPM | BODY MASS INDEX: 33.75 KG/M2 | HEART RATE: 80 BPM | WEIGHT: 222.66 LBS | TEMPERATURE: 97.5 F

## 2022-12-30 DIAGNOSIS — I25.10 CORONARY ARTERY DISEASE INVOLVING NATIVE CORONARY ARTERY OF NATIVE HEART, UNSPECIFIED WHETHER ANGINA PRESENT: Primary | ICD-10-CM

## 2022-12-30 PROBLEM — Z98.890 S/P CARDIAC CATHETERIZATION: Status: ACTIVE | Noted: 2022-12-30

## 2022-12-30 LAB
ANION GAP SERPL CALCULATED.3IONS-SCNC: 11 MMOL/L (ref 4–13)
BUN SERPL-MCNC: 12 MG/DL (ref 5–25)
CALCIUM SERPL-MCNC: 8.9 MG/DL (ref 8.3–10.1)
CHLORIDE SERPL-SCNC: 106 MMOL/L (ref 96–108)
CO2 SERPL-SCNC: 22 MMOL/L (ref 21–32)
CREAT SERPL-MCNC: 1.07 MG/DL (ref 0.6–1.3)
GFR SERPL CREATININE-BSD FRML MDRD: 67 ML/MIN/1.73SQ M
GLUCOSE SERPL-MCNC: 131 MG/DL (ref 65–140)
POTASSIUM SERPL-SCNC: 3.9 MMOL/L (ref 3.5–5.3)
SODIUM SERPL-SCNC: 139 MMOL/L (ref 135–147)

## 2022-12-30 RX ORDER — RANOLAZINE 500 MG/1
500 TABLET, EXTENDED RELEASE ORAL 2 TIMES DAILY
Qty: 60 TABLET | Refills: 2 | Status: SHIPPED | OUTPATIENT
Start: 2022-12-30

## 2022-12-30 RX ORDER — ASPIRIN 81 MG/1
81 TABLET, CHEWABLE ORAL DAILY
Refills: 0
Start: 2022-12-30

## 2022-12-30 RX ORDER — RANOLAZINE 500 MG/1
500 TABLET, EXTENDED RELEASE ORAL 2 TIMES DAILY
Qty: 60 TABLET | Refills: 0 | Status: SHIPPED | OUTPATIENT
Start: 2022-12-30 | End: 2022-12-30 | Stop reason: SDUPTHER

## 2022-12-30 RX ORDER — RANOLAZINE 500 MG/1
500 TABLET, EXTENDED RELEASE ORAL 2 TIMES DAILY
Qty: 60 TABLET | Refills: 2 | Status: SHIPPED | OUTPATIENT
Start: 2022-12-30 | End: 2022-12-30 | Stop reason: SDUPTHER

## 2022-12-30 RX ORDER — RANOLAZINE 500 MG/1
500 TABLET, EXTENDED RELEASE ORAL EVERY 12 HOURS SCHEDULED
Qty: 60 TABLET | Refills: 5 | Status: CANCELLED | OUTPATIENT
Start: 2022-12-30

## 2022-12-30 RX ADMIN — TICAGRELOR 90 MG: 90 TABLET ORAL at 09:18

## 2022-12-30 RX ADMIN — LEVOTHYROXINE SODIUM 88 MCG: 88 TABLET ORAL at 05:49

## 2022-12-30 RX ADMIN — PROPRANOLOL HYDROCHLORIDE 60 MG: 60 CAPSULE, EXTENDED RELEASE ORAL at 09:17

## 2022-12-30 RX ADMIN — ALLOPURINOL 100 MG: 100 TABLET ORAL at 09:17

## 2022-12-30 RX ADMIN — ASPIRIN 81 MG CHEWABLE TABLET 81 MG: 81 TABLET CHEWABLE at 09:17

## 2022-12-30 RX ADMIN — ESCITALOPRAM OXALATE 20 MG: 20 TABLET, FILM COATED ORAL at 09:18

## 2022-12-30 RX ADMIN — RANOLAZINE 500 MG: 500 TABLET, FILM COATED, EXTENDED RELEASE ORAL at 09:18

## 2022-12-30 NOTE — PLAN OF CARE
Problem: DISCHARGE PLANNING  Goal: Discharge to home or other facility with appropriate resources  Description: INTERVENTIONS:  - Identify barriers to discharge w/patient and caregiver  - Arrange for needed discharge resources and transportation as appropriate  - Identify discharge learning needs (meds, wound care, etc )  - Arrange for interpretive services to assist at discharge as needed  - Refer to Case Management Department for coordinating discharge planning if the patient needs post-hospital services based on physician/advanced practitioner order or complex needs related to functional status, cognitive ability, or social support system  Outcome: Completed     Problem: Potential for Falls  Goal: Patient will remain free of falls  Description: INTERVENTIONS:  - Educate patient/family on patient safety including physical limitations  - Instruct patient to call for assistance with activity   - Consult OT/PT to assist with strengthening/mobility   - Keep Call bell within reach  - Keep bed low and locked with side rails adjusted as appropriate  - Keep care items and personal belongings within reach  - Initiate and maintain comfort rounds  - Make Fall Risk Sign visible to staff  - Offer Toileting every 2 Hours, in advance of need  - Initiate/Maintain bed alarm  - Obtain necessary fall risk management equipment: alarm  - Apply yellow socks and bracelet for high fall risk patients  - Consider moving patient to room near nurses station  Outcome: Completed

## 2022-12-30 NOTE — DISCHARGE INSTR - AVS FIRST PAGE
Do not take metformin for the next 24 hours  During this time avoid simple sugars and try to limit your intake of carbohydrates  -----------------------------------------------------------------------------------------------------------------------------------------------------------------------------  SITE CARE  1  Please see the post cardiac catheterization dishcarge instructions  No lifting greater than 10 lbs  or strenuous activity for 72 hrs     2   Okay to remove Band-Aid  Shower and wash area (wrist) gently with soap and water  Rinse and pat dry  Apply new water seal band aid  Repeat this process for 5 days  No powders, creams lotions or antibiotic ointments for 5 days  No tub baths, hot tubs/sauna or swimming for 5 days (you may shower)  3  Please call our office ([phone]) if you have any fever, redness, swelling, discharge from your wrist/access site  4  No driving for 1 day       -----------------------------------------------------------------------------------------------------------------------------------------------------------------------------  PROCEDURE INFORMATION    LEFT HEART CATHETERIZATION    WHAT YOU NEED TO KNOW:   A left heart catheterization is a procedure to look at your heart and its arteries  You may need this procedure if you have chest pain, heart disease, or your heart is not working as it should  DISCHARGE INSTRUCTIONS:   Follow up with your healthcare provider as directed:  Write down your questions so you remember to ask them during your visits  Limit activity as directed:   Avoid unnecessary stair climbing for 48 hours, if a catheter was put in your groin  Do not place pressure on your arm, hand, or wrist, if the catheter was placed in your wrist  Avoid pushing, pulling, or heavy lifting with that arm  If you need to cough, support the area where the catheter was inserted with your hand      Ask your healthcare provider how long you need to limit movement and avoid certain activities  You may feel like resting more after your procedure  Slowly start to do more each day  Rest when you feel it is needed  Drink liquids as directed:  Liquids help flush the dye used for your procedure out of your body  Ask your healthcare provider how much liquid to drink each day, and which liquids to drink  Some foods, such as soup and fruit, also provide liquid  Wound care:  Ask your healthcare provider about how to care for your incision wound  Ask when you can get into a tub, shower, or pool  Contact your healthcare provider if:   You have a fever  The skin around your wound is red, swollen, or has pus coming from it  You have trouble breathing, or your skin is itchy, swollen, or has a rash  You have questions or concerns about your condition or care  Seek care immediately or call 911 if: The area where the catheter was placed is swollen and filled with blood or is bleeding  The leg or arm used for the procedure becomes numb or turns white or blue  You feel lightheaded, short of breath, and have chest pain  You cough up blood  You have any of the following signs of a heart attack:      Squeezing, pressure, or pain in your chest that lasts longer than 5 minutes or returns    Discomfort or pain in your back, neck, jaw, stomach, or arm     Trouble breathing    Nausea or vomiting    Lightheadedness or a sudden cold sweat, especially with chest pain or trouble breathing    Your arm or leg feels warm, tender, and painful  It may look swollen and red  You have any of the following signs of a stroke:     Part of your face droops or is numb    Weakness in an arm or leg    Confusion or difficulty speaking    Dizziness, a severe headache, or vision loss  © 2017 [facility] [employer] Information is for End User's use only and may not be sold, redistributed or otherwise used for commercial purposes   All illustrations and images included in [employer] are the copyrighted property of [employer]  or Tushar Avila  The above information is an  only  It is not intended as medical advice for individual conditions or treatments  Talk to your doctor, nurse or pharmacist before following any medical regimen to see if it is safe and effective for you  Coronary Intravascular Stent Placement, Ambulatory Care     GENERAL INFORMATION:   What do I need to know about coronary intravascular stent placement? Coronary intravascular stent placement is a procedure to place a stent in an artery of your heart that has plaque buildup  Plaque is a mixture of fat and cholesterol  A stent is a small mesh tube made of metal that helps keep your artery open  Your healthcare provider may place a bare metal stent or a drug-eluting stent (SAM) in your artery  A SAM is coated with medicine that is slowly released and helps prevent more plaque buildup in the area where the stent is placed  The stent remains in your artery for life  You may need more than one stent  How do I prepare for coronary intravascular stent placement? Your healthcare provider will talk to you about how to prepare for surgery  He may tell you not to eat or drink anything after midnight on the day of your surgery  He will tell you what medicines to take or not take on the day of your surgery  You may need blood tests and a stress test before your procedure  Talk to your healthcare provider about these or other tests you may need  Contrast dye will be used during your procedure to help healthcare providers see your heart better  Tell the healthcare provider if you have ever had an allergic reaction to contrast dye  What will happen during coronary intravascular stent placement? You will be given medicine in your IV to help you relax or make you drowsy  You will also get local anesthesia that will numb the area where the catheter will be placed   You will be awake during the procedure so that your healthcare providers can give you instructions  You may be asked to cough, hold your breath, or to tell them how you feel during the procedure  A catheter (long, thin tube) will be put into an artery, in your wrist or groin  The catheter will be guided through this artery to your heart and into the narrowed or blocked artery  Healthcare providers will use x-rays and dye to find the area where the stent needs to be placed  You may feel warm as the dye is put into the catheter  A guidewire is then placed into the catheter  The balloon catheter is guided into the narrowed or blocked artery using the guidewire  Healthcare providers inflate the balloon several times for short periods  The inflated balloon pushes the plaque against the artery walls  This opens them and allows more blood flow to your heart  Another balloon catheter with a stent is then inserted into the artery  The balloon is inflated  This expands the stent and pushes it into place against the artery wall  The stent will be left in your artery to help keep it open  What are the risks of coronary intravascular stent placement? You may develop a hematoma (swelling caused by collection of blood) or bleed more than expected from your catheter site  The dye used during this procedure may cause an allergic reaction or kidney problems  You may develop an infection  Your artery may be injured when the catheter is inserted  During or after surgery, blood clots may form, or plaque may break off  The blood clot or plaque may block your artery and cause a heart attack or stroke  The stent could collapse, or a clot could form on the stent  This could cause the artery to become blocked again  You may need another procedure to open your artery  CARE AGREEMENT:   You have the right to help plan your care  Learn about your health condition and how it may be treated   Discuss treatment options with your caregivers to decide what care you want to receive  You always have the right to refuse treatment  The above information is an  only  It is not intended as medical advice for individual conditions or treatments  Talk to your doctor, nurse or pharmacist before following any medical regimen to see if it is safe and effective for you  © 2014 [address] is for End User's use only and may not be sold, redistributed or otherwise used for commercial purposes  All illustrations and images included in CareNotes® are the copyrighted property of [employer] M [employer] Inc  or Tushar Avila  the copyrighted property of [employer] D A M [employer] Inc  or Tushar Avila

## 2022-12-30 NOTE — NURSING NOTE
Patient discharged to home  Patient AVS reviewed with patient and daughter  Discharged via wheelchair by nurse and daughter  Post cath paper work given to patient and explained  No further questions at this time  IV removed, all belongings went with patient

## 2022-12-30 NOTE — PROGRESS NOTES
Follow up Consultation    Nephrology   Kaur Mccoy 69 y o  male MRN: [id number]  Unit/Bed#: E4 -01 Encounter: [id number]      Physician Requesting Consult: Kvng Valera MD        ASSESSMENT/PLAN:  55-year-old male with multiple committees including BPH, diabetes, gout, hyperlipidemia, hypertension, CAD and CKD stage III admitted for elective cardiac catheterization on 12/29/2022  Nephrology consulted for perioperative medication resistance for acute kidney injury         Perioperative optimization to reduce incidence for acute kidney injury/CKD stage III:  At risk for LUCI most likely secondary to VIRGILIO due to planned contrast exposure on 12/29/2022 with cardiac catheterization in light of underlying comorbidities  After review of records In [facility] as well as Care everywhere it appears that the patient has a baseline Creatinine of 1 2-1 5 mg/dL  patient was admitted with a creatinine of 1 22mg/dL on 12/29/2022  patient's creatinine today is at 1 07 mg/dL, stable and below baseline  check BMP in a m  if still in the hospital  Optimize hemodynamic status to avoid delay in renal recovery  Place on a renal diet when allowed diet order  Avoid nephrotoxins, adjust meds to appropriate GFR  Strict I/O  Daily weights  Urinary retention protocol if patient does not have a Jose  Most likely has underlying CKD secondary to better kidney disease plus obesity hyperfiltration plus hypertensive cirrhosis plus cardiorenal syndrome plus age-related nephron loss  will need to set up patient for follow up with Nephrology as an outpatient post hospitalization    as an outpatient for nephrology patient follows up with Dr Tameka Esparza for discharge from renal standpoint medically cleared recommend follow-up with routine outpatient nephrologist upon discharge     Blood pressure/hypertension:  current medications: Propranolol 60 mg p o  daily  recommendations: Okay to resume home medications of losartan and torsemide upon discharge  Optimize hemodynamics  Maintain MAP > 65mmHg  Avoid BP fluctuations      H/H:  most recent hemoglobin at 14 7 g/dL  Continue to monitor for now  maintain hemoglobin greater than 8 grams/deciliter     Acid-base electrolytes:  Electrolytes:    Stable     Acid-base:    Most recent bicarb of 22 stable, mild acidosis likely due to IV fluids        Other medical problems:  Proteinuria: Most recent micro and creatinine ratio 68 mg/dL as of 2022  Diabetes:  Management per primary team   Most recent A1c 6 6% as an outpatient is on Jardiance  CAD:  Management per primary team     Status post cardiac catheterization on 2022 with stent placement  Most recent echo with EF of 65% on 2022  Follow-up with cardiology  Thanks for the consult  Will continue to follow  Please call with questions/ concerns  Above-mentioned orders and Plan in terms of perioperative management was discussed with the team in depth with Will Lemon MD, [facility], 2022, 7:28 AM              Objective :   Patient seen and examined in his room no overnight event hemodynamic stable remains afebrile urine output 1 7 L plus  Status post cardiac cath yesterday had stent placed  Feels well has no complaints  For discharge today      PHYSICAL EXAM  /98 (BP Location: Left arm)   Pulse 60   Temp (!) 97 °F (36 1 °C) (Temporal)   Resp 18   Ht 5' 8" (1 727 m)   Wt 101 kg (222 lb 10 6 oz)   SpO2 91%   BMI 33 86 kg/m²   Temp (24hrs), Av 5 °F (36 4 °C), Min:97 °F (36 1 °C), Max:98 3 °F (36 8 °C)        Intake/Output Summary (Last 24 hours) at 2022 0728  Last data filed at 2022 0501  Gross per 24 hour   Intake 240 ml   Output 1700 ml   Net -1460 ml       I/O last 24 hours: In: 240 [P O :240]  Out: 1700 [Urine:1700]      Current Weight: Weight - Scale: 101 kg (222 lb 10 6 oz)  First Weight: Weight - Scale: 101 kg (222 lb 10 6 oz)  Physical Exam  Vitals and nursing note reviewed  Constitutional:       General: He is not in acute distress  Appearance: Normal appearance  He is obese  He is not ill-appearing, toxic-appearing or diaphoretic  HENT:      Head: Normocephalic and atraumatic  Mouth/Throat:      Mouth: Mucous membranes are moist       Pharynx: Oropharynx is clear  No oropharyngeal exudate  Eyes:      General: No scleral icterus  Conjunctiva/sclera: Conjunctivae normal    Cardiovascular:      Rate and Rhythm: Normal rate  Heart sounds: No friction rub  Pulmonary:      Effort: Pulmonary effort is normal  No respiratory distress  Breath sounds: Normal breath sounds  No stridor  No wheezing  Abdominal:      General: There is no distension  Palpations: Abdomen is soft  There is no mass  Tenderness: There is no abdominal tenderness  Musculoskeletal:         General: No swelling  Cervical back: Neck supple  No rigidity  Skin:     General: Skin is warm  Coloration: Skin is not jaundiced  Neurological:      General: No focal deficit present  Mental Status: He is alert and oriented to person, place, and time  Mental status is at baseline  Psychiatric:         Mood and Affect: Mood normal          Behavior: Behavior normal              Review of Systems   Constitutional: Negative for chills and fatigue  HENT: Negative for rhinorrhea  Respiratory: Negative for cough, shortness of breath and wheezing  Cardiovascular: Negative for leg swelling  Gastrointestinal: Negative for abdominal pain and diarrhea  Genitourinary: Negative for dysuria  Musculoskeletal: Negative for back pain  Neurological: Negative for headaches  Psychiatric/Behavioral: Negative for agitation and confusion  All other systems reviewed and are negative        Scheduled Meds:  Current Facility-Administered Medications   Medication Dose Route Frequency Provider Last Rate   • allopurinol  100 mg Oral Daily Ino Motta MD     • aspirin  81 mg Oral Daily Charmayne Macho, MD     • atorvastatin  80 mg Oral Daily With Ewa Castillo MD     • escitalopram  20 mg Oral Daily Charmayne Macho, MD     • levothyroxine  88 mcg Oral Early Morning Charmayne Macho, MD     • propranolol  60 mg Oral Daily Charmayne Macho, MD     • ranolazine  500 mg Oral Q12H [facility] Charmayne Macho, MD     • ticagrelor  90 mg Oral Q12H [facility] Charmayne Macho, MD         PRN Meds:     Continuous Infusions:       Invasive Devices: Invasive Devices     Peripheral Intravenous Line  Duration           Peripheral IV 12/29/22 Left Hand 1 day                  LABORATORY:    Results from last 7 days   Lab Units 12/30/22  0544 12/29/22  0658   POTASSIUM mmol/L 3 9 4 4   CHLORIDE mmol/L 106 104   CO2 mmol/L 22 26   BUN mg/dL 12 20   CREATININE mg/dL 1 07 1 22   CALCIUM mg/dL 8 9 8 9      rest all reviewed    RADIOLOGY:  No orders to display     Rest all reviewed    Portions of the record may have been created with voice recognition software  Occasional wrong word or "sound a like" substitutions may have occurred due to the inherent limitations of voice recognition software  Read the chart carefully and recognize, using context, where substitutions have occurred  If you have any questions, please contact the dictating provider

## 2022-12-30 NOTE — DISCHARGE SUMMARY
Discharge Summary    Sandra Childers 69 y o  male MRN: [id number]    Unit/Bed#: E4 -01 Encounter: [id number]      Admission Date: 12/29/2022   Discharge Date: 12/30/2022      Admitting Diagnosis: Aortic stenosis, moderate [I35 0]  Discharge Diagnosis: Principal Problem:    S/P cardiac catheterization  Active Problems:    Coronary artery disease of native artery of native heart with stable angina pectoris ([address] )       Condition at Discharge: stable   Disposition: Home  Planned Readmission: No    HPI/Reason For Admission:   Mr Ger Hahn is a 69year old patient of Dr Loc Solo with known CAD who was recently seen for complaints of increasing and frequent chest pain and ROWE  A catheterization was advised  Hospital Course:   As above, the patient, on 12/29, came to the hospital for elective catheterization  Angiography showed the RCA to now be chronically totally occluded in the midsegment with left-to-right collateral flow  Previously placed OM1 stent was patent  The LAD showed significant proximal and mid vessel lesions both of which were treated with angioplasty  A stent was placed at the proximal lesion but the mid vessel was not able to be sufficiently accessed for stent deployment (i e  plain angioplasty)  The patient was retained in the hospital overnight with no untoward events  Chemistry on hospital day 2 showed stable and normal level creatinine  He was discharged to home in stable and satisfactory condition  Importance of uninterrupted dual antiplatelet therapy was discussed, as well as cardiac rehab, and follow-up with Dr Loc Solo          Day of discharge examination   General:  Alert normally conversant and comfortable-appearing  Right groin (cath access): No ecchymosis or hematoma  Heart:  Regular with controlled rate and no pathologic murmur or rub  Lungs:  Clear throughout  Lower Limbs:  No edema      procedures Performed: Cardiac cath  Catheterization   •  Left Anterior Descending The vessel was visualized by angiography and is large  There are L to R collaterals to the distal RCA system  Prox LAD lesion is 80% stenosed  Culprit lesion  Culprit for anginal symptoms  JANETT flow is 3  The lesion is non high-C and tubular  The lesion is mildly calcified  iFR was measured in the Prox LAD  iFR ratio: 0 68  The iFR was significant, intervened  Mid LAD lesion is 80% stenosed  Culprit lesion  JANETT flow is 3  The lesion is type C and focal  After D2  The lesion is mildly calcified  iFR was measured in the Mid LAD  iFR ratio: 0 68  The iFR was significant, intervened  •  Drug-eluting stent was successfully placed across the proximal lesion  The stent used was a STENT XIENCE SKYPOINT 2 25 X 23MM  •  The intervention was successful  The guidewire crossed the lesion  Post-intervention JANETT flow is 3  There were no complications  There is a 0% residual stenosis post intervention  •  Mid LAD lesion Angioplasty Angioplasty using a standard balloon was performed independent of stent deployment  •  PTCA was performed with excellent angiographic result  Stenting was attempted but the stent was not able to negotiate the tortuosity and calcification of the vessel to get to the lesion  •  Post-Intervention Lesion Assessment The intervention was successful  Post-intervention JANETT flow is 3  Lesion had 15 mm of its length treated  There were no complications   There is a 10% residual stenosis post intervention          Other labs/data   Chemistry on day of discharge: Stable   Latest Reference Range & Units 12/30/22 05:44   Sodium 135 - 147 mmol/L 139   Potassium 3 5 - 5 3 mmol/L 3 9   Chloride 96 - 108 mmol/L 106   CO2 21 - 32 mmol/L 22   Anion Gap 4 - 13 mmol/L 11   BUN 5 - 25 mg/dL 12   Creatinine 0 60 - 1 30 mg/dL 1 07   Glucose, Random 65 - 140 mg/dL 131   Calcium 8 3 - 10 1 mg/dL 8 9   eGFR OG/ZKI/8 36FU m 67       Complications: None       Discharge Medications:  See after visit summary for reconciled discharge medications provided to patient and family  Discharge instructions/Information to patient and family:   See after visit summary for information provided to patient and family  Provisions for Follow-Up Care:  See after visit summary for information related to follow-up care and any pertinent home health orders  Discharge Statement   I had direct contact with the patient on the day of discharge  I spent 50 minutes discharging the patient  This time was spent on the day of discharge including: education, examination, paperwork  All questions were answered to patient satisfaction

## 2023-01-04 ENCOUNTER — APPOINTMENT (EMERGENCY)
Dept: RADIOLOGY | Facility: HOSPITAL | Age: 78
End: 2023-01-04

## 2023-01-04 ENCOUNTER — HOSPITAL ENCOUNTER (EMERGENCY)
Facility: HOSPITAL | Age: 78
Discharge: HOME | End: 2023-01-05
Attending: EMERGENCY MEDICINE

## 2023-01-04 VITALS
TEMPERATURE: 97.2 F | SYSTOLIC BLOOD PRESSURE: 107 MMHG | RESPIRATION RATE: 18 BRPM | OXYGEN SATURATION: 90 % | HEART RATE: 72 BPM | DIASTOLIC BLOOD PRESSURE: 55 MMHG

## 2023-01-04 DIAGNOSIS — R07.9 CHEST PAIN: Primary | ICD-10-CM

## 2023-01-04 DIAGNOSIS — R79.89 ELEVATED TSH: ICD-10-CM

## 2023-01-04 DIAGNOSIS — N17.9 AKI (ACUTE KIDNEY INJURY) (HCC): ICD-10-CM

## 2023-01-04 LAB
2HR DELTA HS TROPONIN: -1 NG/L
4HR DELTA HS TROPONIN: -1 NG/L
ALBUMIN SERPL BCP-MCNC: 3.4 G/DL (ref 3.5–5)
ALP SERPL-CCNC: 101 U/L (ref 46–116)
ALT SERPL W P-5'-P-CCNC: 62 U/L (ref 12–78)
ANION GAP SERPL CALCULATED.3IONS-SCNC: 10 MMOL/L (ref 4–13)
APTT PPP: 27 SECONDS (ref 23–37)
AST SERPL W P-5'-P-CCNC: 33 U/L (ref 5–45)
BASOPHILS # BLD AUTO: 0.09 THOUSANDS/ÂΜL (ref 0–0.1)
BASOPHILS NFR BLD AUTO: 1 % (ref 0–1)
BILIRUB SERPL-MCNC: 0.56 MG/DL (ref 0.2–1)
BUN SERPL-MCNC: 27 MG/DL (ref 5–25)
CALCIUM ALBUM COR SERPL-MCNC: 9.8 MG/DL (ref 8.3–10.1)
CALCIUM SERPL-MCNC: 9.3 MG/DL (ref 8.3–10.1)
CARDIAC TROPONIN I PNL SERPL HS: 4 NG/L
CARDIAC TROPONIN I PNL SERPL HS: 4 NG/L
CARDIAC TROPONIN I PNL SERPL HS: 5 NG/L
CHLORIDE SERPL-SCNC: 100 MMOL/L (ref 96–108)
CO2 SERPL-SCNC: 26 MMOL/L (ref 21–32)
CREAT SERPL-MCNC: 1.88 MG/DL (ref 0.6–1.3)
EOSINOPHIL # BLD AUTO: 0.63 THOUSAND/ÂΜL (ref 0–0.61)
EOSINOPHIL NFR BLD AUTO: 6 % (ref 0–6)
ERYTHROCYTE [DISTWIDTH] IN BLOOD BY AUTOMATED COUNT: 13 % (ref 11.6–15.1)
FLUAV RNA RESP QL NAA+PROBE: NEGATIVE
FLUBV RNA RESP QL NAA+PROBE: NEGATIVE
GFR SERPL CREATININE-BSD FRML MDRD: 33 ML/MIN/1.73SQ M
GLUCOSE SERPL-MCNC: 158 MG/DL (ref 65–140)
HCT VFR BLD AUTO: 44.8 % (ref 36.5–49.3)
HGB BLD-MCNC: 15 G/DL (ref 12–17)
IMM GRANULOCYTES # BLD AUTO: 0.07 THOUSAND/UL (ref 0–0.2)
IMM GRANULOCYTES NFR BLD AUTO: 1 % (ref 0–2)
INR PPP: 0.84 (ref 0.84–1.19)
LYMPHOCYTES # BLD AUTO: 2.56 THOUSANDS/ÂΜL (ref 0.6–4.47)
LYMPHOCYTES NFR BLD AUTO: 24 % (ref 14–44)
MAGNESIUM SERPL-MCNC: 1.8 MG/DL (ref 1.6–2.6)
MCH RBC QN AUTO: 32.9 PG (ref 26.8–34.3)
MCHC RBC AUTO-ENTMCNC: 33.5 G/DL (ref 31.4–37.4)
MCV RBC AUTO: 98 FL (ref 82–98)
MONOCYTES # BLD AUTO: 1 THOUSAND/ÂΜL (ref 0.17–1.22)
MONOCYTES NFR BLD AUTO: 10 % (ref 4–12)
NEUTROPHILS # BLD AUTO: 6.23 THOUSANDS/ÂΜL (ref 1.85–7.62)
NEUTS SEG NFR BLD AUTO: 58 % (ref 43–75)
NRBC BLD AUTO-RTO: 0 /100 WBCS
NT-PROBNP SERPL-MCNC: 211 PG/ML
PLATELET # BLD AUTO: 368 THOUSANDS/UL (ref 149–390)
PMV BLD AUTO: 9.7 FL (ref 8.9–12.7)
POTASSIUM SERPL-SCNC: 4.3 MMOL/L (ref 3.5–5.3)
PROT SERPL-MCNC: 7.8 G/DL (ref 6.4–8.4)
PROTHROMBIN TIME: 11.8 SECONDS (ref 11.6–14.5)
RBC # BLD AUTO: 4.56 MILLION/UL (ref 3.88–5.62)
RSV RNA RESP QL NAA+PROBE: NEGATIVE
SARS-COV-2 RNA RESP QL NAA+PROBE: NEGATIVE
SODIUM SERPL-SCNC: 136 MMOL/L (ref 135–147)
TSH SERPL DL<=0.05 MIU/L-ACNC: 4.51 UIU/ML (ref 0.45–4.5)
WBC # BLD AUTO: 10.58 THOUSAND/UL (ref 4.31–10.16)

## 2023-01-04 NOTE — ED PROVIDER NOTES
History  Chief Complaint   Patient presents with   • Chest Pain     Pt reports having chest pain "that isn't really pain but feels like the gloom of death" starting today  Cardiac stent placed on 12/28/22     23-year-old male with history of coronary artery disease type 2 diabetes hyperlipidemia chronic kidney disease hypertension gout sleep apnea and remote history of DVT presents with tightness shortness of breath and a gloom and doom feeling states that he is situationally depressed due to construction project denies suicidal or homicidal ideation this is similar to symptoms that he felt with his second and third heart attack which were NSTEMI's  He underwent a recent cardiac catheterization which was elective on 12/29/2022 he was found to have moderate aortic stenosis chronic total right coronary artery occlusion with left-to-right collateral flow to circumflex OM1 stent that was widely patent LAD showed small and mid vessel lesions the proximal lesion was treated with angioplasty and stent in the mid lesion was treated with angioplasty only has been compliant with his aspirin and Brilinta and denies any pleuritic symptoms he has no leg swelling no fever chills cough or upper respiratory complaints  Does have some dizziness and increased increasing dyspnea on exertion which are new  No numbness or tingling no nausea or vomiting  Did not take NTG at home          Prior to Admission Medications   Prescriptions Last Dose Informant Patient Reported? Taking? Blood Glucose Monitoring Suppl (OneTouch Verio Reflect) w/Device KIT   No No   Sig: Check blood sugars twice daily  Please substitute with appropriate alternative as covered by patient's insurance  Dx: E11 65   Empagliflozin (Jardiance) 10 MG TABS   No No   Sig: Take 1 tablet (10 mg total) by mouth every morning   OneTouch Delica Lancets 31I MISC   No No   Sig: Check blood sugars twice daily   Please substitute with appropriate alternative as covered by patient's insurance  Dx: E11 65   allopurinol (ZYLOPRIM) 100 mg tablet   No No   Sig: Take 1 tablet (100 mg total) by mouth daily   allopurinol (ZYLOPRIM) 100 mg tablet   No No   Sig: Take 1 tablet (100 mg total) by mouth daily   aspirin 81 mg chewable tablet   No No   Sig: Chew 1 tablet (81 mg total) daily   atorvastatin (LIPITOR) 80 mg tablet   No No   Sig: Take 1 tablet by mouth once daily   benzonatate (TESSALON) 200 MG capsule   No No   Sig: Take 1 capsule (200 mg total) by mouth 3 (three) times a day as needed for cough   escitalopram (LEXAPRO) 20 mg tablet   No No   Sig: Take 1 tablet (20 mg total) by mouth daily   glimepiride (AMARYL) 4 mg tablet   No No   Sig: TAKE 1 TABLET TWICE A DAY   glucose blood (OneTouch Verio) test strip   No No   Sig: Check blood sugars twice daily  Please substitute with appropriate alternative as covered by patient's insurance   Dx: E11 65   levothyroxine 88 mcg tablet   No No   Sig: TAKE 1 TABLET BY MOUTH ONCE DAILY IN THE MORNING   levothyroxine 88 mcg tablet   No No   Sig: Take 1 tablet (88 mcg total) by mouth daily in the early morning   losartan (COZAAR) 25 mg tablet   No No   Sig: Take 1 tablet (25 mg total) by mouth daily   metFORMIN (GLUCOPHAGE) 1000 MG tablet   No No   Sig: Take 1 tablet (1,000 mg total) by mouth 2 (two) times a day with meals   multivitamin (THERAGRAN) TABS   Yes No   Sig: Take 1 tablet by mouth daily   nitroglycerin (NITROSTAT) 0 4 mg SL tablet   No No   Sig: Place 1 tablet (0 4 mg total) under the tongue every 5 (five) minutes as needed for chest pain   propranolol (INDERAL LA) 60 mg 24 hr capsule   No No   Sig: Take 1 capsule (60 mg total) by mouth daily   ranolazine (RANEXA) 500 mg 12 hr tablet   No No   Sig: Take 1 tablet (500 mg total) by mouth 2 (two) times a day   ticagrelor (BRILINTA) 90 MG   No No   Sig: Take 1 tablet (90 mg total) by mouth every 12 (twelve) hours   torsemide (DEMADEX) 10 mg tablet   No No   Sig: Take 1 tablet (10 mg total) by mouth daily   zonisamide (ZONEGRAN) 50 MG capsule   No No   Sig: Take 1 tablet (50 mg total) by mouth daily      Facility-Administered Medications: None       Past Medical History:   Diagnosis Date   • BPH (benign prostatic hyperplasia)    • CAD (coronary artery disease)    • Cancer (HCC)     basal cell   • Chronic kidney disease     stage 3   • Colon polyp    • CPAP (continuous positive airway pressure) dependence    • Diabetes mellitus (HCC)     niddm   • Disease of thyroid gland    • Gout    • High cholesterol    • Hypertension    • MI, 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    • Myocardial infarction ([facility] [address] ) 2014   • S/P cardiac catheterization 12/30/2022   • Sleep apnea        Past Surgical History:   Procedure Laterality Date   • BASAL CELL CARCINOMA EXCISION Right 1/21/2022    Procedure: EXCISION BASAL CELL CARCINOMA EXTREMITY;  Surgeon: Dangelo Muse DO;  Location: [facility];  Service: General   • CARDIAC CATHETERIZATION Left 12/29/2022    Procedure: Cardiac Left Heart Cath;  Surgeon: Reena Syed MD;  Location: [facility]; Service: Cardiology   • CARDIAC CATHETERIZATION N/A 12/29/2022    Procedure: Cardiac Coronary Angiogram;  Surgeon: Reena Syed MD;  [facility]: [facility]; Service: Cardiology   • CARDIAC CATHETERIZATION N/A 12/29/2022    Procedure: Cardiac pci;  Surgeon: Reena Syed MD;  [facility]: [facility];   Service: Cardiology   • CATARACT EXTRACTION W/  INTRAOCULAR LENS IMPLANT     • CORNEAL TRANSPLANT     • CORONARY ANGIOPLASTY WITH STENT PLACEMENT      stents x3-- [phone]   • EYE SURGERY      repair corneal laceration   • SD COLONOSCOPY FLX DX W/COLLJ SPEC WHEN PFRMD N/A 3/21/2016    Procedure: COLONOSCOPY;  Surgeon: Jean Paul Rivera MD;  Location: [facility];  Service: Colorectal   • TONSILLECTOMY AND ADENOIDECTOMY         Family History   Adopted: Yes   Problem Relation Age of Onset   • No Known Problems Mother    • No Known Problems Father      I have reviewed and agree with the history as documented  E-Cigarette/Vaping   • E-Cigarette Use Never User      E-Cigarette/Vaping Substances   • Nicotine No    • THC No    • CBD No    • Flavoring No    • Other No    • Unknown No      Social History     Tobacco Use   • Smoking status: Never   • Smokeless tobacco: Never   Vaping Use   • Vaping Use: Never used   Substance Use Topics   • Alcohol use: Not Currently     Comment: socially, cut down in 2008, previously did more than    • Drug use: Never       Review of Systems   Constitutional: Positive for activity change  Negative for appetite change, chills and fever  HENT: Negative for congestion, ear pain, rhinorrhea, sneezing and sore throat  Eyes: Negative for discharge  Respiratory: Positive for shortness of breath  Negative for cough  Cardiovascular: Positive for chest pain  Negative for leg swelling  Gastrointestinal: Negative for abdominal pain, blood in stool, diarrhea, nausea and vomiting  Endocrine: Negative for polyuria  Genitourinary: Negative for difficulty urinating, dysuria, frequency and urgency  Musculoskeletal: Negative for back pain and myalgias  Skin: Negative for rash  Neurological: Positive for dizziness  Negative for weakness, light-headedness, numbness and headaches  Hematological: Negative for adenopathy  Psychiatric/Behavioral: Negative for confusion and suicidal ideas  All other systems reviewed and are negative  Physical Exam  Physical Exam  Vitals and nursing note reviewed  Constitutional:       General: He is not in acute distress  Appearance: He is well-developed  He is not diaphoretic  HENT:      Head: Normocephalic and atraumatic        Right Ear: Tympanic membrane and external ear normal       Left Ear: Tympanic membrane and external ear normal       Nose: Nose normal       Mouth/Throat:      Mouth: Mucous membranes are moist       Pharynx: No oropharyngeal exudate or posterior oropharyngeal erythema  Eyes:      General: No scleral icterus  Right eye: No discharge  Left eye: No discharge  Extraocular Movements: Extraocular movements intact  Conjunctiva/sclera: Conjunctivae normal       Pupils: Pupils are equal, round, and reactive to light  Cardiovascular:      Rate and Rhythm: Normal rate and regular rhythm  Heart sounds: Normal heart sounds  Pulmonary:      Effort: Pulmonary effort is normal  No respiratory distress  Breath sounds: Wheezing (scant) present  Abdominal:      General: Bowel sounds are normal  There is no distension  Palpations: Abdomen is soft  There is no mass  Tenderness: There is no abdominal tenderness  There is no right CVA tenderness, left CVA tenderness, guarding or rebound  Comments: Back: no mildline or CVA tenderness   Musculoskeletal:         General: No tenderness or deformity  Normal range of motion  Cervical back: Normal range of motion and neck supple  Right lower leg: No edema  Left lower leg: No edema  Lymphadenopathy:      Cervical: No cervical adenopathy  Skin:     General: Skin is warm and dry  Neurological:      General: No focal deficit present  Mental Status: He is alert and oriented to person, place, and time  Cranial Nerves: No cranial nerve deficit  Sensory: No sensory deficit  Motor: No weakness or abnormal muscle tone        Coordination: Coordination normal       Comments: Gait steady   Psychiatric:         Mood and Affect: Mood normal          Vital Signs  ED Triage Vitals   Temperature Pulse Respirations Blood Pressure SpO2   01/04/23 1802 01/04/23 1735 01/04/23 1735 01/04/23 1758 01/04/23 1735   (!) 97 2 °F (36 2 °C) 94 18 128/72 95 %      Temp Source Heart Rate Source Patient Position - Orthostatic VS BP Location FiO2 (%)   01/04/23 1802 01/04/23 1735 01/04/23 1735 01/04/23 1735 --   Temporal Monitor Lying Right arm       Pain Score       -- Vitals:    01/04/23 1930 01/04/23 2030 01/04/23 2200 01/04/23 2300   BP: 111/65 103/61 117/66 107/55   Pulse: 81 79 76 72   Patient Position - Orthostatic VS:             Visual Acuity      ED Medications  Medications - No data to display    Diagnostic Studies  Results Reviewed     Procedure Component Value Units Date/Time    HS Troponin I 4hr [[id number]]  (Normal) Collected: 01/04/23 2245    Lab Status: Final result Specimen: Blood from Arm, Left Updated: 01/04/23 2329     hs TnI 4hr 4 ng/L      Delta 4hr hsTnI -1 ng/L     HS Troponin I 2hr [[id number]]  (Normal) Collected: 01/04/23 2014    Lab Status: Final result Specimen: Blood from Arm, Left Updated: 01/04/23 2043     hs TnI 2hr 4 ng/L      Delta 2hr hsTnI -1 ng/L     T4, free [[id number]] Collected: 01/04/23 1755    Lab Status: In process Specimen: Blood Updated: 01/04/23 1952    FLU/RSV/COVID - if FLU/RSV clinically relevant [[id number]]  (Normal) Collected: 01/04/23 1819    Lab Status: Final result Specimen: Nares from Nose Updated: 01/04/23 1900     SARS-CoV-2 Negative     INFLUENZA A PCR Negative     INFLUENZA B PCR Negative     RSV PCR Negative    Narrative:      FOR PEDIATRIC PATIENTS - copy/paste COVID Guidelines URL to browser: [email or web address]    SARS-CoV-2 assay is a Nucleic Acid Amplification assay intended for the  qualitative detection of nucleic acid from SARS-CoV-2 in nasopharyngeal  swabs  Results are for the presumptive identification of SARS-CoV-2 RNA  Positive results are indicative of infection with SARS-CoV-2, the virus  causing COVID-19, but do not rule out bacterial infection or co-infection  with other viruses  Laboratories within the [place] and its  territories are required to report all positive results to the appropriate  public health authorities   Negative results do not preclude SARS-CoV-2  infection and should not be used as the sole basis for treatment or other  patient management decisions  Negative results must be combined with  clinical observations, patient history, and epidemiological information  This test has not been FDA cleared or approved  This test has been authorized by FDA under an Emergency Use Authorization  (EUA)  This test is only authorized for the duration of time the  declaration that circumstances exist justifying the authorization of the  emergency use of an in vitro diagnostic tests for detection of SARS-CoV-2  virus and/or diagnosis of COVID-19 infection under section 564(b)(1) of  the Act, 21 U  S C  023IKE-7(C)(3), unless the authorization is terminated  or revoked sooner  The test has been validated but independent review by FDA  and CLIA is pending  Test performed using [employer] GeneXpert: This RT-PCR assay targets N2,  a region unique to SARS-CoV-2  A conserved region in the E-gene was chosen  for pan-Sarbecovirus detection which includes SARS-CoV-2  According to CMS-2020-01-R, this platform meets the definition of high-throughput technology  NT-BNP PRO [[id number]]  (Normal) Collected: 01/04/23 1755    Lab Status: Final result Specimen: Blood from Arm, Left Updated: 01/04/23 1848     NT-proBNP 211 pg/mL     TSH [[id number]]  (Abnormal) Collected: 01/04/23 1755    Lab Status: Final result Specimen: Blood from Arm, Left Updated: 01/04/23 1848     TSH 3RD GENERATON 4 506 uIU/mL     Narrative:      Patients undergoing fluorescein dye angiography may retain small amounts of fluorescein in the body for 48-72 hours post procedure  Samples containing fluorescein can produce falsely depressed TSH values  If the patient had this procedure,a specimen should be resubmitted post fluorescein clearance        HS Troponin 0hr (reflex protocol) [[id number]]  (Normal) Collected: 01/04/23 1755    Lab Status: Final result Specimen: Blood from Arm, Left Updated: 01/04/23 1822     hs TnI 0hr 5 ng/L     Protime-INR [[id number]]  (Normal) Collected: 01/04/23 1755    Lab Status: Final result Specimen: Blood from Arm, Left Updated: 01/04/23 1818     Protime 11 8 seconds      INR 0 84    APTT [[id number]]  (Normal) Collected: 01/04/23 1755    Lab Status: Final result Specimen: Blood from Arm, Left Updated: 01/04/23 1818     PTT 27 seconds     Comprehensive metabolic panel [[id number]]  (Abnormal) Collected: 01/04/23 1755    Lab Status: Final result Specimen: Blood from Arm, Left Updated: 01/04/23 1816     Sodium 136 mmol/L      Potassium 4 3 mmol/L      Chloride 100 mmol/L      CO2 26 mmol/L      ANION GAP 10 mmol/L      BUN 27 mg/dL      Creatinine 1 88 mg/dL      Glucose 158 mg/dL      Calcium 9 3 mg/dL      Corrected Calcium 9 8 mg/dL      AST 33 U/L      ALT 62 U/L      Alkaline Phosphatase 101 U/L      Total Protein 7 8 g/dL      Albumin 3 4 g/dL      Total Bilirubin 0 56 mg/dL      eGFR 33 ml/min/1 73sq m     Narrative:      [place] guidelines for Chronic Kidney Disease (CKD):   •  Stage 1 with normal or high GFR (GFR > 90 mL/min/1 73 square meters)  •  Stage 2 Mild CKD (GFR = 60-89 mL/min/1 73 square meters)  •  Stage 3A Moderate CKD (GFR = 45-59 mL/min/1 73 square meters)  •  Stage 3B Moderate CKD (GFR = 30-44 mL/min/1 73 square meters)  •  Stage 4 Severe CKD (GFR = 15-29 mL/min/1 73 square meters)  •  Stage 5 End Stage CKD (GFR <15 mL/min/1 73 square meters)  Note: GFR calculation is accurate only with a steady state creatinine    Magnesium [[id number]]  (Normal) Collected: 01/04/23 1755    Lab Status: Final result Specimen: Blood from Arm, Left Updated: 01/04/23 1816     Magnesium 1 8 mg/dL     CBC and differential [[id number]]  (Abnormal) Collected: 01/04/23 1755    Lab Status: Final result Specimen: Blood from Arm, Left Updated: 01/04/23 1807     WBC 10 58 Thousand/uL      RBC 4 56 Million/uL      Hemoglobin 15 0 g/dL      Hematocrit 44 8 %      MCV 98 fL      MCH 32 9 pg      MCHC 33 5 g/dL      RDW 13 0 %      MPV 9 7 fL Platelets 037 Thousands/uL      nRBC 0 /100 WBCs      Neutrophils Relative 58 %      Immat GRANS % 1 %      Lymphocytes Relative 24 %      Monocytes Relative 10 %      Eosinophils Relative 6 %      Basophils Relative 1 %      Neutrophils Absolute 6 23 Thousands/µL      Immature Grans Absolute 0 07 Thousand/uL      Lymphocytes Absolute 2 56 Thousands/µL      Monocytes Absolute 1 00 Thousand/µL      Eosinophils Absolute 0 63 Thousand/µL      Basophils Absolute 0 09 Thousands/µL                  XR chest 1 view portable   ED Interpretation by Alonzo Jarrell MD (01/04 1810)   Read by me; Radiologist to provide formal interpretation   Poor insp effort with crowding ov vasulature                 Procedures  ECG 12 Lead Documentation Only    Date/Time: 1/4/2023 5:43 PM  Performed by: Alonzo Jarrell MD  Authorized by: Alonzo Jarrell MD     Indications / Diagnosis:  Cp  Patient location:  ED  Previous ECG:     Previous ECG:  Compared to current    Comparison ECG info:  12/29/0801    Similarity:  Changes noted  Rate:     ECG rate:  81    ECG rate assessment: normal    Rhythm:     Rhythm: sinus rhythm    QRS:     QRS axis:  Left  Comments:      Diffuse nonspecific twave inversions with twi 1, AVL vs previous    ECG 12 Lead Documentation Only    Date/Time: 1/5/2023 12:08 AM  Performed by: Alonzo Jarrell MD  Authorized by: Alonzo Jarrell MD     Indications / Diagnosis:  Delta EKG  ECG reviewed by me, the ED Provider: yes    Patient location:  ED  Previous ECG:     Previous ECG:  Compared to current    Comparison ECG info:  1/4/23 1741    Similarity:  No change  Rate:     ECG rate:  69    ECG rate assessment: normal    Rhythm:     Rhythm: sinus rhythm    QRS:     QRS axis:  Left  Comments:      Nonspecific twave changes;  twi 1 avl no change vs prior             ED Course  ED Course as of 01/05/23 0103   Wed Jan 04, 2023   2343 Patient remained assx with chest pain will recheck ekg; serial trops 5/4/4   Thu Jan 05, 2023   0021 Serial EKG are unchanged TC to Dr Caitlyn Rodriguez cardiology covering for [facility] to determine if obs recommended or keep outpt follow up for 1/12/22 0027 Dr Caitlyn Rodriguez favors outpt followup return with any worsening sypmptoms   0041 Reviewed cardiology recc with patient and daughter  Recommend recheck with PMD for creatinine and tsh  Return to ED with any worsenings sx comfortable with plan   0042 Left A/C PIV d/c'ed by me with catheter intact             HEART Risk Score    Flowsheet Row Most Recent Value   Heart Score Risk Calculator    History 1 Filed at: 01/05/2023 0004   ECG 1 Filed at: 01/05/2023 0004   Age 2 Filed at: 01/05/2023 0004   Risk Factors 2 Filed at: 01/05/2023 0004   Troponin 0 Filed at: 01/05/2023 0004   HEART Score 6 Filed at: 01/05/2023 0004                                      Medical Decision Making  Mdm: 68 yo male with recent catheterization, PCA/stent to prox LAD and PCA to mid-LAD lesion 12/29 ; now with chest tightness and a sense of doom and gloom similar to prior NSTEMI's  Patient denies significant chest discomfort currently  Will assess for acute coronary syndrome with serial troponins check for any CHF times or not consistent with pulmonary embolism after serial troponins return we will discuss with cardiology  Amount and/or Complexity of Data Reviewed  Labs: ordered  Radiology: ordered and independent interpretation performed            Disposition  Final diagnoses:   Chest pain   [facility]   Elevated TSH     Time reflects when diagnosis was documented in both MDM as applicable and the Disposition within this note     Time User Action Codes Description Comment    1/4/2023 11:49 PM Tab Patterson Add [R07 9] Chest pain     1/4/2023 11:49 PM Radha Lee [N17 9] LUCI (acute kidney injury) ([facility] [address] )     1/5/2023 12:07 AM Tab Patterson Add [R79 89] Elevated TSH       ED Disposition     ED Disposition Discharge    Condition   Stable    Date/Time   Thu Jan 5, 2023 12:06 AM    Comment   Davin Carballo discharge to home/self care  Follow-up Information     Follow up With Specialties Details Why Contact Info Additional [address], DO Family Medicine Call in 1 day recheck of thyroid and kidney numbers 10 42 [facility] [address] Cardiology Go to  1/12/23 keep follow up appt [address] [facility] [address] [phone] [address] [facility] [place], [place], [place], [address] Emergency Department Emergency Medicine  any new or worsening symptoms [place] [phone]  [address] [facility] Emergency Department, [place] [address], [place], [place], [address]          Discharge Medication List as of 1/5/2023 12:34 AM      CONTINUE these medications which have NOT CHANGED    Details   !! allopurinol (ZYLOPRIM) 100 mg tablet Take 1 tablet (100 mg total) by mouth daily, Starting Mon 10/24/2022, Normal      !! allopurinol (ZYLOPRIM) 100 mg tablet Take 1 tablet (100 mg total) by mouth daily, Starting Fri 11/4/2022, Normal      aspirin 81 mg chewable tablet Chew 1 tablet (81 mg total) daily, Starting Fri 12/30/2022, No Print      atorvastatin (LIPITOR) 80 mg tablet Take 1 tablet by mouth once daily, Normal      benzonatate (TESSALON) 200 MG capsule Take 1 capsule (200 mg total) by mouth 3 (three) times a day as needed for cough, Starting Mon 12/26/2022, Normal      Blood Glucose Monitoring Suppl (OneTouch Verio Reflect) w/Device KIT Check blood sugars twice daily  Please substitute with appropriate alternative as covered by patient's insurance   Dx: E11 65, Normal      Empagliflozin (Jardiance) 10 MG TABS Take 1 tablet (10 mg total) by mouth every morning, Starting Tue 9/6/2022, Normal      escitalopram (LEXAPRO) 20 mg tablet Take 1 tablet (20 mg total) by mouth daily, Starting Mon 11/14/2022, Normal      glimepiride (AMARYL) 4 mg tablet TAKE 1 TABLET TWICE A DAY, Normal      glucose blood (OneTouch Verio) test strip Check blood sugars twice daily  Please substitute with appropriate alternative as covered by patient's insurance  Dx: E11 65, Normal      !! levothyroxine 88 mcg tablet TAKE 1 TABLET BY MOUTH ONCE DAILY IN THE MORNING, Normal      !! levothyroxine 88 mcg tablet Take 1 tablet (88 mcg total) by mouth daily in the early morning, Starting Thu 11/17/2022, Normal      losartan (COZAAR) 25 mg tablet Take 1 tablet (25 mg total) by mouth daily, Starting Mon 8/22/2022, Normal      metFORMIN (GLUCOPHAGE) 1000 MG tablet Take 1 tablet (1,000 mg total) by mouth 2 (two) times a day with meals, Starting Thu 10/6/2022, Normal      multivitamin (THERAGRAN) TABS Take 1 tablet by mouth daily, Historical Med      nitroglycerin (NITROSTAT) 0 4 mg SL tablet Place 1 tablet (0 4 mg total) under the tongue every 5 (five) minutes as needed for chest pain, Starting Mon 12/12/2022, Normal      OneTouch Delica Lancets 91D MISC Check blood sugars twice daily  Please substitute with appropriate alternative as covered by patient's insurance   Dx: E11 65, Normal      propranolol (INDERAL LA) 60 mg 24 hr capsule Take 1 capsule (60 mg total) by mouth daily, Starting Mon 12/5/2022, Normal      ranolazine (RANEXA) 500 mg 12 hr tablet Take 1 tablet (500 mg total) by mouth 2 (two) times a day, Starting Fri 12/30/2022, Normal      ticagrelor (BRILINTA) 90 MG Take 1 tablet (90 mg total) by mouth every 12 (twelve) hours, Starting Fri 12/30/2022, Normal      torsemide (DEMADEX) 10 mg tablet Take 1 tablet (10 mg total) by mouth daily, Starting Tue 11/15/2022, Normal      zonisamide (ZONEGRAN) 50 MG capsule Take 1 tablet (50 mg total) by mouth daily, Normal       !! - Potential duplicate medications found  Please discuss with provider  No discharge procedures on file      PDMP Review     None          ED Provider  Electronically Signed by           Ambrose Fihs MD  01/05/23 2429

## 2023-01-05 ENCOUNTER — OFFICE VISIT (OUTPATIENT)
Dept: FAMILY MEDICINE CLINIC | Facility: CLINIC | Age: 78
End: 2023-01-05

## 2023-01-05 VITALS
WEIGHT: 223 LBS | DIASTOLIC BLOOD PRESSURE: 60 MMHG | BODY MASS INDEX: 33.8 KG/M2 | OXYGEN SATURATION: 96 % | SYSTOLIC BLOOD PRESSURE: 122 MMHG | HEIGHT: 68 IN | HEART RATE: 73 BPM | TEMPERATURE: 97.7 F

## 2023-01-05 DIAGNOSIS — N17.9 ACUTE KIDNEY INJURY (HCC): ICD-10-CM

## 2023-01-05 DIAGNOSIS — E11.65 CONTROLLED TYPE 2 DIABETES MELLITUS WITH HYPERGLYCEMIA, WITHOUT LONG-TERM CURRENT USE OF INSULIN (HCC): Primary | ICD-10-CM

## 2023-01-05 DIAGNOSIS — I50.32 CHRONIC DIASTOLIC (CONGESTIVE) HEART FAILURE (HCC): ICD-10-CM

## 2023-01-05 DIAGNOSIS — I25.118 CORONARY ARTERY DISEASE OF NATIVE ARTERY OF NATIVE HEART WITH STABLE ANGINA PECTORIS (HCC): ICD-10-CM

## 2023-01-05 DIAGNOSIS — E66.01 MORBID OBESITY (HCC): ICD-10-CM

## 2023-01-05 DIAGNOSIS — E08.42 DIABETIC POLYNEUROPATHY ASSOCIATED WITH DIABETES MELLITUS DUE TO UNDERLYING CONDITION (HCC): ICD-10-CM

## 2023-01-05 DIAGNOSIS — I73.9 PERIPHERAL ARTERIAL DISEASE (HCC): ICD-10-CM

## 2023-01-05 DIAGNOSIS — F33.9 RECURRENT MAJOR DEPRESSIVE DISORDER, REMISSION STATUS UNSPECIFIED (HCC): ICD-10-CM

## 2023-01-05 DIAGNOSIS — I10 PRIMARY HYPERTENSION: ICD-10-CM

## 2023-01-05 LAB
SL AMB POCT HEMOGLOBIN AIC: 7.6 (ref ?–6.5)
T4 FREE SERPL-MCNC: 1.22 NG/DL (ref 0.76–1.46)

## 2023-01-05 RX ORDER — PREDNISOLONE ACETATE 10 MG/ML
SUSPENSION/ DROPS OPHTHALMIC
COMMUNITY
Start: 2022-12-13

## 2023-01-05 NOTE — PROGRESS NOTES
Cardiology Follow Up    Lashaun Avila  1945  [id number]  [facility] PARUL Haro  [address]  [phone]    1  Coronary artery disease of native artery of native heart with stable angina pectoris ([facility] [address] )        2  Controlled type 2 diabetes mellitus with hyperglycemia, without long-term current use of insulin ([facility] [address] )        3  Primary hypertension        4  Aortic stenosis, moderate        5  Chronic diastolic (congestive) heart failure (HCC)        6  Mixed hyperlipidemia  Lipid Panel with Direct LDL reflex      7   LUCI (acute kidney injury) [facility])            Discussion/Summary:    CAD  History of prior stenting to OM and RCA  Inferior STEMI in 2014 with repeat stenting to RCA  Recent elective cath on December 29, 2022 in setting of worsening stable angina: RCA noted to now be chronic total occlusion with left to right collaterals, OM 1 stent is patent; stent placed to proximal LAD, mid LAD lesion with balloon angioplasty only as was not amenable to stent placement  He continues on DAPT with aspirin 81 mg QD and Brilinta 90 mg BID and he will do so until otherwise instructed by Dr Aguilar Mediate was reviewed with patient and his wife  He is also on Lipitor 80 mg QD and propanolol and was also placed on Ranexa 500 mg BID  Cardiac rehab recommended and this referral has been ordered  He was seen in the ED on January 4, 2023 due to what is documented as CP but then also notes patient states "it isn't really pain but feels like the gloom of death" (which is essentially his anginal equivalent--this is the feeling he was getting prior to cath      EKG in ED was sinus rhythm with non-specific ST changes noted; hsTn were not elevated and he was discharged to home with cariology follow up  Today he tells me he has had no further symptoms and currently feels at his baseline; he notes chronic ROWE mainly with climbing stairs and walking moderate distances, also chronic fatigue, both of which have not acutely worsened since cath      Hyperlipidemia  Lipid panel from September 2021  Triglycerides 190  HDL 36  LDL 66  Continue Lipitor 80 mg QD and I will order updated lipid panel    Moderate aortic stenosis  Echo from March 2022: EF 65%, RV normal size/function; moderate AS with KELECHI 1 2 cm2 and mean gradient 18 mmHg  Will defer timing of repeat echo to Dr Wesley Funk    Hypertension  controlled  He is maintained on propanolol 60 mg QD, losartan 25 mg QD and torsemide 10 mg QD--torsemide on hold in setting of LUCI post cath    Chronic diastolic HF  He appears euvolemic on exam  Had previously been on torsemide 10 mg QD but this was d/c'd by PCP on January 5th 2023 due to evidence of LUCI on CKD (likely secondary to dye load from cardiac cath in setting of chronic ARB use); he was instructed to take this med only if his weight goes up by 3 lbs in 24 hrs for now and have serial BMP's every Monday for the next three weeks at which time we can consider restarting torsemide  His last creat shows improvement but not yet back to baseline, we will await repeat creat from this coming Monday and reevaluate; goal would be to get him back on torsemide at some point    DM  HgbA1c in June 2022 was 6 6    LUCI on CKD  Patient with known CKD, follows with Nephrology  Creat on January 4th, several days post cath was 1 88 up from 1 0 (day of discharge); likely related to dye load in setting of chronic ARB use  Evaluated by PCP on January 5th and his torsemide was d/c'd he was instructed to take this med only if his weight goes up by 3 lbs in 24 hrs for now and have serial BMP's every Monday for the next three weeks at which time we can consider restarting torsemide  His last creat shows improvement but not yet back to baseline, we will await repeat creat from this coming Monday and reevaluate; goal would be to get him back on torsemide at some point      He will return in 3 months to follow up with Dr Lawyer Wyman    Interval History:   Amauri Campbell is a 69 y o  male with PMH of DM, chronic diastolic HF, HTN, moderate AS, HLD, CKD and CAD who comes to the office today for post hospital follow up visit  The patient has a known history of CAD with prior stenting to OM1 and RCA and repeat stenting of RCA after inferior STEMI in 2014  He recently underwent elective cardiac cath on December 29, 2022 due to worsening stable angina  This cath revealed RCA to be chronic total occlusion with left-right collaterals, patent prior OM stent and new lesion to proximal and mid LAD treated with stent to proximal LAD and balloon angioplasty only to mid LAD as mid LAD was not amenable to stenting  He was placed on aspirin and Brilinta as well as Ranexa  It was also recommended he attend cardiac rehab and this order has been placed  He was seen in the ED on January 4, 2023 due to what is documented as CP but then also notes patient states "that isn't really pain but feels like the gloom of death" starting that day  The patient notes this is his anginal equivalent, he doesn't get "chest pain"  This is the feeling he was getting that prompted cath  EKG in ED was sinus rhythm with non-specific ST changes noted; hsTn were not elevated and he was discharged to home with cariology follow up  On January 4, 2023 the patient was evaluated by his PCP and lab work from ED visit was reviewed at which time LUCI noted  LUCI likely secondary to dye load and setting of chronic ARB use the patient's primary care doctor elected to discontinue his torsemide at this time and has ordered serial BMPs to be done every Monday for the next 3 weeks  Instructed to only take torsemide if his weight increases by 3 pounds in 24 hours BMP from this past Monday does show improvement in his creatinine but not yet back to baseline  Today patient tells me overall he feels at his baseline  The patient has chronic fatigue which he states has been dealing with for years and has not acutely worsened  He also states he has chronic dyspnea on exertion only when climbing stairs or walking a moderate distance and all of these continue but have not acutely worsened  He has not had this feeling of doom and gloom since his visit to the ED and overall feels fairly well  Has no lower extremity edema and he has no complaints of orthopnea  His wife says that his weight has been stable since his visit with his PCP and they have not given him any torsemide  He continues to follow a low-sodium diet  I did instruct the patient that I agree with his PCPs plan we will continue to monitor BMP results as they come with the eventual plan to get him back on torsemide once his creat normalizes              Medical Problems     Problem List     Arthritis    Depression with anxiety    Controlled type 2 diabetes mellitus with hyperglycemia, without long-term current use of insulin ([facility] [address] )    Overview Signed 7/10/2018  9:13 AM by Diego Fullre DO      Patient is has not had lab work in June is due for it we will give him slips for same           Lab Results   Component Value Date    HGBA1C 7 6 (A) 01/05/2023         Gout    Hyperlipidemia    Hypertension    Morbid obesity ([facility] [address] )    NSTEMI (non-ST elevated myocardial infarction) ([facility] [address] )    Tremor    Degenerative disc disease, lumbar    Overview Signed 7/10/2018  9:14 AM by Diego Fuller DO      Patient having pain across lumbar spine due to degenerative disc disease also is having ambulatory difficulties he does have peripheral neuropathy also which affects his ability to ambulate         Tremor, essential    Diabetic neuropathy associated with diabetes mellitus due to underlying condition [facility])      Lab Results   Component Value Date    HGBA1C 7 6 (A) 01/05/2023         Gait instability    Frequent falls    Infected epidermoid cyst    Vitamin E deficiency    Primary osteoarthritis involving multiple joints    Chronic pain of both knees    Primary osteoarthritis of both knees    Coronary artery disease of native artery of native heart with stable angina pectoris (HCC)    Primary osteoarthritis of right knee    Primary osteoarthritis of left knee    Fall (on) (from) other stairs and steps, initial encounter    Recurrent major depressive disorder ([facility] [address] )    Benign hypertension with CKD (chronic kidney disease) stage III [facility])    Lab Results   Component Value Date    EGFR 46 01/09/2023    EGFR 33 01/04/2023    EGFR 67 12/30/2022    CREATININE 1 44 (H) 01/09/2023    CREATININE 1 88 (H) 01/04/2023    CREATININE 1 07 12/30/2022         Diabetic nephropathy associated with type 2 diabetes mellitus ([place] [id number] )      Lab Results   Component Value Date    HGBA1C 7 6 (A) 01/05/2023         Concentric left ventricular hypertrophy    Aortic stenosis, moderate    Edema    Mild cognitive impairment    Bilateral lower extremity edema    Other chest pain    Leukocytosis    Skin lesion    Chronic diastolic (congestive) heart failure (HCC)    Wt Readings from Last 3 Encounters:   01/12/23 98 9 kg (218 lb)   01/10/23 99 8 kg (220 lb)   01/09/23 98 9 kg (218 lb)                 Basal cell carcinoma (BCC) of right shoulder    Stage 3a chronic kidney disease ([facility]  )    Lab Results   Component Value Date    EGFR 46 01/09/2023    EGFR 33 01/04/2023    EGFR 67 12/30/2022    CREATININE 1 44 (H) 01/09/2023    CREATININE 1 88 (H) 01/04/2023    CREATININE 1 07 12/30/2022         S/P cardiac catheterization    Overview Addendum 12/30/2022  8:23 AM by Lilly Pierce PA-C     Elective [facility] 12/29/22 - PCI/SAM prox & mid LAD             Past Medical History:   Diagnosis Date   • BPH (benign prostatic hyperplasia)    • CAD (coronary artery disease)    • Cancer (HCC)     basal cell   • Chronic kidney disease     stage 3   • Colon polyp    • CPAP (continuous positive airway pressure) dependence    • Diabetes mellitus ([facility] ) niddm   • Disease of thyroid gland    • Gout    • High cholesterol    • Hypertension    • MI, 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    • Myocardial infarction ([facility] [address] ) 2014   • S/P cardiac catheterization 12/30/2022   • Sleep apnea      Social History     Socioeconomic History   • Marital status: /Civil Union     Spouse name: Not on file   • Number of children: Not on file   • Years of education: Not on file   • Highest education level: Not on file   Occupational History   • Not on file   Tobacco Use   • Smoking status: Never   • Smokeless tobacco: Never   Vaping Use   • Vaping Use: Never used   Substance and Sexual Activity   • Alcohol use: Not Currently     Comment: socially, cut down in 2008, previously did more than    • Drug use: Never   • Sexual activity: Yes     Partners: Female   Other Topics Concern   • Not on file   Social History Narrative    No advance directive     No living will      Social Determinants of Health     Financial Resource Strain: Not on file   Food Insecurity: Not on file   Transportation Needs: Not on file   Physical Activity: Not on file   Stress: Not on file   Social Connections: Not on file   Intimate Partner Violence: Not on file   Housing Stability: Not on file      Family History   Adopted: Yes   Problem Relation Age of Onset   • No Known Problems Mother    • No Known Problems Father      Past Surgical History:   Procedure Laterality Date   • BASAL CELL CARCINOMA EXCISION Right 1/21/2022    Procedure: EXCISION BASAL CELL CARCINOMA EXTREMITY;  Surgeon: Lora Galindo DO;  Location: [facility] MAIN OR;  Service: General   • CARDIAC CATHETERIZATION Left 12/29/2022    Procedure: Cardiac Left Heart Cath;  Surgeon: Janay Doherty MD;  Location: [facility] CARDIAC CATH LAB; Service: Cardiology   • CARDIAC CATHETERIZATION N/A 12/29/2022    Procedure: Cardiac Coronary Angiogram;  Surgeon: Janay Doherty MD;  Location: [facility] CARDI[facility] C[facility];   Service: Cardiology   • CARDIAC CATHETERIZATION N/A 12/29/2022 Procedure: Cardiac pci;  Surgeon: Martita Castellon MD;  Location: [facility] CARDIAC CATH [facility]; Service: Cardiology   • CATARACT EXTRACTION W/  INTRAOCULAR LENS IMPLANT     • CORNEAL TRANSPLANT     • CORONARY ANGIOPLASTY WITH STENT PLACEMENT      stents x3-- [id number]   • EYE SURGERY      repair corneal laceration   • IL COLONOSCOPY FLX DX W/COLLJ SPEC WHEN PFRMD N/A 3/21/2016    Procedure: COLONOSCOPY;  Surgeon: Neyda Perez MD;  Location: [facility];  Service: Colorectal   • TONSILLECTOMY AND ADENOIDECTOMY         Current Outpatient Medications:   •  allopurinol (ZYLOPRIM) 100 mg tablet, Take 1 tablet (100 mg total) by mouth daily, Disp: 90 tablet, Rfl: 0  •  aspirin 81 mg chewable tablet, Chew 1 tablet (81 mg total) daily, Disp: , Rfl: 0  •  atorvastatin (LIPITOR) 80 mg tablet, Take 1 tablet by mouth once daily, Disp: 90 tablet, Rfl: 0  •  Blood Glucose Monitoring Suppl (OneTouch Verio Reflect) w/Device KIT, Check blood sugars twice daily  Please substitute with appropriate alternative as covered by patient's insurance  Dx: E11 65, Disp: 1 kit, Rfl: 0  •  Empagliflozin (Jardiance) 10 MG TABS, Take 1 tablet (10 mg total) by mouth every morning, Disp: 90 tablet, Rfl: 1  •  escitalopram (LEXAPRO) 20 mg tablet, Take 1 tablet (20 mg total) by mouth daily, Disp: 90 tablet, Rfl: 0  •  glimepiride (AMARYL) 4 mg tablet, TAKE 1 TABLET TWICE A DAY, Disp: 180 tablet, Rfl: 0  •  glucose blood (OneTouch Verio) test strip, Check blood sugars twice daily  Please substitute with appropriate alternative as covered by patient's insurance   Dx: E11 65, Disp: 200 each, Rfl: 3  •  levothyroxine 88 mcg tablet, TAKE 1 TABLET BY MOUTH ONCE DAILY IN THE MORNING, Disp: 90 tablet, Rfl: 0  •  losartan (COZAAR) 25 mg tablet, Take 1 tablet (25 mg total) by mouth daily, Disp: 90 tablet, Rfl: 1  •  metFORMIN (GLUCOPHAGE) 1000 MG tablet, Take 1 tablet (1,000 mg total) by mouth 2 (two) times a day with meals, Disp: 180 tablet, Rfl: 0  • multivitamin (THERAGRAN) TABS, Take 1 tablet by mouth daily, Disp: , Rfl:   •  nitroglycerin (NITROSTAT) 0 4 mg SL tablet, Place 1 tablet (0 4 mg total) under the tongue every 5 (five) minutes as needed for chest pain, Disp: 25 tablet, Rfl: 0  •  OneTouch Delica Lancets 98Y MISC, Check blood sugars twice daily  Please substitute with appropriate alternative as covered by patient's insurance   Dx: E11 65, Disp: 200 each, Rfl: 3  •  prednisoLONE acetate (PRED FORTE) 1 % ophthalmic suspension, INSTILL 1 DROP INTO RIGHT EYE ONCE DAILY, Disp: , Rfl:   •  propranolol (INDERAL LA) 60 mg 24 hr capsule, Take 1 capsule (60 mg total) by mouth daily, Disp: 90 capsule, Rfl: 0  •  ranolazine (RANEXA) 500 mg 12 hr tablet, Take 1 tablet (500 mg total) by mouth 2 (two) times a day, Disp: 60 tablet, Rfl: 2  •  ticagrelor (BRILINTA) 90 MG, Take 1 tablet (90 mg total) by mouth every 12 (twelve) hours, Disp: 60 tablet, Rfl: 2  •  torsemide (DEMADEX) 10 mg tablet, Take 1 tablet (10 mg total) by mouth daily, Disp: 90 tablet, Rfl: 1  •  zonisamide (ZONEGRAN) 50 MG capsule, Take 1 tablet (50 mg total) by mouth daily, Disp: 90 capsule, Rfl: 2  •  allopurinol (ZYLOPRIM) 100 mg tablet, Take 1 tablet (100 mg total) by mouth daily (Patient not taking: Reported on 1/12/2023), Disp: 90 tablet, Rfl: 0  •  benzonatate (TESSALON) 200 MG capsule, Take 1 capsule (200 mg total) by mouth 3 (three) times a day as needed for cough (Patient not taking: Reported on 1/12/2023), Disp: 20 capsule, Rfl: 0  •  levothyroxine 88 mcg tablet, Take 1 tablet (88 mcg total) by mouth daily in the early morning (Patient not taking: Reported on 1/12/2023), Disp: 90 tablet, Rfl: 0  No Known Allergies    Labs:     Chemistry        Component Value Date/Time     07/16/2015 0943    K 4 0 01/09/2023 1018    K 4 3 07/16/2015 0943     01/09/2023 1018     07/16/2015 0943    CO2 28 01/09/2023 1018    CO2 27 0 07/16/2015 0943    BUN 19 01/09/2023 1018    BUN 12 07/16/2015 0943    CREATININE 1 44 (H) 01/09/2023 1018    CREATININE 0 98 07/16/2015 0943        Component Value Date/Time    CALCIUM 9 7 01/09/2023 1018    CALCIUM 9 2 07/16/2015 0943    ALKPHOS 101 01/04/2023 1755    ALKPHOS 68 07/16/2015 0943    AST 33 01/04/2023 1755    AST 21 07/16/2015 0943    ALT 62 01/04/2023 1755    ALT 51 07/16/2015 0943    BILITOT 0 47 07/16/2015 0943            Lab Results   Component Value Date    CHOL 139 07/16/2015    CHOL 178 02/12/2015    CHOL 155 09/29/2014     Lab Results   Component Value Date    HDL 36 (L) 09/13/2021    HDL 29 (L) 02/21/2020    HDL 37 (L) 07/30/2018     Lab Results   Component Value Date    LDLCALC 66 09/13/2021    LDLCALC 63 02/21/2020    LDLCALC 50 07/30/2018     Lab Results   Component Value Date    TRIG 190 (H) 09/13/2021    TRIG 149 02/21/2020    TRIG 118 07/30/2018     No results found for: CHOLHDL    Imaging: XR chest 1 view portable    Result Date: 1/5/2023  Narrative: CHEST INDICATION:   cp  COMPARISON:  Chest x-ray 7/19/2022 EXAM PERFORMED/VIEWS:  XR CHEST PORTABLE FINDINGS: Heart size is not well evaluated on this single portable AP view  Lung markings are crowded secondary to low lung volumes  Within limitations of this examination there is no focal airspace opacity to suggest pneumonia  No pneumothorax or pleural effusion  Osseous structures appear within normal limits for patient age  Impression: No active pulmonary disease on examination which is somewhat limited secondary to low lung volumes  Workstation performed: [id number]     XR foot 3+ views LEFT    Result Date: 12/18/2022  Narrative: LEFT FOOT INDICATION:   atraumatic arch pain  COMPARISON:  None VIEWS:  XR FOOT 3+ VW LEFT FINDINGS: There is no acute fracture or dislocation  Calcaneal and dorsal tarsal spurs  No lytic or blastic osseous lesion  Vascular calcifications  Impression: No acute osseous abnormality   Workstation performed: [id number]     Cardiac catheterization    Result Date: 12/29/2022  Narrative: •  Left Anterior Descending The vessel was visualized by angiography and is large  There are L to R collaterals to the distal RCA system  Prox LAD lesion is 80% stenosed  Culprit lesion  Culprit for anginal symptoms  JANETT flow is 3  The lesion is non high-C and tubular  The lesion is mildly calcified  iFR was measured in the Prox LAD  iFR ratio: 0 68  The iFR was significant, intervened  Mid LAD lesion is 80% stenosed  Culprit lesion  JANETT flow is 3  The lesion is type C and focal  After D2  The lesion is mildly calcified  iFR was measured in the Mid LAD  iFR ratio: 0 68  The iFR was significant, intervened  •  Drug-eluting stent was successfully placed across the proximal lesion  The stent used was a STENT XIENCE SKYPOINT 2 25 X 23MM  •  The intervention was successful  The guidewire crossed the lesion  Post-intervention JANETT flow is 3  There were no complications  There is a 0% residual stenosis post intervention  •  Mid LAD lesion Angioplasty Angioplasty using a standard balloon was performed independent of stent deployment  •  PTCA was performed with excellent angiographic result  Stenting was attempted but the stent was not able to negotiate the tortuosity and calcification of the vessel to get to the lesion •  Post-Intervention Lesion Assessment The intervention was successful  Post-intervention JANETT flow is 3  Lesion had 15 mm of its length treated  There were no complications  There is a 10% residual stenosis post intervention  CTA abdominal w run off w wo contrast    Result Date: 12/19/2022  Narrative: CT ANGIOGRAM OF THE AORTA AND LOWER EXTREMITIES WITH IV CONTRAST INDICATION:  Arch and foot pain COMPARISON: None   TECHNIQUE:  CT angiogram examination of the abdomen, pelvis, and lower extremities was performed according to standard protocol with intravenous contrast   This examination, like all CT scans performed in the [facility], was performed utilizing techniques to minimize radiation dose exposure, including the use of iterative reconstruction and automated exposure control  3D reconstructions were performed an independent workstation, and are supplied for review  Rad dose 3470 59 mGy-cm IV Contrast:  120 mL of iohexol (OMNIPAQUE)  FINDINGS: VASCULAR STRUCTURES:   The distal thoracic aorta and abdominal aorta are mildly atherosclerotic  The celiac artery is patent  The superior mesenteric artery has a critical ostial stenosis versus occlusion due to densely calcified plaque  The inferior mesenteric artery is patent  The right renal artery has a mild ostial stenosis  The left renal artery is patent  The right and left iliofemoral segments and internal iliac arteries are patent  The right common femoral artery, profunda femoris artery, superficial femoral artery, and above-knee popliteal artery are patent  There is three-vessel runoff to the foot with filling of both the dorsalis pedis and plantar arch  Evaluation of the tibial vessels is degraded due to extensive vascular calcifications  The left common femoral artery, profunda femoris artery, superficial femoral artery, and popliteal artery are widely patent with three-vessel runoff to the foot and filling of the dorsalis pedis and plantar arch  Evaluation of the tibial vessels is degraded due to extensive vascular calcifications  OTHER FINDINGS ABDOMEN LOWER CHEST:  Bilateral hypoventilatory changes  LIVER/BILIARY TREE:  Unremarkable  GALLBLADDER:  No calcified gallstones  No pericholecystic inflammatory change  SPLEEN:  Unremarkable  Normal size  PANCREAS:  Unremarkable  ADRENAL GLANDS: Unremarkable  KIDNEYS/URETERS:  No solid renal mass  No hydronephrosis  No urinary tract calculi  Lower pole left renal cyst  PELVIS REPRODUCTIVE ORGANS:  Unremarkable for patient's age  URINARY BLADDER:  Moderately distended   ADDITIONAL ABDOMINAL AND PELVIC STRUCTURES STOMACH AND BOWEL: Colonic diverticulosis without evidence for acute diverticulitis  ABDOMINOPELVIC CAVITY:   No pathologically enlarged mesenteric or retroperitoneal lymph nodes  No ascites or free intraperitoneal air  ABDOMINAL WALL/INGUINAL REGIONS:  Unremarkable  OSSEOUS STRUCTURES:  No acute fracture or destructive osseous lesion  Impression: Continuous three-vessel bilateral lower extremity runoff with diffuse below knee atherosclerotic disease  Critical superior mesenteric artery stenosis versus occlusion  Diverticulosis  Workstation performed: [id number]       Review of Systems   Constitutional: Positive for malaise/fatigue (chronic)  Negative for chills and fever  HENT: Negative for congestion  Cardiovascular: Positive for dyspnea on exertion (chronic; currently at baseline)  Negative for chest pain, leg swelling, orthopnea and palpitations  Respiratory: Negative for cough, shortness of breath (no SOB at rest) and wheezing  Endocrine: Negative  Hematologic/Lymphatic: Negative  Skin: Negative  Musculoskeletal: Negative  Gastrointestinal: Negative for bloating, abdominal pain, nausea and vomiting  Genitourinary: Negative  Neurological: Negative for dizziness and light-headedness  Psychiatric/Behavioral: Negative  All other systems reviewed and are negative  Vitals:    01/12/23 1013   BP: 110/62   Pulse: 65   SpO2: 96%     Vitals:    01/12/23 1013   Weight: 98 9 kg (218 lb)     Height: 5' 8" (172 7 cm)   Body mass index is 33 15 kg/m²  Physical Exam:  Physical Exam  Vitals reviewed  Constitutional:       General: He is not in acute distress  HENT:      Head: Normocephalic and atraumatic  Mouth/Throat:      Mouth: Mucous membranes are moist    Cardiovascular:      Rate and Rhythm: Normal rate and regular rhythm  Heart sounds: S1 normal and S2 normal  Murmur heard  Pulmonary:      Effort: Pulmonary effort is normal  No respiratory distress        Breath sounds: Normal breath sounds  Abdominal:      General: Bowel sounds are normal  There is no distension  Palpations: Abdomen is soft  Musculoskeletal:         General: Normal range of motion  Cervical back: Normal range of motion and neck supple  Right lower leg: No edema  Left lower leg: No edema  Skin:     General: Skin is warm and dry  Neurological:      Mental Status: He is alert and oriented to person, place, and time     Psychiatric:         Mood and Affect: Mood normal

## 2023-01-05 NOTE — RESULT ENCOUNTER NOTE
Please call the patient regarding his abnormal result    No need to call patient this was reported to him in the office

## 2023-01-05 NOTE — PROGRESS NOTES
Assessment/Plan:follow kidney function closely    Problem List Items Addressed This Visit        Endocrine    Controlled type 2 diabetes mellitus with hyperglycemia, without long-term current use of insulin ([facility] ) - Primary    Relevant Orders    POCT hemoglobin A1c (Completed)    Diabetic neuropathy associated with diabetes mellitus due to underlying condition [facility])       Cardiovascular and Mediastinum    Hypertension    Chronic diastolic (congestive) heart failure ([facility] )        Diagnoses and all orders for this visit:    Controlled type 2 diabetes mellitus with hyperglycemia, without long-term current use of insulin ([facility])  -     POCT hemoglobin A1c    Diabetic polyneuropathy associated with diabetes mellitus due to underlying condition ([facility] )    Primary hypertension    Chronic diastolic (congestive) heart failure (H[facility])    Other orders  -     prednisoLONE acetate (PRED FORTE) 1 % ophthalmic suspension; INSTILL 1 DROP INTO RIGHT EYE ONCE DAILY        No problem-specific Assessment & Plan notes found for this encounter  Subjective:      Patient ID: Ashkan Bird is a 69 y o  male      Follow-up visit from emergency room visit patient was also recently catheterized and had a multi vessel coronary atherosclerosis with a collateral circulation pattern patient he had a balloon angioplasty and a stent placed in the left left anterior descending artery but he has other areas of blockages also has rest pain in his feet from peripheral artery disease into the legs was placed on Brilinta post stenting sugars A1c was 7 6 patient also had a considerable jump in his creatinine post catheterization probably from dye load needs weekly BMP told him to discontinue are no stools I told him to reduce his metformin down to 500 mg once a day from a dose of 2000 mg daily also told him to check sugars at least once daily and to consume 1 L of free water per day prescriptions for BMP were supplied to the patient also asked his wife to weigh him and determine his dry weight and to not give him his torsemide unless he goes up by 3 pounds overnight until his kidney function returns back to its usual level      The following portions of the patient's history were reviewed and updated as appropriate:   He has a past medical history of BPH (benign prostatic hyperplasia), CAD (coronary artery disease), Cancer ([facility] ), Chronic kidney disease, Colon polyp, CPAP (continuous positive airway pressure) dependence, Diabetes mellitus ([place] [address] ), Disease of thyroid gland, Gout, High cholesterol, Hypertension, MI, old, Myocardial infarction ([facility] ) (2014), S/P cardiac catheterization (12/30/2022), and Sleep apnea  ,  does not have any pertinent problems on file  ,   has a past surgical history that includes Tonsillectomy and adenoidectomy; Cataract extraction w/  intraocular lens implant; Eye surgery; Coronary angioplasty with stent; pr colonoscopy flx dx w/collj spec when pfrmd (N/A, 3/21/2016); Corneal transplant; Excision basal cell carcinoma (Right, 1/21/2022); Cardiac catheterization (Left, 12/29/2022); Cardiac catheterization (N/A, 12/29/2022); and Cardiac catheterization (N/A, 12/29/2022)  ,  family history includes No Known Problems in his father and mother  He was adopted  ,   reports that he has never smoked  He has never used smokeless tobacco  He reports that he does not currently use alcohol  He reports that he does not use drugs  ,  has No Known Allergies     Current Outpatient Medications   Medication Sig Dispense Refill   • allopurinol (ZYLOPRIM) 100 mg tablet Take 1 tablet (100 mg total) by mouth daily 90 tablet 0   • allopurinol (ZYLOPRIM) 100 mg tablet Take 1 tablet (100 mg total) by mouth daily 90 tablet 0   • aspirin 81 mg chewable tablet Chew 1 tablet (81 mg total) daily  0   • atorvastatin (LIPITOR) 80 mg tablet Take 1 tablet by mouth once daily 90 tablet 0   • benzonatate (TESSALON) 200 MG capsule Take 1 capsule (200 mg total) by mouth 3 (three) times a day as needed for cough 20 capsule 0   • Blood Glucose Monitoring Suppl (OneTouch Verio Reflect) w/Device KIT Check blood sugars twice daily  Please substitute with appropriate alternative as covered by patient's insurance  Dx: E11 65 1 kit 0   • Empagliflozin (Jardiance) 10 MG TABS Take 1 tablet (10 mg total) by mouth every morning 90 tablet 1   • escitalopram (LEXAPRO) 20 mg tablet Take 1 tablet (20 mg total) by mouth daily 90 tablet 0   • glimepiride (AMARYL) 4 mg tablet TAKE 1 TABLET TWICE A  tablet 0   • glucose blood (OneTouch Verio) test strip Check blood sugars twice daily  Please substitute with appropriate alternative as covered by patient's insurance  Dx: E11 65 200 each 3   • levothyroxine 88 mcg tablet TAKE 1 TABLET BY MOUTH ONCE DAILY IN THE MORNING 90 tablet 0   • levothyroxine 88 mcg tablet Take 1 tablet (88 mcg total) by mouth daily in the early morning 90 tablet 0   • losartan (COZAAR) 25 mg tablet Take 1 tablet (25 mg total) by mouth daily 90 tablet 1   • metFORMIN (GLUCOPHAGE) 1000 MG tablet Take 1 tablet (1,000 mg total) by mouth 2 (two) times a day with meals 180 tablet 0   • multivitamin (THERAGRAN) TABS Take 1 tablet by mouth daily     • nitroglycerin (NITROSTAT) 0 4 mg SL tablet Place 1 tablet (0 4 mg total) under the tongue every 5 (five) minutes as needed for chest pain 25 tablet 0   • OneTouch Delica Lancets 53G MISC Check blood sugars twice daily  Please substitute with appropriate alternative as covered by patient's insurance   Dx: E11 65 200 each 3   • prednisoLONE acetate (PRED FORTE) 1 % ophthalmic suspension INSTILL 1 DROP INTO RIGHT EYE ONCE DAILY     • propranolol (INDERAL LA) 60 mg 24 hr capsule Take 1 capsule (60 mg total) by mouth daily 90 capsule 0   • ranolazine (RANEXA) 500 mg 12 hr tablet Take 1 tablet (500 mg total) by mouth 2 (two) times a day 60 tablet 2   • ticagrelor (BRILINTA) 90 MG Take 1 tablet (90 mg total) by mouth every 12 (twelve) hours 60 tablet 2   • torsemide (DEMADEX) 10 mg tablet Take 1 tablet (10 mg total) by mouth daily 90 tablet 1   • zonisamide (ZONEGRAN) 50 MG capsule Take 1 tablet (50 mg total) by mouth daily 90 capsule 2     No current facility-administered medications for this visit  Review of Systems   Constitutional: Negative for activity change, appetite change, diaphoresis, fatigue and fever  HENT: Positive for dental problem  Eyes: Negative  Respiratory: Positive for chest tightness and shortness of breath  Negative for apnea, cough and wheezing  Cardiovascular: Negative for chest pain, palpitations and leg swelling  Gastrointestinal: Negative for abdominal distention, abdominal pain, anal bleeding, constipation, diarrhea, nausea and vomiting  Endocrine: Negative for cold intolerance, heat intolerance, polydipsia, polyphagia and polyuria  Hemoglobin A1c 7 6   Genitourinary: Negative for difficulty urinating, dysuria, flank pain, hematuria and urgency  Musculoskeletal: Negative for arthralgias, back pain, gait problem, joint swelling and myalgias  Skin: Negative for color change, rash and wound  Allergic/Immunologic: Negative for environmental allergies, food allergies and immunocompromised state  Neurological: Negative for dizziness, seizures, syncope, speech difficulty, numbness and headaches  Hematological: Negative for adenopathy  Does not bruise/bleed easily  Psychiatric/Behavioral: Negative for agitation, behavioral problems, hallucinations, sleep disturbance and suicidal ideas  Objective:  Vitals:    01/05/23 1437   BP: 122/60   BP Location: Left arm   Patient Position: Sitting   Cuff Size: Standard   Pulse: 73   Temp: 97 7 °F (36 5 °C)   TempSrc: Temporal   SpO2: 96%   Weight: 101 kg (223 lb)   Height: 5' 8" (1 727 m)     Body mass index is 33 91 kg/m²  Physical Exam  Constitutional:       General: He is not in acute distress  Appearance: He is well-developed  He is obese   He is not diaphoretic  HENT:      Head: Normocephalic  Right Ear: External ear normal       Left Ear: External ear normal       Nose: Nose normal    Eyes:      General: No scleral icterus  Right eye: No discharge  Left eye: No discharge  Conjunctiva/sclera: Conjunctivae normal       Pupils: Pupils are equal, round, and reactive to light  Neck:      Thyroid: No thyromegaly  Trachea: No tracheal deviation  Cardiovascular:      Rate and Rhythm: Normal rate and regular rhythm  Heart sounds: Normal heart sounds  No murmur heard  No friction rub  No gallop  Pulmonary:      Effort: Pulmonary effort is normal  No respiratory distress  Breath sounds: Normal breath sounds  No wheezing  Abdominal:      General: Bowel sounds are normal       Palpations: Abdomen is soft  There is no mass  Tenderness: There is no abdominal tenderness  There is no guarding  Musculoskeletal:         General: No deformity  Cervical back: Normal range of motion  Lymphadenopathy:      Cervical: No cervical adenopathy  Skin:     General: Skin is warm and dry  Findings: No erythema or rash  Neurological:      Mental Status: He is alert and oriented to person, place, and time  Cranial Nerves: No cranial nerve deficit  Psychiatric:         Thought Content:  Thought content normal

## 2023-01-06 LAB
ATRIAL RATE: 69 BPM
ATRIAL RATE: 81 BPM
P AXIS: 53 DEGREES
P AXIS: 59 DEGREES
PR INTERVAL: 202 MS
PR INTERVAL: 204 MS
QRS AXIS: -78 DEGREES
QRS AXIS: -80 DEGREES
QRSD INTERVAL: 82 MS
QRSD INTERVAL: 84 MS
QT INTERVAL: 384 MS
QT INTERVAL: 438 MS
QTC INTERVAL: 446 MS
QTC INTERVAL: 469 MS
T WAVE AXIS: 101 DEGREES
T WAVE AXIS: 72 DEGREES
VENTRICULAR RATE: 69 BPM
VENTRICULAR RATE: 81 BPM

## 2023-01-09 ENCOUNTER — LAB (OUTPATIENT)
Dept: LAB | Facility: HOSPITAL | Age: 78
End: 2023-01-09
Attending: FAMILY MEDICINE

## 2023-01-09 ENCOUNTER — OFFICE VISIT (OUTPATIENT)
Dept: NEPHROLOGY | Facility: CLINIC | Age: 78
End: 2023-01-09

## 2023-01-09 VITALS
HEART RATE: 76 BPM | BODY MASS INDEX: 33.04 KG/M2 | DIASTOLIC BLOOD PRESSURE: 70 MMHG | WEIGHT: 218 LBS | HEIGHT: 68 IN | OXYGEN SATURATION: 97 % | SYSTOLIC BLOOD PRESSURE: 130 MMHG

## 2023-01-09 DIAGNOSIS — N17.9 ACUTE KIDNEY INJURY (HCC): ICD-10-CM

## 2023-01-09 DIAGNOSIS — I10 PRIMARY HYPERTENSION: ICD-10-CM

## 2023-01-09 DIAGNOSIS — E11.21 DIABETIC NEPHROPATHY ASSOCIATED WITH TYPE 2 DIABETES MELLITUS (HCC): ICD-10-CM

## 2023-01-09 DIAGNOSIS — N17.9 ACUTE KIDNEY INJURY SUPERIMPOSED ON CHRONIC KIDNEY DISEASE (HCC): Primary | ICD-10-CM

## 2023-01-09 DIAGNOSIS — E66.01 MORBID OBESITY (HCC): ICD-10-CM

## 2023-01-09 DIAGNOSIS — N18.9 ACUTE KIDNEY INJURY SUPERIMPOSED ON CHRONIC KIDNEY DISEASE (HCC): Primary | ICD-10-CM

## 2023-01-09 DIAGNOSIS — N18.31 STAGE 3A CHRONIC KIDNEY DISEASE (HCC): ICD-10-CM

## 2023-01-09 LAB
ANION GAP SERPL CALCULATED.3IONS-SCNC: 7 MMOL/L (ref 4–13)
BUN SERPL-MCNC: 19 MG/DL (ref 5–25)
CALCIUM SERPL-MCNC: 9.7 MG/DL (ref 8.3–10.1)
CHLORIDE SERPL-SCNC: 102 MMOL/L (ref 96–108)
CO2 SERPL-SCNC: 28 MMOL/L (ref 21–32)
CREAT SERPL-MCNC: 1.44 MG/DL (ref 0.6–1.3)
GFR SERPL CREATININE-BSD FRML MDRD: 46 ML/MIN/1.73SQ M
GLUCOSE P FAST SERPL-MCNC: 170 MG/DL (ref 65–99)
POTASSIUM SERPL-SCNC: 4 MMOL/L (ref 3.5–5.3)
SODIUM SERPL-SCNC: 137 MMOL/L (ref 135–147)

## 2023-01-09 NOTE — RESULT ENCOUNTER NOTE
Please call the patient regarding his abnormal result    Kidney function is gradually improving it has improved but not returned to his usual baseline but it is getting better I would recommend another BMP now in a month

## 2023-01-09 NOTE — PROGRESS NOTES
Assessment & Plan:    1  Acute kidney injury superimposed on chronic kidney disease ([place] [address] )  -     Basic metabolic panel; Future; Expected date: 02/06/2023  -     Comprehensive metabolic panel; Future; Expected date: 04/03/2023  -     CBC and differential; Future; Expected date: 04/03/2023  -     Magnesium; Future; Expected date: 04/03/2023  -     Microalbumin / creatinine urine ratio; Future; Expected date: 04/03/2023  -     Phosphorus; Future; Expected date: 04/03/2023  -     Protein / creatinine ratio, urine; Future; Expected date: 04/03/2023  -     PTH, intact; Future; Expected date: 04/03/2023  -     Urinalysis with microscopic; Future; Expected date: 04/03/2023    2  Primary hypertension  Assessment & Plan:  Pressure acceptable  Continue current antihypertensive regimen and low-sodium diet  3  Diabetic nephropathy associated with type 2 diabetes mellitus ([place] [address] )  Assessment & Plan:    Lab Results   Component Value Date    HGBA1C 7 6 (A) 01/05/2023   Improved glycemic control  Continue SGLT2 inhibitor and ARB  4  Stage 3a chronic kidney disease [facility])  Assessment & Plan:  Lab Results   Component Value Date    EGFR 46 01/09/2023    EGFR 33 01/04/2023    EGFR 67 12/30/2022    CREATININE 1 44 (H) 01/09/2023    CREATININE 1 88 (H) 01/04/2023    CREATININE 1 07 12/30/2022   LUCI improving  Continue to monitor  5  Morbid obesity ([place] [address] )  Assessment & Plan:  BMI greater than 40 is associated with greater risk of progression of renal disease secondary to glomerular hyperfiltration  Recommend weight loss  The benefits, risks and alternatives to the treatment plan were discussed at this visit  Patient was advised of common adverse effects of any medical therapies prescribed  All questions were answered and discussed with the patient and any accompanying family members or caretakers  Subjective:      Patient ID: Alicia Marquez is a 69 y o  male in the [place] office      Patient was seen by   [facility] on 10/4/2022 for consultation of stage IIIa chronic kidney disease with baseline creatinine 1 2 to 1 3 mg/dL in [place] to diabetic nephropathy in the setting of type 2 diabetes  Patient was also seen for benign hypertension in setting of chronic kidney disease, but recently hypotensive in the setting of antihypertensive use  HPI     Today, patient presents for follow-up of hospitalization evaluation of elevated creatinine  Patient was hospitalized [facility] 12/29 through 12/30 cardiac stent  Patient was seen at [facility] [place] emergency department on 1/4/2023 for evaluation of chest pain  Status post cardiac stent on 12/28/2022  Patient was seen in follow-up by PCP Dr Landen Velez on 1/5/2022, at which time he was instructed to take torsemide only if 3 pound weight gain over at night due to reduced renal function  Blood pressure is 130/70 HR 76   Patient does monitor blood pressures at home and reports  Denies headaches, lightheadedness, dizziness  Patient reports adherence with antihypertensive regimen and denies adverse effects: losartan 25 mg daily, torsemide 10 mg daily, propanolol 60 mg daily  Patient also takes empagliflozin 10 mg daily  Patient denies lower extremity swelling  Reviewed and discussed the results of labs performed 1/9/2023 which revealed that renal function has improved to creatinine 1 44 mg/dL estimated GFR 46 mL/min  This is compared with peak creatinine 1 8 mg per dose with estimated GFR 33 mL/min on 1/4/2023  History is obtained from patient  The following portions of the patient's history were reviewed and updated as appropriate: allergies, current medications, past family history, past medical history, past social history, past surgical history, and problem list     Review of Systems   Constitutional: Positive for fatigue  Negative for activity change, chills, diaphoresis and fever  HENT: Negative for mouth sores and trouble swallowing  Respiratory: Negative for apnea, cough, chest tightness, shortness of breath and wheezing  Cardiovascular: Negative for chest pain, palpitations and leg swelling  Gastrointestinal: Negative for abdominal distention, abdominal pain, blood in stool, constipation, diarrhea and nausea  Genitourinary: Negative for decreased urine volume, difficulty urinating, dysuria, enuresis, frequency, hematuria and urgency  Musculoskeletal: Negative for arthralgias, back pain and joint swelling  Skin: Negative for pallor, rash and wound  Neurological: Negative for dizziness, seizures, light-headedness, numbness and headaches  Hematological: Does not bruise/bleed easily  Psychiatric/Behavioral: Negative for agitation, behavioral problems and confusion  The patient is not nervous/anxious  Objective:      /70 (BP Location: Left arm, Patient Position: Sitting)   Pulse 76   Ht 5' 8" (1 727 m)   Wt 98 9 kg (218 lb)   SpO2 97%   BMI 33 15 kg/m²          Physical Exam  Vitals and nursing note reviewed  Constitutional:       General: He is awake  He is not in acute distress  Appearance: Normal appearance  He is well-developed and normal weight  He is not ill-appearing, toxic-appearing or diaphoretic  HENT:      Head: Normocephalic and atraumatic  Jaw: There is normal jaw occlusion  Nose: Nose normal       Mouth/Throat:      Mouth: Mucous membranes are moist       Pharynx: Oropharynx is clear  No oropharyngeal exudate or posterior oropharyngeal erythema  Eyes:      General: Lids are normal  Vision grossly intact  Gaze aligned appropriately  No scleral icterus  Right eye: No discharge  Left eye: No discharge  Extraocular Movements: Extraocular movements intact  Conjunctiva/sclera: Conjunctivae normal       Pupils: Pupils are equal, round, and reactive to light  Neck:      Thyroid: No thyroid mass or thyromegaly        Trachea: Trachea and phonation normal  Cardiovascular:      Rate and Rhythm: Normal rate and regular rhythm  Heart sounds: Normal heart sounds, S1 normal and S2 normal  No murmur heard  No friction rub  No gallop  Pulmonary:      Effort: Pulmonary effort is normal  No respiratory distress  Breath sounds: Normal breath sounds  No stridor  No wheezing, rhonchi or rales  Abdominal:      General: Abdomen is flat  Bowel sounds are normal  There is no distension  Palpations: Abdomen is soft  There is no mass  Tenderness: There is no abdominal tenderness  There is no guarding  Hernia: No hernia is present  Musculoskeletal:         General: Normal range of motion  Cervical back: Normal range of motion and neck supple  No rigidity or tenderness  Right lower leg: No edema  Left lower leg: No edema  Lymphadenopathy:      Cervical: No cervical adenopathy  Skin:     General: Skin is warm and dry  Coloration: Skin is not jaundiced  Findings: No bruising  Nails: There is no clubbing  Neurological:      General: No focal deficit present  Mental Status: He is alert and oriented to person, place, and time  Mental status is at baseline  Psychiatric:         Attention and Perception: Attention and perception normal          Mood and Affect: Mood and affect normal          Speech: Speech normal          Behavior: Behavior normal  Behavior is cooperative  Thought Content:  Thought content normal          Judgment: Judgment normal              Lab Results   Component Value Date     07/16/2015    SODIUM 137 01/09/2023    K 4 0 01/09/2023     01/09/2023    CO2 28 01/09/2023    ANIONGAP 8 07/16/2015    AGAP 7 01/09/2023    BUN 19 01/09/2023    CREATININE 1 44 (H) 01/09/2023    GLUC 158 (H) 01/04/2023    GLUF 170 (H) 01/09/2023    CALCIUM 9 7 01/09/2023    AST 33 01/04/2023    ALT 62 01/04/2023    ALKPHOS 101 01/04/2023    PROT 6 3 (L) 07/16/2015    TP 7 8 01/04/2023    BILITOT 0 47 07/16/2015    TBILI 0 56 01/04/2023    EGFR 46 01/09/2023      Lab Results   Component Value Date    CREATININE 1 44 (H) 01/09/2023    CREATININE 1 88 (H) 01/04/2023    CREATININE 1 07 12/30/2022    CREATININE 1 22 12/29/2022    CREATININE 1 50 (H) 12/17/2022    CREATININE 1 32 (H) 07/28/2022    CREATININE 1 21 07/19/2022    CREATININE 1 37 (H) 06/20/2022    CREATININE 1 25 03/23/2022    CREATININE 1 30 02/21/2022    CREATININE 1 21 12/02/2021    CREATININE 1 56 (H) 12/01/2021    CREATININE 1 48 (H) 11/24/2021    CREATININE 1 27 11/19/2021    CREATININE 1 49 (H) 09/13/2021      Lab Results   Component Value Date    COLORU Yellow 07/28/2022    CLARITYU Clear 07/28/2022    SPECGRAV 1 010 07/28/2022    PHUR 7 0 07/28/2022    LEUKOCYTESUR (A) 07/28/2022     Elevated glucose may cause decreased leukocyte values   See urine microscopic for [facility] result/    NITRITE Negative 07/28/2022    PROTEIN UA Negative 07/28/2022    GLUCOSEU >=1000 (1%) (A) 07/28/2022    KETONESU Negative 07/28/2022    UROBILINOGEN 2 0 (A) 07/28/2022    BILIRUBINUR Negative 07/28/2022    BLOODU Negative 07/28/2022    RBCUA None Seen 07/28/2022    WBCUA None Seen 07/28/2022    EPIS None Seen 07/28/2022    BACTERIA None Seen 07/28/2022      No results found for: LABPROT  No results found for: Garret Galeazzi Results   Component Value Date    WBC 10 58 (H) 01/04/2023    HGB 15 0 01/04/2023    HCT 44 8 01/04/2023    MCV 98 01/04/2023     01/04/2023      Lab Results   Component Value Date    HGB 15 0 01/04/2023    HGB 14 7 12/17/2022    HGB 16 3 07/19/2022    HGB 15 2 02/21/2022    HGB 13 6 12/02/2021      No results found for: IRON, TIBC, FERRITIN   No results found for: PTHCALCIUM, [id number], PHOSPHORUS   Lab Results   Component Value Date    CHOLESTEROL 140 09/13/2021    HDL 36 (L) 09/13/2021    LDLCALC 66 09/13/2021    TRIG 190 (H) 09/13/2021      Lab Results   Component Value Date    URICACID 5 5 07/14/2021      Lab Results Component Value Date    HGBA1C 7 6 (A) 01/05/2023      Lab Results   Component Value Date    J4PJJZO 1 0 06/10/2016    FREET4 1 22 01/04/2023      No results found for: AILYN, DSDNAAB, RFIGM   Lab Results   Component Value Date    PROT 6 3 (L) 07/16/2015    UPEP  11/12/2018     The urine total protein is increased  The urine protein electrophoresis shows selective proteinuria  No monoclonal bands noted  Reviewed [place] ([id number])  **Electronic Signature**        Portions of the record may have been created with voice recognition software  Occasional wrong word or "sound a like" substitutions may have occurred due to the inherent limitations of voice recognition software  Read the chart carefully and recognize, using context, where substitutions have occurred  If you have any questions, please contact the dictating provider

## 2023-01-10 ENCOUNTER — OFFICE VISIT (OUTPATIENT)
Dept: OBGYN CLINIC | Facility: HOSPITAL | Age: 78
End: 2023-01-10

## 2023-01-10 VITALS
HEART RATE: 99 BPM | DIASTOLIC BLOOD PRESSURE: 68 MMHG | BODY MASS INDEX: 33.34 KG/M2 | HEIGHT: 68 IN | SYSTOLIC BLOOD PRESSURE: 104 MMHG | WEIGHT: 220 LBS

## 2023-01-10 DIAGNOSIS — M17.0 BILATERAL PRIMARY OSTEOARTHRITIS OF KNEE: Primary | ICD-10-CM

## 2023-01-10 RX ORDER — BUPIVACAINE HYDROCHLORIDE 2.5 MG/ML
4 INJECTION, SOLUTION INFILTRATION; PERINEURAL
Status: COMPLETED | OUTPATIENT
Start: 2023-01-10 | End: 2023-01-10

## 2023-01-10 RX ORDER — BETAMETHASONE SODIUM PHOSPHATE AND BETAMETHASONE ACETATE 3; 3 MG/ML; MG/ML
6 INJECTION, SUSPENSION INTRA-ARTICULAR; INTRALESIONAL; INTRAMUSCULAR; SOFT TISSUE
Status: COMPLETED | OUTPATIENT
Start: 2023-01-10 | End: 2023-01-10

## 2023-01-10 RX ADMIN — BUPIVACAINE HYDROCHLORIDE 4 ML: 2.5 INJECTION, SOLUTION INFILTRATION; PERINEURAL at 10:30

## 2023-01-10 RX ADMIN — BETAMETHASONE SODIUM PHOSPHATE AND BETAMETHASONE ACETATE 6 MG: 3; 3 INJECTION, SUSPENSION INTRA-ARTICULAR; INTRALESIONAL; INTRAMUSCULAR; SOFT TISSUE at 10:30

## 2023-01-10 NOTE — PROGRESS NOTES
Assessment:  1  Bilateral primary osteoarthritis of knee  Injection Procedure Prior Authorization          Plan:  Pre-auth for synvisc-one injections for both knees are submitted  Once those are authorized, a staff member will contact patient to schedule visit  In the meantime, patient was given steroid injections to both knees  We will plan to see Brenda Mackey back in 3 months for synvisc-one injections  To do next visit:  Return in about 3 months (around 4/10/2023)  The above stated was discussed in layman's terms and the patient expressed understanding  All questions were answered to the patient's satisfaction  Scribe Attestation    I,:  Jorge Lee am acting as a scribe while in the presence of the attending physician :       I,:  Wenceslao Simmonds, MD personally performed the services described in this documentation    as scribed in my presence :             Subjective:   Maciel Brown is a 69 y o  male who presents today with bilateral knee pain  Patient is requesting synvisc-one injections for both knees as he has seen significant relief with the injections in the past  No interval changes from last visit          Review of systems negative unless otherwise specified in HPI    Past Medical History:   Diagnosis Date   • BPH (benign prostatic hyperplasia)    • CAD (coronary artery disease)    • Cancer (HCC)     basal cell   • Chronic kidney disease     stage 3   • Colon polyp    • CPAP (continuous positive airway pressure) dependence    • Diabetes mellitus (HCC)     niddm   • Disease of thyroid gland    • Gout    • High cholesterol    • Hypertension    • MI, 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    • Myocardial infarction ([facility] [address] ) 2014   • S/P cardiac catheterization 12/30/2022   • Sleep apnea        Past Surgical History:   Procedure Laterality Date   • BASAL CELL CARCINOMA EXCISION Right 1/21/2022    Procedure: EXCISION BASAL CELL CARCINOMA EXTREMITY;  Surgeon: Nikolai Davila DO;  Location: [facility]; Service: General   • CARDIAC CATHETERIZATION Left 12/29/2022    Procedure: Cardiac Left Heart Cath;  Surgeon: Spencer Williamson MD;  [facility]; Service: Cardiology   • CARDIAC CATHETERIZATION N/A 12/29/2022    Procedure: Cardiac Coronary Angiogram;  Surgeon: Spencer Williamson MD;  [facility]; Service: Cardiology   • CARDIAC CATHETERIZATION N/A 12/29/2022    Procedure: Cardiac pci;  Surgeon: Spencer Williamson MD;  [facility]: [facility];   Service: Cardiology   • CATARACT EXTRACTION W/  INTRAOCULAR LENS IMPLANT     • CORNEAL TRANSPLANT     • CORONARY ANGIOPLASTY WITH STENT PLACEMENT      stents x3-- [phone]   • EYE SURGERY      repair corneal laceration   • HI COLONOSCOPY FLX DX W/COLLJ SPEC WHEN PFRMD N/A 3/21/2016    Procedure: COLONOSCOPY;  Surgeon: Sabrina Negrete MD;  Location: [facility];  Service: Colorectal   • TONSILLECTOMY AND ADENOIDECTOMY         Family History   Adopted: Yes   Problem Relation Age of Onset   • No Known Problems Mother    • No Known Problems Father        Social History     Occupational History   • Not on file   Tobacco Use   • Smoking status: Never   • Smokeless tobacco: Never   Vaping Use   • Vaping Use: Never used   Substance and Sexual Activity   • Alcohol use: Not Currently     Comment: socially, cut down in 2008, previously did more than    • Drug use: Never   • Sexual activity: Yes     Partners: Female         Current Outpatient Medications:   •  allopurinol (ZYLOPRIM) 100 mg tablet, Take 1 tablet (100 mg total) by mouth daily, Disp: 90 tablet, Rfl: 0  •  allopurinol (ZYLOPRIM) 100 mg tablet, Take 1 tablet (100 mg total) by mouth daily, Disp: 90 tablet, Rfl: 0  •  aspirin 81 mg chewable tablet, Chew 1 tablet (81 mg total) daily, Disp: , Rfl: 0  •  atorvastatin (LIPITOR) 80 mg tablet, Take 1 tablet by mouth once daily, Disp: 90 tablet, Rfl: 0  •  benzonatate (TESSALON) 200 MG capsule, Take 1 capsule (200 mg total) by mouth 3 (three) times a day as needed for cough, Disp: 20 capsule, Rfl: 0  •  Blood Glucose Monitoring Suppl (OneTouch Verio Reflect) w/Device KIT, Check blood sugars twice daily  Please substitute with appropriate alternative as covered by patient's insurance  Dx: E11 65, Disp: 1 kit, Rfl: 0  •  Empagliflozin (Jardiance) 10 MG TABS, Take 1 tablet (10 mg total) by mouth every morning, Disp: 90 tablet, Rfl: 1  •  escitalopram (LEXAPRO) 20 mg tablet, Take 1 tablet (20 mg total) by mouth daily, Disp: 90 tablet, Rfl: 0  •  glimepiride (AMARYL) 4 mg tablet, TAKE 1 TABLET TWICE A DAY, Disp: 180 tablet, Rfl: 0  •  glucose blood (OneTouch Verio) test strip, Check blood sugars twice daily  Please substitute with appropriate alternative as covered by patient's insurance  Dx: E11 65, Disp: 200 each, Rfl: 3  •  levothyroxine 88 mcg tablet, TAKE 1 TABLET BY MOUTH ONCE DAILY IN THE MORNING, Disp: 90 tablet, Rfl: 0  •  levothyroxine 88 mcg tablet, Take 1 tablet (88 mcg total) by mouth daily in the early morning, Disp: 90 tablet, Rfl: 0  •  losartan (COZAAR) 25 mg tablet, Take 1 tablet (25 mg total) by mouth daily, Disp: 90 tablet, Rfl: 1  •  metFORMIN (GLUCOPHAGE) 1000 MG tablet, Take 1 tablet (1,000 mg total) by mouth 2 (two) times a day with meals, Disp: 180 tablet, Rfl: 0  •  multivitamin (THERAGRAN) TABS, Take 1 tablet by mouth daily, Disp: , Rfl:   •  nitroglycerin (NITROSTAT) 0 4 mg SL tablet, Place 1 tablet (0 4 mg total) under the tongue every 5 (five) minutes as needed for chest pain, Disp: 25 tablet, Rfl: 0  •  OneTouch Delica Lancets 02N MISC, Check blood sugars twice daily  Please substitute with appropriate alternative as covered by patient's insurance   Dx: E11 65, Disp: 200 each, Rfl: 3  •  prednisoLONE acetate (PRED FORTE) 1 % ophthalmic suspension, INSTILL 1 DROP INTO RIGHT EYE ONCE DAILY, Disp: , Rfl:   •  propranolol (INDERAL LA) 60 mg 24 hr capsule, Take 1 capsule (60 mg total) by mouth daily, Disp: 90 capsule, Rfl: 0  •  ranolazine (RANEXA) 500 mg 12 hr tablet, Take 1 tablet (500 mg total) by mouth 2 (two) times a day, Disp: 60 tablet, Rfl: 2  •  ticagrelor (BRILINTA) 90 MG, Take 1 tablet (90 mg total) by mouth every 12 (twelve) hours, Disp: 60 tablet, Rfl: 2  •  torsemide (DEMADEX) 10 mg tablet, Take 1 tablet (10 mg total) by mouth daily, Disp: 90 tablet, Rfl: 1  •  zonisamide (ZONEGRAN) 50 MG capsule, Take 1 tablet (50 mg total) by mouth daily, Disp: 90 capsule, Rfl: 2    No Known Allergies         Vitals:    01/10/23 0936   BP: 104/68   Pulse: 99       Objective:  Physical exam  · General: Awake, Alert, Oriented  · Eyes: Pupils equal, round and reactive to light  · Heart: regular rate and rhythm  · Lungs: No audible wheezing  · Abdomen: soft                    Right Knee Exam     Range of Motion   Extension: normal   Flexion: normal     Other   Erythema: absent  Sensation: normal  Pulse: present      Left Knee Exam     Range of Motion   Extension: normal   Flexion: normal     Other   Erythema: absent  Sensation: normal  Pulse: present            Diagnostics, reviewed and taken today if performed as documented:    None performed    Procedures, if performed today:    Large joint arthrocentesis: R knee  Universal Protocol:  Consent: Verbal consent obtained    Risks and benefits: risks, benefits and alternatives were discussed  Consent given by: patient  Patient understanding: patient states understanding of the procedure being performed  Patient consent: the patient's understanding of the procedure matches consent given  Site marked: the operative site was marked  Patient identity confirmed: verbally with patient    Supporting Documentation  Indications: pain   Procedure Details  Location: knee - R knee  Preparation: Patient was prepped and draped in the usual sterile fashion  Needle size: 22 G  Ultrasound guidance: no  Medications administered: 4 mL bupivacaine 0 25 %; 6 mg betamethasone acetate-betamethasone sodium phosphate 6 (3-3) mg/mL    Patient tolerance: patient tolerated the procedure well with no immediate complications  Dressing:  Sterile dressing applied    Large joint arthrocentesis: L knee  Universal Protocol:  Consent: Verbal consent obtained  Risks and benefits: risks, benefits and alternatives were discussed  Consent given by: patient  Patient understanding: patient states understanding of the procedure being performed  Patient consent: the patient's understanding of the procedure matches consent given  Site marked: the operative site was marked  Patient identity confirmed: verbally with patient    Supporting Documentation  Indications: pain   Procedure Details  Location: knee - L knee  Preparation: Patient was prepped and draped in the usual sterile fashion  Needle size: 22 G  Ultrasound guidance: no  Medications administered: 4 mL bupivacaine 0 25 %; 6 mg betamethasone acetate-betamethasone sodium phosphate 6 (3-3) mg/mL    Patient tolerance: patient tolerated the procedure well with no immediate complications  Dressing:  Sterile dressing applied          Portions of the record may have been created with voice recognition software  Occasional wrong word or "sound a like" substitutions may have occurred due to the inherent limitations of voice recognition software  Read the chart carefully and recognize, using context, where substitutions have occurred

## 2023-01-10 NOTE — ASSESSMENT & PLAN NOTE
Lab Results   Component Value Date    HGBA1C 7 6 (A) 01/05/2023   Improved glycemic control  Continue SGLT2 inhibitor and ARB

## 2023-01-10 NOTE — ASSESSMENT & PLAN NOTE
Lab Results   Component Value Date    EGFR 46 01/09/2023    EGFR 33 01/04/2023    EGFR 67 12/30/2022    CREATININE 1 44 (H) 01/09/2023    CREATININE 1 88 (H) 01/04/2023    CREATININE 1 07 12/30/2022   LUCI improving  Continue to monitor

## 2023-01-11 ENCOUNTER — CLINICAL SUPPORT (OUTPATIENT)
Dept: CARDIAC REHAB | Facility: HOSPITAL | Age: 78
End: 2023-01-11

## 2023-01-11 DIAGNOSIS — Z95.5 STATUS POST INSERTION OF DRUG ELUTING CORONARY ARTERY STENT: Primary | ICD-10-CM

## 2023-01-11 NOTE — PROGRESS NOTES
Cardiac Rehabilitation Plan of Care   initial          Today's date: 2023   # of Exercise Sessions Completed: initial  Patient name: Srikanth Gonzalez      : 1945  Age: 69 y o  MRN: [id number]  Referring Physician:Andrzej Chand MD  Cardiologist: Key Luke  Provider:  [facility]  Clinician: Cira Laguna RN    Dx: Stent LAD   Hx diabetes,CKD,sleep apnea,HTN  Date of onset: 23      SUMMARY OF PROGRESS:  Nery Zepeda arrived for his initial evaluation to begin Cardiac Rehab post stent to his LAD using a cane  The patient DOES NOT currently follow a formal exercise program at home,   He has resumed all ADLs reporting dyspnea with activity and reporting weakness and fatigue  Depression screening using the PHQ-9 interprets the patient's score of  5  as  5-9 = Mild Depression  ANGEL-7 screening tool for anxiety suggests 4  0-4  = Not anxious  When addressed, the patient does having depression/anxiety  Patient reports excellent social/emotional support  Information to begin using [employer] was provided as well as contact information for counseling through [employer]  PHQ-9 score will be reassessed in 30 days  The patient is a non-smoker  Patient admits to 100% medication compliance  Patient reports the following physical limitations: neuropathy of both lower leg,PVD of lower legs and arthritis in both knees  The patient completed an initial 6MWT  [place] The patient walked  380 feet/ 1 5 METs  Resting  /60 with appropriate hemodynamic response to exercise reaching 120/60  He was unsteady walking and had eo rest x2 for fatique and dyspnea  Patient during exercise  Telemetry revealed nsr   He has a walker at home and agreed to bring it with him to rehab Patient was counseled on exercise guidelines to achieve a minimum of 150 mins/wk of moderate intensity (RPE 4-6) exercise and encouraged to add 1-2 days of exercise on opposite days of cardiac rehab as tolerated  We discussed current dietary habits and goals of heart healthy eating for lipid management, diabetes management and weight loss  The patient has T2D  He does not check his blood sugars regularly  He also has sleep apnea and doesn`t use his CPAP Patient's goals include: being able to walk better  The patient's CAD risk factors include:  inactivity, obesity/overweight, hypertension,sleep apnea hyperlipidemia and diabetes  His education will focus on lifestyle modification/education specific to His needs  He will recieve education classes on heart healthy eating, reading food labels, stress management, risk factor reduction, understanding heart disease and common heart medications  Patient will attend 36 monitored exercise sessions, 3x/wk for 12-18 weeks beginning 2013         Medication compliance: Yes   Comments: Pt reports to be compliant with medications  Fall Risk: Moderate/high   Comments: Patient uses walking assist device (walker/cane/rollator) and Reports a fall in the past 6 months    EKG Interpretation: nsr      EXERCISE ASSESSMENT and PLAN    Exercise Prescription:      Frequency: 3 days/week   Supplement with home exercise 2+ days/wk as tolerated       Minutes: 6         METS: 1 5-2            HR: 86-92   RPE: 4-6         Modalities: Room walking      30 Day Goals for Exercise Progression:    Frequency: 3 days/week of cardiac rehab       Supplement with home exercise 2+ days/wk as tolerated    Minutes: 30 -40                            >150 mins/wk of moderate intensity exercise   METS:1 5-2 5   HR: 2-=30bpm above resting    RPE: 4-6   Modalities: UBE, NuStep and Room walking    Strength trainin-3 days / week  12-15 repetitions  1-2 sets per modality   Will be added following 2-3 weeks of monitored exercise sessions   Modalities: Chest Press and Pull Downs    Home Exercise: Type: walking    Goals: 10% improvement in functional capacity - based on max METs achieved in fitness assessment, improved DASI score by 10%, Increase in exercise capacity by 40% - based on peak METs tolerated in cardiac rehab exercise session, Exercise 5 days/wk, >150mins/wk of moderate intensity exercise, Attend Rehab regularly, Decrease sitting time and Start a walking program    Progression Toward Goals:  Reviewed Pt goals and determined plan of care    Education: benefit of exercise for CAD risk factors, AHA guidelines to achieve >150 mins/wk of moderate exercise and RPE scale   Plan:education on home exercise guidelines and home exercise 30+ mins 2 days opposite CR  Readiness to change: Pre-Contemplation:   (Not yet acknowledging that there is a problem behavior that needs to change)      NUTRITION ASSESSMENT AND PLAN    Weight control:    Starting weight: 222   Current weight:     Waist circumference:    Starting:    Current:      Diabetes: T2D  A1c: 7 6   last measured: 1/5/23    Lipid management: Last lipid profile  9/13/21Chol 140    HDL 36  LDL 66    Goals:LDL <100, HDL >40, TRG <150, CHOL <200, decreased body fat % <33%  (F), improved A1c  < 7 0% and 2 5-5%  wt loss    Measurable goals were based Rate Your Plate Dietary Self-Assessment  These are the areas in which the patient could score higher on the assessment  Goals include recommendations for a heart healthy diet based on American Heart Association  Progression Toward Goals: Reviewed Pt goals and determined plan of care, Will continue to educate and progress as tolerated      Education: heart healthy eating  low sodium diet  nutrition for  lipid management  nutrition for Improved BG control  target goal for A1c <7 0  exercise and diabetes management   wt  loss   Plan: replace butter with soft spreads made with olive oil, canola or yogurt, replace refined grain bread with whole grain bread, replace unhealthy snacks with fruits & vegs, reduce portion sizes, reduce red meat 1x/wk, switch to skim or 1% milk, eat fewer desserts and sweets, avoid processed foods, remove salt shaker from table, use salt substitute like Mrs  Dash, increase utilization of fresh or dried herbs, eat more home cooked meals and eat out less often, will try new grains like brown rice, quinoa, farro, will replace sugar sweetened cereals with whole grain or oatmeal, monitor home blood glucose, drink more water, learn how to read food labels and keep added daily sugar <25g/day  Readiness to change: Pre-Contemplation:   (Not yet acknowledging that there is a problem behavior that needs to change)      PSYCHOSOCIAL ASSESSMENT AND PLAN    Emotional:  Depression assessment:  PHQ-9 = 5  5-9 = Mild Depression            Anxiety measure:  ANGEL-7 = 4  0-4  = Not anxious  Self-reported stress level:  4  Social support: Excellent    Goals:  Reduce perceived stress to 1-3/10, PHQ-9 - reduced severity by one level, Feelings in [facility] Score < 3, Physical Fitness in [facility] Score < 3, Change in Health in [place] Score < 3 , improved positive thoughts of well being and increased energy    Progression Toward Goals: Reviewed Pt goals and determined plan of care, Will continue to educate and progress as tolerated      Education: signs/sxs of depression and depression and CAD  Plan: Class: Stress and Your Health, PHQ-9 >5 will refer to MD, Exercise, Keep a positive mindset and Learn how to relax and slow down  Readiness to change: Pre-Contemplation:   (Not yet acknowledging that there is a problem behavior that needs to change)      OTHER CORE COMPONENTS     Tobacco:   Social History     Tobacco Use   Smoking Status Never   Smokeless Tobacco Never       Tobacco Use Intervention:   N/A:  Patient is a non-smoker     Anginal Symptoms:  chest pressure   NTG use: Pt has not used NTG since event    Blood pressure:    Restin/60   Exercise: 120/60               Recovery  104/60  Goals: reduced dietary sodium <2300mg, moderate intensity exercise >150 mins/wk, medication compliance, reduce number of medications  needed for BP control and reduced angina     Progression Toward Goals: Will continue to educate and progress as tolerated      Education:  low sodium diet and HTN, proper use of sublingual NTG, Education class:  Common Heart Medications and Education class: Understanding Heart Disease  Plan: Class: Understanding Heart Disease, Avoid Processed foods, engage in regular exercise, eliminate salt shaker at the table, use salt substitutes, check labels for sodium content and monitor home BP  Readiness to change: Pre-Contemplation:   (Not yet acknowledging that there is a problem behavior that needs to change)

## 2023-01-11 NOTE — PROGRESS NOTES
CARDIAC REHAB ASSESSMENT    Today's date: 2023  Patient name: Ranjit Bishop     : 1945       MRN: [id number]  PCP: Arnulfo Mosquera DO  Referring Physician: Selvin Guevara  Cardiologist: Catalina Rivera  Surgeon:   Dx: Cardiac stent,LAD    Date of onset: 23  Cultural needs: none expressed    Weight    Wt Readings from Last 1 Encounters:   01/10/23 99 8 kg (220 lb)      Height:   Ht Readings from Last 1 Encounters:   01/10/23 5' 8" (1 727 m)     Medical History:   Past Medical History:   Diagnosis Date   • BPH (benign prostatic hyperplasia)    • CAD (coronary artery disease)    • Cancer ([place] [address] )     basal cell   • Chronic kidney disease     stage 3   • Colon polyp    • CPAP (continuous positive airway pressure) dependence    • Diabetes mellitus ([facility] )     niddm   • Disease of thyroid gland    • Gout    • High cholesterol    • Hypertension    • MI, 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    • Myocardial infarction ([place] [address] )    • S/P cardiac catheterization 2022   • Sleep apnea          Physical Limitations: bilateral knee arthritis    Fall Risk: High   Comments: Patient uses walking assist device (walker/cane/rollator) and Reports a fall in the past 6 months    Anginal Equivalent: Chest Pressure   NTG use: Pt has not used NTG since event    Risk Factors   Cholesterol: yes  Smoking: non smoker  HTN: yes  DM: Type 2   Obesity: Yes   Inactivity: Yes  Stress:  perceived  stress: 4/10   Stressors:health issues   Goals for Stress Management:Practice Relaxation Techniques, Exercise, Keep a positive mindset and Enjoy a hobby    Family History:  Family History   Adopted: Yes   Problem Relation Age of Onset   • No Known Problems Mother    • No Known Problems Father        Allergies: Patient has no known allergies    ETOH:   Social History     Substance and Sexual Activity   Alcohol Use Not Currently    Comment: socially, cut down in , previously did more than          Current Medications:   Current Outpatient Medications   Medication Sig Dispense Refill   • allopurinol (ZYLOPRIM) 100 mg tablet Take 1 tablet (100 mg total) by mouth daily 90 tablet 0   • allopurinol (ZYLOPRIM) 100 mg tablet Take 1 tablet (100 mg total) by mouth daily 90 tablet 0   • aspirin 81 mg chewable tablet Chew 1 tablet (81 mg total) daily  0   • atorvastatin (LIPITOR) 80 mg tablet Take 1 tablet by mouth once daily 90 tablet 0   • benzonatate (TESSALON) 200 MG capsule Take 1 capsule (200 mg total) by mouth 3 (three) times a day as needed for cough 20 capsule 0   • Blood Glucose Monitoring Suppl (OneTouch Verio Reflect) w/Device KIT Check blood sugars twice daily  Please substitute with appropriate alternative as covered by patient's insurance  Dx: E11 65 1 kit 0   • Empagliflozin (Jardiance) 10 MG TABS Take 1 tablet (10 mg total) by mouth every morning 90 tablet 1   • escitalopram (LEXAPRO) 20 mg tablet Take 1 tablet (20 mg total) by mouth daily 90 tablet 0   • glimepiride (AMARYL) 4 mg tablet TAKE 1 TABLET TWICE A  tablet 0   • glucose blood (OneTouch Verio) test strip Check blood sugars twice daily  Please substitute with appropriate alternative as covered by patient's insurance  Dx: E11 65 200 each 3   • levothyroxine 88 mcg tablet TAKE 1 TABLET BY MOUTH ONCE DAILY IN THE MORNING 90 tablet 0   • levothyroxine 88 mcg tablet Take 1 tablet (88 mcg total) by mouth daily in the early morning 90 tablet 0   • losartan (COZAAR) 25 mg tablet Take 1 tablet (25 mg total) by mouth daily 90 tablet 1   • metFORMIN (GLUCOPHAGE) 1000 MG tablet Take 1 tablet (1,000 mg total) by mouth 2 (two) times a day with meals 180 tablet 0   • multivitamin (THERAGRAN) TABS Take 1 tablet by mouth daily     • nitroglycerin (NITROSTAT) 0 4 mg SL tablet Place 1 tablet (0 4 mg total) under the tongue every 5 (five) minutes as needed for chest pain 25 tablet 0   • OneTouch Delica Lancets 70S MISC Check blood sugars twice daily   Please substitute with appropriate alternative as covered by patient's insurance  Dx: E11 65 200 each 3   • prednisoLONE acetate (PRED FORTE) 1 % ophthalmic suspension INSTILL 1 DROP INTO RIGHT EYE ONCE DAILY     • propranolol (INDERAL LA) 60 mg 24 hr capsule Take 1 capsule (60 mg total) by mouth daily 90 capsule 0   • ranolazine (RANEXA) 500 mg 12 hr tablet Take 1 tablet (500 mg total) by mouth 2 (two) times a day 60 tablet 2   • ticagrelor (BRILINTA) 90 MG Take 1 tablet (90 mg total) by mouth every 12 (twelve) hours 60 tablet 2   • torsemide (DEMADEX) 10 mg tablet Take 1 tablet (10 mg total) by mouth daily 90 tablet 1   • zonisamide (ZONEGRAN) 50 MG capsule Take 1 tablet (50 mg total) by mouth daily 90 capsule 2     No current facility-administered medications for this visit  Functional Status Prior to Diagnosis for Treatment   Occupation:retired  Recreation:  Playing with grand children   ADL’s:   [place]: able to perform self-care  Exercise: none  Other:     Current Functional Status  Occupation: retired  Recreation:watching grandchildren  ADL’s:  [place]: able to perform self-care  Exercise: none  Other:     Patient Specific Goals:  Lose weight and decrease A1c    Short Term Program Goals: Start regulare exercise program    Long Term Goals:  Reduce risk for heart disease / Dossie Beavers function    Ability to reach goals/rehabilitation potential:  Excellent    Projected return to function: 12 weeks  Objective tests: sub-max TM ETT      Nutritional   Reviewed details of Rate your Plate  Discussed key elements of heart healthy eating  Reviewed patient goals for dietary modifications and their clinical implications  Reviewed most recent lipid profile       Goals for dietary modification based on Rate Your Plate Dietary Assessment:  choose lean cuts of meat  eliminate processed meats  reduce portions of meat to 3 oz  increase fish intake  more meatless meals  low fat dairy   reduced fat cheese  increase whole grains  increase fruits and vegetables  eliminate butter  low sodium  improved snack choices  more nuts/seeds  reduce sweets/frozen desserts  heathier choices while dining out      Emotional/Social  Patient reports he/she is coping well with good social support and denies depression or anxiety    Marital status:     Domestic Violence Screening: No    Comments:

## 2023-01-11 NOTE — PROGRESS NOTES
Dania Mace        Dear Dr Dana Freedman,    Emotional well-being and depression is addressed in the cardiac  rehab evaluation  To assess the severity of depression, patients are given the PHQ-9 Depression Questionnaire  This is to inform you that your patient Lucy Avery scored a 5 which is interpreted as 5-9 = Mild Depression  Your patient was provided contact information for [facility] and has been encouraged to enroll in [employer]  A repeat PHQ -9 will be administered in 30 days to assess improvement  Thank you for your support of cardiopulmonary rehab      Sincerely,    Tatiana Robertson 88

## 2023-01-12 ENCOUNTER — OFFICE VISIT (OUTPATIENT)
Dept: CARDIOLOGY CLINIC | Facility: HOSPITAL | Age: 78
End: 2023-01-12

## 2023-01-12 VITALS
DIASTOLIC BLOOD PRESSURE: 62 MMHG | WEIGHT: 218 LBS | HEIGHT: 68 IN | OXYGEN SATURATION: 96 % | HEART RATE: 65 BPM | BODY MASS INDEX: 33.04 KG/M2 | SYSTOLIC BLOOD PRESSURE: 110 MMHG

## 2023-01-12 DIAGNOSIS — I25.118 CORONARY ARTERY DISEASE OF NATIVE ARTERY OF NATIVE HEART WITH STABLE ANGINA PECTORIS (HCC): Primary | ICD-10-CM

## 2023-01-12 DIAGNOSIS — I50.32 CHRONIC DIASTOLIC (CONGESTIVE) HEART FAILURE (HCC): ICD-10-CM

## 2023-01-12 DIAGNOSIS — N17.9 AKI (ACUTE KIDNEY INJURY) (HCC): ICD-10-CM

## 2023-01-12 DIAGNOSIS — I10 PRIMARY HYPERTENSION: ICD-10-CM

## 2023-01-12 DIAGNOSIS — E11.65 CONTROLLED TYPE 2 DIABETES MELLITUS WITH HYPERGLYCEMIA, WITHOUT LONG-TERM CURRENT USE OF INSULIN (HCC): ICD-10-CM

## 2023-01-12 DIAGNOSIS — E78.2 MIXED HYPERLIPIDEMIA: ICD-10-CM

## 2023-01-12 DIAGNOSIS — I35.0 AORTIC STENOSIS, MODERATE: ICD-10-CM

## 2023-01-13 ENCOUNTER — CLINICAL SUPPORT (OUTPATIENT)
Dept: CARDIAC REHAB | Facility: HOSPITAL | Age: 78
End: 2023-01-13

## 2023-01-13 DIAGNOSIS — Z95.5 STENTED CORONARY ARTERY: ICD-10-CM

## 2023-01-13 NOTE — ADDENDUM NOTE
Addended by: Micha Russell on: 1/13/2023 11:38 AM     Modules accepted: Orders [facility]    Date of visit: 12/19/2018    Primary Care Provider: Dr. Paul Aguilar    Reason for consultation:    Ramírez Epperson is a 44 year old male who is seen in consultation today at the request of his primary care physician, Paul Aguilar.     Consultation and Evaluation for: Medical spine evaluation    Review of [place] Prescription Monitoring Program (): Today I have also reviewed the patient's history of controlled substance use, as provided by [place] licensed pharmacies and prescriber dispensers.     Review of Pain Questionnaire: Please see the [facility] health questionnaire, which the patient completed and reviewed with me in detail.    Review of Electronic Chart: Today I have also reviewed available medical information in the patient's medical record at [facility] ([facility]), including relevant provider notes, laboratory work, and imaging.       Chief Complaint:    Low back and leg pain    Pain history:  Ramírez Epperson is a 44 year old male who first started having problems with right low back pain in 2014.     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  He also was having pain radiating down the back of his leg to the calf.  He has worked with a few different physical therapy groups, he continues doing the exercises but has had limited benefit for his pain. He has also worked with a chiropractor and continues to go monthly but only gets 1 or 2 days of benefit and sometimes none at all. Working with PT did improve his right posterior leg pain. His most bothersome pain currently is the low back, buttocks, hips region. He previously had some numbness and tingling in the right posterior leg but this resolved as the pain resolved.     About a month ago he started having in the left low back and his left buttocks and hips.    He hasn't tried any oral medications for his  symptoms.        Recently he took a steroid burst for a week which was very beneficial for about 2 weeks then wore off.     Pain is localized to: low back, both sides and down the buttocks   Radiation of pain: none  Other associated symptoms: denies weakness, paresthesias, numbness in the LEs  History of cancer or weight loss: denies    Pain is described as aching, throbbing, shotting, sharp, nagging, unbearable   Quality and timing of pain: constant  Pain rating: intensity averages 7/10 on a 0-10 scale.  Aggravating factors include: Too much activity, too much rest  Relieving factors include: stretching  Denies red flags including: bowel or bladder symptoms, fever, chills, saddle anesthesia, profound motor loss, history of cancer, history of immune compromise, weight loss.     Impact of Pain: There is significant limitation with mobility, activity.    Current medication treatments include:  -Medrol dose pack was very helpful  Pain medications are being prescribed by PCP.    Previous medication treatments included:  none    Other treatments have included:  Ramírez Epperson has not been seen at a pain clinic in the past.    Behavioral interventions: none  PT: Has done several times in the past with some benefit  Manual Medicine: very limited benefit, 1-2 days of relief    Interventional:  Acupuncture: helpful in the past  TENs Unit: hasn't tried  Injections: has had epidurals without benefit.       Past Medical History:  No past medical history on file.  Past Surgical History:  No past surgical history on file.  Medications:  Current Outpatient Medications   Medication Sig Dispense Refill     diclofenac (VOLTAREN) 50 MG EC tablet Take 1 tablet (50 mg) by mouth 2 times daily as needed for moderate pain 60 tablet 0     lisinopril (PRINIVIL/ZESTRIL) 10 MG tablet TAKE 1 TABLET (10 MG) BY MOUTH DAILY 90 tablet 1     LISINOPRIL PO Take 10 mg by mouth daily       methocarbamol (ROBAXIN) 500 MG tablet Take 1.5 tablets (750  mg) by mouth nightly as needed for muscle spasms 45 tablet 0     methylPREDNISolone (MEDROL DOSEPAK) 4 MG tablet Follow package instructions 21 tablet 0     Allergies:   No Known Allergies    Social History:  Home situation: Lives in [facility]  Occupation/Schooling:  Tobacco use: denies  Alcohol use: once/month  Drug use: denies    Chemical dependency: The patient denies any history of treatment for alcohol or drug abuse.    Family history:  Family History   Problem Relation Age of Onset     Hypertension Mother      Hypertension Father      Coronary Artery Disease Maternal Grandfather      Coronary Artery Disease Paternal Grandfather              Diagnostic Tests: Had MRI in 2017 but doesn't have the results or the disc.    Lab Results   Component Value Date    WBC 3.9 11/27/2018     Lab Results   Component Value Date    RBC 5.20 11/27/2018     Lab Results   Component Value Date    HGB 15.3 11/27/2018     Lab Results   Component Value Date    HCT 45.0 11/27/2018     No components found for: MCT  Lab Results   Component Value Date    MCV 87 11/27/2018     Lab Results   Component Value Date    MCH 29.4 11/27/2018     Lab Results   Component Value Date    MCHC 34.0 11/27/2018     Lab Results   Component Value Date    RDW 12.6 11/27/2018     Lab Results   Component Value Date     11/27/2018     Last Comprehensive Metabolic Panel:  Sodium   Date Value Ref Range Status   11/27/2018 139 133 - 144 mmol/L Final     Potassium   Date Value Ref Range Status   11/27/2018 4.2 3.4 - 5.3 mmol/L Final     Chloride   Date Value Ref Range Status   11/27/2018 107 94 - 109 mmol/L Final     Carbon Dioxide   Date Value Ref Range Status   11/27/2018 26 20 - 32 mmol/L Final     Anion Gap   Date Value Ref Range Status   11/27/2018 6 3 - 14 mmol/L Final     Glucose   Date Value Ref Range Status   11/27/2018 84 70 - 99 mg/dL Final     Urea Nitrogen   Date Value Ref Range Status   11/27/2018 14 7 - 30 mg/dL Final      Creatinine   Date Value Ref Range Status   11/27/2018 0.96 0.66 - 1.25 mg/dL Final     GFR Estimate   Date Value Ref Range Status   11/27/2018 85 >60 mL/min/1.7m2 Final     Comment:     Non  GFR Calc     Calcium   Date Value Ref Range Status   11/27/2018 8.9 8.5 - 10.1 mg/dL Final     Bilirubin Total   Date Value Ref Range Status   11/27/2018 0.4 0.2 - 1.3 mg/dL Final     Alkaline Phosphatase   Date Value Ref Range Status   11/27/2018 71 40 - 150 U/L Final     ALT   Date Value Ref Range Status   11/27/2018 81 (H) 0 - 70 U/L Final     AST   Date Value Ref Range Status   11/27/2018 37 0 - 45 U/L Final                 Review of Systems:    POSTIVE IN BOLD  GENERAL: fever/chills, fatigue, general unwell feeling, weight gain/loss.  HEAD/EYES:  headache, dizziness, or vision changes.    EARS/NOSE/THROAT:  Nosebleeds, hearing loss, sinus infection, earache, tinnitus.  IMMUNE:  Allergies, cancer, immune deficiency, or infections.  SKIN:  Urticaria, rash, hives  HEME/Lymphatic:   anemia, easy bruising, easy bleeding.  RESPIRATORY:  cough, wheezing, or shortness of breath  CARDIOVASCULAR/Circulation:  Extremity edema, syncope, hypertension, tachycardia, or angina.  GASTROINTESTINAL:  abdominal pain, nausea/emesis, diarrhea, constipation,  hematochezia, or melena.  ENDOCRINE:  Diabetes, steroid use,  thyroid disease or osteoporosis.  MUSCULOSKELETAL: neck pain, back pain, arthralgia, arthritis, or gout.  GENITOURINARY:  frequency, urgency, dysuria, difficulty voiding, hematuria or incontinence.  NEUROLOGIC:  weakness, numbness, paresthesias, seizure, tremor, stroke or memory loss.  PSYCHIATRIC:  depression, anxiety, stress, suicidal thoughts or mood swings.     Physical Exam:  Vitals:    12/19/18 0957   BP: (!) 139/94   BP Location: Left arm   Patient Position: Sitting   Cuff Size: Adult Large   Pulse: 66   SpO2: 96%   Weight: 114.8 kg (253 lb)     Exam:  Constitutional: healthy, alert and no  distress  Head: normocephalic. Atraumatic.   Eyes: no redness or jaundice noted   ENT: oropharnx normal.  MMM.   Cardiovascular: RRR no m/g/r  Respiratory: clear to auscultation bilaterally  Gastrointestinal: soft, non-tender, normoactive bowel sounds   Skin: no suspicious lesions or rashes  Psychiatric: mentation appears normal and affect normal/bright    Musculoskeletal exam:  Gait/Station/Posture: Antalgic, slow. Decreased arm swing and stride  Cervical spine: ROM  Thoracic spine:  Normal ROM  Lumbar spine: mildly reduced in all planes. Pain with extension/rotation bilaterally.   Myofascial tenderness:  Pain with palpation of the lower lumbar paraspinals.  Straight leg exam: negative  Sacha's maneuver: negative  Sacroiliac (SI) joint tenderness: negative  Greater trochanteric tenderness: negative  Piriformis tenderness: negative  Bilateral hip motion without discomfort     Neurologic exam:  Motor:  5/5 UE and LE strength  Reflexes:     Biceps:     R:  2/4 L: 2/4   Brachioradialis   R:  2/4 L: 2/4   Triceps:  R:  2/4 L: 2/4   Patella:  R:  2/4 L: 2/4   Achilles:  R:  2/4 L: 2/4   Sensory:  (upper and lower extremities):   Light touch and pin prick were symmetric and normal.   Allodynia: absent    Hyperalgesia: absent       Screening tools:  Oswestry score is 8 corresponding to a 16% disability    [place]  database review: no medications    Assessment:  Ramírez Epperson is a 44 year old male with a past medical history significant for hypertension who presents with complaints of chronic low back and buttock pain.     Exam shows pain with palpation of the lumbar paraspinals and with extension/rotation bilaterally. Strength, sensation and reflexes are intact. He has done PT several times with limited benefit. Reports having epidural injections in the past with no benefit for his pain.    Based on history and exam it is likely that his pain is mediated by lumbar spondylosis and facet arthropathy. He likely has  myofascial pain as well. He had an MRI in 2017 but these records aren't available in our system or [facility] imaging.        Plan:  Diagnosis reviewed, treatment options addressed, and risks/benefits discussed. The patient was involved in shared decision making regarding the plan as laid out below.    1. Education: The patient was educated as to the natural history of their disorder. Reassurance was given and the patient was encouraged to engage in activity and movement as able.  2. Physical Therapy: He has done PT several times in the past and continues to do the regimen provided previously. 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 Discussed that he should continue these activities.  3. Diagnostic Studies:  He had an MRI in 2017 and will work on getting these results and images to the clinic for review.  4. Medication Management:    1. Voltaren 50mg BID prn. Discussed avoiding all other NSAIDs while taking this medication. To take primarily on days where he anticipates a lot of activity.  2. Methocarbamol 500mg hs prn. If no side effects and limited relief, can increase to 750mg hs prn.  5. Further procedures recommended:  6. Consider lumbar MBB/RFA pending review of MRI. Discussed the procedure with the patient and he was agreeable to trying this.  7. Recommendations/follow-up for PCP:  None at this time.  8. Follow up: Pending review of MRI.            Total time spent face to face was 45 minutes and more than 50% of face to face time was spent in counseling and/or coordination of care regarding the diagnosis and recommendations above.      Antwan Clark D.O.  Medical Spine Specialist  [facility]

## 2023-01-16 ENCOUNTER — CLINICAL SUPPORT (OUTPATIENT)
Dept: CARDIAC REHAB | Facility: HOSPITAL | Age: 78
End: 2023-01-16

## 2023-01-16 ENCOUNTER — LAB (OUTPATIENT)
Dept: LAB | Facility: HOSPITAL | Age: 78
End: 2023-01-16
Attending: FAMILY MEDICINE

## 2023-01-16 DIAGNOSIS — E03.9 ACQUIRED HYPOTHYROIDISM: ICD-10-CM

## 2023-01-16 DIAGNOSIS — Z95.5 STENTED CORONARY ARTERY: ICD-10-CM

## 2023-01-16 DIAGNOSIS — E11.65 CONTROLLED TYPE 2 DIABETES MELLITUS WITH HYPERGLYCEMIA, WITHOUT LONG-TERM CURRENT USE OF INSULIN (HCC): ICD-10-CM

## 2023-01-16 LAB
ANION GAP SERPL CALCULATED.3IONS-SCNC: 10 MMOL/L (ref 4–13)
BUN SERPL-MCNC: 25 MG/DL (ref 5–25)
CALCIUM SERPL-MCNC: 9.3 MG/DL (ref 8.3–10.1)
CHLORIDE SERPL-SCNC: 103 MMOL/L (ref 96–108)
CHOLEST SERPL-MCNC: 203 MG/DL
CO2 SERPL-SCNC: 26 MMOL/L (ref 21–32)
CREAT SERPL-MCNC: 1.44 MG/DL (ref 0.6–1.3)
CREAT UR-MCNC: 41 MG/DL
EST. AVERAGE GLUCOSE BLD GHB EST-MCNC: 186 MG/DL
GFR SERPL CREATININE-BSD FRML MDRD: 46 ML/MIN/1.73SQ M
GLUCOSE P FAST SERPL-MCNC: 224 MG/DL (ref 65–99)
HBA1C MFR BLD: 8.1 %
HDLC SERPL-MCNC: 45 MG/DL
LDLC SERPL CALC-MCNC: 117 MG/DL (ref 0–100)
MICROALBUMIN UR-MCNC: 79 MG/L (ref 0–20)
MICROALBUMIN/CREAT 24H UR: 193 MG/G CREATININE (ref 0–30)
NONHDLC SERPL-MCNC: 158 MG/DL
POTASSIUM SERPL-SCNC: 4.6 MMOL/L (ref 3.5–5.3)
SODIUM SERPL-SCNC: 139 MMOL/L (ref 135–147)
TRIGL SERPL-MCNC: 206 MG/DL
TSH SERPL DL<=0.05 MIU/L-ACNC: 2.9 UIU/ML (ref 0.45–4.5)

## 2023-01-16 NOTE — RESULT ENCOUNTER NOTE
Please call the patient regarding his abnormal result    Patient has stable kidney function still is a reduced kidney function but it is certainly not dropping

## 2023-01-16 NOTE — RESULT ENCOUNTER NOTE
Please call the patient regarding his abnormal result    Patient is spilling some protein in his urine not an excessive amount but still abnormal needs strict control of blood pressure and sugar

## 2023-01-18 ENCOUNTER — CLINICAL SUPPORT (OUTPATIENT)
Dept: CARDIAC REHAB | Facility: HOSPITAL | Age: 78
End: 2023-01-18

## 2023-01-18 DIAGNOSIS — Z95.5 STENTED CORONARY ARTERY: ICD-10-CM

## 2023-01-20 ENCOUNTER — CLINICAL SUPPORT (OUTPATIENT)
Dept: CARDIAC REHAB | Facility: HOSPITAL | Age: 78
End: 2023-01-20

## 2023-01-20 DIAGNOSIS — Z95.5 STENTED CORONARY ARTERY: ICD-10-CM

## 2023-01-23 ENCOUNTER — APPOINTMENT (OUTPATIENT)
Dept: LAB | Facility: HOSPITAL | Age: 78
End: 2023-01-23
Attending: FAMILY MEDICINE

## 2023-01-23 ENCOUNTER — CLINICAL SUPPORT (OUTPATIENT)
Dept: CARDIAC REHAB | Facility: HOSPITAL | Age: 78
End: 2023-01-23

## 2023-01-23 DIAGNOSIS — N17.9 ACUTE KIDNEY INJURY SUPERIMPOSED ON CHRONIC KIDNEY DISEASE (HCC): ICD-10-CM

## 2023-01-23 DIAGNOSIS — N18.9 ACUTE KIDNEY INJURY SUPERIMPOSED ON CHRONIC KIDNEY DISEASE (HCC): ICD-10-CM

## 2023-01-23 DIAGNOSIS — Z95.5 STENTED CORONARY ARTERY: ICD-10-CM

## 2023-01-23 LAB
ANION GAP SERPL CALCULATED.3IONS-SCNC: 10 MMOL/L (ref 4–13)
BUN SERPL-MCNC: 33 MG/DL (ref 5–25)
CALCIUM SERPL-MCNC: 9.6 MG/DL (ref 8.4–10.2)
CHLORIDE SERPL-SCNC: 102 MMOL/L (ref 96–108)
CO2 SERPL-SCNC: 26 MMOL/L (ref 21–32)
CREAT SERPL-MCNC: 1.8 MG/DL (ref 0.6–1.3)
GFR SERPL CREATININE-BSD FRML MDRD: 35 ML/MIN/1.73SQ M
GLUCOSE P FAST SERPL-MCNC: 231 MG/DL (ref 65–99)
POTASSIUM SERPL-SCNC: 4.1 MMOL/L (ref 3.5–5.3)
SODIUM SERPL-SCNC: 138 MMOL/L (ref 135–147)

## 2023-01-24 DIAGNOSIS — N18.31 STAGE 3A CHRONIC KIDNEY DISEASE (HCC): Primary | ICD-10-CM

## 2023-01-25 ENCOUNTER — CLINICAL SUPPORT (OUTPATIENT)
Dept: CARDIAC REHAB | Facility: HOSPITAL | Age: 78
End: 2023-01-25

## 2023-01-25 DIAGNOSIS — Z95.5 STENTED CORONARY ARTERY: ICD-10-CM

## 2023-01-27 ENCOUNTER — TELEPHONE (OUTPATIENT)
Dept: FAMILY MEDICINE CLINIC | Facility: CLINIC | Age: 78
End: 2023-01-27

## 2023-01-27 ENCOUNTER — CLINICAL SUPPORT (OUTPATIENT)
Dept: CARDIAC REHAB | Facility: HOSPITAL | Age: 78
End: 2023-01-27

## 2023-01-27 DIAGNOSIS — Z95.5 STENTED CORONARY ARTERY: ICD-10-CM

## 2023-01-27 NOTE — TELEPHONE ENCOUNTER
Sugars running higher HbA1c 8 1 - Sugars running 200-236 with the last 3 BMP's    Nephrology ordered another F/U BMP to be done 2/6/23 - Urine out-put is stable    Weight down from 217 8 to 211# today    QUESTIONS:    Do you want Pt to go back up to the 1000mg of Metformin? (Was told to reduce metformin down to 500 mg once a day from a dose of 2000 mg daily at [address])    Do you want Pt to restart the Torsemide 10mg?

## 2023-01-27 NOTE — TELEPHONE ENCOUNTER
Would rather see him increase his Jardiance to 25 mg as opposed to the 10 mg to get better control of his sugars and this may actually help him to lose a little more with fluid so I would not restart the torsemide

## 2023-01-29 DIAGNOSIS — M1A.9XX0 CHRONIC GOUT WITHOUT TOPHUS, UNSPECIFIED CAUSE, UNSPECIFIED SITE: ICD-10-CM

## 2023-01-30 ENCOUNTER — CLINICAL SUPPORT (OUTPATIENT)
Dept: CARDIAC REHAB | Facility: HOSPITAL | Age: 78
End: 2023-01-30

## 2023-01-30 DIAGNOSIS — I21.4 NSTEMI (NON-ST ELEVATED MYOCARDIAL INFARCTION) (HCC): ICD-10-CM

## 2023-01-30 DIAGNOSIS — Z95.5 STENTED CORONARY ARTERY: ICD-10-CM

## 2023-01-30 RX ORDER — ALLOPURINOL 100 MG/1
TABLET ORAL
Qty: 90 TABLET | Refills: 0 | Status: SHIPPED | OUTPATIENT
Start: 2023-01-30

## 2023-01-31 ENCOUNTER — CONSULT (OUTPATIENT)
Dept: VASCULAR SURGERY | Facility: CLINIC | Age: 78
End: 2023-01-31

## 2023-01-31 VITALS
HEART RATE: 79 BPM | SYSTOLIC BLOOD PRESSURE: 96 MMHG | WEIGHT: 220.2 LBS | HEIGHT: 68 IN | DIASTOLIC BLOOD PRESSURE: 70 MMHG | BODY MASS INDEX: 33.37 KG/M2 | OXYGEN SATURATION: 97 %

## 2023-01-31 DIAGNOSIS — I73.9 PERIPHERAL ARTERIAL DISEASE (HCC): ICD-10-CM

## 2023-01-31 DIAGNOSIS — K55.1 MESENTERIC ARTERY STENOSIS (HCC): Primary | ICD-10-CM

## 2023-01-31 NOTE — PROGRESS NOTES
Assessment/Plan:    Mesenteric artery stenosis (HCC)  High grade SMA stenosis, found incidentally on CTA abd with runoff for isolated episode of foot pain  Denies food fear, postprandial pain, loss of appetite, weight loss, change in bowel habits  No abdominal pain  CTA abd runoff reviewed - mild celiac atherosclerosis, high grade calcified SMA stenosis at the origin/proximal segment, widely patent enlarged ARIEL    -We discussed the pathophysiology of mesenteric arterial occlusive disease, available treatment options and indications for treatment  Asymptomatic  Large ARIEL likely compensating for near occlusive SMA stenosis  -Continue medical optimization  Currently on ASA and statin  -Recommend mesenteric duplex in 1 yr  He will be moving to [place] within the year  Advised follow-up with vascular surgery in [place]  Discussed signs and symptoms of progressive symptoms associated with chronic mesenteric ischemia and advised following up with a vascular surgeon sooner if he should develop symptoms  Peripheral arterial disease (HCC)  Mild diffuse atherosclerotic disease without significant stenosis  Had isolated episode of right foot pain prompting ED visit and CTA abd runoff  Has not had any recurrence of pain  No claudication, rest pain or tissue loss    LEAD 7/2022 - Diffuse disease noted throughout the femoral-popliteal arteries without  significant focal stenosis  Evidence of diffuse tibioperoneal disease bilaterally  Unreliable CADEN bilaterally  Right [id number]; left [id number]  CTA abd runoff - diffuse atherosclerosis without significant stenosis, 3 vessel runoff bilaterally  Palpable pedal pulses bilaterally    -We discussed the pathophysiology of arterial occlusive disease, available treatment options and indications for treatment  Asymptomatic without claudication, rest pain or tissue loss  -Continue medical optimization  Currently on ASA and statin   Goal hgb A1c<7 0, LDL<70, BP <130/80  -Recommend walking program, ambulating at least 30 minutes for 3-5 times weekly  -Recommend moisturization of the lower extremities, avoidance of injury and close observation of bilateral feet for any new wounds  -Moving to [place]  Advised follow-up with vascular surgeon if he develops worsening symptoms of claudication, rest pain or tissue loss  Diagnoses and all orders for this visit:    Mesenteric artery stenosis ([facility] [address] )    Peripheral arterial disease ([facility] [address] )  -     Ambulatory Referral to Vascular Surgery        I have spent 30 minutes with Patient  today in which greater than 50% of this time was spent in counseling/coordination of care regarding Intructions for management, Importance of tx compliance and Impressions  Subjective:      Patient ID: Tres De Los Santos is a 69 y o  male  Patient is new to our office  He was referred here by Jeffrey Hollins MD  Patient had a CTA aorta and lower extremities w contrast done 12/18/2022  Patient denies any pain in his legs when walking or any open wounds  Patient also denies any abd pain, bloating nausea, vomiting or wt loss  He had a concern when he got pain from the arch of the left foot to his heel  It pain lasted 1 day and has not returned  Patient is taking ASA 81 mg, Brilinta and Atorvastatin  He is a non-smoker  HPI  Mr Melissa Mistry is a 77yo male with HTN, HLD, arthritis, depression, DMII, CAD, aortic stenosis, gout, OA, CKD with recent history of isolated foot pain prompting ED visit  His imaging work-up demonstrated high grade SMA stenosis with diffuse arterial atherosclerosis and no significant stenosis  His symptoms resolved and he has no recurrence  He denies any claudication, rest pain or tissue loss  He has no abdominal pain  He denies food fear, postprandial pain, loss of appetite, weight loss or change in bowel habits      The following portions of the patient's history were reviewed and updated as appropriate: allergies, current medications, past family history, past medical history, past social history, past surgical history and problem list     I have reviewed and made appropriate changes to the review of systems input by the medical assistant      Vitals:    01/31/23 1449   BP: 96/70   BP Location: Left arm   Patient Position: Sitting   Cuff Size: Standard   Pulse: 79   SpO2: 97%   Weight: 99 9 kg (220 lb 3 2 oz)   Height: 5' 8" (1 727 m)       Patient Active Problem List   Diagnosis   • Arthritis   • Depression with anxiety   • Controlled type 2 diabetes mellitus with hyperglycemia, without long-term current use of insulin ([facility])   • Gout   • Hyperlipidemia   • Hypertension   • Morbid obesity ([place] [address] )   • NSTEMI (non-ST elevated myocardial infarction) ([facility])   • Tremor   • Degenerative disc disease, lumbar   • Tremor, essential   • Diabetic neuropathy associated with diabetes mellitus due to underlying condition ([facility])   • Gait instability   • Frequent falls   • Infected epidermoid cyst   • Vitamin E deficiency   • Primary osteoarthritis involving multiple joints   • Chronic pain of both knees   • Primary osteoarthritis of both knees   • Coronary artery disease of native artery of native heart with stable angina pectoris ([facility])   • Primary osteoarthritis of right knee   • Primary osteoarthritis of left knee   • Fall (on) (from) other stairs and steps, initial encounter   • Recurrent major depressive disorder ([place] [address] )   • Benign hypertension with CKD (chronic kidney disease) stage III ([facility])   • Diabetic nephropathy associated with type 2 diabetes mellitus ([place] [address] )   • Concentric left ventricular hypertrophy   • Aortic stenosis, moderate   • Edema   • Mild cognitive impairment   • Bilateral lower extremity edema   • Other chest pain   • Leukocytosis   • Skin lesion   • Chronic diastolic (congestive) heart failure ([facility])   • Basal cell carcinoma (BCC) of right shoulder   • Stage 3a chronic kidney disease ([facility])   • S/P cardiac catheterization   • Peripheral arterial disease ([facility] [address] )   • Mesenteric artery stenosis [facility])       Past Surgical History:   Procedure Laterality Date   • BASAL CELL CARCINOMA EXCISION Right 1/21/2022    Procedure: EXCISION BASAL CELL CARCINOMA EXTREMITY;  Surgeon: Irasema Gant DO;  Location: [facility] MAIN OR;  Service: General   • CARDIAC CATHETERIZATION Left 12/29/2022    Procedure: Cardiac Left Heart Cath;  Surgeon: Wandy Rosas MD;  Location: [facility] CARDIAC C[facility] LAB; Service: Cardiology   • CARDIAC CATHETERIZATION N/A 12/29/2022    Procedure: Cardiac Coronary Angiogram;  Surgeon: Wandy Rosas MD;  Location: [facility]; Service: Cardiology   • CARDIAC CATHETERIZATION N/A 12/29/2022    Procedure: Cardiac pci;  Surgeon: Wandy Rosas MD;  Location: [facility] C[facility];   Service: Cardiology   • CATARACT EXTRACTION W/  INTRAOCULAR LENS IMPLANT     • CORNEAL TRANSPLANT     • CORONARY ANGIOPLASTY WITH STENT PLACEMENT      stents x3-- [id number]   • EYE SURGERY      repair corneal laceration   • MS COLONOSCOPY FLX DX W/COLLJ SPEC WHEN PFRMD N/A 3/21/2016    Procedure: COLONOSCOPY;  Surgeon: Bailee Mobley MD;  Location: [facility];  Service: Colorectal   • TONSILLECTOMY AND ADENOIDECTOMY         Family History   Adopted: Yes   Problem Relation Age of Onset   • No Known Problems Mother    • No Known Problems Father        Social History     Socioeconomic History   • Marital status: /Civil Union     Spouse name: Not on file   • Number of children: Not on file   • Years of education: Not on file   • Highest education level: Not on file   Occupational History   • Not on file   Tobacco Use   • Smoking status: Never   • Smokeless tobacco: Never   Vaping Use   • Vaping Use: Never used   Substance and Sexual Activity   • Alcohol use: Not Currently     Comment: socially, cut down in 2008, previously did more than    • Drug use: Never   • Sexual activity: Yes     Partners: Female   Other Topics Concern   • Not on file Social History Narrative    No advance directive     No living will      Social Determinants of Health     Financial Resource Strain: Not on file   Food Insecurity: Not on file   Transportation Needs: Not on file   Physical Activity: Not on file   Stress: Not on file   Social Connections: Not on file   Intimate Partner Violence: Not on file   Housing Stability: Not on file       No Known Allergies      Current Outpatient Medications:   •  allopurinol (ZYLOPRIM) 100 mg tablet, Take 1 tablet (100 mg total) by mouth daily, Disp: 90 tablet, Rfl: 0  •  allopurinol (ZYLOPRIM) 100 mg tablet, Take 1 tablet by mouth once daily, Disp: 90 tablet, Rfl: 0  •  aspirin 81 mg chewable tablet, Chew 1 tablet (81 mg total) daily, Disp: , Rfl: 0  •  atorvastatin (LIPITOR) 80 mg tablet, Take 1 tablet by mouth once daily, Disp: 90 tablet, Rfl: 0  •  Blood Glucose Monitoring Suppl (OneTouch Verio Reflect) w/Device KIT, Check blood sugars twice daily  Please substitute with appropriate alternative as covered by patient's insurance  Dx: E11 65, Disp: 1 kit, Rfl: 0  •  Empagliflozin (Jardiance) 10 MG TABS, Take 1 tablet (10 mg total) by mouth every morning, Disp: 90 tablet, Rfl: 1  •  escitalopram (LEXAPRO) 20 mg tablet, Take 1 tablet (20 mg total) by mouth daily, Disp: 90 tablet, Rfl: 0  •  glimepiride (AMARYL) 4 mg tablet, TAKE 1 TABLET TWICE A DAY, Disp: 180 tablet, Rfl: 0  •  glucose blood (OneTouch Verio) test strip, Check blood sugars twice daily  Please substitute with appropriate alternative as covered by patient's insurance   Dx: E11 65, Disp: 200 each, Rfl: 3  •  levothyroxine 88 mcg tablet, TAKE 1 TABLET BY MOUTH ONCE DAILY IN THE MORNING, Disp: 90 tablet, Rfl: 0  •  losartan (COZAAR) 25 mg tablet, Take 1 tablet (25 mg total) by mouth daily, Disp: 90 tablet, Rfl: 1  •  metFORMIN (GLUCOPHAGE) 1000 MG tablet, Take 1 tablet (1,000 mg total) by mouth 2 (two) times a day with meals, Disp: 180 tablet, Rfl: 0  •  multivitamin (THERAGRAN) TABS, Take 1 tablet by mouth daily, Disp: , Rfl:   •  nitroglycerin (NITROSTAT) 0 4 mg SL tablet, Place 1 tablet (0 4 mg total) under the tongue every 5 (five) minutes as needed for chest pain, Disp: 25 tablet, Rfl: 0  •  OneTouch Delica Lancets 16G MISC, Check blood sugars twice daily  Please substitute with appropriate alternative as covered by patient's insurance  Dx: E11 65, Disp: 200 each, Rfl: 3  •  prednisoLONE acetate (PRED FORTE) 1 % ophthalmic suspension, INSTILL 1 DROP INTO RIGHT EYE ONCE DAILY, Disp: , Rfl:   •  propranolol (INDERAL LA) 60 mg 24 hr capsule, Take 1 capsule (60 mg total) by mouth daily, Disp: 90 capsule, Rfl: 0  •  ranolazine (RANEXA) 500 mg 12 hr tablet, Take 1 tablet (500 mg total) by mouth 2 (two) times a day, Disp: 60 tablet, Rfl: 2  •  ticagrelor (BRILINTA) 90 MG, Take 1 tablet (90 mg total) by mouth every 12 (twelve) hours, Disp: 60 tablet, Rfl: 2  •  zonisamide (ZONEGRAN) 50 MG capsule, Take 1 tablet (50 mg total) by mouth daily, Disp: 90 capsule, Rfl: 2  •  benzonatate (TESSALON) 200 MG capsule, Take 1 capsule (200 mg total) by mouth 3 (three) times a day as needed for cough (Patient not taking: Reported on 1/12/2023), Disp: 20 capsule, Rfl: 0  •  levothyroxine 88 mcg tablet, Take 1 tablet (88 mcg total) by mouth daily in the early morning (Patient not taking: Reported on 1/12/2023), Disp: 90 tablet, Rfl: 0  •  torsemide (DEMADEX) 10 mg tablet, Take 1 tablet (10 mg total) by mouth daily (Patient not taking: Reported on 1/31/2023), Disp: 90 tablet, Rfl: 1    Review of Systems   Constitutional: Negative  HENT: Negative  Eyes: Negative  Respiratory: Positive for cough  Cardiovascular: Negative  Gastrointestinal: Negative  Endocrine: Negative  Genitourinary: Negative  Musculoskeletal: Positive for arthralgias  Skin: Negative  Allergic/Immunologic: Negative  Neurological: Negative  Hematological: Negative      Psychiatric/Behavioral: Negative  I have personally reviewed the ROS entered by MA and agree as documented  Objective:      BP 96/70 (BP Location: Left arm, Patient Position: Sitting, Cuff Size: Standard)   Pulse 79   Ht 5' 8" (1 727 m)   Wt 99 9 kg (220 lb 3 2 oz)   SpO2 97%   BMI 33 48 kg/m²          Physical Exam  Constitutional:       Appearance: Normal appearance  He is obese  HENT:      Head: Normocephalic and atraumatic  Cardiovascular:      Rate and Rhythm: Normal rate  Pulses:           Radial pulses are 1+ on the right side and 1+ on the left side  Dorsalis pedis pulses are 1+ on the right side and 1+ on the left side  Posterior tibial pulses are 1+ on the right side and 1+ on the left side  Pulmonary:      Effort: Pulmonary effort is normal    Abdominal:      General: There is no distension  Palpations: Abdomen is soft  Tenderness: There is no abdominal tenderness  Musculoskeletal:         General: Normal range of motion  Cervical back: Normal range of motion and neck supple  Skin:     General: Skin is warm and dry  Capillary Refill: Capillary refill takes less than 2 seconds  Neurological:      General: No focal deficit present  Mental Status: He is alert and oriented to person, place, and time  Psychiatric:         Mood and Affect: Mood normal          Behavior: Behavior normal          Thought Content:  Thought content normal          Judgment: Judgment normal

## 2023-01-31 NOTE — PATIENT INSTRUCTIONS
Mesenteric artery stenosis (HCC)  High grade SMA stenosis, found incidentally on CTA abd with runoff for isolated episode of foot pain  Denies food fear, postprandial pain, loss of appetite, weight loss, change in bowel habits  No abdominal pain  CTA abd runoff reviewed - mild celiac atherosclerosis, high grade calcified SMA stenosis at the origin/proximal segment, widely patent enlarged ARIEL     -We discussed the pathophysiology of mesenteric arterial occlusive disease, available treatment options and indications for treatment  Asymptomatic  Large ARIEL likely compensating for near occlusive SMA stenosis  -Continue medical optimization  Currently on ASA and statin  -Recommend mesenteric duplex in 1 yr  He will be moving to [place] within the year  Advised follow-up with vascular surgery in [place]  Discussed signs and symptoms of progressive symptoms associated with chronic mesenteric ischemia and advised following up with a vascular surgeon sooner if he should develop symptoms  Peripheral arterial disease (HCC)  Mild diffuse atherosclerotic disease without significant stenosis  Had isolated episode of right foot pain prompting ED visit and CTA abd runoff  Has not had any recurrence of pain  No claudication, rest pain or tissue loss     LEAD 7/2022 - Diffuse disease noted throughout the femoral-popliteal arteries without  significant focal stenosis  Evidence of diffuse tibioperoneal disease bilaterally  Unreliable CADEN bilaterally  Right [id number]; left [id number]  CTA abd runoff - diffuse atherosclerosis without significant stenosis, 3 vessel runoff bilaterally  Palpable pedal pulses bilaterally     -We discussed the pathophysiology of arterial occlusive disease, available treatment options and indications for treatment  Asymptomatic without claudication, rest pain or tissue loss  -Continue medical optimization  Currently on ASA and statin   Goal hgb A1c<7 0, LDL<70, BP <130/80  -Recommend walking program, ambulating at least 30 minutes for 3-5 times weekly  -Recommend moisturization of the lower extremities, avoidance of injury and close observation of bilateral feet for any new wounds  -Moving to [place]  Advised follow-up with vascular surgeon if he develops worsening symptoms of claudication, rest pain or tissue loss

## 2023-01-31 NOTE — ASSESSMENT & PLAN NOTE
Mild diffuse atherosclerotic disease without significant stenosis  Had isolated episode of right foot pain prompting ED visit and CTA abd runoff  Has not had any recurrence of pain  No claudication, rest pain or tissue loss    LEAD 7/2022 - Diffuse disease noted throughout the femoral-popliteal arteries without  significant focal stenosis  Evidence of diffuse tibioperoneal disease bilaterally  Unreliable CADEN bilaterally  Right [id number]; left [id number]  CTA abd runoff - diffuse atherosclerosis without significant stenosis, 3 vessel runoff bilaterally  Palpable pedal pulses bilaterally    -We discussed the pathophysiology of arterial occlusive disease, available treatment options and indications for treatment  Asymptomatic without claudication, rest pain or tissue loss  -Continue medical optimization  Currently on ASA and statin  Goal hgb A1c<7 0, LDL<70, BP <130/80  -Recommend walking program, ambulating at least 30 minutes for 3-5 times weekly  -Recommend moisturization of the lower extremities, avoidance of injury and close observation of bilateral feet for any new wounds  -Moving to [place]  Advised follow-up with vascular surgeon if he develops worsening symptoms of claudication, rest pain or tissue loss

## 2023-01-31 NOTE — ASSESSMENT & PLAN NOTE
High grade SMA stenosis, found incidentally on CTA abd with runoff for isolated episode of foot pain  Denies food fear, postprandial pain, loss of appetite, weight loss, change in bowel habits  No abdominal pain  CTA abd runoff reviewed - mild celiac atherosclerosis, high grade calcified SMA stenosis at the origin/proximal segment, widely patent enlarged ARIEL    -We discussed the pathophysiology of mesenteric arterial occlusive disease, available treatment options and indications for treatment  Asymptomatic  Large ARIEL likely compensating for near occlusive SMA stenosis  -Continue medical optimization  Currently on ASA and statin  -Recommend mesenteric duplex in 1 yr  He will be moving to [place] within the year  Advised follow-up with vascular surgery in [place]  Discussed signs and symptoms of progressive symptoms associated with chronic mesenteric ischemia and advised following up with a vascular surgeon sooner if he should develop symptoms

## 2023-01-31 NOTE — LETTER
January 31, 2023     DO Cristian Cheung [address]    Patient: Tasha Barros   YOB: 1945   Date of Visit: 1/31/2023       Dear Dr Holder Quiet: Thank you for referring Xochitl Stark to me for evaluation  Below are the relevant portions of my assessment and plan of care  Diagnoses and all orders for this visit:    Mesenteric artery stenosis (HCC)  High grade SMA stenosis, found incidentally on CTA abd with runoff for isolated episode of foot pain  Denies food fear, postprandial pain, loss of appetite, weight loss, change in bowel habits  No abdominal pain      CTA abd runoff reviewed - mild celiac atherosclerosis, high grade calcified SMA stenosis at the origin/proximal segment, widely patent enlarged ARIEL     -We discussed the pathophysiology of mesenteric arterial occlusive disease, available treatment options and indications for treatment  Asymptomatic  Large ARIEL likely compensating for near occlusive SMA stenosis  -Continue medical optimization  Currently on ASA and statin  -Recommend mesenteric duplex in 1 yr  He will be moving to [place] within the year  Advised follow-up with vascular surgery in [place]  Discussed signs and symptoms of progressive symptoms associated with chronic mesenteric ischemia and advised following up with a vascular surgeon sooner if he should develop symptoms      Peripheral arterial disease ([address] )  Mild diffuse atherosclerotic disease without significant stenosis  Had isolated episode of right foot pain prompting ED visit and CTA abd runoff  Has not had any recurrence of pain  No claudication, rest pain or tissue loss     LEAD 7/2022 - Diffuse disease noted throughout the femoral-popliteal arteries without  significant focal stenosis  Evidence of diffuse tibioperoneal disease bilaterally  Unreliable CADEN bilaterally   Right [id number]; left [id number]  CTA abd runoff - diffuse atherosclerosis without significant stenosis, 3 vessel runoff bilaterally  Palpable pedal pulses bilaterally     -We discussed the pathophysiology of arterial occlusive disease, available treatment options and indications for treatment  Asymptomatic without claudication, rest pain or tissue loss  -Continue medical optimization  Currently on ASA and statin  Goal hgb A1c<7 0, LDL<70, BP <130/80  -Recommend walking program, ambulating at least 30 minutes for 3-5 times weekly  -Recommend moisturization of the lower extremities, avoidance of injury and close observation of bilateral feet for any new wounds  -Moving to [place]  Advised follow-up with vascular surgeon if he develops worsening symptoms of claudication, rest pain or tissue loss  If you have questions, please do not hesitate to call me  I look forward to following Fay Nunn along with you           Sincerely,        Jessica Slaughter MD        CC: Maurilio Jules MD

## 2023-02-01 ENCOUNTER — CLINICAL SUPPORT (OUTPATIENT)
Dept: CARDIAC REHAB | Facility: HOSPITAL | Age: 78
End: 2023-02-01

## 2023-02-01 DIAGNOSIS — Z95.5 STENTED CORONARY ARTERY: ICD-10-CM

## 2023-02-03 ENCOUNTER — APPOINTMENT (OUTPATIENT)
Dept: CARDIAC REHAB | Facility: HOSPITAL | Age: 78
End: 2023-02-03

## 2023-02-05 DIAGNOSIS — E11.42 TYPE 2 DIABETES MELLITUS WITH DIABETIC POLYNEUROPATHY, WITH LONG-TERM CURRENT USE OF INSULIN (HCC): ICD-10-CM

## 2023-02-05 DIAGNOSIS — E78.2 MIXED HYPERLIPIDEMIA: ICD-10-CM

## 2023-02-05 DIAGNOSIS — Z79.4 TYPE 2 DIABETES MELLITUS WITH DIABETIC POLYNEUROPATHY, WITH LONG-TERM CURRENT USE OF INSULIN (HCC): ICD-10-CM

## 2023-02-05 DIAGNOSIS — E11.40 CONTROLLED TYPE 2 DIABETES MELLITUS WITH DIABETIC NEUROPATHY, WITH LONG-TERM CURRENT USE OF INSULIN (HCC): ICD-10-CM

## 2023-02-05 DIAGNOSIS — Z79.4 CONTROLLED TYPE 2 DIABETES MELLITUS WITH DIABETIC NEUROPATHY, WITH LONG-TERM CURRENT USE OF INSULIN (HCC): ICD-10-CM

## 2023-02-06 ENCOUNTER — CLINICAL SUPPORT (OUTPATIENT)
Dept: CARDIAC REHAB | Facility: HOSPITAL | Age: 78
End: 2023-02-06

## 2023-02-06 ENCOUNTER — APPOINTMENT (OUTPATIENT)
Dept: LAB | Facility: HOSPITAL | Age: 78
End: 2023-02-06

## 2023-02-06 ENCOUNTER — TELEPHONE (OUTPATIENT)
Dept: OBGYN CLINIC | Facility: HOSPITAL | Age: 78
End: 2023-02-06

## 2023-02-06 DIAGNOSIS — Z95.5 STENTED CORONARY ARTERY: Primary | ICD-10-CM

## 2023-02-06 DIAGNOSIS — N18.31 STAGE 3A CHRONIC KIDNEY DISEASE (HCC): ICD-10-CM

## 2023-02-06 LAB
ANION GAP SERPL CALCULATED.3IONS-SCNC: 10 MMOL/L (ref 4–13)
BUN SERPL-MCNC: 19 MG/DL (ref 5–25)
CALCIUM SERPL-MCNC: 9.2 MG/DL (ref 8.4–10.2)
CHLORIDE SERPL-SCNC: 104 MMOL/L (ref 96–108)
CO2 SERPL-SCNC: 22 MMOL/L (ref 21–32)
CREAT SERPL-MCNC: 1.25 MG/DL (ref 0.6–1.3)
GFR SERPL CREATININE-BSD FRML MDRD: 55 ML/MIN/1.73SQ M
GLUCOSE P FAST SERPL-MCNC: 93 MG/DL (ref 65–99)
POTASSIUM SERPL-SCNC: 3.9 MMOL/L (ref 3.5–5.3)
SODIUM SERPL-SCNC: 136 MMOL/L (ref 135–147)

## 2023-02-06 RX ORDER — ATORVASTATIN CALCIUM 80 MG/1
80 TABLET, FILM COATED ORAL DAILY
Qty: 90 TABLET | Refills: 0 | Status: SHIPPED | OUTPATIENT
Start: 2023-02-06

## 2023-02-06 RX ORDER — GLIMEPIRIDE 4 MG/1
TABLET ORAL
Qty: 180 TABLET | Refills: 0 | Status: SHIPPED | OUTPATIENT
Start: 2023-02-06

## 2023-02-06 RX ORDER — LOSARTAN POTASSIUM 25 MG/1
25 TABLET ORAL DAILY
Qty: 90 TABLET | Refills: 0 | Status: SHIPPED | OUTPATIENT
Start: 2023-02-06

## 2023-02-06 NOTE — PROGRESS NOTES
Chandni Garzon        Dear Dr Abigail Lake,    Emotional well-being and depression is addressed in the cardiac  rehab evaluation  To assess the severity of depression, patients are given the PHQ-9 Depression Questionnaire  This is to inform you that your patient Jannette Hassan scored a 10 which is interpreted as 10-14 = Moderate Depression  Your patient was provided contact information for [facility] and has been encouraged to enroll in [place] [id number]  A repeat PHQ -9 will be administered in 30 days to assess improvement  Thank you for your support of cardiopulmonary rehab      Sincerely,    Joce Fuentes, [facility] [address] [facility] NEUROPSYCHOLOGICAL EVALUATION    CONSULTATION INFORMATION:  The patient is a 33-year-old, right handed,  man with a reported history of a concussion and who was recently identified as having a brain mass. Dr. Jamal Zacarias requested neuropsychological consultation to assess higher cognitive function and psychological status. Clinical interview with the patient and his girlfriend and neuropsychological testing with the patient were completed on 05/16/2017. As his history is described in prior records, it will only be briefly reviewed in this report.    BACKGROUND INFORMATION:  The patient last recalls feeling lightheaded after he struck his knee at work on 05/02/2017, and he was attempting to step down from a truck when he reportedly fell. His next recollection is of being on the ground, and he believes that he experienced loss of consciousness (LOC) for less than 1 minute duration. He was transferred by a personal vehicle to a local hospital, but he does not recall portions of the ride or some of the questions being asked of him. He estimates the trip took approximately 5 minutes, suggesting 5 minutes of posttraumatic amnesia (PTA). His girlfriend, however, stated that he was confused for approximately one hour after the event, suggesting a longer period of PTA. Interview information suggests a mild traumatic brain injury (TBI) or concussion. However, his evaluation at the hospital included head CT studies that revealed a 4 cm right parietal lobe mass. Subsequent brain MRI revealed the mass with mild adjacent vasogenic edema.    Of concern for the present evaluation is the patient's cognitive function. He had thinking difficulties following the fall, but his forgetfulness and confusion reportedly worsened over the next 3 days leading to another hospital visit. He was diagnosed with a concussion at that time and discharged home. He and his girlfriend believe that he has been slowly improving since injury,  and they both believe that he has returned to baseline function. He denied any cognitive issues at the time this evaluation, and he believes that he is stable.    Physically, the patient complained of fatigue and occasional right hand coordination problems, particularly after sleeping. He also noted residual ankle and back pain secondary to the fall. Aside from a likely mild TBI with brief loss of consciousness secondary to dive into a pool when he was 21 years old, he denied any other significant medical issues. He denied any past surgeries. He denied any history of developmental problems. He is not prescribed any medications.    The patient's family medical history is reportedly noncontributory. He said that his mother is in good health. He has no contact with his father and does not know his health history. He has no siblings. He denied any family history of psychiatric difficulties.    The patient described his current mood as \"good\", although he acknowledged that he is a little bit more irritable than he had been the past. He denied any other psychiatric symptoms. He denied any past or current suicidal or homicidal ideation. He denied any history of abuse. He denied any prior mental health involvement or treatment. He initially denied any alcohol or drug problems, but later stated that he was charged with a DUI at the age of 21 and he was charged with marijuana possession at the age of 18. He denied any recent alcohol or drug problems. He does not smoke tobacco.    SOCIAL HISTORY:  The patient is single and has never . He has been with his girlfriend for a total of 6 years, and they have a daughter together. He currently lives with his girlfriend and daughter.    The patient stated that he was home schooled after ninth grade because he had \"too many issues with other people\" in high school. He was able to obtain his high school diploma, and he denied any history of learning difficulties.    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 He has worked as a , , and  in the past.    The patient stated that he has not driven since his DUI at the age of 21.    The patient indicated that he enjoys working out, working on his home, spending time with family, and watching movies. He socializes with friends once every 2 weeks and sees his mother weekly. He spends most of his time with his girlfriend and daughter.    BEHAVIORAL OBSERVATIONS:  The patient was alert and fully oriented at the time of the evaluation. He was adequately groomed and dressed, and he appeared his stated age. Gait and balance were normal with no evidence of difficulties with gross or fine motor function. Speech was normal in rate, rhythm, and volume. Fluency was normal with no major errors in conversation. Comprehension appeared to be intact. Thought processes were logical and coherent with no evidence of gross distortion. His mood was euthymic, and his affect was broad and appropriate to circumstances. He was friendly with the examiner and cooperative with the evaluation. Attention, effort, motivation, and frustration tolerance appeared to be adequate.    RESULTS SUMMARY:  The patient's intellectual functions were mostly in the average range. He scored in the average range on measures of verbal abstraction, pattern completion, and digit span.    The patient's processing speed index was in the average range. He scored in the low average range on a measure of symbol search and the average range on a measure of symbol coding.    The patient's language scores were in the average to high average range. Confrontation naming was in the average range and word recognition was in the high average range.    The patient's visual-spatial abilities were in the average range. He scored in the average range judging line orientation in space. His clock  drawing performance was intact.    The patient's complex problem-solving scores were in the average range. He demonstrated the ability to learn from errors and generate new strategies from feedback. He no significant difficulties with perseveration or maintaining cognitive sets.    The patient's auditory memory scores were mostly in the average range. Immediate recall of stories was in the high average range with average delayed recall and recognition. On a word list learning task, learning across trials was in the average range with average short delay free and cued recall and long delay free and cued recall. Recognition was also in the average range.    The patient's visual memory scores fluctuated markedly from extremely low to high average. Immediate recall, delayed recall, and recognition of designs were in the extremely low range. In contrast, on a measure of spatial and design memory and learning, learning across trials was in the average range with high average delayed recall and average retention and recognition/discrimination.    The patient's attention and processing scores fluctuated from low average to high average, but were mostly in the average range. Speeded oral processing of words and color names were in the low average range, and speeded oral processing of color interference was in the average range. Verbal fluency for letter was in the low average range. Mental processing speed was in the high average range with average set shifting.    The patient scored in the average range bilaterally on a measure of fine motor speed (finger tapping). He scored in the borderline range with his dominant right hand and the average range with his left hand on a measure of  strength. He scored in the average range with his dominant right hand and the low average range with his left hand on a measure of fine motor dexterity (grooved pegboard).    The patient was administered a measure of performance validity.  His scores were within normal limits, suggesting adequate engagement during the evaluation.    The patient's total score on a self-report measure of atypical symptoms was below cut off. Findings indicate that he was not overly endorsing many unusual symptoms at the time of this evaluation.    The patient's responses to several self-report measures of mood revealed minimal symptoms of depression and mild symptoms of anxiety.    IMPRESSION:  The patient is a 33-year-old, right handed,  man with a reported history of a concussion, and who was recently identified as having a brain mass. He reportedly fell and struck his head on 05/02/2017, resulting in brief LOC and PTA indicative of a mild TBI or concussion. Neuroimaging studies, however, revealed a 4 cm right parietal lobe mass with mild adjacent vasogenic edema. He reported cognitive difficulties initially after the injury, but he believes that he is back to baseline function. His other medical history and his psychiatric history are reportedly noncontributory. Behavioral observations and performance validity measures suggest adequate engagement, and current test findings are considered a valid measure of his neuropsychological function.    The patient's intellectual functions were mostly in the average range. His processing speed index, visual spatial abilities, and complex problem-solving scores were in the average range. His language scores were in the average to high average range. Attention and processing speed scores varied from low average to high average, but were mostly in the average range. His auditory memory scores were in the average range. Visual memory scores, however, fluctuated markedly from extremely low to high average. Motor testing revealed borderline to average performances bilaterally, but with no consistent evidence of lateralized dysfunction. From a psychological standpoint, he endorsed minimal depression but mild anxiety. He was not  overly endorsing unusual symptoms in this evaluation.    In summary, the majority of the patient's cognitive functions were intact, suggesting that he has mostly returned to baseline function since sustaining the mild TBI. The isolated exception were his markedly variable visual memory scores. It is unclear, however, to what extent this reflects random variation or possible cognitive change secondary to the right parietal brain mass and the associated vasogenic edema. This isolated cognitive weakness is unlikely to be related to the mild TBI. Mood does not appear to be contributory, although he acknowledged mild anxiety. His clinical presentation is consistent with mild neurocognitive disorder (visual memory weakness) along with adjustment disorder with anxious mood likely secondary to his recent medical issues.    It is recommended that the patient continue follow-up with assistance and management of his health care. Continued monitoring of his cognitive status is warranted, and follow-up neuropsychological testing can be completed to assess for possible change across time, particularly if he undergoes any medical interventions. Monitoring of his psychological status is also recommended, and he may benefit from supportive psychotherapy and adjustment counseling if his mood symptoms worsen.    The above impressions are best interpreted in the context of other medical evaluations. Impressions and recommendations will be discussed with the patient at his next scheduled appointment.    Thank you for this consultation request. If there are any questions or concerns, please contact the neuropsychology service at [phone].    Neuropsychologist time including interview, medical record review, data integration/interpretation, and report: 3 hours.    Psychometrist time with patient for neuropsychological testing and scorin hours.

## 2023-02-06 NOTE — TELEPHONE ENCOUNTER
Caller: Rosangela Jerez     Doctor:    Reason for call: Patient was calling about labs      Call back#:

## 2023-02-06 NOTE — PROGRESS NOTES
Cardiac Rehabilitation Plan of Care   30 Day Reassessment      Today's date: 2023   # of Exercise Sessions Completed:   Patient name: Sandra Childers      : 1945  Age: 69 y o  MRN: [id number]  Referring Physician: Lacho Moreno PA-C  Cardiologist: Myra Schroeder MD  Provider: Shae Beckett  Clinician: Yahir Elliott MS    Dx: Stent LAD  Date of onset: 23      SUMMARY OF PROGRESS:  Patient has attended  sessions of cardiac rehab  Patient is completing about 40 minutes of aerobic exercise  Resting /66, HR 78 bpm  Exercise /64, -113 bpm  Telemetry reads NSR  Patient still appears to show weakness at rehab  Patient stated he does not do any walking at home besides walking to the bathroom  Patient does light hand weights at home  Patient was encouraged to do as much walking as he can  Patient completes the NuStep and arm bike at rehab and is extremely tired afterward  Patient walks with a walker and shows imbalance at times  Patient is pushing himself at rehab and is improving  Patient stated he does feel miserable though  Patient will continue to received education appropriate to his needs        Medication compliance: Yes   Comments: Pt reports to be compliant with medications  Fall Risk: Moderate   Comments: Patient uses walking assist device (walker/cane/rollator) and shows imbalance at times     EKG Interpretation: NSR      EXERCISE ASSESSMENT and PLAN    Exercise Prescription:      Frequency: 3 days/week   Supplement with home exercise 2+ days/wk as tolerated       Minutes: 35-40         METS: 2 56-3 0         HR: 105-113 bpm   RPE: 4-6         Modalities: UBE, NuStep and Room walking      30 Day Goals for Exercise Progression:    Frequency: 3 days/week of cardiac rehab       Supplement with home exercise 2+ days/wk as tolerated    Minutes: 45-50                              >150 mins/wk of moderate intensity exercise   METS: 3 0-3 5   HR: 20-30 beats above RHR   RPE: 4-6   Modalities: UBE, NuStep, Recumbent bike and Room walking    Strength trainin-3 days / week   Modalities: Leg Press, Chest Press, Danica Close and free weights    Home Exercise: none    Goals: 10% improvement in functional capacity - based on max METs achieved in fitness assessment, Resume ADLs with increased strength, Attend Rehab regularly, Decrease sitting time and Start a walking program    Progression Toward Goals:  Reviewed Pt goals and determined plan of care, Patient will initiate a walking program at home for at least 15 miuntes daily  in the next 30 days    Education: benefit of exercise for CAD risk factors, home exercise guidelines, AHA guidelines to achieve >150 mins/wk of moderate exercise and RPE scale   Plan:education on home exercise guidelines, home exercise 30+ mins 2 days opposite CR and Education class: Risk Factors for Heart Disease  Readiness to change: Preparation:  (Getting ready to change)       NUTRITION ASSESSMENT AND PLAN    Weight control:    Starting weight: 222   Current weight:   222       Diabetes: T2D  A1c: 7 6    last measured: 23    Lipid management: Last lipid profile 21  Chol 140    HDL 36  LDL 66    Goals:LDL <100, HDL >40, TRG <150, CHOL <200, eliminate processed meats, cook without added fat or use vegetable oil/spray, eat 3 or more servings of whole grains a day, Eat 4-5 cups of fruits and vegetables daily, choose low sodium processed foods, eliminate butter and choose healthy snacks: light popcorn, plain pretzels    Measurable goals were based Rate Your Plate Dietary Self-Assessment  These are the areas in which the patient could score higher on the assessment  Goals include recommendations for a heart healthy diet based on American Heart Association      Progression Toward Goals: Reviewed Pt goals and determined plan of care, Patient will make dietary changes to see improvement in future lipid panel  in the next 30 days    Education: heart healthy eating  low sodium diet  exercise and diabetes management   wt  loss   healthy choices while dining out  portion control  Plan: Education class: Reading Food Labels, Education Class: Heart Healthy Eating, replace butter with soft spreads made with olive oil, canola or yogurt, replace refined grain bread with whole grain bread, replace unhealthy snacks with fruits & vegs, reduce portion sizes, eat fewer desserts and sweets and avoid processed foods  Readiness to change: Preparation:  (Getting ready to change)       PSYCHOSOCIAL ASSESSMENT AND PLAN    Emotional:  Depression assessment:  PHQ-9 = 10  10-14 = Moderate Depression            Anxiety measure:  ANGEL-7 = 8  5-9 = Mild anxiety  Self-reported stress level:  4  Social support: Very Good    Goals:  Reduce perceived stress to 1-3/10, Physical Fitness in [place] Score < 3, Social Support in [place] Score < 3, Daily Activity in [place] Score < 3, Increased interest in doing things, improved positive thoughts of well being and increased energy    Progression Toward Goals: Reviewed Pt goals and determined plan of care, Patient will ask for help when needed, do activities to help him relax in the next 30 days    Education: signs/sxs of depression, benefits of a positive support system, class:  Stress and Your Health  and class:  Relaxation  Plan: Exercise, Spend time outdoors, Enjoy a hobby, Keep a positive mindset, Meet new people and Enjoy family  Readiness to change: Preparation:  (Getting ready to change)       OTHER CORE COMPONENTS     Tobacco:   Social History     Tobacco Use   Smoking Status Never   Smokeless Tobacco Never       Tobacco Use Intervention:   N/A:  Patient is a non-smoker     Anginal Symptoms:  chest pressure   NTG use: Compliant with carrying NTG, Understands proper use and Pt has not used NTG since event    Blood pressure:    Restin/66   Exercise: 124/64   Recovery: 108/64    Goals: consistent BP < 130/80, reduced dietary sodium <2300mg, moderate intensity exercise >150 mins/wk and medication compliance    Progression Toward Goals: Reviewed Pt goals and determined plan of care, Patient will monitor glucose levels at home to maintain appropriate levels  in the next 30 days    Education:  low sodium diet and HTN, proper use of sublingual NTG, Education class:  Common Heart Medications and Education class: Understanding Heart Disease  Plan: avoid places with second hand smoke, Avoid Processed foods, engage in regular exercise, eliminate salt shaker at the table and use salt substitutes  Readiness to change: Preparation:  (Getting ready to change)

## 2023-02-07 NOTE — PROGRESS NOTES
Assessment/Plan:    Problem List Items Addressed This Visit        Endocrine    Diabetes mellitus type 2, controlled ([address] )       Cardiovascular and Mediastinum    Hypertension       Other    Hyperlipidemia      Other Visit Diagnoses     Type 2 diabetes mellitus without complication, unspecified whether long term insulin use ([address] )    -  Primary    Relevant Orders    IRIS Diabetic eye exam           Diagnoses and all orders for this visit:    Type 2 diabetes mellitus without complication, unspecified whether long term insulin use ([address] )  -     IRIS Diabetic eye exam    Controlled type 2 diabetes mellitus with diabetic neuropathy, with long-term current use of insulin ([address] )    Essential hypertension    Mixed hyperlipidemia        No problem-specific Assessment & Plan notes found for this encounter  Subjective:      Patient ID: Gissel Keene is a 69 y o  male  Mr Kellen Young here follow-up visit of most recent blood work was good sugar was good kidney function was good of on his diet has been little bit off the anat because of the holiday no chest pain having a lot of problems with arthritis in both knees needs x-rays of both knees to assess how severe his arthritis is      The following portions of the patient's history were reviewed and updated as appropriate:   He has a past medical history of BPH (benign prostatic hyperplasia), CAD (coronary artery disease), Diabetes mellitus ([address] ), Gout, High cholesterol, Hypertension, and MI, old ,  does not have any pertinent problems on file  ,   has a past surgical history that includes Tonsillectomy and adenoidectomy; Cataract extraction w/  intraocular lens implant; Eye surgery; Coronary angioplasty with stent; and pr colonoscopy flx dx w/collj spec when pfrmd (N/A, 3/21/2016)  ,  family history includes No Known Problems in his father and mother  He was adopted  ,   reports that he has never smoked   He has never used smokeless tobacco  He reports that he drinks alcohol  He reports that he does not use drugs  ,  has No Known Allergies     Current Outpatient Medications   Medication Sig Dispense Refill    allopurinol (ZYLOPRIM) 100 mg tablet TAKE ONE TABLET BY MOUTH EVERY DAY 90 tablet 0    aspirin 325 mg tablet Take 325 mg by mouth daily   atorvastatin (LIPITOR) 80 mg tablet Take 1 tablet (80 mg total) by mouth daily 90 tablet 3    escitalopram (LEXAPRO) 20 mg tablet Take 1 tablet (20 mg total) by mouth daily for 90 days 90 tablet 0    gabapentin (NEURONTIN) 300 mg capsule Take 2 capsules (600 mg total) by mouth 3 (three) times a day (Patient taking differently: Take 300 mg by mouth daily at bedtime ) 180 capsule 5    glimepiride (AMARYL) 4 mg tablet Take one tablet by mouth 2 times daily 180 tablet 3    levothyroxine 75 mcg tablet Take 1 tablet (75 mcg total) by mouth daily One hour before food in am on an empty stomach 90 tablet 0    losartan (COZAAR) 25 mg tablet Take 1 tablet (25 mg total) by mouth daily 90 tablet 1    metFORMIN (GLUCOPHAGE) 1000 MG tablet Take 1 tablet (1,000 mg total) by mouth 2 (two) times a day with meals 180 tablet 3    metoprolol tartrate (LOPRESSOR) 50 mg tablet TAKE 1 TABLET TWICE DAILY  (Patient taking differently: Take 12 5 mg by mouth every 12 (twelve) hours ) 180 tablet 0    multivitamin (THERAGRAN) TABS Take 1 tablet by mouth daily      nitroglycerin (NITROSTAT) 0 4 mg SL tablet Place 1 tablet (0 4 mg total) under the tongue every 5 (five) minutes as needed for chest pain 25 tablet 3     No current facility-administered medications for this visit  Review of Systems   Constitutional: Negative for activity change, appetite change, diaphoresis, fatigue and fever  HENT: Negative  Eyes: Positive for visual disturbance  Right cataract with corneal transplant   Respiratory: Negative for apnea, cough, chest tightness, shortness of breath and wheezing      Cardiovascular: Negative for chest pain, palpitations and leg swelling  Gastrointestinal: Negative for abdominal distention, abdominal pain, anal bleeding, constipation, diarrhea, nausea and vomiting  Endocrine: Negative for cold intolerance, heat intolerance, polydipsia, polyphagia and polyuria  Genitourinary: Negative for difficulty urinating, dysuria, flank pain, hematuria and urgency  Musculoskeletal: Negative for arthralgias, back pain, gait problem, joint swelling and myalgias  Skin: Negative for color change, rash and wound  Allergic/Immunologic: Negative for environmental allergies, food allergies and immunocompromised state  Neurological: Negative for dizziness, seizures, syncope, speech difficulty, numbness and headaches  Hematological: Negative for adenopathy  Does not bruise/bleed easily  Psychiatric/Behavioral: Negative for agitation, behavioral problems, hallucinations, sleep disturbance and suicidal ideas  Objective:  Vitals:    12/16/19 0942   BP: 142/76   BP Location: Left arm   Patient Position: Sitting   Cuff Size: Standard   Weight: 111 kg (244 lb)   Height: 5' 9" (1 753 m)     Body mass index is 36 03 kg/m²  Physical Exam   Constitutional: He is oriented to person, place, and time  He appears well-developed and well-nourished  No distress  HENT:   Head: Normocephalic  Right Ear: External ear normal    Left Ear: External ear normal    Nose: Nose normal    Mouth/Throat: Oropharynx is clear and moist    Eyes: Pupils are equal, round, and reactive to light  Conjunctivae and EOM are normal  Right eye exhibits no discharge  Left eye exhibits no discharge  No scleral icterus  Neck: Normal range of motion  No tracheal deviation present  No thyromegaly present  Cardiovascular: Normal rate, regular rhythm and normal heart sounds  Exam reveals no gallop and no friction rub  No murmur heard  Pulmonary/Chest: Effort normal and breath sounds normal  No respiratory distress  He has no wheezes  Abdominal: Soft   Bowel sounds are None normal  He exhibits no mass  There is no tenderness  There is no guarding  Musculoskeletal: He exhibits no edema or deformity  Lymphadenopathy:     He has no cervical adenopathy  Neurological: He is alert and oriented to person, place, and time  No cranial nerve deficit  Skin: Skin is warm and dry  No rash noted  He is not diaphoretic  No erythema  Psychiatric: He has a normal mood and affect   Thought content normal

## 2023-02-08 ENCOUNTER — CLINICAL SUPPORT (OUTPATIENT)
Dept: CARDIAC REHAB | Facility: HOSPITAL | Age: 78
End: 2023-02-08

## 2023-02-08 DIAGNOSIS — Z95.5 STENTED CORONARY ARTERY: ICD-10-CM

## 2023-02-08 NOTE — ADDENDUM NOTE
"    Chief Complaint: Bilateral pulmonary emboli, parapneumonic effusion  S/P: CT-guided chest tube insertion    Subjective:  Symptoms:  Stable.  He reports shortness of breath, cough, weakness and anorexia.    Diet:  Poor intake.  No nausea or vomiting.    Activity level: Impaired due to weakness.    Pain:  He complains of pain that is mild.  Pain is well controlled.        Vital Signs:  Temp:  [97.7 °F (36.5 °C)-99.1 °F (37.3 °C)] 98.4 °F (36.9 °C)  Heart Rate:  [60-69] 60  Resp:  [15-18] 18  BP: (128-138)/(58-90) 131/63    Intake & Output (last day)       03/16 0701 - 03/17 0700 03/17 0701 - 03/18 0700    P.O. 440 120    Total Intake(mL/kg) 440 (5.5) 120 (1.5)    Urine (mL/kg/hr) 300 (0.2)     Stool      Chest Tube 789 0    Total Output 1089 0    Net -649 +120                Objective:  General Appearance:  Comfortable, ill-appearing, in no acute distress and not in pain.    Vital signs: (most recent): Blood pressure 131/63, pulse 60, temperature 98.4 °F (36.9 °C), temperature source Oral, resp. rate 18, height 177.8 cm (70\"), weight 80.6 kg (177 lb 11.2 oz), SpO2 98 %.  Vital signs are normal.  No fever.    Output: Producing urine and producing stool.    HEENT: (Very hard of hearing.)    Lungs:  Normal effort and normal respiratory rate.  He is not in respiratory distress.  There are decreased breath sounds (bibasilar).    Heart: Normal rate.  Regular rhythm.    Chest: Chest wall tenderness (at chest tube site) present.    Abdomen: Abdomen is soft.  Bowel sounds are normal.   There is no abdominal tenderness.   There is no mass.   Extremities: Normal range of motion.  There is dependent edema.    Pulses: Distal pulses are intact.    Neurological: Patient is alert and oriented to person, place and time.    Skin:  Warm and dry.          Chest tube:   Site: Left, Clean, Dry, Intact and Securement device intact  Suction: -20 cm  Air Leak: negative  24 Hour Total: 789cc    Results Review:     I reviewed the patient's " Addended by: Jennifer Mckee on: 2/8/2023 10:38 AM     Modules accepted: Orders new clinical results.  I reviewed the patient's new imaging results and agree with the interpretation.  I reviewed the patient's other test results and agree with the interpretation. I have discussed these results with the patient, family at bedside and Dr. Leon.     Imaging Results (Last 24 Hours)     Procedure Component Value Units Date/Time    CT Chest Without Contrast [[id number]] Resulted:  03/17/20 1052     Updated:  03/17/20 1053    XR Chest 1 View [[id number]] Collected:  03/17/20 0641     Updated:  03/17/20 0646    Narrative:       XR CHEST 1 VW-     Clinical: Chest tube management     COMPARISON examination 3/16/2020     FINDINGS: Improved aeration of the lungs compared to the previous  examination. No pneumothorax, left base chest tube position is stable.  Right-sided pleural effusion is less conspicuous, persistent stable  blunting of the left costophrenic angle. No pulmonary edema or new  airspace disease has developed within the interim. The cardiomediastinal  silhouette is stable. The remainder is unremarkable.     This report was finalized on 3/17/2020 6:43 AM by Dr. Lobito Macario M.D.             Lab Results:     Lab Results (last 24 hours)     Procedure Component Value Units Date/Time    aPTT [[id number]] Collected:  03/17/20 1355    Specimen:  Blood from Hand, Left Updated:  03/17/20 1406    Blood Culture - Blood, Arm, Left [[id number]] Collected:  03/12/20 1039    Specimen:  Blood from Arm, Left Updated:  03/17/20 1100     Blood Culture No growth at 5 days    Blood Culture - Blood, Hand, Left [[id number]] Collected:  03/12/20 1039    Specimen:  Blood from Hand, Left Updated:  03/17/20 1100     Blood Culture No growth at 5 days    aPTT [[id number]]  (Abnormal) Collected:  03/17/20 0547    Specimen:  Blood Updated:  03/17/20 0641     .3 seconds     Renal Function Panel [[id number]]  (Abnormal) Collected:  03/17/20 0547    Specimen:  Blood Updated:  03/17/20 0638     Glucose 104 mg/dL      BUN 15  mg/dL      Creatinine 0.87 mg/dL      Sodium 137 mmol/L      Potassium 3.7 mmol/L      Chloride 101 mmol/L      CO2 25.6 mmol/L      Calcium 7.9 mg/dL      Albumin 1.90 g/dL      Phosphorus 3.8 mg/dL      Anion Gap 10.4 mmol/L      BUN/Creatinine Ratio 17.2     eGFR Non African Amer 83 mL/min/1.73     Narrative:       GFR Normal >60  Chronic Kidney Disease <60  Kidney Failure <15      CBC & Differential [[id number]] Collected:  03/17/20 0547    Specimen:  Blood Updated:  03/17/20 0617    Narrative:       The following orders were created for panel order CBC & Differential.  Procedure                               Abnormality         Status                     ---------                               -----------         ------                     CBC Auto Differential[[id number]]        Abnormal            Final result                 Please view results for these tests on the individual orders.    CBC Auto Differential [[id number]]  (Abnormal) Collected:  03/17/20 0547    Specimen:  Blood Updated:  03/17/20 0617     WBC 9.56 10*3/mm3      RBC 3.22 10*6/mm3      Hemoglobin 8.8 g/dL      Hematocrit 26.6 %      MCV 82.6 fL      MCH 27.3 pg      MCHC 33.1 g/dL      RDW 12.3 %      RDW-SD 37.0 fl      MPV 9.8 fL      Platelets 257 10*3/mm3      Neutrophil % 56.2 %      Lymphocyte % 26.3 %      Monocyte % 8.5 %      Eosinophil % 3.8 %      Basophil % 0.6 %      Immature Grans % 4.6 %      Neutrophils, Absolute 5.38 10*3/mm3      Lymphocytes, Absolute 2.51 10*3/mm3      Monocytes, Absolute 0.81 10*3/mm3      Eosinophils, Absolute 0.36 10*3/mm3      Basophils, Absolute 0.06 10*3/mm3      Immature Grans, Absolute 0.44 10*3/mm3      nRBC 0.0 /100 WBC     aPTT [[id number]]  (Abnormal) Collected:  03/16/20 2131    Specimen:  Blood Updated:  03/16/20 2236     .8 seconds     Fungus Culture - Body Fluid, Pleural Cavity [[id number]] Collected:  03/02/20 1336    Specimen:  Body Fluid from Pleural Cavity Updated:  03/16/20 5141      Fungus Culture No fungus isolated at 2 weeks    AFB Culture - Body Fluid, Pleural Cavity [[id number]] Collected:  03/02/20 1336    Specimen:  Body Fluid from Pleural Cavity Updated:  03/16/20 1445     AFB Culture No AFB isolated at 2 weeks     AFB Stain No acid fast bacilli seen on direct smear           Assessment/Plan       Acute saddle pulmonary embolism with acute cor pulmonale (CMS/HCC)    Pulmonary emboli (CMS/HCC)    Empyema of pleura (CMS/HCC)       Assessment & Plan     Mr. Garcia is a pleasant 89-year-old gentleman status post CT-guided drainage of the right chest and subsequent removal who now has a CT guided chest tube on the left.  Activase administered intrapleurally yesterday.  Patient tolerated without difficulty.  He has since had good output from the chest tube.  Chest x-ray is improved this morning.  I ordered a CT of the chest.  There is not yet a formal reading but I personally reviewed the images which demonstrates significant improvement in left sided pleural effusion with a small amount of fluid remaining on the right.    We will leave chest tube to suction another day and allow for any remaining fluid to drain and likely plan removal for tomorrow.  Once chest tube is out patient can be bridged back to oral anticoagulation and discharge home.    EBEN Rivers  [facility]  03/17/20  14:14

## 2023-02-10 ENCOUNTER — CLINICAL SUPPORT (OUTPATIENT)
Dept: CARDIAC REHAB | Facility: HOSPITAL | Age: 78
End: 2023-02-10

## 2023-02-10 DIAGNOSIS — I21.4 NSTEMI (NON-ST ELEVATED MYOCARDIAL INFARCTION) (HCC): ICD-10-CM

## 2023-02-13 ENCOUNTER — CLINICAL SUPPORT (OUTPATIENT)
Dept: CARDIAC REHAB | Facility: HOSPITAL | Age: 78
End: 2023-02-13

## 2023-02-13 DIAGNOSIS — I21.4 NSTEMI (NON-ST ELEVATED MYOCARDIAL INFARCTION) (HCC): ICD-10-CM

## 2023-02-15 ENCOUNTER — CLINICAL SUPPORT (OUTPATIENT)
Dept: CARDIAC REHAB | Facility: HOSPITAL | Age: 78
End: 2023-02-15

## 2023-02-15 DIAGNOSIS — Z95.5 STENTED CORONARY ARTERY: ICD-10-CM

## 2023-02-16 ENCOUNTER — RA CDI HCC (OUTPATIENT)
Dept: OTHER | Facility: HOSPITAL | Age: 78
End: 2023-02-16

## 2023-02-16 DIAGNOSIS — F33.9 RECURRENT MAJOR DEPRESSIVE DISORDER, REMISSION STATUS UNSPECIFIED (HCC): ICD-10-CM

## 2023-02-16 DIAGNOSIS — R25.1 TREMOR: ICD-10-CM

## 2023-02-16 RX ORDER — ESCITALOPRAM OXALATE 20 MG/1
10 TABLET ORAL DAILY
Qty: 90 TABLET | Refills: 0 | Status: SHIPPED | OUTPATIENT
Start: 2023-02-16

## 2023-02-16 RX ORDER — PROPRANOLOL HCL 60 MG
60 CAPSULE, EXTENDED RELEASE 24HR ORAL DAILY
Qty: 90 CAPSULE | Refills: 0 | Status: SHIPPED | OUTPATIENT
Start: 2023-02-16

## 2023-02-16 NOTE — PROGRESS NOTES
[facility]  coding opportunities     I13 0, E11 51 and E11 22     Chart Reviewed number of suggestions sent to Provider: 3     Patients Insurance     Medicare Insurance: [address] [place] [address]

## 2023-02-17 ENCOUNTER — CLINICAL SUPPORT (OUTPATIENT)
Dept: CARDIAC REHAB | Facility: HOSPITAL | Age: 78
End: 2023-02-17

## 2023-02-17 DIAGNOSIS — I21.4 NSTEMI (NON-ST ELEVATED MYOCARDIAL INFARCTION) (HCC): ICD-10-CM

## 2023-02-20 ENCOUNTER — CLINICAL SUPPORT (OUTPATIENT)
Dept: CARDIAC REHAB | Facility: HOSPITAL | Age: 78
End: 2023-02-20

## 2023-02-20 DIAGNOSIS — Z95.5 STENTED CORONARY ARTERY: ICD-10-CM

## 2023-02-22 ENCOUNTER — CLINICAL SUPPORT (OUTPATIENT)
Dept: CARDIAC REHAB | Facility: HOSPITAL | Age: 78
End: 2023-02-22

## 2023-02-22 ENCOUNTER — OFFICE VISIT (OUTPATIENT)
Dept: FAMILY MEDICINE CLINIC | Facility: CLINIC | Age: 78
End: 2023-02-22

## 2023-02-22 VITALS
WEIGHT: 218 LBS | SYSTOLIC BLOOD PRESSURE: 122 MMHG | BODY MASS INDEX: 33.04 KG/M2 | HEIGHT: 68 IN | HEART RATE: 82 BPM | TEMPERATURE: 96.4 F | DIASTOLIC BLOOD PRESSURE: 68 MMHG | OXYGEN SATURATION: 98 %

## 2023-02-22 DIAGNOSIS — E08.42 DIABETIC POLYNEUROPATHY ASSOCIATED WITH DIABETES MELLITUS DUE TO UNDERLYING CONDITION (HCC): ICD-10-CM

## 2023-02-22 DIAGNOSIS — I10 PRIMARY HYPERTENSION: ICD-10-CM

## 2023-02-22 DIAGNOSIS — I21.4 NSTEMI (NON-ST ELEVATED MYOCARDIAL INFARCTION) (HCC): ICD-10-CM

## 2023-02-22 DIAGNOSIS — H25.9 SENILE CATARACT OF RIGHT EYE, UNSPECIFIED AGE-RELATED CATARACT TYPE: ICD-10-CM

## 2023-02-22 DIAGNOSIS — I73.9 PERIPHERAL ARTERIAL DISEASE (HCC): ICD-10-CM

## 2023-02-22 DIAGNOSIS — Z95.5 STENTED CORONARY ARTERY: ICD-10-CM

## 2023-02-22 DIAGNOSIS — E11.21 DIABETIC NEPHROPATHY ASSOCIATED WITH TYPE 2 DIABETES MELLITUS (HCC): ICD-10-CM

## 2023-02-22 DIAGNOSIS — Z01.818 PREOP EXAMINATION: Primary | ICD-10-CM

## 2023-02-22 DIAGNOSIS — E11.65 CONTROLLED TYPE 2 DIABETES MELLITUS WITH HYPERGLYCEMIA, WITHOUT LONG-TERM CURRENT USE OF INSULIN (HCC): ICD-10-CM

## 2023-02-22 DIAGNOSIS — E66.01 MORBID OBESITY (HCC): ICD-10-CM

## 2023-02-22 NOTE — PROGRESS NOTES
Subjective:     Gem Call is a 69 y o  male who presents to the office today for a preoperative consultation at the request of surgeon Dr Donald Wakefield who plans on performingcataract extraction right eye  on March 17  This consultation is requested for the specific conditions prompting preoperative evaluation (i e  because of potential affect on operative risk): Diabetes, hypertension, coronary artery disease  Planned anesthesia: MAC  The patient has the following known anesthesia issues:  no prior problems with endotracheal intubation or anesthesia  Patients bleeding risk: no recent abnormal bleeding  The following portions of the patient's history were reviewed and updated as appropriate:   He  has a past medical history of BPH (benign prostatic hyperplasia), CAD (coronary artery disease), Cancer ([facility] ), Chronic kidney disease, Colon polyp, CPAP (continuous positive airway pressure) dependence, Diabetes mellitus ([facility] ), Disease of thyroid gland, Gout, High cholesterol, Hypertension, MI, old, Myocardial infarction ([facility] ) (2014), S/P cardiac catheterization (12/30/2022), and Sleep apnea    He   Patient Active Problem List    Diagnosis Date Noted   • Mesenteric artery stenosis ([facility] ) 01/31/2023   • Peripheral arterial disease ([facility] ) 01/05/2023   • S/P cardiac catheterization 12/30/2022   • Stage 3a chronic kidney disease ([facility] ) 02/23/2022   • Basal cell carcinoma (BCC) of right shoulder 01/18/2022   • Chronic diastolic (congestive) heart failure ([facility] ) 01/10/2022   • Skin lesion 12/22/2021   • Other chest pain 12/01/2021   • Leukocytosis 12/01/2021   • Bilateral lower extremity edema 10/22/2021   • Mild cognitive impairment 09/03/2021   • Benign hypertension with CKD (chronic kidney disease) stage III ([facility] ) 02/10/2021   • Diabetic nephropathy associated with type 2 diabetes mellitus ([facility] ) 02/10/2021   • Concentric left ventricular hypertrophy 02/10/2021   • Aortic stenosis, moderate 02/10/2021   • Edema 02/10/2021   • Fall (on) (from) other stairs and steps, initial encounter 12/28/2020   • Recurrent major depressive disorder ([place] [address] ) 12/28/2020   • Primary osteoarthritis of right knee 05/21/2020   • Primary osteoarthritis of left knee 05/21/2020   • Coronary artery disease of native artery of native heart with stable angina pectoris ([place] [address] ) 03/18/2020   • Chronic pain of both knees 02/13/2020   • Primary osteoarthritis of both knees 02/13/2020   • Primary osteoarthritis involving multiple joints 12/16/2019   • Vitamin E deficiency 09/19/2019   • Infected epidermoid cyst 12/04/2018   • Tremor, essential 09/26/2018   • Diabetic neuropathy associated with diabetes mellitus due to underlying condition ([place] [id number] ) 09/26/2018   • Gait instability 09/26/2018   • Frequent falls 09/26/2018   • Degenerative disc disease, lumbar 07/10/2018   • Tremor 10/10/2016   • Depression with anxiety 08/29/2016   • Arthritis 07/13/2015   • NSTEMI (non-ST elevated myocardial infarction) ([place] [address] ) 10/06/2014   • Gout 05/17/2013   • Controlled type 2 diabetes mellitus with hyperglycemia, without long-term current use of insulin ([place] [address] ) 06/18/2012   • Hyperlipidemia 06/18/2012   • Hypertension 06/18/2012   • Morbid obesity ([place] [address] ) 06/18/2012     He  has a past surgical history that includes Tonsillectomy and adenoidectomy; Cataract extraction w/  intraocular lens implant; Eye surgery; Coronary angioplasty with stent; pr colonoscopy flx dx w/collj spec when pfrmd (N/A, 3/21/2016); Corneal transplant; Excision basal cell carcinoma (Right, 1/21/2022); Cardiac catheterization (Left, 12/29/2022); Cardiac catheterization (N/A, 12/29/2022); and Cardiac catheterization (N/A, 12/29/2022)  His family history includes No Known Problems in his father and mother  He was adopted  He  reports that he has never smoked  He has never used smokeless tobacco  He reports that he does not currently use alcohol  He reports that he does not use drugs    Current Outpatient Medications   Medication Sig Dispense Refill   • allopurinol (ZYLOPRIM) 100 mg tablet Take 1 tablet (100 mg total) by mouth daily 90 tablet 0   • allopurinol (ZYLOPRIM) 100 mg tablet Take 1 tablet by mouth once daily 90 tablet 0   • aspirin 81 mg chewable tablet Chew 1 tablet (81 mg total) daily  0   • atorvastatin (LIPITOR) 80 mg tablet Take 1 tablet (80 mg total) by mouth daily 90 tablet 0   • Blood Glucose Monitoring Suppl (OneTouch Verio Reflect) w/Device KIT Check blood sugars twice daily  Please substitute with appropriate alternative as covered by patient's insurance  Dx: E11 65 1 kit 0   • Empagliflozin (Jardiance) 10 MG TABS Take 1 tablet (10 mg total) by mouth every morning 90 tablet 1   • escitalopram (LEXAPRO) 20 mg tablet Take 0 5 tablets (10 mg total) by mouth daily 90 tablet 0   • glimepiride (AMARYL) 4 mg tablet TAKE 1 TABLET TWICE A  tablet 0   • glucose blood (OneTouch Verio) test strip Check blood sugars twice daily  Please substitute with appropriate alternative as covered by patient's insurance  Dx: E11 65 200 each 3   • levothyroxine 88 mcg tablet TAKE 1 TABLET BY MOUTH ONCE DAILY IN THE MORNING 90 tablet 0   • levothyroxine 88 mcg tablet Take 1 tablet (88 mcg total) by mouth daily in the early morning 90 tablet 0   • losartan (COZAAR) 25 mg tablet Take 1 tablet (25 mg total) by mouth daily 90 tablet 0   • metFORMIN (GLUCOPHAGE) 1000 MG tablet Take 1 tablet (1,000 mg total) by mouth 2 (two) times a day with meals 180 tablet 0   • multivitamin (THERAGRAN) TABS Take 1 tablet by mouth daily     • nitroglycerin (NITROSTAT) 0 4 mg SL tablet Place 1 tablet (0 4 mg total) under the tongue every 5 (five) minutes as needed for chest pain 25 tablet 0   • OneTouch Delica Lancets 37J MISC Check blood sugars twice daily  Please substitute with appropriate alternative as covered by patient's insurance   Dx: E11 65 200 each 3   • prednisoLONE acetate (PRED FORTE) 1 % ophthalmic suspension INSTILL 1 DROP INTO RIGHT EYE ONCE DAILY     • propranolol (INDERAL LA) 60 mg 24 hr capsule Take 1 capsule (60 mg total) by mouth daily 90 capsule 0   • ranolazine (RANEXA) 500 mg 12 hr tablet Take 1 tablet (500 mg total) by mouth 2 (two) times a day 60 tablet 2   • ticagrelor (BRILINTA) 90 MG Take 1 tablet (90 mg total) by mouth every 12 (twelve) hours 60 tablet 2   • zonisamide (ZONEGRAN) 50 MG capsule Take 1 tablet (50 mg total) by mouth daily 90 capsule 2   • benzonatate (TESSALON) 200 MG capsule Take 1 capsule (200 mg total) by mouth 3 (three) times a day as needed for cough (Patient not taking: Reported on 1/12/2023) 20 capsule 0   • torsemide (DEMADEX) 10 mg tablet Take 1 tablet (10 mg total) by mouth daily (Patient not taking: Reported on 1/31/2023) 90 tablet 1     No current facility-administered medications for this visit  Current Outpatient Medications on File Prior to Visit   Medication Sig   • allopurinol (ZYLOPRIM) 100 mg tablet Take 1 tablet (100 mg total) by mouth daily   • allopurinol (ZYLOPRIM) 100 mg tablet Take 1 tablet by mouth once daily   • aspirin 81 mg chewable tablet Chew 1 tablet (81 mg total) daily   • atorvastatin (LIPITOR) 80 mg tablet Take 1 tablet (80 mg total) by mouth daily   • Blood Glucose Monitoring Suppl (OneTouch Verio Reflect) w/Device KIT Check blood sugars twice daily  Please substitute with appropriate alternative as covered by patient's insurance  Dx: E11 65   • Empagliflozin (Jardiance) 10 MG TABS Take 1 tablet (10 mg total) by mouth every morning   • escitalopram (LEXAPRO) 20 mg tablet Take 0 5 tablets (10 mg total) by mouth daily   • glimepiride (AMARYL) 4 mg tablet TAKE 1 TABLET TWICE A DAY   • glucose blood (OneTouch Verio) test strip Check blood sugars twice daily  Please substitute with appropriate alternative as covered by patient's insurance   Dx: E11 65   • levothyroxine 88 mcg tablet TAKE 1 TABLET BY MOUTH ONCE DAILY IN THE MORNING   • levothyroxine 88 mcg tablet Take 1 tablet (88 mcg total) by mouth daily in the early morning   • losartan (COZAAR) 25 mg tablet Take 1 tablet (25 mg total) by mouth daily   • metFORMIN (GLUCOPHAGE) 1000 MG tablet Take 1 tablet (1,000 mg total) by mouth 2 (two) times a day with meals   • multivitamin (THERAGRAN) TABS Take 1 tablet by mouth daily   • nitroglycerin (NITROSTAT) 0 4 mg SL tablet Place 1 tablet (0 4 mg total) under the tongue every 5 (five) minutes as needed for chest pain   • OneTouch Delica Lancets 79C MISC Check blood sugars twice daily  Please substitute with appropriate alternative as covered by patient's insurance  Dx: E11 65   • prednisoLONE acetate (PRED FORTE) 1 % ophthalmic suspension INSTILL 1 DROP INTO RIGHT EYE ONCE DAILY   • propranolol (INDERAL LA) 60 mg 24 hr capsule Take 1 capsule (60 mg total) by mouth daily   • ranolazine (RANEXA) 500 mg 12 hr tablet Take 1 tablet (500 mg total) by mouth 2 (two) times a day   • ticagrelor (BRILINTA) 90 MG Take 1 tablet (90 mg total) by mouth every 12 (twelve) hours   • zonisamide (ZONEGRAN) 50 MG capsule Take 1 tablet (50 mg total) by mouth daily   • benzonatate (TESSALON) 200 MG capsule Take 1 capsule (200 mg total) by mouth 3 (three) times a day as needed for cough (Patient not taking: Reported on 1/12/2023)   • torsemide (DEMADEX) 10 mg tablet Take 1 tablet (10 mg total) by mouth daily (Patient not taking: Reported on 1/31/2023)   • [DISCONTINUED] topiramate (Topamax) 25 mg tablet Take 1 tablet (25 mg total) by mouth daily X 1 week then discontinue     No current facility-administered medications on file prior to visit  He has No Known Allergies     Past Medical History:   Diagnosis Date   • BPH (benign prostatic hyperplasia)    • CAD (coronary artery disease)    • Cancer (HCC)     basal cell   • Chronic kidney disease     stage 3   • Colon polyp    • CPAP (continuous positive airway pressure) dependence    • Diabetes mellitus (HCC)     niddm   • Disease of thyroid gland    • Gout    • High cholesterol • Hypertension    • MI, 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    • Myocardial infarction [facility]) 2014   • S/P cardiac catheterization 12/30/2022   • Sleep apnea        Past Surgical History:   Procedure Laterality Date   • BASAL CELL CARCINOMA EXCISION Right 1/21/2022    Procedure: EXCISION BASAL CELL CARCINOMA EXTREMITY;  Surgeon: Elder Bowden DO;  Location: [facility] MAIN [facility];  Service: General   • CARDIAC CATHETERIZATION Left 12/29/2022    Procedure: Cardiac Left Heart Cath;  Surgeon: Merrill Alvarez MD;  Location: [facility] C[facility]; Service: Cardiology   • CARDIAC CATHETERIZATION N/A 12/29/2022    Procedure: Cardiac Coronary Angiogram;  Surgeon: Merrill Alvarez MD;  Location: [facility]; Service: Cardiology   • CARDIAC CATHETERIZATION N/A 12/29/2022    Procedure: Cardiac pci;  Surgeon: Merrill Alvarez MD;  Location: [facility];   Service: Cardiology   • CATARACT EXTRACTION W/  INTRAOCULAR LENS IMPLANT     • CORNEAL TRANSPLANT     • CORONARY ANGIOPLASTY WITH STENT PLACEMENT      stents x3-- [id number]   • EYE SURGERY      repair corneal laceration   • RI COLONOSCOPY FLX DX W/COLLJ SPEC WHEN PFRMD N/A 3/21/2016    Procedure: COLONOSCOPY;  Surgeon: Aliya Dailey MD;  Location: [facility];  Service: Colorectal   • TONSILLECTOMY AND ADENOIDECTOMY         Review of Systems  A comprehensive review of systems was negative except for: Eyes: positive for cataracts  Cardiovascular: positive for Luann Bickers artery disease and congestive heart failure currently on multiple cardiac meds also on Brilinta and aspirin  Genitourinary: positive for nocturia  Endocrine: positive for diabetic symptoms including Poorly controlled diabetes mellitus     Objective:  Physical Exam    Cardiographics  ECG: no prior ECG  Echocardiogram: not done    Imaging  Chest x-ray:  no recent chest x-ray     Lab Review   Appointment on 02/06/2023   Component Date Value   • Sodium 02/06/2023 136    • Potassium 02/06/2023 3 9    • Chloride 02/06/2023 104    • CO2 02/06/2023 22    • ANION GAP 02/06/2023 10    • BUN 02/06/2023 19    • Creatinine 02/06/2023 1 25    • Glucose, Fasting 02/06/2023 93    • Calcium 02/06/2023 9 2    • eGFR 02/06/2023 55    Appointment on 01/23/2023   Component Date Value   • Sodium 01/23/2023 138    • Potassium 01/23/2023 4 1    • Chloride 01/23/2023 102    • CO2 01/23/2023 26    • ANION GAP 01/23/2023 10    • BUN 01/23/2023 33 (H)    • Creatinine 01/23/2023 1 80 (H)    • Glucose, Fasting 01/23/2023 231 (H)    • Calcium 01/23/2023 9 6    • eGFR 01/23/2023 35    Lab on 01/16/2023   Component Date Value   • Sodium 01/16/2023 139    • Potassium 01/16/2023 4 6    • Chloride 01/16/2023 103    • CO2 01/16/2023 26    • ANION GAP 01/16/2023 10    • BUN 01/16/2023 25    • Creatinine 01/16/2023 1 44 (H)    • Glucose, Fasting 01/16/2023 224 (H)    • Calcium 01/16/2023 9 3    • eGFR 01/16/2023 46    • Hemoglobin A1C 01/16/2023 8 1 (H)    • EAG 01/16/2023 186    • Creatinine, Ur 01/16/2023 41 0    • Microalbum  ,U,Random 01/16/2023 79 0 (H)    • Microalb Creat Ratio 01/16/2023 193 (H)    • Cholesterol 01/16/2023 203 (H)    • Triglycerides 01/16/2023 206 (H)    • HDL, Direct 01/16/2023 45    • LDL Calculated 01/16/2023 117 (H)    • Non-HDL-Chol (CHOL-HDL) 01/16/2023 158    • TSH 3RD GENERATON 01/16/2023 2 905    Lab on 01/09/2023   Component Date Value   • Sodium 01/09/2023 137    • Potassium 01/09/2023 4 0    • Chloride 01/09/2023 102    • CO2 01/09/2023 28    • ANION GAP 01/09/2023 7    • BUN 01/09/2023 19    • Creatinine 01/09/2023 1 44 (H)    • Glucose, Fasting 01/09/2023 170 (H)    • Calcium 01/09/2023 9 7    • eGFR 01/09/2023 46    Office Visit on 01/05/2023   Component Date Value   • Hemoglobin A1C 01/05/2023 7 6 (A)    Admission on 01/04/2023, Discharged on 01/05/2023   Component Date Value   • WBC 01/04/2023 10 58 (H)    • RBC 01/04/2023 4 56    • Hemoglobin 01/04/2023 15 0    • Hematocrit 01/04/2023 44 8    • MCV 01/04/2023 98 • MCH 01/04/2023 32 9    • MCHC 01/04/2023 33 5    • RDW 01/04/2023 13 0    • MPV 01/04/2023 9 7    • Platelets 70/74/0705 368    • nRBC 01/04/2023 0    • Neutrophils Relative 01/04/2023 58    • Immat GRANS % 01/04/2023 1    • Lymphocytes Relative 01/04/2023 24    • Monocytes Relative 01/04/2023 10    • Eosinophils Relative 01/04/2023 6    • Basophils Relative 01/04/2023 1    • Neutrophils Absolute 01/04/2023 6 23    • Immature Grans Absolute 01/04/2023 0 07    • Lymphocytes Absolute 01/04/2023 2 56    • Monocytes Absolute 01/04/2023 1 00    • Eosinophils Absolute 01/04/2023 0 63 (H)    • Basophils Absolute 01/04/2023 0 09    • Sodium 01/04/2023 136    • Potassium 01/04/2023 4 3    • Chloride 01/04/2023 100    • CO2 01/04/2023 26    • ANION GAP 01/04/2023 10    • BUN 01/04/2023 27 (H)    • Creatinine 01/04/2023 1 88 (H)    • Glucose 01/04/2023 158 (H)    • Calcium 01/04/2023 9 3    • Corrected Calcium 01/04/2023 9 8    • AST 01/04/2023 33    • ALT 01/04/2023 62    • Alkaline Phosphatase 01/04/2023 101    • Total Protein 01/04/2023 7 8    • Albumin 01/04/2023 3 4 (L)    • Total Bilirubin 01/04/2023 0 56    • eGFR 01/04/2023 33    • Magnesium 01/04/2023 1 8    • hs TnI 0hr 01/04/2023 5    • NT-proBNP 01/04/2023 211    • Protime 01/04/2023 11 8    • INR 01/04/2023 0 84    • PTT 01/04/2023 27    • TSH 3RD GENERATON 01/04/2023 4 506 (H)    • SARS-CoV-2 01/04/2023 Negative    • INFLUENZA A PCR 01/04/2023 Negative    • INFLUENZA B PCR 01/04/2023 Negative    • RSV PCR 01/04/2023 Negative    • hs TnI 2hr 01/04/2023 4    • Delta 2hr hsTnI 01/04/2023 -1    • Free T4 01/04/2023 1 22    • hs TnI 4hr 01/04/2023 4    • Delta 4hr hsTnI 01/04/2023 -1    • Ventricular Rate 01/04/2023 81    • Atrial Rate 01/04/2023 81    • SD Interval 01/04/2023 202    • QRSD Interval 01/04/2023 82    • QT Interval 01/04/2023 384    • QTC Interval 01/04/2023 446    • P Axis 01/04/2023 53    • QRS Axis 01/04/2023 -78    • T Wave Axis 01/04/2023 72 • Ventricular Rate 01/04/2023 69    • Atrial Rate 01/04/2023 69    • [place] Interval 01/04/2023 204    • QRSD Interval 01/04/2023 84    • QT Interval 01/04/2023 438    • QTC Interval 01/04/2023 469    • P Axis 01/04/2023 59    • QRS Axis 01/04/2023 -80    • T Wave Axis 01/04/2023 101    Admission on 12/29/2022, Discharged on 12/30/2022   Component Date Value   • Sodium 12/29/2022 138    • Potassium 12/29/2022 4 4    • Chloride 12/29/2022 104    • CO2 12/29/2022 26    • ANION GAP 12/29/2022 8    • BUN 12/29/2022 20    • Creatinine 12/29/2022 1 22    • Glucose 12/29/2022 132    • Glucose, Fasting 12/29/2022 132 (H)    • Calcium 12/29/2022 8 9    • eGFR 12/29/2022 57    • Ventricular Rate 12/29/2022 63    • Atrial Rate 12/29/2022 63    • [place] Interval 12/29/2022 202    • QRSD Interval 12/29/2022 86    • QT Interval 12/29/2022 438    • QTC Interval 12/29/2022 448    • P Axis 12/29/2022 42    • QRS Axis 12/29/2022 106    • T Wave Axis 12/29/2022 1    • Activated Clotting Time,* 12/29/2022 239 (H)    • Specimen Type 12/29/2022 VENOUS    • POC Glucose 12/29/2022 65    • Sodium 12/30/2022 139    • Potassium 12/30/2022 3 9    • Chloride 12/30/2022 106    • CO2 12/30/2022 22    • ANION GAP 12/30/2022 11    • BUN 12/30/2022 12    • Creatinine 12/30/2022 1 07    • Glucose 12/30/2022 131    • Calcium 12/30/2022 8 9    • eGFR 12/30/2022 67         Assessment:     69 y o  male with planned surgery as above  Known risk factors for perioperative complications: Coronary disease  Congestive heart failure  Diabetes mellitus  Renal dysfunction    Difficulty with intubation is not anticipated  Cardiac Risk Estimation: minimal    Current medications which may produce withdrawal symptoms if withheld perioperatively: none  Plan:  Patient is CLEARED for surgery without any additional cardiac testing  1  Preoperative workup as follows none    2  Change in medication regimen before surgery: none, continue medication regimen including morning of surgery, with sip of water  3  Prophylaxis for cardiac events with perioperative beta-blockers: not indicated  4  Invasive hemodynamic monitoring perioperatively: not indicated  5  Deep vein thrombosis prophylaxis postoperatively:Not applicable    6  Other measures: Not applicable

## 2023-02-24 ENCOUNTER — CLINICAL SUPPORT (OUTPATIENT)
Dept: CARDIAC REHAB | Facility: HOSPITAL | Age: 78
End: 2023-02-24

## 2023-02-24 DIAGNOSIS — Z95.5 STENTED CORONARY ARTERY: ICD-10-CM

## 2023-02-27 ENCOUNTER — CLINICAL SUPPORT (OUTPATIENT)
Dept: CARDIAC REHAB | Facility: HOSPITAL | Age: 78
End: 2023-02-27

## 2023-02-27 DIAGNOSIS — Z95.5 STENTED CORONARY ARTERY: ICD-10-CM

## 2023-03-01 ENCOUNTER — CLINICAL SUPPORT (OUTPATIENT)
Dept: CARDIAC REHAB | Facility: HOSPITAL | Age: 78
End: 2023-03-01

## 2023-03-01 DIAGNOSIS — E03.9 HYPOTHYROIDISM, UNSPECIFIED TYPE: ICD-10-CM

## 2023-03-01 DIAGNOSIS — Z95.5 STENTED CORONARY ARTERY: Primary | ICD-10-CM

## 2023-03-01 RX ORDER — LEVOTHYROXINE SODIUM 88 UG/1
88 TABLET ORAL
Qty: 90 TABLET | Refills: 0 | Status: ON HOLD | OUTPATIENT
Start: 2023-03-01

## 2023-03-02 ENCOUNTER — HOSPITAL ENCOUNTER (EMERGENCY)
Facility: HOSPITAL | Age: 78
End: 2023-03-02
Attending: EMERGENCY MEDICINE

## 2023-03-02 ENCOUNTER — APPOINTMENT (EMERGENCY)
Dept: CT IMAGING | Facility: HOSPITAL | Age: 78
End: 2023-03-02

## 2023-03-02 ENCOUNTER — APPOINTMENT (INPATIENT)
Dept: GASTROENTEROLOGY | Facility: HOSPITAL | Age: 78
End: 2023-03-02

## 2023-03-02 ENCOUNTER — ANESTHESIA (INPATIENT)
Dept: GASTROENTEROLOGY | Facility: HOSPITAL | Age: 78
End: 2023-03-02

## 2023-03-02 ENCOUNTER — APPOINTMENT (EMERGENCY)
Dept: RADIOLOGY | Facility: HOSPITAL | Age: 78
End: 2023-03-02

## 2023-03-02 ENCOUNTER — HOSPITAL ENCOUNTER (INPATIENT)
Facility: HOSPITAL | Age: 78
LOS: 6 days | End: 2023-03-08
Attending: INTERNAL MEDICINE | Admitting: INTERNAL MEDICINE

## 2023-03-02 ENCOUNTER — ANESTHESIA EVENT (INPATIENT)
Dept: GASTROENTEROLOGY | Facility: HOSPITAL | Age: 78
End: 2023-03-02

## 2023-03-02 VITALS
RESPIRATION RATE: 20 BRPM | OXYGEN SATURATION: 99 % | DIASTOLIC BLOOD PRESSURE: 57 MMHG | SYSTOLIC BLOOD PRESSURE: 118 MMHG | BODY MASS INDEX: 33.15 KG/M2 | WEIGHT: 218.03 LBS | HEART RATE: 82 BPM | TEMPERATURE: 98 F

## 2023-03-02 DIAGNOSIS — R33.9 URINARY RETENTION: ICD-10-CM

## 2023-03-02 DIAGNOSIS — K92.2 GI BLEED: Primary | ICD-10-CM

## 2023-03-02 DIAGNOSIS — Z98.890 S/P CARDIAC CATHETERIZATION: ICD-10-CM

## 2023-03-02 DIAGNOSIS — K62.5 BRBPR (BRIGHT RED BLOOD PER RECTUM): ICD-10-CM

## 2023-03-02 PROBLEM — I95.9 HYPOTENSION: Status: ACTIVE | Noted: 2023-03-02

## 2023-03-02 PROBLEM — K57.92 ACUTE DIVERTICULITIS: Status: ACTIVE | Noted: 2023-03-02

## 2023-03-02 PROBLEM — E11.9 DIABETES MELLITUS, TYPE 2 (HCC): Status: ACTIVE | Noted: 2023-03-02

## 2023-03-02 LAB
ABO GROUP BLD BPU: NORMAL
ABO GROUP BLD BPU: NORMAL
ABO GROUP BLD: NORMAL
ABO GROUP BLD: NORMAL
ALBUMIN SERPL BCP-MCNC: 3.3 G/DL (ref 3.5–5)
ALBUMIN SERPL BCP-MCNC: 3.7 G/DL (ref 3.5–5)
ALP SERPL-CCNC: 58 U/L (ref 34–104)
ALP SERPL-CCNC: 67 U/L (ref 34–104)
ALT SERPL W P-5'-P-CCNC: 22 U/L (ref 7–52)
ALT SERPL W P-5'-P-CCNC: 25 U/L (ref 7–52)
ANION GAP SERPL CALCULATED.3IONS-SCNC: 10 MMOL/L (ref 4–13)
ANION GAP SERPL CALCULATED.3IONS-SCNC: 7 MMOL/L (ref 4–13)
APTT PPP: 25 SECONDS (ref 23–37)
AST SERPL W P-5'-P-CCNC: 15 U/L (ref 13–39)
AST SERPL W P-5'-P-CCNC: 15 U/L (ref 13–39)
BASE EX.OXY STD BLDV CALC-SCNC: 93.8 % (ref 60–80)
BASE EXCESS BLDV CALC-SCNC: -6.1 MMOL/L
BASOPHILS # BLD AUTO: 0.04 THOUSANDS/ÂΜL (ref 0–0.1)
BASOPHILS # BLD AUTO: 0.08 THOUSANDS/ÂΜL (ref 0–0.1)
BASOPHILS NFR BLD AUTO: 0 % (ref 0–1)
BASOPHILS NFR BLD AUTO: 1 % (ref 0–1)
BILIRUB SERPL-MCNC: 0.59 MG/DL (ref 0.2–1)
BILIRUB SERPL-MCNC: 0.88 MG/DL (ref 0.2–1)
BLD GP AB SCN SERPL QL: NEGATIVE
BPU ID: NORMAL
BPU ID: NORMAL
BUN SERPL-MCNC: 28 MG/DL (ref 5–25)
BUN SERPL-MCNC: 31 MG/DL (ref 5–25)
CA-I BLD-SCNC: 1.15 MMOL/L (ref 1.12–1.32)
CALCIUM ALBUM COR SERPL-MCNC: 9 MG/DL (ref 8.3–10.1)
CALCIUM SERPL-MCNC: 8.4 MG/DL (ref 8.4–10.2)
CALCIUM SERPL-MCNC: 9 MG/DL (ref 8.4–10.2)
CARDIAC TROPONIN I PNL SERPL HS: 3 NG/L
CARDIAC TROPONIN I PNL SERPL HS: 3 NG/L (ref 8–18)
CHLORIDE SERPL-SCNC: 103 MMOL/L (ref 96–108)
CHLORIDE SERPL-SCNC: 110 MMOL/L (ref 96–108)
CO2 SERPL-SCNC: 21 MMOL/L (ref 21–32)
CO2 SERPL-SCNC: 23 MMOL/L (ref 21–32)
CREAT SERPL-MCNC: 1.38 MG/DL (ref 0.6–1.3)
CREAT SERPL-MCNC: 1.53 MG/DL (ref 0.6–1.3)
CROSSMATCH: NORMAL
CROSSMATCH: NORMAL
EOSINOPHIL # BLD AUTO: 0.07 THOUSAND/ÂΜL (ref 0–0.61)
EOSINOPHIL # BLD AUTO: 0.48 THOUSAND/ÂΜL (ref 0–0.61)
EOSINOPHIL NFR BLD AUTO: 1 % (ref 0–6)
EOSINOPHIL NFR BLD AUTO: 3 % (ref 0–6)
ERYTHROCYTE [DISTWIDTH] IN BLOOD BY AUTOMATED COUNT: 13.5 % (ref 11.6–15.1)
ERYTHROCYTE [DISTWIDTH] IN BLOOD BY AUTOMATED COUNT: 15.8 % (ref 11.6–15.1)
FERRITIN SERPL-MCNC: 93 NG/ML (ref 8–388)
GFR SERPL CREATININE-BSD FRML MDRD: 43 ML/MIN/1.73SQ M
GFR SERPL CREATININE-BSD FRML MDRD: 49 ML/MIN/1.73SQ M
GLUCOSE SERPL-MCNC: 117 MG/DL (ref 65–140)
GLUCOSE SERPL-MCNC: 152 MG/DL (ref 65–140)
GLUCOSE SERPL-MCNC: 181 MG/DL (ref 65–140)
GLUCOSE SERPL-MCNC: 227 MG/DL (ref 65–140)
GLUCOSE SERPL-MCNC: 271 MG/DL (ref 65–140)
HCO3 BLDV-SCNC: 18.2 MMOL/L (ref 24–30)
HCT VFR BLD AUTO: 29.7 % (ref 36.5–49.3)
HCT VFR BLD AUTO: 36.7 % (ref 36.5–49.3)
HCT VFR BLD AUTO: 43.7 % (ref 36.5–49.3)
HGB BLD-MCNC: 12.3 G/DL (ref 12–17)
HGB BLD-MCNC: 13 G/DL (ref 12–17)
HGB BLD-MCNC: 14.4 G/DL (ref 12–17)
HGB BLD-MCNC: 9.9 G/DL (ref 12–17)
IMM GRANULOCYTES # BLD AUTO: 0.08 THOUSAND/UL (ref 0–0.2)
IMM GRANULOCYTES # BLD AUTO: 0.15 THOUSAND/UL (ref 0–0.2)
IMM GRANULOCYTES NFR BLD AUTO: 1 % (ref 0–2)
IMM GRANULOCYTES NFR BLD AUTO: 1 % (ref 0–2)
INR PPP: 1.03 (ref 0.84–1.19)
IRON SATN MFR SERPL: 50 % (ref 20–50)
IRON SERPL-MCNC: 124 UG/DL (ref 65–175)
LACTATE SERPL-SCNC: 1.5 MMOL/L (ref 0.5–2)
LACTATE SERPL-SCNC: 2.6 MMOL/L (ref 0.5–2)
LACTATE SERPL-SCNC: 3.8 MMOL/L (ref 0.5–2)
LIPASE SERPL-CCNC: 213 U/L (ref 11–82)
LYMPHOCYTES # BLD AUTO: 1.92 THOUSANDS/ÂΜL (ref 0.6–4.47)
LYMPHOCYTES # BLD AUTO: 2.84 THOUSANDS/ÂΜL (ref 0.6–4.47)
LYMPHOCYTES NFR BLD AUTO: 17 % (ref 14–44)
LYMPHOCYTES NFR BLD AUTO: 21 % (ref 14–44)
MAGNESIUM SERPL-MCNC: 1.8 MG/DL (ref 1.9–2.7)
MCH RBC QN AUTO: 31.4 PG (ref 26.8–34.3)
MCH RBC QN AUTO: 34.8 PG (ref 26.8–34.3)
MCHC RBC AUTO-ENTMCNC: 33 G/DL (ref 31.4–37.4)
MCHC RBC AUTO-ENTMCNC: 33.5 G/DL (ref 31.4–37.4)
MCV RBC AUTO: 104 FL (ref 82–98)
MCV RBC AUTO: 95 FL (ref 82–98)
MONOCYTES # BLD AUTO: 0.86 THOUSAND/ÂΜL (ref 0.17–1.22)
MONOCYTES # BLD AUTO: 1.35 THOUSAND/ÂΜL (ref 0.17–1.22)
MONOCYTES NFR BLD AUTO: 8 % (ref 4–12)
MONOCYTES NFR BLD AUTO: 9 % (ref 4–12)
NASAL CANNULA: 2
NEUTROPHILS # BLD AUTO: 12.21 THOUSANDS/ÂΜL (ref 1.85–7.62)
NEUTROPHILS # BLD AUTO: 6.27 THOUSANDS/ÂΜL (ref 1.85–7.62)
NEUTS SEG NFR BLD AUTO: 68 % (ref 43–75)
NEUTS SEG NFR BLD AUTO: 70 % (ref 43–75)
NRBC BLD AUTO-RTO: 0 /100 WBCS
NRBC BLD AUTO-RTO: 0 /100 WBCS
O2 CT BLDV-SCNC: 20.1 ML/DL
PCO2 BLDV: 32.9 MM HG (ref 42–50)
PH BLDV: 7.36 [PH] (ref 7.3–7.4)
PHOSPHATE SERPL-MCNC: 3.4 MG/DL (ref 2.3–4.1)
PLATELET # BLD AUTO: 203 THOUSANDS/UL (ref 149–390)
PLATELET # BLD AUTO: 329 THOUSANDS/UL (ref 149–390)
PMV BLD AUTO: 9.6 FL (ref 8.9–12.7)
PMV BLD AUTO: 9.7 FL (ref 8.9–12.7)
PO2 BLDV: 94 MM HG (ref 35–45)
POTASSIUM SERPL-SCNC: 4.8 MMOL/L (ref 3.5–5.3)
POTASSIUM SERPL-SCNC: 5.9 MMOL/L (ref 3.5–5.3)
POTASSIUM SERPL-SCNC: 5.9 MMOL/L (ref 3.5–5.3)
PROT SERPL-MCNC: 5.8 G/DL (ref 6.4–8.4)
PROT SERPL-MCNC: 6.2 G/DL (ref 6.4–8.4)
PROTHROMBIN TIME: 13.5 SECONDS (ref 11.6–14.5)
RBC # BLD AUTO: 3.53 MILLION/UL (ref 3.88–5.62)
RBC # BLD AUTO: 4.58 MILLION/UL (ref 3.88–5.62)
RH BLD: POSITIVE
RH BLD: POSITIVE
SODIUM SERPL-SCNC: 136 MMOL/L (ref 135–147)
SODIUM SERPL-SCNC: 138 MMOL/L (ref 135–147)
SPECIMEN EXPIRATION DATE: NORMAL
TIBC SERPL-MCNC: 246 UG/DL (ref 250–450)
UNIT DISPENSE STATUS: NORMAL
UNIT DISPENSE STATUS: NORMAL
UNIT PRODUCT CODE: NORMAL
UNIT PRODUCT CODE: NORMAL
UNIT PRODUCT VOLUME: 350 ML
UNIT PRODUCT VOLUME: 350 ML
UNIT RH: NORMAL
UNIT RH: NORMAL
WBC # BLD AUTO: 17.11 THOUSAND/UL (ref 4.31–10.16)
WBC # BLD AUTO: 9.24 THOUSAND/UL (ref 4.31–10.16)

## 2023-03-02 PROCEDURE — 0DJD8ZZ INSPECTION OF LOWER INTESTINAL TRACT, VIA NATURAL OR ARTIFICIAL OPENING ENDOSCOPIC: ICD-10-PCS | Performed by: INTERNAL MEDICINE

## 2023-03-02 PROCEDURE — 30233N1 TRANSFUSION OF NONAUTOLOGOUS RED BLOOD CELLS INTO PERIPHERAL VEIN, PERCUTANEOUS APPROACH: ICD-10-PCS | Performed by: INTERNAL MEDICINE

## 2023-03-02 RX ORDER — ZONISAMIDE 25 MG/1
50 CAPSULE ORAL DAILY
Status: DISCONTINUED | OUTPATIENT
Start: 2023-03-03 | End: 2023-03-08 | Stop reason: HOSPADM

## 2023-03-02 RX ORDER — ALLOPURINOL 100 MG/1
100 TABLET ORAL DAILY
Status: DISCONTINUED | OUTPATIENT
Start: 2023-03-03 | End: 2023-03-08 | Stop reason: HOSPADM

## 2023-03-02 RX ORDER — GLYCOPYRROLATE 0.2 MG/ML
INJECTION INTRAMUSCULAR; INTRAVENOUS AS NEEDED
Status: DISCONTINUED | OUTPATIENT
Start: 2023-03-02 | End: 2023-03-02

## 2023-03-02 RX ORDER — PANTOPRAZOLE SODIUM 40 MG/1
40 TABLET, DELAYED RELEASE ORAL
Status: DISCONTINUED | OUTPATIENT
Start: 2023-03-03 | End: 2023-03-08 | Stop reason: HOSPADM

## 2023-03-02 RX ORDER — CEFTRIAXONE 1 G/50ML
1000 INJECTION, SOLUTION INTRAVENOUS EVERY 24 HOURS
Status: DISCONTINUED | OUTPATIENT
Start: 2023-03-03 | End: 2023-03-08 | Stop reason: HOSPADM

## 2023-03-02 RX ORDER — SODIUM CHLORIDE, SODIUM GLUCONATE, SODIUM ACETATE, POTASSIUM CHLORIDE, MAGNESIUM CHLORIDE, SODIUM PHOSPHATE, DIBASIC, AND POTASSIUM PHOSPHATE .53; .5; .37; .037; .03; .012; .00082 G/100ML; G/100ML; G/100ML; G/100ML; G/100ML; G/100ML; G/100ML
125 INJECTION, SOLUTION INTRAVENOUS CONTINUOUS
Status: DISCONTINUED | OUTPATIENT
Start: 2023-03-02 | End: 2023-03-03

## 2023-03-02 RX ORDER — PANTOPRAZOLE SODIUM 40 MG/10ML
40 INJECTION, POWDER, LYOPHILIZED, FOR SOLUTION INTRAVENOUS EVERY 12 HOURS SCHEDULED
Status: DISCONTINUED | OUTPATIENT
Start: 2023-03-02 | End: 2023-03-02

## 2023-03-02 RX ORDER — CALCIUM GLUCONATE 20 MG/ML
1 INJECTION, SOLUTION INTRAVENOUS ONCE
Status: COMPLETED | OUTPATIENT
Start: 2023-03-02 | End: 2023-03-02

## 2023-03-02 RX ORDER — ASPIRIN 81 MG/1
81 TABLET ORAL DAILY
Status: DISCONTINUED | OUTPATIENT
Start: 2023-03-02 | End: 2023-03-04

## 2023-03-02 RX ORDER — ATORVASTATIN CALCIUM 80 MG/1
80 TABLET, FILM COATED ORAL
Status: DISCONTINUED | OUTPATIENT
Start: 2023-03-02 | End: 2023-03-08 | Stop reason: HOSPADM

## 2023-03-02 RX ORDER — PANTOPRAZOLE SODIUM 40 MG/10ML
INJECTION, POWDER, LYOPHILIZED, FOR SOLUTION INTRAVENOUS
Status: COMPLETED
Start: 2023-03-02 | End: 2023-03-02

## 2023-03-02 RX ORDER — ATORVASTATIN CALCIUM 80 MG/1
80 TABLET, FILM COATED ORAL DAILY
Status: DISCONTINUED | OUTPATIENT
Start: 2023-03-03 | End: 2023-03-02

## 2023-03-02 RX ORDER — CHLORHEXIDINE GLUCONATE 0.12 MG/ML
15 RINSE ORAL EVERY 12 HOURS SCHEDULED
Status: DISCONTINUED | OUTPATIENT
Start: 2023-03-02 | End: 2023-03-03

## 2023-03-02 RX ORDER — METRONIDAZOLE 500 MG/100ML
500 INJECTION, SOLUTION INTRAVENOUS EVERY 8 HOURS
Status: DISCONTINUED | OUTPATIENT
Start: 2023-03-02 | End: 2023-03-02 | Stop reason: HOSPADM

## 2023-03-02 RX ORDER — SODIUM CHLORIDE 9 MG/ML
INJECTION, SOLUTION INTRAVENOUS CONTINUOUS PRN
Status: DISCONTINUED | OUTPATIENT
Start: 2023-03-02 | End: 2023-03-02

## 2023-03-02 RX ORDER — ESCITALOPRAM OXALATE 10 MG/1
10 TABLET ORAL DAILY
Status: DISCONTINUED | OUTPATIENT
Start: 2023-03-03 | End: 2023-03-08 | Stop reason: HOSPADM

## 2023-03-02 RX ORDER — LEVOTHYROXINE SODIUM 88 UG/1
88 TABLET ORAL
Status: DISCONTINUED | OUTPATIENT
Start: 2023-03-03 | End: 2023-03-08 | Stop reason: HOSPADM

## 2023-03-02 RX ORDER — MAGNESIUM SULFATE HEPTAHYDRATE 40 MG/ML
2 INJECTION, SOLUTION INTRAVENOUS ONCE
Status: COMPLETED | OUTPATIENT
Start: 2023-03-02 | End: 2023-03-02

## 2023-03-02 RX ORDER — ASPIRIN 81 MG/1
81 TABLET, CHEWABLE ORAL DAILY
Status: DISCONTINUED | OUTPATIENT
Start: 2023-03-03 | End: 2023-03-02

## 2023-03-02 RX ORDER — ONDANSETRON 2 MG/ML
4 INJECTION INTRAMUSCULAR; INTRAVENOUS ONCE
Status: COMPLETED | OUTPATIENT
Start: 2023-03-02 | End: 2023-03-02

## 2023-03-02 RX ORDER — INSULIN LISPRO 100 [IU]/ML
2-12 INJECTION, SOLUTION INTRAVENOUS; SUBCUTANEOUS EVERY 6 HOURS SCHEDULED
Status: DISCONTINUED | OUTPATIENT
Start: 2023-03-02 | End: 2023-03-04

## 2023-03-02 RX ORDER — CEFTRIAXONE 1 G/50ML
1000 INJECTION, SOLUTION INTRAVENOUS ONCE
Status: COMPLETED | OUTPATIENT
Start: 2023-03-02 | End: 2023-03-02

## 2023-03-02 RX ORDER — PANTOPRAZOLE SODIUM 40 MG/10ML
40 INJECTION, POWDER, LYOPHILIZED, FOR SOLUTION INTRAVENOUS ONCE
Status: COMPLETED | OUTPATIENT
Start: 2023-03-02 | End: 2023-03-02

## 2023-03-02 RX ORDER — ACETAMINOPHEN 325 MG/1
650 TABLET ORAL EVERY 6 HOURS PRN
Status: DISCONTINUED | OUTPATIENT
Start: 2023-03-02 | End: 2023-03-08 | Stop reason: HOSPADM

## 2023-03-02 RX ORDER — DEXTROSE MONOHYDRATE 25 G/50ML
25 INJECTION, SOLUTION INTRAVENOUS ONCE
Status: COMPLETED | OUTPATIENT
Start: 2023-03-02 | End: 2023-03-02

## 2023-03-02 RX ORDER — SODIUM CHLORIDE, SODIUM LACTATE, POTASSIUM CHLORIDE, CALCIUM CHLORIDE 600; 310; 30; 20 MG/100ML; MG/100ML; MG/100ML; MG/100ML
125 INJECTION, SOLUTION INTRAVENOUS CONTINUOUS
Status: DISCONTINUED | OUTPATIENT
Start: 2023-03-02 | End: 2023-03-02

## 2023-03-02 RX ORDER — METRONIDAZOLE 500 MG/100ML
500 INJECTION, SOLUTION INTRAVENOUS EVERY 8 HOURS
Status: DISCONTINUED | OUTPATIENT
Start: 2023-03-02 | End: 2023-03-06

## 2023-03-02 RX ORDER — PROPOFOL 10 MG/ML
INJECTION, EMULSION INTRAVENOUS AS NEEDED
Status: DISCONTINUED | OUTPATIENT
Start: 2023-03-02 | End: 2023-03-02

## 2023-03-02 RX ADMIN — MAGNESIUM SULFATE HEPTAHYDRATE 2 G: 40 INJECTION, SOLUTION INTRAVENOUS at 16:34

## 2023-03-02 RX ADMIN — ACETAMINOPHEN 325MG 650 MG: 325 TABLET ORAL at 21:26

## 2023-03-02 RX ADMIN — CHLORHEXIDINE GLUCONATE 0.12% ORAL RINSE 15 ML: 1.2 LIQUID ORAL at 21:26

## 2023-03-02 RX ADMIN — SODIUM CHLORIDE: 0.9 INJECTION, SOLUTION INTRAVENOUS at 15:12

## 2023-03-02 RX ADMIN — DEXTROSE MONOHYDRATE 25 ML: 25 INJECTION, SOLUTION INTRAVENOUS at 06:34

## 2023-03-02 RX ADMIN — ONDANSETRON 4 MG: 2 INJECTION INTRAMUSCULAR; INTRAVENOUS at 08:21

## 2023-03-02 RX ADMIN — TICAGRELOR 90 MG: 90 TABLET ORAL at 21:26

## 2023-03-02 RX ADMIN — ASPIRIN 81 MG: 81 TABLET, COATED ORAL at 16:34

## 2023-03-02 RX ADMIN — SODIUM CHLORIDE 500 ML: 0.9 INJECTION, SOLUTION INTRAVENOUS at 06:20

## 2023-03-02 RX ADMIN — SODIUM BICARBONATE 50 MEQ: 84 INJECTION INTRAVENOUS at 06:36

## 2023-03-02 RX ADMIN — INSULIN LISPRO 4 UNITS: 100 INJECTION, SOLUTION INTRAVENOUS; SUBCUTANEOUS at 23:38

## 2023-03-02 RX ADMIN — BISACODYL 10 MG: 5 TABLET, COATED ORAL at 17:05

## 2023-03-02 RX ADMIN — IOHEXOL 100 ML: 350 INJECTION, SOLUTION INTRAVENOUS at 06:31

## 2023-03-02 RX ADMIN — GLYCOPYRROLATE 0.2 MCG: 0.2 INJECTION INTRAMUSCULAR; INTRAVENOUS at 15:24

## 2023-03-02 RX ADMIN — METRONIDAZOLE 500 MG: 500 INJECTION, SOLUTION INTRAVENOUS at 08:21

## 2023-03-02 RX ADMIN — CHLORHEXIDINE GLUCONATE 0.12% ORAL RINSE 15 ML: 1.2 LIQUID ORAL at 12:27

## 2023-03-02 RX ADMIN — ATORVASTATIN CALCIUM 80 MG: 80 TABLET, FILM COATED ORAL at 16:51

## 2023-03-02 RX ADMIN — SODIUM CHLORIDE, SODIUM GLUCONATE, SODIUM ACETATE, POTASSIUM CHLORIDE AND MAGNESIUM CHLORIDE 125 ML/HR: 526; 502; 368; 37; 30 INJECTION, SOLUTION INTRAVENOUS at 23:36

## 2023-03-02 RX ADMIN — PROPOFOL 20 MG: 10 INJECTION, EMULSION INTRAVENOUS at 15:29

## 2023-03-02 RX ADMIN — CEFTRIAXONE 1000 MG: 1 INJECTION, SOLUTION INTRAVENOUS at 05:52

## 2023-03-02 RX ADMIN — PANTOPRAZOLE SODIUM 8 MG/HR: 40 INJECTION, POWDER, FOR SOLUTION INTRAVENOUS at 06:11

## 2023-03-02 RX ADMIN — CALCIUM GLUCONATE 1 G: 20 INJECTION, SOLUTION INTRAVENOUS at 06:38

## 2023-03-02 RX ADMIN — POLYETHYLENE GLYCOL 3350, SODIUM SULFATE ANHYDROUS, SODIUM BICARBONATE, SODIUM CHLORIDE, POTASSIUM CHLORIDE 4000 ML: 236; 22.74; 6.74; 5.86; 2.97 POWDER, FOR SOLUTION ORAL at 16:59

## 2023-03-02 RX ADMIN — INSULIN HUMAN 10 UNITS: 100 INJECTION, SOLUTION PARENTERAL at 06:37

## 2023-03-02 RX ADMIN — SODIUM CHLORIDE, SODIUM LACTATE, POTASSIUM CHLORIDE, AND CALCIUM CHLORIDE 125 ML/HR: .6; .31; .03; .02 INJECTION, SOLUTION INTRAVENOUS at 13:33

## 2023-03-02 RX ADMIN — PROPOFOL 150 MG: 10 INJECTION, EMULSION INTRAVENOUS at 15:24

## 2023-03-02 RX ADMIN — LIDOCAINE HYDROCHLORIDE 100 MG: 20 INJECTION INTRAVENOUS at 15:24

## 2023-03-02 RX ADMIN — METRONIDAZOLE 500 MG: 500 INJECTION, SOLUTION INTRAVENOUS at 16:34

## 2023-03-02 RX ADMIN — PROPOFOL 50 MG: 10 INJECTION, EMULSION INTRAVENOUS at 15:27

## 2023-03-02 RX ADMIN — SODIUM CHLORIDE, SODIUM GLUCONATE, SODIUM ACETATE, POTASSIUM CHLORIDE AND MAGNESIUM CHLORIDE 125 ML/HR: 526; 502; 368; 37; 30 INJECTION, SOLUTION INTRAVENOUS at 13:52

## 2023-03-02 RX ADMIN — PANTOPRAZOLE SODIUM 80 MG: 40 INJECTION, POWDER, FOR SOLUTION INTRAVENOUS at 06:10

## 2023-03-02 RX ADMIN — PANTOPRAZOLE SODIUM 40 MG: 40 INJECTION, POWDER, FOR SOLUTION INTRAVENOUS at 13:32

## 2023-03-02 RX ADMIN — BISACODYL 10 MG: 5 TABLET, COATED ORAL at 21:01

## 2023-03-02 NOTE — ASSESSMENT & PLAN NOTE
Lab Results   Component Value Date    EGFR 67 03/03/2023    EGFR 49 03/02/2023    EGFR 43 03/02/2023    CREATININE 1 06 03/03/2023    CREATININE 1 38 (H) 03/02/2023    CREATININE 1 53 (H) 03/02/2023     · Creatinine baseline 1 2-1 3  · Trend urine output and renal indices  · Avoid nephrotoxic agents  · Continue measuring I/O

## 2023-03-02 NOTE — SEPSIS NOTE
Sepsis Note   Petar Deleon 69 y o  male MRN: [id number]  Unit/Bed#: RM10 Encounter: [id number]       Initial Sepsis Screening     Row Name 03/02/23 0558                Is the patient's history suggestive of a new or worsening infection? Yes (Proceed)  -BC        Suspected source of infection acute abdominal infection  -BC        Indicate SIRS criteria Tachycardia > 90 bpm;Leukocytosis (WBC > 32813 IJL) OR Leukopenia (WBC <4000 IJL) OR Bandemia (WBC >10% bands)  -BC        Are two or more of the above signs & symptoms of infection both present and new to the patient? Yes (Proceed)  -BC        Assess for evidence of organ dysfunction: Are any of the below criteria present within 6 hours of suspected infection and SIRS criteria that are NOT considered to be chronic conditions? SBP < 90;Lactate > 2 0  -BC        Date of presentation of severe sepsis 03/02/23  -BC        Time of presentation of severe sepsis 0558  -BC        Date of presentation of septic shock 03/02/23  -BC        Time of presentation of septic shock 0558  -BC        Fluid Resuscitation: A lesser volume than 30 ml/kg IV fluid will be given  -BC        The 30 mL/kg fluid bolus was not given to the patient despite having hypotension, a lactate of >= 4 mmol/L, or documentation of septic shock secondary to: --  Patient's blood pressure thought to be due to anemia at this time  -BC        Sepsis Note: Click "NEXT" below (NOT "close") to generate sepsis note based on above information   YES (proceed by clicking "NEXT")  -BC              User Key  (r) = Recorded By, (t) = Taken By, (c) = Cosigned By    [id number] Name Provider Type    BC Arianna Stevenson MD Physician                Default Flowsheet Data (last 720 hours)     Sepsis Reassess     Row Name 03/02/23 0630                   Repeat Volume Status and Tissue Perfusion Assessment Performed    Date of Reassessment: 03/02/23  -BC        Time of Reassessment: 0630  -BC        Sepsis Reassessment Note: Click "NEXT" below (NOT "close") to generate sepsis reassessment note  YES (proceed by clicking "NEXT")  -BC        Repeat Volume Status and Tissue Perfusion Assessment Performed --              User Key  (r) = Recorded By, (t) = Taken By, (c) = Cosigned By    [id number] Name Provider Type    BC Juan Whitten MD Physician                Body mass index is 33 15 kg/m²    Wt Readings from Last 1 Encounters:   03/02/23 98 9 kg (218 lb 0 6 oz)        Ideal body weight: 68 4 kg (150 lb 12 7 oz)  Adjusted ideal body weight: 80 6 kg (177 lb 11 1 oz)

## 2023-03-02 NOTE — PLAN OF CARE
Problem: MOBILITY - ADULT  Goal: Maintain or return to baseline ADL function  Description: INTERVENTIONS:  -  Assess patient's ability to carry out ADLs; assess patient's baseline for ADL function and identify physical deficits which impact ability to perform ADLs (bathing, care of mouth/teeth, toileting, grooming, dressing, etc )  - Assess/evaluate cause of self-care deficits   - Assess range of motion  - Assess patient's mobility; develop plan if impaired  - Assess patient's need for assistive devices and provide as appropriate  - Encourage maximum independence but intervene and supervise when necessary  - Involve family in performance of ADLs  - Assess for home care needs following discharge   - Consider OT consult to assist with ADL evaluation and planning for discharge  - Provide patient education as appropriate  Outcome: Progressing  Goal: Maintains/Returns to pre admission functional level  Description: INTERVENTIONS:  - Perform BMAT or MOVE assessment daily    - Set and communicate daily mobility goal to care team and patient/family/caregiver     - Collaborate with rehabilitation services on mobility goals if consulted  - Perform Range of Motion   - Out of bed for toileting  - Record patient progress and toleration of activity level   Outcome: Progressing     Problem: PAIN - ADULT  Goal: Verbalizes/displays adequate comfort level or baseline comfort level  Description: Interventions:  - Encourage patient to monitor pain and request assistance  - Assess pain using appropriate pain scale  - Administer analgesics based on type and severity of pain and evaluate response  - Implement non-pharmacological measures as appropriate and evaluate response  - Consider cultural and social influences on pain and pain management  - Notify physician/advanced practitioner if interventions unsuccessful or patient reports new pain  Outcome: Progressing     Problem: INFECTION - ADULT  Goal: Absence or prevention of progression during hospitalization  Description: INTERVENTIONS:  - Assess and monitor for signs and symptoms of infection  - Monitor lab/diagnostic results  - Monitor all insertion sites, i e  indwelling lines, tubes, and drains  - Monitor endotracheal if appropriate and nasal secretions for changes in amount and color  - [place] appropriate cooling/warming therapies per order  - Administer medications as ordered  - Instruct and encourage patient and family to use good hand hygiene technique  - Identify and instruct in appropriate isolation precautions for identified infection/condition  Outcome: Progressing  Goal: Absence of fever/infection during neutropenic period  Description: INTERVENTIONS:  - Monitor WBC    Outcome: Progressing     Problem: SAFETY ADULT  Goal: Maintain or return to baseline ADL function  Description: INTERVENTIONS:  -  Assess patient's ability to carry out ADLs; assess patient's baseline for ADL function and identify physical deficits which impact ability to perform ADLs (bathing, care of mouth/teeth, toileting, grooming, dressing, etc )  - Assess/evaluate cause of self-care deficits   - Assess range of motion  - Assess patient's mobility; develop plan if impaired  - Assess patient's need for assistive devices and provide as appropriate  - Encourage maximum independence but intervene and supervise when necessary  - Involve family in performance of ADLs  - Assess for home care needs following discharge   - Consider OT consult to assist with ADL evaluation and planning for discharge  - Provide patient education as appropriate  Outcome: Progressing  Goal: Maintains/Returns to pre admission functional level  Description: INTERVENTIONS:  - Perform BMAT or MOVE assessment daily    - Set and communicate daily mobility goal to care team and patient/family/caregiver     - Collaborate with rehabilitation services on mobility goals if consulted  - Perform Range of Motion   - Out of bed for toileting  - Record patient progress and toleration of activity level   Outcome: Progressing  Goal: Patient will remain free of falls  Description: INTERVENTIONS:  - Educate patient/family on patient safety including physical limitations  - Instruct patient to call for assistance with activity   - Consult OT/PT to assist with strengthening/mobility   - Keep Call bell within reach  - Keep bed low and locked with side rails adjusted as appropriate  - Keep care items and personal belongings within reach  - Initiate and maintain comfort rounds  - Make Fall Risk Sign visible to staff  - Offer Toileting   - Apply yellow socks and bracelet for high fall risk patients  - Consider moving patient to room near nurses station  Outcome: Progressing     Problem: DISCHARGE PLANNING  Goal: Discharge to home or other facility with appropriate resources  Description: INTERVENTIONS:  - Identify barriers to discharge w/patient and caregiver  - Arrange for needed discharge resources and transportation as appropriate  - Identify discharge learning needs (meds, wound care, etc )  - Arrange for interpretive services to assist at discharge as needed  - Refer to Case Management Department for coordinating discharge planning if the patient needs post-hospital services based on physician/advanced practitioner order or complex needs related to functional status, cognitive ability, or social support system  Outcome: Progressing     Problem: Knowledge Deficit  Goal: Patient/family/caregiver demonstrates understanding of disease process, treatment plan, medications, and discharge instructions  Description: Complete learning assessment and assess knowledge base    Interventions:  - Provide teaching at level of understanding  - Provide teaching via preferred learning methods  Outcome: Progressing     Problem: Nutrition/Hydration-ADULT  Goal: Nutrient/Hydration intake appropriate for improving, restoring or maintaining nutritional needs  Description: Monitor and assess patient's nutrition/hydration status for malnutrition  Collaborate with interdisciplinary team and initiate plan and interventions as ordered  Monitor patient's weight and dietary intake as ordered or per policy  Utilize nutrition screening tool and intervene as necessary  Determine patient's food preferences and provide high-protein, high-caloric foods as appropriate       INTERVENTIONS:  - Monitor oral intake, urinary output, labs, and treatment plans  - Assess nutrition and hydration status and recommend course of action  - Evaluate amount of meals eaten  - Assist patient with eating if necessary   - Allow adequate time for meals  - Recommend/ encourage appropriate diets, oral nutritional supplements, and vitamin/mineral supplements  - Order, calculate, and assess calorie counts as needed  - Recommend, monitor, and adjust tube feedings and TPN/PPN based on assessed needs  - Assess need for intravenous fluids  - Provide specific nutrition/hydration education as appropriate  - Include patient/family/caregiver in decisions related to nutrition  Outcome: Progressing

## 2023-03-02 NOTE — ASSESSMENT & PLAN NOTE
Lab Results   Component Value Date    EGFR 49 03/02/2023    EGFR 43 03/02/2023    EGFR 55 02/06/2023    CREATININE 1 38 (H) 03/02/2023    CREATININE 1 53 (H) 03/02/2023    CREATININE 1 25 02/06/2023     · Trend urine output and renal indices  · Avoid nephrotoxic agents  · Most recent creatinine 1 38 which is baseline

## 2023-03-02 NOTE — ASSESSMENT & PLAN NOTE
Wt Readings from Last 3 Encounters:   03/02/23 100 kg (221 lb 1 9 oz)   03/02/23 98 9 kg (218 lb 0 6 oz)   02/22/23 98 9 kg (218 lb)       · Hold BB for now given hypotension  · Hold torsemide given hypotension

## 2023-03-02 NOTE — ASSESSMENT & PLAN NOTE
Blood Pressure: 106/56    · With improvment  · Most likely source is GI bleed  · Most recent hemoglobin within normal range at 14 status post transfusion of 4 units of packed cells

## 2023-03-02 NOTE — ED RE-EVALUATION NOTE
Received patient in signout  Discussed with gastroenterology and there is no GI coverage at this campus today  Patient had another large bloody bowel movement and is hypotensive  Hemoglobin was repeated and is now 9 9  Ordered the 2 units of packed red blood cells to be administered stat  Discussed with patient access center for primary 1 transfer to facility with GI availability       [address] [place], [place]  03/02/23 9523

## 2023-03-02 NOTE — ASSESSMENT & PLAN NOTE
· Home regimen: Losartan  · Holding home antihypertensives in setting of hypotension due to GI bleed

## 2023-03-02 NOTE — CONSULTS
CONSULT: GASTROENTEROLOGY          Inpatient consult to gastroenterology     Performed by  Neva Rivas MD     Authorized by Nadia Mata PA-C            PATIENT INFORMATION      Marcelo Christiansen 69 y o  male MRN: [id number]  Unit/Bed#: ICU 10 Encounter: [id number]  PCP: Kirsten Crandall DO  Date of Admission:  3/2/2023  Date of Consultation: 03/02/23  Requesting Physician: Kaya Cornelius is a 69 y o  old male with PMH including but not limited to CAD s/p PCI x4, most recent in December 2022 currently on aspirin and Brilinta, CKD, self-reported hiatal hernia, diabetes, hypertension who is currently admitted with blood in stool  Gastroenterology team has been consulted for assistance with management of blood in stool, acute anemia  1  Blood in stool  2  Acute anemia  Patient reports having watery bowel movements mixed with dark red blood with clots since 3 AM this morning  Thus far reports 3 episodes  Denies any worsening of abdominal pain but does complain of some minimal right lower quadrant abdominal pain  On arrival to ER patient's hemoglobin 12 3 from 14  It continued to drop to 9 9 over short time  Received 4 PRBC transfusion, improved to 14 4 on rechecking  BUN/creatinine ratio less than 30  Soft blood pressure in the ER which have remained stable  High-volume bleeding scan was obtained which showed no evidence of active GI bleeding  -- IV PPI once followed by drip, keep n p o   -- Plan for EGD today to rule out brisk upper GI bleed in setting of dual antiplatelet use  -- If EGD is unremarkable will have a discussion with the family and the patient regarding neck steps as it would not be ideal to pursue colonoscopy in setting of acute diverticulitis    3  Diverticulitis  Uncomplicated on imaging obtained earlier today  Will recommend continuing antibiotics given hemodynamic instability    Last colonoscopy in 2016 showing 1 polyp as well as mild diverticulosis in the left-sided colon  4  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  Redemonstrated on imaging again today  GI will follow  Further recommendations to follow after the EGD  HISTORY OF PRESENT ILLNESS      Shayy Coffey is a 69 y o  male who is originally admitted for rectal bleeding on 3/2/2023  GI team is consulted for rectal bleeding  71-year-old male with past medical history including but not limited to CAD s/p PCI x4, last in December 2022 currently on aspirin and Brilinta, aortic stenosis, diabetes, hypertension, CKD, self-reported hiatal hernia who is currently transferred from [address] [place] [address] to [facility] for rectal bleeding  Patient reports that he was in his usual state of health when he woke up around 3 AM this morning to have a bowel movement which he describes as watery diarrhea, is unsure if there was any blood in the same bowel movement  Subsequently sometime later he had to go to the bathroom again for a bowel movement which was watery as well, during the second bowel movement the wife noticed that it was mixed with dark red blood with blood clots  Subsequently they came to the ER  In the ER he reports he had another moderate to large size bloody bowel movement which was witnessed by the ER physician  Associated with this he also complains of mild right lower quadrant abdominal pain and epigastric pain which has been chronic in nature  Denies any nausea, vomiting, unintentional weight changes lately  Reports that his last use of aspirin and Brilinta was yesterday  Has not had anything to eat or drink since almost midnight  He does report having some water around 3 AM   On arrival to the ER patient hemoglobin was lower to 9 9 as compared to baseline of 14  CT high-volume GI bleeding scan did not reveal any active source of hemorrhage but did reveal uncomplicated diverticulitis in the sigmoid area      Upon chart review patient has never had an EGD, last colonoscopy in 2016, had 1 tubular adenoma which was removed  REVIEW OF SYSTEMS     A thorough 12-point review of systems has been conducted  Pertinent positives and negatives are mentioned in the history of present illness  PAST MEDICAL & SURGICAL HISTORY      Past Medical History:   Diagnosis Date   • BPH (benign prostatic hyperplasia)    • CAD (coronary artery disease)    • Cancer (HCC)     basal cell   • Chronic kidney disease     stage 3   • Colon polyp    • CPAP (continuous positive airway pressure) dependence    • Diabetes mellitus (HCC)     niddm   • Disease of thyroid gland    • Gout    • High cholesterol    • Hypertension    • MI, 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    • Myocardial infarction ([facility] [address] ) 2014   • S/P cardiac catheterization 12/30/2022   • Sleep apnea        Past Surgical History:   Procedure Laterality Date   • BASAL CELL CARCINOMA EXCISION Right 1/21/2022    Procedure: EXCISION BASAL CELL CARCINOMA EXTREMITY;  Surgeon: Himanshu Galeas DO;  Location: [facility] MAIN OR;  Service: General   • CARDIAC CATHETERIZATION Left 12/29/2022    Procedure: Cardiac Left Heart Cath;  Surgeon: Yoli Veronica MD;  Location: [facility] CARDIAC CATH LAB; Service: Cardiology   • CARDIAC CATHETERIZATION N/A 12/29/2022    Procedure: Cardiac Coronary Angiogram;  Surgeon: Yoli Veronica MD;  Location: [facility] CARDIAC C[facility]; Service: Cardiology   • CARDIAC CATHETERIZATION N/A 12/29/2022    Procedure: Cardiac pci;  Surgeon: Yoli Veronica MD;  Location: [facility] CARDIAC C[facility];   Service: Cardiology   • CATARACT EXTRACTION W/  INTRAOCULAR LENS IMPLANT     • CORNEAL TRANSPLANT     • CORONARY ANGIOPLASTY WITH STENT PLACEMENT      stents x3-- [phone]   • EYE SURGERY      repair corneal laceration   • MA COLONOSCOPY FLX DX W/COLLJ SPEC WHEN PFRMD N/A 3/21/2016    Procedure: COLONOSCOPY;  Surgeon: Elder Jules MD;  Location: [facility];  Service: Colorectal   • TONSILLECTOMY AND ADENOIDECTOMY         MEDICATIONS & ALLERGIES       Medications:   Prior to Admission medications    Medication Sig Start Date End Date Taking? Authorizing Provider   allopurinol (ZYLOPRIM) 100 mg tablet Take 1 tablet (100 mg total) by mouth daily 11/4/22   Cary Haley, DO   allopurinol (ZYLOPRIM) 100 mg tablet Take 1 tablet by mouth once daily 1/30/23   Cary Haley, DO   aspirin 81 mg chewable tablet Chew 1 tablet (81 mg total) daily 12/30/22   Del Muir PA-C   atorvastatin (LIPITOR) 80 mg tablet Take 1 tablet (80 mg total) by mouth daily 2/6/23   Cary Haley, DO   benzonatate (TESSALON) 200 MG capsule Take 1 capsule (200 mg total) by mouth 3 (three) times a day as needed for cough  Patient not taking: Reported on 1/12/2023 12/26/22   Citlaly Alexis PA-C   Blood Glucose Monitoring Suppl (OneTouch Verio Reflect) w/Device KIT Check blood sugars twice daily  Please substitute with appropriate alternative as covered by patient's insurance  Dx: E11 65 11/21/22   Cary Haley, DO   Empagliflozin (Jardiance) 10 MG TABS Take 1 tablet (10 mg total) by mouth every morning 9/6/22   Dana Singh, DO   escitalopram (LEXAPRO) 20 mg tablet Take 0 5 tablets (10 mg total) by mouth daily 2/16/23   Cary Haley, DO   glimepiride (AMARYL) 4 mg tablet TAKE 1 TABLET TWICE A DAY 2/6/23   Cary Haley, DO   glucose blood (OneTouch Verio) test strip Check blood sugars twice daily  Please substitute with appropriate alternative as covered by patient's insurance   Dx: E11 65 11/21/22   Cary Haley, DO   levothyroxine 88 mcg tablet TAKE 1 TABLET BY MOUTH ONCE DAILY IN THE MORNING 5/16/22   Cary Haley, DO   levothyroxine 88 mcg tablet Take 1 tablet (88 mcg total) by mouth daily in the early morning 3/1/23   Cary Haley, DO   losartan (COZAAR) 25 mg tablet Take 1 tablet (25 mg total) by mouth daily 2/6/23   Peter Lebron PA-C   metFORMIN (GLUCOPHAGE) 1000 MG tablet Take 1 tablet (1,000 mg total) by mouth 2 (two) times a day with meals 10/6/22 Guero Hurd DO   multivitamin [facility]) TABS Take 1 tablet by mouth daily    Historical Provider, MD   nitroglycerin (NITROSTAT) 0 4 mg SL tablet Place 1 tablet (0 4 mg total) under the tongue every 5 (five) minutes as needed for chest pain 12/12/22   uGero Hurd DO   OneTouch Delica Lancets 74V MISC Check blood sugars twice daily  Please substitute with appropriate alternative as covered by patient's insurance  Dx: E11 65 11/21/22   Guero Hurd DO   prednisoLONE acetate (PRED FORTE) 1 % ophthalmic suspension INSTILL 1 DROP INTO RIGHT EYE ONCE DAILY 12/13/22   Historical Provider, MD   propranolol (INDERAL LA) 60 mg 24 hr capsule Take 1 capsule (60 mg total) by mouth daily 2/16/23   Guero Hurd DO   ranolazine (RANEXA) 500 mg 12 hr tablet Take 1 tablet (500 mg total) by mouth 2 (two) times a day 12/30/22   Lolly Strickland PA-C   ticagrelor (BRILINTA) 90 MG Take 1 tablet (90 mg total) by mouth every 12 (twelve) hours 12/30/22   Lolly Strickland PA-C   torsemide [facility]) 10 mg tablet Take 1 tablet (10 mg total) by mouth daily  Patient not taking: Reported on 1/31/2023 11/15/22   Mariebl Marti DO   zonisamide (ZONEGRAN) 50 MG capsule Take 1 tablet (50 mg total) by mouth daily 9/8/22   Doreen Fernandez MD   topiramate (Topamax) 25 mg tablet Take 1 tablet (25 mg total) by mouth daily X 1 week then discontinue 10/7/21 10/7/21  Doreen Fernandez MD       Allergies: No Known Allergies    SOCIAL HISTORY      Substance Use History:   Social History     Substance and Sexual Activity   Alcohol Use Not Currently    Comment: socially, cut down in 2008, previously did more than      Social History     Tobacco Use   Smoking Status Never   Smokeless Tobacco Never     Social History     Substance and Sexual Activity   Drug Use Never       FAMILY HISTORY      As in the HPI       PHYSICAL EXAM     Vitals:   Blood Pressure: 117/68 (03/02/23 1102)  Pulse: 82 (03/02/23 1102)  Temperature: 97 8 °F (36 6 °C) (03/02/23 1102)  Respirations: 12 (03/02/23 1102)  Weight - Scale: 100 kg (221 lb 1 9 oz) (03/02/23 1110)  SpO2: 97 % (03/02/23 1102)    Physical Exam:   GENERAL: NAD  HEENT:  NC/AT, MMM  CARDIAC:  RRR, +S1/S2, no S3/S4 heard  PULMONARY:  CTA B/L, no wheezing/rales/rhonci, non-labored breathing  ABDOMEN: Mild tenderness in the right lower quadrant area, mild tenderness in epigastric area with deep palpation  RECTAL: Deferred  NEUROLOGIC:  Alert/oriented x3  EXTREMITIES: No swelling  SKIN:  No rashes or erythema     ADDITIONAL DATA     Lab Results:     Results from last 7 days   Lab Units 03/02/23  1132   WBC Thousand/uL 9 24   HEMOGLOBIN g/dL 14 4   HEMATOCRIT % 43 7   PLATELETS Thousands/uL 203   NEUTROS PCT % 68   LYMPHS PCT % 21   MONOS PCT % 9   EOS PCT % 1     Results from last 7 days   Lab Units 03/02/23  1132   POTASSIUM mmol/L 5 9*   CHLORIDE mmol/L 110*   CO2 mmol/L 21   BUN mg/dL 31*   CREATININE mg/dL 1 38*   CALCIUM mg/dL 8 4   ALK PHOS U/L 58   ALT U/L 22   AST U/L 15           Imaging:    CT high volume bleeding scan abdomen pelvis    Result Date: 3/2/2023  Narrative: CT ABDOMEN AND PELVIS - WITHOUT AND WITH IV CONTRAST INDICATION:   Evaluate for bleeding, hypotensive  COMPARISON: CT angiogram aorta and lower extremities 12/17/2022  TECHNIQUE:  CT examination of the abdomen and pelvis was performed both prior to and after the administration of intravenous contrast   Contrast was injected one time intravenously without immediate complication  Scanning through the abdomen was performed in the unenhanced, arterial, and venous phases according a protocol specifically designed to evaluate high volume bleeding  Axial, sagittal, and coronal 2D reformatted images were created from the source data and submitted for interpretation  Radiation dose length product (DLP) for this visit:  4700 mGy-cm     This examination, like all CT scans performed in the [facility], was performed utilizing techniques to minimize radiation dose exposure, including the use of iterative reconstruction and automated exposure control  IV Contrast:  100 mL of iohexol (OMNIPAQUE)   Enteric Contrast:  Enteric contrast was not administered  FINDINGS: ABDOMEN  BOWEL:  There is no active extravasation of intravenous contrast into the lumen of stomach, small bowel, or large bowel loops  Colonic diverticulosis  Mild bowel wall thickening with pericolonic fat stranding in the descending colon, consistent with acute diverticulitis  No evidence of perforation or intra-abdominal abscess  LOWER CHEST:  Bilateral gynecomastia  Bibasilar subsegmental atelectasis  LIVER/BILIARY TREE:  Liver is diffusely decreased in density consistent with fatty change  No CT evidence of suspicious hepatic mass  Normal hepatic contours  No biliary dilatation  GALLBLADDER:  No calcified gallstones  No pericholecystic inflammatory change  SPLEEN:  Unremarkable  PANCREAS:  Fatty infiltration of the pancreas  ADRENAL GLANDS:  Unremarkable  KIDNEYS/URETERS:  Bilateral simple renal cysts  No hydronephrosis  APPENDIX:  A normal appendix was visualized  ABDOMINOPELVIC CAVITY:  No ascites  No pneumoperitoneum  No lymphadenopathy  VESSELS:  Atherosclerotic changes are present  No evidence of aneurysm  Stable critical ostial stenosis versus occlusion of the superior mesenteric artery due to densely calcified plaque  The celiac artery and inferior mesenteric artery are patent  PELVIS REPRODUCTIVE ORGANS:  The prostate is enlarged  URINARY BLADDER:  Distended, otherwise unremarkable  ABDOMINAL WALL/INGUINAL REGIONS:  Unremarkable  OSSEOUS STRUCTURES:  No acute fracture or destructive osseous lesion  Degenerative change of the visualized spine with unchanged grade 1 anterolisthesis of L4 on L5  Impression: 1  No CT evidence of active high volume gastrointestinal hemorrhage  2   Uncomplicated acute diverticulitis of the descending colon   3   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

## 2023-03-02 NOTE — ED RE-EVALUATION NOTE
After further discussions with GI, patient be transferred to [address] [address] where there is GI, interventional radiology and general surgery availability  EMTALA forms completed and placed on chart  Patient and spouse updated at bedside  Additional 2 units of packed red blood cells have been ordered to be transfused which would take a total of 4 units of PRBCs transfused prior to discharge from [address] [facility]       [address] [place] [address]  03/02/23 5715

## 2023-03-02 NOTE — ANESTHESIA PREPROCEDURE EVALUATION
Procedure:  EGD    Relevant Problems   CARDIO   (+) Aortic stenosis, moderate   (+) Coronary artery disease of native artery of native heart with stable angina pectoris (HCC)   (+) Hyperlipidemia   (+) Hypertension   (+) NSTEMI (non-ST elevated myocardial infarction) (HCC)   (+) Other chest pain      ENDO   (+) Diabetes mellitus, type 2 (HCC)      GI/HEPATIC   (+) GI bleed      /RENAL   (+) Benign hypertension with CKD (chronic kidney disease) stage III (HCC)   (+) Diabetic nephropathy associated with type 2 diabetes mellitus (HCC)   (+) Stage 3a chronic kidney disease (HCC)      MUSCULOSKELETAL   (+) Arthritis   (+) Degenerative disc disease, lumbar   (+) Gout   (+) Primary osteoarthritis involving multiple joints   (+) Primary osteoarthritis of both knees   (+) Primary osteoarthritis of left knee   (+) Primary osteoarthritis of right knee      NEURO/PSYCH   (+) Depression with anxiety   (+) Diabetic neuropathy associated with diabetes mellitus due to underlying condition (HCC)   (+) Recurrent major depressive disorder (HCC)        Physical Exam    Airway    Mallampati score: II  TM Distance: >3 FB  Neck ROM: full     Dental       Cardiovascular  Cardiovascular exam normal    Pulmonary  Pulmonary exam normal     Other Findings        Anesthesia Plan  ASA Score- 4 Emergent    Anesthesia Type- general with ASA Monitors  Additional Monitors:   Airway Plan:           Plan Factors-    Chart reviewed  Existing labs reviewed  Patient summary reviewed  Induction- intravenous  Postoperative Plan-     Informed Consent- Anesthetic plan and risks discussed with patient

## 2023-03-02 NOTE — SEPSIS NOTE
Sepsis Note   Petar Deleon 69 y o  male MRN: [id number]  Unit/Bed#: RM10 Encounter: [id number]       Initial Sepsis Screening     Row Name 03/02/23 0558                Is the patient's history suggestive of a new or worsening infection? Yes (Proceed)  -BC        Suspected source of infection acute abdominal infection  -BC        Indicate SIRS criteria Tachycardia > 90 bpm;Leukocytosis (WBC > 97091 IJL) OR Leukopenia (WBC <4000 IJL) OR Bandemia (WBC >10% bands)  -BC        Are two or more of the above signs & symptoms of infection both present and new to the patient? Yes (Proceed)  -BC        Assess for evidence of organ dysfunction: Are any of the below criteria present within 6 hours of suspected infection and SIRS criteria that are NOT considered to be chronic conditions? SBP < 90;Lactate > 2 0  -BC        Date of presentation of severe sepsis 03/02/23  -BC        Time of presentation of severe sepsis 0558  -BC        Date of presentation of septic shock 03/02/23  -BC        Time of presentation of septic shock 0558  -BC        Fluid Resuscitation: A lesser volume than 30 ml/kg IV fluid will be given  -BC        The 30 mL/kg fluid bolus was not given to the patient despite having hypotension, a lactate of >= 4 mmol/L, or documentation of septic shock secondary to: --  Patient's blood pressure thought to be due to anemia at this time  -BC        Sepsis Note: Click "NEXT" below (NOT "close") to generate sepsis note based on above information  YES (proceed by clicking "NEXT")  -BC              User Key  (r) = Recorded By, (t) = Taken By, (c) = Cosigned By    [address] Name Provider Type    BC Arianna Stevenson MD Physician                    Body mass index is 33 15 kg/m²    Wt Readings from Last 1 Encounters:   03/02/23 98 9 kg (218 lb 0 6 oz)        Ideal body weight: 68 4 kg (150 lb 12 7 oz)  Adjusted ideal body weight: 80 6 kg (177 lb 11 1 oz)

## 2023-03-02 NOTE — ASSESSMENT & PLAN NOTE
· Remote stent placement x3  · Most recent PCI Dec 2022  · Troponin at outside hospital normal, will recheck here   · Hold antiplatelets for now and resume when OK per GI  · Restart statin when no longer NPO  · Hold BB for now given hypotension  · Hold torsemide given hypotension

## 2023-03-02 NOTE — ED PROCEDURE NOTE
PROCEDURE  CriticalCare Time  Performed by: Nacho Jules DO  Authorized by: Nacho Jules DO     Critical care provider statement:     Critical care time (minutes):  40    Critical care time was exclusive of:  Separately billable procedures and treating other patients and teaching time    Critical care was necessary to treat or prevent imminent or life-threatening deterioration of the following conditions:  Circulatory failure and shock    Critical care was time spent personally by me on the following activities:  Blood draw for specimens, obtaining history from patient or surrogate, development of treatment plan with patient or surrogate, discussions with consultants, discussions with primary provider, evaluation of patient's response to treatment, examination of patient, review of old charts, re-evaluation of patient's condition, ordering and review of radiographic studies, ordering and review of laboratory studies and ordering and performing treatments and interventions    I assumed direction of critical care for this patient from another provider in my specialty: no    Comments:      Management of GI Bleed, Hypotension, and transfer         Nacho Jules DO  03/02/23 0932

## 2023-03-02 NOTE — ASSESSMENT & PLAN NOTE
Significantly hypotensive on presentation to [address] [place] [address]  Received 4 units PRBCs at outside hospital  Transferred to higher level of care for GI evaluation and potential need for IR  Presented with hypotension systolic blood pressure in the 80s to 90s, currently improving, no signs of acute bleeding; Most recent hemoglobin is 10  Underwent EGD which ruled out upper GI bleed  Suspect possible diverticular bleed in setting of acute diverticulitis      Plan:  · Possible colonoscopy today    Patient had bowel prep and had 1 BM with 300 cc melena  · A drop in hemoglobin of 2 g since yesterday, we will continue trending hemoglobin  · Transfuse as indicated for Hgb less than 7 or for hypotension/shock

## 2023-03-02 NOTE — ASSESSMENT & PLAN NOTE
Lab Results   Component Value Date    HGBA1C 8 1 (H) 01/16/2023       Recent Labs     03/02/23  0719   POCGLU 181*       Blood Sugar Average: Last 72 hrs:     · Home regimen: Glimepiride, Jardiance, Metformin  · SSI while inpatient

## 2023-03-02 NOTE — ED PROVIDER NOTES
History  Chief Complaint   Patient presents with   • Rectal Bleeding     Bowel movement for a large amount of blood in commode per wife  Patient brought in by EMS     52-year-old male with a past medical history of coronary artery disease, recent stenting back in December 2022 on dual antiplatelet therapy, DM, CKD, who presents for rectal bleeding  Patient had a large bloody bowel movement at approximately 3:00 - 3:30  Since then, patient has felt weak, lightheaded  Denies any vomiting, but does report nausea, as well as generalized stomach upset  No focal abdominal pain  ROS otherwise negative  Prior to Admission Medications   Prescriptions Last Dose Informant Patient Reported? Taking? Blood Glucose Monitoring Suppl (OneTouch Verio Reflect) w/Device KIT   No No   Sig: Check blood sugars twice daily  Please substitute with appropriate alternative as covered by patient's insurance  Dx: E11 65   Empagliflozin (Jardiance) 10 MG TABS   No No   Sig: Take 1 tablet (10 mg total) by mouth every morning   OneTouch Delica Lancets 78I MISC   No No   Sig: Check blood sugars twice daily  Please substitute with appropriate alternative as covered by patient's insurance   Dx: E11 65   allopurinol (ZYLOPRIM) 100 mg tablet   No No   Sig: Take 1 tablet (100 mg total) by mouth daily   allopurinol (ZYLOPRIM) 100 mg tablet   No No   Sig: Take 1 tablet by mouth once daily   aspirin 81 mg chewable tablet   No No   Sig: Chew 1 tablet (81 mg total) daily   atorvastatin (LIPITOR) 80 mg tablet   No No   Sig: Take 1 tablet (80 mg total) by mouth daily   benzonatate (TESSALON) 200 MG capsule   No No   Sig: Take 1 capsule (200 mg total) by mouth 3 (three) times a day as needed for cough   Patient not taking: Reported on 1/12/2023   escitalopram (LEXAPRO) 20 mg tablet   No No   Sig: Take 0 5 tablets (10 mg total) by mouth daily   glimepiride (AMARYL) 4 mg tablet   No No   Sig: TAKE 1 TABLET TWICE A DAY   glucose blood (OneTouch Verio) test strip   No No   Sig: Check blood sugars twice daily  Please substitute with appropriate alternative as covered by patient's insurance   Dx: E11 65   levothyroxine 88 mcg tablet   No No   Sig: TAKE 1 TABLET BY MOUTH ONCE DAILY IN THE MORNING   levothyroxine 88 mcg tablet   No No   Sig: Take 1 tablet (88 mcg total) by mouth daily in the early morning   losartan (COZAAR) 25 mg tablet   No No   Sig: Take 1 tablet (25 mg total) by mouth daily   metFORMIN (GLUCOPHAGE) 1000 MG tablet   No No   Sig: Take 1 tablet (1,000 mg total) by mouth 2 (two) times a day with meals   multivitamin (THERAGRAN) TABS   Yes No   Sig: Take 1 tablet by mouth daily   nitroglycerin (NITROSTAT) 0 4 mg SL tablet   No No   Sig: Place 1 tablet (0 4 mg total) under the tongue every 5 (five) minutes as needed for chest pain   prednisoLONE acetate (PRED FORTE) 1 % ophthalmic suspension   Yes No   Sig: INSTILL 1 DROP INTO RIGHT EYE ONCE DAILY   propranolol (INDERAL LA) 60 mg 24 hr capsule   No No   Sig: Take 1 capsule (60 mg total) by mouth daily   ranolazine (RANEXA) 500 mg 12 hr tablet   No No   Sig: Take 1 tablet (500 mg total) by mouth 2 (two) times a day   ticagrelor (BRILINTA) 90 MG   No No   Sig: Take 1 tablet (90 mg total) by mouth every 12 (twelve) hours   torsemide (DEMADEX) 10 mg tablet   No No   Sig: Take 1 tablet (10 mg total) by mouth daily   Patient not taking: Reported on 1/31/2023   zonisamide (ZONEGRAN) 50 MG capsule   No No   Sig: Take 1 tablet (50 mg total) by mouth daily      Facility-Administered Medications: None       Past Medical History:   Diagnosis Date   • BPH (benign prostatic hyperplasia)    • CAD (coronary artery disease)    • Cancer (HCC)     basal cell   • Chronic kidney disease     stage 3   • Colon polyp    • CPAP (continuous positive airway pressure) dependence    • Diabetes mellitus (HCC)     niddm   • Disease of thyroid gland    • Gout    • High cholesterol    • Hypertension    • MI, 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) 2014   • S/P cardiac catheterization 12/30/2022   • Sleep apnea        Past Surgical History:   Procedure Laterality Date   • BASAL CELL CARCINOMA EXCISION Right 1/21/2022    Procedure: EXCISION BASAL CELL CARCINOMA EXTREMITY;  Surgeon: Kathie Cano DO;  Location: [facility];  Service: General   • CARDIAC CATHETERIZATION Left 12/29/2022    Procedure: Cardiac Left Heart Cath;  Surgeon: Cristina Mederos MD;  Location: [facility]; Service: Cardiology   • CARDIAC CATHETERIZATION N/A 12/29/2022    Procedure: Cardiac Coronary Angiogram;  Surgeon: Cristina Mederos MD;  [facility]; Service: Cardiology   • CARDIAC CATHETERIZATION N/A 12/29/2022    Procedure: Cardiac pci;  Surgeon: Cristina Mederos MD;  [facility]: [facility]; Service: Cardiology   • CATARACT EXTRACTION W/  INTRAOCULAR LENS IMPLANT     • CORNEAL TRANSPLANT     • CORONARY ANGIOPLASTY WITH STENT PLACEMENT      stents x3-- [phone]   • EYE SURGERY      repair corneal laceration   • PA COLONOSCOPY FLX DX W/COLLJ SPEC WHEN PFRMD N/A 3/21/2016    Procedure: COLONOSCOPY;  Surgeon: Lora Valentine MD;  Location: [facility];  Service: Colorectal   • TONSILLECTOMY AND ADENOIDECTOMY         Family History   Adopted: Yes   Problem Relation Age of Onset   • No Known Problems Mother    • No Known Problems Father      I have reviewed and agree with the history as documented      E-Cigarette/Vaping   • E-Cigarette Use Never User      E-Cigarette/Vaping Substances   • Nicotine No    • THC No    • CBD No    • Flavoring No    • Other No    • Unknown No      Social History     Tobacco Use   • Smoking status: Never   • Smokeless tobacco: Never   Vaping Use   • Vaping Use: Never used   Substance Use Topics   • Alcohol use: Not Currently     Comment: socially, cut down in 2008, previously did more than    • Drug use: Never       Review of Systems   Constitutional: Negative for chills, diaphoresis, fatigue and fever  HENT: Negative for congestion and sore throat  Eyes: Negative for visual disturbance  Respiratory: Negative for cough, chest tightness and shortness of breath  Cardiovascular: Negative for chest pain, palpitations and leg swelling  Gastrointestinal: Positive for anal bleeding  Negative for abdominal distention, abdominal pain, constipation, diarrhea, nausea and vomiting  Genitourinary: Negative for difficulty urinating and dysuria  Musculoskeletal: Negative for arthralgias and myalgias  Skin: Negative for rash  Neurological: Negative for dizziness, weakness, light-headedness, numbness and headaches  Psychiatric/Behavioral: Negative for agitation, behavioral problems and confusion  The patient is not nervous/anxious  All other systems reviewed and are negative  Physical Exam  Physical Exam  Constitutional:       Appearance: He is well-developed  HENT:      Head: Normocephalic and atraumatic  Cardiovascular:      Rate and Rhythm: Normal rate and regular rhythm  Heart sounds: Normal heart sounds  No murmur heard  Pulmonary:      Effort: Pulmonary effort is normal  No respiratory distress  Breath sounds: Normal breath sounds  Abdominal:      General: Bowel sounds are normal  There is no distension  Palpations: Abdomen is soft  Tenderness: There is no abdominal tenderness  Musculoskeletal:         General: No deformity  Skin:     General: Skin is warm  Coloration: Skin is pale  Findings: No rash  Neurological:      Mental Status: He is alert and oriented to person, place, and time  Psychiatric:         Behavior: Behavior normal          Thought Content:  Thought content normal          Judgment: Judgment normal          Vital Signs  ED Triage Vitals   Temperature Pulse Respirations Blood Pressure SpO2   03/02/23 0530 03/02/23 0524 03/02/23 0524 03/02/23 0524 03/02/23 0524   (!) 97 2 °F (36 2 °C) 91 16 (!) 83/53 97 %      Temp Source Heart Rate Source Patient Position - Orthostatic VS BP Location FiO2 (%)   03/02/23 0530 03/02/23 0524 03/02/23 0524 03/02/23 0524 --   Temporal Monitor Lying Left arm       Pain Score       03/02/23 0528       No Pain           Vitals:    03/02/23 0943 03/02/23 0945 03/02/23 0950 03/02/23 0955   BP: 114/58 113/60 118/57 118/57   Pulse: 82 83 80 82   Patient Position - Orthostatic VS:             Visual Acuity      ED Medications  Medications   cefTRIAXone (ROCEPHIN) IVPB (premix in dextrose) 1,000 mg 50 mL (0 mg Intravenous Stopped 3/2/23 0622)   pantoprazole (PROTONIX) 80 mg in sodium chloride 0 9 % 100 mL IVPB (0 mg Intravenous Stopped 3/2/23 0625)   sodium chloride 0 9 % bolus 500 mL (0 mL Intravenous Stopped 3/2/23 0724)   pantoprazole (PROTONIX) 40 mg injection **ADS Override Pull** (  Override Pull 3/2/23 0612)   iohexol (OMNIPAQUE) 350 MG/ML injection (SINGLE-DOSE) 100 mL (100 mL Intravenous Given 3/2/23 0631)   calcium gluconate 1 g in sodium chloride 0 9% 50 mL (premix) (0 g Intravenous Stopped 3/2/23 0724)   sodium bicarbonate 8 4 % injection 50 mEq (50 mEq Intravenous Given 3/2/23 0636)   insulin regular (HumuLIN R,NovoLIN R) injection 10 Units (10 Units Intravenous Given 3/2/23 0637)   dextrose 50 % IV solution 25 mL (25 mL Intravenous Given 3/2/23 0634)   ondansetron (ZOFRAN) injection 4 mg (4 mg Intravenous Given 3/2/23 0821)       Diagnostic Studies  Results Reviewed     Procedure Component Value Units Date/Time    Lactic acid 2 Hours [[id number]]  (Abnormal) Collected: 03/02/23 0745    Lab Status: Final result Specimen: Blood from Arm, Left Updated: 03/02/23 0836     LACTIC ACID 2 6 mmol/L     Narrative:      Result may be elevated if tourniquet was used during collection      Hemoglobin and hematocrit, blood [[id number]]  (Abnormal) Collected: 03/02/23 0745    Lab Status: Final result Specimen: Blood from Arm, Left Updated: 03/02/23 0805     Hemoglobin 9 9 g/dL      Hematocrit 29 7 % Fingerstick Glucose (POCT) [[id number]]  (Abnormal) Collected: 03/02/23 0719    Lab Status: Final result Updated: 03/02/23 0720     POC Glucose 181 mg/dl     Lactic acid, plasma [[id number]]  (Abnormal) Collected: 03/02/23 0531    Lab Status: Final result Specimen: Blood from Arm, Left Updated: 03/02/23 0558     LACTIC ACID 3 8 mmol/L     Narrative:      Result may be elevated if tourniquet was used during collection      HS Troponin 0hr (reflex protocol) [[id number]]  (Normal) Collected: 03/02/23 0531    Lab Status: Final result Specimen: Blood from Arm, Right Updated: 03/02/23 0556     hs TnI 0hr 3 ng/L     Comprehensive metabolic panel [[id number]]  (Abnormal) Collected: 03/02/23 0531    Lab Status: Final result Specimen: Blood from Arm, Right Updated: 03/02/23 0550     Sodium 136 mmol/L      Potassium 5 9 mmol/L      Chloride 103 mmol/L      CO2 23 mmol/L      ANION GAP 10 mmol/L      BUN 28 mg/dL      Creatinine 1 53 mg/dL      Glucose 271 mg/dL      Calcium 9 0 mg/dL      AST 15 U/L      ALT 25 U/L      Alkaline Phosphatase 67 U/L      Total Protein 6 2 g/dL      Albumin 3 7 g/dL      Total Bilirubin 0 59 mg/dL      eGFR 43 ml/min/1 73sq m     Narrative:      Usama guidelines for Chronic Kidney Disease (CKD):   •  Stage 1 with normal or high GFR (GFR > 90 mL/min/1 73 square meters)  •  Stage 2 Mild CKD (GFR = 60-89 mL/min/1 73 square meters)  •  Stage 3A Moderate CKD (GFR = 45-59 mL/min/1 73 square meters)  •  Stage 3B Moderate CKD (GFR = 30-44 mL/min/1 73 square meters)  •  Stage 4 Severe CKD (GFR = 15-29 mL/min/1 73 square meters)  •  Stage 5 End Stage CKD (GFR <15 mL/min/1 73 square meters)  Note: GFR calculation is accurate only with a steady state creatinine    Lipase [[id number]]  (Abnormal) Collected: 03/02/23 0531    Lab Status: Final result Specimen: Blood from Arm, Right Updated: 03/02/23 0550     Lipase 213 u/L     CBC and differential [[id number]]  (Abnormal) Collected: 03/02/23 0531    Lab Status: Final result Specimen: Blood from Arm, Right Updated: 03/02/23 0534     WBC 17 11 Thousand/uL      RBC 3 53 Million/uL      Hemoglobin 12 3 g/dL      Hematocrit 36 7 %       fL      MCH 34 8 pg      MCHC 33 5 g/dL      RDW 13 5 %      MPV 9 7 fL      Platelets 554 Thousands/uL      nRBC 0 /100 WBCs      Neutrophils Relative 70 %      Immat GRANS % 1 %      Lymphocytes Relative 17 %      Monocytes Relative 8 %      Eosinophils Relative 3 %      Basophils Relative 1 %      Neutrophils Absolute 12 21 Thousands/µL      Immature Grans Absolute 0 15 Thousand/uL      Lymphocytes Absolute 2 84 Thousands/µL      Monocytes Absolute 1 35 Thousand/µL      Eosinophils Absolute 0 48 Thousand/µL      Basophils Absolute 0 08 Thousands/µL                  CT high volume bleeding scan abdomen pelvis   Final Result by Ciaran Morton MD (03/02 6021)      1  No CT evidence of active high volume gastrointestinal hemorrhage  2   Uncomplicated acute diverticulitis of the descending colon  3   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  4   Hepatic steatosis  The study was marked in [facility] for immediate notification  Workstation performed: [id number]         XR chest portable   ED Interpretation by Derik Beltran MD (03/02 0730)   No acute cardiopulmonary disease  Final Result by Natty Santillan MD (03/02 1423)      Limited evaluation due to low lung volumes, but no active pulmonary disease identified                    Workstation performed: [id number]                    Procedures  ECG 12 Lead Documentation Only    Date/Time: 3/2/2023 7:16 AM  Performed by: Derik Beltran MD  Authorized by: Derik Beltran MD     Indications / Diagnosis:  Anemia  ECG reviewed by me, the ED Provider: yes    Patient location:  ED  Previous ECG:     Previous ECG:  Compared to current    Similarity:  No change  Interpretation: Interpretation: abnormal    Rate:     ECG rate:  93    ECG rate assessment: normal    Rhythm:     Rhythm: sinus rhythm    Ectopy:     Ectopy: none    QRS:     QRS axis:  Left    QRS intervals:  Normal  Conduction:     Conduction: normal    ST segments:     ST segments:  Non-specific  T waves:     T waves: normal               ED Course                            Initial Sepsis Screening     Row Name 03/02/23 0558                Is the patient's history suggestive of a new or worsening infection? Yes (Proceed)  -BC        Suspected source of infection acute abdominal infection  -BC        Indicate SIRS criteria Tachycardia > 90 bpm;Leukocytosis (WBC > 88549 IJL) OR Leukopenia (WBC <4000 IJL) OR Bandemia (WBC >10% bands)  -BC        Are two or more of the above signs & symptoms of infection both present and new to the patient? Yes (Proceed)  -BC        Assess for evidence of organ dysfunction: Are any of the below criteria present within 6 hours of suspected infection and SIRS criteria that are NOT considered to be chronic conditions? SBP < 90;Lactate > 2 0  -BC        Date of presentation of severe sepsis 03/02/23  -BC        Time of presentation of severe sepsis 0558  -BC        Date of presentation of septic shock 03/02/23  -BC        Time of presentation of septic shock 0558  -BC        Fluid Resuscitation: A lesser volume than 30 ml/kg IV fluid will be given  -BC        The 30 mL/kg fluid bolus was not given to the patient despite having hypotension, a lactate of >= 4 mmol/L, or documentation of septic shock secondary to: --  Patient's blood pressure thought to be due to anemia at this time  -BC        Sepsis Note: Click "NEXT" below (NOT "close") to generate sepsis note based on above information   YES (proceed by clicking "NEXT")  -BC              User Key  (r) = Recorded By, (t) = Taken By, (c) = Cosigned By    [address] Name Provider Type    BC Vincenzo Rodriguez MD Physician              Default Flowsheet Data (last 720 hours)     Sepsis Reassess     Row Name 03/02/23 0630                   Repeat Volume Status and Tissue Perfusion Assessment Performed    Date of Reassessment: 03/02/23  -BC        Time of Reassessment: 0630 -BC        Sepsis Reassessment Note: Click "NEXT" below (NOT "close") to generate sepsis reassessment note  YES (proceed by clicking "NEXT")  -BC        Repeat Volume Status and Tissue Perfusion Assessment Performed --              User Key  (r) = Recorded By, (t) = Taken By, (c) = Cosigned By    [id number] Name Provider Type    BC Vincenzo Rodriguez MD Physician                            Medical Decision Making  I reviewed the patient's medical chart, PMHx, prior encounters, medications  Upon chart review, patient recently had cardiac stenting performed in December  Independent interpretation of ECG demonstrated: NSR, non-specific ST and T waves changes  Independent interpretation of CXR demonstrated: No acute cardiopulmonary disease    DDx includes: Massive upper GI bleed, lower GI bleed 2/2 cancer or diverticulitis, mesenteric ischemia    Patient's initial blood pressure was hypotensive in 80s, however cuff was poorly fitting, and repeat pressure with appropriate cuff demonstrated pressures in 90s  Concerned for significant hemorrhage given low blood pressures  500mL NS started while waiting for hgb  Hgb returned at 12 3, plan for delta in 2 hours  CT abd/pelvis GI bleed study showed no active bleeding however did show diverticulitis  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 as well  I discussed with GI who recommended that patient not be reversed at this time given recent cardiac stenting, that patient did not necessarily require emergent transfer  Patient's pressures improved over encounter to 103 SBP and patient had no repeat bloody BMs when patient was signed out to Dr Wei Teague      Upon chart review on finishing this chart, it appears patient had another large bloody BM, repeat H+H was 9 9, and patient was hypotensive, ultimately transferred to higher level of care  The 30ml/kg fluid bolus was not given to the patient despite hypotension and/or significantly elevated lactate of = 4 and/or presence of septic shock due to: strong suspicion for anemia  The patient will be administered 500 mLof crystalloid fluid instead  Orders for this have been placed in Epic  The patient may receive additional colloid or crystalloid fluids thereafter based on clinical condition  Salty Colon MD    BRBPR (bright red blood per rectum): acute illness or injury  GI bleed: acute illness or injury  Amount and/or Complexity of Data Reviewed  Labs: ordered  Radiology: ordered and independent interpretation performed  Risk  OTC drugs  Prescription drug management  Disposition  Final diagnoses:   GI bleed   BRBPR (bright red blood per rectum)     Time reflects when diagnosis was documented in both MDM as applicable and the Disposition within this note     Time User Action Codes Description Comment    3/2/2023  7:26 AM Darylene Rough Add [K62 5] Rectal bleeding     3/2/2023  7:27 AM Darylene Rough Remove [K62 5] Rectal bleeding     3/2/2023  7:27 AM Elena Ledesma Add [K92 2] GI bleed     3/2/2023  8:44 AM Vicenta Nair Add [K62 5] BRBPR (bright red blood per rectum)       ED Disposition     ED Disposition   Transfer to Another Facility-In Network    Condition   --    Date/Time   Thu Mar 2, 2023  8:44 AM    Comment   [facility]             MD Documentation    72 RuKatherine Du      RN Documentation    72 Katherine Cardona   Bed Assignment icu 10   Report Given to rajan      Follow-up Information    None         Discharge Medication List as of 3/2/2023 10:05 AM      CONTINUE these medications which have NOT CHANGED    Details   !! allopurinol (ZYLOPRIM) 100 mg tablet Take 1 tablet (100 mg total) by mouth daily, Starting Fri 11/4/2022, Normal      !! allopurinol (ZYLOPRIM) 100 mg tablet Take 1 tablet by mouth once daily, Normal      aspirin 81 mg chewable tablet Chew 1 tablet (81 mg total) daily, Starting Fri 12/30/2022, No Print      atorvastatin (LIPITOR) 80 mg tablet Take 1 tablet (80 mg total) by mouth daily, Starting Mon 2/6/2023, Normal      benzonatate (TESSALON) 200 MG capsule Take 1 capsule (200 mg total) by mouth 3 (three) times a day as needed for cough, Starting Mon 12/26/2022, Normal      Blood Glucose Monitoring Suppl (OneTouch Verio Reflect) w/Device KIT Check blood sugars twice daily  Please substitute with appropriate alternative as covered by patient's insurance  Dx: E11 65, Normal      Empagliflozin (Jardiance) 10 MG TABS Take 1 tablet (10 mg total) by mouth every morning, Starting Tue 9/6/2022, Normal      escitalopram (LEXAPRO) 20 mg tablet Take 0 5 tablets (10 mg total) by mouth daily, Starting Thu 2/16/2023, Normal      glimepiride (AMARYL) 4 mg tablet TAKE 1 TABLET TWICE A DAY, Normal      glucose blood (OneTouch Verio) test strip Check blood sugars twice daily  Please substitute with appropriate alternative as covered by patient's insurance   Dx: E11 65, Normal      !! levothyroxine 88 mcg tablet TAKE 1 TABLET BY MOUTH ONCE DAILY IN THE MORNING, Normal      !! levothyroxine 88 mcg tablet Take 1 tablet (88 mcg total) by mouth daily in the early morning, Starting Wed 3/1/2023, Normal      losartan (COZAAR) 25 mg tablet Take 1 tablet (25 mg total) by mouth daily, Starting Mon 2/6/2023, Normal      metFORMIN (GLUCOPHAGE) 1000 MG tablet Take 1 tablet (1,000 mg total) by mouth 2 (two) times a day with meals, Starting Thu 10/6/2022, Normal      multivitamin (THERAGRAN) TABS Take 1 tablet by mouth daily, Historical Med      nitroglycerin (NITROSTAT) 0 4 mg SL tablet Place 1 tablet (0 4 mg total) under the tongue every 5 (five) minutes as needed for chest pain, Starting Mon 12/12/2022, Normal      OneTouch Delica Lancets 77F MISC Check blood sugars twice daily  Please substitute with appropriate alternative as covered by patient's insurance  Dx: E11 65, Normal      prednisoLONE acetate (PRED FORTE) 1 % ophthalmic suspension INSTILL 1 DROP INTO RIGHT EYE ONCE DAILY, Historical Med      propranolol (INDERAL LA) 60 mg 24 hr capsule Take 1 capsule (60 mg total) by mouth daily, Starting Thu 2/16/2023, Normal      ranolazine (RANEXA) 500 mg 12 hr tablet Take 1 tablet (500 mg total) by mouth 2 (two) times a day, Starting Fri 12/30/2022, Normal      ticagrelor (BRILINTA) 90 MG Take 1 tablet (90 mg total) by mouth every 12 (twelve) hours, Starting Fri 12/30/2022, Normal      torsemide (DEMADEX) 10 mg tablet Take 1 tablet (10 mg total) by mouth daily, Starting Tue 11/15/2022, Normal      zonisamide (ZONEGRAN) 50 MG capsule Take 1 tablet (50 mg total) by mouth daily, Normal       !! - Potential duplicate medications found  Please discuss with provider  No discharge procedures on file      PDMP Review     None          ED Provider  Electronically Signed by           Juan Whitten MD  03/02/23 4820

## 2023-03-02 NOTE — ASSESSMENT & PLAN NOTE
POA  Significantly hypotensive at outside hospital  Received 4 units PRBCs at outside hospital  Transferred to higher level of care for GI evaluation and potential need for IR  Currently remains hypotensive with systolic blood pressure in the 80s to 90s; however, most recent hemoglobin is 14    · Suspect possible diverticular bleed in setting of acute diverticulitis   · EGD today per GI  · Possible colonoscopy tomorrow   · Will check Hgb this evening, thereafter, monitor Hgb prn if hypotensive or if acute change   · Transfuse as indicated for Hgb less than 7 or for hypotension/shock

## 2023-03-02 NOTE — ASSESSMENT & PLAN NOTE
· Remote stent placement x3  · Most recent PCI Dec 2022  · Troponin within normal limit  · Per GI ok to resume Plavix and aspirin  · Continue with the stating  · Hold BB for now given hypotension  · Hold torsemide given hypotension

## 2023-03-02 NOTE — ED RE-EVALUATION NOTE
Patient accepted at [facility]  GI service department notified as well patient and patient's condition       [address] [place] [address] [place]  03/02/23 1920

## 2023-03-02 NOTE — H&P
[address] [facility]  H&P- Alvin Negrete 1945, 69 y o  male MRN: [id number]  Unit/Bed#: ICU 10 Encounter: [id number]  Primary Care Provider: Ge Gutierrez DO   Date and time admitted to hospital: 3/2/2023 10:59 AM    Hypotension  Assessment & Plan  · Most likely source is GI bleed  · Most recent hemoglobin within normal range at 14 status post transfusion of 4 units of packed cells  · We will recheck hemoglobin this evening and again in a m  or again if any acute change    Acute diverticulitis  Assessment & Plan  · Ceftriaxone and flagyl  · Colonoscopy possibly tomorrow per GI    Diabetes mellitus, type 2 [facility])  Assessment & Plan  Lab Results   Component Value Date    HGBA1C 8 1 (H) 01/16/2023       Recent Labs     03/02/23  0719   POCGLU 181*       Blood Sugar Average: Last 72 hrs:     · Home regimen: Glimepiride, Jardiance, Metformin  · SSI while inpatient     Stage 3a chronic kidney disease [facility])  Assessment & Plan  Lab Results   Component Value Date    EGFR 49 03/02/2023    EGFR 43 03/02/2023    EGFR 55 02/06/2023    CREATININE 1 38 (H) 03/02/2023    CREATININE 1 53 (H) 03/02/2023    CREATININE 1 25 02/06/2023     · Trend urine output and renal indices  · Avoid nephrotoxic agents  · Most recent creatinine 1 38 which is baseline    Chronic diastolic (congestive) heart failure (HCC)  Assessment & Plan  Wt Readings from Last 3 Encounters:   03/02/23 100 kg (221 lb 1 9 oz)   03/02/23 98 9 kg (218 lb 0 6 oz)   02/22/23 98 9 kg (218 lb)       · Hold BB for now given hypotension  · Hold torsemide given hypotension      Recurrent major depressive disorder ([facility] [address] )  Assessment & Plan  · We will start home Lexapro when no longer n p o      Coronary artery disease of native artery of native heart with stable angina pectoris [facility])  Assessment & Plan  · Remote stent placement x3  · Most recent PCI Dec 2022  · Troponin at outside hospital normal, will recheck here   · Hold antiplatelets for now and resume when OK per GI  · Restart statin when no longer NPO  · Hold BB for now given hypotension  · Hold torsemide given hypotension    Primary osteoarthritis involving multiple joints  Assessment & Plan  · Pain control as needed    Hypertension  Assessment & Plan  · Home regimen: Losartan  · Holding home antihypertensives in setting of hypotension due to GI bleed    Hyperlipidemia  Assessment & Plan  · Home regimen: Atorvastatin  · We will start when no longer n p o     * GI bleed  Assessment & Plan  POA  Significantly hypotensive at outside hospital  Received 4 units PRBCs at outside hospital  Transferred to higher level of care for GI evaluation and potential need for IR  Currently remains hypotensive with systolic blood pressure in the 80s to 90s; however, most recent hemoglobin is 14    · Suspect possible diverticular bleed in setting of acute diverticulitis   · EGD today per GI  · Possible colonoscopy tomorrow   · Will check Hgb this evening, thereafter, monitor Hgb prn if hypotensive or if acute change   · Transfuse as indicated for Hgb less than 7 or for hypotension/shock    -------------------------------------------------------------------------------------------------------------  Chief Complaint: abd pain, bloody diarrhea    History of Present Illness   HX and PE limited by: Tong Arana is a 69 y o  male who presents as a transfer from [address] [place] [address] emergency department  Per the patient and chart review, patient awoke from sleep early this a m  with 1 episode of diarrhea  He did not inspect this episode  He woke again shortly thereafter with a second episode of diarrhea which he noted to be bloody and have clots in the toilet  His wife then called EMS and he was transported to [address] [facility]  In the emergency department at [address] [place] [address], patient was found to be significantly hypotensive and was transfused with 4 units PRBCs  His hemoglobin on arrival was 12 3    On a recheck at 7:45 AM, his hemoglobin was 9  9  It appears as if his baseline 1 month ago was 15  Due to concern for significant GI bleed, emergency department at [facility] [place] consulted GI who stated that they will not be at that campus today  Transfer was recommended to a facility with both GI and IR capabilities  He was then transferred to [address] [place] [address] by ground  On arrival, he is admitted to the critical care unit as a stepdown level 1 patient  We will send labs  He will be evaluated by GI  He will be plan for likely EGD  History obtained from chart review and the patient   -------------------------------------------------------------------------------------------------------------  Dispo: Admit to Stepdown Level 1    Code Status: Level 1 - Full Code  --------------------------------------------------------------------------------------------------------------  Review of Systems   All other systems reviewed and are negative  A 12-point, complete review of systems was reviewed and negative except as stated above     Physical Exam  Vitals reviewed  Constitutional:       General: He is not in acute distress  Appearance: He is not ill-appearing, toxic-appearing or diaphoretic  Cardiovascular:      Rate and Rhythm: Normal rate and regular rhythm  Pulses: Normal pulses  Heart sounds: Murmur heard  Systolic murmur is present  Pulmonary:      Effort: Pulmonary effort is normal       Breath sounds: Normal breath sounds and air entry  Abdominal:      General: There is no distension  Palpations: Abdomen is soft  Tenderness: There is abdominal tenderness in the right lower quadrant and left lower quadrant  Musculoskeletal:      Right lower leg: No edema  Left lower leg: No edema  Skin:     General: Skin is warm and dry  Capillary Refill: Capillary refill takes less than 2 seconds  Neurological:      General: No focal deficit present        Mental Status: He is alert and oriented to person, place, and time  GCS: GCS eye subscore is 4  GCS verbal subscore is 5  GCS motor subscore is 6  Sensory: Sensation is intact  Motor: Motor function is intact      --------------------------------------------------------------------------------------------------------------  Vitals:   Vitals:    03/02/23 1110 03/02/23 1413 03/02/23 1543 03/02/23 1558   BP:  106/57 93/53 117/98   Pulse:  83 96 96   Resp:  18 16 16   Temp:  98 6 °F (37 °C)     TempSrc:  Temporal     SpO2:  93% 94% 94%   Weight: 100 kg (221 lb 1 9 oz)        Temp  Min: 96 5 °F (35 8 °C)  Max: 98 6 °F (37 °C)        Body mass index is 33 62 kg/m²      Laboratory and Diagnostics:  Results from last 7 days   Lab Units 03/02/23  1132 03/02/23  0745 03/02/23  0531   WBC Thousand/uL 9 24  --  17 11*   HEMOGLOBIN g/dL 14 4 9 9* 12 3   HEMATOCRIT % 43 7 29 7* 36 7   PLATELETS Thousands/uL 203  --  329   NEUTROS PCT % 68  --  70   MONOS PCT % 9  --  8     Results from last 7 days   Lab Units 03/02/23  1132 03/02/23  0531   SODIUM mmol/L 138 136   POTASSIUM mmol/L 5 9* 5 9*   CHLORIDE mmol/L 110* 103   CO2 mmol/L 21 23   ANION GAP mmol/L 7 10   BUN mg/dL 31* 28*   CREATININE mg/dL 1 38* 1 53*   CALCIUM mg/dL 8 4 9 0   GLUCOSE RANDOM mg/dL 152* 271*   ALT U/L 22 25   AST U/L 15 15   ALK PHOS U/L 58 67   ALBUMIN g/dL 3 3* 3 7   TOTAL BILIRUBIN mg/dL 0 88 0 59     Results from last 7 days   Lab Units 03/02/23  1132   MAGNESIUM mg/dL 1 8*   PHOSPHORUS mg/dL 3 4      Results from last 7 days   Lab Units 03/02/23  1132   INR  1 03   PTT seconds 25          Results from last 7 days   Lab Units 03/02/23  1132 03/02/23  0745 03/02/23  0531   LACTIC ACID mmol/L 1 5 2 6* 3 8*     ABG:    VBG:  Results from last 7 days   Lab Units 03/02/23  1132   PH ZEB  7 360   PCO2 ZEB mm Hg 32 9*   PO2 ZEB mm Hg 94 0*   HCO3 ZEB mmol/L 18 2*   BASE EXC ZEB mmol/L -6 1           Micro:        EKG: NSR tele  Imaging: I have personally reviewed pertinent reports  and I have personally reviewed pertinent films in PACS      Historical Information   Past Medical History:   Diagnosis Date   • BPH (benign prostatic hyperplasia)    • CAD (coronary artery disease)    • Cancer (HCC)     basal cell   • Chronic kidney disease     stage 3   • Colon polyp    • CPAP (continuous positive airway pressure) dependence    • Diabetes mellitus (HCC)     niddm   • Disease of thyroid gland    • Gout    • High cholesterol    • Hypertension    • MI, 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    • Myocardial infarction ([facility] [address] ) 2014   • S/P cardiac catheterization 12/30/2022   • Sleep apnea      Past Surgical History:   Procedure Laterality Date   • BASAL CELL CARCINOMA EXCISION Right 1/21/2022    Procedure: EXCISION BASAL CELL CARCINOMA EXTREMITY;  Surgeon: Gerald Salazar DO;  Location: [facility] MAIN OR;  Service: General   • CARDIAC CATHETERIZATION Left 12/29/2022    Procedure: Cardiac Left Heart Cath;  Surgeon: Juan Gipson MD;  Location: [facility] CARDIAC [facility]; Service: Cardiology   • CARDIAC CATHETERIZATION N/A 12/29/2022    Procedure: Cardiac Coronary Angiogram;  Surgeon: Juan Gipson MD;  [facility]: [facility]; Service: Cardiology   • CARDIAC CATHETERIZATION N/A 12/29/2022    Procedure: Cardiac pci;  Surgeon: Juan Gipson MD;  Location: [facility];   Service: Cardiology   • CATARACT EXTRACTION W/  INTRAOCULAR LENS IMPLANT     • CORNEAL TRANSPLANT     • CORONARY ANGIOPLASTY WITH STENT PLACEMENT      stents x3-- [phone]   • EYE SURGERY      repair corneal laceration   • MI COLONOSCOPY FLX DX W/COLLJ SPEC WHEN PFRMD N/A 3/21/2016    Procedure: COLONOSCOPY;  Surgeon: Heidi Thompson MD;  Location: [facility];  Service: Colorectal   • TONSILLECTOMY AND ADENOIDECTOMY       Social History   Social History     Substance and Sexual Activity   Alcohol Use Not Currently    Comment: socially, cut down in 2008, previously did more than      Social History     Substance and Sexual Activity   Drug Use Never     Social History     Tobacco Use   Smoking Status Never   Smokeless Tobacco Never     Exercise History: Reviewed and noncontributory  Family History:   Family History   Adopted: Yes   Problem Relation Age of Onset   • No Known Problems Mother    • No Known Problems Father      I have reviewed this patient's family history and commented on sigificant items within the HPI      Medications:  Current Facility-Administered Medications   Medication Dose Route Frequency   • aspirin (ECOTRIN LOW STRENGTH) EC tablet 81 mg  81 mg Oral Daily   • bisacodyl (DULCOLAX) EC tablet 10 mg  10 mg Oral Once   • bisacodyl (DULCOLAX) EC tablet 10 mg  10 mg Oral Once   • [START ON 3/3/2023] cefTRIAXone (ROCEPHIN) IVPB (premix in dextrose) 1,000 mg 50 mL  1,000 mg Intravenous Q24H   • chlorhexidine (PERIDEX) 0 12 % oral rinse 15 mL  15 mL Mouth/Throat Q12H [address]   • magnesium sulfate 2 g/50 mL IVPB (premix) 2 g  2 g Intravenous Once   • metroNIDAZOLE (FLAGYL) IVPB (premix) 500 mg 100 mL  500 mg Intravenous Q8H   • multi-electrolyte (PLASMALYTE-A/ISOLYTE-S PH 7 4) IV solution  125 mL/hr Intravenous Continuous   • [START ON 3/3/2023] pantoprazole (PROTONIX) EC tablet 40 mg  40 mg Oral Daily Before Breakfast   • polyethylene glycol (GOLYTELY) bowel prep 4,000 mL  4,000 mL Oral See Admin Instructions     Home medications:  Prior to Admission Medications   Prescriptions Last Dose Informant Patient Reported? Taking? Blood Glucose Monitoring Suppl (OneTouch Verio Reflect) w/Device KIT   No No   Sig: Check blood sugars twice daily  Please substitute with appropriate alternative as covered by patient's insurance  Dx: E11 65   Empagliflozin (Jardiance) 10 MG TABS   No No   Sig: Take 1 tablet (10 mg total) by mouth every morning   OneTouch Delica Lancets 12S MISC   No No   Sig: Check blood sugars twice daily  Please substitute with appropriate alternative as covered by patient's insurance   Dx: E11 65   allopurinol (ZYLOPRIM) 100 mg tablet   No No   Sig: Take 1 tablet (100 mg total) by mouth daily   allopurinol (ZYLOPRIM) 100 mg tablet   No No   Sig: Take 1 tablet by mouth once daily   aspirin 81 mg chewable tablet   No No   Sig: Chew 1 tablet (81 mg total) daily   atorvastatin (LIPITOR) 80 mg tablet   No No   Sig: Take 1 tablet (80 mg total) by mouth daily   benzonatate (TESSALON) 200 MG capsule   No No   Sig: Take 1 capsule (200 mg total) by mouth 3 (three) times a day as needed for cough   Patient not taking: Reported on 1/12/2023   escitalopram (LEXAPRO) 20 mg tablet   No No   Sig: Take 0 5 tablets (10 mg total) by mouth daily   glimepiride (AMARYL) 4 mg tablet   No No   Sig: TAKE 1 TABLET TWICE A DAY   glucose blood (OneTouch Verio) test strip   No No   Sig: Check blood sugars twice daily  Please substitute with appropriate alternative as covered by patient's insurance   Dx: E11 65   levothyroxine 88 mcg tablet   No No   Sig: TAKE 1 TABLET BY MOUTH ONCE DAILY IN THE MORNING   levothyroxine 88 mcg tablet   No No   Sig: Take 1 tablet (88 mcg total) by mouth daily in the early morning   losartan (COZAAR) 25 mg tablet   No No   Sig: Take 1 tablet (25 mg total) by mouth daily   metFORMIN (GLUCOPHAGE) 1000 MG tablet   No No   Sig: Take 1 tablet (1,000 mg total) by mouth 2 (two) times a day with meals   multivitamin (THERAGRAN) TABS   Yes No   Sig: Take 1 tablet by mouth daily   nitroglycerin (NITROSTAT) 0 4 mg SL tablet   No No   Sig: Place 1 tablet (0 4 mg total) under the tongue every 5 (five) minutes as needed for chest pain   prednisoLONE acetate (PRED FORTE) 1 % ophthalmic suspension   Yes No   Sig: INSTILL 1 DROP INTO RIGHT EYE ONCE DAILY   propranolol (INDERAL LA) 60 mg 24 hr capsule   No No   Sig: Take 1 capsule (60 mg total) by mouth daily   ranolazine (RANEXA) 500 mg 12 hr tablet   No No   Sig: Take 1 tablet (500 mg total) by mouth 2 (two) times a day   ticagrelor (BRILINTA) 90 MG   No No   Sig: Take 1 tablet (90 mg total) by mouth every 12 (twelve) hours   torsemide (DEMADEX) 10 mg tablet   No No   Sig: Take 1 tablet (10 mg total) by mouth daily   Patient not taking: Reported on 1/31/2023   zonisamide (ZONEGRAN) 50 MG capsule   No No   Sig: Take 1 tablet (50 mg total) by mouth daily      Facility-Administered Medications: None     Allergies:  No Known Allergies    ------------------------------------------------------------------------------------------------------------  Advance Directive and Living Will:      Power of :    POLST:    ------------------------------------------------------------------------------------------------------------  Anticipated Length of Stay is > 2 midnights    Care Time Delivered:   No Critical Care time spent       Nadira Cespedes PA-C        Portions of the record may have been created with voice recognition software  Occasional wrong word or "sound a like" substitutions may have occurred due to the inherent limitations of voice recognition software    Read the chart carefully and recognize, using context, where substitutions have occurred

## 2023-03-02 NOTE — ASSESSMENT & PLAN NOTE
Lab Results   Component Value Date    HGBA1C 8 1 (H) 01/16/2023       Recent Labs     03/02/23  0719   POCGLU 181*       Blood Sugar Average: Last 72 hrs:     · Home regimen: Glimepiride, Jardiance, Metformin  · SSI while inpatient   · Hypoglycemia protocol  · Accu-Cheks every 6 hours, patient currently n p o   · Diabetic diet once patient allowed to eat

## 2023-03-02 NOTE — EMTALA/ACUTE CARE TRANSFER
[facility] EMERGENCY DEPARTMENT  [address] [address]  Dept: [phone]      EMTALA TRANSFER CONSENT    NAME Marcelo Christiansen                                         1945                              MRN [id number]    I have been informed of my rights regarding examination, treatment, and transfer   by Dr Zoë Starkey,*    Benefits:   GI Availability, IR availability    Risks:   Delay in care, crash during transfer      Transfer Request   I acknowledge that my medical condition has been evaluated and explained to me by the emergency department physician or other qualified medical person and/or my attending physician who has recommended and offered to me further medical examination and treatment  I understand the Hospital's obligation with respect to the treatment and stabilization of my emergency medical condition  I nevertheless request to be transferred  I release the Hospital, the doctor, and any other persons caring for me from all responsibility or liability for any injury or ill effects that may result from my transfer and agree to accept all responsibility for the consequences of my choice to transfer, rather than receive stabilizing treatment at the Hospital  I understand that because the transfer is my request, my insurance may not provide reimbursement for the services  The Hospital will assist and direct me and my family in how to make arrangements for transfer, but the hospital is not liable for any fees charged by the transport service  In spite of this understanding, I refuse to consent to further medical examination and treatment which has been offered to me, and request transfer to    I authorize the performance of emergency medical procedures and treatments upon me in both transit and upon arrival at the receiving facility    Additionally, I authorize the release of any and all medical records to the receiving facility and request they be transported with me, if possible  I authorize the performance of emergency medical procedures and treatments upon me in both transit and upon arrival at the receiving facility  Additionally, I authorize the release of any and all medical records to the receiving facility and request they be transported with me, if possible  I understand that the safest mode of transportation during a medical emergency is an ambulance and that the Hospital advocates the use of this mode of transport  Risks of traveling to the receiving facility by car, including absence of medical control, life sustaining equipment, such as oxygen, and medical personnel has been explained to me and I fully understand them  (MOHAMUD CORRECT BOX BELOW)  [ X ]  I consent to the stated transfer and to be transported by ambulance/helicopter  [  ]  I consent to the stated transfer, but refuse transportation by ambulance and accept full responsibility for my transportation by car  I understand the risks of non-ambulance transfers and I exonerate the Hospital and its staff from any deterioration in my condition that results from this refusal     X___________________________________________    DATE  23  TIME________  Signature of patient or legally responsible individual signing on patient behalf           RELATIONSHIP TO PATIENT_________________________          Provider Certification    NAME Jess Raymond                                        [facility] 1945                              MRN [id number]    A medical screening exam was performed on the above named patient  Based on the examination:    Condition Necessitating Transfer The primary encounter diagnosis was GI bleed  A diagnosis of BRBPR (bright red blood per rectum) was also pertinent to this visit      Patient Condition:   Guarded    Reason for Transfer:   Need for GI / IR availability    Transfer Requirements: Facility    [facility]  · Space available and qualified personnel available for treatment as acknowledged by   PACS  · Agreed to accept transfer and to provide appropriate medical treatment as acknowledged by: Dr Eleni Dominguez          · Appropriate medical records of the examination and treatment of the patient are provided at the time of transfer   [address] [facility], [address] _______  · Transfer will be performed by qualified personnel from    and appropriate transfer equipment as required, including the use of necessary and appropriate life support measures  Provider Certification: I have examined the patient and explained the following risks and benefits of being transferred/refusing transfer to the patient/family:         Based on these reasonable risks and benefits to the patient and/or the unborn child(gino), and based upon the information available at the time of the patient’s examination, I certify that the medical benefits reasonably to be expected from the provision of appropriate medical treatments at another medical facility outweigh the increasing risks, if any, to the individual’s medical condition, and in the case of labor to the unborn child, from effecting the transfer      X____________________________________________ DATE 03/02/23        TIME_______      ORIGINAL - SEND TO MEDICAL RECORDS   COPY - SEND WITH PATIENT DURING TRANSFER

## 2023-03-03 ENCOUNTER — APPOINTMENT (OUTPATIENT)
Dept: CARDIAC REHAB | Facility: HOSPITAL | Age: 78
End: 2023-03-03

## 2023-03-03 ENCOUNTER — ANESTHESIA (INPATIENT)
Dept: GASTROENTEROLOGY | Facility: HOSPITAL | Age: 78
End: 2023-03-03

## 2023-03-03 ENCOUNTER — APPOINTMENT (INPATIENT)
Dept: GASTROENTEROLOGY | Facility: HOSPITAL | Age: 78
End: 2023-03-03

## 2023-03-03 PROBLEM — D62 ACUTE BLOOD LOSS ANEMIA: Status: ACTIVE | Noted: 2023-03-03

## 2023-03-03 LAB
ABO GROUP BLD BPU: NORMAL
ANION GAP SERPL CALCULATED.3IONS-SCNC: 6 MMOL/L (ref 4–13)
BPU ID: NORMAL
BUN SERPL-MCNC: 25 MG/DL (ref 5–25)
CALCIUM SERPL-MCNC: 7.5 MG/DL (ref 8.4–10.2)
CHLORIDE SERPL-SCNC: 110 MMOL/L (ref 96–108)
CO2 SERPL-SCNC: 24 MMOL/L (ref 21–32)
CREAT SERPL-MCNC: 1.06 MG/DL (ref 0.6–1.3)
CROSSMATCH: NORMAL
ERYTHROCYTE [DISTWIDTH] IN BLOOD BY AUTOMATED COUNT: 16.5 % (ref 11.6–15.1)
GFR SERPL CREATININE-BSD FRML MDRD: 67 ML/MIN/1.73SQ M
GLUCOSE SERPL-MCNC: 100 MG/DL (ref 65–140)
GLUCOSE SERPL-MCNC: 114 MG/DL (ref 65–140)
GLUCOSE SERPL-MCNC: 118 MG/DL (ref 65–140)
GLUCOSE SERPL-MCNC: 122 MG/DL (ref 65–140)
GLUCOSE SERPL-MCNC: 211 MG/DL (ref 65–140)
HCT VFR BLD AUTO: 30.2 % (ref 36.5–49.3)
HGB BLD-MCNC: 10.3 G/DL (ref 12–17)
HGB BLD-MCNC: 10.6 G/DL (ref 12–17)
MAGNESIUM SERPL-MCNC: 2.2 MG/DL (ref 1.9–2.7)
MCH RBC QN AUTO: 32.4 PG (ref 26.8–34.3)
MCHC RBC AUTO-ENTMCNC: 34.1 G/DL (ref 31.4–37.4)
MCV RBC AUTO: 95 FL (ref 82–98)
PHOSPHATE SERPL-MCNC: 3.5 MG/DL (ref 2.3–4.1)
PLATELET # BLD AUTO: 173 THOUSANDS/UL (ref 149–390)
PMV BLD AUTO: 9.6 FL (ref 8.9–12.7)
POTASSIUM SERPL-SCNC: 3.9 MMOL/L (ref 3.5–5.3)
RBC # BLD AUTO: 3.18 MILLION/UL (ref 3.88–5.62)
SODIUM SERPL-SCNC: 140 MMOL/L (ref 135–147)
UNIT DISPENSE STATUS: NORMAL
UNIT PRODUCT CODE: NORMAL
UNIT PRODUCT VOLUME: 350 ML
UNIT RH: NORMAL
WBC # BLD AUTO: 8.83 THOUSAND/UL (ref 4.31–10.16)

## 2023-03-03 PROCEDURE — 0DJ08ZZ INSPECTION OF UPPER INTESTINAL TRACT, VIA NATURAL OR ARTIFICIAL OPENING ENDOSCOPIC: ICD-10-PCS | Performed by: INTERNAL MEDICINE

## 2023-03-03 RX ORDER — LIDOCAINE HYDROCHLORIDE 20 MG/ML
INJECTION, SOLUTION EPIDURAL; INFILTRATION; INTRACAUDAL; PERINEURAL AS NEEDED
Status: DISCONTINUED | OUTPATIENT
Start: 2023-03-03 | End: 2023-03-03

## 2023-03-03 RX ORDER — SODIUM CHLORIDE 9 MG/ML
INJECTION, SOLUTION INTRAVENOUS CONTINUOUS PRN
Status: DISCONTINUED | OUTPATIENT
Start: 2023-03-03 | End: 2023-03-03

## 2023-03-03 RX ORDER — SODIUM CHLORIDE, SODIUM GLUCONATE, SODIUM ACETATE, POTASSIUM CHLORIDE, MAGNESIUM CHLORIDE, SODIUM PHOSPHATE, DIBASIC, AND POTASSIUM PHOSPHATE .53; .5; .37; .037; .03; .012; .00082 G/100ML; G/100ML; G/100ML; G/100ML; G/100ML; G/100ML; G/100ML
500 INJECTION, SOLUTION INTRAVENOUS ONCE
Status: COMPLETED | OUTPATIENT
Start: 2023-03-03 | End: 2023-03-03

## 2023-03-03 RX ORDER — PROPOFOL 10 MG/ML
INJECTION, EMULSION INTRAVENOUS CONTINUOUS PRN
Status: DISCONTINUED | OUTPATIENT
Start: 2023-03-03 | End: 2023-03-03

## 2023-03-03 RX ORDER — PROPOFOL 10 MG/ML
INJECTION, EMULSION INTRAVENOUS AS NEEDED
Status: DISCONTINUED | OUTPATIENT
Start: 2023-03-03 | End: 2023-03-03

## 2023-03-03 RX ADMIN — ZONISAMIDE 50 MG: 25 CAPSULE ORAL at 08:22

## 2023-03-03 RX ADMIN — PROPOFOL 100 MCG/KG/MIN: 10 INJECTION, EMULSION INTRAVENOUS at 14:27

## 2023-03-03 RX ADMIN — B-COMPLEX W/ C & FOLIC ACID TAB 1 TABLET: TAB at 08:22

## 2023-03-03 RX ADMIN — ALLOPURINOL 100 MG: 100 TABLET ORAL at 08:22

## 2023-03-03 RX ADMIN — TICAGRELOR 90 MG: 90 TABLET ORAL at 08:25

## 2023-03-03 RX ADMIN — PROPOFOL 80 MG: 10 INJECTION, EMULSION INTRAVENOUS at 14:20

## 2023-03-03 RX ADMIN — SODIUM CHLORIDE: 0.9 INJECTION, SOLUTION INTRAVENOUS at 14:05

## 2023-03-03 RX ADMIN — CEFTRIAXONE 1000 MG: 1 INJECTION, SOLUTION INTRAVENOUS at 05:32

## 2023-03-03 RX ADMIN — ASPIRIN 81 MG: 81 TABLET, COATED ORAL at 08:24

## 2023-03-03 RX ADMIN — ESCITALOPRAM OXALATE 10 MG: 10 TABLET ORAL at 08:20

## 2023-03-03 RX ADMIN — ATORVASTATIN CALCIUM 80 MG: 80 TABLET, FILM COATED ORAL at 17:20

## 2023-03-03 RX ADMIN — METRONIDAZOLE 500 MG: 500 INJECTION, SOLUTION INTRAVENOUS at 00:54

## 2023-03-03 RX ADMIN — INSULIN LISPRO 4 UNITS: 100 INJECTION, SOLUTION INTRAVENOUS; SUBCUTANEOUS at 12:24

## 2023-03-03 RX ADMIN — LEVOTHYROXINE SODIUM 88 MCG: 100 TABLET ORAL at 05:32

## 2023-03-03 RX ADMIN — LIDOCAINE HYDROCHLORIDE 100 MG: 20 INJECTION, SOLUTION EPIDURAL; INFILTRATION; INTRACAUDAL; PERINEURAL at 14:18

## 2023-03-03 RX ADMIN — PANTOPRAZOLE SODIUM 40 MG: 40 TABLET, DELAYED RELEASE ORAL at 08:20

## 2023-03-03 RX ADMIN — CHLORHEXIDINE GLUCONATE 0.12% ORAL RINSE 15 ML: 1.2 LIQUID ORAL at 08:19

## 2023-03-03 RX ADMIN — METRONIDAZOLE 500 MG: 500 INJECTION, SOLUTION INTRAVENOUS at 23:36

## 2023-03-03 RX ADMIN — SODIUM CHLORIDE, SODIUM GLUCONATE, SODIUM ACETATE, POTASSIUM CHLORIDE, MAGNESIUM CHLORIDE, SODIUM PHOSPHATE, DIBASIC, AND POTASSIUM PHOSPHATE 500 ML: .53; .5; .37; .037; .03; .012; .00082 INJECTION, SOLUTION INTRAVENOUS at 12:34

## 2023-03-03 RX ADMIN — METRONIDAZOLE 500 MG: 500 INJECTION, SOLUTION INTRAVENOUS at 17:05

## 2023-03-03 RX ADMIN — METRONIDAZOLE 500 MG: 500 INJECTION, SOLUTION INTRAVENOUS at 08:20

## 2023-03-03 NOTE — ANESTHESIA POSTPROCEDURE EVALUATION
Post-Op Assessment Note    CV Status:  Stable    Pain management: adequate     Mental Status:  Alert and awake   Hydration Status:  Euvolemic   PONV Controlled:  Controlled   Airway Patency:  Patent      Post Op Vitals Reviewed: Yes      Staff: Anesthesiologist, CRNA         No notable events documented      BP      Temp      Pulse     Resp      SpO2      /69   Pulse 76   Temp 98 6 °F (37 °C) (Temporal)   Resp 15   Wt 101 kg (222 lb 7 1 oz)   SpO2 99%   BMI 33 82 kg/m² Glabellar Complex Units: 16

## 2023-03-03 NOTE — CONSULTS
Consultation - Cardiology   Petar Deleon 69 y o  male MRN: [id number]  Unit/Bed#: ICU 10 Encounter: [id number]      Assessment and Plan:    1) GIB with Acute Blood Loss Anemia on DAPT s/p Cardiac Cath w/ PCI    · Patient with history of CAD with angina now s/p PCI on 12/29/22 as below-     •  Left Anterior Descending The vessel was visualized by angiography and is large  There are L to R collaterals to the distal RCA system  Prox LAD lesion is 80% stenosed  Culprit lesion  Culprit for anginal symptoms  JANETT flow is 3  The lesion is non high-C and tubular  The lesion is mildly calcified  iFR was measured in the Prox LAD  iFR ratio: 0 68  The iFR was significant, intervened  Mid LAD lesion is 80% stenosed  Culprit lesion  JANETT flow is 3  The lesion is type C and focal  After D2  The lesion is mildly calcified  iFR was measured in the Mid LAD  iFR ratio: 0 68  The iFR was significant, intervened  •  Drug-eluting stent was successfully placed across the proximal lesion  The stent used was a STENT XIENCE SKYPOINT 2 25 X 23MM  •  The intervention was successful  The guidewire crossed the lesion  Post-intervention JANETT flow is 3  There were no complications  There is a 0% residual stenosis post intervention  •  Mid LAD lesion Angioplasty Angioplasty using a standard balloon was performed independent of stent deployment  •  PTCA was performed with excellent angiographic result  Stenting was attempted but the stent was not able to negotiate the tortuosity and calcification of the vessel to get to the lesion  •  Post-Intervention Lesion Assessment The intervention was successful  Post-intervention JANETT flow is 3  Lesion had 15 mm of its length treated  There were no complications  There is a 10% residual stenosis post intervention      · Patient currently on DAPT with ASA 81 mg daily and Ticagrelor 90 mg q12h   · Currently undergoing colonoscopy with result pending  · Given that patient is approaching 3 months post-PCI, it is reasonable to hold DAPT in setting of active bleed until hemostasis has been adequately achieved   · Would prefer single anti-platelet with ASA if possible for now    2) Hyperlipidemia     Lab Results   Component Value Date    CHOLESTEROL 203 (H) 01/16/2023    TRIG 206 (H) 01/16/2023    HDL 45 01/16/2023    LDLCALC 117 (H) 01/16/2023     · Continue atorvastatin 80 mg daily   · Continue ticagrelor as tolerated in setting of GIB as above      3) Hypertension:     BP Readings from Last 3 Encounters:   03/03/23 107/55   03/02/23 118/57   02/22/23 122/68     · Home regimen includes Losartan 25 mg Daily, propanolol 60 mg daily and torsemide 10 mg daily     Plan:  • Agree with holding home regimen for now in setting hypotension likely secondary to GIB   • Monitor blood pressure    4) Chronic Diastolic Heart Failure:     Lab Results   Component Value Date    LVEF 65 03/31/2022    NTBNP 211 01/04/2023    NTBNP 284 (H) 12/01/2021       • NYHA Class III , Stage C  • Without acute exacerbation    Plan:  • GDMT: Diuretic: Torsemide 10 mg daily, B-Blocker: propanolol 60 mg daily, ACE/ARB/ARNi: Losartan 25 mg daily   · Diet: Sodium restriction 2g  · Labs: Monitor BMP  · Maintain Mg > 2 and K > 4; Replete prn  · Elevate HOB > 30°, Daily standing weights, Measure I/Os, Monitor on Telemetry        History of Present Illness   Physician Requesting Consult: Arno Kocher*  Reason for Consult / Principal Problem: GIB on DAPT s/p cardiac cath  HPI: Jenny Canales is a 69y o  year old male with history of T2DM, diverticulitis, HTN, Stage III CKD, chronic diastolic CHF, who presented from [address] [place], [place] [address] ED on 3/2/23 with sudden-onset diarrhea with evidence of blood and clots in stool  He was found to be hypotensive and transfused 4 units PRBCs  Hgb notably dropped from 12 3 to 9 9 on presentation from baseline hgb of 15  GI was consulted and planned upper and lower endoscopy   Of note, patient is currently on DAPT for cath and PCI last December with ASA 81 mg daily and Ticagrelor 90 mg q12h  Cardiology consulted for comment on management of DAPT in setting of acute GIB  Inpatient consult to Cardiology  Consult performed by: Mariama Joya MD  Consult ordered by: Abimael Aponte MD          Review of Systems:  Review of Systems   Constitutional: Negative for chills and fever  HENT: Negative for ear pain and sore throat  Eyes: Negative for pain and visual disturbance  Respiratory: Negative for cough and shortness of breath  Cardiovascular: Negative for chest pain and palpitations  Gastrointestinal: Positive for anal bleeding, blood in stool and diarrhea  Negative for abdominal pain and vomiting  Genitourinary: Negative for dysuria and hematuria  Musculoskeletal: Negative for arthralgias and back pain  Skin: Negative for color change and rash  Neurological: Negative for seizures and syncope  All other systems reviewed and are negative  Historical Information   Past Medical History:   Diagnosis Date   • BPH (benign prostatic hyperplasia)    • CAD (coronary artery disease)    • Cancer (HCC)     basal cell   • Chronic kidney disease     stage 3   • Colon polyp    • CPAP (continuous positive airway pressure) dependence    • Diabetes mellitus (HCC)     niddm   • Disease of thyroid gland    • Gout    • High cholesterol    • Hypertension    • MI, 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 [address] ) 2014   • S/P cardiac catheterization 12/30/2022   • Sleep apnea      Past Surgical History:   Procedure Laterality Date   • BASAL CELL CARCINOMA EXCISION Right 1/21/2022    Procedure: EXCISION BASAL CELL CARCINOMA EXTREMITY;  Surgeon: Adama Arroyo DO;  Location: [facility];  Service: General   • CARDIAC CATHETERIZATION Left 12/29/2022    Procedure: Cardiac Left Heart Cath;  Surgeon: Ino Motta MD;  Location: [facility];   Service: Cardiology   • CARDIAC CATHETERIZATION N/A 12/29/2022    Procedure: Cardiac Coronary Angiogram;  Surgeon: Elda Kincaid MD;  Location: [facility]; Service: Cardiology   • CARDIAC CATHETERIZATION N/A 12/29/2022    Procedure: Cardiac pci;  Surgeon: Elda Kincaid MD;  Location: [facility]; Service: Cardiology   • CATARACT EXTRACTION W/  INTRAOCULAR LENS IMPLANT     • CORNEAL TRANSPLANT     • CORONARY ANGIOPLASTY WITH STENT PLACEMENT      stents x3-- [phone]   • EYE SURGERY      repair corneal laceration   • [facility] COLONOSCOPY FLX DX W/COLLJ SPEC WHEN PFRMD N/A 3/21/2016    Procedure: COLONOSCOPY;  Surgeon: Cindi Hudson MD;  Location: [facility];  Service: Colorectal   • TONSILLECTOMY AND ADENOIDECTOMY       Social History     Substance and Sexual Activity   Alcohol Use Not Currently    Comment: socially, cut down in 2008, previously did more than      Social History     Substance and Sexual Activity   Drug Use Never     Social History     Tobacco Use   Smoking Status Never   Smokeless Tobacco Never     Family History:   Family History   Adopted: Yes   Problem Relation Age of Onset   • No Known Problems Mother    • No Known Problems Father        Meds/Allergies   all current active meds have been reviewed  No Known Allergies    Objective   Vitals: Blood pressure 98/56, pulse (!) 116, temperature 97 6 °F (36 4 °C), temperature source Oral, resp  rate (!) 31, weight 101 kg (222 lb 7 1 oz), SpO2 99 %  , Body mass index is 33 82 kg/m² ,   Orthostatic Blood Pressures    Flowsheet Row Most Recent Value   Blood Pressure 98/56 filed at 03/03/2023 1200   Patient Position - Orthostatic VS Lying filed at 03/03/2023 0800            Intake/Output Summary (Last 24 hours) at 3/3/2023 1258  Last data filed at 3/3/2023 1201  Gross per 24 hour   Intake 3059 75 ml   Output 1645 ml   Net 1414 75 ml       Invasive Devices     Peripheral Intravenous Line  Duration           Peripheral IV 03/02/23 Left Antecubital 1 day    Peripheral IV 03/02/23 Right Antecubital 1 day                    Physical Exam:  Physical Exam  Constitutional:       Appearance: Normal appearance  He is ill-appearing  He is not toxic-appearing  HENT:      Mouth/Throat:      Mouth: Mucous membranes are moist       Pharynx: Oropharynx is clear  Cardiovascular:      Rate and Rhythm: Regular rhythm  Tachycardia present  Pulses: Normal pulses  Heart sounds: Murmur heard  Pulmonary:      Effort: Pulmonary effort is normal  No respiratory distress  Breath sounds: Normal breath sounds  No wheezing  Abdominal:      General: Abdomen is flat  There is no distension  Palpations: There is no mass  Tenderness: There is abdominal tenderness  Musculoskeletal:      Right lower leg: No edema  Left lower leg: No edema  Skin:     Capillary Refill: Capillary refill takes less than 2 seconds  Coloration: Skin is pale  Skin is not jaundiced  Findings: No bruising  Neurological:      General: No focal deficit present             Lab Results:     Lab Results   Component Value Date    CKTOTAL 213 09/29/2014    CKMBINDEX 7 1 (H) 09/29/2014    TROPONINI <0 02 04/06/2020    TROPONINI <0 02 08/08/2018    TROPONINI 1 71 (H) 09/30/2014       Lab Results   Component Value Date    GLUCOSE 161 (H) 07/16/2015    CALCIUM 7 5 (L) 03/03/2023     07/16/2015    K 3 9 03/03/2023    CO2 24 03/03/2023     (H) 03/03/2023    BUN 25 03/03/2023    CREATININE 1 06 03/03/2023       Lab Results   Component Value Date    WBC 8 83 03/03/2023    HGB 10 6 (L) 03/03/2023    HCT 30 2 (L) 03/03/2023    MCV 95 03/03/2023     03/03/2023       Lab Results   Component Value Date    CHOL 139 07/16/2015    CHOL 178 02/12/2015    CHOL 155 09/29/2014     Lab Results   Component Value Date    HDL 45 01/16/2023    HDL 36 (L) 09/13/2021    HDL 29 (L) 02/21/2020     Lab Results   Component Value Date    LDLCALC 117 (H) 01/16/2023    LDLCALC 66 09/13/2021    LDLCALC 63 02/21/2020 Lab Results   Component Value Date    TRIG 206 (H) 01/16/2023    TRIG 190 (H) 09/13/2021    TRIG 149 02/21/2020       Lab Results   Component Value Date    ALT 22 03/02/2023    AST 15 03/02/2023       Results from last 7 days   Lab Units 03/02/23  1132   INR  1 03         Imaging: I have personally reviewed pertinent reports        EKG: NSR, no acute ST changes

## 2023-03-03 NOTE — PROGRESS NOTES
Cardiac Rehabilitation Plan of Care   60 Day Reassessment      Today's date: 3/3/2023   # of Exercise Sessions Completed:   Patient name: Paulett Jeans      : 1945  Age: 69 y o  MRN: [id number]  Referring Physician: James Baltazar MD  Cardiologist: James Acevedo MD  Provider: Yury Cazares  Clinician: Humble Ruiz MS    Dx: Stent LAD  Date of onset: 23      SUMMARY OF PROGRESS:  Patient has attended  sessions of cardiac rehab  Patient is completing about 35 minutes of aerobic exercise and has started light strength training  Resting /68, HR 80 bpm  Exercise /64, HR  bpm  Telemetry reads NSR  Patient has not been doing any structured formal exercise at home  Patient was encouraged to walk around the house during commercials  Patient walks with a walker  Patient has been admitted into the hospital as of yesterday 3/2/23  Patient had a GI bleed and will be put on hold until told otherwise  Patient's wife contacted rehab to state she will keep us updated  Patient has not been checking his sugar at home and was encouraged to do so before he attends rehab to be sure he is maintaining appropriate levels  Patient is very pleasant at rehab, but states he is tired  No other complaints         Medication compliance: Yes   Comments: Pt reports to be compliant with medications  Fall Risk: Moderate   Comments: Patient uses walking assist device (walker/cane/rollator) and shows imbalance at times     EKG Interpretation: NSR      EXERCISE ASSESSMENT and PLAN    Exercise Prescription:      Frequency: 3 days/week   Supplement with home exercise 2+ days/wk as tolerated       Minutes: 35-40         METS: 22 0-2 57         HR:  bpm   RPE: 4-6         Modalities: UBE, NuStep and Room walking      30 Day Goals for Exercise Progression:    Frequency: 3 days/week of cardiac rehab       Supplement with home exercise 2+ days/wk as tolerated    Minutes: 45-50                              >150 mins/wk of moderate intensity exercise   METS: 3 0-3 5   HR: 20-30 beats above RHR   RPE: 4-6   Modalities: UBE, NuStep, Recumbent bike and Room walking    Strength trainin-3 days / week   Modalities: Leg Press, Chest Press, Pull Downs and free weights    Home Exercise: none    Goals: 10% improvement in functional capacity - based on max METs achieved in fitness assessment, Resume ADLs with increased strength, Attend Rehab regularly, Decrease sitting time and Start a walking program    Progression Toward Goals:  Reviewed Pt goals and determined plan of care, Patient will initiate a walking program at home for at least 15 miuntes daily  in the next 30 days    Education: benefit of exercise for CAD risk factors, home exercise guidelines, AHA guidelines to achieve >150 mins/wk of moderate exercise and RPE scale   Plan:education on home exercise guidelines, home exercise 30+ mins 2 days opposite CR and Education class: Risk Factors for Heart Disease  Readiness to change: Action:  (Changing behavior)      NUTRITION ASSESSMENT AND PLAN    Weight control:    Starting weight: 222   Current weight:   209       Diabetes: T2D  A1c: 7 6    last measured: 23    Lipid management: Last lipid profile 21  Chol 140    HDL 36  LDL 66    Goals:LDL <100, HDL >40, TRG <150, CHOL <200, eliminate processed meats, cook without added fat or use vegetable oil/spray, eat 3 or more servings of whole grains a day, Eat 4-5 cups of fruits and vegetables daily, choose low sodium processed foods, eliminate butter and choose healthy snacks: light popcorn, plain pretzels    Measurable goals were based Rate Your Plate Dietary Self-Assessment  These are the areas in which the patient could score higher on the assessment  Goals include recommendations for a heart healthy diet based on American Heart Association      Progression Toward Goals: Reviewed Pt goals and determined plan of care, Patient will make dietary changes to see improvement in future lipid panel  in the next 30 days    Education: heart healthy eating  low sodium diet  exercise and diabetes management   wt  loss   healthy choices while dining out  portion control  Plan: Education class: Reading Food Labels, Education Class: Heart Healthy Eating, replace butter with soft spreads made with olive oil, canola or yogurt, replace refined grain bread with whole grain bread, replace unhealthy snacks with fruits & vegs, reduce portion sizes, eat fewer desserts and sweets and avoid processed foods  Readiness to change: Action:  (Changing behavior)      PSYCHOSOCIAL ASSESSMENT AND PLAN    Emotional:  Depression assessment:  PHQ-9 = 10  10-14 = Moderate Depression            Anxiety measure:  ANGEL-7 = 8  5-9 = Mild anxiety  Self-reported stress level:  4  Social support: Very Good    Goals:  Reduce perceived stress to 1-3/10, Physical Fitness in [place] Score < 3, Social Support in [place] Score < 3, Daily Activity in [place] Score < 3, Increased interest in doing things, improved positive thoughts of well being and increased energy    Progression Toward Goals: Reviewed Pt goals and determined plan of care, Patient will ask for help when needed, do activities to help him relax in the next 30 days    Education: signs/sxs of depression, benefits of a positive support system, class:  Stress and Your Health  and class:  Relaxation  Plan: Exercise, Spend time outdoors, Enjoy a hobby, Keep a positive mindset, Meet new people and Enjoy family  Readiness to change: Action:  (Changing behavior)      OTHER CORE COMPONENTS     Tobacco:   Social History     Tobacco Use   Smoking Status Never   Smokeless Tobacco Never       Tobacco Use Intervention:   N/A:  Patient is a non-smoker     Anginal Symptoms:  chest pressure   NTG use: Compliant with carrying NTG, Understands proper use and Pt has not used NTG since event    Blood pressure:    Restin/68   Exercise: 122/64   Recovery: 112/62    Goals: consistent BP < 130/80, reduced dietary sodium <2300mg, moderate intensity exercise >150 mins/wk and medication compliance    Progression Toward Goals: Reviewed Pt goals and determined plan of care, Patient will monitor glucose levels at home to maintain appropriate levels  in the next 30 days    Education:  low sodium diet and HTN, proper use of sublingual NTG, Education class:  Common Heart Medications and Education class: Understanding Heart Disease  Plan: avoid places with second hand smoke, Avoid Processed foods, engage in regular exercise, eliminate salt shaker at the table and use salt substitutes  Readiness to change: Action:  (Changing behavior)

## 2023-03-03 NOTE — ANESTHESIA POSTPROCEDURE EVALUATION
Post-Op Assessment Note    CV Status:  Stable    Pain management: adequate     Mental Status:  Awake   Hydration Status:  Stable   PONV Controlled:  Controlled   Airway Patency:  Patent      Post Op Vitals Reviewed: Yes      Staff: Anesthesiologist         No notable events documented      /69 (03/03/23 1618)    Temp      Pulse 76 (03/03/23 1618)   Resp 15 (03/03/23 1618)    SpO2 99 % (03/03/23 1618)

## 2023-03-03 NOTE — PROGRESS NOTES
Progress Note- Ning Elizondo 69 y o  male MRN: [id number]    Unit/Bed#: ICU 10 Encounter: [id number]      Assessment and Plan:    81-year-old male with past medical history including but not limited to CAD s/p PCI with SAM most recently in 12/2022 currently on ticagrelor and aspirin, aortic stenosis, CKD, noncritical SMA stenosis who is currently admitted with hematochezia, melanotic bowel movements  Underwent EGD 3/2/2023 which was unrevealing for the etiology of the bleeding  1   Melena  2  Acute anemia  Patient's hemoglobin trend has been fluctuating however continues to drop hemoglobin from 14-10 this morning  He had received 4 units PRBC yesterday from 9-14 however dropping again this morning  2 melanotic bowel movements witnessed after initiation of prep  EGD yesterday was unrevealing for the etiology of the bleeding  --Continue prep, plan for colonoscopy later today    3  Diaphoresis  Patient reported to be diaphoretic with soft blood pressure and tachycardia, will recommend close monitoring of hemoglobin, vitals  Transfuse as needed for target hemoglobin of 7-8  Will recommend cardiology consultation given his significant history and their assistance with management of DAPT in setting of suspected GI bleeding and CAD s/p PCI done in December 2022  Further recommendations to follow after the colonoscopy   ______________________________________________________________________    Subjective:     Patient seen and examined at bedside  Patient reports feeling well overall, he has drank half of his bowel prep, had 1 melanotic bowel movement prior to my encounter  Denies any worsening abdominal pain, nausea, vomiting      Medication Administration - last 24 hours from 03/02/2023 1310 to 03/03/2023 1310       Date/Time Order Dose Route Action Action by     03/03/2023 0819 EST chlorhexidine (PERIDEX) 0 12 % oral rinse 15 mL 15 mL Mouth/Throat Given Virgil Schlatter, RN     03/02/2023 2126 EST chlorhexidine (PERIDEX) 0 12 % oral rinse 15 mL 15 mL Mouth/Throat Given Luis Manuel Arreola, JAVED     03/02/2023 1332 EST pantoprazole (PROTONIX) injection 40 mg 40 mg Intravenous Given Hesham Medico     03/02/2023 1558 EST pantoprazole (PROTONIX) 80 mg in sodium chloride 0 9 % 100 mL infusion 8 mg/hr Intravenous Not Given Krunal Merlos RN     03/02/2023 1352 EST lactated ringers infusion 0 mL/hr Intravenous Stopped Krunal Merlos RN     03/02/2023 1333 EST lactated ringers infusion 125 mL/hr Intravenous New Bag Hesham Medico     03/02/2023 1837 EST magnesium sulfate 2 g/50 mL IVPB (premix) 2 g 0 g Intravenous Stopped Hesham Medico     03/02/2023 1634 EST magnesium sulfate 2 g/50 mL IVPB (premix) 2 g 2 g Intravenous New Bag Hesham Medico     03/02/2023 2336 EST multi-electrolyte (PLASMALYTE-A/ISOLYTE-S PH 7 4) IV solution 125 mL/hr Intravenous [place]  [address] JAVED Beltran     03/02/2023 1512 EST multi-electrolyte (PLASMALYTE-A/ISOLYTE-S PH 7 4) IV solution -- Intravenous Continue from [facility] 38, CRNA     03/02/2023 1352 EST multi-electrolyte (PLASMALYTE-A/ISOLYTE-S PH 7 4) IV solution 125 mL/hr Intravenous [address] Krunal Merlos RN     03/03/2023 0820 EST metroNIDAZOLE (FLAGYL) IVPB (premix) 500 mg 100 mL 500 mg Intravenous [address] Rosenthal, JAVED     03/03/2023 3579 EST metroNIDAZOLE (FLAGYL) IVPB (premix) 500 mg 100 mL 500 mg Intravenous [facility] [address] [facility]     03/02/2023 1634 EST metroNIDAZOLE (FLAGYL) IVPB (premix) 500 mg 100 mL 500 mg Intravenous New Bag Hesham Medico     03/03/2023 0532 EST cefTRIAXone (ROCEPHIN) IVPB (premix in dextrose) 1,000 mg 50 mL 1,000 mg Intravenous [facility] [address] JAVED Beltran     03/03/2023 0820 EST pantoprazole (PROTONIX) EC tablet 40 mg 40 mg Oral Given Rosanne Rosenthal, RN     03/02/2023 1659 EST polyethylene glycol (GOLYTELY) bowel prep 4,000 mL 4,000 mL Oral Given Hesham Medico     03/02/2023 1705 EST bisacodyl (DULCOLAX) EC tablet 10 mg 10 mg Oral Given [facility]     03/03/2023 0792 EST aspirin (ECOTRIN LOW STRENGTH) EC tablet 81 mg 81 mg Oral Given Rosanne Rosenthal, RN     03/03/2023 0818 EST aspirin (ECOTRIN LOW STRENGTH) EC tablet 81 mg 0 mg Oral Hold Rosanne Rosenthal, RN     03/02/2023 1634 EST aspirin (ECOTRIN LOW STRENGTH) EC tablet 81 mg 81 mg Oral Given [facility]     03/02/2023 2101 EST bisacodyl (DULCOLAX) EC tablet 10 mg 10 mg Oral Given Marielena Espinosa, RN     03/03/2023 1469 EST allopurinol (ZYLOPRIM) tablet 100 mg 100 mg Oral Given Virgil Schlatter, JAVED     03/03/2023 0820 EST escitalopram (LEXAPRO) tablet 10 mg 10 mg Oral Given Virgil Schlatter, RN     03/03/2023 0532 EST levothyroxine tablet 88 mcg 88 mcg Oral Given Marielena Espinosa, RN     03/03/2023 8842 EST multivitamin stress formula tablet 1 tablet 1 tablet Oral Given Rosanne Rosenthal, RN     03/03/2023 0825 EST ticagrelor (BRILINTA) tablet 90 mg 90 mg Oral Given Rosanne Rosenthal, RN     03/03/2023 2723 EST ticagrelor (BRILINTA) tablet 90 mg 0 mg Oral Hold Rosanne Rosenthal, RN     03/02/2023 2126 EST ticagrelor (BRILINTA) tablet 90 mg 90 mg Oral Given Marielena Espinosa, JAVED     03/03/2023 0122 EST zonisamide (ZONEGRAN) capsule 50 mg 50 mg Oral Given Rosanne Rosenthal, RN     03/02/2023 1651 EST atorvastatin (LIPITOR) tablet 80 mg 80 mg Oral Given [facility]     03/03/2023 1224 EST insulin lispro (HumaLOG) 100 units/mL subcutaneous injection 2-12 Units 4 Units Subcutaneous Given Virgil Schlatter, RN     03/03/2023 0545 EST insulin lispro (HumaLOG) 100 units/mL subcutaneous injection 2-12 Units 2 Units Subcutaneous Not Given Marielena Espinosa, JAVED     03/02/2023 2338 EST insulin lispro (HumaLOG) 100 units/mL subcutaneous injection 2-12 Units 4 Units Subcutaneous Given Marielena Espinosa RN     03/02/2023 1736 EST insulin lispro (HumaLOG) 100 units/mL subcutaneous injection 2-12 Units 2 Units Subcutaneous Not Given [facility]     03/02/2023 2126 EST acetaminophen (TYLENOL) tablet 650 mg 650 mg Oral Given Marielena Espinosa RN 03/03/2023 1234 EST multi-electrolyte (ISOLYTE-S PH 7 4) bolus 500 mL 500 mL Intravenous New Bag Rosanne Rosenthal RN          Objective:     Vitals: Blood pressure 98/56, pulse (!) 116, temperature 97 6 °F (36 4 °C), temperature source Oral, resp  rate (!) 31, weight 101 kg (222 lb 7 1 oz), SpO2 99 %  ,Body mass index is 33 82 kg/m²        Intake/Output Summary (Last 24 hours) at 3/3/2023 1310  Last data filed at 3/3/2023 1201  Gross per 24 hour   Intake 3059 75 ml   Output 1645 ml   Net 1414 75 ml       Physical Exam:   General Appearance: Awake and alert, in no acute distress  Abdomen: Soft, non-tender, non-distended; bowel sounds normal; no masses or no organomegaly    Invasive Devices     Peripheral Intravenous Line  Duration           Peripheral IV 03/02/23 Left Antecubital 1 day    Peripheral IV 03/02/23 Right Antecubital 1 day                Lab Results:  Admission on 03/02/2023   Component Date Value   • Sodium 03/02/2023 138    • Potassium 03/02/2023 5 9 (H)    • Chloride 03/02/2023 110 (H)    • CO2 03/02/2023 21    • ANION GAP 03/02/2023 7    • BUN 03/02/2023 31 (H)    • Creatinine 03/02/2023 1 38 (H)    • Glucose 03/02/2023 152 (H)    • Calcium 03/02/2023 8 4    • Corrected Calcium 03/02/2023 9 0    • AST 03/02/2023 15    • ALT 03/02/2023 22    • Alkaline Phosphatase 03/02/2023 58    • Total Protein 03/02/2023 5 8 (L)    • Albumin 03/02/2023 3 3 (L)    • Total Bilirubin 03/02/2023 0 88    • eGFR 03/02/2023 49    • WBC 03/02/2023 9 24    • RBC 03/02/2023 4 58    • Hemoglobin 03/02/2023 14 4    • Hematocrit 03/02/2023 43 7    • MCV 03/02/2023 95    • MCH 03/02/2023 31 4    • MCHC 03/02/2023 33 0    • RDW 03/02/2023 15 8 (H)    • MPV 03/02/2023 9 6    • Platelets 11/57/3712 203    • nRBC 03/02/2023 0    • Neutrophils Relative 03/02/2023 68    • Immat GRANS % 03/02/2023 1    • Lymphocytes Relative 03/02/2023 21    • Monocytes Relative 03/02/2023 9    • Eosinophils Relative 03/02/2023 1    • Basophils Relative 03/02/2023 0    • Neutrophils Absolute 03/02/2023 6 27    • Immature Grans Absolute 03/02/2023 0 08    • Lymphocytes Absolute 03/02/2023 1 92    • Monocytes Absolute 03/02/2023 0 86    • Eosinophils Absolute 03/02/2023 0 07    • Basophils Absolute 03/02/2023 0 04    • Magnesium 03/02/2023 1 8 (L)    • Phosphorus 03/02/2023 3 4    • LACTIC ACID 03/02/2023 1 5    • Calcium, Ionized 03/02/2023 1 15    • pH, Alexis 03/02/2023 7 360    • pCO2, Alexis 03/02/2023 32 9 (L)    • pO2, Alexis 03/02/2023 94 0 (H)    • HCO3, Alexis 03/02/2023 18 2 (L)    • Base Excess, Alexis 03/02/2023 -6 1    • O2 Content, Alexis 03/02/2023 20 1    • O2 HGB, VENOUS 03/02/2023 93 8 (H)    • Nasal Cannula 03/02/2023 2    • PTT 03/02/2023 25    • Protime 03/02/2023 13 5    • INR 03/02/2023 1 03    • Iron Saturation 03/02/2023 50    • TIBC 03/02/2023 246 (L)    • Iron 03/02/2023 124    • Ferritin 03/02/2023 93    • Hemoglobin 03/02/2023 13 0    • HS TnI random 03/02/2023 3 (L)    • Potassium 03/02/2023 4 8    • POC Glucose 03/02/2023 117    • POC Glucose 03/02/2023 227 (H)    • Sodium 03/03/2023 140    • Potassium 03/03/2023 3 9    • Chloride 03/03/2023 110 (H)    • CO2 03/03/2023 24    • ANION GAP 03/03/2023 6    • BUN 03/03/2023 25    • Creatinine 03/03/2023 1 06    • Glucose 03/03/2023 118    • Calcium 03/03/2023 7 5 (L)    • eGFR 03/03/2023 67    • WBC 03/03/2023 8 83    • RBC 03/03/2023 3 18 (L)    • Hemoglobin 03/03/2023 10 3 (L)    • Hematocrit 03/03/2023 30 2 (L)    • MCV 03/03/2023 95    • MCH 03/03/2023 32 4    • MCHC 03/03/2023 34 1    • RDW 03/03/2023 16 5 (H)    • Platelets 27/33/7289 173    • MPV 03/03/2023 9 6    • Magnesium 03/03/2023 2 2    • Phosphorus 03/03/2023 3 5    • POC Glucose 03/03/2023 114    • Hemoglobin 03/03/2023 10 6 (L)        Imaging Studies: I have personally reviewed pertinent imaging studies

## 2023-03-03 NOTE — ASSESSMENT & PLAN NOTE
Lab Results   Component Value Date    HGB 10 3 (L) 03/03/2023    HGB 13 0 03/02/2023    HGB 14 4 03/02/2023    HGB 9 9 (L) 03/02/2023     Patient admitted with lower GI bleed, 2 episodes of bright red blood per rectum  Hemoglobin on admission with a drop of 3 g  S/p 4 units of PRBC  As of this morning noted drop in hemoglobin of 3 g  S/p EGD which ruled out upper GI source of bleeding    Patient in the process of bowel prep for colonoscopy  Monitor hemoglobin

## 2023-03-03 NOTE — QUICK NOTE
Updated patient's wife Cassidy Almaraz over the phone  All the questions and concerns were addressed

## 2023-03-03 NOTE — UTILIZATION REVIEW
Initial Clinical Review    Admission: Date/Time/Statement:   Admission Orders (From admission, onward)     Ordered        03/02/23 1112  Inpatient Admission  Once                      Orders Placed This Encounter   Procedures   • Inpatient Admission     Standing Status:   Standing     Number of Occurrences:   1     Order Specific Question:   Level of Care     Answer:   Level 1 Stepdown [13]     Order Specific Question:   Estimated length of stay     Answer:   More than 2 Midnights     Order Specific Question:   Certification     Answer:   I certify that inpatient services are medically necessary for this patient for a duration of greater than two midnights  See H&P and MD Progress Notes for additional information about the patient's course of treatment  ED Arrival Information     Patient not seen in ED - patient presented to the [facility] ED on 3/2/23  Transferred to the [facility] for high level of care  Initial Presentation: 69 y o  male with PMH of DM, CKD Stage III, CHF, CAD, HTN and HL  presents as a transfer from [address] [place] [address],[address] ED  Patient awoke from sleep early this a m  with one episode of diarrhea which he did not examine  He woke again shortly thereafter with a second episode of diarrhea which he noted to be bloody with clots  His wife then called EMS  In the ED he was found to be significantly hypotensive and was transfused with 4 units PRBCs  His hemoglobin on arrival was 12 3  On a recheck at 7:45 AM, his hemoglobin was 9  9  It appears as if his baseline 1 month ago was 15  He was transferred to the [facility] for higher level of care  PE:  AOx3, abdominal tenderness in RLQ and LLQ      3/2 Inpatient admission for evaluation and treatment of acute lower GI bleed, hypotension, acute diverticulitis and hyperkalemia:  NPO, IVF, Recheck hemoglobin, transfuse for <7 or for hypotension/shock, consult Gastroenterology  IV ceftriaxone an Flagyl   Hold home antihypertensives, recheck [facility]       3/2 Gastroenterology consult: Blood in stool, acute anemia  Patient woke up at 3 am with watery diarrhea which he did not examine for blood  However, he later had another episode of diarrhea, this time filling the commode with dark red liquid with clots  He felt dizzy and lightheaded afterwards  He does mention mild to moderate RLQ and LLQ pain  In [facility] ER, patient had another episode of red blood filling a bed pan with associated hypotension to 80s/60s  Patient's history is most consistent with a diverticular bleed (which may have already resolved)  It is somewhat unusual to see this simultaneously with acute diverticulitis which was also found on CT  However, given that patient is on DAPT with hemodynamic instability and elevated BUN,  will start with EGD to rule out a brisk upper GI bleed from PUD  If this is negative, we can prep for possible colonoscopy tomorrow  It must be said that colonoscopy is not ideal given that patient has active diverticulitis which increases risk for perforation as well as recent cardiac stenting on DAPT,can discuss this further after EGD  Also of note, concurrent sepsis should be considered as a cause for leukocytosis, hypotension, and lactic acidosis, and would continue IV antibiotics for treatment of the acute diverticulitis  3/3 Critical Care:  No overnight events  No further bleeding  EGD done yesterday was negative  Plan for colonoscopy  Lower abdominal tenderness with CT findings consistent with diverticulitis, continue ceftriaxone and metronidazole  Hemoglobin 10  Hyperkalemia improved  NPO, continue IVF  Gastroenterology:  Patient continues to have episodes of dark red stool after drinking bowel prep for colonoscopy  He has had episodes of hypotension, tachycardia, and diaphoresis  Hgb 10 6 (from 13 yesterday)    Patient probably has a diverticular bleed although it is somewhat unusual to see this simultaneously with acute diverticulitis which was also found on CT  EGD already ruled out a brisk upper GI bleed  Will plan for colonoscopy today, as patient completed most of his bowel prep  He and his family understand the higher risk of perforation in the setting of acute diverticulitis and increased cardiovascular risk given that patient just had cardiac stenting in 12/2022 for unstable angina  It would be prudent for cardiology to be consulted given recent stenting to comment on management of DAPT so close to placement of SAM as well as to ensure there is no other concern related to proceeding with colonoscopy today  Continue IV antibiotics for treatment of the acute diverticulitis  Cardiology consult:  Patient with history of CAD with angina  s/p PCI on 12/29/22  Patient currently on DAPT with ASA 81 mg daily and Ticagrelor 90 mg q12   Given that patient is approaching 3 months post-PCI, it is reasonable to hold DAPT in setting of active bleed until hemostasis has been adequately achieved  Would prefer single anti-platelet with ASA if possible for now  Hypertension, home regimen includes Losartan 25 mg Daily, propanolol 60 mg daily and torsemide 10 mg daily Agree with holding home regimen for now in setting hypotension likely secondary to GIB  Monitor blood pressure        Admission Vitals   Temperature Pulse Respirations Blood Pressure SpO2   03/02/23 1102 03/02/23 1102 03/02/23 1102 03/02/23 1102 03/02/23 1102   97 8 °F (36 6 °C) 82 12 117/68 97 %      Temp Source Heart Rate Source Patient Position - Orthostatic VS BP Location FiO2 (%)   03/02/23 1413 03/02/23 1413 03/03/23 0800 03/03/23 0800 --   Temporal Monitor Lying Right arm       Pain Score       03/02/23 1102       No Pain          Wt Readings from Last 1 Encounters:   03/03/23 101 kg (222 lb 7 1 oz)     Additional Vital Signs:     Date/Time Temp Pulse Resp BP MAP (mmHg) SpO2 Calculated FIO2 (%) - Nasal Cannula Nasal Cannula O2 Flow Rate (L/min) O2 Device   03/03/23 1326 98 6 °F (37 °C) 81 16 107/55 -- 98 % -- -- None (Room air)   03/03/23 1200 97 6 °F (36 4 °C) 116 31  98/56 73 99 % -- -- --   03/03/23 1100 -- 82 14 115/62 81 96 % -- -- --   03/03/23 1000 -- 86 17 121/68 83 95 % -- -- --   03/03/23 0900 -- 88 18 98/70 80 97 % -- -- --   03/03/23 0800 97 7 °F (36 5 °C) 84 14 100/66 77 98 % -- -- None (Room air)   03/03/23 0700 -- 82 16 105/60 73 98 % -- -- --   03/03/23 0600 -- 84 15 106/56 71 98 % -- -- --   03/03/23 0400 98 1 °F (36 7 °C) 88 16 107/62 80 97 % 28 2 L/min Nasal cannula   03/03/23 0300 -- 92 15 97/65 76 98 % -- -- --   03/03/23 0200 -- 90 16 113/73 84 98 % -- -- --   03/03/23 0100 -- 94 15 101/62 73 98 % -- -- --   03/02/23 2300 98 5 °F (36 9 °C) 100 17 107/63 76 98 % -- -- Nasal cannula   03/02/23 2200 -- 100 18 95/50 60 Abnormal  97 % -- -- --   03/02/23 2107 -- -- -- 86/52 Abnormal  58 Abnormal  -- -- -- --   03/02/23 2000 98 °F (36 7 °C) 88 16 108/72 83 94 % -- -- None (Room air)   03/02/23 1725 -- 88 15 99/69 81 97 % -- -- --   03/02/23 1558 -- 96 16 117/98 -- 94 % -- -- None (Room air)   03/02/23 1543 -- 96 16 93/53 -- 94 % -- -- None (Room air)   03/02/23 1413 98 6 °F (37 °C) 83 18 106/57 -- 93 % -- -- None (Room air)       Pertinent Labs/Diagnostic Test Results:     3/2 EGD:    IMPRESSION:  Irregular Z-line  Minimal quantity of food seen in the stomach, and duodenum  Otherwise unremarkable      RECOMMENDATION:    Transition to protonix 40 daily PO (for ulcer prophylaxis given advanced age and need for DAPT)  Continue serial H&H, monitor stool output  Monitor vitals closely  Plan for colonoscopy tomorrow, we will start bowel prep today  Resume clear liquid diet  NPO at midnight  Can continue DAPT given recent cardiac stenting        3/2 ED CT high volume lower GI bleed:  1  No CT evidence of active high volume gastrointestinal hemorrhage    2   Uncomplicated acute diverticulitis of the descending colon   3   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  4   Hepatic steatosis  3/2 ED CXR: Limited evaluation due to low lung volumes, but no active pulmonary disease identified      3/2 ED EKG:    Patient location:  ED   Previous ECG:     Previous ECG:  Compared to current     Similarity:  No change   Interpretation:     Interpretation: abnormal     Rate:     ECG rate:  93     ECG rate assessment: normal     Rhythm:     Rhythm: sinus rhythm     Ectopy:     Ectopy: none     QRS:     QRS axis:  Left     QRS intervals:  Normal   Conduction:     Conduction: normal     ST segments:     ST segments:  Non-specific   T waves:     T waves: normal         Results from last 7 days   Lab Units 03/03/23  1225 03/03/23  0545 03/02/23  1716 03/02/23  1132 03/02/23  0745 03/02/23  0531   WBC Thousand/uL  --  8 83  --  9 24  --  17 11*   HEMOGLOBIN g/dL 10 6* 10 3* 13 0 14 4 9 9* 12 3   HEMATOCRIT %  --  30 2*  --  43 7 29 7* 36 7   PLATELETS Thousands/uL  --  173  --  203  --  329   NEUTROS ABS Thousands/µL  --   --   --  6 27  --  12 21*       Blood Product Administration History      Date Status   Transfuse Leukoreduced RBC 03/02/2023 Completed 03/02/23 0956    03/02/2023 Completed 03/02/23 0938   Transfuse Leukoreduced RBC 03/02/2023 Completed 03/02/23 0914    03/02/2023 Completed 03/02/23 0903           Results from last 7 days   Lab Units 03/03/23  0545 03/02/23  1734 03/02/23  1132 03/02/23  0531   SODIUM mmol/L 140  --  138 136   POTASSIUM mmol/L 3 9 4 8 5 9* 5 9*   CHLORIDE mmol/L 110*  --  110* 103   CO2 mmol/L 24  --  21 23   ANION GAP mmol/L 6  --  7 10   BUN mg/dL 25  --  31* 28*   CREATININE mg/dL 1 06  --  1 38* 1 53*   EGFR ml/min/1 73sq m 67  --  49 43   CALCIUM mg/dL 7 5*  --  8 4 9 0   CALCIUM, IONIZED mmol/L  --   --  1 15  --    MAGNESIUM mg/dL 2 2  --  1 8*  --    PHOSPHORUS mg/dL 3 5  --  3 4  --      Results from last 7 days   Lab Units 03/02/23  1132 03/02/23  0531   AST U/L 15 15   ALT U/L 22 25   ALK PHOS U/L 58 67   TOTAL PROTEIN g/dL 5 8* 6 2*   ALBUMIN g/dL 3 3* 3 7   TOTAL BILIRUBIN mg/dL 0 88 0 59     Results from last 7 days   Lab Units 03/03/23  1204 03/03/23  0542 03/02/23  2338 03/02/23  1735 03/02/23  0719   POC GLUCOSE mg/dl 211* 114 227* 117 181*     Results from last 7 days   Lab Units 03/03/23  0545 03/02/23  1132 03/02/23  0531   GLUCOSE RANDOM mg/dL 118 152* 271*           Results from last 7 days   Lab Units 03/02/23  1132   PH ZEB  7 360   PCO2 ZEB mm Hg 32 9*   PO2 ZEB mm Hg 94 0*   HCO3 ZEB mmol/L 18 2*   BASE EXC ZEB mmol/L -6 1   O2 CONTENT ZEB ml/dL 20 1   O2 HGB, VENOUS % 93 8*             Results from last 7 days   Lab Units 03/02/23  0531   HS TNI 0HR ng/L 3         Results from last 7 days   Lab Units 03/02/23  1132   PROTIME seconds 13 5   INR  1 03   PTT seconds 25             Results from last 7 days   Lab Units 03/02/23  1132 03/02/23  0745 03/02/23  0531   LACTIC ACID mmol/L 1 5 2 6* 3 8*                 Results from last 7 days   Lab Units 03/02/23  1132   FERRITIN ng/mL 93         Results from last 7 days   Lab Units 03/02/23  0531   LIPASE u/L 213*           ED Treatment:   Medication Administration - No Administrations Displayed (No Start Event Found)     None        Past Medical History:   Diagnosis Date   • BPH (benign prostatic hyperplasia)    • CAD (coronary artery disease)    • Cancer (HCC)     basal cell   • Chronic kidney disease     stage 3   • Colon polyp    • CPAP (continuous positive airway pressure) dependence    • Diabetes mellitus (HCC)     niddm   • Disease of thyroid gland    • Gout    • High cholesterol    • Hypertension    • MI, 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    • Myocardial infarction ([facility] ) 2014   • S/P cardiac catheterization 12/30/2022   • Sleep apnea      Present on Admission:  • Acute lower GI bleeding  • Stage 3a chronic kidney disease ([facility] )  • Recurrent [facility] )  • Primary osteoarthritis involving multiple joints  • Hypertension  • Hyperlipidemia  • Coronary artery disease of native artery of native heart with stable angina pectoris (HCC)  • Chronic diastolic (congestive) heart failure (HCC)  • Diabetes mellitus, type 2 (HCC)  • Acute diverticulitis  • Hypotension      Admitting Diagnosis: GI bleed [K92 2]  Age/Sex: 69 y o  male       Admission Orders: NPO, SCD  Scheduled Medications:      allopurinol, 100 mg, Oral, Daily  aspirin, 81 mg, Oral, Daily  atorvastatin, 80 mg, Oral, Daily With Dinner  cefTRIAXone, 1,000 mg, Intravenous, Q24H  escitalopram, 10 mg, Oral, Daily  insulin lispro, 2-12 Units, Subcutaneous, Q6H RONNIE  levothyroxine, 88 mcg, Oral, Early Morning  metroNIDAZOLE, 500 mg, Intravenous, Q8H  multivitamin stress formula, 1 tablet, Oral, Daily  pantoprazole, 40 mg, Oral, Daily Before Breakfast  polyethylene glycol, 4,000 mL, Oral, See Admin Instructions  ticagrelor, 90 mg, Oral, Q12H RONNIE  zonisamide, 50 mg, Oral, Daily      Continuous IV Infusions:      multi-electrolyte, 125 mL/hr, Intravenous, Continuous      PRN Meds:  acetaminophen, 650 mg, Oral, Q6H PRN        IP CONSULT TO CASE MANAGEMENT  IP CONSULT TO GASTROENTEROLOGY  IP CONSULT TO CARDIOLOGY         Network Utilization Review     Department  ATTENTION: Please call with any questions or concerns to [phone] and carefully listen to the prompts so that you are directed to the right person  All voicemails are confidential   Amanda Alejo all requests for admission clinical reviews, approved or denied determinations and any other requests to dedicated fax number below belonging to the campus where the patient is receiving treatment   List of dedicated fax numbers for the Facilities:  [address] DENIALS (Administrative/Medical Necessity) [phone]   [address] [place] [address] (Maternity/NICU/Pediatrics) Khadijah Herrera [address] Emelyn Taylor  [address] [place] [address] [facility] [address] [facility] [place] [address] 815 [facility] [phone]

## 2023-03-03 NOTE — ANESTHESIA PREPROCEDURE EVALUATION
Procedure:  COLONOSCOPY    Relevant Problems   CARDIO   (+) Aortic stenosis, moderate   (+) Coronary artery disease of native artery of native heart with stable angina pectoris (HCC)   (+) Hyperlipidemia   (+) Hypertension   (+) NSTEMI (non-ST elevated myocardial infarction) (HCC)   (+) Other chest pain      ENDO   (+) Diabetes mellitus, type 2 (HCC)      GI/HEPATIC   (+) GI bleed      /RENAL   (+) Benign hypertension with CKD (chronic kidney disease) stage III (HCC)   (+) Diabetic nephropathy associated with type 2 diabetes mellitus (HCC)   (+) Stage 3a chronic kidney disease (HCC)      MUSCULOSKELETAL   (+) Arthritis   (+) Degenerative disc disease, lumbar   (+) Gout   (+) Primary osteoarthritis involving multiple joints   (+) Primary osteoarthritis of both knees   (+) Primary osteoarthritis of left knee   (+) Primary osteoarthritis of right knee      NEURO/PSYCH   (+) Depression with anxiety   (+) Diabetic neuropathy associated with diabetes mellitus due to underlying condition (HCC)   (+) Recurrent major depressive disorder (HCC)        Physical Exam    Airway    Mallampati score: II  TM Distance: >3 FB  Neck ROM: full     Dental       Cardiovascular  Cardiovascular exam normal    Pulmonary  Pulmonary exam normal     Other Findings        Anesthesia Plan  ASA Score- 4 Emergent    Anesthesia Type- general with ASA Monitors  Additional Monitors:   Airway Plan:           Plan Factors-    Chart reviewed  Existing labs reviewed  Patient summary reviewed  Induction- intravenous  Postoperative Plan-     Informed Consent- Anesthetic plan and risks discussed with patient

## 2023-03-04 ENCOUNTER — APPOINTMENT (INPATIENT)
Dept: CT IMAGING | Facility: HOSPITAL | Age: 78
End: 2023-03-04

## 2023-03-04 LAB
2HR DELTA HS TROPONIN: 15 NG/L
4HR DELTA HS TROPONIN: 32 NG/L
ABO GROUP BLD: NORMAL
ANION GAP SERPL CALCULATED.3IONS-SCNC: 7 MMOL/L (ref 4–13)
APTT PPP: 30 SECONDS (ref 23–37)
BASOPHILS # BLD AUTO: 0.04 THOUSANDS/ÂΜL (ref 0–0.1)
BASOPHILS NFR BLD AUTO: 1 % (ref 0–1)
BLD GP AB SCN SERPL QL: NEGATIVE
BUN SERPL-MCNC: 12 MG/DL (ref 5–25)
CALCIUM SERPL-MCNC: 7.1 MG/DL (ref 8.4–10.2)
CARDIAC TROPONIN I PNL SERPL HS: 47 NG/L (ref 8–18)
CARDIAC TROPONIN I PNL SERPL HS: 55 NG/L
CARDIAC TROPONIN I PNL SERPL HS: 70 NG/L
CARDIAC TROPONIN I PNL SERPL HS: 87 NG/L
CHLORIDE SERPL-SCNC: 108 MMOL/L (ref 96–108)
CO2 SERPL-SCNC: 24 MMOL/L (ref 21–32)
CREAT SERPL-MCNC: 0.88 MG/DL (ref 0.6–1.3)
EOSINOPHIL # BLD AUTO: 0.48 THOUSAND/ÂΜL (ref 0–0.61)
EOSINOPHIL NFR BLD AUTO: 7 % (ref 0–6)
ERYTHROCYTE [DISTWIDTH] IN BLOOD BY AUTOMATED COUNT: 15.9 % (ref 11.6–15.1)
ERYTHROCYTE [DISTWIDTH] IN BLOOD BY AUTOMATED COUNT: 16 % (ref 11.6–15.1)
GFR SERPL CREATININE-BSD FRML MDRD: 83 ML/MIN/1.73SQ M
GLUCOSE SERPL-MCNC: 117 MG/DL (ref 65–140)
GLUCOSE SERPL-MCNC: 180 MG/DL (ref 65–140)
GLUCOSE SERPL-MCNC: 187 MG/DL (ref 65–140)
GLUCOSE SERPL-MCNC: 214 MG/DL (ref 65–140)
GLUCOSE SERPL-MCNC: 97 MG/DL (ref 65–140)
HCT VFR BLD AUTO: 22.5 % (ref 36.5–49.3)
HCT VFR BLD AUTO: 26.8 % (ref 36.5–49.3)
HCT VFR BLD AUTO: 28.8 % (ref 36.5–49.3)
HGB BLD-MCNC: 7 G/DL (ref 12–17)
HGB BLD-MCNC: 7.5 G/DL (ref 12–17)
HGB BLD-MCNC: 8.9 G/DL (ref 12–17)
HGB BLD-MCNC: 9.4 G/DL (ref 12–17)
IMM GRANULOCYTES # BLD AUTO: 0.07 THOUSAND/UL (ref 0–0.2)
IMM GRANULOCYTES NFR BLD AUTO: 1 % (ref 0–2)
INR PPP: 1.11 (ref 0.84–1.19)
LYMPHOCYTES # BLD AUTO: 1.53 THOUSANDS/ÂΜL (ref 0.6–4.47)
LYMPHOCYTES NFR BLD AUTO: 23 % (ref 14–44)
MCH RBC QN AUTO: 31.9 PG (ref 26.8–34.3)
MCH RBC QN AUTO: 32 PG (ref 26.8–34.3)
MCHC RBC AUTO-ENTMCNC: 32.6 G/DL (ref 31.4–37.4)
MCHC RBC AUTO-ENTMCNC: 33.2 G/DL (ref 31.4–37.4)
MCV RBC AUTO: 96 FL (ref 82–98)
MCV RBC AUTO: 98 FL (ref 82–98)
MONOCYTES # BLD AUTO: 0.65 THOUSAND/ÂΜL (ref 0.17–1.22)
MONOCYTES NFR BLD AUTO: 10 % (ref 4–12)
NEUTROPHILS # BLD AUTO: 3.8 THOUSANDS/ÂΜL (ref 1.85–7.62)
NEUTS SEG NFR BLD AUTO: 58 % (ref 43–75)
NRBC BLD AUTO-RTO: 0 /100 WBCS
PLATELET # BLD AUTO: 186 THOUSANDS/UL (ref 149–390)
PLATELET # BLD AUTO: 248 THOUSANDS/UL (ref 149–390)
PMV BLD AUTO: 9.4 FL (ref 8.9–12.7)
PMV BLD AUTO: 9.5 FL (ref 8.9–12.7)
POTASSIUM SERPL-SCNC: 3.6 MMOL/L (ref 3.5–5.3)
PROTHROMBIN TIME: 14.4 SECONDS (ref 11.6–14.5)
RBC # BLD AUTO: 2.79 MILLION/UL (ref 3.88–5.62)
RBC # BLD AUTO: 2.94 MILLION/UL (ref 3.88–5.62)
RH BLD: POSITIVE
SODIUM SERPL-SCNC: 139 MMOL/L (ref 135–147)
SPECIMEN EXPIRATION DATE: NORMAL
WBC # BLD AUTO: 16.36 THOUSAND/UL (ref 4.31–10.16)
WBC # BLD AUTO: 6.57 THOUSAND/UL (ref 4.31–10.16)

## 2023-03-04 RX ORDER — SODIUM CHLORIDE, SODIUM GLUCONATE, SODIUM ACETATE, POTASSIUM CHLORIDE, MAGNESIUM CHLORIDE, SODIUM PHOSPHATE, DIBASIC, AND POTASSIUM PHOSPHATE .53; .5; .37; .037; .03; .012; .00082 G/100ML; G/100ML; G/100ML; G/100ML; G/100ML; G/100ML; G/100ML
500 INJECTION, SOLUTION INTRAVENOUS ONCE
Status: COMPLETED | OUTPATIENT
Start: 2023-03-04 | End: 2023-03-04

## 2023-03-04 RX ORDER — NITROGLYCERIN 0.4 MG/1
TABLET SUBLINGUAL
Status: COMPLETED
Start: 2023-03-04 | End: 2023-03-04

## 2023-03-04 RX ORDER — NITROGLYCERIN 0.4 MG/1
0.4 TABLET SUBLINGUAL
Status: DISCONTINUED | OUTPATIENT
Start: 2023-03-04 | End: 2023-03-08 | Stop reason: HOSPADM

## 2023-03-04 RX ORDER — INSULIN LISPRO 100 [IU]/ML
2-12 INJECTION, SOLUTION INTRAVENOUS; SUBCUTANEOUS EVERY 6 HOURS SCHEDULED
Status: DISCONTINUED | OUTPATIENT
Start: 2023-03-04 | End: 2023-03-05

## 2023-03-04 RX ORDER — INSULIN LISPRO 100 [IU]/ML
2-12 INJECTION, SOLUTION INTRAVENOUS; SUBCUTANEOUS
Status: DISCONTINUED | OUTPATIENT
Start: 2023-03-04 | End: 2023-03-04

## 2023-03-04 RX ADMIN — CEFTRIAXONE 1000 MG: 1 INJECTION, SOLUTION INTRAVENOUS at 05:55

## 2023-03-04 RX ADMIN — NOREPINEPHRINE BITARTRATE 2 MCG/MIN: 1 INJECTION, SOLUTION, CONCENTRATE INTRAVENOUS at 12:39

## 2023-03-04 RX ADMIN — ALLOPURINOL 100 MG: 100 TABLET ORAL at 08:06

## 2023-03-04 RX ADMIN — ZONISAMIDE 50 MG: 25 CAPSULE ORAL at 08:06

## 2023-03-04 RX ADMIN — NITROGLYCERIN 0.4 MG: 0.4 TABLET SUBLINGUAL at 13:40

## 2023-03-04 RX ADMIN — ESCITALOPRAM OXALATE 10 MG: 10 TABLET ORAL at 08:06

## 2023-03-04 RX ADMIN — PANTOPRAZOLE SODIUM 40 MG: 40 TABLET, DELAYED RELEASE ORAL at 06:26

## 2023-03-04 RX ADMIN — INSULIN LISPRO 4 UNITS: 100 INJECTION, SOLUTION INTRAVENOUS; SUBCUTANEOUS at 18:12

## 2023-03-04 RX ADMIN — INSULIN LISPRO 2 UNITS: 100 INJECTION, SOLUTION INTRAVENOUS; SUBCUTANEOUS at 11:24

## 2023-03-04 RX ADMIN — SODIUM CHLORIDE, SODIUM GLUCONATE, SODIUM ACETATE, POTASSIUM CHLORIDE, MAGNESIUM CHLORIDE, SODIUM PHOSPHATE, DIBASIC, AND POTASSIUM PHOSPHATE 500 ML: .53; .5; .37; .037; .03; .012; .00082 INJECTION, SOLUTION INTRAVENOUS at 12:39

## 2023-03-04 RX ADMIN — LEVOTHYROXINE SODIUM 88 MCG: 100 TABLET ORAL at 06:26

## 2023-03-04 RX ADMIN — METRONIDAZOLE 500 MG: 500 INJECTION, SOLUTION INTRAVENOUS at 08:06

## 2023-03-04 RX ADMIN — ASPIRIN 81 MG: 81 TABLET, COATED ORAL at 08:06

## 2023-03-04 RX ADMIN — METRONIDAZOLE 500 MG: 500 INJECTION, SOLUTION INTRAVENOUS at 16:50

## 2023-03-04 RX ADMIN — ATORVASTATIN CALCIUM 80 MG: 80 TABLET, FILM COATED ORAL at 16:50

## 2023-03-04 RX ADMIN — NITROGLYCERIN 0.4 MG: 0.4 TABLET SUBLINGUAL at 13:56

## 2023-03-04 RX ADMIN — B-COMPLEX W/ C & FOLIC ACID TAB 1 TABLET: TAB at 08:06

## 2023-03-04 RX ADMIN — IOHEXOL 100 ML: 350 INJECTION, SOLUTION INTRAVENOUS at 13:29

## 2023-03-04 NOTE — ASSESSMENT & PLAN NOTE
Blood Pressure: 116/61    · With improvment  · Most likely source is GI bleed  · Hgb  14 4 > 13 0 > 10 3 > 10 6

## 2023-03-04 NOTE — ASSESSMENT & PLAN NOTE
Significantly hypotensive on presentation to [address] [place] [address]  Received 4 units PRBCs at outside hospital  Transferred to higher level of care for GI evaluation and potential need for IR  Presented with hypotension systolic blood pressure in the 80s to 90s, currently improving, no signs of acute bleeding; Most recent hemoglobin is 10  Underwent EGD which ruled out upper GI bleed   Suspect possible diverticular bleed in setting of acute diverticulitis    Colonoscopy 3/3 showed bleeding ulcer - clip x2 placed    Plan:  · We will continue trending hemoglobin daily or more frequent if clinical worsening  · Transfuse as indicated for Hgb less than 7 or for hypotension/shock  · Appreciate GI input

## 2023-03-04 NOTE — PLAN OF CARE
Problem: MOBILITY - ADULT  Goal: Maintain or return to baseline ADL function  Description: INTERVENTIONS:  -  Assess patient's ability to carry out ADLs; assess patient's baseline for ADL function and identify physical deficits which impact ability to perform ADLs (bathing, care of mouth/teeth, toileting, grooming, dressing, etc )  - Assess/evaluate cause of self-care deficits   - Assess range of motion  - Assess patient's mobility; develop plan if impaired  - Assess patient's need for assistive devices and provide as appropriate  - Encourage maximum independence but intervene and supervise when necessary  - Involve family in performance of ADLs  - Assess for home care needs following discharge   - Consider OT consult to assist with ADL evaluation and planning for discharge  - Provide patient education as appropriate  Outcome: Progressing  Goal: Maintains/Returns to pre admission functional level  Description: INTERVENTIONS:  - Perform BMAT or MOVE assessment daily    - Set and communicate daily mobility goal to care team and patient/family/caregiver     - Collaborate with rehabilitation services on mobility goals if consulted  - Out of bed for toileting  - Record patient progress and toleration of activity level   Outcome: Progressing     Problem: PAIN - ADULT  Goal: Verbalizes/displays adequate comfort level or baseline comfort level  Description: Interventions:  - Encourage patient to monitor pain and request assistance  - Assess pain using appropriate pain scale  - Administer analgesics based on type and severity of pain and evaluate response  - Implement non-pharmacological measures as appropriate and evaluate response  - Consider cultural and social influences on pain and pain management  - Notify physician/advanced practitioner if interventions unsuccessful or patient reports new pain  Outcome: Progressing     Problem: INFECTION - ADULT  Goal: Absence or prevention of progression during hospitalization  Description: INTERVENTIONS:  - Assess and monitor for signs and symptoms of infection  - Monitor lab/diagnostic results  - Monitor all insertion sites, i e  indwelling lines, tubes, and drains  - Monitor endotracheal if appropriate and nasal secretions for changes in amount and color  - [place] appropriate cooling/warming therapies per order  - Administer medications as ordered  - Instruct and encourage patient and family to use good hand hygiene technique  - Identify and instruct in appropriate isolation precautions for identified infection/condition  Outcome: Progressing  Goal: Absence of fever/infection during neutropenic period  Description: INTERVENTIONS:  - Monitor WBC    Outcome: Progressing     Problem: SAFETY ADULT  Goal: Maintain or return to baseline ADL function  Description: INTERVENTIONS:  -  Assess patient's ability to carry out ADLs; assess patient's baseline for ADL function and identify physical deficits which impact ability to perform ADLs (bathing, care of mouth/teeth, toileting, grooming, dressing, etc )  - Assess/evaluate cause of self-care deficits   - Assess range of motion  - Assess patient's mobility; develop plan if impaired  - Assess patient's need for assistive devices and provide as appropriate  - Encourage maximum independence but intervene and supervise when necessary  - Involve family in performance of ADLs  - Assess for home care needs following discharge   - Consider OT consult to assist with ADL evaluation and planning for discharge  - Provide patient education as appropriate  Outcome: Progressing  Goal: Maintains/Returns to pre admission functional level  Description: INTERVENTIONS:  - Perform BMAT or MOVE assessment daily    - Set and communicate daily mobility goal to care team and patient/family/caregiver     - Collaborate with rehabilitation services on mobility goals if consulted  - Out of bed for toileting  - Record patient progress and toleration of activity level   Outcome: Progressing  Goal: Patient will remain free of falls  Description: INTERVENTIONS:  - Educate patient/family on patient safety including physical limitations  - Instruct patient to call for assistance with activity   - Consult OT/PT to assist with strengthening/mobility   - Keep Call bell within reach  - Keep bed low and locked with side rails adjusted as appropriate  - Keep care items and personal belongings within reach  - Initiate and maintain comfort rounds  - Make Fall Risk Sign visible to staff  - Apply yellow socks and bracelet for high fall risk patients  - Consider moving patient to room near nurses station  Outcome: Progressing     Problem: DISCHARGE PLANNING  Goal: Discharge to home or other facility with appropriate resources  Description: INTERVENTIONS:  - Identify barriers to discharge w/patient and caregiver  - Arrange for needed discharge resources and transportation as appropriate  - Identify discharge learning needs (meds, wound care, etc )  - Arrange for interpretive services to assist at discharge as needed  - Refer to Case Management Department for coordinating discharge planning if the patient needs post-hospital services based on physician/advanced practitioner order or complex needs related to functional status, cognitive ability, or social support system  Outcome: Progressing     Problem: Knowledge Deficit  Goal: Patient/family/caregiver demonstrates understanding of disease process, treatment plan, medications, and discharge instructions  Description: Complete learning assessment and assess knowledge base  Interventions:  - Provide teaching at level of understanding  - Provide teaching via preferred learning methods  Outcome: Progressing     Problem: Nutrition/Hydration-ADULT  Goal: Nutrient/Hydration intake appropriate for improving, restoring or maintaining nutritional needs  Description: Monitor and assess patient's nutrition/hydration status for malnutrition  Collaborate with interdisciplinary team and initiate plan and interventions as ordered  Monitor patient's weight and dietary intake as ordered or per policy  Utilize nutrition screening tool and intervene as necessary  Determine patient's food preferences and provide high-protein, high-caloric foods as appropriate       INTERVENTIONS:  - Monitor oral intake, urinary output, labs, and treatment plans  - Assess nutrition and hydration status and recommend course of action  - Evaluate amount of meals eaten  - Assist patient with eating if necessary   - Allow adequate time for meals  - Recommend/ encourage appropriate diets, oral nutritional supplements, and vitamin/mineral supplements  - Order, calculate, and assess calorie counts as needed  - Recommend, monitor, and adjust tube feedings and TPN/PPN based on assessed needs  - Assess need for intravenous fluids  - Provide specific nutrition/hydration education as appropriate  - Include patient/family/caregiver in decisions related to nutrition  Outcome: Progressing     Problem: GASTROINTESTINAL - ADULT  Goal: Minimal or absence of nausea and/or vomiting  Description: INTERVENTIONS:  - Administer IV fluids if ordered to ensure adequate hydration  - Maintain NPO status until nausea and vomiting are resolved  - Nasogastric tube if ordered  - Administer ordered antiemetic medications as needed  - Provide nonpharmacologic comfort measures as appropriate  - Advance diet as tolerated, if ordered  - Consider nutrition services referral to assist patient with adequate nutrition and appropriate food choices  Outcome: Progressing  Goal: Maintains or returns to baseline bowel function  Description: INTERVENTIONS:  - Assess bowel function  - Encourage oral fluids to ensure adequate hydration  - Administer IV fluids if ordered to ensure adequate hydration  - Administer ordered medications as needed  - Encourage mobilization and activity  - Consider nutritional services referral to assist patient with adequate nutrition and appropriate food choices  Outcome: Progressing  Goal: Maintains adequate nutritional intake  Description: INTERVENTIONS:  - Monitor percentage of each meal consumed  - Identify factors contributing to decreased intake, treat as appropriate  - Assist with meals as needed  - Monitor I&O, weight, and lab values if indicated  - Obtain nutrition services referral as needed  Outcome: Progressing  Goal: Establish and maintain optimal ostomy function  Description: INTERVENTIONS:  - Assess bowel function  - Encourage oral fluids to ensure adequate hydration  - Administer IV fluids if ordered to ensure adequate hydration   - Administer ordered medications as needed  - Encourage mobilization and activity  - Nutrition services referral to assist patient with appropriate food choices  - Assess stoma site  - Consider wound care consult   Outcome: Progressing  Goal: Oral mucous membranes remain intact  Description: INTERVENTIONS  - Assess oral mucosa and hygiene practices  - Implement preventative oral hygiene regimen  - Implement oral medicated treatments as ordered  - Initiate Nutrition services referral as needed  Outcome: Progressing

## 2023-03-04 NOTE — ASSESSMENT & PLAN NOTE
· Remote stent placement x3  · Most recent PCI Dec 2022  · Troponin within normal limit  · Per GI ok to resume Plavix and aspirin  · Per cards holding DAPT ok but recommend keeping 1 agent with transition back to 2 when no longer bleeding  · Will hold brillinta for now and continue aspirin  · Continue statin  · Hold BB for now given hypotension  · Hold torsemide given hypotension

## 2023-03-04 NOTE — PROGRESS NOTES
Cardiology Progress Note   MD Renita Roberts MD Leonard Reamer, DO, MD Jessica Cavazos DO, Prince Raffi DO, [facility] [place]  ----------------------------------------------------------------  [place] [address]    Jenny Canales 69 y o  male MRN: [id number]  Unit/Bed#: ICU 10 Encounter: [id number]      ASSESSMENT:   • Acute blood loss anemia secondary to GI bleed  • w/ diverticular ulcer in the descending colon s/p 2 hemostatic clips, March 3, 2023  • CAD  ? CP s/p SAM-pLAD and POBA-mLAD w/ residual 60% OM1 distal to OM stent and -mRCA, December 2022  ? STEMI s/p SAM-pRCA, September 2014  ? NSTEMI s/p PCI-OM1, December 2000  • Chronic diastolic heart failure  ? LVEF 65%, mild LVH, mild LA dilatation, moderate AS, trace TR, March 2022  • Moderate aortic stenosis  ? w/ mean PG 18 mmHg and KELECHI 1 2 cm2, March 2022  • Hypotension  • History of hypertension  • Dyslipidemia  • Type 2 diabetes mellitus  • Obesity  • Osteoarthritis     PLAN:  Now that hemostasis has been achieved, would defer to GI as to how soon it is reasonable to restart second antiplatelet with history of drug-eluting stent  Hemoglobin dropped an additional 2 g, but unclear if this is delusional as patient has no further blood per rectum overnight  Can consider transitioning from Brilinta to Plavix for decreased risk of bleeding  Continue aspirin as tolerated so long as the patient does not rebleed and okay from GI perspective  Continue high intensity statin  Treatment of anemia as per primary team/GI    Signed: Jordy Hager DO, GABE, AVERY CONTRERAS      History of Present Illness:  Patient seen and examined  Denies chest pain, pressure, tightness or squeezing  Denies lightheadedness, dizziness or palpitations  Denies lower extremity swelling, orthopnea or paroxysmal nocturnal dyspnea    Patient underwent EGD yesterday with probable bleeding source noted and hemostasis was achieved  Review of Systems:  Review of Systems   Constitutional: Negative for decreased appetite, fever, weight gain and weight loss  HENT: Negative for congestion and sore throat  Eyes: Negative for visual disturbance  Cardiovascular: Negative for chest pain, dyspnea on exertion, leg swelling, near-syncope and palpitations  Respiratory: Negative for cough and shortness of breath  Hematologic/Lymphatic: Negative for bleeding problem  Skin: Negative for rash  Musculoskeletal: Negative for myalgias and neck pain  Gastrointestinal: Negative for abdominal pain and nausea  Neurological: Negative for light-headedness and weakness  Psychiatric/Behavioral: Negative for depression  No Known Allergies    No current facility-administered medications on file prior to encounter  Current Outpatient Medications on File Prior to Encounter   Medication Sig   • allopurinol (ZYLOPRIM) 100 mg tablet Take 1 tablet (100 mg total) by mouth daily   • allopurinol (ZYLOPRIM) 100 mg tablet Take 1 tablet by mouth once daily   • aspirin 81 mg chewable tablet Chew 1 tablet (81 mg total) daily   • atorvastatin (LIPITOR) 80 mg tablet Take 1 tablet (80 mg total) by mouth daily   • benzonatate (TESSALON) 200 MG capsule Take 1 capsule (200 mg total) by mouth 3 (three) times a day as needed for cough (Patient not taking: Reported on 1/12/2023)   • Blood Glucose Monitoring Suppl (OneTouch Verio Reflect) w/Device KIT Check blood sugars twice daily  Please substitute with appropriate alternative as covered by patient's insurance  Dx: E11 65   • Empagliflozin (Jardiance) 10 MG TABS Take 1 tablet (10 mg total) by mouth every morning   • escitalopram (LEXAPRO) 20 mg tablet Take 0 5 tablets (10 mg total) by mouth daily   • glimepiride (AMARYL) 4 mg tablet TAKE 1 TABLET TWICE A DAY   • glucose blood (OneTouch Verio) test strip Check blood sugars twice daily   Please substitute with appropriate alternative as covered by patient's insurance  Dx: E11 65   • levothyroxine 88 mcg tablet TAKE 1 TABLET BY MOUTH ONCE DAILY IN THE MORNING   • levothyroxine 88 mcg tablet Take 1 tablet (88 mcg total) by mouth daily in the early morning   • losartan (COZAAR) 25 mg tablet Take 1 tablet (25 mg total) by mouth daily   • metFORMIN (GLUCOPHAGE) 1000 MG tablet Take 1 tablet (1,000 mg total) by mouth 2 (two) times a day with meals   • multivitamin (THERAGRAN) TABS Take 1 tablet by mouth daily   • nitroglycerin (NITROSTAT) 0 4 mg SL tablet Place 1 tablet (0 4 mg total) under the tongue every 5 (five) minutes as needed for chest pain   • OneTouch Delica Lancets 75H MISC Check blood sugars twice daily  Please substitute with appropriate alternative as covered by patient's insurance   Dx: E11 65   • prednisoLONE acetate (PRED FORTE) 1 % ophthalmic suspension INSTILL 1 DROP INTO RIGHT EYE ONCE DAILY   • propranolol (INDERAL LA) 60 mg 24 hr capsule Take 1 capsule (60 mg total) by mouth daily   • ranolazine (RANEXA) 500 mg 12 hr tablet Take 1 tablet (500 mg total) by mouth 2 (two) times a day   • ticagrelor (BRILINTA) 90 MG Take 1 tablet (90 mg total) by mouth every 12 (twelve) hours   • torsemide (DEMADEX) 10 mg tablet Take 1 tablet (10 mg total) by mouth daily (Patient not taking: Reported on 1/31/2023)   • zonisamide (ZONEGRAN) 50 MG capsule Take 1 tablet (50 mg total) by mouth daily   • [DISCONTINUED] topiramate (Topamax) 25 mg tablet Take 1 tablet (25 mg total) by mouth daily X 1 week then discontinue        Current Facility-Administered Medications   Medication Dose Route Frequency Provider Last Rate   • acetaminophen  650 mg Oral Q6H PRN FAY Saavedra     • allopurinol  100 mg Oral Daily Na Parks PA-C     • aspirin  81 mg Oral Daily Na Parks PA-C     • atorvastatin  80 mg Oral Daily With ULURU E ALEXANDRIA Parks     • cefTRIAXone  1,000 mg Intravenous Q24H Na Parks PA-C 1,000 mg (03/03/23 0532) • escitalopram  10 mg Oral Daily Nilesh Parks PA-C     • insulin lispro  2-12 Units Subcutaneous Q6H [address] Yue Penny PA-C     • levothyroxine  88 mcg Oral Early Morning Nilesh Parks PA-C     • metroNIDAZOLE  500 mg Intravenous Q8H Yue Penny PA-C Stopped (03/04/23 0030)   • multivitamin stress formula  1 tablet Oral Daily Amari Parks PA-C     • pantoprazole  40 mg Oral Daily Before Breakfast Prabhjot Davis MD     • polyethylene glycol  4,000 mL Oral See Admin Instructions Mariza Rincon MD     • zonisamide  50 mg Oral Daily Yue Penny PA-C              Vitals:    03/04/23 0200 03/04/23 0202 03/04/23 0300 03/04/23 0411   BP:  111/60 108/60 126/65   Pulse: 80 78 76 80   Resp: 14 13 14 14   Temp:    97 6 °F (36 4 °C)   TempSrc:    Oral   SpO2: 95% 95% 98% 96%   Weight:         Body mass index is 33 82 kg/m²  Intake/Output Summary (Last 24 hours) at 3/4/2023 0420  Last data filed at 3/3/2023 2335  Gross per 24 hour   Intake 4850 75 ml   Output 800 ml   Net 4050 75 ml       Weight change:     PHYSICAL EXAMINATION:  Gen: Awake, Alert, NAD  Head/eyes: AT/NC, pupils equal and round, Anicteric  ENT: mmm  Neck: Supple, No elevated JVP, trachea midline  Resp: CTA bilaterally no w/r/r  CV: RRR +S1, S2, No m/r/g  Abd: Soft, NT/ND + BS  Ext: no LE edema bilaterally  Neuro:  Follows commands, moves all extermities  Psych: Appropriate affect, happy mood, pleasant attitude, non-combative  Skin: warm; no rash, erythema or venous stasis changes on exposed skin    Lab Results:  Results from last 7 days   Lab Units 03/03/23  1225 03/03/23  0545   WBC Thousand/uL  --  8 83   HEMOGLOBIN g/dL 10 6* 10 3*   HEMATOCRIT %  --  30 2*   PLATELETS Thousands/uL  --  173     Results from last 7 days   Lab Units 03/03/23  0545 03/02/23  1734 03/02/23  1132   POTASSIUM mmol/L 3 9   < > 5 9*   CHLORIDE mmol/L 110*  --  110*   CO2 mmol/L 24  --  21   BUN mg/dL 25  --  31*   CREATININE mg/dL 1 06  -- 1 38*   CALCIUM mg/dL 7 5*  --  8 4   ALK PHOS U/L  --   --  58   ALT U/L  --   --  22   AST U/L  --   --  15    < > = values in this interval not displayed  No results found for: Marcell Homans      Results from last 7 days   Lab Units 03/02/23  1716 03/02/23  0531   HS TNI 0HR ng/L  --  3   HS TNI RAND ng/L 3*  --      Results from last 7 days   Lab Units 03/02/23  1132   INR  1 03       Tele: SR    This note was completed in part utilizing [employer] Software  Grammatical errors, random word insertions, spelling mistakes, and incomplete sentences may be an occasional consequence of this system secondary to software limitations, ambient noise, and hardware issues  If you have any questions or concerns about the content, text, or information contained within the body of this dictation, please contact the provider for clarification

## 2023-03-04 NOTE — ASSESSMENT & PLAN NOTE
Wt Readings from Last 3 Encounters:   03/03/23 101 kg (222 lb 7 1 oz)   03/02/23 98 9 kg (218 lb 0 6 oz)   02/22/23 98 9 kg (218 lb)       · Hold BB for now given hypotension  · Hold torsemide given hypotension

## 2023-03-04 NOTE — PROGRESS NOTES
Progress Note- Jess Raymond 69 y o  male MRN: [id number]    Unit/Bed#: ICU 10 Encounter: [id number]      Assessment and Plan:    69year old male with HTN, DM, CKD, CAD s/p PCI x 4, most recent in December 2022 currently on aspirin and Brilinta who is currently admitted with blood in stool     1  Blood in stool  2  Acute anemia  Patient reports presented after having watery bowel movements mixed with dark red blood with clots  On arrival to ER patient's hemoglobin 12 3 from 14  It continued to drop to 9 9 over short time  Received 4 PRBC transfusion, improved to 14 4 on rechecking  BUN/creatinine ratio less than 30  High-volume bleeding scan was obtained which showed no evidence of active GI bleeding  EGD 3/2 with no etiology of bleeding  Colonoscopy 3/3 with suspected ulcer within a diverticulum in the descending colon with possible visible vessel and an adherant clot, deployed 2 hemostatic clips  The patient initially did well but there are now reports he has had one bloody bowel movement with bright red blood associated with lightheadedness, tachycardia, and hypotension  HGB 10 6-8 9-9 4   -Trend H&H  -Monitor bowel movements  -Resuscitate   -Concerns for current active bleed, recommend CTA and IR intervention for possible embolization, no indication for colonoscopy at this time      3  Diverticulitis  Uncomplicated on imaging obtained on admit  Last colonoscopy in 2016 showing 1 polyp as well as mild diverticulosis in the left-sided colon    -Continue complete course of antibiotics    ______________________________________________________________________    Subjective: The patient has had one bloody bowel movement with bright red blood associated with lightheadedness, tachycardia, and hypotension  HGB 10 6-8 9-9 4      Medication Administration - last 24 hours from 03/03/2023 1229 to 03/04/2023 1229       Date/Time Order Dose Route Action Action by     03/03/2023 1645 EST multi-electrolyte (PLASMALYTE-A/ISOLYTE-S PH 7 4) IV solution 0 mL/hr Intravenous Stopped Rosanne Rosenthal, RN     03/04/2023 3264 EST metroNIDAZOLE (FLAGYL) IVPB (premix) 500 mg 100 mL 500 mg Intravenous New Bag Rosanne Rosenthal, RN     03/04/2023 0030 EST metroNIDAZOLE (FLAGYL) IVPB (premix) 500 mg 100 mL 0 mg Intravenous Stopped [facility], RN     03/03/2023 2336 EST metroNIDAZOLE (FLAGYL) IVPB (premix) 500 mg 100 mL 500 mg Intravenous [facility], [address] [place] [address]     03/03/2023 1705 EST metroNIDAZOLE (FLAGYL) IVPB (premix) 500 mg 100 mL 500 mg Intravenous [address] Rosenthal, RN     03/04/2023 7287 EST cefTRIAXone (ROCEPHIN) IVPB (premix in dextrose) 1,000 mg 50 mL 1,000 mg Intravenous [facility], [address] [place] [address]     03/04/2023 3068 EST pantoprazole (PROTONIX) EC tablet 40 mg 40 mg Oral Given [facility], [address] [place] [address]     03/04/2023 3693 EST aspirin (ECOTRIN LOW STRENGTH) EC tablet 81 mg 81 mg Oral Given Rosanne Rosenthal, RN     03/04/2023 9740 EST allopurinol (ZYLOPRIM) tablet 100 mg 100 mg Oral Given Rosanne Rosenthal, RN     03/04/2023 0806 EST escitalopram (LEXAPRO) tablet 10 mg 10 mg Oral Given [facility], RN     03/04/2023 1408 EST levothyroxine tablet 88 mcg 88 mcg Oral Given [facility], RN     03/04/2023 1074 EST multivitamin stress formula tablet 1 tablet 1 tablet Oral Given Rosanne Rosenthal, RN     03/04/2023 0806 EST zonisamide (ZONEGRAN) capsule 50 mg 50 mg Oral Given Rosanne Rosenthal, RN     03/03/2023 1720 EST atorvastatin (LIPITOR) tablet 80 mg 80 mg Oral Given Rosanne Rosenthal, RN     03/04/2023 0541 EST insulin lispro (HumaLOG) 100 units/mL subcutaneous injection 2-12 Units 2 Units Subcutaneous Not Given [facility], RN     03/03/2023 2334 EST insulin lispro (HumaLOG) 100 units/mL subcutaneous injection 2-12 Units 2 Units Subcutaneous Not Given ROX[facility], RN     03/03/2023 1705 EST insulin lispro (HumaLOG) 100 units/mL subcutaneous injection 2-12 Units 2 Units Subcutaneous Not Given Priyanka Sheppard, JAVED     03/03/2023 1900 EST multi-electrolyte (ISOLYTE-S PH 7 4) bolus 500 mL 0 mL Intravenous Stopped Navw Shelby, JAVED     03/03/2023 1234 EST multi-electrolyte (ISOLYTE-S PH 7 4) bolus 500 mL 500 mL Intravenous New Bag Rosanne Rosenthal, JAVED     03/04/2023 1124 EST insulin lispro (HumaLOG) 100 units/mL subcutaneous injection 2-12 Units 2 Units Subcutaneous Given Bud Hashimoto, RN          Objective:     Vitals: Blood pressure 101/64, pulse 96, temperature 98 °F (36 7 °C), temperature source Oral, resp  rate 18, weight 102 kg (225 lb 12 oz), SpO2 96 %  ,Body mass index is 34 33 kg/m²        Intake/Output Summary (Last 24 hours) at 3/4/2023 1229  Last data filed at 3/4/2023 0806  Gross per 24 hour   Intake 2631 ml   Output 825 ml   Net 1806 ml       Physical Exam:   General Appearance: Awake and alert, in no acute distress  Abdomen: Soft, non-tender, non-distended    Invasive Devices     Peripheral Intravenous Line  Duration           Peripheral IV 03/02/23 Left Antecubital 2 days    Peripheral IV 03/02/23 Right Antecubital 2 days                Lab Results:  Admission on 03/02/2023   Component Date Value   • Sodium 03/02/2023 138    • Potassium 03/02/2023 5 9 (H)    • Chloride 03/02/2023 110 (H)    • CO2 03/02/2023 21    • ANION GAP 03/02/2023 7    • BUN 03/02/2023 31 (H)    • Creatinine 03/02/2023 1 38 (H)    • Glucose 03/02/2023 152 (H)    • Calcium 03/02/2023 8 4    • Corrected Calcium 03/02/2023 9 0    • AST 03/02/2023 15    • ALT 03/02/2023 22    • Alkaline Phosphatase 03/02/2023 58    • Total Protein 03/02/2023 5 8 (L)    • Albumin 03/02/2023 3 3 (L)    • Total Bilirubin 03/02/2023 0 88    • eGFR 03/02/2023 49    • WBC 03/02/2023 9 24    • RBC 03/02/2023 4 58    • Hemoglobin 03/02/2023 14 4    • Hematocrit 03/02/2023 43 7    • MCV 03/02/2023 95    • MCH 03/02/2023 31 4    • MCHC 03/02/2023 33 0    • RDW 03/02/2023 15 8 (H)    • MPV 03/02/2023 9 6    • Platelets 65/18/8774 203    • nRBC 03/02/2023 0    • Neutrophils Relative 03/02/2023 68    • Immat GRANS % 03/02/2023 1    • Lymphocytes Relative 03/02/2023 21    • Monocytes Relative 03/02/2023 9    • Eosinophils Relative 03/02/2023 1    • Basophils Relative 03/02/2023 0    • Neutrophils Absolute 03/02/2023 6 27    • Immature Grans Absolute 03/02/2023 0 08    • Lymphocytes Absolute 03/02/2023 1 92    • Monocytes Absolute 03/02/2023 0 86    • Eosinophils Absolute 03/02/2023 0 07    • Basophils Absolute 03/02/2023 0 04    • Magnesium 03/02/2023 1 8 (L)    • Phosphorus 03/02/2023 3 4    • LACTIC ACID 03/02/2023 1 5    • Calcium, Ionized 03/02/2023 1 15    • pH, Alexis 03/02/2023 7 360    • pCO2, Alexis 03/02/2023 32 9 (L)    • pO2, Alexis 03/02/2023 94 0 (H)    • HCO3, Alexis 03/02/2023 18 2 (L)    • Base Excess, Alexis 03/02/2023 -6 1    • O2 Content, Alexis 03/02/2023 20 1    • O2 HGB, VENOUS 03/02/2023 93 8 (H)    • Nasal Cannula 03/02/2023 2    • PTT 03/02/2023 25    • Protime 03/02/2023 13 5    • INR 03/02/2023 1 03    • Iron Saturation 03/02/2023 50    • TIBC 03/02/2023 246 (L)    • Iron 03/02/2023 124    • Ferritin 03/02/2023 93    • Hemoglobin 03/02/2023 13 0    • HS TnI random 03/02/2023 3 (L)    • Potassium 03/02/2023 4 8    • POC Glucose 03/02/2023 117    • POC Glucose 03/02/2023 227 (H)    • Sodium 03/03/2023 140    • Potassium 03/03/2023 3 9    • Chloride 03/03/2023 110 (H)    • CO2 03/03/2023 24    • ANION GAP 03/03/2023 6    • BUN 03/03/2023 25    • Creatinine 03/03/2023 1 06    • Glucose 03/03/2023 118    • Calcium 03/03/2023 7 5 (L)    • eGFR 03/03/2023 67    • WBC 03/03/2023 8 83    • RBC 03/03/2023 3 18 (L)    • Hemoglobin 03/03/2023 10 3 (L)    • Hematocrit 03/03/2023 30 2 (L)    • MCV 03/03/2023 95    • MCH 03/03/2023 32 4    • MCHC 03/03/2023 34 1    • RDW 03/03/2023 16 5 (H)    • Platelets 84/34/7328 173    • MPV 03/03/2023 9 6    • Magnesium 03/03/2023 2 2    • Phosphorus 03/03/2023 3 5    • POC Glucose 03/03/2023 114    • Hemoglobin 03/03/2023 10 6 (L)    • POC Glucose 03/03/2023 211 (H)    • POC Glucose 03/03/2023 122    • POC Glucose 03/03/2023 100    • WBC 03/04/2023 6 57    • RBC 03/04/2023 2 79 (L)    • Hemoglobin 03/04/2023 8 9 (L)    • Hematocrit 03/04/2023 26 8 (L)    • MCV 03/04/2023 96    • MCH 03/04/2023 31 9    • MCHC 03/04/2023 33 2    • RDW 03/04/2023 16 0 (H)    • MPV 03/04/2023 9 4    • Platelets 47/29/3425 186    • nRBC 03/04/2023 0    • Neutrophils Relative 03/04/2023 58    • Immat GRANS % 03/04/2023 1    • Lymphocytes Relative 03/04/2023 23    • Monocytes Relative 03/04/2023 10    • Eosinophils Relative 03/04/2023 7 (H)    • Basophils Relative 03/04/2023 1    • Neutrophils Absolute 03/04/2023 3 80    • Immature Grans Absolute 03/04/2023 0 07    • Lymphocytes Absolute 03/04/2023 1 53    • Monocytes Absolute 03/04/2023 0 65    • Eosinophils Absolute 03/04/2023 0 48    • Basophils Absolute 03/04/2023 0 04    • Sodium 03/04/2023 139    • Potassium 03/04/2023 3 6    • Chloride 03/04/2023 108    • CO2 03/04/2023 24    • ANION GAP 03/04/2023 7    • BUN 03/04/2023 12    • Creatinine 03/04/2023 0 88    • Glucose 03/04/2023 97    • Calcium 03/04/2023 7 1 (L)    • eGFR 03/04/2023 83    • POC Glucose 03/04/2023 117    • POC Glucose 03/04/2023 187 (H)    • POC Glucose 03/04/2023 180 (H)    • WBC 03/04/2023 16 36 (H)    • RBC 03/04/2023 2 94 (L)    • Hemoglobin 03/04/2023 9 4 (L)    • Hematocrit 03/04/2023 28 8 (L)    • MCV 03/04/2023 98    • MCH 03/04/2023 32 0    • MCHC 03/04/2023 32 6    • RDW 03/04/2023 15 9 (H)    • Platelets 76/73/8077 248    • MPV 03/04/2023 9 5        Imaging Studies: I have personally reviewed pertinent imaging studies

## 2023-03-04 NOTE — ASSESSMENT & PLAN NOTE
Lab Results   Component Value Date    HGBA1C 8 1 (H) 01/16/2023       Recent Labs     03/03/23  0542 03/03/23  1204 03/03/23  1637 03/03/23  2333   POCGLU 114 211* 122 100       Blood Sugar Average: Last 72 hrs:     · Home regimen: Glimepiride, Jardiance, Metformin  · SSI while inpatient   · Hypoglycemia protocol  · Accu-Cheks every 6 hours, currently clear liquid diet - should advance today and transition to SSI before meals and at bedtime  · Diabetic diet once patient allowed to eat

## 2023-03-04 NOTE — PROGRESS NOTES
[address] [facility]  Progress Note - Zachary Shaw 1945, 69 y o  male MRN: [id number]  Unit/Bed#: ICU 10 Encounter: [id number]  Primary Care Provider: Minerva Camilo DO   Date and time admitted to hospital: 3/2/2023 10:59 AM    Acute blood loss anemia  Assessment & Plan  Lab Results   Component Value Date    HGB 10 6 (L) 03/03/2023    HGB 10 3 (L) 03/03/2023    HGB 13 0 03/02/2023    HGB 14 4 03/02/2023     Patient admitted with lower GI bleed, 2 episodes of bright red blood per rectum  Hemoglobin on admission with a drop of 3 g  S/p 4 units of PRBC  As of this morning noted drop in hemoglobin of 3 g  S/p EGD which ruled out upper GI source of bleeding    Colonoscopy showed bleeding colonic ulcer which was clipped x2  Monitor hemoglobin    Hypotension  Assessment & Plan  Blood Pressure: 116/61    · With improvment  · Most likely source is GI bleed  · Hgb  14 4 > 13 0 > 10 3 > 10 6      Acute diverticulitis  Assessment & Plan  · Continue ceftriaxone and flagyl day 3    Diabetes mellitus, type 2 [facility])  Assessment & Plan  Lab Results   Component Value Date    HGBA1C 8 1 (H) 01/16/2023       Recent Labs     03/03/23  0542 03/03/23  1204 03/03/23  1637 03/03/23  2333   POCGLU 114 211* 122 100       Blood Sugar Average: Last 72 hrs:     · Home regimen: Glimepiride, Jardiance, Metformin  · SSI while inpatient   · Hypoglycemia protocol  · Accu-Cheks every 6 hours, currently clear liquid diet - should advance today and transition to SSI before meals and at bedtime  · Diabetic diet once patient allowed to eat    Stage 3a chronic kidney disease [facility])  Assessment & Plan  Lab Results   Component Value Date    EGFR 67 03/03/2023    EGFR 49 03/02/2023    EGFR 43 03/02/2023    CREATININE 1 06 03/03/2023    CREATININE 1 38 (H) 03/02/2023    CREATININE 1 53 (H) 03/02/2023     · Creatinine baseline 1 2-1 3  · Trend urine output and renal indices  · Avoid nephrotoxic agents  · Continue measuring I/O    Chronic diastolic (congestive) heart failure (HCC)  Assessment & Plan  Wt Readings from Last 3 Encounters:   03/03/23 101 kg (222 lb 7 1 oz)   03/02/23 98 9 kg (218 lb 0 6 oz)   02/22/23 98 9 kg (218 lb)       · Hold BB for now given hypotension  · Hold torsemide given hypotension      Recurrent major depressive disorder ([address] )  Assessment & Plan  · home Lexapro     Coronary artery disease of native artery of native heart with stable angina pectoris [facility])  Assessment & Plan  · Remote stent placement x3  · Most recent PCI Dec 2022  · Troponin within normal limit  · Per GI ok to resume Plavix and aspirin  · Per cards holding DAPT ok but recommend keeping 1 agent with transition back to 2 when no longer bleeding  · Will hold brillinta for now and continue aspirin  · Continue statin  · Hold BB for now given hypotension  · Hold torsemide given hypotension    Primary osteoarthritis involving multiple joints  Assessment & Plan  · Pain control as needed    Hypertension  Assessment & Plan  · Home regimen: Losartan  · Holding home antihypertensives in setting of hypotension due to GI bleed    Hyperlipidemia  Assessment & Plan  · Resumed home regimen: Atorvastatin    * Acute lower GI bleeding  Assessment & Plan  Significantly hypotensive on presentation to [address] [place] [address]  Received 4 units PRBCs at outside hospital  Transferred to higher level of care for GI evaluation and potential need for IR  Presented with hypotension systolic blood pressure in the 80s to 90s, currently improving, no signs of acute bleeding; Most recent hemoglobin is 10  Underwent EGD which ruled out upper GI bleed   Suspect possible diverticular bleed in setting of acute diverticulitis    Colonoscopy 3/3 showed bleeding ulcer - clip x2 placed    Plan:  · We will continue trending hemoglobin daily or more frequent if clinical worsening  · Transfuse as indicated for Hgb less than 7 or for hypotension/shock  · Appreciate GI input      ----------------------------------------------------------------------------------------  HPI/24hr events: No acute events overnight    Patient appropriate for transfer out of the ICU today?: Patient does not meet criteria for ICU Follow-up Clinic; referral has not been made  Disposition: Transfer to Stepdown Level 2  Code Status: Level 1 - Full Code  ---------------------------------------------------------------------------------------  SUBJECTIVE  Offers no complaints  Tolerating clear diet without N/V  No further melena or BMs  Review of Systems   All other systems reviewed and are negative  Review of systems was reviewed and negative unless stated above in HPI/24-hour events   ---------------------------------------------------------------------------------------  OBJECTIVE    Vitals   Vitals:    23 2100 23 2200 23 2300 23 0000   BP: 123/64 107/61 114/60 116/61   BP Location:       Pulse: 74 74 82 76   Resp: 16 12 16 17   Temp:    98 4 °F (36 9 °C)   TempSrc:    Oral   SpO2: 96% 95% 97% 97%   Weight:         Temp (24hrs), Av 1 °F (36 7 °C), Min:97 6 °F (36 4 °C), Max:98 6 °F (37 °C)  Current: Temperature: 98 4 °F (36 9 °C)          Respiratory:  SpO2: SpO2: 97 %, SpO2 Activity: SpO2 Activity: At Rest, SpO2 Device: O2 Device: None (Room air)  Nasal Cannula O2 Flow Rate (L/min): 4 L/min    Invasive/non-invasive ventilation settings   Respiratory    Lab Data (Last 4 hours)    None         O2/Vent Data (Last 4 hours)    None                Physical Exam  Vitals reviewed  Constitutional:       General: He is not in acute distress  Appearance: He is overweight  He is not ill-appearing, toxic-appearing or diaphoretic  Cardiovascular:      Rate and Rhythm: Normal rate and regular rhythm  Pulses: Normal pulses  Heart sounds: Murmur heard  Crescendo decrescendo systolic murmur is present    Pulmonary:      Effort: Pulmonary effort is normal       Breath sounds: Normal breath sounds and air entry  Abdominal:      General: There is no distension  Palpations: Abdomen is soft  Tenderness: There is abdominal tenderness in the right lower quadrant and left lower quadrant  Musculoskeletal:      Right lower leg: No edema  Left lower leg: No edema  Skin:     General: Skin is warm and dry  Capillary Refill: Capillary refill takes less than 2 seconds  Neurological:      General: No focal deficit present  GCS: GCS eye subscore is 4  GCS verbal subscore is 5  GCS motor subscore is 6               Laboratory and Diagnostics:  Results from last 7 days   Lab Units 03/03/23  1225 03/03/23  0545 03/02/23  1716 03/02/23  1132 03/02/23  0745 03/02/23  0531   WBC Thousand/uL  --  8 83  --  9 24  --  17 11*   HEMOGLOBIN g/dL 10 6* 10 3* 13 0 14 4 9 9* 12 3   HEMATOCRIT %  --  30 2*  --  43 7 29 7* 36 7   PLATELETS Thousands/uL  --  173  --  203  --  329   NEUTROS PCT %  --   --   --  68  --  70   MONOS PCT %  --   --   --  9  --  8     Results from last 7 days   Lab Units 03/03/23  0545 03/02/23  1734 03/02/23  1132 03/02/23  0531   SODIUM mmol/L 140  --  138 136   POTASSIUM mmol/L 3 9 4 8 5 9* 5 9*   CHLORIDE mmol/L 110*  --  110* 103   CO2 mmol/L 24  --  21 23   ANION GAP mmol/L 6  --  7 10   BUN mg/dL 25  --  31* 28*   CREATININE mg/dL 1 06  --  1 38* 1 53*   CALCIUM mg/dL 7 5*  --  8 4 9 0   GLUCOSE RANDOM mg/dL 118  --  152* 271*   ALT U/L  --   --  22 25   AST U/L  --   --  15 15   ALK PHOS U/L  --   --  58 67   ALBUMIN g/dL  --   --  3 3* 3 7   TOTAL BILIRUBIN mg/dL  --   --  0 88 0 59     Results from last 7 days   Lab Units 03/03/23  0545 03/02/23  1132   MAGNESIUM mg/dL 2 2 1 8*   PHOSPHORUS mg/dL 3 5 3 4      Results from last 7 days   Lab Units 03/02/23  1132   INR  1 03   PTT seconds 25          Results from last 7 days   Lab Units 03/02/23  1132 03/02/23  0745 03/02/23  0531   LACTIC ACID mmol/L 1 5 2 6* 3 8*     ABG:    VBG:  Results from last 7 days   Lab Units 03/02/23  1132   PH ZEB  7 360   PCO2 ZEB mm Hg 32 9*   PO2 ZEB mm Hg 94 0*   HCO3 ZEB mmol/L 18 2*   BASE EXC ZEB mmol/L -6 1           Micro        EKG: NSR tele  Imaging: I have personally reviewed pertinent reports  and I have personally reviewed pertinent films in PACS    Intake and Output  I/O       03/02 0701  03/03 0700 03/03 0701  03/04 0700    I V  (mL/kg) 500 (5) 4750 8 (47)    IV Piggyback  100    Total Intake(mL/kg) 500 (5) 4850 8 (48)    Urine (mL/kg/hr) 745 800 (0 3)    Stool 400 0    Total Output 1145 800    Net -645 +4050 8          Unmeasured Urine Occurrence  1 x    Unmeasured Stool Occurrence  1 x          Height and Weights         Body mass index is 33 82 kg/m²    Weight (last 2 days)     Date/Time Weight    03/03/23 0546 101 (222 44)    03/02/23 1110 100 (221 12)            Nutrition       Diet Orders   (From admission, onward)             Start     Ordered    03/03/23 1607  Diet Clear Liquid  Diet effective midnight        References:    Nutrtion Support Algorithm Enteral vs  Parenteral   Question:  Diet Type  Answer:  Clear Liquid    03/03/23 1606                  Active Medications  Scheduled Meds:  Current Facility-Administered Medications   Medication Dose Route Frequency Provider Last Rate   • acetaminophen  650 mg Oral Q6H PRN FAY Cheung     • allopurinol  100 mg Oral Daily Marcell Parks PA-C     • aspirin  81 mg Oral Daily Marcell Parks PA-C     • atorvastatin  80 mg Oral Daily With Sue Parks PA-C     • cefTRIAXone  1,000 mg Intravenous Q24H Marcell Parks PA-C 1,000 mg (03/03/23 0532)   • escitalopram  10 mg Oral Daily Amari Parks PA-C     • insulin lispro  2-12 Units Subcutaneous Q6H [address] Amari Parks PA-C     • levothyroxine  88 mcg Oral Early Morning Marcell Parks PA-C     • metroNIDAZOLE  500 mg Intravenous Q8H Kristi Winn PA-C Stopped (03/04/23 0030)   • multivitamin stress formula  1 tablet Oral Daily Ruba Parks PA-C     • pantoprazole  40 mg Oral Daily Before Breakfast Prabhjot Davis MD     • polyethylene glycol  4,000 mL Oral See Admin Instructions Vaishali Guillen MD     • zonisamide  50 mg Oral Daily Amari Parks PA-C       Continuous Infusions:     PRN Meds:   acetaminophen, 650 mg, Q6H PRN        Invasive Devices Review  Invasive Devices     Peripheral Intravenous Line  Duration           Peripheral IV 03/02/23 Left Antecubital 1 day    Peripheral IV 03/02/23 Right Antecubital 1 day                Rationale for remaining devices: vascular access in hospitalized patient  ---------------------------------------------------------------------------------------  Advance Directive and Living Will:      Power of :    POLST:    ---------------------------------------------------------------------------------------  Care Time Delivered:   No Critical Care time spent       Bridgett Gonzalez PA-C      Portions of the record may have been created with voice recognition software  Occasional wrong word or "sound a like" substitutions may have occurred due to the inherent limitations of voice recognition software    Read the chart carefully and recognize, using context, where substitutions have occurred

## 2023-03-04 NOTE — UTILIZATION REVIEW
Continued Stay Review    Date: 03/04                    Current Patient Class: IP Current Level of Care: Critical Care    HPI:77 y o  male initially admitted on 03/02     DATE OF SERVICE: 3/03/23  Procedure: Colonoscopy   Anesthesia Type: General  FINDINGS:  • Significant amount of fresh blood and hematin in the transverse colon, splenic flexure, descending colon, sigmoid colon, rectosigmoid and rectum  Significant amount of fresh red blood and older black blood/hematin was seen in the colon up to the transverse colon  No blood at all in the ascending colon and cecum  TI could not be intubated but no blood coming from the IC valve  Blood was extensively lavaged and suctioned on withdrawal, and no active bleeding was found  • Severe pancolonic diverticula; there was stigmata of recent hemorrhage; placed 2 clips successfully and 2 clips unsuccessfully  One diverticulum in the descending colon appeared to have a small ulcer base with possible visible vessel and overlying clot  No active bleeding, but two clips were successfully placed  Date: 03/04   Day 3: Has surpassed a 2nd midnight with active treatments and services  No acute ovn events  Pt tolerating clear diet without N/V  No further melena or BMs  s/p colonoscopy on 03/03 w/c showed bleeding colonic ulcer which was clipped x2  Cont to mon hgb  Transfuse as indicated for Hgb less than 7 or for hypotension/shock  On CLD, will advance today and transition to [facility]  Can cont ASA, hold Brilinta  Transition back to 2 DAPT when no longer bleeding  Consider transitioning Brilinta to Plavix for decrease risk of bleeding per Cards   Continue ceftriaxone and flagyl     Vital Signs: /65   Pulse 94   Temp 98 °F (36 7 °C) (Oral)   Resp 18   Wt 102 kg (225 lb 12 oz)   SpO2 96%   BMI 34 33 kg/m²     Pertinent Labs/Diagnostic Results:       Results from last 7 days   Lab Units 03/04/23  0442 03/03/23  1225 03/03/23  0545 03/02/23  1716 03/02/23  1132 03/02/23  0745 03/02/23  0531   WBC Thousand/uL 6 57  --  8 83  --  9 24  --  17 11*   HEMOGLOBIN g/dL 8 9* 10 6* 10 3* 13 0 14 4 9 9* 12 3   HEMATOCRIT % 26 8*  --  30 2*  --  43 7 29 7* 36 7   PLATELETS Thousands/uL 186  --  173  --  203  --  329   NEUTROS ABS Thousands/µL 3 80  --   --   --  6 27  --  12 21*         Results from last 7 days   Lab Units 03/04/23  0442 03/03/23  0545 03/02/23 1734 03/02/23  1132 03/02/23  0531   SODIUM mmol/L 139 140  --  138 136   POTASSIUM mmol/L 3 6 3 9 4 8 5 9* 5 9*   CHLORIDE mmol/L 108 110*  --  110* 103   CO2 mmol/L 24 24  --  21 23   ANION GAP mmol/L 7 6  --  7 10   BUN mg/dL 12 25  --  31* 28*   CREATININE mg/dL 0 88 1 06  --  1 38* 1 53*   EGFR ml/min/1 73sq m 83 67  --  49 43   CALCIUM mg/dL 7 1* 7 5*  --  8 4 9 0   CALCIUM, IONIZED mmol/L  --   --   --  1 15  --    MAGNESIUM mg/dL  --  2 2  --  1 8*  --    PHOSPHORUS mg/dL  --  3 5  --  3 4  --      Results from last 7 days   Lab Units 03/02/23  1132 03/02/23  0531   AST U/L 15 15   ALT U/L 22 25   ALK PHOS U/L 58 67   TOTAL PROTEIN g/dL 5 8* 6 2*   ALBUMIN g/dL 3 3* 3 7   TOTAL BILIRUBIN mg/dL 0 88 0 59     Results from last 7 days   Lab Units 03/04/23  0720 03/04/23  0540 03/03/23  2333 03/03/23  1637 03/03/23  1204 03/03/23  0542 03/02/23  2338 03/02/23  1735 03/02/23  0719   POC GLUCOSE mg/dl 187* 117 100 122 211* 114 227* 117 181*     Results from last 7 days   Lab Units 03/04/23  0442 03/03/23  0545 03/02/23  1132 03/02/23  0531   GLUCOSE RANDOM mg/dL 97 118 152* 271*       Results from last 7 days   Lab Units 03/02/23  1132   PH ZEB  7 360   PCO2 ZEB mm Hg 32 9*   PO2 ZEB mm Hg 94 0*   HCO3 ZEB mmol/L 18 2*   BASE EXC ZEB mmol/L -6 1   O2 CONTENT ZEB ml/dL 20 1   O2 HGB, VENOUS % 93 8*             Results from last 7 days   Lab Units 03/02/23  0531   HS TNI 0HR ng/L 3         Results from last 7 days   Lab Units 03/02/23  1132   PROTIME seconds 13 5   INR  1 03   PTT seconds 25             Results from last 7 days   Lab Units 03/02/23  1132 03/02/23  0745 03/02/23  0531   LACTIC ACID mmol/L 1 5 2 6* 3 8*                 Results from last 7 days   Lab Units 03/02/23  1132   FERRITIN ng/mL 93         Results from last 7 days   Lab Units 03/02/23  0531   LIPASE u/L 213*         Medications:   Scheduled Medications:  allopurinol, 100 mg, Oral, Daily  aspirin, 81 mg, Oral, Daily  atorvastatin, 80 mg, Oral, Daily With Dinner  cefTRIAXone, 1,000 mg, Intravenous, Q24H  escitalopram, 10 mg, Oral, Daily  insulin lispro, 2-12 Units, Subcutaneous, 4x Daily (AC & HS)  levothyroxine, 88 mcg, Oral, Early Morning  metroNIDAZOLE, 500 mg, Intravenous, Q8H  multivitamin stress formula, 1 tablet, Oral, Daily  pantoprazole, 40 mg, Oral, Daily Before Breakfast  polyethylene glycol, 4,000 mL, Oral, See Admin Instructions  zonisamide, 50 mg, Oral, Daily      Continuous IV Infusions: none     PRN Meds:  acetaminophen, 650 mg, Oral, Q6H PRN        Discharge Plan: D    Network Utilization Review Department  ATTENTION: Please call with any questions or concerns to [phone] and carefully listen to the prompts so that you are directed to the right person  All voicemails are confidential   Ivet Rawls all requests for admission clinical reviews, approved or denied determinations and any other requests to dedicated fax number below belonging to the campus where the patient is receiving treatment   List of dedicated fax numbers for the Facilities:  [address] DENIALS (Administrative/Medical Necessity) [phone]   [address] [place] [address] (Maternity/NICU/Pediatrics) [phone]   [address] [phone]   [facility] [address] [place] [address] Rd [phone] [address] [address] [phone]   [address] [phone]

## 2023-03-04 NOTE — ASSESSMENT & PLAN NOTE
Lab Results   Component Value Date    HGB 10 6 (L) 03/03/2023    HGB 10 3 (L) 03/03/2023    HGB 13 0 03/02/2023    HGB 14 4 03/02/2023     Patient admitted with lower GI bleed, 2 episodes of bright red blood per rectum  Hemoglobin on admission with a drop of 3 g  S/p 4 units of PRBC  As of this morning noted drop in hemoglobin of 3 g  S/p EGD which ruled out upper GI source of bleeding    Colonoscopy showed bleeding colonic ulcer which was clipped x2  Monitor hemoglobin

## 2023-03-05 LAB
ABO GROUP BLD BPU: NORMAL
ANION GAP SERPL CALCULATED.3IONS-SCNC: 7 MMOL/L (ref 4–13)
BASOPHILS # BLD AUTO: 0.04 THOUSANDS/ÂΜL (ref 0–0.1)
BASOPHILS NFR BLD AUTO: 1 % (ref 0–1)
BPU ID: NORMAL
BUN SERPL-MCNC: 8 MG/DL (ref 5–25)
CALCIUM SERPL-MCNC: 7.3 MG/DL (ref 8.4–10.2)
CHLORIDE SERPL-SCNC: 110 MMOL/L (ref 96–108)
CO2 SERPL-SCNC: 23 MMOL/L (ref 21–32)
CREAT SERPL-MCNC: 0.86 MG/DL (ref 0.6–1.3)
CROSSMATCH: NORMAL
EOSINOPHIL # BLD AUTO: 0.31 THOUSAND/ÂΜL (ref 0–0.61)
EOSINOPHIL NFR BLD AUTO: 5 % (ref 0–6)
ERYTHROCYTE [DISTWIDTH] IN BLOOD BY AUTOMATED COUNT: 15.4 % (ref 11.6–15.1)
GFR SERPL CREATININE-BSD FRML MDRD: 84 ML/MIN/1.73SQ M
GLUCOSE SERPL-MCNC: 140 MG/DL (ref 65–140)
GLUCOSE SERPL-MCNC: 142 MG/DL (ref 65–140)
GLUCOSE SERPL-MCNC: 143 MG/DL (ref 65–140)
GLUCOSE SERPL-MCNC: 146 MG/DL (ref 65–140)
GLUCOSE SERPL-MCNC: 187 MG/DL (ref 65–140)
GLUCOSE SERPL-MCNC: 218 MG/DL (ref 65–140)
GLUCOSE SERPL-MCNC: 243 MG/DL (ref 65–140)
HCT VFR BLD AUTO: 23.2 % (ref 36.5–49.3)
HGB BLD-MCNC: 7.8 G/DL (ref 12–17)
HGB BLD-MCNC: 8.9 G/DL (ref 12–17)
IMM GRANULOCYTES # BLD AUTO: 0.07 THOUSAND/UL (ref 0–0.2)
IMM GRANULOCYTES NFR BLD AUTO: 1 % (ref 0–2)
LYMPHOCYTES # BLD AUTO: 1.49 THOUSANDS/ÂΜL (ref 0.6–4.47)
LYMPHOCYTES NFR BLD AUTO: 22 % (ref 14–44)
MCH RBC QN AUTO: 32.1 PG (ref 26.8–34.3)
MCHC RBC AUTO-ENTMCNC: 33.6 G/DL (ref 31.4–37.4)
MCV RBC AUTO: 96 FL (ref 82–98)
MONOCYTES # BLD AUTO: 0.61 THOUSAND/ÂΜL (ref 0.17–1.22)
MONOCYTES NFR BLD AUTO: 9 % (ref 4–12)
NEUTROPHILS # BLD AUTO: 4.32 THOUSANDS/ÂΜL (ref 1.85–7.62)
NEUTS SEG NFR BLD AUTO: 62 % (ref 43–75)
NRBC BLD AUTO-RTO: 0 /100 WBCS
PLATELET # BLD AUTO: 169 THOUSANDS/UL (ref 149–390)
PMV BLD AUTO: 9.3 FL (ref 8.9–12.7)
POTASSIUM SERPL-SCNC: 3.8 MMOL/L (ref 3.5–5.3)
RBC # BLD AUTO: 2.43 MILLION/UL (ref 3.88–5.62)
SODIUM SERPL-SCNC: 140 MMOL/L (ref 135–147)
UNIT DISPENSE STATUS: NORMAL
UNIT PRODUCT CODE: NORMAL
UNIT PRODUCT VOLUME: 300 ML
UNIT RH: NORMAL
WBC # BLD AUTO: 6.84 THOUSAND/UL (ref 4.31–10.16)

## 2023-03-05 RX ORDER — INSULIN LISPRO 100 [IU]/ML
2-12 INJECTION, SOLUTION INTRAVENOUS; SUBCUTANEOUS
Status: DISCONTINUED | OUTPATIENT
Start: 2023-03-05 | End: 2023-03-08 | Stop reason: HOSPADM

## 2023-03-05 RX ORDER — INSULIN LISPRO 100 [IU]/ML
1-5 INJECTION, SOLUTION INTRAVENOUS; SUBCUTANEOUS
Status: DISCONTINUED | OUTPATIENT
Start: 2023-03-05 | End: 2023-03-08 | Stop reason: HOSPADM

## 2023-03-05 RX ADMIN — ZONISAMIDE 50 MG: 25 CAPSULE ORAL at 13:24

## 2023-03-05 RX ADMIN — METRONIDAZOLE 500 MG: 500 INJECTION, SOLUTION INTRAVENOUS at 09:23

## 2023-03-05 RX ADMIN — INSULIN LISPRO 4 UNITS: 100 INJECTION, SOLUTION INTRAVENOUS; SUBCUTANEOUS at 18:13

## 2023-03-05 RX ADMIN — CEFTRIAXONE 1000 MG: 1 INJECTION, SOLUTION INTRAVENOUS at 05:44

## 2023-03-05 RX ADMIN — ESCITALOPRAM OXALATE 10 MG: 10 TABLET ORAL at 09:21

## 2023-03-05 RX ADMIN — ALLOPURINOL 100 MG: 100 TABLET ORAL at 09:21

## 2023-03-05 RX ADMIN — ATORVASTATIN CALCIUM 80 MG: 80 TABLET, FILM COATED ORAL at 16:23

## 2023-03-05 RX ADMIN — METRONIDAZOLE 500 MG: 500 INJECTION, SOLUTION INTRAVENOUS at 16:24

## 2023-03-05 RX ADMIN — INSULIN LISPRO 1 UNITS: 100 INJECTION, SOLUTION INTRAVENOUS; SUBCUTANEOUS at 21:13

## 2023-03-05 RX ADMIN — B-COMPLEX W/ C & FOLIC ACID TAB 1 TABLET: TAB at 13:24

## 2023-03-05 RX ADMIN — METRONIDAZOLE 500 MG: 500 INJECTION, SOLUTION INTRAVENOUS at 23:18

## 2023-03-05 RX ADMIN — LEVOTHYROXINE SODIUM 88 MCG: 100 TABLET ORAL at 05:50

## 2023-03-05 RX ADMIN — METRONIDAZOLE 500 MG: 500 INJECTION, SOLUTION INTRAVENOUS at 00:18

## 2023-03-05 RX ADMIN — PANTOPRAZOLE SODIUM 40 MG: 40 TABLET, DELAYED RELEASE ORAL at 06:00

## 2023-03-05 RX ADMIN — ACETAMINOPHEN 325MG 650 MG: 325 TABLET ORAL at 00:01

## 2023-03-05 NOTE — ASSESSMENT & PLAN NOTE
Wt Readings from Last 3 Encounters:   03/04/23 102 kg (225 lb 12 oz)   03/02/23 98 9 kg (218 lb 0 6 oz)   02/22/23 98 9 kg (218 lb)       · Hold BB for now given hypotension  · Hold torsemide given hypotension

## 2023-03-05 NOTE — ASSESSMENT & PLAN NOTE
· Home regimen: Losartan, propanolol  · Holding home antihypertensives in setting of hypotension due to GI bleed

## 2023-03-05 NOTE — ASSESSMENT & PLAN NOTE
Significantly hypotensive on presentation to [address] [place] [address]  Received 4 units PRBCs at outside hospital  Transferred to higher level of care for GI evaluation and potential need for IR  Presented with hypotension systolic blood pressure in the 80s to 90s, currently improving, no signs of acute bleeding; Most recent hemoglobin is 10  Underwent EGD which ruled out upper GI bleed  Suspect possible diverticular bleed in setting of acute diverticulitis    Colonoscopy 3/3 showed bleeding ulcer - clip x2 placed  Episode of hematochezia, hypotension 03/04 - repeat CT without evidence of active high volume gastrointestinal hemorrhage; nearly resolved diverticulitis at the descending colon      Plan:  · hgb dropped to 7 5 overnight without evidence of ongoing blood loss  · Given his recent coronary intervention and intermittent CP, he was transfused one unit to keep his hgb closer to 9  · We will continue to monitor his clinical status closely  · We will continue to trend his hgb  · We will continue to hold his antiplatelet medications for now

## 2023-03-05 NOTE — ASSESSMENT & PLAN NOTE
Blood Pressure: 120/67    · With improvment  · Most likely source is GI bleed  · Hgb 10 6 > 8 9 > 9 4 > 7 0  · Transfused one unit PRBC

## 2023-03-05 NOTE — ASSESSMENT & PLAN NOTE
Lab Results   Component Value Date    EGFR 83 03/04/2023    EGFR 67 03/03/2023    EGFR 49 03/02/2023    CREATININE 0 88 03/04/2023    CREATININE 1 06 03/03/2023    CREATININE 1 38 (H) 03/02/2023     · Creatinine baseline 1 2-1 3  · Trend urine output and renal indices  · Avoid nephrotoxic agents  · Continue measuring I/O

## 2023-03-05 NOTE — PLAN OF CARE
Problem: MOBILITY - ADULT  Goal: Maintain or return to baseline ADL function  Description: INTERVENTIONS:  -  Assess patient's ability to carry out ADLs; assess patient's baseline for ADL function and identify physical deficits which impact ability to perform ADLs (bathing, care of mouth/teeth, toileting, grooming, dressing, etc )  - Assess/evaluate cause of self-care deficits   - Assess range of motion  - Assess patient's mobility; develop plan if impaired  - Assess patient's need for assistive devices and provide as appropriate  - Encourage maximum independence but intervene and supervise when necessary  - Involve family in performance of ADLs  - Assess for home care needs following discharge   - Consider OT consult to assist with ADL evaluation and planning for discharge  - Provide patient education as appropriate  Outcome: Progressing  Goal: Maintains/Returns to pre admission functional level  Description: INTERVENTIONS:  - Perform BMAT or MOVE assessment daily    - Set and communicate daily mobility goal to care team and patient/family/caregiver     - Collaborate with rehabilitation services on mobility goals if consulted  - Out of bed for toileting  - Record patient progress and toleration of activity level   Outcome: Progressing     Problem: PAIN - ADULT  Goal: Verbalizes/displays adequate comfort level or baseline comfort level  Description: Interventions:  - Encourage patient to monitor pain and request assistance  - Assess pain using appropriate pain scale  - Administer analgesics based on type and severity of pain and evaluate response  - Implement non-pharmacological measures as appropriate and evaluate response  - Consider cultural and social influences on pain and pain management  - Notify physician/advanced practitioner if interventions unsuccessful or patient reports new pain  Outcome: Progressing     Problem: INFECTION - ADULT  Goal: Absence or prevention of progression during hospitalization  Description: INTERVENTIONS:  - Assess and monitor for signs and symptoms of infection  - Monitor lab/diagnostic results  - Monitor all insertion sites, i e  indwelling lines, tubes, and drains  - Monitor endotracheal if appropriate and nasal secretions for changes in amount and color  - [place] appropriate cooling/warming therapies per order  - Administer medications as ordered  - Instruct and encourage patient and family to use good hand hygiene technique  - Identify and instruct in appropriate isolation precautions for identified infection/condition  Outcome: Progressing  Goal: Absence of fever/infection during neutropenic period  Description: INTERVENTIONS:  - Monitor WBC    Outcome: Progressing     Problem: SAFETY ADULT  Goal: Maintain or return to baseline ADL function  Description: INTERVENTIONS:  -  Assess patient's ability to carry out ADLs; assess patient's baseline for ADL function and identify physical deficits which impact ability to perform ADLs (bathing, care of mouth/teeth, toileting, grooming, dressing, etc )  - Assess/evaluate cause of self-care deficits   - Assess range of motion  - Assess patient's mobility; develop plan if impaired  - Assess patient's need for assistive devices and provide as appropriate  - Encourage maximum independence but intervene and supervise when necessary  - Involve family in performance of ADLs  - Assess for home care needs following discharge   - Consider OT consult to assist with ADL evaluation and planning for discharge  - Provide patient education as appropriate  Outcome: Progressing  Goal: Maintains/Returns to pre admission functional level  Description: INTERVENTIONS:  - Perform BMAT or MOVE assessment daily    - Set and communicate daily mobility goal to care team and patient/family/caregiver     - Collaborate with rehabilitation services on mobility goals if consulted  - Out of bed for toileting  - Record patient progress and toleration of activity level   Outcome: Progressing  Goal: Patient will remain free of falls  Description: INTERVENTIONS:  - Educate patient/family on patient safety including physical limitations  - Instruct patient to call for assistance with activity   - Consult OT/PT to assist with strengthening/mobility   - Keep Call bell within reach  - Keep bed low and locked with side rails adjusted as appropriate  - Keep care items and personal belongings within reach  - Initiate and maintain comfort rounds  - Make Fall Risk Sign visible to staff  - Apply yellow socks and bracelet for high fall risk patients  - Consider moving patient to room near nurses station  Outcome: Progressing     Problem: DISCHARGE PLANNING  Goal: Discharge to home or other facility with appropriate resources  Description: INTERVENTIONS:  - Identify barriers to discharge w/patient and caregiver  - Arrange for needed discharge resources and transportation as appropriate  - Identify discharge learning needs (meds, wound care, etc )  - Arrange for interpretive services to assist at discharge as needed  - Refer to Case Management Department for coordinating discharge planning if the patient needs post-hospital services based on physician/advanced practitioner order or complex needs related to functional status, cognitive ability, or social support system  Outcome: Progressing     Problem: Knowledge Deficit  Goal: Patient/family/caregiver demonstrates understanding of disease process, treatment plan, medications, and discharge instructions  Description: Complete learning assessment and assess knowledge base  Interventions:  - Provide teaching at level of understanding  - Provide teaching via preferred learning methods  Outcome: Progressing     Problem: Nutrition/Hydration-ADULT  Goal: Nutrient/Hydration intake appropriate for improving, restoring or maintaining nutritional needs  Description: Monitor and assess patient's nutrition/hydration status for malnutrition  Collaborate with interdisciplinary team and initiate plan and interventions as ordered  Monitor patient's weight and dietary intake as ordered or per policy  Utilize nutrition screening tool and intervene as necessary  Determine patient's food preferences and provide high-protein, high-caloric foods as appropriate       INTERVENTIONS:  - Monitor oral intake, urinary output, labs, and treatment plans  - Assess nutrition and hydration status and recommend course of action  - Evaluate amount of meals eaten  - Assist patient with eating if necessary   - Allow adequate time for meals  - Recommend/ encourage appropriate diets, oral nutritional supplements, and vitamin/mineral supplements  - Order, calculate, and assess calorie counts as needed  - Recommend, monitor, and adjust tube feedings and TPN/PPN based on assessed needs  - Assess need for intravenous fluids  - Provide specific nutrition/hydration education as appropriate  - Include patient/family/caregiver in decisions related to nutrition  Outcome: Progressing     Problem: GASTROINTESTINAL - ADULT  Goal: Minimal or absence of nausea and/or vomiting  Description: INTERVENTIONS:  - Administer IV fluids if ordered to ensure adequate hydration  - Maintain NPO status until nausea and vomiting are resolved  - Nasogastric tube if ordered  - Administer ordered antiemetic medications as needed  - Provide nonpharmacologic comfort measures as appropriate  - Advance diet as tolerated, if ordered  - Consider nutrition services referral to assist patient with adequate nutrition and appropriate food choices  Outcome: Progressing  Goal: Maintains or returns to baseline bowel function  Description: INTERVENTIONS:  - Assess bowel function  - Encourage oral fluids to ensure adequate hydration  - Administer IV fluids if ordered to ensure adequate hydration  - Administer ordered medications as needed  - Encourage mobilization and activity  - Consider nutritional services referral to assist patient with adequate nutrition and appropriate food choices  Outcome: Progressing  Goal: Maintains adequate nutritional intake  Description: INTERVENTIONS:  - Monitor percentage of each meal consumed  - Identify factors contributing to decreased intake, treat as appropriate  - Assist with meals as needed  - Monitor I&O, weight, and lab values if indicated  - Obtain nutrition services referral as needed  Outcome: Progressing  Goal: Establish and maintain optimal ostomy function  Description: INTERVENTIONS:  - Assess bowel function  - Encourage oral fluids to ensure adequate hydration  - Administer IV fluids if ordered to ensure adequate hydration   - Administer ordered medications as needed  - Encourage mobilization and activity  - Nutrition services referral to assist patient with appropriate food choices  - Assess stoma site  - Consider wound care consult   Outcome: Progressing  Goal: Oral mucous membranes remain intact  Description: INTERVENTIONS  - Assess oral mucosa and hygiene practices  - Implement preventative oral hygiene regimen  - Implement oral medicated treatments as ordered  - Initiate Nutrition services referral as needed  Outcome: Progressing

## 2023-03-05 NOTE — PLAN OF CARE
Problem: MOBILITY - ADULT  Goal: Maintain or return to baseline ADL function  Description: INTERVENTIONS:  -  Assess patient's ability to carry out ADLs; assess patient's baseline for ADL function and identify physical deficits which impact ability to perform ADLs (bathing, care of mouth/teeth, toileting, grooming, dressing, etc )  - Assess/evaluate cause of self-care deficits   - Assess range of motion  - Assess patient's mobility; develop plan if impaired  - Assess patient's need for assistive devices and provide as appropriate  - Encourage maximum independence but intervene and supervise when necessary  - Involve family in performance of ADLs  - Assess for home care needs following discharge   - Consider OT consult to assist with ADL evaluation and planning for discharge  - Provide patient education as appropriate  Outcome: Progressing  Goal: Maintains/Returns to pre admission functional level  Description: INTERVENTIONS:  - Perform BMAT or MOVE assessment daily    - Set and communicate daily mobility goal to care team and patient/family/caregiver  - Collaborate with rehabilitation services on mobility goals if consulted  - Perform Range of Motion 3 times a day  - Reposition patient every 2 hours    - Dangle patient 3 times a day  - Stand patient 3 times a day  - Ambulate patient 3 times a day  - Out of bed to chair 3 times a day   - Out of bed for meals 3 times a day  - Out of bed for toileting  - Record patient progress and toleration of activity level   Outcome: Progressing     Problem: PAIN - ADULT  Goal: Verbalizes/displays adequate comfort level or baseline comfort level  Description: Interventions:  - Encourage patient to monitor pain and request assistance  - Assess pain using appropriate pain scale  - Administer analgesics based on type and severity of pain and evaluate response  - Implement non-pharmacological measures as appropriate and evaluate response  - Consider cultural and social influences on pain and pain management  - Notify physician/advanced practitioner if interventions unsuccessful or patient reports new pain  Outcome: Progressing     Problem: INFECTION - ADULT  Goal: Absence or prevention of progression during hospitalization  Description: INTERVENTIONS:  - Assess and monitor for signs and symptoms of infection  - Monitor lab/diagnostic results  - Monitor all insertion sites, i e  indwelling lines, tubes, and drains  - Monitor endotracheal if appropriate and nasal secretions for changes in amount and color  - [place] appropriate cooling/warming therapies per order  - Administer medications as ordered  - Instruct and encourage patient and family to use good hand hygiene technique  - Identify and instruct in appropriate isolation precautions for identified infection/condition  Outcome: Progressing  Goal: Absence of fever/infection during neutropenic period  Description: INTERVENTIONS:  - Monitor WBC    Outcome: Progressing     Problem: SAFETY ADULT  Goal: Maintain or return to baseline ADL function  Description: INTERVENTIONS:  -  Assess patient's ability to carry out ADLs; assess patient's baseline for ADL function and identify physical deficits which impact ability to perform ADLs (bathing, care of mouth/teeth, toileting, grooming, dressing, etc )  - Assess/evaluate cause of self-care deficits   - Assess range of motion  - Assess patient's mobility; develop plan if impaired  - Assess patient's need for assistive devices and provide as appropriate  - Encourage maximum independence but intervene and supervise when necessary  - Involve family in performance of ADLs  - Assess for home care needs following discharge   - Consider OT consult to assist with ADL evaluation and planning for discharge  - Provide patient education as appropriate  Outcome: Progressing  Goal: Maintains/Returns to pre admission functional level  Description: INTERVENTIONS:  - Perform BMAT or MOVE assessment daily    - Set and communicate daily mobility goal to care team and patient/family/caregiver  - Collaborate with rehabilitation services on mobility goals if consulted  - Perform Range of Motion 3 times a day  - Reposition patient every 2 hours    - Dangle patient 3 times a day  - Stand patient 3 times a day  - Ambulate patient 3 times a day  - Out of bed to chair 3 times a day   - Out of bed for meals 3 times a day  - Out of bed for toileting  - Record patient progress and toleration of activity level   Outcome: Progressing  Goal: Patient will remain free of falls  Description: INTERVENTIONS:  - Educate patient/family on patient safety including physical limitations  - Instruct patient to call for assistance with activity   - Consult OT/PT to assist with strengthening/mobility   - Keep Call bell within reach  - Keep bed low and locked with side rails adjusted as appropriate  - Keep care items and personal belongings within reach  - Initiate and maintain comfort rounds  - Make Fall Risk Sign visible to staff  - Offer Toileting every 2 Hours, in advance of need  - Initiate/Maintain bed alarm  - Obtain necessary fall risk management equipment: yellow bracelet, yellow socks  - Apply yellow socks and bracelet for high fall risk patients  - Consider moving patient to room near nurses station  Outcome: Progressing     Problem: DISCHARGE PLANNING  Goal: Discharge to home or other facility with appropriate resources  Description: INTERVENTIONS:  - Identify barriers to discharge w/patient and caregiver  - Arrange for needed discharge resources and transportation as appropriate  - Identify discharge learning needs (meds, wound care, etc )  - Arrange for interpretive services to assist at discharge as needed  - Refer to Case Management Department for coordinating discharge planning if the patient needs post-hospital services based on physician/advanced practitioner order or complex needs related to functional status, cognitive ability, or social support system  Outcome: Progressing     Problem: Nutrition/Hydration-ADULT  Goal: Nutrient/Hydration intake appropriate for improving, restoring or maintaining nutritional needs  Description: Monitor and assess patient's nutrition/hydration status for malnutrition  Collaborate with interdisciplinary team and initiate plan and interventions as ordered  Monitor patient's weight and dietary intake as ordered or per policy  Utilize nutrition screening tool and intervene as necessary  Determine patient's food preferences and provide high-protein, high-caloric foods as appropriate  INTERVENTIONS:  - Monitor oral intake, urinary output, labs, and treatment plans  - Assess nutrition and hydration status and recommend course of action  - Evaluate amount of meals eaten  - Assist patient with eating if necessary   - Allow adequate time for meals  - Recommend/ encourage appropriate diets, oral nutritional supplements, and vitamin/mineral supplements  - Order, calculate, and assess calorie counts as needed  - Recommend, monitor, and adjust tube feedings and TPN/PPN based on assessed needs  - Assess need for intravenous fluids  - Provide specific nutrition/hydration education as appropriate  - Include patient/family/caregiver in decisions related to nutrition  Outcome: Progressing     Problem: Knowledge Deficit  Goal: Patient/family/caregiver demonstrates understanding of disease process, treatment plan, medications, and discharge instructions  Description: Complete learning assessment and assess knowledge base    Interventions:  - Provide teaching at level of understanding  - Provide teaching via preferred learning methods  Outcome: Progressing

## 2023-03-05 NOTE — QUICK NOTE
GI Quick Note   ---------------------    · Informed by ICU team that patient's hemoglobin is 7 0 down from 9 4 this afternoon  Chart reviewed and it appears that he had a large bloody bowel movement in the afternoon resulting in hemodynamic instability  CTA at that time did not reveal any active bleeding    · Currently does not have any bloody bowel movements and remains off pressors  · The hemoglobin drop is most likely reflective of the bloody bowel movement he had this afternoon  · Continue to monitor  · If he does have any further episodes of bleeding recommend stat CTA

## 2023-03-05 NOTE — PROGRESS NOTES
Progress Note- Zachary Shaw 69 y o  male MRN: [id number]    Unit/Bed#: ICU 05 Encounter: [id number]      Assessment and Plan:    69year old male with HTN, DM, CKD, CAD s/p PCI x 4, most recent in December 2022 currently on aspirin and Brilinta who is currently admitted with blood in stool     1  Blood in stool  2  Acute anemia  Patient presented 3/2 after having watery bowel movements mixed with dark red blood with clots associated with acute blood loss anemia requiring multiple units of blood  High-volume bleeding scan was obtained which showed no evidence of active GI bleeding  EGD 3/2 with no etiology of bleeding  Colonoscopy 3/3 with suspected ulcer within a diverticulum in the descending colon with possible visible vessel and an adherant clot, deployed 2 hemostatic clips  At noon on 3/4 the patient had an episode of BRBPR and associated with lightheadedness, tachycardia, and hypotension  The patient underwent repeat CTA which was negative  He has had continued intermittent episodes of BRBPR with a HGB drop from 9 4-7 prompting transfusion with 1 unit, patient now 7 5-7 8  VSS  -Patient's bleeding is consistent with persistent diverticular bleeding likely exacerbated by DAPT, thankfully per cardiology okay to hold all antiplatelet therapy until hemostasis has been achieved  -There is no role for repeat colonoscopy at this time as therapeutic yield is very low, if hemostasis is not achieved and the patient has hemodynamically significant bleeding it may be reasonable to repeat STAT CTA once again and if extravasation found recomemnd IR embolization, IR would like to avoid empiric embolization (even with clip localization) given higher risk for ischemia  -Trend H&H  -Monitor vitals  -Monitor bowel movements  -Clears     3  Diverticulitis  Uncomplicated on imaging obtained on admit   Last colonoscopy in 2016 showing 1 polyp as well as mild diverticulosis in the left-sided colon    -Continue complete course of antibiotics    ______________________________________________________________________    Subjective:     Colonoscopy 3/3 with suspected ulcer within a diverticulum in the descending colon with possible visible vessel and an adherant clot, deployed 2 hemostatic clips  At noon on 3/4 the patient had an episode of BRBPR and associated with lightheadedness, tachycardia, and hypotension  The patient underwent repeat CTA which was negative  He had one more maroon colored stool following this with a HGB drop from 9 4-7 prompting transfusion with 1 unit, patient now 7 5-7 8        Medication Administration - last 24 hours from 03/04/2023 0924 to 03/05/2023 6006       Date/Time Order Dose Route Action Action by     03/05/2023 0923 EST metroNIDAZOLE (FLAGYL) IVPB (premix) 500 mg 100 mL 500 mg Intravenous New Bag Shwetha Cocchimiglio, RN     03/05/2023 0100 EST metroNIDAZOLE (FLAGYL) IVPB (premix) 500 mg 100 mL 0 mg Intravenous Stopped [facility], RN     03/05/2023 0018 EST metroNIDAZOLE (FLAGYL) IVPB (premix) 500 mg 100 mL 500 mg Intravenous [facility]     03/04/2023 1650 EST metroNIDAZOLE (FLAGYL) IVPB (premix) 500 mg 100 mL 500 mg Intravenous [address] Rosenthal, RN     03/05/2023 5490 EST cefTRIAXone (ROCEPHIN) IVPB (premix in dextrose) 1,000 mg 50 mL 1,000 mg Intravenous [facility], RN     03/05/2023 0600 EST pantoprazole (PROTONIX) EC tablet 40 mg 40 mg Oral Given [facility]     03/05/2023 3196 EST allopurinol (ZYLOPRIM) tablet 100 mg 100 mg Oral Given Aleyda Stewart, RN     03/05/2023 1121 EST escitalopram (LEXAPRO) tablet 10 mg 10 mg Oral Given Aleyda Stewart, RN     03/05/2023 0550 EST levothyroxine tablet 88 mcg 88 mcg Oral Given [facility], RN     03/04/2023 1650 EST atorvastatin (LIPITOR) tablet 80 mg 80 mg Oral Given Rosanne Rosenthal, RN     03/05/2023 0001 EST acetaminophen (TYLENOL) tablet 650 mg 650 mg Oral Given [facility], RN     03/04/2023 1650 EST insulin lispro (HumaLOG) 100 units/mL subcutaneous injection 2-12 Units -- Subcutaneous Canceled Entry Rosanne Rosenthal, RN     03/04/2023 1124 EST insulin lispro (HumaLOG) 100 units/mL subcutaneous injection 2-12 Units 2 Units Subcutaneous Given Rosanne Rosenthal, RN     03/04/2023 1900 EST multi-electrolyte (ISOLYTE-S PH 7 4) bolus 500 mL 0 mL Intravenous Stopped Ester Wells, RN     03/04/2023 1239 EST multi-electrolyte (ISOLYTE-S PH 7 4) bolus 500 mL 500 mL Intravenous New Bag Rosanne Rosenthal, RN     03/04/2023 1531 EST NOREPINEPHRINE 4 MG  ML NSS (CMPD ORDER) infusion 0 mcg/min Intravenous Stopped Rosanne Rosenthal, RN     03/04/2023 1355 EST NOREPINEPHRINE 4 MG  ML NSS (CMPD ORDER) infusion 2 mcg/min Intravenous Restarted Rosanne Rosenthal, RN     03/04/2023 1330 EST NOREPINEPHRINE 4 MG  ML NSS (CMPD ORDER) infusion 0 mcg/min Intravenous Stopped Rosanne Rosenthal, RN     03/04/2023 1239 EST NOREPINEPHRINE 4 MG  ML NSS (CMPD ORDER) infusion 2 mcg/min Intravenous New Bag Rosanne Rosenthal, RN     03/04/2023 1329 EST iohexol (OMNIPAQUE) 350 MG/ML injection (SINGLE-DOSE) 100 mL 100 mL Intravenous Given Dayami Tavares     03/04/2023 1356 EST nitroglycerin (NITROSTAT) SL tablet 0 4 mg 0 4 mg Sublingual Given Rosanne Rosenthal, RN     03/04/2023 1340 EST nitroglycerin (NITROSTAT) SL tablet 0 4 mg 0 4 mg Sublingual Given Rosanne Rosenthal, RN     03/05/2023 0557 EST insulin lispro (HumaLOG) 100 units/mL subcutaneous injection 2-12 Units 2 Units Subcutaneous Not Given [facility], RN     03/05/2023 0004 EST insulin lispro (HumaLOG) 100 units/mL subcutaneous injection 2-12 Units 2 Units Subcutaneous Not Given [facility], RN     03/04/2023 1812 EST insulin lispro (HumaLOG) 100 units/mL subcutaneous injection 2-12 Units 4 Units Subcutaneous Given Kayla Dang RN          Objective:     Vitals: Blood pressure 138/66, pulse 78, temperature 97 5 °F (36 4 °C), temperature source Bladder, resp  rate 15, weight 102 kg (224 lb 3 3 oz), SpO2 99 %  ,Body mass index is 34 09 kg/m²  Intake/Output Summary (Last 24 hours) at 3/5/2023 8556  Last data filed at 3/5/2023 0547  Gross per 24 hour   Intake 920 88 ml   Output 2730 ml   Net -1809 12 ml       Physical Exam:   General Appearance: Awake and alert, in no acute distress  Abdomen: Soft, non-tender, non-distended    Invasive Devices     Peripheral Intravenous Line  Duration           Peripheral IV 03/02/23 Right Antecubital 3 days    Peripheral IV 03/04/23 Distal;Right;Upper;Ventral (anterior) Arm <1 day          Drain  Duration           Urethral Catheter Temperature probe <1 day                Lab Results:  No results displayed because visit has over 200 results  Imaging Studies: I have personally reviewed pertinent imaging studies

## 2023-03-05 NOTE — PROGRESS NOTES
[facility]  Progress Note - Alvin Negrete 1945, 69 y o  male MRN: [id number]  Unit/Bed#: ICU 05 Encounter: [id number]  Primary Care Provider: Ge Gutierrez DO   Date and time admitted to hospital: 3/2/2023 10:59 AM    * Acute lower GI bleeding  Assessment & Plan  Significantly hypotensive on presentation to [address] [place] [address]  Received 4 units PRBCs at outside hospital  Transferred to higher level of care for GI evaluation and potential need for IR  Presented with hypotension systolic blood pressure in the 80s to 90s, currently improving, no signs of acute bleeding; Most recent hemoglobin is 10  Underwent EGD which ruled out upper GI bleed  Suspect possible diverticular bleed in setting of acute diverticulitis    Colonoscopy 3/3 showed bleeding ulcer - clip x2 placed  Episode of hematochezia, hypotension 03/04 - repeat CT without evidence of active high volume gastrointestinal hemorrhage; nearly resolved diverticulitis at the descending colon      Plan:  · hgb dropped to 7 5 overnight without evidence of ongoing blood loss  · Given his recent coronary intervention and intermittent CP, he was transfused one unit to keep his hgb closer to 9  · We will continue to monitor his clinical status closely  · We will continue to trend his hgb  · We will continue to hold his antiplatelet medications for now    Coronary artery disease of native artery of native heart with stable angina pectoris [facility])  Assessment & Plan  · Remote stent placement x3  · Most recent PCI Dec 2022  · Troponin within normal limit  · Per cards holding DAPT ok but recommend keeping 1 agent with transition back to 2 when no longer bleeding  · Will hold brillinta and aspirin for now given episode of hematochezia 03/04  · When bleeding resolved consider transitioning from brillinta to plavix  · Continue statin  · Hold BB for now given hypotension  · Hold torsemide given hypotension    Acute blood loss anemia  Assessment & Plan  Lab Results   Component Value Date    HGB 7 0 (L) 03/04/2023    HGB 9 4 (L) 03/04/2023    HGB 8 9 (L) 03/04/2023    HGB 10 6 (L) 03/03/2023     Patient admitted with lower GI bleed, 2 episodes of bright red blood per rectum  Hemoglobin on admission with a drop of 3 g  S/p 4 units of PRBC  S/p EGD which ruled out upper GI source of bleeding    Colonoscopy showed bleeding colonic ulcer which was clipped x2  Additional episode of BRBPR 03/04 with hypotension    Plan:  · Monitor hemoglobin  · Holding antiplatelet medication for now  · Further management per GI's recommendation      Hypotension  Assessment & Plan  Blood Pressure: 120/67    · With improvment  · Most likely source is GI bleed  · Hgb 10 6 > 8 9 > 9 4 > 7 0  · Transfused one unit PRBC      Acute diverticulitis  Assessment & Plan  · Continue ceftriaxone and flagyl day 3    Diabetes mellitus, type 2 [facility])  Assessment & Plan  Lab Results   Component Value Date    HGBA1C 8 1 (H) 01/16/2023       Recent Labs     03/04/23  0540 03/04/23  0720 03/04/23  1121 03/04/23  1811   POCGLU 117 187* 180* 214*       Blood Sugar Average: Last 72 hrs:     · Home regimen: Glimepiride, Jardiance, Metformin  · SSI while inpatient   · Our goal will be to maintain his blood glucose between 140 and 180  · Hypoglycemia protocol  · Accu-Cheks every 6 hours, currently NPO  · Diabetic diet once patient allowed to eat    Stage 3a chronic kidney disease [facility])  Assessment & Plan  Lab Results   Component Value Date    EGFR 83 03/04/2023    EGFR 67 03/03/2023    EGFR 49 03/02/2023    CREATININE 0 88 03/04/2023    CREATININE 1 06 03/03/2023    CREATININE 1 38 (H) 03/02/2023     · Creatinine baseline 1 2-1 3  · Trend urine output and renal indices  · Avoid nephrotoxic agents  · Continue measuring I/O    Chronic diastolic (congestive) heart failure (HCC)  Assessment & Plan  Wt Readings from Last 3 Encounters:   03/04/23 102 kg (225 lb 12 oz)   03/02/23 98 9 kg (218 lb 0 6 oz)   02/22/23 98 9 kg (218 lb)       · Hold BB for now given hypotension  · Hold torsemide given hypotension      Recurrent major depressive disorder (HCC)  Assessment & Plan  · home Lexapro     Primary osteoarthritis involving multiple joints  Assessment & Plan  · Pain control as needed    Hypertension  Assessment & Plan  · Home regimen: Losartan, propanolol  · Holding home antihypertensives in setting of hypotension due to GI bleed    Hyperlipidemia  Assessment & Plan  · Resumed home regimen: Atorvastatin    ----------------------------------------------------------------------------------------  HPI/24hr events: Hgb drop overnight, one episode of bloody stool overnight    Patient appropriate for transfer out of the ICU today?: No  Disposition: Continue Stepdown Level 1 level of care   Code Status: Level 1 - Full Code  ---------------------------------------------------------------------------------------  SUBJECTIVE  Denies pain    Review of Systems  Review of systems was reviewed and negative unless stated above in HPI/24-hour events   ---------------------------------------------------------------------------------------  OBJECTIVE    Vitals   Vitals:    23 0220 23 0400 23 0438 23 0500   BP: 141/66 123/69  134/74   BP Location:       Pulse: 92 78  80   Resp: 14 12  13   Temp: 97 9 °F (36 6 °C) (!) 97 2 °F (36 2 °C)  (!) 97 2 °F (36 2 °C)   TempSrc:  Bladder     SpO2: 100% 99%  99%   Weight:   102 kg (224 lb 3 3 oz)      Temp (24hrs), Av 6 °F (37 °C), Min:97 2 °F (36 2 °C), Max:99 3 °F (37 4 °C)  Current: Temperature: (!) 97 2 °F (36 2 °C)          Respiratory:  SpO2: SpO2: 99 %, SpO2 Activity: SpO2 Activity: At Rest, SpO2 Device: O2 Device: None (Room air)  Nasal Cannula O2 Flow Rate (L/min): 4 L/min    Invasive/non-invasive ventilation settings   Respiratory    Lab Data (Last 4 hours)    None         O2/Vent Data (Last 4 hours)    None                Physical Exam  Constitutional: Appearance: He is obese  HENT:      Head: Normocephalic  Mouth/Throat:      Mouth: Mucous membranes are dry  Eyes:      Pupils: Pupils are equal, round, and reactive to light  Cardiovascular:      Rate and Rhythm: Normal rate  Pulmonary:      Effort: Pulmonary effort is normal       Breath sounds: Normal breath sounds  Abdominal:      General: There is distension  Palpations: Abdomen is soft  Tenderness: There is no abdominal tenderness  Musculoskeletal:         General: Swelling present  Cervical back: Neck supple  Skin:     General: Skin is warm and dry  Coloration: Skin is pale  Neurological:      Mental Status: He is alert  Laboratory and Diagnostics:  Results from last 7 days   Lab Units 03/05/23  0431 03/04/23  2132 03/04/23  1906 03/04/23  1223 03/04/23  0442 03/03/23  1225 03/03/23  0545 03/02/23  1716 03/02/23  1132 03/02/23  0745 03/02/23  0531   WBC Thousand/uL 6 84  --   --  16 36* 6 57  --  8 83  --  9 24  --  17 11*   HEMOGLOBIN g/dL 7 8* 7 5* 7 0* 9 4* 8 9* 10 6* 10 3*   < > 14 4 9 9* 12 3   HEMATOCRIT % 23 2* 22 5*  --  28 8* 26 8*  --  30 2*  --  43 7 29 7* 36 7   PLATELETS Thousands/uL 169  --   --  248 186  --  173  --  203  --  329   NEUTROS PCT % 62  --   --   --  58  --   --   --  68  --  70   MONOS PCT % 9  --   --   --  10  --   --   --  9  --  8    < > = values in this interval not displayed       Results from last 7 days   Lab Units 03/05/23  0431 03/04/23  0442 03/03/23  0545 03/02/23  1734 03/02/23  1132 03/02/23  0531   SODIUM mmol/L 140 139 140  --  138 136   POTASSIUM mmol/L 3 8 3 6 3 9 4 8 5 9* 5 9*   CHLORIDE mmol/L 110* 108 110*  --  110* 103   CO2 mmol/L 23 24 24  --  21 23   ANION GAP mmol/L 7 7 6  --  7 10   BUN mg/dL 8 12 25  --  31* 28*   CREATININE mg/dL 0 86 0 88 1 06  --  1 38* 1 53*   CALCIUM mg/dL 7 3* 7 1* 7 5*  --  8 4 9 0   GLUCOSE RANDOM mg/dL 146* 97 118  --  152* 271*   ALT U/L  --   --   --   --  22 25   AST U/L --   --   --   --  15 15   ALK PHOS U/L  --   --   --   --  58 67   ALBUMIN g/dL  --   --   --   --  3 3* 3 7   TOTAL BILIRUBIN mg/dL  --   --   --   --  0 88 0 59     Results from last 7 days   Lab Units 03/03/23  0545 03/02/23  1132   MAGNESIUM mg/dL 2 2 1 8*   PHOSPHORUS mg/dL 3 5 3 4      Results from last 7 days   Lab Units 03/04/23  2132 03/02/23  1132   INR  1 11 1 03   PTT seconds 30 25          Results from last 7 days   Lab Units 03/02/23  1132 03/02/23  0745 03/02/23  0531   LACTIC ACID mmol/L 1 5 2 6* 3 8*     ABG:    VBG:  Results from last 7 days   Lab Units 03/02/23  1132   PH ZEB  7 360   PCO2 ZEB mm Hg 32 9*   PO2 ZEB mm Hg 94 0*   HCO3 ZEB mmol/L 18 2*   BASE EXC ZEB mmol/L -6 1           Micro        EKG:   Imaging: I have personally reviewed pertinent reports  and I have personally reviewed pertinent films in PACS    Intake and Output  I/O       03/03 0701 03/04 0700 03/04 0701 03/05 0700    P  O   240    I V  (mL/kg) 4750 8 (46 6) 518 4 (5 1)    IV Piggyback 100 200    Total Intake(mL/kg) 4850 8 (47 6) 958 4 (9 4)    Urine (mL/kg/hr) 1125 (0 5) 2485 (1 6)    Stool 0 0    Total Output 1125 2485    Net +3725 8 -1526 6          Unmeasured Urine Occurrence 1 x 1 x    Unmeasured Stool Occurrence 1 x 1 x          Height and Weights         Body mass index is 34 09 kg/m²    Weight (last 2 days)     Date/Time Weight    03/05/23 0438 102 (224 21)    03/04/23 0600 102 (225 75)    03/04/23 0538 102 (225 75)    03/03/23 0546 101 (222 44)            Nutrition       Diet Orders   (From admission, onward)             Start     Ordered    03/04/23 1226  Diet NPO  Diet effective midnight        References:    Nutrtion Support Algorithm Enteral vs  Parenteral   Question:  Diet Type  Answer:  NPO    03/04/23 1226                  Active Medications  Scheduled Meds:  Current Facility-Administered Medications   Medication Dose Route Frequency Provider Last Rate   • acetaminophen  650 mg Oral Q6H PRN Jena Smith, KAYLINP     • allopurinol  100 mg Oral Daily Kim Villafana PA-C     • atorvastatin  80 mg Oral Daily With KB Home	Wister ALEXANDRIA Parks     • cefTRIAXone  1,000 mg Intravenous Q24H Kim Villafana PA-C 1,000 mg (03/04/23 0555)   • escitalopram  10 mg Oral Daily Lovella Glimpse Kasey PA-C     • insulin lispro  2-12 Units Subcutaneous Q6H [facility] HOME FAY Dobbins     • levothyroxine  88 mcg Oral Early Morning Lovella Glimpse Kasey PA-C     • metroNIDAZOLE  500 mg Intravenous Q8H Kim Villafana PA-C Stopped (03/05/23 0100)   • multivitamin stress formula  1 tablet Oral Daily Amari Parks PA-C     • nitroglycerin  0 4 mg Sublingual Q5 Min PRN FAY Dobbins     • norepinephrine  1-30 mcg/min Intravenous Titrated FAY Dobbins Stopped (03/04/23 1531)   • pantoprazole  40 mg Oral Daily Before Breakfast Prabhjot Davis MD     • polyethylene glycol  4,000 mL Oral See Admin Instructions Bo Alfaro MD     • zonisamide  50 mg Oral Daily Amari Parks PA-C       Continuous Infusions:  norepinephrine, 1-30 mcg/min, Last Rate: Stopped (03/04/23 1531)      PRN Meds:   acetaminophen, 650 mg, Q6H PRN  nitroglycerin, 0 4 mg, Q5 Min PRN        Invasive Devices Review  Invasive Devices     Peripheral Intravenous Line  Duration           Peripheral IV 03/02/23 Right Antecubital 3 days    Peripheral IV 03/04/23 Distal;Right;Upper;Ventral (anterior) Arm <1 day          Drain  Duration           Urethral Catheter Temperature probe <1 day                Rationale for remaining devices:   ---------------------------------------------------------------------------------------  Advance Directive and Living Will:      Power of :    POLST:    ---------------------------------------------------------------------------------------  Care Time Delivered:         FAY Madrigal      Portions of the record may have been created with voice recognition software    Occasional wrong word or "sound a like" substitutions may have occurred due to the inherent limitations of voice recognition software    Read the chart carefully and recognize, using context, where substitutions have occurred

## 2023-03-05 NOTE — PROGRESS NOTES
Cardiology Progress Note   MD Ramiro Garcia MD Leveda Heart, DO, MD Smita Garcia DO, Godwin Jaime DO, [facility]  ----------------------------------------------------------------  [address] [address]    Rosanne Kelly 69 y o  male MRN: [id number]  Unit/Bed#: ICU 05 Encounter: [id number]      ASSESSMENT:   • Acute blood loss anemia secondary to GI bleed  • w/ diverticular ulcer in the descending colon s/p 2 hemostatic clips, March 3, 2023  • CAD  ? CP s/p SAM-pLAD and POBA-mLAD w/ residual 60% OM1 distal to OM stent and -mRCA, December 2022  ? STEMI s/p SAM-pRCA, September 2014  ? NSTEMI s/p PCI-OM1, December 2000  • Chronic diastolic heart failure  ? LVEF 65%, mild LVH, mild LA dilatation, moderate AS, trace TR, March 2022  • Moderate aortic stenosis  ? w/ mean PG 18 mmHg and KELECHI 1 2 cm2, March 2022  • Hypotension  • History of hypertension  • Dyslipidemia  • Type 2 diabetes mellitus  • Obesity  • Osteoarthritis     PLAN:  On aspirin monotherapy, hemoglobin continued to trend down  At this point, would hold all antiplatelet therapy until hemostasis has been achieved  Would consider reinitiation of antiplatelet therapy once there is adequate hemostasis, but to restart at this time the risks would certainly outweigh the benefit and may be life-threatening  Would defer to GI when it is reasonable to initiate a trial restart single antiplatelet followed by dual antiplatelet if possible  Continue high intensity statin  Treatment of anemia as per primary team/GI  Supportive care    Signed: Sarita Murray DO, GABE, AVERY CONTRERAS      History of Present Illness:  Patient seen and examined  Denies chest pain, pressure, tightness or squeezing  Denies lightheadedness, dizziness or palpitations  Denies lower extremity swelling, orthopnea or paroxysmal nocturnal dyspnea  Resting comfortably in bed        Review of Systems:  Review of Systems   Constitutional: Negative for decreased appetite, fever, weight gain and weight loss  HENT: Negative for congestion and sore throat  Eyes: Negative for visual disturbance  Cardiovascular: Negative for chest pain, dyspnea on exertion, leg swelling, near-syncope and palpitations  Respiratory: Negative for cough and shortness of breath  Hematologic/Lymphatic: Negative for bleeding problem  Skin: Negative for rash  Musculoskeletal: Negative for myalgias and neck pain  Gastrointestinal: Negative for abdominal pain and nausea  Neurological: Negative for light-headedness and weakness  Psychiatric/Behavioral: Negative for depression  No Known Allergies    No current facility-administered medications on file prior to encounter  Current Outpatient Medications on File Prior to Encounter   Medication Sig   • allopurinol (ZYLOPRIM) 100 mg tablet Take 1 tablet (100 mg total) by mouth daily   • allopurinol (ZYLOPRIM) 100 mg tablet Take 1 tablet by mouth once daily   • aspirin 81 mg chewable tablet Chew 1 tablet (81 mg total) daily   • atorvastatin (LIPITOR) 80 mg tablet Take 1 tablet (80 mg total) by mouth daily   • benzonatate (TESSALON) 200 MG capsule Take 1 capsule (200 mg total) by mouth 3 (three) times a day as needed for cough (Patient not taking: Reported on 1/12/2023)   • Blood Glucose Monitoring Suppl (OneTouch Verio Reflect) w/Device KIT Check blood sugars twice daily  Please substitute with appropriate alternative as covered by patient's insurance  Dx: E11 65   • Empagliflozin (Jardiance) 10 MG TABS Take 1 tablet (10 mg total) by mouth every morning   • escitalopram (LEXAPRO) 20 mg tablet Take 0 5 tablets (10 mg total) by mouth daily   • glimepiride (AMARYL) 4 mg tablet TAKE 1 TABLET TWICE A DAY   • glucose blood (OneTouch Verio) test strip Check blood sugars twice daily  Please substitute with appropriate alternative as covered by patient's insurance   Dx: E11 65   • levothyroxine 88 mcg tablet TAKE 1 TABLET BY MOUTH ONCE DAILY IN THE MORNING   • levothyroxine 88 mcg tablet Take 1 tablet (88 mcg total) by mouth daily in the early morning   • losartan (COZAAR) 25 mg tablet Take 1 tablet (25 mg total) by mouth daily   • metFORMIN (GLUCOPHAGE) 1000 MG tablet Take 1 tablet (1,000 mg total) by mouth 2 (two) times a day with meals   • multivitamin (THERAGRAN) TABS Take 1 tablet by mouth daily   • nitroglycerin (NITROSTAT) 0 4 mg SL tablet Place 1 tablet (0 4 mg total) under the tongue every 5 (five) minutes as needed for chest pain   • OneTouch Delica Lancets 55O MISC Check blood sugars twice daily  Please substitute with appropriate alternative as covered by patient's insurance   Dx: E11 65   • prednisoLONE acetate (PRED FORTE) 1 % ophthalmic suspension INSTILL 1 DROP INTO RIGHT EYE ONCE DAILY   • propranolol (INDERAL LA) 60 mg 24 hr capsule Take 1 capsule (60 mg total) by mouth daily   • ranolazine (RANEXA) 500 mg 12 hr tablet Take 1 tablet (500 mg total) by mouth 2 (two) times a day   • ticagrelor (BRILINTA) 90 MG Take 1 tablet (90 mg total) by mouth every 12 (twelve) hours   • torsemide (DEMADEX) 10 mg tablet Take 1 tablet (10 mg total) by mouth daily (Patient not taking: Reported on 1/31/2023)   • zonisamide (ZONEGRAN) 50 MG capsule Take 1 tablet (50 mg total) by mouth daily   • [DISCONTINUED] topiramate (Topamax) 25 mg tablet Take 1 tablet (25 mg total) by mouth daily X 1 week then discontinue        Current Facility-Administered Medications   Medication Dose Route Frequency Provider Last Rate   • acetaminophen  650 mg Oral Q6H PRN Huey Felty, CRNP     • allopurinol  100 mg Oral Daily Shwetha Parks PA-C     • atorvastatin  80 mg Oral Daily With Sue Parks PA-C     • cefTRIAXone  1,000 mg Intravenous Q24H Shwetha Parks PA-C 1,000 mg (03/05/23 0544)   • escitalopram  10 mg Oral Daily Amari Parks PA-C     • insulin lispro  2-12 Units Subcutaneous Q6H [address] FAY Olvera     • levothyroxine  88 mcg Oral Early Morning Arnav Parks PA-C     • metroNIDAZOLE  500 mg Intravenous Q8H Yoko Sandoval PA-C Stopped (03/05/23 0100)   • multivitamin stress formula  1 tablet Oral Daily Amari Parks PA-C     • nitroglycerin  0 4 mg Sublingual Q5 Min PRN FAY Olvera     • norepinephrine  1-30 mcg/min Intravenous Titrated FAY Olvera Stopped (03/04/23 1531)   • pantoprazole  40 mg Oral Daily Before Breakfast Yaritza Myers MD     • polyethylene glycol  4,000 mL Oral See Admin Instructions Yaritza Myers MD     • zonisamide  50 mg Oral Daily Arnav Parks PA-C         norepinephrine, 1-30 mcg/min, Last Rate: Stopped (03/04/23 1531)        Vitals:    03/05/23 0438 03/05/23 0500 03/05/23 0600 03/05/23 0701   BP:  134/74  138/66   BP Location:    Left arm   Pulse:  80  78   Resp:  13  15   Temp:  (!) 97 2 °F (36 2 °C)  97 5 °F (36 4 °C)   TempSrc:    Bladder   SpO2:  99%  99%   Weight: 102 kg (224 lb 3 3 oz)  102 kg (224 lb 3 3 oz)      Body mass index is 34 09 kg/m²  Intake/Output Summary (Last 24 hours) at 3/5/2023 9895  Last data filed at 3/5/2023 0547  Gross per 24 hour   Intake 920 88 ml   Output 2730 ml   Net -1809 12 ml       Weight change: -0 7 kg (-1 lb 8 7 oz)    PHYSICAL EXAMINATION:  Gen: Awake, Alert, NAD  Head/eyes: AT/NC, pupils equal and round, Anicteric  ENT: mmm  Neck: Supple, No elevated JVP, trachea midline  Resp: CTA bilaterally no w/r/r  CV: RRR +S1, S2, No m/r/g  Abd: Soft, NT/ND + BS  Ext: no LE edema bilaterally  Neuro:  Follows commands, moves all extermities  Psych: Appropriate affect, Pleasant mood, pleasant attitude, non-combative  Skin: warm; no rash, erythema or venous stasis changes on exposed skin    Lab Results:  Results from last 7 days   Lab Units 03/05/23  0431   WBC Thousand/uL 6 84   HEMOGLOBIN g/dL 7 8*   HEMATOCRIT % 23 2*   PLATELETS Thousands/uL 169     Results from last 7 days   Lab Units 03/05/23  0431 03/02/23  1734 03/02/23  1132   POTASSIUM mmol/L 3 8   < > 5 9*   CHLORIDE mmol/L 110*   < > 110*   CO2 mmol/L 23   < > 21   BUN mg/dL 8   < > 31*   CREATININE mg/dL 0 86   < > 1 38*   CALCIUM mg/dL 7 3*   < > 8 4   ALK PHOS U/L  --   --  58   ALT U/L  --   --  22   AST U/L  --   --  15    < > = values in this interval not displayed  No results found for: TROPONINT      Results from last 7 days   Lab Units 03/04/23  2132 03/04/23  1906 03/04/23  1657 03/04/23  1358 03/02/23  1716   HS TNI 0HR ng/L  --   --  55*  --   --    HS TNI 2HR ng/L  --  70*  --   --   --    HS TNI 4HR ng/L 87*  --   --   --   --    HS TNI RAND ng/L  --   --   --  47* 3*     Results from last 7 days   Lab Units 03/04/23  2132   INR  1 11       Tele: SR    This note was completed in part utilizing M-Modal Fluency Direct Software  Grammatical errors, random word insertions, spelling mistakes, and incomplete sentences may be an occasional consequence of this system secondary to software limitations, ambient noise, and hardware issues  If you have any questions or concerns about the content, text, or information contained within the body of this dictation, please contact the provider for clarification

## 2023-03-05 NOTE — ASSESSMENT & PLAN NOTE
Lab Results   Component Value Date    HGBA1C 8 1 (H) 01/16/2023       Recent Labs     03/04/23  0540 03/04/23  0720 03/04/23  1121 03/04/23  1811   POCGLU 117 187* 180* 214*       Blood Sugar Average: Last 72 hrs:     · Home regimen: Glimepiride, Jardiance, Metformin  · SSI while inpatient   · Our goal will be to maintain his blood glucose between 140 and 180  · Hypoglycemia protocol  · Accu-Cheks every 6 hours, currently NPO  · Diabetic diet once patient allowed to eat

## 2023-03-05 NOTE — ASSESSMENT & PLAN NOTE
· Remote stent placement x3  · Most recent PCI Dec 2022  · Troponin within normal limit  · Per cards holding DAPT ok but recommend keeping 1 agent with transition back to 2 when no longer bleeding  · Will hold brillinta and aspirin for now given episode of hematochezia 03/04  · When bleeding resolved consider transitioning from brillinta to plavix  · Continue statin  · Hold BB for now given hypotension  · Hold torsemide given hypotension

## 2023-03-05 NOTE — ASSESSMENT & PLAN NOTE
Lab Results   Component Value Date    HGB 7 0 (L) 03/04/2023    HGB 9 4 (L) 03/04/2023    HGB 8 9 (L) 03/04/2023    HGB 10 6 (L) 03/03/2023     Patient admitted with lower GI bleed, 2 episodes of bright red blood per rectum  Hemoglobin on admission with a drop of 3 g  S/p 4 units of PRBC  S/p EGD which ruled out upper GI source of bleeding    Colonoscopy showed bleeding colonic ulcer which was clipped x2  Additional episode of BRBPR 03/04 with hypotension    Plan:  · Monitor hemoglobin  · Holding antiplatelet medication for now  · Further management per GI's recommendation

## 2023-03-06 ENCOUNTER — APPOINTMENT (OUTPATIENT)
Dept: CARDIAC REHAB | Facility: HOSPITAL | Age: 78
End: 2023-03-06

## 2023-03-06 LAB
ABO GROUP BLD BPU: NORMAL
ANION GAP SERPL CALCULATED.3IONS-SCNC: 4 MMOL/L (ref 4–13)
ATRIAL RATE: 104 BPM
BPU ID: NORMAL
BUN SERPL-MCNC: 4 MG/DL (ref 5–25)
CALCIUM SERPL-MCNC: 7.6 MG/DL (ref 8.4–10.2)
CHLORIDE SERPL-SCNC: 109 MMOL/L (ref 96–108)
CO2 SERPL-SCNC: 25 MMOL/L (ref 21–32)
CREAT SERPL-MCNC: 0.84 MG/DL (ref 0.6–1.3)
CROSSMATCH: NORMAL
ERYTHROCYTE [DISTWIDTH] IN BLOOD BY AUTOMATED COUNT: 16.2 % (ref 11.6–15.1)
GFR SERPL CREATININE-BSD FRML MDRD: 85 ML/MIN/1.73SQ M
GLUCOSE SERPL-MCNC: 122 MG/DL (ref 65–140)
GLUCOSE SERPL-MCNC: 164 MG/DL (ref 65–140)
GLUCOSE SERPL-MCNC: 169 MG/DL (ref 65–140)
GLUCOSE SERPL-MCNC: 185 MG/DL (ref 65–140)
GLUCOSE SERPL-MCNC: 197 MG/DL (ref 65–140)
HCT VFR BLD AUTO: 26.3 % (ref 36.5–49.3)
HGB BLD-MCNC: 9 G/DL (ref 12–17)
MCH RBC QN AUTO: 31.8 PG (ref 26.8–34.3)
MCHC RBC AUTO-ENTMCNC: 34.2 G/DL (ref 31.4–37.4)
MCV RBC AUTO: 93 FL (ref 82–98)
P AXIS: 39 DEGREES
PLATELET # BLD AUTO: 199 THOUSANDS/UL (ref 149–390)
PMV BLD AUTO: 9 FL (ref 8.9–12.7)
POTASSIUM SERPL-SCNC: 3.1 MMOL/L (ref 3.5–5.3)
PR INTERVAL: 192 MS
QRS AXIS: -71 DEGREES
QRSD INTERVAL: 96 MS
QT INTERVAL: 367 MS
QTC INTERVAL: 483 MS
RBC # BLD AUTO: 2.83 MILLION/UL (ref 3.88–5.62)
SODIUM SERPL-SCNC: 138 MMOL/L (ref 135–147)
T WAVE AXIS: 68 DEGREES
UNIT DISPENSE STATUS: NORMAL
UNIT PRODUCT CODE: NORMAL
UNIT PRODUCT VOLUME: 350 ML
UNIT RH: NORMAL
VENTRICULAR RATE: 104 BPM
WBC # BLD AUTO: 6.08 THOUSAND/UL (ref 4.31–10.16)

## 2023-03-06 RX ORDER — METRONIDAZOLE 500 MG/1
500 TABLET ORAL EVERY 8 HOURS SCHEDULED
Status: DISCONTINUED | OUTPATIENT
Start: 2023-03-06 | End: 2023-03-08 | Stop reason: HOSPADM

## 2023-03-06 RX ORDER — POTASSIUM CHLORIDE 20MEQ/15ML
40 LIQUID (ML) ORAL ONCE
Status: COMPLETED | OUTPATIENT
Start: 2023-03-06 | End: 2023-03-06

## 2023-03-06 RX ORDER — TAMSULOSIN HYDROCHLORIDE 0.4 MG/1
0.4 CAPSULE ORAL
Status: DISCONTINUED | OUTPATIENT
Start: 2023-03-06 | End: 2023-03-08 | Stop reason: HOSPADM

## 2023-03-06 RX ORDER — CLOPIDOGREL BISULFATE 75 MG/1
75 TABLET ORAL DAILY
Status: DISCONTINUED | OUTPATIENT
Start: 2023-03-07 | End: 2023-03-08 | Stop reason: HOSPADM

## 2023-03-06 RX ADMIN — LEVOTHYROXINE SODIUM 88 MCG: 100 TABLET ORAL at 05:17

## 2023-03-06 RX ADMIN — INSULIN LISPRO 1 UNITS: 100 INJECTION, SOLUTION INTRAVENOUS; SUBCUTANEOUS at 21:27

## 2023-03-06 RX ADMIN — INSULIN LISPRO 2 UNITS: 100 INJECTION, SOLUTION INTRAVENOUS; SUBCUTANEOUS at 16:53

## 2023-03-06 RX ADMIN — B-COMPLEX W/ C & FOLIC ACID TAB 1 TABLET: TAB at 08:02

## 2023-03-06 RX ADMIN — ZONISAMIDE 50 MG: 25 CAPSULE ORAL at 08:49

## 2023-03-06 RX ADMIN — CEFTRIAXONE 1000 MG: 1 INJECTION, SOLUTION INTRAVENOUS at 05:17

## 2023-03-06 RX ADMIN — INSULIN LISPRO 2 UNITS: 100 INJECTION, SOLUTION INTRAVENOUS; SUBCUTANEOUS at 11:55

## 2023-03-06 RX ADMIN — ESCITALOPRAM OXALATE 10 MG: 10 TABLET ORAL at 08:02

## 2023-03-06 RX ADMIN — INSULIN LISPRO 2 UNITS: 100 INJECTION, SOLUTION INTRAVENOUS; SUBCUTANEOUS at 08:02

## 2023-03-06 RX ADMIN — METRONIDAZOLE 500 MG: 500 INJECTION, SOLUTION INTRAVENOUS at 08:02

## 2023-03-06 RX ADMIN — ATORVASTATIN CALCIUM 80 MG: 80 TABLET, FILM COATED ORAL at 16:53

## 2023-03-06 RX ADMIN — POTASSIUM CHLORIDE 40 MEQ: 1.5 SOLUTION ORAL at 05:17

## 2023-03-06 RX ADMIN — PANTOPRAZOLE SODIUM 40 MG: 40 TABLET, DELAYED RELEASE ORAL at 06:14

## 2023-03-06 RX ADMIN — TAMSULOSIN HYDROCHLORIDE 0.4 MG: 0.4 CAPSULE ORAL at 16:53

## 2023-03-06 RX ADMIN — ALLOPURINOL 100 MG: 100 TABLET ORAL at 08:02

## 2023-03-06 RX ADMIN — METRONIDAZOLE 500 MG: 500 TABLET ORAL at 16:53

## 2023-03-06 NOTE — ASSESSMENT & PLAN NOTE
Lab Results   Component Value Date    HGBA1C 8 1 (H) 01/16/2023       Recent Labs     03/05/23  0557 03/05/23  1148 03/05/23  1620 03/05/23  1811   POCGLU 143* 142* 218* 243*       Blood Sugar Average: Last 72 hrs:     · Home regimen: Glimepiride, Jardiance, Metformin  · SSI while inpatient   · Our goal will be to maintain his blood glucose between 140 and 180  · Hypoglycemia protocol  · Accu-Cheks ACHS, SSI  · Goal blood glucose 140-180  · Currently on clear liquid diet  · Diabetic diet once patient allowed to eat

## 2023-03-06 NOTE — PROGRESS NOTES
Cardiology         Progress Note - Cardiology   Tres De Los Santos 69 y o  male MRN: [id number]  Unit/Bed#: ICU 05 Encounter: [id number]          Assessment/Recommendations/Discussion:   • Acute blood loss anemia secondary to GI bleed  ? 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 CP s/p SAM-pLAD and POBA-mLAD w/ residual 60% OM1 distal to OM stent and -mRCA, December 2022  ? STEMI s/p SAM-pRCA, September 2014  ? NSTEMI s/p PCI-OM1, December 2000  • Chronic diastolic heart failure  ? LVEF 65%, mild LVH, mild LA dilatation, moderate AS, trace TR, March 2022  • Moderate aortic stenosis  ? w/ mean PG 18 mmHg and KELECHI 1 2 cm2, March 2022  • Hypotension  • History of hypertension  • Dyslipidemia  • Type 2 diabetes mellitus  • Obesity  • Osteoarthritis         · Hemoglobin stable  Patient had bowel movement this morning with no visible hematochezia  · Restart antiplatelet when cleared by gastroenterology  Would recommend starting clopidogrel 75 mg once daily as single antiplatelet agent over aspirin, and continue single antiplatelet strategy    · Patient without symptoms of angina  · Continue high-dose statin  · Blood pressure controlled   · Continue to follow weight trend              Subjective: Patient seen and examined, feels well, offers no complaints                Physical Exam:  GEN:  NAD  HEENT:  MMM, NCAT, pink conjunctiva, EOMI, nonicteric sclera  CV:  NO JVD/HJR, RR, NO M/R/G, +S1/S2, NO PARASTERNAL HEAVE/THRILL, + LE EDEMA, NO HEPATIC SYSTOLIC PULSATION, WARM EXTREMITIES  RESP:  CTAB/L  ABD:  SOFT, NT, NO GROSS ORGANOMEGALY        Vitals:   /72   Pulse 76   Temp 98 2 °F (36 8 °C)   Resp 18   Wt 104 kg (230 lb 6 1 oz)   SpO2 98%   BMI 35 03 kg/m²   Vitals:    03/05/23 0600 03/06/23 0539   Weight: 102 kg (224 lb 3 3 oz) 104 kg (230 lb 6 1 oz)       Intake/Output Summary (Last 24 hours) at 3/6/2023 1006  Last data filed at 3/6/2023 0901  Gross per 24 hour   Intake 865 ml   Output 1260 ml   Net -395 ml       TELEMETRY: Sinus rhythm  Lab Results:  Results from last 7 days   Lab Units 03/06/23  0432   WBC Thousand/uL 6 08   HEMOGLOBIN g/dL 9 0*   HEMATOCRIT % 26 3*   PLATELETS Thousands/uL 199     Results from last 7 days   Lab Units 03/06/23  0432 03/02/23  1734 03/02/23  1132   POTASSIUM mmol/L 3 1*   < > 5 9*   CHLORIDE mmol/L 109*   < > 110*   CO2 mmol/L 25   < > 21   BUN mg/dL 4*   < > 31*   CREATININE mg/dL 0 84   < > 1 38*   CALCIUM mg/dL 7 6*   < > 8 4   ALK PHOS U/L  --   --  58   ALT U/L  --   --  22   AST U/L  --   --  15    < > = values in this interval not displayed       Results from last 7 days   Lab Units 03/06/23  0432   POTASSIUM mmol/L 3 1*   CHLORIDE mmol/L 109*   CO2 mmol/L 25   BUN mg/dL 4*   CREATININE mg/dL 0 84   CALCIUM mg/dL 7 6*           Medications:    Current Facility-Administered Medications:   •  acetaminophen (TYLENOL) tablet 650 mg, 650 mg, Oral, Q6H PRN, Rochel Leyden, CRNP, 650 mg at 03/05/23 0001  •  allopurinol (ZYLOPRIM) tablet 100 mg, 100 mg, Oral, Daily, Darrell Parks PA-C, 100 mg at 03/06/23 0802  •  atorvastatin (LIPITOR) tablet 80 mg, 80 mg, Oral, Daily With Bassam Garcia PA-C, 80 mg at 03/05/23 1623  •  cefTRIAXone (ROCEPHIN) IVPB (premix in dextrose) 1,000 mg 50 mL, 1,000 mg, Intravenous, Q24H, Darrell Parks PA-C, Stopped at 03/06/23 2268  •  escitalopram (LEXAPRO) tablet 10 mg, 10 mg, Oral, Daily, Darrell Parks PA-C, 10 mg at 03/06/23 0802  •  insulin lispro (HumaLOG) 100 units/mL subcutaneous injection 1-5 Units, 1-5 Units, Subcutaneous, HS, FAY Kennedy, 1 Units at 03/05/23 2113  •  insulin lispro (HumaLOG) 100 units/mL subcutaneous injection 2-12 Units, 2-12 Units, Subcutaneous, TID AC, 2 Units at 03/06/23 0802 **AND** Fingerstick Glucose (POCT), , , TID AC, FAY Kennedy  •  levothyroxine tablet 88 mcg, 88 mcg, Oral, Early Morning, Amari Parks PA-C, 88 mcg at 03/06/23 0517  •  metroNIDAZOLE (FLAGYL) IVPB (premix) 500 mg 100 mL, 500 mg, Intravenous, Q8H, Amari Parks PA-C, Last Rate: 200 mL/hr at 03/06/23 0802, 500 mg at 03/06/23 0802  •  multivitamin stress formula tablet 1 tablet, 1 tablet, Oral, Daily, Lana Schulz PA-C, 1 tablet at 03/06/23 3611  •  nitroglycerin (NITROSTAT) SL tablet 0 4 mg, 0 4 mg, Sublingual, Q5 Min PRN, FAY Johnson, 0 4 mg at 03/04/23 1356  •  pantoprazole (PROTONIX) EC tablet 40 mg, 40 mg, Oral, Daily Before Breakfast, Prabhjot Davis MD, 40 mg at 03/06/23 1027  •  polyethylene glycol (GOLYTELY) bowel prep 4,000 mL, 4,000 mL, Oral, See Admin Instructions, Amanda Devries MD, 4,000 mL at 03/02/23 1659  •  zonisamide (ZONEGRAN) capsule 50 mg, 50 mg, Oral, Daily, Glendy Parks PA-C, 50 mg at 03/06/23 6000    This note was completed in part utilizing M-Modal Fluency Direct Software  Grammatical errors, random word insertions, spelling mistakes, and incomplete sentences may be an occasional consequence of this system secondary to software limitations, ambient noise, and hardware issues  If you have any questions or concerns about the content, text, or information contained within the body of this dictation, please contact the provider for clarification

## 2023-03-06 NOTE — PROGRESS NOTES
Pastoral Care Progress Note    3/6/2023  Patient: Alvin Negrete : 1945  Admission Date & Time: 3/2/2023 1059  MRN: [id number] [facility]: [id number]                     Chaplaincy Interventions Utilized:   Empowerment: Encouraged focus on present    Exploration: Explored emotional needs & resources and Explored spiritual needs & resources    Collaboration: Facilitated respect for spiritual/cultural practice during hospitalization    Relationship Building: Cultivated a relationship of care and support and Listened empathically    Chaplaincy Outcomes Achieved:  Expressed gratitude    Spiritual Coping Strategies Utilized:   Spiritual gratitude

## 2023-03-06 NOTE — PLAN OF CARE
Problem: MOBILITY - ADULT  Goal: Maintain or return to baseline ADL function  Description: INTERVENTIONS:  -  Assess patient's ability to carry out ADLs; assess patient's baseline for ADL function and identify physical deficits which impact ability to perform ADLs (bathing, care of mouth/teeth, toileting, grooming, dressing, etc )  - Assess/evaluate cause of self-care deficits   - Assess range of motion  - Assess patient's mobility; develop plan if impaired  - Assess patient's need for assistive devices and provide as appropriate  - Encourage maximum independence but intervene and supervise when necessary  - Involve family in performance of ADLs  - Assess for home care needs following discharge   - Consider OT consult to assist with ADL evaluation and planning for discharge  - Provide patient education as appropriate  Outcome: Progressing  Goal: Maintains/Returns to pre admission functional level  Description: INTERVENTIONS:  - Perform BMAT or MOVE assessment daily    - Set and communicate daily mobility goal to care team and patient/family/caregiver  - Collaborate with rehabilitation services on mobility goals if consulted  - Perform Range of Motion 3 times a day  - Reposition patient every 2 hours    - Dangle patient 3 times a day  - Stand patient 3 times a day  - Ambulate patient 3 times a day  - Out of bed to chair 3 times a day   - Out of bed for meals 3 times a day  - Out of bed for toileting  - Record patient progress and toleration of activity level   Outcome: Progressing     Problem: PAIN - ADULT  Goal: Verbalizes/displays adequate comfort level or baseline comfort level  Description: Interventions:  - Encourage patient to monitor pain and request assistance  - Assess pain using appropriate pain scale  - Administer analgesics based on type and severity of pain and evaluate response  - Implement non-pharmacological measures as appropriate and evaluate response  - Consider cultural and social influences on pain and pain management  - Notify physician/advanced practitioner if interventions unsuccessful or patient reports new pain  Outcome: Progressing     Problem: INFECTION - ADULT  Goal: Absence or prevention of progression during hospitalization  Description: INTERVENTIONS:  - Assess and monitor for signs and symptoms of infection  - Monitor lab/diagnostic results  - Monitor all insertion sites, i e  indwelling lines, tubes, and drains  - Monitor endotracheal if appropriate and nasal secretions for changes in amount and color  - [place] appropriate cooling/warming therapies per order  - Administer medications as ordered  - Instruct and encourage patient and family to use good hand hygiene technique  - Identify and instruct in appropriate isolation precautions for identified infection/condition  Outcome: Progressing  Goal: Absence of fever/infection during neutropenic period  Description: INTERVENTIONS:  - Monitor WBC    Outcome: Progressing     Problem: SAFETY ADULT  Goal: Maintain or return to baseline ADL function  Description: INTERVENTIONS:  -  Assess patient's ability to carry out ADLs; assess patient's baseline for ADL function and identify physical deficits which impact ability to perform ADLs (bathing, care of mouth/teeth, toileting, grooming, dressing, etc )  - Assess/evaluate cause of self-care deficits   - Assess range of motion  - Assess patient's mobility; develop plan if impaired  - Assess patient's need for assistive devices and provide as appropriate  - Encourage maximum independence but intervene and supervise when necessary  - Involve family in performance of ADLs  - Assess for home care needs following discharge   - Consider OT consult to assist with ADL evaluation and planning for discharge  - Provide patient education as appropriate  Outcome: Progressing  Goal: Maintains/Returns to pre admission functional level  Description: INTERVENTIONS:  - Perform BMAT or MOVE assessment daily    - Set and communicate daily mobility goal to care team and patient/family/caregiver  - Collaborate with rehabilitation services on mobility goals if consulted  - Perform Range of Motion 3 times a day  - Reposition patient every 2 hours    - Dangle patient 3 times a day  - Stand patient 3 times a day  - Ambulate patient 3 times a day  - Out of bed to chair 3 times a day   - Out of bed for meals 3 times a day  - Out of bed for toileting  - Record patient progress and toleration of activity level   Outcome: Progressing  Goal: Patient will remain free of falls  Description: INTERVENTIONS:  - Educate patient/family on patient safety including physical limitations  - Instruct patient to call for assistance with activity   - Consult OT/PT to assist with strengthening/mobility   - Keep Call bell within reach  - Keep bed low and locked with side rails adjusted as appropriate  - Keep care items and personal belongings within reach  - Initiate and maintain comfort rounds  - Make Fall Risk Sign visible to staff  - Apply yellow socks and bracelet for high fall risk patients  - Consider moving patient to room near nurses station  Outcome: Progressing     Problem: DISCHARGE PLANNING  Goal: Discharge to home or other facility with appropriate resources  Description: INTERVENTIONS:  - Identify barriers to discharge w/patient and caregiver  - Arrange for needed discharge resources and transportation as appropriate  - Identify discharge learning needs (meds, wound care, etc )  - Arrange for interpretive services to assist at discharge as needed  - Refer to Case Management Department for coordinating discharge planning if the patient needs post-hospital services based on physician/advanced practitioner order or complex needs related to functional status, cognitive ability, or social support system  Outcome: Progressing     Problem: Knowledge Deficit  Goal: Patient/family/caregiver demonstrates understanding of disease process, treatment plan, medications, and discharge instructions  Description: Complete learning assessment and assess knowledge base  Interventions:  - Provide teaching at level of understanding  - Provide teaching via preferred learning methods  Outcome: Progressing     Problem: Nutrition/Hydration-ADULT  Goal: Nutrient/Hydration intake appropriate for improving, restoring or maintaining nutritional needs  Description: Monitor and assess patient's nutrition/hydration status for malnutrition  Collaborate with interdisciplinary team and initiate plan and interventions as ordered  Monitor patient's weight and dietary intake as ordered or per policy  Utilize nutrition screening tool and intervene as necessary  Determine patient's food preferences and provide high-protein, high-caloric foods as appropriate       INTERVENTIONS:  - Monitor oral intake, urinary output, labs, and treatment plans  - Assess nutrition and hydration status and recommend course of action  - Evaluate amount of meals eaten  - Assist patient with eating if necessary   - Allow adequate time for meals  - Recommend/ encourage appropriate diets, oral nutritional supplements, and vitamin/mineral supplements  - Order, calculate, and assess calorie counts as needed  - Recommend, monitor, and adjust tube feedings and TPN/PPN based on assessed needs  - Assess need for intravenous fluids  - Provide specific nutrition/hydration education as appropriate  - Include patient/family/caregiver in decisions related to nutrition  Outcome: Progressing     Problem: GASTROINTESTINAL - ADULT  Goal: Minimal or absence of nausea and/or vomiting  Description: INTERVENTIONS:  - Administer IV fluids if ordered to ensure adequate hydration  - Maintain NPO status until nausea and vomiting are resolved  - Nasogastric tube if ordered  - Administer ordered antiemetic medications as needed  - Provide nonpharmacologic comfort measures as appropriate  - Advance diet as tolerated, if ordered  - Consider nutrition services referral to assist patient with adequate nutrition and appropriate food choices  Outcome: Progressing  Goal: Maintains or returns to baseline bowel function  Description: INTERVENTIONS:  - Assess bowel function  - Encourage oral fluids to ensure adequate hydration  - Administer IV fluids if ordered to ensure adequate hydration  - Administer ordered medications as needed  - Encourage mobilization and activity  - Consider nutritional services referral to assist patient with adequate nutrition and appropriate food choices  Outcome: Progressing  Goal: Maintains adequate nutritional intake  Description: INTERVENTIONS:  - Monitor percentage of each meal consumed  - Identify factors contributing to decreased intake, treat as appropriate  - Assist with meals as needed  - Monitor I&O, weight, and lab values if indicated  - Obtain nutrition services referral as needed  Outcome: Progressing  Goal: Establish and maintain optimal ostomy function  Description: INTERVENTIONS:  - Assess bowel function  - Encourage oral fluids to ensure adequate hydration  - Administer IV fluids if ordered to ensure adequate hydration   - Administer ordered medications as needed  - Encourage mobilization and activity  - Nutrition services referral to assist patient with appropriate food choices  - Assess stoma site  - Consider wound care consult   Outcome: Progressing  Goal: Oral mucous membranes remain intact  Description: INTERVENTIONS  - Assess oral mucosa and hygiene practices  - Implement preventative oral hygiene regimen  - Implement oral medicated treatments as ordered  - Initiate Nutrition services referral as needed  Outcome: Progressing

## 2023-03-06 NOTE — QUICK NOTE
Discussed with patient daily update  Patient verbalizes understanding that he will be transitioned to medical surgical floor with internal medicine managing his care sometime today  Patient reports updating his wife as well

## 2023-03-06 NOTE — ASSESSMENT & PLAN NOTE
Wt Readings from Last 3 Encounters:   03/05/23 102 kg (224 lb 3 3 oz)   03/02/23 98 9 kg (218 lb 0 6 oz)   02/22/23 98 9 kg (218 lb)       · Hold BB for now given hypotension  · Hold torsemide given hypotension

## 2023-03-06 NOTE — ASSESSMENT & PLAN NOTE
Lab Results   Component Value Date    HGB 8 9 (L) 03/05/2023    HGB 7 8 (L) 03/05/2023    HGB 7 5 (L) 03/04/2023    HGB 7 0 (L) 03/04/2023     Patient admitted with lower GI bleed, 2 episodes of bright red blood per rectum  Hemoglobin on admission with a drop of 3 g  S/p 4 units of PRBC  S/p EGD which ruled out upper GI source of bleeding    Colonoscopy showed bleeding colonic ulcer which was clipped x2  Additional episode of BRBPR 03/04 with hypotension    Plan:  · Monitor hemoglobin  · Holding antiplatelet medication for now  · Further management per GI's recommendation

## 2023-03-06 NOTE — PROGRESS NOTES
[facility] [employer]  Progress Note - Prashant Patel 1945, 69 y o  male MRN: [id number]  Unit/Bed#: ICU 05 Encounter: [id number]  Primary Care Provider: Mercy Boss DO   Date and time admitted to hospital: 3/2/2023 10:59 AM    * Acute lower GI bleeding  Assessment & Plan  Significantly hypotensive on presentation to [address] [place] [address]  Received 4 units PRBCs at outside hospital  Transferred to higher level of care for GI evaluation and potential need for IR  Presented with hypotension systolic blood pressure in the 80s to 90s, currently improving, no signs of acute bleeding; Most recent hemoglobin is 10  Underwent EGD which ruled out upper GI bleed  Suspect possible diverticular bleed in setting of acute diverticulitis    Colonoscopy 3/3 showed bleeding ulcer - clip x2 placed  Episode of hematochezia, hypotension 03/04 - repeat CT without evidence of active high volume gastrointestinal hemorrhage; nearly resolved diverticulitis at the descending colon  Plan:  · hgb dropped to 7 5 03/04 without evidence of ongoing blood loss   · Given his recent coronary intervention and intermittent CP, he was transfused two units to keep his hgb closer to 9- received 2 units PRBC 3/4-3/5  · We will continue to monitor his clinical status closely  · We will continue to trend his hgb  · Will continue to hold his antiplatelet agents for now  · We will continue to hold his antiplatelet medications for now    Acute blood loss anemia  Assessment & Plan  Lab Results   Component Value Date    HGB 8 9 (L) 03/05/2023    HGB 7 8 (L) 03/05/2023    HGB 7 5 (L) 03/04/2023    HGB 7 0 (L) 03/04/2023     Patient admitted with lower GI bleed, 2 episodes of bright red blood per rectum  Hemoglobin on admission with a drop of 3 g  S/p 4 units of PRBC  S/p EGD which ruled out upper GI source of bleeding    Colonoscopy showed bleeding colonic ulcer which was clipped x2  Additional episode of BRBPR 03/04 with hypotension    Plan:  · Monitor hemoglobin  · Holding antiplatelet medication for now  · Further management per GI's recommendation      Coronary artery disease of native artery of native heart with stable angina pectoris [facility])  Assessment & Plan  · Remote stent placement x3  · Most recent PCI Dec 2022  · Troponin within normal limit  · Per cards holding DAPT ok but recommend keeping 1 agent with transition back to 2 when no longer bleeding  · Will hold brillinta and aspirin for now given episode of hematochezia 03/04  · When bleeding resolved consider transitioning from brillinta to plavix  · Continue statin  · Hold BB for now given hypotension  · Hold torsemide given hypotension    Hypotension  Assessment & Plan  Blood Pressure: 120/69    · With improvment  · Most likely source is GI bleed  · Hgb 7 5 03/04 with hematochezia, no resultant hypotension  · Transfused two units PRBCs  · Most recent hgb 8 9      Acute diverticulitis  Assessment & Plan  · Continue ceftriaxone and flagyl day 4    Diabetes mellitus, type 2 [facility])  Assessment & Plan  Lab Results   Component Value Date    HGBA1C 8 1 (H) 01/16/2023       Recent Labs     03/05/23  0557 03/05/23  1148 03/05/23  1620 03/05/23  1811   POCGLU 143* 142* 218* 243*       Blood Sugar Average: Last 72 hrs:     · Home regimen: Glimepiride, Jardiance, Metformin  · SSI while inpatient   · Our goal will be to maintain his blood glucose between 140 and 180  · Hypoglycemia protocol  · Accu-Cheks ACHS, SSI  · Goal blood glucose 140-180  · Currently on clear liquid diet  · Diabetic diet once patient allowed to eat    Stage 3a chronic kidney disease [facility])  Assessment & Plan  Lab Results   Component Value Date    EGFR 84 03/05/2023    EGFR 83 03/04/2023    EGFR 67 03/03/2023    CREATININE 0 86 03/05/2023    CREATININE 0 88 03/04/2023    CREATININE 1 06 03/03/2023     · Creatinine baseline 1 2-1 3  · Trend urine output and renal indices  · Avoid nephrotoxic agents  · Continue measuring I/O    Chronic diastolic (congestive) heart failure (HCC)  Assessment & Plan  Wt Readings from Last 3 Encounters:   23 102 kg (224 lb 3 3 oz)   23 98 9 kg (218 lb 0 6 oz)   23 98 9 kg (218 lb)       · Hold BB for now given hypotension  · Hold torsemide given hypotension      Recurrent major depressive disorder ([address] )  Assessment & Plan  · Home Lexapro     Primary osteoarthritis involving multiple joints  Assessment & Plan  · Pain control as needed    Hypertension  Assessment & Plan  · Home regimen: Losartan, propanolol  · Holding home antihypertensives in setting of hypotension due to GI bleed    Hyperlipidemia  Assessment & Plan  · Resumed home regimen: Atorvastatin    ----------------------------------------------------------------------------------------  HPI/24hr events: No events overnight    Patient appropriate for transfer out of the ICU today?: Patient does not meet criteria for ICU Follow-up Clinic; referral has not been made     Disposition: Transfer to Med-Surg   Code Status: Level 1 - Full Code  ---------------------------------------------------------------------------------------  SUBJECTIVE  Some mild abdominal pain    Review of Systems  Review of systems was reviewed and negative unless stated above in HPI/24-hour events   ---------------------------------------------------------------------------------------  OBJECTIVE    Vitals   Vitals:    23 0000 23 0100 23 0200 23 0300   BP: 112/62 117/55 115/54 136/63   BP Location: Left arm Left arm Left arm Left arm   Pulse: 82 78 72 74   Resp: 17 16 15 15   Temp: 98 6 °F (37 °C) 97 9 °F (36 6 °C) 97 5 °F (36 4 °C) 97 5 °F (36 4 °C)   TempSrc:       SpO2: 97% 95% 95% 96%   Weight:         Temp (24hrs), Av 2 °F (36 8 °C), Min:97 5 °F (36 4 °C), Max:99 3 °F (37 4 °C)  Current: Temperature: 97 5 °F (36 4 °C)          Respiratory:  SpO2: SpO2: 96 %, SpO2 Activity: SpO2 Activity: At Rest, SpO2 Device: O2 Device: None (Room air)  Nasal Cannula O2 Flow Rate (L/min): 4 L/min    Invasive/non-invasive ventilation settings   Respiratory    Lab Data (Last 4 hours)    None         O2/Vent Data (Last 4 hours)    None                Physical Exam  Constitutional:       Appearance: He is obese  HENT:      Head: Normocephalic  Mouth/Throat:      Mouth: Mucous membranes are moist    Eyes:      Pupils: Pupils are equal, round, and reactive to light  Cardiovascular:      Rate and Rhythm: Normal rate  Pulmonary:      Effort: Pulmonary effort is normal       Breath sounds: Normal breath sounds  Abdominal:      General: There is distension  Tenderness: There is no abdominal tenderness  Musculoskeletal:         General: No swelling  Cervical back: Neck supple  Lymphadenopathy:      Cervical: No cervical adenopathy  Skin:     General: Skin is warm and dry  Coloration: Skin is pale  Neurological:      General: No focal deficit present  Mental Status: He is alert  Mental status is at baseline  Laboratory and Diagnostics:  Results from last 7 days   Lab Units 03/06/23  0432 03/05/23  1421 03/05/23  0431 03/04/23  2132 03/04/23  1906 03/04/23  1223 03/04/23  0442 03/03/23  1225 03/03/23  0545 03/02/23  1716 03/02/23  1132 03/02/23  0745 03/02/23  0531   WBC Thousand/uL 6 08  --  6 84  --   --  16 36* 6 57  --  8 83  --  9 24  --  17 11*   HEMOGLOBIN g/dL 9 0* 8 9* 7 8* 7 5* 7 0* 9 4* 8 9*   < > 10 3*   < > 14 4   < > 12 3   HEMATOCRIT % 26 3*  --  23 2* 22 5*  --  28 8* 26 8*  --  30 2*  --  43 7   < > 36 7   PLATELETS Thousands/uL 199  --  169  --   --  248 186  --  173  --  203  --  329   NEUTROS PCT %  --   --  62  --   --   --  58  --   --   --  68  --  70   MONOS PCT %  --   --  9  --   --   --  10  --   --   --  9  --  8    < > = values in this interval not displayed       Results from last 7 days   Lab Units 03/06/23  0432 03/05/23  0431 03/04/23  0442 03/03/23  0545 03/02/23  1734 03/02/23  1132 03/02/23  0531   SODIUM mmol/L 138 140 139 140  --  138 136   POTASSIUM mmol/L 3 1* 3 8 3 6 3 9 4 8 5 9* 5 9*   CHLORIDE mmol/L 109* 110* 108 110*  --  110* 103   CO2 mmol/L 25 23 24 24  --  21 23   ANION GAP mmol/L 4 7 7 6  --  7 10   BUN mg/dL 4* 8 12 25  --  31* 28*   CREATININE mg/dL 0 84 0 86 0 88 1 06  --  1 38* 1 53*   CALCIUM mg/dL 7 6* 7 3* 7 1* 7 5*  --  8 4 9 0   GLUCOSE RANDOM mg/dL 122 146* 97 118  --  152* 271*   ALT U/L  --   --   --   --   --  22 25   AST U/L  --   --   --   --   --  15 15   ALK PHOS U/L  --   --   --   --   --  58 67   ALBUMIN g/dL  --   --   --   --   --  3 3* 3 7   TOTAL BILIRUBIN mg/dL  --   --   --   --   --  0 88 0 59     Results from last 7 days   Lab Units 03/03/23  0545 03/02/23  1132   MAGNESIUM mg/dL 2 2 1 8*   PHOSPHORUS mg/dL 3 5 3 4      Results from last 7 days   Lab Units 03/04/23  2132 03/02/23  1132   INR  1 11 1 03   PTT seconds 30 25          Results from last 7 days   Lab Units 03/02/23  1132 03/02/23  0745 03/02/23  0531   LACTIC ACID mmol/L 1 5 2 6* 3 8*     ABG:    VBG:  Results from last 7 days   Lab Units 03/02/23  1132   PH ZEB  7 360   PCO2 ZEB mm Hg 32 9*   PO2 ZEB mm Hg 94 0*   HCO3 ZEB mmol/L 18 2*   BASE EXC ZEB mmol/L -6 1           Micro        EKG:   Imaging: I have personally reviewed pertinent reports  and I have personally reviewed pertinent films in PACS    Intake and Output  I/O       03/04 0701 03/05 0700 03/05 0701 03/06 0700    P  O  240     I V  (mL/kg) 518 4 (5 1)     Blood 302 5 375    IV Piggyback 200 100    Total Intake(mL/kg) 1260 9 (12 4) 475 (4 7)    Urine (mL/kg/hr) 2730 (1 1) 595 (0 4)    Stool 200 0    Total Output 2930 595    Net -1669 1 -120          Unmeasured Urine Occurrence 1 x     Unmeasured Stool Occurrence 1 x 1 x          Height and Weights         Body mass index is 34 09 kg/m²    Weight (last 2 days)     Date/Time Weight    03/05/23 0600 102 (224 21)    03/05/23 0438 102 (224 21) 03/04/23 0600 102 (225 75)    03/04/23 0538 102 (225 75)            Nutrition       Diet Orders   (From admission, onward)             Start     Ordered    03/05/23 1059  Diet Clear Liquid  Diet effective midnight        References:    Nutrtion Support Algorithm Enteral vs  Parenteral   Question:  Diet Type  Answer:  Clear Liquid    03/05/23 1058                  Active Medications  Scheduled Meds:  Current Facility-Administered Medications   Medication Dose Route Frequency Provider Last Rate   • acetaminophen  650 mg Oral Q6H PRN FAY Saavedra     • allopurinol  100 mg Oral Daily Na Parks PA-C     • atorvastatin  80 mg Oral Daily With Sue Parks PA-C     • cefTRIAXone  1,000 mg Intravenous Q24H Na Parks PA-C 1,000 mg (03/05/23 0544)   • escitalopram  10 mg Oral Daily Amari Parks PA-C     • insulin lispro  1-5 Units Subcutaneous HS FAY Hughes     • insulin lispro  2-12 Units Subcutaneous TID AC FAY Hughes     • levothyroxine  88 mcg Oral Early Morning Susan Heath PA-C     • metroNIDAZOLE  500 mg Intravenous Q8H Susan Haeth PA-C 500 mg (03/05/23 2318)   • multivitamin stress formula  1 tablet Oral Daily Amari Parks PA-C     • nitroglycerin  0 4 mg Sublingual Q5 Min PRN FAY Hughes     • pantoprazole  40 mg Oral Daily Before Breakfast Prabhjot Davis MD     • polyethylene glycol  4,000 mL Oral See Admin Instructions Atiya Salomon MD     • zonisamide  50 mg Oral Daily Amari Parks PA-C       Continuous Infusions:     PRN Meds:   acetaminophen, 650 mg, Q6H PRN  nitroglycerin, 0 4 mg, Q5 Min PRN        Invasive Devices Review  Invasive Devices     Peripheral Intravenous Line  Duration           Peripheral IV 03/04/23 Distal;Right;Upper;Ventral (anterior) Arm 1 day          Drain  Duration           Urethral Catheter Temperature probe 1 day                Rationale for remaining devices: ---------------------------------------------------------------------------------------  Advance Directive and Living Will:      Power of :    POLST:    ---------------------------------------------------------------------------------------  Care Time Delivered:         FAY Egan      Portions of the record may have been created with voice recognition software  Occasional wrong word or "sound a like" substitutions may have occurred due to the inherent limitations of voice recognition software    Read the chart carefully and recognize, using context, where substitutions have occurred

## 2023-03-06 NOTE — ASSESSMENT & PLAN NOTE
Blood Pressure: 120/69    · With improvment  · Most likely source is GI bleed  · Hgb 7 5 03/04 with hematochezia, no resultant hypotension  · Transfused two units PRBCs  · Most recent hgb 8 9

## 2023-03-06 NOTE — PROGRESS NOTES
03/06/23 1300   Clinical Encounter Type   Visited With Patient; Health care provider   Routine Visit Introduction   Continue Visiting Yes

## 2023-03-06 NOTE — ASSESSMENT & PLAN NOTE
Significantly hypotensive on presentation to [address] [place] [address]  Received 4 units PRBCs at outside hospital  Transferred to higher level of care for GI evaluation and potential need for IR  Presented with hypotension systolic blood pressure in the 80s to 90s, currently improving, no signs of acute bleeding; Most recent hemoglobin is 10  Underwent EGD which ruled out upper GI bleed  Suspect possible diverticular bleed in setting of acute diverticulitis    Colonoscopy 3/3 showed bleeding ulcer - clip x2 placed  Episode of hematochezia, hypotension 03/04 - repeat CT without evidence of active high volume gastrointestinal hemorrhage; nearly resolved diverticulitis at the descending colon      Plan:  · hgb dropped to 7 5 03/04 without evidence of ongoing blood loss   · Given his recent coronary intervention and intermittent CP, he was transfused two units to keep his hgb closer to 9- received 2 units PRBC 3/4-3/5  · We will continue to monitor his clinical status closely  · We will continue to trend his hgb  · Will continue to hold his antiplatelet agents for now  · We will continue to hold his antiplatelet medications for now

## 2023-03-06 NOTE — PROGRESS NOTES
Progress Note- Yadira Larose 69 y o  male MRN: [id number]    Unit/Bed#: ICU 05 Encounter: [id number]      Assessment and Plan:    Rosangela Jerez is a 68 y/o male with HTN, DM, CKD, CAD s/p PCI x 4, most recent in December 2022 currently on aspirin and Brilinta who is currently admitted with blood in stool     1  Blood in stool  2  Acute anemia  Patient presented 3/2 after having watery bowel movements mixed with dark red blood with clots associated with acute blood loss anemia requiring multiple units of blood  High-volume bleeding scan was obtained which showed no evidence of active GI bleeding  EGD 3/2 with no etiology of bleeding  Colonoscopy 3/3 with suspected ulcer within a diverticulum in the descending colon with possible visible vessel and an adherant clot, deployed 2 hemostatic clips  At noon on 3/4 the patient had an episode of BRBPR and associated with lightheadedness, tachycardia, and hypotension  The patient underwent repeat CTA which was negative  He has had continued intermittent episodes of BRBPR with a HGB drop from 9 4-7 prompting transfusion with 1 unit yesterday, however Hgb stable at 9 0 today    -Patient's bleeding is consistent with persistent diverticular bleeding likely exacerbated by DAPT,  -There is no role for repeat colonoscopy at this time as therapeutic yield is very low, if hemostasis is not achieved and the patient has significant bleeding: it may be reasonable to repeat STAT CTA and having IR evaluate again   -Trend H&H  -Monitor VS  -Monitor stool output and color  -clear liquid diet in the setting of diverticulitis (See below)  -discussed with cardiology: pt to be re-started on plavix tomorrow      3  Diverticulitis  Uncomplicated on imaging obtained on admit   Last colonoscopy in 2016 showing 1 polyp as well as mild diverticulosis in the left-sided colon    -Continue complete course of antibiotics  -clear liquid diet: if pts symptoms do not return tomorrow after being put back on plavix, we can advance diet to low fat/low residue soft/surgical diet     ______________________________________________________________________    Subjective:     No further overt GI bleeding  No further pain      Medication Administration - last 24 hours from 03/05/2023 0915 to 03/06/2023 0915       Date/Time Order Dose Route Action Action by     03/06/2023 0802 EST metroNIDAZOLE (FLAGYL) IVPB (premix) 500 mg 100 mL 500 mg Intravenous New [facility], RN     03/06/2023 0015 EST metroNIDAZOLE (FLAGYL) IVPB (premix) 500 mg 100 mL 0 mg Intravenous Stopped [facility], RN     03/05/2023 2318 EST metroNIDAZOLE (FLAGYL) IVPB (premix) 500 mg 100 mL 500 mg Intravenous New Bag [facility], RN     03/05/2023 1624 EST metroNIDAZOLE (FLAGYL) IVPB (premix) 500 mg 100 mL 500 mg Intravenous [address] [facility], RN     03/05/2023 5175 EST metroNIDAZOLE (FLAGYL) IVPB (premix) 500 mg 100 mL 0 mg Intravenous Stopped [facility], RN     03/05/2023 9272 EST metroNIDAZOLE (FLAGYL) IVPB (premix) 500 mg 100 mL 500 mg Intravenous [address] [facility], RN     03/06/2023 4909 EST cefTRIAXone (ROCEPHIN) IVPB (premix in dextrose) 1,000 mg 50 mL 0 mg Intravenous Stopped [facility], RN     03/06/2023 6597 EST cefTRIAXone (ROCEPHIN) IVPB (premix in dextrose) 1,000 mg 50 mL 1,000 mg Intravenous [address] [facility], RN     03/06/2023 3208 EST pantoprazole (PROTONIX) EC tablet 40 mg 40 mg Oral Given [facility], RN     03/06/2023 0802 EST allopurinol (ZYLOPRIM) tablet 100 mg 100 mg Oral Given Rito Gale, RN     03/05/2023 7636 EST allopurinol (ZYLOPRIM) tablet 100 mg 100 mg Oral Given Christine Gentile RN     03/06/2023 0802 EST escitalopram (LEXAPRO) tablet 10 mg 10 mg Oral Given Rito Gale RN     03/05/2023 9548 EST escitalopram (LEXAPRO) tablet 10 mg 10 mg Oral Given Christine Gentile RN     03/06/2023 0517 EST levothyroxine tablet 88 mcg 88 mcg Oral Given Snow Contreras RN     03/06/2023 0802 EST multivitamin stress formula tablet 1 tablet 1 tablet Oral Given Rosanne Rosenthal, RN     03/05/2023 1324 EST multivitamin stress formula tablet 1 tablet 1 tablet Oral Given Ross Grams, RN     03/06/2023 6060 EST zonisamide (ZONEGRAN) capsule 50 mg 50 mg Oral Given Rosanne Rosenthal, RN     03/05/2023 1324 EST zonisamide (ZONEGRAN) capsule 50 mg 50 mg Oral Given Ross Grams, RN     03/05/2023 1623 EST atorvastatin (LIPITOR) tablet 80 mg 80 mg Oral Given Shwetha Lawrence, RN     03/05/2023 1312 EST NOREPINEPHRINE 4 MG  ML NSS (CMPD ORDER) infusion 0 mcg/min Intravenous Stopped Shwetha Lawrence, RN     03/05/2023 1148 EST insulin lispro (HumaLOG) 100 units/mL subcutaneous injection 2-12 Units 2 Units Subcutaneous Not Given Ross Grams, RN     03/06/2023 0802 EST insulin lispro (HumaLOG) 100 units/mL subcutaneous injection 2-12 Units 2 Units Subcutaneous Given Rosanne Rosenthal, RN     03/05/2023 1813 EST insulin lispro (HumaLOG) 100 units/mL subcutaneous injection 2-12 Units 4 Units Subcutaneous Given Ross Grams, RN     03/05/2023 2113 EST insulin lispro (HumaLOG) 100 units/mL subcutaneous injection 1-5 Units 1 Units Subcutaneous Given Major Pointer, RN     03/06/2023 1504 EST potassium chloride oral solution 40 mEq 40 mEq Oral Given Major Pointer, RN          Objective:     Vitals: Blood pressure 110/62, pulse 82, temperature 98 2 °F (36 8 °C), resp  rate 13, weight 104 kg (230 lb 6 1 oz), SpO2 98 %  ,Body mass index is 35 03 kg/m²        Intake/Output Summary (Last 24 hours) at 3/6/2023 0915  Last data filed at 3/6/2023 0802  Gross per 24 hour   Intake 725 ml   Output 1260 ml   Net -535 ml       Physical Exam:   General Appearance: Awake and alert, in no acute distress  Abdomen: Soft, non-tender, non-distended; bowel sounds normal; no masses or no organomegaly    Invasive Devices     Peripheral Intravenous Line  Duration           Peripheral IV 03/04/23 Distal;Right;Upper;Ventral (anterior) Arm 1 day Drain  Duration           Urethral Catheter Temperature probe 1 day                Lab Results:  No results displayed because visit has over 200 results  Imaging Studies: I have personally reviewed pertinent imaging studies

## 2023-03-06 NOTE — ASSESSMENT & PLAN NOTE
Lab Results   Component Value Date    EGFR 84 03/05/2023    EGFR 83 03/04/2023    EGFR 67 03/03/2023    CREATININE 0 86 03/05/2023    CREATININE 0 88 03/04/2023    CREATININE 1 06 03/03/2023     · Creatinine baseline 1 2-1 3  · Trend urine output and renal indices  · Avoid nephrotoxic agents  · Continue measuring I/O

## 2023-03-07 ENCOUNTER — TELEPHONE (OUTPATIENT)
Dept: OTHER | Facility: HOSPITAL | Age: 78
End: 2023-03-07

## 2023-03-07 PROBLEM — R33.9 URINARY RETENTION: Status: ACTIVE | Noted: 2023-03-07

## 2023-03-07 LAB
ANION GAP SERPL CALCULATED.3IONS-SCNC: 5 MMOL/L (ref 4–13)
BASOPHILS # BLD AUTO: 0.04 THOUSANDS/ÂΜL (ref 0–0.1)
BASOPHILS NFR BLD AUTO: 1 % (ref 0–1)
BUN SERPL-MCNC: 3 MG/DL (ref 5–25)
CALCIUM SERPL-MCNC: 8.1 MG/DL (ref 8.4–10.2)
CHLORIDE SERPL-SCNC: 110 MMOL/L (ref 96–108)
CO2 SERPL-SCNC: 23 MMOL/L (ref 21–32)
CREAT SERPL-MCNC: 0.95 MG/DL (ref 0.6–1.3)
EOSINOPHIL # BLD AUTO: 0.35 THOUSAND/ÂΜL (ref 0–0.61)
EOSINOPHIL NFR BLD AUTO: 6 % (ref 0–6)
ERYTHROCYTE [DISTWIDTH] IN BLOOD BY AUTOMATED COUNT: 17.2 % (ref 11.6–15.1)
GFR SERPL CREATININE-BSD FRML MDRD: 77 ML/MIN/1.73SQ M
GLUCOSE SERPL-MCNC: 166 MG/DL (ref 65–140)
GLUCOSE SERPL-MCNC: 210 MG/DL (ref 65–140)
GLUCOSE SERPL-MCNC: 234 MG/DL (ref 65–140)
GLUCOSE SERPL-MCNC: 264 MG/DL (ref 65–140)
GLUCOSE SERPL-MCNC: 265 MG/DL (ref 65–140)
HCT VFR BLD AUTO: 28.3 % (ref 36.5–49.3)
HGB BLD-MCNC: 9.4 G/DL (ref 12–17)
IMM GRANULOCYTES # BLD AUTO: 0.03 THOUSAND/UL (ref 0–0.2)
IMM GRANULOCYTES NFR BLD AUTO: 1 % (ref 0–2)
LYMPHOCYTES # BLD AUTO: 1.22 THOUSANDS/ÂΜL (ref 0.6–4.47)
LYMPHOCYTES NFR BLD AUTO: 20 % (ref 14–44)
MCH RBC QN AUTO: 32.2 PG (ref 26.8–34.3)
MCHC RBC AUTO-ENTMCNC: 33.2 G/DL (ref 31.4–37.4)
MCV RBC AUTO: 97 FL (ref 82–98)
MONOCYTES # BLD AUTO: 0.57 THOUSAND/ÂΜL (ref 0.17–1.22)
MONOCYTES NFR BLD AUTO: 9 % (ref 4–12)
NEUTROPHILS # BLD AUTO: 4.06 THOUSANDS/ÂΜL (ref 1.85–7.62)
NEUTS SEG NFR BLD AUTO: 63 % (ref 43–75)
NRBC BLD AUTO-RTO: 0 /100 WBCS
PLATELET # BLD AUTO: 234 THOUSANDS/UL (ref 149–390)
PMV BLD AUTO: 9.5 FL (ref 8.9–12.7)
POTASSIUM SERPL-SCNC: 4.1 MMOL/L (ref 3.5–5.3)
RBC # BLD AUTO: 2.92 MILLION/UL (ref 3.88–5.62)
SODIUM SERPL-SCNC: 138 MMOL/L (ref 135–147)
WBC # BLD AUTO: 6.27 THOUSAND/UL (ref 4.31–10.16)

## 2023-03-07 RX ORDER — MAGNESIUM HYDROXIDE/ALUMINUM HYDROXICE/SIMETHICONE 120; 1200; 1200 MG/30ML; MG/30ML; MG/30ML
30 SUSPENSION ORAL EVERY 4 HOURS PRN
Status: DISCONTINUED | OUTPATIENT
Start: 2023-03-07 | End: 2023-03-08 | Stop reason: HOSPADM

## 2023-03-07 RX ORDER — FINASTERIDE 5 MG/1
5 TABLET, FILM COATED ORAL DAILY
Status: DISCONTINUED | OUTPATIENT
Start: 2023-03-07 | End: 2023-03-08 | Stop reason: HOSPADM

## 2023-03-07 RX ADMIN — CEFTRIAXONE 1000 MG: 1 INJECTION, SOLUTION INTRAVENOUS at 06:31

## 2023-03-07 RX ADMIN — ESCITALOPRAM OXALATE 10 MG: 10 TABLET ORAL at 08:31

## 2023-03-07 RX ADMIN — METRONIDAZOLE 500 MG: 500 TABLET ORAL at 22:40

## 2023-03-07 RX ADMIN — INSULIN LISPRO 2 UNITS: 100 INJECTION, SOLUTION INTRAVENOUS; SUBCUTANEOUS at 22:45

## 2023-03-07 RX ADMIN — INSULIN LISPRO 6 UNITS: 100 INJECTION, SOLUTION INTRAVENOUS; SUBCUTANEOUS at 16:36

## 2023-03-07 RX ADMIN — INSULIN LISPRO 6 UNITS: 100 INJECTION, SOLUTION INTRAVENOUS; SUBCUTANEOUS at 11:33

## 2023-03-07 RX ADMIN — INSULIN LISPRO 2 UNITS: 100 INJECTION, SOLUTION INTRAVENOUS; SUBCUTANEOUS at 08:31

## 2023-03-07 RX ADMIN — B-COMPLEX W/ C & FOLIC ACID TAB 1 TABLET: TAB at 08:31

## 2023-03-07 RX ADMIN — ALUMINUM HYDROXIDE, MAGNESIUM HYDROXIDE, AND DIMETHICONE 30 ML: 200; 20; 200 SUSPENSION ORAL at 01:23

## 2023-03-07 RX ADMIN — PANTOPRAZOLE SODIUM 40 MG: 40 TABLET, DELAYED RELEASE ORAL at 06:30

## 2023-03-07 RX ADMIN — METRONIDAZOLE 500 MG: 500 TABLET ORAL at 14:28

## 2023-03-07 RX ADMIN — ZONISAMIDE 50 MG: 25 CAPSULE ORAL at 08:31

## 2023-03-07 RX ADMIN — CLOPIDOGREL BISULFATE 75 MG: 75 TABLET ORAL at 08:31

## 2023-03-07 RX ADMIN — FINASTERIDE 5 MG: 5 TABLET, FILM COATED ORAL at 14:28

## 2023-03-07 RX ADMIN — ALLOPURINOL 100 MG: 100 TABLET ORAL at 08:31

## 2023-03-07 RX ADMIN — LEVOTHYROXINE SODIUM 88 MCG: 100 TABLET ORAL at 06:30

## 2023-03-07 RX ADMIN — TAMSULOSIN HYDROCHLORIDE 0.4 MG: 0.4 CAPSULE ORAL at 16:36

## 2023-03-07 RX ADMIN — ATORVASTATIN CALCIUM 80 MG: 80 TABLET, FILM COATED ORAL at 16:36

## 2023-03-07 NOTE — ASSESSMENT & PLAN NOTE
· Home medications: Losartan 25 mg daily, propranolol 60 mg daily, torsemide 10 mg daily  · Patient is on hold as patient was hypotensive in the ICU  · Monitor blood pressure

## 2023-03-07 NOTE — ASSESSMENT & PLAN NOTE
Lab Results   Component Value Date    HGBA1C 8 1 (H) 01/16/2023       Recent Labs     03/06/23  1652 03/06/23  2110 03/07/23  0723 03/07/23  1112   POCGLU 169* 197* 166* 265*     · Hold oral hypoglycemics  · Monitor Accu-Cheks, sliding scale for coverage

## 2023-03-07 NOTE — PROGRESS NOTES
Progress Note - Cardiology   Sarwat Mehta 69 y o  male MRN: [id number]  Unit/Bed#: E5 -01 Encounter: [id number]      Assessment/Recommendations/Discussion:   • Acute blood loss anemia secondary to GI bleed  ? 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 CP s/p SAM-pLAD and POBA-mLAD w/ residual 60% OM1 distal to OM stent and -mRCA, December 2022  ? STEMI s/p SAM-pRCA, September 2014  ? NSTEMI s/p PCI-OM1, December 2000  • Chronic diastolic heart failure  ? LVEF 65%, mild LVH, mild LA dilatation, moderate AS, trace TR, March 2022  • Moderate aortic stenosis  ? w/ mean PG 18 mmHg and KELECHI 1 2 cm2, March 2022  • Hypotension  • History of hypertension  • Dyslipidemia  • Type 2 diabetes mellitus  • Obesity  • Osteoarthritis           PLAN  • Hemoglobin remained stable, no evidence of recurrent bleeding, hemoglobin today 9 4, yesterday 9 0  • He has been restarted on his Plavix  • Continue with Plavix 75  • No symptoms of angina  • Continue statin  • We will follow to assess for rebleeding    Subjective:   HPI  Feels much better today, tolerating food without issues, no evidence of recurrent bleeding      Review of Systems: As noted in HPI  Rest of ROS is negative  Vitals:   /67   Pulse 95   Temp 97 5 °F (36 4 °C) (Oral)   Resp 16   Ht 5' 8" (1 727 m)   Wt 103 kg (227 lb 1 2 oz)   SpO2 95%   BMI 34 53 kg/m²   I/O       03/05 0701  03/06 0700 03/06 0701  03/07 0700 03/07 0701  03/08 0700    P  O   480     I V  (mL/kg)  10 (0 1)     Blood 375      IV Piggyback 250 100     Total Intake(mL/kg) 625 (6) 590 (5 7)     Urine (mL/kg/hr) 1110 (0 4) 1400 (0 6)     Stool 0 0     Total Output 1110 1400     Net -485 -810            Unmeasured Stool Occurrence 1 x 1 x         Weight (last 2 days)     Date/Time Weight    03/07/23 0600 103 (227 07)    03/06/23 0539 104 (230 38)    03/05/23 0600 102 (224 21)    03/05/23 0438 102 (224 21)          Physical Exam Constitutional: awake, alert and oriented, in no acute distress, no obvious deformities, obese male  Head: Normocephalic, without obvious abnormality, atraumatic  Eyes: conjunctivae clear and moist  Sclera anicteric  No xanthelasmas  Pupils equal bilaterally  Extraocular motions are full  Ear nose mouth and throat: ears are symmetrical bilaterally, hearing appears to be equal bilaterally, no nasal discharge or epistaxis, oropharynx is clear with moist mucous membranes  Neck: Trachea is midline, neck is supple, no thyromegaly or significant lymphadenopathy, there is full range of motion  Lungs: clear to auscultation bilaterally, no wheezes, no rales, no rhonchi, no accessory muscle use, breathing is nonlabored  Heart: regular rate and rhythm, S1, S2 normal, 3/6 systolic murmur at the right upper sternal border and left sternal border, no click, no rub and no gallop, no lower extremity edema  Abdomen: Obese, soft, non-tender; bowel sounds normal; no masses, no organomegaly  Psychiatric: Patient is oriented to time, place, person, mood/affect is negative for depression, anxiety, agitation, appears to have appropriate insight  Skin: Skin is warm, dry, intact  No obvious rashes or lesions on exposed extremities  Nail beds are pink with no cyanosis or clubbing  TELEMETRY:   Lab Results:  Results from last 7 days   Lab Units 03/07/23  1429   WBC Thousand/uL 6 27   HEMOGLOBIN g/dL 9 4*   HEMATOCRIT % 28 3*   PLATELETS Thousands/uL 234     Results from last 7 days   Lab Units 03/07/23  1429 03/02/23  1734 03/02/23  1132   POTASSIUM mmol/L 4 1   < > 5 9*   CHLORIDE mmol/L 110*   < > 110*   CO2 mmol/L 23   < > 21   BUN mg/dL 3*   < > 31*   CREATININE mg/dL 0 95   < > 1 38*   CALCIUM mg/dL 8 1*   < > 8 4   ALK PHOS U/L  --   --  58   ALT U/L  --   --  22   AST U/L  --   --  15    < > = values in this interval not displayed       Results from last 7 days   Lab Units 03/07/23  1429   POTASSIUM mmol/L 4 1   CHLORIDE mmol/L 110*   CO2 mmol/L 23   BUN mg/dL 3*   CREATININE mg/dL 0 95   CALCIUM mg/dL 8 1*           Medications:    Current Facility-Administered Medications:   •  acetaminophen (TYLENOL) tablet 650 mg, 650 mg, Oral, Q6H PRN, Evan Liu MD, 650 mg at 03/05/23 0001  •  allopurinol (ZYLOPRIM) tablet 100 mg, 100 mg, Oral, Daily, Evan Liu MD, 100 mg at 03/07/23 0831  •  aluminum-magnesium hydroxide-simethicone (MYLANTA) oral suspension 30 mL, 30 mL, Oral, Q4H PRN, Evan Liu MD, 30 mL at 03/07/23 0123  •  atorvastatin (LIPITOR) tablet 80 mg, 80 mg, Oral, Daily With Dinner, Evan Liu MD, 80 mg at 03/07/23 1636  •  cefTRIAXone (ROCEPHIN) IVPB (premix in dextrose) 1,000 mg 50 mL, 1,000 mg, Intravenous, Q24H, Evan Liu MD, Last Rate: 100 mL/hr at 03/07/23 0631, 1,000 mg at 03/07/23 0631  •  clopidogrel (PLAVIX) tablet 75 mg, 75 mg, Oral, Daily, Evan Liu MD, 75 mg at 03/07/23 0831  •  escitalopram (LEXAPRO) tablet 10 mg, 10 mg, Oral, Daily, Evan Liu MD, 10 mg at 03/07/23 0831  •  finasteride (PROSCAR) tablet 5 mg, 5 mg, Oral, Daily, Arabella Angel PA-C, 5 mg at 03/07/23 1428  •  insulin lispro (HumaLOG) 100 units/mL subcutaneous injection 1-5 Units, 1-5 Units, Subcutaneous, HS, Evan Liu MD, 1 Units at 03/06/23 2127  •  insulin lispro (HumaLOG) 100 units/mL subcutaneous injection 2-12 Units, 2-12 Units, Subcutaneous, TID AC, 6 Units at 03/07/23 1636 **AND** Fingerstick Glucose (POCT), , , TID AC, Evan Liu MD  •  levothyroxine tablet 88 mcg, 88 mcg, Oral, Early Morning, Evan Liu MD, 88 mcg at 03/07/23 0630  •  metroNIDAZOLE (FLAGYL) tablet 500 mg, 500 mg, Oral, Q8H [address], Evan Liu MD, 500 mg at 03/07/23 1428  •  multivitamin stress formula tablet 1 tablet, 1 tablet, Oral, Daily, Evan Liu MD, 1 tablet at 03/07/23 0831  •  nitroglycerin (NITROSTAT) SL tablet 0 4 mg, 0 4 mg, Sublingual, Q5 Min PRN, Evan Liu MD, 0 4 mg at 03/04/23 1356  •  pantoprazole (PROTONIX) EC tablet 40 mg, 40 mg, Oral, Daily Before Breakfast, Corena Osgood, MD, 40 mg at 03/07/23 0630  •  tamsulosin (FLOMAX) capsule 0 4 mg, 0 4 mg, Oral, Daily With Dinner, Corena Osgood, MD, 0 4 mg at 03/07/23 1636  •  zonisamide (ZONEGRAN) capsule 50 mg, 50 mg, Oral, Daily, Corena Osgood, MD, 50 mg at 03/07/23 0831    Portions of the record may have been created with voice recognition software  Occasional wrong word or "sound a like" substitutions may have occurred due to the inherent limitations of voice recognition software  Read the chart carefully and recognize, using context, where substitutions have occurred      Linsey Frausto DO, [facility]  3/7/2023 5:58 PM

## 2023-03-07 NOTE — PLAN OF CARE
Problem: MOBILITY - ADULT  Goal: Maintain or return to baseline ADL function  Description: INTERVENTIONS:  -  Assess patient's ability to carry out ADLs; assess patient's baseline for ADL function and identify physical deficits which impact ability to perform ADLs (bathing, care of mouth/teeth, toileting, grooming, dressing, etc )  - Assess/evaluate cause of self-care deficits   - Assess range of motion  - Assess patient's mobility; develop plan if impaired  - Assess patient's need for assistive devices and provide as appropriate  - Encourage maximum independence but intervene and supervise when necessary  - Involve family in performance of ADLs  - Assess for home care needs following discharge   - Consider OT consult to assist with ADL evaluation and planning for discharge  - Provide patient education as appropriate  Outcome: Progressing  Goal: Maintains/Returns to pre admission functional level  Description: INTERVENTIONS:  - Perform BMAT or MOVE assessment daily    - Set and communicate daily mobility goal to care team and patient/family/caregiver  - Collaborate with rehabilitation services on mobility goals if consulted  - Perform Range of Motion 3 times a day  - Reposition patient every 2 hours    - Dangle patient 3 times a day  - Stand patient 3 times a day  - Ambulate patient 3 times a day  - Out of bed to chair 3 times a day   - Out of bed for meals 3 times a day  - Out of bed for toileting  - Record patient progress and toleration of activity level   Outcome: Progressing     Problem: PAIN - ADULT  Goal: Verbalizes/displays adequate comfort level or baseline comfort level  Description: Interventions:  - Encourage patient to monitor pain and request assistance  - Assess pain using appropriate pain scale  - Administer analgesics based on type and severity of pain and evaluate response  - Implement non-pharmacological measures as appropriate and evaluate response  - Consider cultural and social influences on pain and pain management  - Notify physician/advanced practitioner if interventions unsuccessful or patient reports new pain  Outcome: Progressing     Problem: INFECTION - ADULT  Goal: Absence or prevention of progression during hospitalization  Description: INTERVENTIONS:  - Assess and monitor for signs and symptoms of infection  - Monitor lab/diagnostic results  - Monitor all insertion sites, i e  indwelling lines, tubes, and drains  - Monitor endotracheal if appropriate and nasal secretions for changes in amount and color  - [place] appropriate cooling/warming therapies per order  - Administer medications as ordered  - Instruct and encourage patient and family to use good hand hygiene technique  - Identify and instruct in appropriate isolation precautions for identified infection/condition  Outcome: Progressing  Goal: Absence of fever/infection during neutropenic period  Description: INTERVENTIONS:  - Monitor WBC    Outcome: Progressing     Problem: SAFETY ADULT  Goal: Maintain or return to baseline ADL function  Description: INTERVENTIONS:  -  Assess patient's ability to carry out ADLs; assess patient's baseline for ADL function and identify physical deficits which impact ability to perform ADLs (bathing, care of mouth/teeth, toileting, grooming, dressing, etc )  - Assess/evaluate cause of self-care deficits   - Assess range of motion  - Assess patient's mobility; develop plan if impaired  - Assess patient's need for assistive devices and provide as appropriate  - Encourage maximum independence but intervene and supervise when necessary  - Involve family in performance of ADLs  - Assess for home care needs following discharge   - Consider OT consult to assist with ADL evaluation and planning for discharge  - Provide patient education as appropriate  Outcome: Progressing  Goal: Maintains/Returns to pre admission functional level  Description: INTERVENTIONS:  - Perform BMAT or MOVE assessment daily    - Set and communicate daily mobility goal to care team and patient/family/caregiver  - Collaborate with rehabilitation services on mobility goals if consulted  - Perform Range of Motion 3 times a day  - Reposition patient every 2 hours    - Dangle patient 3 times a day  - Stand patient 3 times a day  - Ambulate patient 3 times a day  - Out of bed to chair 3 times a day   - Out of bed for meals 3 times a day  - Out of bed for toileting  - Record patient progress and toleration of activity level   Outcome: Progressing  Goal: Patient will remain free of falls  Description: INTERVENTIONS:  - Educate patient/family on patient safety including physical limitations  - Instruct patient to call for assistance with activity   - Consult OT/PT to assist with strengthening/mobility   - Keep Call bell within reach  - Keep bed low and locked with side rails adjusted as appropriate  - Keep care items and personal belongings within reach  - Initiate and maintain comfort rounds  - Make Fall Risk Sign visible to staff  - Apply yellow socks and bracelet for high fall risk patients  - Consider moving patient to room near nurses station  Outcome: Progressing     Problem: DISCHARGE PLANNING  Goal: Discharge to home or other facility with appropriate resources  Description: INTERVENTIONS:  - Identify barriers to discharge w/patient and caregiver  - Arrange for needed discharge resources and transportation as appropriate  - Identify discharge learning needs (meds, wound care, etc )  - Arrange for interpretive services to assist at discharge as needed  - Refer to Case Management Department for coordinating discharge planning if the patient needs post-hospital services based on physician/advanced practitioner order or complex needs related to functional status, cognitive ability, or social support system  Outcome: Progressing     Problem: Knowledge Deficit  Goal: Patient/family/caregiver demonstrates understanding of disease process, treatment plan, medications, and discharge instructions  Description: Complete learning assessment and assess knowledge base  Interventions:  - Provide teaching at level of understanding  - Provide teaching via preferred learning methods  Outcome: Progressing     Problem: Nutrition/Hydration-ADULT  Goal: Nutrient/Hydration intake appropriate for improving, restoring or maintaining nutritional needs  Description: Monitor and assess patient's nutrition/hydration status for malnutrition  Collaborate with interdisciplinary team and initiate plan and interventions as ordered  Monitor patient's weight and dietary intake as ordered or per policy  Utilize nutrition screening tool and intervene as necessary  Determine patient's food preferences and provide high-protein, high-caloric foods as appropriate       INTERVENTIONS:  - Monitor oral intake, urinary output, labs, and treatment plans  - Assess nutrition and hydration status and recommend course of action  - Evaluate amount of meals eaten  - Assist patient with eating if necessary   - Allow adequate time for meals  - Recommend/ encourage appropriate diets, oral nutritional supplements, and vitamin/mineral supplements  - Order, calculate, and assess calorie counts as needed  - Recommend, monitor, and adjust tube feedings and TPN/PPN based on assessed needs  - Assess need for intravenous fluids  - Provide specific nutrition/hydration education as appropriate  - Include patient/family/caregiver in decisions related to nutrition  Outcome: Progressing     Problem: GASTROINTESTINAL - ADULT  Goal: Minimal or absence of nausea and/or vomiting  Description: INTERVENTIONS:  - Administer IV fluids if ordered to ensure adequate hydration  - Maintain NPO status until nausea and vomiting are resolved  - Nasogastric tube if ordered  - Administer ordered antiemetic medications as needed  - Provide nonpharmacologic comfort measures as appropriate  - Advance diet as tolerated, if ordered  - Consider nutrition services referral to assist patient with adequate nutrition and appropriate food choices  Outcome: Progressing  Goal: Maintains or returns to baseline bowel function  Description: INTERVENTIONS:  - Assess bowel function  - Encourage oral fluids to ensure adequate hydration  - Administer IV fluids if ordered to ensure adequate hydration  - Administer ordered medications as needed  - Encourage mobilization and activity  - Consider nutritional services referral to assist patient with adequate nutrition and appropriate food choices  Outcome: Progressing  Goal: Maintains adequate nutritional intake  Description: INTERVENTIONS:  - Monitor percentage of each meal consumed  - Identify factors contributing to decreased intake, treat as appropriate  - Assist with meals as needed  - Monitor I&O, weight, and lab values if indicated  - Obtain nutrition services referral as needed  Outcome: Progressing  Goal: Establish and maintain optimal ostomy function  Description: INTERVENTIONS:  - Assess bowel function  - Encourage oral fluids to ensure adequate hydration  - Administer IV fluids if ordered to ensure adequate hydration   - Administer ordered medications as needed  - Encourage mobilization and activity  - Nutrition services referral to assist patient with appropriate food choices  - Assess stoma site  - Consider wound care consult   Outcome: Progressing  Goal: Oral mucous membranes remain intact  Description: INTERVENTIONS  - Assess oral mucosa and hygiene practices  - Implement preventative oral hygiene regimen  - Implement oral medicated treatments as ordered  - Initiate Nutrition services referral as needed  Outcome: Progressing

## 2023-03-07 NOTE — TELEPHONE ENCOUNTER
Patient remains admitted   Will continue to monitor for discharge to arrange appropriate outpatient plans

## 2023-03-07 NOTE — ASSESSMENT & PLAN NOTE
This is a 25-year-old male with history of CAD, hypertension, hyperlipidemia depression, EKG, CHF, diabetes mellitus transferred from [address] [place] [address] to ICU for GI bleed and hemorrhagic shock      · Status post EGD 3/2 unremarkable  · Status post colonoscopy 3/2 revealed bleeding ulcer within diverticulum and descending colon status post clip x2  · Hemoglobin 9  · GI follow-up appreciated  · Okay to resume antiplatelet  · Monitor H&H, advance diet as tolerated

## 2023-03-07 NOTE — PROGRESS NOTES
Progress Note- Rafal Arambula 69 y o  male MRN: [id number]    Unit/Bed#: E5 -01 Encounter: [id number]      Assessment and Plan:    Isra Hidalgo is a 70 y/o male with HTN, DM, CKD, CAD s/p PCI x 4, most recent in December 2022 currently on aspirin and Brilinta who is currently admitted with blood in stool     1  Blood in stool  2  Acute anemia  Patient presented 3/2 after having watery bowel movements mixed with dark red blood with clots associated with acute blood loss anemia requiring multiple units of blood  High-volume bleeding scan was obtained which showed no evidence of active GI bleeding  EGD 3/2 with no etiology of bleeding  Colonoscopy 3/3 with suspected ulcer within a diverticulum in the descending colon with possible visible vessel and an adherant clot, deployed 2 hemostatic clips  At noon on 3/4 the patient had an episode of BRBPR and associated with lightheadedness, tachycardia, and hypotension  The patient underwent repeat CTA which was negative  He has had continued intermittent episodes of BRBPR with a HGB drop from 9 4-7 prompting transfusion with 1 unit 3/5  No repeat Hgb yet today    -await Hgb from today: I re-ordered this as this must be trended daily   -Patient's bleeding is consistent with persistent diverticular bleeding likely exacerbated by DAPT  -There is no role for repeat colonoscopy at this time as therapeutic yield is very low, if hemostasis is not achieved and the patient has significant bleeding: it may be reasonable to repeat STAT CTA and having IR evaluate again   -Trend H&H and transfuse PRN  -Monitor VS  -Monitor stool output and color  -plavix re-started this morning: monitor stool output and color   -appreciate cardiology recs      3  Diverticulitis  Uncomplicated on imaging obtained on admit   Last colonoscopy in 2016 showing 1 polyp as well as mild diverticulosis in the left-sided colon    -Continue complete course of antibiotics  -soft surgical diet started    ______________________________________________________________________    Subjective:     No further bleeding  Pt is hungry and would like his diet advanced       Medication Administration - last 24 hours from 03/06/2023 1206 to 03/07/2023 1206       Date/Time Order Dose Route Action Action by     03/06/2023 1507 EST metroNIDAZOLE (FLAGYL) IVPB (premix) 500 mg 100 mL 0 mg Intravenous Stopped Rosanne Rosenthal, RN     03/07/2023 0631 EST cefTRIAXone (ROCEPHIN) IVPB (premix in dextrose) 1,000 mg 50 mL 1,000 mg Intravenous New Bag Nakia Devlin, RN     03/07/2023 0630 EST pantoprazole (PROTONIX) EC tablet 40 mg 40 mg Oral Given [employer], RN     03/07/2023 0831 EST allopurinol (ZYLOPRIM) tablet 100 mg 100 mg Oral Given [facility]     03/07/2023 0831 EST escitalopram (LEXAPRO) tablet 10 mg 10 mg Oral Given [facility]     03/07/2023 0630 EST levothyroxine tablet 88 mcg 88 mcg Oral Given [employer], RN     03/07/2023 0831 EST multivitamin stress formula tablet 1 tablet 1 tablet Oral Given [facility]     03/07/2023 0831 EST zonisamide (ZONEGRAN) capsule 50 mg 50 mg Oral Given [facility]     03/06/2023 1653 EST atorvastatin (LIPITOR) tablet 80 mg 80 mg Oral Given Rosanne Rosenthal, RN     03/07/2023 1133 EST insulin lispro (HumaLOG) 100 units/mL subcutaneous injection 2-12 Units 6 Units Subcutaneous Given [facility]     03/07/2023 0831 EST insulin lispro (HumaLOG) 100 units/mL subcutaneous injection 2-12 Units 2 Units Subcutaneous Given [facility]     03/06/2023 1653 EST insulin lispro (HumaLOG) 100 units/mL subcutaneous injection 2-12 Units 2 Units Subcutaneous Given Rosanne Rosenthal, RN     03/06/2023 2127 EST insulin lispro (HumaLOG) 100 units/mL subcutaneous injection 1-5 Units 1 Units Subcutaneous Given [employer], RN     03/06/2023 1653 EST tamsulosin (FLOMAX) capsule 0 4 mg 0 4 mg Oral Given Rosanne Rosenthal, RN     03/07/2023 2310 EST metroNIDAZOLE (FLAGYL) tablet 500 mg -- Oral ANDREW Mckenna Rosa Elena Rodrigues MD     03/07/2023 0600 EST metroNIDAZOLE (FLAGYL) tablet 500 mg -- Oral Dose Auto Held Automatic Transfer Provider     03/06/2023 2200 EST metroNIDAZOLE (FLAGYL) tablet 500 mg -- Oral Dose Auto Held Automatic Transfer Provider     03/06/2023 1918 EST metroNIDAZOLE (FLAGYL) tablet 500 mg -- Oral MAR Hold Automatic Transfer Provider     03/06/2023 1653 EST metroNIDAZOLE (FLAGYL) tablet 500 mg 500 mg Oral Given Dalila Byrd RN     03/07/2023 0831 EST clopidogrel (PLAVIX) tablet 75 mg 75 mg Oral Given Cleo Gabe     03/07/2023 0812 EST clopidogrel (PLAVIX) tablet 75 mg -- Oral MAR Tamika Woods MD     03/06/2023 1918 EST clopidogrel (PLAVIX) tablet 75 mg -- Oral MAR Hold Automatic Transfer Provider     03/07/2023 0123 EST aluminum-magnesium hydroxide-simethicone (MYLANTA) oral suspension 30 mL 30 mL Oral Given Nakia Devlin RN          Objective:     Vitals: Blood pressure 114/73, pulse (!) 118, temperature 97 8 °F (36 6 °C), temperature source Oral, resp  rate 16, height 5' 8" (1 727 m), weight 103 kg (227 lb 1 2 oz), SpO2 98 %  ,Body mass index is 34 53 kg/m²  Intake/Output Summary (Last 24 hours) at 3/7/2023 1206  Last data filed at 3/6/2023 2104  Gross per 24 hour   Intake 10 ml   Output 900 ml   Net -890 ml       Physical Exam:   General Appearance: Awake and alert, in no acute distress  Abdomen: Soft, non-tender, non-distended; bowel sounds normal; no masses or no organomegaly    Invasive Devices     Peripheral Intravenous Line  Duration           Peripheral IV 03/04/23 Distal;Right;Upper;Ventral (anterior) Arm 2 days          Drain  Duration           Urethral Catheter Temperature probe 2 days                Lab Results:  No results displayed because visit has over 200 results  Imaging Studies: I have personally reviewed pertinent imaging studies

## 2023-03-07 NOTE — ASSESSMENT & PLAN NOTE
Wt Readings from Last 3 Encounters:   03/07/23 103 kg (227 lb 1 2 oz)   03/02/23 98 9 kg (218 lb 0 6 oz)   02/22/23 98 9 kg (218 lb)     · Diuretics on hold due to hypotension and GI bleed  · Patient euvolemic  · We will monitor

## 2023-03-07 NOTE — TELEPHONE ENCOUNTER
Patient seen in consult today at [facility] for retention  Jose placed 3/4 for >1L output  Unable to start Flomax given history of dizziness  Will need void trial in 7-10 days  Will need office visit with AP/MD to establish outpatient follow up       Thanks

## 2023-03-07 NOTE — PLAN OF CARE
Problem: OCCUPATIONAL THERAPY ADULT  Goal: Performs self-care activities at highest level of function for planned discharge setting  See evaluation for individualized goals  Description: Treatment Interventions: ADL retraining, Functional transfer training, Endurance training, UE strengthening/ROM, Patient/family training, Equipment evaluation/education, Compensatory technique education, Energy conservation          See flowsheet documentation for full assessment, interventions and recommendations  Note: Limitation: Decreased ADL status, Decreased UE strength, Decreased endurance, Decreased high-level ADLs  Prognosis: Good  Assessment: Pt is a 78y/o male admitted(x-[facility]ette from [facility]) to the hospital 2* symptoms of bloody diarrhea  Pt noted with hypotension, anemia, diverticulitis, and a GI bleed  Pt with PMH CAD, basal cell Ca, DM, gout, neuropathy, MI, sleep apnea, HTN, and corneal transplant  PTA pt states that he had assistance with his LE ADLs; states independence with transfers, ambulation--"furniture walker" inside, SPC outside; neg home alone, neg , +falls4; outpt cardiac mnshk-3-3rmd(current)  During initial eval, pt demonstrated slight deficits with his functional balance, functional mobility, ADL status, transfer safety, b/l UE strength, and activity tolerance(currently fair=15-20mins)  Pt would benefit from continued OT tx for the above deficits  2-3xwk/1-2wks  The patient's raw score on the AM-PAC Daily Activity Inpatient Short Form is 21  A raw score of greater than or equal to 19 suggests the patient may benefit from discharge to home  Please refer to the recommendation of the Occupational Therapist for safe discharge planning       OT Discharge Recommendation: Home with outpatient rehabilitation (continue outpt cardiac rehab)

## 2023-03-07 NOTE — ASSESSMENT & PLAN NOTE
· Jose catheter placed in the ICU  · Urology input appreciated  · Patient will follow with urology outpatient for voiding trial

## 2023-03-07 NOTE — PHYSICAL THERAPY NOTE
PT EVALUATION    Pt  Name: Marcelo Christiansen  Pt  Age: 69 y o  MRN: [id number]  LENGTH OF STAY: 5      Admitting Diagnoses:   GI bleed [K92 2]    Past Medical History:   Diagnosis Date    BPH (benign prostatic hyperplasia)     CAD (coronary artery disease)     Cancer (HCC)     basal cell    Chronic kidney disease     stage 3    Colon polyp     CPAP (continuous positive airway pressure) dependence     Diabetes mellitus (HCC)     niddm    Disease of thyroid gland     Gout     High cholesterol     Hypertension     MI, 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     Myocardial infarction ([address] ) 2014    S/P cardiac catheterization 12/30/2022    Sleep apnea        Past Surgical History:   Procedure Laterality Date    BASAL CELL CARCINOMA EXCISION Right 1/21/2022    Procedure: EXCISION BASAL CELL CARCINOMA EXTREMITY;  Surgeon: Irasema Gant DO;  Location: [facility];  Service: General    CARDIAC CATHETERIZATION Left 12/29/2022    Procedure: Cardiac Left Heart Cath;  Surgeon: Wandy Rosas MD;  [facility]; Service: Cardiology    CARDIAC CATHETERIZATION N/A 12/29/2022    Procedure: Cardiac Coronary Angiogram;  Surgeon: Wandy Rosas MD;  [facility]; Service: Cardiology    CARDIAC CATHETERIZATION N/A 12/29/2022    Procedure: Cardiac pci;  Surgeon: Wandy Rosas MD;  [facility]; Service: Cardiology    CATARACT EXTRACTION W/  INTRAOCULAR LENS IMPLANT      CORNEAL TRANSPLANT      CORONARY ANGIOPLASTY WITH STENT PLACEMENT      stents x3-- [phone]    EYE SURGERY      repair corneal laceration    AL COLONOSCOPY FLX DX W/COLLJ SPEC WHEN PFRMD N/A 3/21/2016    Procedure: COLONOSCOPY;  Surgeon: Bailee Mobley MD;  Location: [facility];  Service: Colorectal    TONSILLECTOMY AND ADENOIDECTOMY         Imaging Studies:  CT high volume lower GI bleed   Final Result by Elio Navarro MD (03/04 0102)      1    No CT evidence of active high volume gastrointestinal hemorrhage  2    Mostly resolved previously seen diverticulitis at the descending colon  3   Chronic marked bladder distention  Correlate for voiding dysfunction  Workstation performed: [id number]               03/07/23 1213   PT Last Visit   PT Visit Date 03/07/23   Note Type   Note type Evaluation   Pain Assessment   Pain Assessment Tool 0-10   Pain Score No Pain   Restrictions/Precautions   Weight Bearing Precautions Per Order No   Other Precautions Telemetry; Fall Risk   Home Living   Type of [address] Multi-level;1/2 bath on main level;Bed/bath upstairs;Stairs to enter with rails   Bathroom Shower/Tub Tub/shower unit   [address] Cane;Walker   Additional Comments 1+1 SABINE w/ rail; pt reports can sleep in recliner on 1st floor; bedroom and full bath on 2nd floor; pt reports moving to [place] w/ wife   Prior Function   Level of [place] Independent with ADLs; Independent with functional mobility   Lives With Family; Spouse   Receives Help From [facility] in the last 6 months 5 to 8  (pt reports 6 w/ no injuries)   Vocational Retired   Comments PTA, pt reports RW for cardiac rehab, [facility] for all other ambulation; wife home 24/7 and avail to assist if need; (-) , daughter provides transportation   General   Additional Pertinent History Chronic diastolic heart failure, moderate aortic stenosis, OA, recent h/o cardiac cath w/ coronary stent on 12/30/22, CAD, DM2, CKD   Family/Caregiver Present No   Cognition   Overall Cognitive Status WFL   Arousal/Participation Alert   Attention Attends with cues to redirect   Orientation Level Oriented X4   Following Commands Follows all commands and directions without difficulty   Comments pt likes to joke, cooperative   Subjective   Subjective agreeable to therapy   RUE Assessment   RUE Assessment WFL  (grossly 4-/5)   LUE Assessment   LUE Assessment WFL  (grossly 4-/5)   RLE Assessment   RLE Assessment WFL  (grossly 4-/5)   LLE Assessment   LLE Assessment WFL  (grossly 4-/5)   Coordination   Sensation WFL  (B/L peripheral neuropathy up to mid shin)   Bed Mobility   Additional Comments pt sitting in bedside chair at beginning of session   Transfers   Sit to Stand 5  Supervision   Additional items Verbal cues  (to RW)   Stand to Sit 5  Supervision   Additional items Verbal cues  (from RW)   Additional Comments V/c for hand placement and safety techniques  Pt reported mild lightheadedness upon standing which disspiated within 1 minute   Ambulation/Elevation   Gait pattern Wide MCKENZIE; Short stride; Excessively slow;Decreased hip extension;Decreased heel strike;Decreased toe off;Step through pattern   Gait Assistance 4  Minimal assist  (CG w/ RW)   Additional items Assist x 1;Verbal cues   Assistive Device Rolling walker   Distance 40'   Ambulation/Elevation Additional Comments During ambulation, pt reported mild SOB  Pt was able to ambulate back to chair and maintain control during stand-sit  Symptoms resolved after approx 2 minutes of sitting, /77 &  bpm  No gross LOB and no complaints of dizziness, lightheadedness   Balance   Static Sitting Good   Dynamic Sitting Fair +   Static Standing Fair   Dynamic Standing Fair -   Ambulatory Fair -   Endurance Deficit   Endurance Deficit Yes   Endurance Deficit Description pt reported SOB during ambulation   Activity Tolerance   Activity Tolerance Patient tolerated treatment well;Patient limited by fatigue   Nurse Made Aware JAVED [place]   Assessment   Prognosis Fair   Problem List Decreased strength;Decreased endurance; Impaired balance; Impaired sensation;Obesity   Assessment Pt 69 y o  male presented to [address] [place] [address] on 03/02/2023 transferred from [facility] ED for higher level of care due to GI bleed  Pt admitted for Acute lower GI bleeding, hypotension, acute blood loss anemia, acute diverticulitis, and urinary retention   Pt hx of Chronic diastolic heart failure, moderate aortic stenosis, OA, recent h/o cardiac cath w/ coronary stent on 12/30/22, CAD, DM2, and CKD  PT eval and tx order placed  PTA, pt was independent w/ all functional mobility w/ SPC/RW, has 1+1 SABINE and lives w/ wife in 2 level home  Upon evaluation, pt exhibits weakness, decreased endurance, decreased activity tolerance and altered sensation as noted in flow sheet  Pt demonstrated transfers w/ Supervision requiring verbal cuing for proper mechanics and safety  Pt was able to ambulate using Rolling Walker w/  CGAx1  Observable gait deviations as noted in flowsheet  The above mentioned impairments limit pt's ability for independent functional mobility hence will benefit from skilled PT during hospital stay to improve function  Pt is appropriate for restorative therapy to progress towards baseline while receiving acute care services  The patient's AM-PAC Basic Mobility Inpatient Short Form Raw Score is 18   A Raw score of greater than 16 suggests the patient may benefit from discharge to home  Please also refer to the recommendation of the Physical Therapist for safe discharge planning  PT will recommend home with home health rehabilitation vs OPPT upon d/c from acute care once medically managed to improve functional mobility to baseline  Education provided to pt regarding importance of PT  Continue to encourage mobilization w/ nursing including OOB for meals as tolerated to prevent further functional decline  Nursing notified  Pt tolerated session well  Pt at end of session, in stable condition, seated in bedside chair with all needs within reach  Goals   Patient Goals to get better   STG Expiration Date 03/17/23   Short Term Goal #1 Within 10 days, to progress towards baseline, improve safety, and decrease caregiver burden, pt to: 1  Improve strength by at least 1 grade in all ROM   2  Improve balance by at least 1 grade  3  Improve bed mobility to independent    4  Improve assistance level to Mod I w/ transfers  5  Increase ambulating distance to >200ft with appropriate AD w/ mod I  6  Demonstrate safe stair navigation w/ appropriate AD w/ Supervision  7  Pt/ family education  PT Treatment Day 0   Plan   Treatment/Interventions Functional transfer training;LE strengthening/ROM; Elevations; Therapeutic exercise; Endurance training;Patient/family training;Bed mobility;Gait training;Spoke to nursing;OT   PT Frequency 3-5x/wk   Recommendation   PT Discharge Recommendation Home with home health rehabilitation  (vs  OPPT)   AM-PAC Basic Mobility Inpatient   Turning in Flat Bed Without Bedrails 3   Lying on Back to Sitting on Edge of Flat Bed Without Bedrails 3   Moving Bed to Chair 3   Standing Up From Chair Using Arms 3   Walk in Room 3   Climb 3-5 Stairs With Railing 3   Basic Mobility Inpatient Raw Score 18   Basic Mobility Standardized Score 41 05   Highest Level Of Mobility   JH-HLM Goal 6: Walk 10 steps or more   JH-HLM Achieved 7: Walk 25 feet or more   End of Consult   Patient Position at End of Consult Bedside chair;Bed/Chair alarm activated; All needs within reach   End of Consult Comments pt at end of session in stable condition, seated in bedside chair, alarm activated, w/ all needs within reach     Hx/personal factors: co-morbidities, inaccessible home, advanced age, telemetry, use of AD, fall risk, and obesity  Examination: dec mobility, dec balance, dec endurance, dec amb, risk for falls  Clinical: unpredictable (ongoing medical status, abnormal lab values, and risk for falls)  Complexity: high    James Reynolds PTS

## 2023-03-07 NOTE — PROGRESS NOTES
[facility]  Progress Note - Deyanira Solano 1945, 69 y o  male MRN: [id number]  Unit/Bed#: E5 -01 Encounter: [id number]  Primary Care Provider: Kimberlee Cool DO   Date and time admitted to hospital: 3/2/2023 10:59 AM    * Acute lower GI bleeding  Assessment & Plan  This is a 69-year-old male with history of CAD, hypertension, hyperlipidemia depression, EKG, CHF, diabetes mellitus transferred from [address] [place] [address],[address] to ICU for GI bleed and hemorrhagic shock      · Status post EGD 3/2 unremarkable  · Status post colonoscopy 3/2 revealed bleeding ulcer within diverticulum and descending colon status post clip x2  · Hemoglobin 9  · GI follow-up appreciated  · Okay to resume antiplatelet  · Monitor H&H, advance diet as tolerated    Urinary retention  Assessment & Plan  · Jose catheter placed in the ICU  · Urology input appreciated  · Patient will follow with urology outpatient for voiding trial    Acute diverticulitis  Assessment & Plan  · CT scan revealed uncomplicated acute diverticulitis  · Ceftriaxone and metronidazole day 6    Diabetes mellitus, type 2 [facility])  Assessment & Plan  Lab Results   Component Value Date    HGBA1C 8 1 (H) 01/16/2023       Recent Labs     03/06/23  1652 03/06/23  2110 03/07/23  0723 03/07/23  1112   POCGLU 169* 197* 166* 265*     · Hold oral hypoglycemics  · Monitor Accu-Cheks, sliding scale for coverage    Stage 3a chronic kidney disease [facility])  Assessment & Plan  Lab Results   Component Value Date    EGFR 85 03/06/2023    EGFR 84 03/05/2023    EGFR 83 03/04/2023    CREATININE 0 84 03/06/2023    CREATININE 0 86 03/05/2023    CREATININE 0 88 03/04/2023   · At baseline, will monitor    Chronic diastolic (congestive) heart failure (HCC)  Assessment & Plan  Wt Readings from Last 3 Encounters:   03/07/23 103 kg (227 lb 1 2 oz)   03/02/23 98 9 kg (218 lb 0 6 oz)   02/22/23 98 9 kg (218 lb)     · Diuretics on hold due to hypotension and GI bleed  · Patient euvolemic  · We will monitor    Recurrent major depressive disorder ([address] )  Assessment & Plan  · Continue Lexapro    Coronary artery disease of native artery of native heart with stable angina pectoris ([facility] [address] )  Assessment & Plan  · CAD with recent stenting in 2022  · Cardiology input appreciated  · okay to resume Plavix as single antiplatelet strategy  · Continue statin    Primary osteoarthritis involving multiple joints  Assessment & Plan  · Acetaminophen as needed    Hypertension  Assessment & Plan  · Home medications: Losartan 25 mg daily, propranolol 60 mg daily, torsemide 10 mg daily  · Patient is on hold as patient was hypotensive in the ICU  · Monitor blood pressure    Hyperlipidemia  Assessment & Plan  · Continue statin      Extensive review of chart during critical care stay    VTE Pharmacologic Prophylaxis:   Pharmacologic: Contraindicated due to GI bleed    Mechanical VTE Prophylaxis in Place:     Patient Centered Rounds: I have performed bedside rounds with nursing staff today  Discussions with Specialists or Other Care Team Provider: Urology team    Education and Discussions with Family / Patient: Patient    Time Spent for Care: More than 50% of total time spent on counseling and coordination of care as described above  Current Length of Stay: 5 day(s)    Current Patient Status: Inpatient   Certification Statement: The patient will continue to require additional inpatient hospital stay due to GI bleed    Discharge Plan / Estimated Discharge Date: TBD    Code Status: Level 1 - Full Code      Subjective:   Patient seen and examined at bedside, sitting comfortably in recliner chair    Objective:     Vitals:   Temp (24hrs), Av °F (36 7 °C), Min:97 6 °F (36 4 °C), Max:98 4 °F (36 9 °C)    Temp:  [97 6 °F (36 4 °C)-98 4 °F (36 9 °C)] 97 8 °F (36 6 °C)  HR:  [] 119  Resp:  [16] 16  BP: (114-156)/(69-88) 129/77  SpO2:  [98 %-99 %] 99 %  Body mass index is 34 53 kg/m²  Input and Output Summary (last 24 hours): Intake/Output Summary (Last 24 hours) at 3/7/2023 1424  Last data filed at 3/6/2023 2104  Gross per 24 hour   Intake 10 ml   Output 900 ml   Net -890 ml       Physical Exam:    Constitutional: Patient is oriented to person, place and time, no acute distress  HEENT:  Normocephalic, atraumatic  Cardiovascular: Normal S1S2, RRR, No murmurs/rubs/gallops appreciated  Pulmonary:  Bilateral air entry, No rhonchi/rales/wheezing appreciated  Abdominal: Soft, Bowel sounds present, Non-tender, Non-distended  Extremities:  No cyanosis, clubbing or edema  Neurological: Awake, alert  Skin:  Warm, dry    Additional Data:     Labs:    Results from last 7 days   Lab Units 03/06/23  0432 03/05/23  1421 03/05/23  0431   WBC Thousand/uL 6 08  --  6 84   HEMOGLOBIN g/dL 9 0*   < > 7 8*   HEMATOCRIT % 26 3*  --  23 2*   PLATELETS Thousands/uL 199  --  169   NEUTROS PCT %  --   --  62   LYMPHS PCT %  --   --  22   MONOS PCT %  --   --  9   EOS PCT %  --   --  5    < > = values in this interval not displayed  Results from last 7 days   Lab Units 03/06/23  0432 03/02/23  1734 03/02/23  1132   POTASSIUM mmol/L 3 1*   < > 5 9*   CHLORIDE mmol/L 109*   < > 110*   CO2 mmol/L 25   < > 21   BUN mg/dL 4*   < > 31*   CREATININE mg/dL 0 84   < > 1 38*   CALCIUM mg/dL 7 6*   < > 8 4   ALK PHOS U/L  --   --  58   ALT U/L  --   --  22   AST U/L  --   --  15    < > = values in this interval not displayed  Results from last 7 days   Lab Units 03/04/23  2132   INR  1 11        I Have Reviewed All Lab Data Listed Above      Invasive Devices     Peripheral Intravenous Line  Duration           Peripheral IV 03/04/23 Distal;Right;Upper;Ventral (anterior) Arm 3 days          Drain  Duration           Urethral Catheter Temperature probe 3 days                    Recent Cultures (last 7 days):           Last 24 Hours Medication List:   Current Facility-Administered Medications   Medication Dose Route Frequency Provider Last Rate   • acetaminophen  650 mg Oral Q6H PRN María Wiley MD     • allopurinol  100 mg Oral Daily María Wiley MD     • aluminum-magnesium hydroxide-simethicone  30 mL Oral Q4H PRN María Wiley MD     • atorvastatin  80 mg Oral Daily With Karin Olivier MD     • cefTRIAXone  1,000 mg Intravenous Q24H María Wiley MD 1,000 mg (03/07/23 0631)   • clopidogrel  75 mg Oral Daily María Wiley MD     • escitalopram  10 mg Oral Daily María Wiley MD     • finasteride  5 mg Oral Daily Josselyn Resendiz PA-C     • insulin lispro  1-5 Units Subcutaneous HS María Wiley MD     • insulin lispro  2-12 Units Subcutaneous TID AC María Wiley MD     • levothyroxine  88 mcg Oral Early Morning María Wiley MD     • metroNIDAZOLE  500 mg Oral Q8H [facility] María Wiley MD     • multivitamin stress formula  1 tablet Oral Daily María Wiley MD     • nitroglycerin  0 4 mg Sublingual Q5 Min PRN María Wiley MD     • pantoprazole  40 mg Oral Daily Before Breakfast María Wiley MD     • tamsulosin  0 4 mg Oral Daily With Karin Olivier MD     • zonisamide  50 mg Oral Daily María Wiley MD          Today, Patient Was Seen By: María Wiley MD

## 2023-03-07 NOTE — CONSULTS
Consult - Urology   Sandra Childers 1945, 69 y o  male MRN: [id number]    Unit/Bed#: E5 -01 Encounter: [id number]    Urinary retention  Assessment & Plan  · Suspect previous history of incomplete bladder emptying and overflow incontinence  Patient denies difficulty or inability to void in the past    · Morales catheter placed 3/4 for 1750 mL return  · Currently patent draining clear yellow urine  · Maintain upon discharge  · Suprapubic pain resolved with morales insertion  Denies flank pain  Creatinine has been stable at 0 8 the past few days  · Do not currently recommend starting alpha blockade such as Flomax or Uroxatral given his history of orthostatic hypotension  · Will schedule outpatient void trial and follow up with provider  · Urology will sign off but remain available for any further inpatient needs  Please feel free to contact the provider currently covering the Urology [facility] role for this campus with questions or concerns  Subjective:   Patient is a 69year old male recently admitted on 3/2/23 with hemorrhagic shock secondary to GI bleed  Patient was monitored in the ICU and temp cath morales catheter was placed by critical care nursing staff on 3/4/23 for what appears to be accurate Is/Os  Amount drained on insertion was 1750 mL  Patient states he follows with Urology in [place] but unable to find any documentation in patient's chart  He reports long standing history of suprapubic pain/distention but denies flank pain  CT from December 2021 revealed unremarkable kidneys but did note bladder distention  He denies any difficulty voiding or straining to void  He states his stream is strong and continuous and he does not have the sensation of incomplete bladder emptying  He does note urinary incontinence between his voids for which he has been wearing absorbant pads  He denies any dysuria or gross hematuria  He denies trying previous medications for his prostate   He does note issues with orthostatic hypotension and associated dizziness at a baseline  Review of Systems   Constitutional: Negative for chills and fever  HENT: Negative  Respiratory: Negative  Cardiovascular: Negative  Gastrointestinal: Negative for abdominal pain (suprapubic pain resolved following catheter insertion), nausea and vomiting  Genitourinary: Negative for difficulty urinating, flank pain, hematuria, scrotal swelling and testicular pain  Musculoskeletal: Negative  Skin: Negative  Neurological: Negative  Objective:  Vitals: Blood pressure 129/77, pulse (!) 119, temperature 97 8 °F (36 6 °C), temperature source Oral, resp  rate 16, height 5' 8" (1 727 m), weight 103 kg (227 lb 1 2 oz), SpO2 99 %  ,Body mass index is 34 53 kg/m²  Physical Exam  Vitals reviewed  Constitutional:       General: He is not in acute distress  Appearance: Normal appearance  He is obese  He is not ill-appearing, toxic-appearing or diaphoretic  HENT:      Head: Normocephalic and atraumatic  Eyes:      Conjunctiva/sclera: Conjunctivae normal    Pulmonary:      Effort: Pulmonary effort is normal  No respiratory distress  Abdominal:      General: There is no distension  Palpations: Abdomen is soft  Tenderness: There is no abdominal tenderness  There is no right CVA tenderness, left CVA tenderness, guarding or rebound  Genitourinary:     Comments: Jose catheter patent draining clear yellow urine  Musculoskeletal:         General: Normal range of motion  Cervical back: Normal range of motion  Skin:     General: Skin is warm and dry  Neurological:      General: No focal deficit present  Mental Status: He is alert and oriented to person, place, and time  Psychiatric:         Mood and Affect: Mood normal          Behavior: Behavior normal          Thought Content: Thought content normal          Judgment: Judgment normal        Imaging:  No recent upper tract imaging  Labs:  Recent Labs     03/05/23  0431 03/06/23  0432   WBC 6 84 6 08     Recent Labs     03/04/23  1906 03/04/23  2132 03/05/23  0431 03/05/23  1421 03/06/23  0432   HGB 7 0* 7 5* 7 8* 8 9* 9 0*       Recent Labs     03/05/23  0431 03/06/23  0432   CREATININE 0 86 0 84       Microbiology:  No urine testing this admission  History:  Social History     Socioeconomic History   • Marital status: /Civil Union     Spouse name: None   • Number of children: None   • Years of education: None   • Highest education level: None   Occupational History   • None   Tobacco Use   • Smoking status: Never   • Smokeless tobacco: Never   Vaping Use   • Vaping Use: Never used   Substance and Sexual Activity   • Alcohol use: Not Currently     Comment: socially, cut down in 2008, previously did more than    • Drug use: Never   • Sexual activity: Yes     Partners: Female   Other Topics Concern   • None   Social History Narrative    No advance directive     No living will      Social Determinants of Health     Financial Resource Strain: Not on file   Food Insecurity: No Food Insecurity   • Worried About Running Out of Food in the Last Year: Never true   • Ran Out of Food in the Last Year: Never true   Transportation Needs: No Transportation Needs   • Lack of Transportation (Medical): No   • Lack of Transportation (Non-Medical):  No   Physical Activity: Not on file   Stress: Not on file   Social Connections: Not on file   Intimate Partner Violence: Not on file   Housing Stability: Low Risk    • Unable to Pay for Housing in the Last Year: No   • Number of Places Lived in the Last Year: 1   • Unstable Housing in the Last Year: No       Past Medical History:   Diagnosis Date   • BPH (benign prostatic hyperplasia)    • CAD (coronary artery disease)    • Cancer (HCC)     basal cell   • Chronic kidney disease     stage 3   • Colon polyp    • CPAP (continuous positive airway pressure) dependence    • Diabetes mellitus (HCC)     niddm • Disease of thyroid gland    • Gout    • High cholesterol    • Hypertension    • MI, 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    • Myocardial infarction [facility]) 2014   • S/P cardiac catheterization 12/30/2022   • Sleep apnea      Past Surgical History:   Procedure Laterality Date   • BASAL CELL CARCINOMA EXCISION Right 1/21/2022    Procedure: EXCISION BASAL CELL CARCINOMA EXTREMITY;  Surgeon: Carlynn Meckel, DO;  Location: [facility] MA[facility];  Service: General   • CARDIAC CATHETERIZATION Left 12/29/2022    Procedure: Cardiac Left Heart Cath;  Surgeon: James Acevedo MD;  Location: [facility]; Service: Cardiology   • CARDIAC CATHETERIZATION N/A 12/29/2022    Procedure: Cardiac Coronary Angiogram;  Surgeon: James Acevedo MD;  Location: [facility]; Service: Cardiology   • CARDIAC CATHETERIZATION N/A 12/29/2022    Procedure: Cardiac pci;  Surgeon: James Acevedo MD;  Location: [facility];   Service: Cardiology   • CATARACT EXTRACTION W/  INTRAOCULAR LENS IMPLANT     • CORNEAL TRANSPLANT     • CORONARY ANGIOPLASTY WITH STENT PLACEMENT      stents x3-- [phone]   • EYE SURGERY      repair corneal laceration   • MN COLONOSCOPY FLX DX W/COLLJ SPEC WHEN PFRMD N/A 3/21/2016    Procedure: COLONOSCOPY;  Surgeon: Rahul Ramos MD;  Location: [facility];  Service: Colorectal   • TONSILLECTOMY AND ADENOIDECTOMY       Family History   Adopted: Yes   Problem Relation Age of Onset   • No Known Problems Mother    • No Known Problems Father        [place], [place]  Date: 3/7/2023 Time: 1:04 PM

## 2023-03-07 NOTE — PLAN OF CARE
Problem: PHYSICAL THERAPY ADULT  Goal: Performs mobility at highest level of function for planned discharge setting  See evaluation for individualized goals  Description: Treatment/Interventions: Functional transfer training, LE strengthening/ROM, Elevations, Therapeutic exercise, Endurance training, Patient/family training, Bed mobility, Gait training, Spoke to nursing, OT          See flowsheet documentation for full assessment, interventions and recommendations  Note: Prognosis: Fair  Problem List: Decreased strength, Decreased endurance, Impaired balance, Impaired sensation, Obesity  Assessment: Pt 69 y o  male presented to [address] [place] [address] on 03/02/2023 transferred from [facility] for higher level of care due to GI bleed  Pt admitted for Acute lower GI bleeding, hypotension, acute blood loss anemia, acute diverticulitis, and urinary retention  Pt hx of Chronic diastolic heart failure, moderate aortic stenosis, OA, recent h/o cardiac cath w/ coronary stent on 12/30/22, CAD, DM2, and CKD  PT eval and tx order placed  PTA, pt was independent w/ all functional mobility w/ SPC/RW, has 1+1 SABINE and lives w/ wife in 2 level home  Upon evaluation, pt exhibits weakness, decreased endurance, decreased activity tolerance and altered sensation as noted in flow sheet  Pt demonstrated transfers w/ Supervision requiring verbal cuing for proper mechanics and safety  Pt was able to ambulate using Rolling Walker w/  CGAx1  Observable gait deviations as noted in flowsheet  The above mentioned impairments limit pt's ability for independent functional mobility hence will benefit from skilled PT during hospital stay to improve function  Pt is appropriate for restorative therapy to progress towards baseline while receiving acute care services  The patient's AM-PAC Basic Mobility Inpatient Short Form Raw Score is 18   A Raw score of greater than 16 suggests the patient may benefit from discharge to home   Please also refer to the recommendation of the Physical Therapist for safe discharge planning  PT will recommend home with home health rehabilitation vs OPPT upon d/c from acute care once medically managed to improve functional mobility to baseline  Education provided to pt regarding importance of PT  Continue to encourage mobilization w/ nursing including OOB for meals as tolerated to prevent further functional decline  Nursing notified  Pt tolerated session well  Pt at end of session, in stable condition, seated in bedside chair with all needs within reach  PT Discharge Recommendation: Home with home health rehabilitation (vs  OPPT)    See flowsheet documentation for full assessment

## 2023-03-07 NOTE — ASSESSMENT & PLAN NOTE
· Suspect previous history of incomplete bladder emptying and overflow incontinence  Patient denies difficulty or inability to void in the past    · Morales catheter placed 3/4 for 1750 mL return  · Currently patent draining clear yellow urine  · Maintain upon discharge  · Suprapubic pain resolved with morales insertion  Denies flank pain  Creatinine has been stable at 0 8 the past few days  · Do not currently recommend starting alpha blockade such as Flomax or Uroxatral given his history of orthostatic hypotension  · Will schedule outpatient void trial and follow up with provider  · Urology will sign off but remain available for any further inpatient needs  Please feel free to contact the provider currently covering the Urology [facility] role for this campus with questions or concerns

## 2023-03-07 NOTE — ASSESSMENT & PLAN NOTE
· CAD with recent stenting in December 2022  · Cardiology input appreciated  · okay to resume Plavix as single antiplatelet strategy  · Continue statin

## 2023-03-07 NOTE — PLAN OF CARE
Problem: PHYSICAL THERAPY ADULT  Goal: Performs mobility at highest level of function for planned discharge setting  See evaluation for individualized goals  Description: Treatment/Interventions: Functional transfer training, LE strengthening/ROM, Elevations, Therapeutic exercise, Endurance training, Patient/family training, Bed mobility, Gait training, Spoke to nursing, OT          See flowsheet documentation for full assessment, interventions and recommendations  3/7/2023 1611 by Anh Tinoco  Note: Prognosis: Fair  Problem List: Decreased strength, Decreased endurance, Impaired balance, Impaired sensation, Obesity  Assessment: Pt 69 y o  male presented to [address] [place] [address] on 03/02/2023 transferred from [facility] ED for higher level of care due to GI bleed  Pt admitted for Acute lower GI bleeding, hypotension, acute blood loss anemia, acute diverticulitis, and urinary retention  Pt hx of Chronic diastolic heart failure, moderate aortic stenosis, OA, recent h/o cardiac cath w/ coronary stent on 12/30/22, CAD, DM2, and CKD  PT eval and tx order placed  PTA, pt was independent w/ all functional mobility w/ SPC/RW, has 1+1 SABINE and lives w/ wife in 2 level home  Upon evaluation, pt exhibits weakness, decreased endurance, decreased activity tolerance and altered sensation as noted in flow sheet  Pt demonstrated transfers w/ Supervision requiring verbal cuing for proper mechanics and safety  Pt was able to ambulate using Rolling Walker w/  CGAx1  Observable gait deviations as noted in flowsheet  The above mentioned impairments limit pt's ability for independent functional mobility hence will benefit from skilled PT during hospital stay to improve function  Pt is appropriate for restorative therapy to progress towards baseline while receiving acute care services  The patient's AM-PAC Basic Mobility Inpatient Short Form Raw Score is 18    A Raw score of greater than 16 suggests the patient may benefit from discharge to home  Please also refer to the recommendation of the Physical Therapist for safe discharge planning  PT will recommend home with home health rehabilitation vs OPPT upon d/c from acute care once medically managed to improve functional mobility to baseline  Education provided to pt regarding importance of PT  Continue to encourage mobilization w/ nursing including OOB for meals as tolerated to prevent further functional decline  Nursing notified  Pt tolerated session well  Pt at end of session, in stable condition, seated in bedside chair with all needs within reach  PT Discharge Recommendation: Home with home health rehabilitation (vs  OPPT)    See flowsheet documentation for full assessment  3/7/2023 1609 by James Reynolds  Note: Prognosis: Fair  Problem List: Decreased strength, Decreased endurance, Impaired balance, Impaired sensation, Obesity  Assessment: Pt 69 y o  male presented to [address] [place] [address] on 03/02/2023 transferred from [facility] ED for higher level of care due to GI bleed  Pt admitted for Acute lower GI bleeding, hypotension, acute blood loss anemia, acute diverticulitis, and urinary retention  Pt hx of Chronic diastolic heart failure, moderate aortic stenosis, OA, recent h/o cardiac cath w/ coronary stent on 12/30/22, CAD, DM2, and CKD  PT eval and tx order placed  PTA, pt was independent w/ all functional mobility w/ SPC/RW, has 1+1 SABINE and lives w/ wife in 2 level home  Upon evaluation, pt exhibits weakness, decreased endurance, decreased activity tolerance and altered sensation as noted in flow sheet  Pt demonstrated transfers w/ Supervision requiring verbal cuing for proper mechanics and safety  Pt was able to ambulate using Rolling Walker w/  CGAx1  Observable gait deviations as noted in flowsheet  The above mentioned impairments limit pt's ability for independent functional mobility hence will benefit from skilled PT during hospital stay to improve function   Pt is appropriate for restorative therapy to progress towards baseline while receiving acute care services  The patient's AM-PAC Basic Mobility Inpatient Short Form Raw Score is 18   A Raw score of greater than 16 suggests the patient may benefit from discharge to home  Please also refer to the recommendation of the Physical Therapist for safe discharge planning  PT will recommend home with home health rehabilitation vs OPPT upon d/c from acute care once medically managed to improve functional mobility to baseline  Education provided to pt regarding importance of PT  Continue to encourage mobilization w/ nursing including OOB for meals as tolerated to prevent further functional decline  Nursing notified  Pt tolerated session well  Pt at end of session, in stable condition, seated in bedside chair with all needs within reach  PT Discharge Recommendation: Home with home health rehabilitation (vs  OPPT)    See flowsheet documentation for full assessment

## 2023-03-07 NOTE — ASSESSMENT & PLAN NOTE
Lab Results   Component Value Date    EGFR 85 03/06/2023    EGFR 84 03/05/2023    EGFR 83 03/04/2023    CREATININE 0 84 03/06/2023    CREATININE 0 86 03/05/2023    CREATININE 0 88 03/04/2023   · At baseline, will monitor

## 2023-03-08 ENCOUNTER — APPOINTMENT (OUTPATIENT)
Dept: CARDIAC REHAB | Facility: HOSPITAL | Age: 78
End: 2023-03-08

## 2023-03-08 ENCOUNTER — HOSPITAL ENCOUNTER (INPATIENT)
Facility: HOSPITAL | Age: 78
LOS: 13 days | Discharge: NURSING, REHAB OR LONG-TERM CARE | End: 2023-03-22
Attending: EMERGENCY MEDICINE | Admitting: INTERNAL MEDICINE

## 2023-03-08 ENCOUNTER — HOME HEALTH ADMISSION (OUTPATIENT)
Dept: HOME HEALTH SERVICES | Facility: HOME HEALTHCARE | Age: 78
End: 2023-03-08

## 2023-03-08 ENCOUNTER — APPOINTMENT (EMERGENCY)
Dept: RADIOLOGY | Facility: HOSPITAL | Age: 78
End: 2023-03-08

## 2023-03-08 VITALS
OXYGEN SATURATION: 98 % | RESPIRATION RATE: 18 BRPM | BODY MASS INDEX: 35.08 KG/M2 | HEART RATE: 110 BPM | TEMPERATURE: 98.2 F | WEIGHT: 231.48 LBS | SYSTOLIC BLOOD PRESSURE: 136 MMHG | HEIGHT: 68 IN | DIASTOLIC BLOOD PRESSURE: 72 MMHG

## 2023-03-08 DIAGNOSIS — I25.118 CORONARY ARTERY DISEASE OF NATIVE ARTERY OF NATIVE HEART WITH STABLE ANGINA PECTORIS (HCC): ICD-10-CM

## 2023-03-08 DIAGNOSIS — R23.2 HOT FLASHES: ICD-10-CM

## 2023-03-08 DIAGNOSIS — R79.89 LOW SERUM CORTISOL LEVEL: ICD-10-CM

## 2023-03-08 DIAGNOSIS — I95.1 ORTHOSTATIC HYPOTENSION: ICD-10-CM

## 2023-03-08 DIAGNOSIS — R35.0 INCREASED URINARY FREQUENCY: ICD-10-CM

## 2023-03-08 DIAGNOSIS — R33.9 URINARY RETENTION: ICD-10-CM

## 2023-03-08 DIAGNOSIS — K59.00 CONSTIPATION: ICD-10-CM

## 2023-03-08 DIAGNOSIS — E03.9 HYPOTHYROIDISM, UNSPECIFIED TYPE: ICD-10-CM

## 2023-03-08 DIAGNOSIS — R07.9 CHEST PAIN: Primary | ICD-10-CM

## 2023-03-08 DIAGNOSIS — K92.2 GASTROINTESTINAL HEMORRHAGE: ICD-10-CM

## 2023-03-08 DIAGNOSIS — R77.8 ELEVATED TROPONIN: ICD-10-CM

## 2023-03-08 LAB
ANION GAP SERPL CALCULATED.3IONS-SCNC: 5 MMOL/L (ref 4–13)
BASOPHILS # BLD AUTO: 0.03 THOUSANDS/ÂΜL (ref 0–0.1)
BASOPHILS NFR BLD AUTO: 1 % (ref 0–1)
BUN SERPL-MCNC: 4 MG/DL (ref 5–25)
CALCIUM SERPL-MCNC: 7.8 MG/DL (ref 8.4–10.2)
CHLORIDE SERPL-SCNC: 111 MMOL/L (ref 96–108)
CO2 SERPL-SCNC: 24 MMOL/L (ref 21–32)
CREAT SERPL-MCNC: 0.85 MG/DL (ref 0.6–1.3)
EOSINOPHIL # BLD AUTO: 0.3 THOUSAND/ÂΜL (ref 0–0.61)
EOSINOPHIL NFR BLD AUTO: 5 % (ref 0–6)
ERYTHROCYTE [DISTWIDTH] IN BLOOD BY AUTOMATED COUNT: 16.8 % (ref 11.6–15.1)
GFR SERPL CREATININE-BSD FRML MDRD: 84 ML/MIN/1.73SQ M
GLUCOSE SERPL-MCNC: 159 MG/DL (ref 65–140)
GLUCOSE SERPL-MCNC: 169 MG/DL (ref 65–140)
GLUCOSE SERPL-MCNC: 244 MG/DL (ref 65–140)
HCT VFR BLD AUTO: 24.7 % (ref 36.5–49.3)
HGB BLD-MCNC: 8.2 G/DL (ref 12–17)
IMM GRANULOCYTES # BLD AUTO: 0.04 THOUSAND/UL (ref 0–0.2)
IMM GRANULOCYTES NFR BLD AUTO: 1 % (ref 0–2)
LYMPHOCYTES # BLD AUTO: 1.44 THOUSANDS/ÂΜL (ref 0.6–4.47)
LYMPHOCYTES NFR BLD AUTO: 26 % (ref 14–44)
MAGNESIUM SERPL-MCNC: 1.8 MG/DL (ref 1.9–2.7)
MCH RBC QN AUTO: 32 PG (ref 26.8–34.3)
MCHC RBC AUTO-ENTMCNC: 33.2 G/DL (ref 31.4–37.4)
MCV RBC AUTO: 97 FL (ref 82–98)
MONOCYTES # BLD AUTO: 0.53 THOUSAND/ÂΜL (ref 0.17–1.22)
MONOCYTES NFR BLD AUTO: 10 % (ref 4–12)
NEUTROPHILS # BLD AUTO: 3.24 THOUSANDS/ÂΜL (ref 1.85–7.62)
NEUTS SEG NFR BLD AUTO: 57 % (ref 43–75)
NRBC BLD AUTO-RTO: 0 /100 WBCS
PLATELET # BLD AUTO: 219 THOUSANDS/UL (ref 149–390)
PMV BLD AUTO: 9.5 FL (ref 8.9–12.7)
POTASSIUM SERPL-SCNC: 3.4 MMOL/L (ref 3.5–5.3)
RBC # BLD AUTO: 2.56 MILLION/UL (ref 3.88–5.62)
SODIUM SERPL-SCNC: 140 MMOL/L (ref 135–147)
WBC # BLD AUTO: 5.58 THOUSAND/UL (ref 4.31–10.16)

## 2023-03-08 RX ORDER — ASPIRIN 81 MG/1
324 TABLET, CHEWABLE ORAL ONCE
Status: COMPLETED | OUTPATIENT
Start: 2023-03-08 | End: 2023-03-08

## 2023-03-08 RX ORDER — CLOPIDOGREL BISULFATE 75 MG/1
75 TABLET ORAL DAILY
Qty: 30 TABLET | Refills: 1 | Status: ON HOLD | OUTPATIENT
Start: 2023-03-09

## 2023-03-08 RX ORDER — TAMSULOSIN HYDROCHLORIDE 0.4 MG/1
0.4 CAPSULE ORAL
Qty: 30 CAPSULE | Refills: 0 | Status: ON HOLD | OUTPATIENT
Start: 2023-03-08

## 2023-03-08 RX ORDER — PANTOPRAZOLE SODIUM 40 MG/1
40 TABLET, DELAYED RELEASE ORAL
Qty: 30 TABLET | Refills: 0 | Status: ON HOLD | OUTPATIENT
Start: 2023-03-09

## 2023-03-08 RX ORDER — POTASSIUM CHLORIDE 20 MEQ/1
40 TABLET, EXTENDED RELEASE ORAL ONCE
Status: COMPLETED | OUTPATIENT
Start: 2023-03-08 | End: 2023-03-08

## 2023-03-08 RX ADMIN — INSULIN LISPRO 2 UNITS: 100 INJECTION, SOLUTION INTRAVENOUS; SUBCUTANEOUS at 08:00

## 2023-03-08 RX ADMIN — ESCITALOPRAM OXALATE 10 MG: 10 TABLET ORAL at 08:00

## 2023-03-08 RX ADMIN — POTASSIUM CHLORIDE 40 MEQ: 1500 TABLET, EXTENDED RELEASE ORAL at 11:57

## 2023-03-08 RX ADMIN — CEFTRIAXONE 1000 MG: 1 INJECTION, SOLUTION INTRAVENOUS at 06:17

## 2023-03-08 RX ADMIN — INSULIN LISPRO 4 UNITS: 100 INJECTION, SOLUTION INTRAVENOUS; SUBCUTANEOUS at 11:56

## 2023-03-08 RX ADMIN — ZONISAMIDE 50 MG: 25 CAPSULE ORAL at 11:57

## 2023-03-08 RX ADMIN — CLOPIDOGREL BISULFATE 75 MG: 75 TABLET ORAL at 08:01

## 2023-03-08 RX ADMIN — ALLOPURINOL 100 MG: 100 TABLET ORAL at 08:01

## 2023-03-08 RX ADMIN — B-COMPLEX W/ C & FOLIC ACID TAB 1 TABLET: TAB at 08:00

## 2023-03-08 RX ADMIN — METRONIDAZOLE 500 MG: 500 TABLET ORAL at 06:17

## 2023-03-08 RX ADMIN — FINASTERIDE 5 MG: 5 TABLET, FILM COATED ORAL at 08:00

## 2023-03-08 RX ADMIN — LEVOTHYROXINE SODIUM 88 MCG: 100 TABLET ORAL at 06:17

## 2023-03-08 RX ADMIN — ASPIRIN 81 MG CHEWABLE TABLET 324 MG: 81 TABLET CHEWABLE at 23:33

## 2023-03-08 RX ADMIN — PANTOPRAZOLE SODIUM 40 MG: 40 TABLET, DELAYED RELEASE ORAL at 08:00

## 2023-03-08 NOTE — DISCHARGE SUMMARY
[facility]  Discharge- Love Hodge 1945, 69 y o  male MRN: [id number]  Unit/Bed#: E5 -01 Encounter: [id number]  Primary Care Provider: Romelia Krishnamurthy DO   Date and time admitted to hospital: 3/2/2023 10:59 AM    * Acute lower GI bleeding  Assessment & Plan  This is a 71-year-old male with history of CAD, hypertension, hyperlipidemia depression, EKG, CHF, diabetes mellitus transferred from [address] [place] [address] to ICU for GI bleed and hemorrhagic shock      · Status post EGD 3/2 unremarkable  · Status post colonoscopy 3/2 revealed bleeding ulcer within diverticulum and descending colon status post clip x2  · Hemoglobin 8  · No further episodes of bleeding  · GI follow-up appreciated  · Okay to resume antiplatelet  · Okay to discharge home from GI standpoint    Urinary retention  Assessment & Plan  · Jose catheter placed in the ICU  · Urology input appreciated  · Patient will follow with urology outpatient for voiding trial    Acute diverticulitis  Assessment & Plan  · CT scan revealed uncomplicated acute diverticulitis  Ceftriaxone and metronidazole day 7    Diabetes mellitus, type 2 [facility])  Assessment & Plan  Lab Results   Component Value Date    HGBA1C 8 1 (H) 01/16/2023       Recent Labs     03/07/23  1112 03/07/23  1557 03/07/23  2036 03/08/23  0716   POCGLU 265* 264* 234* 169*       Stage 3a chronic kidney disease (HCC)  Assessment & Plan  Lab Results   Component Value Date    EGFR 84 03/08/2023    EGFR 77 03/07/2023    EGFR 85 03/06/2023    CREATININE 0 85 03/08/2023    CREATININE 0 95 03/07/2023    CREATININE 0 84 03/06/2023   · At baseline    Chronic diastolic (congestive) heart failure (HCC)  Assessment & Plan  Wt Readings from Last 3 Encounters:   03/08/23 105 kg (231 lb 7 7 oz)   03/02/23 98 9 kg (218 lb 0 6 oz)   02/22/23 98 9 kg (218 lb)     ·     Recurrent major depressive disorder (HCC)  Assessment & Plan  · Continue Lexapro    Coronary artery disease of native artery of native heart with stable angina pectoris [facility])  Assessment & Plan  · CAD with recent stenting in December 2022  · Cardiology input appreciated  · okay to resume Plavix as single antiplatelet strategy  · Continue statin    Primary osteoarthritis involving multiple joints  Assessment & Plan  · Acetaminophen as needed    Hypertension  Assessment & Plan  · Home medications: Losartan 25 mg daily, propranolol 60 mg daily, torsemide 10 mg daily    Hyperlipidemia  Assessment & Plan  · Continue statin      Transition of Care Discharge Summary - [facility]    Patient Information: Marlin Livingston 69 y o  male MRN: [id number]  Unit/Bed#: E5 -01 Encounter: [id number]    Discharging Physician / Practitioner: Avery Naidu MD  PCP: Guero Hurd DO  Admission Date: 3/2/2023  Discharge Date: 03/08/23    Disposition:      Other: home      Reason for Admission: GI bleed    Discharge Diagnoses:     Principal Problem:    Acute lower GI bleeding  Active Problems:    Hyperlipidemia    Hypertension    Primary osteoarthritis involving multiple joints    Coronary artery disease of native artery of native heart with stable angina pectoris (HCC)    Recurrent major depressive disorder (HCC)    Chronic diastolic (congestive) heart failure (HCC)    Stage 3a chronic kidney disease ([facility] )    Diabetes mellitus, type 2 ([facility] )    Acute diverticulitis    Hypotension    Acute blood loss anemia    Urinary retention  Resolved Problems:    * No resolved hospital problems  *      Consultations During Hospital Stay:  · IP CONSULT TO CASE MANAGEMENT  · IP CONSULT TO GASTROENTEROLOGY  · IP CONSULT TO CARDIOLOGY  · IP CONSULT TO UROLOGY      Procedures Performed:     · EGD    Medication Adjustments and Discharge Medications:  · Medication Dosing Tapers - Please refer to Discharge Medication List for details on any medication dosing tapers (if applicable to patient)    · Discharge Medication List: See after visit summary for reconciled discharge medications  Wound Care Recommendations:  When applicable, please see wound care section of After Visit Summary  Diet Recommendations at Discharge:  Diet -        Diet Orders   (From admission, onward)             Start     Ordered    03/07/23 1400  Diet Aly/CHO Controlled; Consistent Carbohydrate Diet Level 2 (5 carb servings/75 grams CHO/meal); Consistent Carbohydrate Diet Level 2 (5 carb servings/75 grams CHO/meal)  Diet effective now        References:    Nutrtion Support Algorithm Enteral vs  Parenteral   Question Answer Comment   Diet Type Aly/CHO Controlled    Aly/CHO Controlled Consistent Carbohydrate Diet Level 2 (5 carb servings/75 grams CHO/meal)    Other Restriction(s): Consistent Carbohydrate Diet Level 2 (5 carb servings/75 grams CHO/meal)    RD to adjust diet per protocol? Yes        03/07/23 1400              Fluid Restriction - No Fluid Restriction at Discharge  Significant Findings / Test Results:     EGD    Result Date: 3/2/2023  Impression: Irregular Z-line  Minimal quantity of food seen in the stomach, and duodenum  Otherwise unremarkable  RECOMMENDATION:  There is no recommended follow-up for this procedure  Transition to protonix 40 daily PO (for ulcer prophylaxis given advanced age and need for DAPT) Continue serial H&H, monitor stool output  Monitor vitals closely  Plan for colonoscopy tomorrow, we will start bowel prep today  Resume clear liquid diet  NPO at midnight  Can continue DAPT given recent cardiac stenting Perla Bullock MD     Colonoscopy    Addendum Date: 3/4/2023    [facility] [address] [place] [address] [phone] DATE OF SERVICE: 3/03/23 PHYSICIAN(S): Attending: Perla Bullock MD Fellow: Juwan Wilkins MD INDICATION: GI bleed POST-OP DIAGNOSIS: See the impression below  HISTORY: Prior colonoscopy: More than 10 years ago   BOWEL PREPARATION: Golytely/Colyte/Trilyte PREPROCEDURE: Informed consent was obtained for the procedure, including sedation  Risks including but not limited to bleeding, infection, perforation, adverse drug reaction and aspiration were explained in detail  Also explained about less than 100% sensitivity with the exam and other alternatives  The patient was placed in the left lateral decubitus position  Procedure: Colonoscopy DETAILS OF PROCEDURE: Patient was taken to the procedure room where a time out was performed to confirm correct patient and correct procedure  The patient underwent monitored anesthesia care, which was administered by an anesthesia professional  The patient's blood pressure, heart rate, level of consciousness, oxygen and respirations were monitored throughout the procedure  A digital rectal exam was performed  The scope was introduced through the anus and advanced to the cecum  Retroflexion was performed in the rectum  The quality of bowel preparation was evaluated using the [facility] Bowel Preparation Scale with scores of: right colon = 2, transverse colon = 2, left colon = 2  The total BBPS score was 6  Bowel prep was adequate  The patient experienced no blood loss  The procedure was not difficult  The patient tolerated the procedure well  There were no apparent complications  ANESTHESIA INFORMATION: ASA: IV Anesthesia Type: General MEDICATIONS: No administrations occurring from 1407 to 1557 on 03/03/23 FINDINGS: Significant amount of fresh blood and hematin in the transverse colon, splenic flexure, descending colon, sigmoid colon, rectosigmoid and rectum  Significant amount of fresh red blood and older black blood/hematin was seen in the colon up to the transverse colon  No blood at all in the ascending colon and cecum  TI could not be intubated but no blood coming from the IC valve   Blood was extensively lavaged and suctioned on withdrawal, and no active bleeding was found Severe pancolonic diverticula; there was stigmata of recent hemorrhage; placed 2 clips successfully and 2 clips unsuccessfully  One diverticulum in the descending colon appeared to have a small ulcer base with possible visible vessel and overlying clot  No active bleeding, but two clips were successfully placed  EVENTS: Procedure Events Event Event Time ENDO CECUM REACHED 3/3/2023  2:53 PM ENDO SCOPE OUT TIME 3/3/2023  3:54 PM SPECIMENS: * No specimens in log * EQUIPMENT: Colonoscope - IMPRESSION: Suspected ulcer within a diverticulum in the descending colon with possible visible vessel and an adherant clot, deployed 2 hemostatic clips (another 2 unsuccessful) Old and fresh blood seen upto the level of proximal transverse colon but no active bleeding seen after extensive lavage Pancolon (left >> right) diverticulosis  RECOMMENDATION: - Continue close monitoring of vitals, stool output  - Hgb q8h - Transfusion as needed with target Hb of 7 to 8  - If permissive by cardiology, hold brillinta until Hb stablizes and no witnessed bleeding for 24-48 hours  - Can continue baby aspirin  - If hemodynamically significant GI bleeding recurs, please inform on call GI provider  Would likely benefit from IR angiography and embolization at this point  Clips should also aid with localization if empiric embolization is being considered  - Keep on clear liquid diet  Perla Bullock MD     Result Date: 3/4/2023  Impression: Suspected ulcer within a diverticulum in the descending colon with possible visible vessel and an adherant clot, deployed 2 hemostatic clips (another 2 unsuccessful) Old and fresh blood seen upto the level of proximal transverse colon but no active bleeding seen after extensive lavage Pancolon (left >> right) diverticulosis  RECOMMENDATION: - Continue close monitoring of vitals, stool output  - Hgb q8h - Transfusion as needed with target Hb of 7 to 8  - If permissive by cardiology, hold brillinta until Hb stablizes and no witnessed bleeding for 24-48 hours   - Can continue baby aspirin  - If hemodynamically significant GI bleeding recurs, please inform on call GI provider  Would likely benefit from IR angiography and embolization at this point  Clips should also aid with localization if empiric embolization is being considered  - Keep on clear liquid diet  Rachel Garrett MD     XR chest portable    Result Date: 3/2/2023  Impression: Limited evaluation due to low lung volumes, but no active pulmonary disease identified  Workstation performed: [id number]     CT high volume lower GI bleed    Result Date: 3/4/2023  Impression: 1  No CT evidence of active high volume gastrointestinal hemorrhage  2    Mostly resolved previously seen diverticulitis at the descending colon  3   Chronic marked bladder distention  Correlate for voiding dysfunction  Workstation performed: [id number]     CT high volume bleeding scan abdomen pelvis    Result Date: 3/2/2023  · Impression: 1  No CT evidence of active high volume gastrointestinal hemorrhage  2   Uncomplicated acute diverticulitis of the descending colon  3   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  4   Hepatic steatosis  The study was marked in [facility] for immediate notification  Workstation performed: [id number]       Hospital Course:     Paulett Jeans is a 69 y o  male patient who originally presented to the hospital on 3/2/2023 due to GI bleed  Patient was transferred from [address] [place] [address] admitted to ICU for GI bleed and hemorrhagic shock  Patient with history of CAD, hypertension, hyperlipidemia, depression, CHF, diabetes mellitus  He underwent EGD and colonoscopy colonoscopy revealed bleeding ulcer within diverticulum in descending colon status post clip patient did receive blood transfusions  He was eval by GI and cardiology  Medications changed to Plavix and aspirin and Brilinta have been discontinued    Patient also found to have urinary retention Jose catheter was placed and urology consulted he will be discharged home with catheter and follow-up with urology outpatient for voiding trial   Patient is otherwise stable for discharge home today with outpatient follow-up  Please see above problem list for further details  Condition at Discharge: good     Discharge Day Visit / Exam:     Subjective:  Patient seen and examined at bedside, denies any complaints    Vitals: Blood Pressure: 136/72 (03/08/23 0741)  Pulse: (!) 110 (03/08/23 0741)  Temperature: 98 2 °F (36 8 °C) (03/08/23 0741)  Temp Source: Oral (03/08/23 0741)  Respirations: 18 (03/08/23 0741)  Height: 5' 8" (172 7 cm) (03/06/23 1023)  Weight - Scale: 105 kg (231 lb 7 7 oz) (03/08/23 0600)  SpO2: 98 % (03/08/23 0741)    Physical Exam:    Constitutional: Patient is oriented to person, place and time, no acute distress  HEENT:  Normocephalic, atraumatic  Cardiovascular: Normal S1S2, RRR, No murmurs/rubs/gallops appreciated  Pulmonary:  Bilateral air entry, No rhonchi/rales/wheezing appreciated  Abdominal: Soft, Bowel sounds present, Non-tender, Non-distended  Extremities:  No cyanosis, clubbing or edema  Neurological: Cranial nerves II-XII grossly intact, sensation intact, otherwise no focal neurological symptoms  Discharge instructions/Information to patient and family:   See after visit summary section titled Discharge Instructions for information provided to patient and family  Planned Readmission: no     Discharge Statement:  I spent 35 minutes discharging the patient  This time was spent on the day of discharge  I had direct contact with the patient on the day of discharge  Greater than 50% of the total time was spent examining patient, answering all patient questions, arranging and discussing plan of care with patient as well as directly providing post-discharge instructions  Additional time then spent on discharge activities      ** Please Note: This note has been constructed using a voice recognition system **

## 2023-03-08 NOTE — ASSESSMENT & PLAN NOTE
This is a 80-year-old male with history of CAD, hypertension, hyperlipidemia depression, EKG, CHF, diabetes mellitus transferred from [address] [place] [address] to ICU for GI bleed and hemorrhagic shock      · Status post EGD 3/2 unremarkable  · Status post colonoscopy 3/2 revealed bleeding ulcer within diverticulum and descending colon status post clip x2  · Hemoglobin 9  · GI follow-up appreciated  · Okay to resume antiplatelet  · Monitor H&H, advance diet as tolerated

## 2023-03-08 NOTE — PROGRESS NOTES
Progress Note - Cardiology   Mirta Palmer 69 y o  male MRN: [id number]  Unit/Bed#: E5 -01 Encounter: [id number]      Assessment/Recommendations/Discussion:   • Acute blood loss anemia secondary to GI bleed  ? 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 CP s/p SAM-pLAD and POBA-mLAD w/ residual 60% OM1 distal to OM stent and -mRCA, December 2022  ? STEMI s/p SMA-pRCA, September 2014  ? NSTEMI s/p PCI-OM1, December 2000  • Chronic diastolic heart failure  ? LVEF 65%, mild LVH, mild LA dilatation, moderate AS, trace TR, March 2022  • Moderate aortic stenosis  ? w/ mean PG 18 mmHg and KELECHI 1 2 cm2, March 2022  • Hypotension  • History of hypertension  • Dyslipidemia  • Type 2 diabetes mellitus  • Obesity  • Osteoarthritis    PLAN  • Has remained stable since Plavix has restarted  • No rebleeding  • No symptoms of angina  • Continue with current statin dose  • Okay for DC today from cardiac standpoint        Subjective:   HPI  Doing well, no chest pain shortness of breath or GI bleed      Review of Systems: As noted in HPI  Rest of ROS is negative  Vitals:   /72 (BP Location: Left arm)   Pulse (!) 110   Temp 98 2 °F (36 8 °C) (Oral)   Resp 18   Ht 5' 8" (1 727 m)   Wt 105 kg (231 lb 7 7 oz)   SpO2 98%   BMI 35 20 kg/m²   I/O       03/06 0701  03/07 0700 03/07 0701  03/08 0700 03/08 0701  03/09 0700    P  O  480  300    I V  (mL/kg) 10 (0 1)      Blood       IV Piggyback 100      Total Intake(mL/kg) 590 (5 7)  300 (2 9)    Urine (mL/kg/hr) 1400 (0 6)  1400 (2 3)    Stool 0      Total Output 1400  1400    Net -810  -1100           Unmeasured Stool Occurrence 1 x          Weight (last 2 days)     Date/Time Weight    03/08/23 0600 105 (231 48)    03/07/23 0600 103 (227 07)    03/06/23 0539 104 (230 38)          Physical Exam   Constitutional: awake, alert and oriented, in no acute distress, no obvious deformities, obese male  Head: Normocephalic, without obvious abnormality, atraumatic  Eyes: conjunctivae clear and moist  Sclera anicteric  No xanthelasmas  Pupils equal bilaterally  Extraocular motions are full  Ear nose mouth and throat: ears are symmetrical bilaterally, hearing appears to be equal bilaterally, no nasal discharge or epistaxis, oropharynx is clear with moist mucous membranes  Neck: Trachea is midline, neck is supple, no thyromegaly or significant lymphadenopathy, there is full range of motion  Lungs: clear to auscultation bilaterally, no wheezes, no rales, no rhonchi, no accessory muscle use, breathing is nonlabored  Heart: regular rate and rhythm, S1, S2 normal, no murmur, no click, no rub and no gallop, no lower extremity edema  Abdomen: Obese, soft, non-tender; bowel sounds normal; no masses, no organomegaly  Psychiatric: Patient is oriented to time, place, person, mood/affect is negative for depression, anxiety, agitation, appears to have appropriate insight  Skin: Skin is warm, dry, intact  No obvious rashes or lesions on exposed extremities  Nail beds are pink with no cyanosis or clubbing  TELEMETRY:   Lab Results:  Results from last 7 days   Lab Units 03/08/23  0452   WBC Thousand/uL 5 58   HEMOGLOBIN g/dL 8 2*   HEMATOCRIT % 24 7*   PLATELETS Thousands/uL 219     Results from last 7 days   Lab Units 03/08/23  0452 03/02/23  1734 03/02/23  1132   POTASSIUM mmol/L 3 4*   < > 5 9*   CHLORIDE mmol/L 111*   < > 110*   CO2 mmol/L 24   < > 21   BUN mg/dL 4*   < > 31*   CREATININE mg/dL 0 85   < > 1 38*   CALCIUM mg/dL 7 8*   < > 8 4   ALK PHOS U/L  --   --  58   ALT U/L  --   --  22   AST U/L  --   --  15    < > = values in this interval not displayed       Results from last 7 days   Lab Units 03/08/23  0452   POTASSIUM mmol/L 3 4*   CHLORIDE mmol/L 111*   CO2 mmol/L 24   BUN mg/dL 4*   CREATININE mg/dL 0 85   CALCIUM mg/dL 7 8*           Medications:    Current Facility-Administered Medications:   •  acetaminophen (TYLENOL) tablet 650 mg, 650 mg, Oral, Q6H PRN, Davian Bentley MD, 650 mg at 03/05/23 0001  •  allopurinol (ZYLOPRIM) tablet 100 mg, 100 mg, Oral, Daily, Davian Bentley MD, 100 mg at 03/08/23 0801  •  aluminum-magnesium hydroxide-simethicone (MYLANTA) oral suspension 30 mL, 30 mL, Oral, Q4H PRN, Davian Bentley MD, 30 mL at 03/07/23 0123  •  atorvastatin (LIPITOR) tablet 80 mg, 80 mg, Oral, Daily With Dinner, Davian Bentley MD, 80 mg at 03/07/23 1636  •  cefTRIAXone (ROCEPHIN) IVPB (premix in dextrose) 1,000 mg 50 mL, 1,000 mg, Intravenous, Q24H, Davian Bentley MD, Last Rate: 100 mL/hr at 03/08/23 0617, 1,000 mg at 03/08/23 0374  •  clopidogrel (PLAVIX) tablet 75 mg, 75 mg, Oral, Daily, Davian Bentley MD, 75 mg at 03/08/23 0801  •  escitalopram (LEXAPRO) tablet 10 mg, 10 mg, Oral, Daily, Davian Bentley MD, 10 mg at 03/08/23 0800  •  finasteride (PROSCAR) tablet 5 mg, 5 mg, Oral, Daily, Freda Camp PA-C, 5 mg at 03/08/23 0800  •  insulin lispro (HumaLOG) 100 units/mL subcutaneous injection 1-5 Units, 1-5 Units, Subcutaneous, HS, Davian Bentley MD, 2 Units at 03/07/23 2245  •  insulin lispro (HumaLOG) 100 units/mL subcutaneous injection 2-12 Units, 2-12 Units, Subcutaneous, TID AC, 4 Units at 03/08/23 1156 **AND** Fingerstick Glucose (POCT), , , TID AC, Davian Bentley MD  •  levothyroxine tablet 88 mcg, 88 mcg, Oral, Early Morning, Davian Bentley MD, 88 mcg at 03/08/23 4835  •  metroNIDAZOLE (FLAGYL) tablet 500 mg, 500 mg, Oral, Q8H [address], Davian Bentley MD, 500 mg at 03/08/23 3504  •  multivitamin stress formula tablet 1 tablet, 1 tablet, Oral, Daily, Davian Bentley MD, 1 tablet at 03/08/23 0800  •  nitroglycerin (NITROSTAT) SL tablet 0 4 mg, 0 4 mg, Sublingual, Q5 Min PRN, Davian Bentley MD, 0 4 mg at 03/04/23 1356  •  pantoprazole (PROTONIX) EC tablet 40 mg, 40 mg, Oral, Daily Before Breakfast, Davian Bentley MD, 40 mg at 03/08/23 0800  •  tamsulosin (FLOMAX) capsule 0 4 mg, 0 4 mg, Oral, Daily With Romero Marquez MD, 0 4 mg at 03/07/23 1636  •  zonisamide (ZONEGRAN) capsule 50 mg, 50 mg, Oral, Daily, Domonique Shankar MD, 50 mg at 03/08/23 1157    Portions of the record may have been created with voice recognition software  Occasional wrong word or "sound a like" substitutions may have occurred due to the inherent limitations of voice recognition software  Read the chart carefully and recognize, using context, where substitutions have occurred      Ankit Hayden DO, [facility]  3/8/2023 12:52 PM

## 2023-03-08 NOTE — ASSESSMENT & PLAN NOTE
Wt Readings from Last 3 Encounters:   03/08/23 105 kg (231 lb 7 7 oz)   03/02/23 98 9 kg (218 lb 0 6 oz)   02/22/23 98 9 kg (218 lb)     · Diuretics on hold due to hypotension and GI bleed  · Patient euvolemic  · We will monitor

## 2023-03-08 NOTE — PLAN OF CARE
Problem: MOBILITY - ADULT  Goal: Maintain or return to baseline ADL function  Description: INTERVENTIONS:  -  Assess patient's ability to carry out ADLs; assess patient's baseline for ADL function and identify physical deficits which impact ability to perform ADLs (bathing, care of mouth/teeth, toileting, grooming, dressing, etc )  - Assess/evaluate cause of self-care deficits   - Assess range of motion  - Assess patient's mobility; develop plan if impaired  - Assess patient's need for assistive devices and provide as appropriate  - Encourage maximum independence but intervene and supervise when necessary  - Involve family in performance of ADLs  - Assess for home care needs following discharge   - Consider OT consult to assist with ADL evaluation and planning for discharge  - Provide patient education as appropriate  3/8/2023 0827 by Benoit Dunn  Outcome: Progressing  3/7/2023 1950 by Benoit Dunn  Outcome: Progressing  Goal: Maintains/Returns to pre admission functional level  Description: INTERVENTIONS:  - Perform BMAT or MOVE assessment daily    - Set and communicate daily mobility goal to care team and patient/family/caregiver  - Collaborate with rehabilitation services on mobility goals if consulted  - Perform Range of Motion 3 times a day  - Reposition patient every 2 hours    - Dangle patient 3 times a day  - Stand patient 3 times a day  - Ambulate patient 3 times a day  - Out of bed to chair 3 times a day   - Out of bed for meals 3 times a day  - Out of bed for toileting  - Record patient progress and toleration of activity level   3/8/2023 0827 by Benoit Dunn  Outcome: Progressing  3/7/2023 1950 by Benoit Dunn  Outcome: Progressing     Problem: PAIN - ADULT  Goal: Verbalizes/displays adequate comfort level or baseline comfort level  Description: Interventions:  - Encourage patient to monitor pain and request assistance  - Assess pain using appropriate pain scale  - Administer analgesics based on type and severity of pain and evaluate response  - Implement non-pharmacological measures as appropriate and evaluate response  - Consider cultural and social influences on pain and pain management  - Notify physician/advanced practitioner if interventions unsuccessful or patient reports new pain  3/8/2023 0827 by Luis E Babin  Outcome: Progressing  3/7/2023 1950 by Luis E Babin  Outcome: Progressing     Problem: INFECTION - ADULT  Goal: Absence or prevention of progression during hospitalization  Description: INTERVENTIONS:  - Assess and monitor for signs and symptoms of infection  - Monitor lab/diagnostic results  - Monitor all insertion sites, i e  indwelling lines, tubes, and drains  - Monitor endotracheal if appropriate and nasal secretions for changes in amount and color  - [place] appropriate cooling/warming therapies per order  - Administer medications as ordered  - Instruct and encourage patient and family to use good hand hygiene technique  - Identify and instruct in appropriate isolation precautions for identified infection/condition  3/8/2023 0827 by Luis E Babin  Outcome: Progressing  3/7/2023 1950 by Luis E Babin  Outcome: Progressing  Goal: Absence of fever/infection during neutropenic period  Description: INTERVENTIONS:  - Monitor WBC    3/8/2023 0827 by Luis E Babin  Outcome: Progressing  3/7/2023 1950 by Luis E Babin  Outcome: Progressing     Problem: SAFETY ADULT  Goal: Maintain or return to baseline ADL function  Description: INTERVENTIONS:  -  Assess patient's ability to carry out ADLs; assess patient's baseline for ADL function and identify physical deficits which impact ability to perform ADLs (bathing, care of mouth/teeth, toileting, grooming, dressing, etc )  - Assess/evaluate cause of self-care deficits   - Assess range of motion  - Assess patient's mobility; develop plan if impaired  - Assess patient's need for assistive devices and provide as appropriate  - Encourage maximum independence but intervene and supervise when necessary  - Involve family in performance of ADLs  - Assess for home care needs following discharge   - Consider OT consult to assist with ADL evaluation and planning for discharge  - Provide patient education as appropriate  3/8/2023 0827 by Sumaya Garcias  Outcome: Progressing  3/7/2023 1950 by Sumaya Garcias  Outcome: Progressing  Goal: Maintains/Returns to pre admission functional level  Description: INTERVENTIONS:  - Perform BMAT or MOVE assessment daily    - Set and communicate daily mobility goal to care team and patient/family/caregiver  - Collaborate with rehabilitation services on mobility goals if consulted  - Perform Range of Motion 3 times a day  - Reposition patient every 2 hours    - Dangle patient 3 times a day  - Stand patient 3 times a day  - Ambulate patient 3 times a day  - Out of bed to chair 3 times a day   - Out of bed for meals 3 times a day  - Out of bed for toileting  - Record patient progress and toleration of activity level   3/8/2023 0827 by Sumaya Garcias  Outcome: Progressing  3/7/2023 1950 by Sumaya Garcias  Outcome: Progressing  Goal: Patient will remain free of falls  Description: INTERVENTIONS:  - Educate patient/family on patient safety including physical limitations  - Instruct patient to call for assistance with activity   - Consult OT/PT to assist with strengthening/mobility   - Keep Call bell within reach  - Keep bed low and locked with side rails adjusted as appropriate  - Keep care items and personal belongings within reach  - Initiate and maintain comfort rounds  - Make Fall Risk Sign visible to staff  - Offer Toileting every 2 Hours, in advance of need  - Initiate/Maintain bed alarm  - Apply yellow socks and bracelet for high fall risk patients  - Consider moving patient to room near nurses station  3/8/2023 0827 by Sumaya Garcias  Outcome: Progressing  3/7/2023 1950 by Sumaya Garcias  Outcome: Progressing     Problem: DISCHARGE PLANNING  Goal: Discharge to home or other facility with appropriate resources  Description: INTERVENTIONS:  - Identify barriers to discharge w/patient and caregiver  - Arrange for needed discharge resources and transportation as appropriate  - Identify discharge learning needs (meds, wound care, etc )  - Arrange for interpretive services to assist at discharge as needed  - Refer to Case Management Department for coordinating discharge planning if the patient needs post-hospital services based on physician/advanced practitioner order or complex needs related to functional status, cognitive ability, or social support system  3/8/2023 0827 by Cedric Marquez  Outcome: Progressing  3/7/2023 1950 by Cedric Marquez  Outcome: Progressing     Problem: Knowledge Deficit  Goal: Patient/family/caregiver demonstrates understanding of disease process, treatment plan, medications, and discharge instructions  Description: Complete learning assessment and assess knowledge base  Interventions:  - Provide teaching at level of understanding  - Provide teaching via preferred learning methods  3/8/2023 0827 by Cedric Marquez  Outcome: Progressing  3/7/2023 1950 by Cedric Marquez  Outcome: Progressing     Problem: Nutrition/Hydration-ADULT  Goal: Nutrient/Hydration intake appropriate for improving, restoring or maintaining nutritional needs  Description: Monitor and assess patient's nutrition/hydration status for malnutrition  Collaborate with interdisciplinary team and initiate plan and interventions as ordered  Monitor patient's weight and dietary intake as ordered or per policy  Utilize nutrition screening tool and intervene as necessary  Determine patient's food preferences and provide high-protein, high-caloric foods as appropriate       INTERVENTIONS:  - Monitor oral intake, urinary output, labs, and treatment plans  - Assess nutrition and hydration status and recommend course of action  - Evaluate amount of meals eaten  - Assist patient with eating if necessary   - Allow adequate time for meals  - Recommend/ encourage appropriate diets, oral nutritional supplements, and vitamin/mineral supplements  - Order, calculate, and assess calorie counts as needed  - Recommend, monitor, and adjust tube feedings and TPN/PPN based on assessed needs  - Assess need for intravenous fluids  - Provide specific nutrition/hydration education as appropriate  - Include patient/family/caregiver in decisions related to nutrition  3/8/2023 0827 by Moon Salcedo  Outcome: Progressing  3/7/2023 1950 by Moon Salcedo  Outcome: Progressing     Problem: GASTROINTESTINAL - ADULT  Goal: Minimal or absence of nausea and/or vomiting  Description: INTERVENTIONS:  - Administer IV fluids if ordered to ensure adequate hydration  - Maintain NPO status until nausea and vomiting are resolved  - Nasogastric tube if ordered  - Administer ordered antiemetic medications as needed  - Provide nonpharmacologic comfort measures as appropriate  - Advance diet as tolerated, if ordered  - Consider nutrition services referral to assist patient with adequate nutrition and appropriate food choices  3/8/2023 0827 by Moon Salcedo  Outcome: Progressing  3/7/2023 1950 by Moon Salcedo  Outcome: Progressing  Goal: Maintains or returns to baseline bowel function  Description: INTERVENTIONS:  - Assess bowel function  - Encourage oral fluids to ensure adequate hydration  - Administer IV fluids if ordered to ensure adequate hydration  - Administer ordered medications as needed  - Encourage mobilization and activity  - Consider nutritional services referral to assist patient with adequate nutrition and appropriate food choices  3/8/2023 0827 by Moon Salcedo  Outcome: Progressing  3/7/2023 1950 by Moon Salcedo  Outcome: Progressing  Goal: Maintains adequate nutritional intake  Description: INTERVENTIONS:  - Monitor percentage of each meal consumed  - Identify factors contributing to decreased intake, treat as appropriate  - Assist with meals as needed  - Monitor I&O, weight, and lab values if indicated  - Obtain nutrition services referral as needed  3/8/2023 0827 by Jenise Michael  Outcome: Progressing  3/7/2023 1950 by Jenise Michael  Outcome: Progressing  Goal: Establish and maintain optimal ostomy function  Description: INTERVENTIONS:  - Assess bowel function  - Encourage oral fluids to ensure adequate hydration  - Administer IV fluids if ordered to ensure adequate hydration   - Administer ordered medications as needed  - Encourage mobilization and activity  - Nutrition services referral to assist patient with appropriate food choices  - Assess stoma site  - Consider wound care consult   3/8/2023 0827 by Jenise Michael  Outcome: Progressing  3/7/2023 1950 by Jenise Michael  Outcome: Progressing  Goal: Oral mucous membranes remain intact  Description: INTERVENTIONS  - Assess oral mucosa and hygiene practices  - Implement preventative oral hygiene regimen  - Implement oral medicated treatments as ordered  - Initiate Nutrition services referral as needed  3/8/2023 0827 by Jenise Michael  Outcome: Progressing  3/7/2023 1950 by Jenise Michael  Outcome: Progressing

## 2023-03-08 NOTE — PLAN OF CARE
Problem: MOBILITY - ADULT  Goal: Maintain or return to baseline ADL function  Description: INTERVENTIONS:  -  Assess patient's ability to carry out ADLs; assess patient's baseline for ADL function and identify physical deficits which impact ability to perform ADLs (bathing, care of mouth/teeth, toileting, grooming, dressing, etc )  - Assess/evaluate cause of self-care deficits   - Assess range of motion  - Assess patient's mobility; develop plan if impaired  - Assess patient's need for assistive devices and provide as appropriate  - Encourage maximum independence but intervene and supervise when necessary  - Involve family in performance of ADLs  - Assess for home care needs following discharge   - Consider OT consult to assist with ADL evaluation and planning for discharge  - Provide patient education as appropriate  Outcome: Progressing  Goal: Maintains/Returns to pre admission functional level  Description: INTERVENTIONS:  - Perform BMAT or MOVE assessment daily    - Set and communicate daily mobility goal to care team and patient/family/caregiver  - Collaborate with rehabilitation services on mobility goals if consulted  - Perform Range of Motion 3 times a day  - Reposition patient every 2 hours    - Dangle patient 3 times a day  - Stand patient 3 times a day  - Ambulate patient 3 times a day  - Out of bed to chair 3 times a day   - Out of bed for meals 3 times a day  - Out of bed for toileting  - Record patient progress and toleration of activity level   Outcome: Progressing     Problem: PAIN - ADULT  Goal: Verbalizes/displays adequate comfort level or baseline comfort level  Description: Interventions:  - Encourage patient to monitor pain and request assistance  - Assess pain using appropriate pain scale  - Administer analgesics based on type and severity of pain and evaluate response  - Implement non-pharmacological measures as appropriate and evaluate response  - Consider cultural and social influences on pain and pain management  - Notify physician/advanced practitioner if interventions unsuccessful or patient reports new pain  Outcome: Progressing     Problem: INFECTION - ADULT  Goal: Absence or prevention of progression during hospitalization  Description: INTERVENTIONS:  - Assess and monitor for signs and symptoms of infection  - Monitor lab/diagnostic results  - Monitor all insertion sites, i e  indwelling lines, tubes, and drains  - Monitor endotracheal if appropriate and nasal secretions for changes in amount and color  - [place] appropriate cooling/warming therapies per order  - Administer medications as ordered  - Instruct and encourage patient and family to use good hand hygiene technique  - Identify and instruct in appropriate isolation precautions for identified infection/condition  Outcome: Progressing  Goal: Absence of fever/infection during neutropenic period  Description: INTERVENTIONS:  - Monitor WBC    Outcome: Progressing     Problem: SAFETY ADULT  Goal: Maintain or return to baseline ADL function  Description: INTERVENTIONS:  -  Assess patient's ability to carry out ADLs; assess patient's baseline for ADL function and identify physical deficits which impact ability to perform ADLs (bathing, care of mouth/teeth, toileting, grooming, dressing, etc )  - Assess/evaluate cause of self-care deficits   - Assess range of motion  - Assess patient's mobility; develop plan if impaired  - Assess patient's need for assistive devices and provide as appropriate  - Encourage maximum independence but intervene and supervise when necessary  - Involve family in performance of ADLs  - Assess for home care needs following discharge   - Consider OT consult to assist with ADL evaluation and planning for discharge  - Provide patient education as appropriate  Outcome: Progressing  Goal: Maintains/Returns to pre admission functional level  Description: INTERVENTIONS:  - Perform BMAT or MOVE assessment daily    - Set and communicate daily mobility goal to care team and patient/family/caregiver  - Collaborate with rehabilitation services on mobility goals if consulted  - Perform Range of Motion 3 times a day  - Reposition patient every 2 hours    - Dangle patient 3 times a day  - Stand patient 3 times a day  - Ambulate patient 3 times a day  - Out of bed to chair 3 times a day   - Out of bed for meals 3 times a day  - Out of bed for toileting  - Record patient progress and toleration of activity level   Outcome: Progressing  Goal: Patient will remain free of falls  Description: INTERVENTIONS:  - Educate patient/family on patient safety including physical limitations  - Instruct patient to call for assistance with activity   - Consult OT/PT to assist with strengthening/mobility   - Keep Call bell within reach  - Keep bed low and locked with side rails adjusted as appropriate  - Keep care items and personal belongings within reach  - Initiate and maintain comfort rounds  - Make Fall Risk Sign visible to staff  - Offer Toileting every 2 Hours, in advance of need  - Apply yellow socks and bracelet for high fall risk patients  - Consider moving patient to room near nurses station  Outcome: Progressing     Problem: DISCHARGE PLANNING  Goal: Discharge to home or other facility with appropriate resources  Description: INTERVENTIONS:  - Identify barriers to discharge w/patient and caregiver  - Arrange for needed discharge resources and transportation as appropriate  - Identify discharge learning needs (meds, wound care, etc )  - Arrange for interpretive services to assist at discharge as needed  - Refer to Case Management Department for coordinating discharge planning if the patient needs post-hospital services based on physician/advanced practitioner order or complex needs related to functional status, cognitive ability, or social support system  Outcome: Progressing     Problem: Knowledge Deficit  Goal: Patient/family/caregiver demonstrates understanding of disease process, treatment plan, medications, and discharge instructions  Description: Complete learning assessment and assess knowledge base  Interventions:  - Provide teaching at level of understanding  - Provide teaching via preferred learning methods  Outcome: Progressing     Problem: Nutrition/Hydration-ADULT  Goal: Nutrient/Hydration intake appropriate for improving, restoring or maintaining nutritional needs  Description: Monitor and assess patient's nutrition/hydration status for malnutrition  Collaborate with interdisciplinary team and initiate plan and interventions as ordered  Monitor patient's weight and dietary intake as ordered or per policy  Utilize nutrition screening tool and intervene as necessary  Determine patient's food preferences and provide high-protein, high-caloric foods as appropriate       INTERVENTIONS:  - Monitor oral intake, urinary output, labs, and treatment plans  - Assess nutrition and hydration status and recommend course of action  - Evaluate amount of meals eaten  - Assist patient with eating if necessary   - Allow adequate time for meals  - Recommend/ encourage appropriate diets, oral nutritional supplements, and vitamin/mineral supplements  - Order, calculate, and assess calorie counts as needed  - Recommend, monitor, and adjust tube feedings and TPN/PPN based on assessed needs  - Assess need for intravenous fluids  - Provide specific nutrition/hydration education as appropriate  - Include patient/family/caregiver in decisions related to nutrition  Outcome: Progressing     Problem: GASTROINTESTINAL - ADULT  Goal: Minimal or absence of nausea and/or vomiting  Description: INTERVENTIONS:  - Administer IV fluids if ordered to ensure adequate hydration  - Maintain NPO status until nausea and vomiting are resolved  - Nasogastric tube if ordered  - Administer ordered antiemetic medications as needed  - Provide nonpharmacologic comfort measures as appropriate  - Advance diet as tolerated, if ordered  - Consider nutrition services referral to assist patient with adequate nutrition and appropriate food choices  Outcome: Progressing  Goal: Maintains or returns to baseline bowel function  Description: INTERVENTIONS:  - Assess bowel function  - Encourage oral fluids to ensure adequate hydration  - Administer IV fluids if ordered to ensure adequate hydration  - Administer ordered medications as needed  - Encourage mobilization and activity  - Consider nutritional services referral to assist patient with adequate nutrition and appropriate food choices  Outcome: Progressing  Goal: Maintains adequate nutritional intake  Description: INTERVENTIONS:  - Monitor percentage of each meal consumed  - Identify factors contributing to decreased intake, treat as appropriate  - Assist with meals as needed  - Monitor I&O, weight, and lab values if indicated  - Obtain nutrition services referral as needed  Outcome: Progressing  Goal: Establish and maintain optimal ostomy function  Description: INTERVENTIONS:  - Assess bowel function  - Encourage oral fluids to ensure adequate hydration  - Administer IV fluids if ordered to ensure adequate hydration   - Administer ordered medications as needed  - Encourage mobilization and activity  - Nutrition services referral to assist patient with appropriate food choices  - Assess stoma site  - Consider wound care consult   Outcome: Progressing  Goal: Oral mucous membranes remain intact  Description: INTERVENTIONS  - Assess oral mucosa and hygiene practices  - Implement preventative oral hygiene regimen  - Implement oral medicated treatments as ordered  - Initiate Nutrition services referral as needed  Outcome: Progressing

## 2023-03-08 NOTE — ASSESSMENT & PLAN NOTE
Lab Results   Component Value Date    EGFR 84 03/08/2023    EGFR 77 03/07/2023    EGFR 85 03/06/2023    CREATININE 0 85 03/08/2023    CREATININE 0 95 03/07/2023    CREATININE 0 84 03/06/2023   · At baseline, will monitor

## 2023-03-08 NOTE — ASSESSMENT & PLAN NOTE
Lab Results   Component Value Date    HGBA1C 8 1 (H) 01/16/2023       Recent Labs     03/07/23  1112 03/07/23  1557 03/07/23 2036 03/08/23  0716   POCGLU 265* 264* 234* 169*     · Hold oral hypoglycemics  · Monitor Accu-Cheks, sliding scale for coverage

## 2023-03-08 NOTE — CASE MANAGEMENT
Case Management Discharge Planning Note    Patient name Yadira Larose  [place] [address] /E5 MS [id number]-* MRN [id number]  : 1945 Date 3/8/2023       Current Admission Date: 3/2/2023  Current Admission Diagnosis:Acute lower GI bleeding   Patient Active Problem List    Diagnosis Date Noted   • Urinary retention 2023   • Acute blood loss anemia 2023   • Acute lower GI bleeding 2023   • Diabetes mellitus, type 2 ([facility] ) 2023   • Acute diverticulitis 2023   • Hypotension 2023   • Mesenteric artery stenosis ([facility] ) 2023   • Peripheral arterial disease ([facility] ) 2023   • S/P cardiac catheterization 2022   • Stage 3a chronic kidney disease ([facility] ) 2022   • Basal cell carcinoma (BCC) of right shoulder 2022   • Chronic diastolic (congestive) heart failure ([facility] ) 01/10/2022   • Skin lesion 2021   • Other chest pain 2021   • Leukocytosis 2021   • Bilateral lower extremity edema 10/22/2021   • Mild cognitive impairment 2021   • Benign hypertension with CKD (chronic kidney disease) stage III ([facility] ) 02/10/2021   • Diabetic nephropathy associated with type 2 diabetes mellitus ([place] [address] ) 02/10/2021   • Concentric left ventricular hypertrophy 02/10/2021   • Aortic stenosis, moderate 02/10/2021   • Edema 02/10/2021   • Fall (on) (from) other stairs and steps, initial encounter 2020   • Recurrent major depressive disorder ([facility] ) 2020   • Primary osteoarthritis of right knee 2020   • Primary osteoarthritis of left knee 2020   • Coronary artery disease of native artery of native heart with stable angina pectoris ([facility] ) 2020   • Chronic pain of both knees 2020   • Primary osteoarthritis of both knees 2020   • Primary osteoarthritis involving multiple joints 2019   • Vitamin E deficiency 2019   • Infected epidermoid cyst 2018   • Tremor, essential 2018   • Diabetic neuropathy associated with diabetes mellitus due to underlying condition ([place] [address] ) 09/26/2018   • Gait instability 09/26/2018   • Frequent falls 09/26/2018   • Degenerative disc disease, lumbar 07/10/2018   • Tremor 10/10/2016   • Depression with anxiety 08/29/2016   • Arthritis 07/13/2015   • NSTEMI (non-ST elevated myocardial infarction) ([place] [address] ) 10/06/2014   • Gout 05/17/2013   • Controlled type 2 diabetes mellitus with hyperglycemia, without long-term current use of insulin ([place] [address] ) 06/18/2012   • Hyperlipidemia 06/18/2012   • Hypertension 06/18/2012   • Morbid obesity ([place] [address] ) 06/18/2012      LOS (days): 6  Geometric Mean LOS (GMLOS) (days): 3 00  Days to GMLOS:-3     OBJECTIVE:  Risk of Unplanned Readmission Score: 19 11         Current admission status: Inpatient   Preferred Pharmacy:   [facility]   [address] [address]  Phone: [phone] Fax: [phone]    Primary Care Provider: Ricardo Easley DO    Primary Insurance: Anabela HOGAN  Secondary Insurance:     DISCHARGE DETAILS:    Discharge planning discussed with[de-identified] patient  Freedom of Choice: Yes  Comments - Freedom of Choice: patient states he wants to continue to do outpatient cardiac rehab  CM contacted family/caregiver?: Yes  Were Treatment Team discharge recommendations reviewed with patient/caregiver?: Yes  Did patient/caregiver verbalize understanding of patient care needs?: Yes  Were patient/caregiver advised of the risks associated with not following Treatment Team discharge recommendations?: Yes    Contacts  Relationship to Patient[de-identified] [address]         Is the patient interested in [facility] at discharge?: No    DME Referral Provided  Referral made for DME?: No    Other Referral/Resources/Interventions Provided:  Interventions: Outpatient PT, Outpatient OT  Referral Comments: patient wants to continue to do cardiac rehab as outpatient at [facility]    Treatment Team Recommendation: Other (OP PT)  Discharge Destination Plan[de-identified] Other (OP PT)  Transport at Discharge : Family     ETA of Transport (Date): 03/08/23     IMM Given (Date):: 03/08/23  IMM Given to[de-identified] Patient     Additional Comments: Patient wants to continue to do cardiac rehab as outpatient at [facility]  Patient has family transportation at discharge  No CM needs, patient agreeable to discharge plan

## 2023-03-09 ENCOUNTER — APPOINTMENT (EMERGENCY)
Dept: CT IMAGING | Facility: HOSPITAL | Age: 78
End: 2023-03-09

## 2023-03-09 ENCOUNTER — APPOINTMENT (INPATIENT)
Dept: CT IMAGING | Facility: HOSPITAL | Age: 78
End: 2023-03-09

## 2023-03-09 ENCOUNTER — APPOINTMENT (INPATIENT)
Dept: NON INVASIVE DIAGNOSTICS | Facility: HOSPITAL | Age: 78
End: 2023-03-09

## 2023-03-09 ENCOUNTER — APPOINTMENT (OUTPATIENT)
Dept: NON INVASIVE DIAGNOSTICS | Facility: HOSPITAL | Age: 78
End: 2023-03-09

## 2023-03-09 PROBLEM — R79.89 ELEVATED TROPONIN: Status: ACTIVE | Noted: 2023-03-09

## 2023-03-09 PROBLEM — K57.31 DIVERTICULOSIS OF COLON WITH HEMORRHAGE: Status: ACTIVE | Noted: 2023-03-09

## 2023-03-09 PROBLEM — E83.42 HYPOMAGNESEMIA: Status: ACTIVE | Noted: 2023-03-09

## 2023-03-09 PROBLEM — R77.8 ELEVATED TROPONIN: Status: ACTIVE | Noted: 2023-03-09

## 2023-03-09 PROBLEM — R51.9 INTRACTABLE HEADACHE: Status: ACTIVE | Noted: 2023-03-09

## 2023-03-09 PROBLEM — R79.89 ELEVATED TSH: Status: ACTIVE | Noted: 2023-03-09

## 2023-03-09 PROBLEM — I95.1 ORTHOSTATIC HYPOTENSION: Status: ACTIVE | Noted: 2023-03-02

## 2023-03-09 PROBLEM — R79.89 ELEVATED D-DIMER: Status: ACTIVE | Noted: 2023-03-09

## 2023-03-09 LAB
2HR DELTA HS TROPONIN: -1 NG/L
4HR DELTA HS TROPONIN: 17 NG/L
ABO GROUP BLD: NORMAL
ALBUMIN SERPL BCP-MCNC: 3.2 G/DL (ref 3.5–5)
ALP SERPL-CCNC: 58 U/L (ref 34–104)
ALT SERPL W P-5'-P-CCNC: 33 U/L (ref 7–52)
ANION GAP SERPL CALCULATED.3IONS-SCNC: 4 MMOL/L (ref 4–13)
ANION GAP SERPL CALCULATED.3IONS-SCNC: 6 MMOL/L (ref 4–13)
APTT PPP: 28 SECONDS (ref 23–37)
AST SERPL W P-5'-P-CCNC: 27 U/L (ref 13–39)
BACTERIA UR QL AUTO: ABNORMAL /HPF
BASOPHILS # BLD AUTO: 0.04 THOUSANDS/ÂΜL (ref 0–0.1)
BASOPHILS NFR BLD AUTO: 1 % (ref 0–1)
BILIRUB SERPL-MCNC: 0.32 MG/DL (ref 0.2–1)
BILIRUB UR QL STRIP: NEGATIVE
BLD GP AB SCN SERPL QL: NEGATIVE
BNP SERPL-MCNC: 120 PG/ML (ref 0–100)
BUN SERPL-MCNC: 7 MG/DL (ref 5–25)
BUN SERPL-MCNC: 8 MG/DL (ref 5–25)
CALCIUM ALBUM COR SERPL-MCNC: 9.1 MG/DL (ref 8.3–10.1)
CALCIUM SERPL-MCNC: 8 MG/DL (ref 8.4–10.2)
CALCIUM SERPL-MCNC: 8.5 MG/DL (ref 8.4–10.2)
CARDIAC TROPONIN I PNL SERPL HS: 58 NG/L
CARDIAC TROPONIN I PNL SERPL HS: 59 NG/L
CARDIAC TROPONIN I PNL SERPL HS: 76 NG/L
CHLORIDE SERPL-SCNC: 108 MMOL/L (ref 96–108)
CHLORIDE SERPL-SCNC: 110 MMOL/L (ref 96–108)
CLARITY UR: ABNORMAL
CO2 SERPL-SCNC: 25 MMOL/L (ref 21–32)
CO2 SERPL-SCNC: 28 MMOL/L (ref 21–32)
COLOR UR: ABNORMAL
CREAT SERPL-MCNC: 1 MG/DL (ref 0.6–1.3)
CREAT SERPL-MCNC: 1.14 MG/DL (ref 0.6–1.3)
D DIMER PPP FEU-MCNC: 1.35 UG/ML FEU
EOSINOPHIL # BLD AUTO: 0.35 THOUSAND/ÂΜL (ref 0–0.61)
EOSINOPHIL NFR BLD AUTO: 5 % (ref 0–6)
ERYTHROCYTE [DISTWIDTH] IN BLOOD BY AUTOMATED COUNT: 17.7 % (ref 11.6–15.1)
ERYTHROCYTE [DISTWIDTH] IN BLOOD BY AUTOMATED COUNT: 17.8 % (ref 11.6–15.1)
FLUAV RNA RESP QL NAA+PROBE: NEGATIVE
FLUBV RNA RESP QL NAA+PROBE: NEGATIVE
GFR SERPL CREATININE-BSD FRML MDRD: 62 ML/MIN/1.73SQ M
GFR SERPL CREATININE-BSD FRML MDRD: 72 ML/MIN/1.73SQ M
GLUCOSE SERPL-MCNC: 144 MG/DL (ref 65–140)
GLUCOSE SERPL-MCNC: 146 MG/DL (ref 65–140)
GLUCOSE SERPL-MCNC: 184 MG/DL (ref 65–140)
GLUCOSE SERPL-MCNC: 186 MG/DL (ref 65–140)
GLUCOSE SERPL-MCNC: 187 MG/DL (ref 65–140)
GLUCOSE SERPL-MCNC: 266 MG/DL (ref 65–140)
GLUCOSE UR STRIP-MCNC: ABNORMAL MG/DL
HCT VFR BLD AUTO: 26.1 % (ref 36.5–49.3)
HCT VFR BLD AUTO: 28.5 % (ref 36.5–49.3)
HGB BLD-MCNC: 8.5 G/DL (ref 12–17)
HGB BLD-MCNC: 9.6 G/DL (ref 12–17)
HGB UR QL STRIP.AUTO: ABNORMAL
HYALINE CASTS #/AREA URNS LPF: ABNORMAL /LPF
IMM GRANULOCYTES # BLD AUTO: 0.04 THOUSAND/UL (ref 0–0.2)
IMM GRANULOCYTES NFR BLD AUTO: 1 % (ref 0–2)
INR PPP: 0.9 (ref 0.84–1.19)
KETONES UR STRIP-MCNC: ABNORMAL MG/DL
LEUKOCYTE ESTERASE UR QL STRIP: ABNORMAL
LIPASE SERPL-CCNC: 49 U/L (ref 11–82)
LYMPHOCYTES # BLD AUTO: 1.14 THOUSANDS/ÂΜL (ref 0.6–4.47)
LYMPHOCYTES NFR BLD AUTO: 16 % (ref 14–44)
MAGNESIUM SERPL-MCNC: 1.6 MG/DL (ref 1.9–2.7)
MAGNESIUM SERPL-MCNC: 1.9 MG/DL (ref 1.9–2.7)
MCH RBC QN AUTO: 31.7 PG (ref 26.8–34.3)
MCH RBC QN AUTO: 33 PG (ref 26.8–34.3)
MCHC RBC AUTO-ENTMCNC: 32.6 G/DL (ref 31.4–37.4)
MCHC RBC AUTO-ENTMCNC: 33.7 G/DL (ref 31.4–37.4)
MCV RBC AUTO: 97 FL (ref 82–98)
MCV RBC AUTO: 98 FL (ref 82–98)
MONOCYTES # BLD AUTO: 0.51 THOUSAND/ÂΜL (ref 0.17–1.22)
MONOCYTES NFR BLD AUTO: 7 % (ref 4–12)
NEUTROPHILS # BLD AUTO: 4.88 THOUSANDS/ÂΜL (ref 1.85–7.62)
NEUTS SEG NFR BLD AUTO: 70 % (ref 43–75)
NITRITE UR QL STRIP: NEGATIVE
NON-SQ EPI CELLS URNS QL MICRO: ABNORMAL /HPF
NRBC BLD AUTO-RTO: 0 /100 WBCS
PH UR STRIP.AUTO: 6 [PH]
PHOSPHATE SERPL-MCNC: 3.4 MG/DL (ref 2.3–4.1)
PLATELET # BLD AUTO: 276 THOUSANDS/UL (ref 149–390)
PLATELET # BLD AUTO: 276 THOUSANDS/UL (ref 149–390)
PMV BLD AUTO: 9.2 FL (ref 8.9–12.7)
PMV BLD AUTO: 9.5 FL (ref 8.9–12.7)
POTASSIUM SERPL-SCNC: 3.8 MMOL/L (ref 3.5–5.3)
POTASSIUM SERPL-SCNC: 4.1 MMOL/L (ref 3.5–5.3)
PROT SERPL-MCNC: 5.3 G/DL (ref 6.4–8.4)
PROT UR STRIP-MCNC: ABNORMAL MG/DL
PROTHROMBIN TIME: 12.3 SECONDS (ref 11.6–14.5)
RBC # BLD AUTO: 2.68 MILLION/UL (ref 3.88–5.62)
RBC # BLD AUTO: 2.91 MILLION/UL (ref 3.88–5.62)
RBC #/AREA URNS AUTO: ABNORMAL /HPF
RETICS # AUTO: ABNORMAL 10*3/UL (ref 14356–105094)
RETICS # CALC: 6.99 % (ref 0.37–1.87)
RH BLD: POSITIVE
RSV RNA RESP QL NAA+PROBE: NEGATIVE
SARS-COV-2 RNA RESP QL NAA+PROBE: NEGATIVE
SL CV LV EF: 65
SODIUM SERPL-SCNC: 139 MMOL/L (ref 135–147)
SODIUM SERPL-SCNC: 142 MMOL/L (ref 135–147)
SP GR UR STRIP.AUTO: >=1.03 (ref 1–1.03)
SPECIMEN EXPIRATION DATE: NORMAL
TRI-PHOS CRY URNS QL MICRO: ABNORMAL /HPF
TSH SERPL DL<=0.05 MIU/L-ACNC: 5.67 UIU/ML (ref 0.45–4.5)
UROBILINOGEN UR QL STRIP.AUTO: 0.2 E.U./DL
WBC # BLD AUTO: 6.4 THOUSAND/UL (ref 4.31–10.16)
WBC # BLD AUTO: 6.96 THOUSAND/UL (ref 4.31–10.16)
WBC #/AREA URNS AUTO: ABNORMAL /HPF

## 2023-03-09 RX ORDER — MAGNESIUM HYDROXIDE/ALUMINUM HYDROXICE/SIMETHICONE 120; 1200; 1200 MG/30ML; MG/30ML; MG/30ML
30 SUSPENSION ORAL EVERY 4 HOURS PRN
Status: DISCONTINUED | OUTPATIENT
Start: 2023-03-09 | End: 2023-03-22 | Stop reason: HOSPADM

## 2023-03-09 RX ORDER — PANTOPRAZOLE SODIUM 40 MG/10ML
40 INJECTION, POWDER, LYOPHILIZED, FOR SOLUTION INTRAVENOUS EVERY 12 HOURS SCHEDULED
Status: DISCONTINUED | OUTPATIENT
Start: 2023-03-09 | End: 2023-03-09

## 2023-03-09 RX ORDER — ENOXAPARIN SODIUM 100 MG/ML
40 INJECTION SUBCUTANEOUS DAILY
Status: DISCONTINUED | OUTPATIENT
Start: 2023-03-09 | End: 2023-03-09

## 2023-03-09 RX ORDER — ONDANSETRON 2 MG/ML
4 INJECTION INTRAMUSCULAR; INTRAVENOUS EVERY 6 HOURS PRN
Status: DISCONTINUED | OUTPATIENT
Start: 2023-03-09 | End: 2023-03-13

## 2023-03-09 RX ORDER — NITROGLYCERIN 0.4 MG/1
0.4 TABLET SUBLINGUAL
Status: DISCONTINUED | OUTPATIENT
Start: 2023-03-09 | End: 2023-03-12

## 2023-03-09 RX ORDER — ATORVASTATIN CALCIUM 40 MG/1
80 TABLET, FILM COATED ORAL
Status: DISCONTINUED | OUTPATIENT
Start: 2023-03-09 | End: 2023-03-22 | Stop reason: HOSPADM

## 2023-03-09 RX ORDER — SODIUM CHLORIDE, SODIUM LACTATE, POTASSIUM CHLORIDE, CALCIUM CHLORIDE 600; 310; 30; 20 MG/100ML; MG/100ML; MG/100ML; MG/100ML
100 INJECTION, SOLUTION INTRAVENOUS CONTINUOUS
Status: DISCONTINUED | OUTPATIENT
Start: 2023-03-09 | End: 2023-03-09

## 2023-03-09 RX ORDER — MAGNESIUM SULFATE HEPTAHYDRATE 40 MG/ML
2 INJECTION, SOLUTION INTRAVENOUS ONCE
Status: COMPLETED | OUTPATIENT
Start: 2023-03-09 | End: 2023-03-09

## 2023-03-09 RX ORDER — TAMSULOSIN HYDROCHLORIDE 0.4 MG/1
0.4 CAPSULE ORAL
Status: DISCONTINUED | OUTPATIENT
Start: 2023-03-09 | End: 2023-03-11

## 2023-03-09 RX ORDER — INSULIN LISPRO 100 [IU]/ML
2-12 INJECTION, SOLUTION INTRAVENOUS; SUBCUTANEOUS
Status: DISCONTINUED | OUTPATIENT
Start: 2023-03-09 | End: 2023-03-22 | Stop reason: HOSPADM

## 2023-03-09 RX ORDER — CLOPIDOGREL BISULFATE 75 MG/1
75 TABLET ORAL DAILY
Status: DISCONTINUED | OUTPATIENT
Start: 2023-03-09 | End: 2023-03-09

## 2023-03-09 RX ORDER — CLOPIDOGREL BISULFATE 75 MG/1
75 TABLET ORAL DAILY
Status: DISCONTINUED | OUTPATIENT
Start: 2023-03-09 | End: 2023-03-22 | Stop reason: HOSPADM

## 2023-03-09 RX ORDER — ACETAMINOPHEN 325 MG/1
650 TABLET ORAL EVERY 6 HOURS PRN
Status: DISCONTINUED | OUTPATIENT
Start: 2023-03-09 | End: 2023-03-22 | Stop reason: HOSPADM

## 2023-03-09 RX ORDER — ESCITALOPRAM OXALATE 10 MG/1
10 TABLET ORAL DAILY
Status: DISCONTINUED | OUTPATIENT
Start: 2023-03-09 | End: 2023-03-22 | Stop reason: HOSPADM

## 2023-03-09 RX ORDER — PANTOPRAZOLE SODIUM 40 MG/10ML
40 INJECTION, POWDER, LYOPHILIZED, FOR SOLUTION INTRAVENOUS EVERY 24 HOURS
Status: DISCONTINUED | OUTPATIENT
Start: 2023-03-10 | End: 2023-03-19

## 2023-03-09 RX ORDER — LOSARTAN POTASSIUM 25 MG/1
25 TABLET ORAL DAILY
Status: DISCONTINUED | OUTPATIENT
Start: 2023-03-09 | End: 2023-03-09

## 2023-03-09 RX ORDER — PANTOPRAZOLE SODIUM 40 MG/1
40 TABLET, DELAYED RELEASE ORAL
Status: DISCONTINUED | OUTPATIENT
Start: 2023-03-09 | End: 2023-03-09

## 2023-03-09 RX ORDER — POLYETHYLENE GLYCOL 3350 17 G/17G
17 POWDER, FOR SOLUTION ORAL DAILY PRN
Status: DISCONTINUED | OUTPATIENT
Start: 2023-03-09 | End: 2023-03-22 | Stop reason: HOSPADM

## 2023-03-09 RX ORDER — RANOLAZINE 500 MG/1
500 TABLET, EXTENDED RELEASE ORAL 2 TIMES DAILY
Status: DISCONTINUED | OUTPATIENT
Start: 2023-03-09 | End: 2023-03-20

## 2023-03-09 RX ORDER — ALLOPURINOL 100 MG/1
100 TABLET ORAL DAILY
Status: DISCONTINUED | OUTPATIENT
Start: 2023-03-09 | End: 2023-03-22 | Stop reason: HOSPADM

## 2023-03-09 RX ORDER — LEVOTHYROXINE SODIUM 88 UG/1
88 TABLET ORAL
Status: DISCONTINUED | OUTPATIENT
Start: 2023-03-09 | End: 2023-03-20

## 2023-03-09 RX ORDER — FUROSEMIDE 10 MG/ML
40 INJECTION INTRAMUSCULAR; INTRAVENOUS ONCE
Status: COMPLETED | OUTPATIENT
Start: 2023-03-09 | End: 2023-03-09

## 2023-03-09 RX ORDER — PROPRANOLOL HCL 60 MG
60 CAPSULE, EXTENDED RELEASE 24HR ORAL DAILY
Status: DISCONTINUED | OUTPATIENT
Start: 2023-03-09 | End: 2023-03-09

## 2023-03-09 RX ADMIN — ATORVASTATIN CALCIUM 80 MG: 40 TABLET, FILM COATED ORAL at 17:08

## 2023-03-09 RX ADMIN — FUROSEMIDE 40 MG: 10 INJECTION, SOLUTION INTRAMUSCULAR; INTRAVENOUS at 11:47

## 2023-03-09 RX ADMIN — SODIUM CHLORIDE, SODIUM LACTATE, POTASSIUM CHLORIDE, AND CALCIUM CHLORIDE 100 ML/HR: .6; .31; .03; .02 INJECTION, SOLUTION INTRAVENOUS at 06:17

## 2023-03-09 RX ADMIN — EMPAGLIFLOZIN 10 MG: 10 TABLET, FILM COATED ORAL at 09:25

## 2023-03-09 RX ADMIN — TAMSULOSIN HYDROCHLORIDE 0.4 MG: 0.4 CAPSULE ORAL at 17:08

## 2023-03-09 RX ADMIN — NITROGLYCERIN 0.4 MG: 0.4 TABLET SUBLINGUAL at 03:44

## 2023-03-09 RX ADMIN — MAGNESIUM SULFATE HEPTAHYDRATE 2 G: 40 INJECTION, SOLUTION INTRAVENOUS at 03:37

## 2023-03-09 RX ADMIN — INSULIN LISPRO 2 UNITS: 100 INJECTION, SOLUTION INTRAVENOUS; SUBCUTANEOUS at 17:09

## 2023-03-09 RX ADMIN — IOHEXOL 100 ML: 350 INJECTION, SOLUTION INTRAVENOUS at 00:55

## 2023-03-09 RX ADMIN — MULTIPLE VITAMINS W/ MINERALS TAB 1 TABLET: TAB at 09:25

## 2023-03-09 RX ADMIN — RANOLAZINE 500 MG: 500 TABLET, EXTENDED RELEASE ORAL at 09:25

## 2023-03-09 RX ADMIN — INSULIN LISPRO 2 UNITS: 100 INJECTION, SOLUTION INTRAVENOUS; SUBCUTANEOUS at 11:57

## 2023-03-09 RX ADMIN — LEVOTHYROXINE SODIUM 88 MCG: 88 TABLET ORAL at 05:02

## 2023-03-09 RX ADMIN — ALLOPURINOL 100 MG: 100 TABLET ORAL at 09:24

## 2023-03-09 RX ADMIN — CLOPIDOGREL BISULFATE 75 MG: 75 TABLET ORAL at 09:25

## 2023-03-09 RX ADMIN — RANOLAZINE 500 MG: 500 TABLET, EXTENDED RELEASE ORAL at 17:08

## 2023-03-09 RX ADMIN — ESCITALOPRAM OXALATE 10 MG: 10 TABLET ORAL at 09:25

## 2023-03-09 RX ADMIN — INSULIN LISPRO 2 UNITS: 100 INJECTION, SOLUTION INTRAVENOUS; SUBCUTANEOUS at 22:02

## 2023-03-09 RX ADMIN — PANTOPRAZOLE SODIUM 40 MG: 40 TABLET, DELAYED RELEASE ORAL at 05:02

## 2023-03-09 RX ADMIN — MORPHINE SULFATE 2 MG: 2 INJECTION, SOLUTION INTRAMUSCULAR; INTRAVENOUS at 04:59

## 2023-03-09 NOTE — PLAN OF CARE
Problem: PAIN - ADULT  Goal: Verbalizes/displays adequate comfort level or baseline comfort level  Description: Interventions:  - Encourage patient to monitor pain and request assistance  - Assess pain using appropriate pain scale  - Administer analgesics based on type and severity of pain and evaluate response  - Implement non-pharmacological measures as appropriate and evaluate response  - Consider cultural and social influences on pain and pain management  - Notify physician/advanced practitioner if interventions unsuccessful or patient reports new pain  Outcome: Progressing     Problem: INFECTION - ADULT  Goal: Absence or prevention of progression during hospitalization  Description: INTERVENTIONS:  - Assess and monitor for signs and symptoms of infection  - Monitor lab/diagnostic results  - Monitor all insertion sites, i e  indwelling lines, tubes, and drains  - Monitor endotracheal if appropriate and nasal secretions for changes in amount and color  - [place] appropriate cooling/warming therapies per order  - Administer medications as ordered  - Instruct and encourage patient and family to use good hand hygiene technique  - Identify and instruct in appropriate isolation precautions for identified infection/condition  Outcome: Progressing     Problem: SAFETY ADULT  Goal: Patient will remain free of falls  Description: INTERVENTIONS:  - Educate patient/family on patient safety including physical limitations  - Instruct patient to call for assistance with activity   - Consult OT/PT to assist with strengthening/mobility   - Keep Call bell within reach  - Keep bed low and locked with side rails adjusted as appropriate  - Keep care items and personal belongings within reach  - Initiate and maintain comfort rounds  - Make Fall Risk Sign visible to staff  - Offer Toileting every 2 Hours, in advance of need  - Initiate/Maintain bed/chair alarm  - Obtain necessary fall risk management equipment: non-skid footwear  - Apply yellow socks and bracelet for high fall risk patients  - Consider moving patient to room near nurses station  Outcome: Progressing  Goal: Maintain or return to baseline ADL function  Description: INTERVENTIONS:  -  Assess patient's ability to carry out ADLs; assess patient's baseline for ADL function and identify physical deficits which impact ability to perform ADLs (bathing, care of mouth/teeth, toileting, grooming, dressing, etc )  - Assess/evaluate cause of self-care deficits   - Assess range of motion  - Assess patient's mobility; develop plan if impaired  - Assess patient's need for assistive devices and provide as appropriate  - Encourage maximum independence but intervene and supervise when necessary  - Involve family in performance of ADLs  - Assess for home care needs following discharge   - Consider OT consult to assist with ADL evaluation and planning for discharge  - Provide patient education as appropriate  Outcome: Progressing  Goal: Maintains/Returns to pre admission functional level  Description: INTERVENTIONS:  - Perform BMAT or MOVE assessment daily    - Set and communicate daily mobility goal to care team and patient/family/caregiver  - Collaborate with rehabilitation services on mobility goals if consulted  - Perform Range of Motion 3 times a day  - Reposition patient every 2 hours    - Dangle patient 3 times a day  - Stand patient 3 times a day  - Ambulate patient 3 times a day  - Out of bed to chair 3 times a day   - Out of bed for meals 3 times a day  - Out of bed for toileting  - Record patient progress and toleration of activity level   Outcome: Progressing     Problem: DISCHARGE PLANNING  Goal: Discharge to home or other facility with appropriate resources  Description: INTERVENTIONS:  - Identify barriers to discharge w/patient and caregiver  - Arrange for needed discharge resources and transportation as appropriate  - Identify discharge learning needs (meds, wound care, etc )  - Arrange for interpretive services to assist at discharge as needed  - Refer to Case Management Department for coordinating discharge planning if the patient needs post-hospital services based on physician/advanced practitioner order or complex needs related to functional status, cognitive ability, or social support system  Outcome: Progressing     Problem: Knowledge Deficit  Goal: Patient/family/caregiver demonstrates understanding of disease process, treatment plan, medications, and discharge instructions  Description: Complete learning assessment and assess knowledge base    Interventions:  - Provide teaching at level of understanding  - Provide teaching via preferred learning methods  Outcome: Progressing     Problem: CARDIOVASCULAR - ADULT  Goal: Maintains optimal cardiac output and hemodynamic stability  Description: INTERVENTIONS:  - Monitor I/O, vital signs and rhythm  - Monitor for S/S and trends of decreased cardiac output  - Administer and titrate ordered vasoactive medications to optimize hemodynamic stability  - Assess quality of pulses, skin color and temperature  - Assess for signs of decreased coronary artery perfusion  - Instruct patient to report change in severity of symptoms  Outcome: Progressing  Goal: Absence of cardiac dysrhythmias or at baseline rhythm  Description: INTERVENTIONS:  - Continuous cardiac monitoring, vital signs, obtain 12 lead EKG if ordered  - Administer antiarrhythmic and heart rate control medications as ordered  - Monitor electrolytes and administer replacement therapy as ordered  Outcome: Progressing     Problem: RESPIRATORY - ADULT  Goal: Achieves optimal ventilation and oxygenation  Description: INTERVENTIONS:  - Assess for changes in respiratory status  - Assess for changes in mentation and behavior  - Position to facilitate oxygenation and minimize respiratory effort  - Oxygen administered by appropriate delivery if ordered  - Initiate smoking cessation education as indicated  - Encourage broncho-pulmonary hygiene including cough, deep breathe, Incentive Spirometry  - Assess the need for suctioning and aspirate as needed  - Assess and instruct to report SOB or any respiratory difficulty  - Respiratory Therapy support as indicated  Outcome: Progressing     Problem: GENITOURINARY - ADULT  Goal: Maintains or returns to baseline urinary function  Description: INTERVENTIONS:  - Assess urinary function  - Encourage oral fluids to ensure adequate hydration if ordered  - Administer IV fluids as ordered to ensure adequate hydration  - Administer ordered medications as needed  - Offer frequent toileting  - Follow urinary retention protocol if ordered  Outcome: Progressing  Goal: Absence of urinary retention  Description: INTERVENTIONS:  - Assess patient's ability to void and empty bladder  - Monitor I/O  - Bladder scan as needed  - Discuss with physician/AP medications to alleviate retention as needed  - Discuss catheterization for long term situations as appropriate  Outcome: Progressing  Goal: Urinary catheter remains patent  Description: INTERVENTIONS:  - Assess patency of urinary catheter  - If patient has a chronic morales, consider changing catheter if non-functioning  - Follow guidelines for intermittent irrigation of non-functioning urinary catheter  Outcome: Progressing     Problem: METABOLIC, FLUID AND ELECTROLYTES - ADULT  Goal: Electrolytes maintained within normal limits  Description: INTERVENTIONS:  - Monitor labs and assess patient for signs and symptoms of electrolyte imbalances  - Administer electrolyte replacement as ordered  - Monitor response to electrolyte replacements, including repeat lab results as appropriate  - Instruct patient on fluid and nutrition as appropriate  Outcome: Progressing  Goal: Fluid balance maintained  Description: INTERVENTIONS:  - Monitor labs   - Monitor I/O and WT  - Instruct patient on fluid and nutrition as appropriate  - Assess for signs & symptoms of volume excess or deficit  Outcome: Progressing  Goal: Glucose maintained within target range  Description: INTERVENTIONS:  - Monitor Blood Glucose as ordered  - Assess for signs and symptoms of hyperglycemia and hypoglycemia  - Administer ordered medications to maintain glucose within target range  - Assess nutritional intake and initiate nutrition service referral as needed  Outcome: Progressing     Problem: MUSCULOSKELETAL - ADULT  Goal: Maintain or return mobility to safest level of function  Description: INTERVENTIONS:  - Assess patient's ability to carry out ADLs; assess patient's baseline for ADL function and identify physical deficits which impact ability to perform ADLs (bathing, care of mouth/teeth, toileting, grooming, dressing, etc )  - Assess/evaluate cause of self-care deficits   - Assess range of motion  - Assess patient's mobility  - Assess patient's need for assistive devices and provide as appropriate  - Encourage maximum independence but intervene and supervise when necessary  - Involve family in performance of ADLs  - Assess for home care needs following discharge   - Consider OT consult to assist with ADL evaluation and planning for discharge  - Provide patient education as appropriate  Outcome: Progressing  Goal: Maintain proper alignment of affected body part  Description: INTERVENTIONS:  - Support, maintain and protect limb and body alignment  - Provide patient/ family with appropriate education  Outcome: Progressing     Problem: MOBILITY - ADULT  Goal: Maintain or return to baseline ADL function  Description: INTERVENTIONS:  -  Assess patient's ability to carry out ADLs; assess patient's baseline for ADL function and identify physical deficits which impact ability to perform ADLs (bathing, care of mouth/teeth, toileting, grooming, dressing, etc )  - Assess/evaluate cause of self-care deficits   - Assess range of motion  - Assess patient's mobility; develop plan if impaired  - Assess patient's need for assistive devices and provide as appropriate  - Encourage maximum independence but intervene and supervise when necessary  - Involve family in performance of ADLs  - Assess for home care needs following discharge   - Consider OT consult to assist with ADL evaluation and planning for discharge  - Provide patient education as appropriate  Outcome: Progressing  Goal: Maintains/Returns to pre admission functional level  Description: INTERVENTIONS:  - Perform BMAT or MOVE assessment daily    - Set and communicate daily mobility goal to care team and patient/family/caregiver  - Collaborate with rehabilitation services on mobility goals if consulted  - Perform Range of Motion 3 times a day  - Reposition patient every 2 hours    - Dangle patient 3 times a day  - Stand patient 3 times a day  - Ambulate patient 3 times a day  - Out of bed to chair 3 times a day   - Out of bed for meals 3 times a day  - Out of bed for toileting  - Record patient progress and toleration of activity level   Outcome: Progressing     Problem: Prexisting or High Potential for Compromised Skin Integrity  Goal: Skin integrity is maintained or improved  Description: INTERVENTIONS:  - Identify patients at risk for skin breakdown  - Assess and monitor skin integrity  - Assess and monitor nutrition and hydration status  - Monitor labs   - Assess for incontinence   - Turn and reposition patient  - Assist with mobility/ambulation  - Relieve pressure over bony prominences  - Avoid friction and shearing  - Provide appropriate hygiene as needed including keeping skin clean and dry  - Evaluate need for skin moisturizer/barrier cream  - Collaborate with interdisciplinary team   - Patient/family teaching  - Consider wound care consult   Outcome: Progressing

## 2023-03-09 NOTE — CASE MANAGEMENT
Case Management Assessment    Patient name Emily Marrero  Location [address] [id number] MRN [id number]  : 1945 Date 3/9/2023       Current Admission Date: 3/8/2023  Current Admission Diagnosis:Other chest pain   Patient Active Problem List    Diagnosis Date Noted   • Elevated troponin 2023   • Diverticulosis of colon with hemorrhage 2023   • Hypomagnesemia 2023   • Elevated TSH 2023   • Urinary retention 2023   • Acute blood loss anemia 2023   • Acute lower GI bleeding 2023   • Diabetes mellitus, type 2 ([facility] ) 2023   • Acute diverticulitis 2023   • Orthostatic hypotension 2023   • Mesenteric artery stenosis ([facility] ) 2023   • Peripheral arterial disease ([facility] ) 2023   • S/P cardiac catheterization 2022   • Stage 3a chronic kidney disease ([facility] ) 2022   • Basal cell carcinoma (BCC) of right shoulder 2022   • Chronic diastolic (congestive) heart failure ([facility] ) 01/10/2022   • Skin lesion 2021   • Other chest pain 2021   • Leukocytosis 2021   • Bilateral lower extremity edema 10/22/2021   • Mild cognitive impairment 2021   • Benign hypertension with CKD (chronic kidney disease) stage III ([facility] ) 02/10/2021   • Diabetic nephropathy associated with type 2 diabetes mellitus ([place] [address] ) 02/10/2021   • Concentric left ventricular hypertrophy 02/10/2021   • Aortic stenosis, moderate 02/10/2021   • Edema 02/10/2021   • Fall (on) (from) other stairs and steps, initial encounter 2020   • Recurrent major depressive disorder ([facility] ) 2020   • Primary osteoarthritis of right knee 2020   • Primary osteoarthritis of left knee 2020   • Coronary artery disease of native artery of native heart with stable angina pectoris ([facility] ) 2020   • Chronic pain of both knees 2020   • Primary osteoarthritis of both knees 2020   • Primary osteoarthritis involving multiple joints 2019   • Vitamin E deficiency 09/19/2019   • Infected epidermoid cyst 12/04/2018   • Tremor, essential 09/26/2018   • Diabetic neuropathy associated with diabetes mellitus due to underlying condition ([facility] ) 09/26/2018   • Gait instability 09/26/2018   • Frequent falls 09/26/2018   • Degenerative disc disease, lumbar 07/10/2018   • Tremor 10/10/2016   • Depression with anxiety 08/29/2016   • Arthritis 07/13/2015   • NSTEMI (non-ST elevated myocardial infarction) ([facility] ) 10/06/2014   • Gout 05/17/2013   • Controlled type 2 diabetes mellitus with hyperglycemia, without long-term current use of insulin ([place] [address] ) 06/18/2012   • Hyperlipidemia 06/18/2012   • Hypertension 06/18/2012   • Morbid obesity ([place] [address] ) 06/18/2012      LOS (days): 0  Geometric Mean LOS (GMLOS) (days):   Days to GMLOS:     OBJECTIVE:              Current admission status: Observation       Preferred Pharmacy:   [facility]   [address] [place] [address]  Phone: [phone] Fax: [phone]    Primary Care Provider: Nela Blackwell DO    Primary Insurance: [facility]  Secondary Insurance:     ASSESSMENT:  Rhonda Wolfe Representative - Spouse   Primary Phone: [phone] [facility]  Home Phone: [phone]               Advance Directives  Does patient have a [address] [place] [address] Avenue?: No  Was patient offered paperwork?: Yes (declined)  Does patient currently have a Health Care decision maker?: Yes, please see Health Care Proxy section  Does patient have Advance Directives?: No  Was patient offered paperwork?: Yes (declined)  Primary Contact: Destinee Allen-spouse         Readmission Root Cause  30 Day Readmission: Yes  Who directed you to return to the hospital?: Self  Did you understand whom to contact if you had questions or problems?: Yes  Did you get your prescriptions before you left the hospital?: No  Reason[de-identified] Preference for own pharmacy  Were you able to get your prescriptions filled when you left the hospital?: Yes  Did you take your medications as prescribed?: Yes  Were you able to get to your follow-up appointments?: No  Reason[de-identified] Readmitted prior to appointment  During previous admission, was a post-acute recommendation made?: Yes  What post-acute resources were offered?: Other (cardiac rehab)  Patient was readmitted due to: chest pain  Action Plan: cardiology consult, tele    Patient Information  Admitted from[de-identified] Home  Mental Status: Alert  During Assessment patient was accompanied by: Not accompanied during assessment  Assessment information provided by[de-identified] Patient  Primary Caregiver: Self  Support Systems: Self, Spouse/significant other  [place] of Residence: [address] do you live in?: [address] entry access options   Select all that apply : Stairs  Number of steps to enter home : 2  Do the steps have railings?: Yes  Type of Current Residence: [address] [place] home  Upon entering residence, is there a bedroom on the main floor (no further steps)?: No  A bedroom is located on the following floor levels of residence (select all that apply):: 2nd Floor  Upon entering residence, is there a bathroom on the main floor (no further steps)?: Yes (1/2 bath on first floor)  Number of steps to 2nd floor from main floor: One Flight  In the last 12 months, was there a time when you were not able to pay the mortgage or rent on time?: No  In the last 12 months, how many places have you lived?: 1  In the last 12 months, was there a time when you did not have a steady place to sleep or slept in a shelter (including now)?: No  Homeless/housing insecurity resource given?: N/A  Living Arrangements: Lives w/ Spouse/significant other, Other (Comment) (grandson)  Is patient a ?: Yes  Is patient active with [facility])?: Yes (patient sees  va yearly)  Is patient service connected?: Yes    Activities of Daily Living Prior to Admission  Functional Status: Independent  Completes ADLs independently?: Yes  Ambulates independently?: Yes  Does patient use assisted devices?: Yes  Assisted Devices (DME) used: CPAP, Bedside Commode, Shower Chair, Wells Gunter, Teola Dago  DME Company Name (respiratory supplies): [employer]  Does patient currently own DME?: Yes  What DME does the patient currently own?: Bedside Commode, Walker, Wells Gunter, Shower Chair  Does patient have a history of Outpatient Therapy (PT/OT)?: Yes (cardiac rehab)  Does the patient have a history of Short-Term Rehab?: No  Does patient have a history of HHC?: No  Does patient currently have [address]?: No         Patient Information Continued  Income Source: Pension/FCI  Does patient have prescription coverage?: Yes  Within the past 12 months, you worried that your food would run out before you got the money to buy more : Never true  Within the past 12 months, the food you bought just didn't last and you didn't have money to get more : Never true  Food insecurity resource given?: N/A  Does patient receive dialysis treatments?: No  Does patient have a history of substance abuse?: No  Does patient have a history of Mental Health Diagnosis?: No  Has patient received inpatient treatment related to mental health in the last 2 years?: No         Means of Transportation  Means of Transport to Appts[de-identified] Family transport  In the past 12 months, has lack of transportation kept you from medical appointments or from getting medications?: No  In the past 12 months, has lack of transportation kept you from meetings, work, or from getting things needed for daily living?: No  Was application for public transport provided?: N/A  Cm met with the patient to evaluate the patients prior function and living situation and any barriers to d/c and form a safe d/c plan  Cm also evaluated the patient for any services in the home or needs for services  CM also discussed with patient that their preferences will be taken into account/consideration   Pt admitted with chest pain  Cardiology consult  No current discharge needs  Pt states wife will be coming over and I do not need to call and update her  He states she is a retired nurse and is caring for him  [address] [facility] [address] with a morales cath  He does not want home care, he wants to continue cardiac rehab  CM to follow

## 2023-03-09 NOTE — TELEPHONE ENCOUNTER
Patient remains admitted at this time  Will follow for discharge and then arrange void trial/AP visit

## 2023-03-09 NOTE — UTILIZATION REVIEW
Initial Clinical Review    Admission: Date/Time/Statement:       OBSERVATION 3-9-23 CHANGED TO INPATIENT 3-9-23 FOR CONTINUED TREATMENT OF ANEMIA SUSPECTED GI BLEED     Admission Orders (From admission, onward)     Ordered        03/09/23 1118  Inpatient Admission  Once            03/09/23 0231  Place in Observation  Once                      Orders Placed This Encounter   Procedures   • Place in Observation     Standing Status:   Standing     Number of Occurrences:   1     Order Specific Question:   Level of Care     Answer:   Med Surg [16]   • Inpatient Admission     Standing Status:   Standing     Number of Occurrences:   1     Order Specific Question:   Level of Care     Answer:   Med Surg [16]     Order Specific Question:   Estimated length of stay     Answer:   More than 2 Midnights     Order Specific Question:   Certification     Answer:   I certify that inpatient services are medically necessary for this patient for a duration of greater than two midnights  See H&P and MD Progress Notes for additional information about the patient's course of treatment  ED Arrival Information     Expected   -    Arrival   3/8/2023 22:59    Acuity   Emergent            Means of arrival   Wheelchair    Escorted by   Family Member    Service   Hospitalist    Admission type   Emergency            Arrival complaint   chest pain           Chief Complaint   Patient presents with   • Chest Pain     Patient reports sub sternal chest pain starting around about an hour ago  Patients daughter gave a nitro at 2252 with no relief  3/2/2023 - 3/8/2023 (6 days)  [facility]  Acute lower GI bleed, diverticulitis, anemia  Initial Presentation: 69 y o  male received from [facility] for observation to further evaluate and treat GI bleed  Plan includes gastroenterology consult, iv protonix, clear liquid diet, orthostatic blood pressure          Date:  3-9-23  Observation changed to inpatient     Tachycardia, 97 3  Check orthostatic blood pressure  Sit to stand SBP  138 decreased to 97  IV lactated ringers 100 /hr discontinued at 1203  CT shows bladder wall thickening suspicious for cystitis  Follow up urine culture  Patient with indwelling morales catheter  PT/OT evaluations completed recommending inpatient rehab  Patient with intermittent  chest pain  Cardiology recommends to continue Plavix  Monitor on telemetry  Nitro stat given at sl 0344  ECHO completed - EF 65%  Ion Ingles BLLE venous duplex ordered to evaluate edema  Received iv mso4 x1 0459 for right leg pain    Date: 3-10-23 inpatient med surg     Continues BLLE edema, BLLE pedal edema, BLUE hand edema persists but improving  Patient reports lightheadedness, chest discomfort  Patient is orthostatic today  NO ischemic changes on EKG  Repeat Ekg today due to orthostatic hypotension also with out ischemic changes  Continue plavix only for next 7-10 days and then add aspirin back to regimen  Hold diuretics and give iv fluid bolus 500 mg x2  New order Kdur 40 meq x1, iv protonix q24 hour  Date: 3-11-23    Day 3: Has surpassed a 2nd midnight with active treatments and services           ED Triage Vitals   03/08/23 2306 03/08/23 2306 03/08/23 2306 03/08/23 2306 03/08/23 2306   98 4 °F (36 9 °C) (!) 108 20 118/65 97 %      Temporal Monitor           No Pain          03/09/23 102 kg (224 lb)     Additional Vital Signs:    Date/  Time Temp Pulse Resp BP MAP (mmHg) SpO2   03/10/23 13:01:57 -- 84 -- 116/66 83 97 %   03/10/23 12:32:08 -- 75 -- 121/59 80 98 %   03/10/23 11:45:41 -- 82 -- 120/58 79 98 %   03/10/23 10:08:05 -- -- -- 88/51   Abnormal  63   Abnormal  --   03/10/23 10:06:40 -- -- -- 106/57 73 --   03/10/23 10:02:53 -- 86 -- 121/75 90 99 %   03/10/23 0840 -- -- -- 78/58   Abnormal  -- --   03/10/23 08:35:42 -- 83 -- 74/44   Abnormal  54   Abnormal  85 % Abnormal    03/10/23 08:33:55 -- 109   Abnormal  -- 89/62   Abnormal  71 99 %   03/10/23 06:25:48 98 1 °F (36 7 °C) 91 20 149/80 103 96 %       ORTHOSTATIC BP  03/10/23 10:08:05 88/51   Abnormal  63   Abnormal  Standing   03/10/23 10:06:40 106/57 73 Sitting   03/10/23 10:02:53 121/75 90 Lying       Date/Time Temp Pulse Resp BP MAP (mmHg) SpO2 O2 Device   03/09/23 10:29:34 -- 107   Abnormal  -- 127/79 95 98 % --   03/09/23 0948 -- 85 -- 108/59 -- -- --   03/09/23 07:18:36 97 3 °F   (36 3 °C)   Abnormal  77 21 108/59 75 98 % --   03/09/23 04:33:30 -- 97 -- 97/55 69 100 % --   03/09/23 0428 -- 76 -- 143/56 -- -- --   03/09/23 03:14:37 97 1 °F   (36 2 °C)   Abnormal  77 18 151/55 87 98 % None (Room air)   03/09/23 0109 -- 81 10   Abnormal  -- -- 98 % --   03/09/23 0030 -- 86 17 115/60 82 97 % None (Room air)   03/08/23 2306 98 4 °F   (36 9 °C) 108   Abnormal  20 118/65 84 97 % None (Room air)         ORTHOSTATIC BP  03/09/23 0432 116   Abnormal  97/55 Standing  Orthostatic    03/09/23 0430 75 138/57 Sitting       Pertinent Labs/Diagnostic Test Results:       ECHO 3-9     •  Left Ventricle: The left ventricular ejection fraction is 65%  Systolic function is normal  Wall motion is normal   •  Right Ventricle: Systolic function is normal   •  Pericardium: There is no pericardial effusion        CT head wo contrast   Final  (03/09 1258)      No acute intracranial abnormality  Mild-to-moderate sinusitis as described  PE Study with CT Abdomen and Pelvis with contrast   Final  (03/09 0118)      No evidence of pulmonary embolus  Diffuse bladder wall thickening and edema with pericystic inflammatory change  Correlate with urinalysis for cystitis         XR chest 1 view portable      Final R(03/09 1224)      No acute cardiopulmonary disease          Results from last 7 days   Lab Units 03/08/23  4446   SARS-COV-2  Negative     Results from last 7 days   Lab Units 03/09/23  0503 03/08/23  2346 03/08/23  0452 03/07/23  1429 03/06/23  0432   WBC Thousand/uL 6 40 6 96 5  58 6 27 6 08   HEMOGLOBIN g/dL 8 5* 9 6* 8 2* 9 4* 9 0*   HEMATOCRIT % 26 1* 28 5* 24 7* 28 3* 26 3*   PLATELETS Thousands/uL 276 276 219 234 199   NEUTROS ABS Thousands/µL  --  4 88 3 24 4 06  --      Results from last 7 days   Lab Units 03/09/23  0503   RETIC CT ABS  187,300*   RETIC CT PCT % 6 99*     Results from last 7 days   Lab Units 03/09/23  0503 03/08/23  2346 03/08/23  0452 03/07/23  1429 03/06/23  0432 03/04/23  0442 03/03/23  0545   SODIUM mmol/L 142 139 140 138 138   < > 140   POTASSIUM mmol/L 3 8 4 1 3 4* 4 1 3 1*   < > 3 9   CHLORIDE mmol/L 110* 108 111* 110* 109*   < > 110*   CO2 mmol/L 28 25 24 23 25   < > 24   ANION GAP mmol/L 4 6 5 5 4   < > 6   BUN mg/dL 8 7 4* 3* 4*   < > 25   CREATININE mg/dL 1 00 1 14 0 85 0 95 0 84   < > 1 06   EGFR ml/min/1 73sq m 72 62 84 77 85   < > 67   CALCIUM mg/dL 8 0* 8 5 7 8* 8 1* 7 6*   < > 7 5*   MAGNESIUM mg/dL 1 9 1 6* 1 8*  --   --   --  2 2   PHOSPHORUS mg/dL 3 4  --   --   --   --   --  3 5    < > = values in this interval not displayed       Results from last 7 days   Lab Units 03/08/23  2346   AST U/L 27   ALT U/L 33   ALK PHOS U/L 58   TOTAL PROTEIN g/dL 5 3*   ALBUMIN g/dL 3 2*   TOTAL BILIRUBIN mg/dL 0 32     Results from last 7 days   Lab Units 03/09/23  1057 03/09/23  0717 03/08/23  1110 03/08/23  0716 03/07/23  2036 03/07/23  1557 03/07/23  1112 03/07/23  0723 03/06/23  2110 03/06/23  1652 03/06/23  1155 03/06/23  0801   POC GLUCOSE mg/dl 187* 144* 244* 169* 234* 264* 265* 166* 197* 169* 164* 185*     Results from last 7 days   Lab Units 03/09/23  0503 03/08/23  2346 03/08/23  0452 03/07/23  1429 03/06/23  0432 03/05/23  0431 03/04/23  0442 03/03/23  0545   GLUCOSE RANDOM mg/dL 146* 266* 159* 210* 122 146* 97 118       Results from last 7 days   Lab Units 03/09/23  0503 03/09/23  0136 03/08/23  2346 03/04/23  2132 03/04/23  1906 03/04/23  1657   HS TNI 0HR ng/L  --   --  59*  --   --  55*   HS TNI 2HR ng/L  --  58*  --   --  70*  --    HSTNI D2 ng/L --  -1  --   --  15  --    HS TNI 4HR ng/L 76*  --   --  87*  --   --    HSTNI D4 ng/L 17  --   --  32*  --   --      Results from last 7 days   Lab Units 03/08/23  2346   D-DIMER QUANTITATIVE ug/ml FEU 1 35*     Results from last 7 days   Lab Units 03/08/23  2346 03/04/23  2132   PROTIME seconds 12 3 14 4   INR  0 90 1 11   PTT seconds 28 30     Results from last 7 days   Lab Units 03/09/23  0503   TSH 3RD GENERATON uIU/mL 5 668*       Results from last 7 days   Lab Units 03/08/23  2346   BNP pg/mL 120*       Results from last 7 days   Lab Units 03/08/23  2346   LIPASE u/L 49       Results from last 7 days   Lab Units 03/09/23  0135   CLARITY UA  Cloudy   COLOR UA  [place]   SPEC GRAV UA  >=1 030   PH UA  6 0   GLUCOSE UA mg/dl >=1000 (1%)*   KETONES UA mg/dl Trace*   BLOOD UA  Moderate*   PROTEIN UA mg/dl 100 (2+)*   NITRITE UA  Negative   BILIRUBIN UA  Negative   UROBILINOGEN UA E U /dl 0 2   LEUKOCYTES UA  Elevated glucose may cause decreased leukocyte values   See urine microscopic for [facility] result/*   WBC UA /hpf 4-10*   RBC UA /hpf 20-30*   BACTERIA UA /hpf Occasional   EPITHELIAL CELLS WET PREP /hpf Occasional     Results from last 7 days   Lab Units 03/08/23  2346   INFLUENZA A PCR  Negative   INFLUENZA B PCR  Negative   RSV PCR  Negative       ED Treatment:   Medication Administration from 03/08/2023 2259 to 03/09/2023 0300       Date/Time Order Dose Route Action     03/08/2023 2333 EST aspirin chewable tablet 324 mg 324 mg Oral Given        Past Medical History:   Diagnosis Date   • BPH (benign prostatic hyperplasia)    • CAD (coronary artery disease)    • Cancer (HCC)     basal cell   • Chronic kidney disease     stage 3   • Colon polyp    • CPAP (continuous positive airway pressure) dependence    • Diabetes mellitus (HCC)     niddm   • Disease of thyroid gland    • Gout    • High cholesterol    • Hypertension    • MI, 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 ) 2014   • S/P cardiac catheterization 12/30/2022   • Sleep apnea      Present on Admission:  • Other chest pain  • Coronary artery disease of native artery of native heart with stable angina pectoris (HCC)  • Diverticulosis of colon with hemorrhage  • Acute lower GI bleeding  • Acute blood loss anemia  • Hypomagnesemia  • Controlled type 2 diabetes mellitus with hyperglycemia, without long-term current use of insulin (HCC)  • Chronic diastolic (congestive) heart failure ([facility])  • Urinary retention  • Aortic stenosis, moderate      Admitting Diagnosis:     Chest pain [R07 9]  Elevated troponin [R77 8]    Age/Sex: 69 y o  male    Scheduled Medications:    allopurinol, 100 mg, Oral, Daily  atorvastatin, 80 mg, Oral, Daily With Dinner  clopidogrel, 75 mg, Oral, Daily  Empagliflozin, 10 mg, Oral, QAM  escitalopram, 10 mg, Oral, Daily  insulin lispro, 2-12 Units, Subcutaneous, TID AC  insulin lispro, 2-12 Units, Subcutaneous, HS  levothyroxine, 88 mcg, Oral, Early Morning  multivitamin-minerals, 1 tablet, Oral, Daily  [START ON 3/10/2023] pantoprazole, 40 mg, Intravenous, Q24H  ranolazine, 500 mg, Oral, BID  tamsulosin, 0 4 mg, Oral, Daily With Dinner      Continuous IV Infusions:     PRN Meds:  acetaminophen, 650 mg, Oral, Q6H PRN  aluminum-magnesium hydroxide-simethicone, 30 mL, Oral, Q4H PRN  morphine injection, 2 mg, Intravenous, Q3H PRN  nitroglycerin, 0 4 mg, Sublingual, Q5 Min PRN  ondansetron, 4 mg, Intravenous, Q6H PRN  polyethylene glycol, 17 g, Oral, Daily PRN        IP CONSULT TO CARDIOLOGY    Network Utilization Review Department  ATTENTION: Please call with any questions or concerns to [phone] and carefully listen to the prompts so that you are directed to the right person  All voicemails are confidential   River Zhang all requests for admission clinical reviews, approved or denied determinations and any other requests to dedicated fax number below belonging to the campus where the patient is receiving treatment   List of dedicated fax numbers for the Facilities:  [address] [place] [address] DENIALS (Administrative/Medical Necessity) [phone]   [address]  (Maternity/NICU/Pediatrics) [phone]    Bettina Dunaway [phone]   Kamar Taylor 77 [phone]   [address] [place] [address] [facility] [phone]   [facility] [address] [facility] [phone]

## 2023-03-09 NOTE — CONSULTS
Consultation - Cardiology   Pilar Bernheim 69 y o  male MRN: [id number]  Unit/Bed#: 288-76 Encounter: [id number]    Inpatient consult to Cardiology  Consult performed by: Tyrell Rose PA-C  Consult ordered by: Chris Alegria MD            Physician Requesting Consult: Alessandra Kate DO  Reason for Consult / Principal Problem: chest pain    Assessment/Plan:  1  Chest pain   - patient presents with an episode of chest burning which was mildly improved with sublingual nitroglycerin on admission but did not resolve until early this morning   - he is current chest pain free   - hs troponin levels are minimally elevated and flat, levels were also similar when checked last week  - EKG does not reveal any ischemic changes   - check limited echocardiogram for EF/wall motion   - at this time he does not require repeat ischemic evaluation   - symptoms may be GI related vs  In the setting of volume overload - see below   - continue Ranexa 500 mg BID   - can consider titration of antianginal regimen if he has recurrent discomfort    2  Known coronary artery disease   - with prior OM/RCA stenting, most recently had a SAM to the proximal LAD and PCI to the mid LAD in December 2022   - was transitioned from aspirin and Brilinta to Plavix monotherapy at hospital discharge   - continue statin   - propranolol on hold ? Due to hypotension, hopefully can resume today  3  Iatrogenic volume overload   - patient noted to have bilateral lower extremity and bilateral hand edema   - likely iatrogenic secondary to recent GI bleed requiring 4 units of PRBC's as well as IV fluid resuscitation in the setting of hypotension   - will give lasix 40 mg IV x 1 dose today and assess response   - check echocardiogram      4  Recent acute GI bleeding s/p placement of two hemostatic clips in the descending colon   - noted   - no evidence of active bleeding    - hemoglobin 8 5 this AM   - continue to trend CBC    5   Aortic stenosis   - stable in the moderate range per echo 1 year ago   - repeat echo pending    6  Hypertension   - controlled with current mild hypotension      History of Present Illness   HPI: Alicia Marquez is a 69y o  year old male with coronary artery disease with prior circumflex and RCA stenting, most recently SAM to the proximal LAD and PCI to the mid LAD in December 2022 - noted to have a  of the RCA with left to right collateral circulation, moderate aortic stenosis, hypertension, dyslipidemia, diabetes who follows with Dr Emma Marie  Patient was recently hospitalized with an acute GI bleed  He was found to have a bleeding ulcer within the diverticulum and descending colon and underwent clip placement x2  He was transfused a total of 4 units packed red blood cells  Dual antiplatelet therapy was resumed but with Plavix instead of Brilinta  He was discharged home yesterday  Subsequently a few hours later he presented back to the ER with chest pain  He states he was at home getting ready to go to bed when he developed substernal chest burning  He denies any radiation of the discomfort  Denies associated shortness of breath, lightheadedness, nausea, or diaphoresis  He does not remember having discomfort like this before  He did not take any medications at home but presented to the emergency department  While here he was given 1 sublingual nitroglycerin tablet with improvement in the chest burning but not complete resolution  High-sensitivity troponin levels have been minimally elevated and relatively flat at 59, 58, 76  EKG showed sinus rhythm without any ischemic changes  Cardiology was consulted for further evaluation and management  This morning patient states he awoke and still had mild burning in the center of his chest  However, at the time of my conversation he denied any discomfort or burning  Patient does report bilateral lower and upper extremity edema   He states this occurred while he was hospitalized at [facility]  He denies any shortness of breath  Denies orthopnea or PND  Review of Systems   Cardiovascular: Positive for chest pain and leg swelling  All other systems reviewed and are negative  Historical Information   Past Medical History:   Diagnosis Date   • BPH (benign prostatic hyperplasia)    • CAD (coronary artery disease)    • Cancer (HCC)     basal cell   • Chronic kidney disease     stage 3   • Colon polyp    • CPAP (continuous positive airway pressure) dependence    • Diabetes mellitus (HCC)     niddm   • Disease of thyroid gland    • Gout    • High cholesterol    • Hypertension    • MI, 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    • Myocardial infarction ([facility] [address] ) 2014   • S/P cardiac catheterization 12/30/2022   • Sleep apnea      Past Surgical History:   Procedure Laterality Date   • BASAL CELL CARCINOMA EXCISION Right 1/21/2022    Procedure: EXCISION BASAL CELL CARCINOMA EXTREMITY;  Surgeon: Ulises Graves DO;  Location: [facility] MAIN [facility];  Service: General   • CARDIAC CATHETERIZATION Left 12/29/2022    Procedure: Cardiac Left Heart Cath;  Surgeon: Vanesa Ramírez MD;  Location: [facility] CARD[facility]AC [facility]; Service: Cardiology   • CARDIAC CATHETERIZATION N/A 12/29/2022    Procedure: Cardiac Coronary Angiogram;  Surgeon: Vanesa Ramírez MD;  [facility]; Service: Cardiology   • CARDIAC CATHETERIZATION N/A 12/29/2022    Procedure: Cardiac pci;  Surgeon: Vanesa Ramírez MD;  Location: [facility];   Service: Cardiology   • CATARACT EXTRACTION W/  INTRAOCULAR LENS IMPLANT     • CORNEAL TRANSPLANT     • CORONARY ANGIOPLASTY WITH STENT PLACEMENT      stents x3-- [id number]   • EYE SURGERY      repair corneal laceration   • AK COLONOSCOPY FLX DX W/COLLJ SPEC WHEN PFRMD N/A 3/21/2016    Procedure: COLONOSCOPY;  Surgeon: Paula Henderson MD;  Location: [facility];  Service: Colorectal   • TONSILLECTOMY AND ADENOIDECTOMY       Social History     Substance and Sexual Activity Alcohol Use Not Currently    Comment: socially, cut down in 2008, previously did more than      Social History     Substance and Sexual Activity   Drug Use Never     Social History     Tobacco Use   Smoking Status Never   Smokeless Tobacco Never     Family History: non-contributory    Meds/Allergies   all current active meds have been reviewed  lactated ringers, 100 mL/hr, Last Rate: 100 mL/hr (03/09/23 0617)        No Known Allergies    Objective   Vitals: Blood pressure 108/59, pulse 77, temperature (!) 97 3 °F (36 3 °C), resp  rate 21, height 5' 9" (1 753 m), weight 102 kg (224 lb 13 9 oz), SpO2 98 %  , Body mass index is 33 21 kg/m² ,   Orthostatic Blood Pressures    Flowsheet Row Most Recent Value   Blood Pressure 108/59 filed at 03/09/2023 0718   Patient Position - Orthostatic VS Standing - Orthostatic VS filed at 03/09/2023 6555        Systolic (21YEY), QBS:869 , Min:97 , ABN:727     Diastolic (91GHM), NWB:58, Min:55, Max:65    No intake or output data in the 24 hours ending 03/09/23 0745    Weight (last 2 days)     Date/Time Weight    03/09/23 0553 102 (224 87)    03/09/23 0354 102 (224 65)    03/09/23 03:14:37 102 (224 65)    03/08/23 2306 105 (231 48)          Invasive Devices     Peripheral Intravenous Line  Duration           Peripheral IV 03/08/23 Distal;Right;Upper;Ventral (anterior) Arm <1 day          Drain  Duration           Urethral Catheter Temperature probe 4 days                  Physical Exam  Vitals reviewed  Constitutional:       General: He is not in acute distress  Appearance: He is well-developed  He is obese  He is not diaphoretic  HENT:      Head: Normocephalic and atraumatic  Eyes:      Pupils: Pupils are equal, round, and reactive to light  Neck:      Vascular: No carotid bruit  Cardiovascular:      Rate and Rhythm: Normal rate and regular rhythm  Pulses:           Radial pulses are 2+ on the right side and 2+ on the left side        Heart sounds: S1 normal and S2 normal  No murmur heard  Pulmonary:      Effort: Pulmonary effort is normal  No respiratory distress  Breath sounds: Normal breath sounds  No wheezing or rales  Abdominal:      General: There is no distension  Palpations: Abdomen is soft  Tenderness: There is no abdominal tenderness  Musculoskeletal:         General: Swelling (of bilateral hands) present  Normal range of motion  Cervical back: Normal range of motion  Right lower leg: Edema (+1 edema bilaterally) present  Left lower leg: Edema present  Skin:     General: Skin is warm and dry  Findings: No erythema  Neurological:      General: No focal deficit present  Mental Status: He is alert and oriented to person, place, and time  Psychiatric:         Mood and Affect: Mood normal          Behavior: Behavior normal              Laboratory Results:        CBC with diff:   Results from last 7 days   Lab Units 03/09/23  0503 03/08/23  2346 03/08/23  0452 03/07/23  1429 03/06/23  0432 03/05/23  1421 03/05/23  0431 03/04/23  2132 03/04/23  1906 03/04/23  1223 03/04/23  0442 03/02/23  1716 03/02/23  1132   WBC Thousand/uL 6 40 6 96 5 58 6 27 6 08  --  6 84  --   --  16 36* 6 57   < > 9 24   HEMOGLOBIN g/dL 8 5* 9 6* 8 2* 9 4* 9 0* 8 9* 7 8* 7 5*   < > 9 4* 8 9*   < > 14 4   HEMATOCRIT % 26 1* 28 5* 24 7* 28 3* 26 3*  --  23 2* 22 5*  --  28 8* 26 8*   < > 43 7   MCV fL 97 98 97 97 93  --  96  --   --  98 96   < > 95   PLATELETS Thousands/uL 276 276 219 234 199  --  169  --   --  248 186   < > 203   MCH pg 31 7 33 0 32 0 32 2 31 8  --  32 1  --   --  32 0 31 9   < > 31 4   MCHC g/dL 32 6 33 7 33 2 33 2 34 2  --  33 6  --   --  32 6 33 2   < > 33 0   RDW % 17 8* 17 7* 16 8* 17 2* 16 2*  --  15 4*  --   --  15 9* 16 0*   < > 15 8*   MPV fL 9 5 9 2 9 5 9 5 9 0  --  9 3  --   --  9 5 9 4   < > 9 6   NRBC AUTO /100 WBCs  --  0 0 0  --   --  0  --   --   --  0  --  0    < > = values in this interval not displayed  CMP:  Results from last 7 days   Lab Units 03/09/23  0503 03/08/23  2346 03/08/23  0452 03/07/23  1429 03/06/23  0432 03/05/23  0431 03/04/23  0442 03/02/23  1734 03/02/23  1132   POTASSIUM mmol/L 3 8 4 1 3 4* 4 1 3 1* 3 8 3 6   < > 5 9*   CHLORIDE mmol/L 110* 108 111* 110* 109* 110* 108   < > 110*   CO2 mmol/L 28 25 24 23 25 23 24   < > 21   BUN mg/dL 8 7 4* 3* 4* 8 12   < > 31*   CREATININE mg/dL 1 00 1 14 0 85 0 95 0 84 0 86 0 88   < > 1 38*   CALCIUM mg/dL 8 0* 8 5 7 8* 8 1* 7 6* 7 3* 7 1*   < > 8 4   AST U/L  --  27  --   --   --   --   --   --  15   ALT U/L  --  33  --   --   --   --   --   --  22   ALK PHOS U/L  --  58  --   --   --   --   --   --  58   EGFR ml/min/1 73sq m 72 62 84 77 85 84 83   < > 49    < > = values in this interval not displayed           BMP:  Results from last 7 days   Lab Units 03/09/23  0503 03/08/23  2346 03/08/23 0452 03/07/23  1429 03/06/23  0432 03/05/23  0431 03/04/23  0442   POTASSIUM mmol/L 3 8 4 1 3 4* 4 1 3 1* 3 8 3 6   CHLORIDE mmol/L 110* 108 111* 110* 109* 110* 108   CO2 mmol/L 28 25 24 23 25 23 24   BUN mg/dL 8 7 4* 3* 4* 8 12   CREATININE mg/dL 1 00 1 14 0 85 0 95 0 84 0 86 0 88   CALCIUM mg/dL 8 0* 8 5 7 8* 8 1* 7 6* 7 3* 7 1*       BNP:    Recent Labs     03/08/23  2346   *       Magnesium:   Results from last 7 days   Lab Units 03/09/23  0503 03/08/23  2346 03/08/23  0452 03/03/23  0545 03/02/23  1132   MAGNESIUM mg/dL 1 9 1 6* 1 8* 2 2 1 8*       Coags:   Results from last 7 days   Lab Units 03/08/23  2346 03/04/23  2132 03/02/23  1132   PTT seconds 28 30 25   INR  0 90 1 11 1 03       TSH:       Hemoglobin A1C       Lipid Profile:         Cardiac testing:   Results for orders placed during the hospital encounter of 01/04/21    Echo complete with contrast if indicated    Narrative  [address] [facility] [address]  [address], [place]  [phone]    Transthoracic Echocardiogram  2D, M-mode, Doppler, and Color Doppler    Study date:  2021    Patient: Iban Bentley  MR number: [id number]  Account number: [de-identified]  : 1945  Age: 77 years  Gender: Male  Status: Outpatient  Location: Echo lab  Height: 68 in  Weight: 237 6 lb  BP: 148/ 68 mmHg    Indications: aortic stenosis    Diagnoses: 424 1 - AORTIC VALVE DISORDER    Sonographer:  Ying Isbell RDCS  Primary Physician:  Madhav Tolentino DO  Referring Physician:  Spencer Williamson MD  Group:  [id number]  Interpreting Physician:  Reginaldo Atkins MD    SUMMARY    LEFT VENTRICLE:  Systolic function was normal by visual assessment  Ejection fraction was estimated to be 70 %  There were no regional wall motion abnormalities  Wall thickness was mildly increased  There was mild concentric hypertrophy  Doppler parameters were consistent with abnormal left ventricular relaxation (grade 1 diastolic dysfunction)  LEFT ATRIUM:  The atrium was mildly dilated  AORTIC VALVE:  The valve was trileaflet  Leaflets exhibited markedly increased thickness, marked calcification, and markedly reduced cuspal separation  Transaortic velocity was increased due to valvular stenosis  There was moderate stenosis by hemodynamics  There was trace regurgitation  Valve peak gradient was 34 mmHg  Valve mean gradient was 20 mmHg  The aortic valve obstructive index (by VTI) was 0 38     TRICUSPID VALVE:  There was trace regurgitation  HISTORY: PRIOR HISTORY: diabetes mellitus, hypertension, mixed hyperlipidemia, NSTEMI, coronary artery disease-stent, obese body habitus    PROCEDURE: The procedure was performed in the echo lab  This was a routine study  The transthoracic approach was used  The study included complete 2D imaging, M-mode, complete spectral Doppler, and color Doppler  Images were obtained from  the parasternal, apical, subcostal, and suprasternal notch acoustic windows   Echocardiographic views were limited due to poor acoustic window availability, decreased penetration, and lung interference  This was a technically difficult  study  LEFT VENTRICLE: Size was normal  Systolic function was normal by visual assessment  Ejection fraction was estimated to be 70 %  There were no regional wall motion abnormalities  Wall thickness was mildly increased  There was mild  concentric hypertrophy  DOPPLER: Doppler parameters were consistent with abnormal left ventricular relaxation (grade 1 diastolic dysfunction)  RIGHT VENTRICLE: The size was normal  Systolic function was normal  Wall thickness was normal     LEFT ATRIUM: The atrium was mildly dilated  RIGHT ATRIUM: Size was normal     MITRAL VALVE: Valve structure was normal  There was normal leaflet separation  DOPPLER: The transmitral velocity was within the normal range  There was no evidence for stenosis  There was no significant regurgitation  AORTIC VALVE: The valve was trileaflet  Leaflets exhibited markedly increased thickness, marked calcification, and markedly reduced cuspal separation  DOPPLER: Transaortic velocity was increased due to valvular stenosis  There was moderate  stenosis by hemodynamics  There was trace regurgitation  TRICUSPID VALVE: The valve structure was normal  There was normal leaflet separation  DOPPLER: The transtricuspid velocity was within the normal range  There was no evidence for stenosis  There was trace regurgitation  PULMONIC VALVE: Leaflets exhibited normal thickness, no calcification, and normal cuspal separation  DOPPLER: The transpulmonic velocity was within the normal range  There was no significant regurgitation  PERICARDIUM: There was no pericardial effusion  The pericardium was normal in appearance  AORTA: The root exhibited normal size  SYSTEMIC VEINS: IVC: The inferior vena cava was normal in size   Respirophasic changes were normal     MEASUREMENT TABLES    DOPPLER MEASUREMENTS  Aortic valve   (Reference normals)  Peak giovanna   293 cm/s   (--)  Peak gradient 34 mmHg   (--)  Mean gradient   20 mmHg   (--)  Valve area, cont   1 3 cmï¾²   (--)  Obstr index, VTI   0 38    (--)    OTHER ECHO MEASUREMENTS  (Reference normals)  Estimated CVP   5 mmHg   (--)    SYSTEM MEASUREMENT TABLES    2D  %FS: 61 05 %  Ao Diam: 3 52 cm  EDV(Teich): 98 29 ml  EF(Teich): 90 12 %  ESV(Teich): 9 71 ml  IVSd: 1 27 cm  LA Area: 16 45 cm2  LA Diam: 4 47 cm  LVEDV MOD A4C: 85 88 ml  LVEF MOD A4C: 44 15 %  LVESV MOD A4C: 47 97 ml  LVIDd: 4 62 cm  LVIDs: 1 8 cm  LVLd A4C: 8 7 cm  LVLs A4C: 7 45 cm  LVPWd: 1 26 cm  RA Area: 19 73 cm2  RVIDd: 2 87 cm  RWT: 0 55  SV MOD A4C: 37 91 ml  SV(Teich): 88 58 ml    CW  AR Dec Early: 1 73 m/s2  AR Dec Time: 207 56 ms  AR PHT: 600 75 ms  AR Vmax: 3 57 m/s  AR maxP 08 mmHg  AV Env  Ti: 274 02 ms  AV VTI: 59 83 cm  AV Vmax: 2 93 m/s  AV Vmean: 2 19 m/s  AV maxP 26 mmHg  AV meanP 64 mmHg  PV Vmax: 0 88 m/s  PV maxPG: 3 07 mmHg  TR Vmax: 2 05 m/s  TR maxP 82 mmHg    MM  TAPSE: 2 67 cm    PW  E' Lat: 0 08 m/s  E' Sept: 0 06 m/s  E/E' Lat: 7 81  E/E' Sept: 10 28  LVOT Env  Ti: 294 96 ms  LVOT VTI: 23 79 cm  LVOT Vmax: 1 04 m/s  LVOT Vmean: 0 81 m/s  LVOT maxP 31 mmHg  LVOT meanP 79 mmHg  MV A Jose: 0 97 m/s  MV Dec Early: 1 52 m/s2  MV DecT: 412 31 ms  MV E Jose: 0 63 m/s  MV E/A Ratio: 0 65  RVOT Vmax: 0 69 m/s  RVOT maxP 88 mmHg    [facility]    Prepared and electronically signed by    Alyssa Wong MD  Signed 2021 16:07:07    No results found for this or any previous visit  No results found for this or any previous visit  No results found for this or any previous visit  Imaging: I have personally reviewed pertinent reports  EGD    Result Date: 3/2/2023  Narrative: Table formatting from the original result was not included   [address]  Endoscopy [address] [facility] [phone] DATE OF SERVICE: 3/02/23 PHYSICIAN(S): Attending: Nasima Salomon MD Fellow: Aravind Andersen MD INDICATION: GI bleed POST-OP DIAGNOSIS: See the impression below  PREPROCEDURE: Informed consent was obtained for the procedure, including sedation  Risks of perforation, hemorrhage, adverse drug reaction and aspiration were discussed  The patient was placed in the left lateral decubitus position  Patient was explained about the risks and benefits of the procedure  Risks including but not limited to bleeding, infection, and perforation were explained in detail  Also explained about less than 100% sensitivity with the exam and other alternatives  PROCEDURE: EGD DETAILS OF PROCEDURE: Patient was taken to the procedure room where a time out was performed to confirm correct patient and correct procedure  The patient underwent monitored anesthesia care, which was administered by an anesthesia professional  The patient's blood pressure, heart rate, level of consciousness, respirations and oxygen were monitored throughout the procedure  The scope was advanced to the second part of the duodenum  Retroflexion was performed in the fundus  The patient experienced no blood loss  The procedure was not difficult  The patient tolerated the procedure well  There were no apparent complications  ANESTHESIA INFORMATION: ASA: IV Anesthesia Type: General MEDICATIONS: No administrations occurring from 1518 to 1533 on 03/02/23 FINDINGS: Irregular Z-line 38 cm from the incisors Food bolus in the body of the stomach, pylorus, duodenal bulb and 1st part of the duodenum Otherwise unremarkable SPECIMENS: * No specimens in log *     Impression: Irregular Z-line  Minimal quantity of food seen in the stomach, and duodenum  Otherwise unremarkable  RECOMMENDATION:  There is no recommended follow-up for this procedure  Transition to protonix 40 daily PO (for ulcer prophylaxis given advanced age and need for DAPT) Continue serial H&H, monitor stool output  Monitor vitals closely   Plan for colonoscopy tomorrow, we will start bowel prep today  Resume clear liquid diet  NPO at midnight  Can continue DAPT given recent cardiac stenting Ezekiel Hewitt MD     Colonoscopy    Addendum Date: 3/4/2023 Addendum:   [address] [facility] End[facility] [address] [place] [address] [phone] DATE OF SERVICE: 3/03/23 PHYSICIAN(S): Attending: Ezekiel Hewitt MD Fellow: Jatin Rodriguez MD INDICATION: GI bleed POST-OP DIAGNOSIS: See the impression below  HISTORY: Prior colonoscopy: More than 10 years ago  BOWEL PREPARATION: Golytely/Colyte/Trilyte PREPROCEDURE: Informed consent was obtained for the procedure, including sedation  Risks including but not limited to bleeding, infection, perforation, adverse drug reaction and aspiration were explained in detail  Also explained about less than 100% sensitivity with the exam and other alternatives  The patient was placed in the left lateral decubitus position  Procedure: Colonoscopy DETAILS OF PROCEDURE: Patient was taken to the procedure room where a time out was performed to confirm correct patient and correct procedure  The patient underwent monitored anesthesia care, which was administered by an anesthesia professional  The patient's blood pressure, heart rate, level of consciousness, oxygen and respirations were monitored throughout the procedure  A digital rectal exam was performed  The scope was introduced through the anus and advanced to the cecum  Retroflexion was performed in the rectum  The quality of bowel preparation was evaluated using the [facility] Bowel Preparation Scale with scores of: right colon = 2, transverse colon = 2, left colon = 2  The total BBPS score was 6  Bowel prep was adequate  The patient experienced no blood loss  The procedure was not difficult  The patient tolerated the procedure well  There were no apparent complications   ANESTHESIA INFORMATION: ASA: IV Anesthesia Type: General MEDICATIONS: No administrations occurring from 1407 to 1557 on 03/03/23 FINDINGS: Significant amount of fresh blood and hematin in the transverse colon, splenic flexure, descending colon, sigmoid colon, rectosigmoid and rectum  Significant amount of fresh red blood and older black blood/hematin was seen in the colon up to the transverse colon  No blood at all in the ascending colon and cecum  TI could not be intubated but no blood coming from the IC valve  Blood was extensively lavaged and suctioned on withdrawal, and no active bleeding was found Severe pancolonic diverticula; there was stigmata of recent hemorrhage; placed 2 clips successfully and 2 clips unsuccessfully  One diverticulum in the descending colon appeared to have a small ulcer base with possible visible vessel and overlying clot  No active bleeding, but two clips were successfully placed  EVENTS: Procedure Events Event Event Time ENDO CECUM REACHED 3/3/2023  2:53 PM ENDO SCOPE OUT TIME 3/3/2023  3:54 PM SPECIMENS: * No specimens in log * EQUIPMENT: Colonoscope - IMPRESSION: Suspected ulcer within a diverticulum in the descending colon with possible visible vessel and an adherant clot, deployed 2 hemostatic clips (another 2 unsuccessful) Old and fresh blood seen upto the level of proximal transverse colon but no active bleeding seen after extensive lavage Pancolon (left >> right) diverticulosis  RECOMMENDATION: - Continue close monitoring of vitals, stool output  - Hgb q8h - Transfusion as needed with target Hb of 7 to 8  - If permissive by cardiology, hold brillinta until Hb stablizes and no witnessed bleeding for 24-48 hours  - Can continue baby aspirin  - If hemodynamically significant GI bleeding recurs, please inform on call GI provider  Would likely benefit from IR angiography and embolization at this point  Clips should also aid with localization if empiric embolization is being considered  - Keep on clear liquid diet   Kelin Graham MD     Result Date: 3/4/2023  Narrative: Table formatting from the original result was not included  [address] [facility] [address] [facility] [phone] DATE OF SERVICE: 3/03/23 PHYSICIAN(S): Attending: Evelyn Del Castillo MD Fellow: Kelsy Sanches MD INDICATION: GI bleed POST-OP DIAGNOSIS: See the impression below  HISTORY: Prior colonoscopy: More than 10 years ago  BOWEL PREPARATION: Golytely/Colyte/Trilyte PREPROCEDURE: Informed consent was obtained for the procedure, including sedation  Risks including but not limited to bleeding, infection, perforation, adverse drug reaction and aspiration were explained in detail  Also explained about less than 100% sensitivity with the exam and other alternatives  The patient was placed in the left lateral decubitus position  Procedure: Colonoscopy DETAILS OF PROCEDURE: Patient was taken to the procedure room where a time out was performed to confirm correct patient and correct procedure  The patient underwent monitored anesthesia care, which was administered by an anesthesia professional  The patient's blood pressure, heart rate, level of consciousness, oxygen and respirations were monitored throughout the procedure  A digital rectal exam was performed  The scope was introduced through the anus and advanced to the cecum  Retroflexion was performed in the rectum  The quality of bowel preparation was evaluated using the [facility] Bowel Preparation Scale with scores of: right colon = 1, transverse colon = 1, left colon = 1  The total BBPS score was 3  Bowel prep was not adequate  The patient experienced no blood loss  The procedure was not difficult  The patient tolerated the procedure well  There were no apparent complications  ANESTHESIA INFORMATION: ASA: IV Anesthesia Type: General MEDICATIONS: No administrations occurring from 1407 to 1557 on 03/03/23 FINDINGS: Significant amount of fresh blood and hematin in the transverse colon, splenic flexure, descending colon, sigmoid colon, rectosigmoid and rectum  Significant amount of fresh red blood and older black blood/hematin was seen in the colon up to the transverse colon  No blood at all in the ascending colon and cecum  TI could not be intubated but no blood coming from the IC valve  Blood was extensively lavaged and suctioned on withdrawal, and no active bleeding was found Severe pancolonic diverticula; there was stigmata of recent hemorrhage; placed 2 clips successfully and 2 clips unsuccessfully  One diverticulum in the descending colon appeared to have a small ulcer base with possible visible vessel and overlying clot  No active bleeding, but two clips were successfully placed  EVENTS: Procedure Events Event Event Time ENDO CECUM REACHED 3/3/2023  2:53 PM ENDO SCOPE OUT TIME 3/3/2023  3:54 PM SPECIMENS: * No specimens in log * EQUIPMENT: Colonoscope -     Impression: Suspected ulcer within a diverticulum in the descending colon with possible visible vessel and an adherant clot, deployed 2 hemostatic clips (another 2 unsuccessful) Old and fresh blood seen upto the level of proximal transverse colon but no active bleeding seen after extensive lavage Pancolon (left >> right) diverticulosis  RECOMMENDATION: - Continue close monitoring of vitals, stool output  - Hgb q8h - Transfusion as needed with target Hb of 7 to 8  - If permissive by cardiology, hold brillinta until Hb stablizes and no witnessed bleeding for 24-48 hours  - Can continue baby aspirin  - If hemodynamically significant GI bleeding recurs, please inform on call GI provider  Would likely benefit from IR angiography and embolization at this point  Clips should also aid with localization if empiric embolization is being considered  - Keep on clear liquid diet  Evelyn Del Castillo MD     XR chest portable    Result Date: 3/2/2023  Narrative: CHEST INDICATION:   anemia   COMPARISON:  Chest x-ray 1/4/2023 EXAM PERFORMED/VIEWS:  XR CHEST PORTABLE  AP semierect FINDINGS: Cardiac and mediastinal contours are stable  Limitation due to low lung volumes and body habitus  No definite airspace disease identified  No pneumothorax or pleural effusion  Osseous structures appear within normal limits for patient age  Impression: Limited evaluation due to low lung volumes, but no active pulmonary disease identified  Workstation performed: [id number]     PE Study with CT Abdomen and Pelvis with contrast    Result Date: 3/9/2023  Narrative: CT PULMONARY ANGIOGRAM OF THE CHEST AND CT ABDOMEN AND PELVIS WITH INTRAVENOUS CONTRAST INDICATION:   Hx NSTEMI december, hx GI bleed just discharged, p/w acute CP  no AC, elevated D dimer eval for PE  COMPARISON:  None  TECHNIQUE:  CT examination of the chest, abdomen and pelvis was performed  Thin section CT angiographic technique was used in the chest in order to evaluate for pulmonary embolus and coronal 3D MIP postprocessing was performed on the acquisition scanner  Axial, sagittal, and coronal 2D reformatted images were created from the source data and submitted for interpretation  Radiation dose length product (DLP) for this visit:  1562 mGy-cm   This examination, like all CT scans performed in the [facility], was performed utilizing techniques to minimize radiation dose exposure, including the use of iterative reconstruction and automated exposure control  IV Contrast:  100 mL of iohexol (OMNIPAQUE) Enteric Contrast:  Enteric contrast was not administered  FINDINGS: CHEST PULMONARY ARTERIAL TREE:  No pulmonary embolus is seen  LUNGS:  Lungs are clear  There is no tracheal or endobronchial lesion  PLEURA:  Unremarkable  HEART/AORTA:  Heavy atherosclerotic coronary artery calcification is noted  Heart is otherwise unremarkable  No thoracic aortic aneurysm  MEDIASTINUM AND IRENE:  Unremarkable  CHEST WALL AND LOWER NECK:  Unremarkable  ABDOMEN LIVER/BILIARY TREE:  Unremarkable  GALLBLADDER:  No calcified gallstones  No pericholecystic inflammatory change   SPLEEN: Unremarkable  PANCREAS:  Unremarkable  ADRENAL GLANDS:  Unremarkable  KIDNEYS/URETERS:  No hydronephrosis or urinary tract calculus  One or more sharply circumscribed subcentimeter renal hypodensities are present, too small to accurately characterize, and statistically most likely benign findings  According to recent literature (Radiology 2019) no further workup of these findings is recommended  STOMACH AND BOWEL:  There is colonic diverticulosis without evidence of acute diverticulitis  APPENDIX:  No findings to suggest appendicitis  ABDOMINOPELVIC CAVITY:  No ascites  No pneumoperitoneum  No lymphadenopathy  VESSELS:  Atherosclerotic changes are present  Stable severe stenosis at the takeoff of the SMA No evidence of aneurysm  PELVIS REPRODUCTIVE ORGANS:  The prostate is enlarged  URINARY BLADDER:  Diffuse bladder wall thickening and edema with pericystic inflammatory change ABDOMINAL WALL/INGUINAL REGIONS:  Subcutaneous edema noted throughout the abdominal/flank walls  OSSEOUS STRUCTURES:  No acute fracture or destructive osseous lesion  Impression: No evidence of pulmonary embolus  Diffuse bladder wall thickening and edema with pericystic inflammatory change  Correlate with urinalysis for cystitis Workstation performed: [id number]     CT high volume lower GI bleed    Result Date: 3/4/2023  Narrative: CT ABDOMEN AND PELVIS - WITHOUT AND WITH IV CONTRAST INDICATION:   GI bleed GIB  COMPARISON: CT abdomen pelvis 3/2/2023  TECHNIQUE:  CT examination of the abdomen and pelvis was performed both prior to and after the administration of intravenous contrast  Axial, sagittal, and coronal 2D reformatted images were created from the source data and submitted for interpretation  Radiation dose length product (DLP) for this visit:  3740 mGy-cm     This examination, like all CT scans performed in the [facility], was performed utilizing techniques to minimize radiation dose exposure, including the use of iterative reconstruction and automated exposure control  IV Contrast:  100 mL of iohexol (OMNIPAQUE) Enteric Contrast:  Enteric contrast was not administered  FINDINGS: ABDOMEN  BOWEL:  There is no active extravasation of intravenous contrast into the lumen of stomach, small bowel, or large bowel loops  Mostly resolved previously seen diverticulitis at the descending colon  Redundant sigmoid colon  There are a few metallic clips within the descending and sigmoid colon  Age-appropriate atherosclerotic disease in the mesenteric vessels, without complete occlusion  LOWER CHEST:  Scattered subpleural interstitial changes at the bilateral lung bases  LIVER/BILIARY TREE:  Liver is diffusely decreased in density consistent with fatty change  No CT evidence of suspicious hepatic mass  Normal hepatic contours  No biliary dilatation  GALLBLADDER:  No calcified gallstones  No pericholecystic inflammatory change  SPLEEN:  Unremarkable  PANCREAS:  Partial fatty replacement  ADRENAL GLANDS:  Unremarkable  KIDNEYS/URETERS:  No hydronephrosis or urinary tract calculus  Small simple cysts  One or more sharply circumscribed subcentimeter renal hypodensities are present, too small to accurately characterize, and statistically most likely benign findings  According to recent literature (Radiology 2019) no further workup of these findings is recommended  APPENDIX:  A normal appendix was visualized  ABDOMINOPELVIC CAVITY:  No ascites  No pneumoperitoneum  No lymphadenopathy  VESSELS:  Atherosclerotic changes are present  No evidence of aneurysm  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  Patent celiac and inferior mesenteric arteries  PELVIS REPRODUCTIVE ORGANS:  Enlarged prostate  URINARY BLADDER:  Marked bladder distention, stable  ABDOMINAL WALL/INGUINAL REGIONS:  Unremarkable  OSSEOUS STRUCTURES:  No acute fracture or destructive osseous lesion  Impression: 1    No CT evidence of active high volume gastrointestinal hemorrhage  2    Mostly resolved previously seen diverticulitis at the descending colon  3   Chronic marked bladder distention  Correlate for voiding dysfunction  Workstation performed: [id number]     CT high volume bleeding scan abdomen pelvis    Result Date: 3/2/2023  Narrative: CT ABDOMEN AND PELVIS - WITHOUT AND WITH IV CONTRAST INDICATION:   Evaluate for bleeding, hypotensive  COMPARISON: CT angiogram aorta and lower extremities 12/17/2022  TECHNIQUE:  CT examination of the abdomen and pelvis was performed both prior to and after the administration of intravenous contrast   Contrast was injected one time intravenously without immediate complication  Scanning through the abdomen was performed in the unenhanced, arterial, and venous phases according a protocol specifically designed to evaluate high volume bleeding  Axial, sagittal, and coronal 2D reformatted images were created from the source data and submitted for interpretation  Radiation dose length product (DLP) for this visit:  4138 mGy-cm   This examination, like all CT scans performed in the [facility], was performed utilizing techniques to minimize radiation dose exposure, including the use of iterative reconstruction and automated exposure control  IV Contrast:  100 mL of iohexol (OMNIPAQUE)   Enteric Contrast:  Enteric contrast was not administered  FINDINGS: ABDOMEN  BOWEL:  There is no active extravasation of intravenous contrast into the lumen of stomach, small bowel, or large bowel loops  Colonic diverticulosis  Mild bowel wall thickening with pericolonic fat stranding in the descending colon, consistent with acute diverticulitis  No evidence of perforation or intra-abdominal abscess  LOWER CHEST:  Bilateral gynecomastia  Bibasilar subsegmental atelectasis  LIVER/BILIARY TREE:  Liver is diffusely decreased in density consistent with fatty change  No CT evidence of suspicious hepatic mass  Normal hepatic contours  No biliary dilatation  GALLBLADDER:  No calcified gallstones  No pericholecystic inflammatory change  SPLEEN:  Unremarkable  PANCREAS:  Fatty infiltration of the pancreas  ADRENAL GLANDS:  Unremarkable  KIDNEYS/URETERS:  Bilateral simple renal cysts  No hydronephrosis  APPENDIX:  A normal appendix was visualized  ABDOMINOPELVIC CAVITY:  No ascites  No pneumoperitoneum  No lymphadenopathy  VESSELS:  Atherosclerotic changes are present  No evidence of aneurysm  Stable critical ostial stenosis versus occlusion of the superior mesenteric artery due to densely calcified plaque  The celiac artery and inferior mesenteric artery are patent  PELVIS REPRODUCTIVE ORGANS:  The prostate is enlarged  URINARY BLADDER:  Distended, otherwise unremarkable  ABDOMINAL WALL/INGUINAL REGIONS:  Unremarkable  OSSEOUS STRUCTURES:  No acute fracture or destructive osseous lesion  Degenerative change of the visualized spine with unchanged grade 1 anterolisthesis of L4 on L5  Impression: 1  No CT evidence of active high volume gastrointestinal hemorrhage  2   Uncomplicated acute diverticulitis of the descending colon  3   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  4   Hepatic steatosis  The study was marked in [facility] for immediate notification   Workstation performed: [id number]       EKG reviewed personally: normal sinus rhythm, low voltage, unchanged compared to prior  Telemetry reviewed personally: normal sinus rhythm    Assessment:  Principal Problem:    Other chest pain  Active Problems:    Controlled type 2 diabetes mellitus with hyperglycemia, without long-term current use of insulin (HCC)    Coronary artery disease of native artery of native heart with stable angina pectoris (HCC)    Aortic stenosis, moderate    Chronic diastolic (congestive) heart failure (HCC)    Acute lower GI bleeding    Orthostatic hypotension    Acute blood loss anemia    Urinary retention    Elevated troponin    Diverticulosis of colon with hemorrhage    Hypomagnesemia        Code Status: Level 1 - Full Code

## 2023-03-09 NOTE — CASE MANAGEMENT
Case Management Progress Note    Patient name Barbara Celestin  Location /[id number] MRN [id number]  : 1945 Date 3/9/2023       LOS (days): 0  Geometric Mean LOS (GMLOS) (days):   Days to GMLOS:        OBJECTIVE:        Current admission status: Inpatient  Preferred Pharmacy:   [facility] [place] - [address] [facility], [address] [facility], [address] [place] [address] [address] [facility]  Phone: [phone] Fax: [phone]    Primary Care Provider: Cristina Kruse DO    Primary Insurance: [facility] REP  Secondary Insurance:     PROGRESS NOTE: spoke with pt, daughter and wife at bedside regarding STR  All declining  They do not want HHC either  Pt wants to continue with cardiac rehab 3xweek on discharge

## 2023-03-09 NOTE — PHYSICAL THERAPY NOTE
Physical Therapy Evaluation    Patient Name: Bairon Barry    AFTQZ'O Date: 3/9/2023     Problem List  Principal Problem:    Other chest pain  Active Problems:    Controlled type 2 diabetes mellitus with hyperglycemia, without long-term current use of insulin (HCC)    Coronary artery disease of native artery of native heart with stable angina pectoris (HCC)    Aortic stenosis, moderate    Chronic diastolic (congestive) heart failure (HCC)    Acute lower GI bleeding    Orthostatic hypotension    Acute blood loss anemia    Urinary retention    Elevated troponin    Diverticulosis of colon with hemorrhage    Hypomagnesemia    Elevated TSH    Elevated d-dimer    Intractable headache       Past Medical History  Past Medical History:   Diagnosis Date    BPH (benign prostatic hyperplasia)     CAD (coronary artery disease)     Cancer (HCC)     basal cell    Chronic kidney disease     stage 3    Colon polyp     CPAP (continuous positive airway pressure) dependence     Diabetes mellitus ([facility] [address] )     niddm    Disease of thyroid gland     Gout     High cholesterol     Hypertension     MI, 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     [facility]    S/P cardiac catheterization 12/30/2022    Sleep apnea         Past Surgical History  Past Surgical History:   Procedure Laterality Date    BASAL CELL CARCINOMA EXCISION Right 1/21/2022    Procedure: EXCISION BASAL CELL CARCINOMA EXTREMITY;  Surgeon: Karyna Baugh DO;  Location: [facility] MAIN OR;  Service: General    CARDIAC CATHETERIZATION Left 12/29/2022    Procedure: Cardiac Left Heart Cath;  Surgeon: Heidi Huerta MD;  Location: [facility] CARD[facility]AC [facility]; Service: Cardiology    CARDIAC CATHETERIZATION N/A 12/29/2022    Procedure: Cardiac Coronary Angiogram;  Surgeon: Heidi Huerta MD;  Location: [facility];   Service: Cardiology    CARDIAC CATHETERIZATION N/A 12/29/2022    Procedure: Cardiac pci;  Surgeon: Urbano Serrano Sandra Robison MD;  [facility]: [facility]; Service: Cardiology    CATARACT EXTRACTION W/  INTRAOCULAR LENS IMPLANT      CORNEAL TRANSPLANT      CORONARY ANGIOPLASTY WITH STENT PLACEMENT      stents x3-- [phone]    EYE SURGERY      repair corneal laceration    IA COLONOSCOPY FLX DX W/COLLJ SPEC WHEN PFRMD N/A 3/21/2016    Procedure: COLONOSCOPY;  Surgeon: Jeffery Schneider MD;  Location: [facility];  Service: Colorectal    TONSILLECTOMY AND ADENOIDECTOMY             03/09/23 1033   PT Last Visit   PT Visit Date 03/09/23   Note Type   Note type Evaluation   Pain Assessment   Pain Assessment Tool 0-10   Pain Score No Pain   Restrictions/Precautions   Weight Bearing Precautions Per Order No   Other Precautions Fall Risk;Telemetry;Multiple lines; Chair Alarm   Home Living   Type of [address] [place] [address] Multi-level;1/2 bath on main level;Stairs to enter with rails  (1+1 SABINE)   Bathroom Shower/Tub Tub/shower unit   Matthew Walker;Cane   Additional Comments pt reports use of RW at baseline   Prior Function   Level of [place] Independent with functional mobility; Needs assistance with ADLs; Needs assistance with IADLS   Lives With Spouse; Family   Receives Help From Family   IADLs Family/Friend/Other provides transportation   Falls in the last 6 months 5 to 10   Vocational Retired   General   Family/Caregiver Present No   Cognition   Overall Cognitive Status WFL   Arousal/Participation Alert   Attention Within functional limits   Orientation Level Oriented X4   Following Commands Follows all commands and directions without difficulty   Comments pt appears to use humour to distract from current functional deficits he is experiencing   Subjective   Subjective "We're moving to [place]"   RLE Assessment   RLE Assessment X  (4-/5 grossly)   LLE Assessment   LLE Assessment X  (4-/5 grossly)   Bed Mobility   Additional Comments pt OOB at start/end of session   Transfers   Sit to Stand 5  Supervision   Additional items Armrests; Increased time required;Verbal cues   Stand to Sit 5  Supervision   Additional items Armrests; Increased time required;Verbal cues   Additional Comments RW used   Ambulation/Elevation   Gait pattern Excessively slow; Short stride; Foward flexed; Shuffling;Decreased foot clearance; Improper Weight shift   Gait Assistance 4  Minimal assist   Additional items Assist x 1;Verbal cues   Assistive Device Rolling walker   Distance 80'   Balance   Static Sitting Good   Dynamic Sitting Fair +   Static Standing Fair   Dynamic Standing [address] & [address] -  (with RW)   Endurance Deficit   Endurance Deficit Yes   Endurance Deficit Description pt becoming SOB with ambulation   Activity Tolerance   Activity Tolerance Patient limited by fatigue   Assessment   Prognosis Fair   Problem List Decreased strength;Decreased endurance; Impaired balance;Decreased mobility   Assessment Patient is a 69 y o  male evaluated by Physical Therapy s/p admit to [address] [place] [address] on 3/8/2023 with admitting diagnosis of: Chest pain, Elevated troponin, and principal problem of: Other chest pain  PT was consulted to assess patient's functional mobility and discharge needs  Ordered are PT Evaluation and treatment with activity level of: up with assistance  Comorbidities affecting patient's physical performance at time of assessment include: BPH, HTN, DM, CAD, hx of MI, sleep apnea, CKD, hx of cancer  Personal factors affecting the patient at time of IE include: lives in multi story home, ambulating with assistive device, step(s) to enter home, inability to navigate community distances, history of fall(s), impaired safety awareness, limited insight into impairments, inability/difficulty performing IADLs and inability/difficulty performing ADLs  Please locate objective findings from PT assessment regarding body systems outlined above   Upon evaluation, pt able to perform all functional mobility with SUP, RW, and increased time  Occasional verbal cuing provided for safety awareness and sequencing  Pt able to ambulate [de-identified]' before taking seated rest break, however ambulation and functional transfers do appear very difficult  Moderate postural instability noted but no true LOB experienced  HR and SpO2 remained WFL on RA throughout  The patient's AM-PAC Basic Mobility Inpatient Short Form Raw Score is 17  A Raw score of greater than 16 suggests the patient may benefit from discharge to home  Please also refer to the recommendation of the Physical Therapist for safe discharge planning  Co treatment with OT secondary to complex medical condition of pt, possible A of 2 required to achieve and maintain transitional movements, requiring the need of skilled therapeutic intervention of 2 therapists to achieve delivery of services  Pt will benefit from continued PT intervention during LOS to address current deficits, increase LOF, and facilitate safe d/c to next level of care when medically appropriate  D/c recommendation at this time is post-acute rehabilitation services d/t pt's hx of frequent falls, current LOF, and other recent hospitalization  Goals   Patient Goals to go home   LTG Expiration Date 03/23/23   Long Term Goal #1 Pt will participate in B LE strengthening exercises to facilitate improved functional activity tolerance  Pt will perform all functional transfers and bed mobility mod(I) with good safety awareness  Pt will ambulate 250' mod(I) with LRAD while maintaining good functional dynamic balance  Pt will ascend/descend a FFOS with HR and SUP to reflect the ability to safely navigate the home  Plan   Treatment/Interventions Functional transfer training;LE strengthening/ROM; Elevations; Therapeutic exercise; Endurance training;Bed mobility;Gait training   PT Frequency 3-5x/wk   Recommendation   PT Discharge Recommendation Post acute rehabilitation services   AM-PAC Basic Mobility Inpatient   Turning in Flat Bed Without Bedrails 3   Lying on Back to Sitting on Edge of Flat Bed Without Bedrails 3   Moving Bed to Chair 3   Standing Up From Chair Using Arms 3   Walk in Room 3   Climb 3-5 Stairs With Railing 2   Basic Mobility Inpatient Raw Score 17   Basic Mobility Standardized Score 39 67   Highest Level Of Mobility   -M Goal 5: Stand one or more mins   -HLM Achieved 7: Walk 25 feet or more   End of Consult   Patient Position at End of Consult Bedside chair;Bed/Chair alarm activated; All needs within reach

## 2023-03-09 NOTE — PLAN OF CARE
Problem: PHYSICAL THERAPY ADULT  Goal: Performs mobility at highest level of function for planned discharge setting  See evaluation for individualized goals  Description: Treatment/Interventions: Functional transfer training, LE strengthening/ROM, Elevations, Therapeutic exercise, Endurance training, Bed mobility, Gait training          See flowsheet documentation for full assessment, interventions and recommendations  Note: Prognosis: Fair  Problem List: Decreased strength, Decreased endurance, Impaired balance, Decreased mobility  Assessment: Patient is a 69 y o  male evaluated by Physical Therapy s/p admit to [address] [place] [address],[address] on 3/8/2023 with admitting diagnosis of: Chest pain, Elevated troponin, and principal problem of: Other chest pain  PT was consulted to assess patient's functional mobility and discharge needs  Ordered are PT Evaluation and treatment with activity level of: up with assistance  Comorbidities affecting patient's physical performance at time of assessment include: BPH, HTN, DM, CAD, hx of MI, sleep apnea, CKD, hx of cancer  Personal factors affecting the patient at time of IE include: lives in multi story home, ambulating with assistive device, step(s) to enter home, inability to navigate community distances, history of fall(s), impaired safety awareness, limited insight into impairments, inability/difficulty performing IADLs and inability/difficulty performing ADLs  Please locate objective findings from PT assessment regarding body systems outlined above  Upon evaluation, pt able to perform all functional mobility with SUP, RW, and increased time  Occasional verbal cuing provided for safety awareness and sequencing  Pt able to ambulate [de-identified]' before taking seated rest break, however ambulation and functional transfers do appear very difficult  Moderate postural instability noted but no true LOB experienced  HR and SpO2 remained WFL on RA throughout   The patient's AM-PAC Basic Mobility Inpatient Short Form Raw Score is 17  A Raw score of greater than 16 suggests the patient may benefit from discharge to home  Please also refer to the recommendation of the Physical Therapist for safe discharge planning  Co treatment with OT secondary to complex medical condition of pt, possible A of 2 required to achieve and maintain transitional movements, requiring the need of skilled therapeutic intervention of 2 therapists to achieve delivery of services  Pt will benefit from continued PT intervention during LOS to address current deficits, increase LOF, and facilitate safe d/c to next level of care when medically appropriate  D/c recommendation at this time is post-acute rehabilitation services d/t pt's hx of frequent falls, current LOF, and other recent hospitalization  PT Discharge Recommendation: Post acute rehabilitation services    See flowsheet documentation for full assessment

## 2023-03-09 NOTE — PLAN OF CARE
Problem: PAIN - ADULT  Goal: Verbalizes/displays adequate comfort level or baseline comfort level  Description: Interventions:  - Encourage patient to monitor pain and request assistance  - Assess pain using appropriate pain scale  - Administer analgesics based on type and severity of pain and evaluate response  - Implement non-pharmacological measures as appropriate and evaluate response  - Consider cultural and social influences on pain and pain management  - Notify physician/advanced practitioner if interventions unsuccessful or patient reports new pain  Outcome: Progressing     Problem: INFECTION - ADULT  Goal: Absence or prevention of progression during hospitalization  Description: INTERVENTIONS:  - Assess and monitor for signs and symptoms of infection  - Monitor lab/diagnostic results  - Monitor all insertion sites, i e  indwelling lines, tubes, and drains  - Monitor endotracheal if appropriate and nasal secretions for changes in amount and color  - Admire appropriate cooling/warming therapies per order  - Administer medications as ordered  - Instruct and encourage patient and family to use good hand hygiene technique  - Identify and instruct in appropriate isolation precautions for identified infection/condition  Outcome: Progressing  Goal: Absence of fever/infection during neutropenic period  Description: INTERVENTIONS:  - Monitor WBC    Outcome: Progressing     Problem: SAFETY ADULT  Goal: Patient will remain free of falls  Description: INTERVENTIONS:  - Educate patient/family on patient safety including physical limitations  - Instruct patient to call for assistance with activity   - Consult OT/PT to assist with strengthening/mobility   - Keep Call bell within reach  - Keep bed low and locked with side rails adjusted as appropriate  - Keep care items and personal belongings within reach  - Initiate and maintain comfort rounds  - Make Fall Risk Sign visible to staff  - Offer Toileting every 2 Hours, in advance of need  - Initiate/Maintain bed/chair alarm  - Obtain necessary fall risk management equipment: non-skid footwear  - Apply yellow socks and bracelet for high fall risk patients  - Consider moving patient to room near nurses station  Outcome: Progressing  Goal: Maintain or return to baseline ADL function  Description: INTERVENTIONS:  -  Assess patient's ability to carry out ADLs; assess patient's baseline for ADL function and identify physical deficits which impact ability to perform ADLs (bathing, care of mouth/teeth, toileting, grooming, dressing, etc )  - Assess/evaluate cause of self-care deficits   - Assess range of motion  - Assess patient's mobility; develop plan if impaired  - Assess patient's need for assistive devices and provide as appropriate  - Encourage maximum independence but intervene and supervise when necessary  - Involve family in performance of ADLs  - Assess for home care needs following discharge   - Consider OT consult to assist with ADL evaluation and planning for discharge  - Provide patient education as appropriate  Outcome: Progressing  Goal: Maintains/Returns to pre admission functional level  Description: INTERVENTIONS:  - Perform BMAT or MOVE assessment daily    - Set and communicate daily mobility goal to care team and patient/family/caregiver  - Collaborate with rehabilitation services on mobility goals if consulted  - Perform Range of Motion 3 times a day  - Reposition patient every 2 hours    - Dangle patient 3 times a day  - Stand patient 3 times a day  - Ambulate patient 3 times a day  - Out of bed to chair 3 times a day   - Out of bed for meals 3 times a day  - Out of bed for toileting  - Record patient progress and toleration of activity level   Outcome: Progressing     Problem: DISCHARGE PLANNING  Goal: Discharge to home or other facility with appropriate resources  Description: INTERVENTIONS:  - Identify barriers to discharge w/patient and caregiver  - Arrange for needed discharge resources and transportation as appropriate  - Identify discharge learning needs (meds, wound care, etc )  - Arrange for interpretive services to assist at discharge as needed  - Refer to Case Management Department for coordinating discharge planning if the patient needs post-hospital services based on physician/advanced practitioner order or complex needs related to functional status, cognitive ability, or social support system  Outcome: Progressing     Problem: Knowledge Deficit  Goal: Patient/family/caregiver demonstrates understanding of disease process, treatment plan, medications, and discharge instructions  Description: Complete learning assessment and assess knowledge base    Interventions:  - Provide teaching at level of understanding  - Provide teaching via preferred learning methods  Outcome: Progressing     Problem: CARDIOVASCULAR - ADULT  Goal: Maintains optimal cardiac output and hemodynamic stability  Description: INTERVENTIONS:  - Monitor I/O, vital signs and rhythm  - Monitor for S/S and trends of decreased cardiac output  - Administer and titrate ordered vasoactive medications to optimize hemodynamic stability  - Assess quality of pulses, skin color and temperature  - Assess for signs of decreased coronary artery perfusion  - Instruct patient to report change in severity of symptoms  Outcome: Progressing  Goal: Absence of cardiac dysrhythmias or at baseline rhythm  Description: INTERVENTIONS:  - Continuous cardiac monitoring, vital signs, obtain 12 lead EKG if ordered  - Administer antiarrhythmic and heart rate control medications as ordered  - Monitor electrolytes and administer replacement therapy as ordered  Outcome: Progressing     Problem: RESPIRATORY - ADULT  Goal: Achieves optimal ventilation and oxygenation  Description: INTERVENTIONS:  - Assess for changes in respiratory status  - Assess for changes in mentation and behavior  - Position to facilitate oxygenation and minimize respiratory effort  - Oxygen administered by appropriate delivery if ordered  - Initiate smoking cessation education as indicated  - Encourage broncho-pulmonary hygiene including cough, deep breathe, Incentive Spirometry  - Assess the need for suctioning and aspirate as needed  - Assess and instruct to report SOB or any respiratory difficulty  - Respiratory Therapy support as indicated  Outcome: Progressing     Problem: GENITOURINARY - ADULT  Goal: Maintains or returns to baseline urinary function  Description: INTERVENTIONS:  - Assess urinary function  - Encourage oral fluids to ensure adequate hydration if ordered  - Administer IV fluids as ordered to ensure adequate hydration  - Administer ordered medications as needed  - Offer frequent toileting  - Follow urinary retention protocol if ordered  Outcome: Progressing  Goal: Absence of urinary retention  Description: INTERVENTIONS:  - Assess patient's ability to void and empty bladder  - Monitor I/O  - Bladder scan as needed  - Discuss with physician/AP medications to alleviate retention as needed  - Discuss catheterization for long term situations as appropriate  Outcome: Progressing  Goal: Urinary catheter remains patent  Description: INTERVENTIONS:  - Assess patency of urinary catheter  - If patient has a chronic morales, consider changing catheter if non-functioning  - Follow guidelines for intermittent irrigation of non-functioning urinary catheter  Outcome: Progressing     Problem: METABOLIC, FLUID AND ELECTROLYTES - ADULT  Goal: Electrolytes maintained within normal limits  Description: INTERVENTIONS:  - Monitor labs and assess patient for signs and symptoms of electrolyte imbalances  - Administer electrolyte replacement as ordered  - Monitor response to electrolyte replacements, including repeat lab results as appropriate  - Instruct patient on fluid and nutrition as appropriate  Outcome: Progressing  Goal: Fluid balance maintained  Description: INTERVENTIONS:  - Monitor labs   - Monitor I/O and WT  - Instruct patient on fluid and nutrition as appropriate  - Assess for signs & symptoms of volume excess or deficit  Outcome: Progressing  Goal: Glucose maintained within target range  Description: INTERVENTIONS:  - Monitor Blood Glucose as ordered  - Assess for signs and symptoms of hyperglycemia and hypoglycemia  - Administer ordered medications to maintain glucose within target range  - Assess nutritional intake and initiate nutrition service referral as needed  Outcome: Progressing     Problem: MUSCULOSKELETAL - ADULT  Goal: Maintain or return mobility to safest level of function  Description: INTERVENTIONS:  - Assess patient's ability to carry out ADLs; assess patient's baseline for ADL function and identify physical deficits which impact ability to perform ADLs (bathing, care of mouth/teeth, toileting, grooming, dressing, etc )  - Assess/evaluate cause of self-care deficits   - Assess range of motion  - Assess patient's mobility  - Assess patient's need for assistive devices and provide as appropriate  - Encourage maximum independence but intervene and supervise when necessary  - Involve family in performance of ADLs  - Assess for home care needs following discharge   - Consider OT consult to assist with ADL evaluation and planning for discharge  - Provide patient education as appropriate  Outcome: Progressing  Goal: Maintain proper alignment of affected body part  Description: INTERVENTIONS:  - Support, maintain and protect limb and body alignment  - Provide patient/ family with appropriate education  Outcome: Progressing     Problem: MOBILITY - ADULT  Goal: Maintain or return to baseline ADL function  Description: INTERVENTIONS:  -  Assess patient's ability to carry out ADLs; assess patient's baseline for ADL function and identify physical deficits which impact ability to perform ADLs (bathing, care of mouth/teeth, toileting, grooming, dressing, etc )  - Assess/evaluate cause of self-care deficits   - Assess range of motion  - Assess patient's mobility; develop plan if impaired  - Assess patient's need for assistive devices and provide as appropriate  - Encourage maximum independence but intervene and supervise when necessary  - Involve family in performance of ADLs  - Assess for home care needs following discharge   - Consider OT consult to assist with ADL evaluation and planning for discharge  - Provide patient education as appropriate  Outcome: Progressing  Goal: Maintains/Returns to pre admission functional level  Description: INTERVENTIONS:  - Perform BMAT or MOVE assessment daily    - Set and communicate daily mobility goal to care team and patient/family/caregiver  - Collaborate with rehabilitation services on mobility goals if consulted  - Perform Range of Motion 3 times a day  - Reposition patient every 2 hours    - Dangle patient 3 times a day  - Stand patient 3 times a day  - Ambulate patient 3 times a day  - Out of bed to chair 3 times a day   - Out of bed for meals 3 times a day  - Out of bed for toileting  - Record patient progress and toleration of activity level   Outcome: Progressing

## 2023-03-09 NOTE — H&P
History and Physical - [facility]    Patient Information: Shefali Winn 69 y o  male MRN: [id number]  Unit/Bed#: 755-92 Encounter: [id number]  Admitting Physician: Yoko Medel MD  PCP: Jose Sales DO  Date of Admission:  03/09/23    Assessment/Plan:    Hospital Problem List:     Principal Problem:    Other chest pain  Active Problems:    Orthostatic hypotension    Elevated troponin    Coronary artery disease of native artery of native heart with stable angina pectoris ([facility] )    Acute blood loss anemia    Acute lower GI bleeding    Diverticulosis of colon with hemorrhage    Hypomagnesemia    Controlled type 2 diabetes mellitus with hyperglycemia, without long-term current use of insulin ([facility])    Aortic stenosis, moderate    Chronic diastolic (congestive) heart failure ([facility] )    Urinary retention      Plan for the Primary Problem(s):  · Observe on telemetry, serial troponin/EKG, serial CBC  · Cardiology consult  · Continue Plavix for now but avoid other antiplatelet & anticoagulant medications    Plan for Additional Problems:   · Continue PPI for GI protection  · Clear liq diet for now since he may require GI endoscopy  · Gentle IV fluid resuscitation with crystalloid in light of positive orthostatic vital signs  · IV serum electrolyte replenishment  · Hold diuretic & other antihypertensive medications  · Check blood cultures, urine culture & serum lactate  · Immediate ICU transfer if clinical deterioration or hemodynamic instability    VTE Prophylaxis: Pharmacologic VTE Prophylaxis contraindicated due to GI bleed  / sequential compression device   Code Status: full  POLST: POLST information provided but not completed    Anticipated Length of Stay:  Patient will be admitted on an Observation basis with an anticipated length of stay of  < 2 midnights  Justification for Hospital Stay: IV fluid    Total Time for Visit, including Counseling / Coordination of Care: 45 minutes    Greater than 50% of this total time spent on direct patient counseling and coordination of care  Chief Complaint:   " Felt like someone was sitting on my chest"    History of Present Illness:    Ema Tran is a 69 y o  male whose past medical history is remarkable for CAD, hypertension, DM 2 (A1c 8 1 on 1/16/2023), hyperlipidemia, obesity  He denies cigarette smoking, alcohol abuse or illicit drug use  He denies anginal type chest pain since his December 2022 stents x2 placement & was on DAPT (Brilinta and aspirin) up until his presentation to this facility's ED 3/2/2023 with hypotension while complaining of bright red blood per rectum, orthostatic lightheadedness and near syncope, as well as burning substernal chest heaviness  He was transfused with 4 units PRBC at this hospital's ED prior to being emergently transferred to [place] [address] MICU where he underwent EGD 3/2/2023 ("normal") & colonoscopy 3/3/2023 (2 clips applied to bleeding descending: diverticulum with ulcer base )  Patient received 2 more PRBC units 3/4/2023 due to recurrence of hematochezia with a hemoglobin nikia of 7 0  He was treated with a 7-day course of IV ceftriaxone and metronidazole due to acute diverticulitis seen on CT  Plavix was resumed vs DAPT per cardiology recommendations after his hemoglobin subsequently plateaued around 8  Patient was discharged from that facility 3/8/23 PM with an indwelling urethral Jose catheter, felt well and had a normal nonbloody bowel movement at home after supper, only to develop "burning" substernal chest pressure at that time  Chest discomfort felt familiar & was similar to his prior MIs  He sought medical attention at this facility's ED after obtaining no relief with several nitroglycerin  Of note, he reports Mylanta did alleviate these episodes of burning chest discomfort during his recent hospitalization    Vitals upon ED arrival included /65, heart rate 108, temperature 98 4 °F, pulse ox 97% room air  Pertinent ED labs include HS troponin trend 59-->58, hemoglobin 9 6, INR 0 90, , magnesium 1 6, potassium 4 1, glucose 266, D-dimer 1 35  CTA chest was negative for pulm embolus  CT abdomen/pelvis with IV contrast was remarkable for diffuse bladder wall thickening and edema with pericystic inflammatory change  UA showed glycosuria, trace ketonuria, moderate blood, elevated WBC esterase, 4-10 WBCs per high-powered field and occasional bacteria  Patient currently denies subjective fever, malaise, chills, nausea, vomiting, diarrhea, abdominal pain, skin rash, dysuria or urinary frequency, headache, neck stiffness or acute confusion  Patient received chewed aspirin treatment for milligrams and IV magnesium sulfate 2 g at the ED  Hospitalization on telemetry was sought for observation of chest pain  Patient displayed positive orthostatic vital signs upon arrival to the telemetry unit, BP and heart rate while supine and standing 143/56 and 76, 97/55 and 116 respectively  Follow-up hemoglobin was 8 5 with elevated reticulocyte count 6 99  Patient denies any abdominal pain, hematochezia, melena, hematemesis or hematuria within the past 2 days  Review of Systems:    A 10+ point review of systems was obtained & is as above, otherwise negative    Review of Systems    Past Medical and Surgical History:     Past Medical History:   Diagnosis Date   • BPH (benign prostatic hyperplasia)    • CAD (coronary artery disease)    • Cancer (HCC)     basal cell   • Chronic kidney disease     stage 3   • Colon polyp    • CPAP (continuous positive airway pressure) dependence    • Diabetes mellitus (HCC)     niddm   • Disease of thyroid gland    • Gout    • High cholesterol    • Hypertension    • MI, 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    • Myocardial infarction ([facility] ) 2014   • S/P cardiac catheterization 12/30/2022   • Sleep apnea        Past Surgical History:   Procedure Laterality Date   • BASAL CELL CARCINOMA EXCISION Right 1/21/2022    Procedure: EXCISION BASAL CELL CARCINOMA EXTREMITY;  Surgeon: Wojciech Alberto DO;  Location: [facility] MAIN OR;  Service: General   • CARDIAC CATHETERIZATION Left 12/29/2022    Procedure: Cardiac Left Heart Cath;  Surgeon: Elliot Panchal MD;  Location: [facility] CARDIAC CATH LAB; Service: Cardiology   • CARDIAC CATHETERIZATION N/A 12/29/2022    Procedure: Cardiac Coronary Angiogram;  Surgeon: Elliot Panchal MD;  Location: [facility]; Service: Cardiology   • CARDIAC CATHETERIZATION N/A 12/29/2022    Procedure: Cardiac pci;  Surgeon: Elliot Panchal MD;  Location: [facility] CARDIAC C[facility]; Service: Cardiology   • CATARACT EXTRACTION W/  INTRAOCULAR LENS IMPLANT     • CORNEAL TRANSPLANT     • CORONARY ANGIOPLASTY WITH STENT PLACEMENT      stents x3-- [phone]   • EYE SURGERY      repair corneal laceration   • IA COLONOSCOPY FLX DX W/COLLJ SPEC WHEN PFRMD N/A 3/21/2016    Procedure: COLONOSCOPY;  Surgeon: Laverne Boeck, MD;  Location: [facility] MAIN OR;  Service: Colorectal   • TONSILLECTOMY AND ADENOIDECTOMY         Meds/Allergies:    Prior to Admission medications    Medication Sig Start Date End Date Taking? Authorizing Provider   allopurinol (ZYLOPRIM) 100 mg tablet Take 1 tablet (100 mg total) by mouth daily 11/4/22   Lucillie Staple, DO   allopurinol (ZYLOPRIM) 100 mg tablet Take 1 tablet by mouth once daily 1/30/23   Lucillie Staple, DO   atorvastatin (LIPITOR) 80 mg tablet Take 1 tablet (80 mg total) by mouth daily 2/6/23   Lucillie Staple, DO   benzonatate (TESSALON) 200 MG capsule Take 1 capsule (200 mg total) by mouth 3 (three) times a day as needed for cough 12/26/22   Kamille Ryan PA-C   Blood Glucose Monitoring Suppl (OneTouch Verio Reflect) w/Device KIT Check blood sugars twice daily  Please substitute with appropriate alternative as covered by patient's insurance   Dx: E11 65 11/21/22   Lucillie Staple, DO   clopidogrel (PLAVIX) 75 mg tablet Take 1 tablet (75 mg total) by mouth daily Do not start before March 9, 2023  3/9/23   Magaly Em MD   Empagliflozin (Jardiance) 10 MG TABS Take 1 tablet (10 mg total) by mouth every morning 9/6/22   Reggie Hendrix,    escitalopram (LEXAPRO) 20 mg tablet Take 0 5 tablets (10 mg total) by mouth daily 2/16/23   Saeid Rosas DO   glimepiride (AMARYL) 4 mg tablet TAKE 1 TABLET TWICE A DAY 2/6/23   Saeid Rosas DO   glucose blood (OneTouch Verio) test strip Check blood sugars twice daily  Please substitute with appropriate alternative as covered by patient's insurance  Dx: E11 65 11/21/22   Saeid Rosas DO   levothyroxine 88 mcg tablet TAKE 1 TABLET BY MOUTH ONCE DAILY IN THE MORNING 5/16/22   Saeid Rosas DO   levothyroxine 88 mcg tablet Take 1 tablet (88 mcg total) by mouth daily in the early morning 3/1/23   Saeid Rosas DO   losartan (COZAAR) 25 mg tablet Take 1 tablet (25 mg total) by mouth daily 2/6/23   Martha Lackey PA-C   metFORMIN (GLUCOPHAGE) 1000 MG tablet Take 1 tablet (1,000 mg total) by mouth 2 (two) times a day with meals 10/6/22   Saeid Rosas DO   multivitamin [facility]) TABS Take 1 tablet by mouth daily    Historical Provider, MD   nitroglycerin (NITROSTAT) 0 4 mg SL tablet Place 1 tablet (0 4 mg total) under the tongue every 5 (five) minutes as needed for chest pain 12/12/22   Saeid Rosas DO   OneTouch Delica Lancets 29K MISC Check blood sugars twice daily  Please substitute with appropriate alternative as covered by patient's insurance   Dx: E11 65 11/21/22   Saeid Rosas, DO   pantoprazole (PROTONIX) 40 mg tablet Take 1 tablet (40 mg total) by mouth daily before breakfast Do not start before March 9, 2023  3/9/23   Magaly Em MD   prednisoLONE acetate (PRED FORTE) 1 % ophthalmic suspension INSTILL 1 DROP INTO RIGHT EYE ONCE DAILY 12/13/22   Historical Provider, MD   propranolol (INDERAL LA) 60 mg 24 hr capsule Take 1 capsule (60 mg total) by mouth daily 2/16/23   DO m[facility]) 500 mg 12 hr tablet Take 1 tablet (500 mg total) by mouth 2 (two) times a day 12/30/22   Perry Stewart PA-C   tamsulosin [facility]) 0 4 mg Take 1 capsule (0 4 mg total) by mouth daily with dinner 3/8/23   Sammie Gracia MD   torsemide [facility]) 10 mg tablet Take 1 tablet (10 mg total) by mouth daily 11/15/22   Bianca Rivas DO   zonisamide (ZONEGRAN) 50 MG capsule Take 1 tablet (50 mg total) by mouth daily 9/8/22   Pee Fernandez MD   topiramate (Topamax) 25 mg tablet Take 1 tablet (25 mg total) by mouth daily X 1 week then discontinue 10/7/21 10/7/21  Washington Orellana MD     I have reviewed home medications with patient personally      Allergies: No Known Allergies    Social History:     Marital Status: /Civil Union   Patient Pre-hospital Living Situation: home  Patient Pre-hospital Level of Mobility: independent  Substance Use History:   Social History     Substance and Sexual Activity   Alcohol Use Not Currently    Comment: socially, cut down in 2008, previously did more than      Social History     Tobacco Use   Smoking Status Never   Smokeless Tobacco Never     Social History     Substance and Sexual Activity   Drug Use Never       Family History:    non-contributory    Physical Exam:   General: Obese gentleman, appeared unwell & diaphoretic with lightheadedness when he sat upright in bed  HEENT: Appears pale but not jaundiced  Neck: supple, no JVD  Resp: clear lungs, no crackles or wheeze  Cardiovascular: S1 S2 audible, regular, precordial 4/6 murmur auscultated  GI: soft abdomen, nontender, bowel sounds present  Musculoskeletal: no pedal edema or cyanosis  Skin: no petechiae or suspicious rash  Psych: appropriately oriented in all spheres  Neuro: Awake, alert, essentially nonfocal      Vitals:   Blood Pressure: 97/55 (03/09/23 0433)  Pulse: 97 (03/09/23 0433)  Temperature: (!) 97 1 °F (36 2 °C) (03/09/23 0314)  Temp Source: Oral (03/09/23 0314)  Respirations: 18 (03/09/23 1291)  Height: 5' 9" (175 3 cm) (03/09/23 0354)  Weight - Scale: 102 kg (224 lb 10 4 oz) (03/09/23 0354)  SpO2: 100 % (03/09/23 0433)      Additional Data:     Lab Results: I have personally reviewed pertinent reports  Results from last 7 days   Lab Units 03/08/23  2346   WBC Thousand/uL 6 96   HEMOGLOBIN g/dL 9 6*   HEMATOCRIT % 28 5*   PLATELETS Thousands/uL 276   NEUTROS PCT % 70   LYMPHS PCT % 16   MONOS PCT % 7   EOS PCT % 5     Results from last 7 days   Lab Units 03/08/23  2346   POTASSIUM mmol/L 4 1   CHLORIDE mmol/L 108   CO2 mmol/L 25   BUN mg/dL 7   CREATININE mg/dL 1 14   CALCIUM mg/dL 8 5   ALK PHOS U/L 58   ALT U/L 33   AST U/L 27     Results from last 7 days   Lab Units 03/08/23  2346   INR  0 90     Invalid input(s): TROIP    Imaging: I have personally reviewed pertinent reports  EGD    Result Date: 3/2/2023  Narrative: Table formatting from the original result was not included  [id number] [facility] Endoscopy [address] [place] [address] [phone] DATE OF SERVICE: 3/02/23 PHYSICIAN(S): Attending: Sung Rios MD Fellow: Jr Crook MD INDICATION: GI bleed POST-OP DIAGNOSIS: See the impression below  PREPROCEDURE: Informed consent was obtained for the procedure, including sedation  Risks of perforation, hemorrhage, adverse drug reaction and aspiration were discussed  The patient was placed in the left lateral decubitus position  Patient was explained about the risks and benefits of the procedure  Risks including but not limited to bleeding, infection, and perforation were explained in detail  Also explained about less than 100% sensitivity with the exam and other alternatives  PROCEDURE: EGD DETAILS OF PROCEDURE: Patient was taken to the procedure room where a time out was performed to confirm correct patient and correct procedure   The patient underwent monitored anesthesia care, which was administered by an anesthesia professional  The patient's blood pressure, heart rate, level of consciousness, respirations and oxygen were monitored throughout the procedure  The scope was advanced to the second part of the duodenum  Retroflexion was performed in the fundus  The patient experienced no blood loss  The procedure was not difficult  The patient tolerated the procedure well  There were no apparent complications  ANESTHESIA INFORMATION: ASA: IV Anesthesia Type: General MEDICATIONS: No administrations occurring from 1518 to 1533 on 03/02/23 FINDINGS: Irregular Z-line 38 cm from the incisors Food bolus in the body of the stomach, pylorus, duodenal bulb and 1st part of the duodenum Otherwise unremarkable SPECIMENS: * No specimens in log *     Impression: Irregular Z-line  Minimal quantity of food seen in the stomach, and duodenum  Otherwise unremarkable  RECOMMENDATION:  There is no recommended follow-up for this procedure  Transition to protonix 40 daily PO (for ulcer prophylaxis given advanced age and need for DAPT) Continue serial H&H, monitor stool output  Monitor vitals closely  Plan for colonoscopy tomorrow, we will start bowel prep today  Resume clear liquid diet  NPO at midnight  Can continue DAPT given recent cardiac stenting Kenn Shultz MD     Colonoscopy    Addendum Date: 3/4/2023 Addendum:   [facility] Endoscopy [address] [place] [address] [phone] DATE OF SERVICE: 3/03/23 PHYSICIAN(S): Attending: Kenn Shultz MD Fellow: Marti Bartlett MD INDICATION: GI bleed POST-OP DIAGNOSIS: See the impression below  HISTORY: Prior colonoscopy: More than 10 years ago  BOWEL PREPARATION: Golytely/Colyte/Trilyte PREPROCEDURE: Informed consent was obtained for the procedure, including sedation  Risks including but not limited to bleeding, infection, perforation, adverse drug reaction and aspiration were explained in detail  Also explained about less than 100% sensitivity with the exam and other alternatives   The patient was placed in the left lateral decubitus position  Procedure: Colonoscopy DETAILS OF PROCEDURE: Patient was taken to the procedure room where a time out was performed to confirm correct patient and correct procedure  The patient underwent monitored anesthesia care, which was administered by an anesthesia professional  The patient's blood pressure, heart rate, level of consciousness, oxygen and respirations were monitored throughout the procedure  A digital rectal exam was performed  The scope was introduced through the anus and advanced to the cecum  Retroflexion was performed in the rectum  The quality of bowel preparation was evaluated using the [facility] Bowel Preparation Scale with scores of: right colon = 2, transverse colon = 2, left colon = 2  The total BBPS score was 6  Bowel prep was adequate  The patient experienced no blood loss  The procedure was not difficult  The patient tolerated the procedure well  There were no apparent complications  ANESTHESIA INFORMATION: ASA: IV Anesthesia Type: General MEDICATIONS: No administrations occurring from 1407 to 1557 on 03/03/23 FINDINGS: Significant amount of fresh blood and hematin in the transverse colon, splenic flexure, descending colon, sigmoid colon, rectosigmoid and rectum  Significant amount of fresh red blood and older black blood/hematin was seen in the colon up to the transverse colon  No blood at all in the ascending colon and cecum  TI could not be intubated but no blood coming from the IC valve  Blood was extensively lavaged and suctioned on withdrawal, and no active bleeding was found Severe pancolonic diverticula; there was stigmata of recent hemorrhage; placed 2 clips successfully and 2 clips unsuccessfully  One diverticulum in the descending colon appeared to have a small ulcer base with possible visible vessel and overlying clot  No active bleeding, but two clips were successfully placed   EVENTS: Procedure Events Event Event Time ENDO CECUM REACHED 3/3/2023  2:53 PM ENDO SCOPE OUT TIME 3/3/2023  3:54 PM SPECIMENS: * No specimens in log * EQUIPMENT: Colonoscope - IMPRESSION: Suspected ulcer within a diverticulum in the descending colon with possible visible vessel and an adherant clot, deployed 2 hemostatic clips (another 2 unsuccessful) Old and fresh blood seen upto the level of proximal transverse colon but no active bleeding seen after extensive lavage Pancolon (left >> right) diverticulosis  RECOMMENDATION: - Continue close monitoring of vitals, stool output  - Hgb q8h - Transfusion as needed with target Hb of 7 to 8  - If permissive by cardiology, hold brillinta until Hb stablizes and no witnessed bleeding for 24-48 hours  - Can continue baby aspirin  - If hemodynamically significant GI bleeding recurs, please inform on call GI provider  Would likely benefit from IR angiography and embolization at this point  Clips should also aid with localization if empiric embolization is being considered  - Keep on clear liquid diet  Nasima Salomon MD     Result Date: 3/4/2023  Narrative: Table formatting from the original result was not included  [id number] [facility] Endoscopy [address] [place] [address] [phone] DATE OF SERVICE: 3/03/23 PHYSICIAN(S): Attending: Nasima Salomon MD Fellow: Aravind Andersen MD INDICATION: GI bleed POST-OP DIAGNOSIS: See the impression below  HISTORY: Prior colonoscopy: More than 10 years ago  BOWEL PREPARATION: Golytely/Colyte/Trilyte PREPROCEDURE: Informed consent was obtained for the procedure, including sedation  Risks including but not limited to bleeding, infection, perforation, adverse drug reaction and aspiration were explained in detail  Also explained about less than 100% sensitivity with the exam and other alternatives  The patient was placed in the left lateral decubitus position   Procedure: Colonoscopy DETAILS OF PROCEDURE: Patient was taken to the procedure room where a time out was performed to confirm correct patient and correct procedure  The patient underwent monitored anesthesia care, which was administered by an anesthesia professional  The patient's blood pressure, heart rate, level of consciousness, oxygen and respirations were monitored throughout the procedure  A digital rectal exam was performed  The scope was introduced through the anus and advanced to the cecum  Retroflexion was performed in the rectum  The quality of bowel preparation was evaluated using the [facility] Bowel Preparation Scale with scores of: right colon = 1, transverse colon = 1, left colon = 1  The total BBPS score was 3  Bowel prep was not adequate  The patient experienced no blood loss  The procedure was not difficult  The patient tolerated the procedure well  There were no apparent complications  ANESTHESIA INFORMATION: ASA: IV Anesthesia Type: General MEDICATIONS: No administrations occurring from 1407 to 1557 on 03/03/23 FINDINGS: Significant amount of fresh blood and hematin in the transverse colon, splenic flexure, descending colon, sigmoid colon, rectosigmoid and rectum  Significant amount of fresh red blood and older black blood/hematin was seen in the colon up to the transverse colon  No blood at all in the ascending colon and cecum  TI could not be intubated but no blood coming from the IC valve  Blood was extensively lavaged and suctioned on withdrawal, and no active bleeding was found Severe pancolonic diverticula; there was stigmata of recent hemorrhage; placed 2 clips successfully and 2 clips unsuccessfully  One diverticulum in the descending colon appeared to have a small ulcer base with possible visible vessel and overlying clot  No active bleeding, but two clips were successfully placed   EVENTS: Procedure Events Event Event Time ENDO CECUM REACHED 3/3/2023  2:53 PM ENDO SCOPE OUT TIME 3/3/2023  3:54 PM SPECIMENS: * No specimens in log * EQUIPMENT: Colonoscope -     Impression: Suspected ulcer within a diverticulum in the descending colon with possible visible vessel and an adherant clot, deployed 2 hemostatic clips (another 2 unsuccessful) Old and fresh blood seen upto the level of proximal transverse colon but no active bleeding seen after extensive lavage Pancolon (left >> right) diverticulosis  RECOMMENDATION: - Continue close monitoring of vitals, stool output  - Hgb q8h - Transfusion as needed with target Hb of 7 to 8  - If permissive by cardiology, hold brillinta until Hb stablizes and no witnessed bleeding for 24-48 hours  - Can continue baby aspirin  - If hemodynamically significant GI bleeding recurs, please inform on call GI provider  Would likely benefit from IR angiography and embolization at this point  Clips should also aid with localization if empiric embolization is being considered  - Keep on clear liquid diet  Rosa Bartlett MD     XR chest portable    Result Date: 3/2/2023  Narrative: CHEST INDICATION:   anemia  COMPARISON:  Chest x-ray 1/4/2023 EXAM PERFORMED/VIEWS:  XR CHEST PORTABLE  AP semierect FINDINGS: Cardiac and mediastinal contours are stable  Limitation due to low lung volumes and body habitus  No definite airspace disease identified  No pneumothorax or pleural effusion  Osseous structures appear within normal limits for patient age  Impression: Limited evaluation due to low lung volumes, but no active pulmonary disease identified  Workstation performed: [id number]     PE Study with CT Abdomen and Pelvis with contrast    Result Date: 3/9/2023  Narrative: CT PULMONARY ANGIOGRAM OF THE CHEST AND CT ABDOMEN AND PELVIS WITH INTRAVENOUS CONTRAST INDICATION:   Hx NSTEMI december, hx GI bleed just discharged, p/w acute CP  no AC, elevated D dimer eval for PE  COMPARISON:  None  TECHNIQUE:  CT examination of the chest, abdomen and pelvis was performed    Thin section CT angiographic technique was used in the chest in order to evaluate for pulmonary embolus and coronal 3D MIP postprocessing was performed on the acquisition scanner  Axial, sagittal, and coronal 2D reformatted images were created from the source data and submitted for interpretation  Radiation dose length product (DLP) for this visit:  1562 mGy-cm   This examination, like all CT scans performed in the [facility], was performed utilizing techniques to minimize radiation dose exposure, including the use of iterative reconstruction and automated exposure control  IV Contrast:  100 mL of iohexol (OMNIPAQUE) Enteric Contrast:  Enteric contrast was not administered  FINDINGS: CHEST PULMONARY ARTERIAL TREE:  No pulmonary embolus is seen  LUNGS:  Lungs are clear  There is no tracheal or endobronchial lesion  PLEURA:  Unremarkable  HEART/AORTA:  Heavy atherosclerotic coronary artery calcification is noted  Heart is otherwise unremarkable  No thoracic aortic aneurysm  MEDIASTINUM AND IRENE:  Unremarkable  CHEST WALL AND LOWER NECK:  Unremarkable  ABDOMEN LIVER/BILIARY TREE:  Unremarkable  GALLBLADDER:  No calcified gallstones  No pericholecystic inflammatory change  SPLEEN:  Unremarkable  PANCREAS:  Unremarkable  ADRENAL GLANDS:  Unremarkable  KIDNEYS/URETERS:  No hydronephrosis or urinary tract calculus  One or more sharply circumscribed subcentimeter renal hypodensities are present, too small to accurately characterize, and statistically most likely benign findings  According to recent literature (Radiology 2019) no further workup of these findings is recommended  STOMACH AND BOWEL:  There is colonic diverticulosis without evidence of acute diverticulitis  APPENDIX:  No findings to suggest appendicitis  ABDOMINOPELVIC CAVITY:  No ascites  No pneumoperitoneum  No lymphadenopathy  VESSELS:  Atherosclerotic changes are present  Stable severe stenosis at the takeoff of the SMA No evidence of aneurysm  PELVIS REPRODUCTIVE ORGANS:  The prostate is enlarged   URINARY BLADDER:  Diffuse bladder wall thickening and edema with pericystic inflammatory change ABDOMINAL WALL/INGUINAL REGIONS:  Subcutaneous edema noted throughout the abdominal/flank walls  OSSEOUS STRUCTURES:  No acute fracture or destructive osseous lesion  Impression: No evidence of pulmonary embolus  Diffuse bladder wall thickening and edema with pericystic inflammatory change  Correlate with urinalysis for cystitis Workstation performed: [id number]     CT high volume lower GI bleed    Result Date: 3/4/2023  Narrative: CT ABDOMEN AND PELVIS - WITHOUT AND WITH IV CONTRAST INDICATION:   GI bleed GIB  COMPARISON: CT abdomen pelvis 3/2/2023  TECHNIQUE:  CT examination of the abdomen and pelvis was performed both prior to and after the administration of intravenous contrast  Axial, sagittal, and coronal 2D reformatted images were created from the source data and submitted for interpretation  Radiation dose length product (DLP) for this visit:  3740 mGy-cm   This examination, like all CT scans performed in the [facility], was performed utilizing techniques to minimize radiation dose exposure, including the use of iterative reconstruction and automated exposure control  IV Contrast:  100 mL of iohexol (OMNIPAQUE) Enteric Contrast:  Enteric contrast was not administered  FINDINGS: ABDOMEN  BOWEL:  There is no active extravasation of intravenous contrast into the lumen of stomach, small bowel, or large bowel loops  Mostly resolved previously seen diverticulitis at the descending colon  Redundant sigmoid colon  There are a few metallic clips within the descending and sigmoid colon  Age-appropriate atherosclerotic disease in the mesenteric vessels, without complete occlusion  LOWER CHEST:  Scattered subpleural interstitial changes at the bilateral lung bases  LIVER/BILIARY TREE:  Liver is diffusely decreased in density consistent with fatty change  No CT evidence of suspicious hepatic mass  Normal hepatic contours  No biliary dilatation  GALLBLADDER:  No calcified gallstones   No pericholecystic inflammatory change  SPLEEN:  Unremarkable  PANCREAS:  Partial fatty replacement  ADRENAL GLANDS:  Unremarkable  KIDNEYS/URETERS:  No hydronephrosis or urinary tract calculus  Small simple cysts  One or more sharply circumscribed subcentimeter renal hypodensities are present, too small to accurately characterize, and statistically most likely benign findings  According to recent literature (Radiology 2019) no further workup of these findings is recommended  APPENDIX:  A normal appendix was visualized  ABDOMINOPELVIC CAVITY:  No ascites  No pneumoperitoneum  No lymphadenopathy  VESSELS:  Atherosclerotic changes are present  No evidence of aneurysm  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  Patent celiac and inferior mesenteric arteries  PELVIS REPRODUCTIVE ORGANS:  Enlarged prostate  URINARY BLADDER:  Marked bladder distention, stable  ABDOMINAL WALL/INGUINAL REGIONS:  Unremarkable  OSSEOUS STRUCTURES:  No acute fracture or destructive osseous lesion  Impression: 1  No CT evidence of active high volume gastrointestinal hemorrhage  2    Mostly resolved previously seen diverticulitis at the descending colon  3   Chronic marked bladder distention  Correlate for voiding dysfunction  Workstation performed: [id number]     CT high volume bleeding scan abdomen pelvis    Result Date: 3/2/2023  Narrative: CT ABDOMEN AND PELVIS - WITHOUT AND WITH IV CONTRAST INDICATION:   Evaluate for bleeding, hypotensive  COMPARISON: CT angiogram aorta and lower extremities 12/17/2022  TECHNIQUE:  CT examination of the abdomen and pelvis was performed both prior to and after the administration of intravenous contrast   Contrast was injected one time intravenously without immediate complication  Scanning through the abdomen was performed in the unenhanced, arterial, and venous phases according a protocol specifically designed to evaluate high volume bleeding    Axial, sagittal, and coronal 2D reformatted images were created from the source data and submitted for interpretation  Radiation dose length product (DLP) for this visit:  1011 mGy-cm   This examination, like all CT scans performed in the [facility], was performed utilizing techniques to minimize radiation dose exposure, including the use of iterative reconstruction and automated exposure control  IV Contrast:  100 mL of iohexol (OMNIPAQUE)   Enteric Contrast:  Enteric contrast was not administered  FINDINGS: ABDOMEN  BOWEL:  There is no active extravasation of intravenous contrast into the lumen of stomach, small bowel, or large bowel loops  Colonic diverticulosis  Mild bowel wall thickening with pericolonic fat stranding in the descending colon, consistent with acute diverticulitis  No evidence of perforation or intra-abdominal abscess  LOWER CHEST:  Bilateral gynecomastia  Bibasilar subsegmental atelectasis  LIVER/BILIARY TREE:  Liver is diffusely decreased in density consistent with fatty change  No CT evidence of suspicious hepatic mass  Normal hepatic contours  No biliary dilatation  GALLBLADDER:  No calcified gallstones  No pericholecystic inflammatory change  SPLEEN:  Unremarkable  PANCREAS:  Fatty infiltration of the pancreas  ADRENAL GLANDS:  Unremarkable  KIDNEYS/URETERS:  Bilateral simple renal cysts  No hydronephrosis  APPENDIX:  A normal appendix was visualized  ABDOMINOPELVIC CAVITY:  No ascites  No pneumoperitoneum  No lymphadenopathy  VESSELS:  Atherosclerotic changes are present  No evidence of aneurysm  Stable critical ostial stenosis versus occlusion of the superior mesenteric artery due to densely calcified plaque  The celiac artery and inferior mesenteric artery are patent  PELVIS REPRODUCTIVE ORGANS:  The prostate is enlarged  URINARY BLADDER:  Distended, otherwise unremarkable  ABDOMINAL WALL/INGUINAL REGIONS:  Unremarkable   OSSEOUS STRUCTURES:  No acute fracture or destructive osseous lesion  Degenerative change of the visualized spine with unchanged grade 1 anterolisthesis of L4 on L5  Impression: 1  No CT evidence of active high volume gastrointestinal hemorrhage  2   Uncomplicated acute diverticulitis of the descending colon  3   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

## 2023-03-09 NOTE — ED PROVIDER NOTES
History  Chief Complaint   Patient presents with   • Chest Pain     Patient reports sub sternal chest pain starting around about an hour ago  Patients daughter gave a nitro at 2252 with no relief  HPI  80-year-old male with a past medical history significant for CAD, NSTEMI, cardiac stent, HTN, HLD, obesity, gout, T2DM, diabetic neuropathy, CKD, aortic stenosis, CHF, recent admission from 3/2/2023 through 3/8/2023 being discharged only several hours ago for GI bleed secondary to bleeding ulcer in the colon, diverticulitis presents to the ER for evaluation of cute onset chest pain  Patient reports acute onset chest pain described as pressure, elephant on chest, similar to prior heart attack which began acutely at approximately 8 PM, 3 5 hours ago  Patient took 2 nitroglycerin without relief  No other medications given  Associated with diaphoresis, nausea, no vomiting  Symptoms are ongoing no resolution  No associated shortness of breath or numbness tingling or weakness  No speech difficulty  No syncope  Patient reports she was started on Plavix reported being on Brilinta and aspirin previous to the GI bleed but Plavix was resumed at the end of the admission  Patient denies any ongoing GI bleeding or abdominal symptoms  Prior to Admission Medications   Prescriptions Last Dose Informant Patient Reported? Taking? Blood Glucose Monitoring Suppl (OneTouch Verio Reflect) w/Device KIT   No No   Sig: Check blood sugars twice daily  Please substitute with appropriate alternative as covered by patient's insurance  Dx: E11 65   Empagliflozin (Jardiance) 10 MG TABS   No No   Sig: Take 1 tablet (10 mg total) by mouth every morning   OneTouch Delica Lancets 62I MISC   No No   Sig: Check blood sugars twice daily  Please substitute with appropriate alternative as covered by patient's insurance   Dx: E11 65   allopurinol (ZYLOPRIM) 100 mg tablet   No No   Sig: Take 1 tablet (100 mg total) by mouth daily allopurinol (ZYLOPRIM) 100 mg tablet   No No   Sig: Take 1 tablet by mouth once daily   atorvastatin (LIPITOR) 80 mg tablet   No No   Sig: Take 1 tablet (80 mg total) by mouth daily   benzonatate (TESSALON) 200 MG capsule   No No   Sig: Take 1 capsule (200 mg total) by mouth 3 (three) times a day as needed for cough   clopidogrel (PLAVIX) 75 mg tablet   No No   Sig: Take 1 tablet (75 mg total) by mouth daily Do not start before March 9, 2023  escitalopram (LEXAPRO) 20 mg tablet   No No   Sig: Take 0 5 tablets (10 mg total) by mouth daily   glimepiride (AMARYL) 4 mg tablet   No No   Sig: TAKE 1 TABLET TWICE A DAY   glucose blood (OneTouch Verio) test strip   No No   Sig: Check blood sugars twice daily  Please substitute with appropriate alternative as covered by patient's insurance  Dx: E11 65   levothyroxine 88 mcg tablet   No No   Sig: TAKE 1 TABLET BY MOUTH ONCE DAILY IN THE MORNING   levothyroxine 88 mcg tablet   No No   Sig: Take 1 tablet (88 mcg total) by mouth daily in the early morning   losartan (COZAAR) 25 mg tablet   No No   Sig: Take 1 tablet (25 mg total) by mouth daily   metFORMIN (GLUCOPHAGE) 1000 MG tablet   No No   Sig: Take 1 tablet (1,000 mg total) by mouth 2 (two) times a day with meals   multivitamin (THERAGRAN) TABS   Yes No   Sig: Take 1 tablet by mouth daily   nitroglycerin (NITROSTAT) 0 4 mg SL tablet   No No   Sig: Place 1 tablet (0 4 mg total) under the tongue every 5 (five) minutes as needed for chest pain   pantoprazole (PROTONIX) 40 mg tablet   No No   Sig: Take 1 tablet (40 mg total) by mouth daily before breakfast Do not start before March 9, 2023     prednisoLONE acetate (PRED FORTE) 1 % ophthalmic suspension   Yes No   Sig: INSTILL 1 DROP INTO RIGHT EYE ONCE DAILY   propranolol (INDERAL LA) 60 mg 24 hr capsule   No No   Sig: Take 1 capsule (60 mg total) by mouth daily   ranolazine (RANEXA) 500 mg 12 hr tablet   No No   Sig: Take 1 tablet (500 mg total) by mouth 2 (two) times a day tamsulosin (FLOMAX) 0 4 mg   No No   Sig: Take 1 capsule (0 4 mg total) by mouth daily with dinner   torsemide (DEMADEX) 10 mg tablet   No No   Sig: Take 1 tablet (10 mg total) by mouth daily   zonisamide (ZONEGRAN) 50 MG capsule   No No   Sig: Take 1 tablet (50 mg total) by mouth daily      Facility-Administered Medications: None       Past Medical History:   Diagnosis Date   • BPH (benign prostatic hyperplasia)    • CAD (coronary artery disease)    • Cancer (HCC)     basal cell   • Chronic kidney disease     stage 3   • Colon polyp    • CPAP (continuous positive airway pressure) dependence    • Diabetes mellitus ([facility] [address] )     niddm   • Disease of thyroid gland    • Gout    • High cholesterol    • Hypertension    • MI, 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    • My[facility] ) 2014   • S/P cardiac catheterization 12/30/2022   • Sleep apnea        Past Surgical History:   Procedure Laterality Date   • BASAL CELL CARCINOMA EXCISION Right 1/21/2022    Procedure: EXCISION BASAL CELL CARCINOMA EXTREMITY;  Surgeon: Lora Galindo DO;  Location: [facility] MAIN OR;  Service: General   • CARDIAC CATHETERIZATION Left 12/29/2022    Procedure: Cardiac Left Heart Cath;  Surgeon: Janay Doherty MD;  Location: [facility] C[facility]; Service: Cardiology   • CARDIAC CATHETERIZATION N/A 12/29/2022    Procedure: Cardiac Coronary Angiogram;  Surgeon: Janay Doherty MD;  [facility]: [facility]; Service: Cardiology   • CARDIAC CATHETERIZATION N/A 12/29/2022    Procedure: Cardiac pci;  Surgeon: Janay Doherty MD;  Location: [facility];   Service: Cardiology   • CATARACT EXTRACTION W/  INTRAOCULAR LENS IMPLANT     • CORNEAL TRANSPLANT     • CORONARY ANGIOPLASTY WITH STENT PLACEMENT      stents x3-- [phone]   • EYE SURGERY      repair corneal laceration   • WA COLONOSCOPY FLX DX W/COLLJ SPEC WHEN PFRMD N/A 3/21/2016    Procedure: COLONOSCOPY;  Surgeon: Chelo Kumar MD;  Location: [facility];  Service: Colorectal • TONSILLECTOMY AND ADENOIDECTOMY         Family History   Adopted: Yes   Problem Relation Age of Onset   • No Known Problems Mother    • No Known Problems Father      I have reviewed and agree with the history as documented  E-Cigarette/Vaping   • E-Cigarette Use Never User      E-Cigarette/Vaping Substances   • Nicotine No    • THC No    • CBD No    • Flavoring No    • Other No    • Unknown No      Social History     Tobacco Use   • Smoking status: Never   • Smokeless tobacco: Never   Vaping Use   • Vaping Use: Never used   Substance Use Topics   • Alcohol use: Not Currently     Comment: socially, cut down in 2008, previously did more than    • Drug use: Never       Review of Systems   Constitutional: Positive for diaphoresis  Negative for chills and fever  HENT: Negative for ear pain and sore throat  Eyes: Negative for pain and visual disturbance  Respiratory: Negative for cough and shortness of breath  Cardiovascular: Positive for chest pain  Negative for palpitations  Gastrointestinal: Positive for nausea  Negative for abdominal pain, diarrhea and vomiting  Genitourinary: Negative for dysuria and hematuria  Musculoskeletal: Negative for arthralgias and back pain  Skin: Negative for color change and rash  Neurological: Negative for seizures, syncope, weakness, light-headedness and numbness  All other systems reviewed and are negative  Physical Exam  Physical Exam  Vitals and nursing note reviewed  Constitutional:       General: He is not in acute distress  Appearance: He is ill-appearing and diaphoretic  He is not toxic-appearing  HENT:      Head: Normocephalic and atraumatic  Eyes:      Extraocular Movements: Extraocular movements intact  Neck:      Vascular: No JVD  Trachea: No tracheal deviation  Cardiovascular:      Rate and Rhythm: Regular rhythm  Tachycardia present  Pulses:           Radial pulses are 2+ on the right side and 2+ on the left side  Heart sounds: Murmur heard  Systolic murmur is present  No diastolic murmur is present  No friction rub  No gallop  Pulmonary:      Effort: Pulmonary effort is normal  No tachypnea  Breath sounds: No decreased breath sounds, wheezing, rhonchi or rales  Chest:      Chest wall: No tenderness  Musculoskeletal:      Right lower leg: No edema  Left lower leg: No edema  Skin:     General: Skin is warm  Capillary Refill: Capillary refill takes less than 2 seconds  Coloration: Skin is not cyanotic  Neurological:      General: No focal deficit present  Mental Status: He is alert and oriented to person, place, and time           Vital Signs  ED Triage Vitals [03/08/23 2306]   Temperature Pulse Respirations Blood Pressure SpO2   98 4 °F (36 9 °C) (!) 108 20 118/65 97 %      Temp Source Heart Rate Source Patient Position - Orthostatic VS BP Location FiO2 (%)   Temporal Monitor Lying Right arm --      Pain Score       --           Vitals:    03/08/23 2306 03/09/23 0015 03/09/23 0030   BP: 118/65  115/60   Pulse: (!) 108 86 86   Patient Position - Orthostatic VS: Lying  Lying         Visual Acuity      ED Medications  Medications   aspirin chewable tablet 324 mg (324 mg Oral Given 3/8/23 2333)   iohexol (OMNIPAQUE) 350 MG/ML injection (SINGLE-DOSE) 100 mL (100 mL Intravenous Given 3/9/23 0055)       Diagnostic Studies  Results Reviewed     Procedure Component Value Units Date/Time    HS Troponin I 2hr [[id number]]  (Abnormal) Collected: 03/09/23 0136    Lab Status: Final result Specimen: Blood from Arm, Right Updated: 03/09/23 0208     hs TnI 2hr 58 ng/L      Delta 2hr hsTnI -1 ng/L     Urine Microscopic [[id number]]  (Abnormal) Collected: 03/09/23 0135    Lab Status: Final result Specimen: Urine, Indwelling Jose Catheter Updated: 03/09/23 0158     RBC, UA 20-30 /hpf      WBC, UA 4-10 /hpf      Epithelial Cells Occasional /hpf      Bacteria, UA Occasional /hpf      Hyaline Casts, UA 1-2 /lpf      Triplep Phos Yolis, UA Moderate /hpf     UA w Reflex to Microscopic w Reflex to Culture [[id number]]  (Abnormal) Collected: 03/09/23 0135    Lab Status: Final result Specimen: Urine, Indwelling Jose Catheter Updated: 03/09/23 0148     Color, UA Raven     Clarity, UA Cloudy     Specific Gravity, UA >=1 030     pH, UA 6 0     Leukocytes, UA Elevated glucose may cause decreased leukocyte values   See urine microscopic for [facility] result/     Nitrite, UA Negative     Protein,  (2+) mg/dl      Glucose, UA >=1000 (1%) mg/dl      Ketones, UA Trace mg/dl      Urobilinogen, UA 0 2 E U /dl      Bilirubin, UA Negative     Occult Blood, UA Moderate    Comprehensive metabolic panel [[id number]]  (Abnormal) Collected: 03/08/23 2346    Lab Status: Final result Specimen: Blood from Arm, Right Updated: 03/09/23 0114     Sodium 139 mmol/L      Potassium 4 1 mmol/L      Chloride 108 mmol/L      CO2 25 mmol/L      ANION GAP 6 mmol/L      BUN 7 mg/dL      Creatinine 1 14 mg/dL      Glucose 266 mg/dL      Calcium 8 5 mg/dL      Corrected Calcium 9 1 mg/dL      AST 27 U/L      ALT 33 U/L      Alkaline Phosphatase 58 U/L      Total Protein 5 3 g/dL      Albumin 3 2 g/dL      Total Bilirubin 0 32 mg/dL      eGFR 62 ml/min/1 73sq m     Narrative:      [place] guidelines for Chronic Kidney Disease (CKD):   •  Stage 1 with normal or high GFR (GFR > 90 mL/min/1 73 square meters)  •  Stage 2 Mild CKD (GFR = 60-89 mL/min/1 73 square meters)  •  Stage 3A Moderate CKD (GFR = 45-59 mL/min/1 73 square meters)  •  Stage 3B Moderate CKD (GFR = 30-44 mL/min/1 73 square meters)  •  Stage 4 Severe CKD (GFR = 15-29 mL/min/1 73 square meters)  •  Stage 5 End Stage CKD (GFR <15 mL/min/1 73 square meters)  Note: GFR calculation is accurate only with a steady state creatinine    Protime-INR [[id number]]  (Normal) Collected: 03/08/23 2346    Lab Status: Final result Specimen: Blood from Arm, Right Updated: 03/09/23 0050 Protime 12 3 seconds      INR 0 90    APTT [[id number]]  (Normal) Collected: 03/08/23 2346    Lab Status: Final result Specimen: Blood from Arm, Right Updated: 03/09/23 0050     PTT 28 seconds     FLU/RSV/COVID - if FLU/RSV clinically relevant [[id number]]  (Normal) Collected: 03/08/23 2346    Lab Status: Final result Specimen: Nasopharyngeal Swab Updated: 03/09/23 0039     SARS-CoV-2 Negative     INFLUENZA A PCR Negative     INFLUENZA B PCR Negative     RSV PCR Negative    Narrative:      FOR PEDIATRIC PATIENTS - copy/paste COVID Guidelines URL to browser: [email or web address]  [email or web address]    SARS-CoV-2 assay is a Nucleic Acid Amplification assay intended for the  qualitative detection of nucleic acid from SARS-CoV-2 in nasopharyngeal  swabs  Results are for the presumptive identification of SARS-CoV-2 RNA  Positive results are indicative of infection with SARS-CoV-2, the virus  causing COVID-19, but do not rule out bacterial infection or co-infection  with other viruses  Laboratories within the [place] and its  territories are required to report all positive results to the appropriate  public health authorities  Negative results do not preclude SARS-CoV-2  infection and should not be used as the sole basis for treatment or other  patient management decisions  Negative results must be combined with  clinical observations, patient history, and epidemiological information  This test has not been FDA cleared or approved  This test has been authorized by FDA under an Emergency Use Authorization  (EUA)  This test is only authorized for the duration of time the  declaration that circumstances exist justifying the authorization of the  emergency use of an in vitro diagnostic tests for detection of SARS-CoV-2  virus and/or diagnosis of COVID-19 infection under section 564(b)(1) of  the Act, 21 U  S C  812FNK-0(M)(5), unless the authorization is terminated  or revoked sooner  The test has been validated but independent review by FDA  and CLIA is pending  Test performed using [employer] GeneXpert: This RT-PCR assay targets N2,  a region unique to SARS-CoV-2  A conserved region in the E-gene was chosen  for pan-Sarbecovirus detection which includes SARS-CoV-2  According to CMS-2020-01-R, this platform meets the definition of high-throughput technology  Lipase [[id number]]  (Normal) Collected: 03/08/23 2346    Lab Status: Final result Specimen: Blood from Arm, Right Updated: 03/09/23 0024     Lipase 49 u/L     Magnesium [[id number]]  (Abnormal) Collected: 03/08/23 2346    Lab Status: Final result Specimen: Blood from Arm, Right Updated: 03/09/23 0024     Magnesium 1 6 mg/dL     HS Troponin 0hr (reflex protocol) [[id number]]  (Abnormal) Collected: 03/08/23 2346    Lab Status: Final result Specimen: Blood from Arm, Right Updated: 03/09/23 0023     hs TnI 0hr 59 ng/L     HS Troponin I 4hr [[id number]]     Lab Status: No result Specimen: Blood     B-Type Natriuretic Peptide(BNP) [[id number]]  (Abnormal) Collected: 03/08/23 2346    Lab Status: Final result Specimen: Blood from Arm, Right Updated: 03/09/23 0021      pg/mL     D-dimer, quantitative [[id number]]  (Abnormal) Collected: 03/08/23 2346    Lab Status: Final result Specimen: Blood from Arm, Right Updated: 03/09/23 0016     D-Dimer, Quant 1 35 ug/ml FEU     Narrative: In the evaluation for possible pulmonary embolism, in the appropriate (Well's Score of 4 or less) patient, the age adjusted d-dimer cutoff for this patient can be calculated as:    Age x 0 01 (in ug/mL) for Age-adjusted D-dimer exclusion threshold for a patient over 50 years      CBC and differential [[id number]]  (Abnormal) Collected: 03/08/23 2346    Lab Status: Final result Specimen: Blood from Arm, Right Updated: 03/09/23 0000     WBC 6 96 Thousand/uL      RBC 2 91 Million/uL      Hemoglobin 9 6 g/dL      Hematocrit 28 5 %      MCV 98 fL      MCH 33 0 pg      MCHC 33 7 g/dL      RDW 17 7 %      MPV 9 2 fL      Platelets 746 Thousands/uL      nRBC 0 /100 WBCs      Neutrophils Relative 70 %      Immat GRANS % 1 %      Lymphocytes Relative 16 %      Monocytes Relative 7 %      Eosinophils Relative 5 %      Basophils Relative 1 %      Neutrophils Absolute 4 88 Thousands/µL      Immature Grans Absolute 0 04 Thousand/uL      Lymphocytes Absolute 1 14 Thousands/µL      Monocytes Absolute 0 51 Thousand/µL      Eosinophils Absolute 0 35 Thousand/µL      Basophils Absolute 0 04 Thousands/µL                  PE Study with CT Abdomen and Pelvis with contrast   Final Result by Charlotte Johnson MD (03/09 0118)      No evidence of pulmonary embolus  Diffuse bladder wall thickening and edema with pericystic inflammatory change  Correlate with urinalysis for cystitis                     Workstation performed: [id number]         XR chest 1 view portable   ED Interpretation by Beatriz Ferrari DO (03/08 2329)   1 view x-ray of the chest reviewed by myself pending official radiology report  When compared to prior x-ray no acute cardiopulmonary process is present  Poor inspiratory effort/low lung volumes  Procedures  Procedures         ED Course  ED Course as of 03/09/23 0232   Wed Mar 08, 2023   2319 EKG interpreted by myself  EKG dated 3/8/2023 at 2314 demonstrates normal sinus rhythm at 94 bpm, normal NV interval, normal QRS duration with left axis deviation, age-indeterminate inferior infarct and anterolateral infarct pattern cited, no STEMI    2320 When compared to prior EKG on file no acute ischemic changes are appreciated by myself     2329 Pulse(!): 108   2329 Respirations: 20   Thu Mar 09, 2023   0015 Hemoglobin(!): 9 6  Stable/improved from recent   0025 hs TnI 0hr(!): 59   0029 Troponins were elevated to similar degree during the recent admission, which was not attributed to ACS, but rather supply-demand mismatch in setting of acute blood loss and chronic cardiac disease  0120 IMPRESSION:     No evidence of pulmonary embolus      Diffuse bladder wall thickening and edema with pericystic inflammatory change  Correlate with urinalysis for cystitis   0125 CP persists, discussed with patient and family initial elevated trop, plan to repeat and check EKG again, no PE, continue workup, d/w cards  Anticipate admission here or transfer to cath center  0139 Repeat EKG interpreted by myself  EKG dated 3/9/2023 at 0132 demonstrates normal sinus rhythm 80 bpm, top normal AK interval, normal QRS duration with left axis deviation, normal QTc interval, no STEMI    0222 hs TnI 2hr(!): 58   0222 Delta 2hr hsTnI: -1   0227 hs TnI 2hr(!): 58   0227 Delta 2hr hsTnI: -1   0227 D/w SLIM for admission for ACS r/o     0231 D/w Dr Lisette Dc via TT agrees to admit as observation +tele  HEART Risk Score    Flowsheet Row Most Recent Value   Heart Score Risk Calculator    History 2 Filed at: 03/09/2023 0132   ECG 0 Filed at: 03/09/2023 0132   Age 2 Filed at: 03/09/2023 0132   Risk Factors 2 Filed at: 03/09/2023 0132   Troponin 2 Filed at: 03/09/2023 0132   HEART Score 8 Filed at: 03/09/2023 0132                                      Medical Decision Making  78-year-old male with a significant cardiac history and history of NSTEMI presenting with cute onset chest pain which is similar to previous episodes  Recently admitted for GI bleed no GI bleeding symptoms  Will work-up for ACS, pulmonary embolism, pneumonia, pneumothorax other considerations which may explain his chest discomfort include anemia secondary to blood loss, musculoskeletal etiology, anxiety  No history of aortic pathology, no severe/unknown controlled hypertension, no extremity symptoms low suspicion for aortic dissection  No relief with nitro and symptoms starting at rest speak against typical chest pain however story is concerning especially given his history    Given recent onset of chest pain will require 2 and 4-hour troponins  Work-up revealed elevated but stable troponin with a negative delta troponin  Ongoing mild chest discomfort not relieved with nitroglycerin or aspirin  Work-up otherwise unremarkable with stable hemoglobin no evidence of ongoing GI bleeding diverticulitis or other acute abdominal symptoms  EKG remained unchanged  Case was discussed with slim plan to admit the patient for observation for ACS rule out transfer of troponins plus or minus cardiology eval      Chest pain: acute illness or injury  Elevated troponin: undiagnosed new problem with uncertain prognosis  Amount and/or Complexity of Data Reviewed  Labs: ordered  Decision-making details documented in ED Course  Radiology: ordered and independent interpretation performed  ECG/medicine tests: ordered and independent interpretation performed  Decision-making details documented in ED Course  Risk  OTC drugs  Prescription drug management  Decision regarding hospitalization  Disposition  Final diagnoses:   Chest pain   Elevated troponin     Time reflects when diagnosis was documented in both MDM as applicable and the Disposition within this note     Time User Action Codes Description Comment    3/8/2023 11:27 PM [employer] [R07 9] Chest pain     3/9/2023  2:30 AM [employer] [R77 8] Elevated troponin       ED Disposition     ED Disposition   Admit    Condition   Stable    Date/Time   Thu Mar 9, 2023  2:30 AM    Comment   Case was discussed with JUAN and the patient's admission status was agreed to be Admission Status: observation status to the service of Dr Enio Perez   Follow-up Information    None         Patient's Medications   Discharge Prescriptions    No medications on file       No discharge procedures on file      PDMP Review     None          ED Provider  Electronically Signed by           Beatriz Ferrari DO  03/09/23 4359

## 2023-03-09 NOTE — PLAN OF CARE
Problem: OCCUPATIONAL THERAPY ADULT  Goal: Performs self-care activities at highest level of function for planned discharge setting  See evaluation for individualized goals  Description: Treatment Interventions: ADL retraining, Functional transfer training, UE strengthening/ROM, Endurance training, Patient/family training, Equipment evaluation/education, Activityengagement          See flowsheet documentation for full assessment, interventions and recommendations  Note: Limitation: Decreased ADL status, Decreased UE strength, Decreased Safe judgement during ADL, Decreased endurance, Decreased cognition, Decreased self-care trans, Decreased high-level ADLs     Assessment: Pt is a 69 y o  male seen for OT evaluation s/p admit to [facility] on 3/8/2023 w/ Other chest pain  Comorbidities affecting pt's functional performance at time of assessment include: chest pain, orthostatic hypotension, elevated troponin, CAD, anemia, GI bleeding, hypomagnesemia, DM, aortic stenosis, CHF, urinary retention  Personal factors affecting pt at time of IE include:steps to enter environment, behavioral pattern, difficulty performing ADLS, difficulty performing IADLS , limited insight into deficits, compliance, decreased initiation and engagement  and health management   Prior to admission, pt was (A) with ADLs and IADLs with use of RW or SPC during mobility  Upon evaluation: Pt requires (S)-mod (A) x1-2 with use of RW during mobility 2* the following deficits impacting occupational performance: weakness, decreased strength, decreased balance, decreased tolerance, impaired initiation, impaired problem solving, decreased safety awareness and impaired interpersonal skills  Pt to benefit from continued skilled OT tx while in the hospital to address deficits as defined above and maximize level of functional independence w ADL's and functional mobility   Occupational Performance areas to address include: grooming, bathing/shower, toilet hygiene, dressing, functional mobility, community mobility and clothing management  The patient's raw score on the AM-PAC Daily Activity Inpatient Short Form is 19  A raw score of greater than or equal to 19 suggests the patient may benefit from discharge to home  Please refer to the recommendation of the Occupational Therapist for safe discharge planning  Pt benefited from co-evaluation of skilled OT and PT therapists in order to most appropriately address functional deficits d/t extensive assistance required for safe functional mobility, decreased activity tolerance, and regression from functioning level prior to admission and/or onset of present illness  OT/PT objectives were addressed separately; please see PT note for specific goal areas targeted       OT Discharge Recommendation: Post acute rehabilitation services

## 2023-03-09 NOTE — UTILIZATION REVIEW
NOTIFICATION OF ADMISSION DISCHARGE   This is a Notification of Discharge from [address]  Please be advised that this patient has been discharge from our facility  Below you will find the admission and discharge date and time including the patient’s disposition  UTILIZATION REVIEW CONTACT:  Niecy Parks  Utilization   Network Utilization Review Department  Phone: [phone] x carefully listen to the prompts  All voicemails are confidential   Email: [email or web address]     ADMISSION INFORMATION  PRESENTATION DATE: 3/2/2023 10:59 AM  OBERVATION ADMISSION DATE:   INPATIENT ADMISSION DATE: 3/2/23 10:59 AM   DISCHARGE DATE: 3/8/2023  4:51 PM   DISPOSITION:[facility]    IMPORTANT INFORMATION:  Send all requests for admission clinical reviews, approved or denied determinations and any other requests to dedicated fax number below belonging to the campus where the patient is receiving treatment   List of dedicated fax numbers:  [address] [place] [address] DENIALS (Administrative/Medical Necessity) [phone]   [address] [place] [address] (Maternity/NICU/Pediatrics) [phone]   [facility] [phone]   [place] [address] [phone]   [address] [phone]   [address] [facility] [phone] 90 [facility] [phone]   [address] [place] [address] 119 [phone]   [facility]  [phone]   [address] [facility] [phone]   [address] [facility] [address]  [phone]

## 2023-03-09 NOTE — QUICK NOTE
Pt seen and examined  Pt states that his chest pain has resolved  He states that he is feeing better  C/o on and off headache  Physical Exam  Vitals reviewed  Constitutional:       General: He is not in acute distress  Appearance: He is well-developed  He is obese  He is not diaphoretic  HENT:      Head: Normocephalic and atraumatic  Eyes:      Pupils: Pupils are equal, round, and reactive to light  Neck:      Vascular: No carotid bruit  Cardiovascular:      Rate and Rhythm: Normal rate and regular rhythm  Heart sounds: S1 normal and S2 normal  No murmur heard  Pulmonary:      Effort: Pulmonary effort is normal  No respiratory distress  Breath sounds: Normal breath sounds  No wheezing or rales  Abdominal:      General: There is no distension  Palpations: Abdomen is soft  Tenderness: There is no abdominal tenderness  Musculoskeletal:         Right lower leg: Edema (+1 edema bilaterally) present  Left lower leg: Edema present  Skin:     General: Skin is warm and dry  Findings: No erythema  Neurological:      General: No focal deficit present  Mental Status: He is alert and oriented to person, place, and time     Psychiatric:         Mood and Affect: Mood normal          Behavior: Behavior normal       Plan:    · Check for Orthostatic BP tomorrow  · Continue PPI  · Cardiac diet  · CT head  · Cardiology following

## 2023-03-09 NOTE — OCCUPATIONAL THERAPY NOTE
Occupational Therapy Evaluation     Patient Name: Ramin Moreira  LDRVP'S Date: 3/9/2023  Problem List  Principal Problem:    Other chest pain  Active Problems:    Controlled type 2 diabetes mellitus with hyperglycemia, without long-term current use of insulin (HCC)    Coronary artery disease of native artery of native heart with stable angina pectoris (HCC)    Aortic stenosis, moderate    Chronic diastolic (congestive) heart failure (HCC)    Acute lower GI bleeding    Orthostatic hypotension    Acute blood loss anemia    Urinary retention    Elevated troponin    Diverticulosis of colon with hemorrhage    Hypomagnesemia    Elevated TSH    Elevated d-dimer    Intractable headache    Past Medical History  Past Medical History:   Diagnosis Date    BPH (benign prostatic hyperplasia)     CAD (coronary artery disease)     Cancer (HCC)     basal cell    Chronic kidney disease     stage 3    Colon polyp     CPAP (continuous positive airway pressure) dependence     Diabetes mellitus ([facility] [address] )     niddm    Disease of thyroid gland     Gout     High cholesterol     Hypertension     MI, 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     Myocardial infarction ([facility] [address] ) 2014    S/P cardiac catheterization 12/30/2022    Sleep apnea      Past Surgical History  Past Surgical History:   Procedure Laterality Date    BASAL CELL CARCINOMA EXCISION Right 1/21/2022    Procedure: EXCISION BASAL CELL CARCINOMA EXTREMITY;  Surgeon: Samuel Allan DO;  Location: [facility] MAIN OR;  Service: General    CARDIAC CATHETERIZATION Left 12/29/2022    Procedure: Cardiac Left Heart Cath;  Surgeon: Ej Tobin MD;  Location: [facility] CARDIAC CATH LAB; Service: Cardiology    CARDIAC CATHETERIZATION N/A 12/29/2022    Procedure: Cardiac Coronary Angiogram;  Surgeon: Ej Tobin MD;  [facility] CARD[facility]; Service: Cardiology    CARDIAC CATHETERIZATION N/A 12/29/2022    Procedure: Cardiac pci;  Surgeon: Ej Tobin MD;  Location: [facility] CARD[facility]AC C[facility];   Service: Cardiology    CATARACT EXTRACTION W/  INTRAOCULAR LENS IMPLANT      CORNEAL TRANSPLANT      CORONARY ANGIOPLASTY WITH STENT PLACEMENT      stents x3-- [id number]    EYE SURGERY      repair corneal laceration    PA COLONOSCOPY FLX DX W/COLLJ SPEC WHEN PFRMD N/A 3/21/2016    Procedure: COLONOSCOPY;  Surgeon: Laverne Boeck, MD;  Location: [facility] MAIN OR;  Service: Colorectal    TONSILLECTOMY AND ADENOIDECTOMY               03/09/23 1032   OT Last Visit   OT Visit Date 03/09/23   Note Type   Note type Evaluation   Pain Assessment   Pain Score No Pain   Restrictions/Precautions   Weight Bearing Precautions Per Order No   Other Precautions Telemetry; Fall Risk; Chair Alarm   Home Living   Type of [address] [place] [address] Multi-level;Bed/bath upstairs;1/2 bath on main level;Stairs to enter without rails  (1 SABINE and 13 steps to 2nd)   Bathroom Shower/Tub Tub/shower unit   Bathroom Toilet Standard   Bathroom Equipment Commode   Bathroom Accessibility Accessible   Home Equipment Walker;Cane   Additional Comments pt reports use of RW and SPC as well as furniture walking in the home   Prior Function   Level of Hall Needs assistance with ADLs; Needs assistance with IADLS; Independent with functional mobility   Lives With Spouse   Receives Help From Family   IADLs Family/Friend/Other provides transportation   Falls in the last 6 months 5 to 10   Vocational Retired   Comments pt reports "we are moving to [place]"   Subjective   Subjective "I can't walk far"   ADL   Where Assessed Chair   LB Dressing Assistance 3  Moderate Assistance   LB Dressing Deficit Don/doff R sock; Don/doff L sock   Additional Comments pt with significant difficulties bending forward   Bed Mobility   Additional Comments pt seated in chair at start and end of session; HR elevated into 120s during session and BP WFL, however pt with mild SOB and lighthededness throughout session   Transfers   Sit to Stand 5  Supervision   Additional items Increased time required;Verbal cues  (RW)   Stand to Sit 5  Supervision   Additional items Increased time required;Verbal cues  (RW)   Additional Comments requires cues for hand placement and safety with RW; no significant LOB, however mild instability and poor balance; significant effort required to perform functional transfers; poor abdominal strength observed due to sedentary lifestyle   Functional Mobility   Functional Mobility 5  Supervision   Additional Comments pt performs ~80ft in hallway and limited due to fatigue and endurance deficits   Additional items Rolling walker   Balance   Static Sitting Good   Dynamic Sitting Fair +   Static Standing Fair   Dynamic Standing Fair -   Ambulatory Fair -   Activity Tolerance   Activity Tolerance Patient limited by fatigue   RUE Assessment   RUE Assessment WFL   LUE Assessment   LUE Assessment WFL   Hand Function   Gross Motor Coordination Functional   Fine Motor Coordination Functional   Sensation   Light Touch No apparent deficits   Sharp/Dull No apparent deficits   Psychosocial   Psychosocial (WDL) WDL   Cognition   Overall Cognitive Status WFL   Arousal/Participation Alert   Attention Within functional limits   Orientation Level Oriented X4   Memory Within functional limits   Following Commands Follows all commands and directions without difficulty   Comments pt utilizes humour to mask functional deficits   Assessment   Limitation Decreased ADL status; Decreased UE strength;Decreased Safe judgement during ADL;Decreased endurance;Decreased cognition;Decreased self-care trans;Decreased high-level ADLs   Assessment Pt is a 69 y o  male seen for OT evaluation s/p [facility] w/ Other chest pain  Comorbidities affecting pt's functional performance at time of assessment include: chest pain, orthostatic hypotension, elevated troponin, CAD, anemia, GI bleeding, hypomagnesemia, DM, aortic stenosis, CHF, urinary retention   Personal factors affecting pt at time of IE include:steps to enter environment, behavioral pattern, difficulty performing ADLS, difficulty performing IADLS , limited insight into deficits, compliance, decreased initiation and engagement  and health management   Prior to admission, pt was (A) with ADLs and IADLs with use of RW or SPC during mobility  Upon evaluation: Pt requires (S)-mod (A) x1-2 with use of RW during mobility 2* the following deficits impacting occupational performance: weakness, decreased strength, decreased balance, decreased tolerance, impaired initiation, impaired problem solving, decreased safety awareness and impaired interpersonal skills  Pt to benefit from continued skilled OT tx while in the hospital to address deficits as defined above and maximize level of functional independence w ADL's and functional mobility  Occupational Performance areas to address include: grooming, bathing/shower, toilet hygiene, dressing, functional mobility, community mobility and clothing management  The patient's raw score on the -PAC Daily Activity Inpatient Short Form is 19  A raw score of greater than or equal to 19 suggests the patient may benefit from discharge to home  Please refer to the recommendation of the Occupational Therapist for safe discharge planning  Pt benefited from co-evaluation of skilled OT and PT therapists in order to most appropriately address functional deficits d/t extensive assistance required for safe functional mobility, decreased activity tolerance, and regression from functioning level prior to admission and/or onset of present illness  OT/PT objectives were addressed separately; please see PT note for specific goal areas targeted     Goals   Patient Goals to go home   Short Term Goal  pt will perform UE strengthening exercises   Long Term Goal #1 pt will demonstrate toilet transfers and hygiene at (I) level   Long Term Goal #2 pt will demonstrate functional mobility with use of RW at mod (I) level   Long Term Goal pt will demonstrate UB/LB bathing and grooming tasks at min (A) level   Plan   Treatment Interventions ADL retraining;Functional transfer training;UE strengthening/ROM; Endurance training;Patient/family training;Equipment evaluation/education; Activityengagement   Goal Expiration Date 03/23/23   OT Frequency 2-3x/wk   Recommendation   OT Discharge Recommendation Post acute rehabilitation services   AM-PAC Daily Activity Inpatient   Lower Body Dressing 2   Bathing 2   Toileting 3   Upper Body Dressing 4   Grooming 4   Eating 4   Daily Activity Raw Score 19   Daily Activity Standardized Score (Calc for Raw Score >=11) 40 22   AM-PAC Applied Cognition Inpatient   Following a Speech/Presentation 4   Understanding Ordinary Conversation 4   Taking Medications 4   Remembering Where Things Are Placed or Put Away 4   Remembering List of 4-5 Errands 4   Taking Care of Complicated Tasks 4   Applied Cognition Raw Score 24   Applied Cognition Standardized Score 62 21

## 2023-03-10 ENCOUNTER — TRANSITIONAL CARE MANAGEMENT (OUTPATIENT)
Dept: FAMILY MEDICINE CLINIC | Facility: CLINIC | Age: 78
End: 2023-03-10

## 2023-03-10 ENCOUNTER — APPOINTMENT (OUTPATIENT)
Dept: CARDIAC REHAB | Facility: HOSPITAL | Age: 78
End: 2023-03-10

## 2023-03-10 LAB
ALBUMIN SERPL BCP-MCNC: 2.8 G/DL (ref 3.5–5)
ALP SERPL-CCNC: 48 U/L (ref 34–104)
ALT SERPL W P-5'-P-CCNC: 25 U/L (ref 7–52)
ANION GAP SERPL CALCULATED.3IONS-SCNC: 2 MMOL/L (ref 4–13)
AST SERPL W P-5'-P-CCNC: 21 U/L (ref 13–39)
ATRIAL RATE: 79 BPM
ATRIAL RATE: 88 BPM
ATRIAL RATE: 91 BPM
ATRIAL RATE: 94 BPM
BACTERIA UR CULT: NORMAL
BASOPHILS # BLD AUTO: 0.02 THOUSANDS/ÂΜL (ref 0–0.1)
BASOPHILS NFR BLD AUTO: 0 % (ref 0–1)
BILIRUB SERPL-MCNC: 0.33 MG/DL (ref 0.2–1)
BUN SERPL-MCNC: 7 MG/DL (ref 5–25)
CA-I BLD-SCNC: 1.14 MMOL/L (ref 1.12–1.32)
CALCIUM ALBUM COR SERPL-MCNC: 8.9 MG/DL (ref 8.3–10.1)
CALCIUM SERPL-MCNC: 7.9 MG/DL (ref 8.4–10.2)
CARDIAC TROPONIN I PNL SERPL HS: 63 NG/L (ref 8–18)
CHLORIDE SERPL-SCNC: 107 MMOL/L (ref 96–108)
CK SERPL-CCNC: 27 U/L (ref 39–308)
CO2 SERPL-SCNC: 32 MMOL/L (ref 21–32)
CREAT SERPL-MCNC: 1 MG/DL (ref 0.6–1.3)
EOSINOPHIL # BLD AUTO: 0.4 THOUSAND/ÂΜL (ref 0–0.61)
EOSINOPHIL NFR BLD AUTO: 7 % (ref 0–6)
ERYTHROCYTE [DISTWIDTH] IN BLOOD BY AUTOMATED COUNT: 17.9 % (ref 11.6–15.1)
EST. AVERAGE GLUCOSE BLD GHB EST-MCNC: 114 MG/DL
FERRITIN SERPL-MCNC: 70 NG/ML (ref 8–388)
FOLATE SERPL-MCNC: 17.7 NG/ML (ref 3.1–17.5)
GFR SERPL CREATININE-BSD FRML MDRD: 72 ML/MIN/1.73SQ M
GLUCOSE SERPL-MCNC: 126 MG/DL (ref 65–140)
GLUCOSE SERPL-MCNC: 136 MG/DL (ref 65–140)
GLUCOSE SERPL-MCNC: 139 MG/DL (ref 65–140)
GLUCOSE SERPL-MCNC: 160 MG/DL (ref 65–140)
GLUCOSE SERPL-MCNC: 189 MG/DL (ref 65–140)
HAV IGM SER QL: NORMAL
HBA1C MFR BLD: 5.6 %
HBV CORE IGM SER QL: NORMAL
HBV SURFACE AG SER QL: NORMAL
HCT VFR BLD AUTO: 24.3 % (ref 36.5–49.3)
HCT VFR BLD AUTO: 29.3 % (ref 36.5–49.3)
HCV AB SER QL: NORMAL
HGB BLD-MCNC: 8 G/DL (ref 12–17)
HGB BLD-MCNC: 9.7 G/DL (ref 12–17)
IMM GRANULOCYTES # BLD AUTO: 0.03 THOUSAND/UL (ref 0–0.2)
IMM GRANULOCYTES NFR BLD AUTO: 1 % (ref 0–2)
IRON SATN MFR SERPL: 23 % (ref 20–50)
IRON SERPL-MCNC: 37 UG/DL (ref 65–175)
LACTATE SERPL-SCNC: 0.8 MMOL/L (ref 0.5–2)
LYMPHOCYTES # BLD AUTO: 1.61 THOUSANDS/ÂΜL (ref 0.6–4.47)
LYMPHOCYTES NFR BLD AUTO: 30 % (ref 14–44)
MAGNESIUM SERPL-MCNC: 2 MG/DL (ref 1.9–2.7)
MCH RBC QN AUTO: 32.4 PG (ref 26.8–34.3)
MCHC RBC AUTO-ENTMCNC: 32.9 G/DL (ref 31.4–37.4)
MCV RBC AUTO: 98 FL (ref 82–98)
MONOCYTES # BLD AUTO: 0.55 THOUSAND/ÂΜL (ref 0.17–1.22)
MONOCYTES NFR BLD AUTO: 10 % (ref 4–12)
NEUTROPHILS # BLD AUTO: 2.79 THOUSANDS/ÂΜL (ref 1.85–7.62)
NEUTS SEG NFR BLD AUTO: 52 % (ref 43–75)
NRBC BLD AUTO-RTO: 0 /100 WBCS
P AXIS: 33 DEGREES
P AXIS: 34 DEGREES
P AXIS: 35 DEGREES
P AXIS: 45 DEGREES
PHOSPHATE SERPL-MCNC: 3.7 MG/DL (ref 2.3–4.1)
PLATELET # BLD AUTO: 285 THOUSANDS/UL (ref 149–390)
PMV BLD AUTO: 9.5 FL (ref 8.9–12.7)
POTASSIUM SERPL-SCNC: 3.7 MMOL/L (ref 3.5–5.3)
PR INTERVAL: 192 MS
PR INTERVAL: 198 MS
PR INTERVAL: 204 MS
PR INTERVAL: 208 MS
PROCALCITONIN SERPL-MCNC: 0.07 NG/ML
PROT SERPL-MCNC: 4.6 G/DL (ref 6.4–8.4)
QRS AXIS: -36 DEGREES
QRS AXIS: -39 DEGREES
QRS AXIS: -47 DEGREES
QRS AXIS: -51 DEGREES
QRSD INTERVAL: 84 MS
QRSD INTERVAL: 84 MS
QRSD INTERVAL: 86 MS
QRSD INTERVAL: 86 MS
QT INTERVAL: 378 MS
QT INTERVAL: 386 MS
QT INTERVAL: 390 MS
QT INTERVAL: 414 MS
QTC INTERVAL: 471 MS
QTC INTERVAL: 472 MS
QTC INTERVAL: 474 MS
QTC INTERVAL: 474 MS
RBC # BLD AUTO: 2.47 MILLION/UL (ref 3.88–5.62)
SODIUM SERPL-SCNC: 141 MMOL/L (ref 135–147)
T WAVE AXIS: 20 DEGREES
T WAVE AXIS: 37 DEGREES
T WAVE AXIS: 57 DEGREES
T WAVE AXIS: 73 DEGREES
TIBC SERPL-MCNC: 161 UG/DL (ref 250–450)
VENTRICULAR RATE: 79 BPM
VENTRICULAR RATE: 88 BPM
VENTRICULAR RATE: 91 BPM
VENTRICULAR RATE: 94 BPM
VIT B12 SERPL-MCNC: 328 PG/ML (ref 100–900)
WBC # BLD AUTO: 5.4 THOUSAND/UL (ref 4.31–10.16)

## 2023-03-10 RX ORDER — FLUTICASONE PROPIONATE 50 MCG
2 SPRAY, SUSPENSION (ML) NASAL DAILY
Status: DISCONTINUED | OUTPATIENT
Start: 2023-03-10 | End: 2023-03-22 | Stop reason: HOSPADM

## 2023-03-10 RX ORDER — POTASSIUM CHLORIDE 20 MEQ/1
40 TABLET, EXTENDED RELEASE ORAL ONCE
Status: COMPLETED | OUTPATIENT
Start: 2023-03-10 | End: 2023-03-10

## 2023-03-10 RX ADMIN — LEVOTHYROXINE SODIUM 88 MCG: 88 TABLET ORAL at 05:49

## 2023-03-10 RX ADMIN — RANOLAZINE 500 MG: 500 TABLET, EXTENDED RELEASE ORAL at 17:00

## 2023-03-10 RX ADMIN — FLUTICASONE PROPIONATE 2 SPRAY: 50 SPRAY, METERED NASAL at 17:00

## 2023-03-10 RX ADMIN — EMPAGLIFLOZIN 10 MG: 10 TABLET, FILM COATED ORAL at 08:54

## 2023-03-10 RX ADMIN — CLOPIDOGREL BISULFATE 75 MG: 75 TABLET ORAL at 08:54

## 2023-03-10 RX ADMIN — ALLOPURINOL 100 MG: 100 TABLET ORAL at 08:54

## 2023-03-10 RX ADMIN — POTASSIUM CHLORIDE 40 MEQ: 1500 TABLET, EXTENDED RELEASE ORAL at 08:54

## 2023-03-10 RX ADMIN — SODIUM CHLORIDE 500 ML: 0.9 INJECTION, SOLUTION INTRAVENOUS at 08:51

## 2023-03-10 RX ADMIN — ATORVASTATIN CALCIUM 80 MG: 40 TABLET, FILM COATED ORAL at 16:57

## 2023-03-10 RX ADMIN — PANTOPRAZOLE SODIUM 40 MG: 40 INJECTION, POWDER, FOR SOLUTION INTRAVENOUS at 20:58

## 2023-03-10 RX ADMIN — TAMSULOSIN HYDROCHLORIDE 0.4 MG: 0.4 CAPSULE ORAL at 16:57

## 2023-03-10 RX ADMIN — SODIUM CHLORIDE 500 ML: 0.9 INJECTION, SOLUTION INTRAVENOUS at 10:31

## 2023-03-10 RX ADMIN — MULTIPLE VITAMINS W/ MINERALS TAB 1 TABLET: TAB at 08:54

## 2023-03-10 RX ADMIN — RANOLAZINE 500 MG: 500 TABLET, EXTENDED RELEASE ORAL at 08:54

## 2023-03-10 RX ADMIN — ESCITALOPRAM OXALATE 10 MG: 10 TABLET ORAL at 08:54

## 2023-03-10 RX ADMIN — INSULIN LISPRO 2 UNITS: 100 INJECTION, SOLUTION INTRAVENOUS; SUBCUTANEOUS at 17:03

## 2023-03-10 RX ADMIN — INSULIN LISPRO 2 UNITS: 100 INJECTION, SOLUTION INTRAVENOUS; SUBCUTANEOUS at 12:31

## 2023-03-10 RX ADMIN — ACETAMINOPHEN 650 MG: 325 TABLET ORAL at 18:52

## 2023-03-10 NOTE — ASSESSMENT & PLAN NOTE
· Secondary to acute lower GI bleed  · Colonoscopy 3/3/2023 (2 clips applied to bleeding descending: diverticulum with ulcer base )  · No evidence of active bleeding today  · hemoglobin 8 0 this AM down form 8 5 yesterday AM  · Continue to trend CBC  · Continue PPI for Gi protection  · Check Iron panel, folate, B12  · Occult blood stool

## 2023-03-10 NOTE — ASSESSMENT & PLAN NOTE
· Patient displayed positive orthostatic vital signs upon arrival to the telemetry unit, BP and heart rate while supine and standing 143/56 and 76, 97/55 and 116 respectively  · Continues to have borderline orthostatic hypotension

## 2023-03-10 NOTE — ASSESSMENT & PLAN NOTE
· Patient presented with burning chest pain  which was mildly improved with sublingual nitroglycerin on admission but did not resolve prompting admission on observation  · Reports no chest pain today  · HS troponin trend 59-->58 minimally elevated and flat  · No ischemic changes noted on EKG   · limited echo 3/9/23 shows EF 65% with no WMA  · CTA chest- no evidence of PE  · continue Ranexa 500 mg BID  · Suspect chest pain likely from GI

## 2023-03-10 NOTE — ASSESSMENT & PLAN NOTE
Lab Results   Component Value Date    HGBA1C 8 1 (H) 01/16/2023       Recent Labs     03/09/23  1057 03/09/23  1626 03/09/23  2100 03/10/23  0723   POCGLU 187* 184* 186* 136       Blood Sugar Average: Last 72 hrs:  (P) 167 4     · Sliding scale insulin  · Check HbA1c  · Hypoglycemia protocol  · Finger glucose check AC HS

## 2023-03-10 NOTE — ASSESSMENT & PLAN NOTE
· Complained of on and off headache   · CT head- No acute intracranial abnormality, Mild-to-moderate sinusitis  · Start Flonase 2 puffs daily

## 2023-03-10 NOTE — ASSESSMENT & PLAN NOTE
· Known CAD with prior OM/RCA stenting, most recently had a SAM to the proximal LAD and PCI to the mid LAD in December 2022  · Was transitioned from aspirin and Brilinta to Plavix monotherapy at hospital discharge earlier in the day on 3-8-2023 in the setting of GI bleed  · per Cardiology, continue monotherapy with Plavix only for the next 7-10 days and then add aspirin back to regimen   · Continue statin  · Propranolol on hold in setting of hypotension  · No ischemic changes on EKG at the time of presentation  · Repeat EKG this am

## 2023-03-10 NOTE — ASSESSMENT & PLAN NOTE
· Stable  · Echo 3/22- The aortic valve is trileaflet  The leaflets are moderately calcified  There is moderate stenosis  Peak gradient is 29 mmHg, mean gradient 18 mmHg  KELECHI 1 2 cm2

## 2023-03-10 NOTE — ASSESSMENT & PLAN NOTE
Wt Readings from Last 3 Encounters:   03/10/23 99 1 kg (218 lb 7 6 oz)   03/08/23 105 kg (231 lb 7 7 oz)   03/02/23 98 9 kg (218 lb 0 6 oz)       · Continues to have Bilateral lower extremity and bilateral hand edema although improved today  · Received lasix 40 mg IV x 1 dose yesterday   · Losing weight,  218 lbs this AM down form 224 lbs yesterday  · Limited echo 3/9/23 shows EF 65% with no WMA  · Orthostatic today , hold further diuretics

## 2023-03-10 NOTE — PLAN OF CARE
Problem: PAIN - ADULT  Goal: Verbalizes/displays adequate comfort level or baseline comfort level  Description: Interventions:  - Encourage patient to monitor pain and request assistance  - Assess pain using appropriate pain scale  - Administer analgesics based on type and severity of pain and evaluate response  - Implement non-pharmacological measures as appropriate and evaluate response  - Consider cultural and social influences on pain and pain management  - Notify physician/advanced practitioner if interventions unsuccessful or patient reports new pain  Outcome: Progressing     Problem: INFECTION - ADULT  Goal: Absence or prevention of progression during hospitalization  Description: INTERVENTIONS:  - Assess and monitor for signs and symptoms of infection  - Monitor lab/diagnostic results  - Monitor all insertion sites, i e  indwelling lines, tubes, and drains  - Monitor endotracheal if appropriate and nasal secretions for changes in amount and color  - [place] appropriate cooling/warming therapies per order  - Administer medications as ordered  - Instruct and encourage patient and family to use good hand hygiene technique  - Identify and instruct in appropriate isolation precautions for identified infection/condition  Outcome: Progressing     Problem: SAFETY ADULT  Goal: Patient will remain free of falls  Description: INTERVENTIONS:  - Educate patient/family on patient safety including physical limitations  - Instruct patient to call for assistance with activity   - Consult OT/PT to assist with strengthening/mobility   - Keep Call bell within reach  - Keep bed low and locked with side rails adjusted as appropriate  - Keep care items and personal belongings within reach  - Initiate and maintain comfort rounds  - Make Fall Risk Sign visible to staff  - Offer Toileting every 2 Hours, in advance of need  - Initiate/Maintain bed/chair alarm  - Obtain necessary fall risk management equipment: non-skid footwear  - Apply yellow socks and bracelet for high fall risk patients  - Consider moving patient to room near nurses station  Outcome: Progressing  Goal: Maintain or return to baseline ADL function  Description: INTERVENTIONS:  -  Assess patient's ability to carry out ADLs; assess patient's baseline for ADL function and identify physical deficits which impact ability to perform ADLs (bathing, care of mouth/teeth, toileting, grooming, dressing, etc )  - Assess/evaluate cause of self-care deficits   - Assess range of motion  - Assess patient's mobility; develop plan if impaired  - Assess patient's need for assistive devices and provide as appropriate  - Encourage maximum independence but intervene and supervise when necessary  - Involve family in performance of ADLs  - Assess for home care needs following discharge   - Consider OT consult to assist with ADL evaluation and planning for discharge  - Provide patient education as appropriate  Outcome: Progressing  Goal: Maintains/Returns to pre admission functional level  Description: INTERVENTIONS:  - Perform BMAT or MOVE assessment daily    - Set and communicate daily mobility goal to care team and patient/family/caregiver  - Collaborate with rehabilitation services on mobility goals if consulted  - Perform Range of Motion 3 times a day  - Reposition patient every 2 hours    - Dangle patient 3 times a day  - Stand patient 3 times a day  - Ambulate patient 3 times a day  - Out of bed to chair 3 times a day   - Out of bed for meals 3 times a day  - Out of bed for toileting  - Record patient progress and toleration of activity level   Outcome: Progressing     Problem: DISCHARGE PLANNING  Goal: Discharge to home or other facility with appropriate resources  Description: INTERVENTIONS:  - Identify barriers to discharge w/patient and caregiver  - Arrange for needed discharge resources and transportation as appropriate  - Identify discharge learning needs (meds, wound care, etc )  - Arrange for interpretive services to assist at discharge as needed  - Refer to Case Management Department for coordinating discharge planning if the patient needs post-hospital services based on physician/advanced practitioner order or complex needs related to functional status, cognitive ability, or social support system  Outcome: Progressing     Problem: Knowledge Deficit  Goal: Patient/family/caregiver demonstrates understanding of disease process, treatment plan, medications, and discharge instructions  Description: Complete learning assessment and assess knowledge base    Interventions:  - Provide teaching at level of understanding  - Provide teaching via preferred learning methods  Outcome: Progressing     Problem: CARDIOVASCULAR - ADULT  Goal: Maintains optimal cardiac output and hemodynamic stability  Description: INTERVENTIONS:  - Monitor I/O, vital signs and rhythm  - Monitor for S/S and trends of decreased cardiac output  - Administer and titrate ordered vasoactive medications to optimize hemodynamic stability  - Assess quality of pulses, skin color and temperature  - Assess for signs of decreased coronary artery perfusion  - Instruct patient to report change in severity of symptoms  Outcome: Progressing  Goal: Absence of cardiac dysrhythmias or at baseline rhythm  Description: INTERVENTIONS:  - Continuous cardiac monitoring, vital signs, obtain 12 lead EKG if ordered  - Administer antiarrhythmic and heart rate control medications as ordered  - Monitor electrolytes and administer replacement therapy as ordered  Outcome: Progressing     Problem: RESPIRATORY - ADULT  Goal: Achieves optimal ventilation and oxygenation  Description: INTERVENTIONS:  - Assess for changes in respiratory status  - Assess for changes in mentation and behavior  - Position to facilitate oxygenation and minimize respiratory effort  - Oxygen administered by appropriate delivery if ordered  - Initiate smoking cessation education as indicated  - Encourage broncho-pulmonary hygiene including cough, deep breathe, Incentive Spirometry  - Assess the need for suctioning and aspirate as needed  - Assess and instruct to report SOB or any respiratory difficulty  - Respiratory Therapy support as indicated  Outcome: Progressing     Problem: GENITOURINARY - ADULT  Goal: Maintains or returns to baseline urinary function  Description: INTERVENTIONS:  - Assess urinary function  - Encourage oral fluids to ensure adequate hydration if ordered  - Administer IV fluids as ordered to ensure adequate hydration  - Administer ordered medications as needed  - Offer frequent toileting  - Follow urinary retention protocol if ordered  Outcome: Progressing  Goal: Absence of urinary retention  Description: INTERVENTIONS:  - Assess patient's ability to void and empty bladder  - Monitor I/O  - Bladder scan as needed  - Discuss with physician/AP medications to alleviate retention as needed  - Discuss catheterization for long term situations as appropriate  Outcome: Progressing  Goal: Urinary catheter remains patent  Description: INTERVENTIONS:  - Assess patency of urinary catheter  - If patient has a chronic morales, consider changing catheter if non-functioning  - Follow guidelines for intermittent irrigation of non-functioning urinary catheter  Outcome: Progressing     Problem: METABOLIC, FLUID AND ELECTROLYTES - ADULT  Goal: Electrolytes maintained within normal limits  Description: INTERVENTIONS:  - Monitor labs and assess patient for signs and symptoms of electrolyte imbalances  - Administer electrolyte replacement as ordered  - Monitor response to electrolyte replacements, including repeat lab results as appropriate  - Instruct patient on fluid and nutrition as appropriate  Outcome: Progressing  Goal: Fluid balance maintained  Description: INTERVENTIONS:  - Monitor labs   - Monitor I/O and WT  - Instruct patient on fluid and nutrition as appropriate  - Assess for signs & symptoms of volume excess or deficit  Outcome: Progressing  Goal: Glucose maintained within target range  Description: INTERVENTIONS:  - Monitor Blood Glucose as ordered  - Assess for signs and symptoms of hyperglycemia and hypoglycemia  - Administer ordered medications to maintain glucose within target range  - Assess nutritional intake and initiate nutrition service referral as needed  Outcome: Progressing     Problem: MUSCULOSKELETAL - ADULT  Goal: Maintain or return mobility to safest level of function  Description: INTERVENTIONS:  - Assess patient's ability to carry out ADLs; assess patient's baseline for ADL function and identify physical deficits which impact ability to perform ADLs (bathing, care of mouth/teeth, toileting, grooming, dressing, etc )  - Assess/evaluate cause of self-care deficits   - Assess range of motion  - Assess patient's mobility  - Assess patient's need for assistive devices and provide as appropriate  - Encourage maximum independence but intervene and supervise when necessary  - Involve family in performance of ADLs  - Assess for home care needs following discharge   - Consider OT consult to assist with ADL evaluation and planning for discharge  - Provide patient education as appropriate  Outcome: Progressing  Goal: Maintain proper alignment of affected body part  Description: INTERVENTIONS:  - Support, maintain and protect limb and body alignment  - Provide patient/ family with appropriate education  Outcome: Progressing     Problem: MOBILITY - ADULT  Goal: Maintain or return to baseline ADL function  Description: INTERVENTIONS:  -  Assess patient's ability to carry out ADLs; assess patient's baseline for ADL function and identify physical deficits which impact ability to perform ADLs (bathing, care of mouth/teeth, toileting, grooming, dressing, etc )  - Assess/evaluate cause of self-care deficits   - Assess range of motion  - Assess patient's mobility; develop plan if impaired  - Assess patient's need for assistive devices and provide as appropriate  - Encourage maximum independence but intervene and supervise when necessary  - Involve family in performance of ADLs  - Assess for home care needs following discharge   - Consider OT consult to assist with ADL evaluation and planning for discharge  - Provide patient education as appropriate  Outcome: Progressing  Goal: Maintains/Returns to pre admission functional level  Description: INTERVENTIONS:  - Perform BMAT or MOVE assessment daily    - Set and communicate daily mobility goal to care team and patient/family/caregiver  - Collaborate with rehabilitation services on mobility goals if consulted  - Perform Range of Motion 3 times a day  - Reposition patient every 2 hours    - Dangle patient 3 times a day  - Stand patient 3 times a day  - Ambulate patient 3 times a day  - Out of bed to chair 3 times a day   - Out of bed for meals 3 times a day  - Out of bed for toileting  - Record patient progress and toleration of activity level   Outcome: Progressing     Problem: Prexisting or High Potential for Compromised Skin Integrity  Goal: Skin integrity is maintained or improved  Description: INTERVENTIONS:  - Identify patients at risk for skin breakdown  - Assess and monitor skin integrity  - Assess and monitor nutrition and hydration status  - Monitor labs   - Assess for incontinence   - Turn and reposition patient  - Assist with mobility/ambulation  - Relieve pressure over bony prominences  - Avoid friction and shearing  - Provide appropriate hygiene as needed including keeping skin clean and dry  - Evaluate need for skin moisturizer/barrier cream  - Collaborate with interdisciplinary team   - Patient/family teaching  - Consider wound care consult   Outcome: Progressing

## 2023-03-10 NOTE — ASSESSMENT & PLAN NOTE
· No evidence of active bleeding   · hemoglobin 8 0 this AM down form 8 5 yesterday AM  · Continue to trend CBC  · Continue PPI

## 2023-03-10 NOTE — PHYSICAL THERAPY NOTE
Physical Therapy Cancellation Note                       03/10/23 1443   PT Last Visit   PT Visit Date 03/10/23   Note Type   Note Type Cancelled Session   Cancel Reasons Medical status  (hold per nursing)

## 2023-03-10 NOTE — PROGRESS NOTES
[facility] [address]  Progress Note - Trina Mederos 1945, 69 y o  male MRN: [id number]  Unit/Bed#: 421-01 Encounter: [id number]  Primary Care Provider: Que Trejo DO   Date and time admitted to hospital: 3/8/2023 11:01 PM    * Other chest pain  Assessment & Plan  · Patient presented with burning chest pain  which was mildly improved with sublingual nitroglycerin on admission but did not resolve prompting admission on observation  · Reports no chest pain today  · HS troponin trend 59-->58 minimally elevated and flat  · No ischemic changes noted on EKG   · limited echo 3/9/23 shows EF 65% with no WMA  · CTA chest- no evidence of PE  · continue Ranexa 500 mg BID  · Suspect chest pain likely from GI    Coronary artery disease of native artery of native heart with stable angina pectoris [facility])  Assessment & Plan  · Known CAD with prior OM/RCA stenting, most recently had a SAM to the proximal LAD and PCI to the mid LAD in December 2022  · Was transitioned from aspirin and Brilinta to Plavix monotherapy at hospital discharge earlier in the day on 3-8-2023 in the setting of GI bleed  · per Cardiology, continue monotherapy with Plavix only for the next 7-10 days and then add aspirin back to regimen   · Continue statin  · Propranolol on hold in setting of hypotension  · No ischemic changes on EKG at the time of presentation  · Repeat EKG this am    Chronic diastolic (congestive) heart failure (HCC)  Assessment & Plan  Wt Readings from Last 3 Encounters:   03/10/23 99 1 kg (218 lb 7 6 oz)   03/08/23 105 kg (231 lb 7 7 oz)   03/02/23 98 9 kg (218 lb 0 6 oz)       · Continues to have Bilateral lower extremity and bilateral hand edema although improved today  · Received lasix 40 mg IV x 1 dose yesterday   · Losing weight,  218 lbs this AM down form 224 lbs yesterday  · Limited echo 3/9/23 shows EF 65% with no WMA  · Orthostatic today , hold further diuretics                    Acute lower GI bleeding  Assessment & Plan  · No evidence of active bleeding   · hemoglobin 8 0 this AM down form 8 5 yesterday AM  · Continue to trend CBC  · Continue PPI      Acute blood loss anemia  Assessment & Plan  · Secondary to acute lower GI bleed  · Colonoscopy 3/3/2023 (2 clips applied to bleeding descending: diverticulum with ulcer base )  · No evidence of active bleeding today  · hemoglobin 8 0 this AM down form 8 5 yesterday AM  · Continue to trend CBC  · Continue PPI for Gi protection  · Check Iron panel, folate, B12  · Occult blood stool    Orthostatic hypotension  Assessment & Plan  · Patient displayed positive orthostatic vital signs upon arrival to the telemetry unit, BP and heart rate while supine and standing 143/56 and 76, 97/55 and 116 respectively  · Continues to have borderline orthostatic hypotension      Aortic stenosis, moderate  Assessment & Plan  · Stable  · Echo 3/22- The aortic valve is trileaflet  The leaflets are moderately calcified  There is moderate stenosis  Peak gradient is 29 mmHg, mean gradient 18 mmHg  KELECHI 1 2 cm2        Intractable headache  Assessment & Plan  · Complained of on and off headache   · CT head- No acute intracranial abnormality, Mild-to-moderate sinusitis  · Start Flonase 2 puffs daily      Urinary retention  Assessment & Plan  · On morales's catheter  · H/o BPH  · F/u urology outpatient    Elevated d-dimer  Assessment & Plan  · No evidence of DVT   · No evidence of PE on CTA chest      Controlled type 2 diabetes mellitus with hyperglycemia, without long-term current use of insulin [facility])  Assessment & Plan  Lab Results   Component Value Date    HGBA1C 8 1 (H) 01/16/2023       Recent Labs     03/09/23  1057 03/09/23  1626 03/09/23  2100 03/10/23  0723   POCGLU 187* 184* 186* 136       Blood Sugar Average: Last 72 hrs:  (P) 167 4     · Sliding scale insulin  · Check HbA1c  · Hypoglycemia protocol  · Finger glucose check AC HS        VTE Pharmacologic Prophylaxis:   High Risk (Score >/= 5) - Pharmacological DVT Prophylaxis Contraindicated  Sequential Compression Devices Ordered  Patient Centered Rounds: I performed bedside rounds with nursing staff today  Discussions with Specialists or Other Care Team Provider: Cardiology    Total Time Spent on Date of Encounter in care of patient: 25 minutes This time was spent on one or more of the following: performing physical exam; counseling and coordination of care; obtaining or reviewing history; documenting in the medical record; reviewing/ordering tests, medications or procedures; communicating with other healthcare professionals and discussing with patient's family/caregivers  Current Length of Stay: 1 day(s)  Current Patient Status: Inpatient   Certification Statement: The patient will continue to require additional inpatient hospital stay due to observation for dropping Hb, and BP  Discharge Plan: Anticipate discharge in 24-48 hrs to home  Code Status: Level 1 - Full Code    Subjective:   Pt seen and examined  He states that he is feeling better  Denies chest pain  No new symptoms  Objective:     Vitals:   Temp (24hrs), Av 9 °F (36 6 °C), Min:97 7 °F (36 5 °C), Max:98 1 °F (36 7 °C)    Temp:  [97 7 °F (36 5 °C)-98 1 °F (36 7 °C)] 98 1 °F (36 7 °C)  HR:  [] 84  Resp:  [20] 20  BP: ()/(44-80) 116/66  SpO2:  [85 %-99 %] 97 %  Body mass index is 32 26 kg/m²  Input and Output Summary (last 24 hours): Intake/Output Summary (Last 24 hours) at 3/10/2023 1339  Last data filed at 3/10/2023 0911  Gross per 24 hour   Intake 600 ml   Output 3700 ml   Net -3100 ml       Physical Exam:   Physical Exam  Vitals and nursing note reviewed  Constitutional:       General: He is not in acute distress  Appearance: He is well-developed  He is obese  HENT:      Head: Normocephalic and atraumatic  Eyes:      Conjunctiva/sclera: Conjunctivae normal    Cardiovascular:      Rate and Rhythm: Normal rate and regular rhythm  Heart sounds: Murmur heard  Pulmonary:      Effort: Pulmonary effort is normal  No respiratory distress  Breath sounds: Normal breath sounds  Abdominal:      Palpations: Abdomen is soft  Tenderness: There is no abdominal tenderness  Musculoskeletal:         General: No swelling  Cervical back: Neck supple  Right lower leg: Edema present  Left lower leg: Edema present  Skin:     General: Skin is warm and dry  Capillary Refill: Capillary refill takes less than 2 seconds  Neurological:      Mental Status: He is alert     Psychiatric:         Mood and Affect: Mood normal         Additional Data:     Labs:  Results from last 7 days   Lab Units 03/10/23  0459   WBC Thousand/uL 5 40   HEMOGLOBIN g/dL 8 0*   HEMATOCRIT % 24 3*   PLATELETS Thousands/uL 285   NEUTROS PCT % 52   LYMPHS PCT % 30   MONOS PCT % 10   EOS PCT % 7*     Results from last 7 days   Lab Units 03/10/23  0459   SODIUM mmol/L 141   POTASSIUM mmol/L 3 7   CHLORIDE mmol/L 107   CO2 mmol/L 32   BUN mg/dL 7   CREATININE mg/dL 1 00   ANION GAP mmol/L 2*   CALCIUM mg/dL 7 9*   ALBUMIN g/dL 2 8*   TOTAL BILIRUBIN mg/dL 0 33   ALK PHOS U/L 48   ALT U/L 25   AST U/L 21   GLUCOSE RANDOM mg/dL 126     Results from last 7 days   Lab Units 03/08/23  2346   INR  0 90     Results from last 7 days   Lab Units 03/10/23  1152 03/10/23  0723 03/09/23  2100 03/09/23  1626 03/09/23  1057 03/09/23  0717 03/08/23  1110 03/08/23  0716 03/07/23  2036 03/07/23  1557 03/07/23  1112 03/07/23  0723   POC GLUCOSE mg/dl 189* 136 186* 184* 187* 144* 244* 169* 234* 264* 265* 166*     Results from last 7 days   Lab Units 03/10/23  0459   HEMOGLOBIN A1C % 5 6     Results from last 7 days   Lab Units 03/10/23  0459   LACTIC ACID mmol/L 0 8   PROCALCITONIN ng/ml 0 07       Lines/Drains:  Invasive Devices     Peripheral Intravenous Line  Duration           Peripheral IV 03/08/23 Distal;Right;Upper;Ventral (anterior) Arm 1 day          Drain  Duration Urethral Catheter Temperature probe 5 days              Urinary Catheter:  Goal for removal: Outpatient urology f/u           Telemetry:  Telemetry Orders (From admission, onward)             48 Hour Telemetry Monitoring  Continuous x 48 hours        References:    Telemetry Guidelines   Question:  Reason for 48 Hour Telemetry  Answer:  Acute MI, chest pain - R/O MI, or unstable angina                          Imaging: Reviewed radiology reports from this admission including: chest xray, chest CT scan and CT head    Recent Cultures (last 7 days):   Results from last 7 days   Lab Units 03/09/23  0722   URINE CULTURE  No Growth <1000 cfu/mL       Last 24 Hours Medication List:   Current Facility-Administered Medications   Medication Dose Route Frequency Provider Last Rate   • acetaminophen  650 mg Oral Q6H PRN Anand Armenta MD     • allopurinol  100 mg Oral Daily Anand Armenta MD     • aluminum-magnesium hydroxide-simethicone  30 mL Oral Q4H PRN Anand Armenta MD     • atorvastatin  80 mg Oral Daily With Dinner Anand Armenta MD     • clopidogrel  75 mg Oral Daily Anand Armenta MD     • Empagliflozin  10 mg Oral QAM Anand Armenta MD     • escitalopram  10 mg Oral Daily Anand Armenta MD     • fluticasone  2 spray Each Nare Daily Adarsh Bonilla MD     • insulin lispro  2-12 Units Subcutaneous TID AC Anand Armenta MD     • insulin lispro  2-12 Units Subcutaneous HS Anand Armenta MD     • levothyroxine  88 mcg Oral Early Morning Anand Armenta MD     • morphine injection  2 mg Intravenous Q3H PRN Anand Armenta MD     • multivitamin-minerals  1 tablet Oral Daily Anand Armenta MD     • nitroglycerin  0 4 mg Sublingual Q5 Min PRN Anand Armenta MD     • ondansetron  4 mg Intravenous Q6H PRN Anand Armenta MD     • pantoprazole  40 mg Intravenous Q24H Anand Armenta MD     • polyethylene glycol  17 g Oral Daily PRN Anand Armenta MD     • ranolazine  500 mg Oral BID Zheng Resendiz MD     • tamsulosin  0 4 mg Oral Daily With Dinner Zheng Resendiz MD          Today, Patient Was Seen By: Bright Brooks MD    **Please Note: This note may have been constructed using a voice recognition system  **

## 2023-03-10 NOTE — PLAN OF CARE
Problem: PAIN - ADULT  Goal: Verbalizes/displays adequate comfort level or baseline comfort level  Description: Interventions:  - Encourage patient to monitor pain and request assistance  - Assess pain using appropriate pain scale  - Administer analgesics based on type and severity of pain and evaluate response  - Implement non-pharmacological measures as appropriate and evaluate response  - Consider cultural and social influences on pain and pain management  - Notify physician/advanced practitioner if interventions unsuccessful or patient reports new pain  Outcome: Progressing     Problem: INFECTION - ADULT  Goal: Absence or prevention of progression during hospitalization  Description: INTERVENTIONS:  - Assess and monitor for signs and symptoms of infection  - Monitor lab/diagnostic results  - Monitor all insertion sites, i e  indwelling lines, tubes, and drains  - Monitor endotracheal if appropriate and nasal secretions for changes in amount and color  - [place] appropriate cooling/warming therapies per order  - Administer medications as ordered  - Instruct and encourage patient and family to use good hand hygiene technique  - Identify and instruct in appropriate isolation precautions for identified infection/condition  Outcome: Progressing     Problem: SAFETY ADULT  Goal: Patient will remain free of falls  Description: INTERVENTIONS:  - Educate patient/family on patient safety including physical limitations  - Instruct patient to call for assistance with activity   - Consult OT/PT to assist with strengthening/mobility   - Keep Call bell within reach  - Keep bed low and locked with side rails adjusted as appropriate  - Keep care items and personal belongings within reach  - Initiate and maintain comfort rounds  - Make Fall Risk Sign visible to staff  - Offer Toileting every 2 Hours, in advance of need  - Initiate/Maintain bed/chair alarm  - Obtain necessary fall risk management equipment: non-skid footwear  - Apply yellow socks and bracelet for high fall risk patients  - Consider moving patient to room near nurses station  Outcome: Progressing  Goal: Maintain or return to baseline ADL function  Description: INTERVENTIONS:  -  Assess patient's ability to carry out ADLs; assess patient's baseline for ADL function and identify physical deficits which impact ability to perform ADLs (bathing, care of mouth/teeth, toileting, grooming, dressing, etc )  - Assess/evaluate cause of self-care deficits   - Assess range of motion  - Assess patient's mobility; develop plan if impaired  - Assess patient's need for assistive devices and provide as appropriate  - Encourage maximum independence but intervene and supervise when necessary  - Involve family in performance of ADLs  - Assess for home care needs following discharge   - Consider OT consult to assist with ADL evaluation and planning for discharge  - Provide patient education as appropriate  Outcome: Progressing  Goal: Maintains/Returns to pre admission functional level  Description: INTERVENTIONS:  - Perform BMAT or MOVE assessment daily    - Set and communicate daily mobility goal to care team and patient/family/caregiver  - Collaborate with rehabilitation services on mobility goals if consulted  - Perform Range of Motion 3 times a day  - Reposition patient every 2 hours    - Dangle patient 3 times a day  - Stand patient 3 times a day  - Ambulate patient 3 times a day  - Out of bed to chair 3 times a day   - Out of bed for meals 3 times a day  - Out of bed for toileting  - Record patient progress and toleration of activity level   Outcome: Progressing     Problem: DISCHARGE PLANNING  Goal: Discharge to home or other facility with appropriate resources  Description: INTERVENTIONS:  - Identify barriers to discharge w/patient and caregiver  - Arrange for needed discharge resources and transportation as appropriate  - Identify discharge learning needs (meds, wound care, etc )  - Arrange for interpretive services to assist at discharge as needed  - Refer to Case Management Department for coordinating discharge planning if the patient needs post-hospital services based on physician/advanced practitioner order or complex needs related to functional status, cognitive ability, or social support system  Outcome: Progressing     Problem: Knowledge Deficit  Goal: Patient/family/caregiver demonstrates understanding of disease process, treatment plan, medications, and discharge instructions  Description: Complete learning assessment and assess knowledge base    Interventions:  - Provide teaching at level of understanding  - Provide teaching via preferred learning methods  Outcome: Progressing     Problem: CARDIOVASCULAR - ADULT  Goal: Maintains optimal cardiac output and hemodynamic stability  Description: INTERVENTIONS:  - Monitor I/O, vital signs and rhythm  - Monitor for S/S and trends of decreased cardiac output  - Administer and titrate ordered vasoactive medications to optimize hemodynamic stability  - Assess quality of pulses, skin color and temperature  - Assess for signs of decreased coronary artery perfusion  - Instruct patient to report change in severity of symptoms  Outcome: Progressing  Goal: Absence of cardiac dysrhythmias or at baseline rhythm  Description: INTERVENTIONS:  - Continuous cardiac monitoring, vital signs, obtain 12 lead EKG if ordered  - Administer antiarrhythmic and heart rate control medications as ordered  - Monitor electrolytes and administer replacement therapy as ordered  Outcome: Progressing     Problem: RESPIRATORY - ADULT  Goal: Achieves optimal ventilation and oxygenation  Description: INTERVENTIONS:  - Assess for changes in respiratory status  - Assess for changes in mentation and behavior  - Position to facilitate oxygenation and minimize respiratory effort  - Oxygen administered by appropriate delivery if ordered  - Initiate smoking cessation education as indicated  - Encourage broncho-pulmonary hygiene including cough, deep breathe, Incentive Spirometry  - Assess the need for suctioning and aspirate as needed  - Assess and instruct to report SOB or any respiratory difficulty  - Respiratory Therapy support as indicated  Outcome: Progressing     Problem: GENITOURINARY - ADULT  Goal: Maintains or returns to baseline urinary function  Description: INTERVENTIONS:  - Assess urinary function  - Encourage oral fluids to ensure adequate hydration if ordered  - Administer IV fluids as ordered to ensure adequate hydration  - Administer ordered medications as needed  - Offer frequent toileting  - Follow urinary retention protocol if ordered  Outcome: Progressing  Goal: Absence of urinary retention  Description: INTERVENTIONS:  - Assess patient's ability to void and empty bladder  - Monitor I/O  - Bladder scan as needed  - Discuss with physician/AP medications to alleviate retention as needed  - Discuss catheterization for long term situations as appropriate  Outcome: Progressing  Goal: Urinary catheter remains patent  Description: INTERVENTIONS:  - Assess patency of urinary catheter  - If patient has a chronic morales, consider changing catheter if non-functioning  - Follow guidelines for intermittent irrigation of non-functioning urinary catheter  Outcome: Progressing     Problem: METABOLIC, FLUID AND ELECTROLYTES - ADULT  Goal: Electrolytes maintained within normal limits  Description: INTERVENTIONS:  - Monitor labs and assess patient for signs and symptoms of electrolyte imbalances  - Administer electrolyte replacement as ordered  - Monitor response to electrolyte replacements, including repeat lab results as appropriate  - Instruct patient on fluid and nutrition as appropriate  Outcome: Progressing  Goal: Fluid balance maintained  Description: INTERVENTIONS:  - Monitor labs   - Monitor I/O and WT  - Instruct patient on fluid and nutrition as appropriate  - Assess for signs & symptoms of volume excess or deficit  Outcome: Progressing  Goal: Glucose maintained within target range  Description: INTERVENTIONS:  - Monitor Blood Glucose as ordered  - Assess for signs and symptoms of hyperglycemia and hypoglycemia  - Administer ordered medications to maintain glucose within target range  - Assess nutritional intake and initiate nutrition service referral as needed  Outcome: Progressing     Problem: MUSCULOSKELETAL - ADULT  Goal: Maintain or return mobility to safest level of function  Description: INTERVENTIONS:  - Assess patient's ability to carry out ADLs; assess patient's baseline for ADL function and identify physical deficits which impact ability to perform ADLs (bathing, care of mouth/teeth, toileting, grooming, dressing, etc )  - Assess/evaluate cause of self-care deficits   - Assess range of motion  - Assess patient's mobility  - Assess patient's need for assistive devices and provide as appropriate  - Encourage maximum independence but intervene and supervise when necessary  - Involve family in performance of ADLs  - Assess for home care needs following discharge   - Consider OT consult to assist with ADL evaluation and planning for discharge  - Provide patient education as appropriate  Outcome: Progressing  Goal: Maintain proper alignment of affected body part  Description: INTERVENTIONS:  - Support, maintain and protect limb and body alignment  - Provide patient/ family with appropriate education  Outcome: Progressing     Problem: MOBILITY - ADULT  Goal: Maintain or return to baseline ADL function  Description: INTERVENTIONS:  -  Assess patient's ability to carry out ADLs; assess patient's baseline for ADL function and identify physical deficits which impact ability to perform ADLs (bathing, care of mouth/teeth, toileting, grooming, dressing, etc )  - Assess/evaluate cause of self-care deficits   - Assess range of motion  - Assess patient's mobility; develop plan if impaired  - Assess patient's need for assistive devices and provide as appropriate  - Encourage maximum independence but intervene and supervise when necessary  - Involve family in performance of ADLs  - Assess for home care needs following discharge   - Consider OT consult to assist with ADL evaluation and planning for discharge  - Provide patient education as appropriate  Outcome: Progressing  Goal: Maintains/Returns to pre admission functional level  Description: INTERVENTIONS:  - Perform BMAT or MOVE assessment daily    - Set and communicate daily mobility goal to care team and patient/family/caregiver  - Collaborate with rehabilitation services on mobility goals if consulted  - Perform Range of Motion 3 times a day  - Reposition patient every 2 hours    - Dangle patient 3 times a day  - Stand patient 3 times a day  - Ambulate patient 3 times a day  - Out of bed to chair 3 times a day   - Out of bed for meals 3 times a day  - Out of bed for toileting  - Record patient progress and toleration of activity level   Outcome: Progressing     Problem: Prexisting or High Potential for Compromised Skin Integrity  Goal: Skin integrity is maintained or improved  Description: INTERVENTIONS:  - Identify patients at risk for skin breakdown  - Assess and monitor skin integrity  - Assess and monitor nutrition and hydration status  - Monitor labs   - Assess for incontinence   - Turn and reposition patient  - Assist with mobility/ambulation  - Relieve pressure over bony prominences  - Avoid friction and shearing  - Provide appropriate hygiene as needed including keeping skin clean and dry  - Evaluate need for skin moisturizer/barrier cream  - Collaborate with interdisciplinary team   - Patient/family teaching  - Consider wound care consult   Outcome: Progressing

## 2023-03-11 PROBLEM — E03.9 HYPOTHYROIDISM: Status: ACTIVE | Noted: 2023-03-11

## 2023-03-11 LAB
ALBUMIN SERPL BCP-MCNC: 3 G/DL (ref 3.5–5)
ALP SERPL-CCNC: 50 U/L (ref 34–104)
ALT SERPL W P-5'-P-CCNC: 25 U/L (ref 7–52)
ANION GAP SERPL CALCULATED.3IONS-SCNC: 6 MMOL/L (ref 4–13)
AST SERPL W P-5'-P-CCNC: 23 U/L (ref 13–39)
BASOPHILS # BLD AUTO: 0.04 THOUSANDS/ÂΜL (ref 0–0.1)
BASOPHILS NFR BLD AUTO: 1 % (ref 0–1)
BILIRUB SERPL-MCNC: 0.45 MG/DL (ref 0.2–1)
BUN SERPL-MCNC: 10 MG/DL (ref 5–25)
CA-I BLD-SCNC: 1.12 MMOL/L (ref 1.12–1.32)
CALCIUM ALBUM COR SERPL-MCNC: 9 MG/DL (ref 8.3–10.1)
CALCIUM SERPL-MCNC: 8.2 MG/DL (ref 8.4–10.2)
CARDIAC TROPONIN I PNL SERPL HS: 55 NG/L (ref 8–18)
CHLORIDE SERPL-SCNC: 105 MMOL/L (ref 96–108)
CO2 SERPL-SCNC: 27 MMOL/L (ref 21–32)
CREAT SERPL-MCNC: 1.02 MG/DL (ref 0.6–1.3)
EOSINOPHIL # BLD AUTO: 0.46 THOUSAND/ÂΜL (ref 0–0.61)
EOSINOPHIL NFR BLD AUTO: 7 % (ref 0–6)
ERYTHROCYTE [DISTWIDTH] IN BLOOD BY AUTOMATED COUNT: 17.8 % (ref 11.6–15.1)
GFR SERPL CREATININE-BSD FRML MDRD: 71 ML/MIN/1.73SQ M
GLUCOSE SERPL-MCNC: 126 MG/DL (ref 65–140)
GLUCOSE SERPL-MCNC: 126 MG/DL (ref 65–140)
GLUCOSE SERPL-MCNC: 178 MG/DL (ref 65–140)
GLUCOSE SERPL-MCNC: 186 MG/DL (ref 65–140)
GLUCOSE SERPL-MCNC: 262 MG/DL (ref 65–140)
HCT VFR BLD AUTO: 27.9 % (ref 36.5–49.3)
HGB BLD-MCNC: 9.2 G/DL (ref 12–17)
IMM GRANULOCYTES # BLD AUTO: 0.03 THOUSAND/UL (ref 0–0.2)
IMM GRANULOCYTES NFR BLD AUTO: 1 % (ref 0–2)
LYMPHOCYTES # BLD AUTO: 1.62 THOUSANDS/ÂΜL (ref 0.6–4.47)
LYMPHOCYTES NFR BLD AUTO: 25 % (ref 14–44)
MAGNESIUM SERPL-MCNC: 2 MG/DL (ref 1.9–2.7)
MCH RBC QN AUTO: 32.5 PG (ref 26.8–34.3)
MCHC RBC AUTO-ENTMCNC: 33 G/DL (ref 31.4–37.4)
MCV RBC AUTO: 99 FL (ref 82–98)
MONOCYTES # BLD AUTO: 0.58 THOUSAND/ÂΜL (ref 0.17–1.22)
MONOCYTES NFR BLD AUTO: 9 % (ref 4–12)
NEUTROPHILS # BLD AUTO: 3.84 THOUSANDS/ÂΜL (ref 1.85–7.62)
NEUTS SEG NFR BLD AUTO: 57 % (ref 43–75)
NRBC BLD AUTO-RTO: 0 /100 WBCS
PHOSPHATE SERPL-MCNC: 3.3 MG/DL (ref 2.3–4.1)
PLATELET # BLD AUTO: 299 THOUSANDS/UL (ref 149–390)
PMV BLD AUTO: 9 FL (ref 8.9–12.7)
POTASSIUM SERPL-SCNC: 4.1 MMOL/L (ref 3.5–5.3)
PROCALCITONIN SERPL-MCNC: 0.08 NG/ML
PROT SERPL-MCNC: 4.9 G/DL (ref 6.4–8.4)
RBC # BLD AUTO: 2.83 MILLION/UL (ref 3.88–5.62)
SODIUM SERPL-SCNC: 138 MMOL/L (ref 135–147)
WBC # BLD AUTO: 6.57 THOUSAND/UL (ref 4.31–10.16)

## 2023-03-11 RX ADMIN — SODIUM CHLORIDE 500 ML: 0.9 INJECTION, SOLUTION INTRAVENOUS at 09:51

## 2023-03-11 RX ADMIN — EMPAGLIFLOZIN 10 MG: 10 TABLET, FILM COATED ORAL at 09:21

## 2023-03-11 RX ADMIN — RANOLAZINE 500 MG: 500 TABLET, EXTENDED RELEASE ORAL at 09:18

## 2023-03-11 RX ADMIN — CLOPIDOGREL BISULFATE 75 MG: 75 TABLET ORAL at 09:18

## 2023-03-11 RX ADMIN — INSULIN LISPRO 2 UNITS: 100 INJECTION, SOLUTION INTRAVENOUS; SUBCUTANEOUS at 16:41

## 2023-03-11 RX ADMIN — FLUTICASONE PROPIONATE 2 SPRAY: 50 SPRAY, METERED NASAL at 09:21

## 2023-03-11 RX ADMIN — INSULIN LISPRO 2 UNITS: 100 INJECTION, SOLUTION INTRAVENOUS; SUBCUTANEOUS at 22:10

## 2023-03-11 RX ADMIN — ESCITALOPRAM OXALATE 10 MG: 10 TABLET ORAL at 09:18

## 2023-03-11 RX ADMIN — MULTIPLE VITAMINS W/ MINERALS TAB 1 TABLET: TAB at 09:18

## 2023-03-11 RX ADMIN — LEVOTHYROXINE SODIUM 88 MCG: 88 TABLET ORAL at 05:11

## 2023-03-11 RX ADMIN — ATORVASTATIN CALCIUM 80 MG: 40 TABLET, FILM COATED ORAL at 16:41

## 2023-03-11 RX ADMIN — RANOLAZINE 500 MG: 500 TABLET, EXTENDED RELEASE ORAL at 17:00

## 2023-03-11 RX ADMIN — PANTOPRAZOLE SODIUM 40 MG: 40 INJECTION, POWDER, FOR SOLUTION INTRAVENOUS at 21:36

## 2023-03-11 RX ADMIN — INSULIN LISPRO 6 UNITS: 100 INJECTION, SOLUTION INTRAVENOUS; SUBCUTANEOUS at 12:09

## 2023-03-11 RX ADMIN — ALLOPURINOL 100 MG: 100 TABLET ORAL at 09:18

## 2023-03-11 NOTE — PLAN OF CARE
Problem: PAIN - ADULT  Goal: Verbalizes/displays adequate comfort level or baseline comfort level  Description: Interventions:  - Encourage patient to monitor pain and request assistance  - Assess pain using appropriate pain scale  - Administer analgesics based on type and severity of pain and evaluate response  - Implement non-pharmacological measures as appropriate and evaluate response  - Consider cultural and social influences on pain and pain management  - Notify physician/advanced practitioner if interventions unsuccessful or patient reports new pain  Outcome: Progressing     Problem: INFECTION - ADULT  Goal: Absence or prevention of progression during hospitalization  Description: INTERVENTIONS:  - Assess and monitor for signs and symptoms of infection  - Monitor lab/diagnostic results  - Monitor all insertion sites, i e  indwelling lines, tubes, and drains  - Monitor endotracheal if appropriate and nasal secretions for changes in amount and color  - [place] appropriate cooling/warming therapies per order  - Administer medications as ordered  - Instruct and encourage patient and family to use good hand hygiene technique  - Identify and instruct in appropriate isolation precautions for identified infection/condition  Outcome: Progressing     Problem: SAFETY ADULT  Goal: Patient will remain free of falls  Description: INTERVENTIONS:  - Educate patient/family on patient safety including physical limitations  - Instruct patient to call for assistance with activity   - Consult OT/PT to assist with strengthening/mobility   - Keep Call bell within reach  - Keep bed low and locked with side rails adjusted as appropriate  - Keep care items and personal belongings within reach  - Initiate and maintain comfort rounds  - Make Fall Risk Sign visible to staff  - Offer Toileting every 2 Hours, in advance of need  - Initiate/Maintain bed/chair alarm  - Obtain necessary fall risk management equipment: non-skid footwear  - Apply yellow socks and bracelet for high fall risk patients  - Consider moving patient to room near nurses station  Outcome: Progressing  Goal: Maintain or return to baseline ADL function  Description: INTERVENTIONS:  -  Assess patient's ability to carry out ADLs; assess patient's baseline for ADL function and identify physical deficits which impact ability to perform ADLs (bathing, care of mouth/teeth, toileting, grooming, dressing, etc )  - Assess/evaluate cause of self-care deficits   - Assess range of motion  - Assess patient's mobility; develop plan if impaired  - Assess patient's need for assistive devices and provide as appropriate  - Encourage maximum independence but intervene and supervise when necessary  - Involve family in performance of ADLs  - Assess for home care needs following discharge   - Consider OT consult to assist with ADL evaluation and planning for discharge  - Provide patient education as appropriate  Outcome: Progressing  Goal: Maintains/Returns to pre admission functional level  Description: INTERVENTIONS:  - Perform BMAT or MOVE assessment daily    - Set and communicate daily mobility goal to care team and patient/family/caregiver  - Collaborate with rehabilitation services on mobility goals if consulted  - Perform Range of Motion 3 times a day  - Reposition patient every 2 hours    - Dangle patient 3 times a day  - Stand patient 3 times a day  - Ambulate patient 3 times a day  - Out of bed to chair 3 times a day   - Out of bed for meals 3 times a day  - Out of bed for toileting  - Record patient progress and toleration of activity level   Outcome: Progressing     Problem: DISCHARGE PLANNING  Goal: Discharge to home or other facility with appropriate resources  Description: INTERVENTIONS:  - Identify barriers to discharge w/patient and caregiver  - Arrange for needed discharge resources and transportation as appropriate  - Identify discharge learning needs (meds, wound care, etc )  - Arrange for interpretive services to assist at discharge as needed  - Refer to Case Management Department for coordinating discharge planning if the patient needs post-hospital services based on physician/advanced practitioner order or complex needs related to functional status, cognitive ability, or social support system  Outcome: Progressing     Problem: Knowledge Deficit  Goal: Patient/family/caregiver demonstrates understanding of disease process, treatment plan, medications, and discharge instructions  Description: Complete learning assessment and assess knowledge base    Interventions:  - Provide teaching at level of understanding  - Provide teaching via preferred learning methods  Outcome: Progressing     Problem: CARDIOVASCULAR - ADULT  Goal: Maintains optimal cardiac output and hemodynamic stability  Description: INTERVENTIONS:  - Monitor I/O, vital signs and rhythm  - Monitor for S/S and trends of decreased cardiac output  - Administer and titrate ordered vasoactive medications to optimize hemodynamic stability  - Assess quality of pulses, skin color and temperature  - Assess for signs of decreased coronary artery perfusion  - Instruct patient to report change in severity of symptoms  Outcome: Progressing  Goal: Absence of cardiac dysrhythmias or at baseline rhythm  Description: INTERVENTIONS:  - Continuous cardiac monitoring, vital signs, obtain 12 lead EKG if ordered  - Administer antiarrhythmic and heart rate control medications as ordered  - Monitor electrolytes and administer replacement therapy as ordered  Outcome: Progressing     Problem: RESPIRATORY - ADULT  Goal: Achieves optimal ventilation and oxygenation  Description: INTERVENTIONS:  - Assess for changes in respiratory status  - Assess for changes in mentation and behavior  - Position to facilitate oxygenation and minimize respiratory effort  - Oxygen administered by appropriate delivery if ordered  - Initiate smoking cessation education as indicated  - Encourage broncho-pulmonary hygiene including cough, deep breathe, Incentive Spirometry  - Assess the need for suctioning and aspirate as needed  - Assess and instruct to report SOB or any respiratory difficulty  - Respiratory Therapy support as indicated  Outcome: Progressing     Problem: GENITOURINARY - ADULT  Goal: Maintains or returns to baseline urinary function  Description: INTERVENTIONS:  - Assess urinary function  - Encourage oral fluids to ensure adequate hydration if ordered  - Administer IV fluids as ordered to ensure adequate hydration  - Administer ordered medications as needed  - Offer frequent toileting  - Follow urinary retention protocol if ordered  Outcome: Progressing  Goal: Absence of urinary retention  Description: INTERVENTIONS:  - Assess patient's ability to void and empty bladder  - Monitor I/O  - Bladder scan as needed  - Discuss with physician/AP medications to alleviate retention as needed  - Discuss catheterization for long term situations as appropriate  Outcome: Progressing  Goal: Urinary catheter remains patent  Description: INTERVENTIONS:  - Assess patency of urinary catheter  - If patient has a chronic morales, consider changing catheter if non-functioning  - Follow guidelines for intermittent irrigation of non-functioning urinary catheter  Outcome: Progressing     Problem: METABOLIC, FLUID AND ELECTROLYTES - ADULT  Goal: Electrolytes maintained within normal limits  Description: INTERVENTIONS:  - Monitor labs and assess patient for signs and symptoms of electrolyte imbalances  - Administer electrolyte replacement as ordered  - Monitor response to electrolyte replacements, including repeat lab results as appropriate  - Instruct patient on fluid and nutrition as appropriate  Outcome: Progressing  Goal: Fluid balance maintained  Description: INTERVENTIONS:  - Monitor labs   - Monitor I/O and WT  - Instruct patient on fluid and nutrition as appropriate  - Assess for signs & symptoms of volume excess or deficit  Outcome: Progressing  Goal: Glucose maintained within target range  Description: INTERVENTIONS:  - Monitor Blood Glucose as ordered  - Assess for signs and symptoms of hyperglycemia and hypoglycemia  - Administer ordered medications to maintain glucose within target range  - Assess nutritional intake and initiate nutrition service referral as needed  Outcome: Progressing     Problem: MUSCULOSKELETAL - ADULT  Goal: Maintain or return mobility to safest level of function  Description: INTERVENTIONS:  - Assess patient's ability to carry out ADLs; assess patient's baseline for ADL function and identify physical deficits which impact ability to perform ADLs (bathing, care of mouth/teeth, toileting, grooming, dressing, etc )  - Assess/evaluate cause of self-care deficits   - Assess range of motion  - Assess patient's mobility  - Assess patient's need for assistive devices and provide as appropriate  - Encourage maximum independence but intervene and supervise when necessary  - Involve family in performance of ADLs  - Assess for home care needs following discharge   - Consider OT consult to assist with ADL evaluation and planning for discharge  - Provide patient education as appropriate  Outcome: Progressing  Goal: Maintain proper alignment of affected body part  Description: INTERVENTIONS:  - Support, maintain and protect limb and body alignment  - Provide patient/ family with appropriate education  Outcome: Progressing     Problem: MOBILITY - ADULT  Goal: Maintain or return to baseline ADL function  Description: INTERVENTIONS:  -  Assess patient's ability to carry out ADLs; assess patient's baseline for ADL function and identify physical deficits which impact ability to perform ADLs (bathing, care of mouth/teeth, toileting, grooming, dressing, etc )  - Assess/evaluate cause of self-care deficits   - Assess range of motion  - Assess patient's mobility; develop plan if impaired  - Assess patient's need for assistive devices and provide as appropriate  - Encourage maximum independence but intervene and supervise when necessary  - Involve family in performance of ADLs  - Assess for home care needs following discharge   - Consider OT consult to assist with ADL evaluation and planning for discharge  - Provide patient education as appropriate  Outcome: Progressing  Goal: Maintains/Returns to pre admission functional level  Description: INTERVENTIONS:  - Perform BMAT or MOVE assessment daily    - Set and communicate daily mobility goal to care team and patient/family/caregiver  - Collaborate with rehabilitation services on mobility goals if consulted  - Perform Range of Motion 3 times a day  - Reposition patient every 2 hours    - Dangle patient 3 times a day  - Stand patient 3 times a day  - Ambulate patient 3 times a day  - Out of bed to chair 3 times a day   - Out of bed for meals 3 times a day  - Out of bed for toileting  - Record patient progress and toleration of activity level   Outcome: Progressing     Problem: Prexisting or High Potential for Compromised Skin Integrity  Goal: Skin integrity is maintained or improved  Description: INTERVENTIONS:  - Identify patients at risk for skin breakdown  - Assess and monitor skin integrity  - Assess and monitor nutrition and hydration status  - Monitor labs   - Assess for incontinence   - Turn and reposition patient  - Assist with mobility/ambulation  - Relieve pressure over bony prominences  - Avoid friction and shearing  - Provide appropriate hygiene as needed including keeping skin clean and dry  - Evaluate need for skin moisturizer/barrier cream  - Collaborate with interdisciplinary team   - Patient/family teaching  - Consider wound care consult   Outcome: Progressing

## 2023-03-11 NOTE — ASSESSMENT & PLAN NOTE
· Continues to experience orthostatic hypotension with severe lightheadedness and dizziness while standing and a systolic blood pressure in the 80's mmHg  · Hold tamsulosin for now  · Give a NSS IV fluid bolus of 500 ml over 2 hours today  · Concern for recurrent gastrointestinal bleeding  · Continue to monitor  · PT/OT-recommending post-acute care rehabilitation services, however, the patient declines short-term rehabilitation at this time

## 2023-03-11 NOTE — ASSESSMENT & PLAN NOTE
Wt Readings from Last 3 Encounters:   03/11/23 101 kg (222 lb 0 1 oz)   03/08/23 105 kg (231 lb 7 7 oz)   03/02/23 98 9 kg (218 lb 0 6 oz)     · Received lasix 40 mg IV x 1 dose per Cardiology  · Hold off on additional diuresis in the setting of orthostatic hypotension

## 2023-03-11 NOTE — ASSESSMENT & PLAN NOTE
· No evidence of pulmonary embolism on CTA of the chest/PE protocol completed on 3/9/2023  · No evidence of DVT on venous duplex of both lower extremities on 03/09/2023

## 2023-03-11 NOTE — PROGRESS NOTES
[address] [facility]  Progress Note - Oziel Bolden 1945, 69 y o  male MRN: [id number]  Unit/Bed#: 517-40 Encounter: [id number]  Primary Care Provider: Cyn Nguyễn DO   Date and time admitted to hospital: 3/8/2023 11:01 PM    * Orthostatic hypotension  Assessment & Plan  · Continues to experience orthostatic hypotension with severe lightheadedness and dizziness while standing and a systolic blood pressure in the 80's mmHg  · Hold tamsulosin for now  · Give a NSS IV fluid bolus of 500 ml over 2 hours today  · Concern for recurrent gastrointestinal bleeding  · Continue to monitor  · PT/OT-recommending post-acute care rehabilitation services, however, the patient declines short-term rehabilitation at this time    Other chest pain  Assessment & Plan  · Known CAD with recent drug-eluting stent placement/PTCA to the LAD on 12/29/2022  · I appreciate Cardiology's input  · No indication for repeat cardiac catheterization at this time per Cardiology  · Continue plavix 75 mg PO Qdaily  · Plan to restart aspirin 81 mg PO Qdaily next week if no signs of additional gastrointestinal bleeding  · Continue intensive statin therapy with atorvastatin 80 mg PO Qdaily with dinner  · Not on beta-blocker therapy at this time due to orthostatic hypotension    Hypothyroidism  Assessment & Plan  · Continue PO levothyroxine  · Recheck a TSH level in 1-2 weeks with his PCP  · If the TSH level remains elevated, he will need an increased dose of PO levothyroxine prescribed by his PCP       Latest Reference Range & Units 03/09/23 05:03   TSH 3RD GENERATON 0 450 - 4 500 uIU/mL 5 668 (H)   (H): Data is abnormally high    Acute blood loss anemia  Assessment & Plan  · Recent gastrointestinal hemorrhage thought to be diverticular bleeding found on colonoscopy on 3/3/2023  · Serial laboratory testing to monitor hemoglobin/hematocrit levels  · Initiate cyanocobalamin 500 mcg PO Qdaily for a low-normal vitamin B12 level  · If the patient continues to have orthostatic hypotension, he will need a Gastroenterology evaluation for possible, recurrent gastrointestinal hemorrhage  Intractable headache  Assessment & Plan  · No acute intracranial abnormality from CT scan of the head without contrast on 3/9/2023    Elevated d-dimer  Assessment & Plan  · No evidence of pulmonary embolism on CTA of the chest/PE protocol completed on 3/9/2023  · No evidence of DVT on venous duplex of both lower extremities on 03/09/2023    Elevated TSH  Assessment & Plan  · Please see the assessment and plan for "Hypothyrodism"    Hypomagnesemia  Assessment & Plan  · Resolved with magnesium replacement therapy  · Follow the magnesium level    Results from last 7 days   Lab Units 03/11/23  0506 03/10/23  0459 03/09/23  0503   MAGNESIUM mg/dL 2 0 2 0 1 9         Elevated troponin  Assessment & Plan  · Likely a non-MI troponin elevation in the setting of anemia    Urinary retention  Assessment & Plan  · Maintain the Jose catheter for now  · Hold tamsulosin in the setting of orthostatic hypotension  · Outpatient follow-up with Urology    Chronic diastolic (congestive) heart failure (HCC)  Assessment & Plan  Wt Readings from Last 3 Encounters:   03/11/23 101 kg (222 lb 0 1 oz)   03/08/23 105 kg (231 lb 7 7 oz)   03/02/23 98 9 kg (218 lb 0 6 oz)     · Received lasix 40 mg IV x 1 dose per Cardiology  · Hold off on additional diuresis in the setting of orthostatic hypotension        Aortic stenosis, moderate  Assessment & Plan  • Outpatient follow-up with Cardiology      Coronary artery disease of native artery of native heart with stable angina pectoris [facility])  Assessment & Plan  · Known CAD with recent drug-eluting stent placement/PTCA to the LAD on 12/29/2022  · I appreciate Cardiology's input    · No indication for repeat cardiac catheterization at this time per Cardiology  · Continue plavix 75 mg PO Qdaily  · Plan to restart aspirin 81 mg PO Qdaily next week if no signs of additional gastrointestinal bleeding  · Continue intensive statin therapy with atorvastatin 80 mg PO Qdaily with dinner  · Not on beta-blocker therapy at this time due to orthostatic hypotension    Type 2 diabetes mellitus with hyperglycemia, without long-term current use of insulin [facility])  Assessment & Plan  Lab Results   Component Value Date    HGBA1C 5 6 03/10/2023       Recent Labs     03/10/23  1647 03/10/23  2203 03/11/23  0741 03/11/23  1124   POCGLU 160* 139 126 262*       Blood Sugar Average: Last 72 hrs:  (P) 171 3     · Continue PO Jardiance  · ISS with blood glucose monitoring ACHS  · Hypoglycemia protocol        VTE Pharmacologic Prophylaxis: VTE Score: 5 High Risk (Score >/= 5) - Pharmacological DVT Prophylaxis Contraindicated  Sequential Compression Devices Ordered  Patient Centered Rounds: I performed bedside rounds with nursing staff today  Total Time Spent on Date of Encounter in care of patient: 35 minutes This time was spent on one or more of the following: performing physical exam; counseling and coordination of care; obtaining or reviewing history; documenting in the medical record; reviewing/ordering tests, medications or procedures; communicating with other healthcare professionals and discussing with patient's family/caregivers  Current Length of Stay: 2 day(s)  Current Patient Status: Inpatient   Certification Statement: The patient will continue to require additional inpatient hospital stay due to the need for IV fluids to treat the orthostatic hypotension, for close hemodynamic status monitoring, and for serial laboratory testing to monitor the hemoglobin/hematocrit levels  Discharge Plan: Anticipate discharge in 48-72 hrs to home with home services  Code Status: Level 1 - Full Code    Subjective: The patient was seen and examined  The patient experienced severe dizziness and lightheadedness while standing this morning  No chest pain  No shortness of breath    No abdominal pain  No nausea or vomiting  Objective:     Vitals:   Temp (24hrs), Av 7 °F (36 5 °C), Min:97 5 °F (36 4 °C), Max:97 9 °F (36 6 °C)    Temp:  [97 5 °F (36 4 °C)-97 9 °F (36 6 °C)] 97 5 °F (36 4 °C)  HR:  [] 123  Resp:  [18] 18  BP: ()/(56-81) 83/56  SpO2:  [94 %-98 %] 96 %  Body mass index is 32 78 kg/m²  Input and Output Summary (last 24 hours):      Intake/Output Summary (Last 24 hours) at 3/11/2023 1355  Last data filed at 3/11/2023 1353  Gross per 24 hour   Intake 840 ml   Output 4635 ml   Net -3795 ml       Physical Exam:   Physical Exam   General:  NAD, follows commands  HEENT:  NC/AT, mucous membranes moist  Neck:  Supple, No JVP elevation  CV:  + S1, + S2, Tachycardic, Regular rhythm  Pulm:  Lung fields are CTA bilaterally  Abd:  Soft, Non-tender, Non-distended  Ext:  No clubbing/cyanosis/edema  Skin:  No rashes  Neuro:  Awake, alert, oriented  Psych:  Normal mood and affect  :  Jose catheter      Additional Data:    Labs:  Results from last 7 days   Lab Units 23  0506   WBC Thousand/uL 6 57   HEMOGLOBIN g/dL 9 2*   HEMATOCRIT % 27 9*   PLATELETS Thousands/uL 299   NEUTROS PCT % 57   LYMPHS PCT % 25   MONOS PCT % 9   EOS PCT % 7*     Results from last 7 days   Lab Units 23  0506   SODIUM mmol/L 138   POTASSIUM mmol/L 4 1   CHLORIDE mmol/L 105   CO2 mmol/L 27   BUN mg/dL 10   CREATININE mg/dL 1 02   ANION GAP mmol/L 6   CALCIUM mg/dL 8 2*   ALBUMIN g/dL 3 0*   TOTAL BILIRUBIN mg/dL 0 45   ALK PHOS U/L 50   ALT U/L 25   AST U/L 23   GLUCOSE RANDOM mg/dL 126     Results from last 7 days   Lab Units 23  2346   INR  0 90     Results from last 7 days   Lab Units 23  1124 23  0741 03/10/23  2203 03/10/23  1647 03/10/23  1152 03/10/23  0723 23  2100 23  1626 23  1057 23  0717 23  1110 23  0716   POC GLUCOSE mg/dl 262* 126 139 160* 189* 136 186* 184* 187* 144* 244* 169*     Results from last 7 days   Lab Units 03/10/23  0459   HEMOGLOBIN A1C % 5 6     Results from last 7 days   Lab Units 03/11/23  0506 03/10/23  0459   LACTIC ACID mmol/L  --  0 8   PROCALCITONIN ng/ml 0 08 0 07       Lines/Drains:  Invasive Devices     Peripheral Intravenous Line  Duration           Peripheral IV 03/08/23 Distal;Right;Upper;Ventral (anterior) Arm 2 days          Drain  Duration           Urethral Catheter Temperature probe 6 days              Urinary Catheter:  Goal for removal: N/A- Discharging with Jose           Telemetry:  Telemetry Orders (From admission, onward)             48 Hour Telemetry Monitoring  Continuous x 48 hours        References:    Telemetry Guidelines   Question:  Reason for 48 Hour Telemetry  Answer:  Arrhythmias Requiring Medical Therapy (eg  SVT, Vtach/fib, Bradycardia, Uncontrolled A-fib)                 Telemetry Reviewed: Sinus Tachycardia  Indication for Continued Telemetry Use: Arrthymias requiring medical therapy             Imaging: No pertinent imaging reviewed      Recent Cultures (last 7 days):   Results from last 7 days   Lab Units 03/09/23  0722   URINE CULTURE  No Growth <1000 cfu/mL       Last 24 Hours Medication List:   Current Facility-Administered Medications   Medication Dose Route Frequency Provider Last Rate   • acetaminophen  650 mg Oral Q6H PRN Maciel Shah MD     • allopurinol  100 mg Oral Daily Maciel Shah MD     • aluminum-magnesium hydroxide-simethicone  30 mL Oral Q4H PRN Maciel Shah MD     • atorvastatin  80 mg Oral Daily With Dinner Maciel Shah MD     • clopidogrel  75 mg Oral Daily Maciel Shah MD     • [START ON 3/12/2023] cyanocobalamin  500 mcg Oral Daily Sunday Bena DO     • Empagliflozin  10 mg Oral QAM Maciel Shah MD     • escitalopram  10 mg Oral Daily Maciel Shah MD     • fluticasone  2 spray Each Nare Daily Tyron Mckeon MD     • insulin lispro  2-12 Units Subcutaneous TID AC Maciel Shah MD     • insulin lispro 2-12 Units Subcutaneous HS Chris Ochoa MD     • levothyroxine  88 mcg Oral Early Morning Chris Ochoa MD     • morphine injection  2 mg Intravenous Q3H PRN Chris Ochoa MD     • multivitamin-minerals  1 tablet Oral Daily Chris Ochoa MD     • nitroglycerin  0 4 mg Sublingual Q5 Min PRN Chris Ochoa MD     • ondansetron  4 mg Intravenous Q6H PRN Chris Ochoa MD     • pantoprazole  40 mg Intravenous Q24H Chris Ochoa MD     • polyethylene glycol  17 g Oral Daily PRN Chris Ochoa MD     • ranolazine  500 mg Oral BID Chris Ochoa MD          Today, Patient Was Seen By: Aj Best DO    **Please Note: This note may have been constructed using a voice recognition system  **

## 2023-03-11 NOTE — PLAN OF CARE
Problem: PAIN - ADULT  Goal: Verbalizes/displays adequate comfort level or baseline comfort level  Description: Interventions:  - Encourage patient to monitor pain and request assistance  - Assess pain using appropriate pain scale  - Administer analgesics based on type and severity of pain and evaluate response  - Implement non-pharmacological measures as appropriate and evaluate response  - Consider cultural and social influences on pain and pain management  - Notify physician/advanced practitioner if interventions unsuccessful or patient reports new pain  Outcome: Progressing     Problem: INFECTION - ADULT  Goal: Absence or prevention of progression during hospitalization  Description: INTERVENTIONS:  - Assess and monitor for signs and symptoms of infection  - Monitor lab/diagnostic results  - Monitor all insertion sites, i e  indwelling lines, tubes, and drains  - Monitor endotracheal if appropriate and nasal secretions for changes in amount and color  - [place] appropriate cooling/warming therapies per order  - Administer medications as ordered  - Instruct and encourage patient and family to use good hand hygiene technique  - Identify and instruct in appropriate isolation precautions for identified infection/condition  Outcome: Progressing     Problem: SAFETY ADULT  Goal: Patient will remain free of falls  Description: INTERVENTIONS:  - Educate patient/family on patient safety including physical limitations  - Instruct patient to call for assistance with activity   - Consult OT/PT to assist with strengthening/mobility   - Keep Call bell within reach  - Keep bed low and locked with side rails adjusted as appropriate  - Keep care items and personal belongings within reach  - Initiate and maintain comfort rounds  - Make Fall Risk Sign visible to staff  - Offer Toileting every 2 Hours, in advance of need  - Initiate/Maintain bed/chair alarm  - Obtain necessary fall risk management equipment: non-skid footwear  - Apply yellow socks and bracelet for high fall risk patients  - Consider moving patient to room near nurses station  Outcome: Progressing  Goal: Maintain or return to baseline ADL function  Description: INTERVENTIONS:  -  Assess patient's ability to carry out ADLs; assess patient's baseline for ADL function and identify physical deficits which impact ability to perform ADLs (bathing, care of mouth/teeth, toileting, grooming, dressing, etc )  - Assess/evaluate cause of self-care deficits   - Assess range of motion  - Assess patient's mobility; develop plan if impaired  - Assess patient's need for assistive devices and provide as appropriate  - Encourage maximum independence but intervene and supervise when necessary  - Involve family in performance of ADLs  - Assess for home care needs following discharge   - Consider OT consult to assist with ADL evaluation and planning for discharge  - Provide patient education as appropriate  Outcome: Progressing  Goal: Maintains/Returns to pre admission functional level  Description: INTERVENTIONS:  - Perform BMAT or MOVE assessment daily    - Set and communicate daily mobility goal to care team and patient/family/caregiver  - Collaborate with rehabilitation services on mobility goals if consulted  - Perform Range of Motion 3 times a day  - Reposition patient every 2 hours    - Dangle patient 3 times a day  - Stand patient 3 times a day  - Ambulate patient 3 times a day  - Out of bed to chair 3 times a day   - Out of bed for meals 3 times a day  - Out of bed for toileting  - Record patient progress and toleration of activity level   Outcome: Progressing     Problem: DISCHARGE PLANNING  Goal: Discharge to home or other facility with appropriate resources  Description: INTERVENTIONS:  - Identify barriers to discharge w/patient and caregiver  - Arrange for needed discharge resources and transportation as appropriate  - Identify discharge learning needs (meds, wound care, etc )  - Arrange for interpretive services to assist at discharge as needed  - Refer to Case Management Department for coordinating discharge planning if the patient needs post-hospital services based on physician/advanced practitioner order or complex needs related to functional status, cognitive ability, or social support system  Outcome: Progressing     Problem: Knowledge Deficit  Goal: Patient/family/caregiver demonstrates understanding of disease process, treatment plan, medications, and discharge instructions  Description: Complete learning assessment and assess knowledge base    Interventions:  - Provide teaching at level of understanding  - Provide teaching via preferred learning methods  Outcome: Progressing     Problem: CARDIOVASCULAR - ADULT  Goal: Maintains optimal cardiac output and hemodynamic stability  Description: INTERVENTIONS:  - Monitor I/O, vital signs and rhythm  - Monitor for S/S and trends of decreased cardiac output  - Administer and titrate ordered vasoactive medications to optimize hemodynamic stability  - Assess quality of pulses, skin color and temperature  - Assess for signs of decreased coronary artery perfusion  - Instruct patient to report change in severity of symptoms  Outcome: Progressing  Goal: Absence of cardiac dysrhythmias or at baseline rhythm  Description: INTERVENTIONS:  - Continuous cardiac monitoring, vital signs, obtain 12 lead EKG if ordered  - Administer antiarrhythmic and heart rate control medications as ordered  - Monitor electrolytes and administer replacement therapy as ordered  Outcome: Progressing     Problem: RESPIRATORY - ADULT  Goal: Achieves optimal ventilation and oxygenation  Description: INTERVENTIONS:  - Assess for changes in respiratory status  - Assess for changes in mentation and behavior  - Position to facilitate oxygenation and minimize respiratory effort  - Oxygen administered by appropriate delivery if ordered  - Initiate smoking cessation education as indicated  - Encourage broncho-pulmonary hygiene including cough, deep breathe, Incentive Spirometry  - Assess the need for suctioning and aspirate as needed  - Assess and instruct to report SOB or any respiratory difficulty  - Respiratory Therapy support as indicated  Outcome: Progressing     Problem: GENITOURINARY - ADULT  Goal: Maintains or returns to baseline urinary function  Description: INTERVENTIONS:  - Assess urinary function  - Encourage oral fluids to ensure adequate hydration if ordered  - Administer IV fluids as ordered to ensure adequate hydration  - Administer ordered medications as needed  - Offer frequent toileting  - Follow urinary retention protocol if ordered  Outcome: Progressing  Goal: Absence of urinary retention  Description: INTERVENTIONS:  - Assess patient's ability to void and empty bladder  - Monitor I/O  - Bladder scan as needed  - Discuss with physician/AP medications to alleviate retention as needed  - Discuss catheterization for long term situations as appropriate  Outcome: Progressing  Goal: Urinary catheter remains patent  Description: INTERVENTIONS:  - Assess patency of urinary catheter  - If patient has a chronic morales, consider changing catheter if non-functioning  - Follow guidelines for intermittent irrigation of non-functioning urinary catheter  Outcome: Progressing     Problem: METABOLIC, FLUID AND ELECTROLYTES - ADULT  Goal: Electrolytes maintained within normal limits  Description: INTERVENTIONS:  - Monitor labs and assess patient for signs and symptoms of electrolyte imbalances  - Administer electrolyte replacement as ordered  - Monitor response to electrolyte replacements, including repeat lab results as appropriate  - Instruct patient on fluid and nutrition as appropriate  Outcome: Progressing  Goal: Fluid balance maintained  Description: INTERVENTIONS:  - Monitor labs   - Monitor I/O and WT  - Instruct patient on fluid and nutrition as appropriate  - Assess for signs & symptoms of volume excess or deficit  Outcome: Progressing  Goal: Glucose maintained within target range  Description: INTERVENTIONS:  - Monitor Blood Glucose as ordered  - Assess for signs and symptoms of hyperglycemia and hypoglycemia  - Administer ordered medications to maintain glucose within target range  - Assess nutritional intake and initiate nutrition service referral as needed  Outcome: Progressing     Problem: MUSCULOSKELETAL - ADULT  Goal: Maintain or return mobility to safest level of function  Description: INTERVENTIONS:  - Assess patient's ability to carry out ADLs; assess patient's baseline for ADL function and identify physical deficits which impact ability to perform ADLs (bathing, care of mouth/teeth, toileting, grooming, dressing, etc )  - Assess/evaluate cause of self-care deficits   - Assess range of motion  - Assess patient's mobility  - Assess patient's need for assistive devices and provide as appropriate  - Encourage maximum independence but intervene and supervise when necessary  - Involve family in performance of ADLs  - Assess for home care needs following discharge   - Consider OT consult to assist with ADL evaluation and planning for discharge  - Provide patient education as appropriate  Outcome: Progressing  Goal: Maintain proper alignment of affected body part  Description: INTERVENTIONS:  - Support, maintain and protect limb and body alignment  - Provide patient/ family with appropriate education  Outcome: Progressing     Problem: MOBILITY - ADULT  Goal: Maintain or return to baseline ADL function  Description: INTERVENTIONS:  -  Assess patient's ability to carry out ADLs; assess patient's baseline for ADL function and identify physical deficits which impact ability to perform ADLs (bathing, care of mouth/teeth, toileting, grooming, dressing, etc )  - Assess/evaluate cause of self-care deficits   - Assess range of motion  - Assess patient's mobility; develop plan if impaired  - Assess patient's need for assistive devices and provide as appropriate  - Encourage maximum independence but intervene and supervise when necessary  - Involve family in performance of ADLs  - Assess for home care needs following discharge   - Consider OT consult to assist with ADL evaluation and planning for discharge  - Provide patient education as appropriate  Outcome: Progressing  Goal: Maintains/Returns to pre admission functional level  Description: INTERVENTIONS:  - Perform BMAT or MOVE assessment daily    - Set and communicate daily mobility goal to care team and patient/family/caregiver  - Collaborate with rehabilitation services on mobility goals if consulted  - Perform Range of Motion 3 times a day  - Reposition patient every 2 hours    - Dangle patient 3 times a day  - Stand patient 3 times a day  - Ambulate patient 3 times a day  - Out of bed to chair 3 times a day   - Out of bed for meals 3 times a day  - Out of bed for toileting  - Record patient progress and toleration of activity level   Outcome: Progressing     Problem: Prexisting or High Potential for Compromised Skin Integrity  Goal: Skin integrity is maintained or improved  Description: INTERVENTIONS:  - Identify patients at risk for skin breakdown  - Assess and monitor skin integrity  - Assess and monitor nutrition and hydration status  - Monitor labs   - Assess for incontinence   - Turn and reposition patient  - Assist with mobility/ambulation  - Relieve pressure over bony prominences  - Avoid friction and shearing  - Provide appropriate hygiene as needed including keeping skin clean and dry  - Evaluate need for skin moisturizer/barrier cream  - Collaborate with interdisciplinary team   - Patient/family teaching  - Consider wound care consult   Outcome: Progressing

## 2023-03-11 NOTE — ASSESSMENT & PLAN NOTE
Lab Results   Component Value Date    HGBA1C 5 6 03/10/2023       Recent Labs     03/10/23  1647 03/10/23  2203 03/11/23  0741 03/11/23  1124   POCGLU 160* 139 126 262*       Blood Sugar Average: Last 72 hrs:  (P) 171 3     · Continue PO Jardiance  · ISS with blood glucose monitoring ACHS  · Hypoglycemia protocol

## 2023-03-11 NOTE — ASSESSMENT & PLAN NOTE
· Recent gastrointestinal hemorrhage thought to be diverticular bleeding found on colonoscopy on 3/3/2023  · Serial laboratory testing to monitor hemoglobin/hematocrit levels  · Initiate cyanocobalamin 500 mcg PO Qdaily for a low-normal vitamin B12 level  · If the patient continues to have orthostatic hypotension, he will need a Gastroenterology evaluation for possible, recurrent gastrointestinal hemorrhage

## 2023-03-11 NOTE — ASSESSMENT & PLAN NOTE
· Continue PO levothyroxine  · Recheck a TSH level in 1-2 weeks with his PCP  · If the TSH level remains elevated, he will need an increased dose of PO levothyroxine prescribed by his PCP       Latest Reference Range & Units 03/09/23 05:03   TSH 3RD GENERATON 0 450 - 4 500 uIU/mL 5 668 (H)   (H): Data is abnormally high

## 2023-03-11 NOTE — ASSESSMENT & PLAN NOTE
· Maintain the Jose catheter for now  · Hold tamsulosin in the setting of orthostatic hypotension  · Outpatient follow-up with Urology

## 2023-03-11 NOTE — ASSESSMENT & PLAN NOTE
· Known CAD with recent drug-eluting stent placement/PTCA to the LAD on 12/29/2022  · I appreciate Cardiology's input    · No indication for repeat cardiac catheterization at this time per Cardiology  · Continue plavix 75 mg PO Qdaily  · Plan to restart aspirin 81 mg PO Qdaily next week if no signs of additional gastrointestinal bleeding  · Continue intensive statin therapy with atorvastatin 80 mg PO Qdaily with dinner  · Not on beta-blocker therapy at this time due to orthostatic hypotension

## 2023-03-11 NOTE — ASSESSMENT & PLAN NOTE
· Resolved with magnesium replacement therapy  · Follow the magnesium level    Results from last 7 days   Lab Units 03/11/23  0506 03/10/23  0459 03/09/23  0503   MAGNESIUM mg/dL 2 0 2 0 1 9

## 2023-03-12 LAB
ALBUMIN SERPL BCP-MCNC: 3.1 G/DL (ref 3.5–5)
ALP SERPL-CCNC: 54 U/L (ref 34–104)
ALT SERPL W P-5'-P-CCNC: 26 U/L (ref 7–52)
ANION GAP SERPL CALCULATED.3IONS-SCNC: 8 MMOL/L (ref 4–13)
AST SERPL W P-5'-P-CCNC: 23 U/L (ref 13–39)
BASOPHILS # BLD AUTO: 0.03 THOUSANDS/ÂΜL (ref 0–0.1)
BASOPHILS NFR BLD AUTO: 0 % (ref 0–1)
BILIRUB SERPL-MCNC: 0.43 MG/DL (ref 0.2–1)
BUN SERPL-MCNC: 10 MG/DL (ref 5–25)
CA-I BLD-SCNC: 1.14 MMOL/L (ref 1.12–1.32)
CALCIUM ALBUM COR SERPL-MCNC: 9 MG/DL (ref 8.3–10.1)
CALCIUM SERPL-MCNC: 8.3 MG/DL (ref 8.4–10.2)
CHLORIDE SERPL-SCNC: 104 MMOL/L (ref 96–108)
CO2 SERPL-SCNC: 26 MMOL/L (ref 21–32)
CREAT SERPL-MCNC: 1.08 MG/DL (ref 0.6–1.3)
EOSINOPHIL # BLD AUTO: 0.43 THOUSAND/ÂΜL (ref 0–0.61)
EOSINOPHIL NFR BLD AUTO: 6 % (ref 0–6)
ERYTHROCYTE [DISTWIDTH] IN BLOOD BY AUTOMATED COUNT: 17.7 % (ref 11.6–15.1)
GFR SERPL CREATININE-BSD FRML MDRD: 66 ML/MIN/1.73SQ M
GLUCOSE SERPL-MCNC: 122 MG/DL (ref 65–140)
GLUCOSE SERPL-MCNC: 124 MG/DL (ref 65–140)
GLUCOSE SERPL-MCNC: 199 MG/DL (ref 65–140)
GLUCOSE SERPL-MCNC: 220 MG/DL (ref 65–140)
GLUCOSE SERPL-MCNC: 237 MG/DL (ref 65–140)
HCT VFR BLD AUTO: 28.4 % (ref 36.5–49.3)
HGB BLD-MCNC: 9.4 G/DL (ref 12–17)
IMM GRANULOCYTES # BLD AUTO: 0.03 THOUSAND/UL (ref 0–0.2)
IMM GRANULOCYTES NFR BLD AUTO: 0 % (ref 0–2)
LYMPHOCYTES # BLD AUTO: 1.78 THOUSANDS/ÂΜL (ref 0.6–4.47)
LYMPHOCYTES NFR BLD AUTO: 27 % (ref 14–44)
MAGNESIUM SERPL-MCNC: 2.1 MG/DL (ref 1.9–2.7)
MCH RBC QN AUTO: 32.6 PG (ref 26.8–34.3)
MCHC RBC AUTO-ENTMCNC: 33.1 G/DL (ref 31.4–37.4)
MCV RBC AUTO: 99 FL (ref 82–98)
MONOCYTES # BLD AUTO: 0.63 THOUSAND/ÂΜL (ref 0.17–1.22)
MONOCYTES NFR BLD AUTO: 9 % (ref 4–12)
NEUTROPHILS # BLD AUTO: 3.82 THOUSANDS/ÂΜL (ref 1.85–7.62)
NEUTS SEG NFR BLD AUTO: 58 % (ref 43–75)
NRBC BLD AUTO-RTO: 0 /100 WBCS
PHOSPHATE SERPL-MCNC: 3.7 MG/DL (ref 2.3–4.1)
PLATELET # BLD AUTO: 319 THOUSANDS/UL (ref 149–390)
PMV BLD AUTO: 9.1 FL (ref 8.9–12.7)
POTASSIUM SERPL-SCNC: 4 MMOL/L (ref 3.5–5.3)
PROCALCITONIN SERPL-MCNC: 0.07 NG/ML
PROT SERPL-MCNC: 5.4 G/DL (ref 6.4–8.4)
RBC # BLD AUTO: 2.88 MILLION/UL (ref 3.88–5.62)
SODIUM SERPL-SCNC: 138 MMOL/L (ref 135–147)
WBC # BLD AUTO: 6.72 THOUSAND/UL (ref 4.31–10.16)

## 2023-03-12 RX ORDER — SODIUM CHLORIDE 9 MG/ML
100 INJECTION, SOLUTION INTRAVENOUS CONTINUOUS
Status: DISCONTINUED | OUTPATIENT
Start: 2023-03-12 | End: 2023-03-14

## 2023-03-12 RX ADMIN — ESCITALOPRAM OXALATE 10 MG: 10 TABLET ORAL at 08:08

## 2023-03-12 RX ADMIN — MULTIPLE VITAMINS W/ MINERALS TAB 1 TABLET: TAB at 08:08

## 2023-03-12 RX ADMIN — INSULIN LISPRO 2 UNITS: 100 INJECTION, SOLUTION INTRAVENOUS; SUBCUTANEOUS at 16:35

## 2023-03-12 RX ADMIN — INSULIN LISPRO 4 UNITS: 100 INJECTION, SOLUTION INTRAVENOUS; SUBCUTANEOUS at 22:07

## 2023-03-12 RX ADMIN — EMPAGLIFLOZIN 10 MG: 10 TABLET, FILM COATED ORAL at 08:11

## 2023-03-12 RX ADMIN — RANOLAZINE 500 MG: 500 TABLET, EXTENDED RELEASE ORAL at 16:35

## 2023-03-12 RX ADMIN — FLUTICASONE PROPIONATE 2 SPRAY: 50 SPRAY, METERED NASAL at 08:12

## 2023-03-12 RX ADMIN — ALLOPURINOL 100 MG: 100 TABLET ORAL at 08:08

## 2023-03-12 RX ADMIN — LEVOTHYROXINE SODIUM 88 MCG: 88 TABLET ORAL at 05:26

## 2023-03-12 RX ADMIN — ATORVASTATIN CALCIUM 80 MG: 40 TABLET, FILM COATED ORAL at 16:35

## 2023-03-12 RX ADMIN — RANOLAZINE 500 MG: 500 TABLET, EXTENDED RELEASE ORAL at 08:08

## 2023-03-12 RX ADMIN — PANTOPRAZOLE SODIUM 40 MG: 40 INJECTION, POWDER, FOR SOLUTION INTRAVENOUS at 22:14

## 2023-03-12 RX ADMIN — SODIUM CHLORIDE 100 ML/HR: 0.9 INJECTION, SOLUTION INTRAVENOUS at 18:26

## 2023-03-12 RX ADMIN — CLOPIDOGREL BISULFATE 75 MG: 75 TABLET ORAL at 08:08

## 2023-03-12 RX ADMIN — CYANOCOBALAMIN TAB 500 MCG 500 MCG: 500 TAB at 08:08

## 2023-03-12 RX ADMIN — INSULIN LISPRO 4 UNITS: 100 INJECTION, SOLUTION INTRAVENOUS; SUBCUTANEOUS at 11:37

## 2023-03-12 NOTE — ASSESSMENT & PLAN NOTE
· Continues to experience orthostatic hypotension with severe lightheadedness and dizziness while standing and a systolic blood pressure in the 80's mmHg  · Hold tamsulosin for now  · Treated with NSS IV fluids  · Concern for recurrent gastrointestinal bleeding  · Continue to monitor  · May require the initiation of a medication to treat orthostatic hypotension  · PT/OT-recommending post-acute care rehabilitation services, however, the patient declines short-term rehabilitation at this time

## 2023-03-12 NOTE — ASSESSMENT & PLAN NOTE
Lab Results   Component Value Date    HGBA1C 5 6 03/10/2023       Recent Labs     03/11/23  2153 03/12/23  0722 03/12/23  1120 03/12/23  1542   POCGLU 178* 124 220* 199*       Blood Sugar Average: Last 72 hrs:  (P) 173 [id number]     · Continue PO Jardiance  · ISS with blood glucose monitoring ACHS  · Hypoglycemia protocol

## 2023-03-12 NOTE — PLAN OF CARE
Problem: PAIN - ADULT  Goal: Verbalizes/displays adequate comfort level or baseline comfort level  Description: Interventions:  - Encourage patient to monitor pain and request assistance  - Assess pain using appropriate pain scale  - Administer analgesics based on type and severity of pain and evaluate response  - Implement non-pharmacological measures as appropriate and evaluate response  - Consider cultural and social influences on pain and pain management  - Notify physician/advanced practitioner if interventions unsuccessful or patient reports new pain  Outcome: Progressing     Problem: INFECTION - ADULT  Goal: Absence or prevention of progression during hospitalization  Description: INTERVENTIONS:  - Assess and monitor for signs and symptoms of infection  - Monitor lab/diagnostic results  - Monitor all insertion sites, i e  indwelling lines, tubes, and drains  - Monitor endotracheal if appropriate and nasal secretions for changes in amount and color  - Nicholson appropriate cooling/warming therapies per order  - Administer medications as ordered  - Instruct and encourage patient and family to use good hand hygiene technique  - Identify and instruct in appropriate isolation precautions for identified infection/condition  Outcome: Progressing     Problem: SAFETY ADULT  Goal: Patient will remain free of falls  Description: INTERVENTIONS:  - Educate patient/family on patient safety including physical limitations  - Instruct patient to call for assistance with activity   - Consult OT/PT to assist with strengthening/mobility   - Keep Call bell within reach  - Keep bed low and locked with side rails adjusted as appropriate  - Keep care items and personal belongings within reach  - Initiate and maintain comfort rounds  - Make Fall Risk Sign visible to staff  - Offer Toileting every 2 Hours, in advance of need  - Initiate/Maintain bed/chair alarm  - Obtain necessary fall risk management equipment: non-skid footwear  - Apply yellow socks and bracelet for high fall risk patients  - Consider moving patient to room near nurses station  Outcome: Progressing  Goal: Maintain or return to baseline ADL function  Description: INTERVENTIONS:  -  Assess patient's ability to carry out ADLs; assess patient's baseline for ADL function and identify physical deficits which impact ability to perform ADLs (bathing, care of mouth/teeth, toileting, grooming, dressing, etc )  - Assess/evaluate cause of self-care deficits   - Assess range of motion  - Assess patient's mobility; develop plan if impaired  - Assess patient's need for assistive devices and provide as appropriate  - Encourage maximum independence but intervene and supervise when necessary  - Involve family in performance of ADLs  - Assess for home care needs following discharge   - Consider OT consult to assist with ADL evaluation and planning for discharge  - Provide patient education as appropriate  Outcome: Progressing  Goal: Maintains/Returns to pre admission functional level  Description: INTERVENTIONS:  - Perform BMAT or MOVE assessment daily    - Set and communicate daily mobility goal to care team and patient/family/caregiver  - Collaborate with rehabilitation services on mobility goals if consulted  - Perform Range of Motion 3 times a day  - Reposition patient every 2 hours    - Dangle patient 3 times a day  - Stand patient 3 times a day  - Ambulate patient 3 times a day  - Out of bed to chair 3 times a day   - Out of bed for meals 3 times a day  - Out of bed for toileting  - Record patient progress and toleration of activity level   Outcome: Progressing     Problem: DISCHARGE PLANNING  Goal: Discharge to home or other facility with appropriate resources  Description: INTERVENTIONS:  - Identify barriers to discharge w/patient and caregiver  - Arrange for needed discharge resources and transportation as appropriate  - Identify discharge learning needs (meds, wound care, etc )  - Arrange for interpretive services to assist at discharge as needed  - Refer to Case Management Department for coordinating discharge planning if the patient needs post-hospital services based on physician/advanced practitioner order or complex needs related to functional status, cognitive ability, or social support system  Outcome: Progressing     Problem: Knowledge Deficit  Goal: Patient/family/caregiver demonstrates understanding of disease process, treatment plan, medications, and discharge instructions  Description: Complete learning assessment and assess knowledge base    Interventions:  - Provide teaching at level of understanding  - Provide teaching via preferred learning methods  Outcome: Progressing     Problem: CARDIOVASCULAR - ADULT  Goal: Maintains optimal cardiac output and hemodynamic stability  Description: INTERVENTIONS:  - Monitor I/O, vital signs and rhythm  - Monitor for S/S and trends of decreased cardiac output  - Administer and titrate ordered vasoactive medications to optimize hemodynamic stability  - Assess quality of pulses, skin color and temperature  - Assess for signs of decreased coronary artery perfusion  - Instruct patient to report change in severity of symptoms  Outcome: Progressing  Goal: Absence of cardiac dysrhythmias or at baseline rhythm  Description: INTERVENTIONS:  - Continuous cardiac monitoring, vital signs, obtain 12 lead EKG if ordered  - Administer antiarrhythmic and heart rate control medications as ordered  - Monitor electrolytes and administer replacement therapy as ordered  Outcome: Progressing     Problem: RESPIRATORY - ADULT  Goal: Achieves optimal ventilation and oxygenation  Description: INTERVENTIONS:  - Assess for changes in respiratory status  - Assess for changes in mentation and behavior  - Position to facilitate oxygenation and minimize respiratory effort  - Oxygen administered by appropriate delivery if ordered  - Initiate smoking cessation education as indicated  - Encourage broncho-pulmonary hygiene including cough, deep breathe, Incentive Spirometry  - Assess the need for suctioning and aspirate as needed  - Assess and instruct to report SOB or any respiratory difficulty  - Respiratory Therapy support as indicated  Outcome: Progressing     Problem: GENITOURINARY - ADULT  Goal: Maintains or returns to baseline urinary function  Description: INTERVENTIONS:  - Assess urinary function  - Encourage oral fluids to ensure adequate hydration if ordered  - Administer IV fluids as ordered to ensure adequate hydration  - Administer ordered medications as needed  - Offer frequent toileting  - Follow urinary retention protocol if ordered  Outcome: Progressing  Goal: Absence of urinary retention  Description: INTERVENTIONS:  - Assess patient's ability to void and empty bladder  - Monitor I/O  - Bladder scan as needed  - Discuss with physician/AP medications to alleviate retention as needed  - Discuss catheterization for long term situations as appropriate  Outcome: Progressing  Goal: Urinary catheter remains patent  Description: INTERVENTIONS:  - Assess patency of urinary catheter  - If patient has a chronic morales, consider changing catheter if non-functioning  - Follow guidelines for intermittent irrigation of non-functioning urinary catheter  Outcome: Progressing     Problem: METABOLIC, FLUID AND ELECTROLYTES - ADULT  Goal: Electrolytes maintained within normal limits  Description: INTERVENTIONS:  - Monitor labs and assess patient for signs and symptoms of electrolyte imbalances  - Administer electrolyte replacement as ordered  - Monitor response to electrolyte replacements, including repeat lab results as appropriate  - Instruct patient on fluid and nutrition as appropriate  Outcome: Progressing  Goal: Fluid balance maintained  Description: INTERVENTIONS:  - Monitor labs   - Monitor I/O and WT  - Instruct patient on fluid and nutrition as appropriate  - Assess for signs & symptoms of volume excess or deficit  Outcome: Progressing  Goal: Glucose maintained within target range  Description: INTERVENTIONS:  - Monitor Blood Glucose as ordered  - Assess for signs and symptoms of hyperglycemia and hypoglycemia  - Administer ordered medications to maintain glucose within target range  - Assess nutritional intake and initiate nutrition service referral as needed  Outcome: Progressing     Problem: MUSCULOSKELETAL - ADULT  Goal: Maintain or return mobility to safest level of function  Description: INTERVENTIONS:  - Assess patient's ability to carry out ADLs; assess patient's baseline for ADL function and identify physical deficits which impact ability to perform ADLs (bathing, care of mouth/teeth, toileting, grooming, dressing, etc )  - Assess/evaluate cause of self-care deficits   - Assess range of motion  - Assess patient's mobility  - Assess patient's need for assistive devices and provide as appropriate  - Encourage maximum independence but intervene and supervise when necessary  - Involve family in performance of ADLs  - Assess for home care needs following discharge   - Consider OT consult to assist with ADL evaluation and planning for discharge  - Provide patient education as appropriate  Outcome: Progressing  Goal: Maintain proper alignment of affected body part  Description: INTERVENTIONS:  - Support, maintain and protect limb and body alignment  - Provide patient/ family with appropriate education  Outcome: Progressing     Problem: MOBILITY - ADULT  Goal: Maintain or return to baseline ADL function  Description: INTERVENTIONS:  -  Assess patient's ability to carry out ADLs; assess patient's baseline for ADL function and identify physical deficits which impact ability to perform ADLs (bathing, care of mouth/teeth, toileting, grooming, dressing, etc )  - Assess/evaluate cause of self-care deficits   - Assess range of motion  - Assess patient's mobility; develop plan if impaired  - Assess patient's need for assistive devices and provide as appropriate  - Encourage maximum independence but intervene and supervise when necessary  - Involve family in performance of ADLs  - Assess for home care needs following discharge   - Consider OT consult to assist with ADL evaluation and planning for discharge  - Provide patient education as appropriate  Outcome: Progressing  Goal: Maintains/Returns to pre admission functional level  Description: INTERVENTIONS:  - Perform BMAT or MOVE assessment daily    - Set and communicate daily mobility goal to care team and patient/family/caregiver  - Collaborate with rehabilitation services on mobility goals if consulted  - Perform Range of Motion 3 times a day  - Reposition patient every 2 hours    - Dangle patient 3 times a day  - Stand patient 3 times a day  - Ambulate patient 3 times a day  - Out of bed to chair 3 times a day   - Out of bed for meals 3 times a day  - Out of bed for toileting  - Record patient progress and toleration of activity level   Outcome: Progressing     Problem: Prexisting or High Potential for Compromised Skin Integrity  Goal: Skin integrity is maintained or improved  Description: INTERVENTIONS:  - Identify patients at risk for skin breakdown  - Assess and monitor skin integrity  - Assess and monitor nutrition and hydration status  - Monitor labs   - Assess for incontinence   - Turn and reposition patient  - Assist with mobility/ambulation  - Relieve pressure over bony prominences  - Avoid friction and shearing  - Provide appropriate hygiene as needed including keeping skin clean and dry  - Evaluate need for skin moisturizer/barrier cream  - Collaborate with interdisciplinary team   - Patient/family teaching  - Consider wound care consult   Outcome: Progressing

## 2023-03-12 NOTE — ASSESSMENT & PLAN NOTE
Wt Readings from Last 3 Encounters:   03/12/23 101 kg (222 lb 7 1 oz)   03/08/23 105 kg (231 lb 7 7 oz)   03/02/23 98 9 kg (218 lb 0 6 oz)     · Received lasix 40 mg IV x 1 dose per Cardiology  · Hold off on additional diuresis in the setting of orthostatic hypotension

## 2023-03-12 NOTE — ASSESSMENT & PLAN NOTE
· Known CAD with recent drug-eluting stent placement/PTCA to the LAD on 12/29/2022  · I appreciate Cardiology's input    · No indication for repeat cardiac catheterization at this time per Cardiology  · Continue plavix 75 mg PO Qdaily  · Plan to restart aspirin 81 mg PO Qdaily this week if no signs of additional gastrointestinal bleeding  · Continue intensive statin therapy with atorvastatin 80 mg PO Qdaily with dinner  · Not on beta-blocker therapy at this time due to orthostatic hypotension

## 2023-03-12 NOTE — ASSESSMENT & PLAN NOTE
· Maintain the Jose catheter for now  · Hold tamsulosin in the setting of orthostatic hypotension  · Consult Urology

## 2023-03-12 NOTE — PROGRESS NOTES
[address] [facility]  Progress Note - Kim Mckenna 1945, 69 y o  male MRN: [id number]  Unit/Bed#: 658-02 Encounter: [id number]  Primary Care Provider: Daphnie Carlson DO   Date and time admitted to hospital: 3/8/2023 11:01 PM    * Orthostatic hypotension  Assessment & Plan  · Continues to experience orthostatic hypotension with severe lightheadedness and dizziness while standing and a systolic blood pressure in the 80's mmHg  · Hold tamsulosin for now  · Treated with NSS IV fluids  · Concern for recurrent gastrointestinal bleeding  · Continue to monitor  · May require the initiation of a medication to treat orthostatic hypotension  · PT/OT-recommending post-acute care rehabilitation services, however, the patient declines short-term rehabilitation at this time    Other chest pain  Assessment & Plan  · Known CAD with recent drug-eluting stent placement/PTCA to the LAD on 12/29/2022  · I appreciate Cardiology's input  · No indication for repeat cardiac catheterization at this time per Cardiology  · Continue plavix 75 mg PO Qdaily  · Plan to restart aspirin 81 mg PO Qdaily this week if no signs of additional gastrointestinal bleeding  · Continue intensive statin therapy with atorvastatin 80 mg PO Qdaily with dinner  · Not on beta-blocker therapy at this time due to orthostatic hypotension    Hypothyroidism  Assessment & Plan  · Continue PO levothyroxine  · Recheck a TSH level in 1-2 weeks with his PCP  · If the TSH level remains elevated, he will need an increased dose of PO levothyroxine prescribed by his PCP       Latest Reference Range & Units 03/09/23 05:03   TSH 3RD GENERATON 0 450 - 4 500 uIU/mL 5 668 (H)   (H): Data is abnormally high    Acute blood loss anemia  Assessment & Plan  · Recent gastrointestinal hemorrhage thought to be diverticular bleeding found on colonoscopy on 3/3/2023  · Serial laboratory testing to monitor hemoglobin/hematocrit levels  · Initiated cyanocobalamin 500 mcg PO Qdaily for a low-normal vitamin B12 level  · If the patient continues to have orthostatic hypotension, he will need a Gastroenterology evaluation for possible, recurrent gastrointestinal hemorrhage      Results from last 7 days   Lab Units 03/12/23  0515 03/11/23  0506 03/10/23  1435 03/10/23  0459   WBC Thousand/uL 6 72 6 57  --  5 40   HEMOGLOBIN g/dL 9 4* 9 2* 9 7* 8 0*   HEMATOCRIT % 28 4* 27 9* 29 3* 24 3*   PLATELETS Thousands/uL 319 299  --  285         Intractable headache  Assessment & Plan  · No acute intracranial abnormality from CT scan of the head without contrast on 3/9/2023    Elevated d-dimer  Assessment & Plan  · No evidence of pulmonary embolism on CTA of the chest/PE protocol completed on 3/9/2023  · No evidence of DVT on venous duplex of both lower extremities on 03/09/2023    Elevated TSH  Assessment & Plan  · Please see the assessment and plan for "Hypothyrodism"    Hypomagnesemia  Assessment & Plan  · Resolved with magnesium replacement therapy  · Follow the magnesium level    Results from last 7 days   Lab Units 03/12/23  0515 03/11/23  0506 03/10/23  0459   MAGNESIUM mg/dL 2 1 2 0 2 0         Elevated troponin  Assessment & Plan  · Likely a non-MI troponin elevation in the setting of anemia    Urinary retention  Assessment & Plan  · Maintain the Jose catheter for now  · Hold tamsulosin in the setting of orthostatic hypotension  · Consult Urology    Chronic diastolic (congestive) heart failure (HCC)  Assessment & Plan  Wt Readings from Last 3 Encounters:   03/12/23 101 kg (222 lb 7 1 oz)   03/08/23 105 kg (231 lb 7 7 oz)   03/02/23 98 9 kg (218 lb 0 6 oz)     · Received lasix 40 mg IV x 1 dose per Cardiology  · Hold off on additional diuresis in the setting of orthostatic hypotension        Aortic stenosis, moderate  Assessment & Plan  • Outpatient follow-up with Cardiology      Coronary artery disease of native artery of native heart with stable angina pectoris ([facility] [address] )  Assessment & Plan  · Known CAD with recent drug-eluting stent placement/PTCA to the LAD on 12/29/2022  · I appreciate Cardiology's input  · No indication for repeat cardiac catheterization at this time per Cardiology  · Continue plavix 75 mg PO Qdaily  · Plan to restart aspirin 81 mg PO Qdaily this week if no signs of additional gastrointestinal bleeding  · Continue intensive statin therapy with atorvastatin 80 mg PO Qdaily with dinner  · Not on beta-blocker therapy at this time due to orthostatic hypotension    Type 2 diabetes mellitus with hyperglycemia, without long-term current use of insulin [facility])  Assessment & Plan  Lab Results   Component Value Date    HGBA1C 5 6 03/10/2023       Recent Labs     03/11/23  2153 03/12/23  0722 03/12/23  1120 03/12/23  1542   POCGLU 178* 124 220* 199*       Blood Sugar Average: Last 72 hrs:  (P) 592 [id number]     · Continue PO Jardiance  · ISS with blood glucose monitoring ACHS  · Hypoglycemia protocol        VTE Pharmacologic Prophylaxis: VTE Score: 5 High Risk (Score >/= 5) - Pharmacological DVT Prophylaxis Contraindicated  Sequential Compression Devices Ordered  Patient Centered Rounds: I performed bedside rounds with nursing staff today  Discussions with Specialists or Other Care Team Provider: I discussed the case with Urology  Education and Discussions with Family / Patient: Updated  (wife) via phone  Total Time Spent on Date of Encounter in care of patient: 35 minutes This time was spent on one or more of the following: performing physical exam; counseling and coordination of care; obtaining or reviewing history; documenting in the medical record; reviewing/ordering tests, medications or procedures; communicating with other healthcare professionals and discussing with patient's family/caregivers      Current Length of Stay: 3 day(s)  Current Patient Status: Inpatient   Certification Statement: The patient will continue to require additional inpatient hospital stay due to the need for treatment of the orthostatic hypotension and for serial laboratory testing to monitor the hemoglobin/hematocrit levels with the possibility of a gastrointestinal hemorrhage  Discharge Plan: Anticipate discharge in 24-48 hrs to home with home services  Code Status: Level 1 - Full Code    Subjective: The patient was seen and examined  The patient experienced lightheadedness and dizziness when getting out of bed to the chair  No chest pain  No shortness of breath  No abdominal pain  No nausea or vomiting  Objective:     Vitals:   Temp (24hrs), Av 9 °F (36 6 °C), Min:97 8 °F (36 6 °C), Max:98 1 °F (36 7 °C)    Temp:  [97 8 °F (36 6 °C)-98 1 °F (36 7 °C)] 97 9 °F (36 6 °C)  HR:  [75-88] 85  Resp:  [18] 18  BP: (110-135)/(63-69) 114/65  SpO2:  [95 %-99 %] 96 %  Body mass index is 32 85 kg/m²  Input and Output Summary (last 24 hours):      Intake/Output Summary (Last 24 hours) at 3/12/2023 1630  Last data filed at 3/12/2023 1300  Gross per 24 hour   Intake 640 ml   Output 6350 ml   Net -5710 ml       Physical Exam:   Physical Exam   General:  NAD, follows commands  HEENT:  NC/AT, mucous membranes moist  Neck:  Supple, No JVP elevation  CV:  + S1, + S2, RRR  Pulm:  Lung fields are CTA bilaterally  Abd:  Soft, Non-tender, Non-distended  Ext:  No clubbing/cyanosis/edema  Skin:  No rashes  Neuro:  Awake, alert, oriented  Psych:  Normal mood and affect      Additional Data:    Labs:  Results from last 7 days   Lab Units 23  0515   WBC Thousand/uL 6 72   HEMOGLOBIN g/dL 9 4*   HEMATOCRIT % 28 4*   PLATELETS Thousands/uL 319   NEUTROS PCT % 58   LYMPHS PCT % 27   MONOS PCT % 9   EOS PCT % 6     Results from last 7 days   Lab Units 23  0515   SODIUM mmol/L 138   POTASSIUM mmol/L 4 0   CHLORIDE mmol/L 104   CO2 mmol/L 26   BUN mg/dL 10   CREATININE mg/dL 1 08   ANION GAP mmol/L 8   CALCIUM mg/dL 8 3*   ALBUMIN g/dL 3 1*   TOTAL BILIRUBIN mg/dL 0 43   ALK PHOS U/L 54   ALT U/L 26   AST U/L 23   GLUCOSE RANDOM mg/dL 122     Results from last 7 days   Lab Units 03/08/23  2346   INR  0 90     Results from last 7 days   Lab Units 03/12/23  1542 03/12/23  1120 03/12/23  0722 03/11/23  2153 03/11/23  1549 03/11/23  1124 03/11/23  0741 03/10/23  2203 03/10/23  1647 03/10/23  1152 03/10/23  0723 03/09/23  2100   POC GLUCOSE mg/dl 199* 220* 124 178* 186* 262* 126 139 160* 189* 136 186*     Results from last 7 days   Lab Units 03/10/23  0459   HEMOGLOBIN A1C % 5 6     Results from last 7 days   Lab Units 03/12/23  0515 03/11/23  0506 03/10/23  0459   LACTIC ACID mmol/L  --   --  0 8   PROCALCITONIN ng/ml 0 07 0 08 0 07       Lines/Drains:  Invasive Devices     Peripheral Intravenous Line  Duration           Peripheral IV 03/08/23 Distal;Right;Upper;Ventral (anterior) Arm 3 days          Drain  Duration           Urethral Catheter Temperature probe 8 days              Urinary Catheter:  Goal for removal: N/A- Discharging with Jose           Telemetry:  Telemetry Orders (From admission, onward)             48 Hour Telemetry Monitoring  Continuous x 48 hours        References:    Telemetry Guidelines   Question:  Reason for 48 Hour Telemetry  Answer:  Arrhythmias Requiring Medical Therapy (eg  SVT, Vtach/fib, Bradycardia, Uncontrolled A-fib)                 Telemetry Reviewed: Normal Sinus Rhythm  Indication for Continued Telemetry Use: Arrthymias requiring medical therapy             Imaging: No pertinent imaging reviewed      Recent Cultures (last 7 days):   Results from last 7 days   Lab Units 03/09/23  0722   URINE CULTURE  No Growth <1000 cfu/mL       Last 24 Hours Medication List:   Current Facility-Administered Medications   Medication Dose Route Frequency Provider Last Rate   • acetaminophen  650 mg Oral Q6H PRN Idris Marin MD     • allopurinol  100 mg Oral Daily Idris Marin MD     • aluminum-magnesium hydroxide-simethicone  30 mL Oral Q4H PRN Idris Marin MD • atorvastatin  80 mg Oral Daily With Dinner Saurabh Arriola MD     • clopidogrel  75 mg Oral Daily aSurabh Arriola MD     • cyanocobalamin  500 mcg Oral Daily Frederic Bartlett DO     • Empagliflozin  10 mg Oral QAM Saurabh Arriola MD     • escitalopram  10 mg Oral Daily Saurabh Arriola MD     • fluticasone  2 spray Each Nare Daily Jennifer Frazier MD     • insulin lispro  2-12 Units Subcutaneous TID AC Saurabh Arriola MD     • insulin lispro  2-12 Units Subcutaneous HS Saurabh Arriola MD     • levothyroxine  88 mcg Oral Early Morning Saurabh Arriola MD     • morphine injection  2 mg Intravenous Q3H PRN Saurabh Arriola MD     • multivitamin-minerals  1 tablet Oral Daily Saurabh Arriola MD     • ondansetron  4 mg Intravenous Q6H PRN Saurabh Arriola MD     • pantoprazole  40 mg Intravenous Q24H Saurabh Arriola MD     • polyethylene glycol  17 g Oral Daily PRN Saurabh Arriola MD     • ranolazine  500 mg Oral BID Saurabh Arriola MD          Today, Patient Was Seen By: Frederic Bartlett DO    **Please Note: This note may have been constructed using a voice recognition system  **

## 2023-03-12 NOTE — ASSESSMENT & PLAN NOTE
ADULT ED Note    Patient interviewed and examined.      Chief Complaint   Patient presents with   • Abdominal Pain        HPI: This 88-year-old female presents to the emergency department with complaint of abdominal pain that began at around 11 PM last night.  She reports that she has not had any significant bowel movement for 3 or 4 days and passed minimal stool earlier today after using a laxative.  She complains of persistent abdominal cramps.  Earlier this evening she had nausea and dry heaves.  She feels slightly distended.  She is unaware fever.  She has had intermittent chills.  She is fully vaccinated for COVID-19 and received her Pfizer booster about 3 weeks ago.    ROS:  Review of Systems   Constitutional: Positive for chills. Negative for activity change and fever.   HENT: Negative for congestion, facial swelling and sore throat.    Eyes: Negative for pain, discharge and redness.   Respiratory: Negative for cough, shortness of breath and wheezing.    Cardiovascular: Negative for chest pain, palpitations and leg swelling.   Gastrointestinal: Positive for abdominal distention, abdominal pain, constipation, nausea and vomiting. Negative for diarrhea.   Endocrine: Negative for polydipsia and polyphagia.   Genitourinary: Negative for difficulty urinating, frequency, hematuria and urgency.   Musculoskeletal: Negative for back pain, joint swelling, myalgias and neck pain.   Skin: Negative for color change, rash and wound.   Neurological: Negative for dizziness, numbness and headaches.   Hematological: Does not bruise/bleed easily.   Psychiatric/Behavioral: Negative for behavioral problems and confusion.      All other systems reviewed and negative    PAST MEDICAL HISTORY:    Malignant neoplasm (CMS/HCC)                                  Essential (primary) hypertension                              High cholesterol                                             Breast cancer  Mitral valve  · No acute intracranial abnormality from CT scan of the head without contrast on 3/9/2023 prolapse  Osteoporosis    PAST SURGICAL HISTORY:    MASTECTOMY                                                    TONSILLECTOMY                                                Hysterectomy  Cystocele  Rectocele  Cataract    FAMILY HISTORY:  History reviewed. No pertinent family history.    SOCIAL HISTORY:  Social History     Tobacco Use   • Smoking status: Never Smoker   • Smokeless tobacco: Never Used   Substance Use Topics   • Alcohol use: Not Currently   • Drug use: Never          OBJECTIVE:  Visit Vitals  BP (!) 155/92   Pulse (!) 107   Temp 98.4 °F (36.9 °C) (Oral)   Resp 15   Wt 59.7 kg (131 lb 9.8 oz)   SpO2 97%   BMI 24.07 kg/m²     Physical Exam  Vitals reviewed.   Constitutional:       General: She is not in acute distress.  HENT:      Head: Normocephalic and atraumatic.      Mouth/Throat:      Mouth: Mucous membranes are moist.   Eyes:      General: No scleral icterus.     Extraocular Movements: Extraocular movements intact.      Conjunctiva/sclera: Conjunctivae normal.      Pupils: Pupils are equal, round, and reactive to light.   Cardiovascular:      Rate and Rhythm: Regular rhythm. Tachycardia present.      Pulses: Normal pulses.      Heart sounds: Normal heart sounds.   Pulmonary:      Effort: Pulmonary effort is normal.      Breath sounds: Normal breath sounds.   Chest:      Chest wall: No tenderness.   Abdominal:      Comments: Mildly distended.  Hyperactive bowel sounds.  Generalized abdominal tenderness without rebound or guarding.   Musculoskeletal:         General: No swelling, tenderness or deformity. Normal range of motion.      Cervical back: Normal range of motion and neck supple.   Skin:     General: Skin is warm and dry.   Neurological:      General: No focal deficit present.      Mental Status: She is alert and oriented to person, place, and time.      Cranial Nerves: No cranial nerve deficit.   Psychiatric:         Mood and Affect: Mood normal.         Behavior: Behavior normal.         MDM  Number of Diagnoses or Management Options  Diagnosis management comments: 88-year-old presents with complaint of abdominal pain, nausea, vomiting with dry heaves and constipation.  Bowel obstruction needs to be excluded.  Metabolic screening ordered as well as CT abdomen pelvis.  Patient hydrated and treated symptomatically with fentanyl and ondansetron.       RADIOLOGY AND LAB RESULTS:    Results for orders placed or performed during the hospital encounter of 11/16/21   Comprehensive Metabolic Panel   Result Value    Fasting Status     Sodium 127 (L)    Potassium 4.2    Chloride 99    Carbon Dioxide 20 (L)    Anion Gap 12    Glucose 163 (H)    BUN 18    Creatinine 0.88    Glomerular Filtration Rate 59 (L)     Comment: eGFR 30-59 mL/min/1.73m2 = Moderate decrease in kidney function. Stage 3 CKD (chronic kidney disease) or moderate kidney disease. Estimated GFR calculated using the 2009 CKD-EPI creatinine equation.    BUN/ Creatinine Ratio 20    Calcium 9.5    Bilirubin, Total 0.4    GOT/AST 29    GPT/ALT 23    Alkaline Phosphatase 90    Albumin 3.7    Protein, Total 7.6    Globulin 3.9    A/G Ratio 0.9 (L)   Lipase   Result Value    Lipase 80   Urinalysis & Reflex Microscopy With Culture If Indicated   Result Value    COLOR, URINALYSIS Yellow    APPEARANCE, URINALYSIS Cloudy    GLUCOSE, URINALYSIS Negative    BILIRUBIN, URINALYSIS Negative    KETONES, URINALYSIS 20  (A)    SPECIFIC GRAVITY, URINALYSIS 1.025    OCCULT BLOOD, URINALYSIS Small (A)    PH, URINALYSIS 5.0    PROTEIN, URINALYSIS 30  (A)    UROBILINOGEN, URINALYSIS 0.2    NITRITE, URINALYSIS Negative    LEUKOCYTE ESTERASE, URINALYSIS Large (A)    SQUAMOUS EPITHELIAL, URINALYSIS 1 to 5    ERYTHROCYTES, URINALYSIS 11 to 25 (A)    LEUKOCYTES, URINALYSIS >100 (A)    BACTERIA, URINALYSIS Large (A)    HYALINE CASTS, URINALYSIS 6 to 10 (A)    CALCIUM OXALATE CRYSTALS Present    MUCUS Present   CBC with Automated Differential (performable only)   Result  Value    WBC 11.0    RBC 4.50    HGB 13.8    HCT 41.9    MCV 93.1    MCH 30.7    MCHC 32.9    RDW-CV 13.2    RDW-SD 45.0        NRBC 0    Neutrophil, Percent 84    Lymphocytes, Percent 8    Mono, Percent 7    Eosinophils, Percent 0    Basophils, Percent 0    Immature Granulocytes 1    Absolute Neutrophils 9.3 (H)    Absolute Lymphocytes 0.8 (L)    Absolute Monocytes 0.8    Absolute Eosinophils  0.0    Absolute Basophils 0.0    Absolute Immmature Granulocytes 0.1   Rapid SARS-CoV-2 by PCR    Specimen: Nasal, Mid-turbinate; Swab   Result Value    Rapid SARS-COV-2 by PCR Not Detected    Isolation Guidelines      Comment: Do not use this test result as the sole decision-maker for discontinuation of isolation.   Clinical evaluation should be considered for other respiratory illness requiring transmission-based isolation.    -    No fever (<99.0 F/37.2 C) for at least 24 hours without the use of fever-reducing medications    AND  -    Respiratory symptoms have improved or resolved (e.g. cough, shortness of breath)     AND  -    COVID-19 negative test    See COVID-19 Deisolation Resource Guide    Procedural Comment      Comment: SARS-CoV-2 nucleic acid has not been detected indicating the absence of COVID-19.       Testing was performed using the [employer] Xpert Xpress SARS-CoV-2 RT-PCR assay that has been given Emergency Use Authorization (EUA) by the [place] Food and Drug Administration (FDA).  These results are considered definitive and do not need to be confirmed by another method.       CT ABDOMEN PELVIS W CONTRAST - IV contrast only   Final Result   1.  Prominent caliber narrowing of the ascending colon /hepatic flexure   distal to which the colon is nearly completely decompressed, and proximal   to which ascending colon and cecum is dilated with dilated small bowel   consistent with colonic obstruction.   2.  Small volume of intra-abdominal free fluid with intermediate   attenuation and mesenteric edema  can be seen with bowel ischemia, however   the small and large bowel enhances.   3.  Large hiatal hernia containing the majority of the stomach although the   antrum and fundus remain below the diaphragm.  The superior extent of this   is not included on the field-of-view, and volvulus is possible.   4.  Please see above for additional observations.      Dr. Briones was notified of the findings over the telephone by Dr. Tobar   on 11/16/2021 at 23:50.      Electronically Signed by: JESUS TOBAR M.D.    Signed on: 11/16/2021 11:52 PM              EKG Results     EKG Interpretation  Normal sinus rhythm at a rate of 90.  Normal TX interval.  Right atrial enlargement.  Normal QRS duration.  Low voltage QRS.  No acute ischemic changes.  No old ECG available for comparison.  EKG tracing interpreted by ED physician    ED COURSE & MEDICAL DECISION MAKING:   Vitals:    11/16/21 2051 11/16/21 2154 11/16/21 2233 11/16/21 2357   BP: (!) 175/96 (!) 158/84 (!) 171/92 (!) 155/92   BP Location: LUE - Left upper extremity LUE - Left upper extremity     Patient Position: Supine Supine     Pulse: (!) 101 96 93 (!) 107   Resp: 18 18 17 15   Temp: 98.4 °F (36.9 °C)      TempSrc: Oral      SpO2: 97% 97% 97% 97%   Weight: 59.7 kg (131 lb 9.8 oz)           Medications   sodium chloride (NORMAL SALINE) 0.9 % bolus 1,000 mL (0 mLs Intravenous Completed 11/16/21 2334)   ondansetron (ZOFRAN) injection 4 mg (4 mg Intravenous Given 11/16/21 2133)   fentaNYL (SUBLIMAZE) injection 50 mcg (50 mcg Intravenous Given 11/16/21 2133)   cefTRIAXone (ROCEPHIN) syringe 1,000 mg (1,000 mg Intravenous Given 11/16/21 2318)   fentaNYL (SUBLIMAZE) injection 50 mcg (50 mcg Intravenous Given 11/16/21 2318)   iohexol (OMNIPAQUE 350 INJECT) contrast solution 100 mL (100 mLs Intravenous Given 11/16/21 2338)       ED Course as of Nov 17 0018   Wed Nov 17, 2021   0002 This discussed with Dr. JOSE Uribe, [facility] hospitalist covering for patient's primary physician,  Dr. Goyo Harrison.  She accepted the patient for admission and requested [facility].    [JK]   0011 Case discussed with Dr. Roth, general surgery.  He will see the patient in consultation.  He requested NG tube and requested GI consultation.  Patient has seen Dr. Hicks previously.    [JK]   0015 Case discussed with Dr. Muir, who was covering for patient's gastroenterologist, Dr. Hicks.  He will arrange for patient be seen in consultation.    [JK]      ED Course User Index  [JK] Balaji Briones MD       Procedures      DIAGNOSIS:  ED Diagnoses        Final diagnoses    Intestinal obstruction, unspecified cause, unspecified whether partial or complete (CMS/[facility])          Acute UTI (urinary tract infection)          Hyponatremia                   Disposition        Admit 11/17/2021 12:03 AM  Telemetry Bed?: No  Admitting Physician: GREY BARRETO [[id number]]  Comments: Rapid Covid negative  Is this a telephone or verbal order?: This is a verbal order from the admitting physician.    Balaji Briones MD  11/17/2021           Balaji Briones MD  11/17/21 0018

## 2023-03-12 NOTE — CONSULTS
Consultation - Urology  Maciel Brown 69 y o  male MRN: [id number]  Unit/Bed#: 781-43 Encounter: [id number]    Assessment:  18-year-old male with past medical history significant for BPH, CAD with MI, CKD, LUKE with CPAP use, DM, HTN presenting with orthostatic hypotension  Found to have urinary retention during hospitalization  -Afebrile, intermittent episodes of tachycardia 100s to 110s, intermittent episodes of hypotension with SBP 70s to 80s, other vital signs stable on room air  -UO 5 8 L via Jose, additional 900 cc recorded so far today  -WBC 6 72  -Hgb 9 4  -Plts 319  -CR 1 08  -Electrolytes unremarkable    Plan:  Maintain Jose catheter  Patient's home tamsulosin is currently being held in the setting of orthostatic hypotension  Likely with outpatient voiding trial in the outpatient urology office  Medical management per primary, appreciate recommendations  Discussed with attending    History of Present Illness   Chief Complaint: Urinary retention    HPI:  Maciel Brown is a 69 y o  male with past medical history significant for BPH, CAD with MI, CKD, LUKE with CPAP use, DM, HTN  who initially presented to [facility] ED on 03/09/2023 with complaints of substernal chest pain  Of note patient with recent hospitalization at [facility] from transfer from  [address]  For that admission patient presented with hypotension and bright red blood per rectum  Patient was emergently transferred for EGD and colonoscopy secondary to bleed  He was discharged from that hospitalization on 03/08/2023 with an indwelling urethral Jose catheter  Following that hospitalization it was recommended the patient follow-up with urology for outpatient voiding trial   Was unable to have that outpatient appointment secondary to rehospitalization  Evaluation this morning, patient reports episodes of urinary incontinence prior to Jose insertion    Patient additionally reports that he has difficulty starting a stream when he does have the urge to urinate before Jose placement  Otherwise without acute complaints at this time  Review of Systems   Constitutional: Negative for activity change, appetite change, chills and fever  HENT: Negative  Respiratory: Negative for cough and shortness of breath  Cardiovascular: Negative for chest pain, palpitations and leg swelling  Gastrointestinal: Negative for abdominal distention, abdominal pain, constipation, diarrhea, nausea and vomiting  Endocrine: Negative for polydipsia, polyphagia and polyuria  Genitourinary: Positive for difficulty urinating  Negative for flank pain, frequency, hematuria and urgency  Musculoskeletal: Negative for arthralgias and myalgias  Skin: Negative for color change, rash and wound  Neurological: Negative for dizziness, facial asymmetry and headaches  Historical Information   Past Medical History:   Diagnosis Date   • BPH (benign prostatic hyperplasia)    • CAD (coronary artery disease)    • Cancer (HCC)     basal cell   • Chronic kidney disease     stage 3   • Colon polyp    • CPAP (continuous positive airway pressure) dependence    • Diabetes mellitus (HCC)     niddm   • Disease of thyroid gland    • Gout    • High cholesterol    • Hypertension    • MI, 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    • Myocardial infarction ([facility] [address] ) 2014   • S/P cardiac catheterization 12/30/2022   • Sleep apnea      Past Surgical History:   Procedure Laterality Date   • BASAL CELL CARCINOMA EXCISION Right 1/21/2022    Procedure: EXCISION BASAL CELL CARCINOMA EXTREMITY;  Surgeon: Kandice Brunner, DO;  Location: [facility] MAIN OR;  Service: General   • CARDIAC CATHETERIZATION Left 12/29/2022    Procedure: Cardiac Left Heart Cath;  Surgeon: Sigrid Rivas MD;  Location: [facility] CARDIAC C[facility]; Service: Cardiology   • CARDIAC CATHETERIZATION N/A 12/29/2022    Procedure: Cardiac Coronary Angiogram;  Surgeon: Sigrid Rivas MD;  Location: [facility];   Service: Cardiology   • CARDIAC CATHETERIZATION N/A 12/29/2022    Procedure: Cardiac pci;  Surgeon: Jamil Lozano MD;  Location: [facility];   Service: Cardiology   • CATARACT EXTRACTION W/  INTRAOCULAR LENS IMPLANT     • CORNEAL TRANSPLANT     • CORONARY ANGIOPLASTY WITH STENT PLACEMENT      stents x3-- [phone]   • EYE SURGERY      repair corneal laceration   • MN COLONOSCOPY FLX DX W/COLLJ SPEC WHEN PFRMD N/A 3/21/2016    Procedure: COLONOSCOPY;  Surgeon: Ricardo Sotomayor MD;  Location: [facility] MA[facility];  Service: Colorectal   • TONSILLECTOMY AND ADENOIDECTOMY       Social History   Social History     Substance and Sexual Activity   Alcohol Use Not Currently    Comment: socially, cut down in 2008, previously did more than      Social History     Substance and Sexual Activity   Drug Use Never     E-Cigarette/Vaping   • E-Cigarette Use Never User      E-Cigarette/Vaping Substances   • Nicotine No    • THC No    • CBD No    • Flavoring No    • Other No    • Unknown No      Social History     Tobacco Use   Smoking Status Never   Smokeless Tobacco Never     Family History:   Family History   Adopted: Yes   Problem Relation Age of Onset   • No Known Problems Mother    • No Known Problems Father        Meds/Allergies   all current active meds have been reviewed  No Known Allergies    Objective   First Vitals:   Blood Pressure: 118/65 (03/08/23 2306)  Pulse: (!) 108 (03/08/23 2306)  Temperature: 98 4 °F (36 9 °C) (03/08/23 2306)  Temp Source: Temporal (03/08/23 2306)  Respirations: 20 (03/08/23 2306)  Height: 5' 8" (172 7 cm) (03/08/23 2306)  Weight - Scale: 105 kg (231 lb 7 7 oz) (03/08/23 2306)  SpO2: 97 % (03/08/23 2306)    Current Vitals:   Blood Pressure: 110/63 (03/12/23 0637)  Pulse: 88 (03/12/23 0637)  Temperature: 98 1 °F (36 7 °C) (03/12/23 0637)  Temp Source: Oral (03/10/23 0625)  Respirations: 18 (03/12/23 0637)  Height: 5' 9" (175 3 cm) (03/09/23 1029)  Weight - Scale: 101 kg (222 lb 7 1 oz) (03/12/23 0600)  SpO2: 95 % (03/12/23 1517)      Intake/Output Summary (Last 24 hours) at 3/12/2023 0911  Last data filed at 3/12/2023 4420  Gross per 24 hour   Intake 880 ml   Output 5800 ml   Net -4920 ml       Invasive Devices     Peripheral Intravenous Line  Duration           Peripheral IV 03/08/23 Distal;Right;Upper;Ventral (anterior) Arm 3 days          Drain  Duration           Urethral Catheter Temperature probe 7 days                Physical Exam  Vitals and nursing note reviewed  Constitutional:       General: He is not in acute distress  Appearance: Normal appearance  He is obese  He is not ill-appearing or toxic-appearing  HENT:      Head: Normocephalic and atraumatic  Cardiovascular:      Rate and Rhythm: Normal rate and regular rhythm  Pulses: Normal pulses  Pulmonary:      Effort: Pulmonary effort is normal    Abdominal:      General: Bowel sounds are normal  There is no distension  Palpations: Abdomen is soft  Tenderness: There is no abdominal tenderness  There is no right CVA tenderness, left CVA tenderness or guarding  Genitourinary:     Comments: Jose in place  Musculoskeletal:         General: Normal range of motion  Right lower leg: No edema  Left lower leg: No edema  Skin:     General: Skin is warm and dry  Neurological:      General: No focal deficit present  Mental Status: He is alert  Psychiatric:         Mood and Affect: Mood normal          Behavior: Behavior normal          Thought Content: Thought content normal        Lab Results:   I have personally reviewed pertinent lab results      CBC:   Lab Results   Component Value Date    WBC 6 72 03/12/2023    HGB 9 4 (L) 03/12/2023    HCT 28 4 (L) 03/12/2023    MCV 99 (H) 03/12/2023     03/12/2023    MCH 32 6 03/12/2023    MCHC 33 1 03/12/2023    RDW 17 7 (H) 03/12/2023    MPV 9 1 03/12/2023    NRBC 0 03/12/2023     CMP:   Lab Results   Component Value Date    SODIUM 138 03/12/2023    K 4 0 03/12/2023     03/12/2023    CO2 26 03/12/2023    BUN 10 03/12/2023    CREATININE 1 08 03/12/2023    CALCIUM 8 3 (L) 03/12/2023    AST 23 03/12/2023    ALT 26 03/12/2023    ALKPHOS 54 03/12/2023    EGFR 66 03/12/2023     Imaging: I have personally reviewed pertinent reports  EKG, Pathology, and Other Studies: I have personally reviewed pertinent reports  Code Status: Level 1 - Full Code  Advance Directive and Living Will:      Power of :    POLST:      Counseling / Coordination of Care  Total floor / unit time spent today 25 minutes  Greater than 50% of total time was spent with the patient and / or family counseling and / or coordination of care  A description of the counseling / coordination of care: Reviewing pertinent diagnostic imaging and laboratory studies, evaluation patient, discussion with attending and AP       Frannie Zhao PA-C

## 2023-03-12 NOTE — ASSESSMENT & PLAN NOTE
· Resolved with magnesium replacement therapy  · Follow the magnesium level    Results from last 7 days   Lab Units 03/12/23  0515 03/11/23  0506 03/10/23  0459   MAGNESIUM mg/dL 2 1 2 0 2 0

## 2023-03-12 NOTE — ASSESSMENT & PLAN NOTE
· Recent gastrointestinal hemorrhage thought to be diverticular bleeding found on colonoscopy on 3/3/2023  · Serial laboratory testing to monitor hemoglobin/hematocrit levels  · Initiated cyanocobalamin 500 mcg PO Qdaily for a low-normal vitamin B12 level  · If the patient continues to have orthostatic hypotension, he will need a Gastroenterology evaluation for possible, recurrent gastrointestinal hemorrhage      Results from last 7 days   Lab Units 03/12/23  0515 03/11/23  0506 03/10/23  1435 03/10/23  0459   WBC Thousand/uL 6 72 6 57  --  5 40   HEMOGLOBIN g/dL 9 4* 9 2* 9 7* 8 0*   HEMATOCRIT % 28 4* 27 9* 29 3* 24 3*   PLATELETS Thousands/uL 319 299  --  285

## 2023-03-13 ENCOUNTER — APPOINTMENT (INPATIENT)
Dept: CT IMAGING | Facility: HOSPITAL | Age: 78
End: 2023-03-13

## 2023-03-13 ENCOUNTER — APPOINTMENT (OUTPATIENT)
Dept: CARDIAC REHAB | Facility: HOSPITAL | Age: 78
End: 2023-03-13

## 2023-03-13 PROBLEM — R79.89 ELEVATED PLATELET COUNT: Status: ACTIVE | Noted: 2023-03-13

## 2023-03-13 PROBLEM — R11.0 NAUSEA: Status: ACTIVE | Noted: 2023-03-13

## 2023-03-13 LAB
ALBUMIN SERPL BCP-MCNC: 1.6 G/DL (ref 3.5–5)
ALBUMIN SERPL BCP-MCNC: 3.7 G/DL (ref 3.5–5)
ALP SERPL-CCNC: 25 U/L (ref 34–104)
ALP SERPL-CCNC: 61 U/L (ref 34–104)
ALT SERPL W P-5'-P-CCNC: 13 U/L (ref 7–52)
ALT SERPL W P-5'-P-CCNC: 32 U/L (ref 7–52)
ANION GAP SERPL CALCULATED.3IONS-SCNC: 5 MMOL/L (ref 4–13)
ANION GAP SERPL CALCULATED.3IONS-SCNC: 7 MMOL/L (ref 4–13)
AST SERPL W P-5'-P-CCNC: 12 U/L (ref 13–39)
AST SERPL W P-5'-P-CCNC: 34 U/L (ref 13–39)
BASOPHILS # BLD AUTO: 0.05 THOUSANDS/ÂΜL (ref 0–0.1)
BASOPHILS # BLD AUTO: 0.06 THOUSANDS/ÂΜL (ref 0–0.1)
BASOPHILS NFR BLD AUTO: 1 % (ref 0–1)
BASOPHILS NFR BLD AUTO: 1 % (ref 0–1)
BILIRUB SERPL-MCNC: 0.22 MG/DL (ref 0.2–1)
BILIRUB SERPL-MCNC: 0.38 MG/DL (ref 0.2–1)
BUN SERPL-MCNC: 12 MG/DL (ref 5–25)
BUN SERPL-MCNC: 4 MG/DL (ref 5–25)
CA-I BLD-SCNC: 1.1 MMOL/L (ref 1.12–1.32)
CALCIUM ALBUM COR SERPL-MCNC: 6.1 MG/DL (ref 8.3–10.1)
CALCIUM SERPL-MCNC: 4.2 MG/DL (ref 8.4–10.2)
CALCIUM SERPL-MCNC: 8.3 MG/DL (ref 8.4–10.2)
CHLORIDE SERPL-SCNC: 104 MMOL/L (ref 96–108)
CHLORIDE SERPL-SCNC: 126 MMOL/L (ref 96–108)
CO2 SERPL-SCNC: 16 MMOL/L (ref 21–32)
CO2 SERPL-SCNC: 27 MMOL/L (ref 21–32)
CREAT SERPL-MCNC: 0.56 MG/DL (ref 0.6–1.3)
CREAT SERPL-MCNC: 1.25 MG/DL (ref 0.6–1.3)
EOSINOPHIL # BLD AUTO: 0.48 THOUSAND/ÂΜL (ref 0–0.61)
EOSINOPHIL # BLD AUTO: 0.49 THOUSAND/ÂΜL (ref 0–0.61)
EOSINOPHIL NFR BLD AUTO: 5 % (ref 0–6)
EOSINOPHIL NFR BLD AUTO: 7 % (ref 0–6)
ERYTHROCYTE [DISTWIDTH] IN BLOOD BY AUTOMATED COUNT: 18.1 % (ref 11.6–15.1)
ERYTHROCYTE [DISTWIDTH] IN BLOOD BY AUTOMATED COUNT: 18.3 % (ref 11.6–15.1)
GFR SERPL CREATININE-BSD FRML MDRD: 100 ML/MIN/1.73SQ M
GFR SERPL CREATININE-BSD FRML MDRD: 55 ML/MIN/1.73SQ M
GLUCOSE SERPL-MCNC: 166 MG/DL (ref 65–140)
GLUCOSE SERPL-MCNC: 171 MG/DL (ref 65–140)
GLUCOSE SERPL-MCNC: 171 MG/DL (ref 65–140)
GLUCOSE SERPL-MCNC: 196 MG/DL (ref 65–140)
GLUCOSE SERPL-MCNC: 202 MG/DL (ref 65–140)
GLUCOSE SERPL-MCNC: 73 MG/DL (ref 65–140)
HCT VFR BLD AUTO: 27.3 % (ref 36.5–49.3)
HCT VFR BLD AUTO: 32.8 % (ref 36.5–49.3)
HGB BLD-MCNC: 10.3 G/DL (ref 12–17)
HGB BLD-MCNC: 8.8 G/DL (ref 12–17)
IMM GRANULOCYTES # BLD AUTO: 0.02 THOUSAND/UL (ref 0–0.2)
IMM GRANULOCYTES # BLD AUTO: 0.05 THOUSAND/UL (ref 0–0.2)
IMM GRANULOCYTES NFR BLD AUTO: 0 % (ref 0–2)
IMM GRANULOCYTES NFR BLD AUTO: 1 % (ref 0–2)
LYMPHOCYTES # BLD AUTO: 1.83 THOUSANDS/ÂΜL (ref 0.6–4.47)
LYMPHOCYTES # BLD AUTO: 1.99 THOUSANDS/ÂΜL (ref 0.6–4.47)
LYMPHOCYTES NFR BLD AUTO: 21 % (ref 14–44)
LYMPHOCYTES NFR BLD AUTO: 28 % (ref 14–44)
MAGNESIUM SERPL-MCNC: 1.2 MG/DL (ref 1.9–2.7)
MAGNESIUM SERPL-MCNC: 2.2 MG/DL (ref 1.9–2.7)
MCH RBC QN AUTO: 32.1 PG (ref 26.8–34.3)
MCH RBC QN AUTO: 32.5 PG (ref 26.8–34.3)
MCHC RBC AUTO-ENTMCNC: 31.4 G/DL (ref 31.4–37.4)
MCHC RBC AUTO-ENTMCNC: 32.2 G/DL (ref 31.4–37.4)
MCV RBC AUTO: 101 FL (ref 82–98)
MCV RBC AUTO: 102 FL (ref 82–98)
MONOCYTES # BLD AUTO: 0.71 THOUSAND/ÂΜL (ref 0.17–1.22)
MONOCYTES # BLD AUTO: 0.72 THOUSAND/ÂΜL (ref 0.17–1.22)
MONOCYTES NFR BLD AUTO: 11 % (ref 4–12)
MONOCYTES NFR BLD AUTO: 8 % (ref 4–12)
NEUTROPHILS # BLD AUTO: 3.46 THOUSANDS/ÂΜL (ref 1.85–7.62)
NEUTROPHILS # BLD AUTO: 6.07 THOUSANDS/ÂΜL (ref 1.85–7.62)
NEUTS SEG NFR BLD AUTO: 53 % (ref 43–75)
NEUTS SEG NFR BLD AUTO: 64 % (ref 43–75)
NRBC BLD AUTO-RTO: 0 /100 WBCS
NRBC BLD AUTO-RTO: 0 /100 WBCS
PHOSPHATE SERPL-MCNC: 2 MG/DL (ref 2.3–4.1)
PHOSPHATE SERPL-MCNC: 3.3 MG/DL (ref 2.3–4.1)
PLATELET # BLD AUTO: 341 THOUSANDS/UL (ref 149–390)
PLATELET # BLD AUTO: 446 THOUSANDS/UL (ref 149–390)
PMV BLD AUTO: 9 FL (ref 8.9–12.7)
PMV BLD AUTO: 9.1 FL (ref 8.9–12.7)
POTASSIUM SERPL-SCNC: 2.6 MMOL/L (ref 3.5–5.3)
POTASSIUM SERPL-SCNC: 4.5 MMOL/L (ref 3.5–5.3)
PROCALCITONIN SERPL-MCNC: <0.05 NG/ML
PROT SERPL-MCNC: 6.8 G/DL (ref 6.4–8.4)
PROT SERPL-MCNC: <3 G/DL (ref 6.4–8.4)
RBC # BLD AUTO: 2.71 MILLION/UL (ref 3.88–5.62)
RBC # BLD AUTO: 3.21 MILLION/UL (ref 3.88–5.62)
SODIUM SERPL-SCNC: 138 MMOL/L (ref 135–147)
SODIUM SERPL-SCNC: 147 MMOL/L (ref 135–147)
WBC # BLD AUTO: 6.55 THOUSAND/UL (ref 4.31–10.16)
WBC # BLD AUTO: 9.38 THOUSAND/UL (ref 4.31–10.16)

## 2023-03-13 RX ORDER — ONDANSETRON 2 MG/ML
4 INJECTION INTRAMUSCULAR; INTRAVENOUS EVERY 4 HOURS PRN
Status: DISCONTINUED | OUTPATIENT
Start: 2023-03-13 | End: 2023-03-22 | Stop reason: HOSPADM

## 2023-03-13 RX ORDER — ONDANSETRON 2 MG/ML
4 INJECTION INTRAMUSCULAR; INTRAVENOUS ONCE
Status: COMPLETED | OUTPATIENT
Start: 2023-03-13 | End: 2023-03-13

## 2023-03-13 RX ADMIN — MORPHINE SULFATE 2 MG: 2 INJECTION, SOLUTION INTRAMUSCULAR; INTRAVENOUS at 21:31

## 2023-03-13 RX ADMIN — RANOLAZINE 500 MG: 500 TABLET, EXTENDED RELEASE ORAL at 17:36

## 2023-03-13 RX ADMIN — IOHEXOL 100 ML: 350 INJECTION, SOLUTION INTRAVENOUS at 17:02

## 2023-03-13 RX ADMIN — FLUTICASONE PROPIONATE 2 SPRAY: 50 SPRAY, METERED NASAL at 09:11

## 2023-03-13 RX ADMIN — INSULIN LISPRO 4 UNITS: 100 INJECTION, SOLUTION INTRAVENOUS; SUBCUTANEOUS at 12:40

## 2023-03-13 RX ADMIN — ONDANSETRON 4 MG: 2 INJECTION INTRAMUSCULAR; INTRAVENOUS at 13:43

## 2023-03-13 RX ADMIN — CLOPIDOGREL BISULFATE 75 MG: 75 TABLET ORAL at 09:02

## 2023-03-13 RX ADMIN — INSULIN LISPRO 2 UNITS: 100 INJECTION, SOLUTION INTRAVENOUS; SUBCUTANEOUS at 17:37

## 2023-03-13 RX ADMIN — INSULIN LISPRO 2 UNITS: 100 INJECTION, SOLUTION INTRAVENOUS; SUBCUTANEOUS at 21:36

## 2023-03-13 RX ADMIN — MULTIPLE VITAMINS W/ MINERALS TAB 1 TABLET: TAB at 09:02

## 2023-03-13 RX ADMIN — PANTOPRAZOLE SODIUM 40 MG: 40 INJECTION, POWDER, FOR SOLUTION INTRAVENOUS at 21:31

## 2023-03-13 RX ADMIN — ATORVASTATIN CALCIUM 80 MG: 40 TABLET, FILM COATED ORAL at 17:36

## 2023-03-13 RX ADMIN — RANOLAZINE 500 MG: 500 TABLET, EXTENDED RELEASE ORAL at 09:02

## 2023-03-13 RX ADMIN — EMPAGLIFLOZIN 10 MG: 10 TABLET, FILM COATED ORAL at 09:02

## 2023-03-13 RX ADMIN — ONDANSETRON 4 MG: 2 INJECTION INTRAMUSCULAR; INTRAVENOUS at 09:06

## 2023-03-13 RX ADMIN — LEVOTHYROXINE SODIUM 88 MCG: 88 TABLET ORAL at 05:09

## 2023-03-13 RX ADMIN — SODIUM CHLORIDE 100 ML/HR: 0.9 INJECTION, SOLUTION INTRAVENOUS at 05:10

## 2023-03-13 RX ADMIN — CYANOCOBALAMIN TAB 500 MCG 500 MCG: 500 TAB at 09:02

## 2023-03-13 RX ADMIN — SODIUM CHLORIDE 100 ML/HR: 0.9 INJECTION, SOLUTION INTRAVENOUS at 16:15

## 2023-03-13 RX ADMIN — ESCITALOPRAM OXALATE 10 MG: 10 TABLET ORAL at 09:02

## 2023-03-13 RX ADMIN — ALLOPURINOL 100 MG: 100 TABLET ORAL at 09:01

## 2023-03-13 NOTE — PROGRESS NOTES
Progress Note - UROLOGY  Shira Barahona 69 y o  male MRN: [id number]  Unit/Bed#: 185-07 Encounter: [id number]    Assessment:  Jose catheter draining well, clear yellow urine  Patient still with orthostatic hypotension, tamsulosin being held  Hemoglobin today 8 8 (9 4, 9 2)  Urine output 3350 mL    Acute blood loss anemia, history of recent GI hemorrhage from presumed diverticular bleeding  Hypokalemia  Hypocalcemia    WBC 6 5  Hemoglobin 8 88  Potassium 2 6  Creatinine 0 56  Calcium low 4 2    Plan:  Potassium repletion ongoing  Gastroenterology consultation requested by medicine  Monitor hemoglobin, transfuse per protocol or if the patient becomes symptomatic  Indwelling Jose catheter to remain in place as a urological requirement and through the remainder of the hospital stay  Will require outpatient voiding trial in the urology office in about 2 weeks  Continue to hold tamsulosin  Monitor vital signs for hypotension  Urology will follow from the periphery to the remainder of the hospital stay    Subjective/Objective      Chief Complaint: Patient with occasional overflow incontinence around the urinary catheter  Subjective: No overnight events  78-year-old male with a history of BPH found to have urinary retention during this hospital stay  Jose catheter remains in place, he was hypotensive last evening, tamsulosin on hold    Patient is recent gastrointestinal hemorrhage thought to be diverticular bleeding found on colonoscopy on March 3     Scheduled Meds:  Current Facility-Administered Medications   Medication Dose Route Frequency Provider Last Rate   • acetaminophen  650 mg Oral Q6H PRN Daylin Riddle MD     • allopurinol  100 mg Oral Daily Daylin Riddle MD     • aluminum-magnesium hydroxide-simethicone  30 mL Oral Q4H PRN Daylin Riddle MD     • atorvastatin  80 mg Oral Daily With Dinner Daylin Riddle MD     • clopidogrel  75 mg Oral Daily Daylin Riddle MD     • cyanocobalamin  500 mcg Oral Daily Kimbrelee Bean DO     • Empagliflozin  10 mg Oral QAM Yoko Medel MD     • escitalopram  10 mg Oral Daily Yoko Medel MD     • fluticasone  2 spray Each Nare Daily Jonnangela Magana MD     • insulin lispro  2-12 Units Subcutaneous TID AC Yoko Medel MD     • insulin lispro  2-12 Units Subcutaneous HS Yoko Medel MD     • levothyroxine  88 mcg Oral Early Morning Yoko Medel MD     • morphine injection  2 mg Intravenous Q3H PRN Yoko Medel MD     • multivitamin-minerals  1 tablet Oral Daily Yoko Medel MD     • ondansetron  4 mg Intravenous Q4H PRN Kimberlee Bean DO     • pantoprazole  40 mg Intravenous Q24H Yoko Medel MD     • polyethylene glycol  17 g Oral Daily PRN Yoko Medel MD     • ranolazine  500 mg Oral BID Yoko Medel MD     • sodium chloride  100 mL/hr Intravenous Continuous Kimberlee Bean  mL/hr (03/13/23 0510)     Continuous Infusions:sodium chloride, 100 mL/hr, Last Rate: 100 mL/hr (03/13/23 0510)      PRN Meds: •  acetaminophen  •  aluminum-magnesium hydroxide-simethicone  •  morphine injection  •  ondansetron  •  polyethylene glycol      Objective:     Blood pressure 110/68, pulse (!) 107, temperature 97 7 °F (36 5 °C), resp  rate 20, height 5' 9" (1 753 m), weight 97 5 kg (214 lb 15 2 oz), SpO2 93 %  ,Body mass index is 31 74 kg/m²  I/O       03/11 0701 03/12 0700 03/12 0701  03/13 0700 03/13 0701  03/14 0700    P  O  1000 660 660    Total Intake(mL/kg) 1000 (9 9) 660 (6 8) 660 (6 8)    Urine (mL/kg/hr) 5800 (2 4) 3350 (1 4)     Total Output 5800 3350     Net -4800 -2690 +660               3350    Invasive Devices     Peripheral Intravenous Line  Duration           Peripheral IV 03/13/23 Proximal;Right;Ventral (anterior) Forearm <1 day          Drain  Duration           Urethral Catheter Temperature probe 8 days                Physical Exam:     Awake, no acute distress    Sitting up in bedside chair   ENT clear  Skin mild pallor, no jaundice  Heart regular rate and rhythm  No murmur or gallop  Lungs clear to auscultation, no rales or rhonchi  Abdomen positive bowel sounds, soft, nontender  Genital rectal examination Jose catheter draining clear yellow urine  Extremities no clubbing, ambulation not observed  No cyanosis or ischemia  Both lower extremities are warm to touch and appear well-perfused  Neurological exam shows no focal motor or sensory neurologic weakness  No obvious coordination problems  Mental status appears normal     Lab, Imaging and other studies:  I have personally reviewed pertinent lab results  , CBC:   Lab Results   Component Value Date    WBC 6 55 03/13/2023    HGB 8 8 (L) 03/13/2023    HCT 27 3 (L) 03/13/2023     (H) 03/13/2023     03/13/2023    MCH 32 5 03/13/2023    MCHC 32 2 03/13/2023    RDW 18 1 (H) 03/13/2023    MPV 9 0 03/13/2023    NRBC 0 03/13/2023   , CMP:   Lab Results   Component Value Date    SODIUM 147 03/13/2023    K 2 6 (LL) 03/13/2023     (H) 03/13/2023    CO2 16 (L) 03/13/2023    BUN 4 (L) 03/13/2023    CREATININE 0 56 (L) 03/13/2023    CALCIUM 4 2 (LL) 03/13/2023    AST 12 (L) 03/13/2023    ALT 13 03/13/2023    ALKPHOS 25 (L) 03/13/2023    EGFR 100 03/13/2023   , Coagulation: No results found for: PT, INR, APTT, Urinalysis: No results found for: Stuart Noon, SPECGRAV, PHUR, LEUKOCYTESUR, NITRITE, PROTEINUA, GLUCOSEU, KETONESU, BILIRUBINUR, BLOODU  VTE Pharmacologic Prophylaxis: Reason for no pharmacologic prophylaxis Acute blood loss anemia  VTE Mechanical Prophylaxis: sequential compression device     Ruba Cruz PA-C

## 2023-03-13 NOTE — CASE MANAGEMENT
Case Management Progress Note    Patient name Ramirez Confer  Location /334-48 MRN [id number]  : 1945 Date 3/13/2023       LOS (days): 4  Geometric Mean LOS (GMLOS) (days): 2 30  Days to GMLOS:-1 8        OBJECTIVE:        Current admission status: Inpatient  Preferred Pharmacy:   [facility] [address] [address]  Phone: [phone] Fax: [phone]    Primary Care Provider: Saeid Rosas DO    Primary Insurance: [facility] REP  Secondary Insurance:     PROGRESS NOTE:pt still not medically ready for discharge  Pt has recurrent GI Bleeding  GI to follow  Pt declines HHC or STR on discharge  Cm to follow

## 2023-03-13 NOTE — ASSESSMENT & PLAN NOTE
· Resolved with magnesium replacement therapy  · Follow the magnesium level    Results from last 7 days   Lab Units 03/13/23  1424 03/13/23  0615 03/12/23  0515   MAGNESIUM mg/dL 2 2 1 2* 2 1

## 2023-03-13 NOTE — PROGRESS NOTES
[address]  Progress Note - Davin Brochure 1945, 69 y o  male MRN: [id number]  Unit/Bed#: 186-57 Encounter: [id number]  Primary Care Provider: Author Oumar DO   Date and time admitted to hospital: 3/8/2023 11:01 PM    * Orthostatic hypotension  Assessment & Plan  · Continues to experience orthostatic hypotension with severe lightheadedness and dizziness while standing and a systolic blood pressure in the 80's mmHg  · Hold tamsulosin for now  · Treated with NSS IV fluids  · Concern for recurrent gastrointestinal bleeding  · Continue to monitor  · May require the initiation of a medication to treat orthostatic hypotension  · PT/OT-recommending post-acute care rehabilitation services, however, the patient declines short-term rehabilitation at this time    Other chest pain  Assessment & Plan  · Known CAD with recent drug-eluting stent placement/PTCA to the LAD on 12/29/2022  · I appreciate Cardiology's input  · No indication for repeat cardiac catheterization at this time per Cardiology  · Continue plavix 75 mg PO Qdaily  · Plan to restart aspirin 81 mg PO Qdaily this week if no signs of additional gastrointestinal bleeding  · Continue high-intensity statin therapy with atorvastatin 80 mg PO Qdaily with dinner  · Not on beta-blocker therapy at this time due to orthostatic hypotension    Hypothyroidism  Assessment & Plan  · Continue PO levothyroxine  · Recheck a TSH level in 1-2 weeks with his PCP  · If the TSH level remains elevated, he will need an increased dose of PO levothyroxine prescribed by his PCP       Latest Reference Range & Units 03/09/23 05:03   TSH 3RD GENERATON 0 450 - 4 500 uIU/mL 5 668 (H)   (H): Data is abnormally high    Acute blood loss anemia  Assessment & Plan  · Recent gastrointestinal hemorrhage thought to be diverticular bleeding found on colonoscopy on 3/3/2023  · Serial laboratory testing to monitor hemoglobin/hematocrit levels  · Initiated cyanocobalamin 500 mcg PO Qdaily for a low-normal vitamin B12 level  · Now with maroon-colored stool on 03/13/2023  · Consult Gastroenterology  · Check a CT scan of the abdomen/pelvis with IV contrast    Results from last 7 days   Lab Units 03/13/23  1424 03/13/23  0615 03/12/23  0515   WBC Thousand/uL 9 38 6 55 6 72   HEMOGLOBIN g/dL 10 3* 8 8* 9 4*   HEMATOCRIT % 32 8* 27 3* 28 4*   PLATELETS Thousands/uL 446* 341 319         Elevated platelet count  Assessment & Plan  · Likely reactive in nature  · Follow the platelet count    Nausea  Assessment & Plan  · Utilize PRN IV zofran    Intractable headache  Assessment & Plan  · No acute intracranial abnormality from CT scan of the head without contrast on 3/9/2023    Elevated d-dimer  Assessment & Plan  · No evidence of pulmonary embolism on CTA of the chest/PE protocol completed on 3/9/2023  · No evidence of DVT on venous duplex of both lower extremities on 03/09/2023    Elevated TSH  Assessment & Plan  · Please see the assessment and plan for "Hypothyrodism"    Hypomagnesemia  Assessment & Plan  · Resolved with magnesium replacement therapy  · Follow the magnesium level    Results from last 7 days   Lab Units 03/13/23  1424 03/13/23  0615 03/12/23  0515   MAGNESIUM mg/dL 2 2 1 2* 2 1         Elevated troponin  Assessment & Plan  · Likely a non-MI troponin elevation in the setting of anemia    Urinary retention  Assessment & Plan  · Maintain the Jose catheter for now  · Hold tamsulosin in the setting of orthostatic hypotension  · I appreciate Urology's input    · Will need outpatient follow-up with Urology for definitive treatment of the urinary retention    Chronic diastolic (congestive) heart failure ([address] )  Assessment & Plan  Wt Readings from Last 3 Encounters:   03/13/23 97 5 kg (214 lb 15 2 oz)   03/08/23 105 kg (231 lb 7 7 oz)   03/02/23 98 9 kg (218 lb 0 6 oz)     · Received lasix 40 mg IV x 1 dose per Cardiology  · Hold off on additional diuresis in the setting of orthostatic hypotension        Aortic stenosis, moderate  Assessment & Plan  • Outpatient follow-up with Cardiology      Coronary artery disease of native artery of native heart with stable angina pectoris [facility])  Assessment & Plan  · Known CAD with recent drug-eluting stent placement/PTCA to the LAD on 12/29/2022  · I appreciate Cardiology's input  · No indication for repeat cardiac catheterization at this time per Cardiology  · Continue plavix 75 mg PO Qdaily  · Plan to restart aspirin 81 mg PO Qdaily this week if no signs of additional gastrointestinal bleeding  · Continue high-intensity statin therapy with atorvastatin 80 mg PO Qdaily with dinner  · Not on beta-blocker therapy at this time due to orthostatic hypotension    Type 2 diabetes mellitus with hyperglycemia, without long-term current use of insulin [facility])  Assessment & Plan  Lab Results   Component Value Date    HGBA1C 5 6 03/10/2023       Recent Labs     03/12/23  1542 03/12/23  2100 03/13/23  0831 03/13/23  1107   POCGLU 199* 237* 196* 202*       Blood Sugar Average: Last 72 hrs:  (P) 182 [id number]     · Continue PO Jardiance  · ISS with blood glucose monitoring ACHS  · Hypoglycemia protocol        VTE Pharmacologic Prophylaxis: VTE Score: 5 High Risk (Score >/= 5) - Pharmacological DVT Prophylaxis Contraindicated  Sequential Compression Devices Ordered  Patient Centered Rounds: I performed bedside rounds with nursing staff today  Discussions with Specialists or Other Care Team Provider: I discussed the case with Urology  Education and Discussions with Family / Patient: Updated  (wife) via phone      Total Time Spent on Date of Encounter in care of patient: 35 minutes This time was spent on one or more of the following: performing physical exam; counseling and coordination of care; obtaining or reviewing history; documenting in the medical record; reviewing/ordering tests, medications or procedures; communicating with other healthcare professionals and discussing with patient's family/caregivers  Current Length of Stay: 4 day(s)  Current Patient Status: Inpatient   Certification Statement: The patient will continue to require additional inpatient hospital stay due to the need for a CT scan of the abdomen/pelvis, for a Gastroenterology evaluation in the setting of rectal bleeding, and for continuous IV fluids to treat the persistent orthostatic hypotension  Discharge Plan: Anticipate discharge in 48-72 hrs to home with home services  Code Status: Level 1 - Full Code    Subjective: The patient was seen and examined  The patient had an episode of gastrointestinal bleeding with a maroon-colored stool today  The patient also continues to experience dizziness and lightheadedness when standing  In addition, the patient is experiencing nausea today  Objective:     Vitals:   Temp (24hrs), Av 8 °F (36 6 °C), Min:97 7 °F (36 5 °C), Max:97 9 °F (36 6 °C)    Temp:  [97 7 °F (36 5 °C)-97 9 °F (36 6 °C)] 97 9 °F (36 6 °C)  HR:  [] 93  Resp:  [20] 20  BP: ()/(48-68) 114/67  SpO2:  [90 %-97 %] 96 %  Body mass index is 31 74 kg/m²  Input and Output Summary (last 24 hours):      Intake/Output Summary (Last 24 hours) at 3/13/2023 1605  Last data filed at 3/13/2023 1401  Gross per 24 hour   Intake 840 ml   Output 2900 ml   Net -2060 ml       Physical Exam:   Physical Exam   General:  NAD, follows commands  HEENT:  NC/AT, mucous membranes moist  Neck:  Supple, No JVP elevation  CV:  + S1, + S2, RRR  Pulm:  Lung fields are CTA bilaterally  Abd:  Soft, Non-tender, Non-distended  Ext:  No clubbing/cyanosis/edema  Skin:  No rashes  Neuro:  Awake, alert, oriented  Psych:  Normal mood and affect      Additional Data:    Labs:  Results from last 7 days   Lab Units 23  1424   WBC Thousand/uL 9 38   HEMOGLOBIN g/dL 10 3*   HEMATOCRIT % 32 8*   PLATELETS Thousands/uL 446*   NEUTROS PCT % 64   LYMPHS PCT % 21   MONOS PCT % 8   EOS PCT % 5     Results from last 7 days   Lab Units 03/13/23  1424   SODIUM mmol/L 138   POTASSIUM mmol/L 4 5   CHLORIDE mmol/L 104   CO2 mmol/L 27   BUN mg/dL 12   CREATININE mg/dL 1 25   ANION GAP mmol/L 7   CALCIUM mg/dL 8 3*   ALBUMIN g/dL 3 7   TOTAL BILIRUBIN mg/dL 0 38   ALK PHOS U/L 61   ALT U/L 32   AST U/L 34   GLUCOSE RANDOM mg/dL 171*     Results from last 7 days   Lab Units 03/08/23  2346   INR  0 90     Results from last 7 days   Lab Units 03/13/23  1107 03/13/23  0831 03/12/23  2100 03/12/23  1542 03/12/23  1120 03/12/23  0722 03/11/23  2153 03/11/23  1549 03/11/23  1124 03/11/23  0741 03/10/23  2203 03/10/23  1647   POC GLUCOSE mg/dl 202* 196* 237* 199* 220* 124 178* 186* 262* 126 139 160*     Results from last 7 days   Lab Units 03/10/23  0459   HEMOGLOBIN A1C % 5 6     Results from last 7 days   Lab Units 03/13/23  0615 03/12/23  0515 03/11/23  0506 03/10/23  0459   LACTIC ACID mmol/L  --   --   --  0 8   PROCALCITONIN ng/ml <0 05 0 07 0 08 0 07       Lines/Drains:  Invasive Devices     Peripheral Intravenous Line  Duration           Peripheral IV 03/13/23 Proximal;Right;Ventral (anterior) Forearm <1 day          Drain  Duration           Urethral Catheter Temperature probe 9 days              Urinary Catheter:  Goal for removal: N/A- Discharging with Jose           Telemetry:  Telemetry Orders (From admission, onward)             48 Hour Telemetry Monitoring  Continuous x 48 hours        References:    Telemetry Guidelines   Question:  Reason for 48 Hour Telemetry  Answer:  Arrhythmias Requiring Medical Therapy (eg  SVT, Vtach/fib, Bradycardia, Uncontrolled A-fib)                 Telemetry Reviewed: Normal Sinus Rhythm  Indication for Continued Telemetry Use: Arrthymias requiring medical therapy             Imaging: No pertinent imaging reviewed      Recent Cultures (last 7 days):   Results from last 7 days   Lab Units 03/09/23  0722   URINE CULTURE  No Growth <1000 cfu/mL       Last 24 Hours Medication List:   Current Facility-Administered Medications   Medication Dose Route Frequency Provider Last Rate   • acetaminophen  650 mg Oral Q6H PRN Alton Lim MD     • allopurinol  100 mg Oral Daily Alton Lim MD     • aluminum-magnesium hydroxide-simethicone  30 mL Oral Q4H PRN Alton Lim MD     • atorvastatin  80 mg Oral Daily With Dinner Alton Lim MD     • clopidogrel  75 mg Oral Daily Alton Lim MD     • cyanocobalamin  500 mcg Oral Daily Raquel Bean DO     • Empagliflozin  10 mg Oral QAM Alton Lim MD     • escitalopram  10 mg Oral Daily Alton Lim MD     • fluticasone  2 spray Each Nare Daily Calvin Hu MD     • insulin lispro  2-12 Units Subcutaneous TID AC Alton Lim MD     • insulin lispro  2-12 Units Subcutaneous HS Alton Lim MD     • levothyroxine  88 mcg Oral Early Morning Alton Lim MD     • morphine injection  2 mg Intravenous Q3H PRN Alton Lim MD     • multivitamin-minerals  1 tablet Oral Daily Alton Lim MD     • ondansetron  4 mg Intravenous Q4H PRN Raquel Bean DO     • pantoprazole  40 mg Intravenous Q24H Alton Lim MD     • polyethylene glycol  17 g Oral Daily PRN Alton Lim MD     • ranolazine  500 mg Oral BID Alton Lim MD     • sodium chloride  100 mL/hr Intravenous Continuous Raquel Bean  mL/hr (03/13/23 0510)        Today, Patient Was Seen By: Delmy Ghotra DO    **Please Note: This note may have been constructed using a voice recognition system  **

## 2023-03-13 NOTE — ASSESSMENT & PLAN NOTE
· Known CAD with recent drug-eluting stent placement/PTCA to the LAD on 12/29/2022  · I appreciate Cardiology's input    · No indication for repeat cardiac catheterization at this time per Cardiology  · Continue plavix 75 mg PO Qdaily  · Plan to restart aspirin 81 mg PO Qdaily this week if no signs of additional gastrointestinal bleeding  · Continue high-intensity statin therapy with atorvastatin 80 mg PO Qdaily with dinner  · Not on beta-blocker therapy at this time due to orthostatic hypotension

## 2023-03-13 NOTE — PHYSICAL THERAPY NOTE
PHYSICAL THERAPY NOTE          Patient Name: Lashaun Avila  [id number] Date: 3/13/2023   03/13/23 1101   PT Last Visit   PT Visit Date 03/13/23   Note Type   Note Type Treatment   Pain Assessment   Pain Assessment Tool 0-10   Pain Score No Pain   Restrictions/Precautions   Other Precautions Fall Risk   Cognition   Overall Cognitive Status WFL   Following Commands Follows all commands and directions without difficulty   Subjective   Subjective c/o dizziness with postion changes   Bed Mobility   Additional Comments OOB in chair start/end PT session   Transfers   Sit to Stand 5  Supervision   Additional items Armrests; Increased time required;Verbal cues   Stand to Sit 5  Supervision   Additional items Armrests; Increased time required;Verbal cues   Additional Comments RW used   Ambulation/Elevation   Ambulation/Elevation Additional Comments deferred ambulation low BP   Balance   Static Sitting Good   Dynamic Sitting Fair +   Static Standing Fair   Endurance Deficit   Endurance Deficit Yes   Activity Tolerance   Activity Tolerance Treatment limited secondary to medical complications (Comment)  (low BP: sitting 99/62, standing 78/48 sitting after standing 110/68)   Nurse Made Aware RN Irma   Exercises   Hip Flexion Sitting;20 reps;AROM; Bilateral   Hip Abduction Sitting;20 reps;AROM; Bilateral   Hip Adduction Sitting;20 reps;AROM; Bilateral   Knee AROM Long Arc Quad Sitting;20 reps;AROM; Bilateral   Ankle Pumps Sitting;20 reps   Assessment   Prognosis Fair   Problem List   (Decreased strength; Decreased endurance; Impaired balance; Impaired sensation)   Assessment Pt  seen for PT treatment session this date with interventions consisting of  therapeutic exercises and transfers  Orthostatic  With tx to standing 78/48 mmHg symptomatic with dizziness  In comparison to previous session, Pt  With no improvement  in activity tolerance     Pt is in need of continued activity in PT to improve strength balance endurance mobility transfers and ambulation with return to maximize LOF  From PT/mobility standpoint, recommendation at time of d/c would be post acute rehab pending progress in order to promote return to PLOF and independence  The patient's AM-PAC Basic Mobility Inpatient Short Form Raw Score is 17  A Raw score of greater than 16 suggests the patient may benefit from discharge to home  Please also refer to physical therapy recommendation for safe DC planning  Goals   LTG Expiration Date 03/23/23   PT Treatment Day 1   Plan   Treatment/Interventions   (Functional transfer training; LE strengthening/ROM; Elevations; Therapeutic exercise; Endurance training; Patient/family training; Bed mobility; Gait training)   Progress Slow progress, decreased activity tolerance   PT Frequency 3-5x/wk   Recommendation   PT Discharge Recommendation Post acute rehabilitation services   AM-PAC Basic Mobility Inpatient   Turning in Flat Bed Without Bedrails 3   Lying on Back to Sitting on Edge of Flat Bed Without Bedrails 3   Moving Bed to Chair 3   Standing Up From Chair Using Arms 3   Walk in Room 3   Climb 3-5 Stairs With Railing 2   Basic Mobility Inpatient Raw Score 17   Basic Mobility Standardized Score 39 67   Highest Level Of Mobility   -HLM Goal 5: Stand one or more mins   -HLM Achieved 5: Stand (1 or more minutes)   Education   Education Provided Mobility training   Patient Demonstrates verbal understanding   End of Consult   Patient Position at End of Consult Bedside chair;Bed/Chair alarm activated; All needs within reach   End of Consult Comments discussed POC with PT

## 2023-03-13 NOTE — PLAN OF CARE
Problem: PAIN - ADULT  Goal: Verbalizes/displays adequate comfort level or baseline comfort level  Description: Interventions:  - Encourage patient to monitor pain and request assistance  - Assess pain using appropriate pain scale  - Administer analgesics based on type and severity of pain and evaluate response  - Implement non-pharmacological measures as appropriate and evaluate response  - Consider cultural and social influences on pain and pain management  - Notify physician/advanced practitioner if interventions unsuccessful or patient reports new pain  Outcome: Progressing     Problem: INFECTION - ADULT  Goal: Absence or prevention of progression during hospitalization  Description: INTERVENTIONS:  - Assess and monitor for signs and symptoms of infection  - Monitor lab/diagnostic results  - Monitor all insertion sites, i e  indwelling lines, tubes, and drains  - Monitor endotracheal if appropriate and nasal secretions for changes in amount and color  - [place] appropriate cooling/warming therapies per order  - Administer medications as ordered  - Instruct and encourage patient and family to use good hand hygiene technique  - Identify and instruct in appropriate isolation precautions for identified infection/condition  Outcome: Progressing     Problem: SAFETY ADULT  Goal: Patient will remain free of falls  Description: INTERVENTIONS:  - Educate patient/family on patient safety including physical limitations  - Instruct patient to call for assistance with activity   - Consult OT/PT to assist with strengthening/mobility   - Keep Call bell within reach  - Keep bed low and locked with side rails adjusted as appropriate  - Keep care items and personal belongings within reach  - Initiate and maintain comfort rounds  - Make Fall Risk Sign visible to staff  - Offer Toileting every 2 Hours, in advance of need  - Initiate/Maintain bed/chair alarm  - Obtain necessary fall risk management equipment: non-skid footwear  - Apply yellow socks and bracelet for high fall risk patients  - Consider moving patient to room near nurses station  Outcome: Progressing  Goal: Maintain or return to baseline ADL function  Description: INTERVENTIONS:  -  Assess patient's ability to carry out ADLs; assess patient's baseline for ADL function and identify physical deficits which impact ability to perform ADLs (bathing, care of mouth/teeth, toileting, grooming, dressing, etc )  - Assess/evaluate cause of self-care deficits   - Assess range of motion  - Assess patient's mobility; develop plan if impaired  - Assess patient's need for assistive devices and provide as appropriate  - Encourage maximum independence but intervene and supervise when necessary  - Involve family in performance of ADLs  - Assess for home care needs following discharge   - Consider OT consult to assist with ADL evaluation and planning for discharge  - Provide patient education as appropriate  Outcome: Progressing  Goal: Maintains/Returns to pre admission functional level  Description: INTERVENTIONS:  - Perform BMAT or MOVE assessment daily    - Set and communicate daily mobility goal to care team and patient/family/caregiver  - Collaborate with rehabilitation services on mobility goals if consulted  - Perform Range of Motion 3 times a day  - Reposition patient every 2 hours    - Dangle patient 3 times a day  - Stand patient 3 times a day  - Ambulate patient 3 times a day  - Out of bed to chair 3 times a day   - Out of bed for meals 3 times a day  - Out of bed for toileting  - Record patient progress and toleration of activity level   Outcome: Progressing     Problem: DISCHARGE PLANNING  Goal: Discharge to home or other facility with appropriate resources  Description: INTERVENTIONS:  - Identify barriers to discharge w/patient and caregiver  - Arrange for needed discharge resources and transportation as appropriate  - Identify discharge learning needs (meds, wound care, etc )  - Arrange for interpretive services to assist at discharge as needed  - Refer to Case Management Department for coordinating discharge planning if the patient needs post-hospital services based on physician/advanced practitioner order or complex needs related to functional status, cognitive ability, or social support system  Outcome: Progressing     Problem: Knowledge Deficit  Goal: Patient/family/caregiver demonstrates understanding of disease process, treatment plan, medications, and discharge instructions  Description: Complete learning assessment and assess knowledge base    Interventions:  - Provide teaching at level of understanding  - Provide teaching via preferred learning methods  Outcome: Progressing     Problem: CARDIOVASCULAR - ADULT  Goal: Maintains optimal cardiac output and hemodynamic stability  Description: INTERVENTIONS:  - Monitor I/O, vital signs and rhythm  - Monitor for S/S and trends of decreased cardiac output  - Administer and titrate ordered vasoactive medications to optimize hemodynamic stability  - Assess quality of pulses, skin color and temperature  - Assess for signs of decreased coronary artery perfusion  - Instruct patient to report change in severity of symptoms  Outcome: Progressing  Goal: Absence of cardiac dysrhythmias or at baseline rhythm  Description: INTERVENTIONS:  - Continuous cardiac monitoring, vital signs, obtain 12 lead EKG if ordered  - Administer antiarrhythmic and heart rate control medications as ordered  - Monitor electrolytes and administer replacement therapy as ordered  Outcome: Progressing     Problem: RESPIRATORY - ADULT  Goal: Achieves optimal ventilation and oxygenation  Description: INTERVENTIONS:  - Assess for changes in respiratory status  - Assess for changes in mentation and behavior  - Position to facilitate oxygenation and minimize respiratory effort  - Oxygen administered by appropriate delivery if ordered  - Initiate smoking cessation education as indicated  - Encourage broncho-pulmonary hygiene including cough, deep breathe, Incentive Spirometry  - Assess the need for suctioning and aspirate as needed  - Assess and instruct to report SOB or any respiratory difficulty  - Respiratory Therapy support as indicated  Outcome: Progressing     Problem: GENITOURINARY - ADULT  Goal: Maintains or returns to baseline urinary function  Description: INTERVENTIONS:  - Assess urinary function  - Encourage oral fluids to ensure adequate hydration if ordered  - Administer IV fluids as ordered to ensure adequate hydration  - Administer ordered medications as needed  - Offer frequent toileting  - Follow urinary retention protocol if ordered  Outcome: Progressing  Goal: Absence of urinary retention  Description: INTERVENTIONS:  - Assess patient's ability to void and empty bladder  - Monitor I/O  - Bladder scan as needed  - Discuss with physician/AP medications to alleviate retention as needed  - Discuss catheterization for long term situations as appropriate  Outcome: Progressing  Goal: Urinary catheter remains patent  Description: INTERVENTIONS:  - Assess patency of urinary catheter  - If patient has a chronic morales, consider changing catheter if non-functioning  - Follow guidelines for intermittent irrigation of non-functioning urinary catheter  Outcome: Progressing     Problem: METABOLIC, FLUID AND ELECTROLYTES - ADULT  Goal: Electrolytes maintained within normal limits  Description: INTERVENTIONS:  - Monitor labs and assess patient for signs and symptoms of electrolyte imbalances  - Administer electrolyte replacement as ordered  - Monitor response to electrolyte replacements, including repeat lab results as appropriate  - Instruct patient on fluid and nutrition as appropriate  Outcome: Progressing  Goal: Fluid balance maintained  Description: INTERVENTIONS:  - Monitor labs   - Monitor I/O and WT  - Instruct patient on fluid and nutrition as appropriate  - Assess for signs & symptoms of volume excess or deficit  Outcome: Progressing  Goal: Glucose maintained within target range  Description: INTERVENTIONS:  - Monitor Blood Glucose as ordered  - Assess for signs and symptoms of hyperglycemia and hypoglycemia  - Administer ordered medications to maintain glucose within target range  - Assess nutritional intake and initiate nutrition service referral as needed  Outcome: Progressing     Problem: MUSCULOSKELETAL - ADULT  Goal: Maintain or return mobility to safest level of function  Description: INTERVENTIONS:  - Assess patient's ability to carry out ADLs; assess patient's baseline for ADL function and identify physical deficits which impact ability to perform ADLs (bathing, care of mouth/teeth, toileting, grooming, dressing, etc )  - Assess/evaluate cause of self-care deficits   - Assess range of motion  - Assess patient's mobility  - Assess patient's need for assistive devices and provide as appropriate  - Encourage maximum independence but intervene and supervise when necessary  - Involve family in performance of ADLs  - Assess for home care needs following discharge   - Consider OT consult to assist with ADL evaluation and planning for discharge  - Provide patient education as appropriate  Outcome: Progressing  Goal: Maintain proper alignment of affected body part  Description: INTERVENTIONS:  - Support, maintain and protect limb and body alignment  - Provide patient/ family with appropriate education  Outcome: Progressing     Problem: MOBILITY - ADULT  Goal: Maintain or return to baseline ADL function  Description: INTERVENTIONS:  -  Assess patient's ability to carry out ADLs; assess patient's baseline for ADL function and identify physical deficits which impact ability to perform ADLs (bathing, care of mouth/teeth, toileting, grooming, dressing, etc )  - Assess/evaluate cause of self-care deficits   - Assess range of motion  - Assess patient's mobility; develop plan if impaired  - Assess patient's need for assistive devices and provide as appropriate  - Encourage maximum independence but intervene and supervise when necessary  - Involve family in performance of ADLs  - Assess for home care needs following discharge   - Consider OT consult to assist with ADL evaluation and planning for discharge  - Provide patient education as appropriate  Outcome: Progressing  Goal: Maintains/Returns to pre admission functional level  Description: INTERVENTIONS:  - Perform BMAT or MOVE assessment daily    - Set and communicate daily mobility goal to care team and patient/family/caregiver  - Collaborate with rehabilitation services on mobility goals if consulted  - Perform Range of Motion 3 times a day  - Reposition patient every 2 hours    - Dangle patient 3 times a day  - Stand patient 3 times a day  - Ambulate patient 3 times a day  - Out of bed to chair 3 times a day   - Out of bed for meals 3 times a day  - Out of bed for toileting  - Record patient progress and toleration of activity level   Outcome: Progressing     Problem: Prexisting or High Potential for Compromised Skin Integrity  Goal: Skin integrity is maintained or improved  Description: INTERVENTIONS:  - Identify patients at risk for skin breakdown  - Assess and monitor skin integrity  - Assess and monitor nutrition and hydration status  - Monitor labs   - Assess for incontinence   - Turn and reposition patient  - Assist with mobility/ambulation  - Relieve pressure over bony prominences  - Avoid friction and shearing  - Provide appropriate hygiene as needed including keeping skin clean and dry  - Evaluate need for skin moisturizer/barrier cream  - Collaborate with interdisciplinary team   - Patient/family teaching  - Consider wound care consult   Outcome: Progressing

## 2023-03-13 NOTE — ASSESSMENT & PLAN NOTE
· Maintain the Jose catheter for now  · Hold tamsulosin in the setting of orthostatic hypotension  · I appreciate Urology's input    · Will need outpatient follow-up with Urology for definitive treatment of the urinary retention

## 2023-03-13 NOTE — PLAN OF CARE
Problem: PHYSICAL THERAPY ADULT  Goal: Performs mobility at highest level of function for planned discharge setting  See evaluation for individualized goals  Description: Treatment/Interventions: Functional transfer training, LE strengthening/ROM, Elevations, Therapeutic exercise, Endurance training, Bed mobility, Gait training          See flowsheet documentation for full assessment, interventions and recommendations  Outcome: Progressing  Note: Prognosis: Fair  Problem List:  (Decreased strength; Decreased endurance; Impaired balance; Impaired sensation)  Assessment: Pt  seen for PT treatment session this date with interventions consisting of  therapeutic exercises and transfers  Orthostatic  With tx to standing 78/48 mmHg symptomatic with dizziness  In comparison to previous session, Pt  With no improvement  in activity tolerance  Pt is in need of continued activity in PT to improve strength balance endurance mobility transfers and ambulation with return to maximize LOF  From PT/mobility standpoint, recommendation at time of d/c would be post acute rehab pending progress in order to promote return to PLOF and independence  The patient's AM-[facility] Basic Mobility Inpatient Short Form Raw Score is 17  A Raw score of greater than 16 suggests the patient may benefit from discharge to home  Please also refer to physical therapy recommendation for safe DC planning  PT Discharge Recommendation: Post acute rehabilitation services    See flowsheet documentation for full assessment

## 2023-03-13 NOTE — ASSESSMENT & PLAN NOTE
Lab Results   Component Value Date    HGBA1C 5 6 03/10/2023       Recent Labs     03/12/23  1542 03/12/23  2100 03/13/23  0831 03/13/23  1107   POCGLU 199* 237* 196* 202*       Blood Sugar Average: Last 72 hrs:  (P) 182 [id number]     · Continue PO Jardiance  · ISS with blood glucose monitoring ACHS  · Hypoglycemia protocol

## 2023-03-13 NOTE — ASSESSMENT & PLAN NOTE
Wt Readings from Last 3 Encounters:   03/13/23 97 5 kg (214 lb 15 2 oz)   03/08/23 105 kg (231 lb 7 7 oz)   03/02/23 98 9 kg (218 lb 0 6 oz)     · Received lasix 40 mg IV x 1 dose per Cardiology  · Hold off on additional diuresis in the setting of orthostatic hypotension

## 2023-03-13 NOTE — TELEPHONE ENCOUNTER
Patient readmitted to [facility] on 3/8/2022 with chest pain  Will continue to monitor for discharge and then arrange appropriate outpatient plans

## 2023-03-13 NOTE — PLAN OF CARE
Problem: PAIN - ADULT  Goal: Verbalizes/displays adequate comfort level or baseline comfort level  Description: Interventions:  - Encourage patient to monitor pain and request assistance  - Assess pain using appropriate pain scale  - Administer analgesics based on type and severity of pain and evaluate response  - Implement non-pharmacological measures as appropriate and evaluate response  - Consider cultural and social influences on pain and pain management  - Notify physician/advanced practitioner if interventions unsuccessful or patient reports new pain  Outcome: Progressing     Problem: INFECTION - ADULT  Goal: Absence or prevention of progression during hospitalization  Description: INTERVENTIONS:  - Assess and monitor for signs and symptoms of infection  - Monitor lab/diagnostic results  - Monitor all insertion sites, i e  indwelling lines, tubes, and drains  - Monitor endotracheal if appropriate and nasal secretions for changes in amount and color  - [place] appropriate cooling/warming therapies per order  - Administer medications as ordered  - Instruct and encourage patient and family to use good hand hygiene technique  - Identify and instruct in appropriate isolation precautions for identified infection/condition  Outcome: Progressing     Problem: SAFETY ADULT  Goal: Patient will remain free of falls  Description: INTERVENTIONS:  - Educate patient/family on patient safety including physical limitations  - Instruct patient to call for assistance with activity   - Consult OT/PT to assist with strengthening/mobility   - Keep Call bell within reach  - Keep bed low and locked with side rails adjusted as appropriate  - Keep care items and personal belongings within reach  - Initiate and maintain comfort rounds  - Make Fall Risk Sign visible to staff  - Offer Toileting every 2 Hours, in advance of need  - Initiate/Maintain bed/chair alarm  - Obtain necessary fall risk management equipment: non-skid footwear  - Apply yellow socks and bracelet for high fall risk patients  - Consider moving patient to room near nurses station  Outcome: Progressing  Goal: Maintain or return to baseline ADL function  Description: INTERVENTIONS:  -  Assess patient's ability to carry out ADLs; assess patient's baseline for ADL function and identify physical deficits which impact ability to perform ADLs (bathing, care of mouth/teeth, toileting, grooming, dressing, etc )  - Assess/evaluate cause of self-care deficits   - Assess range of motion  - Assess patient's mobility; develop plan if impaired  - Assess patient's need for assistive devices and provide as appropriate  - Encourage maximum independence but intervene and supervise when necessary  - Involve family in performance of ADLs  - Assess for home care needs following discharge   - Consider OT consult to assist with ADL evaluation and planning for discharge  - Provide patient education as appropriate  Outcome: Progressing  Goal: Maintains/Returns to pre admission functional level  Description: INTERVENTIONS:  - Perform BMAT or MOVE assessment daily    - Set and communicate daily mobility goal to care team and patient/family/caregiver  - Collaborate with rehabilitation services on mobility goals if consulted  - Perform Range of Motion 3 times a day  - Reposition patient every 2 hours    - Dangle patient 3 times a day  - Stand patient 3 times a day  - Ambulate patient 3 times a day  - Out of bed to chair 3 times a day   - Out of bed for meals 3 times a day  - Out of bed for toileting  - Record patient progress and toleration of activity level   Outcome: Progressing     Problem: DISCHARGE PLANNING  Goal: Discharge to home or other facility with appropriate resources  Description: INTERVENTIONS:  - Identify barriers to discharge w/patient and caregiver  - Arrange for needed discharge resources and transportation as appropriate  - Identify discharge learning needs (meds, wound care, etc )  - Arrange for interpretive services to assist at discharge as needed  - Refer to Case Management Department for coordinating discharge planning if the patient needs post-hospital services based on physician/advanced practitioner order or complex needs related to functional status, cognitive ability, or social support system  Outcome: Progressing     Problem: Knowledge Deficit  Goal: Patient/family/caregiver demonstrates understanding of disease process, treatment plan, medications, and discharge instructions  Description: Complete learning assessment and assess knowledge base    Interventions:  - Provide teaching at level of understanding  - Provide teaching via preferred learning methods  Outcome: Progressing     Problem: CARDIOVASCULAR - ADULT  Goal: Maintains optimal cardiac output and hemodynamic stability  Description: INTERVENTIONS:  - Monitor I/O, vital signs and rhythm  - Monitor for S/S and trends of decreased cardiac output  - Administer and titrate ordered vasoactive medications to optimize hemodynamic stability  - Assess quality of pulses, skin color and temperature  - Assess for signs of decreased coronary artery perfusion  - Instruct patient to report change in severity of symptoms  Outcome: Progressing  Goal: Absence of cardiac dysrhythmias or at baseline rhythm  Description: INTERVENTIONS:  - Continuous cardiac monitoring, vital signs, obtain 12 lead EKG if ordered  - Administer antiarrhythmic and heart rate control medications as ordered  - Monitor electrolytes and administer replacement therapy as ordered  Outcome: Progressing     Problem: RESPIRATORY - ADULT  Goal: Achieves optimal ventilation and oxygenation  Description: INTERVENTIONS:  - Assess for changes in respiratory status  - Assess for changes in mentation and behavior  - Position to facilitate oxygenation and minimize respiratory effort  - Oxygen administered by appropriate delivery if ordered  - Initiate smoking cessation education as indicated  - Encourage broncho-pulmonary hygiene including cough, deep breathe, Incentive Spirometry  - Assess the need for suctioning and aspirate as needed  - Assess and instruct to report SOB or any respiratory difficulty  - Respiratory Therapy support as indicated  Outcome: Progressing     Problem: GENITOURINARY - ADULT  Goal: Maintains or returns to baseline urinary function  Description: INTERVENTIONS:  - Assess urinary function  - Encourage oral fluids to ensure adequate hydration if ordered  - Administer IV fluids as ordered to ensure adequate hydration  - Administer ordered medications as needed  - Offer frequent toileting  - Follow urinary retention protocol if ordered  Outcome: Progressing  Goal: Absence of urinary retention  Description: INTERVENTIONS:  - Assess patient's ability to void and empty bladder  - Monitor I/O  - Bladder scan as needed  - Discuss with physician/AP medications to alleviate retention as needed  - Discuss catheterization for long term situations as appropriate  Outcome: Progressing  Goal: Urinary catheter remains patent  Description: INTERVENTIONS:  - Assess patency of urinary catheter  - If patient has a chronic morales, consider changing catheter if non-functioning  - Follow guidelines for intermittent irrigation of non-functioning urinary catheter  Outcome: Progressing     Problem: METABOLIC, FLUID AND ELECTROLYTES - ADULT  Goal: Electrolytes maintained within normal limits  Description: INTERVENTIONS:  - Monitor labs and assess patient for signs and symptoms of electrolyte imbalances  - Administer electrolyte replacement as ordered  - Monitor response to electrolyte replacements, including repeat lab results as appropriate  - Instruct patient on fluid and nutrition as appropriate  Outcome: Progressing  Goal: Fluid balance maintained  Description: INTERVENTIONS:  - Monitor labs   - Monitor I/O and WT  - Instruct patient on fluid and nutrition as appropriate  - Assess for signs & symptoms of volume excess or deficit  Outcome: Progressing  Goal: Glucose maintained within target range  Description: INTERVENTIONS:  - Monitor Blood Glucose as ordered  - Assess for signs and symptoms of hyperglycemia and hypoglycemia  - Administer ordered medications to maintain glucose within target range  - Assess nutritional intake and initiate nutrition service referral as needed  Outcome: Progressing     Problem: MUSCULOSKELETAL - ADULT  Goal: Maintain or return mobility to safest level of function  Description: INTERVENTIONS:  - Assess patient's ability to carry out ADLs; assess patient's baseline for ADL function and identify physical deficits which impact ability to perform ADLs (bathing, care of mouth/teeth, toileting, grooming, dressing, etc )  - Assess/evaluate cause of self-care deficits   - Assess range of motion  - Assess patient's mobility  - Assess patient's need for assistive devices and provide as appropriate  - Encourage maximum independence but intervene and supervise when necessary  - Involve family in performance of ADLs  - Assess for home care needs following discharge   - Consider OT consult to assist with ADL evaluation and planning for discharge  - Provide patient education as appropriate  Outcome: Progressing  Goal: Maintain proper alignment of affected body part  Description: INTERVENTIONS:  - Support, maintain and protect limb and body alignment  - Provide patient/ family with appropriate education  Outcome: Progressing     Problem: MOBILITY - ADULT  Goal: Maintain or return to baseline ADL function  Description: INTERVENTIONS:  -  Assess patient's ability to carry out ADLs; assess patient's baseline for ADL function and identify physical deficits which impact ability to perform ADLs (bathing, care of mouth/teeth, toileting, grooming, dressing, etc )  - Assess/evaluate cause of self-care deficits   - Assess range of motion  - Assess patient's mobility; develop plan if impaired  - Assess patient's need for assistive devices and provide as appropriate  - Encourage maximum independence but intervene and supervise when necessary  - Involve family in performance of ADLs  - Assess for home care needs following discharge   - Consider OT consult to assist with ADL evaluation and planning for discharge  - Provide patient education as appropriate  Outcome: Progressing  Goal: Maintains/Returns to pre admission functional level  Description: INTERVENTIONS:  - Perform BMAT or MOVE assessment daily    - Set and communicate daily mobility goal to care team and patient/family/caregiver  - Collaborate with rehabilitation services on mobility goals if consulted  - Perform Range of Motion 3 times a day  - Reposition patient every 2 hours    - Dangle patient 3 times a day  - Stand patient 3 times a day  - Ambulate patient 3 times a day  - Out of bed to chair 3 times a day   - Out of bed for meals 3 times a day  - Out of bed for toileting  - Record patient progress and toleration of activity level   Outcome: Progressing     Problem: Prexisting or High Potential for Compromised Skin Integrity  Goal: Skin integrity is maintained or improved  Description: INTERVENTIONS:  - Identify patients at risk for skin breakdown  - Assess and monitor skin integrity  - Assess and monitor nutrition and hydration status  - Monitor labs   - Assess for incontinence   - Turn and reposition patient  - Assist with mobility/ambulation  - Relieve pressure over bony prominences  - Avoid friction and shearing  - Provide appropriate hygiene as needed including keeping skin clean and dry  - Evaluate need for skin moisturizer/barrier cream  - Collaborate with interdisciplinary team   - Patient/family teaching  - Consider wound care consult   Outcome: Progressing

## 2023-03-13 NOTE — ASSESSMENT & PLAN NOTE
· Recent gastrointestinal hemorrhage thought to be diverticular bleeding found on colonoscopy on 3/3/2023  · Serial laboratory testing to monitor hemoglobin/hematocrit levels  · Initiated cyanocobalamin 500 mcg PO Qdaily for a low-normal vitamin B12 level  · Now with maroon-colored stool on 03/13/2023  · Consult Gastroenterology  · Check a CT scan of the abdomen/pelvis with IV contrast    Results from last 7 days   Lab Units 03/13/23  1424 03/13/23  0615 03/12/23  0515   WBC Thousand/uL 9 38 6 55 6 72   HEMOGLOBIN g/dL 10 3* 8 8* 9 4*   HEMATOCRIT % 32 8* 27 3* 28 4*   PLATELETS Thousands/uL 446* 341 319

## 2023-03-14 PROBLEM — R51.9 INTRACTABLE HEADACHE: Status: RESOLVED | Noted: 2023-03-09 | Resolved: 2023-03-14

## 2023-03-14 PROBLEM — R11.0 NAUSEA: Status: RESOLVED | Noted: 2023-03-13 | Resolved: 2023-03-14

## 2023-03-14 LAB
ALBUMIN SERPL BCP-MCNC: 2.9 G/DL (ref 3.5–5)
ALP SERPL-CCNC: 42 U/L (ref 34–104)
ALT SERPL W P-5'-P-CCNC: 22 U/L (ref 7–52)
ANION GAP SERPL CALCULATED.3IONS-SCNC: 7 MMOL/L (ref 4–13)
AST SERPL W P-5'-P-CCNC: 23 U/L (ref 13–39)
ATRIAL RATE: 91 BPM
BASOPHILS # BLD AUTO: 0.04 THOUSANDS/ÂΜL (ref 0–0.1)
BASOPHILS NFR BLD AUTO: 1 % (ref 0–1)
BILIRUB SERPL-MCNC: 0.39 MG/DL (ref 0.2–1)
BUN SERPL-MCNC: 15 MG/DL (ref 5–25)
CA-I BLD-SCNC: 1.08 MMOL/L (ref 1.12–1.32)
CALCIUM ALBUM COR SERPL-MCNC: 8.5 MG/DL (ref 8.3–10.1)
CALCIUM SERPL-MCNC: 7.6 MG/DL (ref 8.4–10.2)
CHLORIDE SERPL-SCNC: 107 MMOL/L (ref 96–108)
CO2 SERPL-SCNC: 24 MMOL/L (ref 21–32)
CREAT SERPL-MCNC: 1.02 MG/DL (ref 0.6–1.3)
EOSINOPHIL # BLD AUTO: 0.45 THOUSAND/ÂΜL (ref 0–0.61)
EOSINOPHIL NFR BLD AUTO: 8 % (ref 0–6)
ERYTHROCYTE [DISTWIDTH] IN BLOOD BY AUTOMATED COUNT: 18 % (ref 11.6–15.1)
GFR SERPL CREATININE-BSD FRML MDRD: 71 ML/MIN/1.73SQ M
GLUCOSE SERPL-MCNC: 102 MG/DL (ref 65–140)
GLUCOSE SERPL-MCNC: 113 MG/DL (ref 65–140)
GLUCOSE SERPL-MCNC: 183 MG/DL (ref 65–140)
GLUCOSE SERPL-MCNC: 186 MG/DL (ref 65–140)
GLUCOSE SERPL-MCNC: 195 MG/DL (ref 65–140)
HCT VFR BLD AUTO: 26.7 % (ref 36.5–49.3)
HGB BLD-MCNC: 8.5 G/DL (ref 12–17)
IMM GRANULOCYTES # BLD AUTO: 0.02 THOUSAND/UL (ref 0–0.2)
IMM GRANULOCYTES NFR BLD AUTO: 0 % (ref 0–2)
LYMPHOCYTES # BLD AUTO: 1.77 THOUSANDS/ÂΜL (ref 0.6–4.47)
LYMPHOCYTES NFR BLD AUTO: 30 % (ref 14–44)
MAGNESIUM SERPL-MCNC: 2.2 MG/DL (ref 1.9–2.7)
MCH RBC QN AUTO: 32.8 PG (ref 26.8–34.3)
MCHC RBC AUTO-ENTMCNC: 31.8 G/DL (ref 31.4–37.4)
MCV RBC AUTO: 103 FL (ref 82–98)
MONOCYTES # BLD AUTO: 0.62 THOUSAND/ÂΜL (ref 0.17–1.22)
MONOCYTES NFR BLD AUTO: 11 % (ref 4–12)
NEUTROPHILS # BLD AUTO: 2.93 THOUSANDS/ÂΜL (ref 1.85–7.62)
NEUTS SEG NFR BLD AUTO: 50 % (ref 43–75)
NRBC BLD AUTO-RTO: 0 /100 WBCS
P AXIS: 35 DEGREES
PHOSPHATE SERPL-MCNC: 3.5 MG/DL (ref 2.3–4.1)
PLATELET # BLD AUTO: 338 THOUSANDS/UL (ref 149–390)
PMV BLD AUTO: 9.1 FL (ref 8.9–12.7)
POTASSIUM SERPL-SCNC: 4.5 MMOL/L (ref 3.5–5.3)
PR INTERVAL: 208 MS
PROCALCITONIN SERPL-MCNC: 0.07 NG/ML
PROT SERPL-MCNC: 4.9 G/DL (ref 6.4–8.4)
QRS AXIS: -47 DEGREES
QRSD INTERVAL: 86 MS
QT INTERVAL: 386 MS
QTC INTERVAL: 474 MS
RBC # BLD AUTO: 2.59 MILLION/UL (ref 3.88–5.62)
SODIUM SERPL-SCNC: 138 MMOL/L (ref 135–147)
T WAVE AXIS: 73 DEGREES
VENTRICULAR RATE: 91 BPM
WBC # BLD AUTO: 5.83 THOUSAND/UL (ref 4.31–10.16)

## 2023-03-14 RX ORDER — MIDODRINE HYDROCHLORIDE 5 MG/1
5 TABLET ORAL
Status: DISCONTINUED | OUTPATIENT
Start: 2023-03-14 | End: 2023-03-17

## 2023-03-14 RX ADMIN — SODIUM CHLORIDE 100 ML/HR: 0.9 INJECTION, SOLUTION INTRAVENOUS at 02:43

## 2023-03-14 RX ADMIN — INSULIN LISPRO 2 UNITS: 100 INJECTION, SOLUTION INTRAVENOUS; SUBCUTANEOUS at 17:02

## 2023-03-14 RX ADMIN — INSULIN LISPRO 2 UNITS: 100 INJECTION, SOLUTION INTRAVENOUS; SUBCUTANEOUS at 11:54

## 2023-03-14 RX ADMIN — CYANOCOBALAMIN TAB 500 MCG 500 MCG: 500 TAB at 09:17

## 2023-03-14 RX ADMIN — EMPAGLIFLOZIN 10 MG: 10 TABLET, FILM COATED ORAL at 09:18

## 2023-03-14 RX ADMIN — CLOPIDOGREL BISULFATE 75 MG: 75 TABLET ORAL at 09:16

## 2023-03-14 RX ADMIN — MULTIPLE VITAMINS W/ MINERALS TAB 1 TABLET: TAB at 09:17

## 2023-03-14 RX ADMIN — INSULIN LISPRO 2 UNITS: 100 INJECTION, SOLUTION INTRAVENOUS; SUBCUTANEOUS at 21:11

## 2023-03-14 RX ADMIN — ALLOPURINOL 100 MG: 100 TABLET ORAL at 09:16

## 2023-03-14 RX ADMIN — ATORVASTATIN CALCIUM 80 MG: 40 TABLET, FILM COATED ORAL at 17:03

## 2023-03-14 RX ADMIN — LEVOTHYROXINE SODIUM 88 MCG: 88 TABLET ORAL at 05:06

## 2023-03-14 RX ADMIN — ESCITALOPRAM OXALATE 10 MG: 10 TABLET ORAL at 09:17

## 2023-03-14 RX ADMIN — FLUTICASONE PROPIONATE 2 SPRAY: 50 SPRAY, METERED NASAL at 09:17

## 2023-03-14 RX ADMIN — MIDODRINE HYDROCHLORIDE 5 MG: 5 TABLET ORAL at 17:03

## 2023-03-14 RX ADMIN — MIDODRINE HYDROCHLORIDE 5 MG: 5 TABLET ORAL at 11:54

## 2023-03-14 RX ADMIN — PANTOPRAZOLE SODIUM 40 MG: 40 INJECTION, POWDER, FOR SOLUTION INTRAVENOUS at 21:09

## 2023-03-14 RX ADMIN — RANOLAZINE 500 MG: 500 TABLET, EXTENDED RELEASE ORAL at 09:17

## 2023-03-14 RX ADMIN — RANOLAZINE 500 MG: 500 TABLET, EXTENDED RELEASE ORAL at 17:03

## 2023-03-14 NOTE — TELEPHONE ENCOUNTER
Patient remains admitted  Will continue to monitor for discharge, then schedule appropriate follow up

## 2023-03-14 NOTE — PLAN OF CARE
Problem: OCCUPATIONAL THERAPY ADULT  Goal: Performs self-care activities at highest level of function for planned discharge setting  See evaluation for individualized goals  Description: Treatment Interventions: ADL retraining, Functional transfer training, UE strengthening/ROM, Endurance training, Patient/family training, Equipment evaluation/education, Activityengagement          See flowsheet documentation for full assessment, interventions and recommendations  Outcome: Progressing  Note: Limitation: Decreased ADL status, Decreased UE strength, Decreased Safe judgement during ADL, Decreased endurance, Decreased cognition, Decreased self-care trans, Decreased high-level ADLs     Assessment: Patient participated in Skilled OT session this date with interventions consisting of  therapeutic activities to: increase activity tolerance   Patient agreeable to OT treatment session, upon arrival patient was found seated OOB to Recliner  Sit to stand txfrs from dennis chair with S, standing balance/functional mobility around room with use of RW and CGA with patient reporting feeling light headed throughout treatment  To increase posture, dynamic standing balance, balance during self cares, use of BUE  Pt required Max A for B  socks with patient declining use of AE with patient reporting "I have seen it and I dont want it, it is just as easy for my wife to put them on " Patient requiring verbal cues for safety and verbal cues for correct technique  Patient continues to be functioning below baseline level, occupational performance remains limited secondary to factors listed above and increased risk for falls and injury  From OT standpoint, recommendation at time of d/c would be Post acute rehabilitation services  The patient's raw score on the -PAC Daily Activity inpatient short form is 19, standardized score is 40 22, less than 39 4   Patients at this level are likely to benefit from discharge to post-acute rehabilitation services  Please refer to the recommendation of the Occupational Therapist for safe discharge planning       OT Discharge Recommendation: Post acute rehabilitation services

## 2023-03-14 NOTE — OCCUPATIONAL THERAPY NOTE
Occupational Therapy Progress Note     Patient Name: Trina Mederos  [id number] Date: 3/14/2023  Problem List  Principal Problem:    Orthostatic hypotension  Active Problems:    Type 2 diabetes mellitus with hyperglycemia, without long-term current use of insulin (HCC)    Coronary artery disease of native artery of native heart with stable angina pectoris (HCC)    Aortic stenosis, moderate    Other chest pain    Chronic diastolic (congestive) heart failure (HCC)    Acute blood loss anemia    Urinary retention    Elevated troponin    Hypomagnesemia    Elevated TSH    Elevated d-dimer    Hypothyroidism    Elevated platelet count            03/14/23 1333   OT Last Visit   OT Visit Date 03/14/23   Note Type   Note Type Treatment   Pain Assessment   Pain Assessment Tool 0-10   Pain Score No Pain   Restrictions/Precautions   Other Precautions Fall Risk; Chair Alarm   ADL   LB Dressing Assistance 2  Maximal Assistance   LB Dressing Deficit Don/doff R sock; Don/doff L sock   LB Dressing Comments Pt reporting assistance from wife at home and declining AE   Transfers   Sit to Stand 5  Supervision   Additional items Verbal cues;Armrests; Increased time required   Stand to Sit 5  Supervision   Additional items Increased time required;Verbal cues;Armrests   Additional Comments Use of RW   Functional Mobility   Functional Mobility 4  Minimal assistance   Additional Comments Pt completed standing balance/functional mobility around room with use of RW and CGA with patient reporting feeling light headed throughout treatment with nursing notified of res reports  Pts BP checked standing 100/56 and after functional mobility 103/56 with nursing notified of BP as well as patient feeling light headed throughout treatment     Additional items Rolling walker   Cognition   Overall Cognitive Status WFL   Arousal/Participation Alert   Attention Within functional limits   Orientation Level Oriented X4;Oriented to person;Oriented to place;Oriented to time;Oriented to situation   Memory Within functional limits   Following Commands Follows all commands and directions without difficulty   Assessment   Assessment Patient participated in Skilled OT session this date with interventions consisting of  therapeutic activities to: increase activity tolerance   Patient agreeable to OT treatment session, upon arrival patient was found seated OOB to Recliner  Sit to stand txfrs from dennis chair with S, standing balance/functional mobility around room with use of RW and CGA with patient reporting feeling light headed throughout treatment  To increase posture, dynamic standing balance, balance during self cares, use of BUE  Pt required Max A for B  socks with patient declining use of AE with patient reporting "I have seen it and I dont want it, it is just as easy for my wife to put them on " Patient requiring verbal cues for safety and verbal cues for correct technique  Patient continues to be functioning below baseline level, occupational performance remains limited secondary to factors listed above and increased risk for falls and injury  From OT standpoint, recommendation at time of d/c would be Post acute rehabilitation services  The patient's raw score on the AM-PAC Daily Activity inpatient short form is 19, standardized score is 40 22, less than 39 4  Patients at this level are likely to benefit from discharge to post-acute rehabilitation services  Please refer to the recommendation of the Occupational Therapist for safe discharge planning     Plan   Goal Expiration Date 03/23/23   OT Treatment Day 1   OT Frequency 2-3x/wk   Recommendation   OT Discharge Recommendation Post acute rehabilitation services   AM-PAC Daily Activity Inpatient   Lower Body Dressing 2   Bathing 2   Toileting 3   Upper Body Dressing 4   Grooming 4   Eating 4   Daily Activity Raw Score 19   Daily Activity Standardized Score (Calc for Raw Score >=11) 40 22   AM-PAC Applied Cognition Inpatient   Following a Speech/Presentation 4   Understanding Ordinary Conversation 4   Taking Medications 4   Remembering Where Things Are Placed or Put Away 4   Remembering List of 4-5 Errands 4   Taking Care of Complicated Tasks 4   Applied Cognition Raw Score 24   Applied Cognition Standardized Score 62 21       Pt left seated in dennis chair with chair alarm on and all needs in reach

## 2023-03-14 NOTE — ASSESSMENT & PLAN NOTE
· Continues to experience orthostatic hypotension with severe lightheadedness and dizziness while standing and a systolic blood pressure in the 80's mmHg  · Hold tamsulosin for now  · Still with orthostatic hypotension  · Will trial midodrine  · PT/OT-recommending post-acute care rehabilitation services, however, the patient declines short-term rehabilitation at this time

## 2023-03-14 NOTE — PLAN OF CARE
Problem: PAIN - ADULT  Goal: Verbalizes/displays adequate comfort level or baseline comfort level  Description: Interventions:  - Encourage patient to monitor pain and request assistance  - Assess pain using appropriate pain scale  - Administer analgesics based on type and severity of pain and evaluate response  - Implement non-pharmacological measures as appropriate and evaluate response  - Consider cultural and social influences on pain and pain management  - Notify physician/advanced practitioner if interventions unsuccessful or patient reports new pain  Outcome: Progressing     Problem: INFECTION - ADULT  Goal: Absence or prevention of progression during hospitalization  Description: INTERVENTIONS:  - Assess and monitor for signs and symptoms of infection  - Monitor lab/diagnostic results  - Monitor all insertion sites, i e  indwelling lines, tubes, and drains  - Monitor endotracheal if appropriate and nasal secretions for changes in amount and color  - [place] appropriate cooling/warming therapies per order  - Administer medications as ordered  - Instruct and encourage patient and family to use good hand hygiene technique  - Identify and instruct in appropriate isolation precautions for identified infection/condition  Outcome: Progressing     Problem: SAFETY ADULT  Goal: Patient will remain free of falls  Description: INTERVENTIONS:  - Educate patient/family on patient safety including physical limitations  - Instruct patient to call for assistance with activity   - Consult OT/PT to assist with strengthening/mobility   - Keep Call bell within reach  - Keep bed low and locked with side rails adjusted as appropriate  - Keep care items and personal belongings within reach  - Initiate and maintain comfort rounds  - Make Fall Risk Sign visible to staff  - Offer Toileting every 2 Hours, in advance of need  - Initiate/Maintain bed/chair alarm  - Obtain necessary fall risk management equipment: non-skid footwear  - Apply yellow socks and bracelet for high fall risk patients  - Consider moving patient to room near nurses station  Outcome: Progressing  Goal: Maintain or return to baseline ADL function  Description: INTERVENTIONS:  -  Assess patient's ability to carry out ADLs; assess patient's baseline for ADL function and identify physical deficits which impact ability to perform ADLs (bathing, care of mouth/teeth, toileting, grooming, dressing, etc )  - Assess/evaluate cause of self-care deficits   - Assess range of motion  - Assess patient's mobility; develop plan if impaired  - Assess patient's need for assistive devices and provide as appropriate  - Encourage maximum independence but intervene and supervise when necessary  - Involve family in performance of ADLs  - Assess for home care needs following discharge   - Consider OT consult to assist with ADL evaluation and planning for discharge  - Provide patient education as appropriate  Outcome: Progressing  Goal: Maintains/Returns to pre admission functional level  Description: INTERVENTIONS:  - Perform BMAT or MOVE assessment daily    - Set and communicate daily mobility goal to care team and patient/family/caregiver  - Collaborate with rehabilitation services on mobility goals if consulted  - Perform Range of Motion 3 times a day  - Reposition patient every 2 hours    - Dangle patient 3 times a day  - Stand patient 3 times a day  - Ambulate patient 3 times a day  - Out of bed to chair 3 times a day   - Out of bed for meals 3 times a day  - Out of bed for toileting  - Record patient progress and toleration of activity level   Outcome: Progressing     Problem: DISCHARGE PLANNING  Goal: Discharge to home or other facility with appropriate resources  Description: INTERVENTIONS:  - Identify barriers to discharge w/patient and caregiver  - Arrange for needed discharge resources and transportation as appropriate  - Identify discharge learning needs (meds, wound care, etc )  - Arrange for interpretive services to assist at discharge as needed  - Refer to Case Management Department for coordinating discharge planning if the patient needs post-hospital services based on physician/advanced practitioner order or complex needs related to functional status, cognitive ability, or social support system  Outcome: Progressing     Problem: Knowledge Deficit  Goal: Patient/family/caregiver demonstrates understanding of disease process, treatment plan, medications, and discharge instructions  Description: Complete learning assessment and assess knowledge base    Interventions:  - Provide teaching at level of understanding  - Provide teaching via preferred learning methods  Outcome: Progressing     Problem: CARDIOVASCULAR - ADULT  Goal: Maintains optimal cardiac output and hemodynamic stability  Description: INTERVENTIONS:  - Monitor I/O, vital signs and rhythm  - Monitor for S/S and trends of decreased cardiac output  - Administer and titrate ordered vasoactive medications to optimize hemodynamic stability  - Assess quality of pulses, skin color and temperature  - Assess for signs of decreased coronary artery perfusion  - Instruct patient to report change in severity of symptoms  Outcome: Progressing  Goal: Absence of cardiac dysrhythmias or at baseline rhythm  Description: INTERVENTIONS:  - Continuous cardiac monitoring, vital signs, obtain 12 lead EKG if ordered  - Administer antiarrhythmic and heart rate control medications as ordered  - Monitor electrolytes and administer replacement therapy as ordered  Outcome: Progressing     Problem: RESPIRATORY - ADULT  Goal: Achieves optimal ventilation and oxygenation  Description: INTERVENTIONS:  - Assess for changes in respiratory status  - Assess for changes in mentation and behavior  - Position to facilitate oxygenation and minimize respiratory effort  - Oxygen administered by appropriate delivery if ordered  - Initiate smoking cessation education as indicated  - Encourage broncho-pulmonary hygiene including cough, deep breathe, Incentive Spirometry  - Assess the need for suctioning and aspirate as needed  - Assess and instruct to report SOB or any respiratory difficulty  - Respiratory Therapy support as indicated  Outcome: Progressing     Problem: GENITOURINARY - ADULT  Goal: Maintains or returns to baseline urinary function  Description: INTERVENTIONS:  - Assess urinary function  - Encourage oral fluids to ensure adequate hydration if ordered  - Administer IV fluids as ordered to ensure adequate hydration  - Administer ordered medications as needed  - Offer frequent toileting  - Follow urinary retention protocol if ordered  Outcome: Progressing  Goal: Absence of urinary retention  Description: INTERVENTIONS:  - Assess patient's ability to void and empty bladder  - Monitor I/O  - Bladder scan as needed  - Discuss with physician/AP medications to alleviate retention as needed  - Discuss catheterization for long term situations as appropriate  Outcome: Progressing  Goal: Urinary catheter remains patent  Description: INTERVENTIONS:  - Assess patency of urinary catheter  - If patient has a chronic morales, consider changing catheter if non-functioning  - Follow guidelines for intermittent irrigation of non-functioning urinary catheter  Outcome: Progressing     Problem: METABOLIC, FLUID AND ELECTROLYTES - ADULT  Goal: Electrolytes maintained within normal limits  Description: INTERVENTIONS:  - Monitor labs and assess patient for signs and symptoms of electrolyte imbalances  - Administer electrolyte replacement as ordered  - Monitor response to electrolyte replacements, including repeat lab results as appropriate  - Instruct patient on fluid and nutrition as appropriate  Outcome: Progressing  Goal: Fluid balance maintained  Description: INTERVENTIONS:  - Monitor labs   - Monitor I/O and WT  - Instruct patient on fluid and nutrition as appropriate  - Assess for signs & symptoms of volume excess or deficit  Outcome: Progressing  Goal: Glucose maintained within target range  Description: INTERVENTIONS:  - Monitor Blood Glucose as ordered  - Assess for signs and symptoms of hyperglycemia and hypoglycemia  - Administer ordered medications to maintain glucose within target range  - Assess nutritional intake and initiate nutrition service referral as needed  Outcome: Progressing     Problem: MUSCULOSKELETAL - ADULT  Goal: Maintain or return mobility to safest level of function  Description: INTERVENTIONS:  - Assess patient's ability to carry out ADLs; assess patient's baseline for ADL function and identify physical deficits which impact ability to perform ADLs (bathing, care of mouth/teeth, toileting, grooming, dressing, etc )  - Assess/evaluate cause of self-care deficits   - Assess range of motion  - Assess patient's mobility  - Assess patient's need for assistive devices and provide as appropriate  - Encourage maximum independence but intervene and supervise when necessary  - Involve family in performance of ADLs  - Assess for home care needs following discharge   - Consider OT consult to assist with ADL evaluation and planning for discharge  - Provide patient education as appropriate  Outcome: Progressing  Goal: Maintain proper alignment of affected body part  Description: INTERVENTIONS:  - Support, maintain and protect limb and body alignment  - Provide patient/ family with appropriate education  Outcome: Progressing     Problem: MOBILITY - ADULT  Goal: Maintain or return to baseline ADL function  Description: INTERVENTIONS:  -  Assess patient's ability to carry out ADLs; assess patient's baseline for ADL function and identify physical deficits which impact ability to perform ADLs (bathing, care of mouth/teeth, toileting, grooming, dressing, etc )  - Assess/evaluate cause of self-care deficits   - Assess range of motion  - Assess patient's mobility; develop plan if impaired  - Assess patient's need for assistive devices and provide as appropriate  - Encourage maximum independence but intervene and supervise when necessary  - Involve family in performance of ADLs  - Assess for home care needs following discharge   - Consider OT consult to assist with ADL evaluation and planning for discharge  - Provide patient education as appropriate  Outcome: Progressing  Goal: Maintains/Returns to pre admission functional level  Description: INTERVENTIONS:  - Perform BMAT or MOVE assessment daily    - Set and communicate daily mobility goal to care team and patient/family/caregiver  - Collaborate with rehabilitation services on mobility goals if consulted  - Perform Range of Motion 3 times a day  - Reposition patient every 2 hours    - Dangle patient 3 times a day  - Stand patient 3 times a day  - Ambulate patient 3 times a day  - Out of bed to chair 3 times a day   - Out of bed for meals 3 times a day  - Out of bed for toileting  - Record patient progress and toleration of activity level   Outcome: Progressing     Problem: Prexisting or High Potential for Compromised Skin Integrity  Goal: Skin integrity is maintained or improved  Description: INTERVENTIONS:  - Identify patients at risk for skin breakdown  - Assess and monitor skin integrity  - Assess and monitor nutrition and hydration status  - Monitor labs   - Assess for incontinence   - Turn and reposition patient  - Assist with mobility/ambulation  - Relieve pressure over bony prominences  - Avoid friction and shearing  - Provide appropriate hygiene as needed including keeping skin clean and dry  - Evaluate need for skin moisturizer/barrier cream  - Collaborate with interdisciplinary team   - Patient/family teaching  - Consider wound care consult   Outcome: Progressing

## 2023-03-14 NOTE — CONSULTS
Consultation -  [facility]  Sloan Cardenas 69 y o  male MRN: [id number]  Unit/Bed#: 334-39 Encounter: [id number]        ASSESSMENT/PLAN:   Sloan Cardenas is a 69 y o  male with CAD status post PCI and drug-eluting stent back in December on ticagrelor and aspirin, moderate AAS, hypertension, CKD, critical SMA stenosis but not felt to be contributing to mesenteric ischemia per vascular, diabetes, recent admission earlier this month with hematochezia as well as watery diarrhea who now presents with readmission and GI consulted for some recurrent bleeding and blood in the stool  Recent endoscopy and colonoscopy earlier this month with relatively benign upper abdominal exam aside for some food in the stomach and duodenum, and colonoscopy with suspected ulcer within a diverticulum in the descending colon with possible visible vessel and adherent clot which 2 clips were placed, with fresh and old blood seen  Diverticulosis was noted throughout the colon but more on the left than the right  CT performed yesterday with no findings to suggest active GI hemorrhage, diverticulosis without diverticulitis, possible cystitis, possible mild interstitial pulmonary edema  Blood work here notable for CMP with albumin 2 9, total protein 4 9, calcium 7 6 but otherwise normal   Hemoglobin yesterday was 10 3 in the evening but 8 8 in the morning, and it did decrease to 8 5   platelet count normal and white blood cell count normal   Glucose 183  It is possible that he did pass some residual blood versus a small amount of recurrent bleeding from diverticulosis  He has not had any additional bleeding since yesterday per the patient  His CT scan yesterday did not show any active hemorrhage and overall his hemoglobin has remained stable      Trend CBC and would recommend transfusing for hemoglobin less than 7, platelets less than 50  Maintain 2 large-bore IVs  Maintain active type and screen  Recommend continuing Plavix for now  Monitor stools closely for ongoing bleeding  If he continues to have bleeding can plan for repeat colonoscopy  Can continue Protonix for now  MiraLAX as needed to help with constipation    Inpatient consult to gastroenterology  Consult performed by: Stephanie Black MD  Consult ordered by: Betzy Camacho DO          Reason for Consult / Principal Problem: Orthostatic hypotension    HPI: Chela Yanes is a 69y o  year old male with CAD status post PCI and drug-eluting stent back in December on ticagrelor and aspirin, moderate AAS, hypertension, CKD, critical SMA stenosis but not felt to be contributing to mesenteric ischemia per vascular, diabetes, recent admission earlier this month with hematochezia as well as watery diarrhea who now presents with readmission and GI consulted for some recurrent bleeding and blood in the stool  The patient was discharged from the hospital but returned 1 day later with complaint of chest pain  He has been worked up and managed for symptoms and complains of chest pain as well as orthostatic hypotension but yesterday also developed maroon-colored stool  He has not had any bowel movement since yesterday and has not passed any additional stool  He denies any abdominal pain, nausea, vomiting  He reports that he has been eating well  He overall feels well this morning when he was seen and he does not have any significant complaints            Review of Systems: as per HPI  10 point ROS reviewed and negative, except as above    Historical Information   Past Medical History:   Diagnosis Date   • BPH (benign prostatic hyperplasia)    • CAD (coronary artery disease)    • Cancer (HCC)     basal cell   • Chronic kidney disease     stage 3   • Colon polyp    • CPAP (continuous positive airway pressure) dependence    • Diabetes mellitus (HCC)     niddm   • Disease of thyroid gland    • Gout    • High cholesterol    • Hypertension    • MI, 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    • Myocardial infarction [facility]) 2014   • S/P cardiac catheterization 12/30/2022   • Sleep apnea      Past Surgical History:   Procedure Laterality Date   • BASAL CELL CARCINOMA EXCISION Right 1/21/2022    Procedure: EXCISION BASAL CELL CARCINOMA EXTREMITY;  Surgeon: Lora Galindo DO;  Location: [facility];  Service: General   • CARDIAC CATHETERIZATION Left 12/29/2022    Procedure: Cardiac Left Heart Cath;  Surgeon: Janay Doherty MD;  Location: [facility]; Service: Cardiology   • CARDIAC CATHETERIZATION N/A 12/29/2022    Procedure: Cardiac Coronary Angiogram;  Surgeon: Janay Doherty MD;  Location: [facility]; Service: Cardiology   • CARDIAC CATHETERIZATION N/A 12/29/2022    Procedure: Cardiac pci;  Surgeon: Janay Doherty MD;  Location: [facility];   Service: Cardiology   • CATARACT EXTRACTION W/  INTRAOCULAR LENS IMPLANT     • CORNEAL TRANSPLANT     • CORONARY ANGIOPLASTY WITH STENT PLACEMENT      stents x3-- [id number]   • EYE SURGERY      repair corneal laceration   • IA COLONOSCOPY FLX DX W/COLLJ SPEC WHEN PFRMD N/A 3/21/2016    Procedure: COLONOSCOPY;  Surgeon: Chelo Kumar MD;  Location: [facility];  Service: Colorectal   • TONSILLECTOMY AND ADENOIDECTOMY       Social History   Social History     Substance and Sexual Activity   Alcohol Use Not Currently    Comment: socially, cut down in 2008, previously did more than      Social History     Substance and Sexual Activity   Drug Use Never     Social History     Tobacco Use   Smoking Status Never   Smokeless Tobacco Never     Family History   Adopted: Yes   Problem Relation Age of Onset   • No Known Problems Mother    • No Known Problems Father        Meds/Allergies     Medications Prior to Admission   Medication   • allopurinol (ZYLOPRIM) 100 mg tablet   • atorvastatin (LIPITOR) 80 mg tablet   • clopidogrel (PLAVIX) 75 mg tablet   • Empagliflozin (Jardiance) 10 MG TABS   • escitalopram (LEXAPRO) 20 mg tablet   • glimepiride (AMARYL) 4 mg tablet   • levothyroxine 88 mcg tablet   • losartan (COZAAR) 25 mg tablet   • metFORMIN (GLUCOPHAGE) 1000 MG tablet   • multivitamin (THERAGRAN) TABS   • pantoprazole (PROTONIX) 40 mg tablet   • propranolol (INDERAL LA) 60 mg 24 hr capsule   • ranolazine (RANEXA) 500 mg 12 hr tablet   • tamsulosin (FLOMAX) 0 4 mg   • zonisamide (ZONEGRAN) 50 MG capsule   • allopurinol (ZYLOPRIM) 100 mg tablet   • benzonatate (TESSALON) 200 MG capsule   • Blood Glucose Monitoring Suppl (OneTouch Verio Reflect) w/Device KIT   • glucose blood (OneTouch Verio) test strip   • levothyroxine 88 mcg tablet   • nitroglycerin (NITROSTAT) 0 4 mg SL tablet   • OneTouch Delica Lancets 72B MISC   • prednisoLONE acetate (PRED FORTE) 1 % ophthalmic suspension   • torsemide (DEMADEX) 10 mg tablet     Current Facility-Administered Medications   Medication Dose Route Frequency   • acetaminophen (TYLENOL) tablet 650 mg  650 mg Oral Q6H PRN   • allopurinol (ZYLOPRIM) tablet 100 mg  100 mg Oral Daily   • aluminum-magnesium hydroxide-simethicone (MYLANTA) oral suspension 30 mL  30 mL Oral Q4H PRN   • atorvastatin (LIPITOR) tablet 80 mg  80 mg Oral Daily With Dinner   • clopidogrel (PLAVIX) tablet 75 mg  75 mg Oral Daily   • cyanocobalamin (VITAMIN B-12) tablet 500 mcg  500 mcg Oral Daily   • Empagliflozin (JARDIANCE) tablet 10 mg  10 mg Oral QAM   • escitalopram (LEXAPRO) tablet 10 mg  10 mg Oral Daily   • fluticasone (FLONASE) 50 mcg/act nasal spray 2 spray  2 spray Each Nare Daily   • insulin lispro (HumaLOG) 100 units/mL subcutaneous injection 2-12 Units  2-12 Units Subcutaneous TID AC   • insulin lispro (HumaLOG) 100 units/mL subcutaneous injection 2-12 Units  2-12 Units Subcutaneous HS   • levothyroxine tablet 88 mcg  88 mcg Oral Early Morning   • midodrine (PROAMATINE) tablet 5 mg  5 mg Oral TID AC   • morphine injection 2 mg  2 mg Intravenous Q3H PRN   • multivitamin-minerals (CENTRUM) tablet 1 tablet  1 tablet Oral Daily   • ondansetron (ZOFRAN) injection 4 mg  4 mg Intravenous Q4H PRN   • pantoprazole (PROTONIX) injection 40 mg  40 mg Intravenous Q24H   • polyethylene glycol (MIRALAX) packet 17 g  17 g Oral Daily PRN   • ranolazine (RANEXA) 12 hr tablet 500 mg  500 mg Oral BID       No Known Allergies    Objective     Blood pressure 100/56, pulse 77, temperature 97 8 °F (36 6 °C), resp  rate 18, height 5' 9" (1 753 m), weight 99 6 kg (219 lb 9 3 oz), SpO2 97 %  Intake/Output Summary (Last 24 hours) at 3/14/2023 1330  Last data filed at 3/14/2023 1325  Gross per 24 hour   Intake 5183 33 ml   Output 5200 ml   Net -16 67 ml       PHYSICAL EXAMINATION:    General Appearance:   Alert, cooperative, no distress   HEENT:  Normocephalic, atraumatic, anicteric  Neck supple, symmetrical, trachea midline  Lungs:   Equal chest rise and unlabored breathing, normal effort, no coughing  Cardiovascular:   No visualized JVD  Abdomen:   No abdominal distension  Skin:   No jaundice, rashes, or lesions  Musculoskeletal:   Normal range of motion visualized  Psych:  Normal affect and normal insight  Neuro:  Alert and appropriate         Lab Results:   CBC:   Lab Results   Component Value Date    WBC 5 83 03/14/2023    HGB 8 5 (L) 03/14/2023    HCT 26 7 (L) 03/14/2023     (H) 03/14/2023     03/14/2023    MCH 32 8 03/14/2023    MCHC 31 8 03/14/2023    RDW 18 0 (H) 03/14/2023    MPV 9 1 03/14/2023    NRBC 0 03/14/2023   ,   CMP:   Lab Results   Component Value Date    K 4 5 03/14/2023     03/14/2023    CO2 24 03/14/2023    BUN 15 03/14/2023    CREATININE 1 02 03/14/2023    CALCIUM 7 6 (L) 03/14/2023    AST 23 03/14/2023    ALT 22 03/14/2023    ALKPHOS 42 03/14/2023    EGFR 71 03/14/2023   ,   Lipase: No results found for: LIPASE,  PT/INR: No results found for: PT, INR,   Troponin: No results found for: TROPONINI,   Magnesium: No components found for: MAG,   Phosphorous:   Lab Results   Component Value Date PHOS 3 5 03/14/2023     Imaging Studies: I have personally reviewed pertinent reports

## 2023-03-14 NOTE — CASE MANAGEMENT
Case Management Discharge Planning Note    Patient name Rozella Bloch  Location /127-40 MRN [id number]  : 1945 Date 3/14/2023       Current Admission Date: 3/8/2023  Current Admission Diagnosis:Orthostatic hypotension   Patient Active Problem List    Diagnosis Date Noted   • Elevated platelet count    • Hypothyroidism 2023   • Elevated troponin 2023   • Diverticulosis of colon with hemorrhage 2023   • Hypomagnesemia 2023   • Elevated TSH 2023   • Elevated d-dimer 2023   • Urinary retention 2023   • Acute blood loss anemia 2023   • Acute lower GI bleeding 2023   • Diabetes mellitus, type 2 ([facility] ) 2023   • Acute diverticulitis 2023   • Orthostatic hypotension 2023   • 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   • Peripheral arterial disease ([facility] ) 2023   • S/P cardiac catheterization 2022   • Stage 3a chronic kidney disease ([facility] ) 2022   • Basal cell carcinoma (BCC) of right shoulder 2022   • Chronic diastolic (congestive) heart failure ([facility] ) 01/10/2022   • Skin lesion 2021   • Other chest pain 2021   • Leukocytosis 2021   • Bilateral lower extremity edema 10/22/2021   • Mild cognitive impairment 2021   • Benign hypertension with CKD (chronic kidney disease) stage III ([facility] ) 02/10/2021   • Diabetic nephropathy associated with type 2 diabetes mellitus ([facility] ) 02/10/2021   • Concentric left ventricular hypertrophy 02/10/2021   • Aortic stenosis, moderate 02/10/2021   • Edema 02/10/2021   • Fall (on) (from) other stairs and steps, initial encounter 2020   • Recurrent major depressive disorder ([facility] ) 2020   • Primary osteoarthritis of right knee 2020   • Primary osteoarthritis of left knee 2020   • Coronary artery disease of native artery of native heart with stable angina pectoris ([facility] ) 2020   • Chronic pain of both knees 2020   • Primary osteoarthritis of both knees 02/13/2020   • Primary osteoarthritis involving multiple joints 12/16/2019   • Vitamin E deficiency 09/19/2019   • Infected epidermoid cyst 12/04/2018   • Tremor, essential 09/26/2018   • Diabetic neuropathy associated with diabetes mellitus due to underlying condition ([facility] ) 09/26/2018   • Gait instability 09/26/2018   • Frequent falls 09/26/2018   • Degenerative disc disease, lumbar 07/10/2018   • Tremor 10/10/2016   • Depression with anxiety 08/29/2016   • Arthritis 07/13/2015   • NSTEMI (non-ST elevated myocardial infarction) ([facility] ) 10/06/2014   • Gout 05/17/2013   • Type 2 diabetes mellitus with hyperglycemia, without long-term current use of insulin [facility] ) 06/18/2012   • Hyperlipidemia 06/18/2012   • Hypertension 06/18/2012   • Morbid obesity ([facility] ) 06/18/2012      LOS (days): 5  Geometric Mean LOS (GMLOS) (days): 2 30  Days to GMLOS:-2 8     OBJECTIVE:  Risk of Unplanned Readmission Score: 24 55         Current admission status: Inpatient   Preferred Pharmacy:   [facility] [address] [address]  Phone: [phone] Fax: [phone]    Primary Care Provider: Que Trejo DO    Primary Insurance: [facility]  Secondary Insurance:     DISCHARGE DETAILS:    Discharge planning discussed with[de-identified] patient        CM contacted family/caregiver?: No- see comments (declined to call wife  did speak with wife and daughter on admission and they also declined STR and [address])  Were Treatment Team discharge recommendations reviewed with patient/caregiver?: Yes  Did patient/caregiver verbalize understanding of patient care needs?: Yes  Were patient/caregiver advised of the risks associated with not following Treatment Team discharge recommendations?: Yes    Contacts  Contact Method:  In Person  Reason/Outcome: Discharge [address]         Is the patient interested in [address] at discharge?: No    Would you like to participate in our [address] service program?  : No - Declined    Treatment Team Recommendation: Short Term Rehab (patient, wife and daughter declining )  Discharge Destination Plan[de-identified] Home       IMM Given (Date):: 03/14/23  IMM Given to[de-identified] Patient      tentative discharge tomorrow  Pt still declining any STR and HHC  Cm to follow for any additional needs

## 2023-03-14 NOTE — PLAN OF CARE
Problem: PAIN - ADULT  Goal: Verbalizes/displays adequate comfort level or baseline comfort level  Description: Interventions:  - Encourage patient to monitor pain and request assistance  - Assess pain using appropriate pain scale  - Administer analgesics based on type and severity of pain and evaluate response  - Implement non-pharmacological measures as appropriate and evaluate response  - Consider cultural and social influences on pain and pain management  - Notify physician/advanced practitioner if interventions unsuccessful or patient reports new pain  Outcome: Progressing     Problem: INFECTION - ADULT  Goal: Absence or prevention of progression during hospitalization  Description: INTERVENTIONS:  - Assess and monitor for signs and symptoms of infection  - Monitor lab/diagnostic results  - Monitor all insertion sites, i e  indwelling lines, tubes, and drains  - Monitor endotracheal if appropriate and nasal secretions for changes in amount and color  - [place] appropriate cooling/warming therapies per order  - Administer medications as ordered  - Instruct and encourage patient and family to use good hand hygiene technique  - Identify and instruct in appropriate isolation precautions for identified infection/condition  Outcome: Progressing     Problem: SAFETY ADULT  Goal: Patient will remain free of falls  Description: INTERVENTIONS:  - Educate patient/family on patient safety including physical limitations  - Instruct patient to call for assistance with activity   - Consult OT/PT to assist with strengthening/mobility   - Keep Call bell within reach  - Keep bed low and locked with side rails adjusted as appropriate  - Keep care items and personal belongings within reach  - Initiate and maintain comfort rounds  - Make Fall Risk Sign visible to staff  - Offer Toileting every 2 Hours, in advance of need  - Initiate/Maintain bed/chair alarm  - Obtain necessary fall risk management equipment: non-skid footwear  - Apply yellow socks and bracelet for high fall risk patients  - Consider moving patient to room near nurses station  Outcome: Progressing  Goal: Maintain or return to baseline ADL function  Description: INTERVENTIONS:  -  Assess patient's ability to carry out ADLs; assess patient's baseline for ADL function and identify physical deficits which impact ability to perform ADLs (bathing, care of mouth/teeth, toileting, grooming, dressing, etc )  - Assess/evaluate cause of self-care deficits   - Assess range of motion  - Assess patient's mobility; develop plan if impaired  - Assess patient's need for assistive devices and provide as appropriate  - Encourage maximum independence but intervene and supervise when necessary  - Involve family in performance of ADLs  - Assess for home care needs following discharge   - Consider OT consult to assist with ADL evaluation and planning for discharge  - Provide patient education as appropriate  Outcome: Progressing  Goal: Maintains/Returns to pre admission functional level  Description: INTERVENTIONS:  - Perform BMAT or MOVE assessment daily    - Set and communicate daily mobility goal to care team and patient/family/caregiver  - Collaborate with rehabilitation services on mobility goals if consulted  - Perform Range of Motion 3 times a day  - Reposition patient every 2 hours    - Dangle patient 3 times a day  - Stand patient 3 times a day  - Ambulate patient 3 times a day  - Out of bed to chair 3 times a day   - Out of bed for meals 3 times a day  - Out of bed for toileting  - Record patient progress and toleration of activity level   Outcome: Progressing     Problem: DISCHARGE PLANNING  Goal: Discharge to home or other facility with appropriate resources  Description: INTERVENTIONS:  - Identify barriers to discharge w/patient and caregiver  - Arrange for needed discharge resources and transportation as appropriate  - Identify discharge learning needs (meds, wound care, etc )  - Arrange for interpretive services to assist at discharge as needed  - Refer to Case Management Department for coordinating discharge planning if the patient needs post-hospital services based on physician/advanced practitioner order or complex needs related to functional status, cognitive ability, or social support system  Outcome: Progressing     Problem: Knowledge Deficit  Goal: Patient/family/caregiver demonstrates understanding of disease process, treatment plan, medications, and discharge instructions  Description: Complete learning assessment and assess knowledge base    Interventions:  - Provide teaching at level of understanding  - Provide teaching via preferred learning methods  Outcome: Progressing     Problem: CARDIOVASCULAR - ADULT  Goal: Maintains optimal cardiac output and hemodynamic stability  Description: INTERVENTIONS:  - Monitor I/O, vital signs and rhythm  - Monitor for S/S and trends of decreased cardiac output  - Administer and titrate ordered vasoactive medications to optimize hemodynamic stability  - Assess quality of pulses, skin color and temperature  - Assess for signs of decreased coronary artery perfusion  - Instruct patient to report change in severity of symptoms  Outcome: Progressing  Goal: Absence of cardiac dysrhythmias or at baseline rhythm  Description: INTERVENTIONS:  - Continuous cardiac monitoring, vital signs, obtain 12 lead EKG if ordered  - Administer antiarrhythmic and heart rate control medications as ordered  - Monitor electrolytes and administer replacement therapy as ordered  Outcome: Progressing     Problem: RESPIRATORY - ADULT  Goal: Achieves optimal ventilation and oxygenation  Description: INTERVENTIONS:  - Assess for changes in respiratory status  - Assess for changes in mentation and behavior  - Position to facilitate oxygenation and minimize respiratory effort  - Oxygen administered by appropriate delivery if ordered  - Initiate smoking cessation education as indicated  - Encourage broncho-pulmonary hygiene including cough, deep breathe, Incentive Spirometry  - Assess the need for suctioning and aspirate as needed  - Assess and instruct to report SOB or any respiratory difficulty  - Respiratory Therapy support as indicated  Outcome: Progressing     Problem: GENITOURINARY - ADULT  Goal: Maintains or returns to baseline urinary function  Description: INTERVENTIONS:  - Assess urinary function  - Encourage oral fluids to ensure adequate hydration if ordered  - Administer IV fluids as ordered to ensure adequate hydration  - Administer ordered medications as needed  - Offer frequent toileting  - Follow urinary retention protocol if ordered  Outcome: Progressing  Goal: Absence of urinary retention  Description: INTERVENTIONS:  - Assess patient's ability to void and empty bladder  - Monitor I/O  - Bladder scan as needed  - Discuss with physician/AP medications to alleviate retention as needed  - Discuss catheterization for long term situations as appropriate  Outcome: Progressing  Goal: Urinary catheter remains patent  Description: INTERVENTIONS:  - Assess patency of urinary catheter  - If patient has a chronic morales, consider changing catheter if non-functioning  - Follow guidelines for intermittent irrigation of non-functioning urinary catheter  Outcome: Progressing     Problem: METABOLIC, FLUID AND ELECTROLYTES - ADULT  Goal: Electrolytes maintained within normal limits  Description: INTERVENTIONS:  - Monitor labs and assess patient for signs and symptoms of electrolyte imbalances  - Administer electrolyte replacement as ordered  - Monitor response to electrolyte replacements, including repeat lab results as appropriate  - Instruct patient on fluid and nutrition as appropriate  Outcome: Progressing  Goal: Fluid balance maintained  Description: INTERVENTIONS:  - Monitor labs   - Monitor I/O and WT  - Instruct patient on fluid and nutrition as appropriate  - Assess for signs & symptoms of volume excess or deficit  Outcome: Progressing  Goal: Glucose maintained within target range  Description: INTERVENTIONS:  - Monitor Blood Glucose as ordered  - Assess for signs and symptoms of hyperglycemia and hypoglycemia  - Administer ordered medications to maintain glucose within target range  - Assess nutritional intake and initiate nutrition service referral as needed  Outcome: Progressing     Problem: MUSCULOSKELETAL - ADULT  Goal: Maintain or return mobility to safest level of function  Description: INTERVENTIONS:  - Assess patient's ability to carry out ADLs; assess patient's baseline for ADL function and identify physical deficits which impact ability to perform ADLs (bathing, care of mouth/teeth, toileting, grooming, dressing, etc )  - Assess/evaluate cause of self-care deficits   - Assess range of motion  - Assess patient's mobility  - Assess patient's need for assistive devices and provide as appropriate  - Encourage maximum independence but intervene and supervise when necessary  - Involve family in performance of ADLs  - Assess for home care needs following discharge   - Consider OT consult to assist with ADL evaluation and planning for discharge  - Provide patient education as appropriate  Outcome: Progressing  Goal: Maintain proper alignment of affected body part  Description: INTERVENTIONS:  - Support, maintain and protect limb and body alignment  - Provide patient/ family with appropriate education  Outcome: Progressing     Problem: MOBILITY - ADULT  Goal: Maintain or return to baseline ADL function  Description: INTERVENTIONS:  -  Assess patient's ability to carry out ADLs; assess patient's baseline for ADL function and identify physical deficits which impact ability to perform ADLs (bathing, care of mouth/teeth, toileting, grooming, dressing, etc )  - Assess/evaluate cause of self-care deficits   - Assess range of motion  - Assess patient's mobility; develop plan if impaired  - Assess patient's need for assistive devices and provide as appropriate  - Encourage maximum independence but intervene and supervise when necessary  - Involve family in performance of ADLs  - Assess for home care needs following discharge   - Consider OT consult to assist with ADL evaluation and planning for discharge  - Provide patient education as appropriate  Outcome: Progressing  Goal: Maintains/Returns to pre admission functional level  Description: INTERVENTIONS:  - Perform BMAT or MOVE assessment daily    - Set and communicate daily mobility goal to care team and patient/family/caregiver  - Collaborate with rehabilitation services on mobility goals if consulted  - Perform Range of Motion 3 times a day  - Reposition patient every 2 hours    - Dangle patient 3 times a day  - Stand patient 3 times a day  - Ambulate patient 3 times a day  - Out of bed to chair 3 times a day   - Out of bed for meals 3 times a day  - Out of bed for toileting  - Record patient progress and toleration of activity level   Outcome: Progressing     Problem: Prexisting or High Potential for Compromised Skin Integrity  Goal: Skin integrity is maintained or improved  Description: INTERVENTIONS:  - Identify patients at risk for skin breakdown  - Assess and monitor skin integrity  - Assess and monitor nutrition and hydration status  - Monitor labs   - Assess for incontinence   - Turn and reposition patient  - Assist with mobility/ambulation  - Relieve pressure over bony prominences  - Avoid friction and shearing  - Provide appropriate hygiene as needed including keeping skin clean and dry  - Evaluate need for skin moisturizer/barrier cream  - Collaborate with interdisciplinary team   - Patient/family teaching  - Consider wound care consult   Outcome: Progressing

## 2023-03-14 NOTE — PROGRESS NOTES
[address] [facility]  Progress Note - Kim Mckenna 1945, 69 y o  male MRN: [id number]  Unit/Bed#: 421-01 Encounter: [id number]  Primary Care Provider: Daphnie Carlson DO   Date and time admitted to hospital: 3/8/2023 11:01 PM    * Orthostatic hypotension  Assessment & Plan  · Continues to experience orthostatic hypotension with severe lightheadedness and dizziness while standing and a systolic blood pressure in the 80's mmHg  · Hold tamsulosin for now  · Still with orthostatic hypotension  · Will trial midodrine  · PT/OT-recommending post-acute care rehabilitation services, however, the patient declines short-term rehabilitation at this time    Hypothyroidism  Assessment & Plan  · Continue PO levothyroxine  · Recheck a TSH level in 1-2 weeks with his PCP      Elevated TSH  Assessment & Plan  · Please see the assessment and plan for "Hypothyrodism"    Acute blood loss anemia  Assessment & Plan  · Recent gastrointestinal hemorrhage thought to be diverticular bleeding found on colonoscopy on 3/3/2023  · Serial laboratory testing to monitor hemoglobin/hematocrit levels  · Initiated cyanocobalamin 500 mcg PO Qdaily for a low-normal vitamin B12 level  · Now with maroon-colored stool on 03/13/2023  Hemoglobin stable  GI consult ordered      Chronic diastolic (congestive) heart failure (HCC)  Assessment & Plan  Wt Readings from Last 3 Encounters:   03/14/23 99 6 kg (219 lb 9 3 oz)   03/08/23 105 kg (231 lb 7 7 oz)   03/02/23 98 9 kg (218 lb 0 6 oz)     · Appears euvolemic  · Continue to monitor        Aortic stenosis, moderate  Assessment & Plan  • Outpatient follow-up with Cardiology      Coronary artery disease of native artery of native heart with stable angina pectoris [facility])  Assessment & Plan  · Known CAD with recent drug-eluting stent placement/PTCA to the LAD on 12/29/2022  · I appreciate Cardiology's input    · No indication for repeat cardiac catheterization at this time per Cardiology  · Continue plavix 75 mg PO Qdaily  · Plan to restart aspirin 81 mg PO Qdaily this week if no signs of additional gastrointestinal bleeding  · Continue high-intensity statin therapy with atorvastatin 80 mg PO Qdaily with dinner  · Not on beta-blocker therapy at this time due to orthostatic hypotension    Type 2 diabetes mellitus with hyperglycemia, without long-term current use of insulin [facility])  Assessment & Plan  Lab Results   Component Value Date    HGBA1C 5 6 03/10/2023       Recent Labs     23  1107 23  1627 23  2127 23  0738   POCGLU 202* 166* 171* 113       Blood Sugar Average: Last 72 hrs:  (P) 500 [id number]     · Continue sliding scale insulin      VTE Pharmacologic Prophylaxis:   Pharmacologic: Pharmacologic VTE Prophylaxis contraindicated due to gi bleed  Mechanical VTE Prophylaxis in Place: Yes    Patient Centered Rounds: I have performed bedside rounds with nursing staff today  Discussions with Specialists or Other Care Team Provider: cm, nursing    Education and Discussions with Family / Patient: pt    Time Spent for Care: 30 minutes  More than 50% of total time spent on counseling and coordination of care as described above  Current Length of Stay: 5 day(s)    Current Patient Status: Inpatient   Certification Statement: The patient will continue to require additional inpatient hospital stay due to see below    Discharge Plan: pending further monitoring of hgb and tx of orthostasis  Code Status: Level 1 - Full Code      Subjective:   Denies fevers, chillls, sob, cough    Objective:     Vitals:   Temp (24hrs), Av 8 °F (36 6 °C), Min:97 6 °F (36 4 °C), Max:97 9 °F (36 6 °C)    Temp:  [97 6 °F (36 4 °C)-97 9 °F (36 6 °C)] 97 8 °F (36 6 °C)  HR:  [75-98] 77  Resp:  [18-19] 18  BP: (100-129)/(56-70) 103/56  SpO2:  [94 %-99 %] 97 %  Body mass index is 32 43 kg/m²  Input and Output Summary (last 24 hours):        Intake/Output Summary (Last 24 hours) at 3/14/2023 1527  Last data filed at 3/14/2023 1325  Gross per 24 hour   Intake 5183 33 ml   Output 4000 ml   Net 1183 33 ml       Physical Exam:     Physical Exam  Constitutional:       General: He is not in acute distress  Appearance: He is well-developed  He is not diaphoretic  HENT:      Head: Normocephalic and atraumatic  Nose: Nose normal       Mouth/Throat:      Pharynx: No oropharyngeal exudate  Eyes:      General: No scleral icterus  Conjunctiva/sclera: Conjunctivae normal    Cardiovascular:      Rate and Rhythm: Normal rate and regular rhythm  Heart sounds: Normal heart sounds  No murmur heard  No friction rub  No gallop  Pulmonary:      Effort: Pulmonary effort is normal  No respiratory distress  Breath sounds: Normal breath sounds  No wheezing or rales  Chest:      Chest wall: No tenderness  Abdominal:      General: Bowel sounds are normal  There is no distension  Palpations: Abdomen is soft  Tenderness: There is no abdominal tenderness  There is no guarding  Musculoskeletal:         General: No tenderness or deformity  Normal range of motion  Cervical back: Normal range of motion and neck supple  Skin:     General: Skin is warm and dry  Findings: No erythema  Neurological:      Mental Status: He is alert  Mental status is at baseline           Additional Data:     Labs:    Results from last 7 days   Lab Units 03/14/23  0645   WBC Thousand/uL 5 83   HEMOGLOBIN g/dL 8 5*   HEMATOCRIT % 26 7*   PLATELETS Thousands/uL 338   NEUTROS PCT % 50   LYMPHS PCT % 30   MONOS PCT % 11   EOS PCT % 8*     Results from last 7 days   Lab Units 03/14/23  0527   SODIUM mmol/L 138   POTASSIUM mmol/L 4 5   CHLORIDE mmol/L 107   CO2 mmol/L 24   BUN mg/dL 15   CREATININE mg/dL 1 02   ANION GAP mmol/L 7   CALCIUM mg/dL 7 6*   ALBUMIN g/dL 2 9*   TOTAL BILIRUBIN mg/dL 0 39   ALK PHOS U/L 42   ALT U/L 22   AST U/L 23   GLUCOSE RANDOM mg/dL 102     Results from last 7 days   Lab Units 03/08/23  2346   INR  0 90     Results from last 7 days   Lab Units 03/14/23  1521 03/14/23  1102 03/14/23  0738 03/13/23  2127 03/13/23  1627 03/13/23  1107 03/13/23  0831 03/12/23  2100 03/12/23  1542 03/12/23  1120 03/12/23  0722 03/11/23  2153   POC GLUCOSE mg/dl 195* 183* 113 171* 166* 202* 196* 237* 199* 220* 124 178*     Results from last 7 days   Lab Units 03/10/23  0459   HEMOGLOBIN A1C % 5 6     Results from last 7 days   Lab Units 03/14/23  0527 03/13/23  0615 03/12/23  0515 03/11/23  0506 03/10/23  0459   LACTIC ACID mmol/L  --   --   --   --  0 8   PROCALCITONIN ng/ml 0 07 <0 05 0 07 0 08 0 07           * I Have Reviewed All Lab Data Listed Above  * Additional Pertinent Lab Tests Reviewed:  All Labs Within Last 24 Hours Reviewed    Imaging:    Imaging Reports Reviewed Today Include: na  Imaging Personally Reviewed by Myself Includes:  na    Recent Cultures (last 7 days):     Results from last 7 days   Lab Units 03/09/23  0722   URINE CULTURE  No Growth <1000 cfu/mL       Last 24 Hours Medication List:   Current Facility-Administered Medications   Medication Dose Route Frequency Provider Last Rate   • acetaminophen  650 mg Oral Q6H PRN Mykel Neville MD     • allopurinol  100 mg Oral Daily Mykel Neville MD     • aluminum-magnesium hydroxide-simethicone  30 mL Oral Q4H PRN Mykel Neville MD     • atorvastatin  80 mg Oral Daily With Dinner Mykel Neville MD     • clopidogrel  75 mg Oral Daily Mykel Neville MD     • cyanocobalamin  500 mcg Oral Daily Lucille Bean, DO     • Empagliflozin  10 mg Oral QAM Mykel Neville MD     • escitalopram  10 mg Oral Daily Mykel Neville MD     • fluticasone  2 spray Each Nare Daily Citlaly Camp MD     • insulin lispro  2-12 Units Subcutaneous TID AC Mykel Neville MD     • insulin lispro  2-12 Units Subcutaneous HS Mykel Neville MD     • levothyroxine  88 mcg Oral Early Morning Mykel Neville MD     • midodrine  5 mg Oral TID AC Mk Castillo MD     • morphine injection  2 mg Intravenous Q3H PRN Serena Chatterjee MD     • multivitamin-minerals  1 tablet Oral Daily Serena Chatterjee MD     • ondansetron  4 mg Intravenous Q4H PRN Calvin Bean DO     • pantoprazole  40 mg Intravenous Q24H Serena Chatterjee MD     • polyethylene glycol  17 g Oral Daily PRN Serena Chatterjee MD     • ranolazine  500 mg Oral BID Serena Chatterjee MD          Today, Patient Was Seen By: Lia Paulino MD    ** Please Note: Dictation voice to text software may have been used in the creation of this document   **

## 2023-03-14 NOTE — ASSESSMENT & PLAN NOTE
· Recent gastrointestinal hemorrhage thought to be diverticular bleeding found on colonoscopy on 3/3/2023  · Serial laboratory testing to monitor hemoglobin/hematocrit levels  · Initiated cyanocobalamin 500 mcg PO Qdaily for a low-normal vitamin B12 level  · Now with maroon-colored stool on 03/13/2023  Hemoglobin stable  GI consult ordered

## 2023-03-14 NOTE — ASSESSMENT & PLAN NOTE
Wt Readings from Last 3 Encounters:   03/14/23 99 6 kg (219 lb 9 3 oz)   03/08/23 105 kg (231 lb 7 7 oz)   03/02/23 98 9 kg (218 lb 0 6 oz)     · Appears euvolemic  · Continue to monitor

## 2023-03-14 NOTE — UTILIZATION REVIEW
Continued Stay Review    Date: 3- 14-23                        Current Patient Class: inpatient  Current Level of Care: med surg    HPI:77 y o  male initially admitted on 3-9-23     Assessment/Plan:     GI consult completed for maroon colored stool on 3-13  Patient has not had recurrent bleeding from diverticulitis  Last evidence of bleeding was 3-13  HB decreased from 10 3  to 8 5  Continue iv protonix  CT abdomen and pelvis shows  Bladder wall thickening suspicious for cystitis and pulmonary edema  No acute diverticulitis  Hold off on EGD and  Colonoscopy  Last done 3-2-23  Vital Signs:     Date/Time Temp Pulse Resp BP MAP (mmHg) SpO2 O2 Device   03/14/23 13:34:30 -- -- -- 103/56 72 -- --   03/14/23 13:25:12 -- -- -- 100/56 71 -- --   03/14/23 11:53:40 -- 77 -- 109/63 78 97 % --   03/14/23 06:19:35 97 8 °F (36 6 °C) 89 18 110/64 79 95 % --   03/14/23 06:17:13 97 8 °F (36 6 °C) 75 18 110/64 79 95 % --   03/14/23 00:14:22 97 6 °F (36 4 °C) 83 19 113/64 80 99 %        Pertinent Labs/Diagnostic Results:       CT abdomen and pelvis   3-13-23   No findings to suggest active GI hemorrhage    Diverticulosis    No evidence of acute diverticulitis    Bladder wall thickening and perivesicular fat stranding suspicious for cystitis    Bibasilar septal thickening and patchy groundglass probably representing mild interstitial pulmonary edema        Results from last 7 days   Lab Units 03/08/23  2346   SARS-COV-2  Negative     Results from last 7 days   Lab Units 03/14/23  0645 03/13/23  1424 03/13/23  0615 03/12/23  0515 03/11/23  0506   WBC Thousand/uL 5 83 9 38 6 55 6 72 6 57   HEMOGLOBIN g/dL 8 5* 10 3* 8 8* 9 4* 9 2*   HEMATOCRIT % 26 7* 32 8* 27 3* 28 4* 27 9*   PLATELETS Thousands/uL 338 446* 341 319 299   NEUTROS ABS Thousands/µL 2 93 6 07 3 46 3 82 3 84     Results from last 7 days   Lab Units 03/09/23  0503   RETIC CT ABS  187,300*   RETIC CT PCT % 6 99*     Results from last 7 days   Lab Units 03/14/23  0527 03/13/23  1424 03/13/23  0615 03/12/23  0515 03/11/23  0506 03/10/23  0459   SODIUM mmol/L 138 138 147 138 138 141   POTASSIUM mmol/L 4 5 4 5 2 6* 4 0 4 1 3 7   CHLORIDE mmol/L 107 104 126* 104 105 107   CO2 mmol/L 24 27 16* 26 27 32   ANION GAP mmol/L 7 7 5 8 6 2*   BUN mg/dL 15 12 4* 10 10 7   CREATININE mg/dL 1 02 1 25 0 56* 1 08 1 02 1 00   EGFR ml/min/1 73sq m 71 55 100 66 71 72   CALCIUM mg/dL 7 6* 8 3* 4 2* 8 3* 8 2* 7 9*   CALCIUM, IONIZED mmol/L 1 08*  --  1 10* 1 14 1 12 1 14   MAGNESIUM mg/dL 2 2 2 2 1 2* 2 1 2 0 2 0   PHOSPHORUS mg/dL 3 5 3 3 2 0* 3 7 3 3 3 7     Results from last 7 days   Lab Units 03/14/23  0527 03/13/23  1424 03/13/23  0615 03/12/23  0515 03/11/23  0506   AST U/L 23 34 12* 23 23   ALT U/L 22 32 13 26 25   ALK PHOS U/L 42 61 25* 54 50   TOTAL PROTEIN g/dL 4 9* 6 8 <3 0* 5 4* 4 9*   ALBUMIN g/dL 2 9* 3 7 1 6* 3 1* 3 0*   TOTAL BILIRUBIN mg/dL 0 39 0 38 0 22 0 43 0 45     Results from last 7 days   Lab Units 03/14/23  1102 03/14/23  0738 03/13/23  2127 03/13/23  1627 03/13/23  1107 03/13/23  0831 03/12/23  2100 03/12/23  1542 03/12/23  1120 03/12/23  0722 03/11/23  2153 03/11/23  1549   POC GLUCOSE mg/dl 183* 113 171* 166* 202* 196* 237* 199* 220* 124 178* 186*     Results from last 7 days   Lab Units 03/14/23  0527 03/13/23  1424 03/13/23  0615 03/12/23  0515 03/11/23  0506 03/10/23  0459 03/09/23  0503 03/08/23  2346 03/08/23  0452 03/07/23  1429   GLUCOSE RANDOM mg/dL 102 171* 73 122 126 126 146* 266* 159* 210*         Results from last 7 days   Lab Units 03/10/23  0459   HEMOGLOBIN A1C % 5 6   EAG mg/dl 114       Results from last 7 days   Lab Units 03/10/23  0459   CK TOTAL U/L 27*     Results from last 7 days   Lab Units 03/09/23  0503 03/09/23  0136 03/08/23  2346   HS TNI 0HR ng/L  --   --  59*   HS TNI 2HR ng/L  --  58*  --    HSTNI D2 ng/L  --  -1  --    HS TNI 4HR ng/L 76*  --   --    HSTNI D4 ng/L 17  --   --      Results from last 7 days   Lab Units 03/08/23  2346   D-DIMER QUANTITATIVE ug/ml FEU 1 35*     Results from last 7 days   Lab Units 03/08/23  2346   PROTIME seconds 12 3   INR  0 90   PTT seconds 28     Results from last 7 days   Lab Units 03/09/23  0503   TSH 3RD GENERATON uIU/mL 5 668*     Results from last 7 days   Lab Units 03/14/23  0527 03/13/23  0615 03/12/23  0515 03/11/23  0506 03/10/23  0459   PROCALCITONIN ng/ml 0 07 <0 05 0 07 0 08 0 07     Results from last 7 days   Lab Units 03/10/23  0459   LACTIC ACID mmol/L 0 8             Results from last 7 days   Lab Units 03/08/23  2346   BNP pg/mL 120*     Results from last 7 days   Lab Units 03/10/23  0459   FERRITIN ng/mL 70     Results from last 7 days   Lab Units 03/10/23  0459   HEP B S AG  Non-reactive   HEP C AB  Non-reactive   HEP B C IGM  Non-reactive     Results from last 7 days   Lab Units 03/08/23  2346   LIPASE u/L 49       Results from last 7 days   Lab Units 03/09/23  0135   CLARITY UA  Cloudy   COLOR UA  [place]   SPEC GRAV UA  >=1 030   PH UA  6 0   GLUCOSE UA mg/dl >=1000 (1%)*   KETONES UA mg/dl Trace*   BLOOD UA  Moderate*   PROTEIN UA mg/dl 100 (2+)*   NITRITE UA  Negative   BILIRUBIN UA  Negative   UROBILINOGEN UA E U /dl 0 2   LEUKOCYTES UA  Elevated glucose may cause decreased leukocyte values   See urine microscopic for UWBC result/*   WBC UA /hpf 4-10*   RBC UA /hpf 20-30*   BACTERIA UA /hpf Occasional   EPITHELIAL CELLS WET PREP /hpf Occasional     Results from last 7 days   Lab Units 03/08/23  2346   INFLUENZA A PCR  Negative   INFLUENZA B PCR  Negative   RSV PCR  Negative       Results from last 7 days   Lab Units 03/09/23  0722   URINE CULTURE  No Growth <1000 cfu/mL       Scheduled Medications:  allopurinol, 100 mg, Oral, Daily  atorvastatin, 80 mg, Oral, Daily With Dinner  clopidogrel, 75 mg, Oral, Daily  cyanocobalamin, 500 mcg, Oral, Daily  Empagliflozin, 10 mg, Oral, QAM  escitalopram, 10 mg, Oral, Daily  fluticasone, 2 spray, Each Nare, Daily  insulin lispro, 2-12 Units, Subcutaneous, TID AC  insulin lispro, 2-12 Units, Subcutaneous, HS  levothyroxine, 88 mcg, Oral, Early Morning  midodrine, 5 mg, Oral, TID AC  multivitamin-minerals, 1 tablet, Oral, Daily  pantoprazole, 40 mg, Intravenous, Q24H  ranolazine, 500 mg, Oral, BID      Continuous IV Infusions:     PRN Meds:  acetaminophen, 650 mg, Oral, Q6H PRN  aluminum-magnesium hydroxide-simethicone, 30 mL, Oral, Q4H PRN  morphine injection, 2 mg, Intravenous, Q3H PRN  ondansetron, 4 mg, Intravenous, Q4H PRN  polyethylene glycol, 17 g, Oral, Daily PRN        Discharge Plan: to be determined     Network Utilization Review Department  ATTENTION: Please call with any questions or concerns to [phone] and carefully listen to the prompts so that you are directed to the right person  All voicemails are confidential   [facility] all requests for admission clinical reviews, approved or denied determinations and any other requests to dedicated fax number below belonging to the campus where the patient is receiving treatment   List of dedicated fax numbers for the Facilities:  [address] DENIALS (Administrative/Medical Necessity) [phone]   [address] (Maternity/NICU/Pediatrics) [phone]   9 Bettina Dunaway [phone]   Kamar Taylor 77 [phone]   [address] [place] [address] Terrie Medeiros 28 [phone]   1556 [facility] [place] [facility] 134 815 [facility] [phone]

## 2023-03-14 NOTE — ASSESSMENT & PLAN NOTE
Lab Results   Component Value Date    HGBA1C 5 6 03/10/2023       Recent Labs     03/13/23  1107 03/13/23  1627 03/13/23  2127 03/14/23  0738   POCGLU 202* 166* 171* 113       Blood Sugar Average: Last 72 hrs:  (P) 117 [id number]     · Continue sliding scale insulin

## 2023-03-15 ENCOUNTER — APPOINTMENT (OUTPATIENT)
Dept: CARDIAC REHAB | Facility: HOSPITAL | Age: 78
End: 2023-03-15

## 2023-03-15 PROBLEM — R77.8 ELEVATED TROPONIN: Status: RESOLVED | Noted: 2023-03-09 | Resolved: 2023-03-15

## 2023-03-15 PROBLEM — R79.89 ELEVATED PLATELET COUNT: Status: RESOLVED | Noted: 2023-03-13 | Resolved: 2023-03-15

## 2023-03-15 PROBLEM — R79.89 ELEVATED TROPONIN: Status: RESOLVED | Noted: 2023-03-09 | Resolved: 2023-03-15

## 2023-03-15 LAB
ANION GAP SERPL CALCULATED.3IONS-SCNC: 7 MMOL/L (ref 4–13)
BASOPHILS # BLD AUTO: 0.04 THOUSANDS/ÂΜL (ref 0–0.1)
BASOPHILS NFR BLD AUTO: 1 % (ref 0–1)
BUN SERPL-MCNC: 10 MG/DL (ref 5–25)
CALCIUM SERPL-MCNC: 8.2 MG/DL (ref 8.4–10.2)
CHLORIDE SERPL-SCNC: 105 MMOL/L (ref 96–108)
CO2 SERPL-SCNC: 27 MMOL/L (ref 21–32)
CREAT SERPL-MCNC: 1 MG/DL (ref 0.6–1.3)
EOSINOPHIL # BLD AUTO: 0.5 THOUSAND/ÂΜL (ref 0–0.61)
EOSINOPHIL NFR BLD AUTO: 9 % (ref 0–6)
ERYTHROCYTE [DISTWIDTH] IN BLOOD BY AUTOMATED COUNT: 17.2 % (ref 11.6–15.1)
GFR SERPL CREATININE-BSD FRML MDRD: 72 ML/MIN/1.73SQ M
GLUCOSE SERPL-MCNC: 163 MG/DL (ref 65–140)
GLUCOSE SERPL-MCNC: 175 MG/DL (ref 65–140)
GLUCOSE SERPL-MCNC: 193 MG/DL (ref 65–140)
GLUCOSE SERPL-MCNC: 96 MG/DL (ref 65–140)
GLUCOSE SERPL-MCNC: 98 MG/DL (ref 65–140)
HCT VFR BLD AUTO: 26 % (ref 36.5–49.3)
HGB BLD-MCNC: 8.2 G/DL (ref 12–17)
IMM GRANULOCYTES # BLD AUTO: 0.02 THOUSAND/UL (ref 0–0.2)
IMM GRANULOCYTES NFR BLD AUTO: 0 % (ref 0–2)
LYMPHOCYTES # BLD AUTO: 1.49 THOUSANDS/ÂΜL (ref 0.6–4.47)
LYMPHOCYTES NFR BLD AUTO: 27 % (ref 14–44)
MCH RBC QN AUTO: 31.2 PG (ref 26.8–34.3)
MCHC RBC AUTO-ENTMCNC: 31.5 G/DL (ref 31.4–37.4)
MCV RBC AUTO: 99 FL (ref 82–98)
MONOCYTES # BLD AUTO: 0.55 THOUSAND/ÂΜL (ref 0.17–1.22)
MONOCYTES NFR BLD AUTO: 10 % (ref 4–12)
NEUTROPHILS # BLD AUTO: 2.95 THOUSANDS/ÂΜL (ref 1.85–7.62)
NEUTS SEG NFR BLD AUTO: 53 % (ref 43–75)
NRBC BLD AUTO-RTO: 0 /100 WBCS
PLATELET # BLD AUTO: 327 THOUSANDS/UL (ref 149–390)
PMV BLD AUTO: 8.6 FL (ref 8.9–12.7)
POTASSIUM SERPL-SCNC: 4.1 MMOL/L (ref 3.5–5.3)
RBC # BLD AUTO: 2.63 MILLION/UL (ref 3.88–5.62)
SODIUM SERPL-SCNC: 139 MMOL/L (ref 135–147)
WBC # BLD AUTO: 5.55 THOUSAND/UL (ref 4.31–10.16)

## 2023-03-15 RX ADMIN — MIDODRINE HYDROCHLORIDE 5 MG: 5 TABLET ORAL at 11:45

## 2023-03-15 RX ADMIN — RANOLAZINE 500 MG: 500 TABLET, EXTENDED RELEASE ORAL at 18:07

## 2023-03-15 RX ADMIN — MULTIPLE VITAMINS W/ MINERALS TAB 1 TABLET: TAB at 09:05

## 2023-03-15 RX ADMIN — INSULIN LISPRO 2 UNITS: 100 INJECTION, SOLUTION INTRAVENOUS; SUBCUTANEOUS at 16:29

## 2023-03-15 RX ADMIN — MIDODRINE HYDROCHLORIDE 5 MG: 5 TABLET ORAL at 06:08

## 2023-03-15 RX ADMIN — INSULIN LISPRO 2 UNITS: 100 INJECTION, SOLUTION INTRAVENOUS; SUBCUTANEOUS at 11:46

## 2023-03-15 RX ADMIN — ALLOPURINOL 100 MG: 100 TABLET ORAL at 09:06

## 2023-03-15 RX ADMIN — ATORVASTATIN CALCIUM 80 MG: 40 TABLET, FILM COATED ORAL at 16:29

## 2023-03-15 RX ADMIN — EMPAGLIFLOZIN 10 MG: 10 TABLET, FILM COATED ORAL at 09:09

## 2023-03-15 RX ADMIN — CYANOCOBALAMIN TAB 500 MCG 500 MCG: 500 TAB at 09:06

## 2023-03-15 RX ADMIN — MIDODRINE HYDROCHLORIDE 5 MG: 5 TABLET ORAL at 16:29

## 2023-03-15 RX ADMIN — RANOLAZINE 500 MG: 500 TABLET, EXTENDED RELEASE ORAL at 09:06

## 2023-03-15 RX ADMIN — INSULIN LISPRO 2 UNITS: 100 INJECTION, SOLUTION INTRAVENOUS; SUBCUTANEOUS at 21:24

## 2023-03-15 RX ADMIN — PANTOPRAZOLE SODIUM 40 MG: 40 INJECTION, POWDER, FOR SOLUTION INTRAVENOUS at 21:17

## 2023-03-15 RX ADMIN — FLUTICASONE PROPIONATE 2 SPRAY: 50 SPRAY, METERED NASAL at 09:05

## 2023-03-15 RX ADMIN — CLOPIDOGREL BISULFATE 75 MG: 75 TABLET ORAL at 09:06

## 2023-03-15 RX ADMIN — ESCITALOPRAM OXALATE 10 MG: 10 TABLET ORAL at 09:06

## 2023-03-15 RX ADMIN — LEVOTHYROXINE SODIUM 88 MCG: 88 TABLET ORAL at 06:07

## 2023-03-15 NOTE — PLAN OF CARE
Problem: PAIN - ADULT  Goal: Verbalizes/displays adequate comfort level or baseline comfort level  Description: Interventions:  - Encourage patient to monitor pain and request assistance  - Assess pain using appropriate pain scale  - Administer analgesics based on type and severity of pain and evaluate response  - Implement non-pharmacological measures as appropriate and evaluate response  - Consider cultural and social influences on pain and pain management  - Notify physician/advanced practitioner if interventions unsuccessful or patient reports new pain  Outcome: Progressing     Problem: INFECTION - ADULT  Goal: Absence or prevention of progression during hospitalization  Description: INTERVENTIONS:  - Assess and monitor for signs and symptoms of infection  - Monitor lab/diagnostic results  - Monitor all insertion sites, i e  indwelling lines, tubes, and drains  - Monitor endotracheal if appropriate and nasal secretions for changes in amount and color  - [place] appropriate cooling/warming therapies per order  - Administer medications as ordered  - Instruct and encourage patient and family to use good hand hygiene technique  - Identify and instruct in appropriate isolation precautions for identified infection/condition  Outcome: Progressing     Problem: SAFETY ADULT  Goal: Patient will remain free of falls  Description: INTERVENTIONS:  - Educate patient/family on patient safety including physical limitations  - Instruct patient to call for assistance with activity   - Consult OT/PT to assist with strengthening/mobility   - Keep Call bell within reach  - Keep bed low and locked with side rails adjusted as appropriate  - Keep care items and personal belongings within reach  - Initiate and maintain comfort rounds  - Make Fall Risk Sign visible to staff  - Offer Toileting every 2 Hours, in advance of need  - Initiate/Maintain bed/chair alarm  - Obtain necessary fall risk management equipment: non-skid footwear  - Apply yellow socks and bracelet for high fall risk patients  - Consider moving patient to room near nurses station  Outcome: Progressing  Goal: Maintain or return to baseline ADL function  Description: INTERVENTIONS:  -  Assess patient's ability to carry out ADLs; assess patient's baseline for ADL function and identify physical deficits which impact ability to perform ADLs (bathing, care of mouth/teeth, toileting, grooming, dressing, etc )  - Assess/evaluate cause of self-care deficits   - Assess range of motion  - Assess patient's mobility; develop plan if impaired  - Assess patient's need for assistive devices and provide as appropriate  - Encourage maximum independence but intervene and supervise when necessary  - Involve family in performance of ADLs  - Assess for home care needs following discharge   - Consider OT consult to assist with ADL evaluation and planning for discharge  - Provide patient education as appropriate  Outcome: Progressing  Goal: Maintains/Returns to pre admission functional level  Description: INTERVENTIONS:  - Perform BMAT or MOVE assessment daily    - Set and communicate daily mobility goal to care team and patient/family/caregiver  - Collaborate with rehabilitation services on mobility goals if consulted  - Perform Range of Motion 3 times a day  - Reposition patient every 2 hours    - Dangle patient 3 times a day  - Stand patient 3 times a day  - Ambulate patient 3 times a day  - Out of bed to chair 3 times a day   - Out of bed for meals 3 times a day  - Out of bed for toileting  - Record patient progress and toleration of activity level   Outcome: Progressing     Problem: DISCHARGE PLANNING  Goal: Discharge to home or other facility with appropriate resources  Description: INTERVENTIONS:  - Identify barriers to discharge w/patient and caregiver  - Arrange for needed discharge resources and transportation as appropriate  - Identify discharge learning needs (meds, wound care, etc )  - Arrange for interpretive services to assist at discharge as needed  - Refer to Case Management Department for coordinating discharge planning if the patient needs post-hospital services based on physician/advanced practitioner order or complex needs related to functional status, cognitive ability, or social support system  Outcome: Progressing     Problem: Knowledge Deficit  Goal: Patient/family/caregiver demonstrates understanding of disease process, treatment plan, medications, and discharge instructions  Description: Complete learning assessment and assess knowledge base    Interventions:  - Provide teaching at level of understanding  - Provide teaching via preferred learning methods  Outcome: Progressing     Problem: CARDIOVASCULAR - ADULT  Goal: Maintains optimal cardiac output and hemodynamic stability  Description: INTERVENTIONS:  - Monitor I/O, vital signs and rhythm  - Monitor for S/S and trends of decreased cardiac output  - Administer and titrate ordered vasoactive medications to optimize hemodynamic stability  - Assess quality of pulses, skin color and temperature  - Assess for signs of decreased coronary artery perfusion  - Instruct patient to report change in severity of symptoms  Outcome: Progressing  Goal: Absence of cardiac dysrhythmias or at baseline rhythm  Description: INTERVENTIONS:  - Continuous cardiac monitoring, vital signs, obtain 12 lead EKG if ordered  - Administer antiarrhythmic and heart rate control medications as ordered  - Monitor electrolytes and administer replacement therapy as ordered  Outcome: Progressing     Problem: RESPIRATORY - ADULT  Goal: Achieves optimal ventilation and oxygenation  Description: INTERVENTIONS:  - Assess for changes in respiratory status  - Assess for changes in mentation and behavior  - Position to facilitate oxygenation and minimize respiratory effort  - Oxygen administered by appropriate delivery if ordered  - Initiate smoking cessation education as indicated  - Encourage broncho-pulmonary hygiene including cough, deep breathe, Incentive Spirometry  - Assess the need for suctioning and aspirate as needed  - Assess and instruct to report SOB or any respiratory difficulty  - Respiratory Therapy support as indicated  Outcome: Progressing     Problem: GENITOURINARY - ADULT  Goal: Maintains or returns to baseline urinary function  Description: INTERVENTIONS:  - Assess urinary function  - Encourage oral fluids to ensure adequate hydration if ordered  - Administer IV fluids as ordered to ensure adequate hydration  - Administer ordered medications as needed  - Offer frequent toileting  - Follow urinary retention protocol if ordered  Outcome: Progressing  Goal: Absence of urinary retention  Description: INTERVENTIONS:  - Assess patient's ability to void and empty bladder  - Monitor I/O  - Bladder scan as needed  - Discuss with physician/AP medications to alleviate retention as needed  - Discuss catheterization for long term situations as appropriate  Outcome: Progressing  Goal: Urinary catheter remains patent  Description: INTERVENTIONS:  - Assess patency of urinary catheter  - If patient has a chronic morales, consider changing catheter if non-functioning  - Follow guidelines for intermittent irrigation of non-functioning urinary catheter  Outcome: Progressing     Problem: METABOLIC, FLUID AND ELECTROLYTES - ADULT  Goal: Electrolytes maintained within normal limits  Description: INTERVENTIONS:  - Monitor labs and assess patient for signs and symptoms of electrolyte imbalances  - Administer electrolyte replacement as ordered  - Monitor response to electrolyte replacements, including repeat lab results as appropriate  - Instruct patient on fluid and nutrition as appropriate  Outcome: Progressing  Goal: Fluid balance maintained  Description: INTERVENTIONS:  - Monitor labs   - Monitor I/O and WT  - Instruct patient on fluid and nutrition as appropriate  - Assess for signs & symptoms of volume excess or deficit  Outcome: Progressing  Goal: Glucose maintained within target range  Description: INTERVENTIONS:  - Monitor Blood Glucose as ordered  - Assess for signs and symptoms of hyperglycemia and hypoglycemia  - Administer ordered medications to maintain glucose within target range  - Assess nutritional intake and initiate nutrition service referral as needed  Outcome: Progressing     Problem: MUSCULOSKELETAL - ADULT  Goal: Maintain or return mobility to safest level of function  Description: INTERVENTIONS:  - Assess patient's ability to carry out ADLs; assess patient's baseline for ADL function and identify physical deficits which impact ability to perform ADLs (bathing, care of mouth/teeth, toileting, grooming, dressing, etc )  - Assess/evaluate cause of self-care deficits   - Assess range of motion  - Assess patient's mobility  - Assess patient's need for assistive devices and provide as appropriate  - Encourage maximum independence but intervene and supervise when necessary  - Involve family in performance of ADLs  - Assess for home care needs following discharge   - Consider OT consult to assist with ADL evaluation and planning for discharge  - Provide patient education as appropriate  Outcome: Progressing  Goal: Maintain proper alignment of affected body part  Description: INTERVENTIONS:  - Support, maintain and protect limb and body alignment  - Provide patient/ family with appropriate education  Outcome: Progressing     Problem: MOBILITY - ADULT  Goal: Maintain or return to baseline ADL function  Description: INTERVENTIONS:  -  Assess patient's ability to carry out ADLs; assess patient's baseline for ADL function and identify physical deficits which impact ability to perform ADLs (bathing, care of mouth/teeth, toileting, grooming, dressing, etc )  - Assess/evaluate cause of self-care deficits   - Assess range of motion  - Assess patient's mobility; develop plan if impaired  - Assess patient's need for assistive devices and provide as appropriate  - Encourage maximum independence but intervene and supervise when necessary  - Involve family in performance of ADLs  - Assess for home care needs following discharge   - Consider OT consult to assist with ADL evaluation and planning for discharge  - Provide patient education as appropriate  Outcome: Progressing  Goal: Maintains/Returns to pre admission functional level  Description: INTERVENTIONS:  - Perform BMAT or MOVE assessment daily    - Set and communicate daily mobility goal to care team and patient/family/caregiver  - Collaborate with rehabilitation services on mobility goals if consulted  - Perform Range of Motion 3 times a day  - Reposition patient every 2 hours    - Dangle patient 3 times a day  - Stand patient 3 times a day  - Ambulate patient 3 times a day  - Out of bed to chair 3 times a day   - Out of bed for meals 3 times a day  - Out of bed for toileting  - Record patient progress and toleration of activity level   Outcome: Progressing     Problem: Prexisting or High Potential for Compromised Skin Integrity  Goal: Skin integrity is maintained or improved  Description: INTERVENTIONS:  - Identify patients at risk for skin breakdown  - Assess and monitor skin integrity  - Assess and monitor nutrition and hydration status  - Monitor labs   - Assess for incontinence   - Turn and reposition patient  - Assist with mobility/ambulation  - Relieve pressure over bony prominences  - Avoid friction and shearing  - Provide appropriate hygiene as needed including keeping skin clean and dry  - Evaluate need for skin moisturizer/barrier cream  - Collaborate with interdisciplinary team   - Patient/family teaching  - Consider wound care consult   Outcome: Progressing

## 2023-03-15 NOTE — OCCUPATIONAL THERAPY NOTE
Occupational Therapy         Patient Name: Kathye Meckel  IYSIV'K Date: 3/15/2023       03/15/23 1122   OT Last Visit   OT Visit Date 03/15/23   Note Type   Note Type Treatment   Pain Assessment   Pain Assessment Tool 0-10   Pain Score No Pain   Restrictions/Precautions   Weight Bearing Precautions Per Order No   Other Precautions Fall Risk; Chair Alarm   Functional Standing Tolerance   Time 1-2 min   Activity static standing during BP reading   Comments Pt's BP at rest, while seated in chair reading 101/59; BP upon standing reading 82/50  Nurse present and aware of pt's BP changes  Bed Mobility   Additional Comments Pt OOB in bedside chair at start and end of session, all needs within reach  Transfers   Sit to Stand 5  Supervision   Additional items Armrests; Increased time required;Verbal cues   Stand to Sit 5  Supervision   Additional items Armrests; Increased time required;Verbal cues   Additional Comments RW used   Functional Mobility   Additional Comments Pt unable to complete due to significant drop in BP  Subjective   Subjective "I always make fun of my sickness"   Cognition   Overall Cognitive Status WFL   Arousal/Participation Alert   Attention Within functional limits   Orientation Level Oriented X4   Memory Within functional limits   Following Commands Follows all commands and directions without difficulty   Activity Tolerance   Activity Tolerance Other (Comment)  (limited by BP values)   Assessment   Assessment Patient participated in Skilled OT session this date with interventions consisting of Energy Conservation techniques, safety awareness and fall prevention techniques and  therapeutic activities to: increase activity tolerance   Co treatment with PT secondary to complex medical condition of pt, mod-max A of 2 required to achieve and maintain transitional movements, requiring the need of skilled therapeutic intervention of 2 therapists to achieve delivery of services   Patient agreeable to OT treatment session, upon arrival patient was found seated OOB to Chair  Patient requiring verbal cues for safety  Patient continues to be functioning below baseline level, occupational performance remains limited secondary to factors listed above and increased risk for falls and injury  The patient's raw score on the AM-PAC Daily Activity Inpatient Short Form is 19  A raw score of greater than or equal to 19 suggests the patient may benefit from discharge to home  Please refer to the recommendation of the Occupational Therapist for safe discharge planning  From OT standpoint, recommendation at time of d/c would be Post acute rehabilitation services  Plan   Treatment Interventions Functional transfer training   Goal Expiration Date 03/23/23   OT Treatment Day 2   OT Frequency 2-3x/wk   Recommendation   OT Discharge Recommendation Post acute rehabilitation services   [facility]   Lower Body Dressing 2   Bathing 2   Toileting 3   Upper Body Dressing 4   Grooming 4   Eating 4   Daily Activity Raw Score 19   Daily Activity Standardized Score (Calc for Raw Score >=11) 40 22   AM-Located within [facility] Applied Cognition Inpatient   Following a Speech/Presentation 4   Understanding Ordinary Conversation 4   Taking Medications 4   Remembering Where Things Are Placed or Put Away 4   Remembering List of 4-5 Errands 4   Taking Care of Complicated Tasks 4   Applied Cognition Raw Score 24   Applied Cognition Standardized Score 62 21       Pt will benefit from continued OT services in order to maximize (I) c ADL performance, FM c least restrictive AD, and improve overall endurance/strength required to complete functional tasks in preparation for d/c      Pt benefited from co-treatment of skilled OT and PTA in order to most appropriately address functional deficits d/t extensive assistance required for safe functional mobility, decreased activity tolerance, and regression from functioning level prior to admission and/or onset of present illness  OT/PT objectives were addressed separately; please see PT note for specific goal areas targeted  Pt left seated in chair at end of session; all needs within reach; all lines intact; scds connected and turned on      Elaine Santiago

## 2023-03-15 NOTE — PLAN OF CARE
Problem: PAIN - ADULT  Goal: Verbalizes/displays adequate comfort level or baseline comfort level  Description: Interventions:  - Encourage patient to monitor pain and request assistance  - Assess pain using appropriate pain scale  - Administer analgesics based on type and severity of pain and evaluate response  - Implement non-pharmacological measures as appropriate and evaluate response  - Consider cultural and social influences on pain and pain management  - Notify physician/advanced practitioner if interventions unsuccessful or patient reports new pain  Outcome: Progressing     Problem: INFECTION - ADULT  Goal: Absence or prevention of progression during hospitalization  Description: INTERVENTIONS:  - Assess and monitor for signs and symptoms of infection  - Monitor lab/diagnostic results  - Monitor all insertion sites, i e  indwelling lines, tubes, and drains  - Monitor endotracheal if appropriate and nasal secretions for changes in amount and color  - Brandon appropriate cooling/warming therapies per order  - Administer medications as ordered  - Instruct and encourage patient and family to use good hand hygiene technique  - Identify and instruct in appropriate isolation precautions for identified infection/condition  Outcome: Progressing     Problem: SAFETY ADULT  Goal: Patient will remain free of falls  Description: INTERVENTIONS:  - Educate patient/family on patient safety including physical limitations  - Instruct patient to call for assistance with activity   - Consult OT/PT to assist with strengthening/mobility   - Keep Call bell within reach  - Keep bed low and locked with side rails adjusted as appropriate  - Keep care items and personal belongings within reach  - Initiate and maintain comfort rounds  - Make Fall Risk Sign visible to staff  - Offer Toileting every 2 Hours, in advance of need  - Initiate/Maintain bed/chair alarm  - Obtain necessary fall risk management equipment: non-skid footwear  - Apply yellow socks and bracelet for high fall risk patients  - Consider moving patient to room near nurses station  Outcome: Progressing  Goal: Maintain or return to baseline ADL function  Description: INTERVENTIONS:  -  Assess patient's ability to carry out ADLs; assess patient's baseline for ADL function and identify physical deficits which impact ability to perform ADLs (bathing, care of mouth/teeth, toileting, grooming, dressing, etc )  - Assess/evaluate cause of self-care deficits   - Assess range of motion  - Assess patient's mobility; develop plan if impaired  - Assess patient's need for assistive devices and provide as appropriate  - Encourage maximum independence but intervene and supervise when necessary  - Involve family in performance of ADLs  - Assess for home care needs following discharge   - Consider OT consult to assist with ADL evaluation and planning for discharge  - Provide patient education as appropriate  Outcome: Progressing  Goal: Maintains/Returns to pre admission functional level  Description: INTERVENTIONS:  - Perform BMAT or MOVE assessment daily    - Set and communicate daily mobility goal to care team and patient/family/caregiver  - Collaborate with rehabilitation services on mobility goals if consulted  - Perform Range of Motion 3 times a day  - Reposition patient every 2 hours    - Dangle patient 3 times a day  - Stand patient 3 times a day  - Ambulate patient 3 times a day  - Out of bed to chair 3 times a day   - Out of bed for meals 3 times a day  - Out of bed for toileting  - Record patient progress and toleration of activity level   Outcome: Progressing     Problem: DISCHARGE PLANNING  Goal: Discharge to home or other facility with appropriate resources  Description: INTERVENTIONS:  - Identify barriers to discharge w/patient and caregiver  - Arrange for needed discharge resources and transportation as appropriate  - Identify discharge learning needs (meds, wound care, etc )  - Arrange for interpretive services to assist at discharge as needed  - Refer to Case Management Department for coordinating discharge planning if the patient needs post-hospital services based on physician/advanced practitioner order or complex needs related to functional status, cognitive ability, or social support system  Outcome: Progressing     Problem: Knowledge Deficit  Goal: Patient/family/caregiver demonstrates understanding of disease process, treatment plan, medications, and discharge instructions  Description: Complete learning assessment and assess knowledge base    Interventions:  - Provide teaching at level of understanding  - Provide teaching via preferred learning methods  Outcome: Progressing     Problem: CARDIOVASCULAR - ADULT  Goal: Maintains optimal cardiac output and hemodynamic stability  Description: INTERVENTIONS:  - Monitor I/O, vital signs and rhythm  - Monitor for S/S and trends of decreased cardiac output  - Administer and titrate ordered vasoactive medications to optimize hemodynamic stability  - Assess quality of pulses, skin color and temperature  - Assess for signs of decreased coronary artery perfusion  - Instruct patient to report change in severity of symptoms  Outcome: Progressing  Goal: Absence of cardiac dysrhythmias or at baseline rhythm  Description: INTERVENTIONS:  - Continuous cardiac monitoring, vital signs, obtain 12 lead EKG if ordered  - Administer antiarrhythmic and heart rate control medications as ordered  - Monitor electrolytes and administer replacement therapy as ordered  Outcome: Progressing     Problem: RESPIRATORY - ADULT  Goal: Achieves optimal ventilation and oxygenation  Description: INTERVENTIONS:  - Assess for changes in respiratory status  - Assess for changes in mentation and behavior  - Position to facilitate oxygenation and minimize respiratory effort  - Oxygen administered by appropriate delivery if ordered  - Initiate smoking cessation education as indicated  - Encourage broncho-pulmonary hygiene including cough, deep breathe, Incentive Spirometry  - Assess the need for suctioning and aspirate as needed  - Assess and instruct to report SOB or any respiratory difficulty  - Respiratory Therapy support as indicated  Outcome: Progressing     Problem: GENITOURINARY - ADULT  Goal: Maintains or returns to baseline urinary function  Description: INTERVENTIONS:  - Assess urinary function  - Encourage oral fluids to ensure adequate hydration if ordered  - Administer IV fluids as ordered to ensure adequate hydration  - Administer ordered medications as needed  - Offer frequent toileting  - Follow urinary retention protocol if ordered  Outcome: Progressing  Goal: Absence of urinary retention  Description: INTERVENTIONS:  - Assess patient's ability to void and empty bladder  - Monitor I/O  - Bladder scan as needed  - Discuss with physician/AP medications to alleviate retention as needed  - Discuss catheterization for long term situations as appropriate  Outcome: Progressing  Goal: Urinary catheter remains patent  Description: INTERVENTIONS:  - Assess patency of urinary catheter  - If patient has a chronic morales, consider changing catheter if non-functioning  - Follow guidelines for intermittent irrigation of non-functioning urinary catheter  Outcome: Progressing     Problem: METABOLIC, FLUID AND ELECTROLYTES - ADULT  Goal: Electrolytes maintained within normal limits  Description: INTERVENTIONS:  - Monitor labs and assess patient for signs and symptoms of electrolyte imbalances  - Administer electrolyte replacement as ordered  - Monitor response to electrolyte replacements, including repeat lab results as appropriate  - Instruct patient on fluid and nutrition as appropriate  Outcome: Progressing  Goal: Fluid balance maintained  Description: INTERVENTIONS:  - Monitor labs   - Monitor I/O and WT  - Instruct patient on fluid and nutrition as appropriate  - Assess for signs & symptoms of volume excess or deficit  Outcome: Progressing  Goal: Glucose maintained within target range  Description: INTERVENTIONS:  - Monitor Blood Glucose as ordered  - Assess for signs and symptoms of hyperglycemia and hypoglycemia  - Administer ordered medications to maintain glucose within target range  - Assess nutritional intake and initiate nutrition service referral as needed  Outcome: Progressing     Problem: MUSCULOSKELETAL - ADULT  Goal: Maintain or return mobility to safest level of function  Description: INTERVENTIONS:  - Assess patient's ability to carry out ADLs; assess patient's baseline for ADL function and identify physical deficits which impact ability to perform ADLs (bathing, care of mouth/teeth, toileting, grooming, dressing, etc )  - Assess/evaluate cause of self-care deficits   - Assess range of motion  - Assess patient's mobility  - Assess patient's need for assistive devices and provide as appropriate  - Encourage maximum independence but intervene and supervise when necessary  - Involve family in performance of ADLs  - Assess for home care needs following discharge   - Consider OT consult to assist with ADL evaluation and planning for discharge  - Provide patient education as appropriate  Outcome: Progressing  Goal: Maintain proper alignment of affected body part  Description: INTERVENTIONS:  - Support, maintain and protect limb and body alignment  - Provide patient/ family with appropriate education  Outcome: Progressing     Problem: MOBILITY - ADULT  Goal: Maintain or return to baseline ADL function  Description: INTERVENTIONS:  -  Assess patient's ability to carry out ADLs; assess patient's baseline for ADL function and identify physical deficits which impact ability to perform ADLs (bathing, care of mouth/teeth, toileting, grooming, dressing, etc )  - Assess/evaluate cause of self-care deficits   - Assess range of motion  - Assess patient's mobility; develop plan if impaired  - Assess patient's need for assistive devices and provide as appropriate  - Encourage maximum independence but intervene and supervise when necessary  - Involve family in performance of ADLs  - Assess for home care needs following discharge   - Consider OT consult to assist with ADL evaluation and planning for discharge  - Provide patient education as appropriate  Outcome: Progressing  Goal: Maintains/Returns to pre admission functional level  Description: INTERVENTIONS:  - Perform BMAT or MOVE assessment daily    - Set and communicate daily mobility goal to care team and patient/family/caregiver  - Collaborate with rehabilitation services on mobility goals if consulted  - Perform Range of Motion 3 times a day  - Reposition patient every 2 hours    - Dangle patient 3 times a day  - Stand patient 3 times a day  - Ambulate patient 3 times a day  - Out of bed to chair 3 times a day   - Out of bed for meals 3 times a day  - Out of bed for toileting  - Record patient progress and toleration of activity level   Outcome: Progressing     Problem: Prexisting or High Potential for Compromised Skin Integrity  Goal: Skin integrity is maintained or improved  Description: INTERVENTIONS:  - Identify patients at risk for skin breakdown  - Assess and monitor skin integrity  - Assess and monitor nutrition and hydration status  - Monitor labs   - Assess for incontinence   - Turn and reposition patient  - Assist with mobility/ambulation  - Relieve pressure over bony prominences  - Avoid friction and shearing  - Provide appropriate hygiene as needed including keeping skin clean and dry  - Evaluate need for skin moisturizer/barrier cream  - Collaborate with interdisciplinary team   - Patient/family teaching  - Consider wound care consult   Outcome: Progressing

## 2023-03-15 NOTE — PHYSICAL THERAPY NOTE
PHYSICAL THERAPY NOTE          Patient Name: Srikanth Gonzalez  [id number] Date: 3/15/2023   03/15/23 1121   PT Last Visit   PT Visit Date 03/15/23   Note Type   Note Type Treatment   Pain Assessment   Pain Assessment Tool 0-10   Pain Score No Pain   Restrictions/Precautions   Weight Bearing Precautions Per Order No   Other Precautions   (Fall Risk; Chair Alarm)   General   Chart Reviewed Yes   Response to Previous Treatment Patient with no complaints from previous session  Family/Caregiver Present Yes   Cognition   Overall Cognitive Status WFL   Arousal/Participation Alert   Attention Within functional limits   Orientation Level Oriented X4   Following Commands Follows all commands and directions without difficulty   Subjective   Subjective c/o dizziness with standing   Bed Mobility   Additional Comments OOB in chair start/end PT session   Transfers   Sit to Stand 5  Supervision   Additional items Armrests; Increased time required;Verbal cues   Stand to Sit 5  Supervision   Additional items Armrests; Increased time required;Verbal cues   Additional Comments BP taken sittin/59, standing 82/50 dizzy  Returned to seated postion  Ambulation deferred due to orthostatic hypotension   Ambulation/Elevation   Gait pattern Not tested; Not appropriate   Balance   Static Sitting Good   Dynamic Sitting Fair +   Static Standing Fair   Endurance Deficit   Endurance Deficit Yes   Activity Tolerance   Activity Tolerance   (low BP)   Nurse Made Aware JAVED Rob   Assessment   Prognosis Fair   Problem List   (Decreased strength; Decreased endurance; Impaired balance; Impaired sensation)   Assessment Pt  seen for PT treatment session this date with interventions consisting of transfers with BP monitoring for orthostatic hypotension  /59 sitting and 82/50 standing with c/o dizziness  Returned to seated position  Ambulation deferred   In comparison to previous session, Pt  With decrease in activity tolerance  Pt was able to ambulate in room yesterday with assist with OT  Pt is in need of continued activity in PT to improve strength balance endurance mobility transfers and ambulation with return to maximize LOF  From PT/mobility standpoint, recommendation at time of d/c would be post acute rehab in order to promote return to PLOF and independence  The patient's AM-PAC Basic Mobility Inpatient Short Form Raw Score is 17  A Raw score of greater than 16 suggests the patient may benefit from discharge to home  Please also refer to physical therapy recommendation for safe DC planning  Goals   LTG Expiration Date 03/23/23   Plan   Treatment/Interventions   (Functional transfer training; LE strengthening/ROM; Elevations; Therapeutic exercise; Endurance training; Patient/family training; Bed mobility; Gait training; Spoke to nursing)   Progress Slow progress, medical status limitations   PT Frequency 3-5x/wk   Recommendation   PT Discharge Recommendation Post acute rehabilitation services   AM-PAC Basic Mobility Inpatient   Turning in Flat Bed Without Bedrails 3   Lying on Back to Sitting on Edge of Flat Bed Without Bedrails 3   Moving Bed to Chair 3   Standing Up From Chair Using Arms 3   Walk in Room 3   Climb 3-5 Stairs With Railing 2   Basic Mobility Inpatient Raw Score 17   Basic Mobility Standardized Score 39 67   Highest Level Of Mobility   JH-HLM Goal 5: Stand one or more mins   JH-HLM Achieved 5: Stand (1 or more minutes)   Education   Education Provided Mobility training   Patient Demonstrates verbal understanding   End of Consult   Patient Position at End of Consult Bedside chair;Bed/Chair alarm activated; All needs within reach   End of Consult Comments discussed POC with PT

## 2023-03-15 NOTE — PLAN OF CARE
Problem: OCCUPATIONAL THERAPY ADULT  Goal: Performs self-care activities at highest level of function for planned discharge setting  See evaluation for individualized goals  Description: Treatment Interventions: ADL retraining, Functional transfer training, UE strengthening/ROM, Endurance training, Patient/family training, Equipment evaluation/education, Activityengagement          See flowsheet documentation for full assessment, interventions and recommendations  Outcome: Progressing  Note: Limitation: Decreased ADL status, Decreased UE strength, Decreased Safe judgement during ADL, Decreased endurance, Decreased cognition, Decreased self-care trans, Decreased high-level ADLs     Assessment: Patient participated in Skilled OT session this date with interventions consisting of Energy Conservation techniques, safety awareness and fall prevention techniques and  therapeutic activities to: increase activity tolerance   Co treatment with PT secondary to complex medical condition of pt, mod-max A of 2 required to achieve and maintain transitional movements, requiring the need of skilled therapeutic intervention of 2 therapists to achieve delivery of services  Patient agreeable to OT treatment session, upon arrival patient was found seated OOB to Chair  Patient requiring verbal cues for safety  Patient continues to be functioning below baseline level, occupational performance remains limited secondary to factors listed above and increased risk for falls and injury  The patient's raw score on the AM-PAC Daily Activity Inpatient Short Form is 19  A raw score of greater than or equal to 19 suggests the patient may benefit from discharge to home  Please refer to the recommendation of the Occupational Therapist for safe discharge planning  From OT standpoint, recommendation at time of d/c would be Post acute rehabilitation services       OT Discharge Recommendation: Post acute rehabilitation services

## 2023-03-15 NOTE — ASSESSMENT & PLAN NOTE
· Recent gastrointestinal hemorrhage thought to be diverticular bleeding found on colonoscopy on 3/3/2023  · Serial laboratory testing to monitor hemoglobin/hematocrit levels  · Initiated cyanocobalamin 500 mcg PO Qdaily for a low-normal vitamin B12 level  · Now with blood in his stool 1 episode each of the last 2 days  · GI on board  · Continues to persist may need colonoscopy on Friday  · Hemoglobin fortunately stable  · Continue to monitor

## 2023-03-15 NOTE — PROGRESS NOTES
[address] [facility] [address]  Progress Note - Ashkan Bird 1945, 69 y o  male MRN: [id number]  Unit/Bed#: 421-01 Encounter: [id number]  Primary Care Provider: Simon Irwin DO   Date and time admitted to hospital: 3/8/2023 11:01 PM    * Orthostatic hypotension  Assessment & Plan  · Continues to experience orthostatic hypotension with severe lightheadedness and dizziness while standing and a systolic blood pressure in the 80's mmHg  · Hold tamsulosin for now  · Initiated on midodrine with improvement  · Continue to monitor    Hypothyroidism  Assessment & Plan  · Continue Synthroid  · Recheck a TSH level in 1-2 weeks with his PCP      Urinary retention  Assessment & Plan  · Maintain the Jose catheter for now  · Hold tamsulosin in the setting of orthostatic hypotension  · Appreciate urology evaluation  will have outpatient follow-up with urology    Acute blood loss anemia  Assessment & Plan  · Recent gastrointestinal hemorrhage thought to be diverticular bleeding found on colonoscopy on 3/3/2023  · Serial laboratory testing to monitor hemoglobin/hematocrit levels  · Initiated cyanocobalamin 500 mcg PO Qdaily for a low-normal vitamin B12 level  · Now with blood in his stool 1 episode each of the last 2 days  · GI on board  · Continues to persist may need colonoscopy on Friday  · Hemoglobin fortunately stable  · Continue to monitor        Chronic diastolic (congestive) heart failure (HCC)  Assessment & Plan  Wt Readings from Last 3 Encounters:   03/15/23 96 3 kg (212 lb 4 9 oz)   03/08/23 105 kg (231 lb 7 7 oz)   03/02/23 98 9 kg (218 lb 0 6 oz)     Remains euvolemic      Aortic stenosis, moderate  Assessment & Plan  • Outpatient follow-up with Cardiology      Coronary artery disease of native artery of native heart with stable angina pectoris [facility])  Assessment & Plan  · Known CAD with recent drug-eluting stent placement/PTCA to the LAD on 12/29/2022  · I appreciate Cardiology's input    · No indication for repeat cardiac catheterization at this time per Cardiology  · Continue plavix 75 mg PO Qdaily  · Plan to restart aspirin 81 mg PO Qdaily this week if no signs of additional gastrointestinal bleeding  · Continue statin  · Not on beta-blocker due to hypotension    Type 2 diabetes mellitus with hyperglycemia, without long-term current use of insulin [facility])  Assessment & Plan  Lab Results   Component Value Date    HGBA1C 5 6 03/10/2023       Recent Labs     23  1102 23  1521 23  2105 03/15/23  0651   POCGLU 183* 195* 186* 96       Blood Sugar Average: Last 72 hrs:  (P) 176     · Continue sliding scale insulin      VTE Pharmacologic Prophylaxis:   Pharmacologic: Pharmacologic VTE Prophylaxis contraindicated due to gi bleed  Mechanical VTE Prophylaxis in Place: Yes    Patient Centered Rounds: I have performed bedside rounds with nursing staff today  Discussions with Specialists or Other Care Team Provider: cm, nursing    Education and Discussions with Family / Patient: pt    Time Spent for Care: 30 minutes  More than 50% of total time spent on counseling and coordination of care as described above  Current Length of Stay: 6 day(s)    Current Patient Status: Inpatient   Certification Statement: The patient will continue to require additional inpatient hospital stay due to see below    Discharge Plan: Still requiring monitoring of lower GI bleeding orthostatic hypotension  Anticipate at least 24 hrs    Code Status: Level 1 - Full Code      Subjective:   Denies fevers, chills, cough, chest pain    Objective:     Vitals:   Temp (24hrs), Av 4 °F (36 3 °C), Min:97 1 °F (36 2 °C), Max:97 6 °F (36 4 °C)    Temp:  [97 1 °F (36 2 °C)-97 6 °F (36 4 °C)] 97 6 °F (36 4 °C)  HR:  [69-93] 93  Resp:  [19] 19  BP: (100-138)/(56-78) 138/78  SpO2:  [97 %-100 %] 99 %  Body mass index is 31 35 kg/m²  Input and Output Summary (last 24 hours):        Intake/Output Summary (Last 24 hours) at 3/15/2023 8376  Last data filed at 3/15/2023 0846  Gross per 24 hour   Intake 4923 33 ml   Output 5950 ml   Net -1026 67 ml       Physical Exam:     Physical Exam  Constitutional:       General: He is not in acute distress  Appearance: He is well-developed  He is not diaphoretic  HENT:      Head: Normocephalic and atraumatic  Nose: Nose normal       Mouth/Throat:      Pharynx: No oropharyngeal exudate  Eyes:      General: No scleral icterus  Conjunctiva/sclera: Conjunctivae normal    Cardiovascular:      Rate and Rhythm: Normal rate and regular rhythm  Heart sounds: Normal heart sounds  No murmur heard  No friction rub  No gallop  Pulmonary:      Effort: Pulmonary effort is normal  No respiratory distress  Breath sounds: Normal breath sounds  No wheezing or rales  Chest:      Chest wall: No tenderness  Abdominal:      General: Bowel sounds are normal  There is no distension  Palpations: Abdomen is soft  Tenderness: There is no abdominal tenderness  There is no guarding  Musculoskeletal:         General: No tenderness or deformity  Normal range of motion  Cervical back: Normal range of motion and neck supple  Skin:     General: Skin is warm and dry  Findings: No erythema  Neurological:      Mental Status: He is alert  Mental status is at baseline         (  Additional Data:     Labs:    Results from last 7 days   Lab Units 03/15/23  0506   WBC Thousand/uL 5 55   HEMOGLOBIN g/dL 8 2*   HEMATOCRIT % 26 0*   PLATELETS Thousands/uL 327   NEUTROS PCT % 53   LYMPHS PCT % 27   MONOS PCT % 10   EOS PCT % 9*     Results from last 7 days   Lab Units 03/15/23  0506 03/14/23  0527   SODIUM mmol/L 139 138   POTASSIUM mmol/L 4 1 4 5   CHLORIDE mmol/L 105 107   CO2 mmol/L 27 24   BUN mg/dL 10 15   CREATININE mg/dL 1 00 1 02   ANION GAP mmol/L 7 7   CALCIUM mg/dL 8 2* 7 6*   ALBUMIN g/dL  --  2 9*   TOTAL BILIRUBIN mg/dL  --  0 39   ALK PHOS U/L  --  42   ALT U/L  --  22   AST U/L --  23   GLUCOSE RANDOM mg/dL 98 102     Results from last 7 days   Lab Units 03/08/23  2346   INR  0 90     Results from last 7 days   Lab Units 03/15/23  0651 03/14/23  2105 03/14/23  1521 03/14/23  1102 03/14/23  0738 03/13/23  2127 03/13/23  1627 03/13/23  1107 03/13/23  0831 03/12/23  2100 03/12/23  1542 03/12/23  1120   POC GLUCOSE mg/dl 96 186* 195* 183* 113 171* 166* 202* 196* 237* 199* 220*     Results from last 7 days   Lab Units 03/10/23  0459   HEMOGLOBIN A1C % 5 6     Results from last 7 days   Lab Units 03/14/23  0527 03/13/23  0615 03/12/23  0515 03/11/23  0506 03/10/23  0459   LACTIC ACID mmol/L  --   --   --   --  0 8   PROCALCITONIN ng/ml 0 07 <0 05 0 07 0 08 0 07           * I Have Reviewed All Lab Data Listed Above  * Additional Pertinent Lab Tests Reviewed:  All Labs Within Last 24 Hours Reviewed    Imaging:    Imaging Reports Reviewed Today Include: na  Imaging Personally Reviewed by Myself Includes:  na    Recent Cultures (last 7 days):     Results from last 7 days   Lab Units 03/09/23  0722   URINE CULTURE  No Growth <1000 cfu/mL       Last 24 Hours Medication List:   Current Facility-Administered Medications   Medication Dose Route Frequency Provider Last Rate   • acetaminophen  650 mg Oral Q6H PRN Idris Marin MD     • allopurinol  100 mg Oral Daily Idris Marin MD     • aluminum-magnesium hydroxide-simethicone  30 mL Oral Q4H PRN Idris Marin MD     • atorvastatin  80 mg Oral Daily With Dinner Idris Marin MD     • clopidogrel  75 mg Oral Daily Idris Marin MD     • cyanocobalamin  500 mcg Oral Daily Robert Bean DO     • Empagliflozin  10 mg Oral QAM Idris Marin MD     • escitalopram  10 mg Oral Daily Idris Marin MD     • fluticasone  2 spray Each Nare Daily Florence Figueroa MD     • insulin lispro  2-12 Units Subcutaneous TID AC Idris Marin MD     • insulin lispro  2-12 Units Subcutaneous HS Idris Marin MD     • levothyroxine 88 mcg Oral Early Morning Chris Alegria MD     • midodrine  5 mg Oral TID AC Mk Castillo MD     • morphine injection  2 mg Intravenous Q3H PRN Chris Alegria MD     • multivitamin-minerals  1 tablet Oral Daily Chris Alegria MD     • ondansetron  4 mg Intravenous Q4H PRN Luisa Bean DO     • pantoprazole  40 mg Intravenous Q24H Chris Alegria MD     • polyethylene glycol  17 g Oral Daily PRN Chris Alegria MD     • ranolazine  500 mg Oral BID Chris Alegria MD          Today, Patient Was Seen By: Jayesh Corley MD    ** Please Note: Dictation voice to text software may have been used in the creation of this document   **

## 2023-03-15 NOTE — ASSESSMENT & PLAN NOTE
· Maintain the Jose catheter for now  · Hold tamsulosin in the setting of orthostatic hypotension  · Appreciate urology evaluation  will have outpatient follow-up with urology

## 2023-03-15 NOTE — ASSESSMENT & PLAN NOTE
Wt Readings from Last 3 Encounters:   03/15/23 96 3 kg (212 lb 4 9 oz)   03/08/23 105 kg (231 lb 7 7 oz)   03/02/23 98 9 kg (218 lb 0 6 oz)     Remains euvolemic

## 2023-03-15 NOTE — PROGRESS NOTES
[facility]  Progress Note - Ranjit Bishop 69 y o  male MRN: [id number]    Unit/Bed#: 552-18 Encounter: [id number]    Assessment/Plan:  Ranjit Bishop is a 69 y o  male with CAD status post PCI and drug-eluting stent back in December currently on Plavix, moderate AAS, hypertension, CKD, critical SMA stenosis but not felt to be contributing to mesenteric ischemia, diabetes who had a recent admission for hematochezia and then was readmitted for hypotension and still with ongoing maroon stool  His recent endoscopy and colonoscopy earlier in the month were notable for suspected ulcer within the diverticulum with visible vessel and adherent clot for which 2 clips were placed and diverticulosis throughout the colon  CT performed on this admission with no findings to suggest active GI hemorrhage but diverticulosis noted  His recent BMP with calcium 8 2 but otherwise normal   CBC with white blood cell count of 5 55, platelets of 875  His hemoglobin is 8 2 and yesterday was 8 5  MCV 99  Most recent blood glucose 96  He may be having ongoing intermittent small amount of diverticular bleeding  The blood that he is passing is maroon in color  His hemoglobin has down trended slightly but is still above 8 and relatively in range from where it was  Continue to trend CBC and recommend transfusing for hemoglobin less than 7  Given recent CAD  Reasonable to also transfuse for hemoglobin less than 8  Transfuse for platelets less than 50  Contain active type and screen and 2 large-bore IVs  And continue Plavix for now given relatively recent placement of drug-eluting stent back in December  Continue to monitor stools for ongoing bleeding including pulse volume and color  If he continues to bleed may consider repeat colonoscopy, possibly on Friday  MiraLAX as needed for constipation  Continue Protonix    Subjective:   Patient seen this morning    He reports that his prior bowel movement did have some dark maroon blood in it  He has been having about 1 bowel movement per day  He reports that the bleeding has been ongoing since his prior hospitalization when he was treated for a lower GI bleed that was thought to be diverticular  He reports occasional discomfort on the right side but very minimal   Overall he is eating well and states he is actually hungry and would like more food  No nausea or vomiting  Objective:     Vitals: Blood pressure 138/78, pulse 93, temperature 97 6 °F (36 4 °C), temperature source Oral, resp  rate 19, height 5' 9" (1 753 m), weight 96 3 kg (212 lb 4 9 oz), SpO2 99 %  ,Body mass index is 31 35 kg/m²  Intake/Output Summary (Last 24 hours) at 3/15/2023 0919  Last data filed at 3/15/2023 0846  Gross per 24 hour   Intake 4923 33 ml   Output 5950 ml   Net -1026 67 ml       Review of Systems: as per HPI  10 point ROS reviewed and negative, except as above    PHYSICAL EXAMINATION:    General Appearance:   Alert, cooperative, no distress   HEENT:  Normocephalic, atraumatic, anicteric  Neck supple, symmetrical, trachea midline  Lungs:   Equal chest rise and unlabored breathing, normal effort, no coughing  Cardiovascular:   No visualized JVD  Abdomen:   No abdominal distension  No significant tenderness to palpation  Skin:   No jaundice, rashes, or lesions  Musculoskeletal:   Normal range of motion visualized  Psych:  Normal affect and normal insight  Neuro:  Alert and appropriate           Invasive Devices     Peripheral Intravenous Line  Duration           Peripheral IV 03/13/23 Proximal;Right;Ventral (anterior) Forearm 2 days    Peripheral IV 03/13/23 Right Antecubital 1 day          Drain  Duration           Urethral Catheter Temperature probe 10 days                        CBC:   Lab Results   Component Value Date    WBC 5 55 03/15/2023    HGB 8 2 (L) 03/15/2023    HCT 26 0 (L) 03/15/2023    MCV 99 (H) 03/15/2023     03/15/2023    MCH 31 2 03/15/2023 MCHC 31 5 03/15/2023    RDW 17 2 (H) 03/15/2023    MPV 8 6 (L) 03/15/2023    NRBC 0 03/15/2023   ,   CMP:   Lab Results   Component Value Date    K 4 1 03/15/2023     03/15/2023    CO2 27 03/15/2023    BUN 10 03/15/2023    CREATININE 1 00 03/15/2023    CALCIUM 8 2 (L) 03/15/2023    EGFR 72 03/15/2023   ,   Lipase: No results found for: LIPASE,  PT/INR: No results found for: PT, INR,   Troponin: No results found for: TROPONINI,   Magnesium: No components found for: MAG,   Phosphorous: No results found for: PHOS  Imaging Studies: I have personally reviewed pertinent reports

## 2023-03-15 NOTE — PLAN OF CARE
Problem: PAIN - ADULT  Goal: Verbalizes/displays adequate comfort level or baseline comfort level  Description: Interventions:  - Encourage patient to monitor pain and request assistance  - Assess pain using appropriate pain scale  - Administer analgesics based on type and severity of pain and evaluate response  - Implement non-pharmacological measures as appropriate and evaluate response  - Consider cultural and social influences on pain and pain management  - Notify physician/advanced practitioner if interventions unsuccessful or patient reports new pain  Outcome: Progressing     Problem: INFECTION - ADULT  Goal: Absence or prevention of progression during hospitalization  Description: INTERVENTIONS:  - Assess and monitor for signs and symptoms of infection  - Monitor lab/diagnostic results  - Monitor all insertion sites, i e  indwelling lines, tubes, and drains  - Monitor endotracheal if appropriate and nasal secretions for changes in amount and color  - [place] appropriate cooling/warming therapies per order  - Administer medications as ordered  - Instruct and encourage patient and family to use good hand hygiene technique  - Identify and instruct in appropriate isolation precautions for identified infection/condition  Outcome: Progressing     Problem: SAFETY ADULT  Goal: Patient will remain free of falls  Description: INTERVENTIONS:  - Educate patient/family on patient safety including physical limitations  - Instruct patient to call for assistance with activity   - Consult OT/PT to assist with strengthening/mobility   - Keep Call bell within reach  - Keep bed low and locked with side rails adjusted as appropriate  - Keep care items and personal belongings within reach  - Initiate and maintain comfort rounds  - Make Fall Risk Sign visible to staff  - Offer Toileting every 2 Hours, in advance of need  - Initiate/Maintain bed/chair alarm  - Obtain necessary fall risk management equipment: non-skid footwear  - Apply yellow socks and bracelet for high fall risk patients  - Consider moving patient to room near nurses station  Outcome: Progressing  Goal: Maintain or return to baseline ADL function  Description: INTERVENTIONS:  -  Assess patient's ability to carry out ADLs; assess patient's baseline for ADL function and identify physical deficits which impact ability to perform ADLs (bathing, care of mouth/teeth, toileting, grooming, dressing, etc )  - Assess/evaluate cause of self-care deficits   - Assess range of motion  - Assess patient's mobility; develop plan if impaired  - Assess patient's need for assistive devices and provide as appropriate  - Encourage maximum independence but intervene and supervise when necessary  - Involve family in performance of ADLs  - Assess for home care needs following discharge   - Consider OT consult to assist with ADL evaluation and planning for discharge  - Provide patient education as appropriate  Outcome: Progressing  Goal: Maintains/Returns to pre admission functional level  Description: INTERVENTIONS:  - Perform BMAT or MOVE assessment daily    - Set and communicate daily mobility goal to care team and patient/family/caregiver  - Collaborate with rehabilitation services on mobility goals if consulted  - Perform Range of Motion 3 times a day  - Reposition patient every 2 hours    - Dangle patient 3 times a day  - Stand patient 3 times a day  - Ambulate patient 3 times a day  - Out of bed to chair 3 times a day   - Out of bed for meals 3 times a day  - Out of bed for toileting  - Record patient progress and toleration of activity level   Outcome: Progressing     Problem: DISCHARGE PLANNING  Goal: Discharge to home or other facility with appropriate resources  Description: INTERVENTIONS:  - Identify barriers to discharge w/patient and caregiver  - Arrange for needed discharge resources and transportation as appropriate  - Identify discharge learning needs (meds, wound care, etc )  - Arrange for interpretive services to assist at discharge as needed  - Refer to Case Management Department for coordinating discharge planning if the patient needs post-hospital services based on physician/advanced practitioner order or complex needs related to functional status, cognitive ability, or social support system  Outcome: Progressing     Problem: Knowledge Deficit  Goal: Patient/family/caregiver demonstrates understanding of disease process, treatment plan, medications, and discharge instructions  Description: Complete learning assessment and assess knowledge base    Interventions:  - Provide teaching at level of understanding  - Provide teaching via preferred learning methods  Outcome: Progressing     Problem: CARDIOVASCULAR - ADULT  Goal: Maintains optimal cardiac output and hemodynamic stability  Description: INTERVENTIONS:  - Monitor I/O, vital signs and rhythm  - Monitor for S/S and trends of decreased cardiac output  - Administer and titrate ordered vasoactive medications to optimize hemodynamic stability  - Assess quality of pulses, skin color and temperature  - Assess for signs of decreased coronary artery perfusion  - Instruct patient to report change in severity of symptoms  Outcome: Progressing  Goal: Absence of cardiac dysrhythmias or at baseline rhythm  Description: INTERVENTIONS:  - Continuous cardiac monitoring, vital signs, obtain 12 lead EKG if ordered  - Administer antiarrhythmic and heart rate control medications as ordered  - Monitor electrolytes and administer replacement therapy as ordered  Outcome: Progressing     Problem: RESPIRATORY - ADULT  Goal: Achieves optimal ventilation and oxygenation  Description: INTERVENTIONS:  - Assess for changes in respiratory status  - Assess for changes in mentation and behavior  - Position to facilitate oxygenation and minimize respiratory effort  - Oxygen administered by appropriate delivery if ordered  - Initiate smoking cessation education as indicated  - Encourage broncho-pulmonary hygiene including cough, deep breathe, Incentive Spirometry  - Assess the need for suctioning and aspirate as needed  - Assess and instruct to report SOB or any respiratory difficulty  - Respiratory Therapy support as indicated  Outcome: Progressing     Problem: GENITOURINARY - ADULT  Goal: Maintains or returns to baseline urinary function  Description: INTERVENTIONS:  - Assess urinary function  - Encourage oral fluids to ensure adequate hydration if ordered  - Administer IV fluids as ordered to ensure adequate hydration  - Administer ordered medications as needed  - Offer frequent toileting  - Follow urinary retention protocol if ordered  Outcome: Progressing  Goal: Absence of urinary retention  Description: INTERVENTIONS:  - Assess patient's ability to void and empty bladder  - Monitor I/O  - Bladder scan as needed  - Discuss with physician/AP medications to alleviate retention as needed  - Discuss catheterization for long term situations as appropriate  Outcome: Progressing  Goal: Urinary catheter remains patent  Description: INTERVENTIONS:  - Assess patency of urinary catheter  - If patient has a chronic morales, consider changing catheter if non-functioning  - Follow guidelines for intermittent irrigation of non-functioning urinary catheter  Outcome: Progressing     Problem: METABOLIC, FLUID AND ELECTROLYTES - ADULT  Goal: Electrolytes maintained within normal limits  Description: INTERVENTIONS:  - Monitor labs and assess patient for signs and symptoms of electrolyte imbalances  - Administer electrolyte replacement as ordered  - Monitor response to electrolyte replacements, including repeat lab results as appropriate  - Instruct patient on fluid and nutrition as appropriate  Outcome: Progressing  Goal: Fluid balance maintained  Description: INTERVENTIONS:  - Monitor labs   - Monitor I/O and WT  - Instruct patient on fluid and nutrition as appropriate  - Assess for signs & symptoms of volume excess or deficit  Outcome: Progressing  Goal: Glucose maintained within target range  Description: INTERVENTIONS:  - Monitor Blood Glucose as ordered  - Assess for signs and symptoms of hyperglycemia and hypoglycemia  - Administer ordered medications to maintain glucose within target range  - Assess nutritional intake and initiate nutrition service referral as needed  Outcome: Progressing     Problem: MUSCULOSKELETAL - ADULT  Goal: Maintain or return mobility to safest level of function  Description: INTERVENTIONS:  - Assess patient's ability to carry out ADLs; assess patient's baseline for ADL function and identify physical deficits which impact ability to perform ADLs (bathing, care of mouth/teeth, toileting, grooming, dressing, etc )  - Assess/evaluate cause of self-care deficits   - Assess range of motion  - Assess patient's mobility  - Assess patient's need for assistive devices and provide as appropriate  - Encourage maximum independence but intervene and supervise when necessary  - Involve family in performance of ADLs  - Assess for home care needs following discharge   - Consider OT consult to assist with ADL evaluation and planning for discharge  - Provide patient education as appropriate  Outcome: Progressing  Goal: Maintain proper alignment of affected body part  Description: INTERVENTIONS:  - Support, maintain and protect limb and body alignment  - Provide patient/ family with appropriate education  Outcome: Progressing     Problem: MOBILITY - ADULT  Goal: Maintain or return to baseline ADL function  Description: INTERVENTIONS:  -  Assess patient's ability to carry out ADLs; assess patient's baseline for ADL function and identify physical deficits which impact ability to perform ADLs (bathing, care of mouth/teeth, toileting, grooming, dressing, etc )  - Assess/evaluate cause of self-care deficits   - Assess range of motion  - Assess patient's mobility; develop plan if impaired  - Assess patient's need for assistive devices and provide as appropriate  - Encourage maximum independence but intervene and supervise when necessary  - Involve family in performance of ADLs  - Assess for home care needs following discharge   - Consider OT consult to assist with ADL evaluation and planning for discharge  - Provide patient education as appropriate  Outcome: Progressing  Goal: Maintains/Returns to pre admission functional level  Description: INTERVENTIONS:  - Perform BMAT or MOVE assessment daily    - Set and communicate daily mobility goal to care team and patient/family/caregiver  - Collaborate with rehabilitation services on mobility goals if consulted  - Perform Range of Motion 3 times a day  - Reposition patient every 2 hours    - Dangle patient 3 times a day  - Stand patient 3 times a day  - Ambulate patient 3 times a day  - Out of bed to chair 3 times a day   - Out of bed for meals 3 times a day  - Out of bed for toileting  - Record patient progress and toleration of activity level   Outcome: Progressing     Problem: Prexisting or High Potential for Compromised Skin Integrity  Goal: Skin integrity is maintained or improved  Description: INTERVENTIONS:  - Identify patients at risk for skin breakdown  - Assess and monitor skin integrity  - Assess and monitor nutrition and hydration status  - Monitor labs   - Assess for incontinence   - Turn and reposition patient  - Assist with mobility/ambulation  - Relieve pressure over bony prominences  - Avoid friction and shearing  - Provide appropriate hygiene as needed including keeping skin clean and dry  - Evaluate need for skin moisturizer/barrier cream  - Collaborate with interdisciplinary team   - Patient/family teaching  - Consider wound care consult   Outcome: Progressing

## 2023-03-15 NOTE — ASSESSMENT & PLAN NOTE
· Known CAD with recent drug-eluting stent placement/PTCA to the LAD on 12/29/2022  · I appreciate Cardiology's input    · No indication for repeat cardiac catheterization at this time per Cardiology  · Continue plavix 75 mg PO Qdaily  · Plan to restart aspirin 81 mg PO Qdaily this week if no signs of additional gastrointestinal bleeding  · Continue statin  · Not on beta-blocker due to hypotension

## 2023-03-15 NOTE — PLAN OF CARE
Problem: PHYSICAL THERAPY ADULT  Goal: Performs mobility at highest level of function for planned discharge setting  See evaluation for individualized goals  Description: Treatment/Interventions: Functional transfer training, LE strengthening/ROM, Elevations, Therapeutic exercise, Endurance training, Bed mobility, Gait training          See flowsheet documentation for full assessment, interventions and recommendations  Outcome: Progressing  Note: Prognosis: Fair  Problem List:  (Decreased strength; Decreased endurance; Impaired balance; Impaired sensation)  Assessment: Pt  seen for PT treatment session this date with interventions consisting of transfers with BP monitoring for orthostatic hypotension  /59 sitting and 82/50 standing with c/o dizziness  Returned to seated position  Ambulation deferred  In comparison to previous session, Pt  With decrease in activity tolerance  Pt was able to ambulate in room yesterday with assist with OT  Pt is in need of continued activity in PT to improve strength balance endurance mobility transfers and ambulation with return to maximize LOF  From PT/mobility standpoint, recommendation at time of d/c would be post acute rehab in order to promote return to PLOF and independence  The patient's AM-PAC Basic Mobility Inpatient Short Form Raw Score is 17  A Raw score of greater than 16 suggests the patient may benefit from discharge to home  Please also refer to physical therapy recommendation for safe DC planning  PT Discharge Recommendation: Post acute rehabilitation services    See flowsheet documentation for full assessment

## 2023-03-15 NOTE — ASSESSMENT & PLAN NOTE
· Continues to experience orthostatic hypotension with severe lightheadedness and dizziness while standing and a systolic blood pressure in the 80's mmHg  · Hold tamsulosin for now  · Initiated on midodrine with improvement  · Continue to monitor

## 2023-03-15 NOTE — ASSESSMENT & PLAN NOTE
Lab Results   Component Value Date    HGBA1C 5 6 03/10/2023       Recent Labs     03/14/23  1102 03/14/23  1521 03/14/23  2105 03/15/23  0651   POCGLU 183* 195* 186* 96       Blood Sugar Average: Last 72 hrs:  (P) 176     · Continue sliding scale insulin

## 2023-03-16 LAB
2HR DELTA HS TROPONIN: 0 NG/L
4HR DELTA HS TROPONIN: 0 NG/L
ANION GAP SERPL CALCULATED.3IONS-SCNC: 9 MMOL/L (ref 4–13)
BASOPHILS # BLD AUTO: 0.04 THOUSANDS/ÂΜL (ref 0–0.1)
BASOPHILS NFR BLD AUTO: 1 % (ref 0–1)
BUN SERPL-MCNC: 10 MG/DL (ref 5–25)
CALCIUM SERPL-MCNC: 8.4 MG/DL (ref 8.4–10.2)
CARDIAC TROPONIN I PNL SERPL HS: 7 NG/L
CHLORIDE SERPL-SCNC: 103 MMOL/L (ref 96–108)
CO2 SERPL-SCNC: 27 MMOL/L (ref 21–32)
CREAT SERPL-MCNC: 1 MG/DL (ref 0.6–1.3)
EOSINOPHIL # BLD AUTO: 0.5 THOUSAND/ÂΜL (ref 0–0.61)
EOSINOPHIL NFR BLD AUTO: 8 % (ref 0–6)
ERYTHROCYTE [DISTWIDTH] IN BLOOD BY AUTOMATED COUNT: 17.5 % (ref 11.6–15.1)
GFR SERPL CREATININE-BSD FRML MDRD: 72 ML/MIN/1.73SQ M
GLUCOSE SERPL-MCNC: 105 MG/DL (ref 65–140)
GLUCOSE SERPL-MCNC: 181 MG/DL (ref 65–140)
GLUCOSE SERPL-MCNC: 193 MG/DL (ref 65–140)
GLUCOSE SERPL-MCNC: 197 MG/DL (ref 65–140)
GLUCOSE SERPL-MCNC: 97 MG/DL (ref 65–140)
HCT VFR BLD AUTO: 29 % (ref 36.5–49.3)
HGB BLD-MCNC: 9 G/DL (ref 12–17)
IMM GRANULOCYTES # BLD AUTO: 0.01 THOUSAND/UL (ref 0–0.2)
IMM GRANULOCYTES NFR BLD AUTO: 0 % (ref 0–2)
LYMPHOCYTES # BLD AUTO: 1.74 THOUSANDS/ÂΜL (ref 0.6–4.47)
LYMPHOCYTES NFR BLD AUTO: 27 % (ref 14–44)
MCH RBC QN AUTO: 31.1 PG (ref 26.8–34.3)
MCHC RBC AUTO-ENTMCNC: 31 G/DL (ref 31.4–37.4)
MCV RBC AUTO: 100 FL (ref 82–98)
MONOCYTES # BLD AUTO: 0.7 THOUSAND/ÂΜL (ref 0.17–1.22)
MONOCYTES NFR BLD AUTO: 11 % (ref 4–12)
NEUTROPHILS # BLD AUTO: 3.5 THOUSANDS/ÂΜL (ref 1.85–7.62)
NEUTS SEG NFR BLD AUTO: 53 % (ref 43–75)
NRBC BLD AUTO-RTO: 0 /100 WBCS
PLATELET # BLD AUTO: 398 THOUSANDS/UL (ref 149–390)
PMV BLD AUTO: 8.9 FL (ref 8.9–12.7)
POTASSIUM SERPL-SCNC: 4 MMOL/L (ref 3.5–5.3)
RBC # BLD AUTO: 2.89 MILLION/UL (ref 3.88–5.62)
SODIUM SERPL-SCNC: 139 MMOL/L (ref 135–147)
WBC # BLD AUTO: 6.49 THOUSAND/UL (ref 4.31–10.16)

## 2023-03-16 RX ORDER — NITROGLYCERIN 0.4 MG/1
0.4 TABLET SUBLINGUAL
Status: DISCONTINUED | OUTPATIENT
Start: 2023-03-16 | End: 2023-03-22 | Stop reason: HOSPADM

## 2023-03-16 RX ORDER — SODIUM CHLORIDE 9 MG/ML
100 INJECTION, SOLUTION INTRAVENOUS ONCE
Status: COMPLETED | OUTPATIENT
Start: 2023-03-16 | End: 2023-03-16

## 2023-03-16 RX ADMIN — RANOLAZINE 500 MG: 500 TABLET, EXTENDED RELEASE ORAL at 17:12

## 2023-03-16 RX ADMIN — SODIUM CHLORIDE 100 ML/HR: 0.9 INJECTION, SOLUTION INTRAVENOUS at 11:26

## 2023-03-16 RX ADMIN — NITROGLYCERIN 0.4 MG: 0.4 TABLET SUBLINGUAL at 11:35

## 2023-03-16 RX ADMIN — NITROGLYCERIN 0.4 MG: 0.4 TABLET SUBLINGUAL at 11:27

## 2023-03-16 RX ADMIN — PANTOPRAZOLE SODIUM 40 MG: 40 INJECTION, POWDER, FOR SOLUTION INTRAVENOUS at 21:06

## 2023-03-16 RX ADMIN — EMPAGLIFLOZIN 10 MG: 10 TABLET, FILM COATED ORAL at 08:11

## 2023-03-16 RX ADMIN — MIDODRINE HYDROCHLORIDE 5 MG: 5 TABLET ORAL at 06:03

## 2023-03-16 RX ADMIN — ESCITALOPRAM OXALATE 10 MG: 10 TABLET ORAL at 08:10

## 2023-03-16 RX ADMIN — MIDODRINE HYDROCHLORIDE 5 MG: 5 TABLET ORAL at 11:26

## 2023-03-16 RX ADMIN — ATORVASTATIN CALCIUM 80 MG: 40 TABLET, FILM COATED ORAL at 17:12

## 2023-03-16 RX ADMIN — RANOLAZINE 500 MG: 500 TABLET, EXTENDED RELEASE ORAL at 08:10

## 2023-03-16 RX ADMIN — FLUTICASONE PROPIONATE 2 SPRAY: 50 SPRAY, METERED NASAL at 08:12

## 2023-03-16 RX ADMIN — MULTIPLE VITAMINS W/ MINERALS TAB 1 TABLET: TAB at 08:10

## 2023-03-16 RX ADMIN — MIDODRINE HYDROCHLORIDE 5 MG: 5 TABLET ORAL at 17:12

## 2023-03-16 RX ADMIN — LEVOTHYROXINE SODIUM 88 MCG: 88 TABLET ORAL at 06:03

## 2023-03-16 RX ADMIN — INSULIN LISPRO 2 UNITS: 100 INJECTION, SOLUTION INTRAVENOUS; SUBCUTANEOUS at 21:07

## 2023-03-16 RX ADMIN — CLOPIDOGREL BISULFATE 75 MG: 75 TABLET ORAL at 08:11

## 2023-03-16 RX ADMIN — ALLOPURINOL 100 MG: 100 TABLET ORAL at 08:11

## 2023-03-16 RX ADMIN — CYANOCOBALAMIN TAB 500 MCG 500 MCG: 500 TAB at 08:11

## 2023-03-16 RX ADMIN — INSULIN LISPRO 2 UNITS: 100 INJECTION, SOLUTION INTRAVENOUS; SUBCUTANEOUS at 17:16

## 2023-03-16 RX ADMIN — ACETAMINOPHEN 650 MG: 325 TABLET ORAL at 12:14

## 2023-03-16 NOTE — PLAN OF CARE
Problem: PHYSICAL THERAPY ADULT  Goal: Performs mobility at highest level of function for planned discharge setting  See evaluation for individualized goals  Description: Treatment/Interventions: Functional transfer training, LE strengthening/ROM, Elevations, Therapeutic exercise, Endurance training, Bed mobility, Gait training          See flowsheet documentation for full assessment, interventions and recommendations  Outcome: Progressing  Note: Prognosis: Fair  Problem List:  (Decreased strength; Decreased endurance; Impaired balance; Impaired sensation)  Assessment: Pt cleared by nursing for PT Pt  seen for PT treatment session this date with interventions consisting of transfers and  gait training w/ emphasis on improving pt's ability to ambulate  Pt  Requiring  cues for sequence and safety  In comparison to previous session, Pt  With improvements in activity tolerance  Able to ambulate 61' with RW, mild unsteady gait with chair follow safety  BP did not drop with postion changes  Pt is in need of continued activity in PT to improve strength balance endurance mobility transfers and ambulation with return to maximize LOF  From PT/mobility standpoint, recommendation at time of d/c would be post acute rehab  in order to promote return to PLOF and independence  The patient's AM-PAC Basic Mobility Inpatient Short Form Raw Score is 17  A Raw score of greater than 16 suggests the patient may benefit from discharge to home  Please also refer to physical therapy recommendation for safe DC planning  Co-treat with LISA this session due to complexity of pt and  require A x 2 to provided skilled interventions  PT Discharge Recommendation: Post acute rehabilitation services    See flowsheet documentation for full assessment

## 2023-03-16 NOTE — PHYSICAL THERAPY NOTE
PHYSICAL THERAPY NOTE          Patient Name: Ketan Pozo Date: 3/16/2023   03/16/23 1416   PT Last Visit   PT Visit Date 03/16/23   Note Type   Note Type Treatment   Pain Assessment   Pain Assessment Tool 0-10   Pain Score No Pain   Restrictions/Precautions   Weight Bearing Precautions Per Order No   Other Precautions   (Fall Risk; Chair Alarm; Multiple lines)   General   Chart Reviewed Yes   Response to Previous Treatment Patient with no complaints from previous session  Family/Caregiver Present No   Cognition   Overall Cognitive Status WFL   Arousal/Participation Alert   Following Commands Follows all commands and directions without difficulty   Subjective   Subjective c/o hot flashes  Reports earlier today had an episode of chest pain   Bed Mobility   Additional Comments OOB in chair start/end PT session   Transfers   Sit to Stand 5  Supervision   Additional items Armrests; Verbal cues   Stand to Sit 5  Supervision   Additional items Armrests; Verbal cues   Additional Comments RW used   Ambulation/Elevation   Gait pattern Excessively slow; Short stride; Improper Weight shift   Gait Assistance 4  Minimal assist   Additional items Assist x 1;Verbal cues   Assistive Device Rolling walker   Distance 60'  (chair follow safety)   Balance   Static Sitting Good   Dynamic Sitting Fair +   Static Standing Fair   Dynamic Standing Fair -   Ambulatory Fair -  (RW)   Endurance Deficit   Endurance Deficit Yes   Endurance Deficit Description BP: 103/60 sitting, 100/64 standing and 114/66 after ambulation sitting postion   Activity Tolerance   Activity Tolerance Patient limited by fatigue   Assessment   Prognosis Fair   Problem List   (Decreased strength; Decreased endurance; Impaired balance;  Impaired sensation)   Assessment Pt cleared by nursing for PT Pt  seen for PT treatment session this date with interventions consisting of transfers and  gait training w/ emphasis on improving pt's ability to ambulate  Pt  Requiring  cues for sequence and safety  In comparison to previous session, Pt  With improvements in activity tolerance  Able to ambulate 61' with RW, mild unsteady gait with chair follow safety  BP did not drop with postion changes  Pt is in need of continued activity in PT to improve strength balance endurance mobility transfers and ambulation with return to maximize LOF  From PT/mobility standpoint, recommendation at time of d/c would be post acute rehab  in order to promote return to PLOF and independence  The patient's AM-[facility] Basic Mobility Inpatient Short Form Raw Score is 17  A Raw score of greater than 16 suggests the patient may benefit from discharge to home  Please also refer to physical therapy recommendation for safe DC planning  Co-treat with LISA this session due to complexity of pt and  require A x 2 to provided skilled interventions  Goals   LTG Expiration Date 03/23/23   Plan   Treatment/Interventions   (Functional transfer training; LE strengthening/ROM; Elevations; Therapeutic exercise;  Endurance training; Patient/family training; Bed mobility; Gait training; Spoke to nursing;)   Progress Slow progress, decreased activity tolerance   PT Frequency 3-5x/wk   Recommendation   PT Discharge Recommendation Post acute rehabilitation services   AM-PAC Basic Mobility Inpatient   Turning in Flat Bed Without Bedrails 3   Lying on Back to Sitting on Edge of Flat Bed Without Bedrails 3   Moving Bed to Chair 3   Standing Up From Chair Using Arms 3   Walk in Room 3   Climb 3-5 Stairs With Railing 2   Basic Mobility Inpatient Raw Score 17   Basic Mobility Standardized Score 39 67   Highest Level Of Mobility   JH-HLM Goal 5: Stand one or more mins   JH-HLM Achieved 7: Walk 25 feet or more

## 2023-03-16 NOTE — PROGRESS NOTES
[address] [facility] [address]  Progress Note - Sloan Cardenas 1945, 69 y o  male MRN: [id number]  Unit/Bed#: 421-01 Encounter: [id number]  Primary Care Provider: Doni Altamirano DO   Date and time admitted to hospital: 3/8/2023 11:01 PM    Acute blood loss anemia  Assessment & Plan  · Recent gastrointestinal hemorrhage thought to be diverticular bleeding found on colonoscopy on 3/3/2023  · Serial laboratory testing to monitor hemoglobin/hematocrit levels  · Initiated cyanocobalamin 500 mcg PO Qdaily for a low-normal vitamin B12 level  · Now with blood in his stool 1 episode each of the last 2 days  · GI on board  · Continues to persist may need colonoscopy on Friday  · Hemoglobin remains stable, however he continues to be orthostatic, will provide another 1 L NS         * Orthostatic hypotension  Assessment & Plan  · Continues to experience orthostatic hypotension with severe lightheadedness and dizziness while standing and a systolic blood pressure in the 80's mmHg  · Hold tamsulosin for now  · Initiated on midodrine with improvement of BP, but remains symptomatic and orthostatic on exam today , will provide a L NS over 10 hours, recheck orthostatic this evening and in am   · Continue to monitor    Chronic diastolic (congestive) heart failure (HCC)  Assessment & Plan  Wt Readings from Last 3 Encounters:   03/16/23 95 2 kg (209 lb 14 1 oz)   03/08/23 105 kg (231 lb 7 7 oz)   03/02/23 98 9 kg (218 lb 0 6 oz)     Remains euvolemic      Type 2 diabetes mellitus with hyperglycemia, without long-term current use of insulin [facility])  Assessment & Plan  Lab Results   Component Value Date    HGBA1C 5 6 03/10/2023       Recent Labs     03/15/23  1109 03/15/23  1604 03/15/23  2119 03/16/23  0731   POCGLU 163* 175* 193* 105       Blood Sugar Average: Last 72 hrs:  (P) 164 [id number]     · Continue sliding scale insulin        Progress Note - Sloan Cardenas 69 y o  male MRN: [id number]    Unit/Bed#: 942-42 Encounter: [id number]        Subjective:     Patient seen and examined, he feels well but gets very dizzy upon standing    Objective:     Vitals:   Vitals:    03/16/23 0949   BP: (!) 89/58   Pulse: (!) 106   Resp:    Temp:    SpO2: 95%     Body mass index is 30 99 kg/m²      Intake/Output Summary (Last 24 hours) at 3/16/2023 1057  Last data filed at 3/16/2023 1047  Gross per 24 hour   Intake 1440 ml   Output 4900 ml   Net -3460 ml       Physical Exam:   BP (!) 89/58   Pulse (!) 106   Temp 98 3 °F (36 8 °C)   Resp 18   Ht 5' 9" (1 753 m)   Wt 95 2 kg (209 lb 14 1 oz)   SpO2 95%   BMI 30 99 kg/m²   General appearance: alert and oriented, in no acute distress  Head: Normocephalic, without obvious abnormality, atraumatic  Lungs: clear to auscultation bilaterally  Heart: regular rate and rhythm, S1, S2 normal, no murmur, click, rub or gallop  Abdomen: soft, non-tender; bowel sounds normal; no masses,  no organomegaly  Extremities: extremities normal, warm and well-perfused; no cyanosis, clubbing, or edema  Pulses: 2+ and symmetric  Neurologic: Grossly normal     Invasive Devices     Peripheral Intravenous Line  Duration           Peripheral IV 03/13/23 Proximal;Right;Ventral (anterior) Forearm 3 days    Peripheral IV 03/13/23 Right Antecubital 2 days          Drain  Duration           Urethral Catheter Temperature probe 11 days                Results from last 7 days   Lab Units 03/16/23  0522 03/15/23  0506 03/14/23  0645   WBC Thousand/uL 6 49 5 55 5 83   HEMOGLOBIN g/dL 9 0* 8 2* 8 5*   HEMATOCRIT % 29 0* 26 0* 26 7*   PLATELETS Thousands/uL 398* 327 338       Results from last 7 days   Lab Units 03/16/23  0522 03/15/23  0506 03/14/23  0527 03/13/23  1424 03/13/23  0615   POTASSIUM mmol/L 4 0 4 1 4 5 4 5 2 6*   CHLORIDE mmol/L 103 105 107 104 126*   CO2 mmol/L 27 27 24 27 16*   BUN mg/dL 10 10 15 12 4*   CREATININE mg/dL 1 00 1 00 1 02 1 25 0 56*   CALCIUM mg/dL 8 4 8 2* 7 6* 8 3* 4 2*   ALK PHOS U/L  --   -- 42 61 25*   ALT U/L  --   --  22 32 13   AST U/L  --   --  23 34 12*       Medication Administration - last 24 hours from 03/15/2023 1057 to 03/16/2023 1057       Date/Time Order Dose Route Action Action by     03/16/2023 0700 EDT insulin lispro (HumaLOG) 100 units/mL subcutaneous injection 2-12 Units 2 Units Subcutaneous Not Given [facility], RN     03/15/2023 1629 EDT insulin lispro (HumaLOG) 100 units/mL subcutaneous injection 2-12 Units 2 Units Subcutaneous Given [facility], RN     03/15/2023 1146 EDT insulin lispro (HumaLOG) 100 units/mL subcutaneous injection 2-12 Units 2 Units Subcutaneous Given [facility], RN     03/15/2023 2124 EDT insulin lispro (HumaLOG) 100 units/mL subcutaneous injection 2-12 Units 2 Units Subcutaneous Given [facility]     03/16/2023 8512 EDT allopurinol (ZYLOPRIM) tablet 100 mg 100 mg Oral Given [facility], RN     03/15/2023 1629 EDT atorvastatin (LIPITOR) tablet 80 mg 80 mg Oral Given [facility], RN     03/16/2023 0747 EDT Empagliflozin (JARDIANCE) tablet 10 mg 10 mg Oral Given [facility], RN     03/16/2023 0810 EDT escitalopram (LEXAPRO) tablet 10 mg 10 mg Oral Given [facility], RN     03/16/2023 7537 EDT levothyroxine tablet 88 mcg 88 mcg Oral Given [facility]     03/16/2023 0810 EDT ranolazine (RANEXA) 12 hr tablet 500 mg 500 mg Oral Given [facility], RN     03/15/2023 1807 EDT ranolazine (RANEXA) 12 hr tablet 500 mg 500 mg Oral Given [facility], RN     03/16/2023 2293 EDT clopidogrel (PLAVIX) tablet 75 mg 75 mg Oral Given [facility], RN     03/15/2023 2117 EDT pantoprazole (PROTONIX) injection 40 mg 40 mg Intravenous Given [facility]     03/16/2023 0810 EDT multivitamin-minerals (CENTRUM) tablet 1 tablet 1 tablet Oral Given Davidmare Quintero RN     03/16/2023 4109 EDT fluticasone (FLONASE) 50 mcg/act nasal spray 2 spray 2 spray Each Nare Given David Quintero RN     03/16/2023 1932 EDT cyanocobalamin (VITAMIN B-12) tablet 500 mcg 500 mcg Oral Given Kelly Welch RN     03/16/2023 5410 EDT midodrine (PROAMATINE) tablet 5 mg 5 mg Oral Given Henrietta Mckeon     03/15/2023 1629 EDT midodrine (PROAMATINE) tablet 5 mg 5 mg Oral Given Maryjane Medel RN     03/15/2023 1145 EDT midodrine (PROAMATINE) tablet 5 mg 5 mg Oral Given Maryjane Mdeel RN            Lab, Imaging and other studies: I have personally reviewed pertinent reports      VTE Pharmacologic Prophylaxis: none 2/2 anemia  VTE Mechanical Prophylaxis: sequential compression device     Lily Amanda MD  3/16/2023,10:57 AM

## 2023-03-16 NOTE — OCCUPATIONAL THERAPY NOTE
Occupational Therapy Progress Note     Patient Name: Oziel Bolden  BPYAV'M Date: 3/16/2023  Problem List  Principal Problem:    Orthostatic hypotension  Active Problems:    Type 2 diabetes mellitus with hyperglycemia, without long-term current use of insulin (HCC)    Coronary artery disease of native artery of native heart with stable angina pectoris (HCC)    Aortic stenosis, moderate    Other chest pain    Chronic diastolic (congestive) heart failure (HCC)    Acute blood loss anemia    Urinary retention    Hypomagnesemia    Elevated TSH    Elevated d-dimer    Hypothyroidism            03/16/23 1408   OT Last Visit   OT Visit Date 03/16/23   Note Type   Note Type Treatment   Pain Assessment   Pain Assessment Tool 0-10   Pain Score No Pain   Restrictions/Precautions   Weight Bearing Precautions Per Order No   Other Precautions Fall Risk; Chair Alarm;Multiple lines   Transfers   Sit to Stand 5  Supervision   Additional items Verbal cues; Increased time required;Armrests   Stand to Sit 5  Supervision   Additional items Increased time required;Verbal cues;Armrests   Additional Comments Use of RW   Functional Mobility   Functional Mobility 4  Minimal assistance   Additional Comments Pt completed standing balance/functional mobility around room and hallway with use of RW and Min Ax1 with close chair follow and line management x1 with slight unsteadiness at times approx 60ft with distance limited by fatigue  Pts BP checked seated at start of treatment 103/60, Standing 100/64 and seated after functional mobility 114/66  Pt reporting having episode of chest pain this morning with nursing reporting patient is ok at this time to complete therapy     Additional items Rolling walker   Cognition   Overall Cognitive Status WFL   Arousal/Participation Alert   Attention Within functional limits   Orientation Level Oriented X4   Memory Within functional limits   Following Commands Follows all commands and directions without difficulty   Assessment   Assessment Patient participated in Skilled OT session this date with interventions consisting of  therapeutic activities to: increase activity tolerance   Co treatment with PTA secondary to complex medical condition of pt, min A of 1-2 required to achieve and maintain transitional movements, requiring the need of skilled therapeutic intervention of 2 therapists to achieve delivery of services  Patient agreeable to OT treatment session, upon arrival patient was found seated OOB to Chair  Sit to stand txfrs from dennis chair with S, standing balance/functional mobility around room and hallway with Min Ax1 and close chair follow and line management x1 with slight unsteadiness noted throughout with distance limited by fatigue  To increase posture, dynamic standing balance, balance during self cares, use of BUE  Patient requiring verbal cues for safety and verbal cues for correct technique  Pt reporting no dizziness or chest pain at this time throughout treatment but did report having hot flashes this afternoon  Patient continues to be functioning below baseline level, occupational performance remains limited secondary to factors listed above and increased risk for falls and injury  From OT standpoint, recommendation at time of d/c would be Post acute rehabilitation services  The patient's raw score on the AM-PAC Daily Activity inpatient short form is 19, standardized score is 40 22, less than 39 4  Patients at this level are likely to benefit from discharge to post-acute rehabilitation services  Please refer to the recommendation of the Occupational Therapist for safe discharge planning     Plan   Goal Expiration Date 03/23/23   OT Treatment Day 3   OT Frequency 2-3x/wk   Recommendation   OT Discharge Recommendation Post acute rehabilitation services   [facility] Daily Activity Inpatient   Lower Body Dressing 2   Bathing 2   Toileting 3   Upper Body Dressing 4   Grooming 4   Eating 4   Daily Activity Raw Score 19   Daily Activity Standardized Score (Calc for Raw Score >=11) 40 22   AM-PAC Applied Cognition Inpatient   Following a Speech/Presentation 4   Understanding Ordinary Conversation 4   Taking Medications 4   Remembering Where Things Are Placed or Put Away 4   Remembering List of 4-5 Errands 4   Taking Care of Complicated Tasks 4   Applied Cognition Raw Score 24   Applied Cognition Standardized Score 62 21     Pt left seated in dennis chair with chair alarm on and all needs in reach

## 2023-03-16 NOTE — PLAN OF CARE
Problem: OCCUPATIONAL THERAPY ADULT  Goal: Performs self-care activities at highest level of function for planned discharge setting  See evaluation for individualized goals  Description: Treatment Interventions: ADL retraining, Functional transfer training, UE strengthening/ROM, Endurance training, Patient/family training, Equipment evaluation/education, Activityengagement          See flowsheet documentation for full assessment, interventions and recommendations  Outcome: Progressing  Note: Limitation: Decreased ADL status, Decreased UE strength, Decreased Safe judgement during ADL, Decreased endurance, Decreased cognition, Decreased self-care trans, Decreased high-level ADLs     Assessment: Patient participated in Skilled OT session this date with interventions consisting of  therapeutic activities to: increase activity tolerance   Co treatment with PTA secondary to complex medical condition of pt, min A of 1-2 required to achieve and maintain transitional movements, requiring the need of skilled therapeutic intervention of 2 therapists to achieve delivery of services  Patient agreeable to OT treatment session, upon arrival patient was found seated OOB to Chair  Sit to stand txfrs from dennis chair with S, standing balance/functional mobility around room and hallway with Min Ax1 and close chair follow and line management x1 with slight unsteadiness noted throughout with distance limited by fatigue  To increase posture, dynamic standing balance, balance during self cares, use of BUE  Patient requiring verbal cues for safety and verbal cues for correct technique  Pt reporting no dizziness or chest pain at this time throughout treatment but did report having hot flashes this afternoon  Patient continues to be functioning below baseline level, occupational performance remains limited secondary to factors listed above and increased risk for falls and injury     From OT standpoint, recommendation at time of d/c would be Post acute rehabilitation services  The patient's raw score on the AM-PAC Daily Activity inpatient short form is 19, standardized score is 40 22, less than 39 4  Patients at this level are likely to benefit from discharge to post-acute rehabilitation services  Please refer to the recommendation of the Occupational Therapist for safe discharge planning       OT Discharge Recommendation: Post acute rehabilitation services

## 2023-03-16 NOTE — ASSESSMENT & PLAN NOTE
Wt Readings from Last 3 Encounters:   03/16/23 95 2 kg (209 lb 14 1 oz)   03/08/23 105 kg (231 lb 7 7 oz)   03/02/23 98 9 kg (218 lb 0 6 oz)     Remains euvolemic

## 2023-03-16 NOTE — ASSESSMENT & PLAN NOTE
Lab Results   Component Value Date    HGBA1C 5 6 03/10/2023       Recent Labs     03/15/23  1109 03/15/23  1604 03/15/23  2119 03/16/23  0731   POCGLU 163* 175* 193* 105       Blood Sugar Average: Last 72 hrs:  (P) 164 [id number]     · Continue sliding scale insulin

## 2023-03-16 NOTE — ASSESSMENT & PLAN NOTE
· Recent gastrointestinal hemorrhage thought to be diverticular bleeding found on colonoscopy on 3/3/2023  · Serial laboratory testing to monitor hemoglobin/hematocrit levels  · Initiated cyanocobalamin 500 mcg PO Qdaily for a low-normal vitamin B12 level  · Now with blood in his stool 1 episode each of the last 2 days  · GI on board  · Continues to persist may need colonoscopy on Friday  · Hemoglobin remains stable, however he continues to be orthostatic, will provide another 1 L NS

## 2023-03-16 NOTE — PLAN OF CARE
Problem: PAIN - ADULT  Goal: Verbalizes/displays adequate comfort level or baseline comfort level  Description: Interventions:  - Encourage patient to monitor pain and request assistance  - Assess pain using appropriate pain scale  - Administer analgesics based on type and severity of pain and evaluate response  - Implement non-pharmacological measures as appropriate and evaluate response  - Consider cultural and social influences on pain and pain management  - Notify physician/advanced practitioner if interventions unsuccessful or patient reports new pain  Outcome: Progressing     Problem: INFECTION - ADULT  Goal: Absence or prevention of progression during hospitalization  Description: INTERVENTIONS:  - Assess and monitor for signs and symptoms of infection  - Monitor lab/diagnostic results  - Monitor all insertion sites, i e  indwelling lines, tubes, and drains  - Monitor endotracheal if appropriate and nasal secretions for changes in amount and color  - [place] appropriate cooling/warming therapies per order  - Administer medications as ordered  - Instruct and encourage patient and family to use good hand hygiene technique  - Identify and instruct in appropriate isolation precautions for identified infection/condition  Outcome: Progressing     Problem: SAFETY ADULT  Goal: Patient will remain free of falls  Description: INTERVENTIONS:  - Educate patient/family on patient safety including physical limitations  - Instruct patient to call for assistance with activity   - Consult OT/PT to assist with strengthening/mobility   - Keep Call bell within reach  - Keep bed low and locked with side rails adjusted as appropriate  - Keep care items and personal belongings within reach  - Initiate and maintain comfort rounds  - Make Fall Risk Sign visible to staff  - Offer Toileting every 2 Hours, in advance of need  - Initiate/Maintain bed/chair alarm  - Obtain necessary fall risk management equipment: non-skid footwear  - Apply yellow socks and bracelet for high fall risk patients  - Consider moving patient to room near nurses station  Outcome: Progressing  Goal: Maintain or return to baseline ADL function  Description: INTERVENTIONS:  -  Assess patient's ability to carry out ADLs; assess patient's baseline for ADL function and identify physical deficits which impact ability to perform ADLs (bathing, care of mouth/teeth, toileting, grooming, dressing, etc )  - Assess/evaluate cause of self-care deficits   - Assess range of motion  - Assess patient's mobility; develop plan if impaired  - Assess patient's need for assistive devices and provide as appropriate  - Encourage maximum independence but intervene and supervise when necessary  - Involve family in performance of ADLs  - Assess for home care needs following discharge   - Consider OT consult to assist with ADL evaluation and planning for discharge  - Provide patient education as appropriate  Outcome: Progressing  Goal: Maintains/Returns to pre admission functional level  Description: INTERVENTIONS:  - Perform BMAT or MOVE assessment daily    - Set and communicate daily mobility goal to care team and patient/family/caregiver  - Collaborate with rehabilitation services on mobility goals if consulted  - Perform Range of Motion 3 times a day  - Reposition patient every 2 hours    - Dangle patient 3 times a day  - Stand patient 3 times a day  - Ambulate patient 3 times a day  - Out of bed to chair 3 times a day   - Out of bed for meals 3 times a day  - Out of bed for toileting  - Record patient progress and toleration of activity level   Outcome: Progressing     Problem: DISCHARGE PLANNING  Goal: Discharge to home or other facility with appropriate resources  Description: INTERVENTIONS:  - Identify barriers to discharge w/patient and caregiver  - Arrange for needed discharge resources and transportation as appropriate  - Identify discharge learning needs (meds, wound care, etc )  - Arrange for interpretive services to assist at discharge as needed  - Refer to Case Management Department for coordinating discharge planning if the patient needs post-hospital services based on physician/advanced practitioner order or complex needs related to functional status, cognitive ability, or social support system  Outcome: Progressing     Problem: Knowledge Deficit  Goal: Patient/family/caregiver demonstrates understanding of disease process, treatment plan, medications, and discharge instructions  Description: Complete learning assessment and assess knowledge base    Interventions:  - Provide teaching at level of understanding  - Provide teaching via preferred learning methods  Outcome: Progressing     Problem: CARDIOVASCULAR - ADULT  Goal: Maintains optimal cardiac output and hemodynamic stability  Description: INTERVENTIONS:  - Monitor I/O, vital signs and rhythm  - Monitor for S/S and trends of decreased cardiac output  - Administer and titrate ordered vasoactive medications to optimize hemodynamic stability  - Assess quality of pulses, skin color and temperature  - Assess for signs of decreased coronary artery perfusion  - Instruct patient to report change in severity of symptoms  Outcome: Progressing  Goal: Absence of cardiac dysrhythmias or at baseline rhythm  Description: INTERVENTIONS:  - Continuous cardiac monitoring, vital signs, obtain 12 lead EKG if ordered  - Administer antiarrhythmic and heart rate control medications as ordered  - Monitor electrolytes and administer replacement therapy as ordered  Outcome: Progressing     Problem: RESPIRATORY - ADULT  Goal: Achieves optimal ventilation and oxygenation  Description: INTERVENTIONS:  - Assess for changes in respiratory status  - Assess for changes in mentation and behavior  - Position to facilitate oxygenation and minimize respiratory effort  - Oxygen administered by appropriate delivery if ordered  - Initiate smoking cessation education as indicated  - Encourage broncho-pulmonary hygiene including cough, deep breathe, Incentive Spirometry  - Assess the need for suctioning and aspirate as needed  - Assess and instruct to report SOB or any respiratory difficulty  - Respiratory Therapy support as indicated  Outcome: Progressing     Problem: GENITOURINARY - ADULT  Goal: Maintains or returns to baseline urinary function  Description: INTERVENTIONS:  - Assess urinary function  - Encourage oral fluids to ensure adequate hydration if ordered  - Administer IV fluids as ordered to ensure adequate hydration  - Administer ordered medications as needed  - Offer frequent toileting  - Follow urinary retention protocol if ordered  Outcome: Progressing  Goal: Absence of urinary retention  Description: INTERVENTIONS:  - Assess patient's ability to void and empty bladder  - Monitor I/O  - Bladder scan as needed  - Discuss with physician/AP medications to alleviate retention as needed  - Discuss catheterization for long term situations as appropriate  Outcome: Progressing  Goal: Urinary catheter remains patent  Description: INTERVENTIONS:  - Assess patency of urinary catheter  - If patient has a chronic morales, consider changing catheter if non-functioning  - Follow guidelines for intermittent irrigation of non-functioning urinary catheter  Outcome: Progressing     Problem: METABOLIC, FLUID AND ELECTROLYTES - ADULT  Goal: Electrolytes maintained within normal limits  Description: INTERVENTIONS:  - Monitor labs and assess patient for signs and symptoms of electrolyte imbalances  - Administer electrolyte replacement as ordered  - Monitor response to electrolyte replacements, including repeat lab results as appropriate  - Instruct patient on fluid and nutrition as appropriate  Outcome: Progressing  Goal: Fluid balance maintained  Description: INTERVENTIONS:  - Monitor labs   - Monitor I/O and WT  - Instruct patient on fluid and nutrition as appropriate  - Assess for signs & symptoms of volume excess or deficit  Outcome: Progressing  Goal: Glucose maintained within target range  Description: INTERVENTIONS:  - Monitor Blood Glucose as ordered  - Assess for signs and symptoms of hyperglycemia and hypoglycemia  - Administer ordered medications to maintain glucose within target range  - Assess nutritional intake and initiate nutrition service referral as needed  Outcome: Progressing     Problem: MUSCULOSKELETAL - ADULT  Goal: Maintain or return mobility to safest level of function  Description: INTERVENTIONS:  - Assess patient's ability to carry out ADLs; assess patient's baseline for ADL function and identify physical deficits which impact ability to perform ADLs (bathing, care of mouth/teeth, toileting, grooming, dressing, etc )  - Assess/evaluate cause of self-care deficits   - Assess range of motion  - Assess patient's mobility  - Assess patient's need for assistive devices and provide as appropriate  - Encourage maximum independence but intervene and supervise when necessary  - Involve family in performance of ADLs  - Assess for home care needs following discharge   - Consider OT consult to assist with ADL evaluation and planning for discharge  - Provide patient education as appropriate  Outcome: Progressing  Goal: Maintain proper alignment of affected body part  Description: INTERVENTIONS:  - Support, maintain and protect limb and body alignment  - Provide patient/ family with appropriate education  Outcome: Progressing     Problem: MOBILITY - ADULT  Goal: Maintain or return to baseline ADL function  Description: INTERVENTIONS:  -  Assess patient's ability to carry out ADLs; assess patient's baseline for ADL function and identify physical deficits which impact ability to perform ADLs (bathing, care of mouth/teeth, toileting, grooming, dressing, etc )  - Assess/evaluate cause of self-care deficits   - Assess range of motion  - Assess patient's mobility; develop plan if impaired  - Assess patient's need for assistive devices and provide as appropriate  - Encourage maximum independence but intervene and supervise when necessary  - Involve family in performance of ADLs  - Assess for home care needs following discharge   - Consider OT consult to assist with ADL evaluation and planning for discharge  - Provide patient education as appropriate  Outcome: Progressing  Goal: Maintains/Returns to pre admission functional level  Description: INTERVENTIONS:  - Perform BMAT or MOVE assessment daily    - Set and communicate daily mobility goal to care team and patient/family/caregiver  - Collaborate with rehabilitation services on mobility goals if consulted  - Perform Range of Motion 3 times a day  - Reposition patient every 2 hours    - Dangle patient 3 times a day  - Stand patient 3 times a day  - Ambulate patient 3 times a day  - Out of bed to chair 3 times a day   - Out of bed for meals 3 times a day  - Out of bed for toileting  - Record patient progress and toleration of activity level   Outcome: Progressing     Problem: Prexisting or High Potential for Compromised Skin Integrity  Goal: Skin integrity is maintained or improved  Description: INTERVENTIONS:  - Identify patients at risk for skin breakdown  - Assess and monitor skin integrity  - Assess and monitor nutrition and hydration status  - Monitor labs   - Assess for incontinence   - Turn and reposition patient  - Assist with mobility/ambulation  - Relieve pressure over bony prominences  - Avoid friction and shearing  - Provide appropriate hygiene as needed including keeping skin clean and dry  - Evaluate need for skin moisturizer/barrier cream  - Collaborate with interdisciplinary team   - Patient/family teaching  - Consider wound care consult   Outcome: Progressing

## 2023-03-16 NOTE — ASSESSMENT & PLAN NOTE
· Continues to experience orthostatic hypotension with severe lightheadedness and dizziness while standing and a systolic blood pressure in the 80's mmHg  · Hold tamsulosin for now  · Initiated on midodrine with improvement of BP, but remains symptomatic and orthostatic on exam today , will provide a L NS over 10 hours, recheck orthostatic this evening and in am   · Continue to monitor

## 2023-03-17 ENCOUNTER — APPOINTMENT (OUTPATIENT)
Dept: CARDIAC REHAB | Facility: HOSPITAL | Age: 78
End: 2023-03-17

## 2023-03-17 LAB
ANION GAP SERPL CALCULATED.3IONS-SCNC: 9 MMOL/L (ref 4–13)
ATRIAL RATE: 76 BPM
BASOPHILS # BLD AUTO: 0.05 THOUSANDS/ÂΜL (ref 0–0.1)
BASOPHILS NFR BLD AUTO: 1 % (ref 0–1)
BUN SERPL-MCNC: 10 MG/DL (ref 5–25)
CALCIUM SERPL-MCNC: 8.6 MG/DL (ref 8.4–10.2)
CHLORIDE SERPL-SCNC: 106 MMOL/L (ref 96–108)
CO2 SERPL-SCNC: 24 MMOL/L (ref 21–32)
CREAT SERPL-MCNC: 1.09 MG/DL (ref 0.6–1.3)
EOSINOPHIL # BLD AUTO: 0.52 THOUSAND/ÂΜL (ref 0–0.61)
EOSINOPHIL NFR BLD AUTO: 8 % (ref 0–6)
ERYTHROCYTE [DISTWIDTH] IN BLOOD BY AUTOMATED COUNT: 17.7 % (ref 11.6–15.1)
GFR SERPL CREATININE-BSD FRML MDRD: 65 ML/MIN/1.73SQ M
GLUCOSE SERPL-MCNC: 111 MG/DL (ref 65–140)
GLUCOSE SERPL-MCNC: 112 MG/DL (ref 65–140)
GLUCOSE SERPL-MCNC: 137 MG/DL (ref 65–140)
GLUCOSE SERPL-MCNC: 197 MG/DL (ref 65–140)
GLUCOSE SERPL-MCNC: 235 MG/DL (ref 65–140)
HCT VFR BLD AUTO: 29.7 % (ref 36.5–49.3)
HGB BLD-MCNC: 9.4 G/DL (ref 12–17)
IMM GRANULOCYTES # BLD AUTO: 0.02 THOUSAND/UL (ref 0–0.2)
IMM GRANULOCYTES NFR BLD AUTO: 0 % (ref 0–2)
LYMPHOCYTES # BLD AUTO: 1.57 THOUSANDS/ÂΜL (ref 0.6–4.47)
LYMPHOCYTES NFR BLD AUTO: 24 % (ref 14–44)
MCH RBC QN AUTO: 32.3 PG (ref 26.8–34.3)
MCHC RBC AUTO-ENTMCNC: 31.6 G/DL (ref 31.4–37.4)
MCV RBC AUTO: 102 FL (ref 82–98)
MONOCYTES # BLD AUTO: 0.66 THOUSAND/ÂΜL (ref 0.17–1.22)
MONOCYTES NFR BLD AUTO: 10 % (ref 4–12)
NEUTROPHILS # BLD AUTO: 3.63 THOUSANDS/ÂΜL (ref 1.85–7.62)
NEUTS SEG NFR BLD AUTO: 57 % (ref 43–75)
NRBC BLD AUTO-RTO: 0 /100 WBCS
P AXIS: 29 DEGREES
PLATELET # BLD AUTO: 412 THOUSANDS/UL (ref 149–390)
PMV BLD AUTO: 9 FL (ref 8.9–12.7)
POTASSIUM SERPL-SCNC: 4.3 MMOL/L (ref 3.5–5.3)
PR INTERVAL: 204 MS
QRS AXIS: -45 DEGREES
QRSD INTERVAL: 90 MS
QT INTERVAL: 394 MS
QTC INTERVAL: 443 MS
RBC # BLD AUTO: 2.91 MILLION/UL (ref 3.88–5.62)
SODIUM SERPL-SCNC: 139 MMOL/L (ref 135–147)
T WAVE AXIS: 6 DEGREES
VENTRICULAR RATE: 76 BPM
WBC # BLD AUTO: 6.45 THOUSAND/UL (ref 4.31–10.16)

## 2023-03-17 RX ORDER — MIDODRINE HYDROCHLORIDE 5 MG/1
7.5 TABLET ORAL
Status: DISCONTINUED | OUTPATIENT
Start: 2023-03-17 | End: 2023-03-18

## 2023-03-17 RX ORDER — AMOXICILLIN 250 MG
1 CAPSULE ORAL
Status: DISCONTINUED | OUTPATIENT
Start: 2023-03-17 | End: 2023-03-17

## 2023-03-17 RX ORDER — AMOXICILLIN 250 MG
1 CAPSULE ORAL 2 TIMES DAILY
Status: DISCONTINUED | OUTPATIENT
Start: 2023-03-17 | End: 2023-03-22 | Stop reason: HOSPADM

## 2023-03-17 RX ADMIN — PANTOPRAZOLE SODIUM 40 MG: 40 INJECTION, POWDER, FOR SOLUTION INTRAVENOUS at 19:33

## 2023-03-17 RX ADMIN — INSULIN LISPRO 2 UNITS: 100 INJECTION, SOLUTION INTRAVENOUS; SUBCUTANEOUS at 17:11

## 2023-03-17 RX ADMIN — MIDODRINE HYDROCHLORIDE 7.5 MG: 5 TABLET ORAL at 15:09

## 2023-03-17 RX ADMIN — CYANOCOBALAMIN TAB 500 MCG 500 MCG: 500 TAB at 08:47

## 2023-03-17 RX ADMIN — MULTIPLE VITAMINS W/ MINERALS TAB 1 TABLET: TAB at 08:47

## 2023-03-17 RX ADMIN — RANOLAZINE 500 MG: 500 TABLET, EXTENDED RELEASE ORAL at 08:47

## 2023-03-17 RX ADMIN — IRON SUCROSE 200 MG: 20 INJECTION, SOLUTION INTRAVENOUS at 17:11

## 2023-03-17 RX ADMIN — ESCITALOPRAM OXALATE 10 MG: 10 TABLET ORAL at 08:47

## 2023-03-17 RX ADMIN — SENNOSIDES AND DOCUSATE SODIUM 1 TABLET: 50; 8.6 TABLET ORAL at 17:11

## 2023-03-17 RX ADMIN — CLOPIDOGREL BISULFATE 75 MG: 75 TABLET ORAL at 08:47

## 2023-03-17 RX ADMIN — MIDODRINE HYDROCHLORIDE 5 MG: 5 TABLET ORAL at 06:16

## 2023-03-17 RX ADMIN — FLUTICASONE PROPIONATE 2 SPRAY: 50 SPRAY, METERED NASAL at 08:50

## 2023-03-17 RX ADMIN — LEVOTHYROXINE SODIUM 88 MCG: 88 TABLET ORAL at 06:16

## 2023-03-17 RX ADMIN — ALLOPURINOL 100 MG: 100 TABLET ORAL at 08:47

## 2023-03-17 RX ADMIN — ATORVASTATIN CALCIUM 80 MG: 40 TABLET, FILM COATED ORAL at 17:11

## 2023-03-17 RX ADMIN — EMPAGLIFLOZIN 10 MG: 10 TABLET, FILM COATED ORAL at 08:47

## 2023-03-17 RX ADMIN — RANOLAZINE 500 MG: 500 TABLET, EXTENDED RELEASE ORAL at 17:11

## 2023-03-17 RX ADMIN — INSULIN LISPRO 4 UNITS: 100 INJECTION, SOLUTION INTRAVENOUS; SUBCUTANEOUS at 21:58

## 2023-03-17 RX ADMIN — SENNOSIDES AND DOCUSATE SODIUM 1 TABLET: 50; 8.6 TABLET ORAL at 15:09

## 2023-03-17 NOTE — PHYSICAL THERAPY NOTE
PHYSICAL THERAPY TREATMENT NOTE  NAME:  Ema Tran  DATE: 03/17/23    Length Of Stay: 8  Performed at least 2 patient identifiers during session: Name and Birthday  Treatment time: 7174-9989  Treatment length: 23 min       03/17/23 1356   Note Type   Note Type Treatment   Pain Assessment   Pain Assessment Tool 0-10   Pain Score No Pain   Restrictions/Precautions   Other Precautions Chair Alarm; Fall Risk   General   Chart Reviewed Yes   Response to Previous Treatment Patient with no complaints from previous session  Cognition   Overall Cognitive Status WFL   Orientation Level Oriented X4   Following Commands Follows one step commands without difficulty   Bed Mobility   Sit to Supine 5  Supervision   Additional items HOB elevated; Bedrails; Increased time required;Verbal cues   Additional Comments HOB slightly elevated, bedrails and supervision and VC for safety   Transfers   Sit to Stand 5  Supervision   Additional items Increased time required;Verbal cues   Stand to Sit 5  Supervision   Additional items Increased time required;Verbal cues   Stand pivot 4  Minimal assistance   Additional items Assist x 1; Increased time required;Verbal cues   Additional Comments STS with RW and supervision for safety, pt reporting dizziness upon standing, BP standing assessed at 103/67  Pt stood with steadying assist and fair static standing balance with RW x 2-3 min without LOB and sx unchanged  SPT back to bed, no AD, min x 1 with second therapist present for safety due to dizziness upon standing   Ambulation/Elevation   Gait pattern Short stride; Excessively slow;Decreased foot clearance; Improper Weight shift   Gait Assistance 4  Minimal assist   Additional items Assist x 1;Verbal cues   Assistive Device Rolling walker   Distance 61' with RW, chair follow for safety due to c/o light headedness, no LOB   Balance   Static Sitting Good   Dynamic Sitting Fair +   Static Standing Fair   Dynamic Standing Fair -   Ambulatory Poor +   Endurance Deficit   Endurance Deficit Yes   Endurance Deficit Description fatigue, pt reports light headedness   Activity Tolerance   Activity Tolerance Patient limited by fatigue   Medical Staff Made Aware Lorena MARTIN   Nurse Made Aware RN aware   Exercises   Glute Sets Sitting;20 reps;AROM; Bilateral   Hip Flexion Sitting;20 reps;AROM; Bilateral   Knee AROM Long Arc Quad Sitting;20 reps;AROM; Bilateral   Assessment   Prognosis Good   Problem List Decreased strength;Decreased endurance; Impaired balance;Decreased mobility; Decreased safety awareness; Obesity   Goals   PT Treatment Day 2   Plan   Treatment/Interventions Functional transfer training;LE strengthening/ROM; Elevations; Therapeutic exercise; Endurance training;Patient/family training;Equipment eval/education; Bed mobility;Gait training; Compensatory technique education;Spoke to nursing;OT   Progress Slow progress, medical status limitations   PT Frequency 3-5x/wk   Recommendation   PT Discharge Recommendation Post acute rehabilitation services   Equipment Recommended   (TBD by rehab)   AM-PAC Basic Mobility Inpatient   Turning in Flat Bed Without Bedrails 3   Lying on Back to Sitting on Edge of Flat Bed Without Bedrails 3   Moving Bed to Chair 3   Standing Up From Chair Using Arms 3   Walk in Room 3   Climb 3-5 Stairs With Railing 2   Basic Mobility Inpatient Raw Score 17   Basic Mobility Standardized Score 39 67   Highest Level Of Mobility   JH-HLM Goal 5: Stand one or more mins   JH-HLM Achieved 7: Walk 25 feet or more   Education   Education Provided Mobility training;Home exercise program   Patient Demonstrates acceptance/verbal understanding   End of Consult   Patient Position at End of Consult Bedside chair;Bed/Chair alarm activated; All needs within reach     The patient's AM-PAC Basic Mobility Inpatient Short Form Raw Score is 17   A Raw score of greater than 16 suggests the patient may benefit from discharge to home however due to functional limitations and increased assistance as well as dizziness upon standing, recommend STR  Please also refer to the recommendation of the Physical Therapist for safe discharge planning  Assessment: Pt seen for PT treatment session this date with interventions consisting of gait training w/ emphasis on improving pt's ability to ambulate level surfaces x 60' with min A provided by therapist with RW and Therapeutic exercise consisting of: AROM 20 reps B LE in sitting position  Pt agreeable to PT treatment session upon arrival, pt found seated OOB in recliner, in no apparent distress  In comparison to previous session, pt with no improvements as evidenced by pt continues to experience increased dizziness with standing from sitting, able to complete household distance ambulation with RW and min A, able to complete assigned therex however limited by fatigue   Post session: pt returned BTB, bed alarm engaged, all needs in reach, and RN notified of session findings/recommendations Continue to recommend STR at time of d/c in order to maximize pt's functional independence and safety w/ mobility  Pt continues to be functioning below baseline level, and remains limited 2* factors listed above and including decreased strength, balance, mobility, and activity tolerance  PT will continue to see pt while here in order to address the deficits listed above and provide interventions consistent w/ POC in effort to achieve STGs       Va Galvez, PT,DPT

## 2023-03-17 NOTE — ASSESSMENT & PLAN NOTE
Wt Readings from Last 3 Encounters:   03/17/23 96 3 kg (212 lb 4 9 oz)   03/08/23 105 kg (231 lb 7 7 oz)   03/02/23 98 9 kg (218 lb 0 6 oz)     Remains euvolemic

## 2023-03-17 NOTE — ASSESSMENT & PLAN NOTE
· Continues to experience orthostatic hypotension with severe lightheadedness and dizziness while standing and a systolic blood pressure in the 80's mmHg  · Hold tamsulosin for now  · Initiated on midodrine with improvement of BP but still experiencing hypotension with standing, increase dose further  · Likely in the setting of blood loss anemia, give one round of IV iron  · Compression stockings to be utilized  · Continue to monitor

## 2023-03-17 NOTE — PROGRESS NOTES
[address] [facility]  Progress Note - Chela Yanes 1945, 69 y o  male MRN: [id number]  Unit/Bed#: 421-01 Encounter: [id number]  Primary Care Provider: Nela Blackwell DO   Date and time admitted to hospital: 3/8/2023 11:01 PM    * Orthostatic hypotension  Assessment & Plan  · Continues to experience orthostatic hypotension with severe lightheadedness and dizziness while standing and a systolic blood pressure in the 80's mmHg  · Hold tamsulosin for now  · Initiated on midodrine with improvement of BP but still experiencing hypotension with standing, increase dose further  · Likely in the setting of blood loss anemia, give one round of IV iron  · Compression stockings to be utilized  · Continue to monitor    Hypothyroidism  Assessment & Plan  · Continue Synthroid  · Recheck a TSH level in 1-2 weeks with his PCP      Elevated d-dimer  Assessment & Plan  · No evidence of pulmonary embolism on CTA of the chest/PE protocol completed on 3/9/2023  · No evidence of DVT on venous duplex of both lower extremities on 03/09/2023    Urinary retention  Assessment & Plan  · Maintain the Jose catheter for now  · Hold tamsulosin in the setting of orthostatic hypotension  · Appreciate urology evaluation  will have outpatient follow-up with urology    Acute blood loss anemia  Assessment & Plan  · Recent gastrointestinal hemorrhage thought to be diverticular bleeding found on colonoscopy on 3/3/2023  · Serial laboratory testing to monitor hemoglobin/hematocrit levels  · Initiated cyanocobalamin 500 mcg PO Qdaily for a low-normal vitamin B12 level  · Now with blood in his stool 1 episode each of the last 2 days  · GI on board  · Continues to persist may need colonoscopy on Friday  · Hemoglobin remains stable but low  · Iron venofer to be given today        Chronic diastolic (congestive) heart failure (HCC)  Assessment & Plan  Wt Readings from Last 3 Encounters:   03/17/23 96 3 kg (212 lb 4 9 oz)   03/08/23 105 kg (231 lb 7 7 oz)   03/02/23 98 9 kg (218 lb 0 6 oz)     Remains euvolemic      Other chest pain  Assessment & Plan  · Known CAD with recent drug-eluting stent placement/PTCA to the LAD on 12/29/2022  · I appreciate Cardiology's input  · No indication for repeat cardiac catheterization at this time per Cardiology  · Continue plavix 75 mg PO Qdaily  · Plan to restart aspirin 81 mg PO Qdaily this week if no signs of additional gastrointestinal bleeding  · Continue high-intensity statin therapy with atorvastatin 80 mg PO Qdaily with dinner  · Not on beta-blocker therapy at this time due to orthostatic hypotension    Coronary artery disease of native artery of native heart with stable angina pectoris [facility])  Assessment & Plan  · Known CAD with recent drug-eluting stent placement/PTCA to the LAD on 12/29/2022  · I appreciate Cardiology's input  · No indication for repeat cardiac catheterization at this time per Cardiology  · Continue plavix 75 mg PO Qdaily  · Plan to restart aspirin 81 mg PO Qdaily this week if no signs of additional gastrointestinal bleeding  · Continue statin  · Not on beta-blocker due to hypotension    Type 2 diabetes mellitus with hyperglycemia, without long-term current use of insulin [facility])  Assessment & Plan  Lab Results   Component Value Date    HGBA1C 5 6 03/10/2023       Recent Labs     03/16/23  1637 03/16/23  2057 03/17/23  0726 03/17/23  1119   POCGLU 181* 197* 112 137       Blood Sugar Average: Last 72 hrs:  (P) 159 [id number]     · Good control per last a1c  · Continue sliding scale insulin        VTE Pharmacologic Prophylaxis: VTE Score: 5 High Risk (Score >/= 5) - Pharmacological DVT Prophylaxis Contraindicated  Sequential Compression Devices Ordered  Patient Centered Rounds: I performed bedside rounds with nursing staff today    Discussions with Specialists or Other Care Team Provider: case management    Education and Discussions with Family / Patient: Updated  (significant other) at bedside  Total Time Spent on Date of Encounter in care of patient: 45 minutes This time was spent on one or more of the following: performing physical exam; counseling and coordination of care; obtaining or reviewing history; documenting in the medical record; reviewing/ordering tests, medications or procedures; communicating with other healthcare professionals and discussing with patient's family/caregivers  Current Length of Stay: 8 day(s)  Current Patient Status: Inpatient   Certification Statement: The patient will continue to require additional inpatient hospital stay due to orthostatic hypotension persisting  Discharge Plan: Anticipate discharge in 24-48 hrs to home with home services  Code Status: Level 1 - Full Code    Subjective:   Patient reports feeling constipated, asking for stool softener  Objective:     Vitals:   Temp (24hrs), Av 9 °F (36 6 °C), Min:97 9 °F (36 6 °C), Max:97 9 °F (36 6 °C)    Temp:  [97 9 °F (36 6 °C)] 97 9 °F (36 6 °C)  HR:  [] 101  Resp:  [18] 18  BP: ()/(53-67) 103/67  SpO2:  [94 %-98 %] 95 %  Body mass index is 31 35 kg/m²  Input and Output Summary (last 24 hours): Intake/Output Summary (Last 24 hours) at 3/17/2023 1357  Last data filed at 3/17/2023 1300  Gross per 24 hour   Intake 1440 ml   Output 5100 ml   Net -3660 ml       Physical Exam:   Physical Exam  Vitals and nursing note reviewed  Constitutional:       General: He is not in acute distress  Appearance: He is obese  He is ill-appearing  HENT:      Head: Normocephalic and atraumatic  Cardiovascular:      Rate and Rhythm: Normal rate and regular rhythm  Pulses: Normal pulses  Heart sounds: Normal heart sounds  Pulmonary:      Effort: Pulmonary effort is normal       Breath sounds: Normal breath sounds  Abdominal:      General: Bowel sounds are normal       Palpations: Abdomen is soft  Musculoskeletal:      Right lower leg: No edema  Left lower leg: No edema  Skin:     General: Skin is warm and dry  Neurological:      General: No focal deficit present  Mental Status: He is alert  Mental status is at baseline  Psychiatric:         Mood and Affect: Mood normal          Behavior: Behavior normal           Additional Data:     Labs:  Results from last 7 days   Lab Units 03/17/23  0637   WBC Thousand/uL 6 45   HEMOGLOBIN g/dL 9 4*   HEMATOCRIT % 29 7*   PLATELETS Thousands/uL 412*   NEUTROS PCT % 57   LYMPHS PCT % 24   MONOS PCT % 10   EOS PCT % 8*     Results from last 7 days   Lab Units 03/17/23  0436 03/15/23  0506 03/14/23  0527   SODIUM mmol/L 139   < > 138   POTASSIUM mmol/L 4 3   < > 4 5   CHLORIDE mmol/L 106   < > 107   CO2 mmol/L 24   < > 24   BUN mg/dL 10   < > 15   CREATININE mg/dL 1 09   < > 1 02   ANION GAP mmol/L 9   < > 7   CALCIUM mg/dL 8 6   < > 7 6*   ALBUMIN g/dL  --   --  2 9*   TOTAL BILIRUBIN mg/dL  --   --  0 39   ALK PHOS U/L  --   --  42   ALT U/L  --   --  22   AST U/L  --   --  23   GLUCOSE RANDOM mg/dL 111   < > 102    < > = values in this interval not displayed           Results from last 7 days   Lab Units 03/17/23  1119 03/17/23  0726 03/16/23  2057 03/16/23  1637 03/16/23  1136 03/16/23  0731 03/15/23  2119 03/15/23  1604 03/15/23  1109 03/15/23  0651 03/14/23  2105 03/14/23  1521   POC GLUCOSE mg/dl 137 112 197* 181* 193* 105 193* 175* 163* 96 186* 195*         Results from last 7 days   Lab Units 03/14/23  0527 03/13/23  0615 03/12/23  0515 03/11/23  0506   PROCALCITONIN ng/ml 0 07 <0 05 0 07 0 08       Lines/Drains:  Invasive Devices     Peripheral Intravenous Line  Duration           Peripheral IV 03/13/23 Proximal;Right;Ventral (anterior) Forearm 4 days    Peripheral IV 03/13/23 Right Antecubital 3 days          Drain  Duration           Urethral Catheter Temperature probe 12 days              Urinary Catheter:  Goal for removal: N/A- Discharging with Jose               Imaging: No pertinent imaging reviewed  Recent Cultures (last 7 days):         Last 24 Hours Medication List:   Current Facility-Administered Medications   Medication Dose Route Frequency Provider Last Rate   • acetaminophen  650 mg Oral Q6H PRN Bonnie Ferrer MD     • allopurinol  100 mg Oral Daily Bonnie Ferrer MD     • aluminum-magnesium hydroxide-simethicone  30 mL Oral Q4H PRN Bonnie Ferrer MD     • atorvastatin  80 mg Oral Daily With Dinner Bonnie Ferrer MD     • clopidogrel  75 mg Oral Daily Bonnie Ferrer MD     • cyanocobalamin  500 mcg Oral Daily Cassy Bean DO     • Empagliflozin  10 mg Oral QAM Bonnie Ferrer MD     • escitalopram  10 mg Oral Daily Bonnie Ferrer MD     • fluticasone  2 spray Each Nare Daily Lavonne Wang MD     • insulin lispro  2-12 Units Subcutaneous TID AC Bonnie Ferrer MD     • insulin lispro  2-12 Units Subcutaneous HS Bonnie Ferrer MD     • iron sucrose  200 mg Intravenous Once Napoleon Cazares PA-C     • levothyroxine  88 mcg Oral Early Morning Bonnie Ferrer MD     • midodrine  7 5 mg Oral TID AC Napoleon Cazares PA-C     • multivitamin-minerals  1 tablet Oral Daily Bonnie Ferrer MD     • nitroglycerin  0 4 mg Sublingual Q5 Min PRN Edgard Neff DO     • ondansetron  4 mg Intravenous Q4H PRN Cassy Bean DO     • pantoprazole  40 mg Intravenous Q24H Bonnie Ferrer MD     • polyethylene glycol  17 g Oral Daily PRN Bonnie Ferrer MD     • ranolazine  500 mg Oral BID Bonnie Ferrer MD     • senna-docusate sodium  1 tablet Oral BID Napoleon Cazares PA-C          Today, Patient Was Seen By: Napoleon Cazares PA-C    **Please Note: This note may have been constructed using a voice recognition system  **

## 2023-03-17 NOTE — PLAN OF CARE
Problem: PAIN - ADULT  Goal: Verbalizes/displays adequate comfort level or baseline comfort level  Description: Interventions:  - Encourage patient to monitor pain and request assistance  - Assess pain using appropriate pain scale  - Administer analgesics based on type and severity of pain and evaluate response  - Implement non-pharmacological measures as appropriate and evaluate response  - Consider cultural and social influences on pain and pain management  - Notify physician/advanced practitioner if interventions unsuccessful or patient reports new pain  Outcome: Progressing     Problem: INFECTION - ADULT  Goal: Absence or prevention of progression during hospitalization  Description: INTERVENTIONS:  - Assess and monitor for signs and symptoms of infection  - Monitor lab/diagnostic results  - Monitor all insertion sites, i e  indwelling lines, tubes, and drains  - Monitor endotracheal if appropriate and nasal secretions for changes in amount and color  - [place] appropriate cooling/warming therapies per order  - Administer medications as ordered  - Instruct and encourage patient and family to use good hand hygiene technique  - Identify and instruct in appropriate isolation precautions for identified infection/condition  Outcome: Progressing     Problem: SAFETY ADULT  Goal: Patient will remain free of falls  Description: INTERVENTIONS:  - Educate patient/family on patient safety including physical limitations  - Instruct patient to call for assistance with activity   - Consult OT/PT to assist with strengthening/mobility   - Keep Call bell within reach  - Keep bed low and locked with side rails adjusted as appropriate  - Keep care items and personal belongings within reach  - Initiate and maintain comfort rounds  - Make Fall Risk Sign visible to staff  - Offer Toileting every 2 Hours, in advance of need  - Initiate/Maintain bed/chair alarm  - Obtain necessary fall risk management equipment: non-skid footwear  - Apply yellow socks and bracelet for high fall risk patients  - Consider moving patient to room near nurses station  Outcome: Progressing  Goal: Maintain or return to baseline ADL function  Description: INTERVENTIONS:  -  Assess patient's ability to carry out ADLs; assess patient's baseline for ADL function and identify physical deficits which impact ability to perform ADLs (bathing, care of mouth/teeth, toileting, grooming, dressing, etc )  - Assess/evaluate cause of self-care deficits   - Assess range of motion  - Assess patient's mobility; develop plan if impaired  - Assess patient's need for assistive devices and provide as appropriate  - Encourage maximum independence but intervene and supervise when necessary  - Involve family in performance of ADLs  - Assess for home care needs following discharge   - Consider OT consult to assist with ADL evaluation and planning for discharge  - Provide patient education as appropriate  Outcome: Progressing  Goal: Maintains/Returns to pre admission functional level  Description: INTERVENTIONS:  - Perform BMAT or MOVE assessment daily    - Set and communicate daily mobility goal to care team and patient/family/caregiver  - Collaborate with rehabilitation services on mobility goals if consulted  - Perform Range of Motion 3 times a day  - Reposition patient every 2 hours    - Dangle patient 3 times a day  - Stand patient 3 times a day  - Ambulate patient 3 times a day  - Out of bed to chair 3 times a day   - Out of bed for meals 3 times a day  - Out of bed for toileting  - Record patient progress and toleration of activity level   Outcome: Progressing     Problem: DISCHARGE PLANNING  Goal: Discharge to home or other facility with appropriate resources  Description: INTERVENTIONS:  - Identify barriers to discharge w/patient and caregiver  - Arrange for needed discharge resources and transportation as appropriate  - Identify discharge learning needs (meds, wound care, etc )  - Arrange for interpretive services to assist at discharge as needed  - Refer to Case Management Department for coordinating discharge planning if the patient needs post-hospital services based on physician/advanced practitioner order or complex needs related to functional status, cognitive ability, or social support system  Outcome: Progressing     Problem: Knowledge Deficit  Goal: Patient/family/caregiver demonstrates understanding of disease process, treatment plan, medications, and discharge instructions  Description: Complete learning assessment and assess knowledge base    Interventions:  - Provide teaching at level of understanding  - Provide teaching via preferred learning methods  Outcome: Progressing     Problem: CARDIOVASCULAR - ADULT  Goal: Maintains optimal cardiac output and hemodynamic stability  Description: INTERVENTIONS:  - Monitor I/O, vital signs and rhythm  - Monitor for S/S and trends of decreased cardiac output  - Administer and titrate ordered vasoactive medications to optimize hemodynamic stability  - Assess quality of pulses, skin color and temperature  - Assess for signs of decreased coronary artery perfusion  - Instruct patient to report change in severity of symptoms  Outcome: Progressing  Goal: Absence of cardiac dysrhythmias or at baseline rhythm  Description: INTERVENTIONS:  - Continuous cardiac monitoring, vital signs, obtain 12 lead EKG if ordered  - Administer antiarrhythmic and heart rate control medications as ordered  - Monitor electrolytes and administer replacement therapy as ordered  Outcome: Progressing     Problem: RESPIRATORY - ADULT  Goal: Achieves optimal ventilation and oxygenation  Description: INTERVENTIONS:  - Assess for changes in respiratory status  - Assess for changes in mentation and behavior  - Position to facilitate oxygenation and minimize respiratory effort  - Oxygen administered by appropriate delivery if ordered  - Initiate smoking cessation education as indicated  - Encourage broncho-pulmonary hygiene including cough, deep breathe, Incentive Spirometry  - Assess the need for suctioning and aspirate as needed  - Assess and instruct to report SOB or any respiratory difficulty  - Respiratory Therapy support as indicated  Outcome: Progressing     Problem: GENITOURINARY - ADULT  Goal: Maintains or returns to baseline urinary function  Description: INTERVENTIONS:  - Assess urinary function  - Encourage oral fluids to ensure adequate hydration if ordered  - Administer IV fluids as ordered to ensure adequate hydration  - Administer ordered medications as needed  - Offer frequent toileting  - Follow urinary retention protocol if ordered  Outcome: Progressing  Goal: Absence of urinary retention  Description: INTERVENTIONS:  - Assess patient's ability to void and empty bladder  - Monitor I/O  - Bladder scan as needed  - Discuss with physician/AP medications to alleviate retention as needed  - Discuss catheterization for long term situations as appropriate  Outcome: Progressing  Goal: Urinary catheter remains patent  Description: INTERVENTIONS:  - Assess patency of urinary catheter  - If patient has a chronic morales, consider changing catheter if non-functioning  - Follow guidelines for intermittent irrigation of non-functioning urinary catheter  Outcome: Progressing     Problem: METABOLIC, FLUID AND ELECTROLYTES - ADULT  Goal: Electrolytes maintained within normal limits  Description: INTERVENTIONS:  - Monitor labs and assess patient for signs and symptoms of electrolyte imbalances  - Administer electrolyte replacement as ordered  - Monitor response to electrolyte replacements, including repeat lab results as appropriate  - Instruct patient on fluid and nutrition as appropriate  Outcome: Progressing  Goal: Fluid balance maintained  Description: INTERVENTIONS:  - Monitor labs   - Monitor I/O and WT  - Instruct patient on fluid and nutrition as appropriate  - Assess for signs & symptoms of volume excess or deficit  Outcome: Progressing  Goal: Glucose maintained within target range  Description: INTERVENTIONS:  - Monitor Blood Glucose as ordered  - Assess for signs and symptoms of hyperglycemia and hypoglycemia  - Administer ordered medications to maintain glucose within target range  - Assess nutritional intake and initiate nutrition service referral as needed  Outcome: Progressing     Problem: MUSCULOSKELETAL - ADULT  Goal: Maintain or return mobility to safest level of function  Description: INTERVENTIONS:  - Assess patient's ability to carry out ADLs; assess patient's baseline for ADL function and identify physical deficits which impact ability to perform ADLs (bathing, care of mouth/teeth, toileting, grooming, dressing, etc )  - Assess/evaluate cause of self-care deficits   - Assess range of motion  - Assess patient's mobility  - Assess patient's need for assistive devices and provide as appropriate  - Encourage maximum independence but intervene and supervise when necessary  - Involve family in performance of ADLs  - Assess for home care needs following discharge   - Consider OT consult to assist with ADL evaluation and planning for discharge  - Provide patient education as appropriate  Outcome: Progressing  Goal: Maintain proper alignment of affected body part  Description: INTERVENTIONS:  - Support, maintain and protect limb and body alignment  - Provide patient/ family with appropriate education  Outcome: Progressing     Problem: MOBILITY - ADULT  Goal: Maintain or return to baseline ADL function  Description: INTERVENTIONS:  -  Assess patient's ability to carry out ADLs; assess patient's baseline for ADL function and identify physical deficits which impact ability to perform ADLs (bathing, care of mouth/teeth, toileting, grooming, dressing, etc )  - Assess/evaluate cause of self-care deficits   - Assess range of motion  - Assess patient's mobility; develop plan if impaired  - Assess patient's need for assistive devices and provide as appropriate  - Encourage maximum independence but intervene and supervise when necessary  - Involve family in performance of ADLs  - Assess for home care needs following discharge   - Consider OT consult to assist with ADL evaluation and planning for discharge  - Provide patient education as appropriate  Outcome: Progressing  Goal: Maintains/Returns to pre admission functional level  Description: INTERVENTIONS:  - Perform BMAT or MOVE assessment daily    - Set and communicate daily mobility goal to care team and patient/family/caregiver  - Collaborate with rehabilitation services on mobility goals if consulted  - Perform Range of Motion 3 times a day  - Reposition patient every 2 hours    - Dangle patient 3 times a day  - Stand patient 3 times a day  - Ambulate patient 3 times a day  - Out of bed to chair 3 times a day   - Out of bed for meals 3 times a day  - Out of bed for toileting  - Record patient progress and toleration of activity level   Outcome: Progressing     Problem: Prexisting or High Potential for Compromised Skin Integrity  Goal: Skin integrity is maintained or improved  Description: INTERVENTIONS:  - Identify patients at risk for skin breakdown  - Assess and monitor skin integrity  - Assess and monitor nutrition and hydration status  - Monitor labs   - Assess for incontinence   - Turn and reposition patient  - Assist with mobility/ambulation  - Relieve pressure over bony prominences  - Avoid friction and shearing  - Provide appropriate hygiene as needed including keeping skin clean and dry  - Evaluate need for skin moisturizer/barrier cream  - Collaborate with interdisciplinary team   - Patient/family teaching  - Consider wound care consult   Outcome: Progressing

## 2023-03-17 NOTE — PROGRESS NOTES
[facility]  Progress Note - Samuel Hernandez 69 y o  male MRN: [id number]    Unit/Bed#: 882-97 Encounter: [id number]    Assessment/Plan:  Samuel Hernandez is a 69 y o  male with CAD status post PCI and drug-eluting stent in December on Plavix, moderate AAS, hypertension, CKD, SMA stenosis, diabetes who was recently admitted for hematochezia thought to be secondary to diverticular bleed, readmitted with hypotension  Initially he continued to have maroon-colored stools once a day but he reports that yesterday his stool did not have any overt blood  His endoscopy and colonoscopy earlier in the month were notable for a possible ulcer and a diverticulum with a visible vessel for which 2 clips were placed  CT on this admission with no suggestion of active GI hemorrhage diverticulosis noted  He may have been passing some residual old blood in the colon, as it was maroon and not red  Now that it has stopped this is very reassuring that he is not having an active bleed  Most recent labs notable for normal BMP  Glucose 197  His CBC from yesterday with normal white blood cell count  Hemoglobin 9, , platelets 810  Follow-up morning CBC and continue to trend CBC  Can transfuse for hemoglobin less than 7 or even consider for less than 8 given relatively recent CAD  He is admitted maintain active type and screen  Continue Plavix  Can continue PPI  Continue to monitor stools for overt bleeding  Given absence of overt bleeding no need for colonoscopy at this time  Can continue MiraLAX as needed  GI can follow-up as an outpatient, but if something comes up acutely while he is admitted please let us know      Subjective:   Patient seen this morning  He reports that yesterday he had a stool and it was large in quantity but there was no overt bleeding  Overall he feels well  Objective:     Vitals: Blood pressure 126/65, pulse 75, temperature 98 3 °F (36 8 °C), resp   rate 18, height 5' 9" (1 753 m), weight 96 3 kg (212 lb 4 9 oz), SpO2 98 %  ,Body mass index is 31 35 kg/m²  Intake/Output Summary (Last 24 hours) at 3/17/2023 0610  Last data filed at 3/17/2023 0050  Gross per 24 hour   Intake 1380 ml   Output 4400 ml   Net -3020 ml       Review of Systems: as per HPI  10 point ROS reviewed and negative, except as above    PHYSICAL EXAMINATION:    General Appearance:   Alert, cooperative, no distress   HEENT:  Normocephalic, atraumatic, anicteric  Neck supple, symmetrical, trachea midline  Lungs:   Equal chest rise and unlabored breathing, normal effort, no coughing  Cardiovascular:   No visualized JVD  Abdomen:   No abdominal distension  Skin:   No jaundice, rashes, or lesions  Musculoskeletal:   Normal range of motion visualized  Psych:  Normal affect and normal insight  Neuro:  Alert and appropriate  Invasive Devices     Peripheral Intravenous Line  Duration           Peripheral IV 03/13/23 Proximal;Right;Ventral (anterior) Forearm 4 days    Peripheral IV 03/13/23 Right Antecubital 3 days          Drain  Duration           Urethral Catheter Temperature probe 12 days                        CBC: No results found for: WBC, HGB, HCT, MCV, PLT, ADJUSTEDWBC, MCH, MCHC, RDW, MPV, NRBC,   CMP:   Lab Results   Component Value Date    K 4 3 03/17/2023     03/17/2023    CO2 24 03/17/2023    BUN 10 03/17/2023    CREATININE 1 09 03/17/2023    CALCIUM 8 6 03/17/2023    EGFR 65 03/17/2023   ,   Lipase: No results found for: LIPASE,  PT/INR: No results found for: PT, INR,   Troponin: No results found for: TROPONINI,   Magnesium: No components found for: MAG,   Phosphorous: No results found for: PHOS  Imaging Studies: I have personally reviewed pertinent reports

## 2023-03-17 NOTE — OCCUPATIONAL THERAPY NOTE
Occupational Therapy         Patient Name: Oziel Bolden  HGRAS'W Date: 3/17/2023       03/17/23 1357   OT Last Visit   OT Visit Date 03/17/23   Note Type   Note Type Treatment   Pain Assessment   Pain Assessment Tool 0-10   Pain Score No Pain   Restrictions/Precautions   Weight Bearing Precautions Per Order No   Other Precautions Chair Alarm; Fall Risk   Bed Mobility   Sit to Supine 5  Supervision   Additional items Bedrails; Increased time required;Verbal cues   Additional Comments Pt OOB at start of session; requests to transfer to bed at end of session due to fatigue  All needs within reach  Pt on RA during session with SPO2 90% or greater  Transfers   Sit to Stand 5  Supervision  (RW used)   Additional items Armrests; Verbal cues   Stand to Sit 5  Supervision  (RW used)   Additional items Armrests; Verbal cues   Stand pivot 4  Minimal assistance   Additional items Assist x 2; Increased time required;Verbal cues  (Min A x 2 without AD)   Functional Mobility   Functional Mobility 4  Minimal assistance   Additional Comments BP upon standing reads 103/67  Pt agreeable to functional mobility tasks  Pt ambulates within his room and within hallway with Min Ax1 and chair follow  Pt is limited by fatigue and dizziness  Increased time necessary due to slow pace     Additional items Rolling walker   Therapeutic Excerise-Strength   UE Strength Yes   Right Upper Extremity- Strength   R Shoulder Flexion;ABduction   R Elbow Elbow flexion;Elbow extension   R Wrist Wrist flexion;Wrist extension   R Weight/Reps/Sets x 20 each   RUE Strength Comment rest periods PRN   Left Upper Extremity-Strength   L Shoulder Flexion;ABduction   L Elbow Elbow flexion;Elbow extension   L Wrist Wrist flexion;Wrist extension   L Weights/Reps/Sets x 20 each   LUE Strength Comment rest periods PRN   Subjective   Subjective "I'm not ready for marathons in the [place]"   Cognition   Overall Cognitive Status [facility]   Arousal/Participation Alert; Cooperative   Attention Within functional limits   Orientation Level Oriented X4   Memory Within functional limits   Following Commands Follows all commands and directions without difficulty   Activity Tolerance   Activity Tolerance Patient limited by fatigue;Treatment limited secondary to medical complications (Comment)  (BP symptoms)   Assessment   Assessment Patient participated in Skilled OT session this date with interventions consisting of Energy Conservation techniques, Work simplification skills , safety awareness and fall prevention techniques, therapeutic exercise to: increase functional use of BUEs, increase BUE muscle strength  and  therapeutic activities to: increase activity tolerance   Co treatment with PT secondary to complex medical condition of pt, mod-max A of 2 required to achieve and maintain transitional movements, requiring the need of skilled therapeutic intervention of 2 therapists to achieve delivery of services  Patient agreeable to OT treatment session, upon arrival patient was found seated OOB to Recliner  Patient requiring verbal cues for safety  Patient continues to be functioning below baseline level, occupational performance remains limited secondary to factors listed above and increased risk for falls and injury  The patient's raw score on the AM-PAC Daily Activity Inpatient Short Form is 19  A raw score of greater than or equal to 19 suggests the patient may benefit from discharge to home  Please refer to the recommendation of the Occupational Therapist for safe discharge planning  From OT standpoint, recommendation at time of d/c would be Post acute rehabilitation services  Plan   Treatment Interventions Functional transfer training;UE strengthening/ROM; Endurance training;Energy conservation; Activityengagement   Goal Expiration Date 03/23/23   OT Treatment Day 4   OT Frequency 2-3x/wk   Recommendation   OT Discharge Recommendation Post acute rehabilitation services AM-PAC Daily Activity Inpatient   Lower Body Dressing 2   Bathing 2   Toileting 3   Upper Body Dressing 4   Grooming 4   Eating 4   Daily Activity Raw Score 19   Daily Activity Standardized Score (Calc for Raw Score >=11) 40 22   AM-PAC Applied Cognition Inpatient   Following a Speech/Presentation 4   Understanding Ordinary Conversation 4   Taking Medications 4   Remembering Where Things Are Placed or Put Away 4   Remembering List of 4-5 Errands 4   Taking Care of Complicated Tasks 4   Applied Cognition Raw Score 24   Applied Cognition Standardized Score 62 21       Pt will benefit from continued OT services in order to maximize (I) c ADL performance, FM c RW, and improve overall endurance/strength required to complete functional tasks in preparation for d/c  Pt benefited from co-treatment of skilled OT and PT therapists in order to most appropriately address functional deficits d/t extensive assistance required for safe functional mobility, decreased activity tolerance, and regression from functioning level prior to admission and/or onset of present illness  OT/PT objectives were addressed separately; please see PT note for specific goal areas targeted  Pt left supine in bed at end of session; all needs within reach; all lines intact; scds connected and turned on      Jelena Canada

## 2023-03-17 NOTE — PLAN OF CARE
Problem: PHYSICAL THERAPY ADULT  Goal: Performs mobility at highest level of function for planned discharge setting  See evaluation for individualized goals  Description: Treatment/Interventions: Functional transfer training, LE strengthening/ROM, Elevations, Therapeutic exercise, Endurance training, Bed mobility, Gait training          See flowsheet documentation for full assessment, interventions and recommendations  Outcome: Not Progressing  Note: Prognosis: Good  Problem List: Decreased strength, Decreased endurance, Impaired balance, Decreased mobility, Decreased safety awareness, Obesity  Assessment: Pt seen for PT treatment session this date with interventions consisting of gait training w/ emphasis on improving pt's ability to ambulate level surfaces x 60' with min A provided by therapist with RW and Therapeutic exercise consisting of: AROM 20 reps B LE in sitting position  Pt agreeable to PT treatment session upon arrival, pt found seated OOB in recliner, in no apparent distress  In comparison to previous session, pt with no improvements as evidenced by pt continues to experience increased dizziness with standing from sitting, able to complete household distance ambulation with RW and min A, able to complete assigned therex however limited by fatigue   Post session: pt returned BTB, bed alarm engaged, all needs in reach, and RN notified of session findings/recommendations Continue to recommend STR at time of d/c in order to maximize pt's functional independence and safety w/ mobility  Pt continues to be functioning below baseline level, and remains limited 2* factors listed above and including decreased strength, balance, mobility, and activity tolerance  PT will continue to see pt while here in order to address the deficits listed above and provide interventions consistent w/ POC in effort to achieve STGs          PT Discharge Recommendation: Post acute rehabilitation services    See flowsheet documentation for full assessment

## 2023-03-17 NOTE — ASSESSMENT & PLAN NOTE
· Recent gastrointestinal hemorrhage thought to be diverticular bleeding found on colonoscopy on 3/3/2023  · Serial laboratory testing to monitor hemoglobin/hematocrit levels  · Initiated cyanocobalamin 500 mcg PO Qdaily for a low-normal vitamin B12 level  · Now with blood in his stool 1 episode each of the last 2 days  · GI on board  · Continues to persist may need colonoscopy on Friday  · Hemoglobin remains stable but low  · Iron venofer to be given today

## 2023-03-17 NOTE — PLAN OF CARE
Problem: PAIN - ADULT  Goal: Verbalizes/displays adequate comfort level or baseline comfort level  Description: Interventions:  - Encourage patient to monitor pain and request assistance  - Assess pain using appropriate pain scale  - Administer analgesics based on type and severity of pain and evaluate response  - Implement non-pharmacological measures as appropriate and evaluate response  - Consider cultural and social influences on pain and pain management  - Notify physician/advanced practitioner if interventions unsuccessful or patient reports new pain  Outcome: Progressing     Problem: INFECTION - ADULT  Goal: Absence or prevention of progression during hospitalization  Description: INTERVENTIONS:  - Assess and monitor for signs and symptoms of infection  - Monitor lab/diagnostic results  - Monitor all insertion sites, i e  indwelling lines, tubes, and drains  - Monitor endotracheal if appropriate and nasal secretions for changes in amount and color  - [place] appropriate cooling/warming therapies per order  - Administer medications as ordered  - Instruct and encourage patient and family to use good hand hygiene technique  - Identify and instruct in appropriate isolation precautions for identified infection/condition  Outcome: Progressing     Problem: SAFETY ADULT  Goal: Patient will remain free of falls  Description: INTERVENTIONS:  - Educate patient/family on patient safety including physical limitations  - Instruct patient to call for assistance with activity   - Consult OT/PT to assist with strengthening/mobility   - Keep Call bell within reach  - Keep bed low and locked with side rails adjusted as appropriate  - Keep care items and personal belongings within reach  - Initiate and maintain comfort rounds  - Make Fall Risk Sign visible to staff  - Offer Toileting every 2 Hours, in advance of need  - Initiate/Maintain bed/chair alarm  - Obtain necessary fall risk management equipment: non-skid footwear  - Apply yellow socks and bracelet for high fall risk patients  - Consider moving patient to room near nurses station  Outcome: Progressing  Goal: Maintain or return to baseline ADL function  Description: INTERVENTIONS:  -  Assess patient's ability to carry out ADLs; assess patient's baseline for ADL function and identify physical deficits which impact ability to perform ADLs (bathing, care of mouth/teeth, toileting, grooming, dressing, etc )  - Assess/evaluate cause of self-care deficits   - Assess range of motion  - Assess patient's mobility; develop plan if impaired  - Assess patient's need for assistive devices and provide as appropriate  - Encourage maximum independence but intervene and supervise when necessary  - Involve family in performance of ADLs  - Assess for home care needs following discharge   - Consider OT consult to assist with ADL evaluation and planning for discharge  - Provide patient education as appropriate  Outcome: Progressing  Goal: Maintains/Returns to pre admission functional level  Description: INTERVENTIONS:  - Perform BMAT or MOVE assessment daily    - Set and communicate daily mobility goal to care team and patient/family/caregiver  - Collaborate with rehabilitation services on mobility goals if consulted  - Perform Range of Motion 3 times a day  - Reposition patient every 2 hours    - Dangle patient 3 times a day  - Stand patient 3 times a day  - Ambulate patient 3 times a day  - Out of bed to chair 3 times a day   - Out of bed for meals 3 times a day  - Out of bed for toileting  - Record patient progress and toleration of activity level   Outcome: Progressing     Problem: DISCHARGE PLANNING  Goal: Discharge to home or other facility with appropriate resources  Description: INTERVENTIONS:  - Identify barriers to discharge w/patient and caregiver  - Arrange for needed discharge resources and transportation as appropriate  - Identify discharge learning needs (meds, wound care, etc )  - Arrange for interpretive services to assist at discharge as needed  - Refer to Case Management Department for coordinating discharge planning if the patient needs post-hospital services based on physician/advanced practitioner order or complex needs related to functional status, cognitive ability, or social support system  Outcome: Progressing     Problem: Knowledge Deficit  Goal: Patient/family/caregiver demonstrates understanding of disease process, treatment plan, medications, and discharge instructions  Description: Complete learning assessment and assess knowledge base    Interventions:  - Provide teaching at level of understanding  - Provide teaching via preferred learning methods  Outcome: Progressing     Problem: CARDIOVASCULAR - ADULT  Goal: Maintains optimal cardiac output and hemodynamic stability  Description: INTERVENTIONS:  - Monitor I/O, vital signs and rhythm  - Monitor for S/S and trends of decreased cardiac output  - Administer and titrate ordered vasoactive medications to optimize hemodynamic stability  - Assess quality of pulses, skin color and temperature  - Assess for signs of decreased coronary artery perfusion  - Instruct patient to report change in severity of symptoms  Outcome: Progressing  Goal: Absence of cardiac dysrhythmias or at baseline rhythm  Description: INTERVENTIONS:  - Continuous cardiac monitoring, vital signs, obtain 12 lead EKG if ordered  - Administer antiarrhythmic and heart rate control medications as ordered  - Monitor electrolytes and administer replacement therapy as ordered  Outcome: Progressing     Problem: RESPIRATORY - ADULT  Goal: Achieves optimal ventilation and oxygenation  Description: INTERVENTIONS:  - Assess for changes in respiratory status  - Assess for changes in mentation and behavior  - Position to facilitate oxygenation and minimize respiratory effort  - Oxygen administered by appropriate delivery if ordered  - Initiate smoking cessation education as indicated  - Encourage broncho-pulmonary hygiene including cough, deep breathe, Incentive Spirometry  - Assess the need for suctioning and aspirate as needed  - Assess and instruct to report SOB or any respiratory difficulty  - Respiratory Therapy support as indicated  Outcome: Progressing     Problem: GENITOURINARY - ADULT  Goal: Maintains or returns to baseline urinary function  Description: INTERVENTIONS:  - Assess urinary function  - Encourage oral fluids to ensure adequate hydration if ordered  - Administer IV fluids as ordered to ensure adequate hydration  - Administer ordered medications as needed  - Offer frequent toileting  - Follow urinary retention protocol if ordered  Outcome: Progressing  Goal: Absence of urinary retention  Description: INTERVENTIONS:  - Assess patient's ability to void and empty bladder  - Monitor I/O  - Bladder scan as needed  - Discuss with physician/AP medications to alleviate retention as needed  - Discuss catheterization for long term situations as appropriate  Outcome: Progressing  Goal: Urinary catheter remains patent  Description: INTERVENTIONS:  - Assess patency of urinary catheter  - If patient has a chronic morales, consider changing catheter if non-functioning  - Follow guidelines for intermittent irrigation of non-functioning urinary catheter  Outcome: Progressing     Problem: METABOLIC, FLUID AND ELECTROLYTES - ADULT  Goal: Electrolytes maintained within normal limits  Description: INTERVENTIONS:  - Monitor labs and assess patient for signs and symptoms of electrolyte imbalances  - Administer electrolyte replacement as ordered  - Monitor response to electrolyte replacements, including repeat lab results as appropriate  - Instruct patient on fluid and nutrition as appropriate  Outcome: Progressing  Goal: Fluid balance maintained  Description: INTERVENTIONS:  - Monitor labs   - Monitor I/O and WT  - Instruct patient on fluid and nutrition as appropriate  - Assess for signs & symptoms of volume excess or deficit  Outcome: Progressing  Goal: Glucose maintained within target range  Description: INTERVENTIONS:  - Monitor Blood Glucose as ordered  - Assess for signs and symptoms of hyperglycemia and hypoglycemia  - Administer ordered medications to maintain glucose within target range  - Assess nutritional intake and initiate nutrition service referral as needed  Outcome: Progressing     Problem: MUSCULOSKELETAL - ADULT  Goal: Maintain or return mobility to safest level of function  Description: INTERVENTIONS:  - Assess patient's ability to carry out ADLs; assess patient's baseline for ADL function and identify physical deficits which impact ability to perform ADLs (bathing, care of mouth/teeth, toileting, grooming, dressing, etc )  - Assess/evaluate cause of self-care deficits   - Assess range of motion  - Assess patient's mobility  - Assess patient's need for assistive devices and provide as appropriate  - Encourage maximum independence but intervene and supervise when necessary  - Involve family in performance of ADLs  - Assess for home care needs following discharge   - Consider OT consult to assist with ADL evaluation and planning for discharge  - Provide patient education as appropriate  Outcome: Progressing  Goal: Maintain proper alignment of affected body part  Description: INTERVENTIONS:  - Support, maintain and protect limb and body alignment  - Provide patient/ family with appropriate education  Outcome: Progressing     Problem: MOBILITY - ADULT  Goal: Maintain or return to baseline ADL function  Description: INTERVENTIONS:  -  Assess patient's ability to carry out ADLs; assess patient's baseline for ADL function and identify physical deficits which impact ability to perform ADLs (bathing, care of mouth/teeth, toileting, grooming, dressing, etc )  - Assess/evaluate cause of self-care deficits   - Assess range of motion  - Assess patient's mobility; develop plan if impaired  - Assess patient's need for assistive devices and provide as appropriate  - Encourage maximum independence but intervene and supervise when necessary  - Involve family in performance of ADLs  - Assess for home care needs following discharge   - Consider OT consult to assist with ADL evaluation and planning for discharge  - Provide patient education as appropriate  Outcome: Progressing  Goal: Maintains/Returns to pre admission functional level  Description: INTERVENTIONS:  - Perform BMAT or MOVE assessment daily    - Set and communicate daily mobility goal to care team and patient/family/caregiver  - Collaborate with rehabilitation services on mobility goals if consulted  - Perform Range of Motion 3 times a day  - Reposition patient every 2 hours    - Dangle patient 3 times a day  - Stand patient 3 times a day  - Ambulate patient 3 times a day  - Out of bed to chair 3 times a day   - Out of bed for meals 3 times a day  - Out of bed for toileting  - Record patient progress and toleration of activity level   Outcome: Progressing     Problem: Prexisting or High Potential for Compromised Skin Integrity  Goal: Skin integrity is maintained or improved  Description: INTERVENTIONS:  - Identify patients at risk for skin breakdown  - Assess and monitor skin integrity  - Assess and monitor nutrition and hydration status  - Monitor labs   - Assess for incontinence   - Turn and reposition patient  - Assist with mobility/ambulation  - Relieve pressure over bony prominences  - Avoid friction and shearing  - Provide appropriate hygiene as needed including keeping skin clean and dry  - Evaluate need for skin moisturizer/barrier cream  - Collaborate with interdisciplinary team   - Patient/family teaching  - Consider wound care consult   Outcome: Progressing

## 2023-03-17 NOTE — PLAN OF CARE
Problem: OCCUPATIONAL THERAPY ADULT  Goal: Performs self-care activities at highest level of function for planned discharge setting  See evaluation for individualized goals  Description: Treatment Interventions: ADL retraining, Functional transfer training, UE strengthening/ROM, Endurance training, Patient/family training, Equipment evaluation/education, Activityengagement          See flowsheet documentation for full assessment, interventions and recommendations  Outcome: Progressing  Note: Limitation: Decreased ADL status, Decreased UE strength, Decreased Safe judgement during ADL, Decreased endurance, Decreased cognition, Decreased self-care trans, Decreased high-level ADLs     Assessment: Patient participated in Skilled OT session this date with interventions consisting of Energy Conservation techniques, Work simplification skills , safety awareness and fall prevention techniques, therapeutic exercise to: increase functional use of BUEs, increase BUE muscle strength  and  therapeutic activities to: increase activity tolerance   Co treatment with PT secondary to complex medical condition of pt, mod-max A of 2 required to achieve and maintain transitional movements, requiring the need of skilled therapeutic intervention of 2 therapists to achieve delivery of services  Patient agreeable to OT treatment session, upon arrival patient was found seated OOB to Recliner  Patient requiring verbal cues for safety  Patient continues to be functioning below baseline level, occupational performance remains limited secondary to factors listed above and increased risk for falls and injury  The patient's raw score on the AM-PAC Daily Activity Inpatient Short Form is 19  A raw score of greater than or equal to 19 suggests the patient may benefit from discharge to home  Please refer to the recommendation of the Occupational Therapist for safe discharge planning   From OT standpoint, recommendation at time of d/c would be Post acute rehabilitation services       OT Discharge Recommendation: Post acute rehabilitation services

## 2023-03-17 NOTE — ASSESSMENT & PLAN NOTE
Lab Results   Component Value Date    HGBA1C 5 6 03/10/2023       Recent Labs     03/16/23  1637 03/16/23 2057 03/17/23  0726 03/17/23  1119   POCGLU 181* 197* 112 137       Blood Sugar Average: Last 72 hrs:  (P) 159 [id number]     · Good control per last a1c  · Continue sliding scale insulin

## 2023-03-18 LAB
ANION GAP SERPL CALCULATED.3IONS-SCNC: 10 MMOL/L (ref 4–13)
BASOPHILS # BLD AUTO: 0.05 THOUSANDS/ÂΜL (ref 0–0.1)
BASOPHILS NFR BLD AUTO: 1 % (ref 0–1)
BUN SERPL-MCNC: 9 MG/DL (ref 5–25)
CALCIUM SERPL-MCNC: 8.4 MG/DL (ref 8.4–10.2)
CHLORIDE SERPL-SCNC: 105 MMOL/L (ref 96–108)
CO2 SERPL-SCNC: 25 MMOL/L (ref 21–32)
CORTIS AM PEAK SERPL-MCNC: 6.3 UG/DL (ref 4.2–22.4)
CREAT SERPL-MCNC: 1.03 MG/DL (ref 0.6–1.3)
EOSINOPHIL # BLD AUTO: 0.47 THOUSAND/ÂΜL (ref 0–0.61)
EOSINOPHIL NFR BLD AUTO: 8 % (ref 0–6)
ERYTHROCYTE [DISTWIDTH] IN BLOOD BY AUTOMATED COUNT: 17.2 % (ref 11.6–15.1)
GFR SERPL CREATININE-BSD FRML MDRD: 70 ML/MIN/1.73SQ M
GLUCOSE SERPL-MCNC: 108 MG/DL (ref 65–140)
GLUCOSE SERPL-MCNC: 124 MG/DL (ref 65–140)
GLUCOSE SERPL-MCNC: 184 MG/DL (ref 65–140)
GLUCOSE SERPL-MCNC: 186 MG/DL (ref 65–140)
GLUCOSE SERPL-MCNC: 208 MG/DL (ref 65–140)
HCT VFR BLD AUTO: 28.7 % (ref 36.5–49.3)
HGB BLD-MCNC: 9.2 G/DL (ref 12–17)
IMM GRANULOCYTES # BLD AUTO: 0.02 THOUSAND/UL (ref 0–0.2)
IMM GRANULOCYTES NFR BLD AUTO: 0 % (ref 0–2)
LYMPHOCYTES # BLD AUTO: 1.73 THOUSANDS/ÂΜL (ref 0.6–4.47)
LYMPHOCYTES NFR BLD AUTO: 29 % (ref 14–44)
MCH RBC QN AUTO: 32.1 PG (ref 26.8–34.3)
MCHC RBC AUTO-ENTMCNC: 32.1 G/DL (ref 31.4–37.4)
MCV RBC AUTO: 100 FL (ref 82–98)
MONOCYTES # BLD AUTO: 0.72 THOUSAND/ÂΜL (ref 0.17–1.22)
MONOCYTES NFR BLD AUTO: 12 % (ref 4–12)
NEUTROPHILS # BLD AUTO: 2.89 THOUSANDS/ÂΜL (ref 1.85–7.62)
NEUTS SEG NFR BLD AUTO: 50 % (ref 43–75)
NRBC BLD AUTO-RTO: 0 /100 WBCS
PLATELET # BLD AUTO: 389 THOUSANDS/UL (ref 149–390)
PMV BLD AUTO: 8.7 FL (ref 8.9–12.7)
POTASSIUM SERPL-SCNC: 3.7 MMOL/L (ref 3.5–5.3)
RBC # BLD AUTO: 2.87 MILLION/UL (ref 3.88–5.62)
SODIUM SERPL-SCNC: 140 MMOL/L (ref 135–147)
WBC # BLD AUTO: 5.88 THOUSAND/UL (ref 4.31–10.16)

## 2023-03-18 RX ORDER — MIDODRINE HYDROCHLORIDE 5 MG/1
10 TABLET ORAL
Status: DISCONTINUED | OUTPATIENT
Start: 2023-03-18 | End: 2023-03-22 | Stop reason: HOSPADM

## 2023-03-18 RX ORDER — POLYETHYLENE GLYCOL 3350 17 G/17G
17 POWDER, FOR SOLUTION ORAL DAILY
Status: DISCONTINUED | OUTPATIENT
Start: 2023-03-18 | End: 2023-03-22 | Stop reason: HOSPADM

## 2023-03-18 RX ADMIN — SENNOSIDES AND DOCUSATE SODIUM 1 TABLET: 50; 8.6 TABLET ORAL at 08:15

## 2023-03-18 RX ADMIN — INSULIN LISPRO 4 UNITS: 100 INJECTION, SOLUTION INTRAVENOUS; SUBCUTANEOUS at 17:15

## 2023-03-18 RX ADMIN — MULTIPLE VITAMINS W/ MINERALS TAB 1 TABLET: TAB at 08:15

## 2023-03-18 RX ADMIN — POLYETHYLENE GLYCOL 3350 17 G: 17 POWDER, FOR SOLUTION ORAL at 11:55

## 2023-03-18 RX ADMIN — ALLOPURINOL 100 MG: 100 TABLET ORAL at 08:15

## 2023-03-18 RX ADMIN — CLOPIDOGREL BISULFATE 75 MG: 75 TABLET ORAL at 08:15

## 2023-03-18 RX ADMIN — CYANOCOBALAMIN TAB 500 MCG 500 MCG: 500 TAB at 08:15

## 2023-03-18 RX ADMIN — FLUTICASONE PROPIONATE 2 SPRAY: 50 SPRAY, METERED NASAL at 08:17

## 2023-03-18 RX ADMIN — EMPAGLIFLOZIN 10 MG: 10 TABLET, FILM COATED ORAL at 08:15

## 2023-03-18 RX ADMIN — MIDODRINE HYDROCHLORIDE 7.5 MG: 5 TABLET ORAL at 06:44

## 2023-03-18 RX ADMIN — SENNOSIDES AND DOCUSATE SODIUM 1 TABLET: 50; 8.6 TABLET ORAL at 17:16

## 2023-03-18 RX ADMIN — ATORVASTATIN CALCIUM 80 MG: 40 TABLET, FILM COATED ORAL at 17:16

## 2023-03-18 RX ADMIN — PANTOPRAZOLE SODIUM 40 MG: 40 INJECTION, POWDER, FOR SOLUTION INTRAVENOUS at 20:20

## 2023-03-18 RX ADMIN — MIDODRINE HYDROCHLORIDE 10 MG: 5 TABLET ORAL at 17:15

## 2023-03-18 RX ADMIN — RANOLAZINE 500 MG: 500 TABLET, EXTENDED RELEASE ORAL at 08:15

## 2023-03-18 RX ADMIN — INSULIN LISPRO 2 UNITS: 100 INJECTION, SOLUTION INTRAVENOUS; SUBCUTANEOUS at 11:54

## 2023-03-18 RX ADMIN — ESCITALOPRAM OXALATE 10 MG: 10 TABLET ORAL at 08:15

## 2023-03-18 RX ADMIN — RANOLAZINE 500 MG: 500 TABLET, EXTENDED RELEASE ORAL at 17:16

## 2023-03-18 RX ADMIN — INSULIN LISPRO 2 UNITS: 100 INJECTION, SOLUTION INTRAVENOUS; SUBCUTANEOUS at 21:38

## 2023-03-18 RX ADMIN — LEVOTHYROXINE SODIUM 88 MCG: 88 TABLET ORAL at 05:34

## 2023-03-18 RX ADMIN — MIDODRINE HYDROCHLORIDE 10 MG: 5 TABLET ORAL at 11:55

## 2023-03-18 NOTE — ASSESSMENT & PLAN NOTE
Wt Readings from Last 3 Encounters:   03/18/23 93 3 kg (205 lb 11 oz)   03/08/23 105 kg (231 lb 7 7 oz)   03/02/23 98 9 kg (218 lb 0 6 oz)     Remains euvolemic

## 2023-03-18 NOTE — PLAN OF CARE
Problem: PAIN - ADULT  Goal: Verbalizes/displays adequate comfort level or baseline comfort level  Description: Interventions:  - Encourage patient to monitor pain and request assistance  - Assess pain using appropriate pain scale  - Administer analgesics based on type and severity of pain and evaluate response  - Implement non-pharmacological measures as appropriate and evaluate response  - Consider cultural and social influences on pain and pain management  - Notify physician/advanced practitioner if interventions unsuccessful or patient reports new pain  Outcome: Progressing     Problem: INFECTION - ADULT  Goal: Absence or prevention of progression during hospitalization  Description: INTERVENTIONS:  - Assess and monitor for signs and symptoms of infection  - Monitor lab/diagnostic results  - Monitor all insertion sites, i e  indwelling lines, tubes, and drains  - Monitor endotracheal if appropriate and nasal secretions for changes in amount and color  - [place] appropriate cooling/warming therapies per order  - Administer medications as ordered  - Instruct and encourage patient and family to use good hand hygiene technique  - Identify and instruct in appropriate isolation precautions for identified infection/condition  Outcome: Progressing     Problem: SAFETY ADULT  Goal: Patient will remain free of falls  Description: INTERVENTIONS:  - Educate patient/family on patient safety including physical limitations  - Instruct patient to call for assistance with activity   - Consult OT/PT to assist with strengthening/mobility   - Keep Call bell within reach  - Keep bed low and locked with side rails adjusted as appropriate  - Keep care items and personal belongings within reach  - Initiate and maintain comfort rounds  - Make Fall Risk Sign visible to staff  - Offer Toileting every 2 Hours, in advance of need  - Initiate/Maintain bed/chair alarm  - Obtain necessary fall risk management equipment: non-skid footwear  - Apply yellow socks and bracelet for high fall risk patients  - Consider moving patient to room near nurses station  Outcome: Progressing  Goal: Maintain or return to baseline ADL function  Description: INTERVENTIONS:  -  Assess patient's ability to carry out ADLs; assess patient's baseline for ADL function and identify physical deficits which impact ability to perform ADLs (bathing, care of mouth/teeth, toileting, grooming, dressing, etc )  - Assess/evaluate cause of self-care deficits   - Assess range of motion  - Assess patient's mobility; develop plan if impaired  - Assess patient's need for assistive devices and provide as appropriate  - Encourage maximum independence but intervene and supervise when necessary  - Involve family in performance of ADLs  - Assess for home care needs following discharge   - Consider OT consult to assist with ADL evaluation and planning for discharge  - Provide patient education as appropriate  Outcome: Progressing  Goal: Maintains/Returns to pre admission functional level  Description: INTERVENTIONS:  - Perform BMAT or MOVE assessment daily    - Set and communicate daily mobility goal to care team and patient/family/caregiver  - Collaborate with rehabilitation services on mobility goals if consulted  - Perform Range of Motion 3 times a day  - Reposition patient every 2 hours    - Dangle patient 3 times a day  - Stand patient 3 times a day  - Ambulate patient 3 times a day  - Out of bed to chair 3 times a day   - Out of bed for meals 3 times a day  - Out of bed for toileting  - Record patient progress and toleration of activity level   Outcome: Progressing     Problem: DISCHARGE PLANNING  Goal: Discharge to home or other facility with appropriate resources  Description: INTERVENTIONS:  - Identify barriers to discharge w/patient and caregiver  - Arrange for needed discharge resources and transportation as appropriate  - Identify discharge learning needs (meds, wound care, etc )  - Arrange for interpretive services to assist at discharge as needed  - Refer to Case Management Department for coordinating discharge planning if the patient needs post-hospital services based on physician/advanced practitioner order or complex needs related to functional status, cognitive ability, or social support system  Outcome: Progressing     Problem: Knowledge Deficit  Goal: Patient/family/caregiver demonstrates understanding of disease process, treatment plan, medications, and discharge instructions  Description: Complete learning assessment and assess knowledge base    Interventions:  - Provide teaching at level of understanding  - Provide teaching via preferred learning methods  Outcome: Progressing     Problem: GENITOURINARY - ADULT  Goal: Maintains or returns to baseline urinary function  Description: INTERVENTIONS:  - Assess urinary function  - Encourage oral fluids to ensure adequate hydration if ordered  - Administer IV fluids as ordered to ensure adequate hydration  - Administer ordered medications as needed  - Offer frequent toileting  - Follow urinary retention protocol if ordered  Outcome: Progressing  Goal: Absence of urinary retention  Description: INTERVENTIONS:  - Assess patient's ability to void and empty bladder  - Monitor I/O  - Bladder scan as needed  - Discuss with physician/AP medications to alleviate retention as needed  - Discuss catheterization for long term situations as appropriate  Outcome: Progressing  Goal: Urinary catheter remains patent  Description: INTERVENTIONS:  - Assess patency of urinary catheter  - If patient has a chronic morales, consider changing catheter if non-functioning  - Follow guidelines for intermittent irrigation of non-functioning urinary catheter  Outcome: Progressing     Problem: METABOLIC, FLUID AND ELECTROLYTES - ADULT  Goal: Electrolytes maintained within normal limits  Description: INTERVENTIONS:  - Monitor labs and assess patient for signs and symptoms of electrolyte imbalances  - Administer electrolyte replacement as ordered  - Monitor response to electrolyte replacements, including repeat lab results as appropriate  - Instruct patient on fluid and nutrition as appropriate  Outcome: Progressing  Goal: Fluid balance maintained  Description: INTERVENTIONS:  - Monitor labs   - Monitor I/O and WT  - Instruct patient on fluid and nutrition as appropriate  - Assess for signs & symptoms of volume excess or deficit  Outcome: Progressing  Goal: Glucose maintained within target range  Description: INTERVENTIONS:  - Monitor Blood Glucose as ordered  - Assess for signs and symptoms of hyperglycemia and hypoglycemia  - Administer ordered medications to maintain glucose within target range  - Assess nutritional intake and initiate nutrition service referral as needed  Outcome: Progressing

## 2023-03-18 NOTE — ASSESSMENT & PLAN NOTE
· Resolved with magnesium replacement therapy  · Follow the magnesium level    Results from last 7 days   Lab Units 03/14/23  0527 03/13/23  1424 03/13/23  0615   MAGNESIUM mg/dL 2 2 2 2 1 2*

## 2023-03-18 NOTE — PROGRESS NOTES
[address] [facility]  Progress Note - Chrissy Garrison 1945, 69 y o  male MRN: [id number]  Unit/Bed#: 162-31 Encounter: [id number]  Primary Care Provider: Biagio Closs, DO   Date and time admitted to hospital: 3/8/2023 11:01 PM    Hypothyroidism  Assessment & Plan  · Continue Synthroid  Complains of "hot flashes"  Will recheck TSH    Elevated d-dimer  Assessment & Plan  · No evidence of pulmonary embolism on CTA of the chest/PE protocol completed on 3/9/2023  · No evidence of DVT on venous duplex of both lower extremities on 03/09/2023    Hypomagnesemia  Assessment & Plan  · Resolved with magnesium replacement therapy  · Follow the magnesium level    Results from last 7 days   Lab Units 03/14/23  0527 03/13/23  1424 03/13/23  0615   MAGNESIUM mg/dL 2 2 2 2 1 2*         Urinary retention  Assessment & Plan  · Maintain the Jose catheter for now  · Hold tamsulosin in the setting of orthostatic hypotension  · Appreciate urology evaluation  will have outpatient follow-up with urology    Acute blood loss anemia  Assessment & Plan  · Recent gastrointestinal hemorrhage thought to be diverticular bleeding found on colonoscopy on 3/3/2023  · Serial laboratory testing to monitor hemoglobin/hematocrit levels  · Initiated cyanocobalamin 500 mcg PO Qdaily for a low-normal vitamin B12 level  · Had BM this AM with no blood but he states he did have to strain despite being on senna-s BID   Will add miralax  · GI on board  · Continues to persist may need colonoscopy on Friday  · Hemoglobin remains stable but low  hgb 9 2 today  · Iron venofer given yesterday        Chronic diastolic (congestive) heart failure (HCC)  Assessment & Plan  Wt Readings from Last 3 Encounters:   03/18/23 93 3 kg (205 lb 11 oz)   03/08/23 105 kg (231 lb 7 7 oz)   03/02/23 98 9 kg (218 lb 0 6 oz)     Remains euvolemic      Other chest pain  Assessment & Plan  · Known CAD with recent drug-eluting stent placement/PTCA to the LAD on 12/29/2022  · I appreciate Cardiology's input  · No indication for repeat cardiac catheterization at this time per Cardiology  · Continue plavix 75 mg PO Qdaily  · Plan to restart aspirin 81 mg PO Qdaily this week if no signs of additional gastrointestinal bleeding  · Continue high-intensity statin therapy with atorvastatin 80 mg PO Qdaily with dinner  · Not on beta-blocker therapy at this time due to orthostatic hypotension    Aortic stenosis, moderate  Assessment & Plan  • Outpatient follow-up with Cardiology      Coronary artery disease of native artery of native heart with stable angina pectoris [facility])  Assessment & Plan  · Known CAD with recent drug-eluting stent placement/PTCA to the LAD on 12/29/2022  · I appreciate Cardiology's input    · No indication for repeat cardiac catheterization at this time per Cardiology  · Continue plavix 75 mg PO Qdaily  · Plan to restart aspirin 81 mg PO Qdaily this week if no signs of additional gastrointestinal bleeding  · Continue statin  · Not on beta-blocker due to hypotension    Type 2 diabetes mellitus with hyperglycemia, without long-term current use of insulin [facility])  Assessment & Plan  Lab Results   Component Value Date    HGBA1C 5 6 03/10/2023       Recent Labs     03/17/23  1119 03/17/23  1540 03/17/23  2049 03/18/23  0744   POCGLU 137 197* 235* 124       Blood Sugar Average: Last 72 hrs:  (P) 162 [id number]     · Good control per last a1c  · Continue sliding scale insulin    * Orthostatic hypotension  Assessment & Plan  · Continues to experience orthostatic hypotension with severe lightheadedness and dizziness while standing and a systolic blood pressure in the 90's mmHg  · Hold tamsulosin for now  · Initiated on midodrine with improvement of BP but still experiencing hypotension with standing, increase dose further to 10mg TID with holds  · Likely in the setting of blood loss anemia, given one round of IV iron yesterday  · Compression stockings to be utilized  · Continue to monitor    VTE Pharmacologic Prophylaxis:   Pharmacologic: Pharmacologic VTE Prophylaxis contraindicated due to recent GI bleed  Mechanical VTE Prophylaxis in Place: No    Patient Centered Rounds: I have performed bedside rounds with nursing staff today  Discussions with Specialists or Other Care Team Provider: no    Education and Discussions with Family / Patient: patient updated    Time Spent for Care: 30 minutes  More than 50% of total time spent on counseling and coordination of care as described above  Current Length of Stay: 9 day(s)    Current Patient Status: Inpatient   Certification Statement: The patient will continue to require additional inpatient hospital stay due to continued complaints of dizziness, meds being adjusted    Discharge Plan: home when ready    Code Status: Level 1 - Full Code      Subjective:   69year old male admitted with hypotension, dizziness and lightheadedness  He did have a GI bleed earlier this month which required hospitalization and blood transfusions  He received IV venofer yesterday  Hgb has been stable in the 9's  He continues to complain of lightheadedness and "hot flashes"  His midodrine dose has been titrated up  He states he had a BM this morning with no blood but it was hard and he had to strain  He is also upset about being on a cardiac diet and not being able to get "a good cup of coffee'  Objective:     Vitals:   No data recorded  HR:  [] 97  Resp:  [16] 16  BP: ()/(43-82) 138/82  SpO2:  [93 %-97 %] 97 %  Body mass index is 30 38 kg/m²  Input and Output Summary (last 24 hours): Intake/Output Summary (Last 24 hours) at 3/18/2023 1125  Last data filed at 3/18/2023 0915  Gross per 24 hour   Intake 660 ml   Output 5700 ml   Net -5040 ml       Physical Exam:     Physical Exam  Vitals reviewed  Constitutional:       General: He is not in acute distress  Appearance: He is not ill-appearing or diaphoretic  HENT:      Head: Normocephalic and atraumatic  Nose: Nose normal  No congestion  Mouth/Throat:      Mouth: Mucous membranes are moist       Pharynx: Oropharynx is clear  Eyes:      Conjunctiva/sclera: Conjunctivae normal    Cardiovascular:      Rate and Rhythm: Normal rate  Pulmonary:      Effort: Pulmonary effort is normal  No respiratory distress  Breath sounds: Normal breath sounds  No wheezing  Abdominal:      General: Bowel sounds are normal  There is no distension  Palpations: Abdomen is soft  Tenderness: There is no abdominal tenderness  Musculoskeletal:         General: No deformity or signs of injury  Cervical back: Neck supple  Skin:     General: Skin is warm and dry  Findings: No bruising or erythema  Neurological:      Mental Status: He is alert and oriented to person, place, and time  Psychiatric:         Mood and Affect: Mood normal          Behavior: Behavior normal            Additional Data:     Labs:    Results from last 7 days   Lab Units 03/18/23  0533   WBC Thousand/uL 5 88   HEMOGLOBIN g/dL 9 2*   HEMATOCRIT % 28 7*   PLATELETS Thousands/uL 389   NEUTROS PCT % 50   LYMPHS PCT % 29   MONOS PCT % 12   EOS PCT % 8*     Results from last 7 days   Lab Units 03/18/23  0533 03/15/23  0506 03/14/23  0527   SODIUM mmol/L 140   < > 138   POTASSIUM mmol/L 3 7   < > 4 5   CHLORIDE mmol/L 105   < > 107   CO2 mmol/L 25   < > 24   BUN mg/dL 9   < > 15   CREATININE mg/dL 1 03   < > 1 02   ANION GAP mmol/L 10   < > 7   CALCIUM mg/dL 8 4   < > 7 6*   ALBUMIN g/dL  --   --  2 9*   TOTAL BILIRUBIN mg/dL  --   --  0 39   ALK PHOS U/L  --   --  42   ALT U/L  --   --  22   AST U/L  --   --  23   GLUCOSE RANDOM mg/dL 108   < > 102    < > = values in this interval not displayed           Results from last 7 days   Lab Units 03/18/23  1116 03/18/23  0744 03/17/23  2049 03/17/23  1540 03/17/23  1119 03/17/23  0726 03/16/23  2057 03/16/23  1637 03/16/23  1136 03/16/23  0731 03/15/23  2119 03/15/23  1604   POC GLUCOSE mg/dl 184* 124 235* 197* 137 112 197* 181* 193* 105 193* 175*         Results from last 7 days   Lab Units 03/14/23  0527 03/13/23  0615 03/12/23  0515   PROCALCITONIN ng/ml 0 07 <0 05 0 07           * I Have Reviewed All Lab Data Listed Above  * Additional Pertinent Lab Tests Reviewed:  Nalle[place] 67 Admission Reviewed        Recent Cultures (last 7 days):           Last 24 Hours Medication List:   Current Facility-Administered Medications   Medication Dose Route Frequency Provider Last Rate   • acetaminophen  650 mg Oral Q6H PRN Willie Rainey MD     • allopurinol  100 mg Oral Daily Willie Rainey MD     • aluminum-magnesium hydroxide-simethicone  30 mL Oral Q4H PRN Willie Rainey MD     • atorvastatin  80 mg Oral Daily With Dinner Willie Rainey MD     • clopidogrel  75 mg Oral Daily Willie Rainey MD     • cyanocobalamin  500 mcg Oral Daily Brian Bean DO     • Empagliflozin  10 mg Oral QAM Willie Rainey MD     • escitalopram  10 mg Oral Daily Willie Rainey MD     • fluticasone  2 spray Each Nare Daily Baljinder Galvan MD     • insulin lispro  2-12 Units Subcutaneous TID AC Willie Rainey MD     • insulin lispro  2-12 Units Subcutaneous HS Willie Rainey MD     • levothyroxine  88 mcg Oral Early Morning Willie Rainey MD     • midodrine  10 mg Oral TID AC Ger Fall PA-C     • multivitamin-minerals  1 tablet Oral Daily Willie Rainey MD     • nitroglycerin  0 4 mg Sublingual Q5 Min PRN Kevin Neff DO     • ondansetron  4 mg Intravenous Q4H PRN Brian Bean DO     • pantoprazole  40 mg Intravenous Q24H Willie Rainey MD     • polyethylene glycol  17 g Oral Daily PRN Willie Rainey MD     • polyethylene glycol  17 g Oral Daily Ger Fall PA-C     • ranolazine  500 mg Oral BID Willie Rainey MD     • senna-docusate sodium  1 tablet Oral BID Julien Curtis PA-C Today, Patient Was Seen By: Serg Willett PA-C    ** Please Note: Dictation voice to text software may have been used in the creation of this document   **

## 2023-03-18 NOTE — ASSESSMENT & PLAN NOTE
Lab Results   Component Value Date    HGBA1C 5 6 03/10/2023       Recent Labs     03/17/23  1119 03/17/23  1540 03/17/23 2049 03/18/23  0744   POCGLU 137 197* 235* 124       Blood Sugar Average: Last 72 hrs:  (P) 790 [id number]     · Good control per last a1c  · Continue sliding scale insulin

## 2023-03-18 NOTE — ASSESSMENT & PLAN NOTE
· Continues to experience orthostatic hypotension with severe lightheadedness and dizziness while standing and a systolic blood pressure in the 90's mmHg  · Hold tamsulosin for now  · Initiated on midodrine with improvement of BP but still experiencing hypotension with standing, increase dose further to 10mg TID with holds  · Likely in the setting of blood loss anemia, given one round of IV iron yesterday  · Compression stockings to be utilized  · Continue to monitor

## 2023-03-18 NOTE — ASSESSMENT & PLAN NOTE
· Recent gastrointestinal hemorrhage thought to be diverticular bleeding found on colonoscopy on 3/3/2023  · Serial laboratory testing to monitor hemoglobin/hematocrit levels  · Initiated cyanocobalamin 500 mcg PO Qdaily for a low-normal vitamin B12 level  · Had BM this AM with no blood but he states he did have to strain despite being on senna-s BID   Will add miralax  · GI on board  · Continues to persist may need colonoscopy on Friday  · Hemoglobin remains stable but low  hgb 9 2 today  · Iron venofer given yesterday

## 2023-03-19 PROBLEM — E83.42 HYPOMAGNESEMIA: Status: RESOLVED | Noted: 2023-03-09 | Resolved: 2023-03-19

## 2023-03-19 LAB
ANION GAP SERPL CALCULATED.3IONS-SCNC: 7 MMOL/L (ref 4–13)
BUN SERPL-MCNC: 10 MG/DL (ref 5–25)
CALCIUM SERPL-MCNC: 8.8 MG/DL (ref 8.4–10.2)
CHLORIDE SERPL-SCNC: 103 MMOL/L (ref 96–108)
CO2 SERPL-SCNC: 25 MMOL/L (ref 21–32)
CREAT SERPL-MCNC: 1.15 MG/DL (ref 0.6–1.3)
ERYTHROCYTE [DISTWIDTH] IN BLOOD BY AUTOMATED COUNT: 17.6 % (ref 11.6–15.1)
GFR SERPL CREATININE-BSD FRML MDRD: 61 ML/MIN/1.73SQ M
GLUCOSE SERPL-MCNC: 114 MG/DL (ref 65–140)
GLUCOSE SERPL-MCNC: 169 MG/DL (ref 65–140)
GLUCOSE SERPL-MCNC: 178 MG/DL (ref 65–140)
GLUCOSE SERPL-MCNC: 185 MG/DL (ref 65–140)
GLUCOSE SERPL-MCNC: 211 MG/DL (ref 65–140)
HCT VFR BLD AUTO: 30.8 % (ref 36.5–49.3)
HGB BLD-MCNC: 9.9 G/DL (ref 12–17)
MAGNESIUM SERPL-MCNC: 2 MG/DL (ref 1.9–2.7)
MCH RBC QN AUTO: 32 PG (ref 26.8–34.3)
MCHC RBC AUTO-ENTMCNC: 32.1 G/DL (ref 31.4–37.4)
MCV RBC AUTO: 100 FL (ref 82–98)
PLATELET # BLD AUTO: 412 THOUSANDS/UL (ref 149–390)
PMV BLD AUTO: 8.6 FL (ref 8.9–12.7)
POTASSIUM SERPL-SCNC: 4.3 MMOL/L (ref 3.5–5.3)
RBC # BLD AUTO: 3.09 MILLION/UL (ref 3.88–5.62)
SODIUM 24H UR-SCNC: 24 MOL/L
SODIUM SERPL-SCNC: 135 MMOL/L (ref 135–147)
T4 FREE SERPL-MCNC: 1.18 NG/DL (ref 0.76–1.46)
TSH SERPL DL<=0.05 MIU/L-ACNC: 4.98 UIU/ML (ref 0.45–4.5)
WBC # BLD AUTO: 6.73 THOUSAND/UL (ref 4.31–10.16)

## 2023-03-19 RX ORDER — PANTOPRAZOLE SODIUM 40 MG/1
40 TABLET, DELAYED RELEASE ORAL
Status: DISCONTINUED | OUTPATIENT
Start: 2023-03-20 | End: 2023-03-22 | Stop reason: HOSPADM

## 2023-03-19 RX ORDER — ASPIRIN 81 MG/1
81 TABLET ORAL DAILY
Status: DISCONTINUED | OUTPATIENT
Start: 2023-03-19 | End: 2023-03-22 | Stop reason: HOSPADM

## 2023-03-19 RX ADMIN — POLYETHYLENE GLYCOL 3350 17 G: 17 POWDER, FOR SOLUTION ORAL at 10:53

## 2023-03-19 RX ADMIN — RANOLAZINE 500 MG: 500 TABLET, EXTENDED RELEASE ORAL at 10:51

## 2023-03-19 RX ADMIN — MIDODRINE HYDROCHLORIDE 10 MG: 5 TABLET ORAL at 06:32

## 2023-03-19 RX ADMIN — MIDODRINE HYDROCHLORIDE 10 MG: 5 TABLET ORAL at 10:51

## 2023-03-19 RX ADMIN — ALLOPURINOL 100 MG: 100 TABLET ORAL at 10:51

## 2023-03-19 RX ADMIN — INSULIN LISPRO 2 UNITS: 100 INJECTION, SOLUTION INTRAVENOUS; SUBCUTANEOUS at 21:57

## 2023-03-19 RX ADMIN — SENNOSIDES AND DOCUSATE SODIUM 1 TABLET: 50; 8.6 TABLET ORAL at 17:41

## 2023-03-19 RX ADMIN — CLOPIDOGREL BISULFATE 75 MG: 75 TABLET ORAL at 10:51

## 2023-03-19 RX ADMIN — RANOLAZINE 500 MG: 500 TABLET, EXTENDED RELEASE ORAL at 17:41

## 2023-03-19 RX ADMIN — INSULIN LISPRO 2 UNITS: 100 INJECTION, SOLUTION INTRAVENOUS; SUBCUTANEOUS at 12:41

## 2023-03-19 RX ADMIN — LEVOTHYROXINE SODIUM 88 MCG: 88 TABLET ORAL at 06:32

## 2023-03-19 RX ADMIN — CYANOCOBALAMIN TAB 500 MCG 500 MCG: 500 TAB at 10:51

## 2023-03-19 RX ADMIN — SENNOSIDES AND DOCUSATE SODIUM 1 TABLET: 50; 8.6 TABLET ORAL at 10:51

## 2023-03-19 RX ADMIN — EMPAGLIFLOZIN 10 MG: 10 TABLET, FILM COATED ORAL at 10:51

## 2023-03-19 RX ADMIN — MULTIPLE VITAMINS W/ MINERALS TAB 1 TABLET: TAB at 10:51

## 2023-03-19 RX ADMIN — ASPIRIN 81 MG: 81 TABLET, COATED ORAL at 12:41

## 2023-03-19 RX ADMIN — INSULIN LISPRO 2 UNITS: 100 INJECTION, SOLUTION INTRAVENOUS; SUBCUTANEOUS at 17:41

## 2023-03-19 RX ADMIN — MIDODRINE HYDROCHLORIDE 10 MG: 5 TABLET ORAL at 17:41

## 2023-03-19 RX ADMIN — ATORVASTATIN CALCIUM 80 MG: 40 TABLET, FILM COATED ORAL at 17:41

## 2023-03-19 RX ADMIN — FLUTICASONE PROPIONATE 2 SPRAY: 50 SPRAY, METERED NASAL at 10:55

## 2023-03-19 RX ADMIN — ESCITALOPRAM OXALATE 10 MG: 10 TABLET ORAL at 10:51

## 2023-03-19 NOTE — ASSESSMENT & PLAN NOTE
Wt Readings from Last 3 Encounters:   03/19/23 93 6 kg (206 lb 5 6 oz)   03/08/23 105 kg (231 lb 7 7 oz)   03/02/23 98 9 kg (218 lb 0 6 oz)     · Remains euvolemic  · Trace Lower extremity edema- recommend elevating legs  · Low Na diet, daily weight  · Weight significantly decrease since admission 231> 206lb  · Diuretics held with hypotension

## 2023-03-19 NOTE — PLAN OF CARE
Problem: PAIN - ADULT  Goal: Verbalizes/displays adequate comfort level or baseline comfort level  Description: Interventions:  - Encourage patient to monitor pain and request assistance  - Assess pain using appropriate pain scale (0-10 pain scale)  - Administer analgesics based on type and severity of pain and evaluate response  - Implement non-pharmacological measures as appropriate and evaluate response  - Consider cultural and social influences on pain and pain management  - Notify physician/advanced practitioner if interventions unsuccessful or patient reports new pain  Outcome: Progressing     Problem: INFECTION - ADULT  Goal: Absence or prevention of progression during hospitalization  Description: INTERVENTIONS:  - Assess and monitor for signs and symptoms of infection  - Monitor lab/diagnostic results  - Monitor all insertion sites, i e  indwelling lines  - Administer medications as ordered  - Instruct and encourage patient and family to use good hand hygiene technique  Outcome: Progressing     Problem: SAFETY ADULT  Goal: Patient will remain free of falls  Description: INTERVENTIONS:  - Educate patient/family on patient safety including physical limitations  - Instruct patient to call for assistance with activity (min assist x 1 and walker)  - Consult OT/PT to assist with strengthening/mobility   - Keep Call bell within reach  - Keep bed low and locked with side rails adjusted as appropriate  - Keep care items and personal belongings within reach  - Initiate and maintain comfort rounds  - Make Fall Risk Sign visible to staff (high fall risk)  - Offer Toileting every 1-2 Hours, in advance of need  - Initiate/Maintain bed/chair alarm  - Obtain necessary fall risk management equipment: non-skid footwear  - Apply yellow socks and bracelet for high fall risk patients  - Consider moving patient to room near nurses station  Outcome: Progressing  Goal: Maintain or return to baseline ADL function  Description: INTERVENTIONS:  -  Assess patient's ability to carry out ADLs; (min to mod assist)  - Assess/evaluate cause of self-care deficits (orthostatic hypotension, fatigue, limited movement)  - Assess range of motion  - Assess patient's mobility; (min assist x 1 and walker)  - Assess patient's need for assistive devices and provide as appropriate  - Encourage maximum independence but intervene and supervise when necessary  - Involve family in performance of ADLs  - Assess for home care needs following discharge   - Consider OT consult to assist with ADL evaluation and planning for discharge  - Provide patient education as appropriate  Outcome: Progressing  Goal: Maintains/Returns to pre admission functional level  Description: INTERVENTIONS:  - Perform BMAT or MOVE assessment daily    - Set and communicate daily mobility goal to care team and patient/family/caregiver  - Collaborate with rehabilitation services on mobility goals if consulted  - Perform Range of Motion 3 times a day  - Reposition patient every 2 hours    - Dangle patient 3 times a day  - Stand patient 3 times a day  - Ambulate patient 3 times a day  - Out of bed to chair 3 times a day   - Out of bed for meals 3 times a day  - Out of bed for toileting  - Record patient progress and toleration of activity level   Outcome: Progressing     Problem: DISCHARGE PLANNING  Goal: Discharge to home or other facility with appropriate resources  Description: INTERVENTIONS:  - Identify barriers to discharge w/patient and caregiver  - Arrange for needed discharge resources and transportation as appropriate  - Identify discharge learning needs (meds, wound care, etc )  - Refer to Case Management Department for coordinating discharge planning if the patient needs post-hospital services based on physician/advanced practitioner order or complex needs related to functional status, cognitive ability, or social support system  Outcome: Progressing     Problem: Knowledge Deficit  Goal: Patient/family/caregiver demonstrates understanding of disease process, treatment plan, medications, and discharge instructions  Description: Complete learning assessment and assess knowledge base    Interventions:  - Provide teaching at level of understanding  - Provide teaching via preferred learning methods  Outcome: Progressing     Problem: CARDIOVASCULAR - ADULT  Goal: Maintains optimal cardiac output and hemodynamic stability  Description: INTERVENTIONS:  - Monitor I/O, vital signs and rhythm  - Monitor for S/S and trends of decreased cardiac output  - Assess quality of pulses, skin color and temperature  - Assess for signs of decreased coronary artery perfusion  - Instruct patient to report change in severity of symptoms  Outcome: Progressing  Goal: Absence of cardiac dysrhythmias or at baseline rhythm  Description: INTERVENTIONS:  - Continuous cardiac monitoring, vital signs, obtain 12 lead EKG if ordered  - Administer antiarrhythmic and heart rate control medications as ordered  - Monitor electrolytes and administer replacement therapy as ordered  Outcome: Progressing     Problem: RESPIRATORY - ADULT  Goal: Achieves optimal ventilation and oxygenation  Description: INTERVENTIONS:  - Assess for changes in respiratory status  - Assess for changes in mentation and behavior  - Position to facilitate oxygenation and minimize respiratory effort  - Oxygen administered by appropriate delivery if ordered  - Encourage broncho-pulmonary hygiene including cough, deep breathe, Incentive Spirometry  - Assess and instruct to report SOB or any respiratory difficulty  - Respiratory Therapy support as indicated  Outcome: Progressing     Problem: GENITOURINARY - ADULT  Goal: Maintains or returns to baseline urinary function  Description: INTERVENTIONS:  - Assess urinary function  - Encourage oral fluids to ensure adequate hydration if ordered  - Administer IV fluids as ordered to ensure adequate hydration  - Administer ordered medications as needed  - Offer frequent toileting  - Follow urinary retention protocol if ordered  Outcome: Progressing  Goal: Absence of urinary retention  Description: INTERVENTIONS:  - Assess patient's ability to void and empty bladder (patient has morales catheter intact)  - Monitor I/O  - Discuss with physician/AP medications to alleviate retention as needed (follow up with urology as outpatient)  - Discuss catheterization for long term situations as appropriate (patient has morales catheter intact)  Outcome: Progressing  Goal: Urinary catheter remains patent  Description: INTERVENTIONS:  - Assess patency of urinary catheter  - Follow guidelines for intermittent irrigation of non-functioning urinary catheter  Outcome: Progressing     Problem: METABOLIC, FLUID AND ELECTROLYTES - ADULT  Goal: Electrolytes maintained within normal limits  Description: INTERVENTIONS:  - Monitor labs and assess patient for signs and symptoms of electrolyte imbalances  - Administer electrolyte replacement as ordered  - Monitor response to electrolyte replacements, including repeat lab results as appropriate  - Instruct patient on fluid and nutrition as appropriate  Outcome: Progressing  Goal: Fluid balance maintained  Description: INTERVENTIONS:  - Monitor labs   - Monitor I/O and WT  - Instruct patient on fluid and nutrition as appropriate  - Assess for signs & symptoms of volume excess or deficit  Outcome: Progressing  Goal: Glucose maintained within target range  Description: INTERVENTIONS:  - Monitor Blood Glucose as ordered  - Assess for signs and symptoms of hyperglycemia and hypoglycemia  - Administer ordered medications to maintain glucose within target range  - Assess nutritional intake and initiate nutrition service referral as needed  Outcome: Progressing     Problem: MUSCULOSKELETAL - ADULT  Goal: Maintain or return mobility to safest level of function  Description: INTERVENTIONS:  - Assess patient's ability to carry out ADLs; (min to mod assist)  - Assess/evaluate cause of self-care deficits (orthostatic hypotension, fatigue, limited movement)  - Assess range of motion  - Assess patient's mobility (min assist x 1 and walker)  - Assess patient's need for assistive devices and provide as appropriate  - Encourage maximum independence but intervene and supervise when necessary  - Involve family in performance of ADLs  - Assess for home care needs following discharge   - Consider OT consult to assist with ADL evaluation and planning for discharge  - Provide patient education as appropriate  Outcome: Progressing     Problem: MOBILITY - ADULT  Goal: Maintain or return to baseline ADL function  Description: INTERVENTIONS:  -  Assess patient's ability to carry out ADLs; (min to mod assist)  - Assess/evaluate cause of self-care deficits (orthostatic hypotension, fatigue, limited movement)  - Assess range of motion  - Assess patient's mobility; (min assist x 1 and walker)  - Assess patient's need for assistive devices and provide as appropriate  - Encourage maximum independence but intervene and supervise when necessary  - Involve family in performance of ADLs  - Assess for home care needs following discharge   - Consider OT consult to assist with ADL evaluation and planning for discharge  - Provide patient education as appropriate  Outcome: Progressing  Goal: Maintains/Returns to pre admission functional level  Description: INTERVENTIONS:  - Perform BMAT or MOVE assessment daily    - Set and communicate daily mobility goal to care team and patient/family/caregiver  - Collaborate with rehabilitation services on mobility goals if consulted  - Perform Range of Motion 3 times a day  - Reposition patient every 2 hours    - Dangle patient 3 times a day  - Stand patient 3 times a day  - Ambulate patient 3 times a day  - Out of bed to chair 3 times a day   - Out of bed for meals 3 times a day  - Out of bed for toileting  - Record patient progress and toleration of activity level   Outcome: Progressing     Problem: Prexisting or High Potential for Compromised Skin Integrity  Goal: Skin integrity is maintained or improved  Description: INTERVENTIONS:  - Identify patients at risk for skin breakdown  - Assess and monitor skin integrity  - Assess and monitor nutrition and hydration status  - Monitor labs   - Assess for incontinence (morales catheter intact)  - Turn and reposition patient (cue to turn self)  - Assist with mobility/ambulation (min assist x 1 and walker)  - Relieve pressure over bony prominences  - Avoid friction and shearing  - Provide appropriate hygiene as needed including keeping skin clean and dry  - Evaluate need for skin moisturizer/barrier cream  - Collaborate with interdisciplinary team   - Patient/family teaching  Outcome: Progressing

## 2023-03-19 NOTE — ASSESSMENT & PLAN NOTE
· Known CAD with recent SAM placement/PTCA to the LAD on 12/29/2022  · appreciate Cardiology recommendations  · No indication for repeat cardiac catheterization at this time per Cardiology  · monotherapy plavix 75 mg PO Qdaily (7-10 days) with GI bleeding  · restart aspirin 81 mg PO QD with no additional GI bleeding/Hgb stable - 3/19  · Continue statin  · BB held with hypotension

## 2023-03-19 NOTE — ASSESSMENT & PLAN NOTE
Lab Results   Component Value Date    HGBA1C 5 6 03/10/2023       Recent Labs     03/18/23  1116 03/18/23  1602 03/18/23  2107 03/19/23  0739   POCGLU 184* 208* 186* 114       Blood Sugar Average: Last 72 hrs:  (P) 725 [id number]     · Good control per last a1c  · Continue sliding scale insulin, jardiance (hold with polyuria 6 2L yesterday)

## 2023-03-19 NOTE — ASSESSMENT & PLAN NOTE
· Continues to experience orthostatic hypotension with severe lightheadedness and dizziness while standing and a systolic blood pressure in the 90's mmHg  · HOLD tamsulosin for now  · Beta-blocker and diuretics held  · Midodrine increased to 10 mg TID with persistent symptomatic hypotension,slowly improving  · Likely in the setting of blood loss anemia, given x1 IV iron   · Compression stockings to be utilized  · Continue to monitor  · Repeat orthostatics- > 92 with lightheaded/unsteady, tachycardia  · Cortisol WNL  · Noted to have Polyuria with UO 3-5L daily-  ·  check osmolality, urine Na   ADH  · Is on jardiance-hold as can contribute to large volume excretion

## 2023-03-19 NOTE — PROGRESS NOTES
[address] [facility] [address] [place]  Progress Note - Champ Velasquez 1945, 69 y o  male MRN: [id number]  Unit/Bed#: 421-01 Encounter: [id number]  Primary Care Provider: Beau Mccartney DO   Date and time admitted to hospital: 3/8/2023 11:01 PM    * Orthostatic hypotension  Assessment & Plan  · Continues to experience orthostatic hypotension with severe lightheadedness and dizziness while standing and a systolic blood pressure in the 90's mmHg  · HOLD tamsulosin for now  · Beta-blocker and diuretics held  · Midodrine increased to 10 mg TID with persistent symptomatic hypotension,slowly improving  · Likely in the setting of blood loss anemia, given x1 IV iron   · Compression stockings to be utilized  · Continue to monitor  · Repeat orthostatics- > 92 with lightheaded/unsteady, tachycardia  · Cortisol WNL  · Noted to have Polyuria with UO 3-5L daily-  ·  check osmolality, urine Na  ADH  · Is on jardiance-hold as can contribute to large volume excretion     Hypothyroidism  Assessment & Plan  · Continue Synthroid  · Complains of "hot flashes"  · Will recheck TSH- 4 9    Elevated d-dimer  Assessment & Plan  · No evidence of pulmonary embolism on CTA of the chest/PE protocol completed on 3/9/2023  · No evidence of DVT on venous duplex of both lower extremities on 03/09/2023      Urinary retention  Assessment & Plan  · Maintain the Jose catheter for now  · Hold tamsulosin in the setting of orthostatic hypotension  · Appreciate urology evaluation  will have outpatient follow-up with urology    Acute blood loss anemia  Assessment & Plan  · Recent gastrointestinal hemorrhage thought to be diverticular bleeding found on colonoscopy on 3/3/2023  · Serial laboratory testing to monitor hemoglobin/hematocrit levels  · Initiated cyanocobalamin 500 mcg PO Qdaily for a low-normal vitamin B12 level  · strain despite being on senna-s BID   Will add miralax  · GI on board  · No need for colonoscopy at this time per GI, outpatient follow-up  · Iron venofer x1  · Hemoglobin remains stable, currently 9 9  · No recurrence of melena, or GI bleeding with brown BM last 24hr      Chronic diastolic (congestive) heart failure (HCC)  Assessment & Plan  Wt Readings from Last 3 Encounters:   03/19/23 93 6 kg (206 lb 5 6 oz)   03/08/23 105 kg (231 lb 7 7 oz)   03/02/23 98 9 kg (218 lb 0 6 oz)     · Remains euvolemic  · Trace Lower extremity edema- recommend elevating legs  · Low Na diet, daily weight  · Weight significantly decrease since admission 231> 206lb  · Diuretics held with hypotension      Other chest pain  Assessment & Plan  · Known CAD with recent drug-eluting stent placement/PTCA to the LAD on 12/29/2022  · I appreciate Cardiology's input    · No indication for repeat cardiac catheterization at this time per Cardiology  · Continue plavix 75 mg PO Qdaily  · ASA resumed after 10day hold, and no additonal GI bleeding -per cardiology recommendation  · Continue high-intensity statin therapy with atorvastatin 80 mg PO Qdaily with dinner  · Not on beta-blocker therapy at this time due to orthostatic hypotension    Aortic stenosis, moderate  Assessment & Plan  • Outpatient follow-up with Cardiology      Coronary artery disease of native artery of native heart with stable angina pectoris [facility])  Assessment & Plan  · Known CAD with recent SAM placement/PTCA to the LAD on 12/29/2022  · appreciate Cardiology recommendations  · No indication for repeat cardiac catheterization at this time per Cardiology  · monotherapy plavix 75 mg PO Qdaily (7-10 days) with GI bleeding  · restart aspirin 81 mg PO QD with no additional GI bleeding/Hgb stable - 3/19  · Continue statin  · BB held with hypotension    Type 2 diabetes mellitus with hyperglycemia, without long-term current use of insulin [facility])  Assessment & Plan  Lab Results   Component Value Date    HGBA1C 5 6 03/10/2023       Recent Labs     03/18/23  1116 03/18/23  1602 03/18/23  2107 03/19/23  0739 POCGLU 184* 208* 186* 114       Blood Sugar Average: Last 72 hrs:  (P) 163 [id number]     · Good control per last a1c  · Continue sliding scale insulin, jardiance (hold with polyuria 6 2L yesterday)    Hypomagnesemia-resolved as of 3/19/2023  Assessment & Plan  · Resolved with magnesium replacement therapy  · Follow the magnesium level    Results from last 7 days   Lab Units 23  1031 23  0527 23  1424   MAGNESIUM mg/dL 2 0 2 2 2 2           VTE Pharmacologic Prophylaxis: VTE Score: 5 High Risk (Score >/= 5) - Pharmacological DVT Prophylaxis Contraindicated  Sequential Compression Devices Ordered  Patient Centered Rounds: I performed bedside rounds with nursing staff today  Discussions with Specialists or Other Care Team Provider: nephrology    Education and Discussions with Family / Patient: Patient declined call to   Total Time Spent on Date of Encounter in care of patient: 45 minutes This time was spent on one or more of the following: performing physical exam; counseling and coordination of care; obtaining or reviewing history; documenting in the medical record; reviewing/ordering tests, medications or procedures; communicating with other healthcare professionals and discussing with patient's family/caregivers  Current Length of Stay: 10 day(s)  Current Patient Status: Inpatient   Certification Statement: The patient will continue to require additional inpatient hospital stay due to orthostatic hypotension  Discharge Plan: Anticipate discharge in 24-48 hrs to home  Code Status: Level 1 - Full Code    Subjective:   Denies recurrence of melena, or bright red blood with last bowel movement  Does have lightheadedness when standing still continue to monitor orthostatics    Denies shortness of breath or chest pain    Objective:     Vitals:   Temp (24hrs), Av 5 °F (36 4 °C), Min:97 3 °F (36 3 °C), Max:97 7 °F (36 5 °C)    Temp:  [97 3 °F (36 3 °C)-97 7 °F (36 5 °C)] 97 6 °F (36 4 °C)  HR:  [] 112  Resp:  [16-20] 18  BP: ()/(56-72) 92/56  SpO2:  [96 %-98 %] 98 %  Body mass index is 30 47 kg/m²  Input and Output Summary (last 24 hours): Intake/Output Summary (Last 24 hours) at 3/19/2023 1309  Last data filed at 3/19/2023 0654  Gross per 24 hour   Intake 540 ml   Output 3700 ml   Net -3160 ml       Physical Exam:   Physical Exam  Vitals and nursing note reviewed  Constitutional:       General: He is not in acute distress  Appearance: He is well-developed  HENT:      Head: Normocephalic and atraumatic  Mouth/Throat:      Mouth: Mucous membranes are moist    Eyes:      Conjunctiva/sclera: Conjunctivae normal       Pupils: Pupils are equal, round, and reactive to light  Cardiovascular:      Rate and Rhythm: Normal rate and regular rhythm  Pulses: Normal pulses  Heart sounds: Murmur heard  Pulmonary:      Effort: Pulmonary effort is normal  No respiratory distress  Breath sounds: Normal breath sounds  Abdominal:      General: Bowel sounds are normal  There is no distension  Palpations: Abdomen is soft  Tenderness: There is no abdominal tenderness  Musculoskeletal:         General: No swelling  Cervical back: Neck supple  Right lower le+ Edema present  Left lower le+ Edema present  Skin:     General: Skin is warm and dry  Capillary Refill: Capillary refill takes less than 2 seconds  Neurological:      General: No focal deficit present  Mental Status: He is alert  Mental status is at baseline  Psychiatric:         Mood and Affect: Mood normal          Thought Content:  Thought content normal          Additional Data:     Labs:  Results from last 7 days   Lab Units 23  1031 23  0533   WBC Thousand/uL 6 73 5 88   HEMOGLOBIN g/dL 9 9* 9 2*   HEMATOCRIT % 30 8* 28 7*   PLATELETS Thousands/uL 412* 389   NEUTROS PCT %  --  50   LYMPHS PCT %  --  29   MONOS PCT %  --  12 EOS PCT %  --  8*     Results from last 7 days   Lab Units 03/19/23  1031 03/15/23  0506 03/14/23  0527   SODIUM mmol/L 135   < > 138   POTASSIUM mmol/L 4 3   < > 4 5   CHLORIDE mmol/L 103   < > 107   CO2 mmol/L 25   < > 24   BUN mg/dL 10   < > 15   CREATININE mg/dL 1 15   < > 1 02   ANION GAP mmol/L 7   < > 7   CALCIUM mg/dL 8 8   < > 7 6*   ALBUMIN g/dL  --   --  2 9*   TOTAL BILIRUBIN mg/dL  --   --  0 39   ALK PHOS U/L  --   --  42   ALT U/L  --   --  22   AST U/L  --   --  23   GLUCOSE RANDOM mg/dL 211*   < > 102    < > = values in this interval not displayed  Results from last 7 days   Lab Units 03/19/23  1106 03/19/23  0739 03/18/23  2107 03/18/23  1602 03/18/23  1116 03/18/23  0744 03/17/23  2049 03/17/23  1540 03/17/23  1119 03/17/23  0726 03/16/23  2057 03/16/23  1637   POC GLUCOSE mg/dl 178* 114 186* 208* 184* 124 235* 197* 137 112 197* 181*         Results from last 7 days   Lab Units 03/14/23  0527 03/13/23  0615   PROCALCITONIN ng/ml 0 07 <0 05       Lines/Drains:  Invasive Devices     Peripheral Intravenous Line  Duration           Peripheral IV 03/17/23 Proximal;Ventral (anterior); Right Forearm 1 day          Drain  Duration           Urethral Catheter Temperature probe 14 days              Urinary Catheter:  Goal for removal: N/A- Discharging with Jose               Imaging: Reviewed radiology reports from this admission including: abdominal/pelvic CT    Recent Cultures (last 7 days):         Last 24 Hours Medication List:   Current Facility-Administered Medications   Medication Dose Route Frequency Provider Last Rate   • acetaminophen  650 mg Oral Q6H PRN Marci Landeros MD     • allopurinol  100 mg Oral Daily Marci Landeros MD     • aluminum-magnesium hydroxide-simethicone  30 mL Oral Q4H PRN Marci Landeros MD     • aspirin  81 mg Oral Daily FAY Lai     • atorvastatin  80 mg Oral Daily With Dinner Marci Landeros MD     • clopidogrel  75 mg Oral Daily Isabella Lamas MD Albaro     • cyanocobalamin  500 mcg Oral Daily Ian Bean DO     • escitalopram  10 mg Oral Daily Yoon Blanca MD     • fluticasone  2 spray Each Nare Daily Skyler Madera MD     • insulin lispro  2-12 Units Subcutaneous TID AC Yoon Blanca MD     • insulin lispro  2-12 Units Subcutaneous HS Yoon Blanca MD     • levothyroxine  88 mcg Oral Early Morning Yoon Blanca MD     • midodrine  10 mg Oral TID AC Kathi Merrill PA-C     • multivitamin-minerals  1 tablet Oral Daily Yoon Blanca MD     • nitroglycerin  0 4 mg Sublingual Q5 Min PRN Tony Neff DO     • ondansetron  4 mg Intravenous Q4H PRN Ian Bean DO     • [START ON 3/20/2023] pantoprazole  40 mg Oral Early Morning FAY Phipps     • polyethylene glycol  17 g Oral Daily PRN Yoon Blanca MD     • polyethylene glycol  17 g Oral Daily Kathi Merrill PA-C     • ranolazine  500 mg Oral BID Yoon Blanca MD     • senna-docusate sodium  1 tablet Oral BID Teo Cazares PA-C          Today, Patient Was Seen By: FAY Phipps    **Please Note: This note may have been constructed using a voice recognition system  **

## 2023-03-19 NOTE — ASSESSMENT & PLAN NOTE
· Known CAD with recent drug-eluting stent placement/PTCA to the LAD on 12/29/2022  · I appreciate Cardiology's input    · No indication for repeat cardiac catheterization at this time per Cardiology  · Continue plavix 75 mg PO Qdaily  · ASA resumed after 10day hold, and no additonal GI bleeding -per cardiology recommendation  · Continue high-intensity statin therapy with atorvastatin 80 mg PO Qdaily with dinner  · Not on beta-blocker therapy at this time due to orthostatic hypotension

## 2023-03-19 NOTE — ASSESSMENT & PLAN NOTE
· Recent gastrointestinal hemorrhage thought to be diverticular bleeding found on colonoscopy on 3/3/2023  · Serial laboratory testing to monitor hemoglobin/hematocrit levels  · Initiated cyanocobalamin 500 mcg PO Qdaily for a low-normal vitamin B12 level  · strain despite being on senna-s BID   Will add miralax  · GI on board  · No need for colonoscopy at this time per GI, outpatient follow-up  · Iron venofer x1  · Hemoglobin remains stable, currently 9 9  · No recurrence of melena, or GI bleeding with brown BM last 24hr

## 2023-03-19 NOTE — PLAN OF CARE
June 11, 2019        Re: Michelle Shaw    To whom it may concern,    My name is Cherelle Witt MD. I am taking care of Michelle Shaw, who is suffering chronic right shoulder pain. Patient had arthroscopy in 7/2018 in [facility], but her symptoms fail to improve. She is interested in repeat arthroscopy again for rotator cuff tear per recommendation of Dr. Vincent. I saw patient today for pre-op evaluation. I reviewed her chart. She does not have any obvious contraindication for elective surgery. She is cleared for surgery.     If you have any questions, please do not hesitate to call my office.     Best regards,                    Cherelle Witt M.D.       Problem: PAIN - ADULT  Goal: Verbalizes/displays adequate comfort level or baseline comfort level  Description: Interventions:  - Encourage patient to monitor pain and request assistance  - Assess pain using appropriate pain scale (0-10 pain scale)  - Administer analgesics based on type and severity of pain and evaluate response  - Implement non-pharmacological measures as appropriate and evaluate response  - Consider cultural and social influences on pain and pain management  - Notify physician/advanced practitioner if interventions unsuccessful or patient reports new pain  Outcome: Progressing     Problem: INFECTION - ADULT  Goal: Absence or prevention of progression during hospitalization  Description: INTERVENTIONS:  - Assess and monitor for signs and symptoms of infection  - Monitor lab/diagnostic results  - Monitor all insertion sites, i e  indwelling lines  - Administer medications as ordered  - Instruct and encourage patient and family to use good hand hygiene technique  Outcome: Progressing     Problem: SAFETY ADULT  Goal: Patient will remain free of falls  Description: INTERVENTIONS:  - Educate patient/family on patient safety including physical limitations  - Instruct patient to call for assistance with activity (min assist x 1 and walker)  - Consult OT/PT to assist with strengthening/mobility   - Keep Call bell within reach  - Keep bed low and locked with side rails adjusted as appropriate  - Keep care items and personal belongings within reach  - Initiate and maintain comfort rounds  - Make Fall Risk Sign visible to staff (high fall risk)  - Offer Toileting every 1-2 Hours, in advance of need  - Initiate/Maintain bed/chair alarm  - Obtain necessary fall risk management equipment: non-skid footwear  - Apply yellow socks and bracelet for high fall risk patients  - Consider moving patient to room near nurses station  Outcome: Progressing  Goal: Maintain or return to baseline ADL function  Description: INTERVENTIONS:  -  Assess patient's ability to carry out ADLs; (min to mod assist)  - Assess/evaluate cause of self-care deficits (orthostatic hypotension, fatigue, limited movement)  - Assess range of motion  - Assess patient's mobility; (min assist x 1 and walker)  - Assess patient's need for assistive devices and provide as appropriate  - Encourage maximum independence but intervene and supervise when necessary  - Involve family in performance of ADLs  - Assess for home care needs following discharge   - Consider OT consult to assist with ADL evaluation and planning for discharge  - Provide patient education as appropriate  Outcome: Progressing  Goal: Maintains/Returns to pre admission functional level  Description: INTERVENTIONS:  - Perform BMAT or MOVE assessment daily    - Set and communicate daily mobility goal to care team and patient/family/caregiver  - Collaborate with rehabilitation services on mobility goals if consulted  - Perform Range of Motion 3 times a day  - Reposition patient every 2 hours    - Dangle patient 3 times a day  - Stand patient 3 times a day  - Ambulate patient 3 times a day  - Out of bed to chair 3 times a day   - Out of bed for meals 3 times a day  - Out of bed for toileting  - Record patient progress and toleration of activity level   Outcome: Progressing     Problem: DISCHARGE PLANNING  Goal: Discharge to home or other facility with appropriate resources  Description: INTERVENTIONS:  - Identify barriers to discharge w/patient and caregiver  - Arrange for needed discharge resources and transportation as appropriate  - Identify discharge learning needs (meds, wound care, etc )  - Refer to Case Management Department for coordinating discharge planning if the patient needs post-hospital services based on physician/advanced practitioner order or complex needs related to functional status, cognitive ability, or social support system  Outcome: Progressing     Problem: Knowledge Deficit  Goal: Patient/family/caregiver demonstrates understanding of disease process, treatment plan, medications, and discharge instructions  Description: Complete learning assessment and assess knowledge base    Interventions:  - Provide teaching at level of understanding  - Provide teaching via preferred learning methods  Outcome: Progressing     Problem: CARDIOVASCULAR - ADULT  Goal: Maintains optimal cardiac output and hemodynamic stability  Description: INTERVENTIONS:  - Monitor I/O, vital signs and rhythm  - Monitor for S/S and trends of decreased cardiac output  - Assess quality of pulses, skin color and temperature  - Assess for signs of decreased coronary artery perfusion  - Instruct patient to report change in severity of symptoms  Outcome: Progressing  Goal: Absence of cardiac dysrhythmias or at baseline rhythm  Description: INTERVENTIONS:  - Continuous cardiac monitoring, vital signs, obtain 12 lead EKG if ordered  - Administer antiarrhythmic and heart rate control medications as ordered  - Monitor electrolytes and administer replacement therapy as ordered  Outcome: Progressing     Problem: RESPIRATORY - ADULT  Goal: Achieves optimal ventilation and oxygenation  Description: INTERVENTIONS:  - Assess for changes in respiratory status  - Assess for changes in mentation and behavior  - Position to facilitate oxygenation and minimize respiratory effort  - Oxygen administered by appropriate delivery if ordered  - Encourage broncho-pulmonary hygiene including cough, deep breathe, Incentive Spirometry  - Assess and instruct to report SOB or any respiratory difficulty  - Respiratory Therapy support as indicated  Outcome: Progressing     Problem: GENITOURINARY - ADULT  Goal: Maintains or returns to baseline urinary function  Description: INTERVENTIONS:  - Assess urinary function  - Encourage oral fluids to ensure adequate hydration if ordered  - Administer IV fluids as ordered to ensure adequate hydration  - Administer ordered medications as needed  - Offer frequent toileting  - Follow urinary retention protocol if ordered  Outcome: Progressing  Goal: Absence of urinary retention  Description: INTERVENTIONS:  - Assess patient's ability to void and empty bladder (patient has morales catheter intact)  - Monitor I/O  - Discuss with physician/AP medications to alleviate retention as needed (follow up with urology as outpatient)  - Discuss catheterization for long term situations as appropriate (patient has morales catheter intact)  Outcome: Progressing  Goal: Urinary catheter remains patent  Description: INTERVENTIONS:  - Assess patency of urinary catheter  - Follow guidelines for intermittent irrigation of non-functioning urinary catheter  Outcome: Progressing     Problem: METABOLIC, FLUID AND ELECTROLYTES - ADULT  Goal: Electrolytes maintained within normal limits  Description: INTERVENTIONS:  - Monitor labs and assess patient for signs and symptoms of electrolyte imbalances  - Administer electrolyte replacement as ordered  - Monitor response to electrolyte replacements, including repeat lab results as appropriate  - Instruct patient on fluid and nutrition as appropriate  Outcome: Progressing  Goal: Fluid balance maintained  Description: INTERVENTIONS:  - Monitor labs   - Monitor I/O and WT  - Instruct patient on fluid and nutrition as appropriate  - Assess for signs & symptoms of volume excess or deficit  Outcome: Progressing  Goal: Glucose maintained within target range  Description: INTERVENTIONS:  - Monitor Blood Glucose as ordered  - Assess for signs and symptoms of hyperglycemia and hypoglycemia  - Administer ordered medications to maintain glucose within target range  - Assess nutritional intake and initiate nutrition service referral as needed  Outcome: Progressing     Problem: MUSCULOSKELETAL - ADULT  Goal: Maintain or return mobility to safest level of function  Description: INTERVENTIONS:  - Assess patient's ability to carry out ADLs; (min to mod assist)  - Assess/evaluate cause of self-care deficits (orthostatic hypotension, fatigue, limited movement)  - Assess range of motion  - Assess patient's mobility (min assist x 1 and walker)  - Assess patient's need for assistive devices and provide as appropriate  - Encourage maximum independence but intervene and supervise when necessary  - Involve family in performance of ADLs  - Assess for home care needs following discharge   - Consider OT consult to assist with ADL evaluation and planning for discharge  - Provide patient education as appropriate  Outcome: Progressing     Problem: MOBILITY - ADULT  Goal: Maintain or return to baseline ADL function  Description: INTERVENTIONS:  -  Assess patient's ability to carry out ADLs; (min to mod assist)  - Assess/evaluate cause of self-care deficits (orthostatic hypotension, fatigue, limited movement)  - Assess range of motion  - Assess patient's mobility; (min assist x 1 and walker)  - Assess patient's need for assistive devices and provide as appropriate  - Encourage maximum independence but intervene and supervise when necessary  - Involve family in performance of ADLs  - Assess for home care needs following discharge   - Consider OT consult to assist with ADL evaluation and planning for discharge  - Provide patient education as appropriate  Outcome: Progressing  Goal: Maintains/Returns to pre admission functional level  Description: INTERVENTIONS:  - Perform BMAT or MOVE assessment daily    - Set and communicate daily mobility goal to care team and patient/family/caregiver  - Collaborate with rehabilitation services on mobility goals if consulted  - Perform Range of Motion 3 times a day  - Reposition patient every 2 hours    - Dangle patient 3 times a day  - Stand patient 3 times a day  - Ambulate patient 3 times a day  - Out of bed to chair 3 times a day   - Out of bed for meals 3 times a day  - Out of bed for toileting  - Record patient progress and toleration of activity level   Outcome: Progressing     Problem: Prexisting or High Potential for Compromised Skin Integrity  Goal: Skin integrity is maintained or improved  Description: INTERVENTIONS:  - Identify patients at risk for skin breakdown  - Assess and monitor skin integrity  - Assess and monitor nutrition and hydration status  - Monitor labs   - Assess for incontinence (morales catheter intact)  - Turn and reposition patient (cue to turn self)  - Assist with mobility/ambulation (min assist x 1 and walker)  - Relieve pressure over bony prominences  - Avoid friction and shearing  - Provide appropriate hygiene as needed including keeping skin clean and dry  - Evaluate need for skin moisturizer/barrier cream  - Collaborate with interdisciplinary team   - Patient/family teaching  Outcome: Progressing

## 2023-03-19 NOTE — ASSESSMENT & PLAN NOTE
· Resolved with magnesium replacement therapy  · Follow the magnesium level    Results from last 7 days   Lab Units 03/19/23  1031 03/14/23  0527 03/13/23  1424   MAGNESIUM mg/dL 2 0 2 2 2 2

## 2023-03-20 ENCOUNTER — APPOINTMENT (OUTPATIENT)
Dept: CARDIAC REHAB | Facility: HOSPITAL | Age: 78
End: 2023-03-20

## 2023-03-20 LAB
GLUCOSE SERPL-MCNC: 130 MG/DL (ref 65–140)
GLUCOSE SERPL-MCNC: 183 MG/DL (ref 65–140)
GLUCOSE SERPL-MCNC: 188 MG/DL (ref 65–140)
GLUCOSE SERPL-MCNC: 192 MG/DL (ref 65–140)
OSMOLALITY UR: 485 MMOL/KG

## 2023-03-20 RX ORDER — LEVOTHYROXINE SODIUM 112 UG/1
112 TABLET ORAL
Status: DISCONTINUED | OUTPATIENT
Start: 2023-03-21 | End: 2023-03-22 | Stop reason: HOSPADM

## 2023-03-20 RX ORDER — LEVOTHYROXINE SODIUM 0.03 MG/1
25 TABLET ORAL ONCE
Status: COMPLETED | OUTPATIENT
Start: 2023-03-20 | End: 2023-03-20

## 2023-03-20 RX ADMIN — SENNOSIDES AND DOCUSATE SODIUM 1 TABLET: 50; 8.6 TABLET ORAL at 17:16

## 2023-03-20 RX ADMIN — MIDODRINE HYDROCHLORIDE 10 MG: 5 TABLET ORAL at 16:14

## 2023-03-20 RX ADMIN — CYANOCOBALAMIN TAB 500 MCG 500 MCG: 500 TAB at 09:16

## 2023-03-20 RX ADMIN — ESCITALOPRAM OXALATE 10 MG: 10 TABLET ORAL at 09:16

## 2023-03-20 RX ADMIN — ALLOPURINOL 100 MG: 100 TABLET ORAL at 09:16

## 2023-03-20 RX ADMIN — MIDODRINE HYDROCHLORIDE 10 MG: 5 TABLET ORAL at 11:28

## 2023-03-20 RX ADMIN — CLOPIDOGREL BISULFATE 75 MG: 75 TABLET ORAL at 09:16

## 2023-03-20 RX ADMIN — INSULIN LISPRO 2 UNITS: 100 INJECTION, SOLUTION INTRAVENOUS; SUBCUTANEOUS at 21:42

## 2023-03-20 RX ADMIN — INSULIN LISPRO 2 UNITS: 100 INJECTION, SOLUTION INTRAVENOUS; SUBCUTANEOUS at 11:30

## 2023-03-20 RX ADMIN — MULTIPLE VITAMINS W/ MINERALS TAB 1 TABLET: TAB at 09:16

## 2023-03-20 RX ADMIN — ATORVASTATIN CALCIUM 80 MG: 40 TABLET, FILM COATED ORAL at 16:14

## 2023-03-20 RX ADMIN — LEVOTHYROXINE SODIUM 25 MCG: 25 TABLET ORAL at 10:10

## 2023-03-20 RX ADMIN — PANTOPRAZOLE SODIUM 40 MG: 40 TABLET, DELAYED RELEASE ORAL at 05:59

## 2023-03-20 RX ADMIN — SENNOSIDES AND DOCUSATE SODIUM 1 TABLET: 50; 8.6 TABLET ORAL at 09:16

## 2023-03-20 RX ADMIN — INSULIN LISPRO 2 UNITS: 100 INJECTION, SOLUTION INTRAVENOUS; SUBCUTANEOUS at 17:16

## 2023-03-20 RX ADMIN — FLUTICASONE PROPIONATE 2 SPRAY: 50 SPRAY, METERED NASAL at 09:15

## 2023-03-20 RX ADMIN — ASPIRIN 81 MG: 81 TABLET, COATED ORAL at 09:16

## 2023-03-20 RX ADMIN — POLYETHYLENE GLYCOL 3350 17 G: 17 POWDER, FOR SOLUTION ORAL at 09:16

## 2023-03-20 RX ADMIN — LEVOTHYROXINE SODIUM 88 MCG: 88 TABLET ORAL at 05:59

## 2023-03-20 RX ADMIN — MIDODRINE HYDROCHLORIDE 10 MG: 5 TABLET ORAL at 06:00

## 2023-03-20 NOTE — CONSULTS
Consultation - Nephrology   Saida Hayden 69 y o  male MRN: [id number]  Unit/Bed#: 289-14 Encounter: [id number]      A/P:  1  Increase urine output   Patient urine output appears to be appropriate for volume given, serum creatinine has been stable as have been other electrolytes including serum sodium level  Urine osmolality also noted to be higher than serum osmolality  The patient most likely does not have at this point diabetes insipidus, no further work-up at this point indicated from my perspective  2   Orthostatic hypotension   Patient has found some benefit with maximizing midodrine to 10 mg 3 times daily, however, patient continues to experience elevated heart rates when changing position to the standing  Patient would benefit from more medical intervention as well as potential work-up  Neurology also asked to see and evaluate the patient  3   Chronic kidney disease stage III with a baseline gram between 1-1 2 mg/dL  4  Bilateral lower extremity edema   Continue to monitor for now, patient may require to carry mild excess volume in order to assist with orthostatic hypotension  Patient was on diuretics in the outpatient setting, as well as an SGLT-2 inhibitor  Continue with low to moderate sodium restriction in his diet  5   History of diabetic nephropathy   As mentioned above, the patient was on SGLT-2 inhibitor, currently on hold, continue with losartan  6   Urinary retention   Jose catheter remains intact, continue care according to urology colleagues  Thank you for allowing us to participate in the care of your patient  Please feel free to contact us regarding the care of this patient, or any other questions/concerns that may be applicable      Patient Active Problem List   Diagnosis   • Arthritis   • Depression with anxiety   • Type 2 diabetes mellitus with hyperglycemia, without long-term current use of insulin ([facility] )   • Gout   • Hyperlipidemia   • Hypertension   • Morbid obesity ([place] [address] )   • NSTEMI (non-ST elevated myocardial infarction) ([place] [address] )   • Tremor   • Degenerative disc disease, lumbar   • Tremor, essential   • Diabetic neuropathy associated with diabetes mellitus due to underlying condition ([facility])   • Gait instability   • Frequent falls   • Infected epidermoid cyst   • Vitamin E deficiency   • Primary osteoarthritis involving multiple joints   • Chronic pain of both knees   • Primary osteoarthritis of both knees   • Coronary artery disease of native artery of native heart with stable angina pectoris ([facility])   • Primary osteoarthritis of right knee   • Primary osteoarthritis of left knee   • Fall (on) (from) other stairs and steps, initial encounter   • Recurrent major depressive disorder ([place] [address] )   • Benign hypertension with CKD (chronic kidney disease) stage III ([facility])   • Diabetic nephropathy associated with type 2 diabetes mellitus ([place] [address] )   • Concentric left ventricular hypertrophy   • Aortic stenosis, moderate   • Edema   • Mild cognitive impairment   • Bilateral lower extremity edema   • Other chest pain   • Leukocytosis   • Skin lesion   • Chronic diastolic (congestive) heart failure ([facility])   • Basal cell carcinoma (BCC) of right shoulder   • Stage 3a chronic kidney disease ([facility])   • S/P cardiac catheterization   • Peripheral arterial disease ([facility])   • Mesenteric artery stenosis ([facility])   • Acute lower GI bleeding   • Diabetes mellitus, type 2 ([place] [address] )   • Acute diverticulitis   • Orthostatic hypotension   • Acute blood loss anemia   • Urinary retention   • Diverticulosis of colon with hemorrhage   • Elevated TSH   • Elevated d-dimer   • Hypothyroidism       History of Present Illness   Physician Requesting Consult: Serg Hanley DO  Reason for Consult / Principal Problem: Orthostatic hypotension  Hx and PE limited by:   HPI: Kathye Meckel is a 69y o  year old male who presented to the emergency room due to chest pressure on March 9 in the setting of known underlying atherosclerotic coronary artery disease status post angioplasty and stent placement x2 in December 2022  Earlier in the month, patient presented with complaints of prior blood in his stools  During the admission, the patient was noted to have urinary retention and had a Jose catheter placed and was discharged home with it  Patient was worked up from the cardiac standpoint, and was managed medically, aspirin 81 mg was reinitiated along with Brilinta, as well as high-dose atorvastatin  His beta-blocker was discontinued due to orthostatic hypotension which was felt to be a significant issue  Patient was significantly orthostatic especially from the seated to standing position  He was eventually started on midodrine which was eventually increased up to 10 mg 3 times daily  Patient was also provided with volume expansion with isotonic saline with only minimal improvement  There is a question of excess urine output with urine output of around 4 L, with March 17 noting urine output of 6 4 L  This was in the setting of a urine osmolality of 485 mg/mg, creatinine urine sodium was 24 mmol/L  History obtained from chart review and the patient    Constitutional ROS- Denies fatigue, fever, chills, night sweats, weight changes  HEENT ROS- Denies history of eye surgeries, glaucoma, headaches or history of trauma, blurred vision     Endocrine ROS- No history diabetes mellitus or thyroid disease  Cardiovascular ROS- Denies chest pain, palpitation, dyspnea exertion, orthopnea, claudication  Pulmonary ROS- Denies history of COPD, asthma  Denies cough, hemoptysis, shortness of breath  GI ROS- Denies abdominal pain, diarrhea, nausea, swallowing problems, vomiting, constipation, blood in stools, fecal incontinence  Hematological ROS- Denies history of easy bruising, blood clots, bleeding or blood transfusions    Genitourinary ROS- Denies recent hematuria, pyuria, flank pain, change in urinary stream, decreased urinary output, increased urinary frequency, nocturia, foamy urine, or urinary incontinence  Lymphatic ROS- Denies lymphadenopathy  Musculoskeletal ROS- Denies history of muscle weakness, joint pain  Dermatological ROS- Denies rash, wounds, ulcers, itching, jaundice  Psychiatric ROS- Denies anxiety, depression, hallucinations, disorientation  Neurological ROS- No stroke or TIA symptoms       Historical Information   Past Medical History:   Diagnosis Date   • BPH (benign prostatic hyperplasia)    • CAD (coronary artery disease)    • Cancer (HCC)     basal cell   • Chronic kidney disease     stage 3   • Colon polyp    • CPAP (continuous positive airway pressure) dependence    • Diabetes mellitus (HCC)     niddm   • Disease of thyroid gland    • Gout    • High cholesterol    • Hypertension    • Hypomagnesemia 3/9/2023   • MI, 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    • Myocardial infarction ([facility] ) 2014   • S/P cardiac catheterization 12/30/2022   • Sleep apnea      Past Surgical History:   Procedure Laterality Date   • BASAL CELL CARCINOMA EXCISION Right 1/21/2022    Procedure: EXCISION BASAL CELL CARCINOMA EXTREMITY;  Surgeon: Victor Hugo Watts DO;  Location: [facility];  Service: General   • CARDIAC CATHETERIZATION Left 12/29/2022    Procedure: Cardiac Left Heart Cath;  Surgeon: Kandace Barron MD;  Location: [facility]; Service: Cardiology   • CARDIAC CATHETERIZATION N/A 12/29/2022    Procedure: Cardiac Coronary Angiogram;  Surgeon: Kandace Barron MD;  [facility]: [facility]; Service: Cardiology   • CARDIAC CATHETERIZATION N/A 12/29/2022    Procedure: Cardiac pci;  Surgeon: Kandace Barron MD;  Location: [facility];   Service: Cardiology   • CATARACT EXTRACTION W/  INTRAOCULAR LENS IMPLANT     • CORNEAL TRANSPLANT     • CORONARY ANGIOPLASTY WITH STENT PLACEMENT      stents x3-- [phone]   • EYE SURGERY      repair corneal laceration   • MI COLONOSCOPY FLX DX W/COLLJ SPEC WHEN PFRMD N/A 3/21/2016 Procedure: COLONOSCOPY;  Surgeon: Kumar Alvarez MD;  Location: [facility];  Service: Colorectal   • TONSILLECTOMY AND ADENOIDECTOMY       Social History   Social History     Substance and Sexual Activity   Alcohol Use Not Currently    Comment: socially, cut down in 2008, previously did more than      Social History     Substance and Sexual Activity   Drug Use Never     Social History     Tobacco Use   Smoking Status Never   Smokeless Tobacco Never     Family History   Adopted: Yes   Problem Relation Age of Onset   • No Known Problems Mother    • No Known Problems Father        Meds/Allergies   all current active meds have been reviewed, current meds:   Current Facility-Administered Medications   Medication Dose Route Frequency   • acetaminophen (TYLENOL) tablet 650 mg  650 mg Oral Q6H PRN   • allopurinol (ZYLOPRIM) tablet 100 mg  100 mg Oral Daily   • aluminum-magnesium hydroxide-simethicone (MYLANTA) oral suspension 30 mL  30 mL Oral Q4H PRN   • aspirin (ECOTRIN LOW STRENGTH) EC tablet 81 mg  81 mg Oral Daily   • atorvastatin (LIPITOR) tablet 80 mg  80 mg Oral Daily With Dinner   • clopidogrel (PLAVIX) tablet 75 mg  75 mg Oral Daily   • cyanocobalamin (VITAMIN B-12) tablet 500 mcg  500 mcg Oral Daily   • escitalopram (LEXAPRO) tablet 10 mg  10 mg Oral Daily   • fluticasone (FLONASE) 50 mcg/act nasal spray 2 spray  2 spray Each Nare Daily   • insulin lispro (HumaLOG) 100 units/mL subcutaneous injection 2-12 Units  2-12 Units Subcutaneous TID AC   • insulin lispro (HumaLOG) 100 units/mL subcutaneous injection 2-12 Units  2-12 Units Subcutaneous HS   • [START ON 3/21/2023] levothyroxine tablet 112 mcg  112 mcg Oral Early Morning   • midodrine (PROAMATINE) tablet 10 mg  10 mg Oral TID AC   • multivitamin-minerals (CENTRUM) tablet 1 tablet  1 tablet Oral Daily   • nitroglycerin (NITROSTAT) SL tablet 0 4 mg  0 4 mg Sublingual Q5 Min PRN   • ondansetron (ZOFRAN) injection 4 mg  4 mg Intravenous Q4H PRN   • pantoprazole (PROTONIX) EC tablet 40 mg  40 mg Oral Early Morning   • polyethylene glycol (MIRALAX) packet 17 g  17 g Oral Daily PRN   • polyethylene glycol (MIRALAX) packet 17 g  17 g Oral Daily   • senna-docusate sodium (SENOKOT S) 8 6-50 mg per tablet 1 tablet  1 tablet Oral BID    and PTA meds:    Medications Prior to Admission   Medication   • allopurinol (ZYLOPRIM) 100 mg tablet   • atorvastatin (LIPITOR) 80 mg tablet   • clopidogrel (PLAVIX) 75 mg tablet   • Empagliflozin (Jardiance) 10 MG TABS   • escitalopram (LEXAPRO) 20 mg tablet   • glimepiride (AMARYL) 4 mg tablet   • levothyroxine 88 mcg tablet   • losartan (COZAAR) 25 mg tablet   • metFORMIN (GLUCOPHAGE) 1000 MG tablet   • multivitamin (THERAGRAN) TABS   • pantoprazole (PROTONIX) 40 mg tablet   • propranolol (INDERAL LA) 60 mg 24 hr capsule   • ranolazine (RANEXA) 500 mg 12 hr tablet   • tamsulosin (FLOMAX) 0 4 mg   • zonisamide (ZONEGRAN) 50 MG capsule   • allopurinol (ZYLOPRIM) 100 mg tablet   • benzonatate (TESSALON) 200 MG capsule   • Blood Glucose Monitoring Suppl (OneTouch Verio Reflect) w/Device KIT   • glucose blood (OneTouch Verio) test strip   • levothyroxine 88 mcg tablet   • nitroglycerin (NITROSTAT) 0 4 mg SL tablet   • OneTouch Delica Lancets 22M MISC   • prednisoLONE acetate (PRED FORTE) 1 % ophthalmic suspension   • torsemide (DEMADEX) 10 mg tablet         No Known Allergies    Objective     Intake/Output Summary (Last 24 hours) at 3/20/2023 1301  Last data filed at 3/20/2023 1226  Gross per 24 hour   Intake 2040 ml   Output 3050 ml   Net -1010 ml       Invasive Devices:   Urethral Catheter Temperature probe (Active)   Amt returned on insertion(mL) 950 mL 03/19/23 1607   Reasons to continue Urinary Catheter  Acute urinary retention/obstruction failing urinary retention protocol 03/19/23 1050   Goal for Removal N/A- discharging with morales 03/19/23 1050   Site Assessment Clean;Skin intact 03/19/23 1050   Morales Care Done 03/19/23 2017 Collection Container Standard drainage bag 03/19/23 1050   Securement Method Securing device (Describe) 03/19/23 1050   Output (mL) 600 mL 03/20/23 0602       Physical Exam      I/O last 3 completed shifts: In: 1260 [P O :1260]  Out: 5800 [Urine:5800]    Vitals:    03/20/23 1200   BP: 116/59   Pulse:    Resp:    Temp:    SpO2:        General Appearance:    No acute distress  Cooperative  Appears stated age  Head:    Normocephalic  Atraumatic  Normal jaw occlusion  Eyes:    Lids, conjunctiva normal  No scleral icterus  Ears:    Normal external ears  Nose:   Nares normal  No drainage  Mouth:   Lips, tongue normal  Mucosa normal  Phonation normal    Neck:   Supple  Symmetrical    Back:     Symmetric  No CVA tenderness  Lungs:     Normal respiratory effort  Clear to auscultation bilaterally  Chest wall:    No tenderness or deformity  Heart:    Regular rate and rhythm  Normal S1 and S2  No murmur  No JVD  Mild bilateral lower extremity edema  Abdomen:     Soft  Non-tender  Bowel sounds active  Genitourinary:   No Jose catheter present  Extremities:   Extremities normal  Atraumatic  No cyanosis  Skin:   Warm and dry  No pallor, jaundice, rash, ecchymoses  Neurologic:   Alert and oriented to person, place, time  No focal deficit  Current Weight: Weight - Scale: 93 9 kg (207 lb)  First Weight: Weight - Scale: 105 kg (231 lb 7 7 oz)    Lab Results:  I have personally reviewed pertinent labs      CBC: No results found for: WBC, HGB, HCT, MCV, PLT, ADJUSTEDWBC, MCH, MCHC, RDW, MPV, NRBC  CMP: No results found for: NA, K, CL, CO2, ANIONGAP, BUN, CREATININE, GLUCOSE, CALCIUM, AST, ALT, ALKPHOS, PROT, BILITOT, EGFR  Phosphorus: No results found for: PHOS  Magnesium: No results found for: MG  Urinalysis: No results found for: COLORU, CLARITYU, SPECGRAV, PHUR, LEUKOCYTESUR, NITRITE, PROTEINUA, GLUCOSEU, KETONESU, BILIRUBINUR, BLOODU  Ionized Calcium: No results found for: CAION  Coagulation: No results found for: PT, INR, APTT  Troponin: No results found for: TROPONINI  ABG: No results found for: PHART, BBD0ITG, PO2ART, AQG3MGR, X4CAJAQY, BEART, SOURCE    Results from last 7 days   Lab Units 03/19/23  1031 03/18/23  0533 03/17/23  0436 03/15/23  0506 03/14/23  0527 03/13/23  1424   POTASSIUM mmol/L 4 3 3 7 4 3   < > 4 5 4 5   CHLORIDE mmol/L 103 105 106   < > 107 104   CO2 mmol/L 25 25 24   < > 24 27   BUN mg/dL 10 9 10   < > 15 12   CREATININE mg/dL 1 15 1 03 1 09   < > 1 02 1 25   CALCIUM mg/dL 8 8 8 4 8 6   < > 7 6* 8 3*   ALK PHOS U/L  --   --   --   --  42 61   ALT U/L  --   --   --   --  22 32   AST U/L  --   --   --   --  23 34    < > = values in this interval not displayed  Radiology review:  No results found  Leonel Kincaid DO      This consultation note was produced in part using a dictation device which may document imprecise wording from author's original intent

## 2023-03-20 NOTE — ASSESSMENT & PLAN NOTE
· Recent gastrointestinal hemorrhage thought to be diverticular bleeding found on colonoscopy on 3/3/2023  · Serial laboratory testing to monitor hemoglobin/hematocrit levels  · Initiated cyanocobalamin 500 mcg PO Qdaily for a low-normal vitamin B12 level  · Strain despite being on senna-s BID  Added miralax    · GI on board  · No need for colonoscopy at this time per GI, outpatient follow-up  · Iron venofer x 1  · Hemoglobin remains stable, currently 9 9 g/dl  · No recurrence of melena, or GI bleeding with brown BM last 24hr    Results from last 7 days   Lab Units 03/19/23  1031 03/18/23  0533 03/17/23  0637   WBC Thousand/uL 6 73 5 88 6 45   HEMOGLOBIN g/dL 9 9* 9 2* 9 4*   HEMATOCRIT % 30 8* 28 7* 29 7*   PLATELETS Thousands/uL 412* 389 412*

## 2023-03-20 NOTE — ASSESSMENT & PLAN NOTE
Lab Results   Component Value Date    HGBA1C 5 6 03/10/2023       Recent Labs     03/19/23  1600 03/19/23  2129 03/20/23  0728 03/20/23  1053   POCGLU 185* 169* 130 192*       Blood Sugar Average: Last 72 hrs:  (P) 167 [id number]     · Good control per last HgbA1c  · Continue sliding scale insulin with blood glucose monitoring ACHS  · Continue to hold Jardiance for now

## 2023-03-20 NOTE — PLAN OF CARE
Problem: OCCUPATIONAL THERAPY ADULT  Goal: Performs self-care activities at highest level of function for planned discharge setting  See evaluation for individualized goals  Description: Treatment Interventions: ADL retraining, Functional transfer training, UE strengthening/ROM, Endurance training, Patient/family training, Equipment evaluation/education, Activityengagement          See flowsheet documentation for full assessment, interventions and recommendations  Outcome: Progressing  Note: Limitation: Decreased ADL status, Decreased UE strength, Decreased Safe judgement during ADL, Decreased endurance, Decreased cognition, Decreased self-care trans, Decreased high-level ADLs     Assessment: Patient participated in Skilled OT session this date with interventions consisting of Energy Conservation techniques, Work simplification skills , safety awareness and fall prevention techniques, therapeutic exercise to: increase functional use of BUEs, increase BUE muscle strength ,  therapeutic activities to: increase activity tolerance and increase cardiovascular endurance    Co treatment with PT secondary to complex medical condition of pt, mod-max A of 2 required to achieve and maintain transitional movements, requiring the need of skilled therapeutic intervention of 2 therapists to achieve delivery of services  Patient agreeable to OT treatment session, upon arrival patient was found seated OOB to Chair  Patient requiring verbal cues for safety  Patient continues to be functioning below baseline level, occupational performance remains limited secondary to factors listed above and increased risk for falls and injury  The patient's raw score on the AM-PAC Daily Activity Inpatient Short Form is 19  A raw score of greater than or equal to 19 suggests the patient may benefit from discharge to home  Please refer to the recommendation of the Occupational Therapist for safe discharge planning   From OT standpoint, recommendation at time of d/c would be Post acute rehabilitation services       OT Discharge Recommendation: Post acute rehabilitation services

## 2023-03-20 NOTE — PLAN OF CARE
Problem: PHYSICAL THERAPY ADULT  Goal: Performs mobility at highest level of function for planned discharge setting  See evaluation for individualized goals  Description: Treatment/Interventions: Functional transfer training, LE strengthening/ROM, Elevations, Therapeutic exercise, Endurance training, Bed mobility, Gait training          See flowsheet documentation for full assessment, interventions and recommendations  Outcome: Not Progressing  Note: Prognosis: Good  Problem List: Decreased strength, Decreased endurance, Impaired balance, Decreased mobility, Decreased safety awareness, Impaired sensation, Obesity  Assessment: Pt seen for PT treatment session this date with interventions consisting of gait training w/ emphasis on improving pt's ability to ambulate level surfaces x 60' with min A provided by therapist with RW and Therapeutic exercise consisting of: AROM 20 reps B LE in sitting position  Pt agreeable to PT treatment session upon arrival, pt found seated OOB in recliner, in no apparent distress  In comparison to previous session, pt with no improvements as evidenced by pt continues to experience increased dizziness with standing from sitting, able to complete household distance ambulation with RW and min A, able to complete assigned therex however limited by fatigue   Post session: pt returned BTB, bed alarm engaged, all needs in reach, and RN notified of session findings/recommendations Continue to recommend STR at time of d/c in order to maximize pt's functional independence and safety w/ mobility  Pt continues to be functioning below baseline level, and remains limited 2* factors listed above and including decreased strength, balance, mobility, and activity tolerance  PT will continue to see pt while here in order to address the deficits listed above and provide interventions consistent w/ POC in effort to achieve STGs    Barriers to Discharge: Inaccessible home environment     PT Discharge Recommendation: Post acute rehabilitation services    See flowsheet documentation for full assessment

## 2023-03-20 NOTE — PHYSICAL THERAPY NOTE
PHYSICAL THERAPY TREATMENT NOTE  NAME:  Rozella Bloch  DATE: 03/20/23    Length Of Stay: 11  Performed at least 2 patient identifiers during session: Name and Birthday  Treatment time: 6162-9713  Treatment length: 24 min       03/20/23 1037   Note Type   Note Type Treatment   Pain Assessment   Pain Assessment Tool 0-10   Pain Score No Pain   Restrictions/Precautions   Other Precautions Chair Alarm; Fall Risk   General   Chart Reviewed Yes   Response to Previous Treatment Patient with no complaints from previous session  Cognition   Overall Cognitive Status WFL   Orientation Level Oriented X4   Following Commands Follows one step commands without difficulty   Bed Mobility   Additional Comments Pt seated OOB in recliner at start and end of session; bed mobility not assessed this session   Transfers   Sit to Stand 5  Supervision   Additional items Increased time required;Verbal cues   Stand to Sit 5  Supervision   Additional items Increased time required;Verbal cues   Stand pivot 4  Minimal assistance   Additional items Assist x 1; Increased time required;Verbal cues   Additional Comments STS with RW and supervision with incidental VC for hand placement, SPT min A x 1 for weight shifting   Ambulation/Elevation   Gait pattern Improper Weight shift; Wide MCKENZIE; Decreased foot clearance; Short stride; Excessively slow   Gait Assistance 4  Minimal assist   Additional items Assist x 1;Verbal cues   Assistive Device Rolling walker   Distance 60' with RW and min A and chair follow for safety due to orthostasis  Pt reports dizziness throughout gait however agreeable to complete, limited by dizziness   BP assessed as noted below   Balance   Static Sitting Good   Dynamic Sitting Fair +   Static Standing Fair   Dynamic Standing Fair -   Ambulatory Poor +   Endurance Deficit   Endurance Deficit Yes   Endurance Deficit Description fatigue   Activity Tolerance   Activity Tolerance Patient limited by fatigue   Medical Staff Made Aware Lorena MARTIN   Nurse Made Aware RN aware   Exercises   Quad Sets Sitting;20 reps;AROM; Bilateral   Assessment   Prognosis Good   Problem List Decreased strength;Decreased endurance; Impaired balance;Decreased mobility; Decreased safety awareness; Impaired sensation;Obesity   Barriers to Discharge Inaccessible home environment   Goals   PT Treatment Day 3   Plan   Treatment/Interventions Functional transfer training;LE strengthening/ROM; Elevations; Therapeutic exercise; Endurance training;Patient/family training;Equipment eval/education; Bed mobility;Gait training; Compensatory technique education;Spoke to nursing;OT   Progress Slow progress, medical status limitations   Recommendation   PT Discharge Recommendation Post acute rehabilitation services   Equipment Recommended   (TBD by rehab)   AM-PAC Basic Mobility Inpatient   Turning in Flat Bed Without Bedrails 3   Lying on Back to Sitting on Edge of Flat Bed Without Bedrails 3   Moving Bed to Chair 3   Standing Up From Chair Using Arms 3   Walk in Room 3   Climb 3-5 Stairs With Railing 2   Basic Mobility Inpatient Raw Score 17   Basic Mobility Standardized Score 39 67   Highest Level Of Mobility   JH-HLM Goal 5: Stand one or more mins   JH-HLM Achieved 7: Walk 25 feet or more   Education   Education Provided Mobility training;Assistive device   Patient Demonstrates acceptance/verbal understanding   End of Consult   Patient Position at End of Consult Bedside chair;Bed/Chair alarm activated; All needs within reach        03/20/23 1042  Seated - pt asymptomatic 03/20/23 1045  Standing - pt symptomatic    Vitals   Pulse 91 (!) 110    Blood Pressure 125/75 107/69    MAP (mmHg) 92 82    Oxygen Therapy   SpO2 97 % 95 %       03/20/23 1054 seated post gait - pt asymptomatic   Vitals   Pulse 90   Blood Pressure 113/53   MAP (mmHg) 73   Oxygen Therapy   SpO2 94 %       The patient's AM-PAC Basic Mobility Inpatient Short Form Raw Score is 17   A Raw score of greater than 16 suggests the patient may benefit from discharge to home however pt requires increased assistance for mobility, continues to be limited by orthostatic hypotension Please also refer to the recommendation of the Physical Therapist for safe discharge planning  Assessment: Pt seen for PT treatment session this date with interventions consisting of gait training w/ emphasis on improving pt's ability to ambulate level surfaces x 60' with min A provided by therapist with RW and Therapeutic exercise consisting of: AROM 20 reps B LE in sitting position  Pt agreeable to PT treatment session upon arrival, pt found seated OOB in recliner, in no apparent distress  In comparison to previous session, pt with no improvements as evidenced by pt continues to ambulate with RW and min A 60' limited by orthostasis   Post session: pt returned back to recliner, chair alarm engaged, all needs in reach, and RN notified of session findings/recommendations Continue to recommend STR at time of d/c in order to maximize pt's functional independence and safety w/ mobility  Pt continues to be functioning below baseline level, and remains limited 2* factors listed above and including decreased strength, balance, mobility, and activity tolerance  PT will continue to see pt while here in order to address the deficits listed above and provide interventions consistent w/ POC in effort to achieve STGs       Ting Segal, PT,DPT

## 2023-03-20 NOTE — ASSESSMENT & PLAN NOTE
· Maintain the Jose catheter for now  · Hold tamsulosin in the setting of orthostatic hypotension  · Appreciate Urology evaluation  · Will have outpatient follow-up with Urology

## 2023-03-20 NOTE — ASSESSMENT & PLAN NOTE
· Known CAD with recent SAM placement/PTCA to the LAD on 12/29/2022  · Appreciate Cardiology's recommendations  · No indication for repeat cardiac catheterization at this time per Cardiology  · <onotherapy plavix 75 mg PO Qdaily (7-10 days) with GI bleeding  · Restarted aspirin 81 mg PO QD with no additional GI bleeding/Hgb stable - 3/20/23  · Continue high-intensity statin therapy with atorvastatin 80 mg PO Qdaily with dinner  · BB held with hypotension

## 2023-03-20 NOTE — ASSESSMENT & PLAN NOTE
· Increased levothyroxine to 112 mcg PO Qdaily in the early morning in the setting of an elevated TSH level as shown below  · Recheck a TSH level in 4-6 weeks with his PCP     Latest Reference Range & Units 03/19/23 10:31   TSH 3RD GENERATON 0 450 - 4 500 uIU/mL 4 980 (H)   Free T4 0 76 - 1 46 ng/dL 1 18   (H): Data is abnormally high

## 2023-03-20 NOTE — OCCUPATIONAL THERAPY NOTE
Occupational Therapy         Patient Name: Ema Tran  ZAKQT'K Date: 3/20/2023         03/20/23 1100   OT Last Visit   OT Visit Date 23   Note Type   Note Type Treatment   Pain Assessment   Pain Assessment Tool 0-10   Pain Score 5   Pain Location/Orientation Location: Abdomen   Restrictions/Precautions   Other Precautions Fall Risk; Chair Alarm   Bed Mobility   Additional Comments Pt OOB in recliner at start/end of session, all needs within reach  Transfers   Sit to Stand 5  Supervision   Additional items Increased time required;Verbal cues   Stand to Sit 5  Supervision   Additional items Verbal cues; Increased time required   Additional Comments RW used, verbal cues for safe hand placement; BP and HR at start of session (resting):125/75 HR 91, upon standin/69 and 110  Pt reports increased dizziness upon standing  Functional Mobility   Functional Mobility 4  Minimal assistance   Additional Comments Pt ambulates approx 60 ft with RW, Min A x 1 and chair follow for safety due to orthostasis  Pt demo no LOB; requires increased time due to slow pace  Therapeutic Exercise - ROM   UE-ROM Yes   ROM- Right Upper Extremities   R Shoulder AROM; Flexion;ABduction;Horizontal ABduction   R Elbow AROM;Elbow extension;Elbow flexion   R Wrist AROM; Wrist flexion;Wrist extension   R Weight/Reps/Sets x 15   ROM - Left Upper Extremities    L Shoulder AROM; Flexion;ABduction;Horizontal ABduction   L Elbow AROM;Elbow flexion;Elbow extension   L Wrist AROM; Wrist extension;Wrist flexion   L Weight/Reps/Sets x 15   Subjective   Subjective "You can spit orders but just don't spit on me"   Cognition   Overall Cognitive Status [facility]   Arousal/Participation Alert; Cooperative   Attention Within functional limits   Orientation Level Oriented X4   Memory Within functional limits   Following Commands Follows all commands and directions without difficulty   Activity Tolerance   Activity Tolerance Patient limited by fatigue;Treatment limited secondary to medical complications (Comment)  (BP/HR changes)   Assessment   Assessment Patient participated in Skilled OT session this date with interventions consisting of Energy Conservation techniques, Work simplification skills , safety awareness and fall prevention techniques, therapeutic exercise to: increase functional use of BUEs, increase BUE muscle strength ,  therapeutic activities to: increase activity tolerance and increase cardiovascular endurance    Co treatment with PT secondary to complex medical condition of pt, mod-max A of 2 required to achieve and maintain transitional movements, requiring the need of skilled therapeutic intervention of 2 therapists to achieve delivery of services  Patient agreeable to OT treatment session, upon arrival patient was found seated OOB to Chair  Patient requiring verbal cues for safety  Patient continues to be functioning below baseline level, occupational performance remains limited secondary to factors listed above and increased risk for falls and injury  The patient's raw score on the AM-PAC Daily Activity Inpatient Short Form is 19  A raw score of greater than or equal to 19 suggests the patient may benefit from discharge to home  Please refer to the recommendation of the Occupational Therapist for safe discharge planning  From OT standpoint, recommendation at time of d/c would be Post acute rehabilitation services  Plan   Treatment Interventions Functional transfer training;UE strengthening/ROM; Endurance training;Energy conservation; Activityengagement   Goal Expiration Date 03/23/23   OT Treatment Day 5   OT Frequency 2-3x/wk   Recommendation   OT Discharge Recommendation Post acute rehabilitation services   [facility] Daily Activity Inpatient   Lower Body Dressing 2   Bathing 2   Toileting 3   Upper Body Dressing 4   Grooming 4   Eating 4   Daily Activity Raw Score 19   Daily Activity Standardized Score (Calc for Raw Score >=11) 40 22 AM-PAC Applied Cognition Inpatient   Following a Speech/Presentation 4   Understanding Ordinary Conversation 4   Taking Medications 4   Remembering Where Things Are Placed or Put Away 4   Remembering List of 4-5 Errands 4   Taking Care of Complicated Tasks 4   Applied Cognition Raw Score 24   Applied Cognition Standardized Score 62 21       Pt will benefit from continued OT services in order to maximize (I) c ADL performance, FM c RW, and improve overall endurance/strength required to complete functional tasks in preparation for d/c  Pt benefited from co-treatment of skilled OT and PT therapists in order to most appropriately address functional deficits d/t extensive assistance required for safe functional mobility, decreased activity tolerance, and regression from functioning level prior to admission and/or onset of present illness  OT/PT objectives were addressed separately; please see PT note for specific goal areas targeted  Pt left seated in chair at end of session; all needs within reach; all lines intact      Hernando Callaway

## 2023-03-20 NOTE — CASE MANAGEMENT
Case Management Progress Note    Patient name Ramirez Confer  Location /030-51 MRN [id number]  : 1945 Date 3/20/2023       LOS (days): 11  Geometric Mean LOS (GMLOS) (days): 2 30  Days to GMLOS:-8 9        OBJECTIVE:        Current admission status: Inpatient  Preferred Pharmacy:   [facility] [id number]  Phone: [phone] Fax: [phone]    Primary Care Provider: Saeid Rosas DO    Primary Insurance: [facility] REP  Secondary Insurance:     Angeline Escobedo still requiring inpatient stay due to hypotension  Cardiology re-consulted  Cm to follow for any discharge needs

## 2023-03-20 NOTE — ASSESSMENT & PLAN NOTE
Wt Readings from Last 3 Encounters:   03/20/23 93 9 kg (207 lb)   03/08/23 105 kg (231 lb 7 7 oz)   03/02/23 98 9 kg (218 lb 0 6 oz)     · Remains euvolemic  · Trace lower extremity edema- recommend elevating legs  · Daily weights  · Diuretics held due to orthostatic hypotension

## 2023-03-20 NOTE — ASSESSMENT & PLAN NOTE
· Known CAD with recent drug-eluting stent placement/PTCA to the LAD on 12/29/2022  · I appreciate Cardiology's input    · No indication for repeat cardiac catheterization at this time per Cardiology  · Continue plavix 75 mg PO Qdaily  · ASA resumed after 10-day hold, and no additonal GI bleeding -per Cardiology's recommendation  · Continue high-intensity statin therapy with atorvastatin 80 mg PO Qdaily with dinner  · Not on beta-blocker therapy at this time due to orthostatic hypotension

## 2023-03-20 NOTE — ASSESSMENT & PLAN NOTE
· No evidence of pulmonary embolism on CTA of the chest/PE protocol completed on 3/9/2023  · No evidence of DVT on venous duplex of both lower extremities on 3/9/2023

## 2023-03-20 NOTE — ASSESSMENT & PLAN NOTE
· Continues to experience orthostatic hypotension with severe lightheadedness and dizziness while standing and a systolic blood pressure in the 90's mmHg  · Possible effects of Agent Orange exposure in the past  · Hold tamsulosin for now  · Beta-blocker and diuretics held  · Midodrine increased to 10 mg TID with persistent symptomatic hypotension,slowly improving  · Likely in the setting of blood loss anemia, given 1 dose of IV iron   · Compression stockings to be utilized  · Continue to monitor  · Monitor the orthostatic blood pressure readings  · Cortisol WNL  · Noted to have Polyuria with UO 3-5 L daily-  · Check osmolality, urine Na   ADH  · Anna Zack is on hold due to polyuria  · Nephrology and Neurology have been consulted  · Re-consult Cardiology  · Hold Ranexa in the setting of orthostatic hypotension  · May need the addition of Florinef

## 2023-03-20 NOTE — PROGRESS NOTES
[address] [facility] [address] [place]  Progress Note - Shefali Winn 1945, 69 y o  male MRN: [id number]  Unit/Bed#: 421-01 Encounter: [id number]  Primary Care Provider: Jose Sales DO   Date and time admitted to hospital: 3/8/2023 11:01 PM    * Orthostatic hypotension  Assessment & Plan  · Continues to experience orthostatic hypotension with severe lightheadedness and dizziness while standing and a systolic blood pressure in the 90's mmHg  · Possible effects of Agent Orange exposure in the past  · Hold tamsulosin for now  · Beta-blocker and diuretics held  · Midodrine increased to 10 mg TID with persistent symptomatic hypotension,slowly improving  · Likely in the setting of blood loss anemia, given 1 dose of IV iron   · Compression stockings to be utilized  · Continue to monitor  · Monitor the orthostatic blood pressure readings  · Cortisol WNL  · Noted to have Polyuria with UO 3-5 L daily-  · Check osmolality, urine Na  ADH  · Friend[place] Slate is on hold due to polyuria  · Nephrology and Neurology have been consulted  · Re-consult Cardiology  · Hold Ranexa in the setting of orthostatic hypotension  · May need the addition of Florinef    Other chest pain  Assessment & Plan  · Known CAD with recent drug-eluting stent placement/PTCA to the LAD on 12/29/2022  · I appreciate Cardiology's input    · No indication for repeat cardiac catheterization at this time per Cardiology  · Continue plavix 75 mg PO Qdaily  · ASA resumed after 10-day hold, and no additonal GI bleeding -per Cardiology's recommendation  · Continue high-intensity statin therapy with atorvastatin 80 mg PO Qdaily with dinner  · Not on beta-blocker therapy at this time due to orthostatic hypotension    Hypothyroidism  Assessment & Plan  · Increased levothyroxine to 112 mcg PO Qdaily in the early morning in the setting of an elevated TSH level as shown below  · Recheck a TSH level in 4-6 weeks with his PCP     Latest Reference Range & Units 03/19/23 10:31 TSH 3RD GENERATON 0 450 - 4 500 uIU/mL 4 980 (H)   Free T4 0 76 - 1 46 ng/dL 1 18   (H): Data is abnormally high    Acute blood loss anemia  Assessment & Plan  · Recent gastrointestinal hemorrhage thought to be diverticular bleeding found on colonoscopy on 3/3/2023  · Serial laboratory testing to monitor hemoglobin/hematocrit levels  · Initiated cyanocobalamin 500 mcg PO Qdaily for a low-normal vitamin B12 level  · Strain despite being on senna-s BID  Added miralax    · GI on board  · No need for colonoscopy at this time per GI, outpatient follow-up  · Iron venofer x 1  · Hemoglobin remains stable, currently 9 9 g/dl  · No recurrence of melena, or GI bleeding with brown BM last 24hr    Results from last 7 days   Lab Units 03/19/23  1031 03/18/23  0533 03/17/23  0637   WBC Thousand/uL 6 73 5 88 6 45   HEMOGLOBIN g/dL 9 9* 9 2* 9 4*   HEMATOCRIT % 30 8* 28 7* 29 7*   PLATELETS Thousands/uL 412* 389 412*           Elevated d-dimer  Assessment & Plan  · No evidence of pulmonary embolism on CTA of the chest/PE protocol completed on 3/9/2023  · No evidence of DVT on venous duplex of both lower extremities on 3/9/2023      Elevated TSH  Assessment & Plan  · Please see the assessment and plan for "Hypothyrodism"    Urinary retention  Assessment & Plan  · Maintain the Jose catheter for now  · Hold tamsulosin in the setting of orthostatic hypotension  · Appreciate Urology evaluation  · Will have outpatient follow-up with Urology    Chronic diastolic (congestive) heart failure ([facility] [address] )  Assessment & Plan  Wt Readings from Last 3 Encounters:   03/20/23 93 9 kg (207 lb)   03/08/23 105 kg (231 lb 7 7 oz)   03/02/23 98 9 kg (218 lb 0 6 oz)     · Remains euvolemic  · Trace lower extremity edema- recommend elevating legs  · Daily weights  · Diuretics held due to orthostatic hypotension      Aortic stenosis, moderate  Assessment & Plan  • Outpatient follow-up with Cardiology      Coronary artery disease of native artery of native heart with stable angina pectoris [facility])  Assessment & Plan  · Known CAD with recent SAM placement/PTCA to the LAD on 12/29/2022  · Appreciate Cardiology's recommendations  · No indication for repeat cardiac catheterization at this time per Cardiology  · Was previously treated with monotherapy with plavix 75 mg PO Qdaily (7-10 days) in the setting of GI bleeding  · Restarted aspirin 81 mg PO QD with no additional GI bleeding/Hgb stable - 3/20/23  · Continue high-intensity statin therapy with atorvastatin 80 mg PO Qdaily with dinner  · BB held with hypotension    Type 2 diabetes mellitus with hyperglycemia, without long-term current use of insulin [facility])  Assessment & Plan  Lab Results   Component Value Date    HGBA1C 5 6 03/10/2023       Recent Labs     03/19/23  1600 03/19/23  2129 03/20/23  0728 03/20/23  1053   POCGLU 185* 169* 130 192*       Blood Sugar Average: Last 72 hrs:  (P) 167 [id number]     · Good control per last HgbA1c  · Continue sliding scale insulin with blood glucose monitoring ACHS  · Continue to hold Jardiance for now      VTE Pharmacologic Prophylaxis: VTE Score: 5 High Risk (Score >/= 5) - Pharmacological DVT Prophylaxis Contraindicated  Sequential Compression Devices Ordered  Patient Centered Rounds: I performed bedside rounds with nursing staff today  Total Time Spent on Date of Encounter in care of patient: 35 minutes This time was spent on one or more of the following: performing physical exam; counseling and coordination of care; obtaining or reviewing history; documenting in the medical record; reviewing/ordering tests, medications or procedures; communicating with other healthcare professionals and discussing with patient's family/caregivers      Current Length of Stay: 11 day(s)  Current Patient Status: Inpatient   Certification Statement: The patient will continue to require additional inpatient hospital stay due to the need for additional treatment of the orthostatic hypotension  Discharge Plan: Anticipate discharge in 24-48 hrs to home with home services  Code Status: Level 1 - Full Code    Subjective: The patient was seen and examined  The patient continues to experience dizziness and lightheadedness while sitting up and while standing and ambulating  No shortness of breath  No abdominal pain  No nausea or vomiting  Objective:     Vitals:   Temp (24hrs), Av 3 °F (36 3 °C), Min:97 °F (36 1 °C), Max:97 6 °F (36 4 °C)    Temp:  [97 °F (36 1 °C)-97 6 °F (36 4 °C)] 97 3 °F (36 3 °C)  HR:  [] 104  Resp:  [17-20] 20  BP: ()/() 116/59  SpO2:  [94 %-98 %] 98 %  Body mass index is 30 57 kg/m²  Input and Output Summary (last 24 hours):      Intake/Output Summary (Last 24 hours) at 3/20/2023 1529  Last data filed at 3/20/2023 1226  Gross per 24 hour   Intake 1500 ml   Output 3050 ml   Net -1550 ml       Physical Exam:   Physical Exam   General:  NAD, follows commands  HEENT:  NC/AT, mucous membranes moist  Neck:  Supple, No JVP elevation  CV:  + S1, + S2, Tachycardic, Regular rhythm  Pulm:  Lung fields are CTA bilaterally  Abd:  Soft, Non-tender, Non-distended  Ext:  No clubbing/cyanosis/edema  Skin:  No rashes  Neuro:  Awake, alert, oriented  Psych:  Normal mood and affect      Additional Data:    Labs:  Results from last 7 days   Lab Units 23  1031 23  0533   WBC Thousand/uL 6 73 5 88   HEMOGLOBIN g/dL 9 9* 9 2*   HEMATOCRIT % 30 8* 28 7*   PLATELETS Thousands/uL 412* 389   NEUTROS PCT %  --  50   LYMPHS PCT %  --  29   MONOS PCT %  --  12   EOS PCT %  --  8*     Results from last 7 days   Lab Units 23  1031 03/15/23  0506 23  0527   SODIUM mmol/L 135   < > 138   POTASSIUM mmol/L 4 3   < > 4 5   CHLORIDE mmol/L 103   < > 107   CO2 mmol/L 25   < > 24   BUN mg/dL 10   < > 15   CREATININE mg/dL 1 15   < > 1 02   ANION GAP mmol/L 7   < > 7   CALCIUM mg/dL 8 8   < > 7 6*   ALBUMIN g/dL  --   --  2 9*   TOTAL BILIRUBIN mg/dL  -- --  0 39   ALK PHOS U/L  --   --  42   ALT U/L  --   --  22   AST U/L  --   --  23   GLUCOSE RANDOM mg/dL 211*   < > 102    < > = values in this interval not displayed  Results from last 7 days   Lab Units 03/20/23  1053 03/20/23  0728 03/19/23  2129 03/19/23  1600 03/19/23  1106 03/19/23  0739 03/18/23  2107 03/18/23  1602 03/18/23  1116 03/18/23  0744 03/17/23  2049 03/17/23  1540   POC GLUCOSE mg/dl 192* 130 169* 185* 178* 114 186* 208* 184* 124 235* 197*         Results from last 7 days   Lab Units 03/14/23  0527   PROCALCITONIN ng/ml 0 07       Lines/Drains:  Invasive Devices     Peripheral Intravenous Line  Duration           Peripheral IV 03/17/23 Proximal;Ventral (anterior); Right Forearm 2 days          Drain  Duration           Urethral Catheter Temperature probe 16 days              Urinary Catheter:  Goal for removal: N/A- Discharging with Jose               Imaging: No pertinent imaging reviewed      Recent Cultures (last 7 days):         Last 24 Hours Medication List:   Current Facility-Administered Medications   Medication Dose Route Frequency Provider Last Rate   • acetaminophen  650 mg Oral Q6H PRN Mykel Neville MD     • allopurinol  100 mg Oral Daily Mykel Neville MD     • aluminum-magnesium hydroxide-simethicone  30 mL Oral Q4H PRN Mykel Neville MD     • aspirin  81 mg Oral Daily AlmarazFAY Velazquez     • atorvastatin  80 mg Oral Daily With Dinner Mykel Neville MD     • clopidogrel  75 mg Oral Daily Mykel Neville MD     • cyanocobalamin  500 mcg Oral Daily Lucille Bean DO     • escitalopram  10 mg Oral Daily Mykel Neville MD     • fluticasone  2 spray Each Nare Daily Citlaly Camp MD     • insulin lispro  2-12 Units Subcutaneous TID AC Mykel Neville MD     • insulin lispro  2-12 Units Subcutaneous HS Mykel Neville MD     • [START ON 3/21/2023] levothyroxine  112 mcg Oral Early Morning Pecsaima Bean DO     • midodrine  10 mg Oral TID AC Ange Sotomayor PA-C     • multivitamin-minerals  1 tablet Oral Daily Payal Winter MD     • nitroglycerin  0 4 mg Sublingual Q5 Min PRN Rosamaria olivia DO     • ondansetron  4 mg Intravenous Q4H PRN Maria E Bean DO     • pantoprazole  40 mg Oral Early Morning FAY Calvillo     • polyethylene glycol  17 g Oral Daily PRN Payal Winter MD     • polyethylene glycol  17 g Oral Daily Ange Sotomayor PA-C     • senna-docusate sodium  1 tablet Oral BID Zuri Bolden PA-C          Today, Patient Was Seen By: Latha Marquez DO    **Please Note: This note may have been constructed using a voice recognition system  **

## 2023-03-21 PROBLEM — R23.2 HOT FLASHES: Status: ACTIVE | Noted: 2023-03-21

## 2023-03-21 PROBLEM — R79.89 LOW SERUM CORTISOL LEVEL: Status: ACTIVE | Noted: 2023-03-21

## 2023-03-21 LAB
ALBUMIN SERPL BCP-MCNC: 3.9 G/DL (ref 3.5–5)
ALP SERPL-CCNC: 72 U/L (ref 34–104)
ALT SERPL W P-5'-P-CCNC: 22 U/L (ref 7–52)
ANION GAP SERPL CALCULATED.3IONS-SCNC: 8 MMOL/L (ref 4–13)
AST SERPL W P-5'-P-CCNC: 22 U/L (ref 13–39)
BASOPHILS # BLD AUTO: 0.05 THOUSANDS/ÂΜL (ref 0–0.1)
BASOPHILS NFR BLD AUTO: 1 % (ref 0–1)
BILIRUB SERPL-MCNC: 0.4 MG/DL (ref 0.2–1)
BUN SERPL-MCNC: 12 MG/DL (ref 5–25)
CALCIUM SERPL-MCNC: 8.8 MG/DL (ref 8.4–10.2)
CHLORIDE SERPL-SCNC: 105 MMOL/L (ref 96–108)
CO2 SERPL-SCNC: 26 MMOL/L (ref 21–32)
CREAT SERPL-MCNC: 1.23 MG/DL (ref 0.6–1.3)
EOSINOPHIL # BLD AUTO: 0.51 THOUSAND/ÂΜL (ref 0–0.61)
EOSINOPHIL NFR BLD AUTO: 9 % (ref 0–6)
ERYTHROCYTE [DISTWIDTH] IN BLOOD BY AUTOMATED COUNT: 17.7 % (ref 11.6–15.1)
GFR SERPL CREATININE-BSD FRML MDRD: 56 ML/MIN/1.73SQ M
GLUCOSE SERPL-MCNC: 101 MG/DL (ref 65–140)
GLUCOSE SERPL-MCNC: 119 MG/DL (ref 65–140)
GLUCOSE SERPL-MCNC: 136 MG/DL (ref 65–140)
GLUCOSE SERPL-MCNC: 238 MG/DL (ref 65–140)
GLUCOSE SERPL-MCNC: 266 MG/DL (ref 65–140)
HCT VFR BLD AUTO: 32.4 % (ref 36.5–49.3)
HGB BLD-MCNC: 10 G/DL (ref 12–17)
IMM GRANULOCYTES # BLD AUTO: 0.02 THOUSAND/UL (ref 0–0.2)
IMM GRANULOCYTES NFR BLD AUTO: 0 % (ref 0–2)
LYMPHOCYTES # BLD AUTO: 1.66 THOUSANDS/ÂΜL (ref 0.6–4.47)
LYMPHOCYTES NFR BLD AUTO: 28 % (ref 14–44)
MAGNESIUM SERPL-MCNC: 2 MG/DL (ref 1.9–2.7)
MCH RBC QN AUTO: 31 PG (ref 26.8–34.3)
MCHC RBC AUTO-ENTMCNC: 30.9 G/DL (ref 31.4–37.4)
MCV RBC AUTO: 100 FL (ref 82–98)
MONOCYTES # BLD AUTO: 0.65 THOUSAND/ÂΜL (ref 0.17–1.22)
MONOCYTES NFR BLD AUTO: 11 % (ref 4–12)
NEUTROPHILS # BLD AUTO: 2.98 THOUSANDS/ÂΜL (ref 1.85–7.62)
NEUTS SEG NFR BLD AUTO: 51 % (ref 43–75)
NRBC BLD AUTO-RTO: 0 /100 WBCS
PHOSPHATE SERPL-MCNC: 3.6 MG/DL (ref 2.3–4.1)
PLATELET # BLD AUTO: 393 THOUSANDS/UL (ref 149–390)
PMV BLD AUTO: 8.8 FL (ref 8.9–12.7)
POTASSIUM SERPL-SCNC: 4 MMOL/L (ref 3.5–5.3)
PROCALCITONIN SERPL-MCNC: 0.07 NG/ML
PROT SERPL-MCNC: 6.4 G/DL (ref 6.4–8.4)
RBC # BLD AUTO: 3.23 MILLION/UL (ref 3.88–5.62)
SODIUM SERPL-SCNC: 139 MMOL/L (ref 135–147)
WBC # BLD AUTO: 5.87 THOUSAND/UL (ref 4.31–10.16)

## 2023-03-21 RX ADMIN — SENNOSIDES AND DOCUSATE SODIUM 1 TABLET: 50; 8.6 TABLET ORAL at 10:37

## 2023-03-21 RX ADMIN — MULTIPLE VITAMINS W/ MINERALS TAB 1 TABLET: TAB at 10:36

## 2023-03-21 RX ADMIN — PANTOPRAZOLE SODIUM 40 MG: 40 TABLET, DELAYED RELEASE ORAL at 06:20

## 2023-03-21 RX ADMIN — FLUTICASONE PROPIONATE 2 SPRAY: 50 SPRAY, METERED NASAL at 10:37

## 2023-03-21 RX ADMIN — MIDODRINE HYDROCHLORIDE 10 MG: 5 TABLET ORAL at 17:53

## 2023-03-21 RX ADMIN — ESCITALOPRAM OXALATE 10 MG: 10 TABLET ORAL at 10:37

## 2023-03-21 RX ADMIN — ALLOPURINOL 100 MG: 100 TABLET ORAL at 10:37

## 2023-03-21 RX ADMIN — MIDODRINE HYDROCHLORIDE 10 MG: 5 TABLET ORAL at 06:20

## 2023-03-21 RX ADMIN — CLOPIDOGREL BISULFATE 75 MG: 75 TABLET ORAL at 10:37

## 2023-03-21 RX ADMIN — LEVOTHYROXINE SODIUM 112 MCG: 112 TABLET ORAL at 06:20

## 2023-03-21 RX ADMIN — ASPIRIN 81 MG: 81 TABLET, COATED ORAL at 10:37

## 2023-03-21 RX ADMIN — INSULIN LISPRO 6 UNITS: 100 INJECTION, SOLUTION INTRAVENOUS; SUBCUTANEOUS at 17:54

## 2023-03-21 RX ADMIN — POLYETHYLENE GLYCOL 3350 17 G: 17 POWDER, FOR SOLUTION ORAL at 10:36

## 2023-03-21 RX ADMIN — SENNOSIDES AND DOCUSATE SODIUM 1 TABLET: 50; 8.6 TABLET ORAL at 17:54

## 2023-03-21 RX ADMIN — CYANOCOBALAMIN TAB 500 MCG 500 MCG: 500 TAB at 10:37

## 2023-03-21 RX ADMIN — ATORVASTATIN CALCIUM 80 MG: 40 TABLET, FILM COATED ORAL at 17:53

## 2023-03-21 RX ADMIN — MIDODRINE HYDROCHLORIDE 10 MG: 5 TABLET ORAL at 10:37

## 2023-03-21 NOTE — ASSESSMENT & PLAN NOTE
· Known CAD with recent SAM placement/PTCA to the LAD on 12/29/2022  · Appreciate Cardiology's recommendations  · No indication for repeat cardiac catheterization at this time per Cardiology  • Was previously treated with monotherapy with plavix 75 mg PO Qdaily (7-10 days) in the setting of GI bleeding  · Restarted aspirin 81 mg PO QD with no additional GI bleeding/Hgb stable - 3/21/23  · Continue high-intensity statin therapy with atorvastatin 80 mg PO Qdaily with dinner  · BB held with hypotension

## 2023-03-21 NOTE — CASE MANAGEMENT
Case Management Discharge Planning Note    Patient name Cindy Amador  Location [address] [id number] MRN [id number]  : 1945 Date 3/21/2023       Current Admission Date: 3/8/2023  Current Admission Diagnosis:Orthostatic hypotension   Patient Active Problem List    Diagnosis Date Noted   • Elevated platelet count    • Hypothyroidism 2023   • Diverticulosis of colon with hemorrhage 2023   • Elevated TSH 2023   • Elevated d-dimer 2023   • Urinary retention 2023   • Acute blood loss anemia 2023   • Acute lower GI bleeding 2023   • Diabetes mellitus, type 2 ([facility] ) 2023   • Acute diverticulitis 2023   • Orthostatic hypotension 2023   • 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   • Peripheral arterial disease ([facility] ) 2023   • S/P cardiac catheterization 2022   • Stage 3a chronic kidney disease ([facility] ) 2022   • Basal cell carcinoma (BCC) of right shoulder 2022   • Chronic diastolic (congestive) heart failure ([facility] ) 01/10/2022   • Skin lesion 2021   • Other chest pain 2021   • Leukocytosis 2021   • Bilateral lower extremity edema 10/22/2021   • Mild cognitive impairment 2021   • Benign hypertension with CKD (chronic kidney disease) stage III ([facility] [address] ) 02/10/2021   • Diabetic nephropathy associated with type 2 diabetes mellitus ([place] [address] ) 02/10/2021   • Concentric left ventricular hypertrophy 02/10/2021   • Aortic stenosis, moderate 02/10/2021   • Edema 02/10/2021   • Fall (on) (from) other stairs and steps, initial encounter 2020   • Recurrent major depressive disorder ([facility] ) 2020   • Primary osteoarthritis of right knee 2020   • Primary osteoarthritis of left knee 2020   • Coronary artery disease of native artery of native heart with stable angina pectoris ([facility] ) 2020   • Chronic pain of both knees 2020   • Primary osteoarthritis of both knees 2020   • Primary osteoarthritis involving multiple joints 12/16/2019   • Vitamin E deficiency 09/19/2019   • Infected epidermoid cyst 12/04/2018   • Tremor, essential 09/26/2018   • Diabetic neuropathy associated with diabetes mellitus due to underlying condition ([place] [address] ) 09/26/2018   • Gait instability 09/26/2018   • Frequent falls 09/26/2018   • Degenerative disc disease, lumbar 07/10/2018   • Tremor 10/10/2016   • Depression with anxiety 08/29/2016   • Arthritis 07/13/2015   • NSTEMI (non-ST elevated myocardial infarction) ([place] [address] ) 10/06/2014   • Gout 05/17/2013   • Type 2 diabetes mellitus with hyperglycemia, without long-term current use of insulin ([place] [address] ) 06/18/2012   • Hyperlipidemia 06/18/2012   • Hypertension 06/18/2012   • Morbid obesity ([place] [address] ) 06/18/2012      LOS (days): 12  Geometric Mean LOS (GMLOS) (days): 2 30  Days to GMLOS:-9 7     OBJECTIVE:  Risk of Unplanned Readmission Score: 23 92         Current admission status: Inpatient   Preferred Pharmacy:   [facility] [address] [place] [address]  Phone: [phone] Fax: [phone]    Primary Care Provider: Ney Hernandez DO    Primary Insurance: [facility]  Secondary Insurance:     DISCHARGE DETAILS:    Discharge planning discussed with[de-identified] patient and wife  Freedom of Choice: Yes  Comments - Freedom of Choice: agreeable to [facility] [employer] 5th floor [facility]  CM contacted family/caregiver?: Yes  Were Treatment Team discharge recommendations reviewed with patient/caregiver?: Yes  Did patient/caregiver verbalize understanding of patient care needs?: Yes  Were patient/caregiver advised of the risks associated with not following Treatment Team discharge recommendations?: Yes    Contacts  Contact Method:  In Person  Reason/Outcome: Discharge Planning              Other Referral/Resources/Interventions Provided:  Interventions: Short Term Rehab    Would you like to participate in our [address] service program?  : No - Declined    Treatment Team Recommendation: Short Term Rehab  Discharge Destination Plan[de-identified] Rd Fails   Dr Shankar Arceo at bedside talking with pt and wife  He is only agreeable to STR on 5th floor at [facility]  Referral sent via aidin and message sent to [phone] [employer] via teams

## 2023-03-21 NOTE — ASSESSMENT & PLAN NOTE
· Known CAD with recent drug-eluting stent placement/PTCA to the LAD on 12/29/2022  · I appreciate Cardiology's input    · No indication for repeat cardiac catheterization at this time per Cardiology  · Continue plavix 75 mg PO Qdaily  · ASA resumed after 10-day hold, and no additonal GI bleeding -per Cardiology's recommendation  · Continue high-intensity statin therapy with atorvastatin 80 mg PO Qdaily with dinner  · Not on beta-blocker therapy at this time due to orthostatic hypotension  · Hold Ranexa in the setting of orthostatic hypotension

## 2023-03-21 NOTE — ASSESSMENT & PLAN NOTE
· Recent gastrointestinal hemorrhage thought to be diverticular bleeding found on colonoscopy on 3/3/2023  · Serial laboratory testing to monitor hemoglobin/hematocrit levels  · Initiated cyanocobalamin 500 mcg PO Qdaily for a low-normal vitamin B12 level  · Strain despite being on senna-s BID  Added miralax    · GI on board  · No need for colonoscopy at this time per GI, outpatient follow-up  · Iron venofer x 1 dose  · Hemoglobin remains stable  · No recurrence of melena, hematochezia, or GI bleeding    Results from last 7 days   Lab Units 03/21/23  0629 03/19/23  1031 03/18/23  0533   WBC Thousand/uL 5 87 6 73 5 88   HEMOGLOBIN g/dL 10 0* 9 9* 9 2*   HEMATOCRIT % 32 4* 30 8* 28 7*   PLATELETS Thousands/uL 393* 412* 389

## 2023-03-21 NOTE — PROGRESS NOTES
[address] [facility] [address]  Progress Note - Maciel Brown 1945, 69 y o  male MRN: [id number]  Unit/Bed#: 421-01 Encounter: [id number]  Primary Care Provider: Ney Hernandez DO   Date and time admitted to hospital: 3/8/2023 11:01 PM    * Orthostatic hypotension  Assessment & Plan  · Continues to experience orthostatic hypotension with severe lightheadedness and dizziness while standing and a systolic blood pressure in the 90's mmHg  · Possible effects of Agent Orange exposure in the past  · Hold tamsulosin for now  · Beta-blocker and diuretics held  · Midodrine increased to 10 mg TID with persistent symptomatic hypotension,slowly improving  · Likely in the setting of blood loss anemia, given 1 dose of IV iron   · Compression stockings to be utilized  · Continue to monitor  · Monitor the orthostatic blood pressure readings  · Cortisol WNL  · Noted to have Polyuria with UO 3-5 L daily-  · Check osmolality, urine Na  ADH  · Lennox Noé is on hold due to polyuria  · Nephrology and Neurology have been consulted  · Re-consulted Cardiology  · Hold Ranexa in the setting of orthostatic hypotension  · May need the addition of Florinef  · Evaluated by PT/OT, who recommended post-acute care rehabilitation placement  · The patient is now agreeable to post-acute care rehabilitation placement  Other chest pain  Assessment & Plan  · Known CAD with recent drug-eluting stent placement/PTCA to the LAD on 12/29/2022  · I appreciate Cardiology's input    · No indication for repeat cardiac catheterization at this time per Cardiology  · Continue plavix 75 mg PO Qdaily  · ASA resumed after 10-day hold, and no additonal GI bleeding -per Cardiology's recommendation  · Continue high-intensity statin therapy with atorvastatin 80 mg PO Qdaily with dinner  · Not on beta-blocker therapy at this time due to orthostatic hypotension  · Hold Ranexa in the setting of orthostatic hypotension    Hypothyroidism  Assessment & Plan  · Increased levothyroxine to 112 mcg PO Qdaily in the early morning in the setting of an elevated TSH level as shown below  · Recheck a TSH level in 4-6 weeks with his PCP     Latest Reference Range & Units 03/19/23 10:31   TSH 3RD GENERATON 0 450 - 4 500 uIU/mL 4 980 (H)   Free T4 0 76 - 1 46 ng/dL 1 18   (H): Data is abnormally high    Acute blood loss anemia  Assessment & Plan  · Recent gastrointestinal hemorrhage thought to be diverticular bleeding found on colonoscopy on 3/3/2023  · Serial laboratory testing to monitor hemoglobin/hematocrit levels  · Initiated cyanocobalamin 500 mcg PO Qdaily for a low-normal vitamin B12 level  · Strain despite being on senna-s BID  Added miralax    · GI on board  · No need for colonoscopy at this time per GI, outpatient follow-up  · Iron venofer x 1 dose  · Hemoglobin remains stable  · No recurrence of melena, hematochezia, or GI bleeding    Results from last 7 days   Lab Units 03/21/23  0629 03/19/23  1031 03/18/23  0533   WBC Thousand/uL 5 87 6 73 5 88   HEMOGLOBIN g/dL 10 0* 9 9* 9 2*   HEMATOCRIT % 32 4* 30 8* 28 7*   PLATELETS Thousands/uL 393* 412* 389           Hot flashes  Assessment & Plan  · Needs an Endocrinology evaluation    Low serum cortisol level  Assessment & Plan  · Recheck an AM cortisol level on 3/22/2023  · Consult Endocrinology     Latest Reference Range & Units 03/18/23 05:33   Cortisol - AM 4 2 - 22 4 ug/dL 6 3       Elevated platelet count  Assessment & Plan  · Likely reactive in nature  · Follow the platelet count    Elevated d-dimer  Assessment & Plan  · No evidence of pulmonary embolism on CTA of the chest/PE protocol completed on 3/9/2023  · No evidence of DVT on venous duplex of both lower extremities on 3/9/2023      Elevated TSH  Assessment & Plan  · Please see the assessment and plan for "Hypothyrodism"    Urinary retention  Assessment & Plan  · Maintain the Jose catheter for now  · Hold tamsulosin in the setting of orthostatic hypotension  · Appreciate Urology evaluation  · Will have outpatient follow-up with Urology    Chronic diastolic (congestive) heart failure ([facility] [address] )  Assessment & Plan  Wt Readings from Last 3 Encounters:   03/21/23 94 kg (207 lb 3 2 oz)   03/08/23 105 kg (231 lb 7 7 oz)   03/02/23 98 9 kg (218 lb 0 6 oz)     · Remains euvolemic  · Trace lower extremity edema - recommend elevating legs  · Daily weights  · Diuretics held due to orthostatic hypotension      Aortic stenosis, moderate  Assessment & Plan  • Outpatient follow-up with Cardiology      Coronary artery disease of native artery of native heart with stable angina pectoris [facility])  Assessment & Plan  · Known CAD with recent SAM placement/PTCA to the LAD on 12/29/2022  · Appreciate Cardiology's recommendations  · No indication for repeat cardiac catheterization at this time per Cardiology  • Was previously treated with monotherapy with plavix 75 mg PO Qdaily (7-10 days) in the setting of GI bleeding  · Restarted aspirin 81 mg PO QD with no additional GI bleeding/Hgb stable - 3/21/23  · Continue high-intensity statin therapy with atorvastatin 80 mg PO Qdaily with dinner  · BB held with hypotension    Type 2 diabetes mellitus with hyperglycemia, without long-term current use of insulin [facility])  Assessment & Plan  Lab Results   Component Value Date    HGBA1C 5 6 03/10/2023       Recent Labs     03/20/23  2032 03/21/23  0753 03/21/23  1108 03/21/23  1557   POCGLU 188* 101 238* 266*       Blood Sugar Average: Last 72 hrs:  (P) 176 4     · Good control per recent HgbA1c  · Continue sliding scale insulin with blood glucose monitoring ACHS  · Continue to hold Jardiance for now      VTE Pharmacologic Prophylaxis: VTE Score: 5 High Risk (Score >/= 5) - Pharmacological DVT Prophylaxis Contraindicated  Sequential Compression Devices Ordered  Patient Centered Rounds: I performed bedside rounds with nursing staff today    Discussions with Specialists or Other Care Team Provider: I discussed the case with Cardiology and with Nephrology  Education and Discussions with Family / Patient: Updated  (wife) at bedside  Total Time Spent on Date of Encounter in care of patient: 75 minutes This time was spent on one or more of the following: performing physical exam; counseling and coordination of care; obtaining or reviewing history; documenting in the medical record; reviewing/ordering tests, medications or procedures; communicating with other healthcare professionals and discussing with patient's family/caregivers  Current Length of Stay: 12 day(s)  Current Patient Status: Inpatient   Certification Statement: The patient will continue to require additional inpatient hospital stay due to the need for additional treatment of the orthostatic hypotension and for post-acute care rehabilitation placement  Discharge Plan: Anticipate discharge in 24-48 hrs to rehab facility  Code Status: Level 1 - Full Code    Subjective: The patient was seen and examined  The patient continues to experience dizziness and lightheadedness while standing up and ambulating as well as hot flashes while ambulating  No chest pain  No shortness of breath  No abdominal pain  No nausea or vomiting  Objective:     Vitals:   Temp (24hrs), Av 1 °F (36 2 °C), Min:97 1 °F (36 2 °C), Max:97 1 °F (36 2 °C)    Temp:  [97 1 °F (36 2 °C)] 97 1 °F (36 2 °C)  HR:  [68-91] 75  Resp:  [18-19] 19  BP: (111-134)/(54-77) 133/68  SpO2:  [93 %-98 %] 97 %  Body mass index is 30 6 kg/m²  Input and Output Summary (last 24 hours):      Intake/Output Summary (Last 24 hours) at 3/21/2023 1742  Last data filed at 3/21/2023 1300  Gross per 24 hour   Intake 720 ml   Output 3125 ml   Net -2405 ml       Physical Exam:   Physical Exam   General:  NAD, follows commands  HEENT:  NC/AT, mucous membranes moist  Neck:  Supple, No JVP elevation  CV:  + S1, + S2, RRR  Pulm:  Lung fields are CTA bilaterally  Abd:  Soft, Non-tender, Non-distended  Ext:  No clubbing/cyanosis/edema  Skin:  No rashes  Neuro:  Awake, alert, oriented  Psych:  Normal mood and affect  :  Jose catheter in place      Additional Data:    Labs:  Results from last 7 days   Lab Units 03/21/23  0629   WBC Thousand/uL 5 87   HEMOGLOBIN g/dL 10 0*   HEMATOCRIT % 32 4*   PLATELETS Thousands/uL 393*   NEUTROS PCT % 51   LYMPHS PCT % 28   MONOS PCT % 11   EOS PCT % 9*     Results from last 7 days   Lab Units 03/21/23  0629   SODIUM mmol/L 139   POTASSIUM mmol/L 4 0   CHLORIDE mmol/L 105   CO2 mmol/L 26   BUN mg/dL 12   CREATININE mg/dL 1 23   ANION GAP mmol/L 8   CALCIUM mg/dL 8 8   ALBUMIN g/dL 3 9   TOTAL BILIRUBIN mg/dL 0 40   ALK PHOS U/L 72   ALT U/L 22   AST U/L 22   GLUCOSE RANDOM mg/dL 119         Results from last 7 days   Lab Units 03/21/23  1557 03/21/23  1108 03/21/23  0753 03/20/23  2032 03/20/23  1613 03/20/23  1053 03/20/23  0728 03/19/23  2129 03/19/23  1600 03/19/23  1106 03/19/23  0739 03/18/23  2107   POC GLUCOSE mg/dl 266* 238* 101 188* 183* 192* 130 169* 185* 178* 114 186*         Results from last 7 days   Lab Units 03/21/23  0629   PROCALCITONIN ng/ml 0 07       Lines/Drains:  Invasive Devices     Peripheral Intravenous Line  Duration           Peripheral IV 03/17/23 Proximal;Ventral (anterior); Right Forearm 3 days          Drain  Duration           Urethral Catheter Temperature probe 17 days              Urinary Catheter:  Goal for removal: N/A- Discharging with Jose               Imaging: No pertinent imaging reviewed      Recent Cultures (last 7 days):         Last 24 Hours Medication List:   Current Facility-Administered Medications   Medication Dose Route Frequency Provider Last Rate   • acetaminophen  650 mg Oral Q6H PRN Lynne Rand MD     • allopurinol  100 mg Oral Daily Lynne Rand MD     • aluminum-magnesium hydroxide-simethicone  30 mL Oral Q4H PRN Lynne Rand MD     • aspirin  81 mg Oral Daily Rick Echevarria CRNP     • atorvastatin  80 mg Oral Daily With Dinner Willie Rainey MD     • clopidogrel  75 mg Oral Daily Willie Rainey MD     • cyanocobalamin  500 mcg Oral Daily Brian Bean DO     • escitalopram  10 mg Oral Daily Willie Rainey MD     • fluticasone  2 spray Each Nare Daily Baljinder Galvan MD     • insulin lispro  2-12 Units Subcutaneous TID AC Willie Rainey MD     • insulin lispro  2-12 Units Subcutaneous HS Willie Rainey MD     • levothyroxine  112 mcg Oral Early Morning Brian Bean DO     • midodrine  10 mg Oral TID AC Ger Fall PA-C     • multivitamin-minerals  1 tablet Oral Daily Willie Rainey MD     • nitroglycerin  0 4 mg Sublingual Q5 Min PRN Kevin Neff DO     • ondansetron  4 mg Intravenous Q4H PRN Brian Bean DO     • pantoprazole  40 mg Oral Early Morning FAY Gilman     • polyethylene glycol  17 g Oral Daily PRN Willie Rainey MD     • polyethylene glycol  17 g Oral Daily Ger Fall PA-C     • senna-docusate sodium  1 tablet Oral BID Julien Curtis PA-C          Today, Patient Was Seen By: Khadijah Rome DO    **Please Note: This note may have been constructed using a voice recognition system  **

## 2023-03-21 NOTE — PROGRESS NOTES
NEPHROLOGY PROGRESS NOTE   Saida Hayden 69 y o  male MRN: [id number]  Unit/Bed#: 775-20 Encounter: [id number]    Assessment/Plan:    70 yo man who follows with Dr Jesus Manuel Mora for CKD 3A likely secondary to diabetic nephropathy, hypertensive nephrosclerosis, moderate AS, CAD status post SAM times 2 December 2022, recent GI bleed earlier this month initially presented 3/9 chief complaint chest pain that is post evaluated by cardiology with no plan for intervention however development of orthostatic hypotension for which nephrology consulted  1   Orthostatic hypotension  · Jardiance, diuretic, Flomax, Ranexa have all been discontinued  continues to have mild to moderate symptoms with position changes and "flushing/hot flashes" which speaks to a vasodilatory component   -Received nitroglycerin 2 doses on 3/16  · Concern for polyuria over the weekend however this seems to have resolved  There is concentrated urine in bag and urine osmolality is around 400 mmol/kg  He was likely auto diuresing status post aggressive volume resuscitation and still has lower extremity edema  · A m  cortisol was borderline low at 6 3 ug/dL  Recommend repeating in a m  versus getting endocrinology involved for ACTH and potential cosyntropin stimulation test-TC sent to primary team  · Agree with compression stockings and abdominal binder  · There is also mention of agent orange exposure- recommend endocrinology evaluation in this regard as outpatient  · Monitor for supine hypertension on high-dose midodrine 10 mg 3 times daily and wean as able  · Agree with rehabilitation  2  Stage IIIa chronic kidney disease with baseline creatinine around 1 0-1 2 mg/dL  · Likely due to diabetic nephropathy and hypertensive nephrosclerosis  Follows with Dr Jesus Manuel Mora  3  Bilateral lower extremity edema, iatrogenic volume overload  · Setting of aggressive volume expansion and recent GI bleed requiring 4 units of PRBCs  Diuresed earlier in stay  Diuretics and SGLT2 inhibitor now on hold due to #1  Continue low-sodium diet, LE weights  4   Diabetic nephropathy, likely  · Losartan and SGLT2 inhibitor on hold  Blood sugar management per primary team  5  Urinary retention  · Indwelling urinary catheter remains intact per urology colleagues  6   Moderate aortic stenosis      ROS  Examined sitting in the chair  States he still feels dizzy however it has been improving with rising slowly  A complete 10 point review of systems have been performed and are otherwise negative  Historical Information   Past Medical History:   Diagnosis Date   • BPH (benign prostatic hyperplasia)    • CAD (coronary artery disease)    • Cancer (HCC)     basal cell   • Chronic kidney disease     stage 3   • Colon polyp    • CPAP (continuous positive airway pressure) dependence    • Diabetes mellitus (HCC)     niddm   • Disease of thyroid gland    • Gout    • High cholesterol    • Hypertension    • Hypomagnesemia 3/9/2023   • MI, 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    • Myocardial infarction ([facility] [address] ) 2014   • S/P cardiac catheterization 12/30/2022   • Sleep apnea      Past Surgical History:   Procedure Laterality Date   • BASAL CELL CARCINOMA EXCISION Right 1/21/2022    Procedure: EXCISION BASAL CELL CARCINOMA EXTREMITY;  Surgeon: Sebas Cormier DO;  Location: [facility];  Service: General   • CARDIAC CATHETERIZATION Left 12/29/2022    Procedure: Cardiac Left Heart Cath;  Surgeon: Manasa Hager MD;  Location: [facility]; Service: Cardiology   • CARDIAC CATHETERIZATION N/A 12/29/2022    Procedure: Cardiac Coronary Angiogram;  Surgeon: Manasa Hager MD;  Location: [facility]; Service: Cardiology   • CARDIAC CATHETERIZATION N/A 12/29/2022    Procedure: Cardiac pci;  Surgeon: Manasa Hager MD;  Location: [facility];   Service: Cardiology   • CATARACT EXTRACTION W/  INTRAOCULAR LENS IMPLANT     • CORNEAL TRANSPLANT     • CORONARY ANGIOPLASTY WITH STENT PLACEMENT      stents x3-- [phone]   • EYE SURGERY      repair corneal laceration   • WY COLONOSCOPY FLX DX W/COLLJ SPEC WHEN PFRMD N/A 3/21/2016    Procedure: COLONOSCOPY;  Surgeon: Paul Beckett MD;  Location: [facility];  Service: Colorectal   • TONSILLECTOMY AND ADENOIDECTOMY       Social History   Social History     Substance and Sexual Activity   Alcohol Use Not Currently    Comment: socially, cut down in 2008, previously did more than      Social History     Substance and Sexual Activity   Drug Use Never     Social History     Tobacco Use   Smoking Status Never   Smokeless Tobacco Never       Family History:   Family History   Adopted: Yes   Problem Relation Age of Onset   • No Known Problems Mother    • No Known Problems Father        Medications:  Pertinent medications were reviewed  Current Facility-Administered Medications   Medication Dose Route Frequency Provider Last Rate   • acetaminophen  650 mg Oral Q6H PRN Ajay Phipps MD     • allopurinol  100 mg Oral Daily Ajay Phipps MD     • aluminum-magnesium hydroxide-simethicone  30 mL Oral Q4H PRN Ajay Phipps MD     • aspirin  81 mg Oral Daily FAY Foley     • atorvastatin  80 mg Oral Daily With Dinner Ajay Phipps MD     • clopidogrel  75 mg Oral Daily Ajay Phipps MD     • cyanocobalamin  500 mcg Oral Daily Fab Bean DO     • escitalopram  10 mg Oral Daily Ajay Phipps MD     • fluticasone  2 spray Each Nare Daily Erika Diaz MD     • insulin lispro  2-12 Units Subcutaneous TID AC Ajay Phipps MD     • insulin lispro  2-12 Units Subcutaneous HS Ajay Phipps MD     • levothyroxine  112 mcg Oral Early Morning Fab Bean DO     • midodrine  10 mg Oral TID AC Niraj Swain PA-C     • multivitamin-minerals  1 tablet Oral Daily Ajay Phipps MD     • nitroglycerin  0 4 mg Sublingual Q5 Min PRN Thony Neff DO     • ondansetron  4 mg Intravenous Q4H PRN [place] Mon, DO     • pantoprazole  40 mg Oral Early Morning Cassy AlvaradorFAY     • polyethylene glycol  17 g Oral Daily PRN Yoko Medel MD     • polyethylene glycol  17 g Oral Daily Adan Lopez PA-C     • senna-docusate sodium  1 tablet Oral BID Lindsey De Paz PA-C           No Known Allergies      Vitals:   /76   Pulse 91   Temp (!) 97 1 °F (36 2 °C)   Resp 18   Ht 5' 9" (1 753 m)   Wt 94 kg (207 lb 3 2 oz)   SpO2 96%   BMI 30 60 kg/m²   Body mass index is 30 6 kg/m²  SpO2: 96 %,   SpO2 Activity: At Rest,   O2 Device: None (Room air)      Intake/Output Summary (Last 24 hours) at 3/21/2023 1158  Last data filed at 3/21/2023 0900  Gross per 24 hour   Intake 1500 ml   Output 3125 ml   Net -1625 ml     Invasive Devices     Peripheral Intravenous Line  Duration           Peripheral IV 03/17/23 Proximal;Ventral (anterior); Right Forearm 3 days          Drain  Duration           Urethral Catheter Temperature probe 16 days                Physical Exam  General: conscious, cooperative, in no acute distress  Eyes: conjunctivae pink, anicteric sclerae  ENT: lips and mucous membranes moist  Neck: supple, no JVD, no masses  Chest: Diminished breath sounds bilaterally, no crackles, ronchus or wheezings  CVS: S1 & S2, normal rate, regular rhythm  Abdomen: soft, non-tender, non-distended, normoactive bowel sounds obese  Extremities: Moderate edema of both legs  Skin: no rash  Neuro: awake, alert, oriented      Diagnostic Data:  Lab: I have personally reviewed pertinent lab results  ,   CBC:  Results from last 7 days   Lab Units 03/21/23  0629   WBC Thousand/uL 5 87   HEMOGLOBIN g/dL 10 0*   HEMATOCRIT % 32 4*   PLATELETS Thousands/uL 393*      CMP:   Lab Results   Component Value Date    SODIUM 139 03/21/2023    K 4 0 03/21/2023     03/21/2023    CO2 26 03/21/2023    BUN 12 03/21/2023    CREATININE 1 23 03/21/2023    CALCIUM 8 8 03/21/2023    AST 22 03/21/2023    ALT 22 03/21/2023 ALKPHOS 72 03/21/2023    EGFR 56 03/21/2023   ,   PT/INR: No results found for: PT, INR,   Magnesium: No components found for: MAG,  Phosphorous:   Lab Results   Component Value Date    PHOS 3 6 03/21/2023       Microbiology:  @LABNT,(urinecx:7)@        FAY Francis    Portions of the record may have been created with voice recognition software  Occasional wrong word or "sound a like" substitutions may have occurred due to the inherent limitations of voice recognition software  Read the chart carefully and recognize, using context, where substitutions have occurred

## 2023-03-21 NOTE — ASSESSMENT & PLAN NOTE
· Continues to experience orthostatic hypotension with severe lightheadedness and dizziness while standing and a systolic blood pressure in the 90's mmHg  · Possible effects of Agent Orange exposure in the past  · Hold tamsulosin for now  · Beta-blocker and diuretics held  · Midodrine increased to 10 mg TID with persistent symptomatic hypotension,slowly improving  · Likely in the setting of blood loss anemia, given 1 dose of IV iron   · Compression stockings to be utilized  · Continue to monitor  · Monitor the orthostatic blood pressure readings  · Cortisol WNL  · Noted to have Polyuria with UO 3-5 L daily-  · Check osmolality, urine Na  ADH  · Jessie Rise is on hold due to polyuria  · Nephrology and Neurology have been consulted  · Re-consulted Cardiology  · Hold Ranexa in the setting of orthostatic hypotension  · May need the addition of Florinef  · Evaluated by PT/OT, who recommended post-acute care rehabilitation placement  · The patient is now agreeable to post-acute care rehabilitation placement

## 2023-03-21 NOTE — PLAN OF CARE
Problem: PAIN - ADULT  Goal: Verbalizes/displays adequate comfort level or baseline comfort level  Description: Interventions:  - Encourage patient to monitor pain and request assistance  - Assess pain using appropriate pain scale (0-10 pain scale)  - Administer analgesics based on type and severity of pain and evaluate response  - Implement non-pharmacological measures as appropriate and evaluate response  - Consider cultural and social influences on pain and pain management  - Notify physician/advanced practitioner if interventions unsuccessful or patient reports new pain  Outcome: Progressing     Problem: INFECTION - ADULT  Goal: Absence or prevention of progression during hospitalization  Description: INTERVENTIONS:  - Assess and monitor for signs and symptoms of infection  - Monitor lab/diagnostic results  - Monitor all insertion sites, i e  indwelling lines  - Administer medications as ordered  - Instruct and encourage patient and family to use good hand hygiene technique  Outcome: Progressing     Problem: SAFETY ADULT  Goal: Patient will remain free of falls  Description: INTERVENTIONS:  - Educate patient/family on patient safety including physical limitations  - Instruct patient to call for assistance with activity (min assist x 1 and walker)  - Consult OT/PT to assist with strengthening/mobility   - Keep Call bell within reach  - Keep bed low and locked with side rails adjusted as appropriate  - Keep care items and personal belongings within reach  - Initiate and maintain comfort rounds  - Make Fall Risk Sign visible to staff (high fall risk)  - Offer Toileting every 1-2 Hours, in advance of need  - Initiate/Maintain bed/chair alarm  - Obtain necessary fall risk management equipment: non-skid footwear  - Apply yellow socks and bracelet for high fall risk patients  - Consider moving patient to room near nurses station  Outcome: Progressing  Goal: Maintain or return to baseline ADL function  Description: INTERVENTIONS:  -  Assess patient's ability to carry out ADLs; (min to mod assist)  - Assess/evaluate cause of self-care deficits (orthostatic hypotension, fatigue, limited movement)  - Assess range of motion  - Assess patient's mobility; (min assist x 1 and walker)  - Assess patient's need for assistive devices and provide as appropriate  - Encourage maximum independence but intervene and supervise when necessary  - Involve family in performance of ADLs  - Assess for home care needs following discharge   - Consider OT consult to assist with ADL evaluation and planning for discharge  - Provide patient education as appropriate  Outcome: Progressing  Goal: Maintains/Returns to pre admission functional level  Description: INTERVENTIONS:  - Perform BMAT or MOVE assessment daily    - Set and communicate daily mobility goal to care team and patient/family/caregiver  - Collaborate with rehabilitation services on mobility goals if consulted  - Perform Range of Motion 3 times a day  - Reposition patient every 2 hours    - Dangle patient 3 times a day  - Stand patient 3 times a day  - Ambulate patient 3 times a day  - Out of bed to chair 3 times a day   - Out of bed for meals 3 times a day  - Out of bed for toileting  - Record patient progress and toleration of activity level   Outcome: Progressing     Problem: DISCHARGE PLANNING  Goal: Discharge to home or other facility with appropriate resources  Description: INTERVENTIONS:  - Identify barriers to discharge w/patient and caregiver  - Arrange for needed discharge resources and transportation as appropriate  - Identify discharge learning needs (meds, wound care, etc )  - Refer to Case Management Department for coordinating discharge planning if the patient needs post-hospital services based on physician/advanced practitioner order or complex needs related to functional status, cognitive ability, or social support system  Outcome: Progressing     Problem: Knowledge Deficit  Goal: Patient/family/caregiver demonstrates understanding of disease process, treatment plan, medications, and discharge instructions  Description: Complete learning assessment and assess knowledge base    Interventions:  - Provide teaching at level of understanding  - Provide teaching via preferred learning methods  Outcome: Progressing     Problem: CARDIOVASCULAR - ADULT  Goal: Maintains optimal cardiac output and hemodynamic stability  Description: INTERVENTIONS:  - Monitor I/O, vital signs and rhythm  - Monitor for S/S and trends of decreased cardiac output  - Assess quality of pulses, skin color and temperature  - Assess for signs of decreased coronary artery perfusion  - Instruct patient to report change in severity of symptoms  Outcome: Progressing  Goal: Absence of cardiac dysrhythmias or at baseline rhythm  Description: INTERVENTIONS:  - Continuous cardiac monitoring, vital signs, obtain 12 lead EKG if ordered  - Administer antiarrhythmic and heart rate control medications as ordered  - Monitor electrolytes and administer replacement therapy as ordered  Outcome: Progressing     Problem: GENITOURINARY - ADULT  Goal: Maintains or returns to baseline urinary function  Description: INTERVENTIONS:  - Assess urinary function  - Encourage oral fluids to ensure adequate hydration if ordered  - Administer IV fluids as ordered to ensure adequate hydration  - Administer ordered medications as needed  - Offer frequent toileting  - Follow urinary retention protocol if ordered  Outcome: Progressing  Goal: Absence of urinary retention  Description: INTERVENTIONS:  - Assess patient's ability to void and empty bladder (patient has morales catheter intact)  - Monitor I/O  - Discuss with physician/AP medications to alleviate retention as needed (follow up with urology as outpatient)  - Discuss catheterization for long term situations as appropriate (patient has morales catheter intact)  Outcome: Progressing  Goal: Urinary catheter remains patent  Description: INTERVENTIONS:  - Assess patency of urinary catheter  - Follow guidelines for intermittent irrigation of non-functioning urinary catheter  Outcome: Progressing     Problem: METABOLIC, FLUID AND ELECTROLYTES - ADULT  Goal: Electrolytes maintained within normal limits  Description: INTERVENTIONS:  - Monitor labs and assess patient for signs and symptoms of electrolyte imbalances  - Administer electrolyte replacement as ordered  - Monitor response to electrolyte replacements, including repeat lab results as appropriate  - Instruct patient on fluid and nutrition as appropriate  Outcome: Progressing  Goal: Fluid balance maintained  Description: INTERVENTIONS:  - Monitor labs   - Monitor I/O and WT  - Instruct patient on fluid and nutrition as appropriate  - Assess for signs & symptoms of volume excess or deficit  Outcome: Progressing  Goal: Glucose maintained within target range  Description: INTERVENTIONS:  - Monitor Blood Glucose as ordered  - Assess for signs and symptoms of hyperglycemia and hypoglycemia  - Administer ordered medications to maintain glucose within target range  - Assess nutritional intake and initiate nutrition service referral as needed  Outcome: Progressing

## 2023-03-21 NOTE — ASSESSMENT & PLAN NOTE
· Recheck an AM cortisol level on 3/22/2023  · Consult Endocrinology     Latest Reference Range & Units 03/18/23 05:33   Cortisol - AM 4 2 - 22 4 ug/dL 6 3

## 2023-03-21 NOTE — ASSESSMENT & PLAN NOTE
Wt Readings from Last 3 Encounters:   03/21/23 94 kg (207 lb 3 2 oz)   03/08/23 105 kg (231 lb 7 7 oz)   03/02/23 98 9 kg (218 lb 0 6 oz)     · Remains euvolemic  · Trace lower extremity edema - recommend elevating legs  · Daily weights  · Diuretics held due to orthostatic hypotension

## 2023-03-21 NOTE — CASE MANAGEMENT
Case Management Progress Note    Patient name Ema Tran  Location /572-33 MRN [id number]  : 1945 Date 3/21/2023       LOS (days): 12  Geometric Mean LOS (GMLOS) (days): 2 30  Days to GMLOS:-9 9        OBJECTIVE:        Current admission status: Inpatient  Preferred Pharmacy:   [facility] [id number]  Phone: [phone] Fax: [phone]    Primary Care Provider: Pedro Monsivais DO    Primary Insurance: [address] [place] [address],[address] REP  Secondary Insurance:     PROGRESS NOTE:patient accepted by 5th floor  Request for auth submitted through discharge support

## 2023-03-21 NOTE — CONSULTS
Consultation - Cardiology   Sloan Cardenas 69 y o  male MRN: [id number]  Unit/Bed#: 206-65 Encounter: [id number]    Consults      Physician Requesting Consult: Jyoti Medina DO  Reason for Consult / Principal Problem: orthostatic hypotension    Assessment/Plan:  1  Orthostatic hypotension   - likely multifactorial, patient with recent GI bleed, also with diabetes   - received IV fluids, offending agents were held (tamulosin, propranolol, Ranexa) and he was initiated on midodrine 10 mg TID   - overall orthostatic vital signs have significantly improved   - would recommend adding compression stockings as tolerated   - otherwise, would not recommend change in medical management  2  Coronary artery disease         - with prior OM/RCA stenting, most recently had a SAM to the proximal LAD and PCI to the mid LAD in December 2022              - was transitioned from aspirin and Brilinta to Plavix monotherapy at hospital discharge              - aspirin was added back to regimen 3/19, hemoglobin has remained stable   - continue statin   - continue to hold beta-blocker for now    3  Aortic stenosis   - moderate per echo 1 year ago   - outpatient follow up    4  Recent acute GI bleeding s/p placement of two hemostatic clips in the descending colon    - noted   - CBC has been stable with hemoglobin 10 0 today   - s/p IV iron administration this admission    History of Present Illness   HPI: Sloan Cardenas is a 69y o  year old male with coronary artery disease with prior circumflex and RCA stenting, most recently SAM to the proximal LAD and PCI to the mid LAD in December 2022 - noted to have a  of the RCA with left to right collateral circulation, moderate aortic stenosis, hypertension, dyslipidemia, diabetes who follows with Dr Diana Muir  Cardiology was reconsulted on this patient  We were consulted earlier this admission on March 9  He had presented to the ER with chest pain    This was deemed to be noncardiac  High-sensitivity troponin levels were minimally elevated and flat  EKG did not reveal any ischemic changes  Echocardiogram showed preserved LV systolic function without any wall motion abnormalities  He was given a one-time dose of IV Lasix for volume overload  Patient had excellent urinary output but became hypotensive and subsequent diuretics were discontinued  Since then, patient has been treated for orthostatic hypotension  Midodrine was added and was titrated to 10 mg 3 times daily  Any offending agents have been held such as tamsulosin and propranolol  Ranexa was also held yesterday  He was given IV iron  With these measures, orthostatic vital signs have improved and patient overall feels better, still somewhat lightheaded upon standing  Denies recurrent chest pain/pressure/discomfort  Denies shortness of breath, lower extremity edema  Review of Systems   Neurological: Positive for light-headedness  All other systems reviewed and are negative  Historical Information   Past Medical History:   Diagnosis Date   • BPH (benign prostatic hyperplasia)    • CAD (coronary artery disease)    • Cancer (HCC)     basal cell   • Chronic kidney disease     stage 3   • Colon polyp    • CPAP (continuous positive airway pressure) dependence    • Diabetes mellitus (HCC)     niddm   • Disease of thyroid gland    • Gout    • High cholesterol    • Hypertension    • Hypomagnesemia 3/9/2023   • MI, 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    • Myocardial infarction ([facility] [address] ) 2014   • S/P cardiac catheterization 12/30/2022   • Sleep apnea      Past Surgical History:   Procedure Laterality Date   • BASAL CELL CARCINOMA EXCISION Right 1/21/2022    Procedure: EXCISION BASAL CELL CARCINOMA EXTREMITY;  Surgeon: Jadiel Lao DO;  Location: [facility];  Service: General   • CARDIAC CATHETERIZATION Left 12/29/2022    Procedure: Cardiac Left Heart Cath;  Surgeon: Kelly Ritchie MD;  Location: [facility];   Service: Cardiology   • CARDIAC CATHETERIZATION N/A 12/29/2022    Procedure: Cardiac Coronary Angiogram;  Surgeon: Merrill Alvarez MD;  [facility]: [facility]; Service: Cardiology   • CARDIAC CATHETERIZATION N/A 12/29/2022    Procedure: Cardiac pci;  Surgeon: Merrill Alvarez MD;  [facility]: [facility]; Service: Cardiology   • CATARACT EXTRACTION W/  INTRAOCULAR LENS IMPLANT     • CORNEAL TRANSPLANT     • CORONARY ANGIOPLASTY WITH STENT PLACEMENT      stents x3-- [phone]   • EYE SURGERY      repair corneal laceration   • IN COLONOSCOPY FLX DX W/COLLJ SPEC WHEN PFRMD N/A 3/21/2016    Procedure: COLONOSCOPY;  Surgeon: Aliya Dailey MD;  Location: [facility];  Service: Colorectal   • TONSILLECTOMY AND ADENOIDECTOMY       Social History     Substance and Sexual Activity   Alcohol Use Not Currently    Comment: socially, cut down in 2008, previously did more than      Social History     Substance and Sexual Activity   Drug Use Never     Social History     Tobacco Use   Smoking Status Never   Smokeless Tobacco Never     Family History: non-contributory    Meds/Allergies   all current active meds have been reviewed       No Known Allergies    Objective   Vitals: Blood pressure 132/76, pulse 91, temperature (!) 97 1 °F (36 2 °C), resp  rate 18, height 5' 9" (1 753 m), weight 94 kg (207 lb 3 2 oz), SpO2 96 %  , Body mass index is 30 6 kg/m² ,   Orthostatic Blood Pressures    Flowsheet Row Most Recent Value   Blood Pressure 132/76 filed at 03/21/2023 0800   Patient Position - Orthostatic VS Standing - Orthostatic VS filed at 03/20/2023 9839        Systolic (68WTV), GNE:988 , Min:111 , APN:744     Diastolic (50XTN), AUM:62, Min:54, Max:78      Intake/Output Summary (Last 24 hours) at 3/21/2023 1147  Last data filed at 3/21/2023 0900  Gross per 24 hour   Intake 1500 ml   Output 3125 ml   Net -1625 ml       Weight (last 2 days)     Date/Time Weight    03/21/23 0559 94 (207 2)    03/20/23 0600 93 9 (207) 03/19/23 0600 93 6 (206 35)          Invasive Devices     Peripheral Intravenous Line  Duration           Peripheral IV 03/17/23 Proximal;Ventral (anterior); Right Forearm 3 days          Drain  Duration           Urethral Catheter Temperature probe 16 days                  Physical Exam  Vitals reviewed  Constitutional:       General: He is not in acute distress  Appearance: He is well-developed  He is obese  He is not diaphoretic  HENT:      Head: Normocephalic and atraumatic  Eyes:      Pupils: Pupils are equal, round, and reactive to light  Neck:      Vascular: No carotid bruit  Cardiovascular:      Rate and Rhythm: Normal rate and regular rhythm  Pulses:           Radial pulses are 2+ on the right side and 2+ on the left side  Heart sounds: S1 normal and S2 normal  Murmur heard  Systolic murmur is present with a grade of 2/6  Comments: 2/6 systolic murmur @ RUSB  Pulmonary:      Effort: Pulmonary effort is normal  No respiratory distress  Breath sounds: Examination of the right-lower field reveals rales  Rales present  No wheezing  Abdominal:      General: There is no distension  Palpations: Abdomen is soft  Tenderness: There is no abdominal tenderness  Musculoskeletal:      Cervical back: Normal range of motion  Right lower leg: No edema  Left lower leg: No edema  Skin:     General: Skin is warm and dry  Findings: No erythema  Neurological:      General: No focal deficit present  Mental Status: He is alert and oriented to person, place, and time     Psychiatric:         Mood and Affect: Mood normal          Behavior: Behavior normal              Laboratory Results:        CBC with diff:   Results from last 7 days   Lab Units 03/21/23  0629 03/19/23  1031 03/18/23  0533 03/17/23  0637 03/16/23  0522 03/15/23  0506   WBC Thousand/uL 5 87 6 73 5 88 6 45 6 49 5 55   HEMOGLOBIN g/dL 10 0* 9 9* 9 2* 9 4* 9 0* 8 2*   HEMATOCRIT % 32 4* 30 8* 28 7* 29 7* 29 0* 26 0*   MCV fL 100* 100* 100* 102* 100* 99*   PLATELETS Thousands/uL 393* 412* 389 412* 398* 327   MCH pg 31 0 32 0 32 1 32 3 31 1 31 2   MCHC g/dL 30 9* 32 1 32 1 31 6 31 0* 31 5   RDW % 17 7* 17 6* 17 2* 17 7* 17 5* 17 2*   MPV fL 8 8* 8 6* 8 7* 9 0 8 9 8 6*   NRBC AUTO /100 WBCs 0  --  0 0 0 0         CMP:  Results from last 7 days   Lab Units 23  0629 23  1031 23  0533 23  0436 23  0522 03/15/23  0506   POTASSIUM mmol/L 4 0 4 3 3 7 4 3 4 0 4 1   CHLORIDE mmol/L 105 103 105 106 103 105   CO2 mmol/L 26 25 25 24 27 27   BUN mg/dL 12 10 9 10 10 10   CREATININE mg/dL 1 23 1 15 1 03 1 09 1 00 1 00   CALCIUM mg/dL 8 8 8 8 8 4 8 6 8 4 8 2*   AST U/L 22  --   --   --   --   --    ALT U/L 22  --   --   --   --   --    ALK PHOS U/L 72  --   --   --   --   --    EGFR ml/min/1 73sq m 56 61 70 65 72 72         BMP:  Results from last 7 days   Lab Units 23  0629 23  1031 23  0533 23  0436 23  0522 03/15/23  0506   POTASSIUM mmol/L 4 0 4 3 3 7 4 3 4 0 4 1   CHLORIDE mmol/L 105 103 105 106 103 105   CO2 mmol/L 26 25 25 24 27 27   BUN mg/dL 12 10 9 10 10 10   CREATININE mg/dL 1 23 1 15 1 03 1 09 1 00 1 00   CALCIUM mg/dL 8 8 8 8 8 4 8 6 8 4 8 2*       BNP:  No results for input(s): BNP in the last 72 hours      Magnesium:   Results from last 7 days   Lab Units 23  0629 23  1031   MAGNESIUM mg/dL 2 0 2 0       Coags:       TSH:       Hemoglobin A1C       Lipid Profile:         Cardiac testing:   Results for orders placed during the hospital encounter of 21    Echo complete with contrast if indicated    Narrative  [address] [facility] [address] [place]  [phone]    Transthoracic Echocardiogram  2D, M-mode, Doppler, and Color Doppler    Study date:  2021    Patient: Hemalatha Vick  MR number: [id number]  Account number: [de-identified]  : 1945  Age: 77 years  Gender: Male  Status: Outpatient  Location: Echo lab  Height: 68 in  Weight: 237 6 lb  BP: 148/ 68 mmHg    Indications: aortic stenosis    Diagnoses: 424 1 - AORTIC VALVE DISORDER    Sonographer:  Nacho Kunz RDCS  Primary Physician:  Zoë Barahona DO  Referring Physician:  Yeyo Woods MD  Group:  [id number]  Interpreting Physician:  Alyssa Wong MD    SUMMARY    LEFT VENTRICLE:  Systolic function was normal by visual assessment  Ejection fraction was estimated to be 70 %  There were no regional wall motion abnormalities  Wall thickness was mildly increased  There was mild concentric hypertrophy  Doppler parameters were consistent with abnormal left ventricular relaxation (grade 1 diastolic dysfunction)  LEFT ATRIUM:  The atrium was mildly dilated  AORTIC VALVE:  The valve was trileaflet  Leaflets exhibited markedly increased thickness, marked calcification, and markedly reduced cuspal separation  Transaortic velocity was increased due to valvular stenosis  There was moderate stenosis by hemodynamics  There was trace regurgitation  Valve peak gradient was 34 mmHg  Valve mean gradient was 20 mmHg  The aortic valve obstructive index (by VTI) was 0 38     TRICUSPID VALVE:  There was trace regurgitation  HISTORY: PRIOR HISTORY: diabetes mellitus, hypertension, mixed hyperlipidemia, NSTEMI, coronary artery disease-stent, obese body habitus    PROCEDURE: The procedure was performed in the echo lab  This was a routine study  The transthoracic approach was used  The study included complete 2D imaging, M-mode, complete spectral Doppler, and color Doppler  Images were obtained from  the parasternal, apical, subcostal, and suprasternal notch acoustic windows  Echocardiographic views were limited due to poor acoustic window availability, decreased penetration, and lung interference  This was a technically difficult  study      LEFT VENTRICLE: Size was normal  Systolic function was normal by visual assessment  Ejection fraction was estimated to be 70 %  There were no regional wall motion abnormalities  Wall thickness was mildly increased  There was mild  concentric hypertrophy  DOPPLER: Doppler parameters were consistent with abnormal left ventricular relaxation (grade 1 diastolic dysfunction)  RIGHT VENTRICLE: The size was normal  Systolic function was normal  Wall thickness was normal     LEFT ATRIUM: The atrium was mildly dilated  RIGHT ATRIUM: Size was normal     MITRAL VALVE: Valve structure was normal  There was normal leaflet separation  DOPPLER: The transmitral velocity was within the normal range  There was no evidence for stenosis  There was no significant regurgitation  AORTIC VALVE: The valve was trileaflet  Leaflets exhibited markedly increased thickness, marked calcification, and markedly reduced cuspal separation  DOPPLER: Transaortic velocity was increased due to valvular stenosis  There was moderate  stenosis by hemodynamics  There was trace regurgitation  TRICUSPID VALVE: The valve structure was normal  There was normal leaflet separation  DOPPLER: The transtricuspid velocity was within the normal range  There was no evidence for stenosis  There was trace regurgitation  PULMONIC VALVE: Leaflets exhibited normal thickness, no calcification, and normal cuspal separation  DOPPLER: The transpulmonic velocity was within the normal range  There was no significant regurgitation  PERICARDIUM: There was no pericardial effusion  The pericardium was normal in appearance  AORTA: The root exhibited normal size  SYSTEMIC VEINS: IVC: The inferior vena cava was normal in size   Respirophasic changes were normal     MEASUREMENT TABLES    DOPPLER MEASUREMENTS  Aortic valve   (Reference normals)  Peak giovanna   293 cm/s   (--)  Peak gradient   34 mmHg   (--)  Mean gradient   20 mmHg   (--)  Valve area, cont   1 3 cmï¾²   (--)  Obstr index, VTI   0 38    (--)    OTHER ECHO MEASUREMENTS  (Reference normals)  Estimated CVP   5 mmHg   (--)    SYSTEM MEASUREMENT TABLES    2D  %FS: 61 05 %  Ao Diam: 3 52 cm  EDV(Teich): 98 29 ml  EF(Teich): 90 12 %  ESV(Teich): 9 71 ml  IVSd: 1 27 cm  LA Area: 16 45 cm2  LA Diam: 4 47 cm  LVEDV MOD A4C: 85 88 ml  LVEF MOD A4C: 44 15 %  LVESV MOD A4C: 47 97 ml  LVIDd: 4 62 cm  LVIDs: 1 8 cm  LVLd A4C: 8 7 cm  LVLs A4C: 7 45 cm  LVPWd: 1 26 cm  RA Area: 19 73 cm2  RVIDd: 2 87 cm  RWT: 0 55  SV MOD A4C: 37 91 ml  SV(Teich): 88 58 ml    CW  AR Dec [place]: 1 73 m/s2  AR Dec Time: 2071 56 ms  AR PHT: 600 75 ms  AR Vmax: 3 57 m/s  AR maxP 08 mmHg  AV Env  Ti: 274 02 ms  AV VTI: 59 83 cm  AV Vmax: 2 93 m/s  AV Vmean: 2 19 m/s  AV maxP 26 mmHg  AV meanP 64 mmHg  PV Vmax: 0 88 m/s  PV maxPG: 3 07 mmHg  TR Vmax: 2 05 m/s  TR maxP 82 mmHg    MM  TAPSE: 2 67 cm    PW  E' Lat: 0 08 m/s  E' Sept: 0 06 m/s  E/E' Lat: 7 81  E/E' Sept: 10 28  LVOT Env  Ti: 294 96 ms  LVOT VTI: 23 79 cm  LVOT Vmax: 1 04 m/s  LVOT Vmean: 0 81 m/s  LVOT maxP 31 mmHg  LVOT meanP 79 mmHg  MV A Jose: 0 97 m/s  MV Dec [place]: 1 52 m/s2  MV DecT: 412 31 ms  MV E Jose: 0 63 m/s  MV E/A Ratio: 0 65  RVOT Vmax: 0 69 m/s  RVOT maxP 88 mmHg    [employer] Accredited Echocardiography Laboratory    Prepared and electronically signed by    Usha Thompson MD  Signed 2021 16:07:07    No results found for this or any previous visit  No results found for this or any previous visit  No results found for this or any previous visit  Imaging: I have personally reviewed pertinent reports  EGD    Result Date: 3/2/2023  Narrative: Table formatting from the original result was not included  [facility] End[facility] [address] [phone] DATE OF SERVICE: 3/02/23 PHYSICIAN(S): Attending: Angeles Angeles MD Fellow: Tracey Trent MD INDICATION: GI bleed POST-OP DIAGNOSIS: See the impression below   PREPROCEDURE: Informed consent was obtained for the procedure, including sedation  Risks of perforation, hemorrhage, adverse drug reaction and aspiration were discussed  The patient was placed in the left lateral decubitus position  Patient was explained about the risks and benefits of the procedure  Risks including but not limited to bleeding, infection, and perforation were explained in detail  Also explained about less than 100% sensitivity with the exam and other alternatives  PROCEDURE: EGD DETAILS OF PROCEDURE: Patient was taken to the procedure room where a time out was performed to confirm correct patient and correct procedure  The patient underwent monitored anesthesia care, which was administered by an anesthesia professional  The patient's blood pressure, heart rate, level of consciousness, respirations and oxygen were monitored throughout the procedure  The scope was advanced to the second part of the duodenum  Retroflexion was performed in the fundus  The patient experienced no blood loss  The procedure was not difficult  The patient tolerated the procedure well  There were no apparent complications  ANESTHESIA INFORMATION: ASA: IV Anesthesia Type: General MEDICATIONS: No administrations occurring from 1518 to 1533 on 03/02/23 FINDINGS: Irregular Z-line 38 cm from the incisors Food bolus in the body of the stomach, pylorus, duodenal bulb and 1st part of the duodenum Otherwise unremarkable SPECIMENS: * No specimens in log *     Impression: Irregular Z-line  Minimal quantity of food seen in the stomach, and duodenum  Otherwise unremarkable  RECOMMENDATION:  There is no recommended follow-up for this procedure  Transition to protonix 40 daily PO (for ulcer prophylaxis given advanced age and need for DAPT) Continue serial H&H, monitor stool output  Monitor vitals closely  Plan for colonoscopy tomorrow, we will start bowel prep today  Resume clear liquid diet  NPO at midnight   Can continue DAPT given recent cardiac may Dunne MD     Colonoscopy    Addendum Date: 3/4/2023 Addendum:   [address] [facility] Endoscopy [address] [place] [address] [phone] DATE OF SERVICE: 3/03/23 PHYSICIAN(S): Attending: Natasha Dunne MD Fellow: Harriet Pathak MD INDICATION: GI bleed POST-OP DIAGNOSIS: See the impression below  HISTORY: Prior colonoscopy: More than 10 years ago  BOWEL PREPARATION: Golytely/Colyte/Trilyte PREPROCEDURE: Informed consent was obtained for the procedure, including sedation  Risks including but not limited to bleeding, infection, perforation, adverse drug reaction and aspiration were explained in detail  Also explained about less than 100% sensitivity with the exam and other alternatives  The patient was placed in the left lateral decubitus position  Procedure: Colonoscopy DETAILS OF PROCEDURE: Patient was taken to the procedure room where a time out was performed to confirm correct patient and correct procedure  The patient underwent monitored anesthesia care, which was administered by an anesthesia professional  The patient's blood pressure, heart rate, level of consciousness, oxygen and respirations were monitored throughout the procedure  A digital rectal exam was performed  The scope was introduced through the anus and advanced to the cecum  Retroflexion was performed in the rectum  The quality of bowel preparation was evaluated using the [facility] Bowel Preparation Scale with scores of: right colon = 2, transverse colon = 2, left colon = 2  The total BBPS score was 6  Bowel prep was adequate  The patient experienced no blood loss  The procedure was not difficult  The patient tolerated the procedure well  There were no apparent complications   ANESTHESIA INFORMATION: ASA: IV Anesthesia Type: General MEDICATIONS: No administrations occurring from 1407 to 1557 on 03/03/23 FINDINGS: Significant amount of fresh blood and hematin in the transverse colon, splenic flexure, descending colon, sigmoid colon, rectosigmoid and rectum  Significant amount of fresh red blood and older black blood/hematin was seen in the colon up to the transverse colon  No blood at all in the ascending colon and cecum  TI could not be intubated but no blood coming from the IC valve  Blood was extensively lavaged and suctioned on withdrawal, and no active bleeding was found Severe pancolonic diverticula; there was stigmata of recent hemorrhage; placed 2 clips successfully and 2 clips unsuccessfully  One diverticulum in the descending colon appeared to have a small ulcer base with possible visible vessel and overlying clot  No active bleeding, but two clips were successfully placed  EVENTS: Procedure Events Event Event Time ENDO CECUM REACHED 3/3/2023  2:53 PM ENDO SCOPE OUT TIME 3/3/2023  3:54 PM SPECIMENS: * No specimens in log * EQUIPMENT: Colonoscope - IMPRESSION: Suspected ulcer within a diverticulum in the descending colon with possible visible vessel and an adherant clot, deployed 2 hemostatic clips (another 2 unsuccessful) Old and fresh blood seen upto the level of proximal transverse colon but no active bleeding seen after extensive lavage Pancolon (left >> right) diverticulosis  RECOMMENDATION: - Continue close monitoring of vitals, stool output  - Hgb q8h - Transfusion as needed with target Hb of 7 to 8  - If permissive by cardiology, hold brillinta until Hb stablizes and no witnessed bleeding for 24-48 hours  - Can continue baby aspirin  - If hemodynamically significant GI bleeding recurs, please inform on call GI provider  Would likely benefit from IR angiography and embolization at this point  Clips should also aid with localization if empiric embolization is being considered  - Keep on clear liquid diet  Amanda Clark MD     Result Date: 3/4/2023  Narrative: Table formatting from the original result was not included   [address] [facility] [address] [facility] [phone] DATE OF SERVICE: 3/03/23 PHYSICIAN(S): Attending: Jose Martin Whittaker MD Fellow: Shan Moran MD INDICATION: GI bleed POST-OP DIAGNOSIS: See the impression below  HISTORY: Prior colonoscopy: More than 10 years ago  BOWEL PREPARATION: Golytely/Colyte/Trilyte PREPROCEDURE: Informed consent was obtained for the procedure, including sedation  Risks including but not limited to bleeding, infection, perforation, adverse drug reaction and aspiration were explained in detail  Also explained about less than 100% sensitivity with the exam and other alternatives  The patient was placed in the left lateral decubitus position  Procedure: Colonoscopy DETAILS OF PROCEDURE: Patient was taken to the procedure room where a time out was performed to confirm correct patient and correct procedure  The patient underwent monitored anesthesia care, which was administered by an anesthesia professional  The patient's blood pressure, heart rate, level of consciousness, oxygen and respirations were monitored throughout the procedure  A digital rectal exam was performed  The scope was introduced through the anus and advanced to the cecum  Retroflexion was performed in the rectum  The quality of bowel preparation was evaluated using the [facility] Bowel Preparation Scale with scores of: right colon = 1, transverse colon = 1, left colon = 1  The total BBPS score was 3  Bowel prep was not adequate  The patient experienced no blood loss  The procedure was not difficult  The patient tolerated the procedure well  There were no apparent complications  ANESTHESIA INFORMATION: ASA: IV Anesthesia Type: General MEDICATIONS: No administrations occurring from 1407 to 1557 on 03/03/23 FINDINGS: Significant amount of fresh blood and hematin in the transverse colon, splenic flexure, descending colon, sigmoid colon, rectosigmoid and rectum   Significant amount of fresh red blood and older black blood/hematin was seen in the colon up to the transverse colon  No blood at all in the ascending colon and cecum  TI could not be intubated but no blood coming from the IC valve  Blood was extensively lavaged and suctioned on withdrawal, and no active bleeding was found Severe pancolonic diverticula; there was stigmata of recent hemorrhage; placed 2 clips successfully and 2 clips unsuccessfully  One diverticulum in the descending colon appeared to have a small ulcer base with possible visible vessel and overlying clot  No active bleeding, but two clips were successfully placed  EVENTS: Procedure Events Event Event Time ENDO CECUM REACHED 3/3/2023  2:53 PM ENDO SCOPE OUT TIME 3/3/2023  3:54 PM SPECIMENS: * No specimens in log * EQUIPMENT: Colonoscope -     Impression: Suspected ulcer within a diverticulum in the descending colon with possible visible vessel and an adherant clot, deployed 2 hemostatic clips (another 2 unsuccessful) Old and fresh blood seen upto the level of proximal transverse colon but no active bleeding seen after extensive lavage Pancolon (left >> right) diverticulosis  RECOMMENDATION: - Continue close monitoring of vitals, stool output  - Hgb q8h - Transfusion as needed with target Hb of 7 to 8  - If permissive by cardiology, hold brillinta until Hb stablizes and no witnessed bleeding for 24-48 hours  - Can continue baby aspirin  - If hemodynamically significant GI bleeding recurs, please inform on call GI provider  Would likely benefit from IR angiography and embolization at this point  Clips should also aid with localization if empiric embolization is being considered  - Keep on clear liquid diet  Rajiv Duran MD     XR chest 1 view portable    Result Date: 3/9/2023  Narrative: CHEST INDICATION:   cp  COMPARISON:  Chest radiograph 3/2/2023  EXAM PERFORMED/VIEWS:  XR CHEST PORTABLE FINDINGS: Cardiomediastinal silhouette appears unremarkable  The lungs are clear  No pneumothorax or pleural effusion   Osseous structures appear within normal limits for patient age  Impression: No acute cardiopulmonary disease  Workstation performed: [id number]     XR chest portable    Result Date: 3/2/2023  Narrative: CHEST INDICATION:   anemia  COMPARISON:  Chest x-ray 1/4/2023 EXAM PERFORMED/VIEWS:  XR CHEST PORTABLE  AP semierect FINDINGS: Cardiac and mediastinal contours are stable  Limitation due to low lung volumes and body habitus  No definite airspace disease identified  No pneumothorax or pleural effusion  Osseous structures appear within normal limits for patient age  Impression: Limited evaluation due to low lung volumes, but no active pulmonary disease identified  Workstation performed: [id number]     CT head wo contrast    Result Date: 3/9/2023  Narrative: CT BRAIN - WITHOUT CONTRAST INDICATION:   Headache, sudden, severe focal neurologic symptoms, intractable headache  COMPARISON:  11/29/2022 CT head TECHNIQUE:  CT examination of the brain was performed  In addition to axial images, sagittal and coronal 2D reformatted images were created and submitted for interpretation  Radiation dose length product (DLP) for this visit:  915 52 mGy-cm   This examination, like all CT scans performed in the [facility], was performed utilizing techniques to minimize radiation dose exposure, including the use of iterative  reconstruction and automated exposure control  IMAGE QUALITY:  Diagnostic  FINDINGS: PARENCHYMA:  No intracranial mass, mass effect or midline shift  No CT signs of acute infarction  No acute parenchymal hemorrhage  Focal hypodensity in the right periventricular region adjacent to the right frontal horn, stable compared to 11/29/2022 and likely representing an old lacunar infarct  Supratentorial white matter patchy hypoattenuation, likely representing chronic microvascular ischemic changes  VENTRICLES AND EXTRA-AXIAL SPACES:  Mild cerebral volume loss with compensatory dilation of the ventricular system   VISUALIZED ORBITS: Normal visualized orbits  PARANASAL SINUSES: Mild apical thickening of the right maxillary sinus with periosteal the left maxillary sinus  Partial opacification of the left mastoid air cells as well  CALVARIUM AND EXTRACRANIAL SOFT TISSUES:  Normal      Impression: No acute intracranial abnormality  Mild-to-moderate sinusitis as described  Additional findings as above  Workstation performed: [id number]     PE Study with CT Abdomen and Pelvis with contrast    Result Date: 3/9/2023  Narrative: CT PULMONARY ANGIOGRAM OF THE CHEST AND CT ABDOMEN AND PELVIS WITH INTRAVENOUS CONTRAST INDICATION:   Hx NSTEMI december, hx GI bleed just discharged, p/w acute CP  no AC, elevated D dimer eval for PE  COMPARISON:  None  TECHNIQUE:  CT examination of the chest, abdomen and pelvis was performed  Thin section CT angiographic technique was used in the chest in order to evaluate for pulmonary embolus and coronal 3D MIP postprocessing was performed on the acquisition scanner  Axial, sagittal, and coronal 2D reformatted images were created from the source data and submitted for interpretation  Radiation dose length product (DLP) for this visit:  1562 mGy-cm   This examination, like all CT scans performed in the [facility], was performed utilizing techniques to minimize radiation dose exposure, including the use of iterative reconstruction and automated exposure control  IV Contrast:  100 mL of iohexol (OMNIPAQUE) Enteric Contrast:  Enteric contrast was not administered  FINDINGS: CHEST PULMONARY ARTERIAL TREE:  No pulmonary embolus is seen  LUNGS:  Lungs are clear  There is no tracheal or endobronchial lesion  PLEURA:  Unremarkable  HEART/AORTA:  Heavy atherosclerotic coronary artery calcification is noted  Heart is otherwise unremarkable  No thoracic aortic aneurysm  MEDIASTINUM AND IRENE:  Unremarkable  CHEST WALL AND LOWER NECK:  Unremarkable  ABDOMEN LIVER/BILIARY TREE:  Unremarkable   GALLBLADDER:  No calcified gallstones  No pericholecystic inflammatory change  SPLEEN:  Unremarkable  PANCREAS:  Unremarkable  ADRENAL GLANDS:  Unremarkable  KIDNEYS/URETERS:  No hydronephrosis or urinary tract calculus  One or more sharply circumscribed subcentimeter renal hypodensities are present, too small to accurately characterize, and statistically most likely benign findings  According to recent literature (Radiology 2019) no further workup of these findings is recommended  STOMACH AND BOWEL:  There is colonic diverticulosis without evidence of acute diverticulitis  APPENDIX:  No findings to suggest appendicitis  ABDOMINOPELVIC CAVITY:  No ascites  No pneumoperitoneum  No lymphadenopathy  VESSELS:  Atherosclerotic changes are present  Stable severe stenosis at the takeoff of the SMA No evidence of aneurysm  PELVIS REPRODUCTIVE ORGANS:  The prostate is enlarged  URINARY BLADDER:  Diffuse bladder wall thickening and edema with pericystic inflammatory change ABDOMINAL WALL/INGUINAL REGIONS:  Subcutaneous edema noted throughout the abdominal/flank walls  OSSEOUS STRUCTURES:  No acute fracture or destructive osseous lesion  Impression: No evidence of pulmonary embolus  Diffuse bladder wall thickening and edema with pericystic inflammatory change  Correlate with urinalysis for cystitis Workstation performed: [id number]     CT high volume lower GI bleed    Result Date: 3/4/2023  Narrative: CT ABDOMEN AND PELVIS - WITHOUT AND WITH IV CONTRAST INDICATION:   GI bleed GIB  COMPARISON: CT abdomen pelvis 3/2/2023  TECHNIQUE:  CT examination of the abdomen and pelvis was performed both prior to and after the administration of intravenous contrast  Axial, sagittal, and coronal 2D reformatted images were created from the source data and submitted for interpretation  Radiation dose length product (DLP) for this visit:  3740 mGy-cm     This examination, like all CT scans performed in the [facility], was performed utilizing techniques to minimize radiation dose exposure, including the use of iterative reconstruction and automated exposure control  IV Contrast:  100 mL of iohexol (OMNIPAQUE) Enteric Contrast:  Enteric contrast was not administered  FINDINGS: ABDOMEN  BOWEL:  There is no active extravasation of intravenous contrast into the lumen of stomach, small bowel, or large bowel loops  Mostly resolved previously seen diverticulitis at the descending colon  Redundant sigmoid colon  There are a few metallic clips within the descending and sigmoid colon  Age-appropriate atherosclerotic disease in the mesenteric vessels, without complete occlusion  LOWER CHEST:  Scattered subpleural interstitial changes at the bilateral lung bases  LIVER/BILIARY TREE:  Liver is diffusely decreased in density consistent with fatty change  No CT evidence of suspicious hepatic mass  Normal hepatic contours  No biliary dilatation  GALLBLADDER:  No calcified gallstones  No pericholecystic inflammatory change  SPLEEN:  Unremarkable  PANCREAS:  Partial fatty replacement  ADRENAL GLANDS:  Unremarkable  KIDNEYS/URETERS:  No hydronephrosis or urinary tract calculus  Small simple cysts  One or more sharply circumscribed subcentimeter renal hypodensities are present, too small to accurately characterize, and statistically most likely benign findings  According to recent literature (Radiology 2019) no further workup of these findings is recommended  APPENDIX:  A normal appendix was visualized  ABDOMINOPELVIC CAVITY:  No ascites  No pneumoperitoneum  No lymphadenopathy  VESSELS:  Atherosclerotic changes are present  No evidence of aneurysm  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  Patent celiac and inferior mesenteric arteries  PELVIS REPRODUCTIVE ORGANS:  Enlarged prostate  URINARY BLADDER:  Marked bladder distention, stable  ABDOMINAL WALL/INGUINAL REGIONS:  Unremarkable   OSSEOUS STRUCTURES:  No acute fracture or destructive osseous lesion  Impression: 1  No CT evidence of active high volume gastrointestinal hemorrhage  2    Mostly resolved previously seen diverticulitis at the descending colon  3   Chronic marked bladder distention  Correlate for voiding dysfunction  Workstation performed: [id number]     CT high volume bleeding scan abdomen pelvis    Result Date: 3/2/2023  Narrative: CT ABDOMEN AND PELVIS - WITHOUT AND WITH IV CONTRAST INDICATION:   Evaluate for bleeding, hypotensive  COMPARISON: CT angiogram aorta and lower extremities 12/17/2022  TECHNIQUE:  CT examination of the abdomen and pelvis was performed both prior to and after the administration of intravenous contrast   Contrast was injected one time intravenously without immediate complication  Scanning through the abdomen was performed in the unenhanced, arterial, and venous phases according a protocol specifically designed to evaluate high volume bleeding  Axial, sagittal, and coronal 2D reformatted images were created from the source data and submitted for interpretation  Radiation dose length product (DLP) for this visit:  2597 mGy-cm   This examination, like all CT scans performed in the [facility], was performed utilizing techniques to minimize radiation dose exposure, including the use of iterative reconstruction and automated exposure control  IV Contrast:  100 mL of iohexol (OMNIPAQUE)   Enteric Contrast:  Enteric contrast was not administered  FINDINGS: ABDOMEN  BOWEL:  There is no active extravasation of intravenous contrast into the lumen of stomach, small bowel, or large bowel loops  Colonic diverticulosis  Mild bowel wall thickening with pericolonic fat stranding in the descending colon, consistent with acute diverticulitis  No evidence of perforation or intra-abdominal abscess  LOWER CHEST:  Bilateral gynecomastia  Bibasilar subsegmental atelectasis   LIVER/BILIARY TREE:  Liver is diffusely decreased in density consistent with fatty change  No CT evidence of suspicious hepatic mass  Normal hepatic contours  No biliary dilatation  GALLBLADDER:  No calcified gallstones  No pericholecystic inflammatory change  SPLEEN:  Unremarkable  PANCREAS:  Fatty infiltration of the pancreas  ADRENAL GLANDS:  Unremarkable  KIDNEYS/URETERS:  Bilateral simple renal cysts  No hydronephrosis  APPENDIX:  A normal appendix was visualized  ABDOMINOPELVIC CAVITY:  No ascites  No pneumoperitoneum  No lymphadenopathy  VESSELS:  Atherosclerotic changes are present  No evidence of aneurysm  Stable critical ostial stenosis versus occlusion of the superior mesenteric artery due to densely calcified plaque  The celiac artery and inferior mesenteric artery are patent  PELVIS REPRODUCTIVE ORGANS:  The prostate is enlarged  URINARY BLADDER:  Distended, otherwise unremarkable  ABDOMINAL WALL/INGUINAL REGIONS:  Unremarkable  OSSEOUS STRUCTURES:  No acute fracture or destructive osseous lesion  Degenerative change of the visualized spine with unchanged grade 1 anterolisthesis of L4 on L5  Impression: 1  No CT evidence of active high volume gastrointestinal hemorrhage  2   Uncomplicated acute diverticulitis of the descending colon  3   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  4   Hepatic steatosis  The study was marked in [facility] for immediate notification  Workstation performed: [id number]     VAS lower limb venous duplex study, complete bilateral    Result Date: 3/9/2023  Narrative:  [facility] REPORT CLINICAL: Indications: Patient presents with bilateral lower extremity edema and elevated d-dimer  Operative History: Coronary Angioplasty w/ Stent Placement x 3 Risk Factors The patient has history of HTN, Diabetes, CHF, Hyperlipidemia, CKD and CAD  CONCLUSION: Impression: RIGHT LOWER LIMB: No evidence of acute or chronic deep vein thrombosis  No evidence of superficial thrombophlebitis noted   No evidence of valvular incompetence noted in the deep veins  Popliteal, posterior tibial and anterior tibial arterial Doppler waveforms are triphasic  LEFT LOWER LIMB: No evidence of acute or chronic deep vein thrombosis  No evidence of superficial thrombophlebitis noted  No evidence of valvular incompetence noted in the deep veins  Popliteal, posterior tibial and anterior tibial arterial Doppler waveforms are triphasic  SIGNATURE: Electronically Signed by: Monica Mahan MD on 2023-03-09 08:02:48 PM    CT abdomen pelvis w contrast    Result Date: 3/13/2023  Narrative: CT ABDOMEN AND PELVIS WITH IV CONTRAST INDICATION:   GI bleed gastrotinestinal hemorrhage  COMPARISON:  The scan dated March 9, 2023 is the most recent  TECHNIQUE:  CT examination of the abdomen and pelvis was performed  In addition to portal venous phase postcontrast scanning through the abdomen and pelvis, delayed phase postcontrast scanning was performed through the upper abdominal viscera  Axial, sagittal, and coronal 2D reformatted images were created from the source data and submitted for interpretation  Radiation dose length product (DLP) for this visit:  21   45 mGy-cm   This examination, like all CT scans performed in the [facility], was performed utilizing techniques to minimize radiation dose exposure, including the use of iterative reconstruction and automated exposure control  IV Contrast:  100 mL of iohexol (OMNIPAQUE) Enteric Contrast:  Enteric contrast was not administered  FINDINGS: ABDOMEN LOWER CHEST:  Interlobular septal thickening and patchy areas of groundglass in the bases  Beck Side LIVER/BILIARY TREE:  Unremarkable  GALLBLADDER:  No calcified gallstones  No pericholecystic inflammatory change  SPLEEN:  Unremarkable  PANCREAS:  Unremarkable  ADRENAL GLANDS:  Unremarkable  KIDNEYS/URETERS:  2 5 cm left lower pole cyst   Subcentimeter lesions in the right kidney are too small to characterize but likely represent cysts   STOMACH AND BOWEL: Multiple colonic diverticula  No evidence of acute diverticulitis  APPENDIX:  A normal appendix was visualized  ABDOMINOPELVIC CAVITY:  No ascites  No pneumoperitoneum  No lymphadenopathy  VESSELS:  Atherosclerotic changes are present  No evidence of aneurysm  PELVIS REPRODUCTIVE ORGANS:  Unremarkable for patient's age  URINARY BLADDER:  Collapsed with a Jose catheter in place  Probable bladder wall thickening and perivesicular fat stranding concerning for cystitis  ABDOMINAL WALL/INGUINAL REGIONS:  Unremarkable  OSSEOUS STRUCTURES:  No acute fracture or destructive osseous lesion  Spinal degenerative changes are noted  Impression: No findings to suggest active GI hemorrhage  Diverticulosis  No evidence of acute diverticulitis  Bladder wall thickening and perivesicular fat stranding suspicious for cystitis  Bibasilar septal thickening and patchy groundglass probably representing mild interstitial pulmonary edema  Workstation performed: [id number]     Echo follow up/limited w/ contrast if indicated    Result Date: 3/9/2023  Narrative: •  Left Ventricle: The left ventricular ejection fraction is 65%  Systolic function is normal  Wall motion is normal  •  Right Ventricle: Systolic function is normal  •  Pericardium: There is no pericardial effusion   Limited for LVEF       EKG reviewed personally:   Telemetry reviewed personally: not on telemetry    Assessment:  Principal Problem:    Orthostatic hypotension  Active Problems:    Type 2 diabetes mellitus with hyperglycemia, without long-term current use of insulin (HCC)    Coronary artery disease of native artery of native heart with stable angina pectoris (HCC)    Aortic stenosis, moderate    Other chest pain    Chronic diastolic (congestive) heart failure (HCC)    Acute blood loss anemia    Urinary retention    Elevated TSH    Elevated d-dimer    Hypothyroidism    Elevated platelet count        Code Status: Level 1 - Full Code

## 2023-03-21 NOTE — CASE MANAGEMENT
Cathy Raza 51 received request for authorization from Care Manager     Authorization request for: SNF  Facility Name: [facility]   TVU:[id number]   Facility MD:  Dr Mendoza Thakkar   NPI: [id number]  Authorization initiated by contacting insurance: [facility] Via: [employer]  Pending Reference #:[id number]   Clinicals submitted via: [employer]

## 2023-03-22 ENCOUNTER — APPOINTMENT (OUTPATIENT)
Dept: CARDIAC REHAB | Facility: HOSPITAL | Age: 78
End: 2023-03-22

## 2023-03-22 VITALS
HEART RATE: 76 BPM | BODY MASS INDEX: 30.92 KG/M2 | WEIGHT: 208.78 LBS | SYSTOLIC BLOOD PRESSURE: 120 MMHG | RESPIRATION RATE: 18 BRPM | TEMPERATURE: 97.8 F | DIASTOLIC BLOOD PRESSURE: 66 MMHG | HEIGHT: 69 IN | OXYGEN SATURATION: 96 %

## 2023-03-22 LAB
ALBUMIN SERPL BCP-MCNC: 3.5 G/DL (ref 3.5–5)
ALP SERPL-CCNC: 60 U/L (ref 34–104)
ALT SERPL W P-5'-P-CCNC: 21 U/L (ref 7–52)
ANION GAP SERPL CALCULATED.3IONS-SCNC: 7 MMOL/L (ref 4–13)
AST SERPL W P-5'-P-CCNC: 19 U/L (ref 13–39)
BASOPHILS # BLD AUTO: 0.04 THOUSANDS/ÂΜL (ref 0–0.1)
BASOPHILS NFR BLD AUTO: 1 % (ref 0–1)
BILIRUB SERPL-MCNC: 0.38 MG/DL (ref 0.2–1)
BUN SERPL-MCNC: 13 MG/DL (ref 5–25)
CALCIUM SERPL-MCNC: 8.5 MG/DL (ref 8.4–10.2)
CHLORIDE SERPL-SCNC: 106 MMOL/L (ref 96–108)
CO2 SERPL-SCNC: 25 MMOL/L (ref 21–32)
CORTIS AM PEAK SERPL-MCNC: 10 UG/DL (ref 4.2–22.4)
CREAT SERPL-MCNC: 0.93 MG/DL (ref 0.6–1.3)
EOSINOPHIL # BLD AUTO: 0.44 THOUSAND/ÂΜL (ref 0–0.61)
EOSINOPHIL NFR BLD AUTO: 8 % (ref 0–6)
ERYTHROCYTE [DISTWIDTH] IN BLOOD BY AUTOMATED COUNT: 17.6 % (ref 11.6–15.1)
GFR SERPL CREATININE-BSD FRML MDRD: 79 ML/MIN/1.73SQ M
GLUCOSE SERPL-MCNC: 142 MG/DL (ref 65–140)
GLUCOSE SERPL-MCNC: 143 MG/DL (ref 65–140)
GLUCOSE SERPL-MCNC: 247 MG/DL (ref 65–140)
HCT VFR BLD AUTO: 29.3 % (ref 36.5–49.3)
HGB BLD-MCNC: 9.3 G/DL (ref 12–17)
IMM GRANULOCYTES # BLD AUTO: 0.02 THOUSAND/UL (ref 0–0.2)
IMM GRANULOCYTES NFR BLD AUTO: 0 % (ref 0–2)
LYMPHOCYTES # BLD AUTO: 1.64 THOUSANDS/ÂΜL (ref 0.6–4.47)
LYMPHOCYTES NFR BLD AUTO: 28 % (ref 14–44)
MAGNESIUM SERPL-MCNC: 1.9 MG/DL (ref 1.9–2.7)
MCH RBC QN AUTO: 32.4 PG (ref 26.8–34.3)
MCHC RBC AUTO-ENTMCNC: 31.7 G/DL (ref 31.4–37.4)
MCV RBC AUTO: 102 FL (ref 82–98)
MONOCYTES # BLD AUTO: 0.65 THOUSAND/ÂΜL (ref 0.17–1.22)
MONOCYTES NFR BLD AUTO: 11 % (ref 4–12)
NEUTROPHILS # BLD AUTO: 3 THOUSANDS/ÂΜL (ref 1.85–7.62)
NEUTS SEG NFR BLD AUTO: 52 % (ref 43–75)
NRBC BLD AUTO-RTO: 0 /100 WBCS
PHOSPHATE SERPL-MCNC: 2.6 MG/DL (ref 2.3–4.1)
PLATELET # BLD AUTO: 353 THOUSANDS/UL (ref 149–390)
PMV BLD AUTO: 9.5 FL (ref 8.9–12.7)
POTASSIUM SERPL-SCNC: 3.8 MMOL/L (ref 3.5–5.3)
PROCALCITONIN SERPL-MCNC: 0.07 NG/ML
PROT SERPL-MCNC: 5.7 G/DL (ref 6.4–8.4)
RBC # BLD AUTO: 2.87 MILLION/UL (ref 3.88–5.62)
SARS-COV-2 RNA RESP QL NAA+PROBE: NEGATIVE
SODIUM SERPL-SCNC: 138 MMOL/L (ref 135–147)
WBC # BLD AUTO: 5.79 THOUSAND/UL (ref 4.31–10.16)

## 2023-03-22 RX ORDER — MAGNESIUM HYDROXIDE/ALUMINUM HYDROXICE/SIMETHICONE 120; 1200; 1200 MG/30ML; MG/30ML; MG/30ML
30 SUSPENSION ORAL EVERY 4 HOURS PRN
Refills: 0
Start: 2023-03-22

## 2023-03-22 RX ORDER — LEVOTHYROXINE SODIUM 112 UG/1
112 TABLET ORAL
Refills: 0
Start: 2023-03-23

## 2023-03-22 RX ORDER — ACETAMINOPHEN 325 MG/1
650 TABLET ORAL EVERY 6 HOURS PRN
Refills: 0
Start: 2023-03-22

## 2023-03-22 RX ORDER — ASPIRIN 81 MG/1
81 TABLET ORAL DAILY
Refills: 0
Start: 2023-03-23

## 2023-03-22 RX ORDER — POLYETHYLENE GLYCOL 3350 17 G/17G
17 POWDER, FOR SOLUTION ORAL DAILY PRN
Refills: 0
Start: 2023-03-22

## 2023-03-22 RX ORDER — ATORVASTATIN CALCIUM 80 MG/1
80 TABLET, FILM COATED ORAL
Refills: 0
Start: 2023-03-22

## 2023-03-22 RX ORDER — FLUTICASONE PROPIONATE 50 MCG
1 SPRAY, SUSPENSION (ML) NASAL DAILY
Refills: 0
Start: 2023-03-23

## 2023-03-22 RX ORDER — AMOXICILLIN 250 MG
1 CAPSULE ORAL 2 TIMES DAILY
Refills: 0
Start: 2023-03-22

## 2023-03-22 RX ORDER — MIDODRINE HYDROCHLORIDE 10 MG/1
10 TABLET ORAL
Refills: 0
Start: 2023-03-22

## 2023-03-22 RX ADMIN — MULTIPLE VITAMINS W/ MINERALS TAB 1 TABLET: TAB at 08:34

## 2023-03-22 RX ADMIN — INSULIN LISPRO 4 UNITS: 100 INJECTION, SOLUTION INTRAVENOUS; SUBCUTANEOUS at 11:39

## 2023-03-22 RX ADMIN — PANTOPRAZOLE SODIUM 40 MG: 40 TABLET, DELAYED RELEASE ORAL at 06:05

## 2023-03-22 RX ADMIN — CLOPIDOGREL BISULFATE 75 MG: 75 TABLET ORAL at 08:35

## 2023-03-22 RX ADMIN — MIDODRINE HYDROCHLORIDE 10 MG: 5 TABLET ORAL at 11:38

## 2023-03-22 RX ADMIN — SENNOSIDES AND DOCUSATE SODIUM 1 TABLET: 50; 8.6 TABLET ORAL at 08:35

## 2023-03-22 RX ADMIN — CYANOCOBALAMIN TAB 500 MCG 500 MCG: 500 TAB at 08:35

## 2023-03-22 RX ADMIN — ALLOPURINOL 100 MG: 100 TABLET ORAL at 08:35

## 2023-03-22 RX ADMIN — POLYETHYLENE GLYCOL 3350 17 G: 17 POWDER, FOR SOLUTION ORAL at 08:35

## 2023-03-22 RX ADMIN — MIDODRINE HYDROCHLORIDE 10 MG: 5 TABLET ORAL at 06:05

## 2023-03-22 RX ADMIN — LEVOTHYROXINE SODIUM 112 MCG: 112 TABLET ORAL at 06:05

## 2023-03-22 RX ADMIN — FLUTICASONE PROPIONATE 2 SPRAY: 50 SPRAY, METERED NASAL at 08:37

## 2023-03-22 RX ADMIN — ESCITALOPRAM OXALATE 10 MG: 10 TABLET ORAL at 08:35

## 2023-03-22 RX ADMIN — ASPIRIN 81 MG: 81 TABLET, COATED ORAL at 08:35

## 2023-03-22 NOTE — PLAN OF CARE
Problem: OCCUPATIONAL THERAPY ADULT  Goal: Performs self-care activities at highest level of function for planned discharge setting  See evaluation for individualized goals  Description: Treatment Interventions: ADL retraining, Functional transfer training, UE strengthening/ROM, Endurance training, Patient/family training, Equipment evaluation/education, Activityengagement          See flowsheet documentation for full assessment, interventions and recommendations  3/22/2023 1149 by Flor Sanchez, OT  Note: Limitation: Decreased ADL status, Decreased UE strength, Decreased Safe judgement during ADL, Decreased endurance, Decreased self-care trans, Decreased high-level ADLs     Assessment: Pt is a 69 y o  male seen for OT re-evaluation s/p admit to [facility] on 3/8/2023 w/ Orthostatic hypotension  Upon evaluation: Pt requires (S)-max (A) x1 with use of RW during mobility 2* the following deficits impacting occupational performance: weakness, decreased strength, decreased balance, decreased tolerance, impaired initiation, impaired problem solving, decreased safety awareness and impaired interpersonal skills  Pt to benefit from continued skilled OT tx while in the hospital to address deficits as defined above and maximize level of functional independence w ADL's and functional mobility  Occupational Performance areas to address include: grooming, bathing/shower, toilet hygiene, dressing, functional mobility, community mobility and clothing management  The patient's raw score on the AM-PAC Daily Activity Inpatient Short Form is 17  A raw score of less than 19 suggests the patient may benefit from discharge to post-acute rehabilitation services  Please refer to the recommendation of the Occupational Therapist for safe discharge planning    Pt benefited from co-evaluation of skilled OT and PT therapists in order to most appropriately address functional deficits d/t extensive assistance required for safe functional mobility, decreased activity tolerance, and regression from functioning level prior to admission and/or onset of present illness  OT/PT objectives were addressed separately; please see PT note for specific goal areas targeted  OT Discharge Recommendation: Post acute rehabilitation services       3/22/2023 1148 by VERONICA Quiroga  Note: Limitation: Decreased ADL status, Decreased UE strength, Decreased Safe judgement during ADL, Decreased endurance, Decreased self-care trans, Decreased high-level ADLs     Assessment: Pt is a 69 y o  male seen for OT re-evaluation s/p admit to [facility] on 3/8/2023 w/ Orthostatic hypotension  Upon evaluation: Pt requires (S)-max (A) x1 with use of RW during mobility 2* the following deficits impacting occupational performance: weakness, decreased strength, decreased balance, decreased tolerance, impaired initiation, impaired problem solving, decreased safety awareness and impaired interpersonal skills  Pt to benefit from continued skilled OT tx while in the hospital to address deficits as defined above and maximize level of functional independence w ADL's and functional mobility  Occupational Performance areas to address include: grooming, bathing/shower, toilet hygiene, dressing, functional mobility, community mobility and clothing management  The patient's raw score on the AM-PAC Daily Activity Inpatient Short Form is 17  A raw score of less than 19 suggests the patient may benefit from discharge to post-acute rehabilitation services  Please refer to the recommendation of the Occupational Therapist for safe discharge planning  Pt benefited from co-evaluation of skilled OT and PT therapists in order to most appropriately address functional deficits d/t extensive assistance required for safe functional mobility, decreased activity tolerance, and regression from functioning level prior to admission and/or onset of present illness   OT/PT objectives were addressed separately; please see PT note for specific goal areas targeted       OT Discharge Recommendation: Post acute rehabilitation services

## 2023-03-22 NOTE — DISCHARGE SUMMARY
[facility] [address]  Discharge- Amauri Campbell 1945, 69 y o  male MRN: [id number]  Unit/Bed#: 739-00 Encounter: [id number]  Primary Care Provider: Shayy Sotomayor DO   Date and time admitted to hospital: 3/8/2023 11:01 PM    * Orthostatic hypotension  Assessment & Plan  · Continues to experience orthostatic hypotension with severe lightheadedness and dizziness while standing and a systolic blood pressure in the 90's mmHg  · Possible effects of Agent Orange exposure in the past  · Hold tamsulosin for now  · Beta-blocker and diuretics held  · Midodrine increased to 10 mg TID with persistent symptomatic hypotension,slowly improving  · Likely in the setting of blood loss anemia, given 1 dose of IV iron   · Compression stockings to be utilized  · Continue to monitor  · Monitor the orthostatic blood pressure readings  · Cortisol WNL  · Noted to have Polyuria with UO 3-5 L daily-  · Check osmolality, urine Na  ADH  · Nephrology and Neurology have been consulted  · Re-consulted Cardiology  · Hold Ranexa in the setting of orthostatic hypotension  · May need the addition of Florinef if the orthostatic hypotension continues  · Evaluated by PT/OT, who recommended post-acute care rehabilitation placement  · The patient is now agreeable to post-acute care rehabilitation placement      Hypothyroidism  Assessment & Plan  · Increased levothyroxine to 112 mcg PO Qdaily in the early morning in the setting of an elevated TSH level as shown below  · Recheck a TSH level in 4-6 weeks with his PCP     Latest Reference Range & Units 03/19/23 10:31   TSH 3RD GENERATON 0 450 - 4 500 uIU/mL 4 980 (H)   Free T4 0 76 - 1 46 ng/dL 1 18   (H): Data is abnormally high    Acute blood loss anemia  Assessment & Plan  · Recent gastrointestinal hemorrhage thought to be diverticular bleeding found on colonoscopy on 3/3/2023  · Serial laboratory testing to monitor hemoglobin/hematocrit levels  · Initiated cyanocobalamin 500 mcg PO Qdaily for a low-normal vitamin B12 level  · Strain despite being on senna-s BID  Added miralax  · GI on board  · No need for colonoscopy at this time per GI, outpatient follow-up  · Iron venofer x 1 dose  · Hemoglobin remains stable  · No recurrence of melena, hematochezia, or GI bleeding  · Outpatient follow-up with Gastroenterology    Results from last 7 days   Lab Units 03/22/23  0607 03/21/23  0629 03/19/23  1031   WBC Thousand/uL 5 79 5 87 6 73   HEMOGLOBIN g/dL 9 3* 10 0* 9 9*   HEMATOCRIT % 29 3* 32 4* 30 8*   PLATELETS Thousands/uL 353 393* 412*           Hot flashes  Assessment & Plan  · Outpatient Endocrinology evaluation    Low serum cortisol level  Assessment & Plan  · I discussed the case with Endocrinology, who will evaluate the patient in the outpatient setting       Latest Reference Range & Units 03/18/23 05:33   Cortisol - AM 4 2 - 22 4 ug/dL 6 3      Latest Reference Range & Units 03/22/23 06:07   Cortisol - AM 4 2 - 22 4 ug/dL 10 0       Elevated platelet count  Assessment & Plan  · Likely reactive in nature  · Follow the platelet count after discharge with his PCP  · Outpatient Hematology evaluation if the elevated platelet count persists    Elevated d-dimer  Assessment & Plan  · No evidence of pulmonary embolism on CTA of the chest/PE protocol completed on 3/9/2023  · No evidence of DVT on venous duplex of both lower extremities on 3/9/2023      Elevated TSH  Assessment & Plan  · Please see the assessment and plan for "Hypothyrodism"    Urinary retention  Assessment & Plan  · Maintain the Jose catheter for now  · Hold tamsulosin in the setting of orthostatic hypotension  · Appreciate Urology evaluation  · Will have outpatient follow-up with Urology    Chronic diastolic (congestive) heart failure ([facility] [address] )  Assessment & Plan  Wt Readings from Last 3 Encounters:   03/22/23 94 7 kg (208 lb 12 4 oz)   03/08/23 105 kg (231 lb 7 7 oz)   03/02/23 98 9 kg (218 lb 0 6 oz)     · Remains euvolemic  · Trace lower extremity edema - recommend elevating legs  · Daily weights  · Diuretics held due to orthostatic hypotension  • Outpatient follow-up with Cardiology        Aortic stenosis, moderate  Assessment & Plan  • Outpatient follow-up with Cardiology      Coronary artery disease of native artery of native heart with stable angina pectoris [facility])  Assessment & Plan  · Known CAD with recent SAM placement/PTCA to the LAD on 12/29/2022  · Appreciate Cardiology's recommendations  · No indication for repeat cardiac catheterization at this time per Cardiology  • Was previously treated with monotherapy with plavix 75 mg PO Qdaily (7-10 days) in the setting of GI bleeding  · Restarted aspirin 81 mg PO QD with no additional GI bleeding/Hgb stable - 3/21/23  · Continue high-intensity statin therapy with atorvastatin 80 mg PO Qdaily with dinner  · BB held with hypotension  · Outpatient follow-up with Cardiology    Type 2 diabetes mellitus with hyperglycemia, without long-term current use of insulin [facility])  Assessment & Plan  Lab Results   Component Value Date    HGBA1C 5 6 03/10/2023       Recent Labs     03/21/23  1557 03/21/23  2220 03/22/23  0726 03/22/23  1103   POCGLU 266* 136 143* 247*       Blood Sugar Average: Last 72 hrs:  (P) 176 [id number]     · Good control per recent HgbA1c  · Continue sliding scale insulin with blood glucose monitoring ACHS  · Resume his PO diabetic medication regimen upon discharge  · Outpatient Endocrinology evaluation      Discharging Physician / Practitioner: Ciera Alanis DO  PCP: Simon Irwin DO  Admission Date:   Admission Orders (From admission, onward)     Ordered        03/09/23 1118  Inpatient Admission  Once            03/09/23 0231  Place in Observation  Once                      Discharge Date: 03/22/23    Consultations During Hospital Stay:  · Urology  · Cardiology  · Gastroenterology  · Nephrology    Procedures Performed:   · None    Significant Findings / Test Results: XR chest 1 view portable    Result Date: 3/9/2023  Impression: No acute cardiopulmonary disease  Workstation performed: [id number]     CT head wo contrast    Result Date: 3/9/2023  Impression: No acute intracranial abnormality  Mild-to-moderate sinusitis as described  Additional findings as above  Workstation performed: [id number]     PE Study with CT Abdomen and Pelvis with contrast    Result Date: 3/9/2023  Impression: No evidence of pulmonary embolus  Diffuse bladder wall thickening and edema with pericystic inflammatory change  Correlate with urinalysis for cystitis Workstation performed: [id number]     CT abdomen pelvis w contrast    Result Date: 3/13/2023  Impression: No findings to suggest active GI hemorrhage  Diverticulosis  No evidence of acute diverticulitis  Bladder wall thickening and perivesicular fat stranding suspicious for cystitis  Bibasilar septal thickening and patchy groundglass probably representing mild interstitial pulmonary edema   Workstation performed: [id number]     Narrative & Impression    Normal sinus rhythm  Left axis deviation  Low voltage QRS  Cannot rule out Anterior infarct (cited on or before 28-SEP-2014)  Abnormal ECG  When compared with ECG of 08-MAR-2023 23:14,  Criteria for Inferior infarct are no longer Present  Confirmed by Nathen Miranda ([id number]) on 3/10/2023 7:49:44 AM      Specimen Collected: 03/09/23 01:32 Last Resulted: 03/10/23 07:49        Narrative & Impression    Normal sinus rhythm  Left axis deviation  Low voltage QRS  Poor anterior R wave progression is noted  Abnormal ECG  When compared with ECG of 09-MAR-2023 01:32,  No significant change was found  Confirmed by Nathen Miranda ([id number]) on 3/10/2023 7:49:54 AM      Specimen Collected: 03/09/23 07:13 Last Resulted: 03/10/23 07:49        Narrative & Impression    Normal sinus rhythm  Left axis deviation  Low voltage QRS  Poor anterior R wave progression is noted  Abnormal ECG  When compared with ECG of 09-MAR-2023 07:13,  No significant change was found  Confirmed by Carlos Licona ([id number]) on 3/14/2023 8:32:47 AM      Specimen Collected: 03/10/23 08:45 Last Resulted: 03/14/23 08:32        Narrative & Impression    -Poor data quality, interpretation may be adversely affected  Normal sinus rhythm  Low voltage QRS  Left anterior fascicular block  Cannot rule out Inferior infarct (masked by fascicular block?) , age undetermined  Poor anterior R wave progression is noted  Nonspecific ST and T wave abnormality  Abnormal ECG  When compared with ECG of 10-MAR-2023 08:45,  T wave inversion now evident in Inferior leads  Confirmed by Carlos Licona ([id number]) on 3/17/2023 8:25:41 AM      Specimen Collected: 03/16/23 10:08 Last Resulted: 03/17/23 08:25        Limited echocardiogram (03/09/2023): Interpretation Summary       •  Left Ventricle: The left ventricular ejection fraction is 65%   Systolic function is normal  Wall motion is normal   •  Right Ventricle: Systolic function is normal   •  Pericardium: There is no pericardial effusion      Limited for LVEF    Results from last 7 days   Lab Units 03/22/23  0607 03/21/23  0629 03/19/23  1031   WBC Thousand/uL 5 79 5 87 6 73   HEMOGLOBIN g/dL 9 3* 10 0* 9 9*   HEMATOCRIT % 29 3* 32 4* 30 8*   PLATELETS Thousands/uL 353 393* 412*     Results from last 7 days   Lab Units 03/22/23  0607 03/21/23  0629 03/19/23  1031   SODIUM mmol/L 138 139 135   POTASSIUM mmol/L 3 8 4 0 4 3   CHLORIDE mmol/L 106 105 103   CO2 mmol/L 25 26 25   BUN mg/dL 13 12 10   CREATININE mg/dL 0 93 1 23 1 15   CALCIUM mg/dL 8 5 8 8 8 8     Results from last 7 days   Lab Units 03/22/23  0607 03/21/23  0629 03/19/23  1031   MAGNESIUM mg/dL 1 9 2 0 2 0     Results from last 7 days   Lab Units 03/22/23  0607 03/21/23  0629   ALK PHOS U/L 60 72   ALT U/L 21 22   AST U/L 19 22     Microbiology Results (last 21 days)    Collected Updated Procedure Result Status Patient Facility Result Comment    03/22/2023 1201 03/22/2023 [address] only [[id number]]    Nares from Nose    Final result [address] [facility] PATIENTS - copy/paste COVID Guidelines URL to browser: [email or web address]   SARS-CoV-2 assay is a Nucleic Acid Amplification assay intended for the   qualitative detection of nucleic acid from SARS-CoV-2 in nasopharyngeal   swabs  Results are for the presumptive identification of SARS-CoV-2 RNA  Positive results are indicative of infection with SARS-CoV-2, the virus   causing COVID-19, but do not rule out bacterial infection or co-infection   with other viruses  Laboratories within the [place] and its   territories are required to report all positive results to the appropriate   public health authorities  Negative results do not preclude SARS-CoV-2   infection and should not be used as the sole basis for treatment or other   patient management decisions  Negative results must be combined with   clinical observations, patient history, and epidemiological information  This test has not been FDA cleared or approved  This test has been authorized by FDA under an Emergency Use Authorization   (EUA)  This test is only authorized for the duration of time the   declaration that circumstances exist justifying the authorization of the   emergency use of an in vitro diagnostic tests for detection of SARS-CoV-2   virus and/or diagnosis of COVID-19 infection under section 564(b)(1) of   the Act, 21 U  S C  132OKU-7(Z)(4), unless the authorization is terminated   or revoked sooner  The test has been validated but independent review by FDA   and CLIA is pending  Test performed using [employer] GeneXpert: This RT-PCR assay targets N2,   a region unique to SARS-CoV-2  A conserved region in the E-gene was chosen   for pan-Sarbecovirus detection which includes SARS-CoV-2     According to CMS-2020-01-R, this platform meets the definition of high-throughput technology  Component Value   SARS-CoV-2 Negative          03/12/2023 1416 03/12/2023 1418 Occult blood x 3, stool [[id number]]   Stool from Rectum    In process [address]  Component Value   No component results          03/09/2023 4446 03/10/2023 0928 Urine culture [[id number]]   Urine, Indwelling Jose Catheter    Final result [address]  Component Value   Urine Culture No Growth <1000 cfu/mL             03/08/2023 2346 03/09/2023 0039 FLU/RSV/COVID - if FLU/RSV clinically relevant [[id number]]    Nasopharyngeal Swab    Final result [address] Guidelines URL to browser: [email or web address]   SARS-CoV-2 assay is a Nucleic Acid Amplification assay intended for the   qualitative detection of nucleic acid from SARS-CoV-2 in nasopharyngeal   swabs  Results are for the presumptive identification of SARS-CoV-2 RNA  Positive results are indicative of infection with SARS-CoV-2, the virus   causing COVID-19, but do not rule out bacterial infection or co-infection   with other viruses  Laboratories within the [place] and its   territories are required to report all positive results to the appropriate   public health authorities  Negative results do not preclude SARS-CoV-2   infection and should not be used as the sole basis for treatment or other   patient management decisions  Negative results must be combined with   clinical observations, patient history, and epidemiological information  This test has not been FDA cleared or approved  This test has been authorized by FDA under an Emergency Use Authorization   (EUA)   This test is only authorized for the duration of time the   declaration that circumstances exist justifying the authorization of the   emergency use of an in vitro diagnostic tests for detection of SARS-CoV-2   virus and/or diagnosis of COVID-19 infection under section 564(b)(1) of   the Act, 21 U  S C  408QDH-0(V)(6), unless the authorization is terminated   or revoked sooner  The test has been validated but independent review by FDA   and CLIA is pending  Test performed using [employer] GeneXpert: This RT-PCR assay targets N2,   a region unique to SARS-CoV-2  A conserved region in the E-gene was chosen   for pan-Sarbecovirus detection which includes SARS-CoV-2  According to CMS-2020-01-R, this platform meets the definition of high-throughput technology  Component Value   SARS-CoV-2 Negative   INFLUENZA A PCR Negative   INFLUENZA B PCR Negative   RSV PCR Negative              Incidental Findings:   · As above   · I reviewed the above mentioned incidental findings with the patient and/or family and they expressed understanding  Test Results Pending at Discharge (will require follow-up):  · None     Outpatient Tests Requested:  · CBC with diff , CMP, Magnesium level, and Phosphorus level in 1 day and weekly while at SNF rehab  · TSH level in 4-6 weeks with his PCP    Complications:  None    Reason for Admission:  Chest pain    Hospital Course:   Davin Carballo is a 69 y o  male patient who originally presented to the hospital on 3/8/2023 due to chest pain  Please see above list of diagnoses and related plan for additional information  Condition at Discharge: good    Discharge Day Visit / Exam:   Subjective: The patient was seen and examined  The patient continues to experience dizziness and lightheadedness when standing up and ambulating  He also continues to experience frequent hot flashes  No chest pain  No shortness of breath  No abdominal pain  No nausea or vomiting      Vitals: Blood Pressure: 120/66 (03/22/23 1021)  Pulse: 76 (03/22/23 1021)  Temperature: 97 8 °F (36 6 °C) (03/22/23 0650)  Temp Source: Oral (03/20/23 5123)  Respirations: 18 (03/22/23 0650)  Height: 5' 9" (175 3 cm) (03/09/23 1029)  Weight - Scale: 94 7 kg (208 lb 12 4 oz) (03/22/23 0600)  SpO2: 96 % (03/22/23 1021)  Exam:   Physical Exam   General:  NAD, follows commands  HEENT:  NC/AT, mucous membranes moist  Neck:  Supple, No JVP elevation  CV:  + S1, + S2, RRR  Pulm:  Lung fields are CTA bilaterally  Abd:  Soft, Non-tender, Non-distended  Ext:  No clubbing/cyanosis/edema  Skin:  No rashes  Neuro:  Awake, alert, oriented  Psych:  Normal mood and affect      Discussion with Family: Updated  (wife) via phone  Discharge instructions/Information to patient and family:  See after visit summary for information provided to patient and family  Provisions for Follow-Up Care:  See after visit summary for information related to follow-up care and any pertinent home health orders  Disposition:   Other [place] [facility] [address] 5th floor SNF for short-term rehabilitation    Planned Readmission:  No     Discharge Statement:  I spent 45 minutes discharging the patient  This time was spent on the day of discharge  I had direct contact with the patient on the day of discharge  Greater than 50% of the total time was spent examining patient, answering all patient questions, arranging and discussing plan of care with patient as well as directly providing post-discharge instructions  Additional time then spent on discharge activities  Discharge Medications:  See after visit summary for reconciled discharge medications provided to patient and/or family        **Please Note: This note may have been constructed using a voice recognition system**

## 2023-03-22 NOTE — ASSESSMENT & PLAN NOTE
· Continues to experience orthostatic hypotension with severe lightheadedness and dizziness while standing and a systolic blood pressure in the 90's mmHg  · Possible effects of Agent Orange exposure in the past  · Hold tamsulosin for now  · Beta-blocker and diuretics held  · Midodrine increased to 10 mg TID with persistent symptomatic hypotension,slowly improving  · Likely in the setting of blood loss anemia, given 1 dose of IV iron   · Compression stockings to be utilized  · Continue to monitor  · Monitor the orthostatic blood pressure readings  · Cortisol WNL  · Noted to have Polyuria with UO 3-5 L daily-  · Check osmolality, urine Na  ADH  · Nephrology and Neurology have been consulted  · Re-consulted Cardiology  · Hold Ranexa in the setting of orthostatic hypotension  · May need the addition of Florinef if the orthostatic hypotension continues  · Evaluated by PT/OT, who recommended post-acute care rehabilitation placement  · The patient is now agreeable to post-acute care rehabilitation placement

## 2023-03-22 NOTE — PLAN OF CARE
Problem: PAIN - ADULT  Goal: Verbalizes/displays adequate comfort level or baseline comfort level  Description: Interventions:  - Encourage patient to monitor pain and request assistance  - Assess pain using appropriate pain scale (0-10 pain scale)  - Administer analgesics based on type and severity of pain and evaluate response  - Implement non-pharmacological measures as appropriate and evaluate response  - Consider cultural and social influences on pain and pain management  - Notify physician/advanced practitioner if interventions unsuccessful or patient reports new pain  Outcome: Progressing     Problem: INFECTION - ADULT  Goal: Absence or prevention of progression during hospitalization  Description: INTERVENTIONS:  - Assess and monitor for signs and symptoms of infection  - Monitor lab/diagnostic results  - Monitor all insertion sites, i e  indwelling lines  - Administer medications as ordered  - Instruct and encourage patient and family to use good hand hygiene technique  Outcome: Progressing     Problem: SAFETY ADULT  Goal: Patient will remain free of falls  Description: INTERVENTIONS:  - Educate patient/family on patient safety including physical limitations  - Instruct patient to call for assistance with activity (min assist x 1 and walker)  - Consult OT/PT to assist with strengthening/mobility   - Keep Call bell within reach  - Keep bed low and locked with side rails adjusted as appropriate  - Keep care items and personal belongings within reach  - Initiate and maintain comfort rounds  - Make Fall Risk Sign visible to staff (high fall risk)  - Offer Toileting every 1-2 Hours, in advance of need  - Initiate/Maintain bed/chair alarm  - Obtain necessary fall risk management equipment: non-skid footwear  - Apply yellow socks and bracelet for high fall risk patients  - Consider moving patient to room near nurses station  Outcome: Progressing  Goal: Maintain or return to baseline ADL function  Description: INTERVENTIONS:  -  Assess patient's ability to carry out ADLs; (min to mod assist)  - Assess/evaluate cause of self-care deficits (orthostatic hypotension, fatigue, limited movement)  - Assess range of motion  - Assess patient's mobility; (min assist x 1 and walker)  - Assess patient's need for assistive devices and provide as appropriate  - Encourage maximum independence but intervene and supervise when necessary  - Involve family in performance of ADLs  - Assess for home care needs following discharge   - Consider OT consult to assist with ADL evaluation and planning for discharge  - Provide patient education as appropriate  Outcome: Progressing  Goal: Maintains/Returns to pre admission functional level  Description: INTERVENTIONS:  - Perform BMAT or MOVE assessment daily    - Set and communicate daily mobility goal to care team and patient/family/caregiver  - Collaborate with rehabilitation services on mobility goals if consulted  - Perform Range of Motion 3 times a day  - Reposition patient every 2 hours    - Dangle patient 3 times a day  - Stand patient 3 times a day  - Ambulate patient 3 times a day  - Out of bed to chair 3 times a day   - Out of bed for meals 3 times a day  - Out of bed for toileting  - Record patient progress and toleration of activity level   Outcome: Progressing     Problem: DISCHARGE PLANNING  Goal: Discharge to home or other facility with appropriate resources  Description: INTERVENTIONS:  - Identify barriers to discharge w/patient and caregiver  - Arrange for needed discharge resources and transportation as appropriate  - Identify discharge learning needs (meds, wound care, etc )  - Refer to Case Management Department for coordinating discharge planning if the patient needs post-hospital services based on physician/advanced practitioner order or complex needs related to functional status, cognitive ability, or social support system  Outcome: Progressing     Problem: Knowledge Deficit  Goal: Patient/family/caregiver demonstrates understanding of disease process, treatment plan, medications, and discharge instructions  Description: Complete learning assessment and assess knowledge base    Interventions:  - Provide teaching at level of understanding  - Provide teaching via preferred learning methods  Outcome: Progressing     Problem: GENITOURINARY - ADULT  Goal: Maintains or returns to baseline urinary function  Description: INTERVENTIONS:  - Assess urinary function  - Encourage oral fluids to ensure adequate hydration if ordered  - Administer IV fluids as ordered to ensure adequate hydration  - Administer ordered medications as needed  - Offer frequent toileting  - Follow urinary retention protocol if ordered  Outcome: Progressing  Goal: Absence of urinary retention  Description: INTERVENTIONS:  - Assess patient's ability to void and empty bladder (patient has morales catheter intact)  - Monitor I/O  - Discuss with physician/AP medications to alleviate retention as needed (follow up with urology as outpatient)  - Discuss catheterization for long term situations as appropriate (patient has morales catheter intact)  Outcome: Progressing  Goal: Urinary catheter remains patent  Description: INTERVENTIONS:  - Assess patency of urinary catheter  - Follow guidelines for intermittent irrigation of non-functioning urinary catheter  Outcome: Progressing     Problem: METABOLIC, FLUID AND ELECTROLYTES - ADULT  Goal: Electrolytes maintained within normal limits  Description: INTERVENTIONS:  - Monitor labs and assess patient for signs and symptoms of electrolyte imbalances  - Administer electrolyte replacement as ordered  - Monitor response to electrolyte replacements, including repeat lab results as appropriate  - Instruct patient on fluid and nutrition as appropriate  Outcome: Progressing  Goal: Fluid balance maintained  Description: INTERVENTIONS:  - Monitor labs   - Monitor I/O and WT  - Instruct patient on fluid and nutrition as appropriate  - Assess for signs & symptoms of volume excess or deficit  Outcome: Progressing  Goal: Glucose maintained within target range  Description: INTERVENTIONS:  - Monitor Blood Glucose as ordered  - Assess for signs and symptoms of hyperglycemia and hypoglycemia  - Administer ordered medications to maintain glucose within target range  - Assess nutritional intake and initiate nutrition service referral as needed  Outcome: Progressing

## 2023-03-22 NOTE — ASSESSMENT & PLAN NOTE
Wt Readings from Last 3 Encounters:   03/22/23 94 7 kg (208 lb 12 4 oz)   03/08/23 105 kg (231 lb 7 7 oz)   03/02/23 98 9 kg (218 lb 0 6 oz)     · Remains euvolemic  · Trace lower extremity edema - recommend elevating legs  · Daily weights  · Diuretics held due to orthostatic hypotension  • Outpatient follow-up with Cardiology

## 2023-03-22 NOTE — ASSESSMENT & PLAN NOTE
· Known CAD with recent SAM placement/PTCA to the LAD on 12/29/2022  · Appreciate Cardiology's recommendations  · No indication for repeat cardiac catheterization at this time per Cardiology  • Was previously treated with monotherapy with plavix 75 mg PO Qdaily (7-10 days) in the setting of GI bleeding  · Restarted aspirin 81 mg PO QD with no additional GI bleeding/Hgb stable - 3/21/23  · Continue high-intensity statin therapy with atorvastatin 80 mg PO Qdaily with dinner  · BB held with hypotension  · Outpatient follow-up with Cardiology

## 2023-03-22 NOTE — ASSESSMENT & PLAN NOTE
· Recent gastrointestinal hemorrhage thought to be diverticular bleeding found on colonoscopy on 3/3/2023  · Serial laboratory testing to monitor hemoglobin/hematocrit levels  · Initiated cyanocobalamin 500 mcg PO Qdaily for a low-normal vitamin B12 level  · Strain despite being on senna-s BID  Added miralax    · GI on board  · No need for colonoscopy at this time per GI, outpatient follow-up  · Iron venofer x 1 dose  · Hemoglobin remains stable  · No recurrence of melena, hematochezia, or GI bleeding  · Outpatient follow-up with Gastroenterology    Results from last 7 days   Lab Units 03/22/23  0607 03/21/23  0629 03/19/23  1031   WBC Thousand/uL 5 79 5 87 6 73   HEMOGLOBIN g/dL 9 3* 10 0* 9 9*   HEMATOCRIT % 29 3* 32 4* 30 8*   PLATELETS Thousands/uL 353 393* 412*

## 2023-03-22 NOTE — CASE MANAGEMENT
Case Management Discharge Planning Note    Patient name Alicia Lubbock  Location /230-57 MRN [id number]  : 1945 Date 3/22/2023       Current Admission Date: 3/8/2023  Current Admission Diagnosis:Orthostatic hypotension   Patient Active Problem List    Diagnosis Date Noted   • Low serum cortisol level 2023   • Hot flashes 2023   • Elevated platelet count 63/10/0737   • Hypothyroidism 2023   • Diverticulosis of colon with hemorrhage 2023   • Elevated TSH 2023   • Elevated d-dimer 2023   • Urinary retention 2023   • Acute blood loss anemia 2023   • Acute lower GI bleeding 2023   • Diabetes mellitus, type 2 ([facility] ) 2023   • Acute diverticulitis 2023   • Orthostatic hypotension 2023   • 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   • Peripheral arterial disease ([facility] ) 2023   • S/P cardiac catheterization 2022   • Stage 3a chronic kidney disease ([facility] ) 2022   • Basal cell carcinoma (BCC) of right shoulder 2022   • Chronic diastolic (congestive) heart failure ([facility] ) 01/10/2022   • Skin lesion 2021   • Other chest pain 2021   • Leukocytosis 2021   • Bilateral lower extremity edema 10/22/2021   • Mild cognitive impairment 2021   • Benign hypertension with CKD (chronic kidney disease) stage III ([facility] ) 02/10/2021   • Diabetic nephropathy associated with type 2 diabetes mellitus ([facility] ) 02/10/2021   • Concentric left ventricular hypertrophy 02/10/2021   • Aortic stenosis, moderate 02/10/2021   • Edema 02/10/2021   • Fall (on) (from) other stairs and steps, initial encounter 2020   • Recurrent major depressive disorder ([facility] ) 2020   • Primary osteoarthritis of right knee 2020   • Primary osteoarthritis of left knee 2020   • Coronary artery disease of native artery of native heart with stable angina pectoris ([facility] ) 2020   • Chronic pain of both knees 2020   • Primary osteoarthritis of both knees 02/13/2020   • Primary osteoarthritis involving multiple joints 12/16/2019   • Vitamin E deficiency 09/19/2019   • Infected epidermoid cyst 12/04/2018   • Tremor, essential 09/26/2018   • Diabetic neuropathy associated with diabetes mellitus due to underlying condition ([facility] ) 09/26/2018   • Gait instability 09/26/2018   • Frequent falls 09/26/2018   • Degenerative disc disease, lumbar 07/10/2018   • Tremor 10/10/2016   • Depression with anxiety 08/29/2016   • Arthritis 07/13/2015   • NSTEMI (non-ST elevated myocardial infarction) ([place] [address] ) 10/06/2014   • Gout 05/17/2013   • Type 2 diabetes mellitus with hyperglycemia, without long-term current use of insulin ([place] [address] ) 06/18/2012   • Hyperlipidemia 06/18/2012   • Hypertension 06/18/2012   • Morbid obesity ([place] [address] ) 06/18/2012      LOS (days): 13  Geometric Mean LOS (GMLOS) (days): 2 30  Days to GMLOS:-10 8     OBJECTIVE:  Risk of Unplanned Readmission Score: 24 3         Current admission status: Inpatient   Preferred Pharmacy:   [facility] [address] [address]  Phone: [phone] Fax: [phone]    Primary Care Provider: Sultana Casillas DO    Primary Insurance: [facility]  Secondary Insurance:     DISCHARGE DETAILS:    Discharge planning discussed with[de-identified] patient  Freedom of Choice: Yes  Comments - Freedom of Choice: agreeable to 5th floor for rehab  CM contacted family/caregiver?: No- see comments (wife currently on 5th floor doing paperwork for admission)  Were Treatment Team discharge recommendations reviewed with patient/caregiver?: Yes  Did patient/caregiver verbalize understanding of patient care needs?: Yes  Were patient/caregiver advised of the risks associated with not following Treatment Team discharge recommendations?: Yes    Contacts  Contact Method:  In Person  Reason/Outcome: Discharge [address]         Is the patient interested in [address] at discharge?: No         Other Referral/Resources/Interventions Provided:  Interventions: Short Term Rehab    Would you like to participate in our [address] service program?  : No - Declined    Treatment Team Recommendation: Short Term Rehab  Discharge Destination Plan[de-identified] Short Term Rehab     IMM Given (Date):: 03/22/23  IMM Given to[de-identified] Patient   patient discharging to the 5th floor today for STR  auth and covid resulted added in aidin  Primary nurse kwasi to call report and staff to transport pt  [facility] faxed

## 2023-03-22 NOTE — PROGRESS NOTES
NEPHROLOGY PROGRESS NOTE   Barbara Celestin 69 y o  male MRN: [id number]  Unit/Bed#: 199-09 Encounter: [id number]    Assessment/Plan:     74 yo man who follows with Dr Jhon Parks for CKD 3A likely secondary to diabetic nephropathy, hypertensive nephrosclerosis, moderate AS, CAD status post SAM times 2 December 2022, recent GI bleed earlier this month initially presented 3/9 chief complaint chest pain that is post evaluated by cardiology with no plan for intervention however development of orthostatic hypotension for which nephrology consulted      1  Orthostatic hypotension  ? Diuretics, SGLT2 inhibitor, alpha-blocker, vasodilators all discontinued earlier in stay  Did receive nitro x2 last week  Continues to have flushing/hot flashes with position changes  Work-up revealed low cortisol level and repeat pending  Nephrology initially consulted for polyuria however DI not likely his urine osmolality-serum osmolality and urine is concentrated  ? He may be mildly intravascularly depleted and can consider concentrated albumin 12 5-25 g  ? He will be evaluated by endocrinology later today-agree  ? Monitor for supine hypertension in the setting of high-dose midodrine  2  Stage IIIa chronic kidney disease with baseline creatinine around 1 0-1 2 mg/dL  ? Kidney function is stable and within typical baseline  3   Bilateral lower extremity edema, iatrogenic volume overload  ? Edema and weight stable  He is compensated  Intra-arterial volume status may be difficult to fully assess due to habitus however may be intravascularly volume depleted  Can consider passive leg raise  Can trial concentrated albumin  4   Diabetic nephropathy, likely  ? Losartan SGLT2i on hold  5  Urinary retention  ? Indwelling urinary catheter intact per urology colleagues  6  Moderate aortic stenosis        ROS  Continues to have hot flashes and flushing with position changes    A complete 10 point review of systems have been performed and are otherwise negative  Historical Information   Past Medical History:   Diagnosis Date   • BPH (benign prostatic hyperplasia)    • CAD (coronary artery disease)    • Cancer (HCC)     basal cell   • Chronic kidney disease     stage 3   • Colon polyp    • CPAP (continuous positive airway pressure) dependence    • Diabetes mellitus (HCC)     niddm   • Disease of thyroid gland    • Gout    • High cholesterol    • Hypertension    • Hypomagnesemia 3/9/2023   • MI, 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    • Myocardial infarction ([facility] ) 2014   • S/P cardiac catheterization 12/30/2022   • Sleep apnea      Past Surgical History:   Procedure Laterality Date   • BASAL CELL CARCINOMA EXCISION Right 1/21/2022    Procedure: EXCISION BASAL CELL CARCINOMA EXTREMITY;  Surgeon: Kasey Hodge DO;  Location: [facility] MAIN OR;  Service: General   • CARDIAC CATHETERIZATION Left 12/29/2022    Procedure: Cardiac Left Heart Cath;  Surgeon: Paul Allen MD;  Location: [facility] CARDIAC [facility] LAB; Service: Cardiology   • CARDIAC CATHETERIZATION N/A 12/29/2022    Procedure: Cardiac Coronary Angiogram;  Surgeon: Paul Allen MD;  Location: [facility] CARD[facility]AC [facility]; Service: Cardiology   • CARDIAC CATHETERIZATION N/A 12/29/2022    Procedure: Cardiac pci;  Surgeon: Paul Allen MD;  Location: [facility] CARDIAC C[facility];   Service: Cardiology   • CATARACT EXTRACTION W/  INTRAOCULAR LENS IMPLANT     • CORNEAL TRANSPLANT     • CORONARY ANGIOPLASTY WITH STENT PLACEMENT      stents x3-- [phone]   • EYE SURGERY      repair corneal laceration   • MO COLONOSCOPY FLX DX W/COLLJ SPEC WHEN PFRMD N/A 3/21/2016    Procedure: COLONOSCOPY;  Surgeon: Karen Corley MD;  Location: [facility];  Service: Colorectal   • TONSILLECTOMY AND ADENOIDECTOMY       Social History   Social History     Substance and Sexual Activity   Alcohol Use Not Currently    Comment: socially, cut down in 2008, previously did more than      Social History     Substance and Sexual Activity   Drug Use Never     Social History     Tobacco Use   Smoking Status Never   Smokeless Tobacco Never       Family History:   Family History   Adopted: Yes   Problem Relation Age of Onset   • No Known Problems Mother    • No Known Problems Father        Medications:  Pertinent medications were reviewed  Current Facility-Administered Medications   Medication Dose Route Frequency Provider Last Rate   • acetaminophen  650 mg Oral Q6H PRN Maciel Shah MD     • allopurinol  100 mg Oral Daily Maciel Shah MD     • aluminum-magnesium hydroxide-simethicone  30 mL Oral Q4H PRN Maciel Shah MD     • aspirin  81 mg Oral Daily FAY Trinidad     • atorvastatin  80 mg Oral Daily With Dinner Maciel Shah MD     • clopidogrel  75 mg Oral Daily Maciel Shah MD     • cyanocobalamin  500 mcg Oral Daily Sunday Bean DO     • escitalopram  10 mg Oral Daily Maciel Shah MD     • fluticasone  2 spray Each Nare Daily Tyron Mckeon MD     • insulin lispro  2-12 Units Subcutaneous TID AC Maciel Shah MD     • insulin lispro  2-12 Units Subcutaneous HS Maciel Shah MD     • levothyroxine  112 mcg Oral Early Morning Sunday Bean DO     • midodrine  10 mg Oral TID AC Shahram Harry PA-C     • multivitamin-minerals  1 tablet Oral Daily Maciel Shah MD     • nitroglycerin  0 4 mg Sublingual Q5 Min PRN Loi Neff DO     • ondansetron  4 mg Intravenous Q4H PRN Sunday Bean DO     • pantoprazole  40 mg Oral Early Morning FAY Trin[place]     • polyethylene glycol  17 g Oral Daily PRN Maciel Shah MD     • polyethylene glycol  17 g Oral Daily Shahram Harry PA-C     • senna-docusate sodium  1 tablet Oral BID Renee Thompson PA-C           No Known Allergies      Vitals:   /66   Pulse 76   Temp 97 8 °F (36 6 °C)   Resp 18   Ht 5' 9" (1 753 m)   Wt 94 7 kg (208 lb 12 4 oz)   SpO2 96%   BMI 30 83 kg/m²   Body mass index is 30 83 kg/m²    SpO2: 96 %, SpO2 Activity: At Rest,   O2 Device: None (Room air)      Intake/Output Summary (Last 24 hours) at 3/22/2023 1046  Last data filed at 3/22/2023 0900  Gross per 24 hour   Intake 1380 ml   Output 2200 ml   Net -820 ml     Invasive Devices     Peripheral Intravenous Line  Duration           Peripheral IV 03/17/23 Proximal;Ventral (anterior); Right Forearm 4 days          Drain  Duration           Urethral Catheter Temperature probe 17 days                Physical Exam  General: conscious, cooperative, in no acute distress  Eyes: conjunctivae pink, anicteric sclerae  ENT: lips and mucous membranes moist  Neck: supple, no JVD, no masses  Chest: clear breath sounds bilaterally, no crackles, ronchus or wheezings  CVS: distinct S1 & S2, normal rate, regular rhythm  Abdomen: soft, non-tender, non-distended, normoactive bowel sounds  Extremities: Mild edema of both legs  Skin: no rash  Neuro: awake, alert, oriented  : Indwelling urinary catheter intact draining [place]ston urine    Diagnostic Data:  Lab: I have personally reviewed pertinent lab results  ,   CBC:  Results from last 7 days   Lab Units 03/22/23  0607   WBC Thousand/uL 5 79   HEMOGLOBIN g/dL 9 3*   HEMATOCRIT % 29 3*   PLATELETS Thousands/uL 353      CMP:   Lab Results   Component Value Date    SODIUM 138 03/22/2023    K 3 8 03/22/2023     03/22/2023    CO2 25 03/22/2023    BUN 13 03/22/2023    CREATININE 0 93 03/22/2023    CALCIUM 8 5 03/22/2023    AST 19 03/22/2023    ALT 21 03/22/2023    ALKPHOS 60 03/22/2023    EGFR 79 03/22/2023   ,   PT/INR: No results found for: PT, INR,   Magnesium: No components found for: MAG,  Phosphorous:   Lab Results   Component Value Date    PHOS 2 6 03/22/2023       Microbiology:  @LABRCNTIP,(urinecx:7)@        FAY Phan    Portions of the record may have been created with voice recognition software   Occasional wrong word or "sound a like" substitutions may have occurred due to the inherent limitations of voice recognition software  Read the chart carefully and recognize, using context, where substitutions have occurred

## 2023-03-22 NOTE — OCCUPATIONAL THERAPY NOTE
Occupational Therapy Re-Evaluation     Patient Name: Maciel Brown  PZWAI'T Date: 3/22/2023  Problem List  Principal Problem:    Orthostatic hypotension  Active Problems:    Type 2 diabetes mellitus with hyperglycemia, without long-term current use of insulin (HCC)    Coronary artery disease of native artery of native heart with stable angina pectoris (HCC)    Aortic stenosis, moderate    Other chest pain    Chronic diastolic (congestive) heart failure (HCC)    Acute blood loss anemia    Urinary retention    Elevated TSH    Elevated d-dimer    Hypothyroidism    Elevated platelet count    Low serum cortisol level    Hot flashes    Past Medical History  Past Medical History:   Diagnosis Date    BPH (benign prostatic hyperplasia)     CAD (coronary artery disease)     Cancer (HCC)     basal cell    Chronic kidney disease     stage 3    Colon polyp     CPAP (continuous positive airway pressure) dependence     Diabetes mellitus ([facility] [address] )     niddm    Disease of thyroid gland     Gout     High cholesterol     Hypertension     Hypomagnesemia 3/9/2023    MI, 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     Myocardial infarction ([facility] [address] ) 2014    S/P cardiac catheterization 12/30/2022    Sleep apnea      Past Surgical History  Past Surgical History:   Procedure Laterality Date    BASAL CELL CARCINOMA EXCISION Right 1/21/2022    Procedure: EXCISION BASAL CELL CARCINOMA EXTREMITY;  Surgeon: Nikolai Davila DO;  Location: [facility] MAIN [facility];  Service: General    CARDIAC CATHETERIZATION Left 12/29/2022    Procedure: Cardiac Left Heart Cath;  Surgeon: Padmaja Mustafa MD;  Location: [facility] CARD[facility]; Service: Cardiology    CARDIAC CATHETERIZATION N/A 12/29/2022    Procedure: Cardiac Coronary Angiogram;  Surgeon: Padmaja Mustafa MD;  [facility]; Service: Cardiology    CARDIAC CATHETERIZATION N/A 12/29/2022    Procedure: Cardiac pci;  Surgeon: Padmaja Mustafa MD;  [facility]: [facility];   Service: Cardiology    CATARACT EXTRACTION W/  INTRAOCULAR LENS IMPLANT      CORNEAL TRANSPLANT      CORONARY ANGIOPLASTY WITH STENT PLACEMENT      stents x3-- [id number]    EYE SURGERY      repair corneal laceration    AL COLONOSCOPY FLX DX W/COLLJ SPEC WHEN PFRMD N/A 3/21/2016    Procedure: COLONOSCOPY;  Surgeon: Poly Christian MD;  Location: [facility];  Service: Colorectal    TONSILLECTOMY AND ADENOIDECTOMY             03/22/23 1017   OT Last Visit   OT Visit Date 04/05/23   Note Type   Note type Evaluation   Pain Assessment   Pain Score No Pain   Restrictions/Precautions   Weight Bearing Precautions Per Order No   Other Precautions Chair Alarm;Multiple lines; Fall Risk   Home Living   Additional Comments see initial eval for details   Prior Function   Comments see initial eval for details   Subjective   Subjective "we can do whatever you want"   ADL   Where Assessed Chair   LB Dressing Assistance 3  Moderate Assistance   LB Dressing Deficit Don/doff R sock; Don/doff L sock   Toileting Assistance  2  Maximal Assistance   Bed Mobility   Additional Comments pt OOB in chair at start and end of session; SPO2 WFL on RA; BP WFL in sitting and standing and appropriate for further mobility   Transfers   Sit to Stand 5  Supervision   Additional items Increased time required;Verbal cues  (RW)   Stand to Sit 5  Supervision   Additional items Increased time required;Verbal cues  (RW)   Additional Comments pt with no significant LOB or instability; increased time required and cues for safety with device and transfers technique   Functional Mobility   Functional Mobility 5  Supervision   Additional Comments pt performs functional mobility in hallway ~60ft; no significant LOB, however mild instability   Additional items Rolling walker   Balance   Static Sitting Fair +   Dynamic Sitting Fair +   Static Standing Fair +   Dynamic Standing Fair -   Ambulatory Poor +   Activity Tolerance   Activity Tolerance Patient limited by fatigue   RUE Assessment RUE Assessment WFL   LUE Assessment   LUE Assessment WFL   Hand Function   Gross Motor Coordination Functional   Fine Motor Coordination Functional   Sensation   Light Touch No apparent deficits   Sharp/Dull No apparent deficits   Psychosocial   Psychosocial (WDL) WDL   Cognition   Overall Cognitive Status WFL   Arousal/Participation Alert   Attention Within functional limits   Orientation Level Oriented X4   Memory Within functional limits   Following Commands Follows all commands and directions without difficulty   Assessment   Limitation Decreased ADL status; Decreased UE strength;Decreased Safe judgement during ADL;Decreased endurance;Decreased self-care trans;Decreased high-level ADLs   Assessment Pt is a 69 y o  male seen for OT re-evaluation s/p admit to [facility] on 3/8/2023 w/ Orthostatic hypotension  Upon evaluation: Pt requires (S)-max (A) x1 with use of RW during mobility 2* the following deficits impacting occupational performance: weakness, decreased strength, decreased balance, decreased tolerance, impaired initiation, impaired problem solving, decreased safety awareness and impaired interpersonal skills  Pt to benefit from continued skilled OT tx while in the hospital to address deficits as defined above and maximize level of functional independence w ADL's and functional mobility  Occupational Performance areas to address include: grooming, bathing/shower, toilet hygiene, dressing, functional mobility, community mobility and clothing management  The patient's raw score on the AM-PAC Daily Activity Inpatient Short Form is 17  A raw score of less than 19 suggests the patient may benefit from discharge to post-acute rehabilitation services  Please refer to the recommendation of the Occupational Therapist for safe discharge planning    Pt benefited from co-evaluation of skilled OT and PT therapists in order to most appropriately address functional deficits d/t extensive assistance required for safe functional mobility, decreased activity tolerance, and regression from functioning level prior to admission and/or onset of present illness  OT/PT objectives were addressed separately; please see PT note for specific goal areas targeted  Goals   Patient Goals to go home   Short Term Goal  pt will perform UE strengthening exercises   Long Term Goal #1 pt will demonstrate toilet transfers and hygiene at (I) level   Long Term Goal #2 pt will demonstrate functional mobility with RW at mod (I) level   Long Term Goal pt will demonstrate UB/LB bathing and grooming tasks at min (A) level   Plan   Treatment Interventions ADL retraining;Functional transfer training;UE strengthening/ROM; Endurance training;Patient/family training; Activityengagement;Equipment evaluation/education   Goal Expiration Date 04/05/23   OT Frequency 3-5x/wk   Recommendation   OT Discharge Recommendation Post acute rehabilitation services   AM-PAC Daily Activity Inpatient   Lower Body Dressing 2   Bathing 2   Toileting 2   Upper Body Dressing 3   Grooming 4   Eating 4   Daily Activity Raw Score 17   Daily Activity Standardized Score (Calc for Raw Score >=11) 37 26   AM-PAC Applied Cognition Inpatient   Following a Speech/Presentation 4   Understanding Ordinary Conversation 4   Taking Medications 4   Remembering Where Things Are Placed or Put Away 4   Remembering List of 4-5 Errands 4   Taking Care of Complicated Tasks 4   Applied Cognition Raw Score 24   Applied Cognition Standardized Score 62 21

## 2023-03-22 NOTE — ASSESSMENT & PLAN NOTE
· I discussed the case with Endocrinology, who will evaluate the patient in the outpatient setting       Latest Reference Range & Units 03/18/23 05:33   Cortisol - AM 4 2 - 22 4 ug/dL 6 3      Latest Reference Range & Units 03/22/23 06:07   Cortisol - AM 4 2 - 22 4 ug/dL 10 0

## 2023-03-22 NOTE — PLAN OF CARE
Problem: PAIN - ADULT  Goal: Verbalizes/displays adequate comfort level or baseline comfort level  Description: Interventions:  - Encourage patient to monitor pain and request assistance  - Assess pain using appropriate pain scale (0-10 pain scale)  - Administer analgesics based on type and severity of pain and evaluate response  - Implement non-pharmacological measures as appropriate and evaluate response  - Consider cultural and social influences on pain and pain management  - Notify physician/advanced practitioner if interventions unsuccessful or patient reports new pain  3/22/2023 1255 by Andres Menendez LPN  Outcome: Adequate for Discharge  3/22/2023 1005 by Andres Menendez LPN  Outcome: Progressing     Problem: INFECTION - ADULT  Goal: Absence or prevention of progression during hospitalization  Description: INTERVENTIONS:  - Assess and monitor for signs and symptoms of infection  - Monitor lab/diagnostic results  - Monitor all insertion sites, i e  indwelling lines  - Administer medications as ordered  - Instruct and encourage patient and family to use good hand hygiene technique  3/22/2023 1255 by Andres Menendez LPN  Outcome: Adequate for Discharge  3/22/2023 1005 by Andres Menendez LPN  Outcome: Progressing     Problem: SAFETY ADULT  Goal: Patient will remain free of falls  Description: INTERVENTIONS:  - Educate patient/family on patient safety including physical limitations  - Instruct patient to call for assistance with activity (min assist x 1 and walker)  - Consult OT/PT to assist with strengthening/mobility   - Keep Call bell within reach  - Keep bed low and locked with side rails adjusted as appropriate  - Keep care items and personal belongings within reach  - Initiate and maintain comfort rounds  - Make Fall Risk Sign visible to staff (high fall risk)  - Offer Toileting every 1-2 Hours, in advance of need  - Initiate/Maintain bed/chair alarm  - Obtain necessary fall risk management equipment: non-skid footwear  - Apply yellow socks and bracelet for high fall risk patients  - Consider moving patient to room near nurses station  3/22/2023 1255 by Thien Barry LPN  Outcome: Adequate for Discharge  3/22/2023 1005 by Thien Barry LPN  Outcome: Progressing  Goal: Maintain or return to baseline ADL function  Description: INTERVENTIONS:  -  Assess patient's ability to carry out ADLs; (min to mod assist)  - Assess/evaluate cause of self-care deficits (orthostatic hypotension, fatigue, limited movement)  - Assess range of motion  - Assess patient's mobility; (min assist x 1 and walker)  - Assess patient's need for assistive devices and provide as appropriate  - Encourage maximum independence but intervene and supervise when necessary  - Involve family in performance of ADLs  - Assess for home care needs following discharge   - Consider OT consult to assist with ADL evaluation and planning for discharge  - Provide patient education as appropriate  3/22/2023 1255 by Thien Barry LPN  Outcome: Adequate for Discharge  3/22/2023 1005 by Thien Barry LPN  Outcome: Progressing  Goal: Maintains/Returns to pre admission functional level  Description: INTERVENTIONS:  - Perform BMAT or MOVE assessment daily    - Set and communicate daily mobility goal to care team and patient/family/caregiver  - Collaborate with rehabilitation services on mobility goals if consulted  - Perform Range of Motion 3 times a day  - Reposition patient every 2 hours    - Dangle patient 3 times a day  - Stand patient 3 times a day  - Ambulate patient 3 times a day  - Out of bed to chair 3 times a day   - Out of bed for meals 3 times a day  - Out of bed for toileting  - Record patient progress and toleration of activity level   3/22/2023 1255 by Thien Barry LPN  Outcome: Adequate for Discharge  3/22/2023 1005 by Thien Barry LPN  Outcome: Progressing     Problem: DISCHARGE PLANNING  Goal: Discharge to home or other facility with appropriate resources  Description: INTERVENTIONS:  - Identify barriers to discharge w/patient and caregiver  - Arrange for needed discharge resources and transportation as appropriate  - Identify discharge learning needs (meds, wound care, etc )  - Refer to Case Management Department for coordinating discharge planning if the patient needs post-hospital services based on physician/advanced practitioner order or complex needs related to functional status, cognitive ability, or social support system  3/22/2023 1255 by Alexandra Barrera LPN  Outcome: Adequate for Discharge  3/22/2023 1005 by Alexandra Barrera LPN  Outcome: Progressing     Problem: Knowledge Deficit  Goal: Patient/family/caregiver demonstrates understanding of disease process, treatment plan, medications, and discharge instructions  Description: Complete learning assessment and assess knowledge base    Interventions:  - Provide teaching at level of understanding  - Provide teaching via preferred learning methods  3/22/2023 1255 by Alexandra Barrera LPN  Outcome: Adequate for Discharge  3/22/2023 1005 by Alexandra Barrera LPN  Outcome: Progressing     Problem: CARDIOVASCULAR - ADULT  Goal: Maintains optimal cardiac output and hemodynamic stability  Description: INTERVENTIONS:  - Monitor I/O, vital signs and rhythm  - Monitor for S/S and trends of decreased cardiac output  - Assess quality of pulses, skin color and temperature  - Assess for signs of decreased coronary artery perfusion  - Instruct patient to report change in severity of symptoms  3/22/2023 1255 by Alexandra Barrera LPN  Outcome: Adequate for Discharge  3/22/2023 1005 by Alexandra Barrera LPN  Outcome: Progressing  Goal: Absence of cardiac dysrhythmias or at baseline rhythm  Description: INTERVENTIONS:  - Continuous cardiac monitoring, vital signs, obtain 12 lead EKG if ordered  - Administer antiarrhythmic and heart rate control medications as ordered  - Monitor electrolytes and administer replacement therapy as ordered  3/22/2023 1255 by Batsheva Burkett LPN  Outcome: Adequate for Discharge  3/22/2023 1005 by Batsheva Burkett LPN  Outcome: Progressing     Problem: RESPIRATORY - ADULT  Goal: Achieves optimal ventilation and oxygenation  Description: INTERVENTIONS:  - Assess for changes in respiratory status  - Assess for changes in mentation and behavior  - Position to facilitate oxygenation and minimize respiratory effort  - Oxygen administered by appropriate delivery if ordered  - Encourage broncho-pulmonary hygiene including cough, deep breathe, Incentive Spirometry  - Assess and instruct to report SOB or any respiratory difficulty  - Respiratory Therapy support as indicated  3/22/2023 1255 by Batsheva Burkett LPN  Outcome: Adequate for Discharge  3/22/2023 1005 by Batsheva Burkett LPN  Outcome: Progressing     Problem: GENITOURINARY - ADULT  Goal: Maintains or returns to baseline urinary function  Description: INTERVENTIONS:  - Assess urinary function  - Encourage oral fluids to ensure adequate hydration if ordered  - Administer IV fluids as ordered to ensure adequate hydration  - Administer ordered medications as needed  - Offer frequent toileting  - Follow urinary retention protocol if ordered  3/22/2023 1255 by Batsheva Burkett LPN  Outcome: Adequate for Discharge  3/22/2023 1005 by Batsheva Burktet LPN  Outcome: Progressing  Goal: Absence of urinary retention  Description: INTERVENTIONS:  - Assess patient's ability to void and empty bladder (patient has morales catheter intact)  - Monitor I/O  - Discuss with physician/AP medications to alleviate retention as needed (follow up with urology as outpatient)  - Discuss catheterization for long term situations as appropriate (patient has morales catheter intact)  3/22/2023 1255 by Batsheva Burkett LPN  Outcome: Adequate for Discharge  3/22/2023 1005 by Batsheva Burkett LPN  Outcome: Progressing  Goal: Urinary catheter remains patent  Description: INTERVENTIONS:  - Assess patency of urinary catheter  - Follow guidelines for intermittent irrigation of non-functioning urinary catheter  3/22/2023 1255 by Breanna Quiñonez LPN  Outcome: Adequate for Discharge  3/22/2023 1005 by Breanna Quiñonez LPN  Outcome: Progressing     Problem: METABOLIC, FLUID AND ELECTROLYTES - ADULT  Goal: Electrolytes maintained within normal limits  Description: INTERVENTIONS:  - Monitor labs and assess patient for signs and symptoms of electrolyte imbalances  - Administer electrolyte replacement as ordered  - Monitor response to electrolyte replacements, including repeat lab results as appropriate  - Instruct patient on fluid and nutrition as appropriate  3/22/2023 1255 by Breanna Quiñonez LPN  Outcome: Adequate for Discharge  3/22/2023 1005 by Breanna Quiñonez LPN  Outcome: Progressing  Goal: Fluid balance maintained  Description: INTERVENTIONS:  - Monitor labs   - Monitor I/O and WT  - Instruct patient on fluid and nutrition as appropriate  - Assess for signs & symptoms of volume excess or deficit  3/22/2023 1255 by Breanna Quiñonez LPN  Outcome: Adequate for Discharge  3/22/2023 1005 by Breanna Quiñonez LPN  Outcome: Progressing  Goal: Glucose maintained within target range  Description: INTERVENTIONS:  - Monitor Blood Glucose as ordered  - Assess for signs and symptoms of hyperglycemia and hypoglycemia  - Administer ordered medications to maintain glucose within target range  - Assess nutritional intake and initiate nutrition service referral as needed  3/22/2023 1255 by Breanna Quiñonez LPN  Outcome: Adequate for Discharge  3/22/2023 1005 by Breanna Quiñonez LPN  Outcome: Progressing     Problem: MUSCULOSKELETAL - ADULT  Goal: Maintain or return mobility to safest level of function  Description: INTERVENTIONS:  - Assess patient's ability to carry out ADLs; (min to mod assist)  - Assess/evaluate cause of self-care deficits (orthostatic hypotension, fatigue, limited movement)  - Assess range of motion  - Assess patient's mobility (min assist x 1 and walker)  - Assess patient's need for assistive devices and provide as appropriate  - Encourage maximum independence but intervene and supervise when necessary  - Involve family in performance of ADLs  - Assess for home care needs following discharge   - Consider OT consult to assist with ADL evaluation and planning for discharge  - Provide patient education as appropriate  3/22/2023 1255 by Jazlyn Hernandez LPN  Outcome: Adequate for Discharge  3/22/2023 1005 by Jazlyn Hernandez LPN  Outcome: Progressing     Problem: MOBILITY - ADULT  Goal: Maintain or return to baseline ADL function  Description: INTERVENTIONS:  -  Assess patient's ability to carry out ADLs; (min to mod assist)  - Assess/evaluate cause of self-care deficits (orthostatic hypotension, fatigue, limited movement)  - Assess range of motion  - Assess patient's mobility; (min assist x 1 and walker)  - Assess patient's need for assistive devices and provide as appropriate  - Encourage maximum independence but intervene and supervise when necessary  - Involve family in performance of ADLs  - Assess for home care needs following discharge   - Consider OT consult to assist with ADL evaluation and planning for discharge  - Provide patient education as appropriate  3/22/2023 1255 by Jazlyn Hernandez LPN  Outcome: Adequate for Discharge  3/22/2023 1005 by Jazlyn Hernandez LPN  Outcome: Progressing  Goal: Maintains/Returns to pre admission functional level  Description: INTERVENTIONS:  - Perform BMAT or MOVE assessment daily    - Set and communicate daily mobility goal to care team and patient/family/caregiver  - Collaborate with rehabilitation services on mobility goals if consulted  - Perform Range of Motion 3 times a day  - Reposition patient every 2 hours    - Dangle patient 3 times a day  - Stand patient 3 times a day  - Ambulate patient 3 times a day  - Out of bed to chair 3 times a day   - Out of bed for meals 3 times a day  - Out of bed for toileting  - Record patient progress and toleration of activity level   3/22/2023 1255 by Roger Pinedo LPN  Outcome: Adequate for Discharge  3/22/2023 1005 by Roger Pinedo LPN  Outcome: Progressing     Problem: Prexisting or High Potential for Compromised Skin Integrity  Goal: Skin integrity is maintained or improved  Description: INTERVENTIONS:  - Identify patients at risk for skin breakdown  - Assess and monitor skin integrity  - Assess and monitor nutrition and hydration status  - Monitor labs   - Assess for incontinence (morales catheter intact)  - Turn and reposition patient (cue to turn self)  - Assist with mobility/ambulation (min assist x 1 and walker)  - Relieve pressure over bony prominences  - Avoid friction and shearing  - Provide appropriate hygiene as needed including keeping skin clean and dry  - Evaluate need for skin moisturizer/barrier cream  - Collaborate with interdisciplinary team   - Patient/family teaching  3/22/2023 1255 by Roger Pinedo LPN  Outcome: Adequate for Discharge  3/22/2023 1005 by Roger Pinedo LPN  Outcome: Progressing

## 2023-03-22 NOTE — NURSING NOTE
Patient left floor via wheelchair in stable condition  Report called to Army Villalobos at receiving facility  Appropriate records also sent

## 2023-03-22 NOTE — ASSESSMENT & PLAN NOTE
Lab Results   Component Value Date    HGBA1C 5 6 03/10/2023       Recent Labs     03/21/23  1557 03/21/23  2220 03/22/23  0726 03/22/23  1103   POCGLU 266* 136 143* 247*       Blood Sugar Average: Last 72 hrs:  (P) 176 [id number]     · Good control per recent HgbA1c  · Continue sliding scale insulin with blood glucose monitoring ACHS  · Resume his PO diabetic medication regimen upon discharge  · Outpatient Endocrinology evaluation

## 2023-03-22 NOTE — PLAN OF CARE
Problem: PHYSICAL THERAPY ADULT  Goal: Performs mobility at highest level of function for planned discharge setting  See evaluation for individualized goals  Description: Treatment/Interventions: Functional transfer training, LE strengthening/ROM, Elevations, Therapeutic exercise, Endurance training, Bed mobility, Gait training          See flowsheet documentation for full assessment, interventions and recommendations  Outcome: Progressing  Note: Prognosis: Good  Problem List: Decreased strength, Decreased endurance, Impaired balance, Decreased mobility  Assessment: Pt seen this date for PT re-evaluation d/t POC expiring tomorrow, 3/23/23  Currently, pt requiring SUP-Sakina x 1, RW, and increased time to perform all functional mobility  Pt demonstrating improved endurance and functional activity tolerance, however has not yet met all goals set upon IE  HR and SpO2 remained WFL on RA throughout  BP decreasing with positional changes; pt endorsing mild dizziness but no true LOB experienced  Pt is slated to discharge to [facility] when medically appropriate which remains appropriate from a mobility standpoint  The patient's AM-PAC Basic Mobility Inpatient Short Form Raw Score is 17  A Raw score of greater than 16 suggests the patient may benefit from discharge to home  Please also refer to the recommendation of the Physical Therapist for safe discharge planning  Co treatment with OT secondary to complex medical condition of pt, possible A of 2 required to achieve and maintain transitional movements, requiring the need of skilled therapeutic intervention of 2 therapists to achieve delivery of services  Barriers to Discharge: Inaccessible home environment     PT Discharge Recommendation: Post acute rehabilitation services    See flowsheet documentation for full assessment

## 2023-03-22 NOTE — ASSESSMENT & PLAN NOTE
· Likely reactive in nature  · Follow the platelet count after discharge with his PCP  · Outpatient Hematology evaluation if the elevated platelet count persists

## 2023-03-22 NOTE — CASE MANAGEMENT
Cathy Raza 51 has received approved authorization from insurance:   Malka Naqvi in by Barbie Canales  P# [phone]    Authorization received for: [facility]  Facility: [address] [place] [address] #: [id number]  Start of Care: 3/22/23  Next Review Date: 3/24/23  Care Coordinator:  Chela TRAN#: [phone]  Submit next review to:  [facility] Portal or fax #[phone]   Care Manager notified: Thomasine Heimlich

## 2023-03-23 ENCOUNTER — LAB REQUISITION (OUTPATIENT)
Dept: LAB | Facility: HOSPITAL | Age: 78
End: 2023-03-23

## 2023-03-23 ENCOUNTER — PATIENT OUTREACH (OUTPATIENT)
Dept: CASE MANAGEMENT | Facility: OTHER | Age: 78
End: 2023-03-23

## 2023-03-23 DIAGNOSIS — E11.21 TYPE 2 DIABETES MELLITUS WITH DIABETIC NEPHROPATHY (HCC): ICD-10-CM

## 2023-03-23 DIAGNOSIS — D72.829 ELEVATED WHITE BLOOD CELL COUNT, UNSPECIFIED: ICD-10-CM

## 2023-03-23 DIAGNOSIS — R79.89 OTHER SPECIFIED ABNORMAL FINDINGS OF BLOOD CHEMISTRY: ICD-10-CM

## 2023-03-23 DIAGNOSIS — N18.30 CHRONIC KIDNEY DISEASE, STAGE 3 UNSPECIFIED (HCC): ICD-10-CM

## 2023-03-23 LAB
ALBUMIN SERPL BCP-MCNC: 4 G/DL (ref 3.5–5)
ALP SERPL-CCNC: 70 U/L (ref 34–104)
ALT SERPL W P-5'-P-CCNC: 26 U/L (ref 7–52)
ANION GAP SERPL CALCULATED.3IONS-SCNC: 12 MMOL/L (ref 4–13)
AST SERPL W P-5'-P-CCNC: 36 U/L (ref 13–39)
BASOPHILS # BLD AUTO: 0.05 THOUSANDS/ÂΜL (ref 0–0.1)
BASOPHILS NFR BLD AUTO: 1 % (ref 0–1)
BILIRUB SERPL-MCNC: 0.42 MG/DL (ref 0.2–1)
BUN SERPL-MCNC: 10 MG/DL (ref 5–25)
CALCIUM SERPL-MCNC: 8.9 MG/DL (ref 8.4–10.2)
CHLORIDE SERPL-SCNC: 106 MMOL/L (ref 96–108)
CO2 SERPL-SCNC: 21 MMOL/L (ref 21–32)
CREAT SERPL-MCNC: 0.92 MG/DL (ref 0.6–1.3)
DATE SPECIMEN #1: NORMAL
EOSINOPHIL # BLD AUTO: 0.49 THOUSAND/ÂΜL (ref 0–0.61)
EOSINOPHIL NFR BLD AUTO: 6 % (ref 0–6)
ERYTHROCYTE [DISTWIDTH] IN BLOOD BY AUTOMATED COUNT: 17.6 % (ref 11.6–15.1)
GFR SERPL CREATININE-BSD FRML MDRD: 80 ML/MIN/1.73SQ M
GLUCOSE SERPL-MCNC: 104 MG/DL (ref 65–140)
HCT VFR BLD AUTO: 35.1 % (ref 36.5–49.3)
HEMOCCULT SP1 STL QL: NEGATIVE
HEMOCCULT SP2 STL QL: NORMAL
HEMOCCULT SP3 STL QL: NORMAL
HGB BLD-MCNC: 11.3 G/DL (ref 12–17)
IMM GRANULOCYTES # BLD AUTO: 0.01 THOUSAND/UL (ref 0–0.2)
IMM GRANULOCYTES NFR BLD AUTO: 0 % (ref 0–2)
LYMPHOCYTES # BLD AUTO: 1.73 THOUSANDS/ÂΜL (ref 0.6–4.47)
LYMPHOCYTES NFR BLD AUTO: 22 % (ref 14–44)
MAGNESIUM SERPL-MCNC: 1.8 MG/DL (ref 1.9–2.7)
MCH RBC QN AUTO: 32.6 PG (ref 26.8–34.3)
MCHC RBC AUTO-ENTMCNC: 32.2 G/DL (ref 31.4–37.4)
MCV RBC AUTO: 101 FL (ref 82–98)
MONOCYTES # BLD AUTO: 0.68 THOUSAND/ÂΜL (ref 0.17–1.22)
MONOCYTES NFR BLD AUTO: 9 % (ref 4–12)
NEUTROPHILS # BLD AUTO: 5.08 THOUSANDS/ÂΜL (ref 1.85–7.62)
NEUTS SEG NFR BLD AUTO: 62 % (ref 43–75)
NRBC BLD AUTO-RTO: 0 /100 WBCS
PHOSPHATE SERPL-MCNC: 2.8 MG/DL (ref 2.3–4.1)
PLATELET # BLD AUTO: 381 THOUSANDS/UL (ref 149–390)
PMV BLD AUTO: 9.1 FL (ref 8.9–12.7)
POTASSIUM SERPL-SCNC: 4 MMOL/L (ref 3.5–5.3)
PROT SERPL-MCNC: 6.7 G/DL (ref 6.4–8.4)
RBC # BLD AUTO: 3.47 MILLION/UL (ref 3.88–5.62)
SODIUM SERPL-SCNC: 139 MMOL/L (ref 135–147)
WBC # BLD AUTO: 8.04 THOUSAND/UL (ref 4.31–10.16)

## 2023-03-23 NOTE — PROGRESS NOTES
Outpatient Care Management [facility]  This care manager assistant will continue to monitor via chart review throughout bundle episode

## 2023-03-23 NOTE — UTILIZATION REVIEW
NOTIFICATION OF ADMISSION DISCHARGE   This is a Notification of Discharge from [facility]  Please be advised that this patient has been discharge from our facility  Below you will find the admission and discharge date and time including the patient’s disposition  UTILIZATION REVIEW CONTACT:  Tegan Mcgowan  Utilization   Network Utilization Review Department  Phone: [phone] x carefully listen to the prompts  All voicemails are confidential   Email: [email or web address]     ADMISSION INFORMATION  PRESENTATION DATE: 3/8/2023 11:01 PM  OBERVATION ADMISSION DATE:   INPATIENT ADMISSION DATE: 3/9/23 11:18 AM   DISCHARGE DATE: 3/22/2023  2:15 PM   DISPOSITION:Non [facility] SNF/TCU/SNU    IMPORTANT INFORMATION:  Send all requests for admission clinical reviews, approved or denied determinations and any other requests to dedicated fax number below belonging to the campus where the patient is receiving treatment   List of dedicated fax numbers:  [address] DENIALS (Administrative/Medical Necessity) [phone] [address] (Maternity/NICU/Pediatrics) [phone]   [facility] [phone]   [place] [address] [phone]   [address] [phone]   220 [facility] [phone]   90 [facility] [phone]   [address] [place] [address] 119 [phone]   [facility]  [phone] 4058 [facility] [phone]   [facility] [address] [facility] [phone]

## 2023-03-24 ENCOUNTER — APPOINTMENT (OUTPATIENT)
Dept: CARDIAC REHAB | Facility: HOSPITAL | Age: 78
End: 2023-03-24

## 2023-03-24 ENCOUNTER — TELEPHONE (OUTPATIENT)
Dept: ENDOCRINOLOGY | Facility: CLINIC | Age: 78
End: 2023-03-24

## 2023-03-24 LAB
OSMOLALITY SERPL: 286 MOSMOL/KG (ref 280–301)
VASOPRESSIN SERPL-MCNC: <0.8 PG/ML (ref 0–4.7)

## 2023-03-24 NOTE — TELEPHONE ENCOUNTER
Received referral for Gem weinsteinmail for patient to contact office to schedule Consult/New Patient Appointment

## 2023-03-27 ENCOUNTER — TELEPHONE (OUTPATIENT)
Dept: ENDOCRINOLOGY | Facility: CLINIC | Age: 78
End: 2023-03-27

## 2023-03-27 ENCOUNTER — TRANSITIONAL CARE MANAGEMENT (OUTPATIENT)
Dept: FAMILY MEDICINE CLINIC | Facility: CLINIC | Age: 78
End: 2023-03-27

## 2023-03-27 ENCOUNTER — APPOINTMENT (OUTPATIENT)
Dept: CARDIAC REHAB | Facility: HOSPITAL | Age: 78
End: 2023-03-27

## 2023-03-27 NOTE — TELEPHONE ENCOUNTER
Pt calling in, states he will be a new patient to the group, but he is currently inpatient and is asking why one of our docs can't go see him while he's there? Per Nikole Smith, I advised pt that our group needs to be consulted by the referring provider, we can't just go in to see him  He expressed understanding and stated he will wait until he is discharged and call to make appt

## 2023-03-29 ENCOUNTER — LAB REQUISITION (OUTPATIENT)
Dept: LAB | Facility: HOSPITAL | Age: 78
End: 2023-03-29

## 2023-03-29 ENCOUNTER — APPOINTMENT (OUTPATIENT)
Dept: CARDIAC REHAB | Facility: HOSPITAL | Age: 78
End: 2023-03-29

## 2023-03-29 DIAGNOSIS — I12.9 HYPERTENSIVE CHRONIC KIDNEY DISEASE WITH STAGE 1 THROUGH STAGE 4 CHRONIC KIDNEY DISEASE, OR UNSPECIFIED CHRONIC KIDNEY DISEASE: ICD-10-CM

## 2023-03-29 LAB
ANION GAP SERPL CALCULATED.3IONS-SCNC: 8 MMOL/L (ref 4–13)
BUN SERPL-MCNC: 14 MG/DL (ref 5–25)
CALCIUM SERPL-MCNC: 8.9 MG/DL (ref 8.4–10.2)
CHLORIDE SERPL-SCNC: 106 MMOL/L (ref 96–108)
CO2 SERPL-SCNC: 26 MMOL/L (ref 21–32)
CREAT SERPL-MCNC: 0.88 MG/DL (ref 0.6–1.3)
GFR SERPL CREATININE-BSD FRML MDRD: 83 ML/MIN/1.73SQ M
GLUCOSE SERPL-MCNC: 135 MG/DL (ref 65–140)
POTASSIUM SERPL-SCNC: 3.9 MMOL/L (ref 3.5–5.3)
SODIUM SERPL-SCNC: 140 MMOL/L (ref 135–147)

## 2023-03-30 ENCOUNTER — OFFICE VISIT (OUTPATIENT)
Dept: UROLOGY | Facility: CLINIC | Age: 78
End: 2023-03-30

## 2023-03-30 ENCOUNTER — PATIENT OUTREACH (OUTPATIENT)
Dept: CASE MANAGEMENT | Facility: OTHER | Age: 78
End: 2023-03-30

## 2023-03-30 VITALS
WEIGHT: 215 LBS | BODY MASS INDEX: 31.84 KG/M2 | HEART RATE: 64 BPM | DIASTOLIC BLOOD PRESSURE: 82 MMHG | HEIGHT: 69 IN | SYSTOLIC BLOOD PRESSURE: 132 MMHG

## 2023-03-30 DIAGNOSIS — R33.9 URINARY RETENTION: ICD-10-CM

## 2023-03-30 DIAGNOSIS — N40.1 BENIGN PROSTATIC HYPERPLASIA WITH URINARY RETENTION: Primary | ICD-10-CM

## 2023-03-30 DIAGNOSIS — R33.8 BENIGN PROSTATIC HYPERPLASIA WITH URINARY RETENTION: Primary | ICD-10-CM

## 2023-03-30 LAB — POST-VOID RESIDUAL VOLUME, ML POC: 837 ML

## 2023-03-30 RX ORDER — FINASTERIDE 5 MG/1
5 TABLET, FILM COATED ORAL DAILY
Qty: 30 TABLET | Refills: 6 | Status: SHIPPED | OUTPATIENT
Start: 2023-03-30

## 2023-03-30 NOTE — PATIENT INSTRUCTIONS
1  Urinary retention  Secondary to BPH and was likely suffering from overflow incontinence since last summer  Failed void trial today and has not been able to void since catheter was removed this morning  We will replace the morales catheter today and schedule him for a cystoscopy in the future with Dr Nydia Perea for discussion regarding possible outlet surgery  Morales catheter to remain in place with exchanges every 4 weeks  I will also start him on finasteride 5 mg daily as he is not a good candidate for alpha blockers due to issues with blood pressure and dizziness  I did inform him and his wife today that he may require the morales catheter for a prolong period of time as he may not be a strong surgical candidate at this time due to his recent issues with GI bleeding as well as recent Coronary artery angioplasty in December of 2022 currently taking Plavix and aspirin

## 2023-03-30 NOTE — PROGRESS NOTES
3/30/2023  Markel Hare is a 69 y o  male  [id number]      Procedure:    Universal Protocol:  Consent: Verbal consent obtained  Risks and benefits: risks, benefits and alternatives were discussed  Consent given by: patient  Patient understanding: patient states understanding of the procedure being performed  Patient identity confirmed: verbally with patient      Bladder catheterization    Date/Time: 3/30/2023 4:27 PM  Performed by: Rebecca Mckee RN  Authorized by: FAY Khan     Consent:     Consent given by:  Patient  Universal protocol:     Procedure explained and questions answered to patient or proxy's satisfaction: yes      Patient identity confirmed:  Verbally with patient  Pre-procedure details:     Preparation: Patient was prepped and draped in usual sterile fashion    Anesthesia (see MAR for exact dosages): Anesthesia method:  None  Procedure details:     Bladder irrigation: no      Catheter insertion:  Indwelling    Approach: natural orifice      Catheter type:  Coude, latex and Jose    Catheter size:  16 Fr    Number of attempts:  1    Successful placement: yes      Urine characteristics:  Clear and yellow  Post-procedure details:     Patient tolerance of procedure:   Tolerated well, no immediate complications        Rebecca Mckee RN

## 2023-03-30 NOTE — PROGRESS NOTES
Chart review complete the patient as no LCD  Update retrieved from realtime the patient He feels he is getting stronger  Spoke about his D/C plan  He would like to return home with home health care services

## 2023-03-30 NOTE — PROGRESS NOTES
3/30/2023    No chief complaint on file  Assessment and Plan    69 y o  male new patient to office    1  Urinary retention  · Secondary to BPH and was likely suffering from overflow incontinence since last summer  Failed void trial today and has not been able to void since catheter was removed this morning  Bladder scan volume 837 mL  · We will replace the morales catheter today and schedule him for a cystoscopy in the future for discussion regarding possible outlet surgery  Morales catheter to remain in place with exchanges every 4 weeks  I will also start him on finasteride 5 mg daily as he is not a good candidate for alpha blockers due to issues with blood pressure and dizziness  · I did inform him and his wife today that he may require the morales catheter for a prolong period of time as he may not be a strong surgical candidate at this time due to his recent issues with GI bleeding as well as recent Coronary artery angioplasty in December of 2022 currently taking Plavix and aspirin  History of Present Illness  Chance Cardoso is a 69 y o  male who is new to office but was seen in consultation during hospitalization at [address] [place] [address] on 3/7/2023 for urinary retention by Andrews Keating PA-C  Patient had originally been admitted on 3/2/2023 for hemorrhagic shock secondary to GI bleed  Patient was monitored in the ICU and temp cath Morales catheter was placed by critical care nursing on 3/4/2023 for what appeared to be accurate I's and O's  Amount drained on insertion was 1750 mL  Due to issues with orthostatic hypotension patient was not started on any alpha blockers  Dr Ozzy Canseco did recommend initiation of a 5 alpha reductase inhibitor with either finasteride or dutasteride daily to decrease the size of the prostate    He also recommended consideration of either UroLift placement or transurethral resection of the prostate in the future versus other discussion of possible "outlet surgery based on work-up by with cystoscopy  He presents today with his wife from [address] [facility] skilled nursing and rehab where he is currently residing  His Jose catheter was removed this morning and he has since been unable to void  His bladder scan volume is 837 mL  He reports that he believes he may have been having this issue since last summer as he had been experiencing urgent continence with changes in position  This was likely overflow incontinence  As he continues to have issues with blood pressure and dizziness he is not a strong candidate for alpha blockers  He was never started on any 5 alpha reductase inhibitors after discharge  Review of Systems   Constitutional: Negative for chills and fever  HENT: Negative for congestion and sore throat  Respiratory: Negative for cough and shortness of breath  Cardiovascular: Negative for chest pain and leg swelling  Gastrointestinal: Positive for abdominal distention  Negative for abdominal pain, constipation, diarrhea, nausea and vomiting  Genitourinary: Positive for difficulty urinating  Negative for dysuria, flank pain, frequency, hematuria and urgency  Musculoskeletal: Negative for back pain and gait problem  Skin: Negative for wound  Allergic/Immunologic: Negative for immunocompromised state  Neurological: Positive for weakness  Negative for dizziness and numbness  Hematological: Does not bruise/bleed easily  Vitals  Vitals:    03/30/23 1523   BP: 132/82   Pulse: 64   Weight: 97 5 kg (215 lb)   Height: 5' 9\" (1 753 m)       Physical Exam  Vitals reviewed  Constitutional:       General: He is not in acute distress  Appearance: Normal appearance  He is not ill-appearing or toxic-appearing  HENT:      Head: Normocephalic and atraumatic  Eyes:      General: No scleral icterus  Conjunctiva/sclera: Conjunctivae normal    Cardiovascular:      Rate and Rhythm: Normal rate     Pulmonary:      " Effort: Pulmonary effort is normal  No respiratory distress  Abdominal:      General: There is distension  Palpations: Abdomen is soft  Tenderness: There is no abdominal tenderness  There is no right CVA tenderness or left CVA tenderness  Hernia: No hernia is present  Musculoskeletal:      Cervical back: Normal range of motion  Right lower leg: No edema  Left lower leg: No edema  Skin:     General: Skin is warm and dry  Coloration: Skin is not jaundiced or pale  Neurological:      General: No focal deficit present  Mental Status: He is alert and oriented to person, place, and time  Mental status is at baseline  Gait: Gait normal    Psychiatric:         Mood and Affect: Mood normal          Behavior: Behavior normal          Thought Content:  Thought content normal          Judgment: Judgment normal          Past History  Past Medical History:   Diagnosis Date   • BPH (benign prostatic hyperplasia)    • CAD (coronary artery disease)    • Cancer (HCC)     basal cell   • Chronic kidney disease     stage 3   • Colon polyp    • CPAP (continuous positive airway pressure) dependence    • Diabetes mellitus (HCC)     niddm   • Disease of thyroid gland    • Gout    • High cholesterol    • Hypertension    • Hypomagnesemia 3/9/2023   • MI, 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    • Myocardial infarction ([facility] [address] ) 2014   • S/P cardiac catheterization 12/30/2022   • Sleep apnea      Social History     Socioeconomic History   • Marital status: /Civil Union     Spouse name: None   • Number of children: None   • Years of education: None   • Highest education level: None   Occupational History   • None   Tobacco Use   • Smoking status: Never   • Smokeless tobacco: Never   Vaping Use   • Vaping Use: Never used   Substance and Sexual Activity   • Alcohol use: Not Currently     Comment: socially, cut down in 2008, previously did more than    • Drug use: Never   • Sexual activity: Yes     Partners: Female   Other Topics Concern   • None   Social History Narrative    No advance directive     No living will      Social Determinants of Health     Financial Resource Strain: Not on file   Food Insecurity: No Food Insecurity   • Worried About [address] in the Last Year: Never true   • Ran Out of Food in the Last Year: Never true   Transportation Needs: No Transportation Needs   • Lack of Transportation (Medical): No   • Lack of Transportation (Non-Medical): No   Physical Activity: Not on file   Stress: Not on file   Social Connections: Not on file   Intimate Partner Violence: Not on file   Housing Stability: Low Risk    • Unable to Pay for Housing in the Last Year: No   • Number of Places Lived in the Last Year: 1   • Unstable Housing in the Last Year: No     Social History     Tobacco Use   Smoking Status Never   Smokeless Tobacco Never     Family History   Adopted: Yes   Problem Relation Age of Onset   • No Known Problems Mother    • No Known Problems Father        The following portions of the patient's history were reviewed and updated as appropriate allergies, current medications, past medical history, past social history, past surgical history and problem list    Imaging:     3/13/2023  CT ABDOMEN AND PELVIS WITH IV CONTRAST     INDICATION:   GI bleed  gastrotinestinal hemorrhage      COMPARISON:  The scan dated March 9, 2023 is the most recent      TECHNIQUE:  CT examination of the abdomen and pelvis was performed  In addition to portal venous phase postcontrast scanning through the abdomen and pelvis, delayed phase postcontrast scanning was performed through the upper abdominal viscera  Axial,   sagittal, and coronal 2D reformatted images were created from the source data and submitted for interpretation      Radiation dose length product (DLP) for this visit:  7443  45 mGy-cm     This examination, like all CT scans performed in the [facility], was performed utilizing techniques to minimize radiation dose exposure, including the use of   iterative reconstruction and automated exposure control      IV Contrast:  100 mL of iohexol (OMNIPAQUE)  Enteric Contrast:  Enteric contrast was not administered      FINDINGS:     ABDOMEN     LOWER CHEST:  Interlobular septal thickening and patchy areas of groundglass in the bases        LIVER/BILIARY TREE:  Unremarkable      GALLBLADDER:  No calcified gallstones  No pericholecystic inflammatory change      SPLEEN:  Unremarkable      PANCREAS:  Unremarkable      ADRENAL GLANDS:  Unremarkable      KIDNEYS/URETERS:  2 5 cm left lower pole cyst   Subcentimeter lesions in the right kidney are too small to characterize but likely represent cysts      STOMACH AND BOWEL:  Multiple colonic diverticula  No evidence of acute diverticulitis      APPENDIX:  A normal appendix was visualized      ABDOMINOPELVIC CAVITY:  No ascites  No pneumoperitoneum  No lymphadenopathy      VESSELS:  Atherosclerotic changes are present  No evidence of aneurysm      PELVIS     REPRODUCTIVE ORGANS:  Unremarkable for patient's age      URINARY BLADDER:  Collapsed with a Jose catheter in place  Probable bladder wall thickening and perivesicular fat stranding concerning for cystitis      ABDOMINAL WALL/INGUINAL REGIONS:  Unremarkable      OSSEOUS STRUCTURES:  No acute fracture or destructive osseous lesion  Spinal degenerative changes are noted      IMPRESSION:     No findings to suggest active GI hemorrhage      Diverticulosis  No evidence of acute diverticulitis      Bladder wall thickening and perivesicular fat stranding suspicious for cystitis      Bibasilar septal thickening and patchy groundglass probably representing mild interstitial pulmonary edema              Results  Recent Results (from the past 1 hour(s))   POCT Measure PVR    Collection Time: 03/30/23  3:35 PM   Result Value Ref Range    POST-VOID RESIDUAL VOLUME, ML  mL   ]  Lab Results   Component Value Date PSA 3 0 04/09/2021    PSA 2 37 08/12/2014     Lab Results   Component Value Date    GLUCOSE 161 (H) 07/16/2015    CALCIUM 8 9 03/29/2023     07/16/2015    K 3 9 03/29/2023    CO2 26 03/29/2023     03/29/2023    BUN 14 03/29/2023    CREATININE 0 88 03/29/2023     Lab Results   Component Value Date    WBC 8 04 03/23/2023    HGB 11 3 (L) 03/23/2023    HCT 35 1 (L) 03/23/2023     (H) 03/23/2023     03/23/2023       Please Note:  Voice dictation software has been used to create this document  There may be inadvertent transcriptions errors       FAY Bustillos 03/30/23

## 2023-03-31 ENCOUNTER — APPOINTMENT (OUTPATIENT)
Dept: CARDIAC REHAB | Facility: HOSPITAL | Age: 78
End: 2023-03-31

## 2023-04-05 ENCOUNTER — HOME HEALTH ADMISSION (OUTPATIENT)
Dept: HOME HEALTH SERVICES | Facility: HOME HEALTHCARE | Age: 78
End: 2023-04-05
Payer: COMMERCIAL

## 2023-04-06 ENCOUNTER — PATIENT OUTREACH (OUTPATIENT)
Dept: CASE MANAGEMENT | Facility: OTHER | Age: 78
End: 2023-04-06

## 2023-04-06 ENCOUNTER — TRANSITIONAL CARE MANAGEMENT (OUTPATIENT)
Dept: FAMILY MEDICINE CLINIC | Facility: CLINIC | Age: 78
End: 2023-04-06

## 2023-04-06 NOTE — PROGRESS NOTES
Chart review complete update obtained from point click care the patient discharged 4/6/23 to Home with [facility]  I have removed myself off of the care team, added the CM to the care team who will follow the patient through the episode, sent the care manager an inbasket notifying them of the [facility]/SNF Pathway  Ambulatory referral placed for complex care management  Discharge form from SNF attached to this episode

## 2023-04-07 ENCOUNTER — HOME CARE VISIT (OUTPATIENT)
Dept: HOME HEALTH SERVICES | Facility: HOME HEALTHCARE | Age: 78
End: 2023-04-07

## 2023-04-07 ENCOUNTER — PATIENT OUTREACH (OUTPATIENT)
Dept: CASE MANAGEMENT | Facility: OTHER | Age: 78
End: 2023-04-07

## 2023-04-07 DIAGNOSIS — N17.9 AKI (ACUTE KIDNEY INJURY) (HCC): Primary | ICD-10-CM

## 2023-04-07 NOTE — PROGRESS NOTES
Outpatient Care Manager Note: Patient identified for SNF/[facility] Pathway  BMP order placed and PCP notified  Chart review completed  Patient discharged from [facility] on 4/6 to home with TUSHAR JAMESON  In basket message sent to [address] [place] [address] for home health RN to obtain blood work and to add pathway interventions/goals to patient care plan

## 2023-04-13 ENCOUNTER — TELEPHONE (OUTPATIENT)
Dept: NEUROLOGY | Facility: CLINIC | Age: 78
End: 2023-04-13

## 2023-04-13 NOTE — TELEPHONE ENCOUNTER
Called pt and spoke to Bishop Davis in regards to r/s upcoming appt as the provider will not be in the office  I then asked the patient if we would be able to schedule the patient on 06/02/2023 in the [facility] location at 2:15 PM  Patient agreed and is now r/s

## 2023-04-14 ENCOUNTER — HOSPITAL ENCOUNTER (EMERGENCY)
Facility: HOSPITAL | Age: 78
End: 2023-04-14
Attending: EMERGENCY MEDICINE

## 2023-04-14 ENCOUNTER — HOSPITAL ENCOUNTER (INPATIENT)
Facility: HOSPITAL | Age: 78
LOS: 8 days | Discharge: HOME HEALTH | End: 2023-04-22
Attending: INTERNAL MEDICINE | Admitting: STUDENT IN AN ORGANIZED HEALTH CARE EDUCATION/TRAINING PROGRAM

## 2023-04-14 VITALS
OXYGEN SATURATION: 93 % | TEMPERATURE: 98.3 F | DIASTOLIC BLOOD PRESSURE: 67 MMHG | SYSTOLIC BLOOD PRESSURE: 123 MMHG | RESPIRATION RATE: 20 BRPM | HEART RATE: 80 BPM

## 2023-04-14 DIAGNOSIS — R33.8 BENIGN PROSTATIC HYPERPLASIA WITH URINARY RETENTION: ICD-10-CM

## 2023-04-14 DIAGNOSIS — K92.1 GASTROINTESTINAL HEMORRHAGE WITH MELENA: ICD-10-CM

## 2023-04-14 DIAGNOSIS — K57.90 DIVERTICULOSIS: ICD-10-CM

## 2023-04-14 DIAGNOSIS — K92.1 MELENA: Primary | ICD-10-CM

## 2023-04-14 DIAGNOSIS — K63.3 COLON ULCER: ICD-10-CM

## 2023-04-14 DIAGNOSIS — K92.2 ACUTE GI BLEEDING: Primary | ICD-10-CM

## 2023-04-14 DIAGNOSIS — R33.9 URINARY RETENTION: ICD-10-CM

## 2023-04-14 DIAGNOSIS — N40.1 BENIGN PROSTATIC HYPERPLASIA WITH URINARY RETENTION: ICD-10-CM

## 2023-04-14 LAB
2HR DELTA HS TROPONIN: 0 NG/L
4HR DELTA HS TROPONIN: 0 NG/L
ABO GROUP BLD: NORMAL
ABO GROUP BLD: NORMAL
ALBUMIN SERPL BCP-MCNC: 3.4 G/DL (ref 3.5–5)
ALBUMIN SERPL BCP-MCNC: 3.8 G/DL (ref 3.5–5)
ALP SERPL-CCNC: 65 U/L (ref 34–104)
ALP SERPL-CCNC: 87 U/L (ref 34–104)
ALT SERPL W P-5'-P-CCNC: 21 U/L (ref 7–52)
ALT SERPL W P-5'-P-CCNC: 25 U/L (ref 7–52)
ANION GAP SERPL CALCULATED.3IONS-SCNC: 12 MMOL/L (ref 4–13)
ANION GAP SERPL CALCULATED.3IONS-SCNC: 5 MMOL/L (ref 4–13)
AST SERPL W P-5'-P-CCNC: 17 U/L (ref 13–39)
AST SERPL W P-5'-P-CCNC: 19 U/L (ref 13–39)
BACTERIA UR QL AUTO: ABNORMAL /HPF
BASOPHILS # BLD AUTO: 0.05 THOUSANDS/ΜL (ref 0–0.1)
BASOPHILS # BLD AUTO: 0.06 THOUSANDS/ÂΜL (ref 0–0.1)
BASOPHILS NFR BLD AUTO: 1 % (ref 0–1)
BASOPHILS NFR BLD AUTO: 1 % (ref 0–1)
BILIRUB SERPL-MCNC: 0.27 MG/DL (ref 0.2–1)
BILIRUB SERPL-MCNC: 0.3 MG/DL (ref 0.2–1)
BILIRUB UR QL STRIP: NEGATIVE
BLD GP AB SCN SERPL QL: NEGATIVE
BLD GP AB SCN SERPL QL: NEGATIVE
BUN SERPL-MCNC: 23 MG/DL (ref 5–25)
BUN SERPL-MCNC: 27 MG/DL (ref 5–25)
CALCIUM ALBUM COR SERPL-MCNC: 9 MG/DL (ref 8.3–10.1)
CALCIUM SERPL-MCNC: 8.5 MG/DL (ref 8.4–10.2)
CALCIUM SERPL-MCNC: 9.1 MG/DL (ref 8.4–10.2)
CARDIAC TROPONIN I PNL SERPL HS: 5 NG/L
CHLORIDE SERPL-SCNC: 102 MMOL/L (ref 96–108)
CHLORIDE SERPL-SCNC: 109 MMOL/L (ref 96–108)
CLARITY UR: ABNORMAL
CO2 SERPL-SCNC: 26 MMOL/L (ref 21–32)
CO2 SERPL-SCNC: 28 MMOL/L (ref 21–32)
COLOR UR: YELLOW
CREAT SERPL-MCNC: 0.84 MG/DL (ref 0.6–1.3)
CREAT SERPL-MCNC: 1.17 MG/DL (ref 0.6–1.3)
D DIMER PPP FEU-MCNC: 0.84 UG/ML FEU
EOSINOPHIL # BLD AUTO: 0.4 THOUSAND/ΜL (ref 0–0.61)
EOSINOPHIL # BLD AUTO: 0.49 THOUSAND/ÂΜL (ref 0–0.61)
EOSINOPHIL NFR BLD AUTO: 5 % (ref 0–6)
EOSINOPHIL NFR BLD AUTO: 5 % (ref 0–6)
ERYTHROCYTE [DISTWIDTH] IN BLOOD BY AUTOMATED COUNT: 14.8 % (ref 11.6–15.1)
ERYTHROCYTE [DISTWIDTH] IN BLOOD BY AUTOMATED COUNT: 15 % (ref 11.6–15.1)
GFR SERPL CREATININE-BSD FRML MDRD: 60 ML/MIN/1.73SQ M
GFR SERPL CREATININE-BSD FRML MDRD: 85 ML/MIN/1.73SQ M
GLUCOSE SERPL-MCNC: 200 MG/DL (ref 65–140)
GLUCOSE SERPL-MCNC: 71 MG/DL (ref 65–140)
GLUCOSE SERPL-MCNC: 75 MG/DL (ref 65–140)
GLUCOSE UR STRIP-MCNC: ABNORMAL MG/DL
HCT VFR BLD AUTO: 27 % (ref 36.5–49.3)
HCT VFR BLD AUTO: 33.5 % (ref 36.5–49.3)
HGB BLD-MCNC: 10.5 G/DL (ref 12–17)
HGB BLD-MCNC: 8.4 G/DL (ref 12–17)
HGB UR QL STRIP.AUTO: ABNORMAL
IMM GRANULOCYTES # BLD AUTO: 0.02 THOUSAND/UL (ref 0–0.2)
IMM GRANULOCYTES # BLD AUTO: 0.03 THOUSAND/UL (ref 0–0.2)
IMM GRANULOCYTES NFR BLD AUTO: 0 % (ref 0–2)
IMM GRANULOCYTES NFR BLD AUTO: 0 % (ref 0–2)
KETONES UR STRIP-MCNC: NEGATIVE MG/DL
LACTATE SERPL-SCNC: 2.1 MMOL/L (ref 0.5–2)
LACTATE SERPL-SCNC: 3.5 MMOL/L (ref 0.5–2)
LEUKOCYTE ESTERASE UR QL STRIP: ABNORMAL
LYMPHOCYTES # BLD AUTO: 2.01 THOUSANDS/ΜL (ref 0.6–4.47)
LYMPHOCYTES # BLD AUTO: 2.22 THOUSANDS/ÂΜL (ref 0.6–4.47)
LYMPHOCYTES NFR BLD AUTO: 22 % (ref 14–44)
LYMPHOCYTES NFR BLD AUTO: 27 % (ref 14–44)
MCH RBC QN AUTO: 29.4 PG (ref 26.8–34.3)
MCH RBC QN AUTO: 30.2 PG (ref 26.8–34.3)
MCHC RBC AUTO-ENTMCNC: 31.1 G/DL (ref 31.4–37.4)
MCHC RBC AUTO-ENTMCNC: 31.3 G/DL (ref 31.4–37.4)
MCV RBC AUTO: 94 FL (ref 82–98)
MCV RBC AUTO: 96 FL (ref 82–98)
MONOCYTES # BLD AUTO: 0.67 THOUSAND/ΜL (ref 0.17–1.22)
MONOCYTES # BLD AUTO: 0.91 THOUSAND/ÂΜL (ref 0.17–1.22)
MONOCYTES NFR BLD AUTO: 9 % (ref 4–12)
MONOCYTES NFR BLD AUTO: 9 % (ref 4–12)
NEUTROPHILS # BLD AUTO: 4.26 THOUSANDS/ΜL (ref 1.85–7.62)
NEUTROPHILS # BLD AUTO: 6.4 THOUSANDS/ÂΜL (ref 1.85–7.62)
NEUTS SEG NFR BLD AUTO: 58 % (ref 43–75)
NEUTS SEG NFR BLD AUTO: 63 % (ref 43–75)
NITRITE UR QL STRIP: NEGATIVE
NON-SQ EPI CELLS URNS QL MICRO: ABNORMAL /HPF
NRBC BLD AUTO-RTO: 0 /100 WBCS
NRBC BLD AUTO-RTO: 0 /100 WBCS
PH UR STRIP.AUTO: 6.5 [PH]
PLATELET # BLD AUTO: 317 THOUSANDS/UL (ref 149–390)
PLATELET # BLD AUTO: 464 THOUSANDS/UL (ref 149–390)
PMV BLD AUTO: 8.6 FL (ref 8.9–12.7)
PMV BLD AUTO: 9.3 FL (ref 8.9–12.7)
POTASSIUM SERPL-SCNC: 3.9 MMOL/L (ref 3.5–5.3)
POTASSIUM SERPL-SCNC: 4.1 MMOL/L (ref 3.5–5.3)
PROT SERPL-MCNC: 6.1 G/DL (ref 6.4–8.4)
PROT SERPL-MCNC: 7 G/DL (ref 6.4–8.4)
PROT UR STRIP-MCNC: NEGATIVE MG/DL
RBC # BLD AUTO: 2.86 MILLION/UL (ref 3.88–5.62)
RBC # BLD AUTO: 3.48 MILLION/UL (ref 3.88–5.62)
RBC #/AREA URNS AUTO: ABNORMAL /HPF
RH BLD: POSITIVE
RH BLD: POSITIVE
SODIUM SERPL-SCNC: 140 MMOL/L (ref 135–147)
SODIUM SERPL-SCNC: 142 MMOL/L (ref 135–147)
SP GR UR STRIP.AUTO: 1.01 (ref 1–1.03)
SPECIMEN EXPIRATION DATE: NORMAL
SPECIMEN EXPIRATION DATE: NORMAL
UROBILINOGEN UR QL STRIP.AUTO: 0.2 E.U./DL
WBC # BLD AUTO: 10.11 THOUSAND/UL (ref 4.31–10.16)
WBC # BLD AUTO: 7.41 THOUSAND/UL (ref 4.31–10.16)
WBC #/AREA URNS AUTO: ABNORMAL /HPF

## 2023-04-14 RX ORDER — ZONISAMIDE 25 MG/1
50 CAPSULE ORAL DAILY
Status: DISCONTINUED | OUTPATIENT
Start: 2023-04-15 | End: 2023-04-22 | Stop reason: HOSPADM

## 2023-04-14 RX ORDER — PANTOPRAZOLE SODIUM 40 MG/10ML
40 INJECTION, POWDER, LYOPHILIZED, FOR SOLUTION INTRAVENOUS EVERY 12 HOURS SCHEDULED
Status: DISCONTINUED | OUTPATIENT
Start: 2023-04-14 | End: 2023-04-16

## 2023-04-14 RX ORDER — INSULIN LISPRO 100 [IU]/ML
1-5 INJECTION, SOLUTION INTRAVENOUS; SUBCUTANEOUS
Status: DISCONTINUED | OUTPATIENT
Start: 2023-04-14 | End: 2023-04-14

## 2023-04-14 RX ORDER — ATORVASTATIN CALCIUM 80 MG/1
80 TABLET, FILM COATED ORAL
Status: DISCONTINUED | OUTPATIENT
Start: 2023-04-15 | End: 2023-04-15

## 2023-04-14 RX ORDER — FINASTERIDE 5 MG/1
5 TABLET, FILM COATED ORAL DAILY
Status: DISCONTINUED | OUTPATIENT
Start: 2023-04-15 | End: 2023-04-22 | Stop reason: HOSPADM

## 2023-04-14 RX ORDER — ASPIRIN 81 MG/1
81 TABLET ORAL DAILY
Status: DISCONTINUED | OUTPATIENT
Start: 2023-04-15 | End: 2023-04-15

## 2023-04-14 RX ORDER — SODIUM CHLORIDE, SODIUM GLUCONATE, SODIUM ACETATE, POTASSIUM CHLORIDE, MAGNESIUM CHLORIDE, SODIUM PHOSPHATE, DIBASIC, AND POTASSIUM PHOSPHATE .53; .5; .37; .037; .03; .012; .00082 G/100ML; G/100ML; G/100ML; G/100ML; G/100ML; G/100ML; G/100ML
75 INJECTION, SOLUTION INTRAVENOUS CONTINUOUS
Status: DISPENSED | OUTPATIENT
Start: 2023-04-15 | End: 2023-04-15

## 2023-04-14 RX ORDER — LEVOTHYROXINE SODIUM 112 UG/1
112 TABLET ORAL
Status: DISCONTINUED | OUTPATIENT
Start: 2023-04-15 | End: 2023-04-22 | Stop reason: HOSPADM

## 2023-04-14 RX ORDER — SODIUM CHLORIDE, SODIUM GLUCONATE, SODIUM ACETATE, POTASSIUM CHLORIDE, MAGNESIUM CHLORIDE, SODIUM PHOSPHATE, DIBASIC, AND POTASSIUM PHOSPHATE .53; .5; .37; .037; .03; .012; .00082 G/100ML; G/100ML; G/100ML; G/100ML; G/100ML; G/100ML; G/100ML
1000 INJECTION, SOLUTION INTRAVENOUS ONCE
Status: COMPLETED | OUTPATIENT
Start: 2023-04-14 | End: 2023-04-14

## 2023-04-14 RX ORDER — ESCITALOPRAM OXALATE 10 MG/1
10 TABLET ORAL DAILY
Status: DISCONTINUED | OUTPATIENT
Start: 2023-04-15 | End: 2023-04-22 | Stop reason: HOSPADM

## 2023-04-14 RX ORDER — ACETAMINOPHEN 325 MG/1
650 TABLET ORAL EVERY 8 HOURS PRN
Status: DISCONTINUED | OUTPATIENT
Start: 2023-04-14 | End: 2023-04-22 | Stop reason: HOSPADM

## 2023-04-14 RX ORDER — FLUTICASONE PROPIONATE 50 MCG
1 SPRAY, SUSPENSION (ML) NASAL DAILY
Status: DISCONTINUED | OUTPATIENT
Start: 2023-04-15 | End: 2023-04-22 | Stop reason: HOSPADM

## 2023-04-14 RX ORDER — INSULIN LISPRO 100 [IU]/ML
1-6 INJECTION, SOLUTION INTRAVENOUS; SUBCUTANEOUS EVERY 6 HOURS SCHEDULED
Status: DISCONTINUED | OUTPATIENT
Start: 2023-04-14 | End: 2023-04-16

## 2023-04-14 RX ADMIN — PANTOPRAZOLE SODIUM 40 MG: 40 INJECTION, POWDER, FOR SOLUTION INTRAVENOUS at 21:27

## 2023-04-14 RX ADMIN — SODIUM CHLORIDE, SODIUM GLUCONATE, SODIUM ACETATE, POTASSIUM CHLORIDE, MAGNESIUM CHLORIDE, SODIUM PHOSPHATE, DIBASIC, AND POTASSIUM PHOSPHATE 1000 ML: .53; .5; .37; .037; .03; .012; .00082 INJECTION, SOLUTION INTRAVENOUS at 10:35

## 2023-04-14 NOTE — ASSESSMENT & PLAN NOTE
With history of coronary artery disease; status post stents in 2014 and 12/2022  Stress test from 2018, as per recent cardiology note, was negative for ischemia  Currently on DAPT therapy; continue hold plavix and restart ASA 04/15 , okay per GI on admission given stable HGB and Vitals with no more melena so far on 04/14   Denies chest pain, discuss continuation of DAPT therapy with GI

## 2023-04-14 NOTE — ASSESSMENT & PLAN NOTE
Patient noted to have urinary retention while in the ED at [address]  PVR showed over 800 cc of retained urine;  Jose catheter inserted 04/14   Continue finasteride; monitor ins and outs  Consider consult urology  Patient will need voiding trial prior to discharge

## 2023-04-14 NOTE — ASSESSMENT & PLAN NOTE
Lab Results   Component Value Date    EGFR 60 04/14/2023    EGFR 83 03/29/2023    EGFR 80 03/23/2023    CREATININE 1 17 04/14/2023    CREATININE 0 88 03/29/2023    CREATININE 0 92 03/23/2023     Baseline creatinine approximately 1 0; patient is at or near baseline  Monitor on daily labs and avoid nephrotoxic medications and relative hypotension

## 2023-04-14 NOTE — ASSESSMENT & PLAN NOTE
Patient presents to [address] [facility] 04/14 with complaints of rectal bleeding; he notes the passage of a dark clot 04/13 before midnight with small amounts of bright red blood 04/14 early morning       Episode is concerning as patient had recent hospitalization for GI bleed    PPI twice daily; serial H&H  Hemoglobin currently 10 5; appears at baseline  Consult to gastroenterology  N p o  at midnight; type and screen on admission  Restarting ASA 04/15 and holding Plavix till further eval (had SAM 12/2022)

## 2023-04-14 NOTE — H&P
[address] [facility]  H&P  Name: Soraya Payton 69 y o  male I MRN: [id number]  Unit/Bed#: E5 -01 I Date of Admission: 4/14/2023   Date of Service: 4/14/2023 I Hospital Day: 0      Assessment/Plan   * Acute GI bleeding  Assessment & Plan  Patient presents to [facility]  [facility] [facility] 04/14 with complaints of rectal bleeding; he notes the passage of a dark clot 04/13 before midnight with small amounts of bright red blood 04/14 early morning   Episode is concerning as patient had recent hospitalization for GI bleed    PPI twice daily; serial H&H  Hemoglobin currently 10 5; appears at baseline  Consult to gastroenterology  N p o  at midnight; type and screen on admission  Restarting ASA 04/15 and holding Plavix till further eval (had SAM 12/2022)         Benign prostatic hyperplasia with urinary retention  Assessment & Plan  Patient noted to have urinary retention while in the ED at [address]  PVR showed over 800 cc of retained urine; Jose catheter inserted 04/14   Continue finasteride; monitor ins and outs  Consider consult urology  Patient will need voiding trial prior to discharge    Hypothyroidism  Assessment & Plan  Recent TSH-2 904  Continue present therapy with levothyroxine 112 mcg daily    Diabetes mellitus, type 2 ([facility] [address] )  Assessment & Plan  Lab Results   Component Value Date    HGBA1C 5 6 03/10/2023       No results for input(s): POCGLU in the last 72 hours      Blood Sugar Average: Last 72 hrs:     SSI; Subcutaneous Insulin Order Set  Blood Glucose checks TIDWM and QHS (Q6H for NPO patients)  Hold oral medications  Blood Glucose goal while inpatient is 140-180  Reduce basal insulin by 25-50% while inpatient  Consistent Carbohydrate Diet      S/P cardiac catheterization  Assessment & Plan  With SAM 12/2022 ; most recent ECHO with EF 65%   In and out   Monitor vitals   DAPT; see melena A&P   Continue statin         Stage 3a chronic kidney disease [facility])  Assessment & Plan  Lab Results Component Value Date    EGFR 60 04/14/2023    EGFR 83 03/29/2023    EGFR 80 03/23/2023    CREATININE 1 17 04/14/2023    CREATININE 0 88 03/29/2023    CREATININE 0 92 03/23/2023     Baseline creatinine approximately 1 0; patient is at or near baseline  Monitor on daily labs and avoid nephrotoxic medications and relative hypotension    Coronary artery disease of native artery of native heart with stable angina pectoris [facility])  Assessment & Plan  With history of coronary artery disease; status post stents in 2014 and 12/2022  Stress test from 2018, as per recent cardiology note, was negative for ischemia  Currently on DAPT therapy; continue hold plavix and restart ASA 04/15 , okay per GI on admission given stable HGB and Vitals with no more melena so far on 04/14   Denies chest pain, discuss continuation of DAPT therapy with GI      Tremor, essential  Assessment & Plan  Mild bilateral hands tremor evident on admission exam 04/14   Continue home Zonisamide 50 mg QD     Hypertension  Assessment & Plan  BP WNL on admission   Note not taking Midodrine; and denies dizziness or lightheadness   Continue to monitor Q4 Vitals for now given melena   Off HTN medications     Hyperlipidemia  Assessment & Plan  Continue high intensity statin    Gout  Assessment & Plan  Hx of gout on allopurinol   Hold for now till further eval of kidney function given melena 04/13     Type 2 diabetes mellitus with hyperglycemia, without long-term current use of insulin (HCC)  Assessment & Plan  Lab Results   Component Value Date    HGBA1C 5 6 03/10/2023       No results for input(s): POCGLU in the last 72 hours      Blood Sugar Average: Last 72 hrs:     SSI; Subcutaneous Insulin Order Set  Blood Glucose checks TIDWM and QHS (Q6H for NPO patients)  Hold oral medications  Blood Glucose goal while inpatient is 140-180  Consistent Carbohydrate Diet  SSI       Depression with anxiety  Assessment & Plan  Mood stable on admission ; denies SI   Continue home Lexapro 10 mg QD        VTE Pharmacologic Prophylaxis:   Moderate Risk (Score 3-4) - Pharmacological DVT Prophylaxis Contraindicated  Sequential Compression Devices Ordered  Code Status: Level 1 - Full Code   Discussion with family: Updated  (wife) via phone  Anticipated Length of Stay: Patient will be admitted on an inpatient basis with an anticipated length of stay of greater than 2 midnights secondary to gi bleed  Total Time Spent on Date of Encounter in care of patient: 55 minutes This time was spent on one or more of the following: performing physical exam; counseling and coordination of care; obtaining or reviewing history; documenting in the medical record; reviewing/ordering tests, medications or procedures; communicating with other healthcare professionals and discussing with patient's family/caregivers  Chief Complaint: GI Bleed    History of Present Illness:  Bhavani Guevara is a 69 y o  male with a PMH of CAD s/p SAM (last in 12/2022) on DAPT (last given 04/13), EF 65% with normal systolic function, DM-2 on PO medications, hypotension on Midodrine (recently was Hypertensive and midodrine was not given , per patient's wife), hx AS, PAD, CKD , BPH/retention (morales replaced in ER 04/14) hx diverticulosis and colonic ulcer complicated with recurrent GI bleed last was in March 2023 requiring transusions and EGD/Colonoscopy s/p clipping  Hx obtained from the patient on admission plus from the wife on the phone  Had blood clots/dark black stool noted 04/13 around 11 pm , then 04/14 had some residual / small bright red output, went to ER, some lightheadness on the way, denies any other symptoms otherwise, no abdominal pain, N/V/D/C, Hgb stable 10 5 this morning, on B12 supplementation, ASA and Plavix not given 04/14       Discussed with GI provider on call, Dr Clarita Chiu ; see above A&P     Patient confirmed full code, agreeable to blood transfusion if indicated    Case and plan discussed with RN at bedside     Patient's wife was updated on above A&P , all her questions answered appropriately  Medicine team will continue to follow closely and revaluate ; GI team consulted, input appreciated  Review of Systems:/  Review of Systems   Constitutional: Negative for activity change, appetite change, chills and fever  HENT: Negative for mouth sores, rhinorrhea, sinus pain, sneezing and sore throat  Eyes: Negative for photophobia and visual disturbance  Respiratory: Negative for cough, chest tightness, shortness of breath and wheezing  Cardiovascular: Negative for chest pain, palpitations and leg swelling  Gastrointestinal: Positive for blood in stool  Negative for abdominal pain, constipation, diarrhea, nausea and vomiting  Genitourinary: Positive for difficulty urinating  Negative for decreased urine volume, flank pain, frequency and hematuria  Musculoskeletal: Negative for neck pain  Skin: Negative for wound  Neurological: Negative for dizziness, tremors, seizures, syncope, weakness, light-headedness, numbness and headaches  Psychiatric/Behavioral: Negative for agitation and confusion          Per patient's wife, patient has short term memory deficut       Past Medical and Surgical History:   Past Medical History:   Diagnosis Date   • BPH (benign prostatic hyperplasia)    • CAD (coronary artery disease)    • Cancer (HCC)     basal cell   • Chronic kidney disease     stage 3   • Colon polyp    • CPAP (continuous positive airway pressure) dependence    • Diabetes mellitus (HCC)     niddm   • Disease of thyroid gland    • Gout    • High cholesterol    • Hypertension    • Hypomagnesemia 3/9/2023   • MI, 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 [address] ) 2014   • S/P cardiac catheterization 12/30/2022   • Sleep apnea        Past Surgical History:   Procedure Laterality Date   • BASAL CELL CARCINOMA EXCISION Right 1/21/2022    Procedure: EXCISION BASAL CELL CARCINOMA EXTREMITY;  Surgeon: Zion Silva DO;  Location: [facility] MAIN OR;  Service: General   • CARDIAC CATHETERIZATION Left 12/29/2022    Procedure: Cardiac Left Heart Cath;  Surgeon: Sheyla Gibbons MD;  Location: [facility] CARDIAC C[facility] LAB; Service: Cardiology   • CARDIAC CATHETERIZATION N/A 12/29/2022    Procedure: Cardiac Coronary Angiogram;  Surgeon: Sheyla Gibbons MD;  [facility]: [facility]; Service: Cardiology   • CARDIAC CATHETERIZATION N/A 12/29/2022    Procedure: Cardiac pci;  Surgeon: Sheyla Gibbons MD;  Location: [facility] CARDIAC C[facility]; Service: Cardiology   • CATARACT EXTRACTION W/  INTRAOCULAR LENS IMPLANT     • CORNEAL TRANSPLANT     • CORONARY ANGIOPLASTY WITH STENT PLACEMENT      stents x3-- [phone]   • EYE SURGERY      repair corneal laceration   • MD COLONOSCOPY FLX DX W/COLLJ SPEC WHEN PFRMD N/A 3/21/2016    Procedure: COLONOSCOPY;  Surgeon: Noemi Morris MD;  Location: [facility] MA[facility] OR;  Service: Colorectal   • TONSILLECTOMY AND ADENOIDECTOMY         Meds/Allergies:  Prior to Admission medications    Medication Sig Start Date End Date Taking? Authorizing Provider   allopurinol (ZYLOPRIM) 100 mg tablet Take 1 tablet (100 mg total) by mouth daily 11/4/22  Yes Gabi Madden DO   aluminum-magnesium hydroxide-simethicone (MYLANTA) [id number] mg/30 mL suspension Take 30 mL by mouth every 4 (four) hours as needed for indigestion or heartburn 3/22/23  Yes Kaitlyn Bean DO   aspirin (ECOTRIN LOW STRENGTH) 81 mg EC tablet Take 1 tablet (81 mg total) by mouth daily Do not start before March 23, 2023  3/23/23  Yes Kaitlyn Bean DO   atorvastatin (LIPITOR) 80 mg tablet Take 1 tablet (80 mg total) by mouth daily with dinner 3/22/23  Yes Kaitlyn Bean DO   Blood Glucose Monitoring Suppl (OneTouch Verio Reflect) w/Device KIT Check blood sugars twice daily  Please substitute with appropriate alternative as covered by patient's insurance   Dx: E11 65 11/21/22  Yes Gabi Madden DO   clopidogrel (PLAVIX) 75 mg tablet Take 1 tablet (75 mg total) by mouth daily Do not start before March 9, 2023  3/9/23  Yes Evita Rodriguez MD   Empagliflozin (Jardiance) 10 MG TABS Take 1 tablet (10 mg total) by mouth every morning 9/6/22  Yes Genia Holm, DO   escitalopram (LEXAPRO) 20 mg tablet Take 0 5 tablets (10 mg total) by mouth daily 2/16/23  Yes Lele Alva, DO   finasteride (PROSCAR) 5 mg tablet Take 1 tablet (5 mg total) by mouth daily 3/30/23  Yes FAY Pierce   fluticasone (FLONASE) 50 mcg/act nasal spray 1 spray into each nostril daily Do not start before March 23, 2023  3/23/23  Yes Ken Bean,    glimepiride (AMARYL) 4 mg tablet TAKE 1 TABLET TWICE A DAY 2/6/23  Yes Lele Alva DO   glucose blood (OneTouch Verio) test strip Check blood sugars twice daily  Please substitute with appropriate alternative as covered by patient's insurance  Dx: E11 65 11/21/22  Yes Lele Alva, DO   levothyroxine 112 mcg tablet Take 1 tablet (112 mcg total) by mouth daily in the early morning Do not start before March 23, 2023  3/23/23  Yes Ken Bean,    metFORMIN (GLUCOPHAGE) 1000 MG tablet Take 1 tablet (1,000 mg total) by mouth 2 (two) times a day with meals 10/6/22  Yes Lele Alva DO   midodrine (PROAMATINE) 10 MG tablet Take 1 tablet (10 mg total) by mouth 3 (three) times a day before meals Hold for SBP>140 mmHg 3/22/23  Yes Ken Bean, DO   multivitamin [facility]) TABS Take 1 tablet by mouth daily   Yes Historical Provider, MD   omeprazole (PriLOSEC) 20 mg delayed release capsule  4/7/23  Yes Historical Provider, MD   OneTouch Delica Lancets 73N MISC Check blood sugars twice daily  Please substitute with appropriate alternative as covered by patient's insurance   Dx: E11 65 11/21/22  Yes Lele Alva, DO   pantoprazole (PROTONIX) 40 mg tablet Take 1 tablet (40 mg total) by mouth daily before breakfast Do not start before March 9, 2023  3/9/23  Yes Rosangela Villa Boston Moss MD   senna-docusate sodium (SENOKOT S) 8 6-50 mg per tablet Take 1 tablet by mouth 2 (two) times a day Hold for diarrhea or loose stools 3/22/23  Yes Eladio Cogan Hostetter, DO   torsemide (DEMADEX) 10 mg tablet Take 10 mg by mouth if needed   Yes Historical Provider, MD   zonisamide (ZONEGRAN) 50 MG capsule Take 1 tablet (50 mg total) by mouth daily 9/8/22  Yes Robbin Fernandez MD   acetaminophen (TYLENOL) 325 mg tablet Take 2 tablets (650 mg total) by mouth every 6 (six) hours as needed for mild pain, fever or headaches Do not exceed a total of 3 grams of tylenol/acetaminophen in a 24-hour period  3/22/23   Eladio Cogan Hostetter, DO   cyanocobalamin (VITAMIN B-12) 500 MCG tablet Take 1 tablet (500 mcg total) by mouth daily Do not start before March 23, 2023  3/23/23   Eladio Cogan Hostetter, DO   midodrine (PROAMATINE) 5 mg tablet GIVE 1 TABLET BY MOUTH BEFORE MEAL(S)  HOLD FOR SBP ABOVE 140  Patient not taking: Reported on 4/11/2023 4/8/23   Historical Provider, MD   polyethylene glycol (MIRALAX) 17 g packet Take 17 g by mouth daily as needed (constipation)  Patient not taking: Reported on 4/14/2023 3/22/23   Berhane Soto DO   prednisoLONE acetate (PRED FORTE) 1 % ophthalmic suspension INSTILL 1 DROP INTO RIGHT EYE ONCE DAILY 12/13/22   Historical Provider, MD   topiramate (Topamax) 25 mg tablet Take 1 tablet (25 mg total) by mouth daily X 1 week then discontinue 10/7/21 10/7/21  Robbin Fernandez MD     I have reviewed home medications with patient family member      Allergies: No Known Allergies    Social History:  Marital Status: /Civil Union   Occupation: retired  Patient Pre-hospital Living Situation: Home  Patient Pre-hospital Level of Mobility: walks  Patient Pre-hospital Diet Restrictions: diabetic  Substance Use History:   Social History     Substance and Sexual Activity   Alcohol Use Not Currently    Comment: socially, cut down in 2008, previously did more than      Social History     Tobacco Use   Smoking Status Never   Smokeless Tobacco Never     Social History     Substance and Sexual Activity   Drug Use Never       Family History:  Family History   Adopted: Yes   Problem Relation Age of Onset   • No Known Problems Mother    • No Known Problems Father        Physical Exam:     Vitals:   Blood Pressure: 116/68 (04/14/23 1857)  Pulse: 83 (04/14/23 1857)  Temperature: 97 9 °F (36 6 °C) (04/14/23 1857)  Respirations: 18 (04/14/23 1857)  SpO2: 98 % (04/14/23 1857)    Physical Exam  Constitutional:       General: He is not in acute distress  Appearance: Normal appearance  He is obese  He is not ill-appearing, toxic-appearing or diaphoretic  HENT:      Head: Normocephalic and atraumatic  Right Ear: External ear normal       Left Ear: External ear normal       Nose: Nose normal  No congestion or rhinorrhea  Mouth/Throat:      Mouth: Mucous membranes are dry  Pharynx: Oropharynx is clear  No oropharyngeal exudate or posterior oropharyngeal erythema  Eyes:      General: No scleral icterus  Right eye: No discharge  Left eye: No discharge  Extraocular Movements: Extraocular movements intact  Conjunctiva/sclera: Conjunctivae normal       Pupils: Pupils are equal, round, and reactive to light  Cardiovascular:      Rate and Rhythm: Normal rate and regular rhythm  Pulses: Normal pulses  Heart sounds: Murmur heard  No gallop  Pulmonary:      Effort: Pulmonary effort is normal  No respiratory distress  Breath sounds: Normal breath sounds  No wheezing, rhonchi or rales  Abdominal:      General: Abdomen is flat  Bowel sounds are normal  There is no distension  Palpations: Abdomen is soft  There is no mass  Tenderness: There is no abdominal tenderness  There is no right CVA tenderness, left CVA tenderness, guarding or rebound  Hernia: No hernia is present     Genitourinary:     Comments: Jose in place Musculoskeletal:         General: No swelling or deformity  Normal range of motion  Cervical back: Normal range of motion and neck supple  No rigidity  Right lower leg: No edema  Left lower leg: No edema  Lymphadenopathy:      Cervical: No cervical adenopathy  Skin:     Capillary Refill: Capillary refill takes less than 2 seconds  Coloration: Skin is not jaundiced or pale  Neurological:      General: No focal deficit present  Mental Status: He is alert and oriented to person, place, and time  Cranial Nerves: No cranial nerve deficit  Motor: No weakness  Comments: Mild action tremors noted bilateral hands    Psychiatric:         Mood and Affect: Mood normal          Behavior: Behavior normal          Thought Content: Thought content normal          Judgment: Judgment normal           Additional Data:     Lab Results:  Results from last 7 days   Lab Units 04/14/23  0810   WBC Thousand/uL 10 11   HEMOGLOBIN g/dL 10 5*   HEMATOCRIT % 33 5*   PLATELETS Thousands/uL 464*   NEUTROS PCT % 63   LYMPHS PCT % 22   MONOS PCT % 9   EOS PCT % 5     Results from last 7 days   Lab Units 04/14/23  0810   SODIUM mmol/L 140   POTASSIUM mmol/L 4 1   CHLORIDE mmol/L 102   CO2 mmol/L 26   BUN mg/dL 27*   CREATININE mg/dL 1 17   ANION GAP mmol/L 12   CALCIUM mg/dL 9 1   ALBUMIN g/dL 3 8   TOTAL BILIRUBIN mg/dL 0 27   ALK PHOS U/L 87   ALT U/L 25   AST U/L 19   GLUCOSE RANDOM mg/dL 200*                 Results from last 7 days   Lab Units 04/14/23  1010 04/14/23  0810   LACTIC ACID mmol/L 2 1* 3 5*       Lines/Drains:  Invasive Devices     Peripheral Intravenous Line  Duration           Peripheral IV 04/14/23 Distal;Right;Upper;Ventral (anterior) Arm <1 day          Drain  Duration           Urethral Catheter Coude 16 Fr  <1 day              Urinary Catheter:  Goal for removal: Voiding trial when ambulation improves             Imaging: No pertinent imaging reviewed    No orders to display EKG and Other Studies Reviewed on Admission:   · EKG: No EKG obtained  ** Please Note: This note has been constructed using a voice recognition system   **

## 2023-04-14 NOTE — ASSESSMENT & PLAN NOTE
With SAM 12/2022 ; most recent ECHO with EF 65%   In and out   Monitor vitals   DAPT; see heather A&P   Continue statin

## 2023-04-14 NOTE — ASSESSMENT & PLAN NOTE
Lab Results   Component Value Date    HGBA1C 5 6 03/10/2023       No results for input(s): POCGLU in the last 72 hours      Blood Sugar Average: Last 72 hrs:     SSI; Subcutaneous Insulin Order Set  Blood Glucose checks TIDWM and QHS (Q6H for NPO patients)  Hold oral medications  Blood Glucose goal while inpatient is 140-180  Reduce basal insulin by 25-50% while inpatient  Consistent Carbohydrate Diet

## 2023-04-14 NOTE — ASSESSMENT & PLAN NOTE
BP WNL on admission   Note not taking Midodrine; and denies dizziness or lightheadness   Continue to monitor Q4 Vitals for now given melena   Off HTN medications

## 2023-04-14 NOTE — ED PROCEDURE NOTE
PROCEDURE  CriticalCare Time    Date/Time: 4/14/2023 10:23 AM  Performed by: Karen Mustafa DO  Authorized by: Karen Mustafa DO     Critical care provider statement:     Critical care time (minutes):  32    Critical care time was exclusive of:  Separately billable procedures and treating other patients and teaching time    Critical care was necessary to treat or prevent imminent or life-threatening deterioration of the following conditions: GI bleed  Critical care was time spent personally by me on the following activities:  Blood draw for specimens, obtaining history from patient or surrogate, development of treatment plan with patient or surrogate, discussions with consultants, evaluation of patient's response to treatment, examination of patient, ordering and performing treatments and interventions, ordering and review of laboratory studies, ordering and review of radiographic studies, re-evaluation of patient's condition and review of old charts  Comments:      GI bleed  Check basic labs, consider blood transfusion  Close monitoring as recent large-volume GI bleed with ICU stay           Karen Mustafa DO  04/14/23 1023

## 2023-04-14 NOTE — ED PROVIDER NOTES
History  Chief Complaint   Patient presents with   • Rectal Bleeding     3 bright red bloody BMs today  H/o GI bleeds  C/o chest discomfort, SOB, dizziness     Patient is a 69-year-old male with a PMH of recurrent GI bleeds, diverticulitis, BPH with urinary retention requiring catheters and recent indwelling Jose removal, diabetes mellitus with peripheral neuropathy, CAD, CHF, Orthostatic hypotension, CKD, PAD, Mesenteric artery stenosis, Aortic Stenosis, hypothyroidism, gait instability, and hot flashes and past surgical hx of coronary angioplasty with 3 stents currently on dual platelet therapy, and a recent colonoscopy with 2 hemostatic clips placed on ulcer in descending colon on 03/03  He is accompanied by his wife, a retired nurse who provides most of patient's history  He had a recent hospitalization for orthostatic hypotension and rectal bleeding on 03/08, was discharged to rehab 03/22, and discharged home last Friday 04/07  Patient returns to ED today with reports of bloody diarrhea that began last night along with mild abdominal pain, weakness, dizziness, headaches, and SOB on exertion  He initially reported chest pain, which he states was due to anxiety and is not new, and has since subsided  No active bleeding presently  Denies fevers or chills, nausea or vomiting, muscle aches, dysuria or hematuria, or appetite change  No further complaints  Prior to Admission Medications   Prescriptions Last Dose Informant Patient Reported? Taking? Blood Glucose Monitoring Suppl (OneTouch Verio Reflect) w/Device KIT   No No   Sig: Check blood sugars twice daily  Please substitute with appropriate alternative as covered by patient's insurance  Dx: E11 65   Empagliflozin (Jardiance) 10 MG TABS   No No   Sig: Take 1 tablet (10 mg total) by mouth every morning   OneTouch Delica Lancets 65K MISC   No No   Sig: Check blood sugars twice daily   Please substitute with appropriate alternative as covered by patient's insurance  Dx: E11 65   acetaminophen (TYLENOL) 325 mg tablet   No No   Sig: Take 2 tablets (650 mg total) by mouth every 6 (six) hours as needed for mild pain, fever or headaches Do not exceed a total of 3 grams of tylenol/acetaminophen in a 24-hour period  allopurinol (ZYLOPRIM) 100 mg tablet   No No   Sig: Take 1 tablet (100 mg total) by mouth daily   aluminum-magnesium hydroxide-simethicone (MYLANTA) 9041-4513-796 mg/30 mL suspension   No No   Sig: Take 30 mL by mouth every 4 (four) hours as needed for indigestion or heartburn   aspirin (ECOTRIN LOW STRENGTH) 81 mg EC tablet   No No   Sig: Take 1 tablet (81 mg total) by mouth daily Do not start before 2023  atorvastatin (LIPITOR) 80 mg tablet   No No   Sig: Take 1 tablet (80 mg total) by mouth daily with dinner   clopidogrel (PLAVIX) 75 mg tablet   No No   Sig: Take 1 tablet (75 mg total) by mouth daily Do not start before 2023  cyanocobalamin (VITAMIN B-12) 500 MCG tablet   No No   Sig: Take 1 tablet (500 mcg total) by mouth daily Do not start before 2023  escitalopram (LEXAPRO) 20 mg tablet   No No   Sig: Take 0 5 tablets (10 mg total) by mouth daily   finasteride (PROSCAR) 5 mg tablet   No No   Sig: Take 1 tablet (5 mg total) by mouth daily   fluticasone (FLONASE) 50 mcg/act nasal spray   No No   Si spray into each nostril daily Do not start before 2023  glimepiride (AMARYL) 4 mg tablet   No No   Sig: TAKE 1 TABLET TWICE A DAY   glucose blood (OneTouch Verio) test strip   No No   Sig: Check blood sugars twice daily  Please substitute with appropriate alternative as covered by patient's insurance  Dx: E11 65   levothyroxine 112 mcg tablet   No No   Sig: Take 1 tablet (112 mcg total) by mouth daily in the early morning Do not start before 2023     metFORMIN (GLUCOPHAGE) 1000 MG tablet   No No   Sig: Take 1 tablet (1,000 mg total) by mouth 2 (two) times a day with meals   midodrine (PROAMATINE) 10 MG tablet   No No   Sig: Take 1 tablet (10 mg total) by mouth 3 (three) times a day before meals Hold for SBP>140 mmHg   midodrine (PROAMATINE) 5 mg tablet   Yes No   Sig: GIVE 1 TABLET BY MOUTH BEFORE MEAL(S)  HOLD FOR SBP ABOVE 140   Patient not taking: Reported on 4/11/2023   multivitamin (THERAGRAN) TABS   Yes No   Sig: Take 1 tablet by mouth daily   omeprazole (PriLOSEC) 20 mg delayed release capsule   Yes No   pantoprazole (PROTONIX) 40 mg tablet   No No   Sig: Take 1 tablet (40 mg total) by mouth daily before breakfast Do not start before March 9, 2023     polyethylene glycol (MIRALAX) 17 g packet   No No   Sig: Take 17 g by mouth daily as needed (constipation)   prednisoLONE acetate (PRED FORTE) 1 % ophthalmic suspension   Yes No   Sig: INSTILL 1 DROP INTO RIGHT EYE ONCE DAILY   senna-docusate sodium (SENOKOT S) 8 6-50 mg per tablet   No No   Sig: Take 1 tablet by mouth 2 (two) times a day Hold for diarrhea or loose stools   torsemide (DEMADEX) 10 mg tablet   Yes No   Sig: Take 10 mg by mouth if needed   zonisamide (ZONEGRAN) 50 MG capsule   No No   Sig: Take 1 tablet (50 mg total) by mouth daily      Facility-Administered Medications: None       Past Medical History:   Diagnosis Date   • BPH (benign prostatic hyperplasia)    • CAD (coronary artery disease)    • Cancer (HCC)     basal cell   • Chronic kidney disease     stage 3   • Colon polyp    • CPAP (continuous positive airway pressure) dependence    • Diabetes mellitus (HCC)     niddm   • Disease of thyroid gland    • Gout    • High cholesterol    • Hypertension    • Hypomagnesemia 3/9/2023   • MI, 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    • Myocardial infarction ([facility] [address] ) 2014   • S/P cardiac catheterization 12/30/2022   • Sleep apnea        Past Surgical History:   Procedure Laterality Date   • BASAL CELL CARCINOMA EXCISION Right 1/21/2022    Procedure: EXCISION BASAL CELL CARCINOMA EXTREMITY;  Surgeon: Melida Bach DO;  Location: [facility] MAIN [facility];  Service: General   • CARDIAC CATHETERIZATION Left 12/29/2022    Procedure: Cardiac Left Heart Cath;  Surgeon: Trent Oates MD;  Location: [facility] C[facility]; Service: Cardiology   • CARDIAC CATHETERIZATION N/A 12/29/2022    Procedure: Cardiac Coronary Angiogram;  Surgeon: Trent Oates MD;  Location: [facility]; Service: Cardiology   • CARDIAC CATHETERIZATION N/A 12/29/2022    Procedure: Cardiac pci;  Surgeon: Trent Oates MD;  Location: [facility]; Service: Cardiology   • CATARACT EXTRACTION W/  INTRAOCULAR LENS IMPLANT     • CORNEAL TRANSPLANT     • CORONARY ANGIOPLASTY WITH STENT PLACEMENT      stents x3-- [id number]   • EYE SURGERY      repair corneal laceration   • TN COLONOSCOPY FLX DX W/COLLJ SPEC WHEN PFRMD N/A 3/21/2016    Procedure: COLONOSCOPY;  Surgeon: Zandra Gudino MD;  Location: [facility];  Service: Colorectal   • TONSILLECTOMY AND ADENOIDECTOMY         Family History   Adopted: Yes   Problem Relation Age of Onset   • No Known Problems Mother    • No Known Problems Father      I have reviewed and agree with the history as documented  E-Cigarette/Vaping   • E-Cigarette Use Never User      E-Cigarette/Vaping Substances   • Nicotine No    • THC No    • CBD No    • Flavoring No    • Other No    • Unknown No      Social History     Tobacco Use   • Smoking status: Never   • Smokeless tobacco: Never   Vaping Use   • Vaping Use: Never used   Substance Use Topics   • Alcohol use: Not Currently     Comment: socially, cut down in 2008, previously did more than    • Drug use: Never        Review of Systems   Constitutional: Negative for activity change, appetite change, chills, diaphoresis, fatigue and fever  HENT: Negative  Eyes: Negative for visual disturbance  Respiratory: Negative  Cardiovascular: Negative  Negative for chest pain  Gastrointestinal: Positive for abdominal pain (mild), blood in stool and diarrhea   Negative for anal bleeding, nausea, rectal pain and vomiting  Endocrine:        Hot flashes   Genitourinary: Negative for hematuria  Urinary retention, requires cath   Musculoskeletal: Positive for arthralgias  Neurological: Positive for dizziness, weakness and headaches  Psychiatric/Behavioral: Negative  All other systems reviewed and are negative  Physical Exam  ED Triage Vitals [04/14/23 0752]   Temp Pulse Respirations Blood Pressure SpO2   -- 100 18 117/73 99 %      Temp src Heart Rate Source Patient Position - Orthostatic VS BP Location FiO2 (%)   -- Monitor -- Left arm --      Pain Score       --             Orthostatic Vital Signs  Vitals:    04/14/23 0900 04/14/23 0930 04/14/23 1030 04/14/23 1100   BP: 117/64 127/61 124/75 95/62   Pulse: 100 97 98 97       Physical Exam  Vitals reviewed  Constitutional:       General: He is not in acute distress  Appearance: Normal appearance  He is not ill-appearing, toxic-appearing or diaphoretic  HENT:      Head: Normocephalic and atraumatic  Eyes:      Extraocular Movements: Extraocular movements intact  Cardiovascular:      Rate and Rhythm: Regular rhythm  Tachycardia present  Pulses: Normal pulses  Heart sounds: Murmur heard  Pulmonary:      Effort: Pulmonary effort is normal       Breath sounds: Normal breath sounds  Abdominal:      General: Bowel sounds are normal  There is distension (due to urinary retention, distention remained after emptying bladder)  Palpations: Abdomen is soft  There is no mass  Tenderness: There is abdominal tenderness (very minimal tenderness)  There is no right CVA tenderness or left CVA tenderness  Musculoskeletal:         General: No swelling  Right lower leg: No edema  Left lower leg: No edema  Skin:     General: Skin is warm and dry  Neurological:      General: No focal deficit present  Mental Status: He is alert  Mental status is at baseline     Psychiatric:         Mood and Affect: Mood normal  Behavior: Behavior normal          Thought Content: Thought content normal          ED Medications  Medications   multi-electrolyte (ISOLYTE-S PH 7 4) bolus 1,000 mL (1,000 mL Intravenous New Bag 4/14/23 1035)       Diagnostic Studies  Results Reviewed     Procedure Component Value Units Date/Time    HS Troponin I 2hr [[id number]]  (Normal) Collected: 04/14/23 1010    Lab Status: Final result Specimen: Blood from Arm, Right Updated: 04/14/23 1044     hs TnI 2hr 5 ng/L      Delta 2hr hsTnI 0 ng/L     Lactic acid 2 Hours [[id number]]  (Abnormal) Collected: 04/14/23 1010    Lab Status: Final result Specimen: Blood from Arm, Right Updated: 04/14/23 1044     LACTIC ACID 2 1 mmol/L     Narrative:      Result may be elevated if tourniquet was used during collection  HS Troponin I 4hr [[id number]]     Lab Status: No result Specimen: Blood     D-dimer, quantitative [[id number]]  (Abnormal) Collected: 04/14/23 0810    Lab Status: Final result Specimen: Blood Updated: 04/14/23 0909     D-Dimer, Quant 0 84 ug/ml FEU     Narrative: In the evaluation for possible pulmonary embolism, in the appropriate (Well's Score of 4 or less) patient, the age adjusted d-dimer cutoff for this patient can be calculated as:    Age x 0 01 (in ug/mL) for Age-adjusted D-dimer exclusion threshold for a patient over 50 years      Urine Microscopic [[id number]]  (Abnormal) Collected: 04/14/23 0835    Lab Status: Final result Specimen: Urine, Indwelling Jose Catheter Updated: 04/14/23 0857     RBC, UA 2-4 /hpf      WBC, UA 4-10 /hpf      Epithelial Cells None Seen /hpf      Bacteria, UA Occasional /hpf     UA w Reflex to Microscopic w Reflex to Culture [[id number]]  (Abnormal) Collected: 04/14/23 0835    Lab Status: Final result Specimen: Urine, Indwelling Jose Catheter Updated: 04/14/23 0843     Color, UA Yellow     Clarity, UA Slightly Cloudy     Specific [place], UA 1 010     pH, UA 6 5     Leukocytes, UA Elevated glucose may cause decreased leukocyte values  See urine microscopic for [facility] result/     Nitrite, UA Negative     Protein, UA Negative mg/dl      Glucose, UA >=1000 (1%) mg/dl      Ketones, UA Negative mg/dl      Urobilinogen, UA 0 2 E U /dl      Bilirubin, UA Negative     Occult Blood, UA Moderate    HS Troponin 0hr (reflex protocol) [[id number]]  (Normal) Collected: 04/14/23 0810    Lab Status: Final result Specimen: Blood from Arm, Right Updated: 04/14/23 0839     hs TnI 0hr 5 ng/L     Lactic acid, plasma (w/reflex if result > 2 0) [[id number]]  (Abnormal) Collected: 04/14/23 0810    Lab Status: Final result Specimen: Blood from Arm, Right Updated: 04/14/23 9614     LACTIC ACID 3 5 mmol/L     Narrative:      Result may be elevated if tourniquet was used during collection      Comprehensive metabolic panel [[id number]]  (Abnormal) Collected: 04/14/23 0810    Lab Status: Final result Specimen: Blood from Arm, Right Updated: 04/14/23 0836     Sodium 140 mmol/L      Potassium 4 1 mmol/L      Chloride 102 mmol/L      CO2 26 mmol/L      ANION GAP 12 mmol/L      BUN 27 mg/dL      Creatinine 1 17 mg/dL      Glucose 200 mg/dL      Calcium 9 1 mg/dL      AST 19 U/L      ALT 25 U/L      Alkaline Phosphatase 87 U/L      Total Protein 7 0 g/dL      Albumin 3 8 g/dL      Total Bilirubin 0 27 mg/dL      eGFR 60 ml/min/1 73sq m     Narrative:      Usama guidelines for Chronic Kidney Disease (CKD):   •  Stage 1 with normal or high GFR (GFR > 90 mL/min/1 73 square meters)  •  Stage 2 Mild CKD (GFR = 60-89 mL/min/1 73 square meters)  •  Stage 3A Moderate CKD (GFR = 45-59 mL/min/1 73 square meters)  •  Stage 3B Moderate CKD (GFR = 30-44 mL/min/1 73 square meters)  •  Stage 4 Severe CKD (GFR = 15-29 mL/min/1 73 square meters)  •  Stage 5 End Stage CKD (GFR <15 mL/min/1 73 square meters)  Note: GFR calculation is accurate only with a steady state creatinine    CBC and differential [[id number]]  (Abnormal) Collected: 04/14/23 0810    Lab Status: Final result Specimen: Blood from Arm, Right Updated: 04/14/23 0816     WBC 10 11 Thousand/uL      RBC 3 48 Million/uL      Hemoglobin 10 5 g/dL      Hematocrit 33 5 %      MCV 96 fL      MCH 30 2 pg      MCHC 31 3 g/dL      RDW 14 8 %      MPV 9 3 fL      Platelets 374 Thousands/uL      nRBC 0 /100 WBCs      Neutrophils Relative 63 %      Immat GRANS % 0 %      Lymphocytes Relative 22 %      Monocytes Relative 9 %      Eosinophils Relative 5 %      Basophils Relative 1 %      Neutrophils Absolute 6 40 Thousands/µL      Immature Grans Absolute 0 03 Thousand/uL      Lymphocytes Absolute 2 22 Thousands/µL      Monocytes Absolute 0 91 Thousand/µL      Eosinophils Absolute 0 49 Thousand/µL      Basophils Absolute 0 06 Thousands/µL                  No orders to display         Procedures  Procedures      ED Course       SBIRT 22yo+    Flowsheet Row Most Recent Value   Initial Alcohol Screen: US AUDIT-C     1  How often do you have a drink containing alcohol? 0 Filed at: 04/14/2023 0950   2  How many drinks containing alcohol do you have on a typical day you are drinking? 0 Filed at: 04/14/2023 0950   3a  Male UNDER 65: How often do you have five or more drinks on one occasion? 0 Filed at: 04/14/2023 0950   3b  FEMALE Any Age, or MALE 65+: How often do you have 4 or more drinks on one occassion? 0 Filed at: 04/14/2023 0950   Audit-C Score 0 Filed at: 04/14/2023 6506   SARITA: How many times in the past year have you    Used an illegal drug or used a prescription medication for non-medical reasons? Never Filed at: 04/14/2023 8952          Medical Decision Making  Patient with a PMH of GI bleeds, Mesenteric artery stenosis, and surgical hx of coronary angioplasty with 3 stents currently on dual platelet therapy, and recent colonoscopy with 2 hemostatic clips placed on 03/03  Recent hospitalization for orthostatic hypotension and rectal bleeding, discharged 04/07   Patient returns today, brought by wife, due to bloody diarrhea with clots that began last night along with mild abdominal pain, weakness, dizziness, and SOB on exertion  No active bleeding presently  Stable vitals  Patient in no acute distress  On exam, abdomen is distended with mild tenderness of lower region, tachycardic, and heart murmur is heard that was previously noted in chart  Lungs are clear, no lower extremity edema  Abdomen remains moderately distended following bladder emptying via morales catheter  ED labs reveal low Hgb and Hct, elevated platelet count, elevated lactic acid at 3 5, and mildly elevated d-dimer (mild elevation with age-adjusted d-dimer), which was checked due to blood clots in stool  Urine reveals moderate blood, glucose, WBCs, and some bacteria  Normal serum electrolytes, stable renal function, normal trops  Given 1L fluids  Morales catheter placed  Concern for mesenteric ischemia due to elevated lactic acid  Patient requires close monitoring given GI bleeding on dual antiplatelet therapy  Placed transfer order to [facility] due to absence of GI and Cardiology on weekends at [email or web address] Pacs  [facility] discussed case with [facility] GI, who approved patient for admission  Case discussed with Hospitalist at [address], patient accepted for admission  Patient stable and ready for transfer via ambulance pending bed availability  Acute GI bleeding: acute illness or injury  Amount and/or Complexity of Data Reviewed  Labs: ordered  Risk  Prescription drug management              Disposition  Final diagnoses:   Acute GI bleeding     Time reflects when diagnosis was documented in both MDM as applicable and the Disposition within this note     Time User Action Codes Description Comment    4/14/2023  9:20 AM Walcalvin Vasquez Add [K92 2] Acute lower GI bleeding     4/14/2023  9:32 AM Walcalvin Vasquez Add [K92 2] Acute GI bleeding     4/14/2023  9:32 AM Walterine Pedro Modify [K92 2] Acute lower GI bleeding     4/14/2023  9:32 AM Lorne Vargas Modify [K92 2] Acute GI bleeding     4/14/2023  9:36 AM Lorne Vargas Modify [K92 2] Acute GI bleeding     4/14/2023  9:36 AM Lorne Vargas Remove [K92 2] Acute lower GI bleeding       ED Disposition     ED Disposition   Transfer to Another Facility-In Network    Condition   --    Date/Time   Fri Apr 14, 2023 12:03 PM    Comment   Cesar Stark should be transferred out to [facility] for inpatient admission  Follow-up Information     Follow up With Specialties Details Why Contact Philip Tovar,  Family Medicine In 2 weeks  [address] [phone]            Current Discharge Medication List      CONTINUE these medications which have NOT CHANGED    Details   acetaminophen (TYLENOL) 325 mg tablet Take 2 tablets (650 mg total) by mouth every 6 (six) hours as needed for mild pain, fever or headaches Do not exceed a total of 3 grams of tylenol/acetaminophen in a 24-hour period  Refills: 0    Associated Diagnoses: Chest pain      allopurinol (ZYLOPRIM) 100 mg tablet Take 1 tablet (100 mg total) by mouth daily  Qty: 90 tablet, Refills: 0    Associated Diagnoses: Chronic gout without tophus, unspecified cause, unspecified site      aluminum-magnesium hydroxide-simethicone (MYLANTA) 8178-1814-194 mg/30 mL suspension Take 30 mL by mouth every 4 (four) hours as needed for indigestion or heartburn  Refills: 0    Associated Diagnoses: Chest pain      aspirin (ECOTRIN LOW STRENGTH) 81 mg EC tablet Take 1 tablet (81 mg total) by mouth daily Do not start before March 23, 2023    Refills: 0    Associated Diagnoses: Coronary artery disease of native artery of native heart with stable angina pectoris (HCC)      atorvastatin (LIPITOR) 80 mg tablet Take 1 tablet (80 mg total) by mouth daily with dinner  Refills: 0    Associated Diagnoses: Coronary artery disease of native artery of native heart with stable angina pectoris (HCC)      Blood Glucose Monitoring Suppl (OneTouch Verio Reflect) w/Device KIT Check blood sugars twice daily  Please substitute with appropriate alternative as covered by patient's insurance  Dx: E11 65  Qty: 1 kit, Refills: 0    Associated Diagnoses: Diabetic polyneuropathy associated with diabetes mellitus due to underlying condition ([facility] [address] )      clopidogrel (PLAVIX) 75 mg tablet Take 1 tablet (75 mg total) by mouth daily Do not start before March 9, 2023  Qty: 30 tablet, Refills: 1    Associated Diagnoses: S/P cardiac catheterization      cyanocobalamin (VITAMIN B-12) 500 MCG tablet Take 1 tablet (500 mcg total) by mouth daily Do not start before March 23, 2023  Refills: 0    Associated Diagnoses: Coronary artery disease of native artery of native heart with stable angina pectoris (HCC)      Empagliflozin (Jardiance) 10 MG TABS Take 1 tablet (10 mg total) by mouth every morning  Qty: 90 tablet, Refills: 1    Associated Diagnoses: Controlled type 2 diabetes mellitus with diabetic neuropathy, with long-term current use of insulin ([facility])      escitalopram (LEXAPRO) 20 mg tablet Take 0 5 tablets (10 mg total) by mouth daily  Qty: 90 tablet, Refills: 0    Associated Diagnoses: Recurrent major depressive disorder, remission status unspecified ([facility])      finasteride (PROSCAR) 5 mg tablet Take 1 tablet (5 mg total) by mouth daily  Qty: 30 tablet, Refills: 6    Associated Diagnoses: Urinary retention; Benign prostatic hyperplasia with urinary retention      fluticasone (FLONASE) 50 mcg/act nasal spray 1 spray into each nostril daily Do not start before March 23, 2023    Refills: 0    Associated Diagnoses: Coronary artery disease of native artery of native heart with stable angina pectoris (HCC)      glimepiride (AMARYL) 4 mg tablet TAKE 1 TABLET TWICE A DAY  Qty: 180 tablet, Refills: 0    Associated Diagnoses: Type 2 diabetes mellitus with diabetic polyneuropathy, with long-term current use of insulin ([facility])      glucose blood (OneTouch Verio) test strip Check blood sugars twice daily  Please substitute with appropriate alternative as covered by patient's insurance  Dx: E11 65  Qty: 200 each, Refills: 3    Associated Diagnoses: Diabetic polyneuropathy associated with diabetes mellitus due to underlying condition ([address] )      levothyroxine 112 mcg tablet Take 1 tablet (112 mcg total) by mouth daily in the early morning Do not start before March 23, 2023  Refills: 0    Comments: Needs a repeat TSH level in 4-6 weeks  Associated Diagnoses: Hypothyroidism, unspecified type      metFORMIN (GLUCOPHAGE) 1000 MG tablet Take 1 tablet (1,000 mg total) by mouth 2 (two) times a day with meals  Qty: 180 tablet, Refills: 0    Associated Diagnoses: Type 2 diabetes mellitus with diabetic polyneuropathy, with long-term current use of insulin ([facility] )      ! ! midodrine (PROAMATINE) 10 MG tablet Take 1 tablet (10 mg total) by mouth 3 (three) times a day before meals Hold for SBP>140 mmHg  Refills: 0    Associated Diagnoses: Orthostatic hypotension      !! midodrine (PROAMATINE) 5 mg tablet GIVE 1 TABLET BY MOUTH BEFORE MEAL(S)  HOLD FOR SBP ABOVE 140      multivitamin (THERAGRAN) TABS Take 1 tablet by mouth daily      omeprazole (PriLOSEC) 20 mg delayed release capsule       OneTouch Delica Lancets 37C MISC Check blood sugars twice daily  Please substitute with appropriate alternative as covered by patient's insurance  Dx: E11 65  Qty: 200 each, Refills: 3    Associated Diagnoses: Diabetic polyneuropathy associated with diabetes mellitus due to underlying condition ([facility] )      pantoprazole (PROTONIX) 40 mg tablet Take 1 tablet (40 mg total) by mouth daily before breakfast Do not start before March 9, 2023    Qty: 30 tablet, Refills: 0    Associated Diagnoses: GI bleed      polyethylene glycol (MIRALAX) 17 g packet Take 17 g by mouth daily as needed (constipation)  Refills: 0    Associated Diagnoses: Constipation      prednisoLONE acetate (PRED FORTE) 1 % ophthalmic suspension INSTILL 1 DROP INTO RIGHT EYE ONCE DAILY      senna-docusate sodium (SENOKOT S) 8 6-50 mg per tablet Take 1 tablet by mouth 2 (two) times a day Hold for diarrhea or loose stools  Refills: 0    Associated Diagnoses: Constipation      torsemide (DEMADEX) 10 mg tablet Take 10 mg by mouth if needed      zonisamide (ZONEGRAN) 50 MG capsule Take 1 tablet (50 mg total) by mouth daily  Qty: 90 capsule, Refills: 2    Associated Diagnoses: Tremor, essential       !! - Potential duplicate medications found  Please discuss with provider  No discharge procedures on file  PDMP Review     None           ED Provider  Attending physically available and evaluated Ken Joshi I managed the patient along with the ED Attending      Electronically Signed by         Jakub Roberts DO  04/14/23 48 Robert Mcgregor DO  04/14/23 [id number]

## 2023-04-14 NOTE — ASSESSMENT & PLAN NOTE
Lab Results   Component Value Date    HGBA1C 5 6 03/10/2023       No results for input(s): POCGLU in the last 72 hours      Blood Sugar Average: Last 72 hrs:     SSI; Subcutaneous Insulin Order Set  Blood Glucose checks TIDWM and QHS (Q6H for NPO patients)  Hold oral medications  Blood Glucose goal while inpatient is 140-180  Consistent Carbohydrate Diet  SSI

## 2023-04-14 NOTE — EMTALA/ACUTE CARE TRANSFER
[facility] EMERGENCY DEPARTMENT  [address] [facility] [address] [facility] [address]  Dept: [phone]      EMTALA TRANSFER CONSENT    NAME Chance Cardoso                                         1945                              MRN [id number]    I have been informed of my rights regarding examination, treatment, and transfer   by Dr Tanisha Díaz DO    Benefits:  (Weekend availability of GI and Cardiology providers)    Risks: Potential for delay in receiving treatment, Potential deterioration of medical condition, Loss of IV, Increased discomfort during transfer, Possible worsening of condition or death during transfer      Consent for Transfer:  I acknowledge that my medical condition has been evaluated and explained to me by the emergency department physician or other qualified medical person and/or my attending physician, who has recommended that I be transferred to the service of  Accepting Physician: Kelli Sepulveda at [address] [place] [address] Name, [address] : [place], [place]  The above potential benefits of such transfer, the potential risks associated with such transfer, and the probable risks of not being transferred have been explained to me, and I fully understand them  The doctor has explained that, in my case, the benefits of transfer outweigh the risks  I agree to be transferred  I authorize the performance of emergency medical procedures and treatments upon me in both transit and upon arrival at the receiving facility  Additionally, I authorize the release of any and all medical records to the receiving facility and request they be transported with me, if possible  I understand that the safest mode of transportation during a medical emergency is an ambulance and that the Hospital advocates the use of this mode of transport   Risks of traveling to the receiving facility by car, including absence of medical control, life sustaining equipment, such as oxygen, and medical personnel has been explained to me and I fully understand them  (MOHAMUD CORRECT BOX BELOW)  [  ]  I consent to the stated transfer and to be transported by ambulance/helicopter  [  ]  I consent to the stated transfer, but refuse transportation by ambulance and accept full responsibility for my transportation by car  I understand the risks of non-ambulance transfers and I exonerate the Hospital and its staff from any deterioration in my condition that results from this refusal     X___________________________________________    DATE  23  TIME________  Signature of patient or legally responsible individual signing on patient behalf           RELATIONSHIP TO PATIENT_________________________          Provider Certification    NAME Markel Hare                                         1945                              MRN [id number]    A medical screening exam was performed on the above named patient  Based on the examination:    Condition Necessitating Transfer The encounter diagnosis was Acute GI bleeding      Patient Condition: The patient has been stabilized such that within reasonable medical probability, no material deterioration of the patient condition or the condition of the unborn child(gino) is likely to result from the transfer    Reason for Transfer: Level of Care needed not available at this facility    Transfer Requirements: [address] [place] [address]   · Space available and qualified personnel available for treatment as acknowledged by Karen Galdamez, [phone]  · Agreed to accept transfer and to provide appropriate medical treatment as acknowledged by       Katy Rodriguez  · Appropriate medical records of the examination and treatment of the patient are provided at the time of transfer   [address] [facility], [address] _______  · Transfer will be performed by qualified personnel from    and appropriate transfer equipment as required, including the use of necessary and appropriate life support measures  Provider Certification: I have examined the patient and explained the following risks and benefits of being transferred/refusing transfer to the patient/family:  General risk, such as traffic hazards, adverse weather conditions, rough terrain or turbulence, possible failure of equipment (including vehicle or aircraft), or consequences of actions of persons outside the control of the transport personnel, Unanticipated needs of medical equipment and personnel during transport      Based on these reasonable risks and benefits to the patient and/or the unborn child(gino), and based upon the information available at the time of the patient’s examination, I certify that the medical benefits reasonably to be expected from the provision of appropriate medical treatments at another medical facility outweigh the increasing risks, if any, to the individual’s medical condition, and in the case of labor to the unborn child, from effecting the transfer      X____________________________________________ DATE 04/14/23        TIME_______      ORIGINAL - SEND TO MEDICAL RECORDS   COPY - SEND WITH PATIENT DURING TRANSFER

## 2023-04-14 NOTE — ED ATTENDING ATTESTATION
Ji Canseco DO, saw and evaluated the patient  I have discussed the patient with the resident/non-physician practitioner and agree with the resident's/non-physician practitioner's findings, Plan of Care, and MDM as documented in the resident's/non-physician practitioner's note, except where noted  All available labs and Radiology studies were reviewed  At this point I agree with the current assessment done in the Emergency Department  I have conducted an independent evaluation of this patient including a focused history and a physical exam     ED Note - Miguel Cool 69 y o  male MRN: [id number]  Unit/Bed#: IW14 Encounter: [id number]    History of Present Illness   HPI  Miguel Cool is a 69 y o  male who presents for evaluation of GI bleed/ BRBPR  Patient with recent history of large-volume GI bleed status post colonoscopy  Patient states that he had passage of dark-colored blood with clots last night and then had a smaller volume bright red blood per rectum with some specks this morning  At this time there is no active bleeding  Patient did have urinary retention had a Jose catheter placed while in the emergency department today  Patient has any chest pain, shortness of breath, nausea or vomiting, dizziness or diaphoresis  There is no overt abdominal discomfort  There is slight tenderness when palpating the right and left lower quadrants  Patient otherwise is well-appearing, no distress and is resting comfortably in the bed  REVIEW OF SYSTEMS  See HPI for further details  12 systems reviewed and otherwise negative except as noted     Historical Information     PAST MEDICAL HISTORY  Past Medical History:   Diagnosis Date   • BPH (benign prostatic hyperplasia)    • CAD (coronary artery disease)    • Cancer (HCC)     basal cell   • Chronic kidney disease     stage 3   • Colon polyp    • CPAP (continuous positive airway pressure) dependence    • Diabetes mellitus (HCC)     niddm   • Disease of thyroid gland    • Gout    • High cholesterol    • Hypertension    • Hypomagnesemia 3/9/2023   • MI, 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    • Myocardial infarction ([facility] [address] ) 2014   • S/P cardiac catheterization 12/30/2022   • Sleep apnea        FAMILY HISTORY  Family History   Adopted: Yes   Problem Relation Age of Onset   • No Known Problems Mother    • No Known Problems Father        SOCIAL HISTORY  Social History     Socioeconomic History   • Marital status: /Civil Union     Spouse name: None   • Number of children: None   • Years of education: None   • Highest education level: None   Occupational History   • None   Tobacco Use   • Smoking status: Never   • Smokeless tobacco: Never   Vaping Use   • Vaping Use: Never used   Substance and Sexual Activity   • Alcohol use: Not Currently     Comment: socially, cut down in 2008, previously did more than    • Drug use: Never   • Sexual activity: Not Currently     Partners: Female   Other Topics Concern   • None   Social History Narrative    No advance directive     No living will      Social Determinants of Health     Financial Resource Strain: Not on file   Food Insecurity: No Food Insecurity   • Worried About Running Out of Food in the Last Year: Never true   • Ran Out of Food in the Last Year: Never true   Transportation Needs: No Transportation Needs   • Lack of Transportation (Medical): No   • Lack of Transportation (Non-Medical):  No   Physical Activity: Not on file   Stress: Not on file   Social Connections: Not on file   Intimate Partner Violence: Not on file   Housing Stability: Low Risk    • Unable to Pay for Housing in the Last Year: No   • Number of Places Lived in the Last Year: 1   • Unstable Housing in the Last Year: No       SURGICAL HISTORY  Past Surgical History:   Procedure Laterality Date   • BASAL CELL CARCINOMA EXCISION Right 1/21/2022    Procedure: EXCISION BASAL CELL CARCINOMA EXTREMITY;  Surgeon: Casa Heredia DO;  Location: [facility] MAIN OR; Service: General   • CARDIAC CATHETERIZATION Left 12/29/2022    Procedure: Cardiac Left Heart Cath;  Surgeon: Remberto Powell MD;  Location: [facility] CARDIAC CATH LAB; Service: Cardiology   • CARDIAC CATHETERIZATION N/A 12/29/2022    Procedure: Cardiac Coronary Angiogram;  Surgeon: Remberto Powell MD;  Location: [facility] CARDIAC C[facility] LAB; Service: Cardiology   • CARDIAC CATHETERIZATION N/A 12/29/2022    Procedure: Cardiac pci;  Surgeon: Remberto Powell MD;  Location: [facility] CARDIAC C[facility] LAB; Service: Cardiology   • CATARACT EXTRACTION W/  INTRAOCULAR LENS IMPLANT     • CORNEAL TRANSPLANT     • CORONARY ANGIOPLASTY WITH STENT PLACEMENT      stents x3-- [phone]   • EYE SURGERY      repair corneal laceration   • MN COLONOSCOPY FLX DX W/COLLJ SPEC WHEN PFRMD N/A 3/21/2016    Procedure: COLONOSCOPY;  Surgeon: Saida Talbot MD;  Location: [facility] MA[facility] OR;  Service: Colorectal   • TONSILLECTOMY AND ADENOIDECTOMY       Meds/Allergies     CURRENT MEDICATIONS    Current Facility-Administered Medications:   •  multi-electrolyte (ISOLYTE-S PH 7 4) bolus 1,000 mL, 1,000 mL, Intravenous, Once, Sol Valencia, DO    Current Outpatient Medications:   •  acetaminophen (TYLENOL) 325 mg tablet, Take 2 tablets (650 mg total) by mouth every 6 (six) hours as needed for mild pain, fever or headaches Do not exceed a total of 3 grams of tylenol/acetaminophen in a 24-hour period  , Disp: , Rfl: 0  •  allopurinol (ZYLOPRIM) 100 mg tablet, Take 1 tablet (100 mg total) by mouth daily, Disp: 90 tablet, Rfl: 0  •  aluminum-magnesium hydroxide-simethicone (MYLANTA) 7984-5014-182 mg/30 mL suspension, Take 30 mL by mouth every 4 (four) hours as needed for indigestion or heartburn, Disp: , Rfl: 0  •  aspirin (ECOTRIN LOW STRENGTH) 81 mg EC tablet, Take 1 tablet (81 mg total) by mouth daily Do not start before March 23, 2023 , Disp: , Rfl: 0  •  atorvastatin (LIPITOR) 80 mg tablet, Take 1 tablet (80 mg total) by mouth daily with dinner, Disp: , Rfl: 0  •  Blood Glucose Monitoring Suppl (OneTouch Verio Reflect) w/Device KIT, Check blood sugars twice daily  Please substitute with appropriate alternative as covered by patient's insurance  Dx: E11 65, Disp: 1 kit, Rfl: 0  •  clopidogrel (PLAVIX) 75 mg tablet, Take 1 tablet (75 mg total) by mouth daily Do not start before March 9, 2023 , Disp: 30 tablet, Rfl: 1  •  cyanocobalamin (VITAMIN B-12) 500 MCG tablet, Take 1 tablet (500 mcg total) by mouth daily Do not start before March 23, 2023 , Disp: , Rfl: 0  •  Empagliflozin (Jardiance) 10 MG TABS, Take 1 tablet (10 mg total) by mouth every morning, Disp: 90 tablet, Rfl: 1  •  escitalopram (LEXAPRO) 20 mg tablet, Take 0 5 tablets (10 mg total) by mouth daily, Disp: 90 tablet, Rfl: 0  •  finasteride (PROSCAR) 5 mg tablet, Take 1 tablet (5 mg total) by mouth daily, Disp: 30 tablet, Rfl: 6  •  fluticasone (FLONASE) 50 mcg/act nasal spray, 1 spray into each nostril daily Do not start before March 23, 2023 , Disp: , Rfl: 0  •  glimepiride (AMARYL) 4 mg tablet, TAKE 1 TABLET TWICE A DAY, Disp: 180 tablet, Rfl: 0  •  glucose blood (OneTouch Verio) test strip, Check blood sugars twice daily  Please substitute with appropriate alternative as covered by patient's insurance  Dx: E11 65, Disp: 200 each, Rfl: 3  •  levothyroxine 112 mcg tablet, Take 1 tablet (112 mcg total) by mouth daily in the early morning Do not start before March 23, 2023 , Disp: , Rfl: 0  •  metFORMIN (GLUCOPHAGE) 1000 MG tablet, Take 1 tablet (1,000 mg total) by mouth 2 (two) times a day with meals, Disp: 180 tablet, Rfl: 0  •  midodrine (PROAMATINE) 10 MG tablet, Take 1 tablet (10 mg total) by mouth 3 (three) times a day before meals Hold for SBP>140 mmHg, Disp: , Rfl: 0  •  midodrine (PROAMATINE) 5 mg tablet, GIVE 1 TABLET BY MOUTH BEFORE MEAL(S)   HOLD FOR SBP ABOVE 140 (Patient not taking: Reported on 4/11/2023), Disp: , Rfl:   •  multivitamin (THERAGRAN) TABS, Take 1 tablet by mouth daily, Disp: , Rfl:   •  omeprazole (PriLOSEC) 20 mg delayed release capsule, , Disp: , Rfl:   •  OneTouch Delica Lancets 51Z MISC, Check blood sugars twice daily  Please substitute with appropriate alternative as covered by patient's insurance  Dx: E11 65, Disp: 200 each, Rfl: 3  •  pantoprazole (PROTONIX) 40 mg tablet, Take 1 tablet (40 mg total) by mouth daily before breakfast Do not start before March 9, 2023 , Disp: 30 tablet, Rfl: 0  •  polyethylene glycol (MIRALAX) 17 g packet, Take 17 g by mouth daily as needed (constipation), Disp: , Rfl: 0  •  prednisoLONE acetate (PRED FORTE) 1 % ophthalmic suspension, INSTILL 1 DROP INTO RIGHT EYE ONCE DAILY, Disp: , Rfl:   •  senna-docusate sodium (SENOKOT S) 8 6-50 mg per tablet, Take 1 tablet by mouth 2 (two) times a day Hold for diarrhea or loose stools, Disp: , Rfl: 0  •  torsemide (DEMADEX) 10 mg tablet, Take 10 mg by mouth if needed, Disp: , Rfl:   •  zonisamide (ZONEGRAN) 50 MG capsule, Take 1 tablet (50 mg total) by mouth daily, Disp: 90 capsule, Rfl: 2  (Not in a hospital admission)      ALLERGIES  No Known Allergies  Objective     PHYSICAL EXAM    VITAL SIGNS: Blood pressure 127/61, pulse 97, resp  rate 19, SpO2 93 %      Constitutional:  Appears well developed and well nourished, no acute distress, non-toxic appearance   Eyes:  PERRL, EOMI, conjunctivae pink, sclerae non-icteric    HENT:  Normocephalic/Atraumatic, no rhinorrhea, mucous membranes moist   Neck: normal range of motion, no tenderness, supple   Respiratory:  No respiratory distress, normal breath sounds   Cardiovascular:  Normal rate, normal rhythm    GI:  Soft, minimal left lower and right lower quadrant tenderness, non-distended  :  No CVAT, no flank ecchymosis  Musculoskeletal:  No swelling or edema, no tenderness, no deformities  Integument:  Pink, warm, dry, Well hydrated, no rash, no erythema, no bullae   Lymphatic:  No cervical/ tonsillar/ submandibular lymphadenopathy noted   Neurologic:  Awake, Alert & "oriented x 3, CN 2-12 intact, no focal neurological deficits, motor function intact  Psychiatric:  Speech and behavior appropriate       ED COURSE and MDM:    Assessment/Plan   Assessment:  Hien Barahona is a 69 y o  male presents for evaluation of GI bleed  Plan:  Labs, symptom management, IV fluids, blood transfusion as needed  Plan to [facility]  No imaging at this time  A D-dimer was sent by the resident to evaluate for bleeding/clotting  No concern for pulmonary embolism or deep venous thrombosis  Also noted elevated lactate level without obvious etiology  Patient without significant abdominal pain to suggest bowel ischemia and no metabolic acidosis per review of lab work  Patient is warm and well-perfused without evidence of cardiogenic dysfunction  CRITICAL CARE TIME:   32 minutes  GI bleed  Labs, consider transfusion if needed  Portions of the record may have been created with voice recognition software  Occasional wrong word or \"sound a like\" substitutions may have occurred due to the inherent limitations of voice recognition software       ED Provider  Electronically Signed by  "

## 2023-04-15 LAB
ALBUMIN SERPL BCP-MCNC: 3.2 G/DL (ref 3.5–5)
ALBUMIN SERPL BCP-MCNC: 3.2 G/DL (ref 3.5–5)
ALP SERPL-CCNC: 62 U/L (ref 34–104)
ALP SERPL-CCNC: 63 U/L (ref 34–104)
ALT SERPL W P-5'-P-CCNC: 23 U/L (ref 7–52)
ALT SERPL W P-5'-P-CCNC: 25 U/L (ref 7–52)
ANION GAP SERPL CALCULATED.3IONS-SCNC: 6 MMOL/L (ref 4–13)
ANION GAP SERPL CALCULATED.3IONS-SCNC: 6 MMOL/L (ref 4–13)
AST SERPL W P-5'-P-CCNC: 19 U/L (ref 13–39)
AST SERPL W P-5'-P-CCNC: 21 U/L (ref 13–39)
BILIRUB SERPL-MCNC: 0.31 MG/DL (ref 0.2–1)
BILIRUB SERPL-MCNC: 0.32 MG/DL (ref 0.2–1)
BUN SERPL-MCNC: 22 MG/DL (ref 5–25)
BUN SERPL-MCNC: 23 MG/DL (ref 5–25)
CALCIUM ALBUM COR SERPL-MCNC: 8.8 MG/DL (ref 8.3–10.1)
CALCIUM ALBUM COR SERPL-MCNC: 8.9 MG/DL (ref 8.3–10.1)
CALCIUM SERPL-MCNC: 8.2 MG/DL (ref 8.4–10.2)
CALCIUM SERPL-MCNC: 8.3 MG/DL (ref 8.4–10.2)
CHLORIDE SERPL-SCNC: 108 MMOL/L (ref 96–108)
CHLORIDE SERPL-SCNC: 109 MMOL/L (ref 96–108)
CO2 SERPL-SCNC: 25 MMOL/L (ref 21–32)
CO2 SERPL-SCNC: 25 MMOL/L (ref 21–32)
CREAT SERPL-MCNC: 0.84 MG/DL (ref 0.6–1.3)
CREAT SERPL-MCNC: 0.92 MG/DL (ref 0.6–1.3)
ERYTHROCYTE [DISTWIDTH] IN BLOOD BY AUTOMATED COUNT: 14.9 % (ref 11.6–15.1)
ERYTHROCYTE [DISTWIDTH] IN BLOOD BY AUTOMATED COUNT: 15 % (ref 11.6–15.1)
GFR SERPL CREATININE-BSD FRML MDRD: 80 ML/MIN/1.73SQ M
GFR SERPL CREATININE-BSD FRML MDRD: 85 ML/MIN/1.73SQ M
GLUCOSE SERPL-MCNC: 101 MG/DL (ref 65–140)
GLUCOSE SERPL-MCNC: 136 MG/DL (ref 65–140)
GLUCOSE SERPL-MCNC: 139 MG/DL (ref 65–140)
GLUCOSE SERPL-MCNC: 155 MG/DL (ref 65–140)
GLUCOSE SERPL-MCNC: 91 MG/DL (ref 65–140)
GLUCOSE SERPL-MCNC: 98 MG/DL (ref 65–140)
HCT VFR BLD AUTO: 24.6 % (ref 36.5–49.3)
HCT VFR BLD AUTO: 24.9 % (ref 36.5–49.3)
HCT VFR BLD AUTO: 25.2 % (ref 36.5–49.3)
HCT VFR BLD AUTO: 25.4 % (ref 36.5–49.3)
HGB BLD-MCNC: 7.6 G/DL (ref 12–17)
HGB BLD-MCNC: 7.9 G/DL (ref 12–17)
MCH RBC QN AUTO: 29.2 PG (ref 26.8–34.3)
MCH RBC QN AUTO: 29.6 PG (ref 26.8–34.3)
MCHC RBC AUTO-ENTMCNC: 31.1 G/DL (ref 31.4–37.4)
MCHC RBC AUTO-ENTMCNC: 31.7 G/DL (ref 31.4–37.4)
MCV RBC AUTO: 93 FL (ref 82–98)
MCV RBC AUTO: 94 FL (ref 82–98)
PLATELET # BLD AUTO: 311 THOUSANDS/UL (ref 149–390)
PLATELET # BLD AUTO: 313 THOUSANDS/UL (ref 149–390)
PMV BLD AUTO: 8.8 FL (ref 8.9–12.7)
PMV BLD AUTO: 9.1 FL (ref 8.9–12.7)
POTASSIUM SERPL-SCNC: 3.9 MMOL/L (ref 3.5–5.3)
POTASSIUM SERPL-SCNC: 4.3 MMOL/L (ref 3.5–5.3)
PROT SERPL-MCNC: 5.7 G/DL (ref 6.4–8.4)
PROT SERPL-MCNC: 5.7 G/DL (ref 6.4–8.4)
RBC # BLD AUTO: 2.67 MILLION/UL (ref 3.88–5.62)
RBC # BLD AUTO: 2.71 MILLION/UL (ref 3.88–5.62)
SODIUM SERPL-SCNC: 139 MMOL/L (ref 135–147)
SODIUM SERPL-SCNC: 140 MMOL/L (ref 135–147)
WBC # BLD AUTO: 7.49 THOUSAND/UL (ref 4.31–10.16)
WBC # BLD AUTO: 7.81 THOUSAND/UL (ref 4.31–10.16)

## 2023-04-15 RX ORDER — BISACODYL 5 MG/1
10 TABLET, DELAYED RELEASE ORAL ONCE
Status: COMPLETED | OUTPATIENT
Start: 2023-04-15 | End: 2023-04-15

## 2023-04-15 RX ORDER — ASPIRIN 81 MG/1
81 TABLET ORAL DAILY
Status: DISCONTINUED | OUTPATIENT
Start: 2023-04-16 | End: 2023-04-15

## 2023-04-15 RX ADMIN — ZONISAMIDE 50 MG: 25 CAPSULE ORAL at 09:18

## 2023-04-15 RX ADMIN — ESCITALOPRAM OXALATE 10 MG: 10 TABLET ORAL at 09:18

## 2023-04-15 RX ADMIN — FINASTERIDE 5 MG: 5 TABLET, FILM COATED ORAL at 09:17

## 2023-04-15 RX ADMIN — SODIUM CHLORIDE, SODIUM GLUCONATE, SODIUM ACETATE, POTASSIUM CHLORIDE, MAGNESIUM CHLORIDE, SODIUM PHOSPHATE, DIBASIC, AND POTASSIUM PHOSPHATE 75 ML/HR: .53; .5; .37; .037; .03; .012; .00082 INJECTION, SOLUTION INTRAVENOUS at 00:32

## 2023-04-15 RX ADMIN — LEVOTHYROXINE SODIUM 112 MCG: 112 TABLET ORAL at 05:15

## 2023-04-15 RX ADMIN — CYANOCOBALAMIN TAB 500 MCG 500 MCG: 500 TAB at 09:18

## 2023-04-15 RX ADMIN — BISACODYL 10 MG: 5 TABLET, COATED ORAL at 10:45

## 2023-04-15 RX ADMIN — PANTOPRAZOLE SODIUM 40 MG: 40 INJECTION, POWDER, FOR SOLUTION INTRAVENOUS at 22:32

## 2023-04-15 RX ADMIN — POLYETHYLENE GLYCOL 3350, SODIUM SULFATE ANHYDROUS, SODIUM BICARBONATE, SODIUM CHLORIDE, POTASSIUM CHLORIDE 4000 ML: 236; 22.74; 6.74; 5.86; 2.97 POWDER, FOR SOLUTION ORAL at 10:49

## 2023-04-15 RX ADMIN — PANTOPRAZOLE SODIUM 40 MG: 40 INJECTION, POWDER, FOR SOLUTION INTRAVENOUS at 09:18

## 2023-04-15 NOTE — CONSULTS
Consultation - [facility]  Layla Kirkland 69 y o  male MRN: [id number]  Unit/Bed#: E5 -01 Encounter: [id number]        Inpatient consult to gastroenterology  Consult performed by: Nica Bolton PA-C  Consult ordered by: Jj Malagon DO          Reason for Consult / Principal Problem:     Melena  Diverticulosis  Colon ulcer      ASSESSMENT AND PLAN:      Patient is a 69 y o  male with PMH significant for CAD s/p SAM (12/2022) on DAPT (last dose 4/13), DM2, HTN on midodrine, AS, PAD, CKD, BPH with urinary retention and history of diverticulosis with ?diverticular bleed and colonic ulcer admitted on 4/14 with complaints of maroon-colored rectal bleeding  Patient found to have downtrending hgb (10 5-8 4-7 9) and elevated BUN/Cr (27/1 17) on admission  1  Rectal bleeding  2  Hx of ?diverticular bleed vs colonic ulcer  2  CAD s/p SAM on DAPT  Patient with an episode of large-volume maroon-colored rectal bleeding with clots and without stool at approximately 8AM  Hgb remained stable (7 9), will plan for repeat H&H at noon  Patient remains hemodynamically stable  Given maroon-colored stool and DAPT, there is concern for both upper and lower GI sources of bleeding such as esophagitis, gastritis, duodenitis, PUD, AVMs, diverticular bleeding, advance/bleeding polyps and less likely mass/malignancy given recent endoscopic evaluation  Recommend patient to begin a GoLytely/Dulcolax bowel prep this morning  If patient continues to have hematochezia throughout the entirety of his prep, will tentatively plan for EGD with push enteroscopy/COY this afternoon  If patient's rectal bleeding clears with prep, will plan for EGD with push enteroscopy/COY tomorrow     - Clear liquid diet  - Continue to hold Plavix if safe from cardiac standpoint  - Continue IV PPI Twice daily  - Repeat H&H at 1200 today  - Begin GoLytely/Dulcolax bowel prep to complete over the next 4-6 hrs  - Patient to be made NPO @1400 in anticipation of tentative EGD with push enteroscopy/COY this afternoon  - If endoscopies are deferred, please make patient NPO at midnight (4/16 @0001)  - Monitor hgb; Transfuse for hgb <7 0   - Monitor stools for evidence of further over GI bleeding  - If patient were to develop brisk or hemodynamically significant bleeding, please make patient NPO and contact GI on-call immediately  GI will continue to follow  ______________________________________________________________________    HPI: Patient is a 69 y o  male with PMH significant for CAD s/p SAM (12/2022) on DAPT (last dose 4/13), DM2, hypertension on midodrine, aortic stenosis, PAD, CKD, BPH with urinary retention and history of diverticulosis and colonic ulcer who presented to the ED on 4/14 with complaints of black/tarry appearing stool with blood clots first noticed the evening prior  Patient reports having a BM with dark/maroon-colored rectal bleeding the evening of 4/13  States he then had a small BM with some BRBPR the following day in addition to some lightheadedness prompting evaluation in the ED  Denies hematemesis or coffee-ground emesis  Denies fever/chills, nausea/vomiting, abdominal pain, constipation, diarrhea or unintentional weight loss  Of note, patient was hospitalized earlier this year (3/2/2023) also for rectal bleeding  Patient underwent bidirectional endoscopic evaluation, as detailed below, notable for possible colonic ulcer surrounding a bleeding diverticulum  Patient's bleeding ultimately resolved and DAPT was resumed  Per chart review, patient last received Plavix 4/13 but continues to receive ASA due to recent PCI with SAM (12/2022)  Serologies on admission notable for downtrending hemoglobin (10 5-8 4-7 9), elevated BUN/Cr (27/1 17), elevated lactate and D-dimer  No abdominal imaging performed during current admission      EGD 3/2/2023 notable for irregular Z-line and minimal quantity of food seen in the stomach and duodenum, otherwise unremarkable  Colonoscopy 3/3/2023 notable for suspected ulcer within a diverticulum in the descending colon s/p 2 hemostatic clips with old and fresh blood seen at the level of the proximal transverse colon although no active bleeding, pancolonic diverticulosis  REVIEW OF SYSTEMS:    CONSTITUTIONAL: Denies any fever, chills, rigors, and weight loss  HEENT: No earache or tinnitus  Denies hearing loss or visual disturbances  CARDIOVASCULAR: No chest pain or palpitations  RESPIRATORY: Denies any cough, hemoptysis, shortness of breath or dyspnea on exertion  GASTROINTESTINAL: As noted in the History of Present Illness  GENITOURINARY: No problems with urination  Denies any hematuria or dysuria  NEUROLOGIC: No dizziness or vertigo, denies headaches  MUSCULOSKELETAL: Denies any muscle or joint pain  SKIN: Denies skin rashes or itching  ENDOCRINE: Denies excessive thirst  Denies intolerance to heat or cold  PSYCHOSOCIAL: Denies depression or anxiety  Denies any recent memory loss         Historical Information   Past Medical History:   Diagnosis Date   • BPH (benign prostatic hyperplasia)    • CAD (coronary artery disease)    • Cancer (HCC)     basal cell   • Chronic kidney disease     stage 3   • Colon polyp    • CPAP (continuous positive airway pressure) dependence    • Diabetes mellitus ([address] )     niddm   • Disease of thyroid gland    • Gout    • High cholesterol    • Hypertension    • Hypomagnesemia 3/9/2023   • MI, 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    • Myocardial infarction ([address] ) 2014   • S/P cardiac catheterization 12/30/2022   • Sleep apnea      Past Surgical History:   Procedure Laterality Date   • BASAL CELL CARCINOMA EXCISION Right 1/21/2022    Procedure: EXCISION BASAL CELL CARCINOMA EXTREMITY;  Surgeon: Kg Lancaster DO;  Location: [facility];  Service: General   • CARDIAC CATHETERIZATION Left 12/29/2022    Procedure: Cardiac Left Heart Cath;  Surgeon: Sunny Arzate MD;  Location: [facility]; Service: Cardiology   • CARDIAC CATHETERIZATION N/A 12/29/2022    Procedure: Cardiac Coronary Angiogram;  Surgeon: Alfa Mckeon MD;  [facility]; Service: Cardiology   • CARDIAC CATHETERIZATION N/A 12/29/2022    Procedure: Cardiac pci;  Surgeon: Alfa Mckeon MD;  Location: [facility];   Service: Cardiology   • CATARACT EXTRACTION W/  INTRAOCULAR LENS IMPLANT     • CORNEAL TRANSPLANT     • CORONARY ANGIOPLASTY WITH STENT PLACEMENT      stents x3-- [id number]   • EYE SURGERY      repair corneal laceration   • WY COLONOSCOPY FLX DX W/COLLJ SPEC WHEN PFRMD N/A 3/21/2016    Procedure: COLONOSCOPY;  Surgeon: Carley Pozo MD;  Location: [facility];  Service: Colorectal   • TONSILLECTOMY AND ADENOIDECTOMY       Social History   Social History     Substance and Sexual Activity   Alcohol Use Not Currently    Comment: socially, cut down in 2008, previously did more than      Social History     Substance and Sexual Activity   Drug Use Never     Social History     Tobacco Use   Smoking Status Never   Smokeless Tobacco Never     Family History   Adopted: Yes   Problem Relation Age of Onset   • No Known Problems Mother    • No Known Problems Father        Meds/Allergies     Medications Prior to Admission   Medication   • allopurinol (ZYLOPRIM) 100 mg tablet   • aluminum-magnesium hydroxide-simethicone (MYLANTA) 6932-0826-668 mg/30 mL suspension   • aspirin (ECOTRIN LOW STRENGTH) 81 mg EC tablet   • atorvastatin (LIPITOR) 80 mg tablet   • Blood Glucose Monitoring Suppl (OneTouch Verio Reflect) w/Device KIT   • clopidogrel (PLAVIX) 75 mg tablet   • Empagliflozin (Jardiance) 10 MG TABS   • escitalopram (LEXAPRO) 20 mg tablet   • finasteride (PROSCAR) 5 mg tablet   • fluticasone (FLONASE) 50 mcg/act nasal spray   • glimepiride (AMARYL) 4 mg tablet   • glucose blood (OneTouch Verio) test strip   • levothyroxine 112 mcg tablet   • metFORMIN (GLUCOPHAGE) 1000 MG tablet   • midodrine (PROAMATINE) 10 MG tablet   • multivitamin (THERAGRAN) TABS   • omeprazole (PriLOSEC) 20 mg delayed release capsule   • OneTouch Delica Lancets 55U MISC   • pantoprazole (PROTONIX) 40 mg tablet   • senna-docusate sodium (SENOKOT S) 8 6-50 mg per tablet   • torsemide (DEMADEX) 10 mg tablet   • zonisamide (ZONEGRAN) 50 MG capsule   • acetaminophen (TYLENOL) 325 mg tablet   • cyanocobalamin (VITAMIN B-12) 500 MCG tablet   • midodrine (PROAMATINE) 5 mg tablet   • polyethylene glycol (MIRALAX) 17 g packet   • prednisoLONE acetate (PRED FORTE) 1 % ophthalmic suspension     Current Facility-Administered Medications   Medication Dose Route Frequency   • acetaminophen (TYLENOL) tablet 650 mg  650 mg Oral Q8H PRN   • [START ON 4/16/2023] aspirin (ECOTRIN LOW STRENGTH) EC tablet 81 mg  81 mg Oral Daily   • atorvastatin (LIPITOR) tablet 80 mg  80 mg Oral Daily With Dinner   • cyanocobalamin (VITAMIN B-12) tablet 500 mcg  500 mcg Oral Daily   • escitalopram (LEXAPRO) tablet 10 mg  10 mg Oral Daily   • finasteride (PROSCAR) tablet 5 mg  5 mg Oral Daily   • fluticasone (FLONASE) 50 mcg/act nasal spray 1 spray  1 spray Nasal Daily   • insulin lispro (HumaLOG) 100 units/mL subcutaneous injection 1-6 Units  1-6 Units Subcutaneous Q6H [facility]   • levothyroxine tablet 112 mcg  112 mcg Oral Early Morning   • multi-electrolyte (PLASMALYTE-A/ISOLYTE-S PH 7 4) IV solution  75 mL/hr Intravenous Continuous   • pantoprazole (PROTONIX) injection 40 mg  40 mg Intravenous Q12H RONNIE   • zonisamide (ZONEGRAN) capsule 50 mg  50 mg Oral Daily       No Known Allergies        Objective     Blood pressure 98/61, pulse 92, temperature 98 °F (36 7 °C), resp  rate 18, SpO2 94 %  There is no height or weight on file to calculate BMI        Intake/Output Summary (Last 24 hours) at 4/15/2023 0825  Last data filed at 4/15/2023 0301  Gross per 24 hour   Intake --   Output 400 ml   Net -400 ml         PHYSICAL EXAM:      General Appearance:   Alert, cooperative, no distress   HEENT:   Normocephalic, atraumatic, anicteric  Neck:  Supple, symmetrical, trachea midline   Lungs:   Clear to auscultation bilaterally; no rales, rhonchi or wheezing; respirations unlabored    Heart[de-identified]   Regular rate and rhythm; no murmur, rub, or gallop     Abdomen:   Soft, non-tender, non-distended; normal bowel sounds; no masses, no organomegaly    Genitalia:   Deferred    Rectal:   Deferred    Extremities:  No cyanosis, clubbing or edema    Pulses:  2+ and symmetric all extremities    Skin:  No jaundice, rashes, or lesions    Lymph nodes:  No palpable cervical lymphadenopathy        Lab Results:   Admission on 04/14/2023   Component Date Value   • WBC 04/14/2023 7 41    • RBC 04/14/2023 2 86 (L)    • Hemoglobin 04/14/2023 8 4 (L)    • Hematocrit 04/14/2023 27 0 (L)    • MCV 04/14/2023 94    • MCH 04/14/2023 29 4    • MCHC 04/14/2023 31 1 (L)    • RDW 04/14/2023 15 0    • MPV 04/14/2023 8 6 (L)    • Platelets 16/92/0099 317    • nRBC 04/14/2023 0    • Neutrophils Relative 04/14/2023 58    • Immat GRANS % 04/14/2023 0    • Lymphocytes Relative 04/14/2023 27    • Monocytes Relative 04/14/2023 9    • Eosinophils Relative 04/14/2023 5    • Basophils Relative 04/14/2023 1    • Neutrophils Absolute 04/14/2023 4 26    • Immature Grans Absolute 04/14/2023 0 02    • Lymphocytes Absolute 04/14/2023 2 01    • Monocytes Absolute 04/14/2023 0 67    • Eosinophils Absolute 04/14/2023 0 40    • Basophils Absolute 04/14/2023 0 05    • Sodium 04/14/2023 142    • Potassium 04/14/2023 3 9    • Chloride 04/14/2023 109 (H)    • CO2 04/14/2023 28    • ANION GAP 04/14/2023 5    • BUN 04/14/2023 23    • Creatinine 04/14/2023 0 84    • Glucose 04/14/2023 71    • Calcium 04/14/2023 8 5    • Corrected Calcium 04/14/2023 9 0    • AST 04/14/2023 17    • ALT 04/14/2023 21    • Alkaline Phosphatase 04/14/2023 65    • Total Protein 04/14/2023 6 1 (L)    • Albumin 04/14/2023 3 4 (L)    • Total Bilirubin 04/14/2023 0 30    • eGFR 04/14/2023 85    • ABO Grouping 04/14/2023 A    • Rh Factor 04/14/2023 Positive    • Antibody Screen 04/14/2023 Negative    • Specimen Expiration Date 04/14/2023 [id number]    • POC Glucose 04/14/2023 75    • POC Glucose 04/15/2023 101    • Sodium 04/15/2023 140    • Potassium 04/15/2023 3 9    • Chloride 04/15/2023 109 (H)    • CO2 04/15/2023 25    • ANION GAP 04/15/2023 6    • BUN 04/15/2023 22    • Creatinine 04/15/2023 0 84    • Glucose 04/15/2023 91    • Calcium 04/15/2023 8 2 (L)    • Corrected Calcium 04/15/2023 8 8    • AST 04/15/2023 19    • ALT 04/15/2023 23    • Alkaline Phosphatase 04/15/2023 62    • Total Protein 04/15/2023 5 7 (L)    • Albumin 04/15/2023 3 2 (L)    • Total Bilirubin 04/15/2023 0 31    • eGFR 04/15/2023 85    • WBC 04/15/2023 7 81    • RBC 04/15/2023 2 71 (L)    • Hemoglobin 04/15/2023 7 9 (L)    • Hematocrit 04/15/2023 25 4 (L)    • MCV 04/15/2023 94    • MCH 04/15/2023 29 2    • MCHC 04/15/2023 31 1 (L)    • RDW 04/15/2023 15 0    • Platelets 55/61/5070 313    • MPV 04/15/2023 8 8 (L)    • POC Glucose 04/15/2023 98    • POC Glucose 04/15/2023 155 (H)        Imaging Studies: I have personally reviewed pertinent imaging studies

## 2023-04-15 NOTE — PROGRESS NOTES
Progress Note - Padmini Ackerman 69 y o  male MRN: [id number]    Unit/Bed#: E5 -01 Encounter: [id number]    Assessment/Plan:    Acute blood loss anemia  GI loss active bleeding, hemoglobin globin decreased to 7 9, patient is seen in consultation with Dr Padilla possible emergent EGD colonoscopy planned  Continue PPI and hemoglobin monitoring    GI bleed    history of diverticular bleed in March/2023, will await GI procedure continue to monitor hemoglobin    Urinary retention   history of BPH, Jose has been placed    Hypothyroid    continue Synthroid 1 1 2 mcg daily    Diabetes    good control hemoglobin A1c, currently n p o  continue sliding scale    Dyslipidemia    continue statin for LDL control    Hypertension    actually been hypotensive on midodrine      Subjective:   Continued bloody bowel movements, no pain, feels weak, denies chest pain or shortness of breath no nausea vomiting very hungry      Objective:     Vitals: Blood pressure 98/61, pulse 92, temperature 98 °F (36 7 °C), resp  rate 18, SpO2 94 %  ,There is no height or weight on file to calculate BMI        Results from last 7 days   Lab Units 04/15/23  0911   WBC Thousand/uL 7 49   HEMOGLOBIN g/dL 7 9*  7 9*   HEMATOCRIT % 25 2*  24 9*   PLATELETS Thousands/uL 311     Results from last 7 days   Lab Units 04/15/23  0911   POTASSIUM mmol/L 4 3   CHLORIDE mmol/L 108   CO2 mmol/L 25   BUN mg/dL 23   CREATININE mg/dL 0 92   CALCIUM mg/dL 8 3*   ALK PHOS U/L 63   ALT U/L 25   AST U/L 21       Scheduled Meds:  Current Facility-Administered Medications   Medication Dose Route Frequency Provider Last Rate   • acetaminophen  650 mg Oral Q8H PRN Azeem Aiad, DO     • cyanocobalamin  500 mcg Oral Daily Azeem Aiad, DO     • escitalopram  10 mg Oral Daily Azeem Aiad, DO     • finasteride  5 mg Oral Daily Azeem Aiad, DO     • fluticasone  1 spray Nasal Daily Azeem Aiad, DO     • insulin lispro  1-6 Units Subcutaneous Q6H [address] Azeem Aiad, DO     • levothyroxine 112 mcg Oral Early Morning Azeem Aiad, DO     • multi-electrolyte  75 mL/hr Intravenous Continuous Azeem Aiad, DO 75 mL/hr (04/15/23 0032)   • pantoprazole  40 mg Intravenous Q12H [facility] Azeem Aiad, DO     • zonisamide  50 mg Oral Daily Azeem Aiad, DO         Continuous Infusions:  multi-electrolyte, 75 mL/hr, Last Rate: 75 mL/hr (04/15/23 0032)          Physical exam:  General appearance: Alert and oriented x3 no distress interaction appropriate   Head/Eyes: Pale nonicteric EOMI  Neck: Supple  Lungs: CTA bilateral no wheezing rhonchi or rales  Heart: normal S1 S2 regular  Abdomen: Obese nontender with bowel sounds  Extremities: no edema  Skin: no rash    Invasive Devices     Peripheral Intravenous Line  Duration           Peripheral IV 04/14/23 Distal;Right;Upper;Ventral (anterior) Arm 1 day          Drain  Duration           Urethral Catheter Coude 16 Fr  1 day                    Counseling / Coordination of Care  Total floor / unit time spent today 30  minutes  Greater than 50% of total time was spent with the patient and / or family counseling and / or coordination of care  A description of the counseling / coordination of care: Maria Luisa Underwood

## 2023-04-15 NOTE — PLAN OF CARE
Problem: PAIN - ADULT  Goal: Verbalizes/displays adequate comfort level or baseline comfort level  Description: Interventions:  - Encourage patient to monitor pain and request assistance  - Assess pain using appropriate pain scale  - Administer analgesics based on type and severity of pain and evaluate response  - Implement non-pharmacological measures as appropriate and evaluate response  - Consider cultural and social influences on pain and pain management  - Notify physician/advanced practitioner if interventions unsuccessful or patient reports new pain  Outcome: Progressing     Problem: HEMATOLOGIC - ADULT  Goal: Maintains hematologic stability  Description: INTERVENTIONS  - Assess for signs and symptoms of bleeding or hemorrhage  - Monitor labs  - Administer supportive blood products/factors as ordered and appropriate  Outcome: Progressing     Problem: MOBILITY - ADULT  Goal: Maintain or return to baseline ADL function  Description: INTERVENTIONS:  -  Assess patient's ability to carry out ADLs; assess patient's baseline for ADL function and identify physical deficits which impact ability to perform ADLs (bathing, care of mouth/teeth, toileting, grooming, dressing, etc )  - Assess/evaluate cause of self-care deficits   - Assess range of motion  - Assess patient's mobility; develop plan if impaired  - Assess patient's need for assistive devices and provide as appropriate  - Encourage maximum independence but intervene and supervise when necessary  - Involve family in performance of ADLs  - Assess for home care needs following discharge   - Consider OT consult to assist with ADL evaluation and planning for discharge  - Provide patient education as appropriate  Outcome: Progressing  Goal: Maintains/Returns to pre admission functional level  Description: INTERVENTIONS:  - Perform BMAT or MOVE assessment daily    - Set and communicate daily mobility goal to care team and patient/family/caregiver     - Collaborate with rehabilitation services on mobility goals if consulted  - Perform Range of Motion 3 times a day  - Reposition patient every 2 hours    - Dangle patient 3 times a day  - Stand patient 3 times a day  - Ambulate patient 3 times a day  - Out of bed to chair 3 times a day   - Out of bed for meals 3 times a day  - Out of bed for toileting  - Record patient progress and toleration of activity level   Outcome: Progressing

## 2023-04-16 ENCOUNTER — APPOINTMENT (INPATIENT)
Dept: GASTROENTEROLOGY | Facility: HOSPITAL | Age: 78
End: 2023-04-16
Attending: INTERNAL MEDICINE

## 2023-04-16 LAB
ABO GROUP BLD: NORMAL
ALBUMIN SERPL BCP-MCNC: 3.1 G/DL (ref 3.5–5)
ALP SERPL-CCNC: 60 U/L (ref 34–104)
ALT SERPL W P-5'-P-CCNC: 20 U/L (ref 7–52)
ANION GAP SERPL CALCULATED.3IONS-SCNC: 6 MMOL/L (ref 4–13)
AST SERPL W P-5'-P-CCNC: 16 U/L (ref 13–39)
BILIRUB SERPL-MCNC: 0.31 MG/DL (ref 0.2–1)
BLD GP AB SCN SERPL QL: NEGATIVE
BUN SERPL-MCNC: 16 MG/DL (ref 5–25)
CALCIUM ALBUM COR SERPL-MCNC: 8.8 MG/DL (ref 8.3–10.1)
CALCIUM SERPL-MCNC: 8.1 MG/DL (ref 8.4–10.2)
CHLORIDE SERPL-SCNC: 107 MMOL/L (ref 96–108)
CO2 SERPL-SCNC: 27 MMOL/L (ref 21–32)
CREAT SERPL-MCNC: 0.8 MG/DL (ref 0.6–1.3)
GFR SERPL CREATININE-BSD FRML MDRD: 86 ML/MIN/1.73SQ M
GLUCOSE SERPL-MCNC: 114 MG/DL (ref 65–140)
GLUCOSE SERPL-MCNC: 118 MG/DL (ref 65–140)
GLUCOSE SERPL-MCNC: 120 MG/DL (ref 65–140)
GLUCOSE SERPL-MCNC: 122 MG/DL (ref 65–140)
GLUCOSE SERPL-MCNC: 135 MG/DL (ref 65–140)
GLUCOSE SERPL-MCNC: 290 MG/DL (ref 65–140)
HCT VFR BLD AUTO: 24 % (ref 36.5–49.3)
HCT VFR BLD AUTO: 24.1 % (ref 36.5–49.3)
HGB BLD-MCNC: 7.3 G/DL (ref 12–17)
HGB BLD-MCNC: 7.6 G/DL (ref 12–17)
POTASSIUM SERPL-SCNC: 3.7 MMOL/L (ref 3.5–5.3)
PROT SERPL-MCNC: 5.5 G/DL (ref 6.4–8.4)
RH BLD: POSITIVE
SODIUM SERPL-SCNC: 140 MMOL/L (ref 135–147)
SPECIMEN EXPIRATION DATE: NORMAL

## 2023-04-16 PROCEDURE — 0DJD8ZZ INSPECTION OF LOWER INTESTINAL TRACT, VIA NATURAL OR ARTIFICIAL OPENING ENDOSCOPIC: ICD-10-PCS | Performed by: INTERNAL MEDICINE

## 2023-04-16 PROCEDURE — 0DJ07ZZ INSPECTION OF UPPER INTESTINAL TRACT, VIA NATURAL OR ARTIFICIAL OPENING: ICD-10-PCS | Performed by: INTERNAL MEDICINE

## 2023-04-16 RX ORDER — INSULIN LISPRO 100 [IU]/ML
1-6 INJECTION, SOLUTION INTRAVENOUS; SUBCUTANEOUS
Status: DISCONTINUED | OUTPATIENT
Start: 2023-04-16 | End: 2023-04-22 | Stop reason: HOSPADM

## 2023-04-16 RX ORDER — CLOPIDOGREL BISULFATE 75 MG/1
75 TABLET ORAL DAILY
Status: DISCONTINUED | OUTPATIENT
Start: 2023-04-17 | End: 2023-04-22 | Stop reason: HOSPADM

## 2023-04-16 RX ORDER — INSULIN LISPRO 100 [IU]/ML
1-6 INJECTION, SOLUTION INTRAVENOUS; SUBCUTANEOUS
Status: DISCONTINUED | OUTPATIENT
Start: 2023-04-16 | End: 2023-04-16

## 2023-04-16 RX ORDER — PANTOPRAZOLE SODIUM 40 MG/1
40 TABLET, DELAYED RELEASE ORAL
Status: DISCONTINUED | OUTPATIENT
Start: 2023-04-16 | End: 2023-04-22 | Stop reason: HOSPADM

## 2023-04-16 RX ADMIN — FINASTERIDE 5 MG: 5 TABLET, FILM COATED ORAL at 10:50

## 2023-04-16 RX ADMIN — Medication 40 MG: at 07:19

## 2023-04-16 RX ADMIN — CYANOCOBALAMIN TAB 500 MCG 500 MCG: 500 TAB at 10:50

## 2023-04-16 RX ADMIN — INSULIN LISPRO 4 UNITS: 100 INJECTION, SOLUTION INTRAVENOUS; SUBCUTANEOUS at 17:45

## 2023-04-16 RX ADMIN — ZONISAMIDE 50 MG: 25 CAPSULE ORAL at 10:50

## 2023-04-16 RX ADMIN — ESCITALOPRAM OXALATE 10 MG: 10 TABLET ORAL at 10:50

## 2023-04-16 RX ADMIN — PANTOPRAZOLE SODIUM 40 MG: 40 TABLET, DELAYED RELEASE ORAL at 17:54

## 2023-04-16 RX ADMIN — PANTOPRAZOLE SODIUM 40 MG: 40 INJECTION, POWDER, FOR SOLUTION INTRAVENOUS at 10:50

## 2023-04-16 NOTE — UTILIZATION REVIEW
Initial Clinical Review    Pt initially presented to [address] [place] [address]  [place] [facility] [place]  Admission: Date/Time/Statement:   Admission Orders (From admission, onward)     Ordered        04/14/23 1921  Inpatient Admission  Once                      Orders Placed This Encounter   Procedures   • Inpatient Admission     Standing Status:   Standing     Number of Occurrences:   1     Order Specific Question:   Level of Care     Answer:   Med Surg [16]     Order Specific Question:   Estimated length of stay     Answer:   More than 2 Midnights     Order Specific Question:   Certification     Answer:   I certify that inpatient services are medically necessary for this patient for a duration of greater than two midnights  See H&P and MD Progress Notes for additional information about the patient's course of treatment  Initial Presentation: 69 y o  male who presented from [address] [place] [address] by EMS [place] [facility]  Inpatient admission for evaluation and treatment of acute GI bleed  PMHx: BPH, CAD, CKD, T2DM, Gout, HTN, MI, sleep apnea  Presented w/ dark stools  Has recent hospitalization for GI bleed  On exam, dry mucous membranes, murmur, morales, tremors  Plan: PPI BID, serial H&H, hold ASA/plavix, continue other PTA meds, SSI w/ accuchecks, Trend labs, replete electrolytes as needed  GI consulted  Date: 4/15   Day 2: Continued bloody bowel movements, notes feeling weak  Hgb 7 9 down from 10 5  On exam, pallor  Plan: maintain morales, IVF, continue current meds, NPO, bowel prep  GI: Pt w/ possible GIB  Clear liquid diet, hold plavix, PPI IV BID, trend H&H, bowel prep, NPO after bowel prep, monitor stools for blood  EGD/colonoscopy  Date: 04/16/23    Day 3: Has surpassed a 2nd midnight with active treatments and services       04/16/23   Indication: melena  Anesthesia: general ASA Score: 3  EGD Findings:  Normal esophagus and stomach  Duodenum was examined to the fourth portion and appeared normal  Colonoscopy Findings:   Normal terminal ileum  Pancolonic diverticulosis  There was maroon tinge blood mixed with brown stool in the left colon  There was no evidence of high risk stigmata seen in any of the diverticula  Small internal hemorrhoids  Recommendations:  He may have a diverticular source of his GI bleeding episodes given otherwise unremarkable enteroscopy and colonoscopy findings  The bleeding appears to have stopped   If recurrent lower GI bleeding obtain stat CT abdomen pelvis bleeding protocol  Obtain outpatient video capsule endoscopy for evaluation of small bowel AVMs and his recurrent bleeding episodes  Resume anticoagulation  Okay to resume diet    ED Triage Vitals   Temperature Pulse Respirations Blood Pressure SpO2   04/14/23 1857 04/14/23 1857 04/14/23 1857 04/14/23 1857 04/14/23 1857   97 9 °F (36 6 °C) 83 18 116/68 98 %      Temp Source Heart Rate Source Patient Position - Orthostatic VS BP Location FiO2 (%)   04/14/23 2244 04/16/23 0706 04/14/23 2244 04/14/23 2244 --   Oral Monitor Lying Left arm       Pain Score       04/14/23 2244       No Pain          Wt Readings from Last 1 Encounters:   04/12/23 97 5 kg (215 lb)     Additional Vital Signs:   Date/Time Temp Pulse Resp BP MAP (mmHg) SpO2 O2 Flow Rate (L/min) O2 Device   04/16/23 09:36:10 97 7 °F (36 5 °C) 74 18 108/59 75 100 % -- --   04/16/23 0819 -- 71 20 108/59 -- 97 % -- None (Room air)   04/16/23 0812 -- 79 21 99/64 -- 98 % -- None (Room air)   04/16/23 0809 -- 76 23 Abnormal  91/60 -- 98 % -- None (Room air)   04/16/23 0803 -- 76 15 95/59 -- 100 % 6 L/min Other (comment)    04/16/23 07:18:40 -- 95 -- -- -- 100 % -- --   04/16/23 07:17:40 -- 100 -- -- -- 88 % Abnormal  -- --   04/16/23 07:16:40 -- 89 -- -- -- 95 % -- --   04/16/23 07:15:40 -- 79 -- -- -- 100 % -- --   04/16/23 07:14:40 -- 70 -- -- -- 100 % -- --   04/16/23 07:13:40 -- 75 -- -- -- 100 % -- --   04/16/23 07:12:40 -- 79 -- -- -- 100 % -- --   04/16/23 07:11:40 -- 73 -- -- -- 100 % -- --   04/16/23 07:10:40 -- 74 -- -- -- 100 % -- --   04/16/23 07:09:40 -- 71 -- -- -- 100 % -- --   04/16/23 07:08:40 -- 75 -- -- -- 99 % -- --   04/16/23 0706 98 7 °F (37 1 °C) 78 17 115/69 -- 100 % -- None (Room air)   04/15/23 22:40:09 97 2 °F (36 2 °C) Abnormal  79 16 102/66 78 95 % -- --   04/15/23 15:53:34 97 9 °F (36 6 °C) 83 -- 129/68 88 98 % -- --   04/15/23 07:38:57 98 °F (36 7 °C) 92 18 98/61 73 94 % -- --   04/15/23 05:14:47 98 4 °F (36 9 °C) 88 19 116/58 77 94 % -- --   04/14/23 22:44:15 98 7 °F (37 1 °C) 86 17 115/61 79 96 % -- None (Room air)     Pertinent Labs/Diagnostic Test Results:   Results from last 7 days   Lab Units 04/16/23  0630 04/16/23  0029 04/15/23  1425 04/15/23  0911 04/15/23  0357 04/14/23 2001 04/14/23  0810   WBC Thousand/uL  --   --   --  7 49 7 81 7 41 10 11   HEMOGLOBIN g/dL 7 3* 7 6* 7 6* 7 9*  7 9* 7 9* 8 4* 10 5*   HEMATOCRIT % 24 1* 24 0* 24 6* 25 2*  24 9* 25 4* 27 0* 33 5*   PLATELETS Thousands/uL  --   --   --  311 313 317 464*   NEUTROS ABS Thousands/µL  --   --   --   --   --  4 26 6 40         Results from last 7 days   Lab Units 04/16/23  0513 04/15/23  0911 04/15/23  0357 04/14/23 2001 04/14/23  0810   SODIUM mmol/L 140 139 140 142 140   POTASSIUM mmol/L 3 7 4 3 3 9 3 9 4 1   CHLORIDE mmol/L 107 108 109* 109* 102   CO2 mmol/L 27 25 25 28 26   ANION GAP mmol/L 6 6 6 5 12   BUN mg/dL 16 23 22 23 27*   CREATININE mg/dL 0 80 0 92 0 84 0 84 1 17   EGFR ml/min/1 73sq m 86 80 85 85 60   CALCIUM mg/dL 8 1* 8 3* 8 2* 8 5 9 1     Results from last 7 days   Lab Units 04/16/23  0513 04/15/23  0911 04/15/23  0357 04/14/23 2001 04/14/23  0810   AST U/L 16 21 19 17 19   ALT U/L 20 25 23 21 25   ALK PHOS U/L 60 63 62 65 87   TOTAL PROTEIN g/dL 5 5* 5 7* 5 7* 6 1* 7 0   ALBUMIN g/dL 3 1* 3 2* 3 2* 3 4* 3 8   TOTAL BILIRUBIN mg/dL 0 31 0 32 0 31 0 30 0 27     Results from last 7 days   Lab Units 04/16/23  3123 04/16/23  0030 04/15/23  1816 04/15/23  0738 04/15/23  0514 04/15/23  0000 04/14/23 2051   POC GLUCOSE mg/dl 118 120 136 155* 98 101 75     Results from last 7 days   Lab Units 04/16/23  0513 04/15/23  0911 04/15/23  0357 04/14/23 2001 04/14/23  0810   GLUCOSE RANDOM mg/dL 114 139 91 71 200*       Results from last 7 days   Lab Units 04/14/23  1207 04/14/23  1010 04/14/23  0810   HS TNI 0HR ng/L  --   --  5   HS TNI 2HR ng/L  --  5  --    HSTNI D2 ng/L  --  0  --    HS TNI 4HR ng/L 5  --   --    HSTNI D4 ng/L 0  --   --      Results from last 7 days   Lab Units 04/14/23  0810   D-DIMER QUANTITATIVE ug/ml FEU 0 84*                 Results from last 7 days   Lab Units 04/14/23  1010 04/14/23  0810   LACTIC ACID mmol/L 2 1* 3 5*     Results from last 7 days   Lab Units 04/14/23  0835   CLARITY UA  Slightly Cloudy   COLOR UA  Yellow   SPEC GRAV UA  1 010   PH UA  6 5   GLUCOSE UA mg/dl >=1000 (1%)*   KETONES UA mg/dl Negative   BLOOD UA  Moderate*   PROTEIN UA mg/dl Negative   NITRITE UA  Negative   BILIRUBIN UA  Negative   UROBILINOGEN UA E U /dl 0 2   LEUKOCYTES UA  Elevated glucose may cause decreased leukocyte values   See urine microscopic for UWBC result/*   WBC UA /hpf 4-10*   RBC UA /hpf 2-4   BACTERIA UA /hpf Occasional   EPITHELIAL CELLS WET PREP /hpf None Seen         Past Medical History:   Diagnosis Date   • BPH (benign prostatic hyperplasia)    • CAD (coronary artery disease)    • Cancer (HCC)     basal cell   • Chronic kidney disease     stage 3   • Colon polyp    • CPAP (continuous positive airway pressure) dependence    • Diabetes mellitus (HCC)     niddm   • Disease of thyroid gland    • Gout    • High cholesterol    • Hypertension    • Hypomagnesemia 3/9/2023   • MI, 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    • Myocardial infarction ([facility] [address] ) 2014   • S/P cardiac catheterization 12/30/2022   • Sleep apnea      Present on Admission:  • Acute GI bleeding  • Coronary artery disease of native artery of native heart with stable angina pectoris (HCC)  • Hyperlipidemia  • Hypertension  • Hypothyroidism  • Type 2 diabetes mellitus with hyperglycemia, without long-term current use of insulin (HCC)  • Stage 3a chronic kidney disease (HCC)  • Benign prostatic hyperplasia with urinary retention  • Depression with anxiety  • Tremor, essential  • Gout      Admitting Diagnosis: Acute GI bleeding [K92 2]  Age/Sex: 69 y o  male  Admission Orders:  NPO  -- Cardiac Diet  accuchecks q6h   I&O  q4h vitals  SCDs  Scheduled Medications:  cyanocobalamin, 500 mcg, Oral, Daily  escitalopram, 10 mg, Oral, Daily  finasteride, 5 mg, Oral, Daily  fluticasone, 1 spray, Nasal, Daily  insulin lispro, 1-6 Units, Subcutaneous, Q6H RONNIE  levothyroxine, 112 mcg, Oral, Early Morning  pantoprazole, 40 mg, Intravenous, Q12H RONNIE  zonisamide, 50 mg, Oral, Daily    Continuous IV Infusions:    multi-electrolyte, 75 mL/hr, Intravenous, Continuous; Start: 04/15/23 0000 End: 04/15/23 1231    PRN Meds:  acetaminophen, 650 mg, Oral, Q8H PRN  polyethylene glycol, 4,000 mL, Oral; 4/15 x1      IP CONSULT TO GASTROENTEROLOGY  IP CONSULT TO CASE MANAGEMENT    Network Utilization Review Department  ATTENTION: Please call with any questions or concerns to [phone] and carefully listen to the prompts so that you are directed to the right person  All voicemails are confidential   Gavino Paiz all requests for admission clinical reviews, approved or denied determinations and any other requests to dedicated fax number below belonging to the campus where the patient is receiving treatment   List of dedicated fax numbers for the Facilities:  [address] [place] [address] DENIALS (Administrative/Medical Necessity) [phone]   [address] [place] [address] (Maternity/NICU/Pediatrics) [address] [place] [address] [address] [place] [address] [place] [id number] [facility] 28 [address] [facility] 310 [facility] [place] [address] [phone]

## 2023-04-16 NOTE — PHYSICAL THERAPY NOTE
Physical Therapy Evaluation:    2 forms of pt ID verified:name,birthdate and pt ID abbie    Patient's Name: Cesar Stark    Admitting Diagnosis  Acute GI bleeding [K92 2]    Problem List  Patient Active Problem List   Diagnosis    Arthritis    Depression with anxiety    Type 2 diabetes mellitus with hyperglycemia, without long-term current use of insulin ([place] [address] )    Gout    Hyperlipidemia    Hypertension    Morbid obesity ([place] [address] )    NSTEMI (non-ST elevated myocardial infarction) ([place] [address] )    Tremor    Degenerative disc disease, lumbar    Tremor, essential    Diabetic neuropathy associated with diabetes mellitus due to underlying condition ([facility])    Gait instability    Frequent falls    Infected epidermoid cyst    Vitamin E deficiency    Primary osteoarthritis involving multiple joints    Chronic pain of both knees    Primary osteoarthritis of both knees    Coronary artery disease of native artery of native heart with stable angina pectoris (HCC)    Primary osteoarthritis of right knee    Primary osteoarthritis of left knee    Fall (on) (from) other stairs and steps, initial encounter    Recurrent major depressive disorder ([place] [address] )    Benign hypertension with CKD (chronic kidney disease) stage III ([place] [address] )    Diabetic nephropathy associated with type 2 diabetes mellitus ([place] [address] )    Concentric left ventricular hypertrophy    Aortic stenosis, moderate    Edema    Mild cognitive impairment    Bilateral lower extremity edema    Other chest pain    Leukocytosis    Skin lesion    Chronic diastolic (congestive) heart failure (HCC)    Basal cell carcinoma (BCC) of right shoulder    Stage 3a chronic kidney disease (HCC)    S/P cardiac catheterization    Peripheral arterial disease ([place] [address] )    Mesenteric artery stenosis (HCC)    Acute GI bleeding    Diabetes mellitus, type 2 (HCC)    Acute diverticulitis    Orthostatic hypotension    Acute blood loss anemia    Urinary retention    Diverticulosis of colon with hemorrhage    Elevated TSH Elevated d-dimer    Hypothyroidism    Elevated platelet count    Low serum cortisol level    Hot flashes    Benign prostatic hyperplasia with urinary retention       Past Medical History  Past Medical History:   Diagnosis Date    BPH (benign prostatic hyperplasia)     CAD (coronary artery disease)     Cancer (HCC)     basal cell    Chronic kidney disease     stage 3    Colon polyp     CPAP (continuous positive airway pressure) dependence     Diabetes mellitus ([facility] [address] )     niddm    Disease of thyroid gland     Gout     High cholesterol     Hypertension     Hypomagnesemia 3/9/2023    MI, 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     Myocardial infarction ([facility] ) 2014    S/P cardiac catheterization 12/30/2022    Sleep apnea        Past Surgical History  Past Surgical History:   Procedure Laterality Date    BASAL CELL CARCINOMA EXCISION Right 1/21/2022    Procedure: EXCISION BASAL CELL CARCINOMA EXTREMITY;  Surgeon: Rogers Mendiola DO;  Location: [facility] MAIN OR;  Service: General    CARDIAC CATHETERIZATION Left 12/29/2022    Procedure: Cardiac Left Heart Cath;  Surgeon: Luis Tavarez MD;  Location: [facility] CARDIAC CATH LAB; Service: Cardiology    CARDIAC CATHETERIZATION N/A 12/29/2022    Procedure: Cardiac Coronary Angiogram;  Surgeon: Luis Tavarez MD;  Location: [facility] CARDIAC CATH [facility]; Service: Cardiology    CARDIAC CATHETERIZATION N/A 12/29/2022    Procedure: Cardiac pci;  Surgeon: Luis Tavarez MD;  Location: [facility] CARDIAC CATH LAB;   Service: Cardiology    CATARACT EXTRACTION W/  INTRAOCULAR LENS IMPLANT      CORNEAL TRANSPLANT      CORONARY ANGIOPLASTY WITH STENT PLACEMENT      stents x3-- [phone]    EYE SURGERY      repair corneal laceration    UT COLONOSCOPY FLX DX W/COLLJ SPEC WHEN PFRMD N/A 3/21/2016    Procedure: COLONOSCOPY;  Surgeon: Juju Resendiz MD;  Location: [facility] MAIN OR;  Service: Colorectal    TONSILLECTOMY AND ADENOIDECTOMY          04/16/23 1106   PT Last Visit   PT Visit Date 04/16/23   Note Type   Note type "Evaluation   Pain Assessment   Pain Assessment Tool 0-10   Pain Score No Pain   Restrictions/Precautions   Other Precautions Multiple lines;Telemetry; Fall Risk;Hard of hearing   Home Living   Type of [address] Two level;Performs ADLs on one level;Stairs to enter without rails  (1 SABINE->landing->1 SABINE; first floor setup with pt sleeping in recliner chair  Pt reports going up and down steps to 2nd floor for bathing)   [address]  (owns [facility], tub bench, BSC from when relatives passed)   Additional Comments pt lives with wife in 2 [facility], first floor setup, (+)SABINE single steps without HR,use of RW for mobility PTA  A from wife for ADLs and IADLs  Pt reports no recent falls   Prior Function   Level of [place] Independent with functional mobility; Needs assistance with IADLS;Needs assistance with ADLs   Lives With Spouse  (pt reports wife is a retired nurse and can A as needed upon DC)   Receives Help From Family   IADLs Family/Friend/Other provides meals; Family/Friend/Other provides transportation   Falls in the last 6 months 0   General   Additional Pertinent History acute GI bleeding; orthostatic hypotension and rectal bleeding 03/08/23->DC to rehab 03/22- recently DC home on 04/07  Returned to [facility] for acute GI bleeding and was transfered to [facility] [place] Present No   Cognition   Overall Cognitive Status WFL   Arousal/Participation Alert   Orientation Level Oriented X4   Following Commands Follows all commands and directions without difficulty   Subjective   Subjective pt supine in bed resting comfortably;pt willing and agreeable to work with PT with inc  motivation and encouragement  \"Ya know I just got back from having a test and procedure\"     RLE Assessment   RLE Assessment   (at least 4/5 grossly throughout)   LLE Assessment   LLE Assessment   (at least 4/5 grossly throughout)   Vision-Basic Assessment   Current Vision Wears glasses all the time   Coordination   Movements " "are Fluid and Coordinated 0   Coordination and Movement Description ataxic and unsteady gait and movement patterns, dec BLE step length,forward flexed posture   Sensation WFL   Light Touch   RLE Light Touch Grossly intact   LLE Light Touch Grossly intact   Bed Mobility   Rolling R 5  Supervision   Additional items Assist x 1;HOB elevated; Bedrails; Increased time required;Verbal cues   Supine to Sit 5  Supervision   Additional items Assist x 1;HOB elevated; Bedrails; Increased time required;Verbal cues   Transfers   Sit to Stand 4  Minimal assistance   Additional items Assist x 1;Bedrails; Increased time required;Verbal cues   Stand to Sit 4  Minimal assistance   Additional items Assist x 1; Armrests; Increased time required;Verbal cues   Ambulation/Elevation   Gait pattern Poor UE support;Narrow MCKENZIE; Forward Flexion; Shuffling; Foward flexed; Short stride; Ataxia   Gait Assistance 4  Minimal assist   Additional items Assist x 1;Verbal cues   Assistive Device Rolling walker   Distance 24 feet x2 with use of RW on tile and hardwood jeanine; no LOB noted and/or observed during upright mobility  Pt reports \"I only walk from room to room in my home  And then I need to rest, I get tired\"  Balance   Static Sitting Fair +   Dynamic Sitting Poor +   Static Standing Poor +   Dynamic Standing Poor +   Ambulatory Poor +   Endurance Deficit   Endurance Deficit Yes   Endurance Deficit Description fatigues quickly, SOB following and during minimal exertion   Activity Tolerance   Activity Tolerance Patient limited by fatigue  (fair)   Nurse Made Aware yes   Assessment   Prognosis Good   Problem List Decreased strength;Decreased endurance; Impaired balance;Decreased mobility; Impaired judgement;Decreased safety awareness; Impaired hearing;Obesity; Decreased skin integrity   Assessment Pt is a 70 yo male admitted to [facility] 2* acute GI bleeding  Pt was transfered from [facility] 2* rectal bleed,DM,HTN,urinary retention, anemia   Pt was " recently DC from inpt rehab to home on 04/07/23  Pt lives with wife in 2 [address], first floor setup, (+)SABINE without HA (single steps), reports no recent falls, goes to 2nd floor foor bathing,use of RW for mobility PTA  Pt currently is not at functional mobility baseline, needs A for mobility and OOB,multiple lines,recent procedure performed,masimo monitoring,limited insight on safety awareness  pt demonstrates minimal deficits during functional mobility and gait including dec endurance, dec balance, dec BLE strength, ataxic and unsteady gait and movement patterns,dec safety awareness and needs S for bed mobility, minAx1 for TT and GT with use of RW   Pt would cont to benefit from skilled inpt PT services to maximize functional independence and to dec caregiver burden upon being DC from the hospital    Barriers to Discharge Inaccessible home environment  (2 SH, SABINE)   Goals   Patient Goals to take a shower   STG Expiration Date 04/26/23   Short Term Goal #1 in 7-10 days:  (1) Pt will be able to ambulate greater than 75 feet with use of RW on various surfaces needing mod (I) level of A in order to A pt to return to PLOF, (2) activity tolerance:45 mins/45mins, (3) pt will be able to perform sit to stand transfers needing mod (I) level of A to and from various surfaces consistently in order to return to PLOF, (4) pt will be able to perform BM needing mod (I) level of A to A pt to return to PLOF, (5) (I) with BLE therapeutic ex HEP in various positions to A pt to inc balance,strength,mobility,endurance  (6) inc balance 1/2 grade in order to dec fall risk, (7) pt will be able to go up and down single curb steps with use of RW needing S level of A in order to navigate SABINE as able and as needed prior to D/C, (8) cont to provide pt and pt family education for safe D/C planning, (9) inc BLE strength 1/2 to 1 full grade in order to A pt to inc balance,strength,mobility,endurance   PT Treatment Day 0   Plan   Treatment/Interventions Functional transfer training;LE strengthening/ROM; Elevations; Therapeutic exercise; Endurance training;Patient/family training;Equipment eval/education; Bed mobility;Gait training;Spoke to nursing   PT Frequency 3-5x/wk   Recommendation   PT Discharge Recommendation Home with home health rehabilitation   Equipment Recommended Walker  (owns RW at home for use)   AM-PAC Basic Mobility Inpatient   Turning in Flat Bed Without Bedrails 4   Lying on Back to Sitting on Edge of Flat Bed Without Bedrails 4   Moving Bed to Chair 3   Standing Up From Chair Using Arms 3   Walk in Room 3   Climb 3-5 Stairs With Railing 3   Basic Mobility Inpatient Raw Score 20   Basic Mobility Standardized Score 43 99   Highest Level Of Mobility   JH-HLM Goal 6: Walk 10 steps or more   JH-HLM Achieved 7: Walk 25 feet or more           @Marlene Prince, PT, DPT@

## 2023-04-16 NOTE — PROGRESS NOTES
Progress Note - Nona Boyce 69 y o  male MRN: [id number]    Unit/Bed#: E5 -01 Encounter: [id number]    Assessment/Plan:    Acute GI bleed   post EGD normal findings, colonoscopy, extensive pancolonic diverticuli small internal hemorrhoids no active bleeding  Most likely diverticular bleed continue PPI    Acute blood loss anemia GI loss hemoglobin 7 3, no further active bleeding continue to monitor    Urinary retention  history of BPH Jose has been placed    Hypothyroid   continue Synthroid 112 mcg daily    Dyslipidemia   continue statin for LDL control    Diabetes   a m  glucose 118, continue sliding scale with ADA diet    Subjective:   Feels much better, no abdominal pain no chest pain no nausea vomiting no fevers chills no bowel movement since procedure appetite is very good      Objective:     Vitals: Blood pressure 108/59, pulse 74, temperature 97 7 °F (36 5 °C), resp  rate 18, SpO2 100 %  ,There is no height or weight on file to calculate BMI  Results from last 7 days   Lab Units 04/16/23  0630 04/15/23  1425 04/15/23  0911   WBC Thousand/uL  --   --  7 49   HEMOGLOBIN g/dL 7 3*   < > 7 9*  7 9*   HEMATOCRIT % 24 1*   < > 25 2*  24 9*   PLATELETS Thousands/uL  --   --  311    < > = values in this interval not displayed       Results from last 7 days   Lab Units 04/16/23  0513   POTASSIUM mmol/L 3 7   CHLORIDE mmol/L 107   CO2 mmol/L 27   BUN mg/dL 16   CREATININE mg/dL 0 80   CALCIUM mg/dL 8 1*   ALK PHOS U/L 60   ALT U/L 20   AST U/L 16       Scheduled Meds:  Current Facility-Administered Medications   Medication Dose Route Frequency Provider Last Rate   • acetaminophen  650 mg Oral Q8H PRN Azeem Aiad, DO     • cyanocobalamin  500 mcg Oral Daily Azeem Aiad, DO     • escitalopram  10 mg Oral Daily Azeem Aiad, DO     • finasteride  5 mg Oral Daily Azeem Aiad, DO     • fluticasone  1 spray Nasal Daily Azeem Aiad, DO     • insulin lispro  1-6 Units Subcutaneous Q6H [id number] [place] [address], DO     • levothyroxine  112 mcg Oral Early Morning Azeem Aiad, DO     • pantoprazole  40 mg Intravenous Q12H [address] Azeem Aiad, DO     • zonisamide  50 mg Oral Daily Azeem Aiad, DO         Continuous Infusions:         Physical exam:  General appearance: Alert no distress interaction appropriate  Head/Eyes: Anicteric pale EOMI  Neck: Supple  Lungs: CTA bilateral no wheezing rhonchi or rales  Heart: normal S1 S2 regular  Abdomen: Obese nontender with bowel sounds  Extremities: no edema  Skin: no rash    Invasive Devices     Peripheral Intravenous Line  Duration           Peripheral IV 04/14/23 Distal;Right;Upper;Ventral (anterior) Arm 2 days          Drain  Duration           Urethral Catheter Coude 16 Fr  2 days                    Counseling / Coordination of Care  Total floor / unit time spent today 30  minutes  Greater than 50% of total time was spent with the patient and / or family counseling and / or coordination of care    A description of the counseling / coordination of care: Discussed with GI

## 2023-04-16 NOTE — PROGRESS NOTES
The pantoprazole has been converted to Oral per [facility] IV-to-PO Auto-Conversion Protocol for Adults as approved by the Pharmacy and Therapeutics Committee  The patient met all eligible criteria:  3 Age = 25years old   2) Received at least one dose of the IV form   3) Receiving at least one other scheduled oral/enteral medication   4) Tolerating an oral/enteral diet   and did not have any exclusions:   1) Critical care patient   2) Active GI bleed (IF assessing H2RAs or PPIs)   3) Continuous tube feeding (IF assessing cipro, doxycycline, levofloxacin, minocycline, rifampin, or voriconazole)   4) Receiving PO vancomycin (IF assessing metronidazole)   5) Persistent nausea and/or vomiting   6) Ileus or gastrointestinal obstruction   7) Josh/nasogastric tube set for continuous suction   8) Specific order not to automatically convert to PO (in the order's comments or if discussed in the most recent Infectious Disease or primary team's progress notes)

## 2023-04-16 NOTE — PLAN OF CARE
Problem: PAIN - ADULT  Goal: Verbalizes/displays adequate comfort level or baseline comfort level  Description: Interventions:  - Encourage patient to monitor pain and request assistance  - Assess pain using appropriate pain scale  - Administer analgesics based on type and severity of pain and evaluate response  - Implement non-pharmacological measures as appropriate and evaluate response  - Consider cultural and social influences on pain and pain management  - Notify physician/advanced practitioner if interventions unsuccessful or patient reports new pain  Outcome: Progressing     Problem: HEMATOLOGIC - ADULT  Goal: Maintains hematologic stability  Description: INTERVENTIONS  - Assess for signs and symptoms of bleeding or hemorrhage  - Monitor labs  - Administer supportive blood products/factors as ordered and appropriate  Outcome: Progressing     Problem: MOBILITY - ADULT  Goal: Maintain or return to baseline ADL function  Description: INTERVENTIONS:  -  Assess patient's ability to carry out ADLs; assess patient's baseline for ADL function and identify physical deficits which impact ability to perform ADLs (bathing, care of mouth/teeth, toileting, grooming, dressing, etc )  - Assess/evaluate cause of self-care deficits   - Assess range of motion  - Assess patient's mobility; develop plan if impaired  - Assess patient's need for assistive devices and provide as appropriate  - Encourage maximum independence but intervene and supervise when necessary  - Involve family in performance of ADLs  - Assess for home care needs following discharge   - Consider OT consult to assist with ADL evaluation and planning for discharge  - Provide patient education as appropriate  Outcome: Progressing  Goal: Maintains/Returns to pre admission functional level  Description: INTERVENTIONS:  - Perform BMAT or MOVE assessment daily    - Set and communicate daily mobility goal to care team and patient/family/caregiver     - Collaborate with rehabilitation services on mobility goals if consulted  - Ambulate patient 3 times a day  - Out of bed to chair 3 times a day   - Out of bed for meals 3 times a day  - Out of bed for toileting  - Record patient progress and toleration of activity level   Outcome: Progressing     Problem: CARDIOVASCULAR - ADULT  Goal: Maintains optimal cardiac output and hemodynamic stability  Description: INTERVENTIONS:  - Monitor I/O, vital signs and rhythm  - Monitor for S/S and trends of decreased cardiac output  - Administer and titrate ordered vasoactive medications to optimize hemodynamic stability  - Assess quality of pulses, skin color and temperature  - Assess for signs of decreased coronary artery perfusion  - Instruct patient to report change in severity of symptoms  Outcome: Progressing     Problem: GASTROINTESTINAL - ADULT  Goal: Maintains or returns to baseline bowel function  Description: INTERVENTIONS:  - Assess bowel function  - Encourage oral fluids to ensure adequate hydration  - Administer IV fluids if ordered to ensure adequate hydration  - Administer ordered medications as needed  - Encourage mobilization and activity  - Consider nutritional services referral to assist patient with adequate nutrition and appropriate food choices  Outcome: Progressing     Problem: METABOLIC, FLUID AND ELECTROLYTES - ADULT  Goal: Electrolytes maintained within normal limits  Description: INTERVENTIONS:  - Monitor labs and assess patient for signs and symptoms of electrolyte imbalances  - Administer electrolyte replacement as ordered  - Monitor response to electrolyte replacements, including repeat lab results as appropriate  - Instruct patient on fluid and nutrition as appropriate  Outcome: Progressing  Goal: Fluid balance maintained  Description: INTERVENTIONS:  - Monitor labs   - Monitor I/O and WT  - Instruct patient on fluid and nutrition as appropriate  - Assess for signs & symptoms of volume excess or deficit  Outcome: Progressing  Goal: Glucose maintained within target range  Description: INTERVENTIONS:  - Monitor Blood Glucose as ordered  - Assess for signs and symptoms of hyperglycemia and hypoglycemia  - Administer ordered medications to maintain glucose within target range  - Assess nutritional intake and initiate nutrition service referral as needed  Outcome: Progressing     Problem: MUSCULOSKELETAL - ADULT  Goal: Maintain or return mobility to safest level of function  Description: INTERVENTIONS:  - Assess patient's ability to carry out ADLs; assess patient's baseline for ADL function and identify physical deficits which impact ability to perform ADLs (bathing, care of mouth/teeth, toileting, grooming, dressing, etc )  - Assess/evaluate cause of self-care deficits   - Assess range of motion  - Assess patient's mobility  - Assess patient's need for assistive devices and provide as appropriate  - Encourage maximum independence but intervene and supervise when necessary  - Involve family in performance of ADLs  - Assess for home care needs following discharge   - Consider OT consult to assist with ADL evaluation and planning for discharge  - Provide patient education as appropriate  Outcome: Progressing

## 2023-04-16 NOTE — PLAN OF CARE
Problem: PHYSICAL THERAPY ADULT  Goal: Performs mobility at highest level of function for planned discharge setting  See evaluation for individualized goals  Description: Treatment/Interventions: Functional transfer training, LE strengthening/ROM, Elevations, Therapeutic exercise, Endurance training, Patient/family training, Equipment eval/education, Bed mobility, Gait training, Spoke to nursing  Equipment Recommended: Jefferson Bloom (owns RW at home for use)       See flowsheet documentation for full assessment, interventions and recommendations  Note: Prognosis: Good  Problem List: Decreased strength, Decreased endurance, Impaired balance, Decreased mobility, Impaired judgement, Decreased safety awareness, Impaired hearing, Obesity, Decreased skin integrity  Assessment: Pt is a 70 yo male admitted to [facility] 2* acute GI bleeding  Pt was transfered from [facility] 2* rectal bleed,DM,HTN,urinary retention, anemia  Pt was recently DC from inpt rehab to home on 04/07/23  Pt lives with wife in [address] [facility], first floor setup, (+)SABINE without HA (single steps), reports no recent falls, goes to 2nd floor foor bathing,use of RW for mobility PTA  Pt currently is not at functional mobility baseline, needs A for mobility and OOB,multiple lines,recent procedure performed,masimo monitoring,limited insight on safety awareness  pt demonstrates minimal deficits during functional mobility and gait including dec endurance, dec balance, dec BLE strength, ataxic and unsteady gait and movement patterns,dec safety awareness and needs S for bed mobility, minAx1 for TT and GT with use of RW  Pt would cont to benefit from skilled inpt PT services to maximize functional independence and to dec caregiver burden upon being DC from the hospital   Barriers to Discharge: Inaccessible home environment (2 SH, SABINE)     PT Discharge Recommendation: Home with home health rehabilitation    See flowsheet documentation for full assessment

## 2023-04-17 LAB
ANION GAP SERPL CALCULATED.3IONS-SCNC: 6 MMOL/L (ref 4–13)
BASOPHILS # BLD AUTO: 0.03 THOUSANDS/ΜL (ref 0–0.1)
BASOPHILS NFR BLD AUTO: 1 % (ref 0–1)
BUN SERPL-MCNC: 12 MG/DL (ref 5–25)
CALCIUM SERPL-MCNC: 8.3 MG/DL (ref 8.4–10.2)
CHLORIDE SERPL-SCNC: 107 MMOL/L (ref 96–108)
CO2 SERPL-SCNC: 25 MMOL/L (ref 21–32)
CREAT SERPL-MCNC: 0.86 MG/DL (ref 0.6–1.3)
EOSINOPHIL # BLD AUTO: 0.29 THOUSAND/ΜL (ref 0–0.61)
EOSINOPHIL NFR BLD AUTO: 5 % (ref 0–6)
ERYTHROCYTE [DISTWIDTH] IN BLOOD BY AUTOMATED COUNT: 14.8 % (ref 11.6–15.1)
GFR SERPL CREATININE-BSD FRML MDRD: 84 ML/MIN/1.73SQ M
GLUCOSE SERPL-MCNC: 128 MG/DL (ref 65–140)
GLUCOSE SERPL-MCNC: 137 MG/DL (ref 65–140)
GLUCOSE SERPL-MCNC: 159 MG/DL (ref 65–140)
GLUCOSE SERPL-MCNC: 194 MG/DL (ref 65–140)
GLUCOSE SERPL-MCNC: 217 MG/DL (ref 65–140)
HCT VFR BLD AUTO: 22.1 % (ref 36.5–49.3)
HCT VFR BLD AUTO: 30.5 % (ref 36.5–49.3)
HGB BLD-MCNC: 6.8 G/DL (ref 12–17)
HGB BLD-MCNC: 9.6 G/DL (ref 12–17)
IMM GRANULOCYTES # BLD AUTO: 0.04 THOUSAND/UL (ref 0–0.2)
IMM GRANULOCYTES NFR BLD AUTO: 1 % (ref 0–2)
LYMPHOCYTES # BLD AUTO: 1.4 THOUSANDS/ΜL (ref 0.6–4.47)
LYMPHOCYTES NFR BLD AUTO: 26 % (ref 14–44)
MCH RBC QN AUTO: 29.1 PG (ref 26.8–34.3)
MCHC RBC AUTO-ENTMCNC: 30.8 G/DL (ref 31.4–37.4)
MCV RBC AUTO: 94 FL (ref 82–98)
MONOCYTES # BLD AUTO: 0.53 THOUSAND/ΜL (ref 0.17–1.22)
MONOCYTES NFR BLD AUTO: 10 % (ref 4–12)
NEUTROPHILS # BLD AUTO: 3.2 THOUSANDS/ΜL (ref 1.85–7.62)
NEUTS SEG NFR BLD AUTO: 57 % (ref 43–75)
NRBC BLD AUTO-RTO: 0 /100 WBCS
PLATELET # BLD AUTO: 318 THOUSANDS/UL (ref 149–390)
PMV BLD AUTO: 9.8 FL (ref 8.9–12.7)
POTASSIUM SERPL-SCNC: 3.7 MMOL/L (ref 3.5–5.3)
RBC # BLD AUTO: 2.34 MILLION/UL (ref 3.88–5.62)
SODIUM SERPL-SCNC: 138 MMOL/L (ref 135–147)
WBC # BLD AUTO: 5.49 THOUSAND/UL (ref 4.31–10.16)

## 2023-04-17 RX ORDER — ASPIRIN 81 MG/1
81 TABLET ORAL DAILY
Status: DISCONTINUED | OUTPATIENT
Start: 2023-04-18 | End: 2023-04-22 | Stop reason: HOSPADM

## 2023-04-17 RX ORDER — ALLOPURINOL 100 MG/1
100 TABLET ORAL DAILY
Status: DISCONTINUED | OUTPATIENT
Start: 2023-04-17 | End: 2023-04-22 | Stop reason: HOSPADM

## 2023-04-17 RX ORDER — ATORVASTATIN CALCIUM 80 MG/1
80 TABLET, FILM COATED ORAL
Status: DISCONTINUED | OUTPATIENT
Start: 2023-04-17 | End: 2023-04-22 | Stop reason: HOSPADM

## 2023-04-17 RX ORDER — MIDODRINE HYDROCHLORIDE 5 MG/1
5 TABLET ORAL
Status: DISCONTINUED | OUTPATIENT
Start: 2023-04-17 | End: 2023-04-22 | Stop reason: HOSPADM

## 2023-04-17 RX ADMIN — ESCITALOPRAM OXALATE 10 MG: 10 TABLET ORAL at 09:47

## 2023-04-17 RX ADMIN — FLUTICASONE PROPIONATE 1 SPRAY: 50 SPRAY, METERED NASAL at 09:47

## 2023-04-17 RX ADMIN — INSULIN LISPRO 1 UNITS: 100 INJECTION, SOLUTION INTRAVENOUS; SUBCUTANEOUS at 16:34

## 2023-04-17 RX ADMIN — ALLOPURINOL 100 MG: 100 TABLET ORAL at 09:47

## 2023-04-17 RX ADMIN — PANTOPRAZOLE SODIUM 40 MG: 40 TABLET, DELAYED RELEASE ORAL at 16:35

## 2023-04-17 RX ADMIN — ZONISAMIDE 50 MG: 25 CAPSULE ORAL at 09:47

## 2023-04-17 RX ADMIN — FINASTERIDE 5 MG: 5 TABLET, FILM COATED ORAL at 09:47

## 2023-04-17 RX ADMIN — CLOPIDOGREL BISULFATE 75 MG: 75 TABLET, FILM COATED ORAL at 09:47

## 2023-04-17 RX ADMIN — MIDODRINE HYDROCHLORIDE 5 MG: 5 TABLET ORAL at 16:35

## 2023-04-17 RX ADMIN — LEVOTHYROXINE SODIUM 112 MCG: 112 TABLET ORAL at 06:11

## 2023-04-17 RX ADMIN — INSULIN LISPRO 2 UNITS: 100 INJECTION, SOLUTION INTRAVENOUS; SUBCUTANEOUS at 21:31

## 2023-04-17 RX ADMIN — CYANOCOBALAMIN TAB 500 MCG 500 MCG: 500 TAB at 09:47

## 2023-04-17 RX ADMIN — ATORVASTATIN CALCIUM 80 MG: 80 TABLET, FILM COATED ORAL at 16:35

## 2023-04-17 RX ADMIN — INSULIN LISPRO 2 UNITS: 100 INJECTION, SOLUTION INTRAVENOUS; SUBCUTANEOUS at 11:53

## 2023-04-17 RX ADMIN — MIDODRINE HYDROCHLORIDE 5 MG: 5 TABLET ORAL at 09:47

## 2023-04-17 RX ADMIN — PANTOPRAZOLE SODIUM 40 MG: 40 TABLET, DELAYED RELEASE ORAL at 06:13

## 2023-04-17 NOTE — DISCHARGE INSTRUCTIONS
Bruna Mota, you have a scheduled capsule endoscopy at 7 AM on Thursday 4/20/2023 at [facility]  Patient instructions: Wednesday light breakfast and then clear liquids, and nothing to eat or drink past midnight

## 2023-04-17 NOTE — ASSESSMENT & PLAN NOTE
· Patient noted to have urinary retention while in the ED at [address]  · PVR showed over 800 cc of retained urine;  Jose catheter inserted 04/14   · Continue finasteride; monitor ins and outs  · consult urology for recommendations on void trial here vs outpatient

## 2023-04-17 NOTE — ASSESSMENT & PLAN NOTE
· Patient presented to [address] [facility] 04/14 with complaints of rectal bleeding; he notes the passage of a dark clot    transferee to [facility] for GI eval   ·  patient had recent hospitalization for GI bleed in March thought to be diverticular in nature   · S/p EGD and Colonoscopy   · EGD normal, no evidence of bleeding   · Colonoscopy: Multiple small and large, moderate extensive pancolonic diverticula, small internal hemorrhoids    · Likely diverticular bleed   · He denies any ongoing evidence of bleeding   · 1 unit PRBC ordered this AM   · Monitor closely for evidence of ongoing bleeding for need to CT   · Will need outpatient video capsule endoscopy   · Check H&H post transfusion and CBC in AM

## 2023-04-17 NOTE — ASSESSMENT & PLAN NOTE
· With [facility] 12/2022 ; most recent ECHO with EF 65%   · Continue DAPT, statin   · No longer on BB given orthostatic hypotension

## 2023-04-17 NOTE — ASSESSMENT & PLAN NOTE
Lab Results   Component Value Date    HGBA1C 5 6 03/10/2023       Recent Labs     04/16/23  1108 04/16/23  1752 04/16/23 2055 04/17/23  0726   POCGLU 135 290* 122 137       Blood Sugar Average: Last 72 hrs:  (P) 135 [id number]   · SSI; accuchecks   · Hold oral meds

## 2023-04-17 NOTE — CASE MANAGEMENT
Case Management Assessment & Discharge Planning Note    Patient name Buddy Villatoro  Location [facility] 5 /E5  269 114-* MRN [id number]  : 1945 Date 2023       Current Admission Date: 2023  Current Admission Diagnosis:Acute GI bleeding   Patient Active Problem List    Diagnosis Date Noted   • Benign prostatic hyperplasia with urinary retention 2023   • Low serum cortisol level 2023   • Hot flashes 2023   • Elevated platelet count    • Hypothyroidism 2023   • Diverticulosis of colon with hemorrhage 2023   • Elevated TSH 2023   • Elevated d-dimer 2023   • Urinary retention 2023   • Acute blood loss anemia 2023   • Acute GI bleeding 2023   • Diabetes mellitus, type 2 ([facility] ) 2023   • Acute diverticulitis 2023   • Orthostatic hypotension 2023   • 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   • Peripheral arterial disease ([address] ) 2023   • S/P cardiac catheterization 2022   • Stage 3a chronic kidney disease ([address] ) 2022   • Basal cell carcinoma (BCC) of right shoulder 2022   • Chronic diastolic (congestive) heart failure ([address] ) 01/10/2022   • Skin lesion 2021   • Other chest pain 2021   • Leukocytosis 2021   • Bilateral lower extremity edema 10/22/2021   • Mild cognitive impairment 2021   • Benign hypertension with CKD (chronic kidney disease) stage III ([address] ) 02/10/2021   • Diabetic nephropathy associated with type 2 diabetes mellitus ([address] ) 02/10/2021   • Concentric left ventricular hypertrophy 02/10/2021   • Aortic stenosis, moderate 02/10/2021   • Edema 02/10/2021   • Fall (on) (from) other stairs and steps, initial encounter 2020   • Recurrent major depressive disorder ([facility] ) 2020   • Primary osteoarthritis of right knee 2020   • Primary osteoarthritis of left knee 2020   • Coronary artery disease of native artery of native heart with stable angina pectoris ([facility] ) 03/18/2020   • Chronic pain of both knees 02/13/2020   • Primary osteoarthritis of both knees 02/13/2020   • Primary osteoarthritis involving multiple joints 12/16/2019   • Vitamin E deficiency 09/19/2019   • Infected epidermoid cyst 12/04/2018   • Tremor, essential 09/26/2018   • Diabetic neuropathy associated with diabetes mellitus due to underlying condition ([facility] ) 09/26/2018   • Gait instability 09/26/2018   • Frequent falls 09/26/2018   • Degenerative disc disease, lumbar 07/10/2018   • Tremor 10/10/2016   • Depression with anxiety 08/29/2016   • Arthritis 07/13/2015   • NSTEMI (non-ST elevated myocardial infarction) ([place] [address] ) 10/06/2014   • Gout 05/17/2013   • Type 2 diabetes mellitus with hyperglycemia, without long-term current use of insulin ([place] [address] ) 06/18/2012   • Hyperlipidemia 06/18/2012   • Hypertension 06/18/2012   • Morbid obesity ([facility] ) 06/18/2012      LOS (days): 3  Geometric Mean LOS (GMLOS) (days):   Days to GMLOS:     OBJECTIVE:    Risk of Unplanned Readmission Score: 27 57         Current admission status: Inpatient       Preferred Pharmacy:   [facility] [place] - [address] [facility], [address]    [address] [place] [address]  Phone: [phone] Fax: [phone]    Primary Care Provider: Heri Beaver DO    Primary Insurance: [employer]   Secondary Insurance:     ASSESSMENT:  Rhonda Wolfe Representative - Spouse   Primary Phone: [phone] (Mobile)               Patient Information  Mental Status: Alert      DISCHARGE DETAILS:    Requested [email or web address]         Is the patient interested in [facility] at discharge?: Yes  [address] requested[de-identified] Occupational Therapy, Physical Therapy, [address] Name[de-identified] Other  [address] [place] [address] Provider[de-identified] PCP  Home Health Services Needed[de-identified] Strengthening/Theraputic Exercises to Improve Function, Gait/ADL Training, Evaluate Functional Status and Safety, Oxygen Via Nasal Cannula  Oxygen LPM Ordered (if applicable based on home health services needed):: 6 LPM  Homebound Criteria Met[de-identified] Requires the Assistance of Another Person for Safe Ambulation or to Leave the Home, Uses an Assist Device (i e  cane, walker, etc)  Supporting Clincal Findings[de-identified] Limited Endurance, Fatigues Easliy in [employer], Requires Oxygen

## 2023-04-17 NOTE — OCCUPATIONAL THERAPY NOTE
OCCUPATIONAL THERAPY CANCELLATION     OT orders received and chart reviewed  Pt w/ HGB 6 8  Unit of PRBC's ordered  Will f/u and see as able and appropriate       Genesis Hernandez MS, OTR/L

## 2023-04-17 NOTE — QUICK NOTE
Discussed with GI lab at [place]  Patient will be scheduled for capsule endoscopy at 7 AM on Thursday 4/20/2023  As discussed with the patient earlier  I attempted to call the wife to inform her of the same, however not answered, went to voicemail  Patient instructions: Wednesday light breakfast and then clear liquids, and NPO past midnight

## 2023-04-17 NOTE — ASSESSMENT & PLAN NOTE
· Limited Echo 3/9: EF 65%  · Echo 3/31/22: moderate AS   · Repeat echo outpatient with cardiology   · Murmur noted on exam   · Monitor volume status, holding torsemide

## 2023-04-17 NOTE — PROGRESS NOTES
Progress Note- Jose Shelter 69 y o  male MRN: [id number]    Unit/Bed#: E5 -01 Encounter: [id number]      Assessment and Plan:    78-year-old male with past medical history including but not limited to CAD s/p PCI with drug-eluting stent in December 2022, on DAPT who is currently admitted with maroon-colored stool, anemia concerning for GI bleed  Underwent EGD with push enteroscopy and colonoscopy 4/16/2023 which was unrevealing for underlying etiology, colonoscopy showed brown appearing nonbloody stool  1   Anemia  Patient continues to have fluctuating hemoglobin levels, from 7 3-6 8 this morning  1 PRBC transfusion has been ordered  --Recommend checking CBC tomorrow, if hemoglobin is stable patient can be discharged with a plan for outpatient capsule endoscopy which we will set up later this week  --If patient should develop further bleeding or requires increasing transfusion then we will consider transfer to [facility] for inpatient capsule endoscopy  --Antiplatelets do not have to be held for capsule endoscopy    Discussed with primary team  GI will follow  ______________________________________________________________________    Subjective:     Patient seen and examined at bedside  Patient reports feeling well overall  Denies any abdominal pain, nausea or vomiting  Reports last bowel movement was day before yesterday during the colonoscopy prep  No bowel movement since  Denies seeing any blood in stool  Denies any fever, chills      Medication Administration - last 24 hours from 04/16/2023 1139 to 04/17/2023 1139       Date/Time Order Dose Route Action Action by     04/17/2023 0947 EDT cyanocobalamin (VITAMIN B-12) tablet 500 mcg 500 mcg Oral Given Sonny Costello RN     04/17/2023 0947 EDT escitalopram (LEXAPRO) tablet 10 mg 10 mg Oral Given Sonny Costello RN     04/17/2023 5982 EDT finasteride (PROSCAR) tablet 5 mg 5 mg Oral Given Sonny Costello RN     04/17/2023 8331 EDT fluticasone (FLONASE) 50 mcg/act nasal spray 1 spray 1 spray Nasal Given Xin Coyne, JAVED     04/17/2023 8042 EDT levothyroxine tablet 112 mcg 112 mcg Oral Given Jinny Quintero, JAVED     04/17/2023 9694 EDT zonisamide (ZONEGRAN) capsule 50 mg 50 mg Oral Given Xin Coyne, JAVED     04/17/2023 0737 EDT insulin lispro (HumaLOG) 100 units/mL subcutaneous injection 1-6 Units 1 Units Subcutaneous Not Given Xin Coyne, JAVED     04/16/2023 2257 EDT insulin lispro (HumaLOG) 100 units/mL subcutaneous injection 1-6 Units 1 Units Subcutaneous Not Given Raquel Briseno, JAVED     04/16/2023 1745 EDT insulin lispro (HumaLOG) 100 units/mL subcutaneous injection 1-6 Units 4 Units Subcutaneous Given Karli Hassan, JAVED     04/16/2023 1201 EDT insulin lispro (HumaLOG) 100 units/mL subcutaneous injection 1-6 Units 1 Units Subcutaneous Not Given Karli Hassan, JAVED     04/17/2023 7303 EDT clopidogrel (PLAVIX) tablet 75 mg 75 mg Oral Given Xin Coyne RN     04/17/2023 7405 EDT pantoprazole (PROTONIX) EC tablet 40 mg 40 mg Oral Given Jinny Quintero RN     04/16/2023 1754 EDT pantoprazole (PROTONIX) EC tablet 40 mg 40 mg Oral Given Karli Hassan, JAVED     04/17/2023 0947 EDT midodrine (PROAMATINE) tablet 5 mg 5 mg Oral Given Colleen Hughes-Behler, RN     04/17/2023 0633 EDT allopurinol (ZYLOPRIM) tablet 100 mg 100 mg Oral Given Colleen Hughes-Behler, RN          Objective:     Vitals: Blood pressure 117/63, pulse 75, temperature 98 °F (36 7 °C), temperature source Oral, resp  rate 16, SpO2 96 %  ,There is no height or weight on file to calculate BMI        Intake/Output Summary (Last 24 hours) at 4/17/2023 1139  Last data filed at 4/16/2023 2300  Gross per 24 hour   Intake --   Output 1000 ml   Net -1000 ml       Physical Exam:   General Appearance: Awake and alert, in no acute distress  Abdomen: Soft, non-tender, non-distended; bowel sounds normal; no masses or no organomegaly    Invasive Devices     Peripheral Intravenous Line  Duration           Peripheral IV 04/14/23 Distal;Right;Upper;Ventral (anterior) Arm 3 days          Drain  Duration           Urethral Catheter Coude 16 Fr  3 days                Lab Results:  Admission on 04/14/2023   Component Date Value   • WBC 04/14/2023 7 41    • RBC 04/14/2023 2 86 (L)    • Hemoglobin 04/14/2023 8 4 (L)    • Hematocrit 04/14/2023 27 0 (L)    • MCV 04/14/2023 94    • MCH 04/14/2023 29 4    • MCHC 04/14/2023 31 1 (L)    • RDW 04/14/2023 15 0    • MPV 04/14/2023 8 6 (L)    • Platelets 73/10/6565 317    • nRBC 04/14/2023 0    • Neutrophils Relative 04/14/2023 58    • Immat GRANS % 04/14/2023 0    • Lymphocytes Relative 04/14/2023 27    • Monocytes Relative 04/14/2023 9    • Eosinophils Relative 04/14/2023 5    • Basophils Relative 04/14/2023 1    • Neutrophils Absolute 04/14/2023 4 26    • Immature Grans Absolute 04/14/2023 0 02    • Lymphocytes Absolute 04/14/2023 2 01    • Monocytes Absolute 04/14/2023 0 67    • Eosinophils Absolute 04/14/2023 0 40    • Basophils Absolute 04/14/2023 0 05    • Sodium 04/14/2023 142    • Potassium 04/14/2023 3 9    • Chloride 04/14/2023 109 (H)    • CO2 04/14/2023 28    • ANION GAP 04/14/2023 5    • BUN 04/14/2023 23    • Creatinine 04/14/2023 0 84    • Glucose 04/14/2023 71    • Calcium 04/14/2023 8 5    • Corrected Calcium 04/14/2023 9 0    • AST 04/14/2023 17    • ALT 04/14/2023 21    • Alkaline Phosphatase 04/14/2023 65    • Total Protein 04/14/2023 6 1 (L)    • Albumin 04/14/2023 3 4 (L)    • Total Bilirubin 04/14/2023 0 30    • eGFR 04/14/2023 85    • ABO Grouping 04/14/2023 A    • Rh Factor 04/14/2023 Positive    • Antibody Screen 04/14/2023 Negative    • Specimen Expiration Date 04/14/2023 [id number]    • POC Glucose 04/14/2023 75    • POC Glucose 04/15/2023 101    • Sodium 04/15/2023 140    • Potassium 04/15/2023 3 9    • Chloride 04/15/2023 109 (H)    • CO2 04/15/2023 25    • ANION GAP 04/15/2023 6    • BUN 04/15/2023 22    • Creatinine 04/15/2023 0 84    • Glucose 04/15/2023 91    • Calcium 04/15/2023 8 2 (L)    • Corrected Calcium 04/15/2023 8 8    • AST 04/15/2023 19    • ALT 04/15/2023 23    • Alkaline Phosphatase 04/15/2023 62    • Total Protein 04/15/2023 5 7 (L)    • Albumin 04/15/2023 3 2 (L)    • Total Bilirubin 04/15/2023 0 31    • eGFR 04/15/2023 85    • WBC 04/15/2023 7 81    • RBC 04/15/2023 2 71 (L)    • Hemoglobin 04/15/2023 7 9 (L)    • Hematocrit 04/15/2023 25 4 (L)    • MCV 04/15/2023 94    • MCH 04/15/2023 29 2    • MCHC 04/15/2023 31 1 (L)    • RDW 04/15/2023 15 0    • Platelets 97/09/1755 313    • MPV 04/15/2023 8 8 (L)    • Sodium 04/15/2023 139    • Potassium 04/15/2023 4 3    • Chloride 04/15/2023 108    • CO2 04/15/2023 25    • ANION GAP 04/15/2023 6    • BUN 04/15/2023 23    • Creatinine 04/15/2023 0 92    • Glucose 04/15/2023 139    • Calcium 04/15/2023 8 3 (L)    • Corrected Calcium 04/15/2023 8 9    • AST 04/15/2023 21    • ALT 04/15/2023 25    • Alkaline Phosphatase 04/15/2023 63    • Total Protein 04/15/2023 5 7 (L)    • Albumin 04/15/2023 3 2 (L)    • Total Bilirubin 04/15/2023 0 32    • eGFR 04/15/2023 80    • WBC 04/15/2023 7 49    • RBC 04/15/2023 2 67 (L)    • Hemoglobin 04/15/2023 7 9 (L)    • Hematocrit 04/15/2023 24 9 (L)    • MCV 04/15/2023 93    • MCH 04/15/2023 29 6    • MCHC 04/15/2023 31 7    • RDW 04/15/2023 14 9    • Platelets 12/45/9773 311    • MPV 04/15/2023 9 1    • Hemoglobin 04/15/2023 7 9 (L)    • Hematocrit 04/15/2023 25 2 (L)    • POC Glucose 04/15/2023 98    • POC Glucose 04/15/2023 155 (H)    • Hemoglobin 04/15/2023 7 6 (L)    • Hematocrit 04/15/2023 24 6 (L)    • POC Glucose 04/15/2023 136    • Hemoglobin 04/16/2023 7 6 (L)    • Hematocrit 04/16/2023 24 0 (L)    • POC Glucose 04/16/2023 120    • Sodium 04/16/2023 140    • Potassium 04/16/2023 3 7    • Chloride 04/16/2023 107    • CO2 04/16/2023 27    • ANION GAP 04/16/2023 6    • BUN 04/16/2023 16    • Creatinine 04/16/2023 0 80    • Glucose 04/16/2023 114    • Calcium 04/16/2023 8 1 (L)    • Corrected Calcium 04/16/2023 8 8    • AST 04/16/2023 16    • ALT 04/16/2023 20    • Alkaline Phosphatase 04/16/2023 60    • Total Protein 04/16/2023 5 5 (L)    • Albumin 04/16/2023 3 1 (L)    • Total Bilirubin 04/16/2023 0 31    • eGFR 04/16/2023 86    • Hemoglobin 04/16/2023 7 3 (L)    • Hematocrit 04/16/2023 24 1 (L)    • ABO Grouping 04/16/2023 A    • Rh Factor 04/16/2023 Positive    • Antibody Screen 04/16/2023 Negative    • Specimen Expiration Date 04/16/2023 [id number]    • POC Glucose 04/16/2023 118    • POC Glucose 04/16/2023 135    • POC Glucose 04/16/2023 290 (H)    • POC Glucose 04/16/2023 122    • WBC 04/17/2023 5 49    • RBC 04/17/2023 2 34 (L)    • Hemoglobin 04/17/2023 6 8 (LL)    • Hematocrit 04/17/2023 22 1 (L)    • MCV 04/17/2023 94    • MCH 04/17/2023 29 1    • MCHC 04/17/2023 30 8 (L)    • RDW 04/17/2023 14 8    • MPV 04/17/2023 9 8    • Platelets 11/31/8696 318    • nRBC 04/17/2023 0    • Neutrophils Relative 04/17/2023 57    • Immat GRANS % 04/17/2023 1    • Lymphocytes Relative 04/17/2023 26    • Monocytes Relative 04/17/2023 10    • Eosinophils Relative 04/17/2023 5    • Basophils Relative 04/17/2023 1    • Neutrophils Absolute 04/17/2023 3 20    • Immature Grans Absolute 04/17/2023 0 04    • Lymphocytes Absolute 04/17/2023 1 40    • Monocytes Absolute 04/17/2023 0 53    • Eosinophils Absolute 04/17/2023 0 29    • Basophils Absolute 04/17/2023 0 03    • Sodium 04/17/2023 138    • Potassium 04/17/2023 3 7    • Chloride 04/17/2023 107    • CO2 04/17/2023 25    • ANION GAP 04/17/2023 6    • BUN 04/17/2023 12    • Creatinine 04/17/2023 0 86    • Glucose 04/17/2023 128    • Calcium 04/17/2023 8 3 (L)    • eGFR 04/17/2023 84    • Unit Product Code 04/17/2023 X3128O35    • Unit Number 04/17/2023 [id number]    • Unit ABO 04/17/2023 A    • Unit [facility] 04/17/2023 POS    • Crossmatch 04/17/2023 Compatible    • Unit Dispense Status 04/17/2023 Issued    • Unit Product Volume 04/17/2023 350    • POC Glucose 04/17/2023 137    • POC Glucose 04/17/2023 217 (H)        Imaging Studies: I have personally reviewed pertinent imaging studies

## 2023-04-17 NOTE — PROGRESS NOTES
[address]  Progress Note  Name: Jesús Elias  MRN: [id number]  Unit/Bed#: E5 -01 I Date of Admission: 4/14/2023   Date of Service: 4/17/2023 I Hospital Day: 3    Assessment/Plan   * Acute GI bleeding  Assessment & Plan  · Patient presented to [address] [facility] 04/14 with complaints of rectal bleeding; he notes the passage of a dark clot    transferee to [facility] for GI eval   ·  patient had recent hospitalization for GI bleed in March thought to be diverticular in nature   · S/p EGD and Colonoscopy   · EGD normal, no evidence of bleeding   · Colonoscopy: Multiple small and large, moderate extensive pancolonic diverticula, small internal hemorrhoids    · Likely diverticular bleed   · He denies any ongoing evidence of bleeding   · 1 unit PRBC ordered this AM   · Monitor closely for evidence of ongoing bleeding for need to CT   · Will need outpatient video capsule endoscopy   · Check H&H post transfusion and CBC in AM       Benign prostatic hyperplasia with urinary retention  Assessment & Plan  · Hx of urinary retention with indwelling morlaes in place   · Continue finasteride; monitor ins and outs  · No flomax given orthostatic hypotension   · consult urology for recommendations on void trial here vs outpatient       Hypothyroidism  Assessment & Plan  · Continue levothyroxine 112 mcg daily    Acute blood loss anemia  Assessment & Plan  Lab Results   Component Value Date    HGB 6 8 (LL) 04/17/2023    HGB 7 3 (L) 04/16/2023    HGB 7 6 (L) 04/16/2023    HGB 7 6 (L) 04/15/2023    HGB 7 9 (L) 04/15/2023    HGB 7 9 (L) 04/15/2023     · 2/2 diverticular bleed   · 1 unit PRBC ordered this AM, check H&H post transfusion and CBC in AM   · Monitor for evidence of ongoing bleeding   · Await stabilization of hemoglobin     S/P cardiac catheterization  Assessment & Plan  · With SAM 12/2022 ; most recent ECHO with EF 65%   · Continue DAPT, statin   · No longer on BB given orthostatic hypotension Stage 3a chronic kidney disease [facility])  Assessment & Plan  Lab Results   Component Value Date    EGFR 84 04/17/2023    EGFR 86 04/16/2023    EGFR 80 04/15/2023    CREATININE 0 86 04/17/2023    CREATININE 0 80 04/16/2023    CREATININE 0 92 04/15/2023   stable   Monitor BMP     Aortic stenosis, moderate  Assessment & Plan  · Limited Echo 3/9: EF 65%  · Echo 3/31/22: moderate AS   · Repeat echo outpatient with cardiology   · Murmur noted on exam   · Monitor volume status, holding torsemide     Coronary artery disease of native artery of native heart with stable angina pectoris ([facility] [address] )  Assessment & Plan  · With history of coronary artery disease; status post stents in 2014 and 12/2022  · Currently on DAPT therapy; okay to resume per Gi   · No chest pain   · No longer on BB given orthostatic hypotension       Tremor, essential  Assessment & Plan  · Continue home Zonisamide 50 mg QD     Hypertension  Assessment & Plan  · He was recently started on midodrine during hospitalization in March   · Resume midodrine 5 mg tid   · Off HTN meds   · Monitor VS     Hyperlipidemia  Assessment & Plan  · Resume high intensity statin    Gout  Assessment & Plan  · Hx of gout on allopurinol     Type 2 diabetes mellitus with hyperglycemia, without long-term current use of insulin [facility])  Assessment & Plan  Lab Results   Component Value Date    HGBA1C 5 6 03/10/2023       Recent Labs     04/16/23  1108 04/16/23  1752 04/16/23  2055 04/17/23  0726   POCGLU 135 290* 122 137       Blood Sugar Average: Last 72 hrs:  (P) 135 [id number]   · SSI; accuchecks   · Hold oral meds         Depression with anxiety  Assessment & Plan  · Mood stable   · Continue home Lexapro 10 mg QD              VTE Pharmacologic Prophylaxis:   Moderate Risk (Score 3-4) - Pharmacological DVT Prophylaxis Contraindicated  Sequential Compression Devices Ordered  Patient Centered Rounds: I performed bedside rounds with nursing staff today    Discussions with Specialists or Other Care Team Provider: spoke with GI     Education and Discussions with Family / Patient: attempted facetime iwth wife at bedside no answer  Total Time Spent on Date of Encounter in care of patient: 25 minutes This time was spent on one or more of the following: performing physical exam; counseling and coordination of care; obtaining or reviewing history; documenting in the medical record; reviewing/ordering tests, medications or procedures; communicating with other healthcare professionals and discussing with patient's family/caregivers  Current Length of Stay: 3 day(s)  Current Patient Status: Inpatient   Certification Statement: The patient will continue to require additional inpatient hospital stay due to GI bleed   Discharge Plan: Anticipate discharge in 24-48 hrs to home with home services  Code Status: Level 1 - Full Code    Subjective:   Arlyce Bur well overall feels good  No abdo pain  No further bleeding  No BM yet today  Has great appetitie and loves [employer] food  Ate all breakfsat  Has slight lightheadedness but improved  States he was taking midodrine at home  No CP or SOB  No dizziness, headache  No N/V  Objective:     Vitals:   Temp (24hrs), Av 8 °F (36 6 °C), Min:97 7 °F (36 5 °C), Max:97 9 °F (36 6 °C)    Temp:  [97 7 °F (36 5 °C)-97 9 °F (36 6 °C)] 97 9 °F (36 6 °C)  HR:  [74-87] 81  Resp:  [18] 18  BP: (108-117)/(58-63) 112/58  SpO2:  [96 %-100 %] 96 %  There is no height or weight on file to calculate BMI  Input and Output Summary (last 24 hours): Intake/Output Summary (Last 24 hours) at 2023 0924  Last data filed at 2023 2300  Gross per 24 hour   Intake --   Output 1000 ml   Net -1000 ml       Physical Exam:   Physical Exam  Vitals and nursing note reviewed  Constitutional:       General: He is not in acute distress  Appearance: He is not ill-appearing, toxic-appearing or diaphoretic     Cardiovascular:      Rate and Rhythm: Normal rate and regular rhythm  Heart sounds: Murmur heard  Pulmonary:      Effort: Pulmonary effort is normal  No respiratory distress  Breath sounds: Normal breath sounds  No wheezing or rales  Abdominal:      General: Bowel sounds are normal       Palpations: Abdomen is soft  Musculoskeletal:      Right lower leg: No edema  Left lower leg: No edema  Skin:     General: Skin is warm  Coloration: Skin is pale  Neurological:      Mental Status: He is alert  Mental status is at baseline     Psychiatric:         Mood and Affect: Mood normal           Additional Data:     Labs:  Results from last 7 days   Lab Units 04/17/23  0538   WBC Thousand/uL 5 49   HEMOGLOBIN g/dL 6 8*   HEMATOCRIT % 22 1*   PLATELETS Thousands/uL 318   NEUTROS PCT % 57   LYMPHS PCT % 26   MONOS PCT % 10   EOS PCT % 5     Results from last 7 days   Lab Units 04/17/23  0538 04/16/23  0513   SODIUM mmol/L 138 140   POTASSIUM mmol/L 3 7 3 7   CHLORIDE mmol/L 107 107   CO2 mmol/L 25 27   BUN mg/dL 12 16   CREATININE mg/dL 0 86 0 80   ANION GAP mmol/L 6 6   CALCIUM mg/dL 8 3* 8 1*   ALBUMIN g/dL  --  3 1*   TOTAL BILIRUBIN mg/dL  --  0 31   ALK PHOS U/L  --  60   ALT U/L  --  20   AST U/L  --  16   GLUCOSE RANDOM mg/dL 128 114         Results from last 7 days   Lab Units 04/17/23  0726 04/16/23  2055 04/16/23  1752 04/16/23  1108 04/16/23  0628 04/16/23  0030 04/15/23  1816 04/15/23  0738 04/15/23  0514 04/15/23  0000 04/14/23  2051   POC GLUCOSE mg/dl 137 122 290* 135 118 120 136 155* 98 101 75         Results from last 7 days   Lab Units 04/14/23  1010 04/14/23  0810   LACTIC ACID mmol/L 2 1* 3 5*       Lines/Drains:  Invasive Devices     Peripheral Intravenous Line  Duration           Peripheral IV 04/14/23 Distal;Right;Upper;Ventral (anterior) Arm 3 days          Drain  Duration           Urethral Catheter Coude 16 Fr  3 days              Urinary Catheter:  Goal for removal: morales , urology consulted to discuss void trial Imaging: Reviewed radiology reports from this admission including: abdominal/pelvic CT and ECHO  Reviewed EGD and colonoscopy     Recent Cultures (last 7 days):         Last 24 Hours Medication List:   Current Facility-Administered Medications   Medication Dose Route Frequency Provider Last Rate   • acetaminophen  650 mg Oral Q8H PRN Azeem Aiad, DO     • allopurinol  100 mg Oral Daily Estefany Ramos PA-C     • [START ON 4/18/2023] aspirin  81 mg Oral Daily Estefany Ramos PA-C     • atorvastatin  80 mg Oral Daily With Jelena Witt PA-C     • clopidogrel  75 mg Oral Daily Roel Callaway, DO     • cyanocobalamin  500 mcg Oral Daily Azeem Aiad, DO     • escitalopram  10 mg Oral Daily Azeem Aiad, DO     • finasteride  5 mg Oral Daily Azeem Aiad, DO     • fluticasone  1 spray Nasal Daily Azeem Aiad, DO     • insulin lispro  1-6 Units Subcutaneous 4x Daily (AC & HS) Roel Callaway, DO     • levothyroxine  112 mcg Oral Early Morning Azeem Aiad, DO     • midodrine  5 mg Oral TID AC Estefany Ramos PA-C     • pantoprazole  40 mg Oral BID AC Roel Callaway, DO     • zonisamide  50 mg Oral Daily Atif Aguero DO          Today, Patient Was Seen By: Harish Wren PA-C    **Please Note: This note may have been constructed using a voice recognition system  **

## 2023-04-17 NOTE — ASSESSMENT & PLAN NOTE
Lab Results   Component Value Date    EGFR 84 04/17/2023    EGFR 86 04/16/2023    EGFR 80 04/15/2023    CREATININE 0 86 04/17/2023    CREATININE 0 80 04/16/2023    CREATININE 0 92 04/15/2023   stable   Monitor BMP

## 2023-04-17 NOTE — ASSESSMENT & PLAN NOTE
· With history of coronary artery disease; status post stents in 2014 and 12/2022  · Currently on DAPT therapy; okay to resume per Gi   · No chest pain   · No longer on BB given orthostatic hypotension

## 2023-04-17 NOTE — ASSESSMENT & PLAN NOTE
· He was recently started on midodrine during hospitalization in March   · Resume midodrine 5 mg tid   · Off HTN meds   · Monitor VS

## 2023-04-17 NOTE — CONSULTS
Consult - Urology   [facility] 1945, 69 y o  male MRN: [id number]    Unit/Bed#: E5 -01 Encounter: [id number]    Assessment & Plan  Urinary retention s/t hypotonic bladder  Needs continued bladder drainage  Options clean intermittent catheterization q6 hours, indwelling suprapubic catheter, or indwelling urethral catheter  Make no changes for now  Voiding trial not appropriate  This is a longterm problem and he does not void effectively on his own, necessary to have some type of bladder drainage  Current urethral catheter inserted 4/14/23  He can get a suprapubic catheter inserted as an outpatient as the next best alternative as he cannot reliably self cath due to hand tremors  Has f/u 5/10/23  He agrees with all this plan  Urology will sign off but remain available for any further inpatient needs  Please feel free to contact the provider [facility] [place] with questions or concerns  Subjective:  Admitted for GI bleeding  Urology consulted today for voiding trial recommendations  Known to [facility]) urology Dr Simba Nichole recent visit for ongoing urinary retention very high volume retention with overflow incontinence for about a year  Cystoscopy last week demonstrates high capacity asensate and multiple folds c/w probable hypotonic bladder as such continued bladder drainage is necessary  He started self cathing 3x a day as instructed but had a hard time with dexterity and tremors  He now has a morales in place again instead  Review of Systems   Constitutional: Negative  Respiratory: Negative  Cardiovascular: Negative  Genitourinary: Positive for difficulty urinating  Negative for decreased urine volume, dysuria, flank pain, frequency, hematuria and urgency  Musculoskeletal: Negative  Objective:  Vitals: Blood pressure 100/56, pulse 78, temperature 97 9 °F (36 6 °C), resp  rate 20, SpO2 97 %  ,There is no height or weight on file to calculate BMI  Physical Exam  Vitals and nursing note reviewed  Constitutional:       General: He is not in acute distress  Appearance: He is well-developed  He is not diaphoretic  HENT:      Head: Normocephalic and atraumatic  Pulmonary:      Effort: Pulmonary effort is normal    Skin:     General: Skin is warm and dry  Neurological:      Mental Status: He is alert and oriented to person, place, and time        Gait: Gait normal    Psychiatric:         Speech: Speech normal          Behavior: Behavior normal        Labs:  Recent Labs     04/14/23  2001 04/15/23  0357 04/15/23  0911 04/17/23  0538   WBC 7 41 7 81 7 49 5 49     Recent Labs     04/14/23  2001 04/15/23  0357 04/15/23  0911 04/15/23  1425 04/16/23  0029 04/16/23  0630 04/17/23  0538   HGB 8 4* 7 9* 7 9*  7 9* 7 6* 7 6* 7 3* 6 8*       Recent Labs     04/14/23  2001 04/15/23  0357 04/15/23  0911 04/16/23  0513 04/17/23  0538   CREATININE 0 84 0 84 0 92 0 80 0 86         History:  Social History     Socioeconomic History   • Marital status: /Civil Union     Spouse name: Not on file   • Number of children: Not on file   • Years of education: Not on file   • Highest education level: Not on file   Occupational History   • Not on file   Tobacco Use   • Smoking status: Never   • Smokeless tobacco: Never   Vaping Use   • Vaping Use: Never used   Substance and Sexual Activity   • Alcohol use: Not Currently     Comment: socially, cut down in 2008, previously did more than    • Drug use: Never   • Sexual activity: Not Currently     Partners: Female   Other Topics Concern   • Not on file   Social History Narrative    No advance directive     No living will      Social Determinants of Health     Financial Resource Strain: Not on file   Food Insecurity: No Food Insecurity   • Worried About Running Out of Food in the Last Year: Never true   • Ran Out of Food in the Last Year: Never true   Transportation Needs: No Transportation Needs   • Lack of Transportation (Medical): No   • Lack of Transportation (Non-Medical): No   Physical Activity: Not on file   Stress: Not on file   Social Connections: Not on file   Intimate Partner Violence: Not on file   Housing Stability: Low Risk    • Unable to Pay for Housing in the Last Year: No   • Number of Places Lived in the Last Year: 1   • Unstable Housing in the Last Year: No     Financial Resource Strain: Not on file   Food Insecurity: No Food Insecurity   • Worried About [facility] in the Last Year: Never true   • Ran Out of Food in the Last Year: Never true   Transportation Needs: No Transportation Needs   • Lack of Transportation (Medical): No   • Lack of Transportation (Non-Medical): No   Physical Activity: Not on file   Stress: Not on file   Social Connections: Not on file   Intimate Partner Violence: Not on file   Housing Stability: Low Risk    • Unable to Pay for Housing in the Last Year: No   • Number of Places Lived in the Last Year: 1   • Unstable Housing in the Last Year: No      Diagnosis Date   • BPH (benign prostatic hyperplasia)    • CAD (coronary artery disease)    • Cancer (HCC)     basal cell   • Chronic kidney disease     stage 3   • Colon polyp    • CPAP (continuous positive airway pressure) dependence    • Diabetes mellitus (HCC)     niddm   • Disease of thyroid gland    • Gout    • High cholesterol    • Hypertension    • Hypomagnesemia 3/9/2023   • MI, 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    • Myocardial infarction ([facility] ) 2014   • S/P cardiac catheterization 12/30/2022   • Sleep apnea      Past Surgical History:   Procedure Laterality Date   • BASAL CELL CARCINOMA EXCISION Right 1/21/2022    Procedure: EXCISION BASAL CELL CARCINOMA EXTREMITY;  Surgeon: Amara Dewitt DO;  Location: [facility] MAIN OR;  Service: General   • CARDIAC CATHETERIZATION Left 12/29/2022    Procedure: Cardiac Left Heart Cath;  Surgeon: Mirian Madison MD;  Location: [facility] CARDIAC C[facility] LAB;   Service: Cardiology   • CARDIAC CATHETERIZATION N/A 12/29/2022    Procedure: Cardiac Coronary Angiogram;  Surgeon: Parvez Gonzalez MD;  Location: [facility] CARDIAC C[facility] LAB; Service: Cardiology   • CARDIAC CATHETERIZATION N/A 12/29/2022    Procedure: Cardiac pci;  Surgeon: Parvez Gonzalez MD;  Location: [facility] CARDIAC C[facility];   Service: Cardiology   • CATARACT EXTRACTION W/  INTRAOCULAR LENS IMPLANT     • CORNEAL TRANSPLANT     • CORONARY ANGIOPLASTY WITH STENT PLACEMENT      stents x3-- [phone]   • EYE SURGERY      repair corneal laceration   • ME COLONOSCOPY FLX DX W/COLLJ SPEC WHEN PFRMD N/A 3/21/2016    Procedure: COLONOSCOPY;  Surgeon: Boubacar Cardenas MD;  Location: [facility] MA[facility];  Service: Colorectal   • TONSILLECTOMY AND ADENOIDECTOMY       Family History   Adopted: Yes   Problem Relation Age of Onset   • No Known Problems Mother    • No Known Problems Father        Barrera [place], [place]  Date: 4/17/2023 Time: 4:23 PM

## 2023-04-17 NOTE — ASSESSMENT & PLAN NOTE
Lab Results   Component Value Date    HGB 6 8 (LL) 04/17/2023    HGB 7 3 (L) 04/16/2023    HGB 7 6 (L) 04/16/2023    HGB 7 6 (L) 04/15/2023    HGB 7 9 (L) 04/15/2023    HGB 7 9 (L) 04/15/2023     · 2/2 diverticular bleed   · 1 unit PRBC ordered this AM, check H&H post transfusion and CBC in AM   · Monitor for evidence of ongoing bleeding   · Await stabilization of hemoglobin

## 2023-04-18 LAB
ABO GROUP BLD BPU: NORMAL
BPU ID: NORMAL
CROSSMATCH: NORMAL
ERYTHROCYTE [DISTWIDTH] IN BLOOD BY AUTOMATED COUNT: 16.8 % (ref 11.6–15.1)
GLUCOSE SERPL-MCNC: 126 MG/DL (ref 65–140)
GLUCOSE SERPL-MCNC: 140 MG/DL (ref 65–140)
GLUCOSE SERPL-MCNC: 159 MG/DL (ref 65–140)
GLUCOSE SERPL-MCNC: 181 MG/DL (ref 65–140)
HCT VFR BLD AUTO: 26.1 % (ref 36.5–49.3)
HCT VFR BLD AUTO: 29.7 % (ref 36.5–49.3)
HGB BLD-MCNC: 8.2 G/DL (ref 12–17)
HGB BLD-MCNC: 9.4 G/DL (ref 12–17)
MCH RBC QN AUTO: 28.7 PG (ref 26.8–34.3)
MCHC RBC AUTO-ENTMCNC: 31.4 G/DL (ref 31.4–37.4)
MCV RBC AUTO: 91 FL (ref 82–98)
METHYLMALONATE SERPL-SCNC: 147 NMOL/L (ref 0–378)
PLATELET # BLD AUTO: 340 THOUSANDS/UL (ref 149–390)
PMV BLD AUTO: 9.6 FL (ref 8.9–12.7)
RBC # BLD AUTO: 2.86 MILLION/UL (ref 3.88–5.62)
UNIT DISPENSE STATUS: NORMAL
UNIT PRODUCT CODE: NORMAL
UNIT PRODUCT VOLUME: 350 ML
UNIT RH: NORMAL
WBC # BLD AUTO: 6.23 THOUSAND/UL (ref 4.31–10.16)

## 2023-04-18 RX ORDER — POLYETHYLENE GLYCOL 3350 17 G/17G
17 POWDER, FOR SOLUTION ORAL 2 TIMES DAILY
Status: DISCONTINUED | OUTPATIENT
Start: 2023-04-18 | End: 2023-04-22 | Stop reason: HOSPADM

## 2023-04-18 RX ADMIN — ZONISAMIDE 50 MG: 25 CAPSULE ORAL at 08:27

## 2023-04-18 RX ADMIN — INSULIN LISPRO 1 UNITS: 100 INJECTION, SOLUTION INTRAVENOUS; SUBCUTANEOUS at 11:25

## 2023-04-18 RX ADMIN — PANTOPRAZOLE SODIUM 40 MG: 40 TABLET, DELAYED RELEASE ORAL at 16:08

## 2023-04-18 RX ADMIN — PANTOPRAZOLE SODIUM 40 MG: 40 TABLET, DELAYED RELEASE ORAL at 06:21

## 2023-04-18 RX ADMIN — FLUTICASONE PROPIONATE 1 SPRAY: 50 SPRAY, METERED NASAL at 08:26

## 2023-04-18 RX ADMIN — CYANOCOBALAMIN TAB 500 MCG 500 MCG: 500 TAB at 08:26

## 2023-04-18 RX ADMIN — MIDODRINE HYDROCHLORIDE 5 MG: 5 TABLET ORAL at 11:25

## 2023-04-18 RX ADMIN — POLYETHYLENE GLYCOL 3350 17 G: 17 POWDER, FOR SOLUTION ORAL at 08:27

## 2023-04-18 RX ADMIN — ACETAMINOPHEN 325MG 650 MG: 325 TABLET ORAL at 14:07

## 2023-04-18 RX ADMIN — CLOPIDOGREL BISULFATE 75 MG: 75 TABLET, FILM COATED ORAL at 08:26

## 2023-04-18 RX ADMIN — MIDODRINE HYDROCHLORIDE 5 MG: 5 TABLET ORAL at 16:08

## 2023-04-18 RX ADMIN — MIDODRINE HYDROCHLORIDE 5 MG: 5 TABLET ORAL at 06:21

## 2023-04-18 RX ADMIN — LEVOTHYROXINE SODIUM 112 MCG: 112 TABLET ORAL at 06:21

## 2023-04-18 RX ADMIN — ASPIRIN 81 MG: 81 TABLET, COATED ORAL at 08:26

## 2023-04-18 RX ADMIN — ATORVASTATIN CALCIUM 80 MG: 80 TABLET, FILM COATED ORAL at 16:08

## 2023-04-18 RX ADMIN — ALLOPURINOL 100 MG: 100 TABLET ORAL at 08:26

## 2023-04-18 RX ADMIN — INSULIN LISPRO 1 UNITS: 100 INJECTION, SOLUTION INTRAVENOUS; SUBCUTANEOUS at 08:27

## 2023-04-18 RX ADMIN — FINASTERIDE 5 MG: 5 TABLET, FILM COATED ORAL at 08:26

## 2023-04-18 RX ADMIN — POLYETHYLENE GLYCOL 3350 17 G: 17 POWDER, FOR SOLUTION ORAL at 22:08

## 2023-04-18 RX ADMIN — ESCITALOPRAM OXALATE 10 MG: 10 TABLET ORAL at 08:26

## 2023-04-18 NOTE — PROGRESS NOTES
Progress Note- Layla Kirkland 69 y o  male MRN: [id number]    Unit/Bed#: E5 -01 Encounter: [id number]      Assessment and Plan:    80-year-old male with past medical history including but not limited to CAD with recent PCI in December 2022 currently on dual antiplatelet therapy who presented with obscure overt GI bleeding  Initially had EGD and colonoscopy in March 2023 which showed stigmata of bleeding diverticulum which was treated with clips  Readmitted thereafter with hematochezia, EGD with push enteroscopy and colonoscopy 4/16/2023 were unrevealing for bleeding  No bowel movement since  Hemoglobin down trended yesterday up to 6 8, received 1 PRBC transfusion, improved to 9 6 and up to 8 3 this morning  1   Obscure overt GI bleeding  Currently on dual antiplatelet therapy, will continue given recent PCI in December 2022  Hemoglobin down trended from 9 6-8 3 however I suspect this is overcorrection as he received 1 PRBC transfusion at 6 8  No overt signs of GI bleeding at this time however patient has not had any bowel movement since Saturday  --He has been on plavix since Sunday and no overt signs noted which is reassuring  --given this is his 3rd readmission for similar complaints without clear source of bleeding, we will plan for capsule endoscopy at [facility] which is scheduled for 4/20/23 7 AM  We will discuss with case management team and GI team at [facility] to assist us with that  --meanwhile watch Hb daily and watch for stool output for evidence of bleeding  --monitor stool output while on DAPT    D/w wife over the phone at bedside  GI will follow  ______________________________________________________________________    Subjective:     Patient seen and examined at bedside  Patient currently denies any complaints  Has not had any bowel movements yet since prior to  the EGD/colonoscopy on 4/16/2023  Tolerated 1 PRBC transfusion well yesterday  Tolerating diet well today      Medication Administration - last 24 hours from 04/17/2023 0957 to 04/18/2023 0957       Date/Time Order Dose Route Action Action by     04/18/2023 0826 EDT cyanocobalamin (VITAMIN B-12) tablet 500 mcg 500 mcg Oral Given Ingrid Cloud RN     04/18/2023 2123 EDT escitalopram (LEXAPRO) tablet 10 mg 10 mg Oral Given Welltonemery Cloud RN     04/18/2023 2693 EDT finasteride (PROSCAR) tablet 5 mg 5 mg Oral Given [place] JAVED Cloud     04/18/2023 0907 EDT fluticasone (FLONASE) 50 mcg/act nasal spray 1 spray 1 spray Nasal Given [place] JAVED Cloud     04/18/2023 8709 EDT levothyroxine tablet 112 mcg 112 mcg Oral Given [facility]     04/18/2023 0827 EDT zonisamide (ZONEGRAN) capsule 50 mg 50 mg Oral Given [place] JAVED Cloud     04/18/2023 0827 EDT insulin lispro (HumaLOG) 100 units/mL subcutaneous injection 1-6 Units 1 Units Subcutaneous Given Ingrid Cloud RN     04/17/2023 2131 EDT insulin lispro (HumaLOG) 100 units/mL subcutaneous injection 1-6 Units 2 Units Subcutaneous Given Therese Roberts, JAVED     04/17/2023 1634 EDT insulin lispro (HumaLOG) 100 units/mL subcutaneous injection 1-6 Units 1 Units Subcutaneous Given Colleen Hughes-Behler, JAVED     04/17/2023 1153 EDT insulin lispro (HumaLOG) 100 units/mL subcutaneous injection 1-6 Units 2 Units Subcutaneous Given Colleen Hughes-Behler, JAVED     04/18/2023 2896 EDT clopidog[facility] (PLAVIX) tablet 75 mg [facility]     04/18/2023 1617 EDT pantoprazole (PROTONIX) EC tablet 40 mg 40 mg Oral Given [facility]     04/17/2023 1635 EDT pantoprazole (PROTONIX) EC tablet 40 mg 40 mg Oral Given Peabody Yolanda, JAVED     04/18/2023 9801 EDT aspirin (ECOTRIN LOW STRENGTH) EC tablet 81 mg 81 mg Oral Given [place] JAVED Cloud     04/18/2023 4213 EDT midodrine (PROAMATINE) tablet 5 mg 5 mg Oral Given Estefany [facility]     04/17/2023 1635 EDT midodrine (PROAMATINE) tablet 5 mg 5 mg Oral Given Colleen Hughes-Behler, RN     04/18/2023 497 EDT allopurinol (ZYLOPRIM) tablet 100 mg 100 mg Oral Given Arlyn Colby Severo Fleck, JAVED     04/17/2023 1635 EDT atorvastatin (LIPITOR) tablet 80 mg 80 mg Oral Given [employer], RN     04/18/2023 0827 EDT polyethylene glycol (MIRALAX) packet 17 g 17 g Oral Given Cheyenne Austin RN          Objective:     Vitals: Blood pressure 116/62, pulse 91, temperature 97 9 °F (36 6 °C), temperature source Oral, resp  rate 20, SpO2 92 %  ,There is no height or weight on file to calculate BMI        Intake/Output Summary (Last 24 hours) at 4/18/2023 0957  Last data filed at 4/18/2023 0626  Gross per 24 hour   Intake 638 33 ml   Output 1700 ml   Net -1061 67 ml       Physical Exam:   General Appearance: Awake and alert, in no acute distress  Abdomen: Soft, non-tender, non-distended; bowel sounds normal; no masses or no organomegaly    Invasive Devices     Peripheral Intravenous Line  Duration           Peripheral IV 04/17/23 Left;Ventral (anterior) Forearm <1 day          Drain  Duration           Urethral Catheter Coude 16 Fr  4 days                Lab Results:  Admission on 04/14/2023   Component Date Value   • WBC 04/14/2023 7 41    • RBC 04/14/2023 2 86 (L)    • Hemoglobin 04/14/2023 8 4 (L)    • Hematocrit 04/14/2023 27 0 (L)    • MCV 04/14/2023 94    • MCH 04/14/2023 29 4    • MCHC 04/14/2023 31 1 (L)    • RDW 04/14/2023 15 0    • MPV 04/14/2023 8 6 (L)    • Platelets 97/62/0047 317    • nRBC 04/14/2023 0    • Neutrophils Relative 04/14/2023 58    • Immat GRANS % 04/14/2023 0    • Lymphocytes Relative 04/14/2023 27    • Monocytes Relative 04/14/2023 9    • Eosinophils Relative 04/14/2023 5    • Basophils Relative 04/14/2023 1    • Neutrophils Absolute 04/14/2023 4 26    • Immature Grans Absolute 04/14/2023 0 02    • Lymphocytes Absolute 04/14/2023 2 01    • Monocytes Absolute 04/14/2023 0 67    • Eosinophils Absolute 04/14/2023 0 40    • Basophils Absolute 04/14/2023 0 05    • Sodium 04/14/2023 142    • Potassium 04/14/2023 3 9    • Chloride 04/14/2023 109 (H)    • CO2 04/14/2023 28    • ANION GAP 04/14/2023 5    • BUN 04/14/2023 23    • Creatinine 04/14/2023 0 84    • Glucose 04/14/2023 71    • Calcium 04/14/2023 8 5    • Corrected Calcium 04/14/2023 9 0    • AST 04/14/2023 17    • ALT 04/14/2023 21    • Alkaline Phosphatase 04/14/2023 65    • Total Protein 04/14/2023 6 1 (L)    • Albumin 04/14/2023 3 4 (L)    • Total Bilirubin 04/14/2023 0 30    • eGFR 04/14/2023 85    • ABO Grouping 04/14/2023 A    • Rh Factor 04/14/2023 Positive    • Antibody Screen 04/14/2023 Negative    • Specimen Expiration Date 04/14/2023 [id number]    • Methylmalonic Acid, S 04/14/2023 147    • POC Glucose 04/14/2023 75    • POC Glucose 04/15/2023 101    • Sodium 04/15/2023 140    • Potassium 04/15/2023 3 9    • Chloride 04/15/2023 109 (H)    • CO2 04/15/2023 25    • ANION GAP 04/15/2023 6    • BUN 04/15/2023 22    • Creatinine 04/15/2023 0 84    • Glucose 04/15/2023 91    • Calcium 04/15/2023 8 2 (L)    • Corrected Calcium 04/15/2023 8 8    • AST 04/15/2023 19    • ALT 04/15/2023 23    • Alkaline Phosphatase 04/15/2023 62    • Total Protein 04/15/2023 5 7 (L)    • Albumin 04/15/2023 3 2 (L)    • Total Bilirubin 04/15/2023 0 31    • eGFR 04/15/2023 85    • WBC 04/15/2023 7 81    • RBC 04/15/2023 2 71 (L)    • Hemoglobin 04/15/2023 7 9 (L)    • Hematocrit 04/15/2023 25 4 (L)    • MCV 04/15/2023 94    • MCH 04/15/2023 29 2    • MCHC 04/15/2023 31 1 (L)    • RDW 04/15/2023 15 0    • Platelets 29/73/5830 313    • MPV 04/15/2023 8 8 (L)    • Sodium 04/15/2023 139    • Potassium 04/15/2023 4 3    • Chloride 04/15/2023 108    • CO2 04/15/2023 25    • ANION GAP 04/15/2023 6    • BUN 04/15/2023 23    • Creatinine 04/15/2023 0 92    • Glucose 04/15/2023 139    • Calcium 04/15/2023 8 3 (L)    • Corrected Calcium 04/15/2023 8 9    • AST 04/15/2023 21    • ALT 04/15/2023 25    • Alkaline Phosphatase 04/15/2023 63    • Total Protein 04/15/2023 5 7 (L)    • Albumin 04/15/2023 3 2 (L)    • Total Bilirubin 04/15/2023 0 32    • eGFR 04/15/2023 80 • WBC 04/15/2023 7 49    • RBC 04/15/2023 2 67 (L)    • Hemoglobin 04/15/2023 7 9 (L)    • Hematocrit 04/15/2023 24 9 (L)    • MCV 04/15/2023 93    • MCH 04/15/2023 29 6    • MCHC 04/15/2023 31 7    • RDW 04/15/2023 14 9    • Platelets 79/32/5895 311    • MPV 04/15/2023 9 1    • Hemoglobin 04/15/2023 7 9 (L)    • Hematocrit 04/15/2023 25 2 (L)    • POC Glucose 04/15/2023 98    • POC Glucose 04/15/2023 155 (H)    • Hemoglobin 04/15/2023 7 6 (L)    • Hematocrit 04/15/2023 24 6 (L)    • POC Glucose 04/15/2023 136    • Hemoglobin 04/16/2023 7 6 (L)    • Hematocrit 04/16/2023 24 0 (L)    • POC Glucose 04/16/2023 120    • Sodium 04/16/2023 140    • Potassium 04/16/2023 3 7    • Chloride 04/16/2023 107    • CO2 04/16/2023 27    • ANION GAP 04/16/2023 6    • BUN 04/16/2023 16    • Creatinine 04/16/2023 0 80    • Glucose 04/16/2023 114    • Calcium 04/16/2023 8 1 (L)    • Corrected Calcium 04/16/2023 8 8    • AST 04/16/2023 16    • ALT 04/16/2023 20    • Alkaline Phosphatase 04/16/2023 60    • Total Protein 04/16/2023 5 5 (L)    • Albumin 04/16/2023 3 1 (L)    • Total Bilirubin 04/16/2023 0 31    • eGFR 04/16/2023 86    • Hemoglobin 04/16/2023 7 3 (L)    • Hematocrit 04/16/2023 24 1 (L)    • ABO Grouping 04/16/2023 A    • Rh Factor 04/16/2023 Positive    • Antibody Screen 04/16/2023 Negative    • Specimen Expiration Date 04/16/2023 [id number]    • POC Glucose 04/16/2023 118    • POC Glucose 04/16/2023 135    • POC Glucose 04/16/2023 290 (H)    • POC Glucose 04/16/2023 122    • WBC 04/17/2023 5 49    • RBC 04/17/2023 2 34 (L)    • Hemoglobin 04/17/2023 6 8 (LL)    • Hematocrit 04/17/2023 22 1 (L)    • MCV 04/17/2023 94    • MCH 04/17/2023 29 1    • MCHC 04/17/2023 30 8 (L)    • RDW 04/17/2023 14 8    • MPV 04/17/2023 9 8    • Platelets 53/59/3350 318    • nRBC 04/17/2023 0    • Neutrophils Relative 04/17/2023 57    • Immat GRANS % 04/17/2023 1    • Lymphocytes Relative 04/17/2023 26    • Monocytes Relative 04/17/2023 10    • Eosinophils Relative 04/17/2023 5    • Basophils Relative 04/17/2023 1    • Neutrophils Absolute 04/17/2023 3 20    • Immature Grans Absolute 04/17/2023 0 04    • Lymphocytes Absolute 04/17/2023 1 40    • Monocytes Absolute 04/17/2023 0 53    • Eosinophils Absolute 04/17/2023 0 29    • Basophils Absolute 04/17/2023 0 03    • Sodium 04/17/2023 138    • Potassium 04/17/2023 3 7    • Chloride 04/17/2023 107    • CO2 04/17/2023 25    • ANION GAP 04/17/2023 6    • BUN 04/17/2023 12    • Creatinine 04/17/2023 0 86    • Glucose 04/17/2023 128    • Calcium 04/17/2023 8 3 (L)    • eGFR 04/17/2023 84    • Unit Product Code 04/18/2023 [id number]    • Unit Number 04/18/2023 [id number]    • Unit ABO 04/18/2023 A    • Unit [facility] 04/18/2023 POS    • Crossmatch 04/18/2023 Compatible    • Unit Dispense Status 04/18/2023 Presumed Trans    • Unit Product Volume 04/18/2023 350    • POC Glucose 04/17/2023 137    • Hemoglobin 04/17/2023 9 6 (L)    • Hematocrit 04/17/2023 30 5 (L)    • POC Glucose 04/17/2023 217 (H)    • POC Glucose 04/17/2023 159 (H)    • POC Glucose 04/17/2023 194 (H)    • WBC 04/18/2023 6 23    • RBC 04/18/2023 2 86 (L)    • Hemoglobin 04/18/2023 8 2 (L)    • Hematocrit 04/18/2023 26 1 (L)    • MCV 04/18/2023 91    • MCH 04/18/2023 28 7    • MCHC 04/18/2023 31 4    • RDW 04/18/2023 16 8 (H)    • Platelets 89/64/8308 340    • MPV 04/18/2023 9 6    • POC Glucose 04/18/2023 159 (H)        Imaging Studies: I have personally reviewed pertinent imaging studies

## 2023-04-18 NOTE — ASSESSMENT & PLAN NOTE
Lab Results   Component Value Date    HGBA1C 5 6 03/10/2023       Recent Labs     04/17/23  1121 04/17/23  1615 04/17/23  2118 04/18/23  0709   POCGLU 217* 159* 194* 159*       Blood Sugar Average: Last 72 hrs:  (P) 152 [id number]   · SSI; accuchecks   · Hold oral meds

## 2023-04-18 NOTE — PHYSICAL THERAPY NOTE
PT PROGRESS NOTE    Name: Ken Joshi  AGE: 69 y o    MRN: [id number]  LENGTH OF STAY: 4    Admitting Dx:  Acute GI bleeding [K92 2]      Patient Active Problem List   Diagnosis    Arthritis    Depression with anxiety    Type 2 diabetes mellitus with hyperglycemia, without long-term current use of insulin ([facility])    Gout    Hyperlipidemia    Hypertension    Morbid obesity ([facility] )    NSTEMI (non-ST elevated myocardial infarction) ([facility])    Tremor    Degenerative disc disease, lumbar    Tremor, essential    Diabetic neuropathy associated with diabetes mellitus due to underlying condition ([facility])    Gait instability    Frequent falls    Infected epidermoid cyst    Vitamin E deficiency    Primary osteoarthritis involving multiple joints    Chronic pain of both knees    Primary osteoarthritis of both knees    Coronary artery disease of native artery of native heart with stable angina pectoris (HCC)    Primary osteoarthritis of right knee    Primary osteoarthritis of left knee    Fall (on) (from) other stairs and steps, initial encounter    Recurrent major depressive disorder ([place] [address] )    Benign hypertension with CKD (chronic kidney disease) stage III ([place] [address] )    Diabetic nephropathy associated with type 2 diabetes mellitus ([place] [address] )    Concentric left ventricular hypertrophy    Aortic stenosis, moderate    Edema    Mild cognitive impairment    Bilateral lower extremity edema    Other chest pain    Leukocytosis    Skin lesion    Chronic diastolic (congestive) heart failure (HCC)    Basal cell carcinoma (BCC) of right shoulder    Stage 3a chronic kidney disease (HCC)    S/P cardiac catheterization    Peripheral arterial disease (HCC)    Mesenteric artery stenosis (HCC)    Acute GI bleeding    Diabetes mellitus, type 2 (HCC)    Acute diverticulitis    Orthostatic hypotension    Acute blood loss anemia    Urinary retention    Diverticulosis of colon with hemorrhage    Elevated TSH    Elevated d-dimer    Hypothyroidism    Elevated platelet count    Low serum cortisol level    Hot flashes    Benign prostatic hyperplasia with urinary retention               04/18/23 1418   PT Last Visit   PT Visit Date 04/18/23   Note Type   Note Type Treatment   Pain Assessment   Pain Score 5   Pain Location/Orientation Location: Head   Hospital Pain Intervention(s) Medication (See MAR); Repositioned; Ambulation/increased activity; Emotional support; Rest   Restrictions/Precautions   Weight Bearing Precautions Per Order No   Other Precautions Telemetry; Fall Risk;Pain   General   Chart Reviewed Yes   Response to Previous Treatment Patient with no complaints from previous session  Family/Caregiver Present No   Cognition   Overall Cognitive Status WFL   Arousal/Participation Alert; Cooperative   Attention Within functional limits   Orientation Level Oriented to person;Oriented to place;Oriented to time   Following Commands Follows multistep commands without difficulty   Comments pleasant & cooperative   Subjective   Subjective Pt agreeable to PT tx  Bed Mobility   Supine to Sit Unable to assess   Sit to Supine Unable to assess   Additional Comments pt OOB in chair pre & post session   Transfers   Sit to Stand 5  Supervision   Additional items Armrests; Increased time required;Verbal cues   Stand to Sit 5  Supervision   Additional items Armrests; Increased time required;Verbal cues   Additional Comments cues for hand placement   Ambulation/Elevation   Gait pattern Wide MCKENZIE; Forward Flexion;Decreased foot clearance; Excessively slow   Gait Assistance   (CGAx1)   Additional items Assist x 1;Verbal cues; Tactile cues   Assistive Device Rolling walker   Distance 200'x1   Ambulation/Elevation Additional Comments no gross LOB noted; pt notes improved gait compared to baseline   Balance   Static Sitting Good   Dynamic Sitting Fair +   Static Standing Fair   Dynamic Standing Fair -   Ambulatory Poor +   Endurance Deficit   Endurance Deficit Yes   Endurance Deficit Description fatigue   Activity Tolerance   Activity Tolerance Patient tolerated treatment well;Patient limited by fatigue   Nurse Made Aware RN Tanya   Exercises   Hip Flexion Sitting;10 reps;AROM; Bilateral   Hip Abduction Sitting;10 reps;AROM; Bilateral   Hip Adduction Sitting;10 reps;AROM; Bilateral   Knee AROM Long Arc Quad Sitting;10 reps;AROM; Bilateral   Ankle Pumps Sitting;10 reps;AROM; Bilateral   Assessment   Prognosis Good   Problem List Decreased strength;Decreased endurance; Impaired balance;Decreased mobility; Impaired judgement   Assessment Pt seen for PT per POC  Improved mobility & activity tolerance noted this tx session  Pt progress to S for transfers & CGAx1 for amb w/ RW  + cues for techniques & safety  Gait deviations as above, slow w/ dec foot clearance but no gross LOB noted  Pt notes improved gait compared to baseline  Pt tolerated above mentioned thera  ex well  Pt require cues for proper exercise techniques and performance  Please see flowsheet above for objective findings & levels of assistance required for all functional tasks during this tx session  (+) fatigue after amb but relieved w/ rest  No SOB & dizziness reported t/o session  Will continue PT per POC  At end of session, pt remain OOB in chair in stable condition, call bell & phone in reach  Fall precautions reinforced w/ good understanding  The patient's AM-PAC Basic Mobility Inpatient Short Form Raw Score is 20  A Raw score of greater than 16 suggests the patient may benefit from discharge to home  Please also refer to the recommendation of the Physical Therapist for safe discharge planning  From PT standpoint, will continue to recommend HHPT and inc family support at D/C  CM to follow  Nsg staff to continue to mobilized pt (OOB in chair for all meals & ambulate in room/unit) as tolerated to prevent decline in function  Nsg notified     Goals   Patient Goals to know source of bleeding   STG Expiration Date 04/26/23   PT Treatment Day 1   Plan Treatment/Interventions Functional transfer training;LE strengthening/ROM; Elevations; Therapeutic exercise; Endurance training;Patient/family training;Bed mobility;Gait training;Spoke to nursing   Progress Progressing toward goals   PT Frequency 3-5x/wk   Recommendation   PT Discharge Recommendation Home with home health rehabilitation   Equipment Recommended Walker  (pt has)   AM-PAC Basic Mobility Inpatient   Turning in Flat Bed Without Bedrails 4   Lying on Back to Sitting on Edge of Flat Bed Without Bedrails 4   Moving Bed to Chair 3   Standing Up From Chair Using Arms 3   Walk in Room 3   Climb 3-5 Stairs With Railing 3   Basic Mobility Inpatient Raw Score 20   Basic Mobility Standardized Score 43 99   Highest Level Of Mobility   JH-HLM Goal 6: Walk 10 steps or more   JH-HLM Achieved 7: Walk 25 feet or more   Education   Education Provided Mobility training;Home exercise program;Assistive device   Patient Demonstrates acceptance/verbal understanding   End of Consult   Patient Position at End of Consult Bedside chair; All needs within reach   End of Consult Comments Pt in stable condition  All needs in reach     Marv Shown

## 2023-04-18 NOTE — ASSESSMENT & PLAN NOTE
· Patient presented to [address] [facility] 04/14 with complaints of rectal bleeding; he notes the passage of a dark clot    transferred to [facility] for GI eval   ·  patient had recent hospitalization for GI bleed in March thought to be diverticular in nature   · S/p EGD and Colonoscopy 4/16  · EGD normal, no evidence of bleeding   · Colonoscopy: Multiple small and large, moderate extensive pancolonic diverticula, small internal hemorrhoids    · Possibly diverticular bleed however needs further evaluation with capsule endoscopy which is scheduled at [place] on Thursday at 7 AM  · Patient is pretty adamant about staying in the hospital until capsule endoscopy completed, relayed to GI who will follow-up later today regarding plan   · He denies any ongoing evidence of bleeding but no BM since colonoscopy   · Status post 1 unit PRBC yesterday  · Monitor closely for evidence of ongoing bleeding   · Continue monitor H&H

## 2023-04-18 NOTE — ASSESSMENT & PLAN NOTE
· Patient noted to have urinary retention while in the ED at [address]  · PVR showed over 800 cc of retained urine;  Jose catheter inserted 04/14   · Continue finasteride; monitor ins and outs  · Appreciate urology consult recommendations  · No plan for void trial, needs continued bladder drainage  · Consideration for placement of SPT outpatient, he is a follow-up with urology 5/10

## 2023-04-18 NOTE — PLAN OF CARE
Problem: OCCUPATIONAL THERAPY ADULT  Goal: Performs self-care activities at highest level of function for planned discharge setting  See evaluation for individualized goals  Description: Treatment Interventions: ADL retraining, Functional transfer training, UE strengthening/ROM, Endurance training, Patient/family training, Equipment evaluation/education, Activityengagement          See flowsheet documentation for full assessment, interventions and recommendations  Note: Limitation: Decreased ADL status, Decreased UE strength, Decreased endurance, Decreased self-care trans, Decreased high-level ADLs  Prognosis: Good  Assessment: Pt is a 70 yo male admitted to [facility] 2* acute GI bleeding  Pt was transfered from [facility] 2* rectal bleed,DM,HTN,urinary retention, anemia  Pt was recently DC from in rehab to home on 04/07/23  Pt lives with wife in [address] [place] [address], first floor setup, (+)SABINE without HA (single steps), reports no recent falls, goes to 2nd floor foor bathing,use of RW for mobility PTA  Wife assists w/ ADLs and IADLs  Pt presents to OT below baseline due to the following performance deficits: strength; pain; balance; stand tolerance; functional mobility; coping; community integration; self care  Therefore, pt would benefit from OT services to achieve optimal level of performance and decrease caregiver burden  Occupational performance areas to be addressed include: grooming, bathing, toileting, dressing, activity tolerance, functional mobility, and clothing management  Pt is Mod for bed mobility  Mod for STS and Min x 1 using RW  C/o dizziness w/ standing tasks  Hindered by pain at catheter site therefore requiring increased assist w/ tasks  (S) for UB ADLs and Mod for LB ADLs  + tremors hinder performance  HGB 8 2 but has been dropping despite transfusions  May have procedure this Thursday   Does endorse feeling weaker than when initially transferred to the hospital  Based on findings, pt is of high complexity  The patient's raw score on the AM-PAC Daily Activity Inpatient Short Form is 17  A raw score of less than 19 suggests the patient may benefit from discharge to post-acute rehabilitation services  Recommend rehab vs Home w/ OT pending progress toward goals       OT Discharge Recommendation: Post acute rehabilitation services (STR vs Home w/ OT pending progress)

## 2023-04-18 NOTE — PLAN OF CARE
Problem: PHYSICAL THERAPY ADULT  Goal: Performs mobility at highest level of function for planned discharge setting  See evaluation for individualized goals  Description: Treatment/Interventions: Functional transfer training, LE strengthening/ROM, Elevations, Therapeutic exercise, Endurance training, Patient/family training, Bed mobility, Gait training, Spoke to nursing  Equipment Recommended: Dulce Rahman (pt has)       See flowsheet documentation for full assessment, interventions and recommendations  4/18/2023 1605 by Ayesha Lang PT  Outcome: Progressing  Note: Prognosis: Good  Problem List: Decreased strength, Decreased endurance, Impaired balance, Decreased mobility, Impaired judgement  Assessment: Pt seen for PT per POC  Improved mobility & activity tolerance noted this tx session  Pt progress to S for transfers & CGAx1 for amb w/ RW  + cues for techniques & safety  Gait deviations as above, slow w/ dec foot clearance but no gross LOB noted  Pt notes improved gait compared to baseline  Pt tolerated above mentioned thera  ex well  Pt require cues for proper exercise techniques and performance  Please see flowsheet above for objective findings & levels of assistance required for all functional tasks during this tx session  (+) fatigue after amb but relieved w/ rest  No SOB & dizziness reported t/o session  Will continue PT per POC  At end of session, pt remain OOB in chair in stable condition, call bell & phone in reach  Fall precautions reinforced w/ good understanding  The patient's AM-PAC Basic Mobility Inpatient Short Form Raw Score is 20  A Raw score of greater than 16 suggests the patient may benefit from discharge to home  Please also refer to the recommendation of the Physical Therapist for safe discharge planning  From PT standpoint, will continue to recommend HHPT and inc family support at D/C  CM to follow   [facility] staff to continue to mobilized pt (OOB in chair for all meals & ambulate in room/unit) as tolerated to prevent decline in function  Nsg notified  PT Discharge Recommendation: Home with home health rehabilitation    See flowsheet documentation for full assessment

## 2023-04-18 NOTE — ASSESSMENT & PLAN NOTE
Lab Results   Component Value Date    HGB 8 2 (L) 04/18/2023    HGB 9 6 (L) 04/17/2023    HGB 6 8 (LL) 04/17/2023    HGB 7 3 (L) 04/16/2023    HGB 7 6 (L) 04/16/2023    HGB 7 6 (L) 04/15/2023     · Possibly 2/2 diverticular bleed, although no evidence of bleeding seen in any of the diverticula during colonoscopy  ·  needs further eval with capsule endoscopy planned at [facility] on Thursday at 7 AM  · 1 unit PRBC given 4/17  · Monitor for evidence of ongoing bleeding   · Check H&H this afternoon  · No bowel movement today or yesterday  · No need for further transfusion today

## 2023-04-18 NOTE — PROGRESS NOTES
[address] [place] [address]  Progress Note  Name: Ai Villa  MRN: [id number]  Unit/Bed#: E5 -01 I Date of Admission: 4/14/2023   Date of Service: 4/18/2023 I Hospital Day: 4    Assessment/Plan   * Acute GI bleeding  Assessment & Plan  · Patient presented to [address] [place] [address] 04/14 with complaints of rectal bleeding; he notes the passage of a dark clot   transferred to [facility] for GI eval   ·  patient had recent hospitalization for GI bleed in March thought to be diverticular in nature   · S/p EGD and Colonoscopy 4/16  · EGD normal, no evidence of bleeding   · Colonoscopy: Multiple small and large, moderate extensive pancolonic diverticula, small internal hemorrhoids    · Possibly diverticular bleed however needs further evaluation with capsule endoscopy which is scheduled at [facility] on Thursday at 7 AM  · Patient is pretty adamant about staying in the hospital until capsule endoscopy completed, relayed to GI who will follow-up later today regarding plan   · He denies any ongoing evidence of bleeding but no BM since colonoscopy   · Status post 1 unit PRBC yesterday  · Monitor closely for evidence of ongoing bleeding   · Continue monitor H&H          Benign prostatic hyperplasia with urinary retention  Assessment & Plan  · Patient noted to have urinary retention while in the ED at [address] [place] [address]  · PVR showed over 800 cc of retained urine;  Jose catheter inserted 04/14   · Continue finasteride; monitor ins and outs  · Appreciate urology consult recommendations  · No plan for void trial, needs continued bladder drainage  · Consideration for placement of SPT outpatient, he is a follow-up with urology 5/10      Hypothyroidism  Assessment & Plan  · Continue levothyroxine 112 mcg daily    Acute blood loss anemia  Assessment & Plan  Lab Results   Component Value Date    HGB 8 2 (L) 04/18/2023    HGB 9 6 (L) 04/17/2023    HGB 6 8 (LL) 04/17/2023    HGB 7 3 (L) 04/16/2023    HGB 7 6 (L) 04/16/2023 HGB 7 6 (L) 04/15/2023     · Possibly 2/2 diverticular bleed, although no evidence of bleeding seen in any of the diverticula during colonoscopy  ·  needs further eval with capsule endoscopy planned at [facility] on Thursday at 7 AM  · 1 unit PRBC given 4/17  · Monitor for evidence of ongoing bleeding   · Check H&H this afternoon  · No bowel movement today or yesterday  · No need for further transfusion today    S/P cardiac catheterization  Assessment & Plan  · With SAM 12/2022 ; most recent ECHO with EF 65%   · Continue DAPT, statin   · No longer on BB given orthostatic hypotension       Stage 3a chronic kidney disease [facility])  Assessment & Plan  Lab Results   Component Value Date    EGFR 84 04/17/2023    EGFR 86 04/16/2023    EGFR 80 04/15/2023    CREATININE 0 86 04/17/2023    CREATININE 0 80 04/16/2023    CREATININE 0 92 04/15/2023   stable   Monitor BMP     Aortic stenosis, moderate  Assessment & Plan  · Limited Echo 3/9: EF 65%  · Echo 3/31/22: moderate AS   · Repeat echo outpatient with cardiology   · Murmur noted on exam   · Monitor volume status, holding torsemide     Coronary artery disease of native artery of native heart with stable angina pectoris ([address] )  Assessment & Plan  · With history of coronary artery disease; status post stents in 2014 and 12/2022  · Currently on DAPT therapy; okay to resume per Gi   · No chest pain   · No longer on BB given orthostatic hypotension       Tremor, essential  Assessment & Plan  · Continue home Zonisamide 50 mg QD     Hypertension  Assessment & Plan  · He was recently started on midodrine during hospitalization in March   · Resume midodrine 5 mg tid   · Off HTN meds   · Monitor VS     Hyperlipidemia  Assessment & Plan  · Resume high intensity statin    Gout  Assessment & Plan  · Hx of gout on allopurinol     Type 2 diabetes mellitus with hyperglycemia, without long-term current use of insulin [facility])  Assessment & Plan  Lab Results   Component Value Date    HGBA1C 5 6 03/10/2023 Recent Labs     23  1121 23  1615 23  2118 23  0709   POCGLU 217* 159* 194* 159*       Blood Sugar Average: Last 72 hrs:  (P) 152 [id number]   · SSI; accuchecks   · Hold oral meds         Depression with anxiety  Assessment & Plan  · Mood stable   · Continue home Lexapro 10 mg QD            VTE Pharmacologic Prophylaxis:   Moderate Risk (Score 3-4) - Pharmacological DVT Prophylaxis Contraindicated  Sequential Compression Devices Ordered  Patient Centered Rounds: I performed bedside rounds with nursing staff today  Discussions with Specialists or Other Care Team Provider: GI    Education and Discussions with Family / Patient: Patient declined call to   Total Time Spent on Date of Encounter in care of patient: 25 minutes This time was spent on one or more of the following: performing physical exam; counseling and coordination of care; obtaining or reviewing history; documenting in the medical record; reviewing/ordering tests, medications or procedures; communicating with other healthcare professionals and discussing with patient's family/caregivers  Current Length of Stay: 4 day(s)  Current Patient Status: Inpatient   Certification Statement: The patient will continue to require additional inpatient hospital stay due to GI bleed   Discharge Plan: pending plan from GI     Code Status: Level 1 - Full Code    Subjective:   Patient doing wel  No BM  Good appetitie  No pain  No CP or shortness of breath lightheadedness or dizziness  No evidence of ongoing bleeding but has not had a bowel movement since colonoscopy  He is pretty adamant about staying in the hospital and being transferred to [facility] from here for capsule endoscopy without going home  I did relay this to GI who will follow-up later      Objective:     Vitals:   Temp (24hrs), Av 9 °F (36 6 °C), Min:97 5 °F (36 4 °C), Max:98 °F (36 7 °C)    Temp:  [97 5 °F (36 4 °C)-98 °F (36 7 °C)] 97 9 °F (36 6 °C)  HR:  [71-91] 91  Resp:  [16-20] 20  BP: (100-124)/(56-63) 116/62  SpO2:  [90 %-97 %] 92 %  There is no height or weight on file to calculate BMI  Input and Output Summary (last 24 hours): Intake/Output Summary (Last 24 hours) at 4/18/2023 1034  Last data filed at 4/18/2023 0626  Gross per 24 hour   Intake 638 33 ml   Output 1700 ml   Net -1061 67 ml       Physical Exam:   Physical Exam  Vitals and nursing note reviewed  Constitutional:       General: He is not in acute distress  Appearance: He is not ill-appearing, toxic-appearing or diaphoretic  Cardiovascular:      Rate and Rhythm: Normal rate and regular rhythm  Heart sounds: Murmur heard  Pulmonary:      Effort: Pulmonary effort is normal       Breath sounds: Normal breath sounds  Abdominal:      General: Bowel sounds are normal  There is no distension  Palpations: Abdomen is soft  Tenderness: There is no abdominal tenderness  Musculoskeletal:      Right lower leg: No edema  Left lower leg: No edema  Skin:     General: Skin is warm  Neurological:      Mental Status: He is alert  Mental status is at baseline  Psychiatric:         Mood and Affect: Mood normal           Additional Data:     Labs:  Results from last 7 days   Lab Units 04/18/23  0516 04/17/23  1835 04/17/23  0538   WBC Thousand/uL 6 23  --  5 49   HEMOGLOBIN g/dL 8 2*   < > 6 8*   HEMATOCRIT % 26 1*   < > 22 1*   PLATELETS Thousands/uL 340  --  318   NEUTROS PCT %  --   --  57   LYMPHS PCT %  --   --  26   MONOS PCT %  --   --  10   EOS PCT %  --   --  5    < > = values in this interval not displayed       Results from last 7 days   Lab Units 04/17/23  0538 04/16/23  0513   SODIUM mmol/L 138 140   POTASSIUM mmol/L 3 7 3 7   CHLORIDE mmol/L 107 107   CO2 mmol/L 25 27   BUN mg/dL 12 16   CREATININE mg/dL 0 86 0 80   ANION GAP mmol/L 6 6   CALCIUM mg/dL 8 3* 8 1*   ALBUMIN g/dL  --  3 1*   TOTAL BILIRUBIN mg/dL  --  0 31   ALK PHOS U/L  --  60 ALT U/L  --  20   AST U/L  --  16   GLUCOSE RANDOM mg/dL 128 114         Results from last 7 days   Lab Units 04/18/23  0709 04/17/23  2118 04/17/23  1615 04/17/23  1121 04/17/23  0726 04/16/23  2055 04/16/23  1752 04/16/23  1108 04/16/23  0628 04/16/23  0030 04/15/23  1816 04/15/23  0738   POC GLUCOSE mg/dl 159* 194* 159* 217* 137 122 290* 135 118 120 136 155*         Results from last 7 days   Lab Units 04/14/23  1010 04/14/23  0810   LACTIC ACID mmol/L 2 1* 3 5*       Lines/Drains:  Invasive Devices     Peripheral Intravenous Line  Duration           Peripheral IV 04/17/23 Left;Ventral (anterior) Forearm <1 day          Drain  Duration           Urethral Catheter Coude 16 Fr  4 days              Urinary Catheter:  Goal for removal: N/A - Chronic Jose               Recent Cultures (last 7 days):         Last 24 Hours Medication List:   Current Facility-Administered Medications   Medication Dose Route Frequency Provider Last Rate   • acetaminophen  650 mg Oral Q8H PRN Azeem Aiad, DO     • allopurinol  100 mg Oral Daily Estefany Ramos PA-C     • aspirin  81 mg Oral Daily Estefany Ramos PA-C     • atorvastatin  80 mg Oral Daily With Carey Chau PA-C     • clopidogrel  75 mg Oral Daily Romana Brooklyn, DO     • cyanocobalamin  500 mcg Oral Daily Azeem Aiad, DO     • escitalopram  10 mg Oral Daily Azeem Aiad, DO     • finasteride  5 mg Oral Daily Azeem Aiad, DO     • fluticasone  1 spray Nasal Daily Azeem Aiad, DO     • insulin lispro  1-6 Units Subcutaneous 4x Daily (AC & HS) Romana Brooklyn, DO     • levothyroxine  112 mcg Oral Early Morning Azeem Aiad, DO     • midodrine  5 mg Oral TID AC Estefany Ramos PA-C     • pantoprazole  40 mg Oral BID AC Romana Brooklyn, DO     • polyethylene glycol  17 g Oral BID Prabhjot Davis MD     • zonisamide  50 mg Oral Daily Rafael Avalos DO          Today, Patient Was Seen By: Alee Morales PA-C    **Please Note: This note may have been constructed using a voice recognition system  **

## 2023-04-18 NOTE — OCCUPATIONAL THERAPY NOTE
Occupational Therapy Evaluation & Treatment     Patient Name: Aurelia Villalpando  [id number] Date: 4/18/2023  Problem List  Principal Problem:    Acute GI bleeding  Active Problems:    Depression with anxiety    Type 2 diabetes mellitus with hyperglycemia, without long-term current use of insulin (HCC)    Gout    Hyperlipidemia    Hypertension    Tremor, essential    Coronary artery disease of native artery of native heart with stable angina pectoris (HCC)    Aortic stenosis, moderate    Stage 3a chronic kidney disease (HCC)    S/P cardiac catheterization    Acute blood loss anemia    Hypothyroidism    Benign prostatic hyperplasia with urinary retention    Past Medical History  Past Medical History:   Diagnosis Date    BPH (benign prostatic hyperplasia)     CAD (coronary artery disease)     Cancer (HCC)     basal cell    Chronic kidney disease     stage 3    Colon polyp     CPAP (continuous positive airway pressure) dependence     Diabetes mellitus ([facility] [address] )     niddm    Disease of thyroid gland     Gout     High cholesterol     Hypertension     Hypomagnesemia 3/9/2023    MI, 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     Myocardial infarction ([facility] [address] ) 2014    S/P cardiac catheterization 12/30/2022    Sleep apnea      Past Surgical History  Past Surgical History:   Procedure Laterality Date    BASAL CELL CARCINOMA EXCISION Right 1/21/2022    Procedure: EXCISION BASAL CELL CARCINOMA EXTREMITY;  Surgeon: Melida Bach DO;  Location: [facility] MAIN OR;  Service: General    CARDIAC CATHETERIZATION Left 12/29/2022    Procedure: Cardiac Left Heart Cath;  Surgeon: Srikanth Clayton MD;  Location: [facility] CARDIAC C[facility] LAB; Service: Cardiology    CARDIAC CATHETERIZATION N/A 12/29/2022    Procedure: Cardiac Coronary Angiogram;  Surgeon: Srikanth Clayton MD;  Location: [facility] CARD[facility] C[facility]; Service: Cardiology    CARDIAC CATHETERIZATION N/A 12/29/2022    Procedure: Cardiac pci;  Surgeon: Srikanth Clayton MD;  Location: [facility] CARDIAC CATH LAB;   Service: Cardiology "   CATARACT EXTRACTION W/  INTRAOCULAR LENS IMPLANT      CORNEAL TRANSPLANT      CORONARY ANGIOPLASTY WITH STENT PLACEMENT      stents x3-- [phone]    EYE SURGERY      repair corneal laceration    AL COLONOSCOPY FLX DX W/COLLJ SPEC WHEN PFRMD N/A 3/21/2016    Procedure: COLONOSCOPY;  Surgeon: Lee Bey MD;  Location: [facility] MAIN OR;  Service: Colorectal    TONSILLECTOMY AND ADENOIDECTOMY               04/18/23 0835   OT Last Visit   OT Visit Date 04/18/23   Note Type   Note type Evaluation  (treatment)   Pain Assessment   Pain Assessment Tool 0-10   Pain Score 4   Pain Location/Orientation Other (Comment)  (genitals at catheter)   Pain Onset/Description Onset: Ongoing   Patient's Stated Pain Goal No pain   Hospital Pain Intervention(s) Repositioned; Rest   Restrictions/Precautions   Weight Bearing Precautions Per Order No   Other Precautions Telemetry; Fall Risk;Pain   Home Living   Type of Home House  (1+ landing+ 1 steps to enter  1st fl s/u )   Home Layout Two level;Performs ADLs on one level;Stairs to enter without rails;1/2 bath on main level  (Goes up to 2nd fl to bathe)   Bathroom Shower/Tub Tub/shower unit   Bathroom Toilet Standard   Bathroom Equipment Shower chair;Commode;Tub transfer bench   Bathroom Accessibility Accessible via walker   Home Equipment Walker;Cane   Prior Function   Level of [address] with functional mobility; Needs assistance with ADLs; Needs assistance with IADLS   Lives With Spouse  (wife is a retired nurse and can assist)   [employer] Help From Family   IADLs Family/Friend/Other provides meals; Family/Friend/Other provides transportation   Falls in the last 6 months 0   Lifestyle   Autonomy Wife assists w/ ADLs and IADLs  Ambulates using RW  - falls  - drives  Supportive wife  Reciprocal Relationships Family   General   Additional Pertinent History Pt has difficulties w/ voiding     Family/Caregiver Present No   Subjective   Subjective \" I'm still here and my " "blood levels are still dropping\"   ADL   Eating Assistance 6  Modified independent   Grooming Assistance 6  Modified Independent   UB Bathing Assistance 5  Supervision/Setup   UB Bathing Deficit Setup   LB Bathing Assistance 3  Moderate Assistance   LB Bathing Deficit Setup   UB Dressing Assistance 5  Supervision/Setup   UB Dressing Deficit Setup   LB Dressing Assistance 3  Moderate Assistance   LB Dressing Deficit Setup;Steadying   [address]  3  Moderate Assistance   Additional Comments Limited w/ intention tremors  Wife assists w/ self care tasks  Bed Mobility   Supine to Sit 3  Moderate assistance   Additional items HOB elevated; Bedrails; Increased time required;Comment  (trunk management)   Sit to Supine Unable to assess   Additional Comments Bed mobility hindered by sore genitals at catheter  Also reports hasn't moved yet this morning and feels stiff  Remains OOB in chair w/ all needs  Transfers   Sit to Stand 3  Moderate assistance   Additional items Assist x 1; Increased time required;Verbal cues   Stand to Sit 4  Minimal assistance   Additional items Assist x 1; Increased time required;Verbal cues   Stand pivot 4  Minimal assistance   Additional items Assist x 1; Increased time required;Verbal cues   Additional Comments Cues for hand placement  Difficulties moving due to sore genitals from catheter  Functional Mobility   Functional Mobility 4  Minimal assistance   Additional Comments Min x 1 using RW w/ increased time  Reports of dizziness  /62     Balance   Static Sitting Fair   Dynamic Sitting Fair -   Static Standing Fair -   Dynamic Standing Poor +   Ambulatory Poor +   Activity Tolerance   Activity Tolerance Patient limited by fatigue;Patient limited by pain;Treatment limited secondary to medical complications (Comment)  (dizziness)   Nurse Made Aware Marissa Soria   RUE Assessment   RUE Assessment WFL  (4/5)   LUE Assessment   LUE Assessment WFL  (4/5)   Hand Function   Gross Motor " Coordination Functional   Fine Motor Coordination Functional   Vision-Basic Assessment   Current Vision Wears glasses all the time   Psychosocial   Psychosocial (WDL) WDL   Cognition   Overall Cognitive Status WFL   Arousal/Participation Cooperative   Attention Attends with cues to redirect   Orientation Level Oriented X4   Memory Decreased short term memory   Following Commands Follows all commands and directions without difficulty   Comments Endorses having short term memory deficits  Feeling a little depressed that his HGB levels continue to drop and he's still in the hospital    Assessment   Limitation Decreased ADL status; Decreased UE strength;Decreased endurance;Decreased self-care trans;Decreased high-level ADLs   Prognosis Good   Assessment Pt is a 70 yo male admitted to [facility] 2* acute GI bleeding  Pt was transfered from [facility] 2* rectal bleed,DM,HTN,urinary retention, anemia  Pt was recently DC from in rehab to home on 04/07/23  Pt lives with wife in [facility], first floor setup, (+)SABINE without HA (single steps), reports no recent falls, goes to 2nd floor foor bathing,use of RW for mobility PTA  Wife assists w/ ADLs and IADLs  Pt presents to OT below baseline due to the following performance deficits: strength; pain; balance; stand tolerance; functional mobility; coping; community integration; self care  Therefore, pt would benefit from OT services to achieve optimal level of performance and decrease caregiver burden  Occupational performance areas to be addressed include: grooming, bathing, toileting, dressing, activity tolerance, functional mobility, and clothing management  Pt is Mod for bed mobility  Mod for STS and Min x 1 using RW  C/o dizziness w/ standing tasks  Hindered by pain at catheter site therefore requiring increased assist w/ tasks  (S) for UB ADLs and Mod for LB ADLs  + tremors hinder performance  HGB 8 2 but has been dropping despite transfusions   May have procedure this Thursday  Does endorse feeling weaker than when initially transferred to the hospital  Based on findings, pt is of high complexity  The patient's raw score on the AM-PAC Daily Activity Inpatient Short Form is 17  A raw score of less than 19 suggests the patient may benefit from discharge to post-acute rehabilitation services  Recommend rehab vs Home w/ OT pending progress toward goals  Goals   Patient Goals To take a shower   Plan   Treatment Interventions ADL retraining;Functional transfer training;UE strengthening/ROM; Endurance training;Patient/family training;Equipment evaluation/education; Activityengagement   Goal Expiration Date 05/02/23   OT Treatment Day 1   OT Frequency 3-5x/wk   Recommendation   OT Discharge Recommendation Post acute rehabilitation services  (STR vs Home w/ OT pending progress)   AM-PAC Daily Activity Inpatient   Lower Body Dressing 2   Bathing 2   Toileting 2   Upper Body Dressing 3   Grooming 4   Eating 4   Daily Activity Raw Score 17   Daily Activity Standardized Score (Calc for Raw Score >=11) 37 26   AM-[facility] Applied Cognition Inpatient   Following a Speech/Presentation 4   Understanding Ordinary Conversation 4   Taking Medications 3   Remembering Where Things Are Placed or Put Away 3   Remembering List of 4-5 Errands 2   Taking Care of Complicated Tasks 3   Applied Cognition Raw Score 19   Applied Cognition Standardized Score 39 77   Additional Treatment Session   Start Time 0825   End Time 7696   Treatment Assessment Pt seen for OT tx session s/p evaluation w/ focus on activity tolerance and transfers  STS from recliner w/ Mod and increased time  Cues for hand placement  In stance performed alternating standing marching to decrease dizziness from position change and improve wellness/activity tolerance  F- balance  Stand tolerance 2 mins  Discussed taking time and making slow transitions to allow BP's to regulate  Also reinforced alternating and dropping HGB levels   Educated pt on spending time OOB and ambulating to bathroom w/ assist from staff as tolerated vs using BSC placed a limited distance from chair; progress as able  Would benefit from being on restorative program as well due to functional decline and deconditioning from ongoing hospital stay  Remains in chair w/ all needs         GOALS:     Pt will complete UB ADL's w/ MI after s/u    Pt will complete LB ADL's w/ Min     Pt will complete toileting including hygiene and clothing management w/ Min    Pt will complete functional transfers w/ MI using RW    Pt will complete dynamic and unilateral stand balance w/ F+ for safe clothing management     Pt will improve stand tolerance to 4-6 mins for safety w/ ADL tasks     Pt will improve activity tolerance to F/F+ for 20- 30 min tx session for increased engagement in functional tasks     Pt will achieve UE MMT 4+/5 to increase independence w/ ADL txfs      After education, pt will verbalize 3 energy conservation techs to utilize to increase activity tolerance during functional tasks       Rosa Isela ALBERTO, OTR/L

## 2023-04-19 LAB
ANION GAP SERPL CALCULATED.3IONS-SCNC: 6 MMOL/L (ref 4–13)
BUN SERPL-MCNC: 12 MG/DL (ref 5–25)
CALCIUM SERPL-MCNC: 8.9 MG/DL (ref 8.4–10.2)
CHLORIDE SERPL-SCNC: 107 MMOL/L (ref 96–108)
CO2 SERPL-SCNC: 25 MMOL/L (ref 21–32)
CREAT SERPL-MCNC: 0.96 MG/DL (ref 0.6–1.3)
ERYTHROCYTE [DISTWIDTH] IN BLOOD BY AUTOMATED COUNT: 16.9 % (ref 11.6–15.1)
GFR SERPL CREATININE-BSD FRML MDRD: 76 ML/MIN/1.73SQ M
GLUCOSE SERPL-MCNC: 128 MG/DL (ref 65–140)
GLUCOSE SERPL-MCNC: 159 MG/DL (ref 65–140)
GLUCOSE SERPL-MCNC: 167 MG/DL (ref 65–140)
GLUCOSE SERPL-MCNC: 240 MG/DL (ref 65–140)
GLUCOSE SERPL-MCNC: 242 MG/DL (ref 65–140)
HCT VFR BLD AUTO: 27 % (ref 36.5–49.3)
HGB BLD-MCNC: 8.6 G/DL (ref 12–17)
MCH RBC QN AUTO: 29.3 PG (ref 26.8–34.3)
MCHC RBC AUTO-ENTMCNC: 31.9 G/DL (ref 31.4–37.4)
MCV RBC AUTO: 92 FL (ref 82–98)
PLATELET # BLD AUTO: 289 THOUSANDS/UL (ref 149–390)
PMV BLD AUTO: 10.4 FL (ref 8.9–12.7)
POTASSIUM SERPL-SCNC: 4.6 MMOL/L (ref 3.5–5.3)
RBC # BLD AUTO: 2.94 MILLION/UL (ref 3.88–5.62)
SODIUM SERPL-SCNC: 138 MMOL/L (ref 135–147)
WBC # BLD AUTO: 6.41 THOUSAND/UL (ref 4.31–10.16)

## 2023-04-19 RX ORDER — OXYBUTYNIN CHLORIDE 5 MG/1
5 TABLET ORAL 3 TIMES DAILY
Status: DISCONTINUED | OUTPATIENT
Start: 2023-04-19 | End: 2023-04-22 | Stop reason: HOSPADM

## 2023-04-19 RX ORDER — POLYETHYLENE GLYCOL 3350 17 G/17G
238 POWDER, FOR SOLUTION ORAL ONCE
Status: COMPLETED | OUTPATIENT
Start: 2023-04-19 | End: 2023-04-19

## 2023-04-19 RX ADMIN — ASPIRIN 81 MG: 81 TABLET, COATED ORAL at 08:33

## 2023-04-19 RX ADMIN — INSULIN LISPRO 1 UNITS: 100 INJECTION, SOLUTION INTRAVENOUS; SUBCUTANEOUS at 08:33

## 2023-04-19 RX ADMIN — MIDODRINE HYDROCHLORIDE 5 MG: 5 TABLET ORAL at 11:51

## 2023-04-19 RX ADMIN — CLOPIDOGREL BISULFATE 75 MG: 75 TABLET, FILM COATED ORAL at 08:33

## 2023-04-19 RX ADMIN — FLUTICASONE PROPIONATE 1 SPRAY: 50 SPRAY, METERED NASAL at 08:33

## 2023-04-19 RX ADMIN — INSULIN LISPRO 3 UNITS: 100 INJECTION, SOLUTION INTRAVENOUS; SUBCUTANEOUS at 16:39

## 2023-04-19 RX ADMIN — FINASTERIDE 5 MG: 5 TABLET, FILM COATED ORAL at 08:33

## 2023-04-19 RX ADMIN — ESCITALOPRAM OXALATE 10 MG: 10 TABLET ORAL at 08:33

## 2023-04-19 RX ADMIN — ZONISAMIDE 50 MG: 25 CAPSULE ORAL at 08:33

## 2023-04-19 RX ADMIN — OXYBUTYNIN CHLORIDE 5 MG: 5 TABLET ORAL at 16:39

## 2023-04-19 RX ADMIN — ALLOPURINOL 100 MG: 100 TABLET ORAL at 08:33

## 2023-04-19 RX ADMIN — OXYBUTYNIN CHLORIDE 5 MG: 5 TABLET ORAL at 08:33

## 2023-04-19 RX ADMIN — MIDODRINE HYDROCHLORIDE 5 MG: 5 TABLET ORAL at 16:39

## 2023-04-19 RX ADMIN — CYANOCOBALAMIN TAB 500 MCG 500 MCG: 500 TAB at 08:33

## 2023-04-19 RX ADMIN — POLYETHYLENE GLYCOL 3350 17 G: 17 POWDER, FOR SOLUTION ORAL at 08:33

## 2023-04-19 RX ADMIN — PANTOPRAZOLE SODIUM 40 MG: 40 TABLET, DELAYED RELEASE ORAL at 16:40

## 2023-04-19 RX ADMIN — POLYETHYLENE GLYCOL 3350 17 G: 17 POWDER, FOR SOLUTION ORAL at 23:05

## 2023-04-19 RX ADMIN — PANTOPRAZOLE SODIUM 40 MG: 40 TABLET, DELAYED RELEASE ORAL at 06:28

## 2023-04-19 RX ADMIN — POLYETHYLENE GLYCOL 3350 238 G: 17 POWDER, FOR SOLUTION ORAL at 19:58

## 2023-04-19 RX ADMIN — MIDODRINE HYDROCHLORIDE 5 MG: 5 TABLET ORAL at 06:28

## 2023-04-19 RX ADMIN — LEVOTHYROXINE SODIUM 112 MCG: 112 TABLET ORAL at 06:28

## 2023-04-19 RX ADMIN — ATORVASTATIN CALCIUM 80 MG: 80 TABLET, FILM COATED ORAL at 16:40

## 2023-04-19 RX ADMIN — INSULIN LISPRO 3 UNITS: 100 INJECTION, SOLUTION INTRAVENOUS; SUBCUTANEOUS at 11:51

## 2023-04-19 RX ADMIN — OXYBUTYNIN CHLORIDE 5 MG: 5 TABLET ORAL at 23:05

## 2023-04-19 NOTE — ASSESSMENT & PLAN NOTE
· Limited Echo 3/9: EF 65%  · Echo 3/31/22: moderate AS   · Repeat echo outpatient with cardiology   · Murmur noted on exam   · Monitor volume status, holding torsemide  · Euvolemic

## 2023-04-19 NOTE — ASSESSMENT & PLAN NOTE
Lab Results   Component Value Date    EGFR 76 04/19/2023    EGFR 84 04/17/2023    EGFR 86 04/16/2023    CREATININE 0 96 04/19/2023    CREATININE 0 86 04/17/2023    CREATININE 0 80 04/16/2023   stable   Monitor BMP

## 2023-04-19 NOTE — ASSESSMENT & PLAN NOTE
Lab Results   Component Value Date    HGB 8 6 (L) 04/19/2023    HGB 9 4 (L) 04/18/2023    HGB 8 2 (L) 04/18/2023    HGB 9 6 (L) 04/17/2023    HGB 6 8 (LL) 04/17/2023    HGB 7 3 (L) 04/16/2023     · Possibly 2/2 diverticular bleed, although no evidence of bleeding seen in any of the diverticula during colonoscopy  ·  needs further eval with capsule endoscopy , planning to complete capsule endoscopy tomorrow here   · Clear liquids now and NPO after midnight   · 1 unit PRBC given 4/17  · Monitor for evidence of ongoing bleeding   · Normal BM yesterday evening without presence of blood, morales catheter clear yellow urine   · CBC in AM

## 2023-04-19 NOTE — ASSESSMENT & PLAN NOTE
· Patient noted to have urinary retention while in the ED at [address]  · PVR showed over 800 cc of retained urine;  Jose catheter inserted 04/14   · Continue finasteride; monitor ins and outs  · Appreciate urology consult recommendations  · No plan for void trial, needs continued bladder drainage  · Consideration for placement of SPT outpatient, he is a follow-up with urology 5/10  · Patient having some catheter discomfort last evening  · Appreciate urology input this morning  · Ordered ditropan this morning   · Fortunately no gross hematuria continues to drain clear yellow urine  · Continue manual hand irrigation every 4 hours

## 2023-04-19 NOTE — ASSESSMENT & PLAN NOTE
· He was recently started on midodrine during hospitalization in March   · Continue midodrine 5 mg tid   · Off HTN meds   · Monitor VS

## 2023-04-19 NOTE — PROGRESS NOTES
[address] [place] [address]  Progress Note  Name: Marci Bliss  MRN: [id number]  Unit/Bed#: E5 -01 I Date of Admission: 4/14/2023   Date of Service: 4/19/2023 I Hospital Day: 5    Assessment/Plan   * Acute GI bleeding  Assessment & Plan  · Patient presented to [address] [place] [address] 04/14 with complaints of rectal bleeding; he notes the passage of a dark clot   transferred to [facility] for GI eval   ·  patient had recent hospitalization for GI bleed in March thought to be diverticular in nature   · S/p EGD and Colonoscopy 4/16  · EGD normal, no evidence of bleeding   · Colonoscopy: Multiple small and large, moderate extensive pancolonic diverticula, small internal hemorrhoids    · Possibly diverticular bleed however needs further evaluation with capsule endoscopy   · Planning for capsule endoscopy here at [facility] tomorrow  · Clear liquids today and n p o  after midnight  · Had a normal bowel movement last evening without presence of blood or melena  · Monitor closely for evidence of ongoing bleeding   · Continue monitor H&H  · Okay to continue dual antiplatelet therapy per GI          Benign prostatic hyperplasia with urinary retention  Assessment & Plan  · Patient noted to have urinary retention while in the ED at [address] [place] [address]  · PVR showed over 800 cc of retained urine;  Jose catheter inserted 04/14   · Continue finasteride; monitor ins and outs  · Appreciate urology consult recommendations  · No plan for void trial, needs continued bladder drainage  · Consideration for placement of SPT outpatient, he is a follow-up with urology 5/10  · Patient having some catheter discomfort last evening  · Appreciate urology input this morning  · Ordered ditropan this morning   · Fortunately no gross hematuria continues to drain clear yellow urine  · Continue manual hand irrigation every 4 hours      Hypothyroidism  Assessment & Plan  · Continue levothyroxine 112 mcg daily    Acute blood loss anemia  Assessment & Plan  Lab Results   Component Value Date    HGB 8 6 (L) 04/19/2023    HGB 9 4 (L) 04/18/2023    HGB 8 2 (L) 04/18/2023    HGB 9 6 (L) 04/17/2023    HGB 6 8 (LL) 04/17/2023    HGB 7 3 (L) 04/16/2023     · Possibly 2/2 diverticular bleed, although no evidence of bleeding seen in any of the diverticula during colonoscopy  ·  needs further eval with capsule endoscopy , planning to complete capsule endoscopy tomorrow here   · Clear liquids now and NPO after midnight   · 1 unit PRBC given 4/17  · Monitor for evidence of ongoing bleeding   · Normal BM yesterday evening without presence of blood, morales catheter clear yellow urine   · CBC in AM     S/P cardiac catheterization  Assessment & Plan  · With [facility] 12/2022 ; most recent ECHO with EF 65%   · Continue DAPT, statin   · No longer on BB given orthostatic hypotension       Stage 3a chronic kidney disease [facility])  Assessment & Plan  Lab Results   Component Value Date    EGFR 76 04/19/2023    EGFR 84 04/17/2023    EGFR 86 04/16/2023    CREATININE 0 96 04/19/2023    CREATININE 0 86 04/17/2023    CREATININE 0 80 04/16/2023   stable   Monitor BMP     Aortic stenosis, moderate  Assessment & Plan  · Limited Echo 3/9: EF 65%  · Echo 3/31/22: moderate AS   · Repeat echo outpatient with cardiology   · Murmur noted on exam   · Monitor volume status, holding torsemide  · Euvolemic      Coronary artery disease of native artery of native heart with stable angina pectoris ([facility] [address] )  Assessment & Plan  · With history of coronary artery disease; status post stents in 2014 and 12/2022  · Currently on DAPT therapy; okay to continue per Gi   · No chest pain   · No longer on BB given orthostatic hypotension       Tremor, essential  Assessment & Plan  · Continue home Zonisamide 50 mg QD     Hypertension  Assessment & Plan  · He was recently started on midodrine during hospitalization in March   · Continue midodrine 5 mg tid   · Off HTN meds   · Monitor VS     Hyperlipidemia  Assessment & Plan  · Continue high intensity statin    Gout  Assessment & Plan  · Hx of gout on allopurinol     Type 2 diabetes mellitus with hyperglycemia, without long-term current use of insulin [facility])  Assessment & Plan  Lab Results   Component Value Date    HGBA1C 5 6 03/10/2023       Recent Labs     04/18/23  1122 04/18/23  1558 04/18/23 2058 04/19/23  0719   POCGLU 181* 126 140 159*       Blood Sugar Average: Last 72 hrs:  (P) 161 [id number]   · SSI; accuchecks   · Hold oral meds   · Clear liquids today and NPO after midnight   · Hypoglycemia protocol   · A1c well controlled         Depression with anxiety  Assessment & Plan  · Mood stable   · Continue home Lexapro 10 mg QD              VTE Pharmacologic Prophylaxis:   Moderate Risk (Score 3-4) - Pharmacological DVT Prophylaxis Contraindicated  Sequential Compression Devices Ordered  Patient Centered Rounds: I performed bedside rounds with nursing staff today  Discussions with Specialists or Other Care Team Provider: Uroogy  , GI     Education and Discussions with Family / Patient: Updated  (wife) via phone  Total Time Spent on Date of Encounter in care of patient: 25 minutes This time was spent on one or more of the following: performing physical exam; counseling and coordination of care; obtaining or reviewing history; documenting in the medical record; reviewing/ordering tests, medications or procedures; communicating with other healthcare professionals and discussing with patient's family/caregivers  Current Length of Stay: 5 day(s)  Current Patient Status: Inpatient   Certification Statement: The patient will continue to require additional inpatient hospital stay due to GI bleed for capsule endoscopy tomorrow   Discharge Plan: Anticipate discharge in 24-48 hrs to home  Code Status: Level 1 - Full Code    Subjective:   Doing well  Was having catheter pain last evening    He states the pain has improved somewhat but is still having some discomfort  No gross hematuria noted and is draining clear yellow urine at this time  No abdominal pain nausea vomiting chest pain shortness of breath lightheadedness or dizziness  He had a bowel movement last evening that was normal no blood or melena present  He is understanding for the plan for clear liquids throughout the day, n p o  after midnight and capsule endoscopy tomorrow  Objective:     Vitals:   Temp (24hrs), Av 8 °F (36 6 °C), Min:97 4 °F (36 3 °C), Max:98 5 °F (36 9 °C)    Temp:  [97 4 °F (36 3 °C)-98 5 °F (36 9 °C)] 97 8 °F (36 6 °C)  HR:  [63-91] 63  Resp:  [17-18] 18  BP: ()/(57-80) 121/57  SpO2:  [92 %-97 %] 96 %  There is no height or weight on file to calculate BMI  Input and Output Summary (last 24 hours): Intake/Output Summary (Last 24 hours) at 2023 0812  Last data filed at 2023 0631  Gross per 24 hour   Intake 240 ml   Output 3370 ml   Net -3130 ml       Physical Exam:   Physical Exam  Vitals and nursing note reviewed  Constitutional:       General: He is not in acute distress  Appearance: He is not ill-appearing or toxic-appearing  Cardiovascular:      Rate and Rhythm: Normal rate and regular rhythm  Heart sounds: Murmur heard  Pulmonary:      Effort: Pulmonary effort is normal  No respiratory distress  Breath sounds: Normal breath sounds  No wheezing  Abdominal:      General: Bowel sounds are normal  There is no distension  Palpations: Abdomen is soft  Tenderness: There is no abdominal tenderness  There is no guarding or rebound  Musculoskeletal:      Right lower leg: No edema  Left lower leg: No edema  Skin:     General: Skin is warm  Neurological:      Mental Status: He is alert  Mental status is at baseline     Psychiatric:         Mood and Affect: Mood normal           Additional Data:     Labs:  Results from last 7 days   Lab Units 23  0546 23  1835 23  0538   WBC Thousand/uL 6 41   < > 5 49 HEMOGLOBIN g/dL 8 6*   < > 6 8*   HEMATOCRIT % 27 0*   < > 22 1*   PLATELETS Thousands/uL 289   < > 318   NEUTROS PCT %  --   --  57   LYMPHS PCT %  --   --  26   MONOS PCT %  --   --  10   EOS PCT %  --   --  5    < > = values in this interval not displayed  Results from last 7 days   Lab Units 04/19/23  0546 04/17/23  0538 04/16/23  0513   SODIUM mmol/L 138   < > 140   POTASSIUM mmol/L 4 6   < > 3 7   CHLORIDE mmol/L 107   < > 107   CO2 mmol/L 25   < > 27   BUN mg/dL 12   < > 16   CREATININE mg/dL 0 96   < > 0 80   ANION GAP mmol/L 6   < > 6   CALCIUM mg/dL 8 9   < > 8 1*   ALBUMIN g/dL  --   --  3 1*   TOTAL BILIRUBIN mg/dL  --   --  0 31   ALK PHOS U/L  --   --  60   ALT U/L  --   --  20   AST U/L  --   --  16   GLUCOSE RANDOM mg/dL 167*   < > 114    < > = values in this interval not displayed           Results from last 7 days   Lab Units 04/19/23  0719 04/18/23 2058 04/18/23  1558 04/18/23  1122 04/18/23  0709 04/17/23  2118 04/17/23  1615 04/17/23  1121 04/17/23  0726 04/16/23  2055 04/16/23  1752 04/16/23  1108   POC GLUCOSE mg/dl 159* 140 126 181* 159* 194* 159* 217* 137 122 290* 135         Results from last 7 days   Lab Units 04/14/23  1010 04/14/23  0810   LACTIC ACID mmol/L 2 1* 3 5*       Lines/Drains:  Invasive Devices     Peripheral Intravenous Line  Duration           Peripheral IV 04/17/23 Left;Ventral (anterior) Forearm 1 day          Drain  Duration           Urethral Catheter Coude 16 Fr  4 days              Urinary Catheter:  Goal for removal: N/A - Chronic Jose               Imaging: Reviewed all imaging since admission    Recent Cultures (last 7 days):         Last 24 Hours Medication List:   Current Facility-Administered Medications   Medication Dose Route Frequency Provider Last Rate   • acetaminophen  650 mg Oral Q8H PRN Azeem Delarosa DO     • allopurinol  100 mg Oral Daily Estefany Ramos PA-C     • aspirin  81 mg Oral Daily Estefany Ramos PA-C     • atorvastatin  80 mg Oral Daily With Ysabel Stewart PA-C     • clopidogrel  75 mg Oral Daily [place], DO     • cyanocobalamin  500 mcg Oral Daily Azeem Aiad, DO     • escitalopram  10 mg Oral Daily Azeem Aiad, DO     • finasteride  5 mg Oral Daily Azeem Aiad, DO     • fluticasone  1 spray Nasal Daily Azeem Aiad, DO     • insulin lispro  1-6 Units Subcutaneous 4x Daily (AC & HS) [place], DO     • levothyroxine  112 mcg Oral Early Morning Azeem Aiad, DO     • midodrine  5 mg Oral TID AC Estefany Ramos PA-C     • oxybutynin  5 mg Oral TID Xavier Campos PA-C     • pantoprazole  40 mg Oral BID AC [facility], DO     • polyethylene glycol  17 g Oral BID Kelli Cordero MD     • zonisamide  50 mg Oral Daily Pam Morris DO          Today, Patient Was Seen By: Xavier Campos PA-C    **Please Note: This note may have been constructed using a voice recognition system  **

## 2023-04-19 NOTE — ASSESSMENT & PLAN NOTE
· Patient presented to [address] [facility] 04/14 with complaints of rectal bleeding; he notes the passage of a dark clot    transferred to [facility] for GI eval   ·  patient had recent hospitalization for GI bleed in March thought to be diverticular in nature   · S/p EGD and Colonoscopy 4/16  · EGD normal, no evidence of bleeding   · Colonoscopy: Multiple small and large, moderate extensive pancolonic diverticula, small internal hemorrhoids    · Possibly diverticular bleed however needs further evaluation with capsule endoscopy   · Planning for capsule endoscopy here at [facility] tomorrow  · Clear liquids today and n p o  after midnight  · Had a normal bowel movement last evening without presence of blood or melena  · Monitor closely for evidence of ongoing bleeding   · Continue monitor H&H  · Okay to continue dual antiplatelet therapy per GI

## 2023-04-19 NOTE — ASSESSMENT & PLAN NOTE
· With history of coronary artery disease; status post stents in 2014 and 12/2022  · Currently on DAPT therapy; okay to continue per Gi   · No chest pain   · No longer on BB given orthostatic hypotension

## 2023-04-19 NOTE — PLAN OF CARE
Medications: Problem: PAIN - ADULT  Goal: Verbalizes/displays adequate comfort level or baseline comfort level  Description: Interventions:  - Encourage patient to monitor pain and request assistance  - Assess pain using appropriate pain scale  - Administer analgesics based on type and severity of pain and evaluate response  - Implement non-pharmacological measures as appropriate and evaluate response  - Consider cultural and social influences on pain and pain management  - Notify physician/advanced practitioner if interventions unsuccessful or patient reports new pain  Outcome: Progressing

## 2023-04-19 NOTE — TREATMENT PLAN
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, now catheter dependent  The night shift AP was contacted by the nurse for gross hematuria and catheter obstruction  She was instructed deflate, reinflate and/or adjust catheter position which was successful at achieving Jose catheter drainage  However at 6:38 AM was contacted again by bedside nurse Nisa Hitchcock to communicate persistent Jose catheter drainage issues  Given my lack of familiarity with patient I conducted a chart review  Patient is currently afebrile, hemodynamically stable  He expresses some suprapubic discomfort per nurse report  His hemoglobin on 4/17 was 6 8 and patient was transfused  His hemoglobin within the past 2 draws has remained relatively stable between 8 2-8 6  Of note, there are no urine studies or  tract imaging  Furthermore, patient is currently receiving Plavix for history of drug-eluting stents  He is also admitted for GI bleeding and is awaiting capsule endoscopy, scheduled for Thursday  Fortunately, despite this, Jose bag remains patent for grossly clear yellow urine  Plan:  · Maintain Jose catheter to straight drainage  · Nursing staff can continue manual irrigation 4 times daily  · We will add Ditropan for spasms  · There is no indication for aggressive irrigation such as CBI or  surgical manipulation  · Patient will be seen in follow-up by primary urologist  at [facility] Dr Ro Raines,  5/10 as scheduled

## 2023-04-19 NOTE — PROGRESS NOTES
4/14/2023  Aurelia Villalpando is a 69 y o  male  [id number]    Diagnosis: urinary retention s/t hypotonic bladder  Poor catheter drainage overnight with retention reportedly a bladder scan of 700cc he tells me but I see no documentation of this and on I/O chart appears he had over a liter output overnight  He feels fullness pain and spasm in the bladder and penis which he has not felt with catheters before  Possible catheter/balloon malfunction or occluded  Will troubleshoot and irrigate the catheter, or simply exchange it  Ditropan also added for bladder spasm pains  Plan:  Irrigated catheter with 60cc saline flushes in easily but with no return  Balloon deflated/inflated and repositioned without benefit  Morales catheter removed after deflation of balloon  Reinserted new 16Fr coude morales catheter for yellow urine return and flushed with 60cc saline left to gravity drainage  Total 300cc out  Will reassess adequacy of drainage later today  Vitals:    04/18/23 2256 04/18/23 2257 04/19/23 0312 04/19/23 0719   BP: 124/71 124/71 104/64 121/57   BP Location: Right arm Right arm Right arm    Pulse: 69 73 70 63   Resp: 18  17 18   Temp: (!) 97 4 °F (36 3 °C) 98 5 °F (36 9 °C) 97 7 °F (36 5 °C) 97 8 °F (36 6 °C)   TempSrc: Oral Oral Oral    SpO2: 94% 96% 93% 96%             Universal Protocol:  Procedure performed by: (Eddie ROBERTS (urology))  Consent: Verbal consent obtained  Risks and benefits: risks, benefits and alternatives were discussed  Consent given by: patient      Bladder catheterization    Date/Time: 4/19/2023 10:45 AM  Performed by: Orion Gilmore PA-C  Authorized by: Orion Gilmore PA-C     Patient location:  Bedside  Consent:     Consent given by:  Patient  Anesthesia (see MAR for exact dosages):      Anesthesia method:  None  Procedure details:     Bladder irrigation: yes      Catheter insertion:  Indwelling    Approach: natural orifice      Catheter type:  Coude    Catheter size:  16 Fr    Number of attempts:  1    Successful placement: yes      Urine characteristics:  Cloudy    Provider performed due to:  Nurse unable to complete  Post-procedure details:     Patient tolerance of procedure:   Tolerated well, no immediate complications            Monique Womack PA-C

## 2023-04-20 ENCOUNTER — HOSPITAL ENCOUNTER (OUTPATIENT)
Dept: GASTROENTEROLOGY | Facility: HOSPITAL | Age: 78
Discharge: HOME | End: 2023-04-20

## 2023-04-20 DIAGNOSIS — K92.2 ACUTE GI BLEEDING: ICD-10-CM

## 2023-04-20 DIAGNOSIS — D64.9 ANEMIA, UNSPECIFIED TYPE: ICD-10-CM

## 2023-04-20 LAB
BACTERIA UR QL AUTO: ABNORMAL /HPF
BILIRUB UR QL STRIP: NEGATIVE
CLARITY UR: ABNORMAL
COLOR UR: ABNORMAL
ERYTHROCYTE [DISTWIDTH] IN BLOOD BY AUTOMATED COUNT: 16.4 % (ref 11.6–15.1)
GLUCOSE SERPL-MCNC: 157 MG/DL (ref 65–140)
GLUCOSE SERPL-MCNC: 174 MG/DL (ref 65–140)
GLUCOSE SERPL-MCNC: 190 MG/DL (ref 65–140)
GLUCOSE SERPL-MCNC: 298 MG/DL (ref 65–140)
GLUCOSE UR STRIP-MCNC: NEGATIVE MG/DL
HCT VFR BLD AUTO: 27.5 % (ref 36.5–49.3)
HGB BLD-MCNC: 8.5 G/DL (ref 12–17)
HGB UR QL STRIP.AUTO: NEGATIVE
KETONES UR STRIP-MCNC: NEGATIVE MG/DL
LEUKOCYTE ESTERASE UR QL STRIP: ABNORMAL
MCH RBC QN AUTO: 28.6 PG (ref 26.8–34.3)
MCHC RBC AUTO-ENTMCNC: 30.9 G/DL (ref 31.4–37.4)
MCV RBC AUTO: 93 FL (ref 82–98)
NITRITE UR QL STRIP: NEGATIVE
NON-SQ EPI CELLS URNS QL MICRO: ABNORMAL /HPF
OTHER STN SPEC: ABNORMAL
PH UR STRIP.AUTO: 7 [PH]
PLATELET # BLD AUTO: 339 THOUSANDS/UL (ref 149–390)
PMV BLD AUTO: 9.5 FL (ref 8.9–12.7)
PROT UR STRIP-MCNC: ABNORMAL MG/DL
RBC # BLD AUTO: 2.97 MILLION/UL (ref 3.88–5.62)
RBC #/AREA URNS AUTO: ABNORMAL /HPF
SP GR UR STRIP.AUTO: 1.01 (ref 1–1.03)
UROBILINOGEN UR QL STRIP.AUTO: 0.2 E.U./DL
WBC # BLD AUTO: 6.25 THOUSAND/UL (ref 4.31–10.16)
WBC #/AREA URNS AUTO: ABNORMAL /HPF

## 2023-04-20 RX ORDER — LIDOCAINE HYDROCHLORIDE 20 MG/ML
1 JELLY TOPICAL DAILY PRN
Status: DISCONTINUED | OUTPATIENT
Start: 2023-04-20 | End: 2023-04-22 | Stop reason: HOSPADM

## 2023-04-20 RX ORDER — CEFAZOLIN SODIUM 2 G/50ML
2000 SOLUTION INTRAVENOUS EVERY 8 HOURS
Status: DISCONTINUED | OUTPATIENT
Start: 2023-04-20 | End: 2023-04-21

## 2023-04-20 RX ORDER — LIDOCAINE HYDROCHLORIDE 20 MG/ML
1 JELLY TOPICAL DAILY PRN
Status: DISCONTINUED | OUTPATIENT
Start: 2023-04-20 | End: 2023-04-20

## 2023-04-20 RX ADMIN — MIDODRINE HYDROCHLORIDE 5 MG: 5 TABLET ORAL at 06:18

## 2023-04-20 RX ADMIN — CEFAZOLIN SODIUM 2000 MG: 2 SOLUTION INTRAVENOUS at 03:22

## 2023-04-20 RX ADMIN — FLUTICASONE PROPIONATE 1 SPRAY: 50 SPRAY, METERED NASAL at 09:57

## 2023-04-20 RX ADMIN — CEFAZOLIN SODIUM 2000 MG: 2 SOLUTION INTRAVENOUS at 11:49

## 2023-04-20 RX ADMIN — CLOPIDOGREL BISULFATE 75 MG: 75 TABLET, FILM COATED ORAL at 09:57

## 2023-04-20 RX ADMIN — OXYBUTYNIN CHLORIDE 5 MG: 5 TABLET ORAL at 21:32

## 2023-04-20 RX ADMIN — PANTOPRAZOLE SODIUM 40 MG: 40 TABLET, DELAYED RELEASE ORAL at 06:18

## 2023-04-20 RX ADMIN — OXYBUTYNIN CHLORIDE 5 MG: 5 TABLET ORAL at 09:57

## 2023-04-20 RX ADMIN — POLYETHYLENE GLYCOL 3350 17 G: 17 POWDER, FOR SOLUTION ORAL at 21:32

## 2023-04-20 RX ADMIN — INSULIN LISPRO 4 UNITS: 100 INJECTION, SOLUTION INTRAVENOUS; SUBCUTANEOUS at 16:09

## 2023-04-20 RX ADMIN — ESCITALOPRAM OXALATE 10 MG: 10 TABLET ORAL at 09:57

## 2023-04-20 RX ADMIN — LEVOTHYROXINE SODIUM 112 MCG: 112 TABLET ORAL at 06:18

## 2023-04-20 RX ADMIN — CYANOCOBALAMIN TAB 500 MCG 500 MCG: 500 TAB at 09:57

## 2023-04-20 RX ADMIN — OXYBUTYNIN CHLORIDE 5 MG: 5 TABLET ORAL at 16:09

## 2023-04-20 RX ADMIN — ASPIRIN 81 MG: 81 TABLET, COATED ORAL at 09:57

## 2023-04-20 RX ADMIN — MIDODRINE HYDROCHLORIDE 5 MG: 5 TABLET ORAL at 16:09

## 2023-04-20 RX ADMIN — ATORVASTATIN CALCIUM 80 MG: 80 TABLET, FILM COATED ORAL at 16:09

## 2023-04-20 RX ADMIN — ZONISAMIDE 50 MG: 25 CAPSULE ORAL at 09:57

## 2023-04-20 RX ADMIN — INSULIN LISPRO 2 UNITS: 100 INJECTION, SOLUTION INTRAVENOUS; SUBCUTANEOUS at 21:32

## 2023-04-20 RX ADMIN — PANTOPRAZOLE SODIUM 40 MG: 40 TABLET, DELAYED RELEASE ORAL at 16:09

## 2023-04-20 RX ADMIN — FINASTERIDE 5 MG: 5 TABLET, FILM COATED ORAL at 09:57

## 2023-04-20 RX ADMIN — CEFAZOLIN SODIUM 2000 MG: 2 SOLUTION INTRAVENOUS at 19:07

## 2023-04-20 RX ADMIN — LIDOCAINE HYDROCHLORIDE 1 APPLICATION.: 20 JELLY TOPICAL at 03:22

## 2023-04-20 RX ADMIN — MIDODRINE HYDROCHLORIDE 5 MG: 5 TABLET ORAL at 11:49

## 2023-04-20 RX ADMIN — ALLOPURINOL 100 MG: 100 TABLET ORAL at 09:57

## 2023-04-20 NOTE — PHYSICAL THERAPY NOTE
Physical Therapy Treatment Note     04/20/23 1412   PT Last Visit   PT Visit Date 04/20/23   Note Type   Note Type Treatment   Pain Assessment   Pain Assessment Tool 0-10   Pain Score 3   Pain Location/Orientation Location: Abdomen; Location: Groin   Restrictions/Precautions   Weight Bearing Precautions Per Order No   Other Precautions Fall Risk;Telemetry   General   Chart Reviewed Yes   Subjective   Subjective Pt  agreeable to PT   Transfers   Sit to Stand 5  Supervision   Additional items Armrests; Increased time required   Stand to Sit 5  Supervision   Additional items Armrests; Increased time required   Stand pivot 5  Supervision   Additional items Increased time required   Ambulation/Elevation   Gait pattern Improper Weight shift; Wide MCKENZIE; Forward Flexion;Decreased foot clearance; Foward flexed; Excessively slow;Decreased heel strike;Decreased toe off   Gait Assistance 5  Supervision   Additional items Assist x 1   Assistive Device Rolling walker   Distance 140ft, 120ft   Stair Management Assistance 5  Supervision   Additional items Assist x 1; Increased time required;Verbal cues   Stair Management Technique Two rails; Step to pattern; Foreward;Nonreciprocal   Number of Stairs 2   Balance   Static Sitting Good   Dynamic Sitting Fair +   Static Standing Fair   Dynamic Standing Fair -   Ambulatory Fair -   Endurance Deficit   Endurance Deficit Yes   Endurance Deficit Description fatigue   Activity Tolerance   Activity Tolerance Patient tolerated treatment well   Nurse Made Aware Yes   Exercises   TKR Sitting;Bilateral;AROM;20 reps   Assessment   Prognosis Good   Problem List Decreased strength;Decreased range of motion;Decreased endurance; Impaired balance;Decreased mobility;Pain   Assessment Though patient reported no pain beginning of session facial grimmacing noted with initial standing  Pt  noted with mildly unstady gait with excess forward trunk flexion   Cues to stay closer to the RW during amb, Cues given for LE sequencing during stair negotiation ascending leading with LLE and descended with RLE  Though mildly ataxic gait patient had no gross LOB  Pt  needed seated rest betweeen amb  due to fatigue  Will continue to follow per PT POC   Barriers to Discharge None   Goals   Patient Goals None reported   STG Expiration Date 04/26/23   PT Treatment Day 2   Plan   Treatment/Interventions LE strengthening/ROM; Functional transfer training;Elevations; Therapeutic exercise;Spoke to nursing;Gait training;Equipment eval/education;Patient/family training   Progress Progressing toward goals   PT Frequency 3-5x/wk   Recommendation   PT Discharge Recommendation Home with home health rehabilitation   Equipment Recommended [address] Mobility Inpatient   Turning in Flat Bed Without Bedrails 4   Lying on Back to Sitting on Edge of Flat Bed Without Bedrails 4   Moving Bed to Chair 4   Standing Up From Chair Using Arms 4   Walk in Room 3   Climb 3-5 Stairs With Railing 3   Basic Mobility Inpatient Raw Score 22   Basic Mobility Standardized Score 47 4   Highest Level Of Mobility   JH-HLM Goal 7: Walk 25 feet or more   JH-HLM Achieved 8: Walk 250 feet ot more   End of Consult   Patient Position at End of Consult All needs within reach; Bedside chair;Bed/Chair alarm activated         Mi Fitch PTA    An AM-PAC basic mobility standardized score less than 42 9 suggest the patient may benefit from discharge to post-acute rehab services

## 2023-04-20 NOTE — QUICK NOTE
Nursing reported patient with drainage from penis, erythematous, and tender  Patient currently with morales in place    · Not fulfilling septic criteria  · UA ordered  · Will order ancef for now   · Urology following

## 2023-04-20 NOTE — PLAN OF CARE
Problem: PHYSICAL THERAPY ADULT  Goal: Performs mobility at highest level of function for planned discharge setting  See evaluation for individualized goals  Description: Treatment/Interventions: Functional transfer training, LE strengthening/ROM, Elevations, Therapeutic exercise, Endurance training, Patient/family training, Bed mobility, Gait training, Spoke to nursing  Equipment Recommended: Jennifer Thompson (pt has)       See flowsheet documentation for full assessment, interventions and recommendations  Outcome: Progressing  Note: Prognosis: Good  Problem List: Decreased strength, Decreased range of motion, Decreased endurance, Impaired balance, Decreased mobility, Pain  Assessment: Though patient reported no pain beginning of session facial grimmacing noted with initial standing  Pt  noted with mildly unstady gait with excess forward trunk flexion  Cues to stay closer to the RW during amb, Cues given for LE sequencing during stair negotiation ascending leading with LLE and descended with RLE  Though mildly ataxic gait patient had no gross LOB  Pt  needed seated rest betweeen amb  due to fatigue  Will continue to follow per PT POC  Barriers to Discharge: None     PT Discharge Recommendation: Home with home health rehabilitation    See flowsheet documentation for full assessment

## 2023-04-20 NOTE — PLAN OF CARE
Problem: PAIN - ADULT  Goal: Verbalizes/displays adequate comfort level or baseline comfort level  Description: Interventions:  - Encourage patient to monitor pain and request assistance  - Assess pain using appropriate pain scale  - Administer analgesics based on type and severity of pain and evaluate response  - Implement non-pharmacological measures as appropriate and evaluate response  - Consider cultural and social influences on pain and pain management  - Notify physician/advanced practitioner if interventions unsuccessful or patient reports new pain  Outcome: Progressing     Problem: HEMATOLOGIC - ADULT  Goal: Maintains hematologic stability  Description: INTERVENTIONS  - Assess for signs and symptoms of bleeding or hemorrhage  - Monitor labs  - Administer supportive blood products/factors as ordered and appropriate  Outcome: Progressing     Problem: CARDIOVASCULAR - ADULT  Goal: Maintains optimal cardiac output and hemodynamic stability  Description: INTERVENTIONS:  - Monitor I/O, vital signs and rhythm  - Monitor for S/S and trends of decreased cardiac output  - Administer and titrate ordered vasoactive medications to optimize hemodynamic stability  - Assess quality of pulses, skin color and temperature  - Assess for signs of decreased coronary artery perfusion  - Instruct patient to report change in severity of symptoms  Outcome: Progressing     Problem: GASTROINTESTINAL - ADULT  Goal: Maintains or returns to baseline bowel function  Description: INTERVENTIONS:  - Assess bowel function  - Encourage oral fluids to ensure adequate hydration  - Administer IV fluids if ordered to ensure adequate hydration  - Administer ordered medications as needed  - Encourage mobilization and activity  - Consider nutritional services referral to assist patient with adequate nutrition and appropriate food choices  Outcome: Progressing     Problem: METABOLIC, FLUID AND ELECTROLYTES - ADULT  Goal: Electrolytes maintained within normal limits  Description: INTERVENTIONS:  - Monitor labs and assess patient for signs and symptoms of electrolyte imbalances  - Administer electrolyte replacement as ordered  - Monitor response to electrolyte replacements, including repeat lab results as appropriate  - Instruct patient on fluid and nutrition as appropriate  Outcome: Progressing  Goal: Fluid balance maintained  Description: INTERVENTIONS:  - Monitor labs   - Monitor I/O and WT  - Instruct patient on fluid and nutrition as appropriate  - Assess for signs & symptoms of volume excess or deficit  Outcome: Progressing  Goal: Glucose maintained within target range  Description: INTERVENTIONS:  - Monitor Blood Glucose as ordered  - Assess for signs and symptoms of hyperglycemia and hypoglycemia  - Administer ordered medications to maintain glucose within target range  - Assess nutritional intake and initiate nutrition service referral as needed  Outcome: Progressing     Problem: MUSCULOSKELETAL - ADULT  Goal: Maintain or return mobility to safest level of function  Description: INTERVENTIONS:  - Assess patient's ability to carry out ADLs; assess patient's baseline for ADL function and identify physical deficits which impact ability to perform ADLs (bathing, care of mouth/teeth, toileting, grooming, dressing, etc )  - Assess/evaluate cause of self-care deficits   - Assess range of motion  - Assess patient's mobility  - Assess patient's need for assistive devices and provide as appropriate  - Encourage maximum independence but intervene and supervise when necessary  - Involve family in performance of ADLs  - Assess for home care needs following discharge   - Consider OT consult to assist with ADL evaluation and planning for discharge  - Provide patient education as appropriate  Outcome: Progressing     Problem: MOBILITY - ADULT  Goal: Maintain or return to baseline ADL function  Description: INTERVENTIONS:  -  Assess patient's ability to carry out ADLs; assess patient's baseline for ADL function and identify physical deficits which impact ability to perform ADLs (bathing, care of mouth/teeth, toileting, grooming, dressing, etc )  - Assess/evaluate cause of self-care deficits   - Assess range of motion  - Assess patient's mobility; develop plan if impaired  - Assess patient's need for assistive devices and provide as appropriate  - Encourage maximum independence but intervene and supervise when necessary  - Involve family in performance of ADLs  - Assess for home care needs following discharge   - Consider OT consult to assist with ADL evaluation and planning for discharge  - Provide patient education as appropriate  Outcome: Progressing  Goal: Maintains/Returns to pre admission functional level  Description: INTERVENTIONS:  - Perform BMAT or MOVE assessment daily    - Set and communicate daily mobility goal to care team and patient/family/caregiver     - Collaborate with rehabilitation services on mobility goals if consulted  - Ambulate patient 3 times a day  - Out of bed to chair 3 times a day   - Out of bed for meals 3 times a day  - Out of bed for toileting  - Record patient progress and toleration of activity level   Outcome: Progressing

## 2023-04-21 LAB
BACTERIA UR CULT: ABNORMAL
GLUCOSE SERPL-MCNC: 155 MG/DL (ref 65–140)
GLUCOSE SERPL-MCNC: 189 MG/DL (ref 65–140)
GLUCOSE SERPL-MCNC: 201 MG/DL (ref 65–140)
GLUCOSE SERPL-MCNC: 274 MG/DL (ref 65–140)

## 2023-04-21 RX ADMIN — ATORVASTATIN CALCIUM 80 MG: 80 TABLET, FILM COATED ORAL at 16:29

## 2023-04-21 RX ADMIN — CEFAZOLIN SODIUM 2000 MG: 2 SOLUTION INTRAVENOUS at 02:03

## 2023-04-21 RX ADMIN — FINASTERIDE 5 MG: 5 TABLET, FILM COATED ORAL at 08:13

## 2023-04-21 RX ADMIN — PANTOPRAZOLE SODIUM 40 MG: 40 TABLET, DELAYED RELEASE ORAL at 06:23

## 2023-04-21 RX ADMIN — LIDOCAINE HYDROCHLORIDE 1 APPLICATION.: 20 JELLY TOPICAL at 06:36

## 2023-04-21 RX ADMIN — PANTOPRAZOLE SODIUM 40 MG: 40 TABLET, DELAYED RELEASE ORAL at 16:29

## 2023-04-21 RX ADMIN — LEVOTHYROXINE SODIUM 112 MCG: 112 TABLET ORAL at 06:23

## 2023-04-21 RX ADMIN — INSULIN LISPRO 1 UNITS: 100 INJECTION, SOLUTION INTRAVENOUS; SUBCUTANEOUS at 08:14

## 2023-04-21 RX ADMIN — CLOPIDOGREL BISULFATE 75 MG: 75 TABLET, FILM COATED ORAL at 08:13

## 2023-04-21 RX ADMIN — ALLOPURINOL 100 MG: 100 TABLET ORAL at 08:13

## 2023-04-21 RX ADMIN — ASPIRIN 81 MG: 81 TABLET, COATED ORAL at 08:13

## 2023-04-21 RX ADMIN — MIDODRINE HYDROCHLORIDE 5 MG: 5 TABLET ORAL at 06:24

## 2023-04-21 RX ADMIN — OXYBUTYNIN CHLORIDE 5 MG: 5 TABLET ORAL at 16:29

## 2023-04-21 RX ADMIN — OXYBUTYNIN CHLORIDE 5 MG: 5 TABLET ORAL at 08:13

## 2023-04-21 RX ADMIN — FLUTICASONE PROPIONATE 1 SPRAY: 50 SPRAY, METERED NASAL at 08:13

## 2023-04-21 RX ADMIN — INSULIN LISPRO 4 UNITS: 100 INJECTION, SOLUTION INTRAVENOUS; SUBCUTANEOUS at 11:30

## 2023-04-21 RX ADMIN — ZONISAMIDE 50 MG: 25 CAPSULE ORAL at 08:14

## 2023-04-21 RX ADMIN — MIDODRINE HYDROCHLORIDE 5 MG: 5 TABLET ORAL at 11:25

## 2023-04-21 RX ADMIN — CEFAZOLIN SODIUM 2000 MG: 2 SOLUTION INTRAVENOUS at 11:24

## 2023-04-21 RX ADMIN — CYANOCOBALAMIN TAB 500 MCG 500 MCG: 500 TAB at 08:13

## 2023-04-21 RX ADMIN — INSULIN LISPRO 1 UNITS: 100 INJECTION, SOLUTION INTRAVENOUS; SUBCUTANEOUS at 16:33

## 2023-04-21 RX ADMIN — POLYETHYLENE GLYCOL 3350 17 G: 17 POWDER, FOR SOLUTION ORAL at 08:13

## 2023-04-21 RX ADMIN — ESCITALOPRAM OXALATE 10 MG: 10 TABLET ORAL at 08:13

## 2023-04-21 RX ADMIN — INSULIN LISPRO 2 UNITS: 100 INJECTION, SOLUTION INTRAVENOUS; SUBCUTANEOUS at 21:37

## 2023-04-21 RX ADMIN — MIDODRINE HYDROCHLORIDE 5 MG: 5 TABLET ORAL at 16:29

## 2023-04-21 RX ADMIN — OXYBUTYNIN CHLORIDE 5 MG: 5 TABLET ORAL at 21:36

## 2023-04-21 NOTE — PROGRESS NOTES
Progress Note- Burnard Gottron 69 y o  male MRN: [id number]    Unit/Bed#: E5 -01 Encounter: [id number]      Assessment and Plan:    50-year-old male with past medical history including but not limited to CAD s/p PCI in December 2022 currently on Plavix and aspirin admitted with hematochezia  Previously had EGD and colonoscopy in March 2023 with similar presentation which showed ulcerated diverticulum with stigmata of bleeding which was treated  Patient readmitted with similar symptoms this time and underwent EGD with push enteroscopy and colonoscopy on 4/16/2023 which were essentially unremarkable  Underwent inpatient capsule endoscopy 4/20/23 which showed 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, otherwise unremarkable  1   Hematochezia, now resolved  2  Anemia  Given presence of pandiverticulosis, high suspicion of rectal bleeding because of diverticulosis  He has not had any bleeding for almost 1 week despite being on dual antiplatelet therapy, tolerating solid diet  Hemoglobin has been fluctuating but essentially has remained within acceptable limits and has remained stable  Discussed the findings of capsule endoscopy with the patient at bedside and wife over the phone,  Which showed nonbleeding small AVM in distal ileum which likely is not contributing to the presentation of hematochezia  Patient can be discharged from GI standpoint, discussed with primary team, tentative plan for discharge tomorrow  Discussed at length with the patient as well as wife that given diverticular source of bleed, there is a chance that rebleeding can occur moving forward  In the case of rebleeding advised that he should go to the closest ER for emergent evaluation  In case of rebleeding would recommend CT high-volume lower GI bleed to locate source of bleeding    If CT is negative patient should be transferred to [facility] for colonoscopy + retrograde single balloon in the future  ______________________________________________________________________    Subjective:     Patient seen and examined at bedside  Denies any complaints at present  Had bowel movement this morning which was nonbloody  No bleeding reported since prior to the EGD and colonoscopy last weekend  Tolerating diet well  Feeling well overall      Medication Administration - last 24 hours from 04/20/2023 1810 to 04/21/2023 1810       Date/Time Order Dose Route Action Action by     04/21/2023 0813 EDT cyanocobalamin (VITAMIN B-12) tablet 500 mcg 500 mcg Oral Given Javed Hubbard RN     04/21/2023 0813 EDT escitalopram (LEXAPRO) tablet 10 mg 10 mg Oral Given Javed Hubbard RN     04/21/2023 0813 EDT finasteride (PROSCAR) tablet 5 mg 5 mg Oral Given Javed Hubbard RN     04/21/2023 0813 EDT fluticasone (FLONASE) 50 mcg/act nasal spray 1 spray 1 spray Nasal Given Javed Hubbard RN     04/21/2023 1863 EDT levothyroxine tablet 112 mcg 112 mcg Oral Given Paloma Varela     04/21/2023 0814 EDT zonisamide (ZONEGRAN) capsule 50 mg 50 mg Oral Given Javed Hubbard RN     04/21/2023 1633 EDT insulin lispro (HumaLOG) 100 units/mL subcutaneous injection 1-6 Units 1 Units Subcutaneous Given Javed Hubbard RN     04/21/2023 1130 EDT insulin lispro (HumaLOG) 100 units/mL subcutaneous injection 1-6 Units 4 Units Subcutaneous Given Javed Hubbard RN     04/21/2023 5023 EDT insulin lispro (HumaLOG) 100 units/mL subcutaneous injection 1-6 Units 1 Units Subcutaneous Given Javed Hubbard RN     04/20/2023 2132 EDT insulin lispro (HumaLOG) 100 units/mL subcutaneous injection 1-6 Units 2 Units Subcutaneous Given Bess Varela     04/21/2023 0813 EDT clopidogrel (PLAVIX) tablet 75 mg 75 mg Oral Given Javed Hubbard RN     04/21/2023 1629 EDT pantoprazole (PROTONIX) EC tablet 40 mg 40 mg Oral Given Javed Hubbard RN     04/21/2023 4429 EDT pantoprazole (PROTONIX) EC tablet 40 mg 40 mg Oral Given Paloma Varela     04/21/2023 0813 EDT aspirin (ECOTRIN LOW STRENGTH) EC tablet 81 mg 81 mg Oral Given Kelsey BaarhonaJAVED     04/21/2023 1629 EDT midodrine (PROAMATINE) tablet 5 mg 5 mg Oral Given Kelsey Barkeroc RN     04/21/2023 1125 EDT midodrine (PROAMATINE) tablet 5 mg 5 mg Oral Given Kelsey Barahona RN     04/21/2023 7168 EDT midodrine (PROAMATINE) tablet 5 mg 5 mg Oral Given Paloma Nichole     04/21/2023 0813 EDT allopurinol (ZYLOPRIM) tablet 100 mg 100 mg Oral Given Kelsey Barahona RN     04/21/2023 1629 EDT atorvastatin (LIPITOR) tablet 80 mg 80 mg Oral Given Kelsey Barkeroc RN     04/21/2023 0813 EDT polyethylene glycol (MIRALAX) packet 17 g 17 g Oral Given Kelsey Barahona RN     04/20/2023 2132 EDT polyethylene glycol (MIRALAX) packet 17 g 17 g Oral Given Paloma Nichole     04/21/2023 1629 EDT oxybutynin (DITROPAN) tablet 5 mg 5 mg Oral Given Kelsey Barahona RN     04/21/2023 0813 EDT oxybutynin (DITROPAN) tablet 5 mg 5 mg [facility]     04/20/2023 2132 EDT oxybutynin (DITROPAN) tablet 5 mg 5 mg Oral Given Paloma Nichole     04/21/2023 1256 EDT ceFAZolin (ANCEF) IVPB (premix in dextrose) 2,000 mg 50 mL 0 mg Intravenous Stopped Kelsey BarkerJAVED renae     04/21/2023 1124 EDT ceFAZolin (ANCEF) IVPB (premix in dextrose) 2,000 mg 50 mL 2,000 mg Intravenous [address] Kelsey BarahonaJAVED     04/21/2023 0203 EDT ceFAZolin (ANCEF) IVPB (premix in dextrose) 2,000 mg 50 mL 2,000 mg Intravenous New Bag Paloma Nichole     04/20/2023 1907 EDT ceFAZolin (ANCEF) IVPB (premix in dextrose) 2,000 mg 50 mL 2,000 mg Intravenous New Bag Cleo Bernal     04/21/2023 0636 EDT lidocaine (URO-JET) 2 % urethral/mucosal gel 1 application  1 application  Topical Given Paloma Varela          Objective:     Vitals: Blood pressure 116/62, pulse 68, temperature 97 9 °F (36 6 °C), resp  rate 17, SpO2 97 %  ,There is no height or weight on file to calculate BMI        Intake/Output Summary (Last 24 hours) at 4/21/2023 1810  Last data filed at 4/21/2023 1401  Gross per 24 hour   Intake --   Output 3140 ml   Net -3140 ml Physical Exam:   General Appearance: Awake and alert, in no acute distress  Abdomen: Soft, non-tender, non-distended; bowel sounds normal; no masses or no organomegaly    Invasive Devices     Peripheral Intravenous Line  Duration           Peripheral IV 04/17/23 Left;Ventral (anterior) Forearm 3 days          Drain  Duration           Urethral Catheter Coude 16 Fr  2 days                Lab Results:  No results displayed because visit has over 200 results  Imaging Studies: I have personally reviewed pertinent imaging studies

## 2023-04-21 NOTE — CASE MANAGEMENT
Case Management Discharge Planning Note    Patient name Yahaira Pineda  Location [facility] /E5  269 114-* MRN [id number]  : 1945 Date 2023       Current Admission Date: 2023  Current Admission Diagnosis:Acute GI bleeding   Patient Active Problem List    Diagnosis Date Noted   • Benign prostatic hyperplasia with urinary retention 2023   • Low serum cortisol level 2023   • Hot flashes 2023   • Elevated platelet count    • Hypothyroidism 2023   • Diverticulosis of colon with hemorrhage 2023   • Elevated TSH 2023   • Elevated d-dimer 2023   • Urinary retention 2023   • Acute blood loss anemia 2023   • Acute GI bleeding 2023   • Diabetes mellitus, type 2 ([facility] ) 2023   • Acute diverticulitis 2023   • Orthostatic hypotension 2023   • Mesenteric artery stenosis ([facility] ) 2023   • Peripheral arterial disease ([facility] ) 2023   • S/P cardiac catheterization 2022   • Stage 3a chronic kidney disease ([facility] ) 2022   • Basal cell carcinoma (BCC) of right shoulder 2022   • Chronic diastolic (congestive) heart failure ([facility] ) 01/10/2022   • Skin lesion 2021   • Other chest pain 2021   • Leukocytosis 2021   • Bilateral lower extremity edema 10/22/2021   • Mild cognitive impairment 2021   • Benign hypertension with CKD (chronic kidney disease) stage III ([facility] ) 02/10/2021   • Diabetic nephropathy associated with type 2 diabetes mellitus ([facility] ) 02/10/2021   • Concentric left ventricular hypertrophy 02/10/2021   • Aortic stenosis, moderate 02/10/2021   • Edema 02/10/2021   • Fall (on) (from) other stairs and steps, initial encounter 2020   • Recurrent major depressive disorder ([facility] ) 2020   • Primary osteoarthritis of right knee 2020   • Primary osteoarthritis of left knee 2020   • Coronary artery disease of native artery of native heart with stable angina pectoris ([facility] ) 03/18/2020   • Chronic pain of both knees 02/13/2020   • Primary osteoarthritis of both knees 02/13/2020   • Primary osteoarthritis involving multiple joints 12/16/2019   • Vitamin E deficiency 09/19/2019   • Infected epidermoid cyst 12/04/2018   • Tremor, essential 09/26/2018   • Diabetic neuropathy associated with diabetes mellitus due to underlying condition ([facility] ) 09/26/2018   • Gait instability 09/26/2018   • Frequent falls 09/26/2018   • Degenerative disc disease, lumbar 07/10/2018   • Tremor 10/10/2016   • Depression with anxiety 08/29/2016   • Arthritis 07/13/2015   • NSTEMI (non-ST elevated myocardial infarction) ([facility]  ) 10/06/2014   • Gout 05/17/2013   • Type 2 diabetes mellitus with hyperglycemia, without long-term current use of insulin ([place] [address] ) 06/18/2012   • Hyperlipidemia 06/18/2012   • Hypertension 06/18/2012   • Morbid obesity ([place] [address] ) 06/18/2012      LOS (days): 7  Geometric Mean LOS (GMLOS) (days): 3 00  Days to GMLOS:-3 8     OBJECTIVE:  Risk of Unplanned Readmission Score: 26 6         Current admission status: Inpatient   Preferred Pharmacy:   [facility]   [address] [place] [address]  Phone: [phone] Fax: [phone]    Primary Care Provider: Shay Cleary DO    Primary Insurance: [facility]  Secondary Insurance:     DISCHARGE DETAILS:    Elvia Gracia requested[de-identified] Nursing, Occupational Therapy, Physical [address] [place] [address] Emelyn Name[de-identified] [address] [place] [address] Provider[de-identified] PCP  Home Health Services Needed[de-identified] Gait/ADL Training, Evaluate Functional Status and Safety, Strengthening/Theraputic Exercises to Improve Function, Urinary Incontinence Catheter Management, Oxygen Via Nasal Cannula  Homebound Criteria Met[de-identified] Requires the Assistance of Another Person for Safe Ambulation or to Leave the Home, Uses an Assist Device (i e  cane, walker, etc)  Supporting Clincal Findings[de-identified] Limited Endurance, Requires Oxygen, Fatigues Easliy in [employer]    Plan is to discharge with morales catheter

## 2023-04-21 NOTE — ASSESSMENT & PLAN NOTE
· Patient noted to have urinary retention while in the ED at [address] [facility]  · Jose catheter inserted 04/14   · Due to poor output, Jose catheter was adjusted, replaced on 4/19/2023 by urology    · [facility]  · Continue Ditropan 3 times daily for bladder spasms

## 2023-04-21 NOTE — ASSESSMENT & PLAN NOTE
Lab Results   Component Value Date    HGBA1C 5 6 03/10/2023       Recent Labs     04/20/23  1536 04/20/23  2115 04/21/23  0724 04/21/23  1127   POCGLU 298* 190* 155* 274*     A1c is at goal   Continue sliding scale  Stable

## 2023-04-21 NOTE — ASSESSMENT & PLAN NOTE
· With history of coronary artery disease; status post stents in 2014 and 12/2022  · Ongoing dual antiplatelet therapy and high-dose Lipitor  · Tolerated procedures without cardiac complication   · Not on beta-blocker due to orthostatic hypotension

## 2023-04-21 NOTE — PROGRESS NOTES
[address] [place] [address]  Progress Note  Name: Debi Vallecillo  MRN: [id number]  Unit/Bed#: E5 -01 I Date of Admission: 4/14/2023   Date of Service: 4/21/2023 I Hospital Day: 7    Assessment/Plan   * Acute GI bleeding  Assessment & Plan  · Possible small bowel source  · Awaiting capsule endoscopy results  · EGD with push enteroscopy on 4/16/2023 was normal  · Colonoscopy on 4/16/2023 showed: Normal terminal ileum  Small internal hemorrhoid  Pancolonic diverticulosis  There was maroon tinge blood mixed with brown stool in the left colon  There was no evidence of high risk stigmata seen in any of the diverticula  Acute blood loss anemia  Assessment & Plan  Lab Results   Component Value Date    HGB 8 5 (L) 04/20/2023    HGB 8 6 (L) 04/19/2023    HGB 9 4 (L) 04/18/2023    HGB 8 2 (L) 04/18/2023    HGB 9 6 (L) 04/17/2023    HGB 6 8 (LL) 04/17/2023     · 1 unit PRBC given 4/17  · No further bleeding noted  · Stable     Aortic stenosis, moderate  Assessment & Plan  · Known moderate AS  · Compensated  · Volume status is euvolemic     Coronary artery disease of native artery of native heart with stable angina pectoris [facility])  Assessment & Plan  · With history of coronary artery disease; status post stents in 2014 and 12/2022  · Ongoing dual antiplatelet therapy and high-dose Lipitor  · Tolerated procedures without cardiac complication   · Not on beta-blocker due to orthostatic hypotension       Benign prostatic hyperplasia with urinary retention  Assessment & Plan  · Patient noted to have urinary retention while in the ED [address]  · Jose catheter inserted 04/14   · Due to poor output, Jose catheter was adjusted, replaced on 4/19/2023 by urology    · [facility] with [facility] after discharge  · Continue Ditropan 3 times daily for bladder spasms    Orthostatic hypotension  Assessment & Plan  · Continue midodrine 5 mg p o  3 times daily     Hyperlipidemia  Assessment & Plan  · Continue Lipitor 80 mg daily    Type 2 diabetes mellitus with hyperglycemia, without long-term current use of insulin [facility])  Assessment & Plan  Lab Results   Component Value Date    HGBA1C 5 6 03/10/2023       Recent Labs     23  1536 23  2115 23  0724 23  1127   POCGLU 298* 190* 155* 274*     A1c is at goal   Continue sliding scale  Stable  Hypothyroidism  Assessment & Plan  · Continue levothyroxine 112 mcg daily    Depression with anxiety  Assessment & Plan  · Mood is stable  · Continue Lexapro 10 mg QD          VTE Pharmacologic Prophylaxis:   He is on dual antiplatelet  Additional  anticoagulation held due to GI bleed    Patient Centered Rounds: I performed bedside rounds with nursing staff today  Discussions with Specialists or Other Care Team Provider: GI    Education and Discussions with Family / Patient: Attempted to update  (wife) via phone  Unable to contact  I tried to call wife Dacia Yoder at [phone]  It was just ringing and  I was unable to leave a voicemail   Current Length of Stay: 7 day(s)  Current Patient Status: Inpatient   Certification Statement: The patient will continue to require additional inpatient hospital stay due to capsule endoscopy results  Discharge Plan: possible dc today    Code Status: Level 1 - Full Code    Subjective:   Seen and examined  He reports feeling well and is eager to go home  No further bleeding   Jose is working properly since it was replaced 2 days ago    Objective:     Vitals:   Temp (24hrs), Av 8 °F (36 6 °C), Min:97 8 °F (36 6 °C), Max:97 8 °F (36 6 °C)    Temp:  [97 8 °F (36 6 °C)] 97 8 °F (36 6 °C)  HR:  [66-82] 66  Resp:  [17-19] 17  BP: (116-120)/(54-66) 118/61  SpO2:  [93 %-97 %] 93 %  There is no height or weight on file to calculate BMI  Input and Output Summary (last 24 hours):      Intake/Output Summary (Last 24 hours) at 2023 1525  Last data filed at 4/21/2023 1401  Gross per 24 hour   Intake --   Output 3140 ml   Net -3140 ml       Physical Exam:   Physical Exam  Vitals reviewed  Constitutional:       Appearance: He is not ill-appearing  HENT:      Head: Normocephalic and atraumatic  Nose: No congestion or rhinorrhea  Eyes:      General: No scleral icterus  Cardiovascular:      Rate and Rhythm: Regular rhythm  Heart sounds: Murmur heard  Pulmonary:      Breath sounds: Normal breath sounds  No wheezing or rales  Abdominal:      General: There is no distension  Palpations: Abdomen is soft  Tenderness: There is no abdominal tenderness  Musculoskeletal:      Cervical back: Tenderness present  Right lower leg: No edema  Left lower leg: No edema  Skin:     General: Skin is warm and dry  Coloration: Skin is not jaundiced or pale  Neurological:      Mental Status: He is oriented to person, place, and time  Psychiatric:         Mood and Affect: Mood normal          Behavior: Behavior normal       Additional Data:     Labs:  Results from last 7 days   Lab Units 04/20/23  0525 04/17/23  1835 04/17/23  0538   WBC Thousand/uL 6 25   < > 5 49   HEMOGLOBIN g/dL 8 5*   < > 6 8*   HEMATOCRIT % 27 5*   < > 22 1*   PLATELETS Thousands/uL 339   < > 318   NEUTROS PCT %  --   --  57   LYMPHS PCT %  --   --  26   MONOS PCT %  --   --  10   EOS PCT %  --   --  5    < > = values in this interval not displayed       Results from last 7 days   Lab Units 04/19/23  0546 04/17/23  0538 04/16/23  0513   SODIUM mmol/L 138   < > 140   POTASSIUM mmol/L 4 6   < > 3 7   CHLORIDE mmol/L 107   < > 107   CO2 mmol/L 25   < > 27   BUN mg/dL 12   < > 16   CREATININE mg/dL 0 96   < > 0 80   ANION GAP mmol/L 6   < > 6   CALCIUM mg/dL 8 9   < > 8 1*   ALBUMIN g/dL  --   --  3 1*   TOTAL BILIRUBIN mg/dL  --   --  0 31   ALK PHOS U/L  --   --  60   ALT U/L  --   --  20   AST U/L  --   --  16   GLUCOSE RANDOM mg/dL 167*   < > 114    < > = values in this interval not displayed  Results from last 7 days   Lab Units 04/21/23  1127 04/21/23  0724 04/20/23  2115 04/20/23  1536 04/20/23  1127 04/20/23  0702 04/19/23  2109 04/19/23  1557 04/19/23  1126 04/19/23  0719 04/18/23  2058 04/18/23  1558   POC GLUCOSE mg/dl 274* 155* 190* 298* 174* 157* 128 242* 240* 159* 140 126               Lines/Drains:  Invasive Devices     Peripheral Intravenous Line  Duration           Peripheral IV 04/17/23 Left;Ventral (anterior) Forearm 3 days          Drain  Duration           Urethral Catheter Coude 16 Fr  2 days              Urinary Catheter:  Goal for removal: N/A- Discharging with Jose               Imaging: Reviewed radiology reports from this admission including: procedure reports    Recent Cultures (last 7 days):   Results from last 7 days   Lab Units 04/20/23  0306   URINE CULTURE  <10,000 cfu/ml Staphylococcus coagulase negative*       Last 24 Hours Medication List:   Current Facility-Administered Medications   Medication Dose Route Frequency Provider Last Rate   • acetaminophen  650 mg Oral Q8H PRN Azeem Aiad, DO     • allopurinol  100 mg Oral Daily Estefany Ramos PA-C     • aspirin  81 mg Oral Daily Estefany Ramos PA-C     • atorvastatin  80 mg Oral Daily With Jelena Witt PA-C     • cefazolin  2,000 mg Intravenous Q8H Judy Yang PA-C Stopped (04/21/23 1256)   • clopidogrel  75 mg Oral Daily Roel Callaway, DO     • cyanocobalamin  500 mcg Oral Daily Azeem Aiad, DO     • escitalopram  10 mg Oral Daily Azeem Aiad, DO     • finasteride  5 mg Oral Daily Azeem Aiad, DO     • fluticasone  1 spray Nasal Daily Azeem Aiad, DO     • insulin lispro  1-6 Units Subcutaneous 4x Daily (AC & HS) Roel Callaway, DO     • levothyroxine  112 mcg Oral Early Morning Azeem Aiad, DO     • lidocaine  1 application   Topical Daily PRN Judy Yang PA-C     • midodrine  5 mg Oral TID AC Estefany Ramos PA-C     • oxybutynin  5 mg Oral TID Harish Wren PA-C     • pantoprazole  40 mg Oral BID AC Chepe Marley DO     • polyethylene glycol  17 g Oral BID Dario Boo MD     • zonisamide  50 mg Oral Daily Crispin Bates DO          Today, Patient Was Seen By: Whitley Madrid MD    **Please Note: This note may have been constructed using a voice recognition system  **

## 2023-04-21 NOTE — ASSESSMENT & PLAN NOTE
· Possible small bowel source  · Awaiting capsule endoscopy results  · EGD with push enteroscopy on 4/16/2023 was normal  · Colonoscopy on 4/16/2023 showed: Normal terminal ileum  Small internal hemorrhoid  Pancolonic diverticulosis  There was maroon tinge blood mixed with brown stool in the left colon  There was no evidence of high risk stigmata seen in any of the diverticula

## 2023-04-21 NOTE — ASSESSMENT & PLAN NOTE
Lab Results   Component Value Date    HGB 8 5 (L) 04/20/2023    HGB 8 6 (L) 04/19/2023    HGB 9 4 (L) 04/18/2023    HGB 8 2 (L) 04/18/2023    HGB 9 6 (L) 04/17/2023    HGB 6 8 (LL) 04/17/2023     · 1 unit PRBC given 4/17  · No further bleeding noted      · Stable

## 2023-04-22 VITALS
HEART RATE: 78 BPM | TEMPERATURE: 97.7 F | OXYGEN SATURATION: 98 % | RESPIRATION RATE: 18 BRPM | SYSTOLIC BLOOD PRESSURE: 119 MMHG | DIASTOLIC BLOOD PRESSURE: 62 MMHG

## 2023-04-22 LAB
GLUCOSE SERPL-MCNC: 170 MG/DL (ref 65–140)
GLUCOSE SERPL-MCNC: 230 MG/DL (ref 65–140)

## 2023-04-22 RX ORDER — OXYBUTYNIN CHLORIDE 5 MG/1
5 TABLET ORAL 3 TIMES DAILY
Qty: 90 TABLET | Refills: 0 | Status: SHIPPED | OUTPATIENT
Start: 2023-04-22

## 2023-04-22 RX ADMIN — FINASTERIDE 5 MG: 5 TABLET, FILM COATED ORAL at 09:44

## 2023-04-22 RX ADMIN — LEVOTHYROXINE SODIUM 112 MCG: 112 TABLET ORAL at 06:03

## 2023-04-22 RX ADMIN — ESCITALOPRAM OXALATE 10 MG: 10 TABLET ORAL at 09:44

## 2023-04-22 RX ADMIN — CYANOCOBALAMIN TAB 500 MCG 500 MCG: 500 TAB at 09:43

## 2023-04-22 RX ADMIN — ALLOPURINOL 100 MG: 100 TABLET ORAL at 09:44

## 2023-04-22 RX ADMIN — OXYBUTYNIN CHLORIDE 5 MG: 5 TABLET ORAL at 09:44

## 2023-04-22 RX ADMIN — MIDODRINE HYDROCHLORIDE 5 MG: 5 TABLET ORAL at 11:52

## 2023-04-22 RX ADMIN — ZONISAMIDE 50 MG: 25 CAPSULE ORAL at 09:45

## 2023-04-22 RX ADMIN — INSULIN LISPRO 3 UNITS: 100 INJECTION, SOLUTION INTRAVENOUS; SUBCUTANEOUS at 11:52

## 2023-04-22 RX ADMIN — CLOPIDOGREL BISULFATE 75 MG: 75 TABLET, FILM COATED ORAL at 09:44

## 2023-04-22 RX ADMIN — MIDODRINE HYDROCHLORIDE 5 MG: 5 TABLET ORAL at 07:35

## 2023-04-22 RX ADMIN — PANTOPRAZOLE SODIUM 40 MG: 40 TABLET, DELAYED RELEASE ORAL at 07:35

## 2023-04-22 RX ADMIN — FLUTICASONE PROPIONATE 1 SPRAY: 50 SPRAY, METERED NASAL at 09:45

## 2023-04-22 RX ADMIN — INSULIN LISPRO 1 UNITS: 100 INJECTION, SOLUTION INTRAVENOUS; SUBCUTANEOUS at 07:34

## 2023-04-22 RX ADMIN — ASPIRIN 81 MG: 81 TABLET, COATED ORAL at 09:44

## 2023-04-22 NOTE — RESTORATIVE TECHNICIAN NOTE
Restorative Technician Note      Patient Name: Markel Hare     Restorative Tech Visit Date: 04/22/23  Note Type: Mobility  Patient Position Upon Consult: Bedside chair  Activity Performed: Ambulated  Assistive Device: Roller walker  Patient Position at End of Consult: Bedside chair;  All needs within reach      ns

## 2023-04-22 NOTE — ASSESSMENT & PLAN NOTE
· Patient noted to have urinary retention while in the ED at [address]  · Jose catheter inserted 04/14   · Due to poor output, Jose catheter was adjusted, replaced on 4/19/2023 by urology    · Outpatient follow-up with [facility] after discharge  · He was started on Ditropan 3 times daily for bladder spasms by urology and he is tolerating this medication   · Ambulatory referral to urology was placed

## 2023-04-22 NOTE — ASSESSMENT & PLAN NOTE
· Likely due to transient diverticular bleed  · Status post  capsule endoscopywhich showed AVM but no bleeding  · EGD with push enteroscopy on 4/16/2023 was normal  · Colonoscopy on 4/16/2023 showed: Normal terminal ileum  Small internal hemorrhoid  Pancolonic diverticulosis  There was maroon tinge blood mixed with brown stool in the left colon  There was no evidence of high risk stigmata seen in any of the diverticula  · Patient is stable for discharge home  · Advised to  follow a soft diet and continue stool softeners    · Outpatient follow-up with PCP/GI

## 2023-04-22 NOTE — PLAN OF CARE
Problem: PAIN - ADULT  Goal: Verbalizes/displays adequate comfort level or baseline comfort level  Description: Interventions:  - Encourage patient to monitor pain and request assistance  - Assess pain using appropriate pain scale  - Administer analgesics based on type and severity of pain and evaluate response  - Implement non-pharmacological measures as appropriate and evaluate response  - Consider cultural and social influences on pain and pain management  - Notify physician/advanced practitioner if interventions unsuccessful or patient reports new pain  Outcome: Adequate for Discharge     Problem: HEMATOLOGIC - ADULT  Goal: Maintains hematologic stability  Description: INTERVENTIONS  - Assess for signs and symptoms of bleeding or hemorrhage  - Monitor labs  - Administer supportive blood products/factors as ordered and appropriate  Outcome: Adequate for Discharge     Problem: MOBILITY - ADULT  Goal: Maintain or return to baseline ADL function  Description: INTERVENTIONS:  -  Assess patient's ability to carry out ADLs; assess patient's baseline for ADL function and identify physical deficits which impact ability to perform ADLs (bathing, care of mouth/teeth, toileting, grooming, dressing, etc )  - Assess/evaluate cause of self-care deficits   - Assess range of motion  - Assess patient's mobility; develop plan if impaired  - Assess patient's need for assistive devices and provide as appropriate  - Encourage maximum independence but intervene and supervise when necessary  - Involve family in performance of ADLs  - Assess for home care needs following discharge   - Consider OT consult to assist with ADL evaluation and planning for discharge  - Provide patient education as appropriate  Outcome: Adequate for Discharge  Goal: Maintains/Returns to pre admission functional level  Description: INTERVENTIONS:  - Perform BMAT or MOVE assessment daily    - Set and communicate daily mobility goal to care team and patient/family/caregiver     - Collaborate with rehabilitation services on mobility goals if consulted  - Ambulate patient 3 times a day  - Out of bed to chair 3 times a day   - Out of bed for meals 3 times a day  - Out of bed for toileting  - Record patient progress and toleration of activity level   Outcome: Adequate for Discharge     Problem: CARDIOVASCULAR - ADULT  Goal: Maintains optimal cardiac output and hemodynamic stability  Description: INTERVENTIONS:  - Monitor I/O, vital signs and rhythm  - Monitor for S/S and trends of decreased cardiac output  - Administer and titrate ordered vasoactive medications to optimize hemodynamic stability  - Assess quality of pulses, skin color and temperature  - Assess for signs of decreased coronary artery perfusion  - Instruct patient to report change in severity of symptoms  Outcome: Adequate for Discharge     Problem: GASTROINTESTINAL - ADULT  Goal: Maintains or returns to baseline bowel function  Description: INTERVENTIONS:  - Assess bowel function  - Encourage oral fluids to ensure adequate hydration  - Administer IV fluids if ordered to ensure adequate hydration  - Administer ordered medications as needed  - Encourage mobilization and activity  - Consider nutritional services referral to assist patient with adequate nutrition and appropriate food choices  Outcome: Adequate for Discharge     Problem: METABOLIC, FLUID AND ELECTROLYTES - ADULT  Goal: Electrolytes maintained within normal limits  Description: INTERVENTIONS:  - Monitor labs and assess patient for signs and symptoms of electrolyte imbalances  - Administer electrolyte replacement as ordered  - Monitor response to electrolyte replacements, including repeat lab results as appropriate  - Instruct patient on fluid and nutrition as appropriate  Outcome: Adequate for Discharge  Goal: Fluid balance maintained  Description: INTERVENTIONS:  - Monitor labs   - Monitor I/O and WT  - Instruct patient on fluid and nutrition as appropriate  - Assess for signs & symptoms of volume excess or deficit  Outcome: Adequate for Discharge  Goal: Glucose maintained within target range  Description: INTERVENTIONS:  - Monitor Blood Glucose as ordered  - Assess for signs and symptoms of hyperglycemia and hypoglycemia  - Administer ordered medications to maintain glucose within target range  - Assess nutritional intake and initiate nutrition service referral as needed  Outcome: Adequate for Discharge     Problem: MUSCULOSKELETAL - ADULT  Goal: Maintain or return mobility to safest level of function  Description: INTERVENTIONS:  - Assess patient's ability to carry out ADLs; assess patient's baseline for ADL function and identify physical deficits which impact ability to perform ADLs (bathing, care of mouth/teeth, toileting, grooming, dressing, etc )  - Assess/evaluate cause of self-care deficits   - Assess range of motion  - Assess patient's mobility  - Assess patient's need for assistive devices and provide as appropriate  - Encourage maximum independence but intervene and supervise when necessary   - Involve family in performance of ADLs  - Assess for home care needs following discharge   - Consider OT consult to assist with ADL evaluation and planning for discharge  - Provide patient education as appropriate  Outcome: Adequate for Discharge

## 2023-04-22 NOTE — DISCHARGE SUMMARY
Erika 49  Discharge- Former[facility] 1945, 69 y o  male MRN: [id number]  Unit/Bed#: E5 -01 Encounter: [id number]  Primary Care Provider: Gabi Madden DO   Date and time admitted to hospital: 4/14/2023  6:55 PM    * Acute GI bleeding  Assessment & Plan  · Likely due to transient diverticular bleed  · Status post  capsule endoscopywhich showed AVM but no bleeding  · EGD with push enteroscopy on 4/16/2023 was normal  · Colonoscopy on 4/16/2023 showed: Normal terminal ileum  Small internal hemorrhoid  Pancolonic diverticulosis  There was maroon tinge blood mixed with brown stool in the left colon  There was no evidence of high risk stigmata seen in any of the diverticula  · Patient is stable for discharge home  · Advised to  follow a soft diet and continue stool softeners  · Outpatient follow-up with PCP/GI      Acute blood loss anemia  Assessment & Plan  Lab Results   Component Value Date    HGB 8 5 (L) 04/20/2023    HGB 8 6 (L) 04/19/2023    HGB 9 4 (L) 04/18/2023    HGB 8 2 (L) 04/18/2023    HGB 9 6 (L) 04/17/2023    HGB 6 8 (LL) 04/17/2023     · 1 unit PRBC given 4/17  · No further bleeding noted  · Stable     Aortic stenosis, moderate  Assessment & Plan  · Known moderate AS  · Compensated  · Volume status is euvolemic     Coronary artery disease of native artery of native heart with stable angina pectoris [facility])  Assessment & Plan  · With history of coronary artery disease; status post stents in 2014 and 12/2022  · Ongoing dual antiplatelet therapy and high-dose Lipitor  · Tolerated procedures without cardiac complication   · Not on beta-blocker due to orthostatic hypotension       Benign prostatic hyperplasia with urinary retention  Assessment & Plan  · Patient noted to have urinary retention while in the ED at [address]  · Jose catheter inserted 04/14   · Due to poor output, Jose catheter was adjusted, replaced on 4/19/2023 by urology    · Outpatient "follow-up with [facility]'s urology after discharge  · He was started on Ditropan 3 times daily for bladder spasms by urology and he is tolerating this medication   · Ambulatory referral to urology was placed    Orthostatic hypotension  Assessment & Plan  · Continue midodrine 5 mg p o  3 times daily  Hyperlipidemia  Assessment & Plan  · Continue Lipitor 80 mg daily    Type 2 diabetes mellitus with hyperglycemia, without long-term current use of insulin [facility])  Assessment & Plan  Lab Results   Component Value Date    HGBA1C 5 6 03/10/2023       Recent Labs     04/21/23  1127 04/21/23  1632 04/21/23  2040 04/22/23  0658   POCGLU 274* 189* 201* 170*     A1c is at goal   Resume outpatient regimen    Hypothyroidism  Assessment & Plan  · Continue levothyroxine 112 mcg daily    Depression with anxiety  Assessment & Plan  · Mood is stable  · Continue Lexapro 10 mg QD       Medical Problems     Resolved Problems  Date Reviewed: 4/22/2023   None       Discharging Physician / Practitioner: Donna Sawyer MD  PCP: Gwen Pichardo DO  Admission Date:   Admission Orders (From admission, onward)     Ordered        04/14/23 1921  Inpatient Admission  Once                      Discharge Date: 04/22/23    Consultations During Hospital Stay:  · GI    Procedures Performed:   · Capsule endoscopy    Significant Findings / Test Results:   Per GI, inpatient capsule endoscopy showed no evidence of blood in the GI tract  \"There were small punctate areas of erythema in the proximal small bowel and possible small, nonbleeding AVMs in the distal ileum  \"    Incidental Findings:   · None    Test Results Pending at Discharge (will require follow up): · Final capsule endoscopy report     Outpatient Tests Requested:  · None    Complications: None    Reason for Admission: [facility] AT [facility] Hospital Course:   Lucia Treadwell is a 69 y o  male patient who originally presented to the hospital on 4/14/2023 due to melena    He  was initially seen " at [facility] and was transferred to [facility] for further work-up   he has known history of GI bleed  He  just had a colonoscopy on 4/16/2023 which showed normal terminal ileum, pancolonic diverticulosis, small internal hemorrhoids  During the colonoscopy there was maroon tinge of blood mixed with brown stool in the left colon but no evidence of high risk stigmata  He is EGD was normal    Underwent an inpatient capsule endoscopy which showed no active bleed  He did have small AVMs noted  After reviewing all of his work-up, I felt that his recent melena was due to transient diverticular bleed  The patient had no further bleeding here and is stable for discharge home with close GI follow-up    He was also seen by urology for urinary retention  He will be discharged with a Jose catheter  He has an upcoming appointment with urology  next month  He will go home with VNA  Please see above list of diagnoses and related plan for additional information  Condition at Discharge: good    Discharge Day Visit / Exam:   Subjective: No bleeding since admission  Feels well and is eager to go home  Vitals: Blood Pressure: 119/62 (04/22/23 0734)  Pulse: 78 (04/22/23 0734)  Temperature: 97 7 °F (36 5 °C) (04/22/23 0734)  Temp Source: Oral (04/22/23 0246)  Respirations: 18 (04/22/23 0734)  SpO2: 98 % (04/22/23 0734)  Exam:   Physical Exam  Constitutional:       Appearance: He is not ill-appearing  HENT:      Head: Normocephalic and atraumatic  Nose: No congestion or rhinorrhea  Eyes:      General: No scleral icterus  Cardiovascular:      Rate and Rhythm: Regular rhythm  Pulmonary:      Breath sounds: No wheezing or rhonchi  Abdominal:      General: Abdomen is flat  There is no distension  Palpations: Abdomen is soft  Tenderness: There is no abdominal tenderness  Musculoskeletal:      Cervical back: Neck supple  Right lower leg: No edema        Left lower leg: No edema  Skin:     General: Skin is warm and dry  Coloration: Skin is not jaundiced or pale  Neurological:      Mental Status: He is alert and oriented to person, place, and time  Psychiatric:         Mood and Affect: Mood normal        Discussion with Family: Attempted to update  (wife) via phone  Left voicemail  Discharge instructions/Information to patient and family:   See after visit summary for information provided to patient and family  Provisions for Follow-Up Care:  See after visit summary for information related to follow-up care and any pertinent home health orders  Disposition:   Home with VNA Services (Reminder: Complete face to face encounter)    Planned Readmission: no     Discharge Statement:  I spent >30 minutes discharging the patient  This time was spent on the day of discharge  I had direct contact with the patient on the day of discharge  Greater than 50% of the total time was spent examining patient, answering all patient questions, arranging and discussing plan of care with patient as well as directly providing post-discharge instructions  Additional time then spent on discharge activities  Discharge Medications:  See after visit summary for reconciled discharge medications provided to patient and/or family        **Please Note: This note may have been constructed using a voice recognition system**

## 2023-04-22 NOTE — ASSESSMENT & PLAN NOTE
Lab Results   Component Value Date    HGBA1C 5 6 03/10/2023       Recent Labs     04/21/23  1127 04/21/23  1632 04/21/23 2040 04/22/23  0658   POCGLU 274* 189* 201* 170*     A1c is at goal   Resume outpatient regimen

## 2023-04-23 ENCOUNTER — HOME CARE VISIT (OUTPATIENT)
Dept: HOME HEALTH SERVICES | Facility: HOME HEALTHCARE | Age: 78
End: 2023-04-23

## 2023-04-23 VITALS
TEMPERATURE: 98.3 F | OXYGEN SATURATION: 93 % | SYSTOLIC BLOOD PRESSURE: 112 MMHG | RESPIRATION RATE: 18 BRPM | DIASTOLIC BLOOD PRESSURE: 60 MMHG | HEART RATE: 74 BPM

## 2023-04-23 NOTE — CASE COMMUNICATION
[facility] has admitted your patient to [facility] service with the following disciplines:      SN, PT and OT  This report is informational only, no responses is needed  Primary focus of home health care /GI assess  Patient stated goals of care increase strength   Anticipated visit pattern and next visit date 2w6 next visit planned for Thurs 4/27/23  See medication list - meds in home differ from AVS NA wife manages  Significant  clinical findings NA  Potential barriers to goal achievement comorbidities  Other pertinent information     Thank you for allowing us to participate in the care of your patient

## 2023-04-24 ENCOUNTER — PATIENT OUTREACH (OUTPATIENT)
Dept: CASE MANAGEMENT | Facility: OTHER | Age: 78
End: 2023-04-24

## 2023-04-24 ENCOUNTER — OFFICE VISIT (OUTPATIENT)
Dept: ENDOCRINOLOGY | Facility: CLINIC | Age: 78
End: 2023-04-24

## 2023-04-24 ENCOUNTER — TRANSITIONAL CARE MANAGEMENT (OUTPATIENT)
Dept: FAMILY MEDICINE CLINIC | Facility: CLINIC | Age: 78
End: 2023-04-24

## 2023-04-24 VITALS
WEIGHT: 214.2 LBS | BODY MASS INDEX: 31.73 KG/M2 | DIASTOLIC BLOOD PRESSURE: 68 MMHG | HEIGHT: 69 IN | SYSTOLIC BLOOD PRESSURE: 112 MMHG | HEART RATE: 104 BPM

## 2023-04-24 DIAGNOSIS — E78.2 MIXED HYPERLIPIDEMIA: ICD-10-CM

## 2023-04-24 DIAGNOSIS — R23.2 HOT FLASH IN MALE: ICD-10-CM

## 2023-04-24 DIAGNOSIS — E11.51 TYPE 2 DIABETES MELLITUS WITH DIABETIC PERIPHERAL ANGIOPATHY WITHOUT GANGRENE, WITHOUT LONG-TERM CURRENT USE OF INSULIN (HCC): Primary | ICD-10-CM

## 2023-04-24 DIAGNOSIS — E03.9 HYPOTHYROIDISM, UNSPECIFIED TYPE: ICD-10-CM

## 2023-04-24 DIAGNOSIS — E87.70 HYPERVOLEMIA, UNSPECIFIED HYPERVOLEMIA TYPE: ICD-10-CM

## 2023-04-24 NOTE — PATIENT INSTRUCTIONS
Check sugars 2x daily    Before meals, bedtime    Goal Blood Sugars:   Premeal , even better <110  2hr after a meal <180, even better <140  A1C <7%, even better <6 5%      Aim for 45g carbohydrates with meals  15-20g with snacks    Goal exercise is 30 minutes daily, most days of the week    Please have labs done before next visit    Bring your meter or logbook with you each visit    Return appointment 2 months

## 2023-04-24 NOTE — PROGRESS NOTES
Aydin Hannon 69 y o  male MRN: [id number]    Encounter: [id number]      Assessment/Plan     1  Type 2 diabetes c/b DPN, DR, CKD, CAD - We discussed the pathophysiology of diabetes and its associations with negative health outcomes  I counseled the patient on various goals of diabetes management, including healthy lifestyle and behaviors, glycemic targets, preventative health care, and the role of pharmacotherapies  No meter or log today  Reviewed recent inpatient DM control, which was suboptimal  A1c unreliable due to recent PRBC tranfusions  Recommend BID fingerstick testing at scattered times  Send BG log for review in 2-weeks  If additional anti-glycemic lowering therapy needed, would consider introduction of insulin vs GLP  He is currently on triple therapy for diabetes  Bruna Villareal will be recommended to RTC in 6-8 weeks to review care  2  Hyperlipidemia - continue statin Rx, confirm compliance at future follow up  Given high ASCVD risk, would pursue LDL target <70    3  Hypothyroidism - mild TSH elevation on last check with normal FT4  Continue current Rx levothyroxine, follow up TFTs prior to next visit  4  Hot flashes - uncertain cause  Recommend testing for testosterone, advised should be completed in AM after overnight fasting    5  Mild volume overload - advised patient to monitor for symptoms of dyspnea, weight gain, or worsening edema   Follow up with cardiology/PCP    Problem List Items Addressed This Visit        Endocrine    Diabetes mellitus, type 2 ([facility] [address] ) - Primary    Relevant Orders    Comprehensive metabolic panel Lab Collect    Hypothyroidism       Other    Hyperlipidemia   Other Visit Diagnoses     Hot flash in male        Relevant Orders    Comprehensive metabolic panel Lab Collect    Testosterone- Lab Collect    Sex Hormone Binding Globulin- Lab Collect    T4, free Lab Collect    TSH, 3rd generation Lab Collect    Hypervolemia, unspecified hypervolemia type            RTC 8-weeks    CC: Diabetes, hot flashes    History of Present Illness     HPI:    Bruna Mota presents today for evaluation of diabetes  He is joined today by his daughter who is contributing to elements of the history  Recent history has been complicated, including multiple hospital admissions, including admission at [facility] for GI bleed  He has received transfusions in the recent past      Diabetes History:  Year of diabetes diagnosis and symptoms: 2005 during admission for CAD s/p stent  Is patient on insulin? Not presently, but for some time following diagnosis  If yes, how long on insulin? List Current Medications for Glycemic control and the doses:  1- Metformin 1000 mg bid            2- Jardiance 10 mg daily  3- Glimepiride 4 mg bid    Other Medications:   Statin? Lipitor 80 mg daily  ACE inhibitor? None  ASA? Yes, 81 mg daily    Diabetic Complications:   Microvascular:   - Eyes: Retinopathy? Mild DR                When was the last fully dilated eye exam?            Within last year, follows with Dr Kun Salguero in [place]  - Feet:  Neuropathy? Yes   When was the last time patient saw a Podiatrist?         Follows with Dr Ulises Moore  - Kidneys: Nephropathy? Macrovascular:  - CAD? Yes, history of MI and stents x4  - CVD? denies  - PVD? denies  - Smoker? denies      No meter or log today  SMBG (self-monitored blood glucose) readings:   - How often does the patient check BG? Wife checks at home  No recent recordings since hospital DC  - Does the patient keep a log? No    Does patient get Hypoglycemic episodes? Denies  If yes how frequent? How low do the BG readings reach? When do most of those episodes occur? What symptoms does the patient get during those episodes? No hyperglycemic symptoms    Education: DM education completed in past    Diet: denies concentrated sweets, flavored beverages       Patient reports history of hypothyroidism  For hypothyroidism he takes levothyroxine 112 mcg daily  He takes this consistently  He has chronic low frequency tremor  He denies palpitations  Patient is adopted  No known family history  No compressive neck complaints  Patient denies history of pancreatitis  Patient reports symptoms of hot flashes which have been chronic  He reports sensation of facial flushing, occasional headaches  Triggers include exercise and stress  He denies palpitations, diarrhea, dyspnea  He has 4 biologic children  He denies any bone fracture history  He does have history of prostatic enlargement, currently using indwelling morales  Review of Systems   Constitutional: Negative for diaphoresis and unexpected weight change  HENT: Negative for trouble swallowing and voice change  Cardiovascular: Negative for chest pain and palpitations  Gastrointestinal: Negative for nausea and vomiting  Endocrine: Negative for polydipsia and polyuria  +hot flashes   Neurological: Positive for tremors  All other systems reviewed and are negative        Historical Information   Past Medical History:   Diagnosis Date   • BPH (benign prostatic hyperplasia)    • CAD (coronary artery disease)    • Cancer (HCC)     basal cell   • Chronic kidney disease     stage 3   • Colon polyp    • CPAP (continuous positive airway pressure) dependence    • Diabetes mellitus (HCC)     niddm   • Disease of thyroid gland    • Gout    • High cholesterol    • Hypertension    • Hypomagnesemia 3/9/2023   • MI, 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    • Myocardial infarction ([facility] [address] ) 2014   • S/P cardiac catheterization 12/30/2022   • Sleep apnea      Past Surgical History:   Procedure Laterality Date   • BASAL CELL CARCINOMA EXCISION Right 1/21/2022    Procedure: EXCISION BASAL CELL CARCINOMA EXTREMITY;  Surgeon: Paul Lobo DO;  Location: [facility] MAIN OR;  Service: General   • CARDIAC CATHETERIZATION Left 12/29/2022    Procedure: Cardiac Left Heart Cath;  Surgeon: Thi Magana MD;  Location: [facility] CARDIAC CATH LAB; Service: Cardiology   • CARDIAC CATHETERIZATION N/A 12/29/2022    Procedure: Cardiac Coronary Angiogram;  Surgeon: Thi Magana MD;  Location: [facility] CARDIAC CATH LAB; Service: Cardiology   • CARDIAC CATHETERIZATION N/A 12/29/2022    Procedure: Cardiac pci;  Surgeon: Thi Magana MD;  Location: [facility] CARDIAC CATH LAB; Service: Cardiology   • CATARACT EXTRACTION W/  INTRAOCULAR LENS IMPLANT     • CORNEAL TRANSPLANT     • CORONARY ANGIOPLASTY WITH STENT PLACEMENT      stents x3-- [phone]   • EYE SURGERY      repair corneal laceration   • MI COLONOSCOPY FLX DX W/COLLJ SPEC WHEN PFRMD N/A 3/21/2016    Procedure: COLONOSCOPY;  Surgeon: Tiffany Michael MD;  Location: [facility] MAIN OR;  Service: Colorectal   • TONSILLECTOMY AND ADENOIDECTOMY       Social History   Social History     Substance and Sexual Activity   Alcohol Use Not Currently    Comment: socially, cut down in 2008, previously did more than      Social History     Substance and Sexual Activity   Drug Use Never     Social History     Tobacco Use   Smoking Status Never   Smokeless Tobacco Never     Family History:   Family History   Adopted: Yes   Problem Relation Age of Onset   • No Known Problems Mother    • No Known Problems Father        Meds/Allergies   Current Outpatient Medications   Medication Sig Dispense Refill   • acetaminophen (TYLENOL) 325 mg tablet Take 2 tablets (650 mg total) by mouth every 6 (six) hours as needed for mild pain, fever or headaches Do not exceed a total of 3 grams of tylenol/acetaminophen in a 24-hour period    0   • allopurinol (ZYLOPRIM) 100 mg tablet Take 1 tablet (100 mg total) by mouth daily 90 tablet 0   • aluminum-magnesium hydroxide-simethicone (MYLANTA) [id number] mg/30 mL suspension Take 30 mL by mouth every 4 (four) hours as needed for indigestion or heartburn  0   • aspirin (ECOTRIN LOW STRENGTH) 81 mg EC tablet Take 1 tablet (81 mg total) by mouth daily Do not start before March 23, 2023   0   • atorvastatin (LIPITOR) 80 mg tablet Take 1 tablet (80 mg total) by mouth daily with dinner  0   • Blood Glucose Monitoring Suppl (OneTouch Verio Reflect) w/Device KIT Check blood sugars twice daily  Please substitute with appropriate alternative as covered by patient's insurance  Dx: E11 65 1 kit 0   • clopidogrel (PLAVIX) 75 mg tablet Take 1 tablet (75 mg total) by mouth daily Do not start before March 9, 2023  30 tablet 1   • cyanocobalamin (VITAMIN B-12) 500 MCG tablet Take 1 tablet (500 mcg total) by mouth daily Do not start before March 23, 2023   0   • Empagliflozin (Jardiance) 10 MG TABS Take 1 tablet (10 mg total) by mouth every morning 90 tablet 1   • escitalopram (LEXAPRO) 20 mg tablet Take 0 5 tablets (10 mg total) by mouth daily 90 tablet 0   • finasteride (PROSCAR) 5 mg tablet Take 1 tablet (5 mg total) by mouth daily 30 tablet 6   • fluticasone (FLONASE) 50 mcg/act nasal spray 1 spray into each nostril daily Do not start before March 23, 2023   0   • glimepiride (AMARYL) 4 mg tablet TAKE 1 TABLET TWICE A  tablet 0   • glucose blood (OneTouch Verio) test strip Check blood sugars twice daily  Please substitute with appropriate alternative as covered by patient's insurance  Dx: E11 65 200 each 3   • levothyroxine 112 mcg tablet Take 1 tablet (112 mcg total) by mouth daily in the early morning Do not start before March 23, 2023   0   • metFORMIN (GLUCOPHAGE) 1000 MG tablet Take 1 tablet (1,000 mg total) by mouth 2 (two) times a day with meals 180 tablet 0   • multivitamin (THERAGRAN) TABS Take 1 tablet by mouth daily     • OneTouch Delica Lancets 50I MISC Check blood sugars twice daily  Please substitute with appropriate alternative as covered by patient's insurance   Dx: E11 65 200 each 3   • oxybutynin (DITROPAN) 5 mg tablet Take 1 tablet (5 mg total) by mouth 3 (three) times a day 90 tablet 0   • pantoprazole (PROTONIX) 40 mg "tablet Take 1 tablet (40 mg total) by mouth daily before breakfast Do not start before March 9, 2023  30 tablet 0   • polyethylene glycol (MIRALAX) 17 g packet Take 17 g by mouth daily as needed (constipation)  0   • prednisoLONE acetate (PRED FORTE) 1 % ophthalmic suspension INSTILL 1 DROP INTO RIGHT EYE ONCE DAILY     • senna-docusate sodium (SENOKOT S) 8 6-50 mg per tablet Take 1 tablet by mouth 2 (two) times a day Hold for diarrhea or loose stools  0   • torsemide (DEMADEX) 10 mg tablet Take 10 mg by mouth if needed     • zonisamide (ZONEGRAN) 50 MG capsule Take 1 tablet (50 mg total) by mouth daily 90 capsule 2   • midodrine (PROAMATINE) 5 mg tablet GIVE 1 TABLET BY MOUTH BEFORE MEAL(S)  HOLD FOR SBP ABOVE 140 (Patient not taking: Reported on 4/11/2023)       No current facility-administered medications for this visit  No Known Allergies    Objective   Vitals: Blood pressure 112/68, pulse 104, height 5' 9\" (1 753 m), weight 97 2 kg (214 lb 3 2 oz)  Physical Exam  Vitals reviewed  Constitutional:       Appearance: Normal appearance  HENT:      Head: Normocephalic and atraumatic  Eyes:      General: No scleral icterus  Conjunctiva/sclera: Conjunctivae normal    Cardiovascular:      Rate and Rhythm: Normal rate and regular rhythm  Heart sounds: Murmur heard  Pulmonary:      Effort: Pulmonary effort is normal  No respiratory distress  Breath sounds: Rales (bibasilar rales) present  Abdominal:      Palpations: Abdomen is soft  Tenderness: There is no abdominal tenderness  Musculoskeletal:      Cervical back: Normal range of motion  Right lower leg: Edema (1+) present  Left lower leg: Edema (1+) present  Skin:     General: Skin is warm and dry  Neurological:      General: No focal deficit present  Mental Status: He is alert     Psychiatric:         Mood and Affect: Mood normal          Behavior: Behavior normal          The history was obtained from the review " of the chart, patient and family      Lab Results:   Lab Results   Component Value Date/Time    Hemoglobin A1C 5 6 03/10/2023 04:59 AM    Hemoglobin A1C 8 1 (H) 01/16/2023 10:06 AM    Hemoglobin A1C 7 6 (A) 01/05/2023 02:56 PM    Hemoglobin A1C 6 6 (A) 06/23/2022 12:51 PM    WBC 6 25 04/20/2023 05:25 AM    WBC 6 41 04/19/2023 05:46 AM    WBC 6 23 04/18/2023 05:16 AM    Hemoglobin 8 5 (L) 04/20/2023 05:25 AM    Hemoglobin 8 6 (L) 04/19/2023 05:46 AM    Hemoglobin 9 4 (L) 04/18/2023 02:30 PM    Hematocrit 27 5 (L) 04/20/2023 05:25 AM    Hematocrit 27 0 (L) 04/19/2023 05:46 AM    Hematocrit 29 7 (L) 04/18/2023 02:30 PM    MCV 93 04/20/2023 05:25 AM    MCV 92 04/19/2023 05:46 AM    MCV 91 04/18/2023 05:16 AM    Platelets 985 86/42/5324 05:25 AM    Platelets 565 78/20/1799 05:46 AM    Platelets 001 02/25/8104 05:16 AM    BUN 12 04/19/2023 05:46 AM    BUN 12 04/17/2023 05:38 AM    BUN 16 04/16/2023 05:13 AM    Potassium 4 6 04/19/2023 05:46 AM    Potassium 3 7 04/17/2023 05:38 AM    Potassium 3 7 04/16/2023 05:13 AM    Chloride 107 04/19/2023 05:46 AM    Chloride 107 04/17/2023 05:38 AM    Chloride 107 04/16/2023 05:13 AM    CO2 25 04/19/2023 05:46 AM    CO2 25 04/17/2023 05:38 AM    CO2 27 04/16/2023 05:13 AM    Creatinine 0 96 04/19/2023 05:46 AM    Creatinine 0 86 04/17/2023 05:38 AM    Creatinine 0 80 04/16/2023 05:13 AM    AST 16 04/16/2023 05:13 AM    AST 21 04/15/2023 09:11 AM    AST 19 04/15/2023 03:57 AM    ALT 20 04/16/2023 05:13 AM    ALT 25 04/15/2023 09:11 AM    ALT 23 04/15/2023 03:57 AM    Albumin 3 1 (L) 04/16/2023 05:13 AM    Albumin 3 2 (L) 04/15/2023 09:11 AM    Albumin 3 2 (L) 04/15/2023 03:57 AM    HDL, Direct 45 01/16/2023 10:06 AM    Triglycerides 206 (H) 01/16/2023 10:06 AM     Lab Results   Component Value Date    LDLCALC 117 (H) 01/16/2023     Lab Results   Component Value Date    QDP2LSGKZRSD 4 980 (H) 03/19/2023    O2VXWTW 1 0 06/10/2016           Imaging Studies: I have personally reviewed "pertinent reports  Portions of the record may have been created with voice recognition software  Occasional wrong word or \"sound a like\" substitutions may have occurred due to the inherent limitations of voice recognition software  Read the chart carefully and recognize, using context, where substitutions have occurred    "

## 2023-04-24 NOTE — PROGRESS NOTES
ADT alert received  Patient was discharged home on 4/22 with [facility] for resumption of care  Patient was seen 4/23 by nurse

## 2023-04-25 ENCOUNTER — CONSULT (OUTPATIENT)
Dept: GASTROENTEROLOGY | Facility: CLINIC | Age: 78
End: 2023-04-25

## 2023-04-25 VITALS
SYSTOLIC BLOOD PRESSURE: 118 MMHG | WEIGHT: 212 LBS | TEMPERATURE: 96.6 F | HEIGHT: 69 IN | BODY MASS INDEX: 31.4 KG/M2 | DIASTOLIC BLOOD PRESSURE: 78 MMHG | HEART RATE: 96 BPM | OXYGEN SATURATION: 97 % | RESPIRATION RATE: 18 BRPM

## 2023-04-25 DIAGNOSIS — K92.1 GASTROINTESTINAL HEMORRHAGE WITH MELENA: ICD-10-CM

## 2023-04-25 DIAGNOSIS — K92.1 MELENA: ICD-10-CM

## 2023-04-25 DIAGNOSIS — K57.90 DIVERTICULOSIS: ICD-10-CM

## 2023-04-25 NOTE — PROGRESS NOTES
"Danna Abdullahi [facility] - Outpatient Follow-up Note  Buddy Villatoro 69 y o  male MRN: [id number]  Encounter: [id number]          ASSESSMENT AND PLAN:      Ingrid Birch is a 70 y/o male with HTN, DM, CKD, CAD s/p PCI x 4 on plavix and ASA ( most recent in December 2022) who presents for hospital follow-up  1  Acute anemia   2  GI bleeding   3  AVM, Ileum   Pt admitted from 4/14-4/21 for recurrent GI bleed; pt was admitted in March 2023 for hematochezia, suspected to be diverticular in nature  He continued with recurrent hematochezia after this, which prompted his most recent admission  Last CT done was during his admission in March, which noted diverticulosis  EGD 3/2023 noted food bolus in stomach but otherwise WNL; colonoscopy at that time depicted \"Suspected ulcer within a diverticulum in the descending colon with possible visible vessel and an adherant clot, deployed 2 hemostatic clips\" with old and fresh blood noted  EGD w push and repeat colonoscopy 4/16 noted maroon-tinged blood mixed with brown stool in L colon without active bleeding  Pt underwent IP capsule that suggested potential AVMs in distal ileum, however these were not suspected to be cause of his bleeding due to large volume  Hgb 5 days ago at 8 5; his Hgb was trending around 7 during this last admission  Pt says he has not had any further bleeding since d/c    -etiology of pt's recent recurrent bleed likely hemorrhoidal; I also agree that one AVM in the ileum likely would not be enough to cause such large-volume bleeds acutely   -continue miralax daily-BID  -trend Hgb:n repeat CBC in 1 week  -monitor stool output and color: I asked pt and daughter to let me know if he has any over bleeding  -if pt again has large-volume hematochezia and/or presyncope and/or Hgb worsens, would recommend going to the ED for CT scan with GI bleed protocol   If pt starts to have melena but Hgb is stable, I would recommend colonoscopy with single balloon to " evaluate the AVM in the ileum   ______________________________________________________________________    SUBJECTIVE:  Ángel Garcia is a 68 y/o male with HTN, DM, CKD, CAD s/p PCI x 4 on plavix and ASA ( most recent in December 2022) who presents for hospital follow-up  Pt says that he has not had any further bleeding since leaving the hospital  He says he has not moved his bowels much but is passing gas  Pt denies black BMs, n/v, abdominal pain, dizziness, passing out or falls  Pt denies NSAID use  Pt is back on his plavix  REVIEW OF SYSTEMS IS OTHERWISE NEGATIVE  Historical Information   Past Medical History:   Diagnosis Date   • BPH (benign prostatic hyperplasia)    • CAD (coronary artery disease)    • Cancer (HCC)     basal cell   • Chronic kidney disease     stage 3   • Colon polyp    • CPAP (continuous positive airway pressure) dependence    • Diabetes mellitus (HCC)     niddm   • Disease of thyroid gland    • Gout    • High cholesterol    • Hypertension    • Hypomagnesemia 3/9/2023   • MI, 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    • Myocardial infarction ([facility] [address] ) 2014   • S/P cardiac catheterization 12/30/2022   • Sleep apnea      Past Surgical History:   Procedure Laterality Date   • BASAL CELL CARCINOMA EXCISION Right 1/21/2022    Procedure: EXCISION BASAL CELL CARCINOMA EXTREMITY;  Surgeon: Jose Carlos Tidwell DO;  Location: [facility] MAIN [facility];  Service: General   • CARDIAC CATHETERIZATION Left 12/29/2022    Procedure: Cardiac Left Heart Cath;  Surgeon: Diamond Lamar MD;  Location: [facility] CARDIAC [facility] LAB; Service: Cardiology   • CARDIAC CATHETERIZATION N/A 12/29/2022    Procedure: Cardiac Coronary Angiogram;  Surgeon: Diamond Lamar MD;  [facility]: [facility]; Service: Cardiology   • CARDIAC CATHETERIZATION N/A 12/29/2022    Procedure: Cardiac pci;  Surgeon: Diamond Lamar MD;  Location: [facility];   Service: Cardiology   • CATARACT EXTRACTION W/  INTRAOCULAR LENS IMPLANT     • CORNEAL TRANSPLANT     • CORONARY ANGIOPLASTY WITH STENT PLACEMENT      stents x3-- [id number]   • EYE SURGERY      repair corneal laceration   • OR COLONOSCOPY FLX DX W/COLLJ SPEC WHEN PFRMD N/A 3/21/2016    Procedure: COLONOSCOPY;  Surgeon: Kathy Bermudez MD;  Location: [facility];  Service: Colorectal   • TONSILLECTOMY AND ADENOIDECTOMY       Social History   Social History     Substance and Sexual Activity   Alcohol Use Not Currently    Comment: socially, cut down in 2008, previously did more than      Social History     Substance and Sexual Activity   Drug Use Never     Social History     Tobacco Use   Smoking Status Never   Smokeless Tobacco Never     Family History   Adopted: Yes   Problem Relation Age of Onset   • No Known Problems Mother    • No Known Problems Father        Meds/Allergies       Current Outpatient Medications:   •  acetaminophen (TYLENOL) 325 mg tablet  •  allopurinol (ZYLOPRIM) 100 mg tablet  •  aluminum-magnesium hydroxide-simethicone (MYLANTA) 5824-5089-308 mg/30 mL suspension  •  aspirin (ECOTRIN LOW STRENGTH) 81 mg EC tablet  •  atorvastatin (LIPITOR) 80 mg tablet  •  Blood Glucose Monitoring Suppl (OneTouch Verio Reflect) w/Device KIT  •  clopidogrel (PLAVIX) 75 mg tablet  •  cyanocobalamin (VITAMIN B-12) 500 MCG tablet  •  Empagliflozin (Jardiance) 10 MG TABS  •  escitalopram (LEXAPRO) 20 mg tablet  •  finasteride (PROSCAR) 5 mg tablet  •  fluticasone (FLONASE) 50 mcg/act nasal spray  •  glimepiride (AMARYL) 4 mg tablet  •  glucose blood (OneTouch Verio) test strip  •  levothyroxine 112 mcg tablet  •  metFORMIN (GLUCOPHAGE) 1000 MG tablet  •  midodrine (PROAMATINE) 5 mg tablet  •  multivitamin (THERAGRAN) TABS  •  OneTouch Delica Lancets 43F MISC  •  oxybutynin (DITROPAN) 5 mg tablet  •  pantoprazole (PROTONIX) 40 mg tablet  •  polyethylene glycol (MIRALAX) 17 g packet  •  prednisoLONE acetate (PRED FORTE) 1 % ophthalmic suspension  •  senna-docusate sodium (SENOKOT S) 8 6-50 mg per tablet  • torsemide (DEMADEX) 10 mg tablet  •  zonisamide (ZONEGRAN) 50 MG capsule    No Known Allergies        Objective     There were no vitals taken for this visit  There is no height or weight on file to calculate BMI  PHYSICAL EXAM:      General Appearance:   Alert, cooperative, no distress   HEENT:   Normocephalic, atraumatic, anicteric      Neck:  Supple, symmetrical, trachea midline   Lungs:   Clear to auscultation bilaterally; no rales, rhonchi or wheezing; respirations unlabored    Heart[de-identified]   Regular rate and rhythm; no murmur, rub, or gallop  Abdomen:   Soft, non-tender, non-distended; normal bowel sounds; no masses, no organomegaly    Genitalia:   Deferred    Rectal:   Deferred    Extremities:  No cyanosis, clubbing or edema    Pulses:  2+ and symmetric    Skin:  No jaundice, rashes, or lesions    Lymph nodes:  No palpable cervical lymphadenopathy        Lab Results:   No visits with results within 1 Day(s) from this visit  Latest known visit with results is:   No results displayed because visit has over 200 results  Radiology Results:   Colonoscopy    Result Date: 4/16/2023  Narrative: Table formatting from the original result was not included  [id number] [address] [place] [address] [phone] DATE OF SERVICE: 4/16/23 PHYSICIAN(S): Attending: Lucia Marinelli MD Fellow: No Staff Documented INDICATION: Melena POST-OP DIAGNOSIS: See the impression below  HISTORY: Prior colonoscopy: Less than 3 years ago  It is being repeated at an interval of less than 3 years because: This colonoscopy is being performed for a diagnostic indication BOWEL PREPARATION: Golytely/Colyte/Trilyte PREPROCEDURE: Informed consent was obtained for the procedure, including sedation  Risks including but not limited to bleeding, infection, perforation, adverse drug reaction and aspiration were explained in detail   Also explained about less than 100% sensitivity with the exam and other alternatives  The patient was placed in the left lateral decubitus position  Procedure: Colonoscopy DETAILS OF PROCEDURE: Patient was taken to the procedure room where a time out was performed to confirm correct patient and correct procedure  The patient underwent monitored anesthesia care, which was administered by an anesthesia professional  The patient's blood pressure, heart rate, level of consciousness, oxygen and respirations were monitored throughout the procedure  A digital rectal exam was performed  The scope was introduced through the anus and advanced to the terminal ileum  Retroflexion was performed in the rectum  The quality of bowel preparation was evaluated using the [facility] Bowel Preparation Scale with scores of: right colon = 2, transverse colon = 2, left colon = 2  The total BBPS score was 6  Bowel prep was adequate  The patient experienced no blood loss  The procedure was not difficult  The patient tolerated the procedure well  There were no apparent complications  ANESTHESIA INFORMATION: ASA: III Anesthesia Type: General MEDICATIONS: simethicone (MYLICON) 40 mg in sterile water 250 mL 40 mg (Totals for administrations occurring from 0708 to 0802 on 04/16/23) FINDINGS: The terminal ileum appeared normal  Multiple small and large, moderate extensive pancolonic diverticula Small, internal hemorrhoids Otherwise normal colonic mucosa EVENTS: Procedure Events Event Event Time ENDO SCOPE OUT TIME 4/16/2023  7:32 AM ENDO CECUM REACHED 4/16/2023  7:45 AM ENDO SCOPE OUT TIME 4/16/2023  8:00 AM SPECIMENS: * No specimens in log * EQUIPMENT: Colonoscope - Colonoscope -     Impression: Normal terminal ileum  Pancolonic diverticulosis  There was maroon tinge blood mixed with brown stool in the left colon  There was no evidence of high risk stigmata seen in any of the diverticula   Small internal hemorrhoids RECOMMENDATION:  Repeat screening colonoscopy in 10 years He may have a diverticular source of his GI bleeding episodes given otherwise unremarkable enteroscopy and colonoscopy findings  The bleeding appears to have stopped  If recurrent lower GI bleeding obtain stat CT abdomen pelvis bleeding protocol Obtain outpatient video capsule endoscopy for evaluation of small bowel AVMs and his recurrent bleeding episodes Resume anticoagulation Okay to resume diet  Alberto Syed MD     EGD with Push Enteroscopy    Result Date: 4/16/2023  Narrative: Table formatting from the original result was not included  [id number] [facility] [address] [place] [address] [phone] DATE OF SERVICE: 4/16/23 PHYSICIAN(S): Attending: Alberto Syed MD Fellow: No Staff Documented INDICATION: Melena POST-OP DIAGNOSIS: See the impression below  PREPROCEDURE: Informed consent was obtained for the procedure, including sedation  Risks of perforation, hemorrhage, adverse drug reaction and aspiration were discussed  The patient was placed in the left lateral decubitus position  Patient was explained about the risks and benefits of the procedure  Risks including but not limited to bleeding, infection, and perforation were explained in detail  Also explained about less than 100% sensitivity with the exam and other alternatives  PROCEDURE: EGD DETAILS OF PROCEDURE: Patient was taken to the procedure room where a time out was performed to confirm correct patient and correct procedure  The patient underwent monitored anesthesia care, which was administered by an anesthesia professional  The patient's blood pressure, level of consciousness, ECG, ETCO2, heart rate, respirations and oxygen were monitored throughout the procedure  The scope was advanced to the fourth part of the duodenum  Tattoo was not placed to anat the distal extent of the examThe procedure was performed without an overtube  Fluoroscopy was not used  Retroflexion was performed in the cardia  The patient experienced no blood loss   The procedure was not difficult  The patient tolerated the procedure well  There were no apparent complications  ANESTHESIA INFORMATION: ASA: III Anesthesia Type: General MEDICATIONS: simethicone (MYLICON) 40 mg in sterile water 250 mL 40 mg (Totals for administrations occurring from 0708 to 0733 on 04/16/23) FINDINGS: Regular Z-line 38 cm from the incisors The esophagus, stomach, duodenal bulb, 2nd part of the duodenum, 3rd part of the duodenum and 4th part of the duodenum appeared normal  SPECIMENS: * No specimens in log *     Impression: Normal esophagus and stomach Duodenum was examined to the fourth portion and appeared normal RECOMMENDATION:  There is no recommended follow-up for this procedure   No etiology of GI bleeding visualized on this exam Proceed with colonoscopy   Ramon Bowie MD

## 2023-04-25 NOTE — UTILIZATION REVIEW
NOTIFICATION OF ADMISSION DISCHARGE   This is a Notification of Discharge from [address]  Please be advised that this patient has been discharge from our facility  Below you will find the admission and discharge date and time including the patient’s disposition  UTILIZATION REVIEW CONTACT:  Lawson Hazel  Utilization   Network Utilization Review Department  Phone: [phone] x carefully listen to the prompts  All voicemails are confidential   Email: [email or web address]     ADMISSION INFORMATION  PRESENTATION DATE: 4/14/2023  6:55 PM  OBERVATION ADMISSION DATE:   INPATIENT ADMISSION DATE: 4/14/23  6:55 PM   DISCHARGE DATE: 4/22/2023  3:14 PM   DISPOSITION:Home with Home Health Care    IMPORTANT INFORMATION:  Send all requests for admission clinical reviews, approved or denied determinations and any other requests to dedicated fax number below belonging to the campus where the patient is receiving treatment   List of dedicated fax numbers:  [address] DENIALS (Administrative/Medical Necessity) [phone]   [address] (Maternity/NICU/Pediatrics) [phone]   [facility] [phone]   [facility] 87 [phone]   [address] [phone]   220 [facility] [phone] 90 [facility] [phone]   [address] [place] [address] [phone]   [facility]  [phone]   [address] [facility] [phone]   412 [facility] [address] [facility] [phone]

## 2023-04-26 ENCOUNTER — HOME CARE VISIT (OUTPATIENT)
Dept: HOME HEALTH SERVICES | Facility: HOME HEALTHCARE | Age: 78
End: 2023-04-26

## 2023-04-26 VITALS
OXYGEN SATURATION: 94 % | TEMPERATURE: 98 F | HEART RATE: 72 BPM | SYSTOLIC BLOOD PRESSURE: 98 MMHG | DIASTOLIC BLOOD PRESSURE: 52 MMHG | RESPIRATION RATE: 20 BRPM

## 2023-04-26 VITALS — OXYGEN SATURATION: 96 % | HEART RATE: 87 BPM

## 2023-04-26 NOTE — CASE COMMUNICATION
OT evaluation completed 4/26/23  No further visits planned at this time as patient is functioning at max potential for ADL's

## 2023-04-27 ENCOUNTER — HOME CARE VISIT (OUTPATIENT)
Dept: HOME HEALTH SERVICES | Facility: HOME HEALTHCARE | Age: 78
End: 2023-04-27

## 2023-04-27 VITALS — HEART RATE: 75 BPM | SYSTOLIC BLOOD PRESSURE: 120 MMHG | DIASTOLIC BLOOD PRESSURE: 70 MMHG | OXYGEN SATURATION: 94 %

## 2023-04-28 ENCOUNTER — LAB (OUTPATIENT)
Dept: LAB | Facility: HOSPITAL | Age: 78
End: 2023-04-28
Attending: STUDENT IN AN ORGANIZED HEALTH CARE EDUCATION/TRAINING PROGRAM

## 2023-04-28 ENCOUNTER — OFFICE VISIT (OUTPATIENT)
Dept: FAMILY MEDICINE CLINIC | Facility: CLINIC | Age: 78
End: 2023-04-28

## 2023-04-28 VITALS
HEART RATE: 106 BPM | DIASTOLIC BLOOD PRESSURE: 68 MMHG | BODY MASS INDEX: 31.7 KG/M2 | HEIGHT: 69 IN | SYSTOLIC BLOOD PRESSURE: 126 MMHG | TEMPERATURE: 96.8 F | WEIGHT: 214 LBS | OXYGEN SATURATION: 95 %

## 2023-04-28 DIAGNOSIS — K92.1 MELENA: ICD-10-CM

## 2023-04-28 DIAGNOSIS — E11.65 TYPE 2 DIABETES MELLITUS WITH HYPERGLYCEMIA, WITHOUT LONG-TERM CURRENT USE OF INSULIN (HCC): ICD-10-CM

## 2023-04-28 DIAGNOSIS — K57.31 DIVERTICULOSIS OF COLON WITH HEMORRHAGE: ICD-10-CM

## 2023-04-28 DIAGNOSIS — Z79.4 TYPE 2 DIABETES MELLITUS WITH DIABETIC POLYNEUROPATHY, WITH LONG-TERM CURRENT USE OF INSULIN (HCC): ICD-10-CM

## 2023-04-28 DIAGNOSIS — E11.42 TYPE 2 DIABETES MELLITUS WITH DIABETIC POLYNEUROPATHY, WITH LONG-TERM CURRENT USE OF INSULIN (HCC): ICD-10-CM

## 2023-04-28 DIAGNOSIS — I50.32 CHRONIC DIASTOLIC (CONGESTIVE) HEART FAILURE (HCC): ICD-10-CM

## 2023-04-28 DIAGNOSIS — Z76.89 ENCOUNTER FOR SUPPORT AND COORDINATION OF TRANSITION OF CARE: Primary | ICD-10-CM

## 2023-04-28 DIAGNOSIS — E11.51 TYPE 2 DIABETES MELLITUS WITH DIABETIC PERIPHERAL ANGIOPATHY WITHOUT GANGRENE, WITHOUT LONG-TERM CURRENT USE OF INSULIN (HCC): ICD-10-CM

## 2023-04-28 DIAGNOSIS — R23.2 HOT FLASH IN MALE: ICD-10-CM

## 2023-04-28 DIAGNOSIS — K92.2 ACUTE GI BLEEDING: ICD-10-CM

## 2023-04-28 LAB
ALBUMIN SERPL BCP-MCNC: 4.3 G/DL (ref 3.5–5)
ALP SERPL-CCNC: 107 U/L (ref 34–104)
ALT SERPL W P-5'-P-CCNC: 20 U/L (ref 7–52)
ANION GAP SERPL CALCULATED.3IONS-SCNC: 8 MMOL/L (ref 4–13)
AST SERPL W P-5'-P-CCNC: 16 U/L (ref 13–39)
BASOPHILS # BLD AUTO: 0.05 THOUSANDS/ΜL (ref 0–0.1)
BASOPHILS NFR BLD AUTO: 1 % (ref 0–1)
BILIRUB SERPL-MCNC: 0.31 MG/DL (ref 0.2–1)
BUN SERPL-MCNC: 20 MG/DL (ref 5–25)
CALCIUM SERPL-MCNC: 9.4 MG/DL (ref 8.4–10.2)
CHLORIDE SERPL-SCNC: 105 MMOL/L (ref 96–108)
CO2 SERPL-SCNC: 28 MMOL/L (ref 21–32)
CREAT SERPL-MCNC: 1.14 MG/DL (ref 0.6–1.3)
CREAT UR-MCNC: 57 MG/DL
EOSINOPHIL # BLD AUTO: 0.53 THOUSAND/ΜL (ref 0–0.61)
EOSINOPHIL NFR BLD AUTO: 8 % (ref 0–6)
ERYTHROCYTE [DISTWIDTH] IN BLOOD BY AUTOMATED COUNT: 14.9 % (ref 11.6–15.1)
GFR SERPL CREATININE-BSD FRML MDRD: 62 ML/MIN/1.73SQ M
GLUCOSE P FAST SERPL-MCNC: 218 MG/DL (ref 65–99)
HCT VFR BLD AUTO: 34.8 % (ref 36.5–49.3)
HGB BLD-MCNC: 10.6 G/DL (ref 12–17)
IMM GRANULOCYTES # BLD AUTO: 0.01 THOUSAND/UL (ref 0–0.2)
IMM GRANULOCYTES NFR BLD AUTO: 0 % (ref 0–2)
LYMPHOCYTES # BLD AUTO: 1.66 THOUSANDS/ΜL (ref 0.6–4.47)
LYMPHOCYTES NFR BLD AUTO: 26 % (ref 14–44)
MCH RBC QN AUTO: 27.9 PG (ref 26.8–34.3)
MCHC RBC AUTO-ENTMCNC: 30.5 G/DL (ref 31.4–37.4)
MCV RBC AUTO: 92 FL (ref 82–98)
MICROALBUMIN UR-MCNC: 191 MG/L (ref 0–20)
MICROALBUMIN/CREAT 24H UR: 335 MG/G CREATININE (ref 0–30)
MONOCYTES # BLD AUTO: 0.63 THOUSAND/ΜL (ref 0.17–1.22)
MONOCYTES NFR BLD AUTO: 10 % (ref 4–12)
NEUTROPHILS # BLD AUTO: 3.62 THOUSANDS/ΜL (ref 1.85–7.62)
NEUTS SEG NFR BLD AUTO: 55 % (ref 43–75)
NRBC BLD AUTO-RTO: 0 /100 WBCS
PLATELET # BLD AUTO: 400 THOUSANDS/UL (ref 149–390)
PMV BLD AUTO: 8.8 FL (ref 8.9–12.7)
POTASSIUM SERPL-SCNC: 3.9 MMOL/L (ref 3.5–5.3)
PROT SERPL-MCNC: 7.7 G/DL (ref 6.4–8.4)
RBC # BLD AUTO: 3.8 MILLION/UL (ref 3.88–5.62)
SODIUM SERPL-SCNC: 141 MMOL/L (ref 135–147)
T4 FREE SERPL-MCNC: 1.11 NG/DL (ref 0.76–1.46)
TESTOST SERPL-MCNC: 462 NG/DL (ref 95–948)
TSH SERPL DL<=0.05 MIU/L-ACNC: 1.51 UIU/ML (ref 0.45–4.5)
WBC # BLD AUTO: 6.5 THOUSAND/UL (ref 4.31–10.16)

## 2023-04-28 NOTE — PROGRESS NOTES
Assessment/Plan:      Diagnoses and all orders for this visit:    Encounter for support and coordination of transition of care    Type 2 diabetes mellitus with diabetic polyneuropathy, with long-term current use of insulin (HCC)  -     metFORMIN (GLUCOPHAGE) 1000 MG tablet; Take 1 tablet (1,000 mg total) by mouth 2 (two) times a day with meals    Acute GI bleeding    Diverticulosis of colon with hemorrhage    Type 2 diabetes mellitus with hyperglycemia, without long-term current use of insulin (HCC)  -     HEMOGLOBIN A1C W/ EAG ESTIMATION; Future    Chronic diastolic (congestive) heart failure (HCC)         Subjective:     Patient ID: Pernell Monk is a 69 y o  male  Mr Yon Flynn is here today accompanied by his wife for a transition of care recent hospitalization for GI bleed AV malformations diverticular disease extensive with possible bleeding there he actually looks better than he has looked in quite a few months they also made some adjustments to his medications took him off the tamsulosin and put him on finasteride for his urinary outlet obstruction and his blood pressures actually are good enough now that he is not taking his midodrine      Review of Systems   Constitutional: Positive for activity change and fatigue  Negative for appetite change, diaphoresis and fever  HENT: Negative  Negative for dental problem  Eyes: Negative  Patient has near blindness in 1 eye and poor vision in the other   Respiratory: Negative for apnea, cough, chest tightness, shortness of breath and wheezing  Cardiovascular: Positive for leg swelling  Negative for chest pain and palpitations  Gastrointestinal: Negative for abdominal distention, abdominal pain, anal bleeding, constipation, diarrhea, nausea and vomiting  Endocrine: Negative for cold intolerance, heat intolerance, polydipsia, polyphagia and polyuria  Genitourinary: Positive for difficulty urinating   Negative for dysuria, flank pain, hematuria and urgency  Musculoskeletal: Positive for arthralgias  Negative for back pain, gait problem, joint swelling and myalgias  Skin: Negative for color change, rash and wound  Allergic/Immunologic: Negative for environmental allergies, food allergies and immunocompromised state  Neurological: Positive for dizziness  Negative for seizures, syncope, speech difficulty, numbness and headaches  Hematological: Negative for adenopathy  Does not bruise/bleed easily  Psychiatric/Behavioral: Negative for agitation, behavioral problems, hallucinations, sleep disturbance and suicidal ideas  Objective:     Physical Exam  Constitutional:       General: He is not in acute distress  Appearance: He is well-developed  He is obese  He is not diaphoretic  HENT:      Head: Normocephalic  Right Ear: External ear normal       Left Ear: External ear normal       Nose: Nose normal    Eyes:      General: No scleral icterus  Right eye: No discharge  Left eye: No discharge  Conjunctiva/sclera: Conjunctivae normal       Pupils: Pupils are equal, round, and reactive to light  Neck:      Thyroid: No thyromegaly  Trachea: No tracheal deviation  Cardiovascular:      Rate and Rhythm: Normal rate and regular rhythm  Pulses: no weak pulses          Dorsalis pedis pulses are 2+ on the right side and 2+ on the left side  Posterior tibial pulses are 2+ on the right side and 2+ on the left side  Heart sounds: Normal heart sounds  No murmur heard  No friction rub  No gallop  Pulmonary:      Effort: Pulmonary effort is normal  No respiratory distress  Breath sounds: Normal breath sounds  No wheezing  Abdominal:      General: Bowel sounds are normal       Palpations: Abdomen is soft  There is no mass  Tenderness: There is no abdominal tenderness  There is no guarding  Musculoskeletal:         General: No deformity  Cervical back: Normal range of motion     Feet: "     Right foot:      Skin integrity: No ulcer, skin breakdown, erythema, warmth, callus or dry skin  Left foot:      Skin integrity: No ulcer, skin breakdown, erythema, warmth, callus or dry skin  Lymphadenopathy:      Cervical: No cervical adenopathy  Skin:     General: Skin is warm and dry  Findings: No erythema or rash  Neurological:      Mental Status: He is alert and oriented to person, place, and time  Cranial Nerves: No cranial nerve deficit  Psychiatric:         Thought Content: Thought content normal            Vitals:    04/28/23 0800   BP: 126/68   BP Location: Left arm   Patient Position: Sitting   Cuff Size: Standard   Pulse: (!) 106   Temp: (!) 96 8 °F (36 °C)   TempSrc: Temporal   SpO2: 95%   Weight: 97 1 kg (214 lb)   Height: 5' 9\" (1 753 m)       The following portions of the patient's history were reviewed and updated as appropriate: He  has a past medical history of BPH (benign prostatic hyperplasia), CAD (coronary artery disease), Cancer ([facility] ), Chronic kidney disease, Colon polyp, CPAP (continuous positive airway pressure) dependence, Diabetes mellitus ([facility] ), Disease of thyroid gland, Gout, High cholesterol, Hypertension, Hypomagnesemia (3/9/2023), MI, old, Myocardial infarction ([facility] ) (2014), S/P cardiac catheterization (12/30/2022), and Sleep apnea    He   Patient Active Problem List    Diagnosis Date Noted   • Benign prostatic hyperplasia with urinary retention 04/14/2023   • Low serum cortisol level 03/21/2023   • Hot flashes 03/21/2023   • Elevated platelet count 19/12/4830   • Hypothyroidism 03/11/2023   • Diverticulosis of colon with hemorrhage 03/09/2023   • Elevated TSH 03/09/2023   • Elevated d-dimer 03/09/2023   • Urinary retention 03/07/2023   • Acute blood loss anemia 03/03/2023   • Acute GI bleeding 03/02/2023   • Diabetes mellitus, type 2 ([facility] ) 03/02/2023   • Acute diverticulitis 03/02/2023   • Orthostatic hypotension 03/02/2023   • Mesenteric artery stenosis " ([place] [address] ) 01/31/2023   • Peripheral arterial disease ([place] [address] ) 01/05/2023   • S/P cardiac catheterization 12/30/2022   • Stage 3a chronic kidney disease ([place] [address] ) 02/23/2022   • Basal cell carcinoma (BCC) of right shoulder 01/18/2022   • Chronic diastolic (congestive) heart failure ([place] [address] ) 01/10/2022   • Skin lesion 12/22/2021   • Other chest pain 12/01/2021   • Leukocytosis 12/01/2021   • Bilateral lower extremity edema 10/22/2021   • Mild cognitive impairment 09/03/2021   • Benign hypertension with CKD (chronic kidney disease) stage III ([place] [address] ) 02/10/2021   • Diabetic nephropathy associated with type 2 diabetes mellitus ([place] [address] ) 02/10/2021   • Concentric left ventricular hypertrophy 02/10/2021   • Aortic stenosis, moderate 02/10/2021   • Edema 02/10/2021   • Fall (on) (from) other stairs and steps, initial encounter 12/28/2020   • Recurrent major depressive disorder ([place] [address] ) 12/28/2020   • Primary osteoarthritis of right knee 05/21/2020   • Primary osteoarthritis of left knee 05/21/2020   • Coronary artery disease of native artery of native heart with stable angina pectoris ([place] [address] ) 03/18/2020   • Chronic pain of both knees 02/13/2020   • Primary osteoarthritis of both knees 02/13/2020   • Primary osteoarthritis involving multiple joints 12/16/2019   • Vitamin E deficiency 09/19/2019   • Infected epidermoid cyst 12/04/2018   • Tremor, essential 09/26/2018   • Diabetic neuropathy associated with diabetes mellitus due to underlying condition ([place] [address] ) 09/26/2018   • Gait instability 09/26/2018   • Frequent falls 09/26/2018   • Degenerative disc disease, lumbar 07/10/2018   • Tremor 10/10/2016   • Depression with anxiety 08/29/2016   • Arthritis 07/13/2015   • NSTEMI (non-ST elevated myocardial infarction) ([place] [address] ) 10/06/2014   • Gout 05/17/2013   • Type 2 diabetes mellitus with hyperglycemia, without long-term current use of insulin ([place] [address] ) 06/18/2012   • Hyperlipidemia 06/18/2012   • Hypertension 06/18/2012   • Morbid obesity ([facility] ) 06/18/2012 He  has a past surgical history that includes Tonsillectomy and adenoidectomy; Cataract extraction w/  intraocular lens implant; Eye surgery; Coronary angioplasty with stent; pr colonoscopy flx dx w/collj spec when pfrmd (N/A, 3/21/2016); Corneal transplant; Excision basal cell carcinoma (Right, 1/21/2022); Cardiac catheterization (Left, 12/29/2022); Cardiac catheterization (N/A, 12/29/2022); and Cardiac catheterization (N/A, 12/29/2022)  His family history includes No Known Problems in his father and mother  He was adopted  He  reports that he has never smoked  He has never used smokeless tobacco  He reports that he does not currently use alcohol  He reports that he does not use drugs  Current Outpatient Medications   Medication Sig Dispense Refill   • acetaminophen (TYLENOL) 325 mg tablet Take 2 tablets (650 mg total) by mouth every 6 (six) hours as needed for mild pain, fever or headaches Do not exceed a total of 3 grams of tylenol/acetaminophen in a 24-hour period  0   • allopurinol (ZYLOPRIM) 100 mg tablet Take 1 tablet (100 mg total) by mouth daily 90 tablet 0   • aluminum-magnesium hydroxide-simethicone (MYLANTA) 9019-5751-770 mg/30 mL suspension Take 30 mL by mouth every 4 (four) hours as needed for indigestion or heartburn  0   • aspirin (ECOTRIN LOW STRENGTH) 81 mg EC tablet Take 1 tablet (81 mg total) by mouth daily Do not start before March 23, 2023   0   • atorvastatin (LIPITOR) 80 mg tablet Take 1 tablet (80 mg total) by mouth daily with dinner  0   • Blood Glucose Monitoring Suppl (OneTouch Verio Reflect) w/Device KIT Check blood sugars twice daily  Please substitute with appropriate alternative as covered by patient's insurance   Dx: E11 65 1 kit 0   • clopidogrel (PLAVIX) 75 mg tablet Take 1 tablet (75 mg total) by mouth daily Do not start before March 9, 2023  30 tablet 1   • cyanocobalamin (VITAMIN B-12) 500 MCG tablet Take 1 tablet (500 mcg total) by mouth daily Do not start before March 23, 2023   0   • Empagliflozin (Jardiance) 10 MG TABS Take 1 tablet (10 mg total) by mouth every morning 90 tablet 1   • escitalopram (LEXAPRO) 20 mg tablet Take 0 5 tablets (10 mg total) by mouth daily 90 tablet 0   • finasteride (PROSCAR) 5 mg tablet Take 1 tablet (5 mg total) by mouth daily 30 tablet 6   • fluticasone (FLONASE) 50 mcg/act nasal spray 1 spray into each nostril daily Do not start before March 23, 2023   0   • glimepiride (AMARYL) 4 mg tablet TAKE 1 TABLET TWICE A  tablet 0   • glucose blood (OneTouch Verio) test strip Check blood sugars twice daily  Please substitute with appropriate alternative as covered by patient's insurance  Dx: E11 65 200 each 3   • levothyroxine 112 mcg tablet Take 1 tablet (112 mcg total) by mouth daily in the early morning Do not start before March 23, 2023   0   • metFORMIN (GLUCOPHAGE) 1000 MG tablet Take 1 tablet (1,000 mg total) by mouth 2 (two) times a day with meals 180 tablet 1   • multivitamin (THERAGRAN) TABS Take 1 tablet by mouth daily     • OneTouch Delica Lancets 11Q MISC Check blood sugars twice daily  Please substitute with appropriate alternative as covered by patient's insurance   Dx: E11 65 200 each 3   • oxybutynin (DITROPAN) 5 mg tablet Take 1 tablet (5 mg total) by mouth 3 (three) times a day 90 tablet 0   • pantoprazole (PROTONIX) 40 mg tablet Take 1 tablet (40 mg total) by mouth daily before breakfast Do not start before March 9, 2023  30 tablet 0   • polyethylene glycol (MIRALAX) 17 g packet Take 17 g by mouth daily as needed (constipation)  0   • prednisoLONE acetate (PRED FORTE) 1 % ophthalmic suspension INSTILL 1 DROP INTO RIGHT EYE ONCE DAILY     • senna-docusate sodium (SENOKOT S) 8 6-50 mg per tablet Take 1 tablet by mouth 2 (two) times a day Hold for diarrhea or loose stools  0   • torsemide (DEMADEX) 10 mg tablet Take 10 mg by mouth if needed     • zonisamide (ZONEGRAN) 50 MG capsule Take 1 tablet (50 mg total) by mouth daily 90 capsule 2   • midodrine (PROAMATINE) 5 mg tablet GIVE 1 TABLET BY MOUTH BEFORE MEAL(S)  HOLD FOR SBP ABOVE 140 (Patient not taking: Reported on 4/11/2023)       No current facility-administered medications for this visit  Current Outpatient Medications on File Prior to Visit   Medication Sig   • acetaminophen (TYLENOL) 325 mg tablet Take 2 tablets (650 mg total) by mouth every 6 (six) hours as needed for mild pain, fever or headaches Do not exceed a total of 3 grams of tylenol/acetaminophen in a 24-hour period  • allopurinol (ZYLOPRIM) 100 mg tablet Take 1 tablet (100 mg total) by mouth daily   • aluminum-magnesium hydroxide-simethicone (MYLANTA) 3979-5322-852 mg/30 mL suspension Take 30 mL by mouth every 4 (four) hours as needed for indigestion or heartburn   • aspirin (ECOTRIN LOW STRENGTH) 81 mg EC tablet Take 1 tablet (81 mg total) by mouth daily Do not start before March 23, 2023  • atorvastatin (LIPITOR) 80 mg tablet Take 1 tablet (80 mg total) by mouth daily with dinner   • Blood Glucose Monitoring Suppl (OneTouch Verio Reflect) w/Device KIT Check blood sugars twice daily  Please substitute with appropriate alternative as covered by patient's insurance  Dx: E11 65   • clopidogrel (PLAVIX) 75 mg tablet Take 1 tablet (75 mg total) by mouth daily Do not start before March 9, 2023  • cyanocobalamin (VITAMIN B-12) 500 MCG tablet Take 1 tablet (500 mcg total) by mouth daily Do not start before March 23, 2023  • Empagliflozin (Jardiance) 10 MG TABS Take 1 tablet (10 mg total) by mouth every morning   • escitalopram (LEXAPRO) 20 mg tablet Take 0 5 tablets (10 mg total) by mouth daily   • finasteride (PROSCAR) 5 mg tablet Take 1 tablet (5 mg total) by mouth daily   • fluticasone (FLONASE) 50 mcg/act nasal spray 1 spray into each nostril daily Do not start before March 23, 2023     • glimepiride (AMARYL) 4 mg tablet TAKE 1 TABLET TWICE A DAY   • glucose blood (OneTouch Verio) test strip Check blood sugars twice daily  Please substitute with appropriate alternative as covered by patient's insurance  Dx: E11 65   • levothyroxine 112 mcg tablet Take 1 tablet (112 mcg total) by mouth daily in the early morning Do not start before March 23, 2023  • multivitamin (THERAGRAN) TABS Take 1 tablet by mouth daily   • OneTouch Delica Lancets 13T MISC Check blood sugars twice daily  Please substitute with appropriate alternative as covered by patient's insurance  Dx: E11 65   • oxybutynin (DITROPAN) 5 mg tablet Take 1 tablet (5 mg total) by mouth 3 (three) times a day   • pantoprazole (PROTONIX) 40 mg tablet Take 1 tablet (40 mg total) by mouth daily before breakfast Do not start before March 9, 2023  • polyethylene glycol (MIRALAX) 17 g packet Take 17 g by mouth daily as needed (constipation)   • prednisoLONE acetate (PRED FORTE) 1 % ophthalmic suspension INSTILL 1 DROP INTO RIGHT EYE ONCE DAILY   • senna-docusate sodium (SENOKOT S) 8 6-50 mg per tablet Take 1 tablet by mouth 2 (two) times a day Hold for diarrhea or loose stools   • torsemide (DEMADEX) 10 mg tablet Take 10 mg by mouth if needed   • zonisamide (ZONEGRAN) 50 MG capsule Take 1 tablet (50 mg total) by mouth daily   • [DISCONTINUED] metFORMIN (GLUCOPHAGE) 1000 MG tablet Take 1 tablet (1,000 mg total) by mouth 2 (two) times a day with meals   • midodrine (PROAMATINE) 5 mg tablet GIVE 1 TABLET BY MOUTH BEFORE MEAL(S)  HOLD FOR SBP ABOVE 140 (Patient not taking: Reported on 4/11/2023)   • [DISCONTINUED] topiramate (Topamax) 25 mg tablet Take 1 tablet (25 mg total) by mouth daily X 1 week then discontinue     No current facility-administered medications on file prior to visit  He has No Known Allergies       Transitional Care Management Review:  Chance Cardoso is a 69 y o  male here for TCM follow up       During the TCM phone call patient stated:    TCM Call     Date and time call was made  4/24/2023  1:47 PM    Hospital care reviewed  Records reviewed    Patient was hospitialized at  [facility] [address]    Date of Admission  04/14/23    Date of discharge  04/22/23    Diagnosis  acute G I bleed, anemia    Disposition  Home; Home health services    Were the patients medications reviewed and updated  No    Current Symptoms  None      TCM Call     Post hospital issues  None    Should patient be enrolled in anticoag monitoring? No    Scheduled for follow up? Yes    Patients specialists  --  Gastroenterology    Cardiologist name  Tobibertrand Laird    Did you obtain your prescribed medications  Yes    Do you need help managing your prescriptions or medications  No    Is transportation to your appointment needed  No    I have advised the patient to call PCP with any new or worsening symptoms  Kenzie Godinez     Counseling  Patient        Patient's shoes and socks removed  Right Foot/Ankle   Right Foot Inspection  Skin Exam: skin normal and skin intact  No dry skin, no warmth, no callus, no erythema, no maceration, no abnormal color, no pre-ulcer, no ulcer and no callus  Toe Exam: ROM and strength within normal limits  Sensory   Vibration: intact  Proprioception: intact  Monofilament testing: intact    Vascular  Capillary refills: < 3 seconds  The right DP pulse is 2+  The right PT pulse is 2+  Left Foot/Ankle  Left Foot Inspection  Skin Exam: skin normal and skin intact  No dry skin, no warmth, no erythema, no maceration, normal color, no pre-ulcer, no ulcer and no callus  Toe Exam: ROM and strength within normal limits  Sensory   Vibration: intact  Proprioception: intact  Monofilament testing: intact    Vascular  Capillary refills: < 3 seconds  The left DP pulse is 2+  The left PT pulse is 2+       Assign Risk Category  No deformity present  No loss of protective sensation  No weak pulses  Risk: 0        Rahul Isbell DO

## 2023-04-29 NOTE — CASE COMMUNICATION
PT reeval s/p hospital stay on 4/27/23  PT POC for 1wk1 2wk3 for gait/transfers/balance training, LE ther ex, HEP, edu       Karen Haile PT

## 2023-04-30 LAB — SHBG SERPL-SCNC: 38.3 NMOL/L (ref 19.3–76.4)

## 2023-05-01 LAB
EST. AVERAGE GLUCOSE BLD GHB EST-MCNC: 148 MG/DL
HBA1C MFR BLD: 6.8 %

## 2023-05-01 NOTE — RESULT ENCOUNTER NOTE
Call patient to notify normal results good job on the hemoglobin A1c please keep up with the current treatment

## 2023-05-01 NOTE — RESULT ENCOUNTER NOTE
Please call the patient regarding his abnormal result    Still spilling protein in urine that goes with his diabetes

## 2023-05-02 ENCOUNTER — HOME CARE VISIT (OUTPATIENT)
Dept: HOME HEALTH SERVICES | Facility: HOME HEALTHCARE | Age: 78
End: 2023-05-02

## 2023-05-03 VITALS
HEART RATE: 82 BPM | SYSTOLIC BLOOD PRESSURE: 126 MMHG | DIASTOLIC BLOOD PRESSURE: 62 MMHG | RESPIRATION RATE: 18 BRPM | OXYGEN SATURATION: 96 % | TEMPERATURE: 98 F

## 2023-05-04 ENCOUNTER — HOME CARE VISIT (OUTPATIENT)
Dept: HOME HEALTH SERVICES | Facility: HOME HEALTHCARE | Age: 78
End: 2023-05-04

## 2023-05-04 VITALS — SYSTOLIC BLOOD PRESSURE: 120 MMHG | HEART RATE: 78 BPM | OXYGEN SATURATION: 97 % | DIASTOLIC BLOOD PRESSURE: 60 MMHG

## 2023-05-05 NOTE — CASE COMMUNICATION
Pt performed 5 steps with PT this date and sat to rest    Pt put arm up to chest  Pt reported sternum pain , dull 3/10  /60 RUE, HR 78, pulse ox 97%  No other s/s's at this time  Pt declined need for ER  phys ther reviewed s/s's of MI & to call 911 if present       Thank you, Tiffany Rodarte PT

## 2023-05-08 ENCOUNTER — PATIENT OUTREACH (OUTPATIENT)
Dept: CASE MANAGEMENT | Facility: OTHER | Age: 78
End: 2023-05-08

## 2023-05-08 ENCOUNTER — HOME CARE VISIT (OUTPATIENT)
Dept: HOME HEALTH SERVICES | Facility: HOME HEALTHCARE | Age: 78
End: 2023-05-08

## 2023-05-08 VITALS
SYSTOLIC BLOOD PRESSURE: 122 MMHG | TEMPERATURE: 97.3 F | OXYGEN SATURATION: 99 % | DIASTOLIC BLOOD PRESSURE: 64 MMHG | RESPIRATION RATE: 18 BRPM | HEART RATE: 73 BPM

## 2023-05-08 NOTE — PROGRESS NOTES
Chart reviewed  Patient had his transition of care appointment on 4/24 and per note is doing well, stable  He continues with [facility] home health nursing and physical therapy  BUN and creatinine remain normal since discharge    I closed the complex episode and removed myself from the care team

## 2023-05-09 ENCOUNTER — HOME CARE VISIT (OUTPATIENT)
Dept: HOME HEALTH SERVICES | Facility: HOME HEALTHCARE | Age: 78
End: 2023-05-09

## 2023-05-09 VITALS
SYSTOLIC BLOOD PRESSURE: 98 MMHG | DIASTOLIC BLOOD PRESSURE: 48 MMHG | HEART RATE: 90 BPM | OXYGEN SATURATION: 95 % | TEMPERATURE: 97.3 F | RESPIRATION RATE: 18 BRPM

## 2023-05-09 VITALS — SYSTOLIC BLOOD PRESSURE: 120 MMHG | HEART RATE: 88 BPM | OXYGEN SATURATION: 92 % | DIASTOLIC BLOOD PRESSURE: 70 MMHG

## 2023-05-10 ENCOUNTER — OFFICE VISIT (OUTPATIENT)
Dept: UROLOGY | Facility: CLINIC | Age: 78
End: 2023-05-10

## 2023-05-10 ENCOUNTER — TELEPHONE (OUTPATIENT)
Dept: PULMONOLOGY | Facility: HOSPITAL | Age: 78
End: 2023-05-10

## 2023-05-10 VITALS — BODY MASS INDEX: 31.99 KG/M2 | TEMPERATURE: 97.1 F | WEIGHT: 216 LBS | HEIGHT: 69 IN

## 2023-05-10 DIAGNOSIS — N40.1 BENIGN PROSTATIC HYPERPLASIA WITH URINARY RETENTION: ICD-10-CM

## 2023-05-10 DIAGNOSIS — R33.9 URINARY RETENTION: ICD-10-CM

## 2023-05-10 DIAGNOSIS — R33.8 BENIGN PROSTATIC HYPERPLASIA WITH URINARY RETENTION: ICD-10-CM

## 2023-05-10 RX ORDER — FINASTERIDE 5 MG/1
5 TABLET, FILM COATED ORAL DAILY
Qty: 90 TABLET | Refills: 3 | Status: SHIPPED | OUTPATIENT
Start: 2023-05-10

## 2023-05-10 NOTE — PROGRESS NOTES
UROLOGY PROGRESS NOTE         NAME: Ernesto Santiago  AGE: 69 y o  SEX: male  : 1945   MRN: [id number]    DATE: 5/10/2023  TIME: 11:17 AM    Assessment and Plan      Impression:   1  Benign prostatic hyperplasia with urinary retention  -     Ambulatory referral to Urology  -     finasteride (PROSCAR) 5 mg tablet; Take 1 tablet (5 mg total) by mouth daily    2  Urinary retention  -     finasteride (PROSCAR) 5 mg tablet; Take 1 tablet (5 mg total) by mouth daily  -     Bladder catheterization      Recently back in hospital with some hematuria  Jose catheter replaced  They were cathing 3 times daily   Plan: Hypotonic bladder  Status post cardiac stent in December  Currently on Plavix  Seeing the cardiologist in a few weeks  They will ask about blood thinner  He would like to proceed with a laser prostatectomy when he can come off the blood thinner  Ideally this would be discontinued for 3 to 4 weeks  Chief Complaint     Chief Complaint   Patient presents with   • Benign Prostatic Hypertrophy     Oxybuytin causing dizziness      History of Present Illness     HPI: Ernesto Santiago is a 69y o  year old male who presents with urinary retention hypotonic bladder  BPH  Currently he prefers to be managed with a Jose catheter  Intermittent cath again discussed  His wife is a nurse  No fever or chills  Jose catheter replaced today  Follow-up in 1 month  Consider PVP if we can get cardiac clearance and off his blood thinner for 3 to 4 weeks                The following portions of the patient's history were reviewed and updated as appropriate: allergies, current medications, past family history, past medical history, past social history, past surgical history and problem list   Past Medical History:   Diagnosis Date   • BPH (benign prostatic hyperplasia)    • CAD (coronary artery disease)    • [facility]     basal cell   • Chronic kidney disease     stage 3   • Colon polyp    • Coronary "artery disease    • CPAP (continuous positive airway pressure) dependence    • Diabetes mellitus (HCC)     niddm   • Disease of thyroid gland    • Gout    • High cholesterol    • Hypertension    • Hypomagnesemia 03/09/2023   • MI, 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    • Myocardial infarction [facility]) 2014   • S/P cardiac catheterization 12/30/2022   • Sleep apnea      Past Surgical History:   Procedure Laterality Date   • BASAL CELL CARCINOMA EXCISION Right 1/21/2022    Procedure: EXCISION BASAL CELL CARCINOMA EXTREMITY;  Surgeon: Monika Lamar DO;  Location: [facility];  Service: General   • CARDIAC CATHETERIZATION Left 12/29/2022    Procedure: Cardiac Left Heart Cath;  Surgeon: Isra Kimble MD;  Location: [facility]; Service: Cardiology   • CARDIAC CATHETERIZATION N/A 12/29/2022    Procedure: Cardiac Coronary Angiogram;  Surgeon: Isra Kimble MD;  Location: [facility]; Service: Cardiology   • CARDIAC CATHETERIZATION N/A 12/29/2022    Procedure: Cardiac pci;  Surgeon: Isra Kimble MD;  Location: [facility]; Service: Cardiology   • CATARACT EXTRACTION W/  INTRAOCULAR LENS IMPLANT     • CORNEAL TRANSPLANT     • CORONARY ANGIOPLASTY WITH STENT PLACEMENT      stents x3-- [phone]   • EYE SURGERY      repair corneal laceration   • CT COLONOSCOPY FLX DX W/COLLJ SPEC WHEN PFRMD N/A 3/21/2016    Procedure: COLONOSCOPY;  Surgeon: Nydia Velez MD;  Location: [facility];  Service: Colorectal   • TONSILLECTOMY AND ADENOIDECTOMY       shoulder  Review of Systems     Const: Denies chills, fever and weight loss  CV: Denies chest pain  Resp: Denies SOB  GI: Denies abdominal pain, nausea and vomiting  : Denies symptoms other than stated above  Musculo: Denies back pain      Objective   BP (P) 120/72 (BP Location: Left arm, Patient Position: Sitting, Cuff Size: Adult)   Pulse (P) 101   Temp (!) 97 1 °F (36 2 °C)   Ht 5' 9\" (1 753 m)   Wt 98 kg (216 lb)   SpO2 (P) 95%   BMI " 31 90 kg/m²     Physical Exam  Const: Appears healthy and well developed  No signs of acute distress present  Resp: Respirations are regular and unlabored  CV: Rate is regular  Rhythm is regular  Abdomen: Abdomen is soft, nontender, and nondistended  Kidneys are not palpable  : Normal penis  Psych: Patient's attitude is cooperative  Mood is normal  Affect is normal     Current Medications     Current Outpatient Medications:   •  acetaminophen (TYLENOL) 325 mg tablet, Take 2 tablets (650 mg total) by mouth every 6 (six) hours as needed for mild pain, fever or headaches Do not exceed a total of 3 grams of tylenol/acetaminophen in a 24-hour period  , Disp: , Rfl: 0  •  allopurinol (ZYLOPRIM) 100 mg tablet, Take 1 tablet (100 mg total) by mouth daily, Disp: 90 tablet, Rfl: 0  •  aluminum-magnesium hydroxide-simethicone (MYLANTA) [id number] mg/30 mL suspension, Take 30 mL by mouth every 4 (four) hours as needed for indigestion or heartburn, Disp: , Rfl: 0  •  aspirin (ECOTRIN LOW STRENGTH) 81 mg EC tablet, Take 1 tablet (81 mg total) by mouth daily Do not start before March 23, 2023 , Disp: , Rfl: 0  •  atorvastatin (LIPITOR) 80 mg tablet, Take 1 tablet (80 mg total) by mouth daily with dinner, Disp: , Rfl: 0  •  Blood Glucose Monitoring Suppl (OneTouch Verio Reflect) w/Device KIT, Check blood sugars twice daily  Please substitute with appropriate alternative as covered by patient's insurance   Dx: E11 65, Disp: 1 kit, Rfl: 0  •  clopidogrel (PLAVIX) 75 mg tablet, Take 1 tablet (75 mg total) by mouth daily Do not start before March 9, 2023 , Disp: 30 tablet, Rfl: 1  •  cyanocobalamin (VITAMIN B-12) 500 MCG tablet, Take 1 tablet (500 mcg total) by mouth daily Do not start before March 23, 2023 , Disp: , Rfl: 0  •  Empagliflozin (Jardiance) 10 MG TABS tablet, Take 1 tablet (10 mg total) by mouth every morning, Disp: 90 tablet, Rfl: 3  •  escitalopram (LEXAPRO) 20 mg tablet, Take 0 5 tablets (10 mg total) by mouth daily, Disp: 90 tablet, Rfl: 0  •  finasteride (PROSCAR) 5 mg tablet, Take 1 tablet (5 mg total) by mouth daily, Disp: 90 tablet, Rfl: 3  •  fluticasone (FLONASE) 50 mcg/act nasal spray, 1 spray into each nostril daily Do not start before March 23, 2023 , Disp: , Rfl: 0  •  glimepiride (AMARYL) 4 mg tablet, TAKE 1 TABLET TWICE A DAY, Disp: 180 tablet, Rfl: 0  •  glucose blood (OneTouch Verio) test strip, Check blood sugars twice daily  Please substitute with appropriate alternative as covered by patient's insurance  Dx: E11 65, Disp: 200 each, Rfl: 3  •  levothyroxine 112 mcg tablet, Take 1 tablet (112 mcg total) by mouth daily in the early morning Do not start before March 23, 2023 , Disp: , Rfl: 0  •  metFORMIN (GLUCOPHAGE) 1000 MG tablet, Take 1 tablet (1,000 mg total) by mouth 2 (two) times a day with meals, Disp: 180 tablet, Rfl: 1  •  multivitamin (THERAGRAN) TABS, Take 1 tablet by mouth daily, Disp: , Rfl:   •  OneTouch Delica Lancets 82L MISC, Check blood sugars twice daily  Please substitute with appropriate alternative as covered by patient's insurance   Dx: E11 65, Disp: 200 each, Rfl: 3  •  oxybutynin (DITROPAN) 5 mg tablet, Take 1 tablet (5 mg total) by mouth 3 (three) times a day, Disp: 90 tablet, Rfl: 0  •  pantoprazole (PROTONIX) 40 mg tablet, Take 1 tablet (40 mg total) by mouth daily before breakfast Do not start before March 9, 2023 , Disp: 30 tablet, Rfl: 0  •  polyethylene glycol (MIRALAX) 17 g packet, Take 17 g by mouth daily as needed (constipation), Disp: , Rfl: 0  •  prednisoLONE acetate (PRED FORTE) 1 % ophthalmic suspension, INSTILL 1 DROP INTO RIGHT EYE ONCE DAILY, Disp: , Rfl:   •  senna-docusate sodium (SENOKOT S) 8 6-50 mg per tablet, Take 1 tablet by mouth 2 (two) times a day Hold for diarrhea or loose stools, Disp: , Rfl: 0  •  torsemide (DEMADEX) 10 mg tablet, Take 10 mg by mouth if needed, Disp: , Rfl:   •  zonisamide (ZONEGRAN) 50 MG capsule, Take 1 tablet (50 mg total) by mouth daily, Disp: 90 capsule, Rfl: 2  •  midodrine (PROAMATINE) 5 mg tablet, GIVE 1 TABLET BY MOUTH BEFORE MEAL(S)   HOLD FOR SBP ABOVE 140 (Patient not taking: Reported on 5/10/2023), Disp: , Rfl:         Elli Enriquez MD

## 2023-05-10 NOTE — PROGRESS NOTES
"Universal Protocol:  Consent: Verbal consent obtained  Risks and benefits: risks, benefits and alternatives were discussed  Consent given by: patient  Time out: Immediately prior to procedure a \"time out\" was called to verify the correct patient, procedure, equipment, support staff and site/side marked as required  Timeout called at: 5/10/2023 11:15 AM   Patient identity confirmed: verbally with patient      Bladder catheterization    Date/Time: 5/10/2023 10:15 AM  Performed by: Sudihr Newell MD  Authorized by: Sudhir Newell MD     Patient location:  Bedside  Consent:     Consent given by:  Patient  Universal protocol:     Immediately prior to procedure a time out was called: yes      Patient identity confirmed:  Verbally with patient  Pre-procedure details:     Procedure purpose:  Therapeutic    Preparation: Patient was prepped and draped in usual sterile fashion    Anesthesia (see MAR for exact dosages): Anesthesia method:  None  Procedure details:     Bladder irrigation: yes      Catheter insertion:  Indwelling    Catheter size:  16 Fr    Number of attempts:  1    Urine characteristics:  Clear  Post-procedure details:     Patient tolerance of procedure:   Tolerated well, no immediate complications        "

## 2023-05-11 ENCOUNTER — HOME CARE VISIT (OUTPATIENT)
Dept: HOME HEALTH SERVICES | Facility: HOME HEALTHCARE | Age: 78
End: 2023-05-11

## 2023-05-12 VITALS — OXYGEN SATURATION: 92 % | HEART RATE: 80 BPM | SYSTOLIC BLOOD PRESSURE: 120 MMHG | DIASTOLIC BLOOD PRESSURE: 64 MMHG

## 2023-05-15 ENCOUNTER — HOME CARE VISIT (OUTPATIENT)
Dept: HOME HEALTH SERVICES | Facility: HOME HEALTHCARE | Age: 78
End: 2023-05-15

## 2023-05-15 VITALS — DIASTOLIC BLOOD PRESSURE: 70 MMHG | SYSTOLIC BLOOD PRESSURE: 130 MMHG | HEART RATE: 68 BPM | OXYGEN SATURATION: 94 %

## 2023-05-15 NOTE — CASE COMMUNICATION
UROLOGY: Patient reports catheter pain at insertion; sharp pain 5/10 with movement  DR Tory Bloch: Discussing cardiac rehab vs OPT  Unsure of your recommendations  Pt would benefit from OPT for BLE strength/balance progression  Will also depend on catheter care from [facility]/ discharge plans  Intermittent dizziness upon standing; fatigues easily  Pt does admit to depression; denies thoughts of suicide  will continue to monitor  R eeval vs d/c next PT session       Karen Herman PT

## 2023-05-16 ENCOUNTER — HOME CARE VISIT (OUTPATIENT)
Dept: HOME HEALTH SERVICES | Facility: HOME HEALTHCARE | Age: 78
End: 2023-05-16

## 2023-05-16 ENCOUNTER — OFFICE VISIT (OUTPATIENT)
Dept: CARDIOLOGY CLINIC | Facility: HOSPITAL | Age: 78
End: 2023-05-16
Attending: INTERNAL MEDICINE

## 2023-05-16 VITALS
BODY MASS INDEX: 32.05 KG/M2 | HEIGHT: 69 IN | HEART RATE: 112 BPM | DIASTOLIC BLOOD PRESSURE: 70 MMHG | WEIGHT: 216.4 LBS | OXYGEN SATURATION: 92 % | SYSTOLIC BLOOD PRESSURE: 118 MMHG

## 2023-05-16 DIAGNOSIS — I50.32 CHRONIC DIASTOLIC (CONGESTIVE) HEART FAILURE (HCC): ICD-10-CM

## 2023-05-16 DIAGNOSIS — N18.30 BENIGN HYPERTENSION WITH CKD (CHRONIC KIDNEY DISEASE) STAGE III (HCC): ICD-10-CM

## 2023-05-16 DIAGNOSIS — I51.7 CONCENTRIC LEFT VENTRICULAR HYPERTROPHY: ICD-10-CM

## 2023-05-16 DIAGNOSIS — I35.0 AORTIC STENOSIS, MODERATE: ICD-10-CM

## 2023-05-16 DIAGNOSIS — I12.9 BENIGN HYPERTENSION WITH CKD (CHRONIC KIDNEY DISEASE) STAGE III (HCC): ICD-10-CM

## 2023-05-16 DIAGNOSIS — I25.10 CORONARY ARTERY DISEASE INVOLVING NATIVE CORONARY ARTERY OF NATIVE HEART WITHOUT ANGINA PECTORIS: ICD-10-CM

## 2023-05-16 DIAGNOSIS — K92.2 GASTROINTESTINAL HEMORRHAGE: ICD-10-CM

## 2023-05-16 DIAGNOSIS — R00.0 SINUS TACHYCARDIA: Primary | ICD-10-CM

## 2023-05-16 RX ORDER — METOPROLOL SUCCINATE 50 MG/1
50 TABLET, EXTENDED RELEASE ORAL DAILY
Qty: 90 TABLET | Refills: 4 | Status: SHIPPED | OUTPATIENT
Start: 2023-05-16

## 2023-05-17 VITALS
OXYGEN SATURATION: 95 % | RESPIRATION RATE: 22 BRPM | SYSTOLIC BLOOD PRESSURE: 130 MMHG | TEMPERATURE: 98.1 F | HEART RATE: 102 BPM | DIASTOLIC BLOOD PRESSURE: 70 MMHG

## 2023-05-17 PROCEDURE — 10330064 CATH SECURE, STATLOCK FOLEY SWIVEL SIL P

## 2023-05-17 NOTE — PROGRESS NOTES
Cardiology Follow Up    Jenaro Cabral  1945  [id number]  [phone] [id number] [phone]    1  Sinus tachycardia  metoprolol succinate (TOPROL-XL) 50 mg 24 hr tablet      2  Gastrointestinal hemorrhage  Ambulatory Referral to Gastroenterology      3  Aortic stenosis, moderate        4  Benign hypertension with CKD (chronic kidney disease) stage III ([place] [address] )        5  Chronic diastolic (congestive) heart failure (HCC)        6  Concentric left ventricular hypertrophy        7  Coronary artery disease involving native coronary artery of native heart without angina pectoris              Discussion/Summary:  1  Start Toprol XL 50 mg daily  2  Continue other medications as before  3  Resume cardiac rehab with monitoring  4  Continue ASA and Plavix  He may need to have a TURP performed as he has a chronic morales  His cardiac risk with stopping the ASA and Plavix would be increased but acceptable if TURP is needed  He will let me know if he needs the TURP  RTO 2 months  Interval History: He had LAD stenting on 12/29/2022  He did well but developed severe acute GI bleeding in 3/2023 and 4/2023  He was discharged after his last hospital admission about 1 month ago  He denies any melena and Hgb is running around 10  He is on ASA and Plavix  EF 65%  A1C 6 8  His weight is down from 228 to 216 lbs   - 110  Regular    He is slowing starting to get his strength back  He denies CP, SOB      Patient Active Problem List   Diagnosis   • Arthritis   • Coronary arteriosclerosis in native artery   • Depression with anxiety   • Type 2 diabetes mellitus with hyperglycemia, without long-term current use of insulin ([facility])   • Gout   • Hyperlipidemia   • Hypertension   • Morbid obesity ([place] [address] )   • NSTEMI (non-ST elevated myocardial infarction) ([place] [address] )   • Tremor   • Degenerative disc disease, lumbar   • Tremor, essential   • Diabetic neuropathy associated with diabetes mellitus due to underlying condition [facility])   • Gait instability   • Frequent falls   • Infected epidermoid cyst   • Vitamin E deficiency   • Primary osteoarthritis involving multiple joints   • Chronic pain of both knees   • Primary osteoarthritis of both knees   • Coronary artery disease of native artery of native heart with stable angina pectoris (HCC)   • Primary osteoarthritis of right knee   • Primary osteoarthritis of left knee   • Fall (on) (from) other stairs and steps, initial encounter   • Recurrent major depressive disorder ([place] [address] )   • Benign hypertension with CKD (chronic kidney disease) stage III ([place] [address] )   • Diabetic nephropathy associated with type 2 diabetes mellitus ([place] [address] )   • Concentric left ventricular hypertrophy   • Aortic stenosis, moderate   • Edema   • Mild cognitive impairment   • Bilateral lower extremity edema   • Other chest pain   • Leukocytosis   • Skin lesion   • Chronic diastolic (congestive) heart failure (HCC)   • Basal cell carcinoma (BCC) of right shoulder   • Stage 3a chronic kidney disease (HCC)   • S/P cardiac catheterization   • Peripheral arterial disease (HCC)   • Mesenteric artery stenosis (HCC)   • Acute GI bleeding   • Diabetes mellitus, type 2 (HCC)   • Acute diverticulitis   • Orthostatic hypotension   • Acute blood loss anemia   • Urinary retention   • Diverticulosis of colon with hemorrhage   • Elevated TSH   • Elevated d-dimer   • Hypothyroidism   • Elevated platelet count   • Low serum cortisol level   • Hot flashes   • Benign prostatic hyperplasia with urinary retention     Past Medical History:   Diagnosis Date   • BPH (benign prostatic hyperplasia)    • CAD (coronary artery disease)    • Cancer (HCC)     basal cell   • Chronic kidney disease     stage 3   • Colon polyp    • Coronary artery disease    • CPAP (continuous positive airway pressure) dependence    • Diabetes mellitus ([place] [address] ) niddm   • Disease of thyroid gland    • Gout    • High cholesterol    • Hypertension    • Hypomagnesemia 03/09/2023   • MI, 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    • Myocardial infarction ([facility] [address] ) 2014   • S/P cardiac catheterization 12/30/2022   • Sleep apnea      Social History     Socioeconomic History   • Marital status: /Civil Union     Spouse name: Not on file   • Number of children: Not on file   • Years of education: Not on file   • Highest education level: Not on file   Occupational History   • Not on file   Tobacco Use   • Smoking status: Never   • Smokeless tobacco: Never   Vaping Use   • Vaping Use: Never used   Substance and Sexual Activity   • Alcohol use: Yes     Comment: Occassional (holidays) social sometimes   • Drug use: No   • Sexual activity: Not Currently     Partners: Female   Other Topics Concern   • Not on file   Social History Narrative    No advance directive     No living will      Social Determinants of Health     Financial Resource Strain: Not on file   Food Insecurity: No Food Insecurity   • Worried About Running Out of Food in the Last Year: Never true   • Ran Out of Food in the Last Year: Never true   Transportation Needs: No Transportation Needs   • Lack of Transportation (Medical): No   • Lack of Transportation (Non-Medical):  No   Physical Activity: Not on file   Stress: Not on file   Social Connections: Not on file   Intimate Partner Violence: Not on file   Housing Stability: Low Risk    • Unable to Pay for Housing in the Last Year: No   • Number of Places Lived in the Last Year: 1   • Unstable Housing in the Last Year: No      Family History   Adopted: Yes   Problem Relation Age of Onset   • No Known Problems Mother    • No Known Problems Father      Past Surgical History:   Procedure Laterality Date   • BASAL CELL CARCINOMA EXCISION Right 1/21/2022    Procedure: EXCISION BASAL CELL CARCINOMA EXTREMITY;  Surgeon: William Cueva DO;  Location: [facility] MA[facility];  Service: General   • CARDIAC CATHETERIZATION Left 12/29/2022    Procedure: Cardiac Left Heart Cath;  Surgeon: Dimitri Barron MD;  Location: [facility] C[facility]; Service: Cardiology   • CARDIAC CATHETERIZATION N/A 12/29/2022    Procedure: Cardiac Coronary Angiogram;  Surgeon: Dimitri Barron MD;  [facility]: [facility]; Service: Cardiology   • CARDIAC CATHETERIZATION N/A 12/29/2022    Procedure: Cardiac pci;  Surgeon: Dimitri Barron MD;  Location: [facility] CARD[facility]; Service: Cardiology   • CATARACT EXTRACTION W/  INTRAOCULAR LENS IMPLANT     • CORNEAL TRANSPLANT     • CORONARY ANGIOPLASTY WITH STENT PLACEMENT      stents [phone]-- [phone]   • EYE SURGERY      repair corneal laceration   • IN COLONOSCOPY FLX DX W/COLLJ SPEC WHEN PFRMD N/A 3/21/2016    Procedure: COLONOSCOPY;  Surgeon: Patria Hdez MD;  Location: [facility];  Service: Colorectal   • TONSILLECTOMY AND ADENOIDECTOMY         Current Outpatient Medications:   •  acetaminophen (TYLENOL) 325 mg tablet, Take 2 tablets (650 mg total) by mouth every 6 (six) hours as needed for mild pain, fever or headaches Do not exceed a total of 3 grams of tylenol/acetaminophen in a 24-hour period  , Disp: , Rfl: 0  •  allopurinol (ZYLOPRIM) 100 mg tablet, Take 1 tablet (100 mg total) by mouth daily, Disp: 90 tablet, Rfl: 0  •  aluminum-magnesium hydroxide-simethicone (MYLANTA) 1169-7719-219 mg/30 mL suspension, Take 30 mL by mouth every 4 (four) hours as needed for indigestion or heartburn, Disp: , Rfl: 0  •  aspirin (ECOTRIN LOW STRENGTH) 81 mg EC tablet, Take 1 tablet (81 mg total) by mouth daily Do not start before March 23, 2023 , Disp: , Rfl: 0  •  atorvastatin (LIPITOR) 80 mg tablet, Take 1 tablet (80 mg total) by mouth daily with dinner, Disp: , Rfl: 0  •  Blood Glucose Monitoring Suppl (OneTouch Verio Reflect) w/Device KIT, Check blood sugars twice daily  Please substitute with appropriate alternative as covered by patient's insurance   Dx: E11 65, Disp: 1 kit, Rfl: 0  • clopidogrel (PLAVIX) 75 mg tablet, Take 1 tablet (75 mg total) by mouth daily Do not start before March 9, 2023 , Disp: 30 tablet, Rfl: 1  •  cyanocobalamin (VITAMIN B-12) 500 MCG tablet, Take 1 tablet (500 mcg total) by mouth daily Do not start before March 23, 2023 , Disp: , Rfl: 0  •  Empagliflozin (Jardiance) 10 MG TABS tablet, Take 1 tablet (10 mg total) by mouth every morning, Disp: 90 tablet, Rfl: 3  •  escitalopram (LEXAPRO) 20 mg tablet, Take 0 5 tablets (10 mg total) by mouth daily, Disp: 90 tablet, Rfl: 0  •  finasteride (PROSCAR) 5 mg tablet, Take 1 tablet (5 mg total) by mouth daily, Disp: 90 tablet, Rfl: 3  •  fluticasone (FLONASE) 50 mcg/act nasal spray, 1 spray into each nostril daily Do not start before March 23, 2023 , Disp: , Rfl: 0  •  glimepiride (AMARYL) 4 mg tablet, TAKE 1 TABLET TWICE A DAY, Disp: 180 tablet, Rfl: 0  •  glucose blood (OneTouch Verio) test strip, Check blood sugars twice daily  Please substitute with appropriate alternative as covered by patient's insurance  Dx: E11 65, Disp: 200 each, Rfl: 3  •  levothyroxine 112 mcg tablet, Take 1 tablet (112 mcg total) by mouth daily in the early morning Do not start before March 23, 2023 , Disp: , Rfl: 0  •  metFORMIN (GLUCOPHAGE) 1000 MG tablet, Take 1 tablet (1,000 mg total) by mouth 2 (two) times a day with meals, Disp: 180 tablet, Rfl: 1  •  metoprolol succinate (TOPROL-XL) 50 mg 24 hr tablet, Take 1 tablet (50 mg total) by mouth daily, Disp: 90 tablet, Rfl: 4  •  multivitamin (THERAGRAN) TABS, Take 1 tablet by mouth daily, Disp: , Rfl:   •  OneTouch Delica Lancets 31G MISC, Check blood sugars twice daily  Please substitute with appropriate alternative as covered by patient's insurance   Dx: E11 65, Disp: 200 each, Rfl: 3  •  oxybutynin (DITROPAN) 5 mg tablet, Take 1 tablet (5 mg total) by mouth 3 (three) times a day, Disp: 90 tablet, Rfl: 0  •  pantoprazole (PROTONIX) 40 mg tablet, Take 1 tablet (40 mg total) by mouth daily before "breakfast Do not start before March 9, 2023 , Disp: 30 tablet, Rfl: 0  •  polyethylene glycol (MIRALAX) 17 g packet, Take 17 g by mouth daily as needed (constipation), Disp: , Rfl: 0  •  prednisoLONE acetate (PRED FORTE) 1 % ophthalmic suspension, INSTILL 1 DROP INTO RIGHT EYE ONCE DAILY, Disp: , Rfl:   •  senna-docusate sodium (SENOKOT S) 8 6-50 mg per tablet, Take 1 tablet by mouth 2 (two) times a day Hold for diarrhea or loose stools, Disp: , Rfl: 0  •  torsemide (DEMADEX) 10 mg tablet, Take 10 mg by mouth if needed, Disp: , Rfl:   •  zonisamide (ZONEGRAN) 50 MG capsule, Take 1 tablet (50 mg total) by mouth daily, Disp: 90 capsule, Rfl: 2  •  midodrine (PROAMATINE) 5 mg tablet, GIVE 1 TABLET BY MOUTH BEFORE MEAL(S)  HOLD FOR SBP ABOVE 140 (Patient not taking: Reported on 5/10/2023), Disp: , Rfl:   No Known Allergies  Vitals:    05/16/23 1324   BP: 118/70   Pulse: (!) 112   SpO2: 92%   Weight: 98 2 kg (216 lb 6 4 oz)   Height: 5' 9\" (1 753 m)     Weight (last 2 days)     Date/Time Weight    05/16/23 1324 98 2 (216 4)         Blood pressure 118/70, pulse (!) 112, height 5' 9\" (1 753 m), weight 98 2 kg (216 lb 6 4 oz), SpO2 92 %  , Body mass index is 31 96 kg/m²      Labs:  Lab on 04/28/2023   Component Date Value   • Sodium 04/28/2023 141    • Potassium 04/28/2023 3 9    • Chloride 04/28/2023 105    • CO2 04/28/2023 28    • ANION GAP 04/28/2023 8    • BUN 04/28/2023 20    • Creatinine 04/28/2023 1 14    • Glucose, Fasting 04/28/2023 218 (H)    • Calcium 04/28/2023 9 4    • AST 04/28/2023 16    • ALT 04/28/2023 20    • Alkaline Phosphatase 04/28/2023 107 (H)    • Total Protein 04/28/2023 7 7    • Albumin 04/28/2023 4 3    • Total Bilirubin 04/28/2023 0 31    • eGFR 04/28/2023 62    • Testosterone 04/28/2023 462 0    • Sex Hormone Binding 04/28/2023 38 3    • Free T4 04/28/2023 1 11    • TSH 3RD GENERATON 04/28/2023 1 507    • WBC 04/28/2023 6 50    • RBC 04/28/2023 3 80 (L)    • Hemoglobin 04/28/2023 10 6 (L)    • " Hematocrit 04/28/2023 34 8 (L)    • MCV 04/28/2023 92    • MCH 04/28/2023 27 9    • MCHC 04/28/2023 30 5 (L)    • RDW 04/28/2023 14 9    • MPV 04/28/2023 8 8 (L)    • Platelets 54/65/9632 400 (H)    • nRBC 04/28/2023 0    • Neutrophils Relative 04/28/2023 55    • Immat GRANS % 04/28/2023 0    • Lymphocytes Relative 04/28/2023 26    • Monocytes Relative 04/28/2023 10    • Eosinophils Relative 04/28/2023 8 (H)    • Basophils Relative 04/28/2023 1    • Neutrophils Absolute 04/28/2023 3 62    • Immature Grans Absolute 04/28/2023 0 01    • Lymphocytes Absolute 04/28/2023 1 66    • Monocytes Absolute 04/28/2023 0 63    • Eosinophils Absolute 04/28/2023 0 53    • Basophils Absolute 04/28/2023 0 05    • Hemoglobin A1C 04/28/2023 6 8 (H)    • EAG 04/28/2023 148    Office Visit on 04/28/2023   Component Date Value   • Creatinine, Ur 04/28/2023 57 0    • Albumin,U,Random 04/28/2023 191 0 (H)    • Albumin Creat Ratio 04/28/2023 335 (H)    No results displayed because visit has over 200 results        Admission on 04/14/2023, Discharged on 04/14/2023   Component Date Value   • WBC 04/14/2023 10 11    • RBC 04/14/2023 3 48 (L)    • Hemoglobin 04/14/2023 10 5 (L)    • Hematocrit 04/14/2023 33 5 (L)    • MCV 04/14/2023 96    • MCH 04/14/2023 30 2    • MCHC 04/14/2023 31 3 (L)    • RDW 04/14/2023 14 8    • MPV 04/14/2023 9 3    • Platelets 76/51/0771 464 (H)    • nRBC 04/14/2023 0    • Neutrophils Relative 04/14/2023 63    • Immat GRANS % 04/14/2023 0    • Lymphocytes Relative 04/14/2023 22    • Monocytes Relative 04/14/2023 9    • Eosinophils Relative 04/14/2023 5    • Basophils Relative 04/14/2023 1    • Neutrophils Absolute 04/14/2023 6 40    • Immature Grans Absolute 04/14/2023 0 03    • Lymphocytes Absolute 04/14/2023 2 22    • Monocytes Absolute 04/14/2023 0 91    • Eosinophils Absolute 04/14/2023 0 49    • Basophils Absolute 04/14/2023 0 06    • ABO Grouping 04/14/2023 A    • Rh Factor 04/14/2023 Positive    • Antibody Screen 04/14/2023 Negative    • Specimen Expiration Date 04/14/2023 [id number]    • Sodium 04/14/2023 140    • Potassium 04/14/2023 4 1    • Chloride 04/14/2023 102    • CO2 04/14/2023 26    • ANION GAP 04/14/2023 12    • BUN 04/14/2023 27 (H)    • Creatinine 04/14/2023 1 17    • Glucose 04/14/2023 200 (H)    • Calcium 04/14/2023 9 1    • AST 04/14/2023 19    • ALT 04/14/2023 25    • Alkaline Phosphatase 04/14/2023 87    • Total Protein 04/14/2023 7 0    • Albumin 04/14/2023 3 8    • Total Bilirubin 04/14/2023 0 27    • eGFR 04/14/2023 60    • hs TnI 0hr 04/14/2023 5    • LACTIC ACID 04/14/2023 3 5 (HH)    • Color, UA 04/14/2023 Yellow    • Clarity, UA 04/14/2023 Slightly Cloudy    • Specific Gravity, UA 04/14/2023 1 010    • pH, UA 04/14/2023 6 5    • Leukocytes, UA 04/14/2023 Elevated glucose may cause decreased leukocyte values   See urine microscopic for [facility] result/ (A)    • Nitrite, UA 04/14/2023 Negative    • Protein, UA 04/14/2023 Negative    • Glucose, UA 04/14/2023 >=1000 (1%) (A)    • Ketones, UA 04/14/2023 Negative    • Urobilinogen, UA 04/14/2023 0 2    • Bilirubin, UA 04/14/2023 Negative    • Occult Blood, UA 04/14/2023 Moderate (A)    • LACTIC ACID 04/14/2023 2 1 (HH)    • hs TnI 2hr 04/14/2023 5    • Delta 2hr hsTnI 04/14/2023 0    • D-Dimer, Quant 04/14/2023 0 84 (H)    • RBC, UA 04/14/2023 2-4    • WBC, UA 04/14/2023 4-10 (A)    • Epithelial Cells 04/14/2023 None Seen    • Bacteria, UA 04/14/2023 Occasional    • hs TnI 4hr 04/14/2023 5    • Delta 4hr hsTnI 04/14/2023 0    Office Visit on 03/30/2023   Component Date Value   • POST-VOID RESIDUAL VOLUM* 03/30/2023 837    Lab Requisition on 03/29/2023   Component Date Value   • Sodium 03/29/2023 140    • Potassium 03/29/2023 3 9    • Chloride 03/29/2023 106    • CO2 03/29/2023 26    • ANION GAP 03/29/2023 8    • BUN 03/29/2023 14    • Creatinine 03/29/2023 0 88    • Glucose 03/29/2023 135    • Calcium 03/29/2023 8 9    • eGFR 03/29/2023 83    Lab Requisition on 03/23/2023   Component Date Value   • Sodium 03/23/2023 139    • Potassium 03/23/2023 4 0    • Chloride 03/23/2023 106    • CO2 03/23/2023 21    • ANION GAP 03/23/2023 12    • BUN 03/23/2023 10    • Creatinine 03/23/2023 0 92    • Glucose 03/23/2023 104    • Calcium 03/23/2023 8 9    • AST 03/23/2023 36    • ALT 03/23/2023 26    • Alkaline Phosphatase 03/23/2023 70    • Total Protein 03/23/2023 6 7    • Albumin 03/23/2023 4 0    • Total Bilirubin 03/23/2023 0 42    • eGFR 03/23/2023 80    • WBC 03/23/2023 8 04    • RBC 03/23/2023 3 47 (L)    • Hemoglobin 03/23/2023 11 3 (L)    • Hematocrit 03/23/2023 35 1 (L)    • MCV 03/23/2023 101 (H)    • MCH 03/23/2023 32 6    • MCHC 03/23/2023 32 2    • RDW 03/23/2023 17 6 (H)    • MPV 03/23/2023 9 1    • Platelets 79/27/9275 381    • nRBC 03/23/2023 0    • Neutrophils Relative 03/23/2023 62    • Immat GRANS % 03/23/2023 0    • Lymphocytes Relative 03/23/2023 22    • Monocytes Relative 03/23/2023 9    • Eosinophils Relative 03/23/2023 6    • Basophils Relative 03/23/2023 1    • Neutrophils Absolute 03/23/2023 5 08    • Immature Grans Absolute 03/23/2023 0 01    • Lymphocytes Absolute 03/23/2023 1 73    • Monocytes Absolute 03/23/2023 0 68    • Eosinophils Absolute 03/23/2023 0 49    • Basophils Absolute 03/23/2023 0 05    • Magnesium 03/23/2023 1 8 (L)    • Phosphorus 03/23/2023 2 8    No results displayed because visit has over 200 results  No results displayed because visit has over 200 results        Admission on 03/02/2023, Discharged on 03/02/2023   Component Date Value   • WBC 03/02/2023 17 11 (H)    • RBC 03/02/2023 3 53 (L)    • Hemoglobin 03/02/2023 12 3    • Hematocrit 03/02/2023 36 7    • MCV 03/02/2023 104 (H)    • MCH 03/02/2023 34 8 (H)    • MCHC 03/02/2023 33 5    • RDW 03/02/2023 13 5    • MPV 03/02/2023 9 7    • Platelets 76/91/8897 329    • nRBC 03/02/2023 0    • Neutrophils Relative 03/02/2023 70    • Immat GRANS % 03/02/2023 1    • Lymphocytes Relative 03/02/2023 17    • Monocytes Relative 03/02/2023 8    • Eosinophils Relative 03/02/2023 3    • Basophils Relative 03/02/2023 1    • Neutrophils Absolute 03/02/2023 12 21 (H)    • Immature Grans Absolute 03/02/2023 0 15    • Lymphocytes Absolute 03/02/2023 2 84    • Monocytes Absolute 03/02/2023 1 35 (H)    • Eosinophils Absolute 03/02/2023 0 48    • Basophils Absolute 03/02/2023 0 08    • Sodium 03/02/2023 136    • Potassium 03/02/2023 5 9 (H)    • Chloride 03/02/2023 103    • CO2 03/02/2023 23    • ANION GAP 03/02/2023 10    • BUN 03/02/2023 28 (H)    • Creatinine 03/02/2023 1 53 (H)    • Glucose 03/02/2023 271 (H)    • Calcium 03/02/2023 9 0    • AST 03/02/2023 15    • ALT 03/02/2023 25    • Alkaline Phosphatase 03/02/2023 67    • Total Protein 03/02/2023 6 2 (L)    • Albumin 03/02/2023 3 7    • Total Bilirubin 03/02/2023 0 59    • eGFR 03/02/2023 43    • Lipase 03/02/2023 213 (H)    • hs TnI 0hr 03/02/2023 3    • LACTIC ACID 03/02/2023 3 8 (HH)    • ABO Grouping 03/02/2023 A    • Rh Factor 03/02/2023 Positive    • Antibody Screen 03/02/2023 Negative    • Specimen Expiration Date 03/02/2023 [id number]    • Unit Product Code 03/03/2023 [id number]    • Unit Number 03/03/2023 [id number]    • Unit ABO 03/03/2023 A    • Unit [facility] 03/03/2023 POS    • Crossmatch 03/03/2023 Compatible    • Unit Dispense Status 03/03/2023 Presumed Trans    • Unit Product Volume 03/03/2023 350    • Unit Product Code 03/03/2023 [id number]    • Unit Number 03/03/2023 [id number]    • Unit ABO 03/03/2023 A    • Unit [facility] 03/03/2023 POS    • Crossmatch 03/03/2023 Compatible    • Unit Dispense Status 03/03/2023 Presumed Trans    • Unit Product Volume 03/03/2023 350    • LACTIC ACID 03/02/2023 2 6 (HH)    • ABO Grouping 03/02/2023 A    • Rh Factor 03/02/2023 Positive    • POC Glucose 03/02/2023 181 (H)    • Hemoglobin 03/02/2023 9 9 (L)    • Hematocrit 03/02/2023 29 7 (L)    • Unit Product Code 03/03/2023 [id number]    • Unit Number 03/03/2023 [id number]    • Unit ABO 03/03/2023 A    • Unit [facility] 03/03/2023 POS    • Crossmatch 03/03/2023 Compatible    • Unit Dispense Status 03/03/2023 Presumed Trans    • Unit Product Volume 03/03/2023 350    • Unit Product Code 03/03/2023 [id number]    • Unit Number 03/03/2023 [id number]    • Unit ABO 03/03/2023 A    • Unit [facility] 03/03/2023 POS    • Crossmatch 03/03/2023 Compatible    • Unit Dispense Status 03/03/2023 Presumed Trans    • Unit Product Volume 03/03/2023 350    • Unit Product Code 03/02/2023 [id number]    • Unit Number 03/02/2023 [id number]    • Unit ABO 03/02/2023 A    • Unit [facility] 03/02/2023 POS    • Crossmatch 03/02/2023 Compatible    • Unit Dispense Status 03/02/2023 Return to Inv    • Unit Product Volume 03/02/2023 350    • Unit Product Code 03/02/2023 [id number]    • Unit Number 03/02/2023 [id number]    • Unit ABO 03/02/2023 A    • Unit [facility] 03/02/2023 POS    • Crossmatch 03/02/2023 Compatible    • Unit Dispense Status 03/02/2023 Return to Inv    • Unit Product Volume 03/02/2023 350    There may be more visits with results that are not included  Imaging: No results found  Review of Systems:  Review of Systems   Constitutional: Negative for diaphoresis, fatigue, fever and unexpected weight change  HENT: Negative  Respiratory: Negative for cough, shortness of breath and wheezing  Cardiovascular: Negative for chest pain, palpitations and leg swelling  Gastrointestinal: Negative for abdominal pain, diarrhea and nausea  Musculoskeletal: Negative for gait problem and myalgias  Skin: Negative for rash  Neurological: Negative for dizziness and numbness  Psychiatric/Behavioral: Negative  Physical Exam:  Physical Exam  Constitutional:       Appearance: He is well-developed  HENT:      Head: Normocephalic and atraumatic  Eyes:      Pupils: Pupils are equal, round, and reactive to light  Neck:      Vascular: No JVD  Cardiovascular:      Rate and Rhythm: Regular rhythm  Pulses: Normal pulses  Carotid pulses are 2+ on the right side and 2+ on the left side  Heart sounds: S1 normal and S2 normal    Pulmonary:      Effort: Pulmonary effort is normal       Breath sounds: Normal breath sounds  No wheezing or rales  Abdominal:      General: Bowel sounds are normal       Palpations: Abdomen is soft  Tenderness: There is no abdominal tenderness  Musculoskeletal:         General: No tenderness  Normal range of motion  Cervical back: Normal range of motion and neck supple  Skin:     General: Skin is warm  Neurological:      Mental Status: He is alert and oriented to person, place, and time  Cranial Nerves: No cranial nerve deficit  Deep Tendon Reflexes: Reflexes are normal and symmetric

## 2023-05-18 ENCOUNTER — HOME CARE VISIT (OUTPATIENT)
Dept: HOME HEALTH SERVICES | Facility: HOME HEALTHCARE | Age: 78
End: 2023-05-18

## 2023-05-19 ENCOUNTER — NURSE TRIAGE (OUTPATIENT)
Dept: OTHER | Facility: OTHER | Age: 78
End: 2023-05-19

## 2023-05-19 VITALS — OXYGEN SATURATION: 96 % | HEART RATE: 63 BPM | DIASTOLIC BLOOD PRESSURE: 58 MMHG | SYSTOLIC BLOOD PRESSURE: 100 MMHG

## 2023-05-19 DIAGNOSIS — T83.511D URINARY TRACT INFECTION ASSOCIATED WITH INDWELLING URETHRAL CATHETER, SUBSEQUENT ENCOUNTER: Primary | ICD-10-CM

## 2023-05-19 DIAGNOSIS — N39.0 URINARY TRACT INFECTION ASSOCIATED WITH INDWELLING URETHRAL CATHETER, SUBSEQUENT ENCOUNTER: Primary | ICD-10-CM

## 2023-05-19 NOTE — TELEPHONE ENCOUNTER
"Regarding: catheter issues  ----- Message from [facility] sent at 5/19/2023  2:32 PM EDT -----  \"I have puss, sediment and pulling at my catheter  \"    "

## 2023-05-19 NOTE — CASE COMMUNICATION
d/c home PT on 5/18  pt to return to cardiac rehab per result of cardiology appt       Thank you, Cristina Salas PT

## 2023-05-19 NOTE — TELEPHONE ENCOUNTER
"Patient called in to report pus and sediment in clear yellow urine in morales tubing and bag since Thursday 5/18/23  Denies fever or blood in urine  Urine labs ordered and patient will go to lab Saturday 5/20 AM for specimen  Patient may follow up over weekend after checking results in My Chart and office will follow up with patient on Monday 5/22 post culture results  Please follow up with patient  Morales changed in last OV 5/10/23  Reason for Disposition  • Cloudy urine lasts > 24 hours and not cleared by increasing fluid intake    Answer Assessment - Initial Assessment Questions  1  SYMPTOMS: \"What symptoms are you concerned about? \"      Pus and sediment in tubing and morales bag  2  ONSET:  \"When did the symptoms start? \"      Thursday 5/18  3  FEVER: \"Is there a fever? \" If Yes, ask: \"What is the temperature, how was it measured, and when did it start? \"      Denies  4  ABDOMINAL PAIN: \"Is there any abdominal pain? \" (e g , Scale 1-10; or mild, moderate, severe)      Denies  5  URINE COLOR: \"What color is the urine? \"  \"Is there blood present in the urine? \" (e g , clear, yellow, cloudy, tea-colored, blood streaks, bright red)     Clear yellow with pus and sediment  6  ONSET: \"When was the catheter inserted? \"      OV 5/10/23  7  OTHER SYMPTOMS: \"Do you have any other symptoms? \" (e g , back pain, bad urine odor)       Denies  8  PREGNANCY: \"Is there any chance you are pregnant? \" \"When was your last menstrual period? \"      *No Answer*    Protocols used: URINARY CATHETER SYMPTOMS AND QUESTIONS-ADULT-[place]    "

## 2023-05-22 ENCOUNTER — APPOINTMENT (OUTPATIENT)
Dept: LAB | Facility: HOSPITAL | Age: 78
End: 2023-05-22

## 2023-05-22 ENCOUNTER — HOME CARE VISIT (OUTPATIENT)
Dept: HOME HEALTH SERVICES | Facility: HOME HEALTHCARE | Age: 78
End: 2023-05-22

## 2023-05-22 LAB
BACTERIA UR QL AUTO: ABNORMAL /HPF
BILIRUB UR QL STRIP: NEGATIVE
CAOX CRY URNS QL MICRO: ABNORMAL /HPF
CLARITY UR: ABNORMAL
COLOR UR: YELLOW
GLUCOSE UR STRIP-MCNC: ABNORMAL MG/DL
HGB UR QL STRIP.AUTO: ABNORMAL
KETONES UR STRIP-MCNC: NEGATIVE MG/DL
LEUKOCYTE ESTERASE UR QL STRIP: ABNORMAL
NITRITE UR QL STRIP: POSITIVE
NON-SQ EPI CELLS URNS QL MICRO: ABNORMAL /HPF
PH UR STRIP.AUTO: 7 [PH]
PROT UR STRIP-MCNC: ABNORMAL MG/DL
RBC #/AREA URNS AUTO: ABNORMAL /HPF
SP GR UR STRIP.AUTO: 1.01 (ref 1–1.03)
UROBILINOGEN UR QL STRIP.AUTO: 1 E.U./DL
WBC #/AREA URNS AUTO: ABNORMAL /HPF

## 2023-05-23 VITALS
DIASTOLIC BLOOD PRESSURE: 60 MMHG | TEMPERATURE: 97.5 F | HEART RATE: 66 BPM | SYSTOLIC BLOOD PRESSURE: 106 MMHG | OXYGEN SATURATION: 94 % | RESPIRATION RATE: 18 BRPM

## 2023-05-23 PROCEDURE — 10330064 SALINE, IRR SOL STR 100ML (48/CS) MGM37

## 2023-05-23 PROCEDURE — 10330064 SYRINGE, CATH TIP FLAT STR 60CC (50/CS)

## 2023-05-25 DIAGNOSIS — E03.9 HYPOTHYROIDISM, UNSPECIFIED TYPE: ICD-10-CM

## 2023-05-25 DIAGNOSIS — K92.2 GI BLEED: ICD-10-CM

## 2023-05-25 RX ORDER — PANTOPRAZOLE SODIUM 40 MG/1
40 TABLET, DELAYED RELEASE ORAL
Qty: 90 TABLET | Refills: 1 | Status: SHIPPED | OUTPATIENT
Start: 2023-05-25

## 2023-05-25 RX ORDER — LEVOTHYROXINE SODIUM 112 UG/1
112 TABLET ORAL
Qty: 90 TABLET | Refills: 1 | Status: SHIPPED | OUTPATIENT
Start: 2023-05-25 | End: 2023-05-26 | Stop reason: SDUPTHER

## 2023-05-26 ENCOUNTER — CLINICAL SUPPORT (OUTPATIENT)
Dept: CARDIAC REHAB | Facility: HOSPITAL | Age: 78
End: 2023-05-26

## 2023-05-26 DIAGNOSIS — Z98.890 S/P CARDIAC CATHETERIZATION: ICD-10-CM

## 2023-05-26 DIAGNOSIS — Z95.5 STENTED CORONARY ARTERY: Primary | ICD-10-CM

## 2023-05-26 DIAGNOSIS — E03.9 HYPOTHYROIDISM, UNSPECIFIED TYPE: ICD-10-CM

## 2023-05-26 LAB
BACTERIA UR CULT: ABNORMAL
BACTERIA UR CULT: ABNORMAL

## 2023-05-26 RX ORDER — CLOPIDOGREL BISULFATE 75 MG/1
75 TABLET ORAL DAILY
Qty: 30 TABLET | Refills: 11 | Status: CANCELLED | OUTPATIENT
Start: 2023-05-26

## 2023-05-26 RX ORDER — CLOPIDOGREL BISULFATE 75 MG/1
75 TABLET ORAL DAILY
Qty: 90 TABLET | Refills: 1 | Status: SHIPPED | OUTPATIENT
Start: 2023-05-26

## 2023-05-26 RX ORDER — LEVOTHYROXINE SODIUM 112 UG/1
112 TABLET ORAL
Qty: 90 TABLET | Refills: 0 | Status: SHIPPED | OUTPATIENT
Start: 2023-05-26

## 2023-05-26 NOTE — PROGRESS NOTES
Nydia Arteaga        Dear Dr Twila Pedraza,    Emotional well-being and depression is addressed in the Cardiac  rehab evaluation  To assess the severity of depression, patients are given the PHQ-9 Depression Questionnaire  This is to inform you that your patient  Ravinder Resendiz  scored a 9which is interpreted as 5-9 = Mild Depression  Your patient was provided contact information for [facility] and has been encouraged to enroll in [employer]  A repeat PHQ -9 will be administered in 30 days to assess improvement  Thank you for your support of cardiopulmonary rehab      Sincerely,      Vee Kc RN

## 2023-05-26 NOTE — PROGRESS NOTES
Cardiac Rehabilitation Plan of Care   90 Day Reassessment      Today's date: 2023   # of Exercise Sessions Completed:   Patient name: Dacia Chen      : 1945  Age: 69 y o  MRN: [id number]  Referring Physician: Ozzie Melendez MD  Cardiologist: Raymundo Alegria MD  Provider: Gavino Sánchez  Clinician: Daria Fontana RN    Dx: Stent LAD  Date of onset: 23      SUMMARY OF PROGRESS:  Teena Daley returned to cardiac rehab today  He had been on medical hold due to a GI bleed  HE is walking with a walker but came up to rehab in a wheelchair   He does had a Jose catheter in place  He did 30min of aerobic exercise  Resting BP 90/58, HR 65 bpm  Exercise /62, HR 70-93 bpm  Recovery BP was 100/70 Telemetry reads NSR  Patient has not been doing any structured formal exercise at home  Patient was encouraged to walk around the house during commercials  Patient is very pleasant at rehab, but states he is tired  No other complaints  His PHQ9 was repeated, He scored  A 9 which is mild depression  His goal is to be able to walk to rehab from the car  He plans on moving to [place] in the near future         Medication compliance: Yes   Comments: Pt reports to be compliant with medications  Fall Risk: Moderate   Comments: Patient uses walking assist device (walker/cane/rollator) and shows imbalance at times     EKG Interpretation: NSR      EXERCISE ASSESSMENT and PLAN    Exercise Prescription:      Frequency: 3 days/week   Supplement with home exercise 2+ days/wk as tolerated       Minutes: 30        METS: 1-2 5         HR: 70-93 bpm   RPE: 4-6         Modalities: UBE, NuStep and Room walking      30 Day Goals for Exercise Progression:    Frequency: 3 days/week of cardiac rehab       Supplement with home exercise 2+ days/wk as tolerated    Minutes: 30-40                              >150 mins/wk of moderate intensity exercise   METS: 2 5-3   HR: 20-30 beats above RHR   RPE: 4-6   Modalities: UBE, NuStep, Recumbent bike and Room walking    Strength trainin-3 days / week   Modalities: Leg Press, Chest Press, Zelphia Holster and free weights    Home Exercise: none    Goals: 10% improvement in functional capacity - based on max METs achieved in fitness assessment, Resume ADLs with increased strength, Attend Rehab regularly, Decrease sitting time and Start a walking program    Progression Toward Goals:  Reviewed Pt goals and determined plan of care, Patient will initiate a walking program at home for at least 15 miuntes daily  in the next 30 days    Education: benefit of exercise for CAD risk factors, home exercise guidelines, AHA guidelines to achieve >150 mins/wk of moderate exercise and RPE scale   Plan:education on home exercise guidelines, home exercise 30+ mins 2 days opposite CR and Education class: Risk Factors for Heart Disease  Readiness to change: Action:  (Changing behavior)      NUTRITION ASSESSMENT AND PLAN    Weight control:    Starting weight: 222   Current weight:   209       Diabetes: T2D  A1c: 7 6    last measured: 23    Lipid management: Last lipid profile 21  Chol 140    HDL 36  LDL 66    Goals:LDL <100, HDL >40, TRG <150, CHOL <200, eliminate processed meats, cook without added fat or use vegetable oil/spray, eat 3 or more servings of whole grains a day, Eat 4-5 cups of fruits and vegetables daily, choose low sodium processed foods, eliminate butter and choose healthy snacks: light popcorn, plain pretzels    Measurable goals were based Rate Your Plate Dietary Self-Assessment  These are the areas in which the patient could score higher on the assessment  Goals include recommendations for a heart healthy diet based on American Heart Association      Progression Toward Goals: Reviewed Pt goals and determined plan of care, Patient will make dietary changes to see improvement in future lipid panel  in the next 30 days    Education: heart healthy eating  low sodium diet  exercise and diabetes management   wt  loss   healthy choices while dining out  portion control  Plan: Education class: Reading Food Labels, Education Class: Heart Healthy Eating, replace butter with soft spreads made with olive oil, canola or yogurt, replace refined grain bread with whole grain bread, replace unhealthy snacks with fruits & vegs, reduce portion sizes, eat fewer desserts and sweets and avoid processed foods  Readiness to change: Action:  (Changing behavior)      PSYCHOSOCIAL ASSESSMENT AND PLAN    Emotional:  Depression assessment:  PHQ-9 = 9  5-9 = Mild Depression               Anxiety measure:  ANGEL-7 = 7  5-9 = Mild anxiety  Self-reported stress level:  4  Social support: Very Good    Goals:  Reduce perceived stress to 1-3/10, Physical Fitness in [place] Score < 3, Social Support in [place] Score < 3, Daily Activity in [facility] Score < 3, Increased interest in doing things, improved positive thoughts of well being and increased energy    Progression Toward Goals: Reviewed Pt goals and determined plan of care, Patient will ask for help when needed, do activities to help him relax in the next 30 days    Education: signs/sxs of depression, benefits of a positive support system, class:  Stress and Your Health  and class:  Relaxation  Plan: Exercise, Spend time outdoors, Enjoy a hobby, Keep a positive mindset, Meet new people and Enjoy family  Readiness to change: Action:  (Changing behavior)      OTHER CORE COMPONENTS     Tobacco:   Social History     Tobacco Use   Smoking Status Never   Smokeless Tobacco Never       Tobacco Use Intervention:   N/A:  Patient is a non-smoker     Anginal Symptoms:  chest pressure   NTG use: Compliant with carrying NTG, Understands proper use and Pt has not used NTG since event    Blood pressure:    Restin/58   Exercise: 100/62   Recovery: 100/70    Goals: consistent BP < 130/80, reduced dietary sodium <2300mg, moderate intensity exercise >150 mins/wk and medication compliance    Progression Toward Goals: Reviewed Pt goals and determined plan of care, Patient will monitor glucose levels at home to maintain appropriate levels  in the next 30 days    Education:  low sodium diet and HTN, proper use of sublingual NTG, Education class:  Common Heart Medications and Education class: Understanding Heart Disease  Plan: avoid places with second hand smoke, Avoid Processed foods, engage in regular exercise, eliminate salt shaker at the table and use salt substitutes  Readiness to change: Action:  (Changing behavior)

## 2023-05-31 ENCOUNTER — CLINICAL SUPPORT (OUTPATIENT)
Dept: CARDIAC REHAB | Facility: HOSPITAL | Age: 78
End: 2023-05-31

## 2023-05-31 ENCOUNTER — TELEPHONE (OUTPATIENT)
Dept: UROLOGY | Facility: CLINIC | Age: 78
End: 2023-05-31

## 2023-05-31 DIAGNOSIS — Z95.5 STENTED CORONARY ARTERY: ICD-10-CM

## 2023-05-31 NOTE — TELEPHONE ENCOUNTER
Patient requesting a call back to discuss test results  Ordering Provider:   Date Completed:     Lab [x]    Urine Culture  MRI []  X-Ray []  CT []  Colonoscopy []  EGD []  Biopsy []  Other []    Patient called in to discuss his results and would like to know if he needs to have medication called into his pharmacy

## 2023-06-01 ENCOUNTER — CONSULT (OUTPATIENT)
Dept: FAMILY MEDICINE CLINIC | Facility: CLINIC | Age: 78
End: 2023-06-01

## 2023-06-01 ENCOUNTER — TELEPHONE (OUTPATIENT)
Dept: INFECTIOUS DISEASES | Facility: CLINIC | Age: 78
End: 2023-06-01

## 2023-06-01 ENCOUNTER — HOME CARE VISIT (OUTPATIENT)
Dept: HOME HEALTH SERVICES | Facility: HOME HEALTHCARE | Age: 78
End: 2023-06-01
Payer: COMMERCIAL

## 2023-06-01 VITALS
HEART RATE: 80 BPM | WEIGHT: 214 LBS | OXYGEN SATURATION: 97 % | SYSTOLIC BLOOD PRESSURE: 112 MMHG | BODY MASS INDEX: 31.7 KG/M2 | DIASTOLIC BLOOD PRESSURE: 56 MMHG | TEMPERATURE: 97.6 F | HEIGHT: 69 IN

## 2023-06-01 DIAGNOSIS — Z79.4 TYPE 2 DIABETES MELLITUS WITH DIABETIC POLYNEUROPATHY, WITH LONG-TERM CURRENT USE OF INSULIN (HCC): ICD-10-CM

## 2023-06-01 DIAGNOSIS — Z01.818 PREOP EXAMINATION: ICD-10-CM

## 2023-06-01 DIAGNOSIS — E11.42 TYPE 2 DIABETES MELLITUS WITH DIABETIC POLYNEUROPATHY, WITH LONG-TERM CURRENT USE OF INSULIN (HCC): ICD-10-CM

## 2023-06-01 DIAGNOSIS — T83.511A URINARY TRACT INFECTION ASSOCIATED WITH INDWELLING URETHRAL CATHETER, INITIAL ENCOUNTER (HCC): Primary | ICD-10-CM

## 2023-06-01 DIAGNOSIS — N39.0 URINARY TRACT INFECTION ASSOCIATED WITH INDWELLING URETHRAL CATHETER, INITIAL ENCOUNTER (HCC): Primary | ICD-10-CM

## 2023-06-01 DIAGNOSIS — I25.118 CORONARY ARTERY DISEASE OF NATIVE ARTERY OF NATIVE HEART WITH STABLE ANGINA PECTORIS (HCC): ICD-10-CM

## 2023-06-01 DIAGNOSIS — M1A.9XX0 CHRONIC GOUT WITHOUT TOPHUS, UNSPECIFIED CAUSE, UNSPECIFIED SITE: ICD-10-CM

## 2023-06-01 PROCEDURE — G0299 HHS/HOSPICE OF RN EA 15 MIN: HCPCS

## 2023-06-01 RX ORDER — ALLOPURINOL 100 MG/1
100 TABLET ORAL DAILY
Qty: 90 TABLET | Refills: 1 | Status: SHIPPED | OUTPATIENT
Start: 2023-06-01

## 2023-06-01 NOTE — PROGRESS NOTES
Subjective:     Mack Banda is a 69 y o  male who presents to the office today for a preoperative consultation at the request of surgeon Dr Danial Baltazar who plans on performing cataract extraction with intraocular lens implant on June 6  This consultation is requested for the specific conditions prompting preoperative evaluation (i e  because of potential affect on operative risk): Coronary artery disease, type 2 diabetes, urinary tract infection  Planned anesthesia: local  The patient has the following known anesthesia issues:  no prior problems with endotracheal intubation or anesthesia  Patients bleeding risk: no recent abnormal bleeding  The following portions of the patient's history were reviewed and updated as appropriate: He  has a past medical history of BPH (benign prostatic hyperplasia), CAD (coronary artery disease), [facility] ), Chronic kidney disease, Colon polyp, Coronary artery disease, CPAP (continuous positive airway pressure) dependence, Diabetes mellitus ([facility] ), Disease of thyroid gland, Gout, High cholesterol, Hypertension, Hypomagnesemia (03/09/2023), MI, old, Myocardial infarction ([facility] ) (2014), S/P cardiac catheterization (12/30/2022), and Sleep apnea    He   Patient Active Problem List    Diagnosis Date Noted   • Benign prostatic hyperplasia with urinary retention 04/14/2023   • Low serum cortisol level 03/21/2023   • Hot flashes 03/21/2023   • Elevated platelet count 80/02/8836   • Hypothyroidism 03/11/2023   • Diverticulosis of colon with hemorrhage 03/09/2023   • Elevated TSH 03/09/2023   • Elevated d-dimer 03/09/2023   • Urinary retention 03/07/2023   • Acute blood loss anemia 03/03/2023   • Acute GI bleeding 03/02/2023   • Diabetes mellitus, type 2 ([address] ) 03/02/2023   • Acute diverticulitis 03/02/2023   • Orthostatic hypotension 03/02/2023   • Mesenteric artery stenosis ([facility] ) 01/31/2023   • Peripheral arterial disease ([facility] [facility] ) 01/05/2023   • S/P cardiac catheterization 12/30/2022   • Stage 3a chronic kidney disease ([place] [address] ) 02/23/2022   • Basal cell carcinoma (BCC) of right shoulder 01/18/2022   • Chronic diastolic (congestive) heart failure ([place] [address] ) 01/10/2022   • Skin lesion 12/22/2021   • Other chest pain 12/01/2021   • Leukocytosis 12/01/2021   • Bilateral lower extremity edema 10/22/2021   • Mild cognitive impairment 09/03/2021   • Benign hypertension with CKD (chronic kidney disease) stage III ([place] [address] ) 02/10/2021   • Diabetic nephropathy associated with type 2 diabetes mellitus ([place] [address] ) 02/10/2021   • Concentric left ventricular hypertrophy 02/10/2021   • Aortic stenosis, moderate 02/10/2021   • Edema 02/10/2021   • Fall (on) (from) other stairs and steps, initial encounter 12/28/2020   • Recurrent major depressive disorder ([place] [address] ) 12/28/2020   • Primary osteoarthritis of right knee 05/21/2020   • Primary osteoarthritis of left knee 05/21/2020   • Coronary artery disease of native artery of native heart with stable angina pectoris ([place] [address] ) 03/18/2020   • Chronic pain of both knees 02/13/2020   • Primary osteoarthritis of both knees 02/13/2020   • Primary osteoarthritis involving multiple joints 12/16/2019   • Vitamin E deficiency 09/19/2019   • Infected epidermoid cyst 12/04/2018   • Tremor, essential 09/26/2018   • Diabetic neuropathy associated with diabetes mellitus due to underlying condition ([place] [address] ) 09/26/2018   • Gait instability 09/26/2018   • Frequent falls 09/26/2018   • Degenerative disc disease, lumbar 07/10/2018   • Tremor 10/10/2016   • Depression with anxiety 08/29/2016   • Arthritis 07/13/2015   • NSTEMI (non-ST elevated myocardial infarction) ([place] [address] ) 10/06/2014   • Coronary arteriosclerosis in native artery 04/08/2014   • Gout 05/17/2013   • Type 2 diabetes mellitus with hyperglycemia, without long-term current use of insulin ([place] [address] ) 06/18/2012   • Hyperlipidemia 06/18/2012   • Hypertension 06/18/2012   • Morbid obesity ([place] [address] ) 06/18/2012     He  has a past surgical history that includes Tonsillectomy and adenoidectomy; Cataract extraction w/  intraocular lens implant; Eye surgery; Coronary angioplasty with stent; pr colonoscopy flx dx w/collj spec when pfrmd (N/A, 3/21/2016); Corneal transplant; Excision basal cell carcinoma (Right, 1/21/2022); Cardiac catheterization (Left, 12/29/2022); Cardiac catheterization (N/A, 12/29/2022); and Cardiac catheterization (N/A, 12/29/2022)  His family history includes No Known Problems in his father and mother  He was adopted  He  reports that he has never smoked  He has never used smokeless tobacco  He reports current alcohol use  He reports that he does not use drugs  Current Outpatient Medications   Medication Sig Dispense Refill   • acetaminophen (TYLENOL) 325 mg tablet Take 2 tablets (650 mg total) by mouth every 6 (six) hours as needed for mild pain, fever or headaches Do not exceed a total of 3 grams of tylenol/acetaminophen in a 24-hour period  0   • allopurinol (ZYLOPRIM) 100 mg tablet Take 1 tablet (100 mg total) by mouth daily 90 tablet 0   • aluminum-magnesium hydroxide-simethicone (MYLANTA) 5264-4402-870 mg/30 mL suspension Take 30 mL by mouth every 4 (four) hours as needed for indigestion or heartburn  0   • aspirin (ECOTRIN LOW STRENGTH) 81 mg EC tablet Take 1 tablet (81 mg total) by mouth daily Do not start before March 23, 2023   0   • atorvastatin (LIPITOR) 80 mg tablet Take 1 tablet (80 mg total) by mouth daily with dinner  0   • Blood Glucose Monitoring Suppl (OneTouch Verio Reflect) w/Device KIT Check blood sugars twice daily  Please substitute with appropriate alternative as covered by patient's insurance   Dx: E11 65 1 kit 0   • clopidogrel (PLAVIX) 75 mg tablet Take 1 tablet (75 mg total) by mouth daily 90 tablet 1   • cyanocobalamin (VITAMIN B-12) 500 MCG tablet Take 1 tablet (500 mcg total) by mouth daily Do not start before March 23, 2023   0   • Empagliflozin (Jardiance) 10 MG TABS tablet Take 1 tablet (10 mg total) by mouth every morning 90 tablet 3   • escitalopram (LEXAPRO) 20 mg tablet Take 0 5 tablets (10 mg total) by mouth daily 90 tablet 0   • finasteride (PROSCAR) 5 mg tablet Take 1 tablet (5 mg total) by mouth daily 90 tablet 3   • fluticasone (FLONASE) 50 mcg/act nasal spray 1 spray into each nostril daily Do not start before March 23, 2023   0   • glimepiride (AMARYL) 4 mg tablet TAKE 1 TABLET TWICE A  tablet 0   • glucose blood (OneTouch Verio) test strip Check blood sugars twice daily  Please substitute with appropriate alternative as covered by patient's insurance  Dx: E11 65 200 each 3   • levothyroxine 112 mcg tablet Take 1 tablet (112 mcg total) by mouth daily in the early morning 90 tablet 0   • metFORMIN (GLUCOPHAGE) 1000 MG tablet Take 1 tablet (1,000 mg total) by mouth 2 (two) times a day with meals 180 tablet 1   • metoprolol succinate (TOPROL-XL) 50 mg 24 hr tablet Take 1 tablet (50 mg total) by mouth daily 90 tablet 4   • multivitamin (THERAGRAN) TABS Take 1 tablet by mouth daily     • OneTouch Delica Lancets 41I MISC Check blood sugars twice daily  Please substitute with appropriate alternative as covered by patient's insurance   Dx: E11 65 200 each 3   • oxybutynin (DITROPAN) 5 mg tablet Take 1 tablet (5 mg total) by mouth 3 (three) times a day 90 tablet 0   • pantoprazole (PROTONIX) 40 mg tablet Take 1 tablet (40 mg total) by mouth daily before breakfast 90 tablet 1   • polyethylene glycol (MIRALAX) 17 g packet Take 17 g by mouth daily as needed (constipation)  0   • prednisoLONE acetate (PRED FORTE) 1 % ophthalmic suspension INSTILL 1 DROP INTO RIGHT EYE ONCE DAILY     • senna-docusate sodium (SENOKOT S) 8 6-50 mg per tablet Take 1 tablet by mouth 2 (two) times a day Hold for diarrhea or loose stools  0   • torsemide (DEMADEX) 10 mg tablet Take 10 mg by mouth if needed     • zonisamide (ZONEGRAN) 50 MG capsule Take 1 tablet (50 mg total) by mouth daily 90 capsule 2   • midodrine (PROAMATINE) 5 mg tablet GIVE 1 TABLET BY MOUTH BEFORE MEAL(S)  HOLD FOR SBP ABOVE 140 (Patient not taking: Reported on 6/1/2023)       No current facility-administered medications for this visit  Current Outpatient Medications on File Prior to Visit   Medication Sig   • acetaminophen (TYLENOL) 325 mg tablet Take 2 tablets (650 mg total) by mouth every 6 (six) hours as needed for mild pain, fever or headaches Do not exceed a total of 3 grams of tylenol/acetaminophen in a 24-hour period  • allopurinol (ZYLOPRIM) 100 mg tablet Take 1 tablet (100 mg total) by mouth daily   • aluminum-magnesium hydroxide-simethicone (MYLANTA) 6901-2234-410 mg/30 mL suspension Take 30 mL by mouth every 4 (four) hours as needed for indigestion or heartburn   • aspirin (ECOTRIN LOW STRENGTH) 81 mg EC tablet Take 1 tablet (81 mg total) by mouth daily Do not start before March 23, 2023  • atorvastatin (LIPITOR) 80 mg tablet Take 1 tablet (80 mg total) by mouth daily with dinner   • Blood Glucose Monitoring Suppl (OneTouch Verio Reflect) w/Device KIT Check blood sugars twice daily  Please substitute with appropriate alternative as covered by patient's insurance  Dx: E11 65   • clopidogrel (PLAVIX) 75 mg tablet Take 1 tablet (75 mg total) by mouth daily   • cyanocobalamin (VITAMIN B-12) 500 MCG tablet Take 1 tablet (500 mcg total) by mouth daily Do not start before March 23, 2023  • Empagliflozin (Jardiance) 10 MG TABS tablet Take 1 tablet (10 mg total) by mouth every morning   • escitalopram (LEXAPRO) 20 mg tablet Take 0 5 tablets (10 mg total) by mouth daily   • finasteride (PROSCAR) 5 mg tablet Take 1 tablet (5 mg total) by mouth daily   • fluticasone (FLONASE) 50 mcg/act nasal spray 1 spray into each nostril daily Do not start before March 23, 2023  • glimepiride (AMARYL) 4 mg tablet TAKE 1 TABLET TWICE A DAY   • glucose blood (OneTouch Verio) test strip Check blood sugars twice daily  Please substitute with appropriate alternative as covered by patient's insurance  Dx: E11 65   • levothyroxine 112 mcg tablet Take 1 tablet (112 mcg total) by mouth daily in the early morning   • metFORMIN (GLUCOPHAGE) 1000 MG tablet Take 1 tablet (1,000 mg total) by mouth 2 (two) times a day with meals   • metoprolol succinate (TOPROL-XL) 50 mg 24 hr tablet Take 1 tablet (50 mg total) by mouth daily   • multivitamin (THERAGRAN) TABS Take 1 tablet by mouth daily   • OneTouch Delica Lancets 39T MISC Check blood sugars twice daily  Please substitute with appropriate alternative as covered by patient's insurance  Dx: E11 65   • oxybutynin (DITROPAN) 5 mg tablet Take 1 tablet (5 mg total) by mouth 3 (three) times a day   • pantoprazole (PROTONIX) 40 mg tablet Take 1 tablet (40 mg total) by mouth daily before breakfast   • polyethylene glycol (MIRALAX) 17 g packet Take 17 g by mouth daily as needed (constipation)   • prednisoLONE acetate (PRED FORTE) 1 % ophthalmic suspension INSTILL 1 DROP INTO RIGHT EYE ONCE DAILY   • senna-docusate sodium (SENOKOT S) 8 6-50 mg per tablet Take 1 tablet by mouth 2 (two) times a day Hold for diarrhea or loose stools   • torsemide (DEMADEX) 10 mg tablet Take 10 mg by mouth if needed   • zonisamide (ZONEGRAN) 50 MG capsule Take 1 tablet (50 mg total) by mouth daily   • midodrine (PROAMATINE) 5 mg tablet GIVE 1 TABLET BY MOUTH BEFORE MEAL(S)  HOLD FOR SBP ABOVE 140 (Patient not taking: Reported on 6/1/2023)   • [DISCONTINUED] topiramate (Topamax) 25 mg tablet Take 1 tablet (25 mg total) by mouth daily X 1 week then discontinue     No current facility-administered medications on file prior to visit  He has No Known Allergies     Past Medical History:   Diagnosis Date   • BPH (benign prostatic hyperplasia)    • CAD (coronary artery disease)    • Cancer (HCC)     basal cell   • Chronic kidney disease     stage 3   • Colon polyp    • Coronary artery disease    • CPAP (continuous positive airway pressure) dependence    • Diabetes mellitus (HCC)     niddm   • Disease of thyroid gland    • Gout    • High cholesterol    • Hypertension    • Hypomagnesemia 03/09/2023   • MI, 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    • Myocardial infarction [facility]) 2014   • S/P cardiac catheterization 12/30/2022   • Sleep apnea        Past Surgical History:   Procedure Laterality Date   • BASAL CELL CARCINOMA EXCISION Right 1/21/2022    Procedure: EXCISION BASAL CELL CARCINOMA EXTREMITY;  Surgeon: Dustin Neal DO;  Location: [facility] MAIN [facility];  Service: General   • CARDIAC CATHETERIZATION Left 12/29/2022    Procedure: Cardiac Left Heart Cath;  Surgeon: Britni Reyna MD;  Location: [facility] C[facility]; Service: Cardiology   • CARDIAC CATHETERIZATION N/A 12/29/2022    Procedure: Cardiac Coronary Angiogram;  Surgeon: Britni Reyna MD;  Location: [facility]; Service: Cardiology   • CARDIAC CATHETERIZATION N/A 12/29/2022    Procedure: Cardiac pci;  Surgeon: Britni Reyna MD;  Location: [facility] C[facility];   Service: Cardiology   • CATARACT EXTRACTION W/  INTRAOCULAR LENS IMPLANT     • CORNEAL TRANSPLANT     • CORONARY ANGIOPLASTY WITH STENT PLACEMENT      stents x3-- [id number]   • EYE SURGERY      repair corneal laceration   • ME COLONOSCOPY FLX DX W/COLLJ SPEC WHEN PFRMD N/A 3/21/2016    Procedure: COLONOSCOPY;  Surgeon: Kathrine Andre MD;  Location: [facility];  Service: Colorectal   • TONSILLECTOMY AND ADENOIDECTOMY         Review of Systems  A comprehensive review of systems was negative except for: Eyes: positive for cataracts  Cardiovascular: positive for Ischemic heart disease  Genitourinary: positive for Urinary tract infection with indwelling Jose catheter  Endocrine: positive for Diabetes     Objective:  Physical Exam    Cardiographics  ECG: no prior ECG  Echocardiogram: not done    Imaging  Chest x-ray:  no recent chest x-ray     Lab Review   Nurse Triage on 05/19/2023   Component Date Value   • Urine Culture 05/22/2023 >100,000 cfu/ml Pseudomonas putida group (A)    • Urine Culture 05/22/2023 >100,000 cfu/ml Methicillin Resistant Staphylococcus aureus (A)    • Color, UA 05/22/2023 Yellow    • Clarity, UA 05/22/2023 Cloudy    • Specific Gravity, UA 05/22/2023 1 015    • pH, UA 05/22/2023 7 0    • Leukocytes, UA 05/22/2023 Moderate (A)    • Nitrite, UA 05/22/2023 Positive (A)    • Protein, UA 05/22/2023 100 (2+) (A)    • Glucose, UA 05/22/2023 >=1000 (1%) (A)    • Ketones, UA 05/22/2023 Negative    • Urobilinogen, UA 05/22/2023 1 0    • Bilirubin, UA 05/22/2023 Negative    • Occult Blood, UA 05/22/2023 Large (A)    • RBC, UA 05/22/2023 4-10 (A)    • WBC, UA 05/22/2023 Innumerable (A)    • Epithelial Cells 05/22/2023 None Seen    • Bacteria, UA 05/22/2023 Innumerable (A)    • Ca Oxalate Yolis, UA 05/22/2023 Occasional (A)    Lab on 04/28/2023   Component Date Value   • Sodium 04/28/2023 141    • Potassium 04/28/2023 3 9    • Chloride 04/28/2023 105    • CO2 04/28/2023 28    • ANION GAP 04/28/2023 8    • BUN 04/28/2023 20    • Creatinine 04/28/2023 1 14    • Glucose, Fasting 04/28/2023 218 (H)    • Calcium 04/28/2023 9 4    • AST 04/28/2023 16    • ALT 04/28/2023 20    • Alkaline Phosphatase 04/28/2023 107 (H)    • Total Protein 04/28/2023 7 7    • Albumin 04/28/2023 4 3    • Total Bilirubin 04/28/2023 0 31    • eGFR 04/28/2023 62    • Testosterone 04/28/2023 462 0    • Sex Hormone Binding 04/28/2023 38 3    • Free T4 04/28/2023 1 11    • TSH 3RD GENERATON 04/28/2023 1 507    • WBC 04/28/2023 6 50    • RBC 04/28/2023 3 80 (L)    • Hemoglobin 04/28/2023 10 6 (L)    • Hematocrit 04/28/2023 34 8 (L)    • MCV 04/28/2023 92    • MCH 04/28/2023 27 9    • MCHC 04/28/2023 30 5 (L)    • RDW 04/28/2023 14 9    • MPV 04/28/2023 8 8 (L)    • Platelets 98/00/6811 400 (H)    • nRBC 04/28/2023 0    • Neutrophils Relative 04/28/2023 55    • Immat GRANS % 04/28/2023 0    • Lymphocytes Relative 04/28/2023 26    • Monocytes Relative 04/28/2023 10    • Eosinophils Relative 04/28/2023 8 (H)    • Basophils Relative 04/28/2023 1    • Neutrophils Absolute 04/28/2023 3 62    • Immature Grans Absolute 04/28/2023 0 01    • Lymphocytes Absolute 04/28/2023 1 66    • Monocytes Absolute 04/28/2023 0 63    • Eosinophils Absolute 04/28/2023 0 53    • Basophils Absolute 04/28/2023 0 05    • Hemoglobin A1C 04/28/2023 6 8 (H)    • EAG 04/28/2023 148    Office Visit on 04/28/2023   Component Date Value   • Creatinine, Ur 04/28/2023 57 0    • Albumin,U,Random 04/28/2023 191 0 (H)    • Albumin Creat Ratio 04/28/2023 335 (H)    No results displayed because visit has over 200 results        Admission on 04/14/2023, Discharged on 04/14/2023   Component Date Value   • WBC 04/14/2023 10 11    • RBC 04/14/2023 3 48 (L)    • Hemoglobin 04/14/2023 10 5 (L)    • Hematocrit 04/14/2023 33 5 (L)    • MCV 04/14/2023 96    • MCH 04/14/2023 30 2    • MCHC 04/14/2023 31 3 (L)    • RDW 04/14/2023 14 8    • MPV 04/14/2023 9 3    • Platelets 72/03/2428 464 (H)    • nRBC 04/14/2023 0    • Neutrophils Relative 04/14/2023 63    • Immat GRANS % 04/14/2023 0    • Lymphocytes Relative 04/14/2023 22    • Monocytes Relative 04/14/2023 9    • Eosinophils Relative 04/14/2023 5    • Basophils Relative 04/14/2023 1    • Neutrophils Absolute 04/14/2023 6 40    • Immature Grans Absolute 04/14/2023 0 03    • Lymphocytes Absolute 04/14/2023 2 22    • Monocytes Absolute 04/14/2023 0 91    • Eosinophils Absolute 04/14/2023 0 49    • Basophils Absolute 04/14/2023 0 06    • ABO Grouping 04/14/2023 A    • Rh Factor 04/14/2023 Positive    • Antibody Screen 04/14/2023 Negative    • Specimen Expiration Date 04/14/2023 [id number]    • Sodium 04/14/2023 140    • Potassium 04/14/2023 4 1    • Chloride 04/14/2023 102    • CO2 04/14/2023 26    • ANION GAP 04/14/2023 12    • BUN 04/14/2023 27 (H)    • Creatinine 04/14/2023 1 17    • Glucose 04/14/2023 200 (H)    • Calcium 04/14/2023 9 1    • AST 04/14/2023 19    • ALT 04/14/2023 25    • Alkaline Phosphatase 04/14/2023 87    • Total Protein 04/14/2023 7 0    • Albumin 04/14/2023 3 8    • Total Bilirubin 04/14/2023 0 27    • eGFR 04/14/2023 60    • hs TnI 0hr 04/14/2023 5    • LACTIC ACID 04/14/2023 3 5 (HH)    • Color, UA 04/14/2023 Yellow    • Clarity, UA 04/14/2023 Slightly Cloudy    • Specific Gravity, UA 04/14/2023 1 010    • pH, UA 04/14/2023 6 5    • Leukocytes, UA 04/14/2023 Elevated glucose may cause decreased leukocyte values  See urine microscopic for [facility] result/ (A)    • Nitrite, UA 04/14/2023 Negative    • Protein, UA 04/14/2023 Negative    • Glucose, UA 04/14/2023 >=1000 (1%) (A)    • Ketones, UA 04/14/2023 Negative    • Urobilinogen, UA 04/14/2023 0 2    • Bilirubin, UA 04/14/2023 Negative    • Occult Blood, UA 04/14/2023 Moderate (A)    • LACTIC ACID 04/14/2023 2 1 (HH)    • hs TnI 2hr 04/14/2023 5    • Delta 2hr hsTnI 04/14/2023 0    • D-Dimer, Quant 04/14/2023 0 84 (H)    • RBC, UA 04/14/2023 2-4    • WBC, UA 04/14/2023 4-10 (A)    • Epithelial Cells 04/14/2023 None Seen    • Bacteria, UA 04/14/2023 Occasional    • hs TnI 4hr 04/14/2023 5    • Delta 4hr hsTnI 04/14/2023 0         Assessment:     69 y o  male with planned surgery as above  Known risk factors for perioperative complications: Illicit drug use    Difficulty with intubation is not anticipated  Cardiac Risk Estimation: Minimal    Current medications which may produce withdrawal symptoms if withheld perioperatively: *None**     Plan:  Patient is DENIED for surgery at this time  Further cardiac testing is required as outlined above  1  Preoperative workup as follows infectious disease consultation  2  Change in medication regimen before surgery: none, continue medication regimen including morning of surgery, with sip of water  3  Prophylaxis for cardiac events with perioperative beta-blockers: not indicated  4  Invasive hemodynamic monitoring perioperatively: not indicated  5  Deep vein thrombosis prophylaxis postoperatively:Not applicable    6  Other measures: Not applicable

## 2023-06-01 NOTE — TELEPHONE ENCOUNTER
Dr Castillo calls office and is wondering is we can see pt asap for a consult  Pt is due to have cataract surgery on 6/6 and he is wondering if pt can proceed with surgery        Referral in chart

## 2023-06-02 ENCOUNTER — OFFICE VISIT (OUTPATIENT)
Dept: NEUROLOGY | Facility: CLINIC | Age: 78
End: 2023-06-02

## 2023-06-02 ENCOUNTER — CLINICAL SUPPORT (OUTPATIENT)
Dept: CARDIAC REHAB | Facility: HOSPITAL | Age: 78
End: 2023-06-02

## 2023-06-02 ENCOUNTER — TELEPHONE (OUTPATIENT)
Dept: NEUROLOGY | Facility: CLINIC | Age: 78
End: 2023-06-02

## 2023-06-02 VITALS
DIASTOLIC BLOOD PRESSURE: 62 MMHG | SYSTOLIC BLOOD PRESSURE: 120 MMHG | TEMPERATURE: 98.6 F | WEIGHT: 217 LBS | HEIGHT: 67 IN | BODY MASS INDEX: 34.06 KG/M2 | HEART RATE: 104 BPM | RESPIRATION RATE: 16 BRPM

## 2023-06-02 DIAGNOSIS — Z95.5 STENTED CORONARY ARTERY: ICD-10-CM

## 2023-06-02 DIAGNOSIS — G25.0 TREMOR, ESSENTIAL: Primary | ICD-10-CM

## 2023-06-02 NOTE — Clinical Note
Good afternoon, I am seeing this patient in followup today  He was previously on Propranolol for his tremors and it looks like this was stopped while inpatient due to hypotension  More recently you were okay with him restarting a beta blocker however it looks like you started Metoprolol  His action tremors are currently worse and I am wondering if this is related to no longer being on the Propranolol  I was just wondering if you would be okay with switching his Metoprolol back to Propranolol? Thanks!

## 2023-06-02 NOTE — PATIENT INSTRUCTIONS
Patient with progressive action and intentional tremors of the hands  He has tried and failed multiple medications in the past including topiramatie, primidone and gabapentin due to sedation  Tapering off Zonisamide worsened tremors in the past  Propranolol was recently stopped due to hypotension, tremors have worsened since that time  He was more recently started on Metoprolol by his Cardiologist and will reach out to see if perhaps they would be okay with switching back to Propranolol  If not then we could consider increasing the dose of Zonisamide  He is not interested in surgical procedures at this time including MRI guided stimulation or DBS  Also discussed devices such as the [employer] Steady glove or the [employer] device

## 2023-06-02 NOTE — PROGRESS NOTES
Patient ID: Edith Reyna is a 69 y o  male  Assessment/Plan:    Tremor, essential  Patient with progressive action and intentional tremors of the hands  He has tried and failed multiple medications in the past including topiramatie, primidone and gabapentin due to sedation  Tapering off Zonisamide worsened tremors in the past  Propranolol was recently stopped due to hypotension, tremors have worsened since that time  He was more recently started on Metoprolol by his Cardiologist and will reach out to see if perhaps they would be okay with switching back to Propranolol  If not then we could consider increasing the dose of Zonisamide  He is not interested in surgical procedures at this time including MRI guided stimulation or DBS  Also discussed devices such as the [employer] Steady glove or the [employer] device  Subjective:    Mr Soledad Kimball is a man with diabetic neuropathy and essential tremor who presents for movement disorder follow up  To review, tremors began gradually several decades ago and slowly progressed to the point of interfering with function  Medication trials with limited improvement and side effects restricting increases  Most beneficial medication has been propranolol  Occupational Therapy visits to work-around tremors with weighted utensils was not helpful  At his last visit he continued to have tremors however he was satisfied with the current control  No changes were made       INTERVAL HISTORY:  His propranolol was stopped since the last visit due to hypotension   Cardiology recently started Metoprolol  He has been in and out of the hospital since the last visit   Tremors are worse   Unclear if perhaps this was related to being off of the propranolol   Both hands will have tremor when using them  He struggles with checking his diabetes   He can drink  He may struggle with eating because of the tremors   He can dress on his own other than difficulty with socks and shoes   Wife "will put the toothpaste on his toothbrush       Current medications:  zonisamide 50mg qhs  Metoprolol    Medication trials:  propranolol 60mg LA q24hrs with improvement in tremor (better tremor control on 120mg but he had bradycardia) - this was later stopped due to orthostatic hypotension while inpatient for GI bleeding   topiramate -terminated at low dose due to sedation  Primidone- stopped at 200mg due to sedation  gabapentin (600mg up to tid but no effect and sedation)  CBD oil      I personally reviewed and updated the ROS  Objective:    Blood pressure 120/62, pulse 104, temperature 98 6 °F (37 °C), temperature source Temporal, resp  rate 16, height 5' 7\" (1 702 m), weight 98 4 kg (217 lb)  Physical Exam  HENT:      Right Ear: Hearing normal       Left Ear: Hearing normal    Eyes:      Extraocular Movements: Extraocular movements intact  Pupils: Pupils are equal, round, and reactive to light  Pulmonary:      Effort: Pulmonary effort is normal    Neurological:      Mental Status: He is alert  Psychiatric:         Speech: Speech normal          Neurological Exam  Mental Status  Alert  Oriented to person, place, time and situation  Recent and remote memory are intact  Unable to copy figure  Clock drawing is normal  Speech is normal   MOCA 27/30 - 3/9/22  Cranial Nerves  CN III, IV, VI: Extraocular movements intact bilaterally  Pupils equal round and reactive to light bilaterally  CN V:  Right: Facial sensation is normal   Left: Facial sensation is normal on the left  CN VII: Full and symmetric facial movement  CN VIII:  Right: Hearing is normal   Left: Hearing is normal   CN XI: Shoulder shrug strength is normal     Motor   Normal muscle tone  Normal in upper extremities  Sensory  Light touch is normal in upper and lower extremities       Coordination  Right: Finger-to-nose abnormality: Rapid alternating movement normal Left: Finger-to-nose abnormality: Rapid alternating movement " normal   No head or vocal tremor  Moderate tremor on FTN   Moderate left greater than right postural tremor  Gait  Casual gait: Unable to rise from chair without using arms  Wide based with cane  ROS:    Review of Systems   Constitutional: Negative  Negative for appetite change and fever  HENT: Negative  Negative for hearing loss, tinnitus, trouble swallowing and voice change  Eyes: Negative  Negative for photophobia, pain and visual disturbance  Respiratory: Negative  Negative for shortness of breath  Cardiovascular: Negative  Negative for palpitations  Gastrointestinal: Negative  Negative for nausea and vomiting  Endocrine: Negative  Negative for cold intolerance  Genitourinary: Negative  Negative for dysuria, frequency and urgency  Musculoskeletal: Negative  Negative for gait problem, myalgias and neck pain  Skin: Negative  Negative for rash  Allergic/Immunologic: Negative  Neurological: Positive for dizziness, tremors, light-headedness and headaches  Negative for seizures, syncope, facial asymmetry, speech difficulty, weakness and numbness  Memory loss   Hematological: Negative  Does not bruise/bleed easily  Psychiatric/Behavioral: Negative  Negative for confusion, hallucinations and sleep disturbance

## 2023-06-05 ENCOUNTER — TELEPHONE (OUTPATIENT)
Dept: FAMILY MEDICINE CLINIC | Facility: CLINIC | Age: 78
End: 2023-06-05

## 2023-06-05 ENCOUNTER — CLINICAL SUPPORT (OUTPATIENT)
Dept: CARDIAC REHAB | Facility: HOSPITAL | Age: 78
End: 2023-06-05
Payer: COMMERCIAL

## 2023-06-05 VITALS
OXYGEN SATURATION: 95 % | RESPIRATION RATE: 18 BRPM | HEART RATE: 81 BPM | TEMPERATURE: 97.1 F | SYSTOLIC BLOOD PRESSURE: 122 MMHG | DIASTOLIC BLOOD PRESSURE: 78 MMHG

## 2023-06-05 DIAGNOSIS — Z95.5 STENTED CORONARY ARTERY: ICD-10-CM

## 2023-06-05 PROCEDURE — 93798 PHYS/QHP OP CAR RHAB W/ECG: CPT

## 2023-06-05 NOTE — TELEPHONE ENCOUNTER
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

## 2023-06-05 NOTE — TELEPHONE ENCOUNTER
Eye center called asking regarding clearance  According to your note, he is denied for surgery  They will remove him from the schedule

## 2023-06-05 NOTE — TELEPHONE ENCOUNTER
Verbal orders given from Dr Wilson Chimera    That anesthesia, cancelled due to [facility]

## 2023-06-06 ENCOUNTER — HOME CARE VISIT (OUTPATIENT)
Dept: HOME HEALTH SERVICES | Facility: HOME HEALTHCARE | Age: 78
End: 2023-06-06
Payer: COMMERCIAL

## 2023-06-06 PROCEDURE — G0299 HHS/HOSPICE OF RN EA 15 MIN: HCPCS

## 2023-06-07 ENCOUNTER — OFFICE VISIT (OUTPATIENT)
Dept: UROLOGY | Facility: CLINIC | Age: 78
End: 2023-06-07
Payer: COMMERCIAL

## 2023-06-07 VITALS
DIASTOLIC BLOOD PRESSURE: 64 MMHG | RESPIRATION RATE: 18 BRPM | HEART RATE: 76 BPM | SYSTOLIC BLOOD PRESSURE: 124 MMHG | OXYGEN SATURATION: 93 % | TEMPERATURE: 97.7 F

## 2023-06-07 VITALS
TEMPERATURE: 98 F | HEART RATE: 71 BPM | OXYGEN SATURATION: 95 % | DIASTOLIC BLOOD PRESSURE: 62 MMHG | SYSTOLIC BLOOD PRESSURE: 120 MMHG | HEIGHT: 67 IN | BODY MASS INDEX: 33.59 KG/M2 | WEIGHT: 214 LBS

## 2023-06-07 DIAGNOSIS — R33.8 BENIGN PROSTATIC HYPERPLASIA WITH URINARY RETENTION: Primary | ICD-10-CM

## 2023-06-07 DIAGNOSIS — N40.1 BENIGN PROSTATIC HYPERPLASIA WITH URINARY RETENTION: Primary | ICD-10-CM

## 2023-06-07 PROCEDURE — 51702 INSERT TEMP BLADDER CATH: CPT | Performed by: UROLOGY

## 2023-06-07 PROCEDURE — 99213 OFFICE O/P EST LOW 20 MIN: CPT | Performed by: UROLOGY

## 2023-06-07 RX ORDER — OXYBUTYNIN CHLORIDE 10 MG/1
10 TABLET, EXTENDED RELEASE ORAL
Qty: 90 TABLET | Refills: 3 | Status: SHIPPED | OUTPATIENT
Start: 2023-06-07

## 2023-06-07 NOTE — PROGRESS NOTES
"Universal Protocol:  Consent: Verbal consent obtained  Written consent not obtained  Risks and benefits: risks, benefits and alternatives were discussed  Consent given by: patient  Time out: Immediately prior to procedure a \"time out\" was called to verify the correct patient, procedure, equipment, support staff and site/side marked as required  Timeout called at: 6/7/2023 11:47 AM   Patient identity confirmed: verbally with patient      Bladder catheterization    Date/Time: 6/7/2023 10:45 AM    Performed by: James Hobbs MD  Authorized by: James Hobbs MD    Patient location:  Bedside  Consent:     Consent given by:  Patient  Universal protocol:     Immediately prior to procedure a time out was called: yes      Patient identity confirmed:  Verbally with patient  Pre-procedure details:     Procedure purpose:  Therapeutic  Anesthesia (see MAR for exact dosages): Anesthesia method:  Topical application    Topical anesthetic:  Lidocaine gel  Procedure details:     Bladder irrigation: no      Catheter insertion:  Indwelling    Approach: natural orifice      Catheter type:  Latex    Catheter size:  16 Fr    Number of attempts:  1    Successful placement: yes      Urine characteristics:  Clear  Post-procedure details:     Patient tolerance of procedure: Tolerated well, no immediate complications  Comments:      He would like to consider a procedure          "

## 2023-06-07 NOTE — PROGRESS NOTES
UROLOGY PROGRESS NOTE         NAME: Lane Martin  AGE: 69 y o  SEX: male  : 1945   MRN: [id number]    DATE: 2023  TIME: 11:48 AM    Assessment and Plan      Impression:   1  Benign prostatic hyperplasia with urinary retention  -     oxybutynin (DITROPAN-XL) 10 MG 24 hr tablet; Take 1 tablet (10 mg total) by mouth daily at bedtime  -     Bladder catheterization    Patient with urinary retention  Significant cardiac disease as well  Cardiologist said it would be okay for him to come off his Plavix for procedure  He would like to consider a laser prostatectomy  Jose catheter changed today   Plan: Follow-up for cystoscopy prostate ultrasound for sizing for consideration of a prostate procedure  Continue with Jose for now  Patient failed intermittent catheterization      Chief Complaint     Chief Complaint   Patient presents with   • cath change     History of Present Illness     HPI: Lane Martin is a 69y o  year old male who presents with urinary retention  He failed intermittent catheterization  His cataract surgery was canceled because of colonization in his urinary system  His cardiologist did state that would be acceptable for him to come off the Plavix for a TURP  We will get him worked up for a laser prostatectomy  Patient is interested in this we will get him in for cystoscopy and prostate ultrasound for sizing  We will continue to manage him with a Jose  Jose changed today    See separate note              The following portions of the patient's history were reviewed and updated as appropriate: allergies, current medications, past family history, past medical history, past social history, past surgical history and problem list   Past Medical History:   Diagnosis Date   • BPH (benign prostatic hyperplasia)    • CAD (coronary artery disease)    • Cancer ([facility] [address] )     basal cell   • Chronic kidney disease     stage 3   • Colon polyp    • Coronary artery disease    • CPAP "(continuous positive airway pressure) dependence    • Diabetes mellitus (HCC)     niddm   • Disease of thyroid gland    • Gout    • High cholesterol    • Hypertension    • Hypomagnesemia 03/09/2023   • MI, 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    • Myocardial infarction [facility]) 2014   • S/P cardiac catheterization 12/30/2022   • Sleep apnea      Past Surgical History:   Procedure Laterality Date   • BASAL CELL CARCINOMA EXCISION Right 1/21/2022    Procedure: EXCISION BASAL CELL CARCINOMA EXTREMITY;  Surgeon: Merlene Payton DO;  Location: [facility];  Service: General   • CARDIAC CATHETERIZATION Left 12/29/2022    Procedure: Cardiac Left Heart Cath;  Surgeon: Jose L Ramesh MD;  Location: [facility] C[facility]; Service: Cardiology   • CARDIAC CATHETERIZATION N/A 12/29/2022    Procedure: Cardiac Coronary Angiogram;  Surgeon: Jose L Ramesh MD;  Location: [facility]; Service: Cardiology   • CARDIAC CATHETERIZATION N/A 12/29/2022    Procedure: Cardiac pci;  Surgeon: Jose L Ramesh MD;  Location: [facility]; Service: Cardiology   • CATARACT EXTRACTION W/  INTRAOCULAR LENS IMPLANT     • CORNEAL TRANSPLANT     • CORONARY ANGIOPLASTY WITH STENT PLACEMENT      stents x3-- [phone]   • EYE SURGERY      repair corneal laceration   • IL COLONOSCOPY FLX DX W/COLLJ SPEC WHEN PFRMD N/A 3/21/2016    Procedure: COLONOSCOPY;  Surgeon: Bartolo Sauceda MD;  Location: [facility];  Service: Colorectal   • TONSILLECTOMY AND ADENOIDECTOMY       shoulder  Review of Systems     Const: Denies chills, fever and weight loss  CV: Denies chest pain  Resp: Denies SOB  GI: Denies abdominal pain, nausea and vomiting  : Denies symptoms other than stated above  Musculo: Denies back pain      Objective   /62 (BP Location: Left arm, Patient Position: Sitting, Cuff Size: Adult)   Pulse 71   Temp 98 °F (36 7 °C) (Temporal)   Ht 5' 7\" (1 702 m)   Wt 97 1 kg (214 lb)   SpO2 95%   BMI 33 52 kg/m²     Physical " Exam  Const: Appears healthy and well developed  No signs of acute distress present  Resp: Respirations are regular and unlabored  CV: Rate is regular  Rhythm is regular  Abdomen: Abdomen is soft, nontender, and nondistended  Kidneys are not palpable  : Normal penis with some erythema at the meatus  Psych: Patient's attitude is cooperative  Mood is normal  Affect is normal     Current Medications     Current Outpatient Medications:   •  acetaminophen (TYLENOL) 325 mg tablet, Take 2 tablets (650 mg total) by mouth every 6 (six) hours as needed for mild pain, fever or headaches Do not exceed a total of 3 grams of tylenol/acetaminophen in a 24-hour period  , Disp: , Rfl: 0  •  allopurinol (ZYLOPRIM) 100 mg tablet, Take 1 tablet (100 mg total) by mouth daily, Disp: 90 tablet, Rfl: 1  •  aluminum-magnesium hydroxide-simethicone (MYLANTA) 3011-1443-812 mg/30 mL suspension, Take 30 mL by mouth every 4 (four) hours as needed for indigestion or heartburn, Disp: , Rfl: 0  •  aspirin (ECOTRIN LOW STRENGTH) 81 mg EC tablet, Take 1 tablet (81 mg total) by mouth daily Do not start before March 23, 2023 , Disp: , Rfl: 0  •  atorvastatin (LIPITOR) 80 mg tablet, Take 1 tablet (80 mg total) by mouth daily with dinner, Disp: , Rfl: 0  •  Blood Glucose Monitoring Suppl (OneTouch Verio Reflect) w/Device KIT, Check blood sugars twice daily  Please substitute with appropriate alternative as covered by patient's insurance   Dx: E11 65, Disp: 1 kit, Rfl: 0  •  clopidogrel (PLAVIX) 75 mg tablet, Take 1 tablet (75 mg total) by mouth daily, Disp: 90 tablet, Rfl: 1  •  cyanocobalamin (VITAMIN B-12) 500 MCG tablet, Take 1 tablet (500 mcg total) by mouth daily, Disp: 90 tablet, Rfl: 1  •  Empagliflozin (Jardiance) 10 MG TABS tablet, Take 1 tablet (10 mg total) by mouth every morning, Disp: 90 tablet, Rfl: 3  •  escitalopram (LEXAPRO) 20 mg tablet, Take 0 5 tablets (10 mg total) by mouth daily, Disp: 90 tablet, Rfl: 0  •  finasteride (PROSCAR) 5 mg tablet, Take 1 tablet (5 mg total) by mouth daily, Disp: 90 tablet, Rfl: 3  •  fluticasone (FLONASE) 50 mcg/act nasal spray, 1 spray into each nostril daily Do not start before March 23, 2023 , Disp: , Rfl: 0  •  glimepiride (AMARYL) 4 mg tablet, TAKE 1 TABLET TWICE A DAY, Disp: 180 tablet, Rfl: 0  •  glucose blood (OneTouch Verio) test strip, Check blood sugars twice daily  Please substitute with appropriate alternative as covered by patient's insurance  Dx: E11 65, Disp: 200 each, Rfl: 3  •  levothyroxine 112 mcg tablet, Take 1 tablet (112 mcg total) by mouth daily in the early morning, Disp: 90 tablet, Rfl: 0  •  metoprolol succinate (TOPROL-XL) 50 mg 24 hr tablet, Take 1 tablet (50 mg total) by mouth daily, Disp: 90 tablet, Rfl: 4  •  multivitamin (THERAGRAN) TABS, Take 1 tablet by mouth daily, Disp: , Rfl:   •  OneTouch Delica Lancets 93Y MISC, Check blood sugars twice daily  Please substitute with appropriate alternative as covered by patient's insurance   Dx: E11 65, Disp: 200 each, Rfl: 3  •  oxybutynin (DITROPAN) 5 mg tablet, Take 1 tablet (5 mg total) by mouth 3 (three) times a day, Disp: 90 tablet, Rfl: 0  •  oxybutynin (DITROPAN-XL) 10 MG 24 hr tablet, Take 1 tablet (10 mg total) by mouth daily at bedtime, Disp: 90 tablet, Rfl: 3  •  pantoprazole (PROTONIX) 40 mg tablet, Take 1 tablet (40 mg total) by mouth daily before breakfast, Disp: 90 tablet, Rfl: 1  •  polyethylene glycol (MIRALAX) 17 g packet, Take 17 g by mouth daily as needed (constipation), Disp: , Rfl: 0  •  prednisoLONE acetate (PRED FORTE) 1 % ophthalmic suspension, INSTILL 1 DROP INTO RIGHT EYE ONCE DAILY, Disp: , Rfl:   •  senna-docusate sodium (SENOKOT S) 8 6-50 mg per tablet, Take 1 tablet by mouth 2 (two) times a day Hold for diarrhea or loose stools, Disp: , Rfl: 0  •  torsemide (DEMADEX) 10 mg tablet, Take 10 mg by mouth if needed, Disp: , Rfl:   •  zonisamide (ZONEGRAN) 50 MG capsule, Take 1 tablet (50 mg total) by mouth daily, Disp: 90 capsule, Rfl: 2  •  metFORMIN (GLUCOPHAGE) 1000 MG tablet, Take 1 tablet (1,000 mg total) by mouth 2 (two) times a day with meals, Disp: 180 tablet, Rfl: 1  •  midodrine (PROAMATINE) 5 mg tablet, GIVE 1 TABLET BY MOUTH BEFORE MEAL(S)   HOLD FOR SBP ABOVE 140 (Patient not taking: Reported on 6/1/2023), Disp: , Rfl:         Jesús Sanders MD

## 2023-06-09 ENCOUNTER — CLINICAL SUPPORT (OUTPATIENT)
Dept: CARDIAC REHAB | Facility: HOSPITAL | Age: 78
End: 2023-06-09
Payer: COMMERCIAL

## 2023-06-09 DIAGNOSIS — Z95.5 STENTED CORONARY ARTERY: ICD-10-CM

## 2023-06-09 PROCEDURE — 93798 PHYS/QHP OP CAR RHAB W/ECG: CPT

## 2023-06-12 ENCOUNTER — CLINICAL SUPPORT (OUTPATIENT)
Dept: CARDIAC REHAB | Facility: HOSPITAL | Age: 78
End: 2023-06-12
Payer: COMMERCIAL

## 2023-06-12 DIAGNOSIS — Z95.5 STENTED CORONARY ARTERY: ICD-10-CM

## 2023-06-12 PROCEDURE — 93798 PHYS/QHP OP CAR RHAB W/ECG: CPT

## 2023-06-13 ENCOUNTER — TELEPHONE (OUTPATIENT)
Dept: INFECTIOUS DISEASES | Facility: CLINIC | Age: 78
End: 2023-06-13

## 2023-06-13 NOTE — TELEPHONE ENCOUNTER
Called pt to get him scheduled for a consult lmom for pt to give us a call back at his earliest convenience     Referral in chart

## 2023-06-13 NOTE — TELEPHONE ENCOUNTER
Patient calls the office back today regarding message left  Informed patient office was calling to get him scheduled for a consult  Patient is scheduled 6/29/23 at 11AM with Dr Joe Jacobo at the [facility]

## 2023-06-14 ENCOUNTER — CLINICAL SUPPORT (OUTPATIENT)
Dept: CARDIAC REHAB | Facility: HOSPITAL | Age: 78
End: 2023-06-14
Payer: COMMERCIAL

## 2023-06-14 DIAGNOSIS — Z95.5 STENTED CORONARY ARTERY: ICD-10-CM

## 2023-06-14 PROCEDURE — 93798 PHYS/QHP OP CAR RHAB W/ECG: CPT

## 2023-06-16 ENCOUNTER — CLINICAL SUPPORT (OUTPATIENT)
Dept: CARDIAC REHAB | Facility: HOSPITAL | Age: 78
End: 2023-06-16
Payer: COMMERCIAL

## 2023-06-16 DIAGNOSIS — Z95.5 STENTED CORONARY ARTERY: ICD-10-CM

## 2023-06-16 PROCEDURE — 93798 PHYS/QHP OP CAR RHAB W/ECG: CPT

## 2023-06-19 ENCOUNTER — TELEPHONE (OUTPATIENT)
Dept: UROLOGY | Facility: CLINIC | Age: 78
End: 2023-06-19

## 2023-06-19 ENCOUNTER — PREP FOR PROCEDURE (OUTPATIENT)
Dept: UROLOGY | Facility: CLINIC | Age: 78
End: 2023-06-19

## 2023-06-19 ENCOUNTER — APPOINTMENT (OUTPATIENT)
Dept: CARDIAC REHAB | Facility: HOSPITAL | Age: 78
End: 2023-06-19
Payer: COMMERCIAL

## 2023-06-19 ENCOUNTER — APPOINTMENT (OUTPATIENT)
Dept: LAB | Facility: HOSPITAL | Age: 78
End: 2023-06-19
Payer: COMMERCIAL

## 2023-06-19 ENCOUNTER — PROCEDURE VISIT (OUTPATIENT)
Dept: UROLOGY | Facility: CLINIC | Age: 78
End: 2023-06-19
Payer: COMMERCIAL

## 2023-06-19 VITALS
WEIGHT: 213.2 LBS | TEMPERATURE: 98 F | OXYGEN SATURATION: 97 % | HEIGHT: 67 IN | HEART RATE: 75 BPM | DIASTOLIC BLOOD PRESSURE: 60 MMHG | SYSTOLIC BLOOD PRESSURE: 102 MMHG | BODY MASS INDEX: 33.46 KG/M2

## 2023-06-19 DIAGNOSIS — N18.31 STAGE 3A CHRONIC KIDNEY DISEASE (HCC): ICD-10-CM

## 2023-06-19 DIAGNOSIS — Z01.818 ENCOUNTER FOR PREADMISSION TESTING: ICD-10-CM

## 2023-06-19 DIAGNOSIS — E11.21 DIABETIC NEPHROPATHY ASSOCIATED WITH TYPE 2 DIABETES MELLITUS (HCC): ICD-10-CM

## 2023-06-19 DIAGNOSIS — E11.9 TYPE 2 DIABETES MELLITUS WITHOUT COMPLICATION, WITHOUT LONG-TERM CURRENT USE OF INSULIN (HCC): ICD-10-CM

## 2023-06-19 DIAGNOSIS — R39.89 SUSPECTED UTI: Primary | ICD-10-CM

## 2023-06-19 DIAGNOSIS — N40.1 BENIGN PROSTATIC HYPERPLASIA WITH URINARY RETENTION: Primary | ICD-10-CM

## 2023-06-19 DIAGNOSIS — Z01.810 ENCOUNTER FOR PRE-OPERATIVE CARDIOVASCULAR CLEARANCE: Primary | ICD-10-CM

## 2023-06-19 DIAGNOSIS — R33.8 BENIGN PROSTATIC HYPERPLASIA WITH URINARY RETENTION: Primary | ICD-10-CM

## 2023-06-19 LAB
ANION GAP SERPL CALCULATED.3IONS-SCNC: 7 MMOL/L (ref 4–13)
BASOPHILS # BLD AUTO: 0.04 THOUSANDS/ÂΜL (ref 0–0.1)
BASOPHILS NFR BLD AUTO: 0 % (ref 0–1)
BUN SERPL-MCNC: 20 MG/DL (ref 5–25)
CALCIUM SERPL-MCNC: 9.3 MG/DL (ref 8.4–10.2)
CHLORIDE SERPL-SCNC: 105 MMOL/L (ref 96–108)
CO2 SERPL-SCNC: 28 MMOL/L (ref 21–32)
CREAT SERPL-MCNC: 1.22 MG/DL (ref 0.6–1.3)
EOSINOPHIL # BLD AUTO: 0.54 THOUSAND/ÂΜL (ref 0–0.61)
EOSINOPHIL NFR BLD AUTO: 6 % (ref 0–6)
ERYTHROCYTE [DISTWIDTH] IN BLOOD BY AUTOMATED COUNT: 17.1 % (ref 11.6–15.1)
GFR SERPL CREATININE-BSD FRML MDRD: 57 ML/MIN/1.73SQ M
GLUCOSE P FAST SERPL-MCNC: 86 MG/DL (ref 65–99)
HCT VFR BLD AUTO: 39 % (ref 36.5–49.3)
HGB BLD-MCNC: 11.4 G/DL (ref 12–17)
IMM GRANULOCYTES # BLD AUTO: 0.04 THOUSAND/UL (ref 0–0.2)
IMM GRANULOCYTES NFR BLD AUTO: 0 % (ref 0–2)
LYMPHOCYTES # BLD AUTO: 2.14 THOUSANDS/ÂΜL (ref 0.6–4.47)
LYMPHOCYTES NFR BLD AUTO: 23 % (ref 14–44)
MCH RBC QN AUTO: 24.6 PG (ref 26.8–34.3)
MCHC RBC AUTO-ENTMCNC: 29.2 G/DL (ref 31.4–37.4)
MCV RBC AUTO: 84 FL (ref 82–98)
MONOCYTES # BLD AUTO: 0.81 THOUSAND/ÂΜL (ref 0.17–1.22)
MONOCYTES NFR BLD AUTO: 9 % (ref 4–12)
NEUTROPHILS # BLD AUTO: 5.77 THOUSANDS/ÂΜL (ref 1.85–7.62)
NEUTS SEG NFR BLD AUTO: 62 % (ref 43–75)
NRBC BLD AUTO-RTO: 0 /100 WBCS
PLATELET # BLD AUTO: 381 THOUSANDS/UL (ref 149–390)
PMV BLD AUTO: 8.7 FL (ref 8.9–12.7)
POTASSIUM SERPL-SCNC: 4.5 MMOL/L (ref 3.5–5.3)
RBC # BLD AUTO: 4.63 MILLION/UL (ref 3.88–5.62)
SODIUM SERPL-SCNC: 140 MMOL/L (ref 135–147)
WBC # BLD AUTO: 9.34 THOUSAND/UL (ref 4.31–10.16)

## 2023-06-19 PROCEDURE — 52000 CYSTOURETHROSCOPY: CPT | Performed by: UROLOGY

## 2023-06-19 PROCEDURE — 83036 HEMOGLOBIN GLYCOSYLATED A1C: CPT

## 2023-06-19 PROCEDURE — 85025 COMPLETE CBC W/AUTO DIFF WBC: CPT

## 2023-06-19 PROCEDURE — 99213 OFFICE O/P EST LOW 20 MIN: CPT | Performed by: UROLOGY

## 2023-06-19 PROCEDURE — 80048 BASIC METABOLIC PNL TOTAL CA: CPT

## 2023-06-19 PROCEDURE — 36415 COLL VENOUS BLD VENIPUNCTURE: CPT

## 2023-06-19 RX ORDER — SULFAMETHOXAZOLE AND TRIMETHOPRIM 800; 160 MG/1; MG/1
1 TABLET ORAL EVERY 12 HOURS SCHEDULED
Qty: 10 TABLET | Refills: 0 | Status: SHIPPED | OUTPATIENT
Start: 2023-06-19 | End: 2023-06-24

## 2023-06-19 RX ORDER — CEPHALEXIN 500 MG/1
CAPSULE ORAL
COMMUNITY
Start: 2023-06-14

## 2023-06-19 NOTE — PROGRESS NOTES
"   Cystoscopy     Date/Time 6/19/2023 9:30 AM     Performed by  Augustin Hughes MD   Authorized by Augustin Hughes MD     Universal Protocol:  Consent: Verbal consent obtained  Written consent obtained  Consent given by: patient  Time out: Immediately prior to procedure a \"time out\" was called to verify the correct patient, procedure, equipment, support staff and site/side marked as required  Timeout called at: 6/19/2023 10:20 AM   Patient identity confirmed: verbally with patient        Procedure Details:  Procedure type: cystoscopy    Patient tolerance: Patient tolerated the procedure well with no immediate complications    Additional Procedure Details: Patient underwent flexible cystoscopy  He also went transrectal ultrasound of the prostate for sizing and he also had a Jose replaced  Cystoscopy revealed a normal urethra and enlarged prostate gland with lateral lobe enlargement  There were no tumors  No diverticuli  No stones in the bladder  The orifices were normal   Transrectal ultrasound the prostate revealed a prostate volume of 43 cc  Prostate measurements were taken  Calculation was made of the volume  There were no other abnormalities of the prostate noted  Please see the picture  There is no significant median lobe or median bar  Jose catheter replaced  Patient is not interested in intermittent catheterization        "

## 2023-06-19 NOTE — PROGRESS NOTES
UROLOGY PROGRESS NOTE         NAME: Stephanie John  AGE: 69 y o  SEX: male  : 1945   MRN: [id number]    DATE: 2023  TIME: 10:21 AM    Assessment and Plan      Impression:   1  Benign prostatic hyperplasia with urinary retention  -     Case request operating room: TRANSURETHRAL RESECTION OF PROSTATE W/ LASER please order the cyber laser  200w; Standing  -     Case request operating room: TRANSURETHRAL RESECTION OF PROSTATE W/ LASER please order the cyber laser  200w  -     Cystoscopy    After discussing the options the patient would like to have a laser prostatectomy  Outcomes all discussed  Risks discussed  Patient would like to keep his Jose until then  He will start Bactrim 5 days prior and hold his Plavix 1 week prior     Plan: Cystoscopy with laser prostatectomy  General anesthesia      Chief Complaint     Chief Complaint   Patient presents with   • Cystoscopy     History of Present Illness     HPI: Stephanie John is a 69y o  year old male who presents with with urinary retention for evaluation  Patient has no fever and chills  He has been managed with a Jose up to this point                The following portions of the patient's history were reviewed and updated as appropriate: allergies, current medications, past family history, past medical history, past social history, past surgical history and problem list   Past Medical History:   Diagnosis Date   • BPH (benign prostatic hyperplasia)    • CAD (coronary artery disease)    • Cancer ([facility] [address] )     basal cell   • Chronic kidney disease     stage 3   • Colon polyp    • Coronary artery disease    • CPAP (continuous positive airway pressure) dependence    • Diabetes mellitus ([facility] [address] )     niddm   • Disease of thyroid gland    • Gout    • High cholesterol    • Hypertension    • Hypomagnesemia 2023   • MI, 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    • Myocardial infarction ([facility] [address] )    • S/P cardiac catheterization 2022   • Sleep apnea      Past "Surgical History:   Procedure Laterality Date   • BASAL CELL CARCINOMA EXCISION Right 1/21/2022    Procedure: EXCISION BASAL CELL CARCINOMA EXTREMITY;  Surgeon: Kwasi Brewer DO;  Location: [facility];  Service: General   • CARDIAC CATHETERIZATION Left 12/29/2022    Procedure: Cardiac Left Heart Cath;  Surgeon: Rupinder Heard MD;  [facility]; Service: Cardiology   • CARDIAC CATHETERIZATION N/A 12/29/2022    Procedure: Cardiac Coronary Angiogram;  Surgeon: Rupinder Heard MD;  [facility]; Service: Cardiology   • CARDIAC CATHETERIZATION N/A 12/29/2022    Procedure: Cardiac pci;  Surgeon: Rupinder Heard MD;  [facility]: [facility]; Service: Cardiology   • CATARACT EXTRACTION W/  INTRAOCULAR LENS IMPLANT     • CORNEAL TRANSPLANT     • CORONARY ANGIOPLASTY WITH STENT PLACEMENT      stents x3-- [id number]   • EYE SURGERY      repair corneal laceration   • PA COLONOSCOPY FLX DX W/COLLJ SPEC WHEN PFRMD N/A 3/21/2016    Procedure: COLONOSCOPY;  Surgeon: Donovan Miller MD;  Location: [facility];  Service: Colorectal   • TONSILLECTOMY AND ADENOIDECTOMY       shoulder  Review of Systems     Const: Denies chills, fever and weight loss  CV: Denies chest pain  Resp: Denies SOB  GI: Denies abdominal pain, nausea and vomiting  : Denies symptoms other than stated above  Musculo: Denies back pain  Objective   /60 (BP Location: Left arm, Patient Position: Sitting, Cuff Size: Adult)   Pulse 75   Temp 98 °F (36 7 °C)   Ht 5' 7\" (1 702 m)   Wt 96 7 kg (213 lb 3 2 oz)   SpO2 97%   BMI 33 39 kg/m²     Physical Exam  Const: Appears healthy and well developed  No signs of acute distress present  Resp: Respirations are regular and unlabored  CV: Rate is regular  Rhythm is regular  Abdomen: Abdomen is soft, nontender, and nondistended  Kidneys are not palpable  : Normal penis testicles nontender  Psych: Patient's attitude is cooperative   Mood is normal  " Affect is normal     Current Medications     Current Outpatient Medications:   •  acetaminophen (TYLENOL) 325 mg tablet, Take 2 tablets (650 mg total) by mouth every 6 (six) hours as needed for mild pain, fever or headaches Do not exceed a total of 3 grams of tylenol/acetaminophen in a 24-hour period  , Disp: , Rfl: 0  •  allopurinol (ZYLOPRIM) 100 mg tablet, Take 1 tablet (100 mg total) by mouth daily, Disp: 90 tablet, Rfl: 1  •  aluminum-magnesium hydroxide-simethicone (MYLANTA) 5710-7624-009 mg/30 mL suspension, Take 30 mL by mouth every 4 (four) hours as needed for indigestion or heartburn, Disp: , Rfl: 0  •  aspirin (ECOTRIN LOW STRENGTH) 81 mg EC tablet, Take 1 tablet (81 mg total) by mouth daily Do not start before March 23, 2023 , Disp: , Rfl: 0  •  atorvastatin (LIPITOR) 80 mg tablet, Take 1 tablet (80 mg total) by mouth daily with dinner, Disp: , Rfl: 0  •  Blood Glucose Monitoring Suppl (OneTouch Verio Reflect) w/Device KIT, Check blood sugars twice daily  Please substitute with appropriate alternative as covered by patient's insurance   Dx: E11 65, Disp: 1 kit, Rfl: 0  •  clopidogrel (PLAVIX) 75 mg tablet, Take 1 tablet (75 mg total) by mouth daily, Disp: 90 tablet, Rfl: 1  •  cyanocobalamin (VITAMIN B-12) 500 MCG tablet, Take 1 tablet (500 mcg total) by mouth daily, Disp: 90 tablet, Rfl: 1  •  Empagliflozin (Jardiance) 10 MG TABS tablet, Take 1 tablet (10 mg total) by mouth every morning, Disp: 90 tablet, Rfl: 3  •  escitalopram (LEXAPRO) 20 mg tablet, Take 0 5 tablets (10 mg total) by mouth daily, Disp: 90 tablet, Rfl: 0  •  finasteride (PROSCAR) 5 mg tablet, Take 1 tablet (5 mg total) by mouth daily, Disp: 90 tablet, Rfl: 3  •  fluticasone (FLONASE) 50 mcg/act nasal spray, 1 spray into each nostril daily Do not start before March 23, 2023 , Disp: , Rfl: 0  •  glimepiride (AMARYL) 4 mg tablet, TAKE 1 TABLET TWICE A DAY, Disp: 180 tablet, Rfl: 0  •  glucose blood (OneTouch Verio) test strip, Check blood sugars twice daily  Please substitute with appropriate alternative as covered by patient's insurance  Dx: E11 65, Disp: 200 each, Rfl: 3  •  levothyroxine 112 mcg tablet, Take 1 tablet (112 mcg total) by mouth daily in the early morning, Disp: 90 tablet, Rfl: 0  •  metFORMIN (GLUCOPHAGE) 1000 MG tablet, Take 1 tablet (1,000 mg total) by mouth 2 (two) times a day with meals, Disp: 180 tablet, Rfl: 1  •  metoprolol succinate (TOPROL-XL) 50 mg 24 hr tablet, Take 1 tablet (50 mg total) by mouth daily, Disp: 90 tablet, Rfl: 4  •  multivitamin (THERAGRAN) TABS, Take 1 tablet by mouth daily, Disp: , Rfl:   •  OneTouch Delica Lancets 14Q MISC, Check blood sugars twice daily  Please substitute with appropriate alternative as covered by patient's insurance  Dx: E11 65, Disp: 200 each, Rfl: 3  •  oxybutynin (DITROPAN-XL) 10 MG 24 hr tablet, Take 1 tablet (10 mg total) by mouth daily at bedtime, Disp: 90 tablet, Rfl: 3  •  pantoprazole (PROTONIX) 40 mg tablet, Take 1 tablet (40 mg total) by mouth daily before breakfast, Disp: 90 tablet, Rfl: 1  •  polyethylene glycol (MIRALAX) 17 g packet, Take 17 g by mouth daily as needed (constipation), Disp: , Rfl: 0  •  prednisoLONE acetate (PRED FORTE) 1 % ophthalmic suspension, INSTILL 1 DROP INTO RIGHT EYE ONCE DAILY, Disp: , Rfl:   •  senna-docusate sodium (SENOKOT S) 8 6-50 mg per tablet, Take 1 tablet by mouth 2 (two) times a day Hold for diarrhea or loose stools, Disp: , Rfl: 0  •  torsemide (DEMADEX) 10 mg tablet, Take 10 mg by mouth if needed, Disp: , Rfl:   •  zonisamide (ZONEGRAN) 50 MG capsule, Take 1 tablet (50 mg total) by mouth daily, Disp: 90 capsule, Rfl: 2  •  cephalexin (KEFLEX) 500 mg capsule, take 1 capsule by mouth three times a day for 7 days, Disp: , Rfl:   •  midodrine (PROAMATINE) 5 mg tablet, GIVE 1 TABLET BY MOUTH BEFORE MEAL(S)   HOLD FOR SBP ABOVE 140 (Patient not taking: Reported on 6/1/2023), Disp: , Rfl:   •  oxybutynin (DITROPAN) 5 mg tablet, Take 1 tablet (5 mg total) by mouth 3 (three) times a day (Patient not taking: Reported on 6/19/2023), Disp: 90 tablet, Rfl: 0        Alexandru Vargas MD

## 2023-06-19 NOTE — H&P (VIEW-ONLY)
UROLOGY PROGRESS NOTE         NAME: Patricia Johnson  AGE: 69 y.o. SEX: male  : 1945   MRN: [id number]    DATE: 2023  TIME: 10:21 AM    Assessment and Plan      Impression:   1. Benign prostatic hyperplasia with urinary retention  -     Case request operating room: TRANSURETHRAL RESECTION OF PROSTATE W/ LASER please order the cyber laser. 200w; Standing  -     Case request operating room: TRANSURETHRAL RESECTION OF PROSTATE W/ LASER please order the cyber laser. 200w  -     Cystoscopy    After discussing the options the patient would like to have a laser prostatectomy. Outcomes all discussed. Risks discussed. Patient would like to keep his Jose until then. He will start Bactrim 5 days prior and hold his Plavix 1 week prior     Plan: Cystoscopy with laser prostatectomy. General anesthesia      Chief Complaint     Chief Complaint   Patient presents with   • Cystoscopy     History of Present Illness     HPI: Patricia Johnson is a 69y.o. year old male who presents with with urinary retention for evaluation. Patient has no fever and chills. He has been managed with a Jose up to this point.               The following portions of the patient's history were reviewed and updated as appropriate: allergies, current medications, past family history, past medical history, past social history, past surgical history and problem list.  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    • Myocardial infarction ([address])    • S/P cardiac catheterization 2022   • Sleep apnea      Past Surgical History:   Procedure Laterality Date   • BASAL CELL CARCINOMA EXCISION Right 1/21/2022    Procedure: EXCISION BASAL CELL CARCINOMA EXTREMITY;  Surgeon: Thanh Rios DO;  Location: [facility];  Service: General   • CARDIAC CATHETERIZATION Left 12/29/2022    Procedure: Cardiac Left Heart Cath;  Surgeon: Lainey Vázquez MD;  [facility]; Service: Cardiology   • CARDIAC CATHETERIZATION N/A 12/29/2022    Procedure: Cardiac Coronary Angiogram;  Surgeon: Lainey Vázquez MD;  [facility]; Service: Cardiology   • CARDIAC CATHETERIZATION N/A 12/29/2022    Procedure: Cardiac pci;  Surgeon: Lainey Vázquez MD;  [facility]; Service: Cardiology   • CATARACT EXTRACTION W/  INTRAOCULAR LENS IMPLANT     • CORNEAL TRANSPLANT     • CORONARY ANGIOPLASTY WITH STENT PLACEMENT      stents x3-- [phone]   • EYE SURGERY      repair corneal laceration   • MD COLONOSCOPY FLX DX W/COLLJ SPEC WHEN PFRMD N/A 3/21/2016    Procedure: COLONOSCOPY;  Surgeon: Aung Leon MD;  Location: [facility];  Service: Colorectal   • TONSILLECTOMY AND ADENOIDECTOMY       shoulder  Review of Systems     Const: Denies chills, fever and weight loss. CV: Denies chest pain. Resp: Denies SOB. GI: Denies abdominal pain, nausea and vomiting. : Denies symptoms other than stated above. Musculo: Denies back pain. Objective   /60 (BP Location: Left arm, Patient Position: Sitting, Cuff Size: Adult)   Pulse 75   Temp 98 °F (36.7 °C)   Ht 5' 7" (1.702 m)   Wt 96.7 kg (213 lb 3.2 oz)   SpO2 97%   BMI 33.39 kg/m²     Physical Exam  Const: Appears healthy and well developed. No signs of acute distress present. Resp: Respirations are regular and unlabored. CV: Rate is regular. Rhythm is regular. Abdomen: Abdomen is soft, nontender, and nondistended. Kidneys are not palpable. : Normal penis testicles nontender  Psych: Patient's attitude is cooperative.  Mood is normal. Affect is normal.    Current Medications     Current Outpatient Medications:   •  acetaminophen (TYLENOL) 325 mg tablet, Take 2 tablets (650 mg total) by mouth every 6 (six) hours as needed for mild pain, fever or headaches Do not exceed a total of 3 grams of tylenol/acetaminophen in a 24-hour period. , Disp: , Rfl: 0  •  allopurinol (ZYLOPRIM) 100 mg tablet, Take 1 tablet (100 mg total) by mouth daily, Disp: 90 tablet, Rfl: 1  •  aluminum-magnesium hydroxide-simethicone (MYLANTA) 4621-5930-285 mg/30 mL suspension, Take 30 mL by mouth every 4 (four) hours as needed for indigestion or heartburn, Disp: , Rfl: 0  •  aspirin (ECOTRIN LOW STRENGTH) 81 mg EC tablet, Take 1 tablet (81 mg total) by mouth daily Do not start before March 23, 2023., Disp: , Rfl: 0  •  atorvastatin (LIPITOR) 80 mg tablet, Take 1 tablet (80 mg total) by mouth daily with dinner, Disp: , Rfl: 0  •  Blood Glucose Monitoring Suppl (OneTouch Verio Reflect) w/Device KIT, Check blood sugars twice daily. Please substitute with appropriate alternative as covered by patient's insurance.  Dx: E11.65, Disp: 1 kit, Rfl: 0  •  clopidogrel (PLAVIX) 75 mg tablet, Take 1 tablet (75 mg total) by mouth daily, Disp: 90 tablet, Rfl: 1  •  cyanocobalamin (VITAMIN B-12) 500 MCG tablet, Take 1 tablet (500 mcg total) by mouth daily, Disp: 90 tablet, Rfl: 1  •  Empagliflozin (Jardiance) 10 MG TABS tablet, Take 1 tablet (10 mg total) by mouth every morning, Disp: 90 tablet, Rfl: 3  •  escitalopram (LEXAPRO) 20 mg tablet, Take 0.5 tablets (10 mg total) by mouth daily, Disp: 90 tablet, Rfl: 0  •  finasteride (PROSCAR) 5 mg tablet, Take 1 tablet (5 mg total) by mouth daily, Disp: 90 tablet, Rfl: 3  •  fluticasone (FLONASE) 50 mcg/act nasal spray, 1 spray into each nostril daily Do not start before March 23, 2023., Disp: , Rfl: 0  •  glimepiride (AMARYL) 4 mg tablet, TAKE 1 TABLET TWICE A DAY, Disp: 180 tablet, Rfl: 0  •  glucose blood (OneTouch Verio) test strip, Check blood sugars twice daily. Please substitute with appropriate alternative as covered by patient's insurance. Dx: E11.65, Disp: 200 each, Rfl: 3  •  levothyroxine 112 mcg tablet, Take 1 tablet (112 mcg total) by mouth daily in the early morning, Disp: 90 tablet, Rfl: 0  •  metFORMIN (GLUCOPHAGE) 1000 MG tablet, Take 1 tablet (1,000 mg total) by mouth 2 (two) times a day with meals, Disp: 180 tablet, Rfl: 1  •  metoprolol succinate (TOPROL-XL) 50 mg 24 hr tablet, Take 1 tablet (50 mg total) by mouth daily, Disp: 90 tablet, Rfl: 4  •  multivitamin (THERAGRAN) TABS, Take 1 tablet by mouth daily, Disp: , Rfl:   •  OneTouch Delica Lancets 45K MISC, Check blood sugars twice daily. Please substitute with appropriate alternative as covered by patient's insurance. Dx: E11.65, Disp: 200 each, Rfl: 3  •  oxybutynin (DITROPAN-XL) 10 MG 24 hr tablet, Take 1 tablet (10 mg total) by mouth daily at bedtime, Disp: 90 tablet, Rfl: 3  •  pantoprazole (PROTONIX) 40 mg tablet, Take 1 tablet (40 mg total) by mouth daily before breakfast, Disp: 90 tablet, Rfl: 1  •  polyethylene glycol (MIRALAX) 17 g packet, Take 17 g by mouth daily as needed (constipation), Disp: , Rfl: 0  •  prednisoLONE acetate (PRED FORTE) 1 % ophthalmic suspension, INSTILL 1 DROP INTO RIGHT EYE ONCE DAILY, Disp: , Rfl:   •  senna-docusate sodium (SENOKOT S) 8.6-50 mg per tablet, Take 1 tablet by mouth 2 (two) times a day Hold for diarrhea or loose stools, Disp: , Rfl: 0  •  torsemide (DEMADEX) 10 mg tablet, Take 10 mg by mouth if needed, Disp: , Rfl:   •  zonisamide (ZONEGRAN) 50 MG capsule, Take 1 tablet (50 mg total) by mouth daily, Disp: 90 capsule, Rfl: 2  •  cephalexin (KEFLEX) 500 mg capsule, take 1 capsule by mouth three times a day for 7 days, Disp: , Rfl:   •  midodrine (PROAMATINE) 5 mg tablet, GIVE 1 TABLET BY MOUTH BEFORE MEAL(S).  HOLD FOR SBP ABOVE 140 (Patient not taking: Reported on 6/1/2023), Disp: , Rfl:   •  oxybutynin (DITROPAN) 5 mg tablet, Take 1 tablet (5 mg total) by mouth 3 (three) times a day (Patient not taking: Reported on 6/19/2023), Disp: 90 tablet, Rfl: 0        Zaki Lobo MD

## 2023-06-20 LAB
EST. AVERAGE GLUCOSE BLD GHB EST-MCNC: 148 MG/DL
HBA1C MFR BLD: 6.8 %

## 2023-06-20 NOTE — TELEPHONE ENCOUNTER
Received cardiac clearance and ok to hold ASA and Plavix x 7 days prior, needs to restart ASAP following procedure

## 2023-06-21 ENCOUNTER — CLINICAL SUPPORT (OUTPATIENT)
Dept: CARDIAC REHAB | Facility: HOSPITAL | Age: 78
End: 2023-06-21
Payer: COMMERCIAL

## 2023-06-21 DIAGNOSIS — Z95.5 STENTED CORONARY ARTERY: Primary | ICD-10-CM

## 2023-06-21 PROCEDURE — 93798 PHYS/QHP OP CAR RHAB W/ECG: CPT

## 2023-06-21 NOTE — TELEPHONE ENCOUNTER
PT called and is asking when he would be able to fly after having surgery on 7/13/23    Pt call back-[phone]

## 2023-06-21 NOTE — PROGRESS NOTES
Cardiac Rehabilitation Plan of Care   120 Day Reassessment      Today's date: 2023   # of Exercise Sessions Completed:   Patient name: Bull Alvarez      : 1945  Age: 69 y o  MRN: [id number]  Referring Physician: Alexx Parrish MD  Cardiologist: Janneth Foster MD  Provider: [facility]  Clinician: Dereje Anthony MS    Dx: Stent LAD  Date of onset: 23      SUMMARY OF PROGRESS:  Patient is completing about 35 minutes of aerobic exercise and is light strength training  Resting /70, HR 59 bpm  Exercise /66, HR  bpm  Telemetry reads NSR  Patient's BP has been under control since starting rehab again after GI bleed  On a good day, patient will walk up from his car  On a bad day, patient's wife will bring him up on a wheelchair  Patient has come a long way since starting up again  Patient does get winded and tired easily, but he has not been doing much exercise outside of rehab  Staff is impressed with how he has improved and will only continue to get better and stronger  Patient is able to walk a longer distance without having to stop and without feeling fatigued  Patient is making some dietary changes, but was encouraged to keep a closer eye on his diet  Carmenza Stratton has the goal to be able to walk up to rehab every day      Medication compliance: Yes   Comments: Pt reports to be compliant with medications  Fall Risk: Moderate   Comments: Patient uses walking assist device (walker/cane/rollator) and shows imbalance at times     EKG Interpretation: NSR      EXERCISE ASSESSMENT and PLAN    Exercise Prescription:      Frequency: 3 days/week   Supplement with home exercise 2+ days/wk as tolerated       Minutes: 30-35        METS: 1-2 25         HR:  bpm   RPE: 4-6         Modalities: UBE, NuStep and Room walking      30 Day Goals for Exercise Progression:    Frequency: 3 days/week of cardiac rehab       Supplement with home exercise 2+ days/wk as tolerated    Minutes: 30-40 >150 mins/wk of moderate intensity exercise   METS: 2 5-3   HR: 20-30 beats above RHR   RPE: 4-6   Modalities: UBE, NuStep, Recumbent bike and Room walking    Strength trainin-3 days / week   Modalities: Leg Press, Chest Press, Pull Downs and free weights    Home Exercise: none    Goals: 10% improvement in functional capacity - based on max METs achieved in fitness assessment, Resume ADLs with increased strength, Attend Rehab regularly, Decrease sitting time and Start a walking program    Progression Toward Goals:  Reviewed Pt goals and determined plan of care, Patient will initiate a walking program at home for at least 15 miuntes daily  in the next 30 days    Education: benefit of exercise for CAD risk factors, home exercise guidelines, AHA guidelines to achieve >150 mins/wk of moderate exercise and RPE scale   Plan:education on home exercise guidelines, home exercise 30+ mins 2 days opposite CR and Education class: Risk Factors for Heart Disease  Readiness to change: Action:  (Changing behavior)      NUTRITION ASSESSMENT AND PLAN    Weight control:    Starting weight: 222   Current weight:   209       Diabetes: T2D  A1c: 7 6    last measured: 23    Lipid management: Last lipid profile 21  Chol 140    HDL 36  LDL 66    Goals:LDL <100, HDL >40, TRG <150, CHOL <200, eliminate processed meats, cook without added fat or use vegetable oil/spray, eat 3 or more servings of whole grains a day, Eat 4-5 cups of fruits and vegetables daily, choose low sodium processed foods, eliminate butter and choose healthy snacks: light popcorn, plain pretzels    Measurable goals were based Rate Your Plate Dietary Self-Assessment  These are the areas in which the patient could score higher on the assessment  Goals include recommendations for a heart healthy diet based on American Heart Association      Progression Toward Goals: Reviewed Pt goals and determined plan of care, Patient will make dietary changes to see improvement in future lipid panel  in the next 30 days    Education: heart healthy eating  low sodium diet  exercise and diabetes management   wt  loss   healthy choices while dining out  portion control  Plan: Education class: Reading Food Labels, Education Class: Heart Healthy Eating, replace butter with soft spreads made with olive oil, canola or yogurt, replace refined grain bread with whole grain bread, replace unhealthy snacks with fruits & vegs, reduce portion sizes, eat fewer desserts and sweets and avoid processed foods  Readiness to change: Action:  (Changing behavior)      PSYCHOSOCIAL ASSESSMENT AND PLAN    Emotional:  Depression assessment:  PHQ-9 = 9  5-9 = Mild Depression               Anxiety measure:  ANGEL-7 = 7  5-9 = Mild anxiety  Self-reported stress level:  4  Social support: Very Good    Goals:  Reduce perceived stress to 1-3/10, Physical Fitness in [place] Score < 3, Social Support in [place] Score < 3, Daily Activity in [place] Score < 3, Increased interest in doing things, improved positive thoughts of well being and increased energy    Progression Toward Goals: Reviewed Pt goals and determined plan of care, Patient will ask for help when needed, do activities to help him relax in the next 30 days    Education: signs/sxs of depression, benefits of a positive support system, class:  Stress and Your Health  and class:  Relaxation  Plan: Exercise, Spend time outdoors, Enjoy a hobby, Keep a positive mindset, Meet new people and Enjoy family  Readiness to change: Action:  (Changing behavior)      OTHER CORE COMPONENTS     Tobacco:   Social History     Tobacco Use   Smoking Status Never   Smokeless Tobacco Never       Tobacco Use Intervention:   N/A:  Patient is a non-smoker     Anginal Symptoms:  chest pressure   NTG use: Compliant with carrying NTG, Understands proper use and Pt has not used NTG since event    Blood pressure:    Restin/70   Exercise: 110/66   Recovery: 96/60    Goals: consistent BP < 130/80, reduced dietary sodium <2300mg, moderate intensity exercise >150 mins/wk and medication compliance    Progression Toward Goals: Reviewed Pt goals and determined plan of care, Patient will monitor glucose levels at home to maintain appropriate levels  in the next 30 days    Education:  low sodium diet and HTN, proper use of sublingual NTG, Education class:  Common Heart Medications and Education class: Understanding Heart Disease  Plan: avoid places with second hand smoke, Avoid Processed foods, engage in regular exercise, eliminate salt shaker at the table and use salt substitutes  Readiness to change: Action:  (Changing behavior)

## 2023-06-21 NOTE — TELEPHONE ENCOUNTER
Patient calling in stating he has surgery coming up on 7/13/23 for TRANSURETHRAL RESECTION OF PROSTATE W/ LASER (Abdomen) with Dr D'Amico  Patient is questioning when he will be able to fly after having this procedure  Patient requesting call back         CB: [phone]

## 2023-06-22 ENCOUNTER — OFFICE VISIT (OUTPATIENT)
Dept: ENDOCRINOLOGY | Facility: CLINIC | Age: 78
End: 2023-06-22
Payer: COMMERCIAL

## 2023-06-22 ENCOUNTER — APPOINTMENT (OUTPATIENT)
Dept: LAB | Facility: HOSPITAL | Age: 78
End: 2023-06-22
Payer: COMMERCIAL

## 2023-06-22 VITALS
OXYGEN SATURATION: 99 % | WEIGHT: 214 LBS | DIASTOLIC BLOOD PRESSURE: 72 MMHG | HEART RATE: 88 BPM | HEIGHT: 67 IN | SYSTOLIC BLOOD PRESSURE: 138 MMHG | BODY MASS INDEX: 33.59 KG/M2

## 2023-06-22 DIAGNOSIS — R23.2 HOT FLASHES: ICD-10-CM

## 2023-06-22 DIAGNOSIS — E78.2 MIXED HYPERLIPIDEMIA: ICD-10-CM

## 2023-06-22 DIAGNOSIS — E03.9 HYPOTHYROIDISM, UNSPECIFIED TYPE: ICD-10-CM

## 2023-06-22 DIAGNOSIS — E11.51 TYPE 2 DIABETES MELLITUS WITH DIABETIC PERIPHERAL ANGIOPATHY WITHOUT GANGRENE, WITHOUT LONG-TERM CURRENT USE OF INSULIN (HCC): Primary | ICD-10-CM

## 2023-06-22 PROCEDURE — 99214 OFFICE O/P EST MOD 30 MIN: CPT | Performed by: STUDENT IN AN ORGANIZED HEALTH CARE EDUCATION/TRAINING PROGRAM

## 2023-06-22 PROCEDURE — 87086 URINE CULTURE/COLONY COUNT: CPT

## 2023-06-22 NOTE — PROGRESS NOTES
Octavia Saavedra 69 y o  male MRN: [id number]    Encounter: [id number]      Assessment/Plan     1  Type 2 diabetes c/b DPN, DR, CKD with microalbuminuria, CAD - at goal with A1c <7%, smbg values largely within target range 70 - 180  No changes today to therapy  Continue metformin, jardiance and glimepiride at present dosing  Continue SMBG monitoring, daily and at scattered times  Will arrange monitoring of a1c in several months  Call sooner with concerns  2  Hyperlipidemia - continue statin    3  Hypothyroidism - at goal  Continue present dosing of levothyroxine    4  Hot flashes - reports facial flushing  Has been progressive over several years  Occurs daily  No apparent triggers  In range thyroid function, in range serum testosterone  No diarrhea or wheezing  Will request 24h urine 5-hiaa, being mindful that patient is on escitalopram      Problem List Items Addressed This Visit        Endocrine    Diabetes mellitus, type 2 ([facility] [address] ) - Primary    Relevant Orders    Basic metabolic panel Lab Collect    HEMOGLOBIN A1C W/ EAG ESTIMATION Lab Collect    Hypothyroidism       Cardiovascular and Mediastinum    Hot flashes    Relevant Orders    5 HIAA, urine, quantitative, 24 hour    Creatinine, urine, 24 hour Lab Collect       Other    Hyperlipidemia     RTC 4-months    CC: Diabetes, hot flashes    History of Present Illness     HPI:    Meir Liu presents today for evaluation of diabetes  He is joined today by his wife who is contributing to elements of the history  Meir Liu has indwelling morales for enlarged prostrate  He is planning to undergo TURP in July  He denies any recent episodes of GI bleed, no recent transfusions  Presently, he is taking metformin 1000 mg bid, jardiance 10 mg daily, and glimepiride 4 mg twice daily  He comes with daily smbg measurements of scattered blood sugars typically ranging from 100 - 180 range  He denies any hypoglycemia   He reports fair adherence to dietary recommendations, but acknowledges that if something looks good, he will enjoy it  For hyperlipidemia he takes lipitor 80 mg daily  For hypothyroidism he takes levothyroxine 112 mcg daily  He takes this consistently  He has chronic low frequency tremor, 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  Patient denies history of pancreatitis  Patient continues to have hot flashes, which occur daily  He denies palpitations, diarrhea, dyspnea, wheezing  His testosterone levels are within normal limits  Lucy Mcgowan states intention to move to [place], hopefully within next several months  Review of Systems   Constitutional: Negative for diaphoresis and unexpected weight change  HENT: Negative for trouble swallowing and voice change  Cardiovascular: Negative for chest pain and palpitations  Gastrointestinal: Negative for nausea and vomiting  Endocrine: Negative for polydipsia and polyuria  +hot flashes   Neurological: Positive for tremors  All other systems reviewed and are negative        Historical Information   Past Medical History:   Diagnosis Date   • BPH (benign prostatic hyperplasia)    • CAD (coronary artery disease)    • Cancer (HCC)     basal cell   • Chronic kidney disease     stage 3   • Colon polyp    • Coronary artery disease    • CPAP (continuous positive airway pressure) dependence    • Diabetes mellitus (HCC)     niddm   • Disease of thyroid gland    • Gout    • High cholesterol    • Hypertension    • Hypomagnesemia 03/09/2023   • MI, 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    • Myocardial infarction ([facility] [address] ) 2014   • S/P cardiac catheterization 12/30/2022   • Sleep apnea      Past Surgical History:   Procedure Laterality Date   • BASAL CELL CARCINOMA EXCISION Right 1/21/2022    Procedure: EXCISION BASAL CELL CARCINOMA EXTREMITY;  Surgeon: Masood Michelle DO;  Location: [facility] MAIN [facility];  Service: General   • CARDIAC CATHETERIZATION Left 12/29/2022    Procedure: Cardiac Left Heart Cath;  Surgeon: Raf Mccartney MD; [facility]: [facility]; Service: Cardiology   • CARDIAC CATHETERIZATION N/A 12/29/2022    Procedure: Cardiac Coronary Angiogram;  Surgeon: Michelle Palmer MD;  [facility]: [facility]; Service: Cardiology   • CARDIAC CATHETERIZATION N/A 12/29/2022    Procedure: Cardiac pci;  Surgeon: Michelle Palmer MD;  [facility]: [facility]; Service: Cardiology   • CATARACT EXTRACTION W/  INTRAOCULAR LENS IMPLANT     • CORNEAL TRANSPLANT     • CORONARY ANGIOPLASTY WITH STENT PLACEMENT      stents x3-- [id number]   • EYE SURGERY      repair corneal laceration   • LA COLONOSCOPY FLX DX W/COLLJ SPEC WHEN PFRMD N/A 3/21/2016    Procedure: COLONOSCOPY;  Surgeon: Kenn Lemos MD;  Location: [facility];  Service: Colorectal   • TONSILLECTOMY AND ADENOIDECTOMY       Social History   Social History     Substance and Sexual Activity   Alcohol Use Yes    Comment: Occassional (holidays) social sometimes     Social History     Substance and Sexual Activity   Drug Use No     Social History     Tobacco Use   Smoking Status Never   Smokeless Tobacco Never     Family History:   Family History   Adopted: Yes   Problem Relation Age of Onset   • No Known Problems Father    • No Known Problems Mother        Meds/Allergies   Current Outpatient Medications   Medication Sig Dispense Refill   • acetaminophen (TYLENOL) 325 mg tablet Take 2 tablets (650 mg total) by mouth every 6 (six) hours as needed for mild pain, fever or headaches Do not exceed a total of 3 grams of tylenol/acetaminophen in a 24-hour period    0   • allopurinol (ZYLOPRIM) 100 mg tablet Take 1 tablet (100 mg total) by mouth daily 90 tablet 1   • aluminum-magnesium hydroxide-simethicone (MYLANTA) 7080-9246-810 mg/30 mL suspension Take 30 mL by mouth every 4 (four) hours as needed for indigestion or heartburn  0   • aspirin (ECOTRIN LOW STRENGTH) 81 mg EC tablet Take 1 tablet (81 mg total) by mouth daily Do not start before March 23, 2023   0   • atorvastatin (LIPITOR) 80 mg tablet Take 1 tablet (80 mg total) by mouth daily with dinner  0   • Blood Glucose Monitoring Suppl (OneTouch Verio Reflect) w/Device KIT Check blood sugars twice daily  Please substitute with appropriate alternative as covered by patient's insurance  Dx: E11 65 1 kit 0   • clopidogrel (PLAVIX) 75 mg tablet Take 1 tablet (75 mg total) by mouth daily 90 tablet 1   • cyanocobalamin (VITAMIN B-12) 500 MCG tablet Take 1 tablet (500 mcg total) by mouth daily 90 tablet 1   • Empagliflozin (Jardiance) 10 MG TABS tablet Take 1 tablet (10 mg total) by mouth every morning 90 tablet 3   • escitalopram (LEXAPRO) 20 mg tablet Take 0 5 tablets (10 mg total) by mouth daily 90 tablet 0   • finasteride (PROSCAR) 5 mg tablet Take 1 tablet (5 mg total) by mouth daily 90 tablet 3   • fluticasone (FLONASE) 50 mcg/act nasal spray 1 spray into each nostril daily Do not start before March 23, 2023   0   • glimepiride (AMARYL) 4 mg tablet TAKE 1 TABLET TWICE A  tablet 0   • glucose blood (OneTouch Verio) test strip Check blood sugars twice daily  Please substitute with appropriate alternative as covered by patient's insurance  Dx: E11 65 200 each 3   • levothyroxine 112 mcg tablet Take 1 tablet (112 mcg total) by mouth daily in the early morning 90 tablet 0   • metFORMIN (GLUCOPHAGE) 1000 MG tablet Take 1 tablet (1,000 mg total) by mouth 2 (two) times a day with meals 180 tablet 1   • metoprolol succinate (TOPROL-XL) 50 mg 24 hr tablet Take 1 tablet (50 mg total) by mouth daily 90 tablet 4   • multivitamin (THERAGRAN) TABS Take 1 tablet by mouth daily     • OneTouch Delica Lancets 66I MISC Check blood sugars twice daily  Please substitute with appropriate alternative as covered by patient's insurance   Dx: E11 65 200 each 3   • oxybutynin (DITROPAN-XL) 10 MG 24 hr tablet Take 1 tablet (10 mg total) by mouth daily at bedtime 90 tablet 3   • pantoprazole (PROTONIX) 40 mg tablet Take 1 tablet (40 mg total) by "mouth daily before breakfast 90 tablet 1   • polyethylene glycol (MIRALAX) 17 g packet Take 17 g by mouth daily as needed (constipation)  0   • prednisoLONE acetate (PRED FORTE) 1 % ophthalmic suspension INSTILL 1 DROP INTO RIGHT EYE ONCE DAILY     • senna-docusate sodium (SENOKOT S) 8 6-50 mg per tablet Take 1 tablet by mouth 2 (two) times a day Hold for diarrhea or loose stools  0   • torsemide (DEMADEX) 10 mg tablet Take 10 mg by mouth if needed     • zonisamide (ZONEGRAN) 50 MG capsule Take 1 tablet (50 mg total) by mouth daily 90 capsule 2   • cephalexin (KEFLEX) 500 mg capsule take 1 capsule by mouth three times a day for 7 days (Patient not taking: Reported on 6/22/2023)     • midodrine (PROAMATINE) 5 mg tablet GIVE 1 TABLET BY MOUTH BEFORE MEAL(S)  HOLD FOR SBP ABOVE 140 (Patient not taking: Reported on 6/22/2023)     • oxybutynin (DITROPAN) 5 mg tablet Take 1 tablet (5 mg total) by mouth 3 (three) times a day (Patient not taking: Reported on 6/19/2023) 90 tablet 0   • sulfamethoxazole-trimethoprim (BACTRIM DS) 800-160 mg per tablet Take 1 tablet by mouth every 12 (twelve) hours for 5 days (Patient not taking: Reported on 6/22/2023) 10 tablet 0     No current facility-administered medications for this visit  No Known Allergies    Objective   Vitals: Blood pressure 138/72, pulse 88, height 5' 7\" (1 702 m), weight 97 1 kg (214 lb), SpO2 99 %  Physical Exam  Vitals reviewed  Constitutional:       Appearance: Normal appearance  HENT:      Head: Normocephalic and atraumatic  Nose: Nose normal    Eyes:      General: No scleral icterus  Conjunctiva/sclera: Conjunctivae normal    Pulmonary:      Effort: Pulmonary effort is normal  No respiratory distress  Abdominal:      Palpations: Abdomen is soft  Tenderness: There is no abdominal tenderness  Musculoskeletal:      Cervical back: Normal range of motion  Skin:     General: Skin is warm and dry     Neurological:      General: No focal " deficit present  Mental Status: He is alert  Psychiatric:         Mood and Affect: Mood normal          Behavior: Behavior normal           The history was obtained from the review of the chart, patient and family      Lab Results:   Lab Results   Component Value Date/Time    Hemoglobin A1C 6 8 (H) 06/19/2023 11:10 AM    Hemoglobin A1C 6 8 (H) 04/28/2023 08:42 AM    Hemoglobin A1C 5 6 03/10/2023 04:59 AM    WBC 9 34 06/19/2023 11:10 AM    WBC 6 50 04/28/2023 08:42 AM    WBC 6 25 04/20/2023 05:25 AM    Hemoglobin 11 4 (L) 06/19/2023 11:10 AM    Hemoglobin 10 6 (L) 04/28/2023 08:42 AM    Hemoglobin 8 5 (L) 04/20/2023 05:25 AM    Hematocrit 39 0 06/19/2023 11:10 AM    Hematocrit 34 8 (L) 04/28/2023 08:42 AM    Hematocrit 27 5 (L) 04/20/2023 05:25 AM    MCV 84 06/19/2023 11:10 AM    MCV 92 04/28/2023 08:42 AM    MCV 93 04/20/2023 05:25 AM    Platelets 732 71/61/8104 11:10 AM    Platelets 553 (H) 61/84/2726 08:42 AM    Platelets 177 61/91/1774 05:25 AM    BUN 20 06/19/2023 11:10 AM    BUN 20 04/28/2023 08:42 AM    BUN 12 04/19/2023 05:46 AM    Potassium 4 5 06/19/2023 11:10 AM    Potassium 3 9 04/28/2023 08:42 AM    Potassium 4 6 04/19/2023 05:46 AM    Chloride 105 06/19/2023 11:10 AM    Chloride 105 04/28/2023 08:42 AM    Chloride 107 04/19/2023 05:46 AM    CO2 28 06/19/2023 11:10 AM    CO2 28 04/28/2023 08:42 AM    CO2 25 04/19/2023 05:46 AM    Creatinine 1 22 06/19/2023 11:10 AM    Creatinine 1 14 04/28/2023 08:42 AM    Creatinine 0 96 04/19/2023 05:46 AM    AST 16 04/28/2023 08:42 AM    AST 16 04/16/2023 05:13 AM    AST 21 04/15/2023 09:11 AM    ALT 20 04/28/2023 08:42 AM    ALT 20 04/16/2023 05:13 AM    ALT 25 04/15/2023 09:11 AM    Total Protein 7 7 04/28/2023 08:42 AM    Total Protein 5 5 (L) 04/16/2023 05:13 AM    Total Protein 5 7 (L) 04/15/2023 09:11 AM    Albumin 4 3 04/28/2023 08:42 AM    Albumin 3 1 (L) 04/16/2023 05:13 AM    Albumin 3 2 (L) 04/15/2023 09:11 AM    HDL, Direct 45 01/16/2023 10:06 AM "Triglycerides 206 (H) 01/16/2023 10:06 AM     Lab Results   Component Value Date    LDLCALC 117 (H) 01/16/2023     Lab Results   Component Value Date    QVK9TUUWXLAN 1 507 04/28/2023    N6JBDOK 1 0 06/10/2016     Component      Latest Ref Rng & Units 4/28/2023   TESTOSTERONE TOTAL      95 - 948 ng/dL 462 0   SEX HORMONE BINDING GLOBULIN      19 3 - 76 4 nmol/L 38 3         Imaging Studies: I have personally reviewed pertinent reports  Portions of the record may have been created with voice recognition software  Occasional wrong word or \"sound a like\" substitutions may have occurred due to the inherent limitations of voice recognition software  Read the chart carefully and recognize, using context, where substitutions have occurred    "

## 2023-06-23 ENCOUNTER — CLINICAL SUPPORT (OUTPATIENT)
Dept: CARDIAC REHAB | Facility: HOSPITAL | Age: 78
End: 2023-06-23
Payer: COMMERCIAL

## 2023-06-23 DIAGNOSIS — Z95.5 STENTED CORONARY ARTERY: ICD-10-CM

## 2023-06-23 LAB — BACTERIA UR CULT: NORMAL

## 2023-06-23 PROCEDURE — 93798 PHYS/QHP OP CAR RHAB W/ECG: CPT

## 2023-06-26 ENCOUNTER — CLINICAL SUPPORT (OUTPATIENT)
Dept: CARDIAC REHAB | Facility: HOSPITAL | Age: 78
End: 2023-06-26
Payer: COMMERCIAL

## 2023-06-26 DIAGNOSIS — Z95.5 STENTED CORONARY ARTERY: ICD-10-CM

## 2023-06-26 PROCEDURE — 93798 PHYS/QHP OP CAR RHAB W/ECG: CPT

## 2023-06-28 ENCOUNTER — CLINICAL SUPPORT (OUTPATIENT)
Dept: CARDIAC REHAB | Facility: HOSPITAL | Age: 78
End: 2023-06-28
Payer: COMMERCIAL

## 2023-06-28 DIAGNOSIS — Z95.5 STENTED CORONARY ARTERY: ICD-10-CM

## 2023-06-28 PROCEDURE — 93798 PHYS/QHP OP CAR RHAB W/ECG: CPT

## 2023-06-29 ENCOUNTER — CONSULT (OUTPATIENT)
Dept: INFECTIOUS DISEASES | Facility: CLINIC | Age: 78
End: 2023-06-29
Payer: COMMERCIAL

## 2023-06-29 VITALS
RESPIRATION RATE: 17 BRPM | HEART RATE: 77 BPM | TEMPERATURE: 98.1 F | BODY MASS INDEX: 33.59 KG/M2 | HEIGHT: 67 IN | OXYGEN SATURATION: 93 % | WEIGHT: 214 LBS | DIASTOLIC BLOOD PRESSURE: 72 MMHG | SYSTOLIC BLOOD PRESSURE: 138 MMHG

## 2023-06-29 DIAGNOSIS — R33.8 BENIGN PROSTATIC HYPERPLASIA WITH URINARY RETENTION: ICD-10-CM

## 2023-06-29 DIAGNOSIS — N39.0 URINARY TRACT INFECTION ASSOCIATED WITH INDWELLING URETHRAL CATHETER, INITIAL ENCOUNTER (HCC): Primary | ICD-10-CM

## 2023-06-29 DIAGNOSIS — N40.1 BENIGN PROSTATIC HYPERPLASIA WITH URINARY RETENTION: ICD-10-CM

## 2023-06-29 DIAGNOSIS — T83.511A URINARY TRACT INFECTION ASSOCIATED WITH INDWELLING URETHRAL CATHETER, INITIAL ENCOUNTER (HCC): Primary | ICD-10-CM

## 2023-06-29 DIAGNOSIS — E11.65 TYPE 2 DIABETES MELLITUS WITH HYPERGLYCEMIA, WITHOUT LONG-TERM CURRENT USE OF INSULIN (HCC): ICD-10-CM

## 2023-06-29 PROCEDURE — 99204 OFFICE O/P NEW MOD 45 MIN: CPT | Performed by: INTERNAL MEDICINE

## 2023-06-29 NOTE — PROGRESS NOTES
Consultation - Infectious Disease   Vipin Acevedo 69 y o  male MRN: [id number]  Unit/Bed#:  Encounter: [id number]      IMPRESSION & RECOMMENDATIONS:   Impression/Recommendations:  1  Recent UTI, in the setting of chronic indwelling Jose catheter  May 2023  As evidenced by purulence/visible sediment in urine and positive urine culture for Pseudomonas/MRSA on 5/22/2023  This was already treated with outpatient oral antibiotic and now most recent preoperative urine culture from 6/22/2023 showed no growth  Currently patient denies any abnormalities associated with his urine  No fevers, chills or other systemic complaints  No evidence of active infection at this time     -No contraindication with proceeding with anticipated urologic surgical intervention from ID standpoint   -Recommend perioperative dose of vancomycin and cefepime based on prior urine culture   -I did advise patient and his wife that if he develops new concerning symptoms for UTI preoperatively, he should have another urine culture checked as he may need antibiotic prior to urologic surgery   -No further ID work-up indicated at this time  2   BPH with urinary retention  Follows regularly with outpatient urology  Currently being managed with chronic indwelling Jose catheter  Patient is scheduled for cystoscopy with laser prostatectomy on 7/13       -Recommendations as stated above   -Ongoing urology follow-up  3  DM2  I discussed above plan in detail with patient and his wife and answered all questions  Follow-up on an as-needed basis  HISTORY OF PRESENT ILLNESS:  Reason for Consult: UTI    HPI: Vipin Acevedo is a 69 y o  male with CAD, CKD 3, DM 2, BPH with urinary retention, hypotonic bladder currently being managed with Jose catheter  Follows regularly with urology who is planning cystoscopy with laser prostatectomy currently scheduled for 7/13/2023  Patient was referred to us due to concern for recurrent UTIs  Urine culture from 5/22/2023 had previously isolated Pseudomonas and MRSA for which he did receive oral antibiotic  At that time, patient had noticed worsening sediment in his urine, which resolved after treatment  Most recent urine culture from 6/22/2023 obtained preoperatively shows no growth  Currently patient denies no sediment or other abnormalities  No fevers or chills  He is also supposed to have cataract surgery but his recent positive urine culture resulted in postponement  REVIEW OF SYSTEMS:  A complete system-based review of systems is otherwise negative  PAST MEDICAL HISTORY:  Past Medical History:   Diagnosis Date   • BPH (benign prostatic hyperplasia)    • CAD (coronary artery disease)    • Cancer (HCC)     basal cell   • Chronic kidney disease     stage 3   • Colon polyp    • Coronary artery disease    • CPAP (continuous positive airway pressure) dependence    • Diabetes mellitus (HCC)     niddm   • Disease of thyroid gland    • Gout    • High cholesterol    • Hypertension    • Hypomagnesemia 03/09/2023   • MI, 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    • Myocardial infarction ([facility] [address] ) 2014   • S/P cardiac catheterization 12/30/2022   • Sleep apnea      Past Surgical History:   Procedure Laterality Date   • BASAL CELL CARCINOMA EXCISION Right 1/21/2022    Procedure: EXCISION BASAL CELL CARCINOMA EXTREMITY;  Surgeon: Tedd Simmonds, DO;  Location: [facility];  Service: General   • CARDIAC CATHETERIZATION Left 12/29/2022    Procedure: Cardiac Left Heart Cath;  Surgeon: Rey Trinidad MD;  [facility]; Service: Cardiology   • CARDIAC CATHETERIZATION N/A 12/29/2022    Procedure: Cardiac Coronary Angiogram;  Surgeon: Rey Trinidad MD;  [facility]: [facility]; Service: Cardiology   • CARDIAC CATHETERIZATION N/A 12/29/2022    Procedure: Cardiac pci;  Surgeon: Rey Trinidad MD;  [facility]: [facility];   Service: Cardiology   • CATARACT EXTRACTION W/  INTRAOCULAR LENS IMPLANT "    • CORNEAL TRANSPLANT     • CORONARY ANGIOPLASTY WITH STENT PLACEMENT      stents x3-- [id number]   • EYE SURGERY      repair corneal laceration   • CO COLONOSCOPY FLX DX W/COLLJ SPEC WHEN PFRMD N/A 3/21/2016    Procedure: COLONOSCOPY;  Surgeon: Wilton Walker MD;  Location: [facility];  Service: Colorectal   • TONSILLECTOMY AND ADENOIDECTOMY         FAMILY HISTORY:  Non-contributory    SOCIAL HISTORY:  Social History     Substance and Sexual Activity   Alcohol Use Yes    Comment: Occassional (holidays) social sometimes     Social History     Substance and Sexual Activity   Drug Use No     Social History     Tobacco Use   Smoking Status Never   Smokeless Tobacco Never       ALLERGIES:  No Known Allergies    MEDICATIONS:  All current active medications have been reviewed  PHYSICAL EXAM:  Vitals:  [unfilled]  [email or web address] com:       Physical Exam:  General:  Well-nourished, well-developed, in no acute distress  Eyes:  Conjunctive clear with no hemorrhages or effusions  Oropharynx:  No ulcers, no lesions  Neck:  Supple, trachea midline  Lungs:  Clear to auscultation bilaterally, no accessory muscle use  Cardiac:  Regular rate and rhythm  Abdomen:  Soft, non-tender, non-distended  Jose catheter in place with clear yellow urine in bag  No visible sediment  Extremities:  No peripheral cyanosis, clubbing, or edema  Skin:  No rashes, heal superficial ulceration with overlying eschar  No surrounding erythema, fluctuance or visible purulence  Neurological:  Moves all four extremities spontaneously    LABS, IMAGING, & OTHER STUDIES:  Lab Results:  I have personally reviewed pertinent labs  Invalid input(s): \"ALBUMIN\"      Results from last 7 days   Lab Units 06/22/23  1244   URINE CULTURE  No Growth <1000 cfu/mL       Imaging Studies:   I have personally reviewed pertinent imaging study reports and images in PACS      CT abdomen/pelvis from 3/13/2023 showed bladder wall thickening and " perivesicular fat stranding suspicious for cystitis  EKG, Pathology, and Other Studies:   I have personally reviewed pertinent reports  Urine culture from 4/20/2023 isolated less than 10,000 coagulation negative staph  Urine culture from 5/22/2023 isolated greater than 100,000 Pseudomonas putida and MRSA  Urine culture from 6/22/2023 showed no growth

## 2023-06-30 ENCOUNTER — APPOINTMENT (OUTPATIENT)
Dept: CARDIAC REHAB | Facility: HOSPITAL | Age: 78
End: 2023-06-30
Payer: COMMERCIAL

## 2023-07-02 ENCOUNTER — APPOINTMENT (OUTPATIENT)
Dept: LAB | Facility: HOSPITAL | Age: 78
End: 2023-07-02
Attending: STUDENT IN AN ORGANIZED HEALTH CARE EDUCATION/TRAINING PROGRAM
Payer: COMMERCIAL

## 2023-07-02 DIAGNOSIS — E11.42 TYPE 2 DIABETES MELLITUS WITH DIABETIC POLYNEUROPATHY, WITH LONG-TERM CURRENT USE OF INSULIN (HCC): ICD-10-CM

## 2023-07-02 DIAGNOSIS — Z79.4 TYPE 2 DIABETES MELLITUS WITH DIABETIC POLYNEUROPATHY, WITH LONG-TERM CURRENT USE OF INSULIN (HCC): ICD-10-CM

## 2023-07-02 DIAGNOSIS — R23.2 HOT FLASHES: ICD-10-CM

## 2023-07-02 LAB
CREAT 24H UR-MRATE: 1.1 G/24HR (ref 0.8–1.8)
SPECIMEN VOL UR: 1000 ML

## 2023-07-02 PROCEDURE — 82570 ASSAY OF URINE CREATININE: CPT

## 2023-07-02 PROCEDURE — 83497 ASSAY OF 5-HIAA: CPT

## 2023-07-03 ENCOUNTER — CLINICAL SUPPORT (OUTPATIENT)
Dept: CARDIAC REHAB | Facility: HOSPITAL | Age: 78
End: 2023-07-03
Payer: COMMERCIAL

## 2023-07-03 DIAGNOSIS — Z95.5 STENTED CORONARY ARTERY: ICD-10-CM

## 2023-07-03 PROCEDURE — 93798 PHYS/QHP OP CAR RHAB W/ECG: CPT

## 2023-07-03 RX ORDER — GLIMEPIRIDE 4 MG/1
TABLET ORAL
Qty: 180 TABLET | Refills: 0 | Status: SHIPPED | OUTPATIENT
Start: 2023-07-03

## 2023-07-05 ENCOUNTER — CLINICAL SUPPORT (OUTPATIENT)
Dept: CARDIAC REHAB | Facility: HOSPITAL | Age: 78
End: 2023-07-05
Payer: COMMERCIAL

## 2023-07-05 DIAGNOSIS — Z95.5 STENTED CORONARY ARTERY: ICD-10-CM

## 2023-07-05 PROCEDURE — 93798 PHYS/QHP OP CAR RHAB W/ECG: CPT

## 2023-07-07 ENCOUNTER — CLINICAL SUPPORT (OUTPATIENT)
Dept: CARDIAC REHAB | Facility: HOSPITAL | Age: 78
End: 2023-07-07
Payer: COMMERCIAL

## 2023-07-07 DIAGNOSIS — Z95.5 STENTED CORONARY ARTERY: Primary | ICD-10-CM

## 2023-07-07 LAB
5OH-INDOLEACETATE 24H UR-MCNC: 4.8 MG/L
5OH-INDOLEACETATE 24H UR-MRATE: 4.8 MG/24 HR (ref 0–14.9)

## 2023-07-07 PROCEDURE — 93798 PHYS/QHP OP CAR RHAB W/ECG: CPT

## 2023-07-07 NOTE — PRE-PROCEDURE INSTRUCTIONS
Pre-Surgery Instructions:   Medication Instructions   • acetaminophen (TYLENOL) 325 mg tablet Uses PRN- OK to take day of surgery   • allopurinol (ZYLOPRIM) 100 mg tablet Take day of surgery. • aluminum-magnesium hydroxide-simethicone (MYLANTA) 2773-9012-634 mg/30 mL suspension Hold day of surgery. • aspirin (ECOTRIN LOW STRENGTH) 81 mg EC tablet Stop taking 5 days prior to surgery. LD 7/7   • atorvastatin (LIPITOR) 80 mg tablet Take night before surgery   • clopidogrel (PLAVIX) 75 mg tablet Stop taking 5 days prior to surgery. LD 7/7   • cyanocobalamin (VITAMIN B-12) 500 MCG tablet Hold day of surgery. • Empagliflozin (Jardiance) 10 MG TABS tablet Stop taking 4 days prior to surgery. LD 7/8   • escitalopram (LEXAPRO) 20 mg tablet Take day of surgery. • finasteride (PROSCAR) 5 mg tablet Take night before surgery   • fluticasone (FLONASE) 50 mcg/act nasal spray Uses PRN- OK to take day of surgery   • glimepiride (AMARYL) 4 mg tablet Hold day of surgery. • levothyroxine 112 mcg tablet Take day of surgery. • metFORMIN (GLUCOPHAGE) 1000 MG tablet Hold day of surgery. • metoprolol succinate (TOPROL-XL) 50 mg 24 hr tablet Take day of surgery. • multivitamin (THERAGRAN) TABS Hold day of surgery. • polyethylene glycol (MIRALAX) 17 g packet Hold day of surgery. • prednisoLONE acetate (PRED FORTE) 1 % ophthalmic suspension Take day of surgery. • senna-docusate sodium (SENOKOT S) 8.6-50 mg per tablet Hold day of surgery. • torsemide (DEMADEX) 10 mg tablet Hold day of surgery. • zonisamide (ZONEGRAN) 50 MG capsule Take day of surgery. Medication instructions for day surgery reviewed. Please use only a sip of water to take your instructed medications. Avoid all over the counter vitamins, supplements and NSAIDS for one week prior to surgery per anesthesia guidelines. Tylenol is ok to take as needed.      You will receive a call one business day prior to surgery with an arrival time and hospital directions. If your surgery is scheduled on a Monday, the hospital will be calling you on the Friday prior to your surgery. If you have not heard from anyone by 8pm, please call the hospital supervisor through the hospital  at [phone]. Yandy Schmitt [phone]). Do not eat or drink anything after midnight the night before your surgery, including candy, mints, lifesavers, or chewing gum. Do not drink alcohol 24hrs before your surgery. Try not to smoke at least 24hrs before your surgery. Follow the pre surgery showering instructions as listed in the [facility] Surgical Experience Booklet” or otherwise provided by your surgeon's office. Do not shave the surgical area 24 hours before surgery. Do not apply any lotions, creams, including makeup, cologne, deodorant, or perfumes after showering on the day of your surgery. No contact lenses, eye make-up, or artificial eyelashes. Remove nail polish, including gel polish, and any artificial, gel, or acrylic nails if possible. Remove all jewelry including rings and body piercing jewelry. Wear causal clothing that is easy to take on and off. Consider your type of surgery. Keep any valuables, jewelry, piercings at home. Please bring any specially ordered equipment (sling, braces) if indicated. Arrange for a responsible person to drive you to and from the hospital on the day of your surgery. Visitor Guidelines discussed. Call the surgeon's office with any new illnesses, exposures, or additional questions prior to surgery. Please reference your [facility] Surgical Experience Booklet” for additional information to prepare for your upcoming surgery.

## 2023-07-07 NOTE — PROGRESS NOTES
Cardiac Rehabilitation Plan of Care   Discharge      Today's date: 2023   # of Exercise Sessions Completed:   Patient name: Trinh Patel      : 1945  Age: 69 y.o. MRN: [id number]  Referring Physician: Ubaldo Bliss MD  Cardiologist: Seth Osborne MD  Provider: [facility]anie  Clinician: Lorraine Palmer RN    Dx: Stent LAD  Date of onset: 23      SUMMARY OF PROGRESS: Diamond Carbone completed 36 sessions. His final 6MWT was 500ft this was improved over his initial 380 ft. He also walks to rehab using a walker now which was his goal.. Resting /70, HR 75 bpm. Exercise /64, HR  bpm. Telemetry reads NSR. Diamond Carbone does get winded and tired easily, but he has not been doing much exercise outside of rehab. Staff is impressed with how he has improved and will only continue to get better and stronger. Patient is able to walk a longer distance without having to stop and without feeling fatigued. His PHQ 9 is 16   Moderately severe depression. This is worse  From his initial score of 5 . He is having surgery next week on his prostate. He has a morales for urinary retention which  Started in  March. The family is also in the process of building a house in [place] and is planning on moving soon to get the [employer] enrolled in school. Vishnu Bains is going to stay here till after his surgery. He has no plans on continuing his exercise . He was encouraged to walk  At home.      Medication compliance: Yes   Comments: Pt reports to be compliant with medications  Fall Risk: Moderate   Comments: Patient uses walking assist device (walker/cane/rollator) and shows imbalance at times     EKG Interpretation: NSR      EXERCISE ASSESSMENT and PLAN    Exercise Prescription:      Frequency: 3 days/week   Supplement with home exercise 2+ days/wk as tolerated       Minutes: 30-35        METS: 1-2.25         HR: 80-97 bpm   RPE: 4-6         Modalities: UBE, NuStep and Room walking      Strength trainin-3 days / week   Modalities: Leg Press, Chest Press, Nicklas Kid and free weights  Exercise Progression after Discharge:    Frequency: 3-5 days of gym or home exercise   Minutes: 30-50  >150 mins/wk of moderate intensity exercise   METS: 2-3   HR: 20-30bpm above resting    RPE: 4-6   Modalities: UBE, NuStep and Room walking  Home Exercise: none    Goals: 10% improvement in functional capacity - based on max METs achieved in fitness assessment, Resume ADLs with increased strength, Attend Rehab regularly, Decrease sitting time and Start a walking program    Progression Toward Goals:  Reviewed Pt goals and determined plan of care, Patient will initiate a walking program at home for at least 15 miuntes daily  in the next 30 days    Education: benefit of exercise for CAD risk factors, home exercise guidelines, AHA guidelines to achieve >150 mins/wk of moderate exercise and RPE scale   Plan:education on home exercise guidelines, home exercise 30+ mins 2 days opposite CR and Education class: Risk Factors for Heart Disease  Readiness to change: Action:  (Changing behavior)      NUTRITION ASSESSMENT AND PLAN    Weight control:    Starting weight: 222   Current weight:   212       Diabetes: T2D  A1c: 7.6    last measured: 1/5/23    Lipid management: Last lipid profile 9/13/21  Chol 140    HDL 36  LDL 66    Goals:LDL <100, HDL >40, TRG <150, CHOL <200, eliminate processed meats, cook without added fat or use vegetable oil/spray, eat 3 or more servings of whole grains a day, Eat 4-5 cups of fruits and vegetables daily, choose low sodium processed foods, eliminate butter and choose healthy snacks: light popcorn, plain pretzels    Measurable goals were based Rate Your Plate Dietary Self-Assessment. These are the areas in which the patient could score higher on the assessment. Goals include recommendations for a heart healthy diet based on American Heart Association.     Progression Toward Goals: Reviewed Pt goals and determined plan of care, Patient will make dietary changes to see improvement in future lipid panel  in the next 30 days    Education: heart healthy eating  low sodium diet  exercise and diabetes management   wt. loss   healthy choices while dining out  portion control  Plan: Education class: Reading Food Labels, Education Class: Heart Healthy Eating, replace butter with soft spreads made with olive oil, canola or yogurt, replace refined grain bread with whole grain bread, replace unhealthy snacks with fruits & vegs, reduce portion sizes, eat fewer desserts and sweets and avoid processed foods  Readiness to change: Action:  (Changing behavior)      PSYCHOSOCIAL ASSESSMENT AND PLAN    Emotional:  Depression assessment:  PHQ-9 = 16 5-9 = Mild Depression and 15-19 = Moderately Severe Depression               Anxiety measure:  ANGEL-7 = 11  5-9 = Mild anxiety and 10-14 = Moderate anxiety  Self-reported stress level:  4  Social support: Very Good    Goals:  Reduce perceived stress to 1-3/10, Physical Fitness in [place] Score < 3, Social Support in [place] Score < 3, Daily Activity in [place] Score < 3, Increased interest in doing things, improved positive thoughts of well being and increased energy    Progression Toward Goals: Reviewed Pt goals and determined plan of care, Patient will ask for help when needed, do activities to help him relax in the next 30 days    Education: signs/sxs of depression, benefits of a positive support system, class:  Stress and Your Health  and class:  Relaxation  Plan: Exercise, Spend time outdoors, Enjoy a hobby, Keep a positive mindset, Meet new people and Enjoy family  Readiness to change: Action:  (Changing behavior)      OTHER CORE COMPONENTS     Tobacco:   Social History     Tobacco Use   Smoking Status Never   Smokeless Tobacco Never       Tobacco Use Intervention:   N/A:  Patient is a non-smoker     Anginal Symptoms:  chest pressure   NTG use: Compliant with carrying NTG, Understands proper use and Pt has not used NTG since event    Blood pressure:    Restin/70   Exercise: 116/64   Recovery: 100/70    Goals: consistent BP < 130/80, reduced dietary sodium <2300mg, moderate intensity exercise >150 mins/wk and medication compliance    Progression Toward Goals: Reviewed Pt goals and determined plan of care, Patient will monitor glucose levels at home to maintain appropriate levels  in the next 30 days    Education:  low sodium diet and HTN, proper use of sublingual NTG, Education class:  Common Heart Medications and Education class: Understanding Heart Disease  Plan: avoid places with second hand smoke, Avoid Processed foods, engage in regular exercise, eliminate salt shaker at the table and use salt substitutes  Readiness to change: Action:  (Changing behavior)

## 2023-07-07 NOTE — PROGRESS NOTES
Krishan Mcdonald      Dear Dr. Oscar Da Silva,    Emotional well-being and depression is addressed in the Cardiac  rehab evaluation. To assess the severity of depression, patients are given the PHQ-9 Depression Questionnaire. This is to inform you that your patient Gilles Rahman scored a 12 which is interpreted as 15-19 = Moderately Severe Depression. This is changed from his initial score of 5 . He is in the process of building a home in [place]  And he is getting surgery  Next week on his prostate. His family is moving to [place] and he is going to be home by himself . Today was his last Cardiac rehab session  His GAD7  Score was 11  moderate anxiety  Your patient was provided contact information for [facility] and has been encouraged to enroll in [employer].  to assess improvement. Thank you for your support of cardiopulmonary rehab.     Sincerely,      Betsy Umana RN

## 2023-07-11 ENCOUNTER — TELEPHONE (OUTPATIENT)
Dept: UROLOGY | Facility: AMBULATORY SURGERY CENTER | Age: 78
End: 2023-07-11

## 2023-07-12 ENCOUNTER — ANESTHESIA EVENT (OUTPATIENT)
Dept: PERIOP | Facility: HOSPITAL | Age: 78
End: 2023-07-12
Payer: COMMERCIAL

## 2023-07-13 ENCOUNTER — TELEPHONE (OUTPATIENT)
Dept: UROLOGY | Facility: CLINIC | Age: 78
End: 2023-07-13

## 2023-07-13 ENCOUNTER — HOSPITAL ENCOUNTER (OUTPATIENT)
Facility: HOSPITAL | Age: 78
Setting detail: OUTPATIENT SURGERY
Discharge: HOME | End: 2023-07-13
Attending: UROLOGY | Admitting: UROLOGY
Payer: COMMERCIAL

## 2023-07-13 ENCOUNTER — ANESTHESIA (OUTPATIENT)
Dept: PERIOP | Facility: HOSPITAL | Age: 78
End: 2023-07-13
Payer: COMMERCIAL

## 2023-07-13 VITALS
OXYGEN SATURATION: 94 % | WEIGHT: 213.2 LBS | HEIGHT: 67 IN | TEMPERATURE: 98.2 F | RESPIRATION RATE: 16 BRPM | DIASTOLIC BLOOD PRESSURE: 72 MMHG | BODY MASS INDEX: 33.46 KG/M2 | HEART RATE: 70 BPM | SYSTOLIC BLOOD PRESSURE: 145 MMHG

## 2023-07-13 DIAGNOSIS — N40.1 BENIGN PROSTATIC HYPERPLASIA WITH URINARY RETENTION: Primary | ICD-10-CM

## 2023-07-13 DIAGNOSIS — R33.8 BENIGN PROSTATIC HYPERPLASIA WITH URINARY RETENTION: Primary | ICD-10-CM

## 2023-07-13 LAB
GLUCOSE SERPL-MCNC: 175 MG/DL (ref 65–140)
GLUCOSE SERPL-MCNC: 183 MG/DL (ref 65–140)

## 2023-07-13 PROCEDURE — 82948 REAGENT STRIP/BLOOD GLUCOSE: CPT

## 2023-07-13 PROCEDURE — 52648 LASER SURGERY OF PROSTATE: CPT | Performed by: UROLOGY

## 2023-07-13 RX ORDER — LIDOCAINE HYDROCHLORIDE 10 MG/ML
0.5 INJECTION, SOLUTION EPIDURAL; INFILTRATION; INTRACAUDAL; PERINEURAL ONCE AS NEEDED
Status: DISCONTINUED | OUTPATIENT
Start: 2023-07-13 | End: 2023-07-13 | Stop reason: HOSPADM

## 2023-07-13 RX ORDER — ONDANSETRON 2 MG/ML
INJECTION INTRAMUSCULAR; INTRAVENOUS AS NEEDED
Status: DISCONTINUED | OUTPATIENT
Start: 2023-07-13 | End: 2023-07-13

## 2023-07-13 RX ORDER — ONDANSETRON 2 MG/ML
4 INJECTION INTRAMUSCULAR; INTRAVENOUS ONCE AS NEEDED
Status: DISCONTINUED | OUTPATIENT
Start: 2023-07-13 | End: 2023-07-13 | Stop reason: HOSPADM

## 2023-07-13 RX ORDER — FENTANYL CITRATE/PF 50 MCG/ML
25 SYRINGE (ML) INJECTION
Status: DISCONTINUED | OUTPATIENT
Start: 2023-07-13 | End: 2023-07-13 | Stop reason: HOSPADM

## 2023-07-13 RX ORDER — FENTANYL CITRATE 50 UG/ML
INJECTION, SOLUTION INTRAMUSCULAR; INTRAVENOUS AS NEEDED
Status: DISCONTINUED | OUTPATIENT
Start: 2023-07-13 | End: 2023-07-13

## 2023-07-13 RX ORDER — PROPOFOL 10 MG/ML
INJECTION, EMULSION INTRAVENOUS AS NEEDED
Status: DISCONTINUED | OUTPATIENT
Start: 2023-07-13 | End: 2023-07-13

## 2023-07-13 RX ORDER — EPHEDRINE SULFATE 50 MG/ML
INJECTION INTRAVENOUS AS NEEDED
Status: DISCONTINUED | OUTPATIENT
Start: 2023-07-13 | End: 2023-07-13

## 2023-07-13 RX ORDER — SODIUM CHLORIDE, SODIUM LACTATE, POTASSIUM CHLORIDE, CALCIUM CHLORIDE 600; 310; 30; 20 MG/100ML; MG/100ML; MG/100ML; MG/100ML
125 INJECTION, SOLUTION INTRAVENOUS CONTINUOUS
Status: DISCONTINUED | OUTPATIENT
Start: 2023-07-13 | End: 2023-07-13 | Stop reason: HOSPADM

## 2023-07-13 RX ORDER — CEFAZOLIN SODIUM 2 G/50ML
2000 SOLUTION INTRAVENOUS ONCE
Status: COMPLETED | OUTPATIENT
Start: 2023-07-13 | End: 2023-07-13

## 2023-07-13 RX ORDER — DEXAMETHASONE SODIUM PHOSPHATE 10 MG/ML
INJECTION, SOLUTION INTRAMUSCULAR; INTRAVENOUS AS NEEDED
Status: DISCONTINUED | OUTPATIENT
Start: 2023-07-13 | End: 2023-07-13

## 2023-07-13 RX ORDER — CEFUROXIME AXETIL 500 MG/1
500 TABLET ORAL EVERY 12 HOURS SCHEDULED
Qty: 14 TABLET | Refills: 0 | Status: SHIPPED | OUTPATIENT
Start: 2023-07-13 | End: 2023-07-21

## 2023-07-13 RX ORDER — SODIUM CHLORIDE, SODIUM LACTATE, POTASSIUM CHLORIDE, CALCIUM CHLORIDE 600; 310; 30; 20 MG/100ML; MG/100ML; MG/100ML; MG/100ML
INJECTION, SOLUTION INTRAVENOUS CONTINUOUS PRN
Status: DISCONTINUED | OUTPATIENT
Start: 2023-07-13 | End: 2023-07-13

## 2023-07-13 RX ORDER — MAGNESIUM HYDROXIDE 1200 MG/15ML
LIQUID ORAL AS NEEDED
Status: DISCONTINUED | OUTPATIENT
Start: 2023-07-13 | End: 2023-07-13 | Stop reason: HOSPADM

## 2023-07-13 RX ORDER — LIDOCAINE HYDROCHLORIDE 10 MG/ML
INJECTION, SOLUTION EPIDURAL; INFILTRATION; INTRACAUDAL; PERINEURAL AS NEEDED
Status: DISCONTINUED | OUTPATIENT
Start: 2023-07-13 | End: 2023-07-13

## 2023-07-13 RX ADMIN — FENTANYL CITRATE 25 MCG: 0.05 INJECTION, SOLUTION INTRAMUSCULAR; INTRAVENOUS at 10:16

## 2023-07-13 RX ADMIN — ONDANSETRON 4 MG: 2 INJECTION INTRAMUSCULAR; INTRAVENOUS at 09:57

## 2023-07-13 RX ADMIN — SODIUM CHLORIDE, SODIUM LACTATE, POTASSIUM CHLORIDE, AND CALCIUM CHLORIDE: .6; .31; .03; .02 INJECTION, SOLUTION INTRAVENOUS at 09:38

## 2023-07-13 RX ADMIN — DEXAMETHASONE SODIUM PHOSPHATE 10 MG: 10 INJECTION, SOLUTION INTRAMUSCULAR; INTRAVENOUS at 09:57

## 2023-07-13 RX ADMIN — CEFAZOLIN SODIUM 2000 MG: 2 SOLUTION INTRAVENOUS at 10:02

## 2023-07-13 RX ADMIN — EPHEDRINE SULFATE 10 MG: 50 INJECTION, SOLUTION INTRAVENOUS at 10:09

## 2023-07-13 RX ADMIN — LIDOCAINE HYDROCHLORIDE 5 ML: 10 INJECTION, SOLUTION EPIDURAL; INFILTRATION; INTRACAUDAL; PERINEURAL at 09:57

## 2023-07-13 RX ADMIN — PROPOFOL 180 MG: 10 INJECTION, EMULSION INTRAVENOUS at 09:57

## 2023-07-13 NOTE — OP NOTE
OPERATIVE REPORT  PATIENT NAME: Ezekiel Sanchez    :  1945  MRN: [id number]  Pt Location: OW OR ROOM 02    SURGERY DATE: 2023    Surgeon(s) and Role:     Ariel Hauser MD - Primary    Preop Diagnosis:  Benign prostatic hyperplasia with urinary retention [N40.1, R33.8]    Post-Op Diagnosis Codes: * Benign prostatic hyperplasia with urinary retention [N40.1, R33.8]    Procedure(s):  PHOTO VAPORIZATION OF PROSTATE    Specimen(s):  * No specimens in log *    Estimated Blood Loss:   Minimal    Drains:  Urethral Catheter Coude 16 Fr. (Active)   Number of days: 85       Urethral Catheter Latex 22 Fr. (Active)   Number of days: 0       Anesthesia Type:   General/LMA    Operative Indications:  Benign prostatic hyperplasia with urinary retention [N40.1, R33.8]  BPH with urinary retention    Operative Findings:  Median bar. Large prostate urinary retention    Complications:   None    Procedure and Technique:  Patient brought the operating room. Hard timeout. I was present for the entire procedure.     Patient Disposition:  PACU         SIGNATURE: Ariel Hauser MD  DATE: 2023  TIME: 10:58 AM

## 2023-07-13 NOTE — INTERVAL H&P NOTE
H&P reviewed. After examining the patient I find no changes in the patients condition since the H&P had been written.     Vitals:    07/13/23 0822   BP: 104/59   Pulse: 69   Resp: 20   Temp: 97.6 °F (36.4 °C)   SpO2: 95%

## 2023-07-13 NOTE — TELEPHONE ENCOUNTER
Pt's wife is calling to make the post op appointment for the pt on 07/19/23 she can't do any other date at this time.      Pt's Wife Flo Ferrari can be reached at [phone]

## 2023-07-13 NOTE — ANESTHESIA PREPROCEDURE EVALUATION
Procedure:  TRANSURETHRAL RESECTION OF PROSTATE W/ LASER (Abdomen)    Relevant Problems   ANESTHESIA (within normal limits)      CARDIO  Stress test 12/2021 neg  Echo: 65% EF   (+) Aortic stenosis, moderate   (+) Coronary arteriosclerosis in native artery   (+) Coronary artery disease of native artery of native heart with stable angina pectoris (HCC)   (+) Hyperlipidemia   (+) Hypertension   (+) Mesenteric artery stenosis (HCC)   (+) NSTEMI (non-ST elevated myocardial infarction) (HCC)   (+) Other chest pain   (+) Peripheral arterial disease (HCC)      ENDO   (+) Diabetes mellitus, type 2 (HCC)   (+) Hypothyroidism   (+) Type 2 diabetes mellitus with hyperglycemia, without long-term current use of insulin (HCC)      GI/HEPATIC   (+) Acute GI bleeding      /RENAL   (+) Benign hypertension with CKD (chronic kidney disease) stage III ([facility])   (+) Benign prostatic hyperplasia with urinary retention   (+) Diabetic nephropathy associated with type 2 diabetes mellitus ([facility])   (+) Stage 3a chronic kidney disease ([facility])      HEMATOLOGY   (+) Acute blood loss anemia      MUSCULOSKELETAL   (+) Arthritis   (+) Degenerative disc disease, lumbar   (+) Gout   (+) Primary osteoarthritis involving multiple joints   (+) Primary osteoarthritis of both knees   (+) Primary osteoarthritis of left knee   (+) Primary osteoarthritis of right knee      NEURO/PSYCH   (+) Depression with anxiety   (+) Diabetic neuropathy associated with diabetes mellitus due to underlying condition ([facility])   (+) Recurrent major depressive disorder (HCC)      Nervous and Auditory   (+) Mild cognitive impairment   (+) Tremor, essential      Other   (+) CPAP (continuous positive airway pressure) dependence   (+) Class 1 obesity in adult   (+) Frequent falls   (+) Gait instability   (+) H/O heart artery stent             Anesthesia Plan  ASA Score- 3     Anesthesia Type- spinal with ASA Monitors.          Additional Monitors:   Airway Plan:     Comment: GA/LMA backup. Plan Factors-Exercise tolerance (METS): >4 METS. Chart reviewed. EKG reviewed. Existing labs reviewed. Patient summary reviewed. Patient is not a current smoker. Induction-     Postoperative Plan-     Informed Consent- Anesthetic plan and risks discussed with patient. I personally reviewed this patient with the CRNA. Discussed and agreed on the Anesthesia Plan with the CRNA. Liana Swanson

## 2023-07-13 NOTE — ANESTHESIA PROCEDURE NOTES
Spinal Block    Patient location during procedure: OR  Start time: 7/13/2023 9:45 AM  Reason for block: primary anesthetic  Staffing  Performed by: Omayra Calero CRNA  Authorized by: Gisela Dsouza MD    Preanesthetic Checklist  Completed: patient identified, IV checked, risks and benefits discussed, surgical consent, monitors and equipment checked, pre-op evaluation and timeout performed  Spinal Block  Patient position: sitting  Prep: ChloraPrep and site prepped and draped  Patient monitoring: heart rate, continuous pulse ox, frequent blood pressure checks and cardiac monitor  Approach: midline  Location: L3-4  Injection technique: single-shot  Needle  Needle type: pencil-tip   Needle gauge: 25 G  Additional Notes  Failed to find CSF: plan GA

## 2023-07-13 NOTE — OP NOTE
OPERATIVE REPORT  PATIENT NAME: Meir Maza    :  1945  MRN: [id number]  Pt Location: OW OR ROOM 02    SURGERY DATE: 2023    Surgeon(s) and Role:     Faviola Sandoval MD - Primary    Preop Diagnosis:  Benign prostatic hyperplasia with urinary retention [N40.1, R33.8]    Post-Op Diagnosis Codes: * Benign prostatic hyperplasia with urinary retention [N40.1, R33.8]    Procedure(s):  PHOTO VAPORIZATION OF PROSTATE    Specimen(s):  * No specimens in log *    Estimated Blood Loss:   Minimal    Drains:  Urethral Catheter Coude 16 Fr. (Active)   Number of days: 85       Urethral Catheter Latex 22 Fr. (Active)   Site Assessment Clean;Skin intact 23 1058   Number of days: 0       Anesthesia Type:   General/LMA    Operative Indications:  Benign prostatic hyperplasia with urinary retention [N40.1, R33.8]      Operative Findings:  Enlarged prostate with median bar. Urinary retention. Complications:   None    Procedure and Technique:  Patient brought to the operating room. He was given a general anesthetic. He was placed in dorsolithotomy position. SCDs in place. Hard timeout. Antibiotics on board. After his Jose was removed prior to prepping him. Resectoscope sheath was placed without difficulty. The cyber laser was used under direct vision to vaporize the prostate gland. No abnormalities of prostate or bladder were noted other than the obstruction and median bar. There was evidence of mild catheter cystitis from his Jose. There were no tumors in the bladder. The prostate was vaporized using the verumontanum to the prostate capsule to the bladder neck as the margins of vaporization. No injury to the sphincter was noted. No injury to the bladder was noted. No injury to the urethra was noted. There is no significant bleeding during the procedure. There was no significant debris that needed to be removed. A nice wide open channel was created.   A total of 300,000 J were used at a power setting of 200 W.  800 µm laser fiber used. No complications. A 22 [place] three-way Jose with 30 cc in the balloon was placed with clear E flux of CBI. Patient brought to recovery room for planned discharge and Jose catheter voiding trial in 1 week. He was sent home on antibiotics. I was present for the entire procedure.     Patient Disposition:  PACU         SIGNATURE: Damon Cobos MD  DATE: July 13, 2023  TIME: 11:05 AM

## 2023-07-13 NOTE — TELEPHONE ENCOUNTER
----- Message from Valdez Padilla MD sent at 7/13/2023 11:12 AM EDT -----  Please have him in for a voiding trial next week.   Thank you

## 2023-07-13 NOTE — ANESTHESIA POSTPROCEDURE EVALUATION
Post-Op Assessment Note    CV Status:  Stable  Pain Score: 0    Pain management: adequate     Mental Status:  Sleepy   Hydration Status:  Stable   PONV Controlled:  Controlled   Airway Patency:  Patent      Post Op Vitals Reviewed: Yes      Staff: CRNA         No notable events documented.     /69 (07/13/23 1058)    Temp 98.7 °F (37.1 °C) (07/13/23 1058)    Pulse 72 (07/13/23 1058)   Resp 15 (07/13/23 1058)    SpO2 99 % (07/13/23 1058)

## 2023-07-15 DIAGNOSIS — F33.9 RECURRENT MAJOR DEPRESSIVE DISORDER, REMISSION STATUS UNSPECIFIED (HCC): ICD-10-CM

## 2023-07-17 DIAGNOSIS — I25.118 CORONARY ARTERY DISEASE OF NATIVE ARTERY OF NATIVE HEART WITH STABLE ANGINA PECTORIS (HCC): ICD-10-CM

## 2023-07-17 RX ORDER — ATORVASTATIN CALCIUM 80 MG/1
80 TABLET, FILM COATED ORAL
Qty: 90 TABLET | Refills: 1 | Status: SHIPPED | OUTPATIENT
Start: 2023-07-17

## 2023-07-17 RX ORDER — ESCITALOPRAM OXALATE 20 MG/1
10 TABLET ORAL DAILY
Qty: 90 TABLET | Refills: 0 | Status: SHIPPED | OUTPATIENT
Start: 2023-07-17

## 2023-07-19 ENCOUNTER — OFFICE VISIT (OUTPATIENT)
Dept: UROLOGY | Facility: CLINIC | Age: 78
End: 2023-07-19

## 2023-07-19 VITALS
HEART RATE: 69 BPM | OXYGEN SATURATION: 98 % | BODY MASS INDEX: 32.95 KG/M2 | DIASTOLIC BLOOD PRESSURE: 62 MMHG | SYSTOLIC BLOOD PRESSURE: 96 MMHG | WEIGHT: 210.4 LBS | TEMPERATURE: 97.1 F

## 2023-07-19 DIAGNOSIS — N40.1 BENIGN PROSTATIC HYPERPLASIA WITH URINARY RETENTION: Primary | ICD-10-CM

## 2023-07-19 DIAGNOSIS — R33.8 BENIGN PROSTATIC HYPERPLASIA WITH URINARY RETENTION: Primary | ICD-10-CM

## 2023-07-19 NOTE — PROGRESS NOTES
Universal Protocol:  Consent: Verbal consent obtained. Risks and benefits: risks, benefits and alternatives were discussed  Time out: Immediately prior to procedure a "time out" was called to verify the correct patient, procedure, equipment, support staff and site/side marked as required. Timeout called at: 7/19/2023 1:54 PM.  Patient identity confirmed: verbally with patient      Bladder catheterization    Date/Time: 7/19/2023 1:15 PM    Performed by: Marv Lemon MD  Authorized by: Marv Lemon MD    Patient location:  Bedside  [place] protocol:     Immediately prior to procedure a time out was called: yes      Patient identity confirmed:  Verbally with patient  Pre-procedure details:     Procedure purpose:  Therapeutic  Anesthesia (see MAR for exact dosages): Anesthesia method:  None  Procedure details:     Bladder irrigation: yes      Catheter insertion:  Indwelling    Approach: natural orifice      Catheter type:  Coude    Catheter size:  16 Fr    Urine characteristics:  Clear  Post-procedure details:     Patient tolerance of procedure: Tolerated well, no immediate complications  Comments:      Patient underwent a voiding trial today. He had a laser prostatectomy a week ago. We put 200 cc in his bladder via irrigation. He was only able to void 30 cc. A coudé Jose 12 [place] was replaced.   Voiding trial in a week again

## 2023-07-19 NOTE — PROGRESS NOTES
UROLOGY PROGRESS NOTE         NAME: Suze Bonner  AGE: 69 y.o. SEX: male  : 1945   MRN: [id number]    DATE: 2023  TIME: 1:55 PM    Assessment and Plan      Impression:   1. Benign prostatic hyperplasia with urinary retention  -     Bladder catheterization    Status post PVP a week ago. Patient likely has a hypotonic bladder. He was unable to void with his voiding trial today. Jose replaced. They did not want to do intermittent catheterization at this point. We will see him in a week for a voiding trial again.  Plan: 1 week for voiding trial drain Jose as needed      Chief Complaint     Chief Complaint   Patient presents with   • Follow-up     Here for post op void trial       History of Present Illness     HPI: Suze Bonner is a 69y.o. year old male who presents with status post PVP 1 week ago. Patient is unable to void today with his voiding trial.  We did replace his Jose. We will see him back for another voiding trial in a week.               The following portions of the patient's history were reviewed and updated as appropriate: allergies, current medications, past family history, past medical history, past social history, past surgical history and problem list.  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    • MRSA infection     wound and urine   • Myocardial infarction [facility])    • S/P cardiac catheterization 2022   • Sleep apnea Past Surgical History:   Procedure Laterality Date   • BASAL CELL CARCINOMA EXCISION Right 1/21/2022    Procedure: EXCISION BASAL CELL CARCINOMA EXTREMITY;  Surgeon: Norberto Damon DO;  Location: [facility];  Service: General   • CARDIAC CATHETERIZATION Left 12/29/2022    Procedure: Cardiac Left Heart Cath;  Surgeon: Ramona Hauser MD;  [facility]; Service: Cardiology   • CARDIAC CATHETERIZATION N/A 12/29/2022    Procedure: Cardiac Coronary Angiogram;  Surgeon: Ramona Hauser MD;  [facility]; Service: Cardiology   • CARDIAC CATHETERIZATION N/A 12/29/2022    Procedure: Cardiac pci;  Surgeon: Ramona Hauser MD;  [facility]; Service: Cardiology   • CATARACT EXTRACTION W/  INTRAOCULAR LENS IMPLANT     • CORNEAL TRANSPLANT     • CORONARY ANGIOPLASTY WITH STENT PLACEMENT      stents x3-- [phone]   • EYE SURGERY      repair corneal laceration   • [place] COLONOSCOPY FLX DX W/COLLJ SPEC WHEN PFRMD N/A 3/21/2016    Procedure: COLONOSCOPY;  Surgeon: Andie Engel MD;  Location: [facility];  Service: Colorectal   • TONSILLECTOMY AND ADENOIDECTOMY     • TRANSURETHRAL RESECTION OF PROSTATE N/A 7/13/2023    Procedure: Marie Apodaca;  Surgeon: Jorge Winter MD;  Location:  MAIN OR;  Service: Urology     shoulder  Review of Systems     Const: Denies chills, fever and weight loss. CV: Denies chest pain. Resp: Denies SOB. GI: Denies abdominal pain, nausea and vomiting. : Denies symptoms other than stated above. Musculo: Denies back pain. Objective   BP 96/62 (BP Location: Left arm, Patient Position: Sitting)   Pulse 69   Temp (!) 97.1 °F (36.2 °C)   Wt 95.4 kg (210 lb 6.4 oz)   SpO2 98%   BMI 32.95 kg/m²     Physical Exam  Const: Appears healthy and well developed. No signs of acute distress present. Resp: Respirations are regular and unlabored. CV: Rate is regular. Rhythm is regular.   Abdomen: Abdomen is soft, nontender, and nondistended. Kidneys are not palpable. : Normal penis  Psych: Patient's attitude is cooperative. Mood is normal. Affect is normal.    Current Medications     Current Outpatient Medications:   •  acetaminophen (TYLENOL) 325 mg tablet, Take 2 tablets (650 mg total) by mouth every 6 (six) hours as needed for mild pain, fever or headaches Do not exceed a total of 3 grams of tylenol/acetaminophen in a 24-hour period. , Disp: , Rfl: 0  •  allopurinol (ZYLOPRIM) 100 mg tablet, Take 1 tablet (100 mg total) by mouth daily, Disp: 90 tablet, Rfl: 1  •  aluminum-magnesium hydroxide-simethicone (MYLANTA) [id number] mg/30 mL suspension, Take 30 mL by mouth every 4 (four) hours as needed for indigestion or heartburn, Disp: , Rfl: 0  •  aspirin (ECOTRIN LOW STRENGTH) 81 mg EC tablet, Take 1 tablet (81 mg total) by mouth daily Do not start before March 23, 2023., Disp: , Rfl: 0  •  atorvastatin (LIPITOR) 80 mg tablet, Take 1 tablet (80 mg total) by mouth daily with dinner, Disp: 90 tablet, Rfl: 1  •  Blood Glucose Monitoring Suppl (OneTouch Verio Reflect) w/Device KIT, Check blood sugars twice daily. Please substitute with appropriate alternative as covered by patient's insurance.  Dx: E11.65, Disp: 1 kit, Rfl: 0  •  cefuroxime (CEFTIN) 500 mg tablet, Take 1 tablet (500 mg total) by mouth every 12 (twelve) hours for 7 days, Disp: 14 tablet, Rfl: 0  •  clopidogrel (PLAVIX) 75 mg tablet, Take 1 tablet (75 mg total) by mouth daily, Disp: 90 tablet, Rfl: 1  •  cyanocobalamin (VITAMIN B-12) 500 MCG tablet, Take 1 tablet (500 mcg total) by mouth daily, Disp: 90 tablet, Rfl: 1  •  Empagliflozin (Jardiance) 10 MG TABS tablet, Take 1 tablet (10 mg total) by mouth every morning, Disp: 90 tablet, Rfl: 3  •  escitalopram (LEXAPRO) 20 mg tablet, Take 0.5 tablets (10 mg total) by mouth daily, Disp: 90 tablet, Rfl: 0  •  finasteride (PROSCAR) 5 mg tablet, Take 1 tablet (5 mg total) by mouth daily, Disp: 90 tablet, Rfl: 3  • fluticasone (FLONASE) 50 mcg/act nasal spray, 1 spray into each nostril daily Do not start before March 23, 2023., Disp: , Rfl: 0  •  glimepiride (AMARYL) 4 mg tablet, TAKE 1 TABLET TWICE A DAY, Disp: 180 tablet, Rfl: 0  •  glucose blood (OneTouch Verio) test strip, Check blood sugars twice daily. Please substitute with appropriate alternative as covered by patient's insurance. Dx: E11.65, Disp: 200 each, Rfl: 3  •  levothyroxine 112 mcg tablet, Take 1 tablet (112 mcg total) by mouth daily in the early morning, Disp: 90 tablet, Rfl: 0  •  metFORMIN (GLUCOPHAGE) 1000 MG tablet, Take 1 tablet (1,000 mg total) by mouth 2 (two) times a day with meals, Disp: 180 tablet, Rfl: 1  •  metoprolol succinate (TOPROL-XL) 50 mg 24 hr tablet, Take 1 tablet (50 mg total) by mouth daily, Disp: 90 tablet, Rfl: 4  •  multivitamin (THERAGRAN) TABS, Take 1 tablet by mouth daily, Disp: , Rfl:   •  OneTouch Delica Lancets 45Z MISC, Check blood sugars twice daily. Please substitute with appropriate alternative as covered by patient's insurance.  Dx: E11.65, Disp: 200 each, Rfl: 3  •  oxybutynin (DITROPAN-XL) 10 MG 24 hr tablet, Take 1 tablet (10 mg total) by mouth daily at bedtime, Disp: 90 tablet, Rfl: 3  •  pantoprazole (PROTONIX) 40 mg tablet, Take 1 tablet (40 mg total) by mouth daily before breakfast, Disp: 90 tablet, Rfl: 1  •  polyethylene glycol (MIRALAX) 17 g packet, Take 17 g by mouth daily as needed (constipation), Disp: , Rfl: 0  •  prednisoLONE acetate (PRED FORTE) 1 % ophthalmic suspension, INSTILL 1 DROP INTO RIGHT EYE ONCE DAILY, Disp: , Rfl:   •  senna-docusate sodium (SENOKOT S) 8.6-50 mg per tablet, Take 1 tablet by mouth 2 (two) times a day Hold for diarrhea or loose stools, Disp: , Rfl: 0  •  torsemide (DEMADEX) 10 mg tablet, Take 10 mg by mouth if needed, Disp: , Rfl:   •  zonisamide (ZONEGRAN) 50 MG capsule, Take 1 tablet (50 mg total) by mouth daily, Disp: 90 capsule, Rfl: 2  •  cephalexin (KEFLEX) 500 mg capsule, take 1 capsule by mouth three times a day for 7 days (Patient not taking: Reported on 6/22/2023), Disp: , Rfl:   •  midodrine (PROAMATINE) 5 mg tablet, GIVE 1 TABLET BY MOUTH BEFORE MEAL(S).  HOLD FOR SBP ABOVE 140 (Patient not taking: Reported on 6/22/2023), Disp: , Rfl:   •  oxybutynin (DITROPAN) 5 mg tablet, Take 1 tablet (5 mg total) by mouth 3 (three) times a day (Patient not taking: Reported on 6/19/2023), Disp: 90 tablet, Rfl: 0        Sherryle Eaton, MD

## 2023-07-21 ENCOUNTER — OFFICE VISIT (OUTPATIENT)
Dept: CARDIOLOGY CLINIC | Facility: HOSPITAL | Age: 78
End: 2023-07-21
Payer: COMMERCIAL

## 2023-07-21 VITALS
BODY MASS INDEX: 33.24 KG/M2 | OXYGEN SATURATION: 97 % | HEIGHT: 67 IN | WEIGHT: 211.8 LBS | DIASTOLIC BLOOD PRESSURE: 70 MMHG | HEART RATE: 86 BPM | SYSTOLIC BLOOD PRESSURE: 128 MMHG

## 2023-07-21 DIAGNOSIS — I51.7 CONCENTRIC LEFT VENTRICULAR HYPERTROPHY: ICD-10-CM

## 2023-07-21 DIAGNOSIS — E78.2 MIXED HYPERLIPIDEMIA: ICD-10-CM

## 2023-07-21 DIAGNOSIS — I25.118 CORONARY ARTERY DISEASE OF NATIVE ARTERY OF NATIVE HEART WITH STABLE ANGINA PECTORIS (HCC): ICD-10-CM

## 2023-07-21 DIAGNOSIS — I50.32 CHRONIC DIASTOLIC (CONGESTIVE) HEART FAILURE (HCC): Primary | ICD-10-CM

## 2023-07-21 DIAGNOSIS — I10 PRIMARY HYPERTENSION: ICD-10-CM

## 2023-07-21 DIAGNOSIS — I35.0 AORTIC STENOSIS, MODERATE: ICD-10-CM

## 2023-07-21 DIAGNOSIS — Z01.810 PREOP CARDIOVASCULAR EXAM: ICD-10-CM

## 2023-07-21 DIAGNOSIS — I21.4 NSTEMI (NON-ST ELEVATED MYOCARDIAL INFARCTION) (HCC): ICD-10-CM

## 2023-07-21 PROCEDURE — 99214 OFFICE O/P EST MOD 30 MIN: CPT | Performed by: INTERNAL MEDICINE

## 2023-07-26 ENCOUNTER — OFFICE VISIT (OUTPATIENT)
Dept: OBGYN CLINIC | Facility: HOSPITAL | Age: 78
End: 2023-07-26

## 2023-07-26 VITALS
BODY MASS INDEX: 33.05 KG/M2 | SYSTOLIC BLOOD PRESSURE: 108 MMHG | HEIGHT: 67 IN | HEART RATE: 76 BPM | DIASTOLIC BLOOD PRESSURE: 67 MMHG

## 2023-07-26 DIAGNOSIS — M17.0 PRIMARY OSTEOARTHRITIS OF BOTH KNEES: ICD-10-CM

## 2023-07-26 DIAGNOSIS — M25.561 CHRONIC PAIN OF BOTH KNEES: Primary | ICD-10-CM

## 2023-07-26 DIAGNOSIS — M25.562 CHRONIC PAIN OF BOTH KNEES: Primary | ICD-10-CM

## 2023-07-26 DIAGNOSIS — G89.29 CHRONIC PAIN OF BOTH KNEES: Primary | ICD-10-CM

## 2023-07-26 RX ORDER — LIDOCAINE HYDROCHLORIDE 10 MG/ML
2 INJECTION, SOLUTION INFILTRATION; PERINEURAL
Status: COMPLETED | OUTPATIENT
Start: 2023-07-26 | End: 2023-07-26

## 2023-07-26 RX ORDER — BUPIVACAINE HYDROCHLORIDE 2.5 MG/ML
2 INJECTION, SOLUTION INFILTRATION; PERINEURAL
Status: COMPLETED | OUTPATIENT
Start: 2023-07-26 | End: 2023-07-26

## 2023-07-26 RX ORDER — BETAMETHASONE SODIUM PHOSPHATE AND BETAMETHASONE ACETATE 3; 3 MG/ML; MG/ML
12 INJECTION, SUSPENSION INTRA-ARTICULAR; INTRALESIONAL; INTRAMUSCULAR; SOFT TISSUE
Status: COMPLETED | OUTPATIENT
Start: 2023-07-26 | End: 2023-07-26

## 2023-07-26 RX ADMIN — BUPIVACAINE HYDROCHLORIDE 2 ML: 2.5 INJECTION, SOLUTION INFILTRATION; PERINEURAL at 14:30

## 2023-07-26 RX ADMIN — LIDOCAINE HYDROCHLORIDE 2 ML: 10 INJECTION, SOLUTION INFILTRATION; PERINEURAL at 14:30

## 2023-07-26 RX ADMIN — BETAMETHASONE SODIUM PHOSPHATE AND BETAMETHASONE ACETATE 12 MG: 3; 3 INJECTION, SUSPENSION INTRA-ARTICULAR; INTRALESIONAL; INTRAMUSCULAR; SOFT TISSUE at 14:30

## 2023-07-26 NOTE — PROGRESS NOTES
Assessment:  1. Chronic pain of both knees        2. Primary osteoarthritis of both knees            Plan:  Bilateral knee osteoarthritis. The patient was provided with bilateral knee steroid injections. The patient tolerated the procedure well. The patient can follow up as needed and is welcome to return at any point with any new or old issue. To do next visit:  Return if symptoms worsen or fail to improve. The above stated was discussed in layman's terms and the patient expressed understanding. All questions were answered to the patient's satisfaction. Scribe Attestation    I,:  Angel Bedolla am acting as a scribe while in the presence of the attending physician.:       I,:  Aftab Sheffield MD personally performed the services described in this documentation    as scribed in my presence.:             Subjective:   Murray Escalante is a 69 y.o. male who presents for follow up of bilateral knees. He has been in hospital on/off due to gastric issues. He is s/p bilateral knee steroid injections with benefit, 1/10/2023. Today he complains of return of bilateral generalized knee pain. Prolonged weight bearing and repetitive bending aggravates while rest alleviates.         Review of systems negative unless otherwise specified in HPI    Past Medical History:   Diagnosis Date   • At risk for falls     unsteady gait, fall risk, uses walker   • BPH (benign prostatic hyperplasia)    • CAD (coronary artery disease)    • Cancer (HCC)     basal cell   • Chronic kidney disease     stage 3   • Colon polyp    • Coronary artery disease    • CPAP (continuous positive airway pressure) dependence    • Diabetes mellitus (HCC)     niddm   • Disease of thyroid gland    • Gout    • High cholesterol    • History of transfusion     GI bleed, no reaction   • Hypertension    • Hypomagnesemia 03/09/2023   • MI, 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    • MRSA infection     wound and urine   • Myocardial infarction [facility]) 2014   • S/P cardiac catheterization 12/30/2022   • Sleep apnea        Past Surgical History:   Procedure Laterality Date   • BASAL CELL CARCINOMA EXCISION Right 1/21/2022    Procedure: EXCISION BASAL CELL CARCINOMA EXTREMITY;  Surgeon: Dax Villarreal DO;  Location: [facility];  Service: General   • CARDIAC CATHETERIZATION Left 12/29/2022    Procedure: Cardiac Left Heart Cath;  Surgeon: Sofie Myles MD;  [facility]; Service: Cardiology   • CARDIAC CATHETERIZATION N/A 12/29/2022    Procedure: Cardiac Coronary Angiogram;  Surgeon: Sofie Myles MD;  [facility]; Service: Cardiology   • CARDIAC CATHETERIZATION N/A 12/29/2022    Procedure: Cardiac pci;  Surgeon: Sofie Myles MD;  [facility];   Service: Cardiology   • CATARACT EXTRACTION W/  INTRAOCULAR LENS IMPLANT     • CORNEAL TRANSPLANT     • CORONARY ANGIOPLASTY WITH STENT PLACEMENT      stents x3-- [id number]   • EYE SURGERY      repair corneal laceration   • [place] COLONOSCOPY FLX DX W/COLLJ SPEC WHEN PFRMD N/A 3/21/2016    Procedure: COLONOSCOPY;  Surgeon: Shari Sommers MD;  Location: [facility];  Service: Colorectal   • TONSILLECTOMY AND ADENOIDECTOMY     • TRANSURETHRAL RESECTION OF PROSTATE N/A 7/13/2023    Procedure: Estle Southern;  Surgeon: Andrew Piedra MD;  Location:  MAIN OR;  Service: Urology       Family History   Adopted: Yes   Problem Relation Age of Onset   • No Known Problems Father    • No Known Problems Mother        Social History     Occupational History   • Not on file   Tobacco Use   • Smoking status: Never   • Smokeless tobacco: Never   Vaping Use   • Vaping Use: Never used   Substance and Sexual Activity   • Alcohol use: Yes     Comment: Occassional (holidays) social sometimes   • Drug use: No   • Sexual activity: Not Currently     Partners: Female         Current Outpatient Medications:   •  acetaminophen (TYLENOL) 325 mg tablet, Take 2 tablets (650 mg total) by mouth every 6 (six) hours as needed for mild pain, fever or headaches Do not exceed a total of 3 grams of tylenol/acetaminophen in a 24-hour period. , Disp: , Rfl: 0  •  allopurinol (ZYLOPRIM) 100 mg tablet, Take 1 tablet (100 mg total) by mouth daily, Disp: 90 tablet, Rfl: 1  •  aluminum-magnesium hydroxide-simethicone (MYLANTA) [id number] mg/30 mL suspension, Take 30 mL by mouth every 4 (four) hours as needed for indigestion or heartburn, Disp: , Rfl: 0  •  aspirin (ECOTRIN LOW STRENGTH) 81 mg EC tablet, Take 1 tablet (81 mg total) by mouth daily Do not start before March 23, 2023., Disp: , Rfl: 0  •  atorvastatin (LIPITOR) 80 mg tablet, Take 1 tablet (80 mg total) by mouth daily with dinner, Disp: 90 tablet, Rfl: 1  •  Blood Glucose Monitoring Suppl (OneTouch Verio Reflect) w/Device KIT, Check blood sugars twice daily. Please substitute with appropriate alternative as covered by patient's insurance.  Dx: E11.65, Disp: 1 kit, Rfl: 0  •  cephalexin (KEFLEX) 500 mg capsule, take 1 capsule by mouth three times a day for 7 days (Patient not taking: Reported on 6/22/2023), Disp: , Rfl:   •  clopidogrel (PLAVIX) 75 mg tablet, Take 1 tablet (75 mg total) by mouth daily, Disp: 90 tablet, Rfl: 1  •  cyanocobalamin (VITAMIN B-12) 500 MCG tablet, Take 1 tablet (500 mcg total) by mouth daily, Disp: 90 tablet, Rfl: 1  •  Empagliflozin (Jardiance) 10 MG TABS tablet, Take 1 tablet (10 mg total) by mouth every morning, Disp: 90 tablet, Rfl: 3  •  escitalopram (LEXAPRO) 20 mg tablet, Take 0.5 tablets (10 mg total) by mouth daily, Disp: 90 tablet, Rfl: 0  •  finasteride (PROSCAR) 5 mg tablet, Take 1 tablet (5 mg total) by mouth daily, Disp: 90 tablet, Rfl: 3  •  fluticasone (FLONASE) 50 mcg/act nasal spray, 1 spray into each nostril daily Do not start before March 23, 2023., Disp: , Rfl: 0  •  glimepiride (AMARYL) 4 mg tablet, TAKE 1 TABLET TWICE A DAY, Disp: 180 tablet, Rfl: 0  •  glucose blood (OneTouch Verio) test strip, Check blood sugars twice daily. Please substitute with appropriate alternative as covered by patient's insurance. Dx: E11.65, Disp: 200 each, Rfl: 3  •  levothyroxine 112 mcg tablet, Take 1 tablet (112 mcg total) by mouth daily in the early morning, Disp: 90 tablet, Rfl: 0  •  metFORMIN (GLUCOPHAGE) 1000 MG tablet, Take 1 tablet (1,000 mg total) by mouth 2 (two) times a day with meals, Disp: 180 tablet, Rfl: 1  •  metoprolol succinate (TOPROL-XL) 50 mg 24 hr tablet, Take 1 tablet (50 mg total) by mouth daily, Disp: 90 tablet, Rfl: 4  •  midodrine (PROAMATINE) 5 mg tablet, GIVE 1 TABLET BY MOUTH BEFORE MEAL(S). HOLD FOR SBP ABOVE 140 (Patient not taking: Reported on 6/22/2023), Disp: , Rfl:   •  multivitamin (THERAGRAN) TABS, Take 1 tablet by mouth daily, Disp: , Rfl:   •  OneTouch Delica Lancets 04B MISC, Check blood sugars twice daily. Please substitute with appropriate alternative as covered by patient's insurance.  Dx: E11.65, Disp: 200 each, Rfl: 3  •  oxybutynin (DITROPAN) 5 mg tablet, Take 1 tablet (5 mg total) by mouth 3 (three) times a day (Patient not taking: Reported on 6/19/2023), Disp: 90 tablet, Rfl: 0  •  oxybutynin (DITROPAN-XL) 10 MG 24 hr tablet, Take 1 tablet (10 mg total) by mouth daily at bedtime, Disp: 90 tablet, Rfl: 3  •  pantoprazole (PROTONIX) 40 mg tablet, Take 1 tablet (40 mg total) by mouth daily before breakfast, Disp: 90 tablet, Rfl: 1  •  polyethylene glycol (MIRALAX) 17 g packet, Take 17 g by mouth daily as needed (constipation), Disp: , Rfl: 0  •  prednisoLONE acetate (PRED FORTE) 1 % ophthalmic suspension, INSTILL 1 DROP INTO RIGHT EYE ONCE DAILY (Patient not taking: Reported on 7/25/2023), Disp: , Rfl:   •  Prolensa 0.07 % SOLN, Right eye as directed, Disp: , Rfl:   •  senna-docusate sodium (SENOKOT S) 8.6-50 mg per tablet, Take 1 tablet by mouth 2 (two) times a day Hold for diarrhea or loose stools, Disp: , Rfl: 0  •  torsemide (DEMADEX) 10 mg tablet, Take 10 mg by mouth if needed, Disp: , Rfl:   •  zonisamide (ZONEGRAN) 50 MG capsule, Take 1 tablet (50 mg total) by mouth daily, Disp: 90 capsule, Rfl: 2    No Known Allergies         Vitals:    07/26/23 1416   BP: 108/67   Pulse: 76       Objective:  Physical exam  · General: Awake, Alert, Oriented  · Eyes: Pupils equal, round and reactive to light  · Heart: regular rate and rhythm  · Lungs: No audible wheezing  · Abdomen: soft                    Ortho Exam  Bilateral knees:  Patient in wheel chair with morales bag. No erythema or ecchymosis  No effusion or swelling  Normal strength  Good ROM with crepitus   Calf compartments soft and supple  Sensation intact  Toes are warm sensate and mobile      Diagnostics, reviewed and taken today if performed as documented:    None performed    Procedures, if performed today:    Large joint arthrocentesis: R knee  Universal Protocol:  Consent: Verbal consent obtained. Risks and benefits: risks, benefits and alternatives were discussed  Consent given by: patient  Time out: Immediately prior to procedure a "time out" was called to verify the correct patient, procedure, equipment, support staff and site/side marked as required.   Timeout called at: 7/26/2023 2:43 PM.  Patient understanding: patient states understanding of the procedure being performed  Site marked: the operative site was marked  Patient identity confirmed: verbally with patient    Supporting Documentation  Indications: pain   Procedure Details  Location: knee - R knee  Preparation: Patient was prepped and draped in the usual sterile fashion  Needle size: 22 G  Ultrasound guidance: no  Approach: anterolateral  Medications administered: 12 mg betamethasone acetate-betamethasone sodium phosphate 6 (3-3) mg/mL; 2 mL bupivacaine 0.25 %; 2 mL lidocaine 1 %    Patient tolerance: patient tolerated the procedure well with no immediate complications  Dressing:  Sterile dressing applied    Large joint arthrocentesis: L knee  Universal Protocol:  Consent: Verbal consent obtained. Risks and benefits: risks, benefits and alternatives were discussed  Consent given by: patient  Time out: Immediately prior to procedure a "time out" was called to verify the correct patient, procedure, equipment, support staff and site/side marked as required. Timeout called at: 7/26/2023 2:43 PM.  Patient understanding: patient states understanding of the procedure being performed  Site marked: the operative site was marked  Patient identity confirmed: verbally with patient    Supporting Documentation  Indications: pain   Procedure Details  Location: knee - L knee  Preparation: Patient was prepped and draped in the usual sterile fashion  Needle size: 22 G  Ultrasound guidance: no  Approach: anterolateral  Medications administered: 12 mg betamethasone acetate-betamethasone sodium phosphate 6 (3-3) mg/mL; 2 mL bupivacaine 0.25 %; 2 mL lidocaine 1 %    Patient tolerance: patient tolerated the procedure well with no immediate complications  Dressing:  Sterile dressing applied              Portions of the record may have been created with voice recognition software. Occasional wrong word or "sound a like" substitutions may have occurred due to the inherent limitations of voice recognition software. Read the chart carefully and recognize, using context, where substitutions have occurred.

## 2023-07-27 ENCOUNTER — OFFICE VISIT (OUTPATIENT)
Dept: GASTROENTEROLOGY | Facility: CLINIC | Age: 78
End: 2023-07-27
Payer: COMMERCIAL

## 2023-07-27 VITALS
RESPIRATION RATE: 16 BRPM | WEIGHT: 212 LBS | SYSTOLIC BLOOD PRESSURE: 107 MMHG | BODY MASS INDEX: 31.4 KG/M2 | TEMPERATURE: 96.8 F | DIASTOLIC BLOOD PRESSURE: 78 MMHG | HEIGHT: 69 IN | HEART RATE: 102 BPM | OXYGEN SATURATION: 95 %

## 2023-07-27 DIAGNOSIS — D64.9 ANEMIA, UNSPECIFIED TYPE: ICD-10-CM

## 2023-07-27 DIAGNOSIS — K92.2 ACUTE GI BLEEDING: Primary | ICD-10-CM

## 2023-07-27 DIAGNOSIS — K92.1 MELENA: ICD-10-CM

## 2023-07-27 DIAGNOSIS — K57.90 DIVERTICULOSIS: ICD-10-CM

## 2023-07-27 PROBLEM — Z01.810 PREOP CARDIOVASCULAR EXAM: Status: ACTIVE | Noted: 2023-07-27

## 2023-07-27 PROCEDURE — 1160F RVW MEDS BY RX/DR IN RCRD: CPT | Performed by: PHYSICIAN ASSISTANT

## 2023-07-27 PROCEDURE — 1159F MED LIST DOCD IN RCRD: CPT | Performed by: PHYSICIAN ASSISTANT

## 2023-07-27 PROCEDURE — 99214 OFFICE O/P EST MOD 30 MIN: CPT | Performed by: PHYSICIAN ASSISTANT

## 2023-07-27 NOTE — PROGRESS NOTES
Cardiology Follow Up    Susie Queen  1945  [id number]  [facility]  [address] [place] [address] [address]VD  [address]  [address] [id number] [phone]    1. Chronic diastolic (congestive) heart failure ([address])        2. Concentric left ventricular hypertrophy        3. Coronary artery disease of native artery of native heart with stable angina pectoris ([address])        4. Primary hypertension        5. NSTEMI (non-ST elevated myocardial infarction) ([address])        6. Mixed hyperlipidemia        7. Preop cardiovascular exam        8. Aortic stenosis, moderate              Discussion/Summary: All of his assessed cardiac problems are stable. I have reviewed his medications and made no changes. I explained to him that he needs to stay on his ASA and Plavix but that he can hold it for 7 days prior to his retinal surgery if needed. He needs to go back on the ASA and Plavix ASAP post surgery. Next office visit in 6 months. Interval History: He has not had any cardiac problems or recurrent GI bleeding since his last office visit. He is scheduled for retinal surgery. He is taking ASA and Plavix. He had LAD stenting on 12/29/2022. He did well but developed severe acute GI bleeding in 3/2023 and 4/2023. EF 65%. /70. His weight is down from 216 to 211 lbs. He denies CP, SOB.      ECHO 3/31/2022 - moderate AS          Patient Active Problem List   Diagnosis   • Arthritis   • Depression with anxiety   • Type 2 diabetes mellitus with hyperglycemia, without long-term current use of insulin ([address])   • Gout   • Hyperlipidemia   • Hypertension   • Class 1 obesity in adult   • NSTEMI (non-ST elevated myocardial infarction) ([facility])   • Tremor   • Degenerative disc disease, lumbar   • Tremor, essential   • Diabetic neuropathy associated with diabetes mellitus due to underlying condition [facility])   • Gait instability   • Frequent falls   • Infected epidermoid cyst   • Vitamin E deficiency   • Primary osteoarthritis involving multiple joints   • Chronic pain of both knees   • Primary osteoarthritis of both knees   • Coronary artery disease of native artery of native heart with stable angina pectoris (HCC)   • Primary osteoarthritis of right knee   • Primary osteoarthritis of left knee   • Fall (on) (from) other stairs and steps, initial encounter   • Recurrent major depressive disorder ([address])   • Benign hypertension with CKD (chronic kidney disease) stage III ([address])   • Diabetic nephropathy associated with type 2 diabetes mellitus ([address])   • Concentric left ventricular hypertrophy   • Aortic stenosis, moderate   • Edema   • Mild cognitive impairment   • Bilateral lower extremity edema   • Other chest pain   • Leukocytosis   • Skin lesion   • Chronic diastolic (congestive) heart failure (HCC)   • Basal cell carcinoma (BCC) of right shoulder   • Stage 3a chronic kidney disease (HCC)   • S/P cardiac catheterization   • Peripheral arterial disease ([address])   • Mesenteric artery stenosis (HCC)   • Acute GI bleeding   • Diabetes mellitus, type 2 ([address])   • Acute diverticulitis   • Orthostatic hypotension   • Acute blood loss anemia   • Urinary retention   • Diverticulosis of colon with hemorrhage   • Elevated TSH   • Elevated d-dimer   • Hypothyroidism   • Elevated platelet count   • Low serum cortisol level   • Hot flashes   • Benign prostatic hyperplasia with urinary retention   • CPAP (continuous positive airway pressure) dependence   • H/O heart artery stent   • Preop cardiovascular exam     Past Medical History:   Diagnosis Date   • At risk for falls     unsteady gait, fall risk, uses walker   • BPH (benign prostatic hyperplasia)    • CAD (coronary artery disease)    • Cancer (HCC)     basal cell   • Chronic kidney disease     stage 3   • Colon polyp    • Coronary artery disease    • CPAP (continuous positive airway pressure) dependence    • Diabetes mellitus ([address]) niddm   • Disease of thyroid gland    • Gout    • High cholesterol    • History of transfusion     GI bleed, no reaction   • Hypertension    • Hypomagnesemia 03/09/2023   • MI, 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    • MRSA infection     wound and urine   • Myocardial infarction [facility]) 2014   • S/P cardiac catheterization 12/30/2022   • Sleep apnea      Social History     Socioeconomic History   • Marital status: /Civil Union     Spouse name: Not on file   • Number of children: Not on file   • Years of education: Not on file   • Highest education level: Not on file   Occupational History   • Not on file   Tobacco Use   • Smoking status: Never   • Smokeless tobacco: Never   Vaping Use   • Vaping Use: Never used   Substance and Sexual Activity   • Alcohol use: Yes     Comment: Occassional (holidays) social sometimes   • Drug use: No   • Sexual activity: Not Currently     Partners: Female   Other Topics Concern   • Not on file   Social History Narrative    No advance directive     No living will      Social Determinants of Health     Financial Resource Strain: Not on file   Food Insecurity: No Food Insecurity (3/9/2023)    Hunger Vital Sign    • Worried About Running Out of Food in the Last Year: Never true    • Ran Out of Food in the Last Year: Never true   Transportation Needs: No Transportation Needs (3/9/2023)    PRAPARE - Transportation    • Lack of Transportation (Medical): No    • Lack of Transportation (Non-Medical):  No   Physical Activity: Not on file   Stress: Not on file   Social Connections: Not on file   Intimate Partner Violence: Not on file   Housing Stability: Low Risk  (3/9/2023)    Housing Stability Vital Sign    • Unable to Pay for Housing in the Last Year: No    • Number of Places Lived in the Last Year: 1    • Unstable Housing in the Last Year: No      Family History   Adopted: Yes   Problem Relation Age of Onset   • No Known Problems Father    • No Known Problems Mother      Past Surgical History: Procedure Laterality Date   • BASAL CELL CARCINOMA EXCISION Right 1/21/2022    Procedure: EXCISION BASAL CELL CARCINOMA EXTREMITY;  Surgeon: Anyi Hawkins DO;  Location: [facility] MAIN OR;  Service: General   • CARDIAC CATHETERIZATION Left 12/29/2022    Procedure: Cardiac Left Heart Cath;  Surgeon: Cornelius Olivarez MD;  Location: [facility] CARD[facility]; Service: Cardiology   • CARDIAC CATHETERIZATION N/A 12/29/2022    Procedure: Cardiac Coronary Angiogram;  Surgeon: Cornelius Olivarez MD;  Location: [facility]; Service: Cardiology   • CARDIAC CATHETERIZATION N/A 12/29/2022    Procedure: Cardiac pci;  Surgeon: Cornelius Olivarez MD;  Location: [facility] C[facility] C[facility]; Service: Cardiology   • CATARACT EXTRACTION W/  INTRAOCULAR LENS IMPLANT     • CORNEAL TRANSPLANT     • CORONARY ANGIOPLASTY WITH STENT PLACEMENT      stents x3-- [phone]   • EYE SURGERY      repair corneal laceration   • WY COLONOSCOPY FLX DX W/COLLJ SPEC WHEN PFRMD N/A 3/21/2016    Procedure: COLONOSCOPY;  Surgeon: Terence Basilio MD;  Location: [facility];  Service: Colorectal   • TONSILLECTOMY AND ADENOIDECTOMY     • TRANSURETHRAL RESECTION OF PROSTATE N/A 7/13/2023    Procedure: [employer] OF PROSTATE;  Surgeon: Sal Archer MD;  Location:  MAIN OR;  Service: Urology       Current Outpatient Medications:   •  acetaminophen (TYLENOL) 325 mg tablet, Take 2 tablets (650 mg total) by mouth every 6 (six) hours as needed for mild pain, fever or headaches Do not exceed a total of 3 grams of tylenol/acetaminophen in a 24-hour period. , Disp: , Rfl: 0  •  allopurinol (ZYLOPRIM) 100 mg tablet, Take 1 tablet (100 mg total) by mouth daily, Disp: 90 tablet, Rfl: 1  •  aluminum-magnesium hydroxide-simethicone (MYLANTA) 9105-6794-461 mg/30 mL suspension, Take 30 mL by mouth every 4 (four) hours as needed for indigestion or heartburn, Disp: , Rfl: 0  •  aspirin (ECOTRIN LOW STRENGTH) 81 mg EC tablet, Take 1 tablet (81 mg total) by mouth daily Do not start before March 23, 2023., Disp: , Rfl: 0  •  atorvastatin (LIPITOR) 80 mg tablet, Take 1 tablet (80 mg total) by mouth daily with dinner, Disp: 90 tablet, Rfl: 1  •  Blood Glucose Monitoring Suppl (OneTouch Verio Reflect) w/Device KIT, Check blood sugars twice daily. Please substitute with appropriate alternative as covered by patient's insurance. Dx: E11.65, Disp: 1 kit, Rfl: 0  •  clopidogrel (PLAVIX) 75 mg tablet, Take 1 tablet (75 mg total) by mouth daily, Disp: 90 tablet, Rfl: 1  •  cyanocobalamin (VITAMIN B-12) 500 MCG tablet, Take 1 tablet (500 mcg total) by mouth daily, Disp: 90 tablet, Rfl: 1  •  Empagliflozin (Jardiance) 10 MG TABS tablet, Take 1 tablet (10 mg total) by mouth every morning, Disp: 90 tablet, Rfl: 3  •  escitalopram (LEXAPRO) 20 mg tablet, Take 0.5 tablets (10 mg total) by mouth daily, Disp: 90 tablet, Rfl: 0  •  finasteride (PROSCAR) 5 mg tablet, Take 1 tablet (5 mg total) by mouth daily, Disp: 90 tablet, Rfl: 3  •  fluticasone (FLONASE) 50 mcg/act nasal spray, 1 spray into each nostril daily Do not start before March 23, 2023., Disp: , Rfl: 0  •  glimepiride (AMARYL) 4 mg tablet, TAKE 1 TABLET TWICE A DAY, Disp: 180 tablet, Rfl: 0  •  glucose blood (OneTouch Verio) test strip, Check blood sugars twice daily. Please substitute with appropriate alternative as covered by patient's insurance. Dx: E11.65, Disp: 200 each, Rfl: 3  •  levothyroxine 112 mcg tablet, Take 1 tablet (112 mcg total) by mouth daily in the early morning, Disp: 90 tablet, Rfl: 0  •  metFORMIN (GLUCOPHAGE) 1000 MG tablet, Take 1 tablet (1,000 mg total) by mouth 2 (two) times a day with meals, Disp: 180 tablet, Rfl: 1  •  metoprolol succinate (TOPROL-XL) 50 mg 24 hr tablet, Take 1 tablet (50 mg total) by mouth daily, Disp: 90 tablet, Rfl: 4  •  multivitamin (THERAGRAN) TABS, Take 1 tablet by mouth daily, Disp: , Rfl:   •  OneTouch Delica Lancets 05Z MISC, Check blood sugars twice daily.  Please substitute with appropriate alternative as covered by patient's insurance. Dx: E11.65, Disp: 200 each, Rfl: 3  •  oxybutynin (DITROPAN-XL) 10 MG 24 hr tablet, Take 1 tablet (10 mg total) by mouth daily at bedtime, Disp: 90 tablet, Rfl: 3  •  pantoprazole (PROTONIX) 40 mg tablet, Take 1 tablet (40 mg total) by mouth daily before breakfast, Disp: 90 tablet, Rfl: 1  •  polyethylene glycol (MIRALAX) 17 g packet, Take 17 g by mouth daily as needed (constipation), Disp: , Rfl: 0  •  senna-docusate sodium (SENOKOT S) 8.6-50 mg per tablet, Take 1 tablet by mouth 2 (two) times a day Hold for diarrhea or loose stools, Disp: , Rfl: 0  •  torsemide (DEMADEX) 10 mg tablet, Take 10 mg by mouth if needed, Disp: , Rfl:   •  zonisamide (ZONEGRAN) 50 MG capsule, Take 1 tablet (50 mg total) by mouth daily, Disp: 90 capsule, Rfl: 2  •  oxybutynin (DITROPAN) 5 mg tablet, Take 1 tablet (5 mg total) by mouth 3 (three) times a day (Patient not taking: Reported on 6/19/2023), Disp: 90 tablet, Rfl: 0  •  Prolensa 0.07 % SOLN, Right eye as directed, Disp: , Rfl:   No Known Allergies  Vitals:    07/21/23 1415   BP: 128/70   Pulse: 86   SpO2: 97%   Weight: 96.1 kg (211 lb 12.8 oz)   Height: 5' 7" (1.702 m)     Weight (last 2 days)     None         Blood pressure 128/70, pulse 86, height 5' 7" (1.702 m), weight 96.1 kg (211 lb 12.8 oz), SpO2 97 %. , Body mass index is 33.17 kg/m².     Labs:  Admission on 07/13/2023, Discharged on 07/13/2023   Component Date Value   • POC Glucose 07/13/2023 175 (H)    • POC Glucose 07/13/2023 183 (H)    Lab on 07/02/2023   Component Date Value   • Creatinine, 24H Ur 07/02/2023 1.1    • TOTAL URINE VOLUME 07/02/2023 1,000    • 5-HIAA, 24H Ur 07/02/2023 4.8    • 5-HIAA, Urine 07/02/2023 4.8    Appointment on 06/19/2023   Component Date Value   • WBC 06/19/2023 9.34    • RBC 06/19/2023 4.63    • Hemoglobin 06/19/2023 11.4 (L)    • Hematocrit 06/19/2023 39.0    • MCV 06/19/2023 84    • MCH 06/19/2023 24.6 (L)    • MCHC 06/19/2023 29.2 (L)    • RDW 06/19/2023 17.1 (H)    • MPV 06/19/2023 8.7 (L)    • Platelets 12/40/2762 381    • nRBC 06/19/2023 0    • Neutrophils Relative 06/19/2023 62    • Immat GRANS % 06/19/2023 0    • Lymphocytes Relative 06/19/2023 23    • Monocytes Relative 06/19/2023 9    • Eosinophils Relative 06/19/2023 6    • Basophils Relative 06/19/2023 0    • Neutrophils Absolute 06/19/2023 5.77    • Immature Grans Absolute 06/19/2023 0.04    • Lymphocytes Absolute 06/19/2023 2.14    • Monocytes Absolute 06/19/2023 0.81    • Eosinophils Absolute 06/19/2023 0.54    • Basophils Absolute 06/19/2023 0.04    • Sodium 06/19/2023 140    • Potassium 06/19/2023 4.5    • Chloride 06/19/2023 105    • CO2 06/19/2023 28    • ANION GAP 06/19/2023 7    • BUN 06/19/2023 20    • Creatinine 06/19/2023 1.22    • Glucose, Fasting 06/19/2023 86    • Calcium 06/19/2023 9.3    • eGFR 06/19/2023 57    • Hemoglobin A1C 06/19/2023 6.8 (H)    • EAG 06/19/2023 148    Prep for Procedure on 06/19/2023   Component Date Value   • Urine Culture 06/22/2023 No Growth <1000 cfu/mL    Nurse Triage on 05/19/2023   Component Date Value   • Urine Culture 05/22/2023 >100,000 cfu/ml Pseudomonas putida group (A)    • Urine Culture 05/22/2023 >100,000 cfu/ml Methicillin Resistant Staphylococcus aureus (A)    • Color, UA 05/22/2023 Yellow    • Clarity, UA 05/22/2023 Cloudy    • Specific Gravity, UA 05/22/2023 1.015    • pH, UA 05/22/2023 7.0    • Leukocytes, UA 05/22/2023 Moderate (A)    • Nitrite, UA 05/22/2023 Positive (A)    • Protein, UA 05/22/2023 100 (2+) (A)    • Glucose, UA 05/22/2023 >=1000 (1%) (A)    • Ketones, UA 05/22/2023 Negative    • Urobilinogen, UA 05/22/2023 1.0    • Bilirubin, UA 05/22/2023 Negative    • Occult Blood, UA 05/22/2023 Large (A)    • RBC, UA 05/22/2023 4-10 (A)    • WBC, UA 05/22/2023 Innumerable (A)    • Epithelial Cells 05/22/2023 None Seen    • Bacteria, UA 05/22/2023 Innumerable (A)    • Ca Oxalate Yolis, UA 05/22/2023 Occasional (A)    Lab on 04/28/2023   Component Date Value   • Sodium 04/28/2023 141    • Potassium 04/28/2023 3.9    • Chloride 04/28/2023 105    • CO2 04/28/2023 28    • ANION GAP 04/28/2023 8    • BUN 04/28/2023 20    • Creatinine 04/28/2023 1.14    • Glucose, Fasting 04/28/2023 218 (H)    • Calcium 04/28/2023 9.4    • AST 04/28/2023 16    • ALT 04/28/2023 20    • Alkaline Phosphatase 04/28/2023 107 (H)    • Total Protein 04/28/2023 7.7    • Albumin 04/28/2023 4.3    • Total Bilirubin 04/28/2023 0.31    • eGFR 04/28/2023 62    • Testosterone 04/28/2023 462.0    • Sex Hormone Binding 04/28/2023 38.3    • Free T4 04/28/2023 1.11    • TSH 3RD GENERATON 04/28/2023 1.507    • WBC 04/28/2023 6.50    • RBC 04/28/2023 3.80 (L)    • Hemoglobin 04/28/2023 10.6 (L)    • Hematocrit 04/28/2023 34.8 (L)    • MCV 04/28/2023 92    • MCH 04/28/2023 27.9    • MCHC 04/28/2023 30.5 (L)    • RDW 04/28/2023 14.9    • MPV 04/28/2023 8.8 (L)    • Platelets 74/18/7756 400 (H)    • nRBC 04/28/2023 0    • Neutrophils Relative 04/28/2023 55    • Immat GRANS % 04/28/2023 0    • Lymphocytes Relative 04/28/2023 26    • Monocytes Relative 04/28/2023 10    • Eosinophils Relative 04/28/2023 8 (H)    • Basophils Relative 04/28/2023 1    • Neutrophils Absolute 04/28/2023 3.62    • Immature Grans Absolute 04/28/2023 0.01    • Lymphocytes Absolute 04/28/2023 1.66    • Monocytes Absolute 04/28/2023 0.63    • Eosinophils Absolute 04/28/2023 0.53    • Basophils Absolute 04/28/2023 0.05    • Hemoglobin A1C 04/28/2023 6.8 (H)    • EAG 04/28/2023 148    Office Visit on 04/28/2023   Component Date Value   • Creatinine, Ur 04/28/2023 57.0    • Albumin,U,Random 04/28/2023 191.0 (H)    • Albumin Creat Ratio 04/28/2023 335 (H)    No results displayed because visit has over 200 results.       Admission on 04/14/2023, Discharged on 04/14/2023   Component Date Value   • WBC 04/14/2023 10.11    • RBC 04/14/2023 3.48 (L)    • Hemoglobin 04/14/2023 10.5 (L)    • Hematocrit 04/14/2023 33.5 (L)    • MCV 04/14/2023 96    • MCH 04/14/2023 30.2    • MCHC 04/14/2023 31.3 (L)    • RDW 04/14/2023 14.8    • MPV 04/14/2023 9.3    • Platelets 42/05/6209 464 (H)    • nRBC 04/14/2023 0    • Neutrophils Relative 04/14/2023 63    • Immat GRANS % 04/14/2023 0    • Lymphocytes Relative 04/14/2023 22    • Monocytes Relative 04/14/2023 9    • Eosinophils Relative 04/14/2023 5    • Basophils Relative 04/14/2023 1    • Neutrophils Absolute 04/14/2023 6.40    • Immature Grans Absolute 04/14/2023 0.03    • Lymphocytes Absolute 04/14/2023 2.22    • Monocytes Absolute 04/14/2023 0.91    • Eosinophils Absolute 04/14/2023 0.49    • Basophils Absolute 04/14/2023 0.06    • ABO Grouping 04/14/2023 A    • Rh Factor 04/14/2023 Positive    • Antibody Screen 04/14/2023 Negative    • Specimen Expiration Date 04/14/2023 [id number]    • Sodium 04/14/2023 140    • Potassium 04/14/2023 4.1    • Chloride 04/14/2023 102    • CO2 04/14/2023 26    • ANION GAP 04/14/2023 12    • BUN 04/14/2023 27 (H)    • Creatinine 04/14/2023 1.17    • Glucose 04/14/2023 200 (H)    • Calcium 04/14/2023 9.1    • AST 04/14/2023 19    • ALT 04/14/2023 25    • Alkaline Phosphatase 04/14/2023 87    • Total Protein 04/14/2023 7.0    • Albumin 04/14/2023 3.8    • Total Bilirubin 04/14/2023 0.27    • eGFR 04/14/2023 60    • hs TnI 0hr 04/14/2023 5    • LACTIC ACID 04/14/2023 3.5 (HH)    • Color, UA 04/14/2023 Yellow    • Clarity, UA 04/14/2023 Slightly Cloudy    • Specific Gravity, UA 04/14/2023 1.010    • pH, UA 04/14/2023 6.5    • Leukocytes, UA 04/14/2023 Elevated glucose may cause decreased leukocyte values.  See urine microscopic for [facility] result/ (A)    • Nitrite, UA 04/14/2023 Negative    • Protein, UA 04/14/2023 Negative    • Glucose, UA 04/14/2023 >=1000 (1%) (A)    • Ketones, UA 04/14/2023 Negative    • Urobilinogen, UA 04/14/2023 0.2    • Bilirubin, UA 04/14/2023 Negative    • Occult Blood, UA 04/14/2023 Moderate (A)    • LACTIC ACID 04/14/2023 2.1 (HH)    • hs TnI 2hr 04/14/2023 5    • Delta 2hr hsTnI 04/14/2023 0    • D-Dimer, Quant 04/14/2023 0.84 (H)    • RBC, UA 04/14/2023 2-4    • WBC, UA 04/14/2023 4-10 (A)    • Epithelial Cells 04/14/2023 None Seen    • Bacteria, UA 04/14/2023 Occasional    • hs TnI 4hr 04/14/2023 5    • Delta 4hr hsTnI 04/14/2023 0    Office Visit on 03/30/2023   Component Date Value   • POST-VOID RESIDUAL VOLUM* 03/30/2023 837    There may be more visits with results that are not included. Imaging: No results found. Review of Systems:  Review of Systems   Constitutional: Negative for diaphoresis, fatigue, fever and unexpected weight change. HENT: Negative. Respiratory: Negative for cough, shortness of breath and wheezing. Cardiovascular: Negative for chest pain, palpitations and leg swelling. Gastrointestinal: Negative for abdominal pain, diarrhea and nausea. Musculoskeletal: Negative for gait problem and myalgias. Skin: Negative for rash. Neurological: Negative for dizziness and numbness. Psychiatric/Behavioral: Negative. Physical Exam:  Physical Exam  Constitutional:       Appearance: He is well-developed. HENT:      Head: Normocephalic and atraumatic. Eyes:      Pupils: Pupils are equal, round, and reactive to light. Neck:      Vascular: No JVD. Cardiovascular:      Rate and Rhythm: Regular rhythm. Pulses: Normal pulses. Carotid pulses are 2+ on the right side and 2+ on the left side. Heart sounds: S1 normal and S2 normal. Murmur heard. Systolic murmur is present with a grade of 4/6. Pulmonary:      Effort: Pulmonary effort is normal.      Breath sounds: Normal breath sounds. No wheezing or rales. Abdominal:      General: Bowel sounds are normal.      Palpations: Abdomen is soft. Tenderness: There is no abdominal tenderness. Musculoskeletal:         General: No tenderness. Normal range of motion. Cervical back: Normal range of motion and neck supple.    Skin: General: Skin is warm. Neurological:      Mental Status: He is alert and oriented to person, place, and time. Cranial Nerves: No cranial nerve deficit. Deep Tendon Reflexes: Reflexes are normal and symmetric.

## 2023-07-27 NOTE — PATIENT INSTRUCTIONS
Please continue on pantoprazole daily at this time. For your bowels, continue with high-fiber diet and prevention of constipation as you do have diverticulum in the colon. You can take MiraLAX as needed or 1-2 times daily on a scheduled regimen to prevent straining and constipation. If you develop any bright red blood per rectum or dark sticky tarry stool, please present to the emergency department immediately. You will be due for another blood count in about 4 to 6 weeks.

## 2023-07-27 NOTE — PROGRESS NOTES
[facility] - Outpatient Follow-up Note  Susie Queen 69 y.o. male MRN: [id number]  Encounter: [id number]     ASSESSMENT AND PLAN:      1. Acute GI bleeding  2. Melena  3. Diverticulosis  4. Anemia, unspecified type    Patient on DAPT here today to follow-up given his history of recurrent GI bleeding and acute symptomatic anemia requiring hospitalization. He underwent bidirectional endoscopic evaluation in 03/2023, and at that time source of GI bleeding was suspected to be  secondary to a suspected ulcer within a diverticulum in the descending colon, with possible visible vessel and adherent clot, which was clipped. Repeat bidirectional endoscopic evaluation in 04/2023 was unremarkable, patient underwent an inpatient capsule study which suggested potential AVMs in the distal ileum, though this was not suspected to be his source of bleeding. Thankfully, he has not had any recurrence of overt GI bleeding since his hospital discharge. His hemoglobin has been uptrending appropriately and was 11.4 when last checked in 06/2023. We will continue to monitor his hemoglobin and stools closely. If patient once again has large-volume hematochezia and/or presyncope and/or if Hb downtrend, would recommend urgent CT scan bleeding protocol. If patient has melena though hemoglobin remained stable, recommend colonoscopy a single balloon to evaluate the possible AVM in the ileum.    - CBC and differential; Future  - Iron Panel (Includes Ferritin, Iron Sat%, Iron, and TIBC); Future    Patient will be relocating to [place] and thus I have encouraged him to establish with GI locally. We have ordered additional blood work tentatively in the event he remains in [address] [place] [address],[address] for the next 6 to 8 weeks. ______________________________________________________________________    SUBJECTIVE: Patient is a 69 y.o. male who presents today for follow-up regarding GI bleeding.  Pmhx sig for HTN, DM2, CKD, CAD s/p PCI x 4 on DAPT (most recent in 12/2022). Patient is new to me. He last evaluated several times in the hospital for GI bleeding and anemia. He underwent bidirectional endoscopic evaluation in 03/2023 and this demonstrated a suspected ulcer within a diverticulum in the descending colon which was clipped. He presented once again in 04/2023 with acute onset of maroon large-volume blood and underwent a repeat bidirectional endoscopic evaluation which was normal.  He underwent a video capsule study as an inpatient, there are several areas of erythema though not suspected to be the source of bleeding. He was last evaluated by colleague in the clinic in late 04/2023 and at that time he was not having any overt GI bleeding. 07/27/23:     Patient is here today with his daughter. He is feeling well from a GI standpoint. He has not had any melenic stool output, maroon stool, or bright red blood per rectum. He shares he is having brown stools every other day or so that require straining at times. He denies any abdominal pain or rectal pain related to defecation. He denies any heartburn, digestion, nausea, vomiting, dysphagia or odynophagia.    06/2023: Hb 11.4, MCV 84, Plt 381, A1C 6.8   04/2023: Hb 10.6, MCV 92, Plt 400  04/2023: Hb 8.5    NSAIDs: advil prn   Tobacco: none   Etoh: none     Endoscopic history:   EGD: 3/2/2023 irregular Z-line and minimal quantity of food seen in the stomach and duodenum, otherwise unremarkable. Colonoscopy: 3/3/2023 suspected ulcer within a diverticulum in the descending colon s/p 2 hemostatic clips with old and fresh blood seen at the level of the proximal transverse colon although no active bleeding, pancolonic diverticulosis.   EGD: 04/2023: normal exam to 4th portion of duodenum   Colon: 04/2023: TI normal, multiple small and large moderate extensive pancolonic diverticula, small internal hemorrhoids   WCE: 04/2023: Focal edema in the proximal duodenum, with possible small nonbleeding AVM in proximal duodenum, punctate minor erythema in the mid small bowel, possible AVM in distal TI     Review of Systems   Per HPI    Historical Information   Past Medical History:   Diagnosis Date   • At risk for falls     unsteady gait, fall risk, uses walker   • BPH (benign prostatic hyperplasia)    • CAD (coronary artery disease)    • Cancer (HCC)     basal cell   • Chronic kidney disease     stage 3   • Colon polyp    • Coronary artery disease    • CPAP (continuous positive airway pressure) dependence    • Diabetes mellitus (HCC)     niddm   • Disease of thyroid gland    • Gout    • High cholesterol    • History of transfusion     GI bleed, no reaction   • Hypertension    • Hypomagnesemia 03/09/2023   • MI, 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    • MRSA infection     wound and urine   • Myocardial infarction [facility]) 2014   • S/P cardiac catheterization 12/30/2022   • Sleep apnea      Past Surgical History:   Procedure Laterality Date   • BASAL CELL CARCINOMA EXCISION Right 1/21/2022    Procedure: EXCISION BASAL CELL CARCINOMA EXTREMITY;  Surgeon: Markel Irene DO;  Location: [facility] MAIN OR;  Service: General   • CARDIAC CATHETERIZATION Left 12/29/2022    Procedure: Cardiac Left Heart Cath;  Surgeon: Seth Osborne MD;  Location: [facility] CARDIAC [facility] LAB; Service: Cardiology   • CARDIAC CATHETERIZATION N/A 12/29/2022    Procedure: Cardiac Coronary Angiogram;  Surgeon: Seth Osborne MD;  [facility]: [facility]; Service: Cardiology   • CARDIAC CATHETERIZATION N/A 12/29/2022    Procedure: Cardiac pci;  Surgeon: Seth Osborne MD;  Location: [facility];   Service: Cardiology   • CATARACT EXTRACTION W/  INTRAOCULAR LENS IMPLANT     • CORNEAL TRANSPLANT     • CORONARY ANGIOPLASTY WITH STENT PLACEMENT      stents x3-- [id number]   • EYE SURGERY      repair corneal laceration   • MA COLONOSCOPY FLX DX W/COLLJ SPEC WHEN PFRMD N/A 3/21/2016    Procedure: COLONOSCOPY;  Surgeon: Aleksandra Gardiner MD;  Location: [facility];  Service: Colorectal   • TONSILLECTOMY AND ADENOIDECTOMY     • TRANSURETHRAL RESECTION OF PROSTATE N/A 7/13/2023    Procedure: PHOTO VAPORIZATION OF PROSTATE;  Surgeon: Andreina Walker MD;  Location:  MAIN OR;  Service: Urology     Social History   Social History     Substance and Sexual Activity   Alcohol Use Yes    Comment: Occassional (holidays) social sometimes     Social History     Substance and Sexual Activity   Drug Use No     Social History     Tobacco Use   Smoking Status Never   Smokeless Tobacco Never     Family History   Adopted: Yes   Problem Relation Age of Onset   • No Known Problems Father    • No Known Problems Mother        Meds/Allergies       Current Outpatient Medications:   •  acetaminophen (TYLENOL) 325 mg tablet  •  allopurinol (ZYLOPRIM) 100 mg tablet  •  aluminum-magnesium hydroxide-simethicone (MYLANTA) [id number] mg/30 mL suspension  •  aspirin (ECOTRIN LOW STRENGTH) 81 mg EC tablet  •  atorvastatin (LIPITOR) 80 mg tablet  •  Blood Glucose Monitoring Suppl (OneTouch Verio Reflect) w/Device KIT  •  clopidogrel (PLAVIX) 75 mg tablet  •  cyanocobalamin (VITAMIN B-12) 500 MCG tablet  •  Empagliflozin (Jardiance) 10 MG TABS tablet  •  escitalopram (LEXAPRO) 20 mg tablet  •  finasteride (PROSCAR) 5 mg tablet  •  fluticasone (FLONASE) 50 mcg/act nasal spray  •  glimepiride (AMARYL) 4 mg tablet  •  glucose blood (OneTouch Verio) test strip  •  levothyroxine 112 mcg tablet  •  metFORMIN (GLUCOPHAGE) 1000 MG tablet  •  metoprolol succinate (TOPROL-XL) 50 mg 24 hr tablet  •  multivitamin (THERAGRAN) TABS  •  OneTouch Delica Lancets 08X MISC  •  oxybutynin (DITROPAN-XL) 10 MG 24 hr tablet  •  pantoprazole (PROTONIX) 40 mg tablet  •  polyethylene glycol (MIRALAX) 17 g packet  •  Prolensa 0.07 % SOLN  •  senna-docusate sodium (SENOKOT S) 8.6-50 mg per tablet  •  torsemide (DEMADEX) 10 mg tablet  •  zonisamide (ZONEGRAN) 50 MG capsule  •  cephalexin (KEFLEX) 500 mg capsule  •  midodrine (PROAMATINE) 5 mg tablet  •  oxybutynin (DITROPAN) 5 mg tablet  •  prednisoLONE acetate (PRED FORTE) 1 % ophthalmic suspension  No current facility-administered medications for this visit. No Known Allergies     Objective     Blood pressure 107/78, pulse 102, temperature (!) 96.8 °F (36 °C), temperature source Tympanic, resp. rate 16, height 5' 9" (1.753 m), weight 96.2 kg (212 lb), SpO2 95 %. Body mass index is 31.31 kg/m². Physical Exam  Vitals and nursing note reviewed. Constitutional:       General: He is not in acute distress. Appearance: He is not toxic-appearing. HENT:      Head: Normocephalic and atraumatic. Eyes:      General: No scleral icterus. Conjunctiva/sclera: Conjunctivae normal.   Cardiovascular:      Rate and Rhythm: Normal rate. Abdominal:      General: Bowel sounds are normal. There is no distension. Palpations: Abdomen is soft. Tenderness: There is no abdominal tenderness. There is no guarding or rebound. Skin:     General: Skin is warm and dry. Coloration: Skin is not jaundiced. Neurological:      General: No focal deficit present. Mental Status: He is alert and oriented to person, place, and time. Psychiatric:         Mood and Affect: Mood normal.         Behavior: Behavior normal.         Lab Results:   No visits with results within 1 Day(s) from this visit. Latest known visit with results is:   Admission on 07/13/2023, Discharged on 07/13/2023   Component Date Value   • POC Glucose 07/13/2023 175 (H)    • POC Glucose 07/13/2023 183 (H)      Radiology Results:   No results found. Tony Henao PA-C    **Please note:  Dictation voice to text software may have been used in the creation of this record. Occasional wrong word or “sound alike” substitutions may have occurred due to the inherent limitations of voice recognition software.   Read the chart carefully and recognize, using context, where substitutions have occurred. **

## 2023-07-28 ENCOUNTER — CLINICAL SUPPORT (OUTPATIENT)
Dept: UROLOGY | Facility: CLINIC | Age: 78
End: 2023-07-28
Payer: COMMERCIAL

## 2023-07-28 VITALS
HEART RATE: 73 BPM | WEIGHT: 212 LBS | OXYGEN SATURATION: 97 % | BODY MASS INDEX: 31.4 KG/M2 | TEMPERATURE: 98.2 F | HEIGHT: 69 IN

## 2023-07-28 DIAGNOSIS — N40.1 BENIGN PROSTATIC HYPERPLASIA WITH URINARY RETENTION: Primary | ICD-10-CM

## 2023-07-28 DIAGNOSIS — R33.8 BENIGN PROSTATIC HYPERPLASIA WITH URINARY RETENTION: Primary | ICD-10-CM

## 2023-07-28 PROCEDURE — 51702 INSERT TEMP BLADDER CATH: CPT

## 2023-07-30 ENCOUNTER — HOSPITAL ENCOUNTER (INPATIENT)
Facility: HOSPITAL | Age: 78
LOS: 1 days | Discharge: HOME | End: 2023-08-01
Attending: EMERGENCY MEDICINE | Admitting: INTERNAL MEDICINE
Payer: COMMERCIAL

## 2023-07-30 DIAGNOSIS — R33.9 URINARY RETENTION: ICD-10-CM

## 2023-07-30 DIAGNOSIS — G25.0 TREMOR, ESSENTIAL: ICD-10-CM

## 2023-07-30 DIAGNOSIS — N30.90 CYSTITIS: ICD-10-CM

## 2023-07-30 DIAGNOSIS — I25.118 CORONARY ARTERY DISEASE OF NATIVE ARTERY OF NATIVE HEART WITH STABLE ANGINA PECTORIS (HCC): ICD-10-CM

## 2023-07-30 DIAGNOSIS — Z98.890 S/P CARDIAC CATHETERIZATION: ICD-10-CM

## 2023-07-30 DIAGNOSIS — E11.65 TYPE 2 DIABETES MELLITUS WITH HYPERGLYCEMIA, WITHOUT LONG-TERM CURRENT USE OF INSULIN (HCC): ICD-10-CM

## 2023-07-30 DIAGNOSIS — R31.0 GROSS HEMATURIA: Primary | ICD-10-CM

## 2023-07-30 LAB
BASOPHILS # BLD AUTO: 0.05 THOUSANDS/ÂΜL (ref 0–0.1)
BASOPHILS NFR BLD AUTO: 0 % (ref 0–1)
EOSINOPHIL # BLD AUTO: 0.47 THOUSAND/ÂΜL (ref 0–0.61)
EOSINOPHIL NFR BLD AUTO: 4 % (ref 0–6)
ERYTHROCYTE [DISTWIDTH] IN BLOOD BY AUTOMATED COUNT: 18.2 % (ref 11.6–15.1)
HCT VFR BLD AUTO: 41.2 % (ref 36.5–49.3)
HGB BLD-MCNC: 12.1 G/DL (ref 12–17)
IMM GRANULOCYTES # BLD AUTO: 0.05 THOUSAND/UL (ref 0–0.2)
IMM GRANULOCYTES NFR BLD AUTO: 0 % (ref 0–2)
LYMPHOCYTES # BLD AUTO: 2.92 THOUSANDS/ÂΜL (ref 0.6–4.47)
LYMPHOCYTES NFR BLD AUTO: 26 % (ref 14–44)
MCH RBC QN AUTO: 24.2 PG (ref 26.8–34.3)
MCHC RBC AUTO-ENTMCNC: 29.4 G/DL (ref 31.4–37.4)
MCV RBC AUTO: 83 FL (ref 82–98)
MONOCYTES # BLD AUTO: 0.94 THOUSAND/ÂΜL (ref 0.17–1.22)
MONOCYTES NFR BLD AUTO: 8 % (ref 4–12)
NEUTROPHILS # BLD AUTO: 6.75 THOUSANDS/ÂΜL (ref 1.85–7.62)
NEUTS SEG NFR BLD AUTO: 62 % (ref 43–75)
NRBC BLD AUTO-RTO: 0 /100 WBCS
PLATELET # BLD AUTO: 443 THOUSANDS/UL (ref 149–390)
PMV BLD AUTO: 9.1 FL (ref 8.9–12.7)
RBC # BLD AUTO: 4.99 MILLION/UL (ref 3.88–5.62)
WBC # BLD AUTO: 11.18 THOUSAND/UL (ref 4.31–10.16)

## 2023-07-30 PROCEDURE — 87077 CULTURE AEROBIC IDENTIFY: CPT | Performed by: EMERGENCY MEDICINE

## 2023-07-30 PROCEDURE — 85025 COMPLETE CBC W/AUTO DIFF WBC: CPT | Performed by: EMERGENCY MEDICINE

## 2023-07-30 PROCEDURE — 36415 COLL VENOUS BLD VENIPUNCTURE: CPT | Performed by: EMERGENCY MEDICINE

## 2023-07-30 PROCEDURE — 81001 URINALYSIS AUTO W/SCOPE: CPT | Performed by: EMERGENCY MEDICINE

## 2023-07-30 PROCEDURE — 87086 URINE CULTURE/COLONY COUNT: CPT | Performed by: EMERGENCY MEDICINE

## 2023-07-30 PROCEDURE — 3C1ZX8Z IRRIGATION OF INDWELLING DEVICE USING IRRIGATING SUBSTANCE, EXTERNAL APPROACH: ICD-10-PCS | Performed by: EMERGENCY MEDICINE

## 2023-07-30 PROCEDURE — 87186 SC STD MICRODIL/AGAR DIL: CPT | Performed by: EMERGENCY MEDICINE

## 2023-07-30 PROCEDURE — 80048 BASIC METABOLIC PNL TOTAL CA: CPT | Performed by: EMERGENCY MEDICINE

## 2023-07-30 PROCEDURE — 99285 EMERGENCY DEPT VISIT HI MDM: CPT | Performed by: EMERGENCY MEDICINE

## 2023-07-30 PROCEDURE — 99285 EMERGENCY DEPT VISIT HI MDM: CPT

## 2023-07-30 NOTE — LETTER
August 1, 2023     Danyell Proctor   [address]    Patient: Michelle Duran   YOB: 1945   Date of Visit: 7/30/2023       Dear Dr. Shaun Arroyo: Thank you for referring Brian Dexter to me for evaluation. Below are my notes for this consultation. If you have questions, please do not hesitate to call me. I look forward to following your patient along with you. Sincerely,        No name on file        CC: No Recipients    Neeraj Preciado PA-C  8/1/2023  9:09 AM  Attested  Progress Note - UROLOGY  Michelle Duran 69 y.o. male MRN: [id number]  Unit/Bed#: 401-01 Encounter: [id number]    Assessment:  Urine now running entirely clear, no blood or clots visible. CBI bladder irrigation has been clamped  Gross hematuria has resolved  Status post photo vaporization of prostate July 13 at [facility] by Dr. Andrew Piedra  BPH with bladder outlet obstruction  Stage IIIa chronic kidney disease  Coronary artery disease, status post PCI x3 stents, Plavix and aspirin currently on hold  Essential hypertension, stable  Type 2 diabetes mellitus  Moderate morbid obesity, BMI 31.31    Hemoglobin today stable 11.2    Patient denies any abdominal pain, no bladder spasms. Vital signs reviewed, afebrile. Plan:  Patient can be discharged home at the direction of the medicine providers  Discontinue CBI bladder irrigation  Inflow port   Exchange urometer CBI bag for regular Jose bag. May have leg bag through the day after discharge.   Leave existing three-way catheter indwelling, do not remove prior to office follow-up  Patient will need to call the office to be seen by Dr. Agueda Eldridge for postoperative follow-up in 1 week for catheter removal and voiding trial.  Continue Proscar nightly as before  Ditropan XL as needed bladder spasms  Discussed with medicine resident  Hold Plavix and aspirin for 2 days then may restart after discharge    Subjective/Objective   Chief Complaint: Urine now running entirely clear, CBI irrigation clamped. Subjective: Patient has no complaints. Denies abdominal pain. Urine without any blood. Patient is being held from Plavix and aspirin.     Scheduled Meds:  Current Facility-Administered Medications   Medication Dose Route Frequency Provider Last Rate   • acetaminophen  650 mg Oral Q6H PRN Kelsey Shells, DO     • allopurinol  100 mg Oral Daily Kelsey Shells, DO     • aluminum-magnesium hydroxide-simethicone  30 mL Oral Q4H PRN Kelsey Shells, DO     • atorvastatin  80 mg Oral Daily With Dinner Elizaldejosh Adkins, DO     • cefTRIAXone  1,000 mg Intravenous Q24H Kelsey Shells, DO 1,000 mg (08/01/23 0549)   • cyanocobalamin  500 mcg Oral Daily Kelsey Shells, DO     • escitalopram  10 mg Oral Daily Kelsey Shells, DO     • finasteride  5 mg Oral Daily Kelsey Shells, DO     • fluticasone  1 spray Nasal Daily Kelsey Shells, DO     • HYDROmorphone  0.5 mg Intravenous Q6H PRN Rian Mcwilliamsro, DO     • insulin lispro  1-6 Units Subcutaneous TID AC [place] Marianelaherb Fitzpatrick, DO     • insulin lispro  1-6 Units Subcutaneous HS Kelsey Shells, DO     • levothyroxine  112 mcg Oral Early Morning Kelsey Shells, DO     • metoprolol succinate  50 mg Oral Daily Kelsey Shells, DO     • ondansetron  4 mg Intravenous Q6H PRN Kelsey Shells, DO     • oxybutynin  5 mg Oral TID Rian Mcwilliamsro, DO     • oxybutynin  10 mg Oral HS Kelsey Shells, DO     • oxyCODONE  10 mg Oral Q4H PRN Rian Mcwilliamsro, DO     • oxyCODONE  5 mg Oral Q4H PRN Rian Mcwilliamsro, DO     • pantoprazole  40 mg Oral Daily Before Breakfast Kelsey Shells, DO     • polyethylene glycol  17 g Oral Daily PRN Kelsey Shells, DO     • prednisoLONE acetate  1 drop Right Eye BID Kelsey Shells, DO     • senna-docusate sodium  1 tablet Oral BID [facility] Amari     • zonisamide  50 mg Oral Daily [place] Fitzpartick DO       Continuous Infusions:   PRN Meds:.•  acetaminophen  •  aluminum-magnesium hydroxide-simethicone  •  HYDROmorphone  •  ondansetron  •  oxyCODONE  •  oxyCODONE  •  polyethylene glycol      Objective:     Blood pressure 132/73, pulse 55, temperature (!) 97.3 °F (36.3 °C), temperature source Oral, resp. rate 17, height 5' 9" (1.753 m), weight 96 kg (211 lb 10.3 oz), SpO2 96 %. ,Body mass index is 31.25 kg/m². Intake/Output Summary (Last 24 hours) at 8/1/2023 0903  Last data filed at 8/1/2023 0571  Gross per 24 hour   Intake 120 ml   Output 91048 ml   Net -07603 ml       Invasive Devices       Peripheral Intravenous Line  Duration             Peripheral IV 07/30/23 Left Hand 1 day              Drain  Duration             Continuous Bladder Irrigation Three-way 18 days    Urethral Catheter Three way 18 Fr. 1 day                    Physical Exam:     Awake, oriented. No acute distress. Skin warm dry touch. ENT clear. Neck no adenopathy or goiter. Heart regular rate and rhythm. Lungs CTA  Abdomen positive bowel sounds, soft. Entirely nontender. Genitalia three-way urinary catheter draining clear yellow urine. Extremities without calf tenderness, no peripheral edema. Ambulation observed. Neurological exam CNS grossly intact. Mental status baseline. Lab, Imaging and other studies:  I have personally reviewed pertinent lab results.   , CBC:   Lab Results   Component Value Date    WBC 8.53 08/01/2023    HGB 11.2 (L) 08/01/2023    HCT 38.0 08/01/2023    MCV 84 08/01/2023     08/01/2023    RBC 4.55 08/01/2023    MCH 24.6 (L) 08/01/2023    MCHC 29.5 (L) 08/01/2023    RDW 18.2 (H) 08/01/2023    MPV 9.3 08/01/2023    NRBC 0 08/01/2023   , CMP:   Lab Results   Component Value Date    SODIUM 138 08/01/2023    K 4.4 08/01/2023     08/01/2023    CO2 26 08/01/2023    BUN 16 08/01/2023    CREATININE 0.99 08/01/2023    CALCIUM 9.0 08/01/2023    EGFR 73 08/01/2023     VTE Pharmacologic Prophylaxis: Reason for no pharmacologic prophylaxis Gross hematuria  VTE Mechanical Prophylaxis: sequential compression device     Narda Lyles PA-C      Attestation signed by Marysol Vasquez MD at 8/1/2023 10:10 AM:  I was the supervising/collaborating physician on the date of service. I acknowledge the AP's documentation and services provided. I was available by phone, if needed, for consultation.     Marysol Vasquez MD 08/01/23

## 2023-07-31 ENCOUNTER — TELEPHONE (OUTPATIENT)
Dept: UROLOGY | Facility: AMBULATORY SURGERY CENTER | Age: 78
End: 2023-07-31

## 2023-07-31 ENCOUNTER — APPOINTMENT (EMERGENCY)
Dept: CT IMAGING | Facility: HOSPITAL | Age: 78
End: 2023-07-31
Payer: COMMERCIAL

## 2023-07-31 PROBLEM — R31.0 GROSS HEMATURIA: Status: ACTIVE | Noted: 2023-07-31

## 2023-07-31 LAB
ANION GAP SERPL CALCULATED.3IONS-SCNC: 11 MMOL/L
BACTERIA UR QL AUTO: ABNORMAL /HPF
BASOPHILS # BLD AUTO: 0.04 THOUSANDS/ÂΜL (ref 0–0.1)
BASOPHILS NFR BLD AUTO: 0 % (ref 0–1)
BILIRUB UR QL STRIP: NEGATIVE
BUN SERPL-MCNC: 13 MG/DL (ref 5–25)
CALCIUM SERPL-MCNC: 9.1 MG/DL (ref 8.4–10.2)
CHLORIDE SERPL-SCNC: 103 MMOL/L (ref 96–108)
CLARITY UR: ABNORMAL
CO2 SERPL-SCNC: 24 MMOL/L (ref 21–32)
COLOR UR: ABNORMAL
CREAT SERPL-MCNC: 0.98 MG/DL (ref 0.6–1.3)
EOSINOPHIL # BLD AUTO: 0.39 THOUSAND/ÂΜL (ref 0–0.61)
EOSINOPHIL NFR BLD AUTO: 4 % (ref 0–6)
ERYTHROCYTE [DISTWIDTH] IN BLOOD BY AUTOMATED COUNT: 18.1 % (ref 11.6–15.1)
GFR SERPL CREATININE-BSD FRML MDRD: 74 ML/MIN/1.73SQ M
GLUCOSE SERPL-MCNC: 129 MG/DL (ref 65–140)
GLUCOSE SERPL-MCNC: 147 MG/DL (ref 65–140)
GLUCOSE SERPL-MCNC: 209 MG/DL (ref 65–140)
GLUCOSE SERPL-MCNC: 221 MG/DL (ref 65–140)
GLUCOSE SERPL-MCNC: 249 MG/DL (ref 65–140)
GLUCOSE UR STRIP-MCNC: ABNORMAL MG/DL
HCT VFR BLD AUTO: 35 % (ref 36.5–49.3)
HGB BLD-MCNC: 10.5 G/DL (ref 12–17)
HGB UR QL STRIP.AUTO: ABNORMAL
IMM GRANULOCYTES # BLD AUTO: 0.04 THOUSAND/UL (ref 0–0.2)
IMM GRANULOCYTES NFR BLD AUTO: 0 % (ref 0–2)
KETONES UR STRIP-MCNC: NEGATIVE MG/DL
LEUKOCYTE ESTERASE UR QL STRIP: ABNORMAL
LYMPHOCYTES # BLD AUTO: 2.2 THOUSANDS/ÂΜL (ref 0.6–4.47)
LYMPHOCYTES NFR BLD AUTO: 24 % (ref 14–44)
MCH RBC QN AUTO: 24.5 PG (ref 26.8–34.3)
MCHC RBC AUTO-ENTMCNC: 30 G/DL (ref 31.4–37.4)
MCV RBC AUTO: 82 FL (ref 82–98)
MONOCYTES # BLD AUTO: 0.82 THOUSAND/ÂΜL (ref 0.17–1.22)
MONOCYTES NFR BLD AUTO: 9 % (ref 4–12)
NEUTROPHILS # BLD AUTO: 5.66 THOUSANDS/ÂΜL (ref 1.85–7.62)
NEUTS SEG NFR BLD AUTO: 63 % (ref 43–75)
NITRITE UR QL STRIP: NEGATIVE
NON-SQ EPI CELLS URNS QL MICRO: ABNORMAL /HPF
NRBC BLD AUTO-RTO: 0 /100 WBCS
PH UR STRIP.AUTO: 6.5 [PH]
PLATELET # BLD AUTO: 358 THOUSANDS/UL (ref 149–390)
PMV BLD AUTO: 9.3 FL (ref 8.9–12.7)
POTASSIUM SERPL-SCNC: 3.5 MMOL/L (ref 3.5–5.3)
PROT UR STRIP-MCNC: >=300 MG/DL
RBC # BLD AUTO: 4.28 MILLION/UL (ref 3.88–5.62)
RBC #/AREA URNS AUTO: ABNORMAL /HPF
SODIUM SERPL-SCNC: 138 MMOL/L (ref 135–147)
SP GR UR STRIP.AUTO: 1.01 (ref 1–1.03)
UROBILINOGEN UR QL STRIP.AUTO: 0.2 E.U./DL
WBC # BLD AUTO: 9.15 THOUSAND/UL (ref 4.31–10.16)
WBC #/AREA URNS AUTO: ABNORMAL /HPF

## 2023-07-31 PROCEDURE — 85025 COMPLETE CBC W/AUTO DIFF WBC: CPT

## 2023-07-31 PROCEDURE — 74176 CT ABD & PELVIS W/O CONTRAST: CPT

## 2023-07-31 PROCEDURE — NC001 PR NO CHARGE: Performed by: UROLOGY

## 2023-07-31 PROCEDURE — G1004 CDSM NDSC: HCPCS

## 2023-07-31 PROCEDURE — 99223 1ST HOSP IP/OBS HIGH 75: CPT | Performed by: INTERNAL MEDICINE

## 2023-07-31 PROCEDURE — 82948 REAGENT STRIP/BLOOD GLUCOSE: CPT

## 2023-07-31 RX ORDER — ACETAMINOPHEN 325 MG/1
650 TABLET ORAL EVERY 6 HOURS PRN
Status: DISCONTINUED | OUTPATIENT
Start: 2023-07-31 | End: 2023-08-01 | Stop reason: HOSPADM

## 2023-07-31 RX ORDER — METOPROLOL SUCCINATE 50 MG/1
50 TABLET, EXTENDED RELEASE ORAL DAILY
Status: DISCONTINUED | OUTPATIENT
Start: 2023-07-31 | End: 2023-08-01 | Stop reason: HOSPADM

## 2023-07-31 RX ORDER — FINASTERIDE 5 MG/1
5 TABLET, FILM COATED ORAL DAILY
Status: DISCONTINUED | OUTPATIENT
Start: 2023-07-31 | End: 2023-08-01 | Stop reason: HOSPADM

## 2023-07-31 RX ORDER — ZONISAMIDE 25 MG/1
50 CAPSULE ORAL DAILY
Status: DISCONTINUED | OUTPATIENT
Start: 2023-07-31 | End: 2023-08-01 | Stop reason: HOSPADM

## 2023-07-31 RX ORDER — PANTOPRAZOLE SODIUM 40 MG/1
40 TABLET, DELAYED RELEASE ORAL
Status: DISCONTINUED | OUTPATIENT
Start: 2023-07-31 | End: 2023-08-01 | Stop reason: HOSPADM

## 2023-07-31 RX ORDER — PREDNISOLONE ACETATE 10 MG/ML
1 SUSPENSION/ DROPS OPHTHALMIC 2 TIMES DAILY
Status: DISCONTINUED | OUTPATIENT
Start: 2023-07-31 | End: 2023-08-01 | Stop reason: HOSPADM

## 2023-07-31 RX ORDER — INSULIN LISPRO 100 [IU]/ML
1-6 INJECTION, SOLUTION INTRAVENOUS; SUBCUTANEOUS
Status: DISCONTINUED | OUTPATIENT
Start: 2023-07-31 | End: 2023-08-01 | Stop reason: HOSPADM

## 2023-07-31 RX ORDER — CEFTRIAXONE 1 G/50ML
1000 INJECTION, SOLUTION INTRAVENOUS EVERY 24 HOURS
Status: DISCONTINUED | OUTPATIENT
Start: 2023-07-31 | End: 2023-08-01 | Stop reason: HOSPADM

## 2023-07-31 RX ORDER — OXYBUTYNIN CHLORIDE 5 MG/1
10 TABLET, EXTENDED RELEASE ORAL
Status: DISCONTINUED | OUTPATIENT
Start: 2023-07-31 | End: 2023-08-01 | Stop reason: HOSPADM

## 2023-07-31 RX ORDER — AMOXICILLIN 250 MG
1 CAPSULE ORAL 2 TIMES DAILY
Status: DISCONTINUED | OUTPATIENT
Start: 2023-07-31 | End: 2023-08-01 | Stop reason: HOSPADM

## 2023-07-31 RX ORDER — ONDANSETRON 2 MG/ML
4 INJECTION INTRAMUSCULAR; INTRAVENOUS EVERY 6 HOURS PRN
Status: DISCONTINUED | OUTPATIENT
Start: 2023-07-31 | End: 2023-08-01 | Stop reason: HOSPADM

## 2023-07-31 RX ORDER — FLUTICASONE PROPIONATE 50 MCG
1 SPRAY, SUSPENSION (ML) NASAL DAILY
Status: DISCONTINUED | OUTPATIENT
Start: 2023-07-31 | End: 2023-08-01 | Stop reason: HOSPADM

## 2023-07-31 RX ORDER — OXYCODONE HYDROCHLORIDE 5 MG/1
5 TABLET ORAL EVERY 4 HOURS PRN
Status: DISCONTINUED | OUTPATIENT
Start: 2023-07-31 | End: 2023-08-01 | Stop reason: HOSPADM

## 2023-07-31 RX ORDER — OXYCODONE HYDROCHLORIDE 10 MG/1
10 TABLET ORAL EVERY 4 HOURS PRN
Status: DISCONTINUED | OUTPATIENT
Start: 2023-07-31 | End: 2023-08-01 | Stop reason: HOSPADM

## 2023-07-31 RX ORDER — LEVOTHYROXINE SODIUM 112 UG/1
112 TABLET ORAL
Status: DISCONTINUED | OUTPATIENT
Start: 2023-07-31 | End: 2023-08-01 | Stop reason: HOSPADM

## 2023-07-31 RX ORDER — ATORVASTATIN CALCIUM 40 MG/1
80 TABLET, FILM COATED ORAL
Status: DISCONTINUED | OUTPATIENT
Start: 2023-07-31 | End: 2023-08-01 | Stop reason: HOSPADM

## 2023-07-31 RX ORDER — POLYETHYLENE GLYCOL 3350 17 G/17G
17 POWDER, FOR SOLUTION ORAL DAILY PRN
Status: DISCONTINUED | OUTPATIENT
Start: 2023-07-31 | End: 2023-08-01 | Stop reason: HOSPADM

## 2023-07-31 RX ORDER — OXYBUTYNIN CHLORIDE 5 MG/1
5 TABLET ORAL 3 TIMES DAILY
Status: DISCONTINUED | OUTPATIENT
Start: 2023-07-31 | End: 2023-08-01 | Stop reason: HOSPADM

## 2023-07-31 RX ORDER — HYDROMORPHONE HCL/PF 1 MG/ML
0.5 SYRINGE (ML) INJECTION EVERY 6 HOURS PRN
Status: DISCONTINUED | OUTPATIENT
Start: 2023-07-31 | End: 2023-08-01 | Stop reason: HOSPADM

## 2023-07-31 RX ORDER — MAGNESIUM HYDROXIDE/ALUMINUM HYDROXICE/SIMETHICONE 120; 1200; 1200 MG/30ML; MG/30ML; MG/30ML
30 SUSPENSION ORAL EVERY 4 HOURS PRN
Status: DISCONTINUED | OUTPATIENT
Start: 2023-07-31 | End: 2023-08-01 | Stop reason: HOSPADM

## 2023-07-31 RX ORDER — ESCITALOPRAM OXALATE 10 MG/1
10 TABLET ORAL DAILY
Status: DISCONTINUED | OUTPATIENT
Start: 2023-07-31 | End: 2023-08-01 | Stop reason: HOSPADM

## 2023-07-31 RX ORDER — ALLOPURINOL 100 MG/1
100 TABLET ORAL DAILY
Status: DISCONTINUED | OUTPATIENT
Start: 2023-07-31 | End: 2023-08-01 | Stop reason: HOSPADM

## 2023-07-31 RX ADMIN — INSULIN LISPRO 2 UNITS: 100 INJECTION, SOLUTION INTRAVENOUS; SUBCUTANEOUS at 21:12

## 2023-07-31 RX ADMIN — PREDNISOLONE ACETATE 1 DROP: 10 SUSPENSION/ DROPS OPHTHALMIC at 09:01

## 2023-07-31 RX ADMIN — HYDROMORPHONE HYDROCHLORIDE 0.5 MG: 1 INJECTION, SOLUTION INTRAMUSCULAR; INTRAVENOUS; SUBCUTANEOUS at 21:10

## 2023-07-31 RX ADMIN — CYANOCOBALAMIN TAB 500 MCG 500 MCG: 500 TAB at 09:02

## 2023-07-31 RX ADMIN — METOPROLOL SUCCINATE 50 MG: 50 TABLET, EXTENDED RELEASE ORAL at 09:02

## 2023-07-31 RX ADMIN — PANTOPRAZOLE SODIUM 40 MG: 40 TABLET, DELAYED RELEASE ORAL at 09:02

## 2023-07-31 RX ADMIN — ESCITALOPRAM OXALATE 10 MG: 10 TABLET ORAL at 09:02

## 2023-07-31 RX ADMIN — PREDNISOLONE ACETATE 1 DROP: 10 SUSPENSION/ DROPS OPHTHALMIC at 17:48

## 2023-07-31 RX ADMIN — ALLOPURINOL 100 MG: 100 TABLET ORAL at 09:02

## 2023-07-31 RX ADMIN — OXYCODONE HYDROCHLORIDE 10 MG: 10 TABLET ORAL at 20:39

## 2023-07-31 RX ADMIN — OXYBUTYNIN CHLORIDE 10 MG: 5 TABLET, EXTENDED RELEASE ORAL at 21:10

## 2023-07-31 RX ADMIN — ZONISAMIDE 50 MG: 25 CAPSULE ORAL at 09:03

## 2023-07-31 RX ADMIN — OXYBUTYNIN CHLORIDE 5 MG: 5 TABLET ORAL at 20:19

## 2023-07-31 RX ADMIN — LEVOTHYROXINE SODIUM 112 MCG: 112 TABLET ORAL at 09:02

## 2023-07-31 RX ADMIN — POLYETHYLENE GLYCOL 3350 17 G: 17 POWDER, FOR SOLUTION ORAL at 20:03

## 2023-07-31 RX ADMIN — SENNOSIDES AND DOCUSATE SODIUM 1 TABLET: 50; 8.6 TABLET ORAL at 09:02

## 2023-07-31 RX ADMIN — FINASTERIDE 5 MG: 5 TABLET, FILM COATED ORAL at 09:02

## 2023-07-31 RX ADMIN — INSULIN LISPRO 3 UNITS: 100 INJECTION, SOLUTION INTRAVENOUS; SUBCUTANEOUS at 09:05

## 2023-07-31 RX ADMIN — ACETAMINOPHEN 650 MG: 325 TABLET, FILM COATED ORAL at 20:03

## 2023-07-31 RX ADMIN — CEFTRIAXONE 1000 MG: 1 INJECTION, SOLUTION INTRAVENOUS at 09:01

## 2023-07-31 RX ADMIN — ATORVASTATIN CALCIUM 80 MG: 40 TABLET, FILM COATED ORAL at 17:46

## 2023-07-31 RX ADMIN — SENNOSIDES AND DOCUSATE SODIUM 1 TABLET: 50; 8.6 TABLET ORAL at 17:46

## 2023-07-31 NOTE — ASSESSMENT & PLAN NOTE
Lab Results   Component Value Date    HGBA1C 6.8 (H) 06/19/2023       No results for input(s): "POCGLU" in the last 72 hours.     Blood Sugar Average: Last 72 hrs:  ISS

## 2023-07-31 NOTE — ASSESSMENT & PLAN NOTE
Patient with hx of BPH s/p recent photo vaporization of prostate in 7/13  Pt had failed voiding trial thus morales was left in place  Case was discussed with urology in the ED, recommended pt can remain here at [facility] in place  S/p CT scan  H/H stable, no anemia  S/p ua with pyuria and noted bladder wall thickening s/p CT scan thus will empirically txt with IV CTX. F/u urine cx  In setting of gross hematuria will hold asa/plavix. Cardiology had ok holding x 7 days prior to  surgery and even for 7 days prior to his upcoming retinal surgery (8/1).    Cont supportive care

## 2023-07-31 NOTE — DISCHARGE INSTRUCTIONS
You have been seen for hematuria. Return to the emergency department if you develop worsening bleeding, abdominal pain, vomiting, fevers or any other symptoms of concern. Please follow up with your urologist by calling the number provided.

## 2023-07-31 NOTE — PLAN OF CARE
Problem: PAIN - ADULT  Goal: Verbalizes/displays adequate comfort level or baseline comfort level  Description: Interventions:  - Encourage patient to monitor pain and request assistance  - Assess pain using appropriate pain scale  - Administer analgesics based on type and severity of pain and evaluate response  - Implement non-pharmacological measures as appropriate and evaluate response  - Consider cultural and social influences on pain and pain management  - Notify physician/advanced practitioner if interventions unsuccessful or patient reports new pain  Outcome: Progressing     Problem: INFECTION - ADULT  Goal: Absence or prevention of progression during hospitalization  Description: INTERVENTIONS:  - Assess and monitor for signs and symptoms of infection  - Monitor lab/diagnostic results  - Monitor all insertion sites, i.e. indwelling lines, tubes, and drains  - Monitor endotracheal if appropriate and nasal secretions for changes in amount and color  - [place] appropriate cooling/warming therapies per order  - Administer medications as ordered  - Instruct and encourage patient and family to use good hand hygiene technique  - Identify and instruct in appropriate isolation precautions for identified infection/condition  Outcome: Progressing  Goal: Absence of fever/infection during neutropenic period  Description: INTERVENTIONS:  - Monitor WBC    Outcome: Progressing     Problem: Knowledge Deficit  Goal: Patient/family/caregiver demonstrates understanding of disease process, treatment plan, medications, and discharge instructions  Description: Complete learning assessment and assess knowledge base.   Interventions:  - Provide teaching at level of understanding  - Provide teaching via preferred learning methods  Outcome: Progressing     Problem: Potential for Falls  Goal: Patient will remain free of falls  Description: INTERVENTIONS:  - Educate patient/family on patient safety including physical limitations  - Instruct patient to call for assistance with activity   - Consult OT/PT to assist with strengthening/mobility   - Keep Call bell within reach  - Keep bed low and locked with side rails adjusted as appropriate  - Keep care items and personal belongings within reach  - Initiate and maintain comfort rounds  - Make Fall Risk Sign visible to staff  - Offer Toileting every 2 Hours, in advance of need  - Initiate/Maintain bed/chair alarm  - Apply yellow socks and bracelet for high fall risk patients  - Consider moving patient to room near nurses station  Outcome: Progressing     Problem: DISCHARGE PLANNING - CARE MANAGEMENT  Goal: Discharge to post-acute care or home with appropriate resources  Description: INTERVENTIONS:  - Conduct assessment to determine patient/family and health care team treatment goals, and need for post-acute services based on payer coverage, community resources, and patient preferences, and barriers to discharge  - Address psychosocial, clinical, and financial barriers to discharge as identified in assessment in conjunction with the patient/family and health care team  - Arrange appropriate level of post-acute services according to patient’s   needs and preference and payer coverage in collaboration with the physician and health care team  - Communicate with and update the patient/family, physician, and health care team regarding progress on the discharge plan  - Arrange appropriate transportation to post-acute venues  Outcome: Progressing

## 2023-07-31 NOTE — UTILIZATION REVIEW
Initial Clinical Review    Admission: Date/Time/Statement:     OBSERVATION 7-31-23 CHANGED TO INPATIENT FOR CONTINUED CBI AND IV ANTIBIOTICS. Admission Orders (From admission, onward)     Ordered        07/31/23 1319  Inpatient Admission  Once            07/31/23 0547  Place in Observation  Once                        ED Arrival Information     Expected   -    Arrival   7/30/2023 23:20    Acuity   Urgent            Means of arrival   Wheelchair    Escorted by   Family Member    Service   Hospitalist    Admission type   Emergency            Arrival complaint   Blood in urine Catheter           Chief Complaint   Patient presents with   • Blood in Urine     Had surgery on enlarged prostate last week, had morales ever since. (+)thinners. Presents w/ dark red urine filled morales bag, bleeding from penis also w/ clots present. Initial Presentation: 69 y.o. male presents to ed from home for evaluation and treatment of hematuria s/p prostatectomy on 7-13. 6 days post op patient failed voiding trial and morales remains in place. He has since resumed plavix and aspirin for CAD. PMHX: also includes HTN, DM2  Clinical assessment significant for red turbid urine, Wbc 11.18, imaging indicating cystitis. CBI initiated. Admit to observation. Date: 7-31-23 observation changed to inpatient   Urology consult completed. No plan for surgery at this time. Trend HH, continue CBI, hand irrigate if visible clots seen. Hold plavix and aspirin. Continue ditropan, proscar. Continue empiriv iv ceftriaxone. Date 8-1-23  Day 2 inpatient   Urine clear. CBI clamped. Leave existing 3 way catheter in place until office visit. Continue proscar and ditropan xl for bladder spasms, hold plavix and aspirin for 2 days then restart.     Hematuria which was present on admission, hematuria likely secondary to urinary tract infection versus catheter manipulation in the setting of antiplatelet therapy, it is not likely a complication of prostate procedure on July 13. Discharged to home. ED Triage Vitals   07/30/23 2339 07/30/23 2339 07/30/23 2339 07/30/23 2339 07/30/23 2339   98.2 °F (36.8 °C) 72 20 122/68 95 %      Temporal Monitor         No Pain          07/31/23 96 kg (211 lb 10.3 oz)     Additional Vital Signs:     Patient Vitals for the past 24 hrs:   BP Temp Pulse Resp SpO2   07/31/23 1445 133/63 97.8 °F (36.6 °C) -- 15 --   07/31/23 0714 138/62 97.8 °F (36.6 °C) 71 17 94 %   07/31/23 0501 164/66 -- 76 22 92 %   07/31/23 0200 149/73 -- 72 19 95 %   07/30/23 2339 122/68 98.2 °F (36.8 °C) 72 20 95 %       Pertinent Labs/Diagnostic Test Results:     CT abdomen pelvis wo contrast   Final  (07/31 0432)      1. Prostatic/urinary bladder hemorrhage/hematoma. Jose catheter in place. 2.  Moderate thickening of the urinary bladder wall, correlate with urinalysis for cystitis. Results from last 7 days   Lab Units 07/30/23  2344   WBC Thousand/uL 11.18*   HEMOGLOBIN g/dL 12.1   HEMATOCRIT % 41.2   PLATELETS Thousands/uL 443*   NEUTROS ABS Thousands/µL 6.75         Results from last 7 days   Lab Units 07/30/23  2344   SODIUM mmol/L 138   POTASSIUM mmol/L 3.5   CHLORIDE mmol/L 103   CO2 mmol/L 24   ANION GAP mmol/L 11   BUN mg/dL 13   CREATININE mg/dL 0.98   EGFR ml/min/1.73sq m 74   CALCIUM mg/dL 9.1         Results from last 7 days   Lab Units 07/31/23  1534 07/31/23  1200 07/31/23  0726   POC GLUCOSE mg/dl 129 147* 249*     Results from last 7 days   Lab Units 07/30/23  2344   GLUCOSE RANDOM mg/dL 209*                 Results from last 7 days   Lab Units 07/30/23  2346   CLARITY UA  Turbid   COLOR UA  Red   SPEC GRAV UA  1.015   PH UA  6.5   GLUCOSE UA mg/dl >=1000 (1%)*   KETONES UA mg/dl Negative   BLOOD UA  Large*   PROTEIN UA mg/dl >=300*   NITRITE UA  Negative   BILIRUBIN UA  Negative   UROBILINOGEN UA E.U./dl 0.2   LEUKOCYTES UA  Elevated glucose may cause decreased leukocyte values.  See urine microscopic for [facility] result* WBC UA /hpf Field obscured, unable to enumerate*   RBC UA /hpf Innumerable*   BACTERIA UA /hpf Field obscured, unable to enumerate*   EPITHELIAL CELLS WET PREP /hpf Field obscured, unable to enumerate*         ED Treatment:   Medication Administration from 07/30/2023 2320 to 07/31/2023 1279     None        Past Medical History:   Diagnosis Date   • At risk for falls     unsteady gait, fall risk, uses walker   • BPH (benign prostatic hyperplasia)    • CAD (coronary artery disease)    • Cancer (HCC)     basal cell   • Chronic kidney disease     stage 3   • Colon polyp    • Coronary artery disease    • CPAP (continuous positive airway pressure) dependence    • Diabetes mellitus (HCC)     niddm   • Disease of thyroid gland    • Gout    • High cholesterol    • History of transfusion     GI bleed, no reaction   • Hypertension    • Hypomagnesemia 03/09/2023   • MI, 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    • MRSA infection     wound and urine   • Myocardial infarction ([address]) 2014   • S/P cardiac catheterization 12/30/2022   • Sleep apnea      Present on Admission:  • Type 2 diabetes mellitus with hyperglycemia, without long-term current use of insulin ([facility])  • Hyperlipidemia  • Hypertension  • Class 1 obesity in adult  • Coronary artery disease of native artery of native heart with stable angina pectoris ([facility])  • Stage 3a chronic kidney disease (HCC)      Admitting Diagnosis:     Blood in urine [R31.9]  Gross hematuria [R31.0]    Age/Sex: 69 y.o. male    Scheduled Medications:  allopurinol, 100 mg, Oral, Daily  atorvastatin, 80 mg, Oral, Daily With Dinner  cefTRIAXone, 1,000 mg, Intravenous, Q24H  cyanocobalamin, 500 mcg, Oral, Daily  escitalopram, 10 mg, Oral, Daily  finasteride, 5 mg, Oral, Daily  fluticasone, 1 spray, Nasal, Daily  insulin lispro, 1-6 Units, Subcutaneous, TID AC  insulin lispro, 1-6 Units, Subcutaneous, HS  levothyroxine, 112 mcg, Oral, Early Morning  metoprolol succinate, 50 mg, Oral, Daily  oxybutynin, 10 mg, Oral, HS  pantoprazole, 40 mg, Oral, Daily Before Breakfast  prednisoLONE acetate, 1 drop, Right Eye, BID  senna-docusate sodium, 1 tablet, Oral, BID  zonisamide, 50 mg, Oral, Daily      Continuous IV Infusions:     PRN Meds:  acetaminophen, 650 mg, Oral, Q6H PRN  aluminum-magnesium hydroxide-simethicone, 30 mL, Oral, Q4H PRN  ondansetron, 4 mg, Intravenous, Q6H PRN  polyethylene glycol, 17 g, Oral, Daily PRN        IP CONSULT TO UROLOGY  IP CONSULT TO CASE MANAGEMENT    Network Utilization Review Department  ATTENTION: Please call with any questions or concerns to [phone] and carefully listen to the prompts so that you are directed to the right person. All voicemails are confidential.  Theta Mc all requests for admission clinical reviews, approved or denied determinations and any other requests to dedicated fax number below belonging to the campus where the patient is receiving treatment.  List of dedicated fax numbers for the Facilities:  [place] DENIALS (Administrative/Medical Necessity) [phone] [address] [facility] (Maternity/NICU/Pediatrics) [phone]   [address] [place] [address] [phone]   [facility] [address] [facility] [phone] [address] [facility] [address] [facility] [address] [facility] [address] [facility] [address] [place], [place] [id number] [facility] [phone]   [address] [facility] [address] [facility]  [address] [phone]

## 2023-07-31 NOTE — H&P
[address]  H&P  Name: Rosemarie Gates 69 y.o. male I MRN: [id number]  Unit/Bed#: RM10 I Date of Admission: 7/30/2023   Date of Service: 7/31/2023 I Hospital Day: 0      Assessment/Plan   * Gross hematuria  Assessment & Plan  Patient with hx of BPH s/p recent photo vaporization of prostate in 7/13  Pt had failed voiding trial thus morales was left in place  Case was discussed with urology in the ED, recommended pt can remain here at [facility] in place  S/p CT scan  H/H stable, no anemia  S/p ua with pyuria and noted bladder wall thickening s/p CT scan thus will empirically txt with IV CTX. F/u urine cx  In setting of gross hematuria will hold asa/plavix. Cardiology had ok holding x 7 days prior to  surgery and even for 7 days prior to his upcoming retinal surgery (8/1). Cont supportive care    Stage 3a chronic kidney disease [facility])  Assessment & Plan  Lab Results   Component Value Date    EGFR 74 07/30/2023    EGFR 57 06/19/2023    EGFR 62 04/28/2023    CREATININE 0.98 07/30/2023    CREATININE 1.22 06/19/2023    CREATININE 1.14 04/28/2023   Cr w/in baseline range    Coronary artery disease of native artery of native heart with stable angina pectoris ([address])  Assessment & Plan  Hx of PCI on DAPT. Both on hold in setting of gross hematuria. Per cardiology had okd been held prior to prostate surgery and even for upcoming eye surgery to occur on 8/1  No acute issues. Pt is cp free  Resume as soon as ok per urology    Class 1 obesity in adult  Assessment & Plan  Lifestyle/dietary modification    Hypertension  Assessment & Plan  Stable    Hyperlipidemia  Assessment & Plan  Cont statin    Type 2 diabetes mellitus with hyperglycemia, without long-term current use of insulin (HCC)  Assessment & Plan  Lab Results   Component Value Date    HGBA1C 6.8 (H) 06/19/2023       No results for input(s): "POCGLU" in the last 72 hours.     Blood Sugar Average: Last 72 hrs:  ISS        VTE Prophylaxis: Pharmacologic VTE Prophylaxis contraindicated due to Hematuria  / sequential compression device   Code Status: Full code  POLST: There is no POLST form on file for this patient (pre-hospital)  Discussion with family: Plan of care discussed with patient and his family    Anticipated Length of Stay:  Patient will be admitted on an Observation basis with an anticipated length of stay of less than 2 midnights. Justification for Hospital Stay: Hematuria    Total Time for Visit, including Counseling / Coordination of Care: 60 minutes. Greater than 50% of this total time spent on direct patient counseling and coordination of care. Chief Complaint:   Hematuria    History of Present Illness:    Ezekiel Sanchez is a 69 y.o. male medical history significant for coronary artery disease, hypertension, diabetes type 2, BPH status post recent laser surgery presenting with hematuria. As mentioned patient had laser prostatectomy on 7/13. Unfortunately 6 days postop patient failed voiding trial thus Jose has remained in place. He reports of occurrence of hematuria yesterday. He had since resumed back on his Plavix and aspirin without any adverse effects. He denies any traumatic dislodgment of the Jose. He denies fever or chills, flank pain. Upon presentation to the ER, CBI has been initiated. Case was discussed with urology recommended to admit patient for observation here at [facility]    Review of Systems:    Review of Systems   Genitourinary: Positive for difficulty urinating and hematuria. All other systems reviewed and are negative.       Past Medical and Surgical History:     Past Medical History:   Diagnosis Date   • At risk for falls     unsteady gait, fall risk, uses walker   • BPH (benign prostatic hyperplasia)    • CAD (coronary artery disease)    • Cancer (HCC)     basal cell   • Chronic kidney disease     stage 3   • Colon polyp    • Coronary artery disease    • CPAP (continuous positive airway pressure) dependence    • Diabetes mellitus ([facility])     niddm   • Disease of thyroid gland    • Gout    • High cholesterol    • History of transfusion     GI bleed, no reaction   • Hypertension    • Hypomagnesemia 03/09/2023   • MI, 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    • MRSA infection     wound and urine   • Myocardial infarction [facility]) 2014   • S/P cardiac catheterization 12/30/2022   • Sleep apnea        Past Surgical History:   Procedure Laterality Date   • BASAL CELL CARCINOMA EXCISION Right 1/21/2022    Procedure: EXCISION BASAL CELL CARCINOMA EXTREMITY;  Surgeon: Shalonda Crandall DO;  Location: [facility] MAIN OR;  Service: General   • CARDIAC CATHETERIZATION Left 12/29/2022    Procedure: Cardiac Left Heart Cath;  Surgeon: Tita Goncalves MD;  Location: [facility] CARDIAC CATH LAB; Service: Cardiology   • CARDIAC CATHETERIZATION N/A 12/29/2022    Procedure: Cardiac Coronary Angiogram;  Surgeon: Tita Goncalves MD;  Location: [facility] CARDIAC CATH [facility]; Service: Cardiology   • CARDIAC CATHETERIZATION N/A 12/29/2022    Procedure: Cardiac pci;  Surgeon: Tita Goncalves MD;  Location: [facility] CARDIAC CATH LAB; Service: Cardiology   • CATARACT EXTRACTION W/  INTRAOCULAR LENS IMPLANT     • CORNEAL TRANSPLANT     • CORONARY ANGIOPLASTY WITH STENT PLACEMENT      stents x3-- [id number]   • EYE SURGERY      repair corneal laceration   • ND COLONOSCOPY FLX DX W/COLLJ SPEC WHEN PFRMD N/A 3/21/2016    Procedure: COLONOSCOPY;  Surgeon: Lisy Levy MD;  Location: [facility] MAIN OR;  Service: Colorectal   • TONSILLECTOMY AND ADENOIDECTOMY     • TRANSURETHRAL RESECTION OF PROSTATE N/A 7/13/2023    Procedure: Barbara Lebron;  Surgeon: Zaki Lobo MD;  Location:  MAIN OR;  Service: Urology       Meds/Allergies:    Prior to Admission medications    Medication Sig Start Date End Date Taking?  Authorizing Provider   acetaminophen (TYLENOL) 325 mg tablet Take 2 tablets (650 mg total) by mouth every 6 (six) hours as needed for mild pain, fever or headaches Do not exceed a total of 3 grams of tylenol/acetaminophen in a 24-hour period. 3/22/23   Abida Bean, DO   allopurinol (ZYLOPRIM) 100 mg tablet Take 1 tablet (100 mg total) by mouth daily 6/1/23   Jesenia Overton DO   aluminum-magnesium hydroxide-simethicone (MYLANTA) 8265-9937-963 mg/30 mL suspension Take 30 mL by mouth every 4 (four) hours as needed for indigestion or heartburn 3/22/23   Abida Bean, DO   aspirin (ECOTRIN LOW STRENGTH) 81 mg EC tablet Take 1 tablet (81 mg total) by mouth daily Do not start before March 23, 2023. 3/23/23   Ricardo Greco, DO   atorvastatin (LIPITOR) 80 mg tablet Take 1 tablet (80 mg total) by mouth daily with dinner 7/17/23   Jesenia Overton, DO   Blood Glucose Monitoring Suppl (OneTouch Verio Reflect) w/Device KIT Check blood sugars twice daily. Please substitute with appropriate alternative as covered by patient's insurance. Dx: E11.65 11/21/22   Jesenia Overton, DO   clopidogrel (PLAVIX) 75 mg tablet Take 1 tablet (75 mg total) by mouth daily 5/26/23   Jesenia Overton DO   cyanocobalamin (VITAMIN B-12) 500 MCG tablet Take 1 tablet (500 mcg total) by mouth daily 6/1/23   Jesenia Overton DO   Empagliflozin (Jardiance) 10 MG TABS tablet Take 1 tablet (10 mg total) by mouth every morning 5/2/23   Olesya Winchester, DO   escitalopram (LEXAPRO) 20 mg tablet Take 0.5 tablets (10 mg total) by mouth daily 7/17/23   Jesenia Overton DO   finasteride (PROSCAR) 5 mg tablet Take 1 tablet (5 mg total) by mouth daily 5/10/23   Lary Crigler, MD   fluticasone [address]) 50 mcg/act nasal spray 1 spray into each nostril daily Do not start before March 23, 2023. 3/23/23   Abida Bean, DO   glimepiride (AMARYL) 4 mg tablet TAKE 1 TABLET TWICE A DAY 7/3/23   Jesenia Overton, DO   glucose blood (OneTouch Verio) test strip Check blood sugars twice daily. Please substitute with appropriate alternative as covered by patient's insurance.  Dx: E11.65 11/21/22   Jesenia Overton, DO levothyroxine 112 mcg tablet Take 1 tablet (112 mcg total) by mouth daily in the early morning 5/26/23   Ralph Maulik, DO   metFORMIN (GLUCOPHAGE) 1000 MG tablet Take 1 tablet (1,000 mg total) by mouth 2 (two) times a day with meals 6/1/23   Ralph Maulik, DO   metoprolol succinate (TOPROL-XL) 50 mg 24 hr tablet Take 1 tablet (50 mg total) by mouth daily 5/16/23   Bart Bojorquez MD   multivitamin [facility]) TABS Take 1 tablet by mouth daily    Historical Provider, MD   OneTouch Delica Lancets 74W MISC Check blood sugars twice daily. Please substitute with appropriate alternative as covered by patient's insurance. Dx: E11.65 11/21/22   Ralph Maulik, DO   oxybutynin (DITROPAN-XL) 10 MG 24 hr tablet Take 1 tablet (10 mg total) by mouth daily at bedtime 6/7/23   Faviola Sandoval MD   pantoprazole (PROTONIX) 40 mg tablet Take 1 tablet (40 mg total) by mouth daily before breakfast 5/25/23   Ralph Maulik, DO   polyethylene glycol (MIRALAX) 17 g packet Take 17 g by mouth daily as needed (constipation) 3/22/23   Tiffany Bean DO   Prolensa 0.07 % SOLN Right eye as directed 7/25/23   Historical Provider, MD   senna-docusate sodium (SENOKOT S) 8.6-50 mg per tablet Take 1 tablet by mouth 2 (two) times a day Hold for diarrhea or loose stools 3/22/23   Tiffany Bean DO   torsemide (DEMADEX) 10 mg tablet Take 10 mg by mouth if needed    Historical Provider, MD   zonisamide (ZONEGRAN) 50 MG capsule Take 1 tablet (50 mg total) by mouth daily 9/8/22   Sophy Fernandez MD   oxybutynin (DITROPAN) 5 mg tablet Take 1 tablet (5 mg total) by mouth 3 (three) times a day  Patient not taking: Reported on 6/19/2023 4/22/23 7/31/23  Lizet Girard MD   topiramate (Topamax) 25 mg tablet Take 1 tablet (25 mg total) by mouth daily X 1 week then discontinue 10/7/21 10/7/21  Sophy Fernandez MD     I have reviewed home medications using allscripts.     Allergies: No Known Allergies    Social History: Marital Status: /Civil Union   Occupation:   Patient Pre-hospital Living Situation: Resides with wife. He is in the process of moving to [place] permanently  Patient Pre-hospital Level of Mobility: Independent  Patient Pre-hospital Diet Restrictions: None  Substance Use History:   Social History     Substance and Sexual Activity   Alcohol Use Yes    Comment: Occassional (holidays) social sometimes     Social History     Tobacco Use   Smoking Status Never   Smokeless Tobacco Never     Social History     Substance and Sexual Activity   Drug Use No       Family History:    Family History   Adopted: Yes   Problem Relation Age of Onset   • No Known Problems Father    • No Known Problems Mother        Physical Exam:     Vitals:   Blood Pressure: 164/66 (07/31/23 0501)  Pulse: 76 (07/31/23 0501)  Temperature: 98.2 °F (36.8 °C) (07/30/23 2339)  Temp Source: Temporal (07/30/23 2339)  Respirations: 22 (07/31/23 0501)  SpO2: 92 % (07/31/23 0501)    Physical Exam  Constitutional:       Appearance: He is obese. Cardiovascular:      Rate and Rhythm: Normal rate and regular rhythm. Pulses: Normal pulses. Heart sounds: Murmur heard. Pulmonary:      Effort: Pulmonary effort is normal. No respiratory distress. Breath sounds: Normal breath sounds. No wheezing or rales. Abdominal:      General: Bowel sounds are normal. There is no distension. Palpations: Abdomen is soft. Tenderness: There is no abdominal tenderness. There is no guarding. Musculoskeletal:         General: Normal range of motion. Cervical back: Normal range of motion and neck supple. Right lower leg: No edema. Left lower leg: No edema. Skin:     General: Skin is warm and dry. Neurological:      General: No focal deficit present. Mental Status: He is alert and oriented to person, place, and time. Mental status is at baseline. Cranial Nerves: No cranial nerve deficit. Motor: No weakness. Additional Data:     Lab Results: I have personally reviewed pertinent reports. Results from last 7 days   Lab Units 07/30/23  2344   WBC Thousand/uL 11.18*   HEMOGLOBIN g/dL 12.1   HEMATOCRIT % 41.2   PLATELETS Thousands/uL 443*   NEUTROS PCT % 62   LYMPHS PCT % 26   MONOS PCT % 8   EOS PCT % 4     Results from last 7 days   Lab Units 07/30/23  2344   SODIUM mmol/L 138   POTASSIUM mmol/L 3.5   CHLORIDE mmol/L 103   CO2 mmol/L 24   BUN mg/dL 13   CREATININE mg/dL 0.98   ANION GAP mmol/L 11   CALCIUM mg/dL 9.1   GLUCOSE RANDOM mg/dL 209*                       Imaging: I have personally reviewed pertinent reports. CT abdomen pelvis wo contrast   Final Result by Bairon Baker MD (14/83 0051)      1. Prostatic/urinary bladder hemorrhage/hematoma. Jose catheter in place. 2.  Moderate thickening of the urinary bladder wall, correlate with urinalysis for cystitis. Workstation performed: [id number]             EKG, Pathology, and Other Studies Reviewed on Admission:   · EKG:     [facility] Yes     ** Please Note: This note has been constructed using a voice recognition system.  **

## 2023-07-31 NOTE — ASSESSMENT & PLAN NOTE
Lab Results   Component Value Date    EGFR 74 07/30/2023    EGFR 57 06/19/2023    EGFR 62 04/28/2023    CREATININE 0.98 07/30/2023    CREATININE 1.22 06/19/2023    CREATININE 1.14 04/28/2023   Cr w/in baseline range

## 2023-07-31 NOTE — TELEPHONE ENCOUNTER
PT under care of:  D'Amico     Pt last seen: 07/19/23     PT calling today because/symptoms are: Pt's wife called and wanted to make sure that Dr. Krista Lanier knows that the patient is in [facility] [place], operated by [facility] for gross hematuria. He had a TURBT done and started back on his plavix on Friday and was taken to the ER for blood in his urine and they give him saline and it stopped but then started right back up again and was admitted. She didn't know of Dr. Krista Lanier wanted him transferred to [facility] to monitor.       Pt's wife can be reached at: [phone]

## 2023-07-31 NOTE — PROGRESS NOTES
Universal Protocol:  Consent: Verbal consent obtained. Consent given by: patient  Patient understanding: patient states understanding of the procedure being performed  Patient consent: the patient's understanding of the procedure matches consent given  Patient identity confirmed: verbally with patient      Bladder catheterization    Date/Time: 7/28/2023 9:30 AM    Performed by: Frankey Archer, LPN  Authorized by: Carol Obrien MD    Patient location:  Other (comment)  Consent:     Consent given by:  Patient  Universal protocol:     Procedure explained and questions answered to patient or proxy's satisfaction: yes      Patient identity confirmed:  Verbally with patient  Procedure details:     Bladder irrigation: no      Catheter insertion:  Indwelling    Approach: natural orifice      Catheter type:  Coude, Jose and latex    Catheter size:  16 Fr    Number of attempts:  1    Successful placement: yes      Urine characteristics:  Yellow  Post-procedure details:     Patient tolerance of procedure:   Tolerated well, no immediate complications

## 2023-07-31 NOTE — ASSESSMENT & PLAN NOTE
Hx of PCI on DAPT. Both on hold in setting of gross hematuria. Per cardiology had okd been held prior to prostate surgery and even for upcoming eye surgery to occur on 8/1  No acute issues.  Pt is cp free  Resume as soon as ok per urology

## 2023-07-31 NOTE — CASE MANAGEMENT
Case Management Assessment & Discharge Planning Note    Patient name Mikal Lopez  Location [address] [facility] [place] 196/509-25 MRN [id number]  : 1945 Date 2023       Current Admission Date: 2023  Current Admission Diagnosis:Gross hematuria   Patient Active Problem List    Diagnosis Date Noted   • Gross hematuria 2023   • Preop cardiovascular exam 2023   • CPAP (continuous positive airway pressure) dependence    • H/O heart artery stent    • Benign prostatic hyperplasia with urinary retention 2023   • Low serum cortisol level 2023   • Hot flashes 2023   • Elevated platelet count    • Hypothyroidism 2023   • Diverticulosis of colon with hemorrhage 2023   • Elevated TSH 2023   • Elevated d-dimer 2023   • Urinary retention 2023   • Acute blood loss anemia 2023   • Acute GI bleeding 2023   • Diabetes mellitus, type 2 ([address]) 2023   • Acute diverticulitis 2023   • Orthostatic hypotension 2023   • 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   • Peripheral arterial disease ([address]) 2023   • S/P cardiac catheterization 2022   • Stage 3a chronic kidney disease ([address]) 2022   • Basal cell carcinoma (BCC) of right shoulder 2022   • Chronic diastolic (congestive) heart failure ([address]) 01/10/2022   • Skin lesion 2021   • Other chest pain 2021   • Leukocytosis 2021   • Bilateral lower extremity edema 10/22/2021   • Mild cognitive impairment 2021   • Benign hypertension with CKD (chronic kidney disease) stage III ([address]) 02/10/2021   • Diabetic nephropathy associated with type 2 diabetes mellitus ([address]) 02/10/2021   • Concentric left ventricular hypertrophy 02/10/2021   • Aortic stenosis, moderate 02/10/2021   • Edema 02/10/2021   • Fall (on) (from) other stairs and steps, initial encounter 2020   • Recurrent major depressive disorder ([address]) 2020   • Primary osteoarthritis of right knee 05/21/2020   • Primary osteoarthritis of left knee 05/21/2020   • Coronary artery disease of native artery of native heart with stable angina pectoris ([address]) 03/18/2020   • Chronic pain of both knees 02/13/2020   • Primary osteoarthritis of both knees 02/13/2020   • Primary osteoarthritis involving multiple joints 12/16/2019   • Vitamin E deficiency 09/19/2019   • Infected epidermoid cyst 12/04/2018   • Tremor, essential 09/26/2018   • Diabetic neuropathy associated with diabetes mellitus due to underlying condition ([address]) 09/26/2018   • Gait instability 09/26/2018   • Frequent falls 09/26/2018   • Degenerative disc disease, lumbar 07/10/2018   • Tremor 10/10/2016   • Depression with anxiety 08/29/2016   • Arthritis 07/13/2015   • NSTEMI (non-ST elevated myocardial infarction) ([address]) 10/06/2014   • Gout 05/17/2013   • Type 2 diabetes mellitus with hyperglycemia, without long-term current use of insulin ([address]) 06/18/2012   • Hyperlipidemia 06/18/2012   • Hypertension 06/18/2012   • Class 1 obesity in adult 06/18/2012      LOS (days): 0  Geometric Mean LOS (GMLOS) (days):   Days to GMLOS:     OBJECTIVE:              Current admission status: Observation       Preferred Pharmacy:   [address] [place] [address], [address]   [address] [place] [id number]  Phone: [phone] Fax: [phone]    Primary Care Provider: Johnny Ceballos DO    Primary Insurance: [facility]  Secondary Insurance:     ASSESSMENT:  [address], [address] [facility] Representative - Spouse   Primary Phone: [phone] [facility])               Advance Directives  Does patient have a [address]?: No  Does patient have Advance Directives?: No              Patient Information  Mental Status: Alert  During Assessment patient was accompanied by: Not accompanied during assessment  Assessment information provided by[de-identified] Patient  Primary Caregiver: Self  Support Systems: Spouse/significant other, Daughter, Family members  What city do you live in?: [facility] entry access options.  Select all that apply.: Stairs  Type of Current Residence: 2 story home  Upon entering residence, is there a bedroom on the main floor (no further steps)?: Yes  Upon entering residence, is there a bathroom on the main floor (no further steps)?: Yes  In the last 12 months, was there a time when you were not able to pay the mortgage or rent on time?: No  In the last 12 months, how many places have you lived?: 1  In the last 12 months, was there a time when you did not have a steady place to sleep or slept in a shelter (including now)?: No  Living Arrangements: Lives w/ Spouse/significant other    Activities of Daily Living Prior to Admission  Functional Status: Independent  Completes ADLs independently?: Yes  Ambulates independently?: Yes  Does patient use assisted devices?: Yes  Assisted Devices (DME) used: Cathy Meo  Does patient currently own DME?: Yes  What DME does the patient currently own?: [employer]  Does patient have a history of Outpatient Therapy (PT/OT)?: No  Does the patient have a history of Short-Term Rehab?: Yes (  [facility])  Does patient have a history of HHC?: Yes ( [facility])         Patient Information Continued  Income Source: Pension/nursing home  Does patient have prescription coverage?: Yes  Within the past 12 months, you worried that your food would run out before you got the money to buy more.: Never true  Within the past 12 months, the food you bought just didn't last and you didn't have money to get more.: Never true  Does patient receive dialysis treatments?: No  Does patient have a history of substance abuse?: No  Does patient have a history of Mental Health Diagnosis?: No         Means of Transportation  Means of Transport to Appts[de-identified] Drives Self  In the past 12 months, has lack of transportation kept you from medical appointments or from getting medications?: No  In the past 12 months, has lack of transportation kept you from meetings, work, or from getting things needed for daily living?: No        DISCHARGE DETAILS:    Discharge planning discussed with[de-identified] pt  Freedom of Choice: Yes                   Contacts  Patient Contacts: Idris Santillan (wife), Payal Reyes (daughter), and Teresa Agnel (daughter)  Relationship to Patient[de-identified] Family  Contact Method: Phone  Phone Number: Arelis Linares  Reason/Outcome: Continuity of Care, Emergency Contact, Discharge Planning                   Would you like to participate in our [address] service program?  : No - Declined                                                 Additional Comments: pt is moving to [place], plans to leave after discharge     CM reviewed d/c planning process including the following: identifying help at home, patient preference for d/c planning needs, [facility] Meds to Bed program, availability of treatment team to discuss questions or concerns patient and/or family may have regarding understanding medications and recognizing signs and symptoms once discharged. CM also encouraged patient to follow up with all recommended appointments after discharge. Patient advised of importance for patient and family to participate in managing patient’s medical well being.

## 2023-07-31 NOTE — PROGRESS NOTES
7/28/2023    Lendell Dys  1945  [id number]    Diagnosis  Chief Complaint    void trial          Patient presents for voiding trial managed by [facility]  Pt to follow up at next office visit with Dr. Elizabeth Srinivasan.     Procedure Jose removal/voiding trial    Instilled 220 cc of sterile water for voiding trial. Jose catheter removed after deflation of an intact balloon. Pt unable to voide. Cindi De Jesus Dr Jose catheter placed, urine returned, 10cc balloon inflated. Patient tolerated well.        Recent Results (from the past 4 hour(s))   Fingerstick Glucose (POCT)    Collection Time: 07/31/23 12:00 PM   Result Value Ref Range    POC Glucose 147 (H) 65 - 140 mg/dl           Vitals:    07/28/23 0907   Pulse: 73   Temp: 98.2 °F (36.8 °C)   SpO2: 97%   Weight: 96.2 kg (212 lb)   Height: 5' 9" (1.753 m)           Edilson Cote LPN

## 2023-07-31 NOTE — CONSULTS
Consultation - UROLOGY  Dory Rodriguez 69 y.o. male MRN: [id number]  Unit/Bed#: 401-01 Encounter: [id number]    Assessment/Plan     Assessment:  Gross hematuria developed yesterday  History of BPH  Status post recent photo vaporization of prostate July 13 by Dr. George Morales at [facility] in [place]. Failed postoperative voiding trial and he was discharged with Jose catheter in place. Hemoglobin stable, no anemia. CBI bladder irrigation was initiated in ED. Still running moderate bright red without visible clots or tubing blocked. CT abdomen pelvis without IV contrast 7/31/2023 showed prostatic/urinary bladder hemorrhage/hematoma. Jose catheter in place. Moderate thickening of urinary bladder wall. Labs for today currently pending. WBC yesterday upon admission last evening was 11.18  Hemoglobin 12.1  Platelets 176  BMP showed normal creatinine 0.98  Electrolytes all normal  UA showed innumerable RBCs, urine culture pending. Vital signs reviewed, afebrile. Chronic medical problems include:  BPH  Stage IIIa chronic kidney disease  Coronary artery disease, status post PCI x3 stents, Plavix and aspirin currently on hold  Essential hypertension, stable  Type 2 diabetes mellitus  Moderate morbid obesity, BMI 31.31    Plan:  Continuous bladder irrigation, nursing to titrate rate until clear. Hand irrigate by nursing as needed visible clots or blocked tubing  Monitor to hemoglobin, repeat a.m. labs  Continue to hold Plavix and aspirin  Urinalysis with pyuria, await urine culture  Continue Ditropan XL 10 mg at bedtime daily  Continue Proscar 5 mg daily  Empiric IV antibiotics, ceftriaxone  Stool softener and laxative as needed  Right now no plan for any need of urological intervention, Dr. Arthur Negron is in the OR here today and will personally discussed with him.   Dr. Carine Barnes is the on-call urologist covering the network and available via [employer] messaging or telephone if any problems or questions. Medical management at the direction of the primary attending service, discussed in person with Dr. Bunny Crook. History of Present Illness     HPI:  John Cast is a 69 y.o. male who presents with nonpainful gross hematuria, started yesterday with indwelling Jose catheter that he had upon discharge from the hospital last week after he underwent photo vaporization of the prostate and failed postoperative voiding trial.  No visible clots. 80-year-old male status post photo vaporization of the prostate with [facility] on 7/13 at [facility]. He was discharged with Jose catheter in place, presented here late last evening for bright red hematuria. ED had contacted urology on-call AP and initiated CBI initially was clear but restarted again. Repeat imaging with CT for evaluation was performed and showed a small hematoma/hemorrhage but no significant clot burden. Overnight urology AP recommended ED can continue with CBI and manual irrigation. The patient was admitted overnight to medicine for continued CBI and irrigation. Hemoglobin is stable at 12. Patient takes aspirin and Plavix at home since his urological procedure over a week ago, now being held because of development of gross hematuria. Patient has a significant history of coronary artery disease and had previous cardiac stenting. No fever or chills. CBI bladder irrigation still running bright red urine without clots. Consults    Review of Systems   Constitutional: Negative. Negative for activity change, chills and fever. HENT: Negative. Eyes: Negative. Respiratory: Negative for cough, choking, chest tightness, shortness of breath and wheezing. Cardiovascular: Negative for chest pain, palpitations and leg swelling. Gastrointestinal: Positive for constipation. Negative for diarrhea, nausea and vomiting. Endocrine: Negative. Genitourinary: Positive for difficulty urinating and hematuria.  Negative for decreased urine volume, flank pain, frequency, penile discharge, penile pain, scrotal swelling, testicular pain and urgency. Musculoskeletal: Negative for back pain. Skin: Negative. Negative for pallor. Allergic/Immunologic: Negative. Neurological: Negative. Negative for weakness, light-headedness and headaches. Hematological: Negative. Does not bruise/bleed easily. Psychiatric/Behavioral: Negative. Historical Information   Past Medical History:   Diagnosis Date   • At risk for falls     unsteady gait, fall risk, uses walker   • BPH (benign prostatic hyperplasia)    • CAD (coronary artery disease)    • Cancer (HCC)     basal cell   • Chronic kidney disease     stage 3   • Colon polyp    • Coronary artery disease    • CPAP (continuous positive airway pressure) dependence    • Diabetes mellitus (HCC)     niddm   • Disease of thyroid gland    • Gout    • High cholesterol    • History of transfusion     GI bleed, no reaction   • Hypertension    • Hypomagnesemia 03/09/2023   • MI, 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    • MRSA infection     wound and urine   • Myocardial infarction [facility]) 2014   • S/P cardiac catheterization 12/30/2022   • Sleep apnea      Past Surgical History:   Procedure Laterality Date   • BASAL CELL CARCINOMA EXCISION Right 1/21/2022    Procedure: EXCISION BASAL CELL CARCINOMA EXTREMITY;  Surgeon: Thanh Rios DO;  Location: [facility] MAIN OR;  Service: General   • CARDIAC CATHETERIZATION Left 12/29/2022    Procedure: Cardiac Left Heart Cath;  Surgeon: Lainey Vázquez MD;  Location: [facility] CARDIAC CATH LAB; Service: Cardiology   • CARDIAC CATHETERIZATION N/A 12/29/2022    Procedure: Cardiac Coronary Angiogram;  Surgeon: Lainey Vázquez MD;  [facility]: [facility] CARD[facility]; Service: Cardiology   • CARDIAC CATHETERIZATION N/A 12/29/2022    Procedure: Cardiac pci;  Surgeon: Lainey Vázquez MD;  Location: [facility] CARDIAC C[facility];   Service: Cardiology   • CATARACT EXTRACTION W/  INTRAOCULAR LENS IMPLANT     • CORNEAL TRANSPLANT     • CORONARY ANGIOPLASTY WITH STENT PLACEMENT      stents x3-- [phone]   • EYE SURGERY      repair corneal laceration   • AL COLONOSCOPY FLX DX W/COLLJ SPEC WHEN PFRMD N/A 3/21/2016    Procedure: COLONOSCOPY;  Surgeon: Alex Brink MD;  Location: [facility] MA[facility];  Service: Colorectal   • TONSILLECTOMY AND ADENOIDECTOMY     • TRANSURETHRAL RESECTION OF PROSTATE N/A 7/13/2023    Procedure: Docia Speaker;  Surgeon: Maryam Bartlett MD;  Location:  MAIN OR;  Service: Urology     Social History   Social History     Substance and Sexual Activity   Alcohol Use Yes    Comment: Occassional (holidays) social sometimes     Social History     Substance and Sexual Activity   Drug Use No     E-Cigarette/Vaping   • E-Cigarette Use Never User      E-Cigarette/Vaping Substances   • Nicotine No    • THC No    • CBD No    • Flavoring No    • Other No    • Unknown No      Social History     Tobacco Use   Smoking Status Never   Smokeless Tobacco Never     Family History:   Family History   Adopted: Yes   Problem Relation Age of Onset   • No Known Problems Father    • No Known Problems Mother        Meds/Allergies   current meds:   Current Facility-Administered Medications   Medication Dose Route Frequency   • acetaminophen (TYLENOL) tablet 650 mg  650 mg Oral Q6H PRN   • allopurinol (ZYLOPRIM) tablet 100 mg  100 mg Oral Daily   • aluminum-magnesium hydroxide-simethicone (MAALOX) oral suspension 30 mL  30 mL Oral Q4H PRN   • atorvastatin (LIPITOR) tablet 80 mg  80 mg Oral Daily With Dinner   • cefTRIAXone (ROCEPHIN) IVPB (premix in dextrose) 1,000 mg 50 mL  1,000 mg Intravenous Q24H   • cyanocobalamin (VITAMIN B-12) tablet 500 mcg  500 mcg Oral Daily   • escitalopram (LEXAPRO) tablet 10 mg  10 mg Oral Daily   • finasteride (PROSCAR) tablet 5 mg  5 mg Oral Daily   • fluticasone (FLONASE) 50 mcg/act nasal spray 1 spray  1 spray Nasal Daily   • insulin lispro (HumaLOG) 100 units/mL subcutaneous injection 1-6 Units  1-6 Units Subcutaneous TID AC   • insulin lispro (HumaLOG) 100 units/mL subcutaneous injection 1-6 Units  1-6 Units Subcutaneous HS   • levothyroxine tablet 112 mcg  112 mcg Oral Early Morning   • metoprolol succinate (TOPROL-XL) 24 hr tablet 50 mg  50 mg Oral Daily   • ondansetron (ZOFRAN) injection 4 mg  4 mg Intravenous Q6H PRN   • oxybutynin (DITROPAN-XL) 24 hr tablet 10 mg  10 mg Oral HS   • pantoprazole (PROTONIX) EC tablet 40 mg  40 mg Oral Daily Before Breakfast   • polyethylene glycol (MIRALAX) packet 17 g  17 g Oral Daily PRN   • prednisoLONE acetate (PRED FORTE) 1 % ophthalmic suspension 1 drop  1 drop Right Eye BID   • senna-docusate sodium (SENOKOT S) 8.6-50 mg per tablet 1 tablet  1 tablet Oral BID   • zonisamide (ZONEGRAN) capsule 50 mg  50 mg Oral Daily     No Known Allergies    Objective   First Vitals:   Blood Pressure: 122/68 (07/30/23 2339)  Pulse: 72 (07/30/23 2339)  Temperature: 98.2 °F (36.8 °C) (07/30/23 2339)  Temp Source: Temporal (07/30/23 2339)  Respirations: 20 (07/30/23 2339)  Height: 5' 9" (175.3 cm) (07/31/23 0714)  SpO2: 95 % (07/30/23 2339)    Current Vitals:   Blood Pressure: 138/62 (07/31/23 0714)  Pulse: 76 (07/31/23 0501)  Temperature: 97.8 °F (36.6 °C) (07/31/23 0714)  Temp Source: Temporal (07/30/23 2339)  Respirations: 17 (07/31/23 0714)  Height: 5' 9" (175.3 cm) (07/31/23 0714)  SpO2: 92 % (07/31/23 0501)      Intake/Output Summary (Last 24 hours) at 7/31/2023 1018  Last data filed at 7/31/2023 0705  Gross per 24 hour   Intake --   Output 0 ml   Net 0 ml       Invasive Devices     Peripheral Intravenous Line  Duration           Peripheral IV 07/30/23 Left Hand <1 day          Drain  Duration           Continuous Bladder Irrigation Three-way 17 days    Urethral Catheter Three way 18 Fr. <1 day                Physical Exam  Constitutional:       Appearance: He is obese. He is not ill-appearing. HENT:      Head: Normocephalic. Nose: Nose normal.      Mouth/Throat:      Mouth: Mucous membranes are moist.      Pharynx: Oropharynx is clear. Eyes:      Conjunctiva/sclera: Conjunctivae normal.      Pupils: Pupils are equal, round, and reactive to light. Neck:      Vascular: No carotid bruit. Cardiovascular:      Rate and Rhythm: Normal rate and regular rhythm. Pulses: Normal pulses. Heart sounds: Murmur heard. Pulmonary:      Effort: Pulmonary effort is normal. No respiratory distress. Breath sounds: Normal breath sounds. No wheezing, rhonchi or rales. Abdominal:      General: Bowel sounds are normal. There is no distension. Palpations: Abdomen is soft. There is no mass. Tenderness: There is no abdominal tenderness. There is no right CVA tenderness, left CVA tenderness, guarding or rebound. Hernia: No hernia is present. Genitourinary:     Penis: Normal.       Testes: Normal.      Comments: Three-way urinary catheter with CBI bladder irrigation running, urine is draining pink in the tubing, urometer bag with light bright red blood without visible clots. Rate was titrated down at this time. Musculoskeletal:         General: Normal range of motion. Cervical back: Normal range of motion. Right lower leg: No edema. Left lower leg: No edema. Lymphadenopathy:      Cervical: No cervical adenopathy. Skin:     General: Skin is warm. Capillary Refill: Capillary refill takes less than 2 seconds. Coloration: Skin is not jaundiced or pale. Findings: No bruising, erythema or rash. Neurological:      General: No focal deficit present. Mental Status: Mental status is at baseline. Psychiatric:         Mood and Affect: Mood normal.         Thought Content: Thought content normal.        Lab Results:   I have personally reviewed pertinent lab results.   , CBC:   Lab Results   Component Value Date    WBC 11.18 (H) 07/30/2023    HGB 12.1 07/30/2023    HCT 41.2 07/30/2023    MCV 83 07/30/2023     (H) 07/30/2023    RBC 4.99 07/30/2023    MCH 24.2 (L) 07/30/2023    MCHC 29.4 (L) 07/30/2023    RDW 18.2 (H) 07/30/2023    MPV 9.1 07/30/2023    NRBC 0 07/30/2023   , CMP:   Lab Results   Component Value Date    SODIUM 138 07/30/2023    K 3.5 07/30/2023     07/30/2023    CO2 24 07/30/2023    BUN 13 07/30/2023    CREATININE 0.98 07/30/2023    CALCIUM 9.1 07/30/2023    EGFR 74 07/30/2023   , Coagulation: No results found for: "PT", "INR", "APTT", Urinalysis:   Lab Results   Component Value Date    COLORU Red 07/30/2023    CLARITYU Turbid 07/30/2023    SPECGRAV 1.015 07/30/2023    PHUR 6.5 07/30/2023    LEUKOCYTESUR (A) 07/30/2023     Elevated glucose may cause decreased leukocyte values. See urine microscopic for UWBC result    NITRITE Negative 07/30/2023    GLUCOSEU >=1000 (1%) (A) 07/30/2023    KETONESU Negative 07/30/2023    BILIRUBINUR Negative 07/30/2023    BLOODU Large (A) 07/30/2023   , Lipase: No results found for: "LIPASE"  Imaging: I have personally reviewed pertinent reports. EKG, Pathology, and Other Studies: I have personally reviewed pertinent reports. Counseling / Coordination of Care  Total floor / unit time spent today 45 minutes. Greater than 50% of total time was spent with the patient and / or family counseling and / or coordination of care. A description of the counseling / coordination of care: Review of diagnostic imaging laboratory studies, personal examination of the patient, review of previous urological operative report, discussing in person with the attending hospitalist physician and review of existing orders all conducted and urological evaluation the patient at this time.     Bhargavi To PA-C

## 2023-07-31 NOTE — ED PROVIDER NOTES
History  Chief Complaint   Patient presents with   • Blood in Urine     Had surgery on enlarged prostate last week, had morales ever since. (+)thinners. Presents w/ dark red urine filled morales bag, bleeding from penis also w/ clots present. Nu Stover is a 69y.o. year old male with PMH of CAD, BPH, DM, HTN presenting to the [facility] ED for hematuria. Patient noticed painless dark red bloody urine drainage from morales catheter around 1800 this evening. He is also noticed clots of blood coming from his urethra. The Morales catheter is draining spontaneously. The patient has had the Morales catheter in place for 1 month. He was evaluated for a voiding trial 2 days ago which he unfortunately failed. Patient denying fever/chills. No associated abdominal pain or vomiting. No flank pain. Patient is s/p photovaporization of the prostate TURP performed by Dr. Dionicio Hackett 7/13. Patient takes plavix daily. Patient has not taken/received any medications at home for relief of symptoms. History provided by:  Medical records, patient and relative (daughter)   used: No    Blood in Urine  Associated symptoms include nausea. Pertinent negatives include no chills, dysuria, fever, flank pain or vomiting. Prior to Admission Medications   Prescriptions Last Dose Informant Patient Reported? Taking? Blood Glucose Monitoring Suppl (OneTouch Verio Reflect) w/Device KIT  Spouse/Significant Other, Outside Facility (Specify), Self No No   Sig: Check blood sugars twice daily. Please substitute with appropriate alternative as covered by patient's insurance. Dx: E11.65   Empagliflozin (Jardiance) 10 MG TABS tablet  Spouse/Significant Other, Self No No   Sig: Take 1 tablet (10 mg total) by mouth every morning   OneTouch Delica Lancets 07D MISC  Spouse/Significant Other, Outside Facility (Specify), Self No No   Sig: Check blood sugars twice daily.  Please substitute with appropriate alternative as covered by patient's insurance. Dx: E11.65   Prolensa 0.07 % SOLN   Yes No   Sig: Right eye as directed   acetaminophen (TYLENOL) 325 mg tablet  Outside Facility (Specify), Spouse/Significant Other, Self No No   Sig: Take 2 tablets (650 mg total) by mouth every 6 (six) hours as needed for mild pain, fever or headaches Do not exceed a total of 3 grams of tylenol/acetaminophen in a 24-hour period. allopurinol (ZYLOPRIM) 100 mg tablet  Spouse/Significant Other, Self No No   Sig: Take 1 tablet (100 mg total) by mouth daily   aluminum-magnesium hydroxide-simethicone (MYLANTA) 6175-0706-764 mg/30 mL suspension  Outside Facility (Specify), Spouse/Significant Other, Self No No   Sig: Take 30 mL by mouth every 4 (four) hours as needed for indigestion or heartburn   aspirin (ECOTRIN LOW STRENGTH) 81 mg EC tablet  Outside Facility (Specify), Spouse/Significant Other, Self No No   Sig: Take 1 tablet (81 mg total) by mouth daily Do not start before 2023. atorvastatin (LIPITOR) 80 mg tablet   No No   Sig: Take 1 tablet (80 mg total) by mouth daily with dinner   clopidogrel (PLAVIX) 75 mg tablet  Spouse/Significant Other, Self No No   Sig: Take 1 tablet (75 mg total) by mouth daily   cyanocobalamin (VITAMIN B-12) 500 MCG tablet  Spouse/Significant Other, Self No No   Sig: Take 1 tablet (500 mcg total) by mouth daily   escitalopram (LEXAPRO) 20 mg tablet   No No   Sig: Take 0.5 tablets (10 mg total) by mouth daily   finasteride (PROSCAR) 5 mg tablet  Spouse/Significant Other, Self No No   Sig: Take 1 tablet (5 mg total) by mouth daily   fluticasone (FLONASE) 50 mcg/act nasal spray  Outside Facility (Specify), Spouse/Significant Other, Self No No   Si spray into each nostril daily Do not start before 2023.    glimepiride (AMARYL) 4 mg tablet   No No   Sig: TAKE 1 TABLET TWICE A DAY   glucose blood (OneTouch Verio) test strip  Spouse/Significant Other, Outside Facility (Specify), Self No No   Sig: Check blood sugars twice daily. Please substitute with appropriate alternative as covered by patient's insurance.  Dx: E11.65   levothyroxine 112 mcg tablet  Spouse/Significant Other, Self No No   Sig: Take 1 tablet (112 mcg total) by mouth daily in the early morning   metFORMIN (GLUCOPHAGE) 1000 MG tablet  Spouse/Significant Other, Self No No   Sig: Take 1 tablet (1,000 mg total) by mouth 2 (two) times a day with meals   metoprolol succinate (TOPROL-XL) 50 mg 24 hr tablet  Spouse/Significant Other, Self No No   Sig: Take 1 tablet (50 mg total) by mouth daily   multivitamin (THERAGRAN) TABS  Spouse/Significant Other, Outside Facility (Specify), Self Yes No   Sig: Take 1 tablet by mouth daily   oxybutynin (DITROPAN) 5 mg tablet  Spouse/Significant Other, Self No No   Sig: Take 1 tablet (5 mg total) by mouth 3 (three) times a day   Patient not taking: Reported on 6/19/2023   oxybutynin (DITROPAN-XL) 10 MG 24 hr tablet   No No   Sig: Take 1 tablet (10 mg total) by mouth daily at bedtime   pantoprazole (PROTONIX) 40 mg tablet  Spouse/Significant Other, Self No No   Sig: Take 1 tablet (40 mg total) by mouth daily before breakfast   polyethylene glycol (MIRALAX) 17 g packet  Outside Facility (Specify), Spouse/Significant Other, Self No No   Sig: Take 17 g by mouth daily as needed (constipation)   senna-docusate sodium (SENOKOT S) 8.6-50 mg per tablet  Outside Facility (Specify), Spouse/Significant Other, Self No No   Sig: Take 1 tablet by mouth 2 (two) times a day Hold for diarrhea or loose stools   torsemide (DEMADEX) 10 mg tablet  Spouse/Significant Other, Self Yes No   Sig: Take 10 mg by mouth if needed   zonisamide (ZONEGRAN) 50 MG capsule  Spouse/Significant Other, Outside Facility (Specify), Self No No   Sig: Take 1 tablet (50 mg total) by mouth daily      Facility-Administered Medications: None       Past Medical History:   Diagnosis Date   • At risk for falls     unsteady gait, fall risk, uses walker   • BPH (benign prostatic hyperplasia) • CAD (coronary artery disease)    • Cancer (HCC)     basal cell   • Chronic kidney disease     stage 3   • Colon polyp    • Coronary artery disease    • CPAP (continuous positive airway pressure) dependence    • Diabetes mellitus (HCC)     niddm   • Disease of thyroid gland    • Gout    • High cholesterol    • History of transfusion     GI bleed, no reaction   • Hypertension    • Hypomagnesemia 03/09/2023   • MI, 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    • MRSA infection     wound and urine   • Myocardial infarction [facility]) 2014   • S/P cardiac catheterization 12/30/2022   • Sleep apnea        Past Surgical History:   Procedure Laterality Date   • BASAL CELL CARCINOMA EXCISION Right 1/21/2022    Procedure: EXCISION BASAL CELL CARCINOMA EXTREMITY;  Surgeon: Cory Gomes DO;  Location: [facility] MAIN OR;  Service: General   • CARDIAC CATHETERIZATION Left 12/29/2022    Procedure: Cardiac Left Heart Cath;  Surgeon: Ellyn Schirmer, MD;  Location: [facility] CARDIAC C[facility] LAB; Service: Cardiology   • CARDIAC CATHETERIZATION N/A 12/29/2022    Procedure: Cardiac Coronary Angiogram;  Surgeon: Ellyn Schirmer, MD;  Location: [facility]; Service: Cardiology   • CARDIAC CATHETERIZATION N/A 12/29/2022    Procedure: Cardiac pci;  Surgeon: Ellyn Schirmer, MD;  Location: [facility] CARD[facility]AC C[facility];   Service: Cardiology   • CATARACT EXTRACTION W/  INTRAOCULAR LENS IMPLANT     • CORNEAL TRANSPLANT     • CORONARY ANGIOPLASTY WITH STENT PLACEMENT      stents x3-- [phone]   • EYE SURGERY      repair corneal laceration   • OH COLONOSCOPY FLX DX W/COLLJ SPEC WHEN PFRMD N/A 3/21/2016    Procedure: COLONOSCOPY;  Surgeon: Alissa Petit MD;  Location: [facility];  Service: Colorectal   • TONSILLECTOMY AND ADENOIDECTOMY     • TRANSURETHRAL RESECTION OF PROSTATE N/A 7/13/2023    Procedure: Vik Liang;  Surgeon: Lary Crigler, MD;  Location:  MAIN OR;  Service: Urology       Family History   Adopted: Yes   Problem Relation Age of Onset   • No Known Problems Father    • No Known Problems Mother      I have reviewed and agree with the history as documented. E-Cigarette/Vaping   • E-Cigarette Use Never User      E-Cigarette/Vaping Substances   • Nicotine No    • THC No    • CBD No    • Flavoring No    • Other No    • Unknown No      Social History     Tobacco Use   • Smoking status: Never   • Smokeless tobacco: Never   Vaping Use   • Vaping Use: Never used   Substance Use Topics   • Alcohol use: Yes     Comment: Occassional (holidays) social sometimes   • Drug use: No       Review of Systems   Constitutional: Negative for chills and fever. Respiratory: Negative for cough and shortness of breath. Cardiovascular: Negative for chest pain. Gastrointestinal: Positive for nausea. Negative for diarrhea and vomiting. Genitourinary: Positive for difficulty urinating and hematuria. Negative for dysuria and flank pain. Musculoskeletal: Negative for back pain. Neurological: Negative for syncope and light-headedness. All other systems reviewed and are negative. Physical Exam  Physical Exam  Vitals and nursing note reviewed. Constitutional:       General: He is not in acute distress. Appearance: Normal appearance. He is well-developed. He is not ill-appearing, toxic-appearing or diaphoretic. HENT:      Head: Normocephalic and atraumatic. Nose: No congestion or rhinorrhea. Eyes:      General:         Right eye: No discharge. Left eye: No discharge. Cardiovascular:      Rate and Rhythm: Normal rate and regular rhythm. Heart sounds: Murmur heard. Pulmonary:      Effort: Pulmonary effort is normal. No respiratory distress. Breath sounds: Normal breath sounds. No wheezing or rales. Abdominal:      General: Bowel sounds are normal.      Palpations: Abdomen is soft. Tenderness: There is no abdominal tenderness. There is no guarding or rebound. Genitourinary:     Penis: Circumcised.        Comments: Clots of blood noted at urethral meatus. Gross hematuria in Jose catheter bag. Musculoskeletal:      Cervical back: Normal range of motion. No rigidity. Skin:     General: Skin is warm. Capillary Refill: Capillary refill takes less than 2 seconds. Neurological:      Mental Status: He is alert and oriented to person, place, and time. Psychiatric:         Mood and Affect: Mood normal.         Behavior: Behavior normal.         Vital Signs  ED Triage Vitals [07/30/23 2339]   Temperature Pulse Respirations Blood Pressure SpO2   98.2 °F (36.8 °C) 72 20 122/68 95 %      Temp Source Heart Rate Source Patient Position - Orthostatic VS BP Location FiO2 (%)   Temporal Monitor Lying Left arm --      Pain Score       --           Vitals:    07/30/23 2339 07/31/23 0200 07/31/23 0501   BP: 122/68 149/73 164/66   Pulse: 72 72 76   Patient Position - Orthostatic VS: Lying Lying          Visual Acuity      ED Medications  Medications - No data to display    Diagnostic Studies  Results Reviewed     Procedure Component Value Units Date/Time    Urine Microscopic [[id number]]  (Abnormal) Collected: 07/30/23 2346    Lab Status: Final result Specimen: Urine, Other Updated: 07/31/23 0020     RBC, UA Innumerable /hpf      WBC, UA       Field obscured, unable to enumerate     /hpf     Epithelial Cells       Field obscured, unable to enumerate     /hpf     Bacteria, UA       Field obscured, unable to enumerate     /hpf    Urine culture [[id number]] Collected: 07/30/23 2346    Lab Status:  In process Specimen: Urine, Other Updated: 07/31/23 2516    Basic metabolic panel [[id number]]  (Abnormal) Collected: 07/30/23 2344    Lab Status: Final result Specimen: Blood from Arm, Left Updated: 07/31/23 0011     Sodium 138 mmol/L      Potassium 3.5 mmol/L      Chloride 103 mmol/L      CO2 24 mmol/L      ANION GAP 11 mmol/L      BUN 13 mg/dL      Creatinine 0.98 mg/dL      Glucose 209 mg/dL      Calcium 9.1 mg/dL      eGFR 74 ml/min/1.73sq m Narrative:      National Kidney Disease Foundation guidelines for Chronic Kidney Disease (CKD):   •  Stage 1 with normal or high GFR (GFR > 90 mL/min/1.73 square meters)  •  Stage 2 Mild CKD (GFR = 60-89 mL/min/1.73 square meters)  •  Stage 3A Moderate CKD (GFR = 45-59 mL/min/1.73 square meters)  •  Stage 3B Moderate CKD (GFR = 30-44 mL/min/1.73 square meters)  •  Stage 4 Severe CKD (GFR = 15-29 mL/min/1.73 square meters)  •  Stage 5 End Stage CKD (GFR <15 mL/min/1.73 square meters)  Note: GFR calculation is accurate only with a steady state creatinine    UA w Reflex to Microscopic w Reflex to Culture [[id number]]  (Abnormal) Collected: 07/30/23 2346    Lab Status: Final result Specimen: Urine, Other Updated: 07/31/23 0009     Color, UA Red     Clarity, UA Turbid     Specific Gravity, UA 1.015     pH, UA 6.5     Leukocytes, UA Elevated glucose may cause decreased leukocyte values.  See urine microscopic for UWBC result     Nitrite, UA Negative     Protein, UA >=300 mg/dl      Glucose, UA >=1000 (1%) mg/dl      Ketones, UA Negative mg/dl      Urobilinogen, UA 0.2 E.U./dl      Bilirubin, UA Negative     Occult Blood, UA Large    CBC and differential [[id number]]  (Abnormal) Collected: 07/30/23 2344    Lab Status: Final result Specimen: Blood from Arm, Left Updated: 07/30/23 2351     WBC 11.18 Thousand/uL      RBC 4.99 Million/uL      Hemoglobin 12.1 g/dL      Hematocrit 41.2 %      MCV 83 fL      MCH 24.2 pg      MCHC 29.4 g/dL      RDW 18.2 %      MPV 9.1 fL      Platelets 637 Thousands/uL      nRBC 0 /100 WBCs      Neutrophils Relative 62 %      Immat GRANS % 0 %      Lymphocytes Relative 26 %      Monocytes Relative 8 %      Eosinophils Relative 4 %      Basophils Relative 0 %      Neutrophils Absolute 6.75 Thousands/µL      Immature Grans Absolute 0.05 Thousand/uL      Lymphocytes Absolute 2.92 Thousands/µL      Monocytes Absolute 0.94 Thousand/µL      Eosinophils Absolute 0.47 Thousand/µL      Basophils Absolute 0.05 Thousands/µL                  CT abdomen pelvis wo contrast   Final Result by Saúl Schuler MD (57/25 [id number])      1. Prostatic/urinary bladder hemorrhage/hematoma. Jose catheter in place. 2.  Moderate thickening of the urinary bladder wall, correlate with urinalysis for cystitis. Workstation performed: [id number]                    Procedures  Procedures         ED Course  ED Course as of 07/31/23 0547   Mon Jul 31, 2023   0003 Hemoglobin: 12.1   0159 Urine now clear, light pink ting on CBI. VSS. Patient denying complaints. Will discharge with urology f.u.   0300 Prior to discharge patient had return of gross hematuria in Jose bag. Discussed with urology on-call provider who recommended noncontrast CT of the abdomen/pelvis to evaluate for clot burden however did note that postoperative hematuria is expected following recent surgery. Will obtain CT and reassess. 9467 CBI is clear at this time. Medical Decision Making    69 y.o. male presenting for hematuria in Jose catheter. Vital stable, well-appearing. We will order labs and UA to evaluate for anemia, LUCI or UTI. I do not suspect appendicitis, AAA or diverticulitis. Jose catheter was manually irrigated by nursing staff with multiple blood clots return. Patient was placed on CBI and had marked improvement of output. CBI was running clear. At that point discussed discharge however prior to leaving the ED he noted gross hematuria again in the Jose bag. Case was discussed with urology provider on-call who recommended noncontrast CT to evaluate for clot burden however she did comment that hematuria is an expected postoperative finding for several weeks following procedure. CT results were again discussed with the on-call urology PA who recommended continuous bladder irrigation.   She did not feel that the patient required urgent surgical intervention and felt the patient could be admitted at [facility]. Discussed this recommendation with the patient and his daughter who are agreement with that plan. Impression: Gross hematuria following TURP. Will admit for further evaluation and management. Patient care discussed with SLIM who will assume care and admit patient to the hospital. I have discussed with the patient our recommendation of inpatient admission for further medical care. I have answered all of the patient's questions and concerns. The patient is in agreement with the plan to proceed with admission to the hospital.     Amount and/or Complexity of Data Reviewed  Labs: ordered. Decision-making details documented in ED Course. Radiology: ordered. Risk  Decision regarding hospitalization. Disposition  Final diagnoses:   Gross hematuria     Time reflects when diagnosis was documented in both MDM as applicable and the Disposition within this note     Time User Action Codes Description Comment    7/31/2023  2:00 AM Tonya Louis Add [R31.0] Gross hematuria       ED Disposition     ED Disposition   Admit    Condition   Stable    Date/Time   Mon Jul 31, 2023  5:35 AM    Comment   Case was discussed with JUAN and the patient's admission status was agreed to be Admission Status: observation status to the service of Dr. King Palmer. Follow-up Information     Follow up With Specialties Details Why Contact Info    Henry Toro MD Urology Schedule an appointment as soon as possible for a visit  To make appointment for reevaluation ASAP. [facility]  [phone]            Patient's Medications   Discharge Prescriptions    No medications on file       No discharge procedures on file.     PDMP Review     None          ED Provider  Electronically Signed by           Svetlana Mustafa DO  07/31/23 1012

## 2023-08-01 VITALS
WEIGHT: 211.64 LBS | TEMPERATURE: 97.3 F | OXYGEN SATURATION: 96 % | HEART RATE: 55 BPM | BODY MASS INDEX: 31.35 KG/M2 | DIASTOLIC BLOOD PRESSURE: 73 MMHG | SYSTOLIC BLOOD PRESSURE: 132 MMHG | RESPIRATION RATE: 17 BRPM | HEIGHT: 69 IN

## 2023-08-01 PROBLEM — T83.511A URINARY TRACT INFECTION ASSOCIATED WITH CATHETERIZATION OF URINARY TRACT (HCC): Status: ACTIVE | Noted: 2023-08-01

## 2023-08-01 PROBLEM — N39.0 URINARY TRACT INFECTION ASSOCIATED WITH CATHETERIZATION OF URINARY TRACT (HCC): Status: ACTIVE | Noted: 2023-08-01

## 2023-08-01 PROBLEM — R31.0 GROSS HEMATURIA: Status: RESOLVED | Noted: 2023-07-31 | Resolved: 2023-08-01

## 2023-08-01 LAB
ANION GAP SERPL CALCULATED.3IONS-SCNC: 7 MMOL/L
BASOPHILS # BLD AUTO: 0.03 THOUSANDS/ÂΜL (ref 0–0.1)
BASOPHILS NFR BLD AUTO: 0 % (ref 0–1)
BUN SERPL-MCNC: 16 MG/DL (ref 5–25)
CALCIUM SERPL-MCNC: 9 MG/DL (ref 8.4–10.2)
CHLORIDE SERPL-SCNC: 105 MMOL/L (ref 96–108)
CO2 SERPL-SCNC: 26 MMOL/L (ref 21–32)
CREAT SERPL-MCNC: 0.99 MG/DL (ref 0.6–1.3)
EOSINOPHIL # BLD AUTO: 0.32 THOUSAND/ÂΜL (ref 0–0.61)
EOSINOPHIL NFR BLD AUTO: 4 % (ref 0–6)
ERYTHROCYTE [DISTWIDTH] IN BLOOD BY AUTOMATED COUNT: 18.2 % (ref 11.6–15.1)
GFR SERPL CREATININE-BSD FRML MDRD: 73 ML/MIN/1.73SQ M
GLUCOSE SERPL-MCNC: 201 MG/DL (ref 65–140)
GLUCOSE SERPL-MCNC: 207 MG/DL (ref 65–140)
GLUCOSE SERPL-MCNC: 292 MG/DL (ref 65–140)
HCT VFR BLD AUTO: 38 % (ref 36.5–49.3)
HGB BLD-MCNC: 11.2 G/DL (ref 12–17)
IMM GRANULOCYTES # BLD AUTO: 0.05 THOUSAND/UL (ref 0–0.2)
IMM GRANULOCYTES NFR BLD AUTO: 1 % (ref 0–2)
LYMPHOCYTES # BLD AUTO: 2.21 THOUSANDS/ÂΜL (ref 0.6–4.47)
LYMPHOCYTES NFR BLD AUTO: 26 % (ref 14–44)
MAGNESIUM SERPL-MCNC: 2.1 MG/DL (ref 1.9–2.7)
MCH RBC QN AUTO: 24.6 PG (ref 26.8–34.3)
MCHC RBC AUTO-ENTMCNC: 29.5 G/DL (ref 31.4–37.4)
MCV RBC AUTO: 84 FL (ref 82–98)
MONOCYTES # BLD AUTO: 0.74 THOUSAND/ÂΜL (ref 0.17–1.22)
MONOCYTES NFR BLD AUTO: 9 % (ref 4–12)
NEUTROPHILS # BLD AUTO: 5.18 THOUSANDS/ÂΜL (ref 1.85–7.62)
NEUTS SEG NFR BLD AUTO: 60 % (ref 43–75)
NRBC BLD AUTO-RTO: 0 /100 WBCS
PHOSPHATE SERPL-MCNC: 4.4 MG/DL (ref 2.3–4.1)
PLATELET # BLD AUTO: 336 THOUSANDS/UL (ref 149–390)
PMV BLD AUTO: 9.3 FL (ref 8.9–12.7)
POTASSIUM SERPL-SCNC: 4.4 MMOL/L (ref 3.5–5.3)
RBC # BLD AUTO: 4.55 MILLION/UL (ref 3.88–5.62)
SODIUM SERPL-SCNC: 138 MMOL/L (ref 135–147)
WBC # BLD AUTO: 8.53 THOUSAND/UL (ref 4.31–10.16)

## 2023-08-01 PROCEDURE — 99239 HOSP IP/OBS DSCHRG MGMT >30: CPT | Performed by: INTERNAL MEDICINE

## 2023-08-01 PROCEDURE — NC001 PR NO CHARGE: Performed by: UROLOGY

## 2023-08-01 PROCEDURE — 82948 REAGENT STRIP/BLOOD GLUCOSE: CPT

## 2023-08-01 PROCEDURE — NC001 PR NO CHARGE

## 2023-08-01 PROCEDURE — 83735 ASSAY OF MAGNESIUM: CPT

## 2023-08-01 PROCEDURE — 80048 BASIC METABOLIC PNL TOTAL CA: CPT

## 2023-08-01 PROCEDURE — 84100 ASSAY OF PHOSPHORUS: CPT

## 2023-08-01 PROCEDURE — 85025 COMPLETE CBC W/AUTO DIFF WBC: CPT

## 2023-08-01 PROCEDURE — 97167 OT EVAL HIGH COMPLEX 60 MIN: CPT

## 2023-08-01 PROCEDURE — 97163 PT EVAL HIGH COMPLEX 45 MIN: CPT

## 2023-08-01 RX ORDER — CLOPIDOGREL BISULFATE 75 MG/1
75 TABLET ORAL DAILY
Qty: 90 TABLET | Refills: 0
Start: 2023-08-03

## 2023-08-01 RX ORDER — CEFUROXIME AXETIL 500 MG/1
500 TABLET ORAL EVERY 12 HOURS SCHEDULED
Qty: 10 TABLET | Refills: 0 | Status: SHIPPED | OUTPATIENT
Start: 2023-08-02 | End: 2023-08-07

## 2023-08-01 RX ADMIN — OXYBUTYNIN CHLORIDE 5 MG: 5 TABLET ORAL at 09:24

## 2023-08-01 RX ADMIN — INSULIN LISPRO 2 UNITS: 100 INJECTION, SOLUTION INTRAVENOUS; SUBCUTANEOUS at 07:31

## 2023-08-01 RX ADMIN — ESCITALOPRAM OXALATE 10 MG: 10 TABLET ORAL at 09:24

## 2023-08-01 RX ADMIN — PANTOPRAZOLE SODIUM 40 MG: 40 TABLET, DELAYED RELEASE ORAL at 06:00

## 2023-08-01 RX ADMIN — ALLOPURINOL 100 MG: 100 TABLET ORAL at 09:24

## 2023-08-01 RX ADMIN — INSULIN LISPRO 4 UNITS: 100 INJECTION, SOLUTION INTRAVENOUS; SUBCUTANEOUS at 11:43

## 2023-08-01 RX ADMIN — FINASTERIDE 5 MG: 5 TABLET, FILM COATED ORAL at 09:24

## 2023-08-01 RX ADMIN — LEVOTHYROXINE SODIUM 112 MCG: 112 TABLET ORAL at 05:49

## 2023-08-01 RX ADMIN — PREDNISOLONE ACETATE 1 DROP: 10 SUSPENSION/ DROPS OPHTHALMIC at 09:24

## 2023-08-01 RX ADMIN — CEFTRIAXONE 1000 MG: 1 INJECTION, SOLUTION INTRAVENOUS at 05:49

## 2023-08-01 RX ADMIN — CYANOCOBALAMIN TAB 500 MCG 500 MCG: 500 TAB at 09:24

## 2023-08-01 RX ADMIN — ZONISAMIDE 50 MG: 25 CAPSULE ORAL at 09:24

## 2023-08-01 RX ADMIN — METOPROLOL SUCCINATE 50 MG: 50 TABLET, EXTENDED RELEASE ORAL at 09:24

## 2023-08-01 RX ADMIN — SENNOSIDES AND DOCUSATE SODIUM 1 TABLET: 50; 8.6 TABLET ORAL at 09:24

## 2023-08-01 NOTE — PLAN OF CARE
Problem: PAIN - ADULT  Goal: Verbalizes/displays adequate comfort level or baseline comfort level  Description: Interventions:  - Encourage patient to monitor pain and request assistance  - Assess pain using appropriate pain scale  - Administer analgesics based on type and severity of pain and evaluate response  - Implement non-pharmacological measures as appropriate and evaluate response  - Consider cultural and social influences on pain and pain management  - Notify physician/advanced practitioner if interventions unsuccessful or patient reports new pain  Outcome: Progressing     Problem: INFECTION - ADULT  Goal: Absence or prevention of progression during hospitalization  Description: INTERVENTIONS:  - Assess and monitor for signs and symptoms of infection  - Monitor lab/diagnostic results  - Monitor all insertion sites, i.e. indwelling lines, tubes, and drains  - Monitor endotracheal if appropriate and nasal secretions for changes in amount and color  - [place] appropriate cooling/warming therapies per order  - Administer medications as ordered  - Instruct and encourage patient and family to use good hand hygiene technique  - Identify and instruct in appropriate isolation precautions for identified infection/condition  Outcome: Progressing  Goal: Absence of fever/infection during neutropenic period  Description: INTERVENTIONS:  - Monitor WBC    Outcome: Progressing     Problem: Knowledge Deficit  Goal: Patient/family/caregiver demonstrates understanding of disease process, treatment plan, medications, and discharge instructions  Description: Complete learning assessment and assess knowledge base.   Interventions:  - Provide teaching at level of understanding  - Provide teaching via preferred learning methods  Outcome: Progressing     Problem: Potential for Falls  Goal: Patient will remain free of falls  Description: INTERVENTIONS:  - Educate patient/family on patient safety including physical limitations  - Instruct patient to call for assistance with activity   - Consult OT/PT to assist with strengthening/mobility   - Keep Call bell within reach  - Keep bed low and locked with side rails adjusted as appropriate  - Keep care items and personal belongings within reach  - Initiate and maintain comfort rounds  - Make Fall Risk Sign visible to staff  - Offer Toileting every 2 Hours, in advance of need  - Initiate/Maintain bed/chair alarm  - Apply yellow socks and bracelet for high fall risk patients  - Consider moving patient to room near nurses station  Outcome: Progressing     Problem: DISCHARGE PLANNING - CARE MANAGEMENT  Goal: Discharge to post-acute care or home with appropriate resources  Description: INTERVENTIONS:  - Conduct assessment to determine patient/family and health care team treatment goals, and need for post-acute services based on payer coverage, community resources, and patient preferences, and barriers to discharge  - Address psychosocial, clinical, and financial barriers to discharge as identified in assessment in conjunction with the patient/family and health care team  - Arrange appropriate level of post-acute services according to patient’s   needs and preference and payer coverage in collaboration with the physician and health care team  - Communicate with and update the patient/family, physician, and health care team regarding progress on the discharge plan  - Arrange appropriate transportation to post-acute venues  Outcome: Progressing

## 2023-08-01 NOTE — ASSESSMENT & PLAN NOTE
Hx of PCI on DAPT. Both on hold in setting of gross hematuria. Per cardiology had okd been held prior to prostate surgery and even for upcoming eye surgery to occur on 8/1      Hold DAPT until 8/3/23 .  Restart accordingly   Continue to follow up with Cardiology and Urology

## 2023-08-01 NOTE — PROGRESS NOTES
Discharge instructions reviewed with patient and daughter, verbally acknowledged understanding. Patient refused leg bag on discharge. Will apply a clean 2000ml morales catheter bag for discharge to home. Taken to lobby via wc/belongings in hand.

## 2023-08-01 NOTE — PLAN OF CARE
Problem: PHYSICAL THERAPY ADULT  Goal: Performs mobility at highest level of function for planned discharge setting. See evaluation for individualized goals. Description: Treatment/Interventions: Functional transfer training, LE strengthening/ROM, Elevations, Therapeutic exercise, Endurance training, Bed mobility, Gait training          See flowsheet documentation for full assessment, interventions and recommendations. Note: Prognosis: Good  Problem List: Decreased strength, Decreased endurance, Impaired balance, Decreased mobility  Assessment: Patient is a 69 y.o. male evaluated by Physical Therapy s/p admit to [address] [place] [address] on 7/30/2023 with admitting diagnosis of: Blood in urine, Gross hematuria, and principal problem of: Gross hematuria. PT was consulted to assess patient's functional mobility and discharge needs. Ordered are PT Evaluation and treatment with activity level of: activity as tolerated. Comorbidities affecting patient's physical performance at time of assessment include: BPH, HTN, DM, high cholestrol, CAD, hx of MI, sleep apnea, disease of thyroid gland. Personal factors affecting the patient at time of IE include: lives in 2 story home, ambulating with assistive device, step(s) to enter home, inability to navigate community distances, decreased initiation and engagement, inability/difficulty performing IADLs and inability/difficulty performing ADLs. Please locate objective findings from PT assessment regarding body systems outlined above. Upon evaluation, pt able to perform all functional mobility with SUP and increased time. Pt reports use of RW at baseline, however wished to ambulate in room from bed to chair without device. Moderate postural sway demonstrated but no true LOB experienced. The patient's AM-PAC Basic Mobility Inpatient Short Form Raw Score is 17. A Raw score of greater than 16 suggests the patient may benefit from discharge to home.  Please also refer to the recommendation of the Physical Therapist for safe discharge planning. Co treatment with OT secondary to complex medical condition of pt, possible A of 2 required to achieve and maintain transitional movements, requiring the need of skilled therapeutic intervention of 2 therapists to achieve delivery of services. Pt will benefit from continued PT intervention during LOS to address current deficits, increase LOF, and facilitate safe d/c to next level of care when medically appropriate. D/c recommendation at this time is home with outpatient rehabilitation. PT Discharge Recommendation: Home with outpatient rehabilitation    See flowsheet documentation for full assessment.

## 2023-08-01 NOTE — ASSESSMENT & PLAN NOTE
Lab Results   Component Value Date    EGFR 73 08/01/2023    EGFR 74 07/30/2023    EGFR 57 06/19/2023    CREATININE 0.99 08/01/2023    CREATININE 0.98 07/30/2023    CREATININE 1.22 06/19/2023   Cr w/in baseline range

## 2023-08-01 NOTE — PLAN OF CARE
Problem: OCCUPATIONAL THERAPY ADULT  Goal: Performs self-care activities at highest level of function for planned discharge setting. See evaluation for individualized goals. Description: Treatment Interventions: ADL retraining, Functional transfer training, UE strengthening/ROM, Endurance training, Patient/family training, Equipment evaluation/education, Activityengagement          See flowsheet documentation for full assessment, interventions and recommendations. Note: Limitation: Decreased ADL status, Decreased UE strength, Decreased Safe judgement during ADL, Decreased endurance, Decreased cognition, Decreased self-care trans, Decreased high-level ADLs     Assessment: Pt is a 69 y.o. male seen for OT evaluation s/p admit to [facility] on 7/30/2023 w/ Gross hematuria. Comorbidities affecting pt's functional performance at time of assessment include: BPH, HTN, DM, high cholesterol, gout, CAD, MI, CA, cardiac cath, hypomagnesmia, CAD, hematuria. Personal factors affecting pt at time of IE include:steps to enter environment, difficulty performing ADLS, difficulty performing IADLS , limited insight into deficits, compliance, decreased initiation and engagement  and health management . Prior to admission, pt was (A) with ADLs and IADLs with use of no device during mobility. Upon evaluation: Pt requires (S) level with no device during mobility 2* the following deficits impacting occupational performance: weakness, decreased strength, decreased balance, decreased tolerance, impaired initiation, impaired problem solving, decreased safety awareness and impaired interpersonal skills. Pt to benefit from continued skilled OT tx while in the hospital to address deficits as defined above and maximize level of functional independence w ADL's and functional mobility.  Occupational Performance areas to address include: grooming, bathing/shower, toilet hygiene, dressing, functional mobility, community mobility and clothing management. The patient's raw score on the AM-PAC Daily Activity Inpatient Short Form is 19. A raw score of less than 19 suggests the patient may benefit from discharge to post-acute rehabilitation services. Please refer to the recommendation of the Occupational Therapist for safe discharge planning. Pt benefited from co-evaluation of skilled OT and PT therapists in order to most appropriately address functional deficits d/t extensive assistance required for safe functional mobility, decreased activity tolerance, and regression from functioning level prior to admission and/or onset of present illness. OT/PT objectives were addressed separately; please see PT note for specific goal areas targeted.      OT Discharge Recommendation: Home with outpatient rehabilitation

## 2023-08-01 NOTE — DISCHARGE INSTR - AVS FIRST PAGE
Urinary catheter remain in place at time of hospital discharge. Nursing will provide instruction for care and emptying of the urinary bag. Leg bag can be worn during the daytime. Continue Proscar daily and Ditropan XL for bladder spasms as needed. He will need to call Dr. Juanjo Mcallister office at [facility] for follow-up to be seen in 1 week. Telephone number for the urology office they are listed on your follow-up appointments in the instructions. Jose catheter will be removed in the office at that time.     Jac Rowland PA-C

## 2023-08-01 NOTE — PROGRESS NOTES
Progress Note - UROLOGY  [place] 69 y.o. male MRN: [id number]  Unit/Bed#: 401-01 Encounter: [id number]    Assessment:  Urine now running entirely clear, no blood or clots visible. CBI bladder irrigation has been clamped  Gross hematuria has resolved  Status post photo vaporization of prostate July 13 at [facility] by Dr. Darwin Miles  BPH with bladder outlet obstruction  Stage IIIa chronic kidney disease  Coronary artery disease, status post PCI x3 stents, Plavix and aspirin currently on hold  Essential hypertension, stable  Type 2 diabetes mellitus  Moderate morbid obesity, BMI 31.31    Hemoglobin today stable 11.2    Patient denies any abdominal pain, no bladder spasms. Vital signs reviewed, afebrile. Plan:  Patient can be discharged home at the direction of the medicine providers  Discontinue CBI bladder irrigation  Inflow port   Exchange urometer CBI bag for regular Jose bag. May have leg bag through the day after discharge. Leave existing three-way catheter indwelling, do not remove prior to office follow-up  Patient will need to call the office to be seen by Dr. Joseline Linder for postoperative follow-up in 1 week for catheter removal and voiding trial.  Continue Proscar nightly as before  Ditropan XL as needed bladder spasms  Discussed with medicine resident  Hold Plavix and aspirin for 2 days then may restart after discharge    Subjective/Objective   Chief Complaint: Urine now running entirely clear, CBI irrigation clamped. Subjective: Patient has no complaints. Denies abdominal pain. Urine without any blood. Patient is being held from Plavix and aspirin.     Scheduled Meds:  Current Facility-Administered Medications   Medication Dose Route Frequency Provider Last Rate   • acetaminophen  650 mg Oral Q6H PRN Crow Peralta DO     • allopurinol  100 mg Oral Daily Crow Peralta DO     • aluminum-magnesium hydroxide-simethicone  30 mL Oral Q4H PRN [place] Shaila Sheth, DO     • atorvastatin  80 mg Oral Daily With Dinner Morel Jean Kimnarinder Padilla, DO     • cefTRIAXone  1,000 mg Intravenous Q24H [facility] [address] Curly, DO 1,000 mg (08/01/23 0549)   • cyanocobalamin  500 mcg Oral Daily [facility] [address] Curly, DO     • escitalopram  10 mg Oral Daily [facility] [address] Curly, DO     • finasteride  5 mg Oral Daily [facility] [address] Curly, DO     • fluticasone  1 spray Nasal Daily [facility] [address] Curly, DO     • HYDROmorphone  0.5 mg Intravenous Q6H PRN Brad Hernandez, DO     • insulin lispro  1-6 Units Subcutaneous TID AC [place] Marianela Fitzpatrick, DO     • insulin lispro  1-6 Units Subcutaneous HS Sherl Curly, DO     • levothyroxine  112 mcg Oral Early Morning [facility] [address] Curly, DO     • metoprolol succinate  50 mg Oral Daily [facility] [address] Tedly, DO     • ondansetron  4 mg Intravenous Q6H PRN [facility] [address] [facility] Jay, DO     • oxybutynin  5 mg Oral TID Brad Hernandez, DO     • oxybutynin  10 mg Oral HS [facility] [address] Tedly, DO     • oxyCODONE  10 mg Oral Q4H PRN Brad Hernandez, DO     • oxyCODONE  5 mg Oral Q4H PRN Brad Hernandez, DO     • pantoprazole  40 mg Oral Daily Before Breakfast [facility] [address] Tedly, DO     • polyethylene glycol  17 g Oral Daily PRN [facility] [address] Tedly, DO     • prednisoLONE acetate  1 drop Right Eye BID [facility] [address] [facility] Tedly, DO     • senna-docusate sodium  1 tablet Oral BID Sherl Curly, DO     • zonisamide  50 mg Oral Daily [place] Fitzpatrick, DO       Continuous Infusions:   PRN Meds:.•  acetaminophen  •  aluminum-magnesium hydroxide-simethicone  •  HYDROmorphone  •  ondansetron  •  oxyCODONE  •  oxyCODONE  •  polyethylene glycol      Objective:     Blood pressure 132/73, pulse 55, temperature (!) 97.3 °F (36.3 °C), temperature source Oral, resp. rate 17, height 5' 9" (1.753 m), weight 96 kg (211 lb 10.3 oz), SpO2 96 %. ,Body mass index is 31.25 kg/m².       Intake/Output Summary (Last 24 hours) at 8/1/2023 0903  Last data filed at 8/1/2023 0724  Gross per 24 hour   Intake 120 ml   Output 05709 ml   Net -48736 ml       Invasive Devices     Peripheral Intravenous Line  Duration           Peripheral IV 07/30/23 Left Hand 1 day          Drain  Duration           Continuous Bladder Irrigation Three-way 18 days    Urethral Catheter Three way 18 Fr. 1 day                Physical Exam:     Awake, oriented. No acute distress. Skin warm dry touch. ENT clear. Neck no adenopathy or goiter. Heart regular rate and rhythm. Lungs CTA  Abdomen positive bowel sounds, soft. Entirely nontender. Genitalia three-way urinary catheter draining clear yellow urine. Extremities without calf tenderness, no peripheral edema. Ambulation observed. Neurological exam CNS grossly intact. Mental status baseline. Lab, Imaging and other studies:  I have personally reviewed pertinent lab results.   , CBC:   Lab Results   Component Value Date    WBC 8.53 08/01/2023    HGB 11.2 (L) 08/01/2023    HCT 38.0 08/01/2023    MCV 84 08/01/2023     08/01/2023    RBC 4.55 08/01/2023    MCH 24.6 (L) 08/01/2023    MCHC 29.5 (L) 08/01/2023    RDW 18.2 (H) 08/01/2023    MPV 9.3 08/01/2023    NRBC 0 08/01/2023   , CMP:   Lab Results   Component Value Date    SODIUM 138 08/01/2023    K 4.4 08/01/2023     08/01/2023    CO2 26 08/01/2023    BUN 16 08/01/2023    CREATININE 0.99 08/01/2023    CALCIUM 9.0 08/01/2023    EGFR 73 08/01/2023     VTE Pharmacologic Prophylaxis: Reason for no pharmacologic prophylaxis Gross hematuria  VTE Mechanical Prophylaxis: sequential compression device     Janell Romero PA-C

## 2023-08-01 NOTE — ASSESSMENT & PLAN NOTE
S/p ua with pyuria and noted bladder wall thickening s/p CT scan thus will empirically txt with IV CTX. F/u urine cx    CT:   1. Prostatic/urinary bladder hemorrhage/hematoma. Jose catheter in place. 2.  Moderate thickening of the urinary bladder wall, correlate with urinalysis for cystitis.     Ucx:    Component Value   Urine Culture >100,000 cfu/ml Gram Negative Wil Abnormal             · Completed 2 days ceftriaxone  · Start cefuroxime and continue for 5 additional days   · Catheter care per instructions  · Restart DAPT on 8/3/23   · Follow up with [facility]B urologist in 1 wk

## 2023-08-01 NOTE — TELEPHONE ENCOUNTER
Patient of Dr. Anson Hancock is being discharged from the hospital today. Wife was told from hospital that patient would need an apt in office with Dr.D'Amico ASAP. Patient currently has a 3 way morales catheter at this time. Please review and call patients daughter back to get patient properly scheduled.      CB: [phone]  (Zeeshan Serrano)

## 2023-08-01 NOTE — DISCHARGE SUMMARY
[address]  Discharge- [facility] 1945, 69 y.o. male MRN: [id number]  Unit/Bed#: 401-01 Encounter: [id number]  Primary Care Provider: Damon Nicole DO   Date and time admitted to hospital: 7/30/2023 11:28 PM    Urinary tract infection associated with catheterization of urinary tract [facility])  Assessment & Plan  S/p ua with pyuria and noted bladder wall thickening s/p CT scan thus will empirically txt with IV CTX. F/u urine cx      · Culture pending  · Reviewed previous Ucx results. · Completed 2 wade ceftriaxone  · Start cefuroxime and continue for 5 additional days   · Catheter care per instructions  · Restart DAPT on 8/3/23   · Follow up with AMB urologist in 1 wk      Urinary retention  Assessment & Plan  Chronic indwelling urinary catheter     Initially admitted with gross hematuria. Cleared 8/1/23     Currently with cystitis. Patient being seen by urology during admission recommending discharge with morales catheter and to followup with his urologist in the next 1 wk. Patient made aware. * Gross hematuria-resolved as of 8/1/2023  Assessment & Plan  Patient with hx of BPH s/p recent photo vaporization of prostate in 7/13  Pt had failed voiding trial thus morales was left in place  Case was discussed with urology in the ED, recommended pt can remain here at [facility] in place  S/p CT scan  H/H stable, no anemia    · Hematuria resolved on 8/1/23   · Continue to hold DAPT until 8/3/23 . Restart accordingly. Cardiology OK per progress note from recent office visit   · F/u with cardiology office. · F/u with urology office in [facility] in the next 1 wk for voiding trial. Catheter care.     Stage 3a chronic kidney disease [facility])  Assessment & Plan  Lab Results   Component Value Date    EGFR 73 08/01/2023    EGFR 74 07/30/2023    EGFR 57 06/19/2023    CREATININE 0.99 08/01/2023    CREATININE 0.98 07/30/2023    CREATININE 1.22 06/19/2023   Cr w/in baseline range    Coronary artery disease of native artery of native heart with stable angina pectoris [facility])  Assessment & Plan  Hx of PCI on DAPT. Both on hold in setting of gross hematuria. Per cardiology had okd been held prior to prostate surgery and even for upcoming eye surgery to occur on 8/1      Hold DAPT until 8/3/23 . Restart accordingly   Continue to follow up with Cardiology and Urology       Class 1 obesity in adult  Assessment & Plan  Lifestyle/dietary modification    Hypertension  Assessment & Plan  Stable    Hyperlipidemia  Assessment & Plan  Cont statin    Type 2 diabetes mellitus with hyperglycemia, without long-term current use of insulin [facility])  Assessment & Plan  Lab Results   Component Value Date    HGBA1C 6.8 (H) 06/19/2023       Recent Labs     07/31/23  1200 07/31/23  1534 07/31/23  2112 08/01/23  0725   POCGLU 147* 129 221* 201*       Blood Sugar Average: Last 72 hrs:  (P) 189. 4ISS    Restart home medications   Continue metformin,glimeperide, empagliflozin  Follow up with Endocrinology. Established care at Dr. Brett Perez office. Discharging Physician / Practitioner: Gurjit Narvaez MD  PCP: Vivian Aquino DO  Admission Date:   Admission Orders (From admission, onward)     Ordered        07/31/23 1319  Inpatient Admission  Once            07/31/23 0547  Place in Observation  Once                      Discharge Date: 08/01/23    Medical Problems     Resolved Problems  Date Reviewed: 8/1/2023          Resolved    * (Principal) Gross hematuria 8/1/2023     Resolved by  Gurjit Narvaez MD          Consultations During Hospital Stay:  · urology    Procedures Performed:   CT abdomen pelvis wo contrast   Final Result      1. Prostatic/urinary bladder hemorrhage/hematoma. Jose catheter in place. 2.  Moderate thickening of the urinary bladder wall, correlate with urinalysis for cystitis.             Workstation performed: [id number]         ·   ·     Significant Findings / Test Results:   Results for orders placed or performed during the hospital encounter of 07/30/23   Urine culture    Specimen: Urine, Other   Result Value Ref Range    Urine Culture >100,000 cfu/ml Gram Negative Wil (A)    CBC and differential   Result Value Ref Range    WBC 11.18 (H) 4.31 - 10.16 Thousand/uL    RBC 4.99 3.88 - 5.62 Million/uL    Hemoglobin 12.1 12.0 - 17.0 g/dL    Hematocrit 41.2 36.5 - 49.3 %    MCV 83 82 - 98 fL    MCH 24.2 (L) 26.8 - 34.3 pg    MCHC 29.4 (L) 31.4 - 37.4 g/dL    RDW 18.2 (H) 11.6 - 15.1 %    MPV 9.1 8.9 - 12.7 fL    Platelets 335 (H) 698 - 390 Thousands/uL    nRBC 0 /100 WBCs    Neutrophils Relative 62 43 - 75 %    Immat GRANS % 0 0 - 2 %    Lymphocytes Relative 26 14 - 44 %    Monocytes Relative 8 4 - 12 %    Eosinophils Relative 4 0 - 6 %    Basophils Relative 0 0 - 1 %    Neutrophils Absolute 6.75 1.85 - 7.62 Thousands/µL    Immature Grans Absolute 0.05 0.00 - 0.20 Thousand/uL    Lymphocytes Absolute 2.92 0.60 - 4.47 Thousands/µL    Monocytes Absolute 0.94 0.17 - 1.22 Thousand/µL    Eosinophils Absolute 0.47 0.00 - 0.61 Thousand/µL    Basophils Absolute 0.05 0.00 - 0.10 Thousands/µL   Basic metabolic panel   Result Value Ref Range    Sodium 138 135 - 147 mmol/L    Potassium 3.5 3.5 - 5.3 mmol/L    Chloride 103 96 - 108 mmol/L    CO2 24 21 - 32 mmol/L    ANION GAP 11 mmol/L    BUN 13 5 - 25 mg/dL    Creatinine 0.98 0.60 - 1.30 mg/dL    Glucose 209 (H) 65 - 140 mg/dL    Calcium 9.1 8.4 - 10.2 mg/dL    eGFR 74 ml/min/1.73sq m   UA w Reflex to Microscopic w Reflex to Culture    Specimen: Urine, Other   Result Value Ref Range    Color, UA Red     Clarity, UA Turbid     Specific Gravity, UA 1.015 1.003 - 1.030    pH, UA 6.5 4.5, 5.0, 5.5, 6.0, 6.5, 7.0, 7.5, 8.0    Leukocytes, UA (A) Negative     Elevated glucose may cause decreased leukocyte values.  See urine microscopic for UWBC result    Nitrite, UA Negative Negative    Protein, UA >=300 (A) Negative mg/dl    Glucose, UA >=1000 (1%) (A) Negative mg/dl    Ketones, UA Negative Negative mg/dl    Urobilinogen, UA 0.2 0.2, 1.0 E.U./dl E.U./dl    Bilirubin, UA Negative Negative    Occult Blood, UA Large (A) Negative   Urine Microscopic   Result Value Ref Range    RBC, UA Innumerable (A) None Seen, 0-1, 1-2, 2-4, 0-5 /hpf    WBC, UA Field obscured, unable to enumerate (A) None Seen, 0-1, 1-2, 0-5, 2-4 /hpf    Epithelial Cells Field obscured, unable to enumerate (A) None Seen, Occasional /hpf    Bacteria, UA Field obscured, unable to enumerate (A) None Seen, Occasional /hpf   CBC and differential   Result Value Ref Range    WBC 9.15 4.31 - 10.16 Thousand/uL    RBC 4.28 3.88 - 5.62 Million/uL    Hemoglobin 10.5 (L) 12.0 - 17.0 g/dL    Hematocrit 35.0 (L) 36.5 - 49.3 %    MCV 82 82 - 98 fL    MCH 24.5 (L) 26.8 - 34.3 pg    MCHC 30.0 (L) 31.4 - 37.4 g/dL    RDW 18.1 (H) 11.6 - 15.1 %    MPV 9.3 8.9 - 12.7 fL    Platelets 433 553 - 385 Thousands/uL    nRBC 0 /100 WBCs    Neutrophils Relative 63 43 - 75 %    Immat GRANS % 0 0 - 2 %    Lymphocytes Relative 24 14 - 44 %    Monocytes Relative 9 4 - 12 %    Eosinophils Relative 4 0 - 6 %    Basophils Relative 0 0 - 1 %    Neutrophils Absolute 5.66 1.85 - 7.62 Thousands/µL    Immature Grans Absolute 0.04 0.00 - 0.20 Thousand/uL    Lymphocytes Absolute 2.20 0.60 - 4.47 Thousands/µL    Monocytes Absolute 0.82 0.17 - 1.22 Thousand/µL    Eosinophils Absolute 0.39 0.00 - 0.61 Thousand/µL    Basophils Absolute 0.04 0.00 - 0.10 Thousands/µL   Phosphorus   Result Value Ref Range    Phosphorus 4.4 (H) 2.3 - 4.1 mg/dL   Magnesium   Result Value Ref Range    Magnesium 2.1 1.9 - 2.7 mg/dL   CBC and differential   Result Value Ref Range    WBC 8.53 4.31 - 10.16 Thousand/uL    RBC 4.55 3.88 - 5.62 Million/uL    Hemoglobin 11.2 (L) 12.0 - 17.0 g/dL    Hematocrit 38.0 36.5 - 49.3 %    MCV 84 82 - 98 fL    MCH 24.6 (L) 26.8 - 34.3 pg    MCHC 29.5 (L) 31.4 - 37.4 g/dL    RDW 18.2 (H) 11.6 - 15.1 %    MPV 9.3 8.9 - 12.7 fL    Platelets 375 697 - 499 Thousands/uL    nRBC 0 /100 WBCs    Neutrophils Relative 60 43 - 75 %    Immat GRANS % 1 0 - 2 %    Lymphocytes Relative 26 14 - 44 %    Monocytes Relative 9 4 - 12 %    Eosinophils Relative 4 0 - 6 %    Basophils Relative 0 0 - 1 %    Neutrophils Absolute 5.18 1.85 - 7.62 Thousands/µL    Immature Grans Absolute 0.05 0.00 - 0.20 Thousand/uL    Lymphocytes Absolute 2.21 0.60 - 4.47 Thousands/µL    Monocytes Absolute 0.74 0.17 - 1.22 Thousand/µL    Eosinophils Absolute 0.32 0.00 - 0.61 Thousand/µL    Basophils Absolute 0.03 0.00 - 0.10 Thousands/µL   Basic metabolic panel   Result Value Ref Range    Sodium 138 135 - 147 mmol/L    Potassium 4.4 3.5 - 5.3 mmol/L    Chloride 105 96 - 108 mmol/L    CO2 26 21 - 32 mmol/L    ANION GAP 7 mmol/L    BUN 16 5 - 25 mg/dL    Creatinine 0.99 0.60 - 1.30 mg/dL    Glucose 207 (H) 65 - 140 mg/dL    Calcium 9.0 8.4 - 10.2 mg/dL    eGFR 73 ml/min/1.73sq m   Fingerstick Glucose (POCT)   Result Value Ref Range    POC Glucose 249 (H) 65 - 140 mg/dl   Fingerstick Glucose (POCT)   Result Value Ref Range    POC Glucose 147 (H) 65 - 140 mg/dl   Fingerstick Glucose (POCT)   Result Value Ref Range    POC Glucose 129 65 - 140 mg/dl   Fingerstick Glucose (POCT)   Result Value Ref Range    POC Glucose 221 (H) 65 - 140 mg/dl   Fingerstick Glucose (POCT)   Result Value Ref Range    POC Glucose 201 (H) 65 - 140 mg/dl   ·   ·     Incidental Findings:   · none     Test Results Pending at Discharge (will require follow up):   · Urine culture     Outpatient Tests Requested:  · none    Complications:  none    Reason for Admission: gross hematuria and complicated UTI    Hospital Course:     Gertrudis Lozoya is a 69 y.o. male patient who originally presented to the hospital on 7/30/2023 due to gross hematuria, UTI  . Dual antiplatelet therapy was stopped. Urinary bag checked at bedside clear light yellow urine. Urine cultures growing gram-negative rods. Patient completed 2 days of ceftriaxone.   Being sent home to complete total 7 days of antibiotics. Advised to follow-up with his urologist in the next 7 days. Follow-up with cardiology offices as well. Restart dual antiplatelet therapy on 7/3/3527. HPI per admission     Gertrudis Lozoya is a 69 y.o. male medical history significant for coronary artery disease, hypertension, diabetes type 2, BPH status post recent laser surgery presenting with hematuria. As mentioned patient had laser prostatectomy on 7/13. Unfortunately 6 days postop patient failed voiding trial thus Jose has remained in place. He reports of occurrence of hematuria yesterday. He had since resumed back on his Plavix and aspirin without any adverse effects. He denies any traumatic dislodgment of the Jose. He denies fever or chills, flank pain.     Upon presentation to the ER, CBI has been initiated. Case was discussed with urology recommended to admit patient for observation here at minus     Review of Systems:    The patient, initially admitted to the hospital as inpatient, was discharged earlier than expected given the following: resolution of hematuria. UTI with oral antibiotic course sent . vitally stable. Please see above list of diagnoses and related plan for additional information. Condition at Discharge: good     Discharge Day Visit / Exam:     Subjective:  Patient was seen today at bedside. He doesn't have any active complaitns. No chest pain or tightness, SOB or cough, dizziness or light headedness, N/V, Diarrhea of constipation. No active urinary symptoms  Tolerating oral diet. Urine bag noted at bedisde. Draining light yellow urine. No clots/blood.    Vitals: Blood Pressure: 132/73 (08/01/23 0724)  Pulse: 55 (08/01/23 0724)  Temperature: (!) 97.3 °F (36.3 °C) (08/01/23 0724)  Temp Source: Oral (08/01/23 0724)  Respirations: 17 (08/01/23 0724)  Height: 5' 9" (175.3 cm) (07/31/23 0714)  Weight - Scale: 96 kg (211 lb 10.3 oz) (07/31/23 0714)  SpO2: 96 % (08/01/23 0724)  Exam:   Physical Exam  Vitals reviewed. Constitutional:       General: He is not in acute distress. Appearance: Normal appearance. He is obese. HENT:      Head: Normocephalic and atraumatic. Mouth/Throat:      Mouth: Mucous membranes are moist.   Eyes:      Conjunctiva/sclera: Conjunctivae normal.      Pupils: Pupils are equal, round, and reactive to light. Cardiovascular:      Rate and Rhythm: Normal rate and regular rhythm. Pulses: Normal pulses. Carotid pulses are 2+ on the right side and 2+ on the left side. Radial pulses are 2+ on the right side and 2+ on the left side. Dorsalis pedis pulses are 2+ on the right side and 2+ on the left side. Heart sounds: Normal heart sounds, S1 normal and S2 normal. No murmur heard. Pulmonary:      Effort: No tachypnea, bradypnea or accessory muscle usage. Breath sounds: Normal breath sounds and air entry. No decreased breath sounds, wheezing, rhonchi or rales. Abdominal:      General: Abdomen is flat. Bowel sounds are normal. There is no distension. Palpations: Abdomen is soft. Tenderness: There is no abdominal tenderness. Comments: Urine bag noted at bedside. Light yellow urine. No clots or pus. Musculoskeletal:      Right lower leg: No edema. Left lower leg: No edema. Skin:     Capillary Refill: Capillary refill takes less than 2 seconds. Neurological:      Mental Status: He is alert and oriented to person, place, and time. Mental status is at baseline. Psychiatric:         Mood and Affect: Mood normal.         Behavior: Behavior normal.         Discussion with Family: patient and partner    Discharge instructions/Information to patient and family:   See after visit summary for information provided to patient and family. Provisions for Follow-Up Care:  See after visit summary for information related to follow-up care and any pertinent home health orders.       Disposition:     Home    For Discharges to [facility]:   · Not Applicable to this Patient - Not Applicable to this Patient    Planned Readmission: none     Discharge Statement:  I spent 45 minutes discharging the patient. This time was spent on the day of discharge. I had direct contact with the patient on the day of discharge. Greater than 50% of the total time was spent examining patient, answering all patient questions, arranging and discussing plan of care with patient as well as directly providing post-discharge instructions. Additional time then spent on discharge activities. Discharge Medications:  See after visit summary for reconciled discharge medications provided to patient and family.       ** Please Note: This note has been constructed using a voice recognition system **

## 2023-08-01 NOTE — ASSESSMENT & PLAN NOTE
Chronic indwelling urinary catheter     Initially admitted with gross hematuria. Cleared 8/1/23     Currently with cystitis. Patient being seen by urology during admission recommending discharge with morales catheter and to followup with his urologist in the next 1 wk. Patient made aware.

## 2023-08-01 NOTE — PHYSICAL THERAPY NOTE
Physical Therapy Evaluation    Patient Name: Nu Stover    WGOSF'P Date: 8/1/2023     Problem List  Active Problems:    Type 2 diabetes mellitus with hyperglycemia, without long-term current use of insulin ([address])    Hyperlipidemia    Hypertension    Class 1 obesity in adult    Coronary artery disease of native artery of native heart with stable angina pectoris (HCC)    Stage 3a chronic kidney disease ([address])    Urinary retention    Urinary tract infection associated with catheterization of urinary tract [facility])       Past Medical History  Past Medical History:   Diagnosis Date    At risk for falls     unsteady gait, fall risk, uses walker    BPH (benign prostatic hyperplasia)     CAD (coronary artery disease)     Cancer (HCC)     basal cell    Chronic kidney disease     stage 3    Colon polyp     Coronary artery disease     CPAP (continuous positive airway pressure) dependence     Diabetes mellitus ([address])     niddm    Disease of thyroid gland     Gout     Gross hematuria 7/31/2023    High cholesterol     History of transfusion     GI bleed, no reaction    Hypertension     Hypomagnesemia 03/09/2023    MI, 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     MRSA infection     wound and urine    Myocardial infarction ([address]) 2014    S/P cardiac catheterization 12/30/2022    Sleep apnea         Past Surgical History  Past Surgical History:   Procedure Laterality Date    BASAL CELL CARCINOMA EXCISION Right 1/21/2022    Procedure: EXCISION BASAL CELL CARCINOMA EXTREMITY;  Surgeon: Surjit Mac DO;  Location: [facility] MAIN OR;  Service: General    CARDIAC CATHETERIZATION Left 12/29/2022    Procedure: Cardiac Left Heart Cath;  Surgeon: David Salcido MD;  Location: [facility] CARDIAC CATH LAB; Service: Cardiology    CARDIAC CATHETERIZATION N/A 12/29/2022    Procedure: Cardiac Coronary Angiogram;  Surgeon: David Salcido MD;  Location: [facility] CARDIAC CATH LAB;   Service: Cardiology    CARDIAC CATHETERIZATION N/A 12/29/2022    Procedure: Cardiac pci;  Surgeon: Naima Diehl MD;  Location: [facility] CARDIAC CATH LAB; Service: Cardiology    CATARACT EXTRACTION W/  INTRAOCULAR LENS IMPLANT      CORNEAL TRANSPLANT      CORONARY ANGIOPLASTY WITH STENT PLACEMENT      stents x3-- [phone]    EYE SURGERY      repair corneal laceration    SC COLONOSCOPY FLX DX W/COLLJ SPEC WHEN PFRMD N/A 3/21/2016    Procedure: COLONOSCOPY;  Surgeon: Natalia Mckeon MD;  Location: [facility] MAIN OR;  Service: Colorectal    TONSILLECTOMY AND ADENOIDECTOMY      TRANSURETHRAL RESECTION OF PROSTATE N/A 7/13/2023    Procedure: Sage Lizzy OF PROSTATE;  Surgeon: Ranjit Bernal MD;  Location:  MAIN OR;  Service: Urology           08/01/23 0931   PT Last Visit   PT Visit Date 08/01/23   Note Type   Note type Evaluation   Pain Assessment   Pain Assessment Tool 0-10   Pain Score No Pain   Restrictions/Precautions   Weight Bearing Precautions Per Order No   Other Precautions Fall Risk;Multiple lines; Chair Alarm   Home Living   Type of [address] [place] [address]  Multi-level;1/2 bath on main level;Stairs to enter with rails  (1+1 SABINE)   Bathroom Shower/Tub Tub/shower unit   [address] Walker;Cane   Additional Comments pt reports use of RW at baseline   Prior Function   Level of [place] Independent with functional mobility; Needs assistance with ADLs; Needs assistance with IADLS   Lives With Spouse   Receives Help From Family   IADLs Family/Friend/Other provides transportation   Falls in the last 6 months 1 to 4   Vocational Retired   General   Family/Caregiver Present No   Cognition   Overall Cognitive Status WFL   Arousal/Participation Alert   Orientation Level Oriented X4   Following Commands Follows all commands and directions without difficulty   Subjective   Subjective "I'm a non-compliant patient"   RLE Assessment   RLE Assessment WFL  (assessed functionally) LLE Assessment   LLE Assessment WFL  (assessed functionally)   Bed Mobility   Supine to Sit 5  Supervision   Additional items Increased time required;Verbal cues;HOB elevated; Bedrails   Sit to Supine   (pt OOB at end of session)   Transfers   Sit to Stand 5  Supervision   Additional items Impulsive   Stand to Sit 5  Supervision   Additional items Impulsive   Additional Comments no device used   Ambulation/Elevation   Gait pattern Ataxia; Short stride; Foward flexed;Decreased foot clearance; Wide MCKENZIE; Improper Weight shift   Gait Assistance 5  Supervision   Additional items Verbal cues   Assistive Device None   Distance 10'   Balance   Static Sitting Good   Dynamic Sitting Fair +   Static Standing Fair   Dynamic Standing Fair -   Ambulatory Fair -   Endurance Deficit   Endurance Deficit Yes   Endurance Deficit Description pt appears easily fatigued with ambulation   Activity Tolerance   Activity Tolerance Patient limited by fatigue   Assessment   Prognosis Good   Problem List Decreased strength;Decreased endurance; Impaired balance;Decreased mobility   Assessment Patient is a 69 y.o. male evaluated by Physical Therapy s/p admit to [address] [place] [address] on 7/30/2023 with admitting diagnosis of: Blood in urine, Gross hematuria, and principal problem of: Gross hematuria. PT was consulted to assess patient's functional mobility and discharge needs. Ordered are PT Evaluation and treatment with activity level of: activity as tolerated. Comorbidities affecting patient's physical performance at time of assessment include: BPH, HTN, DM, high cholestrol, CAD, hx of MI, sleep apnea, disease of thyroid gland. Personal factors affecting the patient at time of IE include: lives in 2 story home, ambulating with assistive device, step(s) to enter home, inability to navigate community distances, decreased initiation and engagement, inability/difficulty performing IADLs and inability/difficulty performing ADLs.  Please locate objective findings from PT assessment regarding body systems outlined above. Upon evaluation, pt able to perform all functional mobility with SUP and increased time. Pt reports use of RW at baseline, however wished to ambulate in room from bed to chair without device. Moderate postural sway demonstrated but no true LOB experienced. The patient's AM-PAC Basic Mobility Inpatient Short Form Raw Score is 17. A Raw score of greater than 16 suggests the patient may benefit from discharge to home. Please also refer to the recommendation of the Physical Therapist for safe discharge planning. Co treatment with OT secondary to complex medical condition of pt, possible A of 2 required to achieve and maintain transitional movements, requiring the need of skilled therapeutic intervention of 2 therapists to achieve delivery of services. Pt will benefit from continued PT intervention during LOS to address current deficits, increase LOF, and facilitate safe d/c to next level of care when medically appropriate. D/c recommendation at this time is home with outpatient rehabilitation. Goals   Patient Goals to go home today   LTG Expiration Date 08/15/23   Long Term Goal #1 Pt will participate in B LE strengthening exercises to facilitate improved functional activity tolerance. Pt will perform all functional transfers and bed mobility mod(I) with good safety awareness. Pt will ambulate 250' mod(I) with LRAD while maintaining good functional dynamic balance. Pt will ascend/descend a FFOS with HR and SUP to reflect the ability to safely navigate the home. Plan   Treatment/Interventions Functional transfer training;LE strengthening/ROM; Elevations; Therapeutic exercise; Endurance training;Bed mobility;Gait training   PT Frequency 3-5x/wk   Recommendation   PT Discharge Recommendation Home with outpatient rehabilitation   Additional Comments pt reports moving to [place] this month, so pt recommended to seek OP PT once there   [address] In[facility]   Turning in Flat Bed Without Bedrails 3   Lying on Back to Sitting on Edge of Flat Bed Without Bedrails 3   Moving Bed to Chair 3   Standing Up From Chair Using Arms 3   Walk in Room 3   Climb 3-5 Stairs With Railing 2   Basic Mobility Inpatient Raw Score 17   Basic Mobility Standardized Score 39.67   Highest Level Of Mobility   -[facility] Goal 5: Stand one or more mins   -HLM Achieved 6: Walk 10 steps or more   End of Consult   Patient Position at End of Consult Bedside chair;Bed/Chair alarm activated; All needs within reach

## 2023-08-01 NOTE — CASE MANAGEMENT
Case Management Discharge Planning Note    Patient name Patricia Johnson  Location [address] [facility] [place] [id number] MRN [id number]  : 1945 Date 2023       Current Admission Date: 2023  Current Admission Diagnosis:Type 2 diabetes mellitus with hyperglycemia, without long-term current use of insulin [facility])   Patient Active Problem List    Diagnosis Date Noted   • Urinary tract infection associated with catheterization of urinary tract ([address]) 2023   • Preop cardiovascular exam 2023   • CPAP (continuous positive airway pressure) dependence    • H/O heart artery stent    • Benign prostatic hyperplasia with urinary retention 2023   • Low serum cortisol level 2023   • Hot flashes 2023   • Elevated platelet count    • Hypothyroidism 2023   • Diverticulosis of colon with hemorrhage 2023   • Elevated TSH 2023   • Elevated d-dimer 2023   • Urinary retention 2023   • Acute blood loss anemia 2023   • Acute GI bleeding 2023   • Diabetes mellitus, type 2 ([address]) 2023   • Acute diverticulitis 2023   • Orthostatic hypotension 2023   • 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   • Peripheral arterial disease ([address]) 2023   • S/P cardiac catheterization 2022   • Stage 3a chronic kidney disease ([address]) 2022   • Basal cell carcinoma (BCC) of right shoulder 2022   • Chronic diastolic (congestive) heart failure ([address]) 01/10/2022   • Skin lesion 2021   • Other chest pain 2021   • Leukocytosis 2021   • Bilateral lower extremity edema 10/22/2021   • Mild cognitive impairment 2021   • Benign hypertension with CKD (chronic kidney disease) stage III ([address]) 02/10/2021   • Diabetic nephropathy associated with type 2 diabetes mellitus ([address]) 02/10/2021   • Concentric left ventricular hypertrophy 02/10/2021   • Aortic stenosis, moderate 02/10/2021   • Edema 02/10/2021   • Fall (on) (from) other stairs and steps, initial encounter 12/28/2020   • Recurrent major depressive disorder ([address]) 12/28/2020   • Primary osteoarthritis of right knee 05/21/2020   • Primary osteoarthritis of left knee 05/21/2020   • Coronary artery disease of native artery of native heart with stable angina pectoris ([address]) 03/18/2020   • Chronic pain of both knees 02/13/2020   • Primary osteoarthritis of both knees 02/13/2020   • Primary osteoarthritis involving multiple joints 12/16/2019   • Vitamin E deficiency 09/19/2019   • Infected epidermoid cyst 12/04/2018   • Tremor, essential 09/26/2018   • Diabetic neuropathy associated with diabetes mellitus due to underlying condition ([address]) 09/26/2018   • Gait instability 09/26/2018   • Frequent falls 09/26/2018   • Degenerative disc disease, lumbar 07/10/2018   • Tremor 10/10/2016   • Depression with anxiety 08/29/2016   • Arthritis 07/13/2015   • NSTEMI (non-ST elevated myocardial infarction) ([address]) 10/06/2014   • Gout 05/17/2013   • Type 2 diabetes mellitus with hyperglycemia, without long-term current use of insulin ([address]) 06/18/2012   • Hyperlipidemia 06/18/2012   • Hypertension 06/18/2012   • Class 1 obesity in adult 06/18/2012      LOS (days): 1  Geometric Mean LOS (GMLOS) (days): 2.30  Days to GMLOS:1.4     OBJECTIVE:  Risk of Unplanned Readmission Score: 24.77         Current admission status: Inpatient   Preferred Pharmacy:   [address] [place] [address], [address]   [address] [facility] [address]  Phone: [phone] Fax: [phone]    Primary Care Provider: Zo Fabian DO    Primary Insurance: [facility]  Secondary Insurance:     DISCHARGE DETAILS:  Pt is being dc'd home on this date and will be following up with Urology in 1 week as an OP for fc removal.

## 2023-08-01 NOTE — ASSESSMENT & PLAN NOTE
Patient with hx of BPH s/p recent photo vaporization of prostate in 7/13  Pt had failed voiding trial thus morales was left in place  Case was discussed with urology in the ED, recommended pt can remain here at [facility] in place  S/p CT scan  H/H stable, no anemia    · Hematuria resolved on 8/1/23   · Continue to hold DAPT until 8/3/23 . Restart accordingly. Cardiology OK per progress note from recent office visit   · F/u with cardiology office. · F/u with urology office in [facility] in the next 1 wk for voiding trial. Catheter care.

## 2023-08-01 NOTE — ASSESSMENT & PLAN NOTE
Lab Results   Component Value Date    HGBA1C 6.8 (H) 06/19/2023       Recent Labs     07/31/23  1200 07/31/23  1534 07/31/23  2112 08/01/23  0725   POCGLU 147* 129 221* 201*       Blood Sugar Average: Last 72 hrs:  (P) 189. 4ISS    Restart home medications   Continue metformin,glimeperide, empagliflozin  Follow up with Endocrinology. Established care at Dr. Magdiel Alonzo office.

## 2023-08-01 NOTE — OCCUPATIONAL THERAPY NOTE
Occupational Therapy Evaluation     Patient Name: Susanne FREIRE Date: 8/1/2023  Problem List  Active Problems:    Type 2 diabetes mellitus with hyperglycemia, without long-term current use of insulin ([address])    Hyperlipidemia    Hypertension    Class 1 obesity in adult    Coronary artery disease of native artery of native heart with stable angina pectoris (HCC)    Stage 3a chronic kidney disease ([address])    Urinary retention    Urinary tract infection associated with catheterization of urinary tract [facility])    Past Medical History  Past Medical History:   Diagnosis Date    At risk for falls     unsteady gait, fall risk, uses walker    BPH (benign prostatic hyperplasia)     CAD (coronary artery disease)     Cancer (HCC)     basal cell    Chronic kidney disease     stage 3    Colon polyp     Coronary artery disease     CPAP (continuous positive airway pressure) dependence     Diabetes mellitus ([address])     niddm    Disease of thyroid gland     Gout     Gross hematuria 7/31/2023    High cholesterol     History of transfusion     GI bleed, no reaction    Hypertension     Hypomagnesemia 03/09/2023    MI, 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     MRSA infection     wound and urine    Myocardial infarction ([address]) 2014    S/P cardiac catheterization 12/30/2022    Sleep apnea      Past Surgical History  Past Surgical History:   Procedure Laterality Date    BASAL CELL CARCINOMA EXCISION Right 1/21/2022    Procedure: EXCISION BASAL CELL CARCINOMA EXTREMITY;  Surgeon: Tom Bethea DO;  Location: [facility] MAIN OR;  Service: General    CARDIAC CATHETERIZATION Left 12/29/2022    Procedure: Cardiac Left Heart Cath;  Surgeon: Nelda Esquivel MD;  Location: [facility] CARDIAC CATH LAB; Service: Cardiology    CARDIAC CATHETERIZATION N/A 12/29/2022    Procedure: Cardiac Coronary Angiogram;  Surgeon: Nelda Esquivel MD;  Location: [facility] CARDIAC CATH LAB;   Service: Cardiology    CARDIAC CATHETERIZATION N/A 12/29/2022    Procedure: Cardiac pci;  Surgeon: Yaron Gooden MD;  Location: [facility] CARDIAC CATH LAB; Service: Cardiology    CATARACT EXTRACTION W/  INTRAOCULAR LENS IMPLANT      CORNEAL TRANSPLANT      CORONARY ANGIOPLASTY WITH STENT PLACEMENT      stents x3-- [phone]    EYE SURGERY      repair corneal laceration    LA COLONOSCOPY FLX DX W/COLLJ SPEC WHEN PFRMD N/A 3/21/2016    Procedure: COLONOSCOPY;  Surgeon: Rogerio Dinh MD;  Location: [facility] MAIN OR;  Service: Colorectal    TONSILLECTOMY AND ADENOIDECTOMY      TRANSURETHRAL RESECTION OF PROSTATE N/A 7/13/2023    Procedure: Loreli [place] OF PROSTATE;  Surgeon: Natalee Alanis MD;  Location:  MAIN OR;  Service: Urology           08/01/23 0930   OT Last Visit   OT Visit Date 08/01/23   Note Type   Note type Evaluation   Pain Assessment   Pain Score No Pain   Restrictions/Precautions   Weight Bearing Precautions Per Order No   Other Precautions Fall Risk;Multiple lines; Chair Alarm   Home Living   Home Layout Two level;Performs ADLs on one level; Able to live on main level with bedroom/bathroom;Stairs to enter without rails; Other (Comment)  (1+1 SABINE no HR)   Bathroom Shower/Tub Tub/shower unit   Bathroom Toilet Standard   Bathroom Equipment Shower chair;Commode   Bathroom Accessibility Accessible   Home Equipment Walker;Cane   Additional Comments pt reports use of RW at baseline, however later in session reports no device during session   Prior Function   Level of [place] Needs assistance with ADLs; Needs assistance with IADLS; Independent with functional mobility   Lives With   (currently residing alone with daughter (A); wife is preparing home in [place])   [address] Help From Family   IADLs Family/Friend/Other provides transportation; Family/Friend/Other provides meals; Family/Friend/Other provides medication management   Falls in the last 6 months 1 to 4   Vocational Retired   Comments pt is known to this department and is noncompliant at baseline   Subjective   Subjective "I am a noncompliant pt"   ADL   Where Assessed Edge of bed   LB Dressing Assistance 2  Maximal Assistance   LB Dressing Deficit Don/doff R sock; Don/doff L sock   Toileting Assistance  2  Maximal Assistance   Toileting Deficit Other (Comment)  (catheter management)   Additional Comments pt requires max (A) for LB dressing and states he requires max (A) at baseline   Bed Mobility   Supine to Sit 5  Supervision   Additional items Bedrails; Increased time required   Sit to Supine   (seated in chair at end of session)   Additional Comments pt on RA during session; SpO2 WFL with no complaints of SOB   Transfers   Sit to Stand 5  Supervision   Additional items Increased time required;Verbal cues   Stand to Sit 5  Supervision   Additional items Increased time required;Verbal cues   Additional Comments pt performed functional transfers with no device and mildly impulsive; no significant LOB, however mild instability   Functional Mobility   Functional Mobility 5  Supervision   Additional Comments pt performs functional mobility with no device; performs mobility to chair this date ~10 ft; declines further mobility   Additional items   (no device)   Balance   Static Sitting Good   Dynamic Sitting Good   Static Standing Fair   Dynamic Standing Fair   Ambulatory Fair -   Activity Tolerance   Activity Tolerance Patient limited by fatigue   RUE Assessment   RUE Assessment WFL   LUE Assessment   LUE Assessment WFL   Hand Function   Gross Motor Coordination Functional   Fine Motor Coordination Functional   Sensation   Light Touch No apparent deficits   Sharp/Dull No apparent deficits   Psychosocial   Psychosocial (WDL) WDL   Cognition   Overall Cognitive Status WFL   Arousal/Participation Alert   Attention Within functional limits   Orientation Level Oriented X4   Memory Within functional limits   Following Commands Follows all commands and directions without difficulty   Assessment   Limitation Decreased ADL status; Decreased UE strength;Decreased Safe judgement during ADL;Decreased endurance;Decreased cognition;Decreased self-care trans;Decreased high-level ADLs   Assessment Pt is a 69 y.o. male seen for OT evaluation s/p admit to [facility] on 7/30/2023 w/ Gross hematuria. Comorbidities affecting pt's functional performance at time of assessment include: BPH, HTN, DM, high cholesterol, gout, CAD, MI, CA, cardiac cath, hypomagnesmia, CAD, hematuria. Personal factors affecting pt at time of IE include:steps to enter environment, difficulty performing ADLS, difficulty performing IADLS , limited insight into deficits, compliance, decreased initiation and engagement  and health management . Prior to admission, pt was (A) with ADLs and IADLs with use of no device during mobility. Upon evaluation: Pt requires (S) level with no device during mobility 2* the following deficits impacting occupational performance: weakness, decreased strength, decreased balance, decreased tolerance, impaired initiation, impaired problem solving, decreased safety awareness and impaired interpersonal skills. Pt to benefit from continued skilled OT tx while in the hospital to address deficits as defined above and maximize level of functional independence w ADL's and functional mobility. Occupational Performance areas to address include: grooming, bathing/shower, toilet hygiene, dressing, functional mobility, community mobility and clothing management. The patient's raw score on the -PAC Daily Activity Inpatient Short Form is 19. A raw score of less than 19 suggests the patient may benefit from discharge to post-acute rehabilitation services. Please refer to the recommendation of the Occupational Therapist for safe discharge planning.   Pt benefited from co-evaluation of skilled OT and PT therapists in order to most appropriately address functional deficits d/t extensive assistance required for safe functional mobility, decreased activity tolerance, and regression from functioning level prior to admission and/or onset of present illness. OT/PT objectives were addressed separately; please see PT note for specific goal areas targeted. Goals   Patient Goals to go home   Short Term Goal  pt will perform UE strengthening exercises   Long Term Goal #1 pt will demonstrate toilet transfers and hygiene at (S) level   Long Term Goal #2 pt will demonstrate functional mobility with RW at mod (I) level   Long Term Goal pt will perform UB/LB bathing and grooming tasks at min (A) level   Plan   Treatment Interventions ADL retraining;Functional transfer training;UE strengthening/ROM; Endurance training;Patient/family training;Equipment evaluation/education; Activityengagement   Goal Expiration Date 08/15/23   OT Frequency 3-5x/wk   Recommendation   OT Discharge Recommendation Home with outpatient rehabilitation   AM-PAC Daily Activity Inpatient   Lower Body Dressing 2   Bathing 2   Toileting 3   Upper Body Dressing 4   Grooming 4   Eating 4   Daily Activity Raw Score 19   Daily Activity Standardized Score (Calc for Raw Score >=11) 40.22   AM-PAC Applied Cognition Inpatient   Following a Speech/Presentation 4   Understanding Ordinary Conversation 4   Taking Medications 3   Remembering Where Things Are Placed or Put Away 3   Remembering List of 4-5 Errands 3   Taking Care of Complicated Tasks 3   Applied Cognition Raw Score 20   Applied Cognition Standardized Score 41.76

## 2023-08-02 ENCOUNTER — TRANSITIONAL CARE MANAGEMENT (OUTPATIENT)
Dept: FAMILY MEDICINE CLINIC | Facility: CLINIC | Age: 78
End: 2023-08-02

## 2023-08-02 DIAGNOSIS — Z71.89 COMPLEX CARE COORDINATION: Primary | ICD-10-CM

## 2023-08-02 LAB — BACTERIA UR CULT: ABNORMAL

## 2023-08-02 NOTE — TELEPHONE ENCOUNTER
Left message for [facility] to call back and schedule an appointment next week on August 9 with Dr. Jalil Dawson

## 2023-08-02 NOTE — UTILIZATION REVIEW
NOTIFICATION OF ADMISSION DISCHARGE   This is a Notification of Discharge from [facility]. Please be advised that this patient has been discharge from our facility. Below you will find the admission and discharge date and time including the patient’s disposition. UTILIZATION REVIEW CONTACT:  Aly Rizzo  Utilization   Network Utilization Review Department  Phone: [phone] x carefully listen to the prompts. All voicemails are confidential.  Email: [email or web address]     ADMISSION INFORMATION  PRESENTATION DATE: 7/30/2023 11:28 PM  OBERVATION ADMISSION DATE:   INPATIENT ADMISSION DATE: 7/31/23  1:20 PM   DISCHARGE DATE: 8/1/2023 12:30 PM   DISPOSITION:Home/Self Care    IMPORTANT INFORMATION:  Send all requests for admission clinical reviews, approved or denied determinations and any other requests to dedicated fax number below belonging to the campus where the patient is receiving treatment.  List of dedicated fax numbers:  [place] DENIALS (Administrative/Medical Necessity) [phone] [address] [facility] (Maternity/NICU/Pediatrics) [phone]   [facility] [phone]   [facility] [phone] [address] [facility] [phone]   [address] [place] [address] [phone]   [facility] [phone]   [address] [place] [address] [address] [facility] [phone]   [address] [place] [address] [phone]   [address] [facility] [phone] [address] [facility] [address] [facility] [phone]

## 2023-08-03 ENCOUNTER — PATIENT OUTREACH (OUTPATIENT)
Dept: FAMILY MEDICINE CLINIC | Facility: CLINIC | Age: 78
End: 2023-08-03

## 2023-08-03 NOTE — PROGRESS NOTES
Contacted patient for f/u post hospitalization for hematuria. Patient has morales in place and reports clear urine. No hematuria noted. He is forcing fluids and denies pain. He has f/u with urology scheduled for next week. He reports he is planning on moving to [place] and will be leaving next week. Medications reviewed and he is taking all as prescribed. He will be resuming plavix and baby asa today. He denies any needs at the present time.

## 2023-08-03 NOTE — TELEPHONE ENCOUNTER
Patient's daughter returning missed call    Patient scheduled on 8/9 at 9:45 with Dr. Arellano Parents in [place]

## 2023-08-07 ENCOUNTER — RA CDI HCC (OUTPATIENT)
Dept: OTHER | Facility: HOSPITAL | Age: 78
End: 2023-08-07

## 2023-08-07 NOTE — PROGRESS NOTES
[facility] coding opportunities     I13.0 and E11.3293     Chart Reviewed number of suggestions sent to Provider: 2     Patients Insurance     Medicare Insurance: [facility] Attempted to speak with the patient. The patient states that he wanted to contact him later as he was busy at this time.

## 2023-08-08 RX ORDER — ZONISAMIDE 50 MG/1
CAPSULE ORAL
Qty: 90 CAPSULE | Refills: 3 | Status: SHIPPED | OUTPATIENT
Start: 2023-08-08

## 2023-08-09 ENCOUNTER — OFFICE VISIT (OUTPATIENT)
Dept: UROLOGY | Facility: CLINIC | Age: 78
End: 2023-08-09

## 2023-08-09 VITALS
DIASTOLIC BLOOD PRESSURE: 76 MMHG | BODY MASS INDEX: 31.25 KG/M2 | OXYGEN SATURATION: 96 % | TEMPERATURE: 97.5 F | SYSTOLIC BLOOD PRESSURE: 102 MMHG | HEIGHT: 69 IN | HEART RATE: 100 BPM

## 2023-08-09 DIAGNOSIS — R33.9 URINARY RETENTION: ICD-10-CM

## 2023-08-09 DIAGNOSIS — N40.1 BENIGN PROSTATIC HYPERPLASIA WITH URINARY RETENTION: Primary | ICD-10-CM

## 2023-08-09 DIAGNOSIS — R33.8 BENIGN PROSTATIC HYPERPLASIA WITH URINARY RETENTION: Primary | ICD-10-CM

## 2023-08-09 PROCEDURE — 99024 POSTOP FOLLOW-UP VISIT: CPT | Performed by: UROLOGY

## 2023-08-09 NOTE — PROGRESS NOTES
UROLOGY PROGRESS NOTE         NAME: Mikal Lopez  AGE: 69 y.o. SEX: male  : 1945   MRN: [id number]    DATE: 2023  TIME: 11:10 AM    Assessment and Plan      Impression:   1. Benign prostatic hyperplasia with urinary retention    2. Urinary retention      Patient status post PVP. He continues to have problems with retention. Did have some gross hematuria which resolved on its own. He failed a voiding trial again today. After discussing the options we decided to replace the Jose.  Plan: He will seek care at the [address] [place] [address] in [place] he is moving there on . Chief Complaint     Chief Complaint   Patient presents with   • Follow-up     History of Present Illness     HPI: Mikal Lopez is a 69y.o. year old male who presents with status post PVP for urinary retention related to BPH. He has a hypotonic bladder. Unfortunately he has failed another voiding trial today. He was also recently in the hospital with hematuria that resolved. He has been on antibiotics. No fever or chills. He slipped and fell in the parking lot today. He states he feels okay. No obvious injuries. They are moving to [place] permanently on . He will seek his care from the [address] [place] [address]. We discussed the options with continued urinary retention. Intermittent catheterization versus Jose. He will need to be reevaluated a cystoscopy since he recently had a PVP. He will get this at the [address] [place] [address] in [place].               The following portions of the patient's history were reviewed and updated as appropriate: allergies, current medications, past family history, past medical history, past social history, past surgical history and problem list.  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    • MRSA infection     wound and urine   • Myocardial infarction [facility]) 2014   • S/P cardiac catheterization 12/30/2022   • Sleep apnea      Past Surgical History:   Procedure Laterality Date   • BASAL CELL CARCINOMA EXCISION Right 1/21/2022    Procedure: EXCISION BASAL CELL CARCINOMA EXTREMITY;  Surgeon: Surjit Mac DO;  Location: [facility];  Service: General   • CARDIAC CATHETERIZATION Left 12/29/2022    Procedure: Cardiac Left Heart Cath;  Surgeon: David Salcido MD;  Location: [facility]; Service: Cardiology   • CARDIAC CATHETERIZATION N/A 12/29/2022    Procedure: Cardiac Coronary Angiogram;  Surgeon: Daivd Salcido MD;  [facility]; Service: Cardiology   • CARDIAC CATHETERIZATION N/A 12/29/2022    Procedure: Cardiac pci;  Surgeon: David Salcido MD;  [facility]: [facility]; Service: Cardiology   • CATARACT EXTRACTION W/  INTRAOCULAR LENS IMPLANT     • CORNEAL TRANSPLANT     • CORONARY ANGIOPLASTY WITH STENT PLACEMENT      stents x3-- [phone]   • EYE SURGERY      repair corneal laceration   • MO COLONOSCOPY FLX DX W/COLLJ SPEC WHEN PFRMD N/A 3/21/2016    Procedure: COLONOSCOPY;  Surgeon: Linda Corley MD;  Location: [facility];  Service: Colorectal   • TONSILLECTOMY AND ADENOIDECTOMY     • TRANSURETHRAL RESECTION OF PROSTATE N/A 7/13/2023    Procedure: Larisa Marte;  Surgeon: Marie Angel MD;  Location:  MAIN OR;  Service: Urology     shoulder  Review of Systems     Const: Denies chills, fever and weight loss. CV: Denies chest pain. Resp: Denies SOB. GI: Denies abdominal pain, nausea and vomiting.   : Denies symptoms other than stated above. Musculo: Denies back pain. Objective   /76 (BP Location: Left arm, Patient Position: Sitting, Cuff Size: Adult)   Pulse 100   Temp 97.5 °F (36.4 °C)   Ht 5' 9" (1.753 m)   SpO2 96%   BMI 31.25 kg/m²     Physical Exam  Const: Appears healthy and well developed. No signs of acute distress present. Resp: Respirations are regular and unlabored. CV: Rate is regular. Rhythm is regular. Abdomen: Abdomen is soft, nontender, and nondistended. Kidneys are not palpable. : Jose. Psych: Patient's attitude is cooperative. Mood is normal. Affect is normal.    Current Medications     Current Outpatient Medications:   •  acetaminophen (TYLENOL) 325 mg tablet, Take 2 tablets (650 mg total) by mouth every 6 (six) hours as needed for mild pain, fever or headaches Do not exceed a total of 3 grams of tylenol/acetaminophen in a 24-hour period. , Disp: , Rfl: 0  •  allopurinol (ZYLOPRIM) 100 mg tablet, Take 1 tablet (100 mg total) by mouth daily, Disp: 90 tablet, Rfl: 1  •  aluminum-magnesium hydroxide-simethicone (MYLANTA) [id number] mg/30 mL suspension, Take 30 mL by mouth every 4 (four) hours as needed for indigestion or heartburn, Disp: , Rfl: 0  •  aspirin (ECOTRIN LOW STRENGTH) 81 mg EC tablet, Take 1 tablet (81 mg total) by mouth daily Do not start before August 3, 2023., Disp: 90 tablet, Rfl: 0  •  atorvastatin (LIPITOR) 80 mg tablet, Take 1 tablet (80 mg total) by mouth daily with dinner, Disp: 90 tablet, Rfl: 1  •  Blood Glucose Monitoring Suppl (OneTouch Verio Reflect) w/Device KIT, Check blood sugars twice daily. Please substitute with appropriate alternative as covered by patient's insurance.  Dx: E11.65, Disp: 1 kit, Rfl: 0  •  clopidogrel (PLAVIX) 75 mg tablet, Take 1 tablet (75 mg total) by mouth daily Do not start before August 3, 2023., Disp: 90 tablet, Rfl: 0  •  cyanocobalamin (VITAMIN B-12) 500 MCG tablet, Take 1 tablet (500 mcg total) by mouth daily, Disp: 90 tablet, Rfl: 1  •  Empagliflozin (Jardiance) 10 MG TABS tablet, Take 1 tablet (10 mg total) by mouth every morning, Disp: 90 tablet, Rfl: 3  •  escitalopram (LEXAPRO) 20 mg tablet, Take 0.5 tablets (10 mg total) by mouth daily, Disp: 90 tablet, Rfl: 0  •  finasteride (PROSCAR) 5 mg tablet, Take 1 tablet (5 mg total) by mouth daily, Disp: 90 tablet, Rfl: 3  •  fluticasone (FLONASE) 50 mcg/act nasal spray, 1 spray into each nostril daily Do not start before March 23, 2023., Disp: , Rfl: 0  •  glimepiride (AMARYL) 4 mg tablet, TAKE 1 TABLET TWICE A DAY, Disp: 180 tablet, Rfl: 0  •  glucose blood (OneTouch Verio) test strip, Check blood sugars twice daily. Please substitute with appropriate alternative as covered by patient's insurance. Dx: E11.65, Disp: 200 each, Rfl: 3  •  Incontinence Supplies [facility] Urine Leg Bag) MISC, Use in the morning, Disp: 10 each, Rfl: 0  •  levothyroxine 112 mcg tablet, Take 1 tablet (112 mcg total) by mouth daily in the early morning, Disp: 90 tablet, Rfl: 0  •  metFORMIN (GLUCOPHAGE) 1000 MG tablet, Take 1 tablet (1,000 mg total) by mouth 2 (two) times a day with meals, Disp: 180 tablet, Rfl: 1  •  metoprolol succinate (TOPROL-XL) 50 mg 24 hr tablet, Take 1 tablet (50 mg total) by mouth daily, Disp: 90 tablet, Rfl: 4  •  multivitamin (THERAGRAN) TABS, Take 1 tablet by mouth daily, Disp: , Rfl:   •  OneTouch Delica Lancets 31A MISC, Check blood sugars twice daily. Please substitute with appropriate alternative as covered by patient's insurance.  Dx: E11.65, Disp: 200 each, Rfl: 3  •  oxybutynin (DITROPAN-XL) 10 MG 24 hr tablet, Take 1 tablet (10 mg total) by mouth daily at bedtime, Disp: 90 tablet, Rfl: 3  •  pantoprazole (PROTONIX) 40 mg tablet, Take 1 tablet (40 mg total) by mouth daily before breakfast, Disp: 90 tablet, Rfl: 1  •  polyethylene glycol (MIRALAX) 17 g packet, Take 17 g by mouth daily as needed (constipation), Disp: , Rfl: 0  •  Prolensa 0.07 % SOLN, Right eye as directed, Disp: , Rfl:   •  senna-docusate sodium (SENOKOT S) 8.6-50 mg per tablet, Take 1 tablet by mouth 2 (two) times a day Hold for diarrhea or loose stools, Disp: , Rfl: 0  •  torsemide (DEMADEX) 10 mg tablet, Take 10 mg by mouth if needed, Disp: , Rfl:   •  zonisamide (ZONEGRAN) 50 MG capsule, Take 1 tablet (50 mg total) by mouth daily, Disp: 90 capsule, Rfl: 3        Carol Obrien MD

## 2023-08-11 ENCOUNTER — OFFICE VISIT (OUTPATIENT)
Dept: FAMILY MEDICINE CLINIC | Facility: CLINIC | Age: 78
End: 2023-08-11
Payer: COMMERCIAL

## 2023-08-11 VITALS
TEMPERATURE: 97.9 F | BODY MASS INDEX: 30.66 KG/M2 | WEIGHT: 207 LBS | HEART RATE: 76 BPM | OXYGEN SATURATION: 96 % | SYSTOLIC BLOOD PRESSURE: 108 MMHG | DIASTOLIC BLOOD PRESSURE: 64 MMHG | HEIGHT: 69 IN

## 2023-08-11 VITALS
OXYGEN SATURATION: 96 % | HEIGHT: 69 IN | WEIGHT: 207 LBS | BODY MASS INDEX: 30.66 KG/M2 | HEART RATE: 76 BPM | DIASTOLIC BLOOD PRESSURE: 64 MMHG | TEMPERATURE: 97.9 F | SYSTOLIC BLOOD PRESSURE: 108 MMHG

## 2023-08-11 DIAGNOSIS — Z76.89 ENCOUNTER FOR SUPPORT AND COORDINATION OF TRANSITION OF CARE: ICD-10-CM

## 2023-08-11 DIAGNOSIS — F33.9 RECURRENT MAJOR DEPRESSIVE DISORDER, REMISSION STATUS UNSPECIFIED (HCC): ICD-10-CM

## 2023-08-11 DIAGNOSIS — Z00.00 MEDICARE ANNUAL WELLNESS VISIT, SUBSEQUENT: Primary | ICD-10-CM

## 2023-08-11 DIAGNOSIS — N30.01 ACUTE CYSTITIS WITH HEMATURIA: Primary | ICD-10-CM

## 2023-08-11 PROBLEM — R60.9 EDEMA: Status: RESOLVED | Noted: 2021-02-10 | Resolved: 2023-08-11

## 2023-08-11 PROBLEM — Z01.810 PREOP CARDIOVASCULAR EXAM: Status: RESOLVED | Noted: 2023-07-27 | Resolved: 2023-08-11

## 2023-08-11 PROBLEM — R07.89 OTHER CHEST PAIN: Status: RESOLVED | Noted: 2021-12-01 | Resolved: 2023-08-11

## 2023-08-11 PROBLEM — D72.829 LEUKOCYTOSIS: Status: RESOLVED | Noted: 2021-12-01 | Resolved: 2023-08-11

## 2023-08-11 PROBLEM — D62 ACUTE BLOOD LOSS ANEMIA: Status: RESOLVED | Noted: 2023-03-03 | Resolved: 2023-08-11
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
## 2023-08-11 PROBLEM — R79.89 LOW SERUM CORTISOL LEVEL: Status: RESOLVED | Noted: 2023-03-21 | Resolved: 2023-08-11

## 2023-08-11 PROBLEM — E56.0 VITAMIN E DEFICIENCY: Status: RESOLVED | Noted: 2019-09-19 | Resolved: 2023-08-11

## 2023-08-11 PROCEDURE — G0439 PPPS, SUBSEQ VISIT: HCPCS | Performed by: FAMILY MEDICINE

## 2023-08-11 PROCEDURE — 99495 TRANSJ CARE MGMT MOD F2F 14D: CPT | Performed by: FAMILY MEDICINE

## 2023-08-11 RX ORDER — CEPHALEXIN 500 MG/1
500 CAPSULE ORAL EVERY 12 HOURS SCHEDULED
COMMUNITY
Start: 2023-08-10 | End: 2023-08-16

## 2023-08-11 NOTE — PROGRESS NOTES
Assessment & Plan     1. Acute cystitis with hematuria    2. Encounter for support and coordination of transition of care    3. Recurrent major depressive disorder, remission status unspecified ([address])      BMI Counseling: Body mass index is 30.57 kg/m². The BMI is above normal. Nutrition recommendations include decreasing portion sizes, encouraging healthy choices of fruits and vegetables, moderation in carbohydrate intake and increasing intake of lean protein. Exercise recommendations include exercising 3-5 times per week. Rationale for BMI follow-up plan is due to patient being overweight or obese. Subjective     Transitional Care Management Review:   Stephany Branch is a 69 y.o. male here for TCM follow up. During the TCM phone call patient stated:  TCM Call     Date and time call was made  8/2/2023 12:05 PM    Hospital care reviewed  Records reviewed    Patient was hospitialized at  [address] [place] [address]    Date of Admission  07/30/23    Date of discharge  08/01/23    Diagnosis  gross hematuria    Disposition  Home    Were the patients medications reviewed and updated  No    Current Symptoms  None      TCM Call     Post hospital issues  None    Should patient be enrolled in anticoag monitoring? No    Scheduled for follow up? Yes    Patients specialists  Urologist    Cardiologist name  Heavenly Stout    Did you obtain your prescribed medications  Yes    Do you need help managing your prescriptions or medications  No    Is transportation to your appointment needed  No    I have advised the patient to call PCP with any new or worsening symptoms  Miguel Khanna,     Counseling  Patient        HPI  Review of Systems   Constitutional: Positive for activity change and fatigue. Negative for appetite change, diaphoresis and fever. HENT: Positive for dental problem and hearing loss. Eyes: Positive for visual disturbance. Respiratory: Positive for shortness of breath.  Negative for apnea, cough, chest tightness and wheezing. Cardiovascular: Negative for chest pain, palpitations and leg swelling. Gastrointestinal: Negative for abdominal distention, abdominal pain, anal bleeding, constipation, diarrhea, nausea and vomiting. Endocrine: Negative for cold intolerance, heat intolerance, polydipsia, polyphagia and polyuria. Genitourinary: Positive for difficulty urinating. Negative for dysuria, flank pain, hematuria and urgency. Jose catheter draining yellow slightly cloudy urine patient is currently on antibiotic therapy   Musculoskeletal: Positive for arthralgias, back pain and gait problem. Negative for joint swelling and myalgias. Skin: Negative for color change, rash and wound. Stage I decubitus ulcer dime sized right medial buttocks   Allergic/Immunologic: Negative for environmental allergies, food allergies and immunocompromised state. Neurological: Positive for tremors and weakness. Negative for dizziness, seizures, syncope, speech difficulty, numbness and headaches. Hematological: Negative for adenopathy. Does not bruise/bleed easily. Psychiatric/Behavioral: Negative for agitation, behavioral problems, hallucinations, sleep disturbance and suicidal ideas. The patient is nervous/anxious. Objective     /64 (BP Location: Left arm, Patient Position: Sitting, Cuff Size: Standard)   Pulse 76   Temp 97.9 °F (36.6 °C) (Temporal)   Ht 5' 9" (1.753 m)   Wt 93.9 kg (207 lb)   SpO2 96%   BMI 30.57 kg/m²      Physical Exam  Vitals and nursing note reviewed. Constitutional:       General: He is not in acute distress. Appearance: He is well-developed. He is ill-appearing. HENT:      Head: Normocephalic and atraumatic. Right Ear: External ear normal.      Left Ear: External ear normal.      Nose: Nose normal.   Eyes:      Conjunctiva/sclera: Conjunctivae normal.      Pupils: Pupils are equal, round, and reactive to light.    Cardiovascular:      Rate and Rhythm: Normal rate and regular rhythm. Heart sounds: Normal heart sounds. No murmur heard. No friction rub. Pulmonary:      Effort: Pulmonary effort is normal. No respiratory distress. Breath sounds: Normal breath sounds. No wheezing or rales. Chest:      Chest wall: No tenderness. Abdominal:      General: Bowel sounds are normal.      Palpations: Abdomen is soft. Tenderness: There is no abdominal tenderness. Musculoskeletal:         General: No swelling. Normal range of motion. Cervical back: Normal range of motion and neck supple. Skin:     General: Skin is warm and dry. Capillary Refill: Capillary refill takes 2 to 3 seconds. Neurological:      Mental Status: He is alert and oriented to person, place, and time. Psychiatric:         Mood and Affect: Mood normal.         Behavior: Behavior normal.         Thought Content:  Thought content normal.         Judgment: Judgment normal.       Medications have been reviewed by provider in current encounter    Zo Fabian DO

## 2023-08-11 NOTE — PATIENT INSTRUCTIONS
Medicare Preventive Visit Patient Instructions  Thank you for completing your Welcome to Medicare Visit or Medicare Annual Wellness Visit today. Your next wellness visit will be due in one year (8/11/2024). The screening/preventive services that you may require over the next 5-10 years are detailed below. Some tests may not apply to you based off risk factors and/or age. Screening tests ordered at today's visit but not completed yet may show as past due. Also, please note that scanned in results may not display below. Preventive Screenings:  Service Recommendations Previous Testing/Comments   Colorectal Cancer Screening  · Colonoscopy    · Fecal Occult Blood Test (FOBT)/Fecal Immunochemical Test (FIT)  · Fecal DNA/Cologuard Test  · Flexible Sigmoidoscopy Age: 43-73 years old   Colonoscopy: every 10 years (May be performed more frequently if at higher risk)  OR  FOBT/FIT: every 1 year  OR  Cologuard: every 3 years  OR  Sigmoidoscopy: every 5 years  Screening may be recommended earlier than age 39 if at higher risk for colorectal cancer. Also, an individualized decision between you and your healthcare provider will decide whether screening between the ages of 77-80 would be appropriate.  Colonoscopy: 04/16/2023  FOBT/FIT: 03/12/2023  Cologuard: Not on file  Sigmoidoscopy: Not on file    Screening Current     Prostate Cancer Screening Individualized decision between patient and health care provider in men between ages of 53-66   Medicare will cover every 12 months beginning on the day after your 50th birthday PSA: 3.0 ng/mL     Screening Not Indicated     Hepatitis C Screening Once for adults born between 1945 and 1965  More frequently in patients at high risk for Hepatitis C Hep C Antibody: 03/10/2023    Screening Current   Diabetes Screening 1-2 times per year if you're at risk for diabetes or have pre-diabetes Fasting glucose: 86 mg/dL (6/19/2023)  A1C: 6.8 % (6/19/2023)  Screening Not Indicated  History Diabetes Cholesterol Screening Once every 5 years if you don't have a lipid disorder. May order more often based on risk factors. Lipid panel: 01/16/2023  Screening Not Indicated  History Lipid Disorder      Other Preventive Screenings Covered by Medicare:  1. Abdominal Aortic Aneurysm (AAA) Screening: covered once if your at risk. You're considered to be at risk if you have a family history of AAA or a male between the age of 70-76 who smoking at least 100 cigarettes in your lifetime. 2. Lung Cancer Screening: covers low dose CT scan once per year if you meet all of the following conditions: (1) Age 48-67; (2) No signs or symptoms of lung cancer; (3) Current smoker or have quit smoking within the last 15 years; (4) You have a tobacco smoking history of at least 20 pack years (packs per day x number of years you smoked); (5) You get a written order from a healthcare provider. 3. Glaucoma Screening: covered annually if you're considered high risk: (1) You have diabetes OR (2) Family history of glaucoma OR (3)  aged 48 and older OR (3)  American aged 72 and older  3. Osteoporosis Screening: covered every 2 years if you meet one of the following conditions: (1) Have a vertebral abnormality; (2) On glucocorticoid therapy for more than 3 months; (3) Have primary hyperparathyroidism; (4) On osteoporosis medications and need to assess response to drug therapy. 5. HIV Screening: covered annually if you're between the age of 14-79. Also covered annually if you are younger than 13 and older than 72 with risk factors for HIV infection. For pregnant patients, it is covered up to 3 times per pregnancy.     Immunizations:  Immunization Recommendations   Influenza Vaccine Annual influenza vaccination during flu season is recommended for all persons aged >= 6 months who do not have contraindications   Pneumococcal Vaccine   * Pneumococcal conjugate vaccine = PCV13 (Prevnar 13), PCV15 (Vaxneuvance), PCV20 (Prevnar 20)  * Pneumococcal polysaccharide vaccine = PPSV23 (Pneumovax) Adults 2364 years old: 1-3 doses may be recommended based on certain risk factors  Adults 72 years old: 1-2 doses may be recommended based off what pneumonia vaccine you previously received   Hepatitis B Vaccine 3 dose series if at intermediate or high risk (ex: diabetes, end stage renal disease, liver disease)   Tetanus (Td) Vaccine - COST NOT COVERED BY MEDICARE PART B Following completion of primary series, a booster dose should be given every 10 years to maintain immunity against tetanus. Td may also be given as tetanus wound prophylaxis. Tdap Vaccine - COST NOT COVERED BY MEDICARE PART B Recommended at least once for all adults. For pregnant patients, recommended with each pregnancy. Shingles Vaccine (Shingrix) - COST NOT COVERED BY MEDICARE PART B  2 shot series recommended in those aged 48 and above     Health Maintenance Due:      Topic Date Due   • Colorectal Cancer Screening  04/13/2033   • Hepatitis C Screening  Completed     Immunizations Due:      Topic Date Due   • COVID-19 Vaccine (5 - Moderna series) 02/02/2022   • Influenza Vaccine (1) 09/01/2023     Advance Directives   What are advance directives? Advance directives are legal documents that state your wishes and plans for medical care. These plans are made ahead of time in case you lose your ability to make decisions for yourself. Advance directives can apply to any medical decision, such as the treatments you want, and if you want to donate organs. What are the types of advance directives? There are many types of advance directives, and each state has rules about how to use them. You may choose a combination of any of the following:  · Living will: This is a written record of the treatment you want. You can also choose which treatments you do not want, which to limit, and which to stop at a certain time. This includes surgery, medicine, IV fluid, and tube feedings. · Durable power of  for healthcare [facility]): This is a written record that states who you want to make healthcare choices for you when you are unable to make them for yourself. This person, called a proxy, is usually a family member or a friend. You may choose more than 1 proxy. · Do not resuscitate (DNR) order:  A DNR order is used in case your heart stops beating or you stop breathing. It is a request not to have certain forms of treatment, such as CPR. A DNR order may be included in other types of advance directives. · Medical directive: This covers the care that you want if you are in a coma, near death, or unable to make decisions for yourself. You can list the treatments you want for each condition. Treatment may include pain medicine, surgery, blood transfusions, dialysis, IV or tube feedings, and a ventilator (breathing machine). · Values history: This document has questions about your views, beliefs, and how you feel and think about life. This information can help others choose the care that you would choose. Why are advance directives important? An advance directive helps you control your care. Although spoken wishes may be used, it is better to have your wishes written down. Spoken wishes can be misunderstood, or not followed. Treatments may be given even if you do not want them. An advance directive may make it easier for your family to make difficult choices about your care. Fall Prevention    Fall prevention  includes ways to make your home and other areas safer. It also includes ways you can move more carefully to prevent a fall. Health conditions that cause changes in your blood pressure, vision, or muscle strength and coordination may increase your risk for falls. Medicines may also increase your risk for falls if they make you dizzy, weak, or sleepy. Fall prevention tips:   · Stand or sit up slowly. · Use assistive devices as directed.     · Wear shoes that fit well and have soles that . · Wear a personal alarm. · Stay active. · Manage your medical conditions. Home Safety Tips:  · Add items to prevent falls in the bathroom. · Keep paths clear. · Install bright lights in your home. · Keep items you use often on shelves within reach. · Paint or place reflective tape on the edges of your stairs. Weight Management   Why it is important to manage your weight:  Being overweight increases your risk of health conditions such as heart disease, high blood pressure, type 2 diabetes, and certain types of cancer. It can also increase your risk for osteoarthritis, sleep apnea, and other respiratory problems. Aim for a slow, steady weight loss. Even a small amount of weight loss can lower your risk of health problems. How to lose weight safely:  A safe and healthy way to lose weight is to eat fewer calories and get regular exercise. You can lose up about 1 pound a week by decreasing the number of calories you eat by 500 calories each day. Healthy meal plan for weight management:  A healthy meal plan includes a variety of foods, contains fewer calories, and helps you stay healthy. A healthy meal plan includes the following:  · Eat whole-grain foods more often. A healthy meal plan should contain fiber. Fiber is the part of grains, fruits, and vegetables that is not broken down by your body. Whole-grain foods are healthy and provide extra fiber in your diet. Some examples of whole-grain foods are whole-wheat breads and pastas, oatmeal, brown rice, and bulgur. · Eat a variety of vegetables every day. Include dark, leafy greens such as spinach, kale, yonathan greens, and mustard greens. Eat yellow and orange vegetables such as carrots, sweet potatoes, and winter squash. · Eat a variety of fruits every day. Choose fresh or canned fruit (canned in its own juice or light syrup) instead of juice. Fruit juice has very little or no fiber. · Eat low-fat dairy foods.   Drink fat-free (skim) milk or 1% milk. Eat fat-free yogurt and low-fat cottage cheese. Try low-fat cheeses such as mozzarella and other reduced-fat cheeses. · Choose meat and other protein foods that are low in fat. Choose beans or other legumes such as split peas or lentils. Choose fish, skinless poultry (chicken or turkey), or lean cuts of red meat (beef or pork). Before you cook meat or poultry, cut off any visible fat. · Use less fat and oil. Try baking foods instead of frying them. Add less fat, such as margarine, sour cream, regular salad dressing and mayonnaise to foods. Eat fewer high-fat foods. Some examples of high-fat foods include french fries, doughnuts, ice cream, and cakes. · Eat fewer sweets. Limit foods and drinks that are high in sugar. This includes candy, cookies, regular soda, and sweetened drinks. Exercise:  Exercise at least 30 minutes per day on most days of the week. Some examples of exercise include walking, biking, dancing, and swimming. You can also fit in more physical activity by taking the stairs instead of the elevator or parking farther away from stores. Ask your healthcare provider about the best exercise plan for you. © Copyright [employer] 2018 Information is for End User's use only and may not be sold, redistributed or otherwise used for commercial purposes.  All illustrations and images included in CareNotes® are the copyrighted property of [employer] or  [employer]

## 2023-08-11 NOTE — PROGRESS NOTES
Assessment and Plan:     Problem List Items Addressed This Visit    None      Falls Plan of Care: balance, strength, and gait training instructions were provided. Recommended assistive device to help with gait and balance. Preventive health issues were discussed with patient, and age appropriate screening tests were ordered as noted in patient's After Visit Summary. Personalized health advice and appropriate referrals for health education or preventive services given if needed, as noted in patient's After Visit Summary. History of Present Illness:     Patient presents for a Medicare Wellness Visit    Patient here for a Medicare subsequent well visit and acute problems will be addressed separately     Patient Care Team:  Eugene Villavicencio DO as PCP - [address] as PCP - Endocrinology (Endocrinology)  MD Rogerio Waller MD as Endoscopist  Dino Watson DO (Nephrology)     Review of Systems:     Review of Systems   Constitutional: Positive for activity change, appetite change, fatigue and unexpected weight change. Negative for diaphoresis and fever. HENT: Positive for dental problem and hearing loss. Eyes: Positive for visual disturbance. 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 Negative for apnea, cough, chest tightness and wheezing. Cardiovascular: Positive for palpitations and leg swelling. Negative for chest pain. Gastrointestinal: Positive for constipation. Negative for abdominal distention, abdominal pain, anal bleeding, diarrhea, nausea and vomiting. Endocrine: Negative for cold intolerance, heat intolerance, polydipsia, polyphagia and polyuria.         Insulin-dependent type 2 diabetes currently moderately well-controlled   Genitourinary: Positive for difficulty urinating. Negative for dysuria, flank pain, hematuria and urgency. Jose catheter with a gravity drainage bag in place this will stay in place until the patient relocates to [place] and obtains another urologist   Musculoskeletal: Positive for arthralgias. Negative for back pain, gait problem, joint swelling and myalgias. Skin: Negative for color change, rash and wound. Stage I decubitus right medial buttocks be treated with Calmoseptine and unloading of the buttocks by positioning patient differently   Allergic/Immunologic: Negative for environmental allergies, food allergies and immunocompromised state. Neurological: Positive for dizziness, tremors, weakness and light-headedness. Negative for seizures, syncope, speech difficulty, numbness and headaches. Hematological: Negative for adenopathy. Does not bruise/bleed easily. Psychiatric/Behavioral: Positive for decreased concentration. Negative for agitation, behavioral problems, hallucinations, sleep disturbance and suicidal ideas. The patient is nervous/anxious.          Problem List:     Patient Active Problem List   Diagnosis   • Arthritis   • Depression with anxiety   • Type 2 diabetes mellitus with hyperglycemia, without long-term current use of insulin ([facility])   • Gout   • Hyperlipidemia   • Hypertension   • Class 1 obesity in adult   • NSTEMI (non-ST elevated myocardial infarction) ([facility])   • Tremor   • Degenerative disc disease, lumbar   • Tremor, essential   • Diabetic neuropathy associated with diabetes mellitus due to underlying condition ([facility])   • Gait instability   • Frequent falls   • Infected epidermoid cyst   • Vitamin E deficiency   • Primary osteoarthritis involving multiple joints   • Chronic pain of both knees   • Primary osteoarthritis of both knees   • Coronary artery disease of native artery of native heart with stable angina pectoris [facility])   • Primary osteoarthritis of right knee   • Primary osteoarthritis of left knee   • Fall (on) (from) other stairs and steps, initial encounter   • Recurrent major depressive disorder ([address])   • Benign hypertension with CKD (chronic kidney disease) stage III ([address])   • Diabetic nephropathy associated with type 2 diabetes mellitus ([address])   • Concentric left ventricular hypertrophy   • Aortic stenosis, moderate   • Edema   • Mild cognitive impairment   • Bilateral lower extremity edema   • Other chest pain   • Leukocytosis   • Skin lesion   • Chronic diastolic (congestive) heart failure (HCC)   • Basal cell carcinoma (BCC) of right shoulder   • Stage 3a chronic kidney disease (HCC)   • S/P cardiac catheterization   • Peripheral arterial disease ([address])   • Mesenteric artery stenosis (HCC)   • Acute GI bleeding   • Diabetes mellitus, type 2 ([address])   • Acute diverticulitis   • Orthostatic hypotension   • Acute blood loss anemia   • Urinary retention   • Diverticulosis of colon with hemorrhage   • Elevated TSH   • Elevated d-dimer   • Hypothyroidism   • Elevated platelet count   • Low serum cortisol level   • Hot flashes   • Benign prostatic hyperplasia with urinary retention   • CPAP (continuous positive airway pressure) dependence   • H/O heart artery stent   • Preop cardiovascular exam   • Urinary tract infection associated with catheterization of urinary tract (HCC)      Past Medical and Surgical History:     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    • MRSA infection     wound and urine   • Myocardial infarction [facility]) 2014   • S/P cardiac catheterization 12/30/2022   • Sleep apnea      Past Surgical History:   Procedure Laterality Date   • BASAL CELL CARCINOMA EXCISION Right 1/21/2022    Procedure: EXCISION BASAL CELL CARCINOMA EXTREMITY;  Surgeon: Markel Irene DO;  Location: [facility] MAIN [facility];  Service: General   • CARDIAC CATHETERIZATION Left 12/29/2022    Procedure: Cardiac Left Heart Cath;  Surgeon: Seth Osborne MD;  Location: [facility] CARD[facility]AC [facility] LAB; Service: Cardiology   • CARDIAC CATHETERIZATION N/A 12/29/2022    Procedure: Cardiac Coronary Angiogram;  Surgeon: Seth Osborne MD;  [facility]: [facility]; Service: Cardiology   • CARDIAC CATHETERIZATION N/A 12/29/2022    Procedure: Cardiac pci;  Surgeon: Seth Osborne MD;  Location: [facility];   Service: Cardiology   • CATARACT EXTRACTION W/  INTRAOCULAR LENS IMPLANT     • CORNEAL TRANSPLANT     • CORONARY ANGIOPLASTY WITH STENT PLACEMENT      stents x3-- [phone]   • EYE SURGERY      repair corneal laceration   • UT COLONOSCOPY FLX DX W/COLLJ SPEC WHEN PFRMD N/A 3/21/2016    Procedure: COLONOSCOPY;  Surgeon: Aleksandra Gardiner MD;  Location: [facility];  Service: Colorectal   • TONSILLECTOMY AND ADENOIDECTOMY     • TRANSURETHRAL RESECTION OF PROSTATE N/A 7/13/2023    Procedure: Dahiana Santillan;  Surgeon: Sunny Urban MD;  Location:  MAIN OR;  Service: Urology      Family History:     Family History   Adopted: Yes   Problem Relation Age of Onset   • No Known Problems Father    • No Known Problems Mother       Social History:     Social History     Socioeconomic History   • Marital status: /Civil Union     Spouse name: None   • Number of children: None   • Years of education: None   • Highest education level: None   Occupational History   • None   Tobacco Use   • Smoking status: Never   • Smokeless tobacco: Never   Vaping Use   • Vaping Use: Never used   Substance and Sexual Activity   • Alcohol use: Yes     Comment: Occassional (holidays) social sometimes   • Drug use: No   • Sexual activity: Not Currently     Partners: Female   Other Topics Concern   • None   Social History Narrative    No advance directive     No living will      Social Determinants of Health     Financial Resource Strain: Not on file   Food Insecurity: No Food Insecurity (7/31/2023)    Hunger Vital Sign    • Worried About Running Out of Food in the Last Year: Never true    • Ran Out of Food in the Last Year: Never true   Transportation Needs: No Transportation Needs (7/31/2023)    PRAPARE - Transportation    • Lack of Transportation (Medical): No    • Lack of Transportation (Non-Medical): No   Physical Activity: Not on file   Stress: Not on file   Social Connections: Not on file   Intimate Partner Violence: Not on file   Housing Stability: Low Risk  (7/31/2023)    Housing Stability Vital Sign    • Unable to Pay for Housing in the Last Year: No    • Number of Places Lived in the Last Year: 1    • Unstable Housing in the Last Year: No      Medications and Allergies:     Current Outpatient Medications   Medication Sig Dispense Refill   • acetaminophen (TYLENOL) 325 mg tablet Take 2 tablets (650 mg total) by mouth every 6 (six) hours as needed for mild pain, fever or headaches Do not exceed a total of 3 grams of tylenol/acetaminophen in a 24-hour period.   0   • allopurinol (ZYLOPRIM) 100 mg tablet Take 1 tablet (100 mg total) by mouth daily 90 tablet 1   • aluminum-magnesium hydroxide-simethicone (MYLANTA) 9962-1197-834 mg/30 mL suspension Take 30 mL by mouth every 4 (four) hours as needed for indigestion or heartburn  0   • aspirin (ECOTRIN LOW STRENGTH) 81 mg EC tablet Take 1 tablet (81 mg total) by mouth daily Do not start before August 3, 2023. 90 tablet 0   • atorvastatin (LIPITOR) 80 mg tablet Take 1 tablet (80 mg total) by mouth daily with dinner 90 tablet 1   • Blood Glucose Monitoring Suppl (OneTouch Verio Reflect) w/Device KIT Check blood sugars twice daily. Please substitute with appropriate alternative as covered by patient's insurance. Dx: E11.65 1 kit 0   • cephalexin (KEFLEX) 500 mg capsule Take 500 mg by mouth every 12 (twelve) hours     • clopidogrel (PLAVIX) 75 mg tablet Take 1 tablet (75 mg total) by mouth daily Do not start before August 3, 2023. 90 tablet 0   • cyanocobalamin (VITAMIN B-12) 500 MCG tablet Take 1 tablet (500 mcg total) by mouth daily 90 tablet 1   • Empagliflozin (Jardiance) 10 MG TABS tablet Take 1 tablet (10 mg total) by mouth every morning 90 tablet 3   • escitalopram (LEXAPRO) 20 mg tablet Take 0.5 tablets (10 mg total) by mouth daily 90 tablet 0   • finasteride (PROSCAR) 5 mg tablet Take 1 tablet (5 mg total) by mouth daily 90 tablet 3   • fluticasone (FLONASE) 50 mcg/act nasal spray 1 spray into each nostril daily Do not start before March 23, 2023.  0   • glimepiride (AMARYL) 4 mg tablet TAKE 1 TABLET TWICE A  tablet 0   • glucose blood (OneTouch Verio) test strip Check blood sugars twice daily. Please substitute with appropriate alternative as covered by patient's insurance. Dx: E11.65 200 each 3   • Incontinence Supplies [facility] Urine Leg Bag) MISC Use in the morning 10 each 0   • levothyroxine 112 mcg tablet Take 1 tablet (112 mcg total) by mouth daily in the early morning 90 tablet 0   • metFORMIN (GLUCOPHAGE) 1000 MG tablet Take 1 tablet (1,000 mg total) by mouth 2 (two) times a day with meals 180 tablet 1   • metoprolol succinate (TOPROL-XL) 50 mg 24 hr tablet Take 1 tablet (50 mg total) by mouth daily 90 tablet 4   • multivitamin (THERAGRAN) TABS Take 1 tablet by mouth daily     • OneTouch Delica Lancets 51G MISC Check blood sugars twice daily. Please substitute with appropriate alternative as covered by patient's insurance.  Dx: E11.65 200 each 3   • oxybutynin (DITROPAN-XL) 10 MG 24 hr tablet Take 1 tablet (10 mg total) by mouth daily at bedtime 90 tablet 3   • pantoprazole (PROTONIX) 40 mg tablet Take 1 tablet (40 mg total) by mouth daily before breakfast 90 tablet 1   • polyethylene glycol (MIRALAX) 17 g packet Take 17 g by mouth daily as needed (constipation)  0   • Prolensa 0.07 % SOLN Right eye as directed     • senna-docusate sodium (SENOKOT S) 8.6-50 mg per tablet Take 1 tablet by mouth 2 (two) times a day Hold for diarrhea or loose stools  0   • torsemide (DEMADEX) 10 mg tablet Take 10 mg by mouth if needed     • zonisamide (ZONEGRAN) 50 MG capsule Take 1 tablet (50 mg total) by mouth daily 90 capsule 3     No current facility-administered medications for this visit. No Known Allergies   Immunizations:     Immunization History   Administered Date(s) Administered   • COVID-19 MODERNA VACC 0.25 ML IM BOOSTER 12/08/2021   • COVID-19 MODERNA VACC 0.5 ML IM 03/12/2021, 04/09/2021, 12/08/2021   • H1N1, All Formulations 11/06/2009   • INFLUENZA 09/01/2015, 09/01/2015, 09/14/2016, 10/04/2021   • Influenza Split High Dose Preservative Free IM 09/11/2012, 09/20/2013, 10/06/2014, 09/02/2015, 09/14/2016, 09/12/2017   • Influenza, high dose seasonal 0.7 mL 10/22/2018, 09/09/2019, 09/28/2020, 11/21/2022   • Pneumococcal Polysaccharide PPV23 1945, 12/01/2022   • Tdap 11/22/2017   • Tuberculin Skin Test 03/22/2023, 04/01/2023   • Zoster 04/10/2014   • Zoster Vaccine Recombinant 04/10/2014      Health Maintenance:         Topic Date Due   • Colorectal Cancer Screening  04/13/2033   • Hepatitis C Screening  Completed         Topic Date Due   • COVID-19 Vaccine (5 - Moderna series) 02/02/2022   • Influenza Vaccine (1) 09/01/2023      Medicare Screening Tests and Risk Assessments:     Manjeet Doug is here for his Subsequent Wellness visit. Health Risk Assessment:   Patient rates overall health as good.  Patient feels that their physical health rating is slightly worse. Patient is satisfied with their life. Eyesight was rated as slightly worse. Hearing was rated as same. Patient feels that their emotional and mental health rating is same. Patients states they are often angry. Patient states they are sometimes unusually tired/fatigued. Pain experienced in the last 7 days has been a lot. Patient's pain rating has been 10/10. Patient states that he has experienced no weight loss or gain in last 6 months. Depression Screening:   PHQ-9 Score: 8      Fall Risk Screening: In the past year, patient has experienced: history of falling in past year    Number of falls: 2 or more  Injured during fall?: Yes    Feels unsteady when standing or walking?: Yes    Worried about falling?: Yes      Home Safety:  Patient has trouble with stairs inside or outside of their home. Patient has working smoke alarms and has working carbon monoxide detector. Home safety hazards include: none. Nutrition:   Current diet is Diabetic. Medications:   Patient is currently taking over-the-counter supplements. OTC medications include: see medication list. Patient is not able to manage medications. Activities of Daily Living (ADLs)/Instrumental Activities of Daily Living (IADLs):   Walk and transfer into and out of bed and chair?: Yes  Dress and groom yourself?: No    Bathe or shower yourself?: Yes    Feed yourself?  Yes  Do your laundry/housekeeping?: No  Manage your money, pay your bills and track your expenses?: No  Make your own meals?: No    Do your own shopping?: No    Previous Hospitalizations:   Any hospitalizations or ED visits within the last 12 months?: Yes    How many hospitalizations have you had in the last year?: more than 4    Advance Care Planning:   Living will: No    Durable POA for healthcare: No    Advanced directive: No    Advanced directive counseling given: Yes    Five wishes given: No    Patient declined ACP directive: No    End of Life Decisions reviewed with patient: Yes    Provider agrees with end of life decisions: Yes      Cognitive Screening:   Provider or family/friend/caregiver concerned regarding cognition?: Yes  Mini-Mental Status Exam (MMSE) Score: 17  Interpretation: MMSE Score 10-19: Moderate dementia    PREVENTIVE SCREENINGS      Cardiovascular Screening:    General: Screening Not Indicated and History Lipid Disorder      Diabetes Screening:     General: Screening Not Indicated and History Diabetes      Colorectal Cancer Screening:     General: Screening Current      Prostate Cancer Screening:    General: Screening Not Indicated      Lung Cancer Screening:     General: Screening Not Indicated      Hepatitis C Screening:    General: Screening Current    Screening, Brief Intervention, and Referral to Treatment (SBIRT)    Screening  Typical number of drinks in a day: 0  Typical number of drinks in a week: 0  Interpretation: Low risk drinking behavior. Single Item Drug Screening:  How often have you used an illegal drug (including marijuana) or a prescription medication for non-medical reasons in the past year? never    Single Item Drug Screen Score: 0  Interpretation: Negative screen for possible drug use disorder    No results found. Physical Exam:     /64 (BP Location: Left arm, Patient Position: Sitting, Cuff Size: Standard)   Pulse 76   Temp 97.9 °F (36.6 °C) (Temporal)   Ht 5' 9" (1.753 m)   Wt 93.9 kg (207 lb)   SpO2 96%   BMI 30.57 kg/m²     Physical Exam  Constitutional:       Appearance: He is obese. He is ill-appearing. HENT:      Head: Normocephalic and atraumatic. Right Ear: External ear normal.      Left Ear: External ear normal.      Nose: Nose normal.      Mouth/Throat:      Mouth: Mucous membranes are moist.      Pharynx: No oropharyngeal exudate or posterior oropharyngeal erythema. Eyes:      Extraocular Movements: Extraocular movements intact.       Conjunctiva/sclera: Conjunctivae normal.      Pupils: Pupils are equal, round, and reactive to light. Comments: Patient has corneal transplants both eyes unable to visualize the retina   Cardiovascular:      Rate and Rhythm: Normal rate. Pulmonary:      Effort: Pulmonary effort is normal.   Abdominal:      General: Abdomen is flat. Bowel sounds are normal.      Palpations: Abdomen is soft. Genitourinary:     Comments: Jose catheter in place to gravity drainage bag I deferred his prostate and rectal exam due to the discomfort it was because given that he has a Jose catheter in place  Musculoskeletal:      Cervical back: Rigidity present. Right lower leg: Edema present. Left lower leg: Edema present. Skin:     Comments: Stage I decubitus dime sized medial aspect right buttocks   Neurological:      Mental Status: He is oriented to person, place, and time. Sensory: Sensory deficit present. Motor: Weakness present.       Coordination: Coordination abnormal.      Gait: Gait abnormal.   Psychiatric:         Behavior: Behavior normal.      Comments: Early dementia patient also has oppositional defiant disorder          Epiloreto Aquino, DO

## 2023-08-28 ENCOUNTER — TELEPHONE (OUTPATIENT)
Dept: NEPHROLOGY | Facility: CLINIC | Age: 78
End: 2023-08-28

## 2023-08-31 NOTE — TELEPHONE ENCOUNTER
patient cancelled appoiment for tomorrow he stated he lives in [place] now and will try to find a nephrologist there. He said thank you for all of your services    no

## 2023-09-25 DIAGNOSIS — E11.42 TYPE 2 DIABETES MELLITUS WITH DIABETIC POLYNEUROPATHY, WITH LONG-TERM CURRENT USE OF INSULIN (HCC): ICD-10-CM

## 2023-09-25 DIAGNOSIS — Z79.4 TYPE 2 DIABETES MELLITUS WITH DIABETIC POLYNEUROPATHY, WITH LONG-TERM CURRENT USE OF INSULIN (HCC): ICD-10-CM

## 2023-09-25 RX ORDER — GLIMEPIRIDE 4 MG/1
TABLET ORAL
Qty: 180 TABLET | Refills: 0 | Status: SHIPPED | OUTPATIENT
Start: 2023-09-25

## 2023-09-30 PROBLEM — T83.511A URINARY TRACT INFECTION ASSOCIATED WITH CATHETERIZATION OF URINARY TRACT: Status: RESOLVED | Noted: 2023-08-01 | Resolved: 2023-09-30

## 2023-09-30 PROBLEM — N39.0 URINARY TRACT INFECTION ASSOCIATED WITH CATHETERIZATION OF URINARY TRACT: Status: RESOLVED | Noted: 2023-08-01 | Resolved: 2023-09-30

## 2023-12-07 ENCOUNTER — TELEPHONE (OUTPATIENT)
Dept: NEUROLOGY | Facility: CLINIC | Age: 78
End: 2023-12-07

## 2023-12-07 NOTE — TELEPHONE ENCOUNTER
12/7 at 1:16 pm:    This is Gabi Love. I no longer live in [place]. I live in [place]. Any questions, call me back.  ___________________________________________________________________________________________    Pt has OV scheduled tomorrow. Attempted to call pt back. Phone busy.

## 2023-12-07 NOTE — TELEPHONE ENCOUNTER
I called patient as well as spouse and daughter (both on consent)    Reached vm at all numbers; left vm on patient's phone to please cb if officially cancelling appointment for tomorrow. Srinivas Reid:  Most likely patient will not be keeping appointment tomorrow with Philippe Naqvi at 1:30 PM

## 2024-01-25 NOTE — PROGRESS NOTES
1 month fuv    [facility]  Progress Note - Tone Factor 1945, 69 y o  male MRN: [id number]  Unit/Bed#: ICU 10 Encounter: [id number]  Primary Care Provider: Renee Villalobos DO   Date and time admitted to hospital: 3/2/2023 10:59 AM    * Acute lower GI bleeding  Assessment & Plan  Significantly hypotensive on presentation to [address] [place] [address]  Received 4 units PRBCs at outside hospital  Transferred to higher level of care for GI evaluation and potential need for IR  Presented with hypotension systolic blood pressure in the 80s to 90s, currently improving, no signs of acute bleeding; Most recent hemoglobin is 10  Underwent EGD which ruled out upper GI bleed  Suspect possible diverticular bleed in setting of acute diverticulitis      Plan:  · Possible colonoscopy today  Patient had bowel prep and had 1 BM with 300 cc melena  · A drop in hemoglobin of 2 g since yesterday, we will continue trending hemoglobin  · Transfuse as indicated for Hgb less than 7 or for hypotension/shock    Acute blood loss anemia  Assessment & Plan  Lab Results   Component Value Date    HGB 10 3 (L) 03/03/2023    HGB 13 0 03/02/2023    HGB 14 4 03/02/2023    HGB 9 9 (L) 03/02/2023     Patient admitted with lower GI bleed, 2 episodes of bright red blood per rectum  Hemoglobin on admission with a drop of 3 g  S/p 4 units of PRBC  As of this morning noted drop in hemoglobin of 3 g  S/p EGD which ruled out upper GI source of bleeding  Patient in the process of bowel prep for colonoscopy  Monitor hemoglobin    Hypotension  Assessment & Plan  Blood Pressure: 106/56    · With improvment  · Most likely source is GI bleed  · Most recent hemoglobin within normal range at 14 status post transfusion of 4 units of packed cells      Acute diverticulitis  Assessment & Plan  · Continue ceftriaxone and flagyl day 2  · Possible colonoscopy for today      Diabetes mellitus, type 2 [facility])  Assessment & Plan  Lab Results   Component Value Date    HGBA1C 8 1 (H) 01/16/2023       Recent Labs     03/02/23  0719   POCGLU 181*       Blood Sugar Average: Last 72 hrs:     · Home regimen: Glimepiride, Jardiance, Metformin  · SSI while inpatient   · Hypoglycemia protocol  · Accu-Cheks every 6 hours, patient currently n p o   · Diabetic diet once patient allowed to eat    Stage 3a chronic kidney disease [facility])  Assessment & Plan  Lab Results   Component Value Date    EGFR 67 03/03/2023    EGFR 49 03/02/2023    EGFR 43 03/02/2023    CREATININE 1 06 03/03/2023    CREATININE 1 38 (H) 03/02/2023    CREATININE 1 53 (H) 03/02/2023     · Creatinine baseline 1 2-1 3  · Trend urine output and renal indices  · Avoid nephrotoxic agents  · Continue measuring I/O      Chronic diastolic (congestive) heart failure (HCC)  Assessment & Plan  Wt Readings from Last 3 Encounters:   03/02/23 100 kg (221 lb 1 9 oz)   03/02/23 98 9 kg (218 lb 0 6 oz)   02/22/23 98 9 kg (218 lb)       · Hold BB for now given hypotension  · Hold torsemide given hypotension      Recurrent major depressive disorder ([facility] [address] )  Assessment & Plan  · We will start home Lexapro     Coronary artery disease of native artery of native heart with stable angina pectoris [facility])  Assessment & Plan  · Remote stent placement x3  · Most recent PCI Dec 2022  · Troponin within normal limit  · Per GI ok to resume Plavix and aspirin  · Continue with the stating  · Hold BB for now given hypotension  · Hold torsemide given hypotension    Primary osteoarthritis involving multiple joints  Assessment & Plan  · Pain control as needed    Hypertension  Assessment & Plan  · Home regimen: Losartan  · Holding home antihypertensives in setting of hypotension due to GI bleed    Hyperlipidemia  Assessment & Plan  · Resumed home regimen: Atorvastatin      ----------------------------------------------------------------------------------------  HPI/24hr events: No overnight event    Underwent bowel prep with 1 bowel movement, 300 cc melena    Patient appropriate for transfer out of the ICU today?: No  Disposition: Continue Critical Care   Code Status: Level 1 - Full Code  ---------------------------------------------------------------------------------------  SUBJECTIVE  Patient lying in bed comfortable  Review of Systems   Constitutional: Negative for chills and fever  HENT: Negative for ear pain and sore throat  Eyes: Negative for pain and visual disturbance  Respiratory: Negative for cough and shortness of breath  Cardiovascular: Negative for chest pain and palpitations  Gastrointestinal: Positive for abdominal pain (Right and left lower quadrant)  Negative for vomiting  Genitourinary: Negative for dysuria and hematuria  Musculoskeletal: Negative for arthralgias and back pain  Skin: Negative for color change and rash  Neurological: Negative for seizures and syncope  All other systems reviewed and are negative       ---------------------------------------------------------------------------------------  OBJECTIVE    Vitals   Vitals:    23 0546 23 0600 23 0700 23 0800   BP:  106/56 105/60 100/66   BP Location:    Right arm   Pulse:  84 82 84   Resp:  15 16 14   Temp:    97 7 °F (36 5 °C)   TempSrc:    Oral   SpO2:  98% 98% 98%   Weight: 101 kg (222 lb 7 1 oz)        Temp (24hrs), Av 4 °F (36 3 °C), Min:96 5 °F (35 8 °C), Max:98 6 °F (37 °C)  Current: Temperature: 97 7 °F (36 5 °C)          Respiratory:  SpO2 Device: O2 Device: None (Room air)  Nasal Cannula O2 Flow Rate (L/min): 2 L/min    Invasive/non-invasive ventilation settings   Respiratory    Lab Data (Last 4 hours)    None         O2/Vent Data (Last 4 hours)    None                Physical Exam  Vitals and nursing note reviewed  Constitutional:       General: He is not in acute distress  Appearance: He is well-developed  He is ill-appearing  HENT:      Head: Normocephalic and atraumatic     Eyes:      Conjunctiva/sclera: Conjunctivae normal    Cardiovascular:      Rate and Rhythm: Normal rate and regular rhythm  Heart sounds: Normal heart sounds  No murmur heard  Pulmonary:      Effort: Pulmonary effort is normal  No respiratory distress  Breath sounds: Normal breath sounds  No wheezing or rales  Abdominal:      General: Bowel sounds are normal       Palpations: Abdomen is soft  Tenderness: There is abdominal tenderness  Musculoskeletal:         General: No swelling  Cervical back: Neck supple  Right lower leg: No edema  Left lower leg: No edema  Skin:     General: Skin is warm and dry  Capillary Refill: Capillary refill takes less than 2 seconds  Coloration: Skin is pale  Neurological:      Mental Status: He is alert     Psychiatric:         Mood and Affect: Mood normal                Laboratory and Diagnostics:  Results from last 7 days   Lab Units 03/03/23  0545 03/02/23  1716 03/02/23  1132 03/02/23  0745 03/02/23  0531   WBC Thousand/uL 8 83  --  9 24  --  17 11*   HEMOGLOBIN g/dL 10 3* 13 0 14 4 9 9* 12 3   HEMATOCRIT % 30 2*  --  43 7 29 7* 36 7   PLATELETS Thousands/uL 173  --  203  --  329   NEUTROS PCT %  --   --  68  --  70   MONOS PCT %  --   --  9  --  8     Results from last 7 days   Lab Units 03/03/23  0545 03/02/23  1734 03/02/23  1132 03/02/23  0531   SODIUM mmol/L 140  --  138 136   POTASSIUM mmol/L 3 9 4 8 5 9* 5 9*   CHLORIDE mmol/L 110*  --  110* 103   CO2 mmol/L 24  --  21 23   ANION GAP mmol/L 6  --  7 10   BUN mg/dL 25  --  31* 28*   CREATININE mg/dL 1 06  --  1 38* 1 53*   CALCIUM mg/dL 7 5*  --  8 4 9 0   GLUCOSE RANDOM mg/dL 118  --  152* 271*   ALT U/L  --   --  22 25   AST U/L  --   --  15 15   ALK PHOS U/L  --   --  58 67   ALBUMIN g/dL  --   --  3 3* 3 7   TOTAL BILIRUBIN mg/dL  --   --  0 88 0 59     Results from last 7 days   Lab Units 03/03/23  0545 03/02/23  1132   MAGNESIUM mg/dL 2 2 1 8*   PHOSPHORUS mg/dL 3 5 3 4      Results from last 7 days   Lab Units 03/02/23  1132   INR  1 03   PTT seconds 25          Results from last 7 days   Lab Units 03/02/23  1132 03/02/23  0745 03/02/23  0531   LACTIC ACID mmol/L 1 5 2 6* 3 8*     ABG:    VBG:  Results from last 7 days   Lab Units 03/02/23  1132   PH ZEB  7 360   PCO2 ZEB mm Hg 32 9*   PO2 ZEB mm Hg 94 0*   HCO3 ZEB mmol/L 18 2*   BASE EXC ZEB mmol/L -6 1           Micro        EKG: No recent EKG  Imaging: I have personally reviewed pertinent reports  Intake and Output  I/O       03/01 0701 03/02 0700 03/02 0701 03/03 0700 03/03 0701 03/04 0700    I V  (mL/kg)  500 (5)     Total Intake(mL/kg)  500 (5)     Urine (mL/kg/hr)  745     Stool  400     Total Output  1145     Net  -645                  Height and Weights         Body mass index is 33 82 kg/m²  Weight (last 2 days)     Date/Time Weight    03/03/23 0546 101 (222 44)    03/02/23 1110 100 (221 12)            Nutrition       Diet Orders   (From admission, onward)             Start     Ordered    03/03/23 0001  Diet NPO; Sips with meds  Diet effective midnight        References:    Nutrtion Support Algorithm Enteral vs  Parenteral   Question Answer Comment   Diet Type NPO    NPO Except:  Sips with meds        03/02/23 1550                  Active Medications  Scheduled Meds:  Current Facility-Administered Medications   Medication Dose Route Frequency Provider Last Rate   • acetaminophen  650 mg Oral Q6H PRN Huey Felty, CRNP     • allopurinol  100 mg Oral Daily Evelia Aquino PA-C     • aspirin  81 mg Oral Daily Shwetha Parks PA-C     • atorvastatin  80 mg Oral Daily With Sue Parks PA-C     • cefTRIAXone  1,000 mg Intravenous Q24H Shwetha Parks PA-C 1,000 mg (03/03/23 0532)   • chlorhexidine  15 mL Mouth/Throat Q12H [address] Amari Parks PA-C     • escitalopram  10 mg Oral Daily Amari Parks PA-C     • insulin lispro  2-12 Units Subcutaneous Q6H [address] Amari Parks PA-C     • levothyroxine  88 mcg Oral Early Morning Martha Parks PA-C     • metroNIDAZOLE  500 mg Intravenous Q8H Martha Parks PA-C 500 mg (03/03/23 0820)   • multi-electrolyte  125 mL/hr Intravenous Continuous Victorina Melara PA-C 125 mL/hr (03/02/23 2336)   • multivitamin stress formula  1 tablet Oral Daily Amari Parks PA-C     • pantoprazole  40 mg Oral Daily Before Breakfast Prabhjot Davis MD     • polyethylene glycol  4,000 mL Oral See Admin Instructions Isma Stockton MD     • ticagrelor  90 mg Oral Q12H [facility] Martha Parks PA-C     • zonisamide  50 mg Oral Daily Amari Parks PA-C       Continuous Infusions:  multi-electrolyte, 125 mL/hr, Last Rate: 125 mL/hr (03/02/23 2336)      PRN Meds:   acetaminophen, 650 mg, Q6H PRN        Invasive Devices Review  Invasive Devices     Peripheral Intravenous Line  Duration           Peripheral IV 03/02/23 Left Antecubital 1 day    Peripheral IV 03/02/23 Right Antecubital 1 day                  ---------------------------------------------------------------------------------------  Advance Directive and Living Will:      Power of :    POLST:    ---------------------------------------------------------------------------------------  Care Time Delivered:   See attending's attestation      Coleen Guzman MD      Portions of the record may have been created with voice recognition software  Occasional wrong word or "sound a like" substitutions may have occurred due to the inherent limitations of voice recognition software    Read the chart carefully and recognize, using context, where substitutions have occurred

## 2024-04-10 ENCOUNTER — TELEPHONE (OUTPATIENT)
Dept: FAMILY MEDICINE CLINIC | Facility: CLINIC | Age: 79
End: 2024-04-10

## 2024-04-12 ENCOUNTER — TELEPHONE (OUTPATIENT)
Dept: FAMILY MEDICINE CLINIC | Facility: CLINIC | Age: 79
End: 2024-04-12

## 2024-04-12 NOTE — TELEPHONE ENCOUNTER
Patient called to cancel appt, they moved to [place].  Please remove patient from Dr Rdoney Castillo's panel.  Thank you

## 2024-04-23 NOTE — TELEPHONE ENCOUNTER
04/23/24 2:39 PM        The office's request has been received, reviewed, and the patient chart updated. The PCP has successfully been removed with a patient attribution note. This message will now be completed.        Thank you  Kimmy Escudero

## 2024-06-10 DIAGNOSIS — R33.9 URINARY RETENTION: ICD-10-CM

## 2024-06-10 DIAGNOSIS — R33.8 BENIGN PROSTATIC HYPERPLASIA WITH URINARY RETENTION: ICD-10-CM

## 2024-06-10 DIAGNOSIS — N40.1 BENIGN PROSTATIC HYPERPLASIA WITH URINARY RETENTION: ICD-10-CM

## 2024-06-10 RX ORDER — FINASTERIDE 5 MG/1
5 TABLET, FILM COATED ORAL DAILY
Qty: 90 TABLET | Refills: 0 | Status: SHIPPED | OUTPATIENT
Start: 2024-06-10

## 2024-09-07 DIAGNOSIS — N40.1 BENIGN PROSTATIC HYPERPLASIA WITH URINARY RETENTION: ICD-10-CM

## 2024-09-07 DIAGNOSIS — R33.9 URINARY RETENTION: ICD-10-CM

## 2024-09-07 DIAGNOSIS — R33.8 BENIGN PROSTATIC HYPERPLASIA WITH URINARY RETENTION: ICD-10-CM

## 2024-09-08 RX ORDER — FINASTERIDE 5 MG/1
5 TABLET, FILM COATED ORAL DAILY
Qty: 90 TABLET | Refills: 1 | Status: SHIPPED | OUTPATIENT
Start: 2024-09-08

## (undated) DEVICE — PREMIUM DRY TRAY LF: Brand: MEDLINE INDUSTRIES, INC.

## (undated) DEVICE — BALLOON EUPHORA RX 2.5 X 12MM

## (undated) DEVICE — 3M™ STERI-STRIP™ REINFORCED ADHESIVE SKIN CLOSURES, R1546, 1/4 IN X 4 IN (6 MM X 100 MM), 10 STRIPS/ENVELOPE: Brand: 3M™ STERI-STRIP™

## (undated) DEVICE — PAD GROUNDING ADULT

## (undated) DEVICE — RADIFOCUS OPTITORQUE ANGIOGRAPHIC CATHETER: Brand: OPTITORQUE

## (undated) DEVICE — STRETCH BANDAGE: Brand: CURITY

## (undated) DEVICE — URO CATCHER BAG STERILE 0-UC32

## (undated) DEVICE — CATH DIAG 5FR IMPULSE 100CM FR4

## (undated) DEVICE — CYSTO TUBING TUR Y IRRIGATION

## (undated) DEVICE — BETHLEHEM UNIVERSAL  MIONR EXT: Brand: CARDINAL HEALTH

## (undated) DEVICE — BAG URINE DRAINAGE 4000ML CONTINUOUS IRR

## (undated) DEVICE — 4-PORT MANIFOLD: Brand: NEPTUNE 2

## (undated) DEVICE — GLOVE SRG BIOGEL 6.5

## (undated) DEVICE — ACE WRAP 3 IN STERILE

## (undated) DEVICE — SYRINGE 10ML LL

## (undated) DEVICE — TUBING SUCTION 5MM X 12 FT

## (undated) DEVICE — NEEDLE 25G X 1 1/2

## (undated) DEVICE — GUIDEWIRE WHOLEY HI TORQUE INTERM MOD J .035 145CM

## (undated) DEVICE — PACK TUR

## (undated) DEVICE — 3M™ TEGADERM™ TRANSPARENT FILM DRESSING FRAME STYLE, 1626W, 4 IN X 4-3/4 IN (10 CM X 12 CM), 50/CT 4CT/CASE: Brand: 3M™ TEGADERM™

## (undated) DEVICE — INTENDED FOR TISSUE SEPARATION, AND OTHER PROCEDURES THAT REQUIRE A SHARP SURGICAL BLADE TO PUNCTURE OR CUT.: Brand: BARD-PARKER SAFETY BLADES SIZE 15, STERILE

## (undated) DEVICE — GAUZE SPONGES,8 PLY: Brand: CURITY

## (undated) DEVICE — GUIDELINER CATHETERS ARE INTENDED TO BE USED IN CONJUNCTION WITH GUIDE CATHETERS TO ACCESS DISCRETE REGIONS OF THE CORONARY AND/OR PERIPHERAL VASCULATURE, AND TO FACILITATE PLACEMENT OF INTERVENTIONAL DEVICES.: Brand: GUIDELINER® V3 CATHETER

## (undated) DEVICE — XIENCE SKYPOINT™ EVEROLIMUS ELUTING CORONARY STENT SYSTEM 2.25 MM X 15 MM / RAPID-EXCHANGE
Type: IMPLANTABLE DEVICE | Site: CORONARY | Status: NON-FUNCTIONAL
Brand: XIENCE SKYPOINT™

## (undated) DEVICE — CULTURE TUBE AEROBIC

## (undated) DEVICE — CHLORAPREP HI-LITE 26ML ORANGE

## (undated) DEVICE — GLOVE INDICATOR PI UNDERGLOVE SZ 7 BLUE

## (undated) DEVICE — CATH GUIDE LAUNCHER 6FR EBU 3.5

## (undated) DEVICE — Device: Brand: LEVEL 1

## (undated) DEVICE — IMPERVIOUS STOCKINETTE: Brand: DEROYAL

## (undated) DEVICE — GUIDEWIRE FFR VERRATA PLUS 185CM STR

## (undated) DEVICE — Device

## (undated) DEVICE — DGW .035 FC J3MM 260CM TEF: Brand: EMERALD

## (undated) DEVICE — BASIC SINGLE BASIN-LF: Brand: MEDLINE INDUSTRIES, INC.

## (undated) DEVICE — SINGLE PORT MANIFOLD: Brand: NEPTUNE 2

## (undated) DEVICE — TR BAND RADIAL ARTERY COMPRESSION DEVICE: Brand: TR BAND

## (undated) DEVICE — STERILE SURGICAL LUBRICANT,  TUBE: Brand: SURGILUBE

## (undated) DEVICE — SCD SEQUENTIAL COMPRESSION COMFORT SLEEVE MEDIUM KNEE LENGTH: Brand: KENDALL SCD

## (undated) DEVICE — TELFA ADHESIVE ISLAND DRESSING: Brand: TELFA

## (undated) DEVICE — RUNTHROUGH NS EXTRA FLOPPY PTCA GUIDEWIRE: Brand: RUNTHROUGH

## (undated) DEVICE — GLOVE SRG BIOGEL ECLIPSE 7

## (undated) DEVICE — SWABSTCK, BENZOIN TINCTURE, 1/PK, STRL: Brand: APLICARE

## (undated) DEVICE — SUT MONOCRYL 4-0 PS-2 27 IN Y426H

## (undated) DEVICE — PLUMEPEN PRO 10FT

## (undated) DEVICE — DISPOSABLE OR TOWEL: Brand: CARDINAL HEALTH

## (undated) DEVICE — CULTURE TUBE ANAEROBIC

## (undated) DEVICE — CHLORHEXIDINE 4PCT 4 OZ

## (undated) DEVICE — SUT VICRYL 3-0 SH 27 IN J416H

## (undated) DEVICE — GLIDESHEATH SLENDER STAINLESS STEEL KIT: Brand: GLIDESHEATH SLENDER

## (undated) DEVICE — 10FR FRAZIER SUCTION HANDLE: Brand: CARDINAL HEALTH

## (undated) DEVICE — GAUZE SPONGES,16 PLY: Brand: CURITY

## (undated) DEVICE — U-DRAPE: Brand: CONVERTORS

## (undated) DEVICE — CATH DIAG 5FR IMPULSE 100CM FL4